# Patient Record
Sex: MALE | Race: WHITE | HISPANIC OR LATINO | URBAN - METROPOLITAN AREA
[De-identification: names, ages, dates, MRNs, and addresses within clinical notes are randomized per-mention and may not be internally consistent; named-entity substitution may affect disease eponyms.]

---

## 2023-01-01 ENCOUNTER — INPATIENT (INPATIENT)
Facility: HOSPITAL | Age: 68
LOS: 10 days | Discharge: HOME HEALTH | DRG: 693 | End: 2023-05-16
Attending: STUDENT IN AN ORGANIZED HEALTH CARE EDUCATION/TRAINING PROGRAM | Admitting: STUDENT IN AN ORGANIZED HEALTH CARE EDUCATION/TRAINING PROGRAM
Payer: MEDICARE

## 2023-01-01 ENCOUNTER — RESULT REVIEW (OUTPATIENT)
Age: 68
End: 2023-01-01

## 2023-01-01 ENCOUNTER — INPATIENT (INPATIENT)
Facility: HOSPITAL | Age: 68
LOS: 30 days | DRG: 853 | End: 2023-07-19
Attending: UROLOGY | Admitting: SURGERY
Payer: MEDICARE

## 2023-01-01 ENCOUNTER — TRANSCRIPTION ENCOUNTER (OUTPATIENT)
Age: 68
End: 2023-01-01

## 2023-01-01 ENCOUNTER — APPOINTMENT (OUTPATIENT)
Dept: UROLOGY | Facility: HOSPITAL | Age: 68
End: 2023-01-01

## 2023-01-01 ENCOUNTER — NON-APPOINTMENT (OUTPATIENT)
Age: 68
End: 2023-01-01

## 2023-01-01 ENCOUNTER — APPOINTMENT (OUTPATIENT)
Dept: UROLOGY | Facility: CLINIC | Age: 68
End: 2023-01-01
Payer: MEDICARE

## 2023-01-01 VITALS
DIASTOLIC BLOOD PRESSURE: 78 MMHG | SYSTOLIC BLOOD PRESSURE: 125 MMHG | HEART RATE: 110 BPM | RESPIRATION RATE: 32 BRPM | OXYGEN SATURATION: 99 % | TEMPERATURE: 98 F | HEIGHT: 70.98 IN

## 2023-01-01 VITALS
DIASTOLIC BLOOD PRESSURE: 89 MMHG | HEIGHT: 65 IN | OXYGEN SATURATION: 100 % | HEART RATE: 95 BPM | WEIGHT: 283.96 LBS | RESPIRATION RATE: 17 BRPM | TEMPERATURE: 98 F | BODY MASS INDEX: 26.66 KG/M2 | SYSTOLIC BLOOD PRESSURE: 193 MMHG | WEIGHT: 160 LBS

## 2023-01-01 VITALS
HEART RATE: 84 BPM | SYSTOLIC BLOOD PRESSURE: 120 MMHG | DIASTOLIC BLOOD PRESSURE: 81 MMHG | RESPIRATION RATE: 18 BRPM | TEMPERATURE: 100 F

## 2023-01-01 VITALS — OXYGEN SATURATION: 76 % | RESPIRATION RATE: 28 BRPM

## 2023-01-01 DIAGNOSIS — N13.30 UNSPECIFIED HYDRONEPHROSIS: ICD-10-CM

## 2023-01-01 DIAGNOSIS — N15.1 RENAL AND PERINEPHRIC ABSCESS: ICD-10-CM

## 2023-01-01 DIAGNOSIS — I87.1 COMPRESSION OF VEIN: ICD-10-CM

## 2023-01-01 DIAGNOSIS — Z51.5 ENCOUNTER FOR PALLIATIVE CARE: ICD-10-CM

## 2023-01-01 DIAGNOSIS — Z87.891 PERSONAL HISTORY OF NICOTINE DEPENDENCE: ICD-10-CM

## 2023-01-01 DIAGNOSIS — R93.49 ABNORMAL RADIOLOGIC FINDINGS ON DIAGNOSITIC IMAGING OF OTHER URINARY ORGANS: ICD-10-CM

## 2023-01-01 DIAGNOSIS — K29.00 ACUTE GASTRITIS WITHOUT BLEEDING: ICD-10-CM

## 2023-01-01 DIAGNOSIS — D50.9 IRON DEFICIENCY ANEMIA, UNSPECIFIED: ICD-10-CM

## 2023-01-01 DIAGNOSIS — R19.00 INTRA-ABDOMINAL AND PELVIC SWELLING, MASS AND LUMP, UNSPECIFIED SITE: ICD-10-CM

## 2023-01-01 DIAGNOSIS — N28.81 HYPERTROPHY OF KIDNEY: ICD-10-CM

## 2023-01-01 DIAGNOSIS — R73.03 PREDIABETES: ICD-10-CM

## 2023-01-01 DIAGNOSIS — D64.9 ANEMIA, UNSPECIFIED: ICD-10-CM

## 2023-01-01 DIAGNOSIS — N39.0 URINARY TRACT INFECTION, SITE NOT SPECIFIED: ICD-10-CM

## 2023-01-01 DIAGNOSIS — N28.1 CYST OF KIDNEY, ACQUIRED: ICD-10-CM

## 2023-01-01 DIAGNOSIS — E43 UNSPECIFIED SEVERE PROTEIN-CALORIE MALNUTRITION: ICD-10-CM

## 2023-01-01 DIAGNOSIS — R47.81 SLURRED SPEECH: ICD-10-CM

## 2023-01-01 DIAGNOSIS — J96.01 ACUTE RESPIRATORY FAILURE WITH HYPOXIA: ICD-10-CM

## 2023-01-01 DIAGNOSIS — A04.8 OTHER SPECIFIED BACTERIAL INTESTINAL INFECTIONS: ICD-10-CM

## 2023-01-01 DIAGNOSIS — Z78.9 OTHER SPECIFIED HEALTH STATUS: ICD-10-CM

## 2023-01-01 DIAGNOSIS — R53.1 WEAKNESS: ICD-10-CM

## 2023-01-01 DIAGNOSIS — I48.91 UNSPECIFIED ATRIAL FIBRILLATION: ICD-10-CM

## 2023-01-01 DIAGNOSIS — A41.9 SEPSIS, UNSPECIFIED ORGANISM: ICD-10-CM

## 2023-01-01 DIAGNOSIS — K44.9 DIAPHRAGMATIC HERNIA WITHOUT OBSTRUCTION OR GANGRENE: ICD-10-CM

## 2023-01-01 DIAGNOSIS — K31.9 DISEASE OF STOMACH AND DUODENUM, UNSPECIFIED: ICD-10-CM

## 2023-01-01 DIAGNOSIS — K76.0 FATTY (CHANGE OF) LIVER, NOT ELSEWHERE CLASSIFIED: ICD-10-CM

## 2023-01-01 LAB
-  AMPICILLIN: SIGNIFICANT CHANGE UP
-  DAPTOMYCIN: SIGNIFICANT CHANGE UP
-  LEVOFLOXACIN: SIGNIFICANT CHANGE UP
-  LINEZOLID: SIGNIFICANT CHANGE UP
-  MINOCYCLINE: SIGNIFICANT CHANGE UP
-  TETRACYCLINE: SIGNIFICANT CHANGE UP
-  TRIMETHOPRIM/SULFAMETHOXAZOLE: SIGNIFICANT CHANGE UP
-  TRIMETHOPRIM/SULFAMETHOXAZOLE: SIGNIFICANT CHANGE UP
-  VANCOMYCIN: SIGNIFICANT CHANGE UP
24R-OH-CALCIDIOL SERPL-MCNC: 26 NG/ML — LOW (ref 30–80)
A1C WITH ESTIMATED AVERAGE GLUCOSE RESULT: 5 % — SIGNIFICANT CHANGE UP (ref 4–5.6)
ACANTHOCYTES BLD QL SMEAR: SLIGHT — SIGNIFICANT CHANGE UP
ACANTHOCYTES BLD QL SMEAR: SLIGHT — SIGNIFICANT CHANGE UP
ALBUMIN SERPL ELPH-MCNC: 1.2 G/DL — LOW (ref 3.5–5.2)
ALBUMIN SERPL ELPH-MCNC: 1.3 G/DL — LOW (ref 3.5–5.2)
ALBUMIN SERPL ELPH-MCNC: 1.3 G/DL — LOW (ref 3.5–5.2)
ALBUMIN SERPL ELPH-MCNC: 1.6 G/DL — LOW (ref 3.5–5.2)
ALBUMIN SERPL ELPH-MCNC: 1.6 G/DL — LOW (ref 3.5–5.2)
ALBUMIN SERPL ELPH-MCNC: 1.8 G/DL — LOW (ref 3.5–5.2)
ALBUMIN SERPL ELPH-MCNC: 1.9 G/DL — LOW (ref 3.5–5.2)
ALBUMIN SERPL ELPH-MCNC: 2 G/DL — LOW (ref 3.5–5.2)
ALBUMIN SERPL ELPH-MCNC: 2.1 G/DL — LOW (ref 3.5–5.2)
ALBUMIN SERPL ELPH-MCNC: 2.2 G/DL — LOW (ref 3.5–5.2)
ALBUMIN SERPL ELPH-MCNC: 2.3 G/DL — LOW (ref 3.5–5.2)
ALBUMIN SERPL ELPH-MCNC: 2.3 G/DL — LOW (ref 3.5–5.2)
ALBUMIN SERPL ELPH-MCNC: 2.4 G/DL — LOW (ref 3.5–5.2)
ALBUMIN SERPL ELPH-MCNC: 2.4 G/DL — LOW (ref 3.5–5.2)
ALBUMIN SERPL ELPH-MCNC: 2.5 G/DL — LOW (ref 3.5–5.2)
ALBUMIN SERPL ELPH-MCNC: 2.5 G/DL — LOW (ref 3.5–5.2)
ALBUMIN SERPL ELPH-MCNC: 2.6 G/DL — LOW (ref 3.5–5.2)
ALBUMIN SERPL ELPH-MCNC: 2.8 G/DL — LOW (ref 3.5–5.2)
ALBUMIN SERPL ELPH-MCNC: 2.9 G/DL — LOW (ref 3.5–5.2)
ALBUMIN SERPL ELPH-MCNC: 3 G/DL — LOW (ref 3.5–5.2)
ALBUMIN SERPL ELPH-MCNC: 3 G/DL — LOW (ref 3.5–5.2)
ALBUMIN SERPL ELPH-MCNC: 3.1 G/DL — LOW (ref 3.5–5.2)
ALP SERPL-CCNC: 106 U/L — SIGNIFICANT CHANGE UP (ref 30–115)
ALP SERPL-CCNC: 113 U/L — SIGNIFICANT CHANGE UP (ref 30–115)
ALP SERPL-CCNC: 127 U/L — HIGH (ref 30–115)
ALP SERPL-CCNC: 179 U/L — HIGH (ref 30–115)
ALP SERPL-CCNC: 43 U/L — SIGNIFICANT CHANGE UP (ref 30–115)
ALP SERPL-CCNC: 46 U/L — SIGNIFICANT CHANGE UP (ref 30–115)
ALP SERPL-CCNC: 47 U/L — SIGNIFICANT CHANGE UP (ref 30–115)
ALP SERPL-CCNC: 48 U/L — SIGNIFICANT CHANGE UP (ref 30–115)
ALP SERPL-CCNC: 49 U/L — SIGNIFICANT CHANGE UP (ref 30–115)
ALP SERPL-CCNC: 50 U/L — SIGNIFICANT CHANGE UP (ref 30–115)
ALP SERPL-CCNC: 50 U/L — SIGNIFICANT CHANGE UP (ref 30–115)
ALP SERPL-CCNC: 51 U/L — SIGNIFICANT CHANGE UP (ref 30–115)
ALP SERPL-CCNC: 52 U/L — SIGNIFICANT CHANGE UP (ref 30–115)
ALP SERPL-CCNC: 54 U/L — SIGNIFICANT CHANGE UP (ref 30–115)
ALP SERPL-CCNC: 82 U/L — SIGNIFICANT CHANGE UP (ref 30–115)
ALP SERPL-CCNC: 83 U/L — SIGNIFICANT CHANGE UP (ref 30–115)
ALP SERPL-CCNC: 84 U/L — SIGNIFICANT CHANGE UP (ref 30–115)
ALP SERPL-CCNC: 86 U/L — SIGNIFICANT CHANGE UP (ref 30–115)
ALP SERPL-CCNC: 87 U/L — SIGNIFICANT CHANGE UP (ref 30–115)
ALP SERPL-CCNC: 95 U/L — SIGNIFICANT CHANGE UP (ref 30–115)
ALP SERPL-CCNC: 97 U/L — SIGNIFICANT CHANGE UP (ref 30–115)
ALP SERPL-CCNC: 98 U/L — SIGNIFICANT CHANGE UP (ref 30–115)
ALT FLD-CCNC: 1074 U/L — HIGH (ref 0–41)
ALT FLD-CCNC: 11 U/L — SIGNIFICANT CHANGE UP (ref 0–41)
ALT FLD-CCNC: 12 U/L — SIGNIFICANT CHANGE UP (ref 0–41)
ALT FLD-CCNC: 13 U/L — SIGNIFICANT CHANGE UP (ref 0–41)
ALT FLD-CCNC: 14 U/L — SIGNIFICANT CHANGE UP (ref 0–41)
ALT FLD-CCNC: 16 U/L — SIGNIFICANT CHANGE UP (ref 0–41)
ALT FLD-CCNC: 16 U/L — SIGNIFICANT CHANGE UP (ref 0–41)
ALT FLD-CCNC: 22 U/L — SIGNIFICANT CHANGE UP (ref 0–41)
ALT FLD-CCNC: 28 U/L — SIGNIFICANT CHANGE UP (ref 0–41)
ALT FLD-CCNC: 31 U/L — SIGNIFICANT CHANGE UP (ref 0–41)
ALT FLD-CCNC: 33 U/L — SIGNIFICANT CHANGE UP (ref 0–41)
ALT FLD-CCNC: 34 U/L — SIGNIFICANT CHANGE UP (ref 0–41)
ALT FLD-CCNC: 34 U/L — SIGNIFICANT CHANGE UP (ref 0–41)
ALT FLD-CCNC: 40 U/L — SIGNIFICANT CHANGE UP (ref 0–41)
ALT FLD-CCNC: 42 U/L — HIGH (ref 0–41)
ALT FLD-CCNC: 49 U/L — HIGH (ref 0–41)
ALT FLD-CCNC: 51 U/L — HIGH (ref 0–41)
ALT FLD-CCNC: 54 U/L — HIGH (ref 0–41)
ALT FLD-CCNC: 7 U/L — SIGNIFICANT CHANGE UP (ref 0–41)
ALT FLD-CCNC: 8 U/L — SIGNIFICANT CHANGE UP (ref 0–41)
ALT FLD-CCNC: 9 U/L — SIGNIFICANT CHANGE UP (ref 0–41)
AMMONIA BLD-MCNC: 25 UMOL/L — SIGNIFICANT CHANGE UP (ref 11–55)
AMMONIA BLD-MCNC: 26 UMOL/L — SIGNIFICANT CHANGE UP (ref 11–55)
AMMONIA BLD-MCNC: 33 UMOL/L — SIGNIFICANT CHANGE UP (ref 11–55)
AMYLASE FLD-CCNC: 4565 U/L — SIGNIFICANT CHANGE UP
ANION GAP SERPL CALC-SCNC: 10 MMOL/L — SIGNIFICANT CHANGE UP (ref 7–14)
ANION GAP SERPL CALC-SCNC: 11 MMOL/L — SIGNIFICANT CHANGE UP (ref 7–14)
ANION GAP SERPL CALC-SCNC: 12 MMOL/L — SIGNIFICANT CHANGE UP (ref 7–14)
ANION GAP SERPL CALC-SCNC: 13 MMOL/L — SIGNIFICANT CHANGE UP (ref 7–14)
ANION GAP SERPL CALC-SCNC: 14 MMOL/L — SIGNIFICANT CHANGE UP (ref 7–14)
ANION GAP SERPL CALC-SCNC: 15 MMOL/L — HIGH (ref 7–14)
ANION GAP SERPL CALC-SCNC: 15 MMOL/L — HIGH (ref 7–14)
ANION GAP SERPL CALC-SCNC: 16 MMOL/L — HIGH (ref 7–14)
ANION GAP SERPL CALC-SCNC: 17 MMOL/L — HIGH (ref 7–14)
ANION GAP SERPL CALC-SCNC: 18 MMOL/L — HIGH (ref 7–14)
ANION GAP SERPL CALC-SCNC: 19 MMOL/L — HIGH (ref 7–14)
ANION GAP SERPL CALC-SCNC: 20 MMOL/L — HIGH (ref 7–14)
ANION GAP SERPL CALC-SCNC: 20 MMOL/L — HIGH (ref 7–14)
ANION GAP SERPL CALC-SCNC: 21 MMOL/L — HIGH (ref 7–14)
ANION GAP SERPL CALC-SCNC: 22 MMOL/L — HIGH (ref 7–14)
ANION GAP SERPL CALC-SCNC: 8 MMOL/L — SIGNIFICANT CHANGE UP (ref 7–14)
ANION GAP SERPL CALC-SCNC: 9 MMOL/L — SIGNIFICANT CHANGE UP (ref 7–14)
ANISOCYTOSIS BLD QL: SIGNIFICANT CHANGE UP
ANISOCYTOSIS BLD QL: SIGNIFICANT CHANGE UP
ANISOCYTOSIS BLD QL: SLIGHT — SIGNIFICANT CHANGE UP
APPEARANCE UR: ABNORMAL
APPEARANCE UR: ABNORMAL
APPEARANCE UR: CLEAR — SIGNIFICANT CHANGE UP
APPEARANCE UR: CLEAR — SIGNIFICANT CHANGE UP
APTT BLD: 108.8 SEC — CRITICAL HIGH (ref 27–39.2)
APTT BLD: 26.7 SEC — LOW (ref 27–39.2)
APTT BLD: 28.8 SEC — SIGNIFICANT CHANGE UP (ref 27–39.2)
APTT BLD: 28.9 SEC — SIGNIFICANT CHANGE UP (ref 27–39.2)
APTT BLD: 29.2 SEC — SIGNIFICANT CHANGE UP (ref 27–39.2)
APTT BLD: 29.5 SEC — SIGNIFICANT CHANGE UP (ref 27–39.2)
APTT BLD: 29.6 SEC — SIGNIFICANT CHANGE UP (ref 27–39.2)
APTT BLD: 29.8 SEC — SIGNIFICANT CHANGE UP (ref 27–39.2)
APTT BLD: 30.5 SEC — SIGNIFICANT CHANGE UP (ref 27–39.2)
APTT BLD: 30.6 SEC — SIGNIFICANT CHANGE UP (ref 27–39.2)
APTT BLD: 31.1 SEC — SIGNIFICANT CHANGE UP (ref 27–39.2)
APTT BLD: 33.2 SEC — SIGNIFICANT CHANGE UP (ref 27–39.2)
APTT BLD: 34 SEC — SIGNIFICANT CHANGE UP (ref 27–39.2)
APTT BLD: 35.4 SEC — SIGNIFICANT CHANGE UP (ref 27–39.2)
APTT BLD: 35.6 SEC — SIGNIFICANT CHANGE UP (ref 27–39.2)
APTT BLD: 36.7 SEC — SIGNIFICANT CHANGE UP (ref 27–39.2)
APTT BLD: 37.3 SEC — SIGNIFICANT CHANGE UP (ref 27–39.2)
APTT BLD: 37.6 SEC — SIGNIFICANT CHANGE UP (ref 27–39.2)
APTT BLD: 43.1 SEC — HIGH (ref 27–39.2)
APTT BLD: 44.8 SEC — HIGH (ref 27–39.2)
APTT BLD: 45 SEC — HIGH (ref 27–39.2)
APTT BLD: 46.5 SEC — HIGH (ref 27–39.2)
APTT BLD: 49.1 SEC — HIGH (ref 27–39.2)
APTT BLD: 49.5 SEC — HIGH (ref 27–39.2)
APTT BLD: 56.3 SEC — HIGH (ref 27–39.2)
APTT BLD: 62 SEC — HIGH (ref 27–39.2)
APTT BLD: 65.1 SEC — HIGH (ref 27–39.2)
APTT BLD: 65.7 SEC — HIGH (ref 27–39.2)
APTT BLD: 66.9 SEC — HIGH (ref 27–39.2)
APTT BLD: 69.2 SEC — HIGH (ref 27–39.2)
APTT BLD: 69.9 SEC — HIGH (ref 27–39.2)
APTT BLD: 75.4 SEC — CRITICAL HIGH (ref 27–39.2)
APTT BLD: 81.4 SEC — CRITICAL HIGH (ref 27–39.2)
APTT BLD: 81.7 SEC — CRITICAL HIGH (ref 27–39.2)
APTT BLD: 93.6 SEC — CRITICAL HIGH (ref 27–39.2)
APTT BLD: 99.6 SEC — CRITICAL HIGH (ref 27–39.2)
AST SERPL-CCNC: 11 U/L — SIGNIFICANT CHANGE UP (ref 0–41)
AST SERPL-CCNC: 12 U/L — SIGNIFICANT CHANGE UP (ref 0–41)
AST SERPL-CCNC: 14 U/L — SIGNIFICANT CHANGE UP (ref 0–41)
AST SERPL-CCNC: 15 U/L — SIGNIFICANT CHANGE UP (ref 0–41)
AST SERPL-CCNC: 20 U/L — SIGNIFICANT CHANGE UP (ref 0–41)
AST SERPL-CCNC: 21 U/L — SIGNIFICANT CHANGE UP (ref 0–41)
AST SERPL-CCNC: 23 U/L — SIGNIFICANT CHANGE UP (ref 0–41)
AST SERPL-CCNC: 24 U/L — SIGNIFICANT CHANGE UP (ref 0–41)
AST SERPL-CCNC: 28 U/L — SIGNIFICANT CHANGE UP (ref 0–41)
AST SERPL-CCNC: 30 U/L — SIGNIFICANT CHANGE UP (ref 0–41)
AST SERPL-CCNC: 31 U/L — SIGNIFICANT CHANGE UP (ref 0–41)
AST SERPL-CCNC: 34 U/L — SIGNIFICANT CHANGE UP (ref 0–41)
AST SERPL-CCNC: 35 U/L — SIGNIFICANT CHANGE UP (ref 0–41)
AST SERPL-CCNC: 36 U/L — SIGNIFICANT CHANGE UP (ref 0–41)
AST SERPL-CCNC: 36 U/L — SIGNIFICANT CHANGE UP (ref 0–41)
AST SERPL-CCNC: 45 U/L — HIGH (ref 0–41)
AST SERPL-CCNC: 51 U/L — HIGH (ref 0–41)
AST SERPL-CCNC: 52 U/L — HIGH (ref 0–41)
AST SERPL-CCNC: 57 U/L — HIGH (ref 0–41)
AST SERPL-CCNC: >7000 U/L — HIGH (ref 0–41)
BACTERIA # UR AUTO: ABNORMAL
BACTERIA # UR AUTO: ABNORMAL
BACTERIA # UR AUTO: NEGATIVE — SIGNIFICANT CHANGE UP
BASE EXCESS BLDA CALC-SCNC: -0.8 MMOL/L — SIGNIFICANT CHANGE UP (ref -2–3)
BASE EXCESS BLDA CALC-SCNC: -1.4 MMOL/L — SIGNIFICANT CHANGE UP (ref -2–3)
BASE EXCESS BLDA CALC-SCNC: -11.7 MMOL/L — LOW (ref -2–3)
BASE EXCESS BLDA CALC-SCNC: -3.2 MMOL/L — LOW (ref -2–3)
BASE EXCESS BLDA CALC-SCNC: -4.9 MMOL/L — LOW (ref -2–3)
BASE EXCESS BLDA CALC-SCNC: -6 MMOL/L — LOW (ref -2–3)
BASE EXCESS BLDA CALC-SCNC: -9.6 MMOL/L — LOW (ref -2–3)
BASE EXCESS BLDA CALC-SCNC: -9.6 MMOL/L — LOW (ref -2–3)
BASE EXCESS BLDV CALC-SCNC: -7.1 MMOL/L — LOW (ref -2–3)
BASE EXCESS BLDV CALC-SCNC: -8.6 MMOL/L — LOW (ref -2–3)
BASE EXCESS BLDV CALC-SCNC: 1 MMOL/L — SIGNIFICANT CHANGE UP (ref -2–3)
BASOPHILS # BLD AUTO: 0 K/UL — SIGNIFICANT CHANGE UP (ref 0–0.2)
BASOPHILS # BLD AUTO: 0.01 K/UL — SIGNIFICANT CHANGE UP (ref 0–0.2)
BASOPHILS # BLD AUTO: 0.02 K/UL — SIGNIFICANT CHANGE UP (ref 0–0.2)
BASOPHILS # BLD AUTO: 0.03 K/UL — SIGNIFICANT CHANGE UP (ref 0–0.2)
BASOPHILS # BLD AUTO: 0.04 K/UL — SIGNIFICANT CHANGE UP (ref 0–0.2)
BASOPHILS # BLD AUTO: 0.04 K/UL — SIGNIFICANT CHANGE UP (ref 0–0.2)
BASOPHILS # BLD AUTO: 0.06 K/UL — SIGNIFICANT CHANGE UP (ref 0–0.2)
BASOPHILS # BLD AUTO: 0.07 K/UL — SIGNIFICANT CHANGE UP (ref 0–0.2)
BASOPHILS # BLD AUTO: 0.08 K/UL — SIGNIFICANT CHANGE UP (ref 0–0.2)
BASOPHILS # BLD AUTO: 0.09 K/UL — SIGNIFICANT CHANGE UP (ref 0–0.2)
BASOPHILS # BLD AUTO: 0.1 K/UL — SIGNIFICANT CHANGE UP (ref 0–0.2)
BASOPHILS # BLD AUTO: 0.11 K/UL — SIGNIFICANT CHANGE UP (ref 0–0.2)
BASOPHILS # BLD AUTO: 0.13 K/UL — SIGNIFICANT CHANGE UP (ref 0–0.2)
BASOPHILS # BLD AUTO: 0.14 K/UL — SIGNIFICANT CHANGE UP (ref 0–0.2)
BASOPHILS # BLD AUTO: 0.17 K/UL — SIGNIFICANT CHANGE UP (ref 0–0.2)
BASOPHILS # BLD AUTO: 0.23 K/UL — HIGH (ref 0–0.2)
BASOPHILS NFR BLD AUTO: 0 % — SIGNIFICANT CHANGE UP (ref 0–1)
BASOPHILS NFR BLD AUTO: 0.1 % — SIGNIFICANT CHANGE UP (ref 0–1)
BASOPHILS NFR BLD AUTO: 0.2 % — SIGNIFICANT CHANGE UP (ref 0–1)
BASOPHILS NFR BLD AUTO: 0.3 % — SIGNIFICANT CHANGE UP (ref 0–1)
BASOPHILS NFR BLD AUTO: 0.4 % — SIGNIFICANT CHANGE UP (ref 0–1)
BASOPHILS NFR BLD AUTO: 0.4 % — SIGNIFICANT CHANGE UP (ref 0–1)
BASOPHILS NFR BLD AUTO: 0.6 % — SIGNIFICANT CHANGE UP (ref 0–1)
BASOPHILS NFR BLD AUTO: 0.7 % — SIGNIFICANT CHANGE UP (ref 0–1)
BASOPHILS NFR BLD AUTO: 0.8 % — SIGNIFICANT CHANGE UP (ref 0–1)
BASOPHILS NFR BLD AUTO: 0.8 % — SIGNIFICANT CHANGE UP (ref 0–1)
BASOPHILS NFR BLD AUTO: 1.1 % — HIGH (ref 0–1)
BASOPHILS NFR BLD AUTO: 2 % — HIGH (ref 0–1)
BILIRUB DIRECT SERPL-MCNC: 0.2 MG/DL — SIGNIFICANT CHANGE UP (ref 0–0.3)
BILIRUB DIRECT SERPL-MCNC: 0.2 MG/DL — SIGNIFICANT CHANGE UP (ref 0–0.3)
BILIRUB DIRECT SERPL-MCNC: 0.3 MG/DL — SIGNIFICANT CHANGE UP (ref 0–0.3)
BILIRUB DIRECT SERPL-MCNC: 0.4 MG/DL — HIGH (ref 0–0.3)
BILIRUB DIRECT SERPL-MCNC: 0.4 MG/DL — HIGH (ref 0–0.3)
BILIRUB DIRECT SERPL-MCNC: 0.7 MG/DL — HIGH (ref 0–0.3)
BILIRUB DIRECT SERPL-MCNC: 0.7 MG/DL — HIGH (ref 0–0.3)
BILIRUB INDIRECT FLD-MCNC: 0.1 MG/DL — LOW (ref 0.2–1.2)
BILIRUB INDIRECT FLD-MCNC: 0.1 MG/DL — LOW (ref 0.2–1.2)
BILIRUB INDIRECT FLD-MCNC: 0.2 MG/DL — SIGNIFICANT CHANGE UP (ref 0.2–1.2)
BILIRUB INDIRECT FLD-MCNC: 0.2 MG/DL — SIGNIFICANT CHANGE UP (ref 0.2–1.2)
BILIRUB INDIRECT FLD-MCNC: 0.3 MG/DL — SIGNIFICANT CHANGE UP (ref 0.2–1.2)
BILIRUB INDIRECT FLD-MCNC: 0.3 MG/DL — SIGNIFICANT CHANGE UP (ref 0.2–1.2)
BILIRUB INDIRECT FLD-MCNC: 0.7 MG/DL — SIGNIFICANT CHANGE UP (ref 0.2–1.2)
BILIRUB SERPL-MCNC: 0.2 MG/DL — SIGNIFICANT CHANGE UP (ref 0.2–1.2)
BILIRUB SERPL-MCNC: 0.3 MG/DL — SIGNIFICANT CHANGE UP (ref 0.2–1.2)
BILIRUB SERPL-MCNC: 0.4 MG/DL — SIGNIFICANT CHANGE UP (ref 0.2–1.2)
BILIRUB SERPL-MCNC: 0.5 MG/DL — SIGNIFICANT CHANGE UP (ref 0.2–1.2)
BILIRUB SERPL-MCNC: 0.6 MG/DL — SIGNIFICANT CHANGE UP (ref 0.2–1.2)
BILIRUB SERPL-MCNC: 0.7 MG/DL — SIGNIFICANT CHANGE UP (ref 0.2–1.2)
BILIRUB SERPL-MCNC: 0.8 MG/DL — SIGNIFICANT CHANGE UP (ref 0.2–1.2)
BILIRUB SERPL-MCNC: 0.8 MG/DL — SIGNIFICANT CHANGE UP (ref 0.2–1.2)
BILIRUB SERPL-MCNC: 0.9 MG/DL — SIGNIFICANT CHANGE UP (ref 0.2–1.2)
BILIRUB SERPL-MCNC: 1.3 MG/DL — HIGH (ref 0.2–1.2)
BILIRUB SERPL-MCNC: 1.4 MG/DL — HIGH (ref 0.2–1.2)
BILIRUB SERPL-MCNC: 1.9 MG/DL — HIGH (ref 0.2–1.2)
BILIRUB UR-MCNC: ABNORMAL
BILIRUB UR-MCNC: NEGATIVE — SIGNIFICANT CHANGE UP
BLD GP AB SCN SERPL QL: SIGNIFICANT CHANGE UP
BUN SERPL-MCNC: 101 MG/DL — CRITICAL HIGH (ref 10–20)
BUN SERPL-MCNC: 105 MG/DL — CRITICAL HIGH (ref 10–20)
BUN SERPL-MCNC: 106 MG/DL — CRITICAL HIGH (ref 10–20)
BUN SERPL-MCNC: 107 MG/DL — CRITICAL HIGH (ref 10–20)
BUN SERPL-MCNC: 110 MG/DL — CRITICAL HIGH (ref 10–20)
BUN SERPL-MCNC: 111 MG/DL — CRITICAL HIGH (ref 10–20)
BUN SERPL-MCNC: 111 MG/DL — CRITICAL HIGH (ref 10–20)
BUN SERPL-MCNC: 112 MG/DL — CRITICAL HIGH (ref 10–20)
BUN SERPL-MCNC: 116 MG/DL — CRITICAL HIGH (ref 10–20)
BUN SERPL-MCNC: 120 MG/DL — CRITICAL HIGH (ref 10–20)
BUN SERPL-MCNC: 121 MG/DL — CRITICAL HIGH (ref 10–20)
BUN SERPL-MCNC: 121 MG/DL — CRITICAL HIGH (ref 10–20)
BUN SERPL-MCNC: 125 MG/DL — CRITICAL HIGH (ref 10–20)
BUN SERPL-MCNC: 13 MG/DL — SIGNIFICANT CHANGE UP (ref 10–20)
BUN SERPL-MCNC: 21 MG/DL — HIGH (ref 10–20)
BUN SERPL-MCNC: 22 MG/DL — HIGH (ref 10–20)
BUN SERPL-MCNC: 24 MG/DL — HIGH (ref 10–20)
BUN SERPL-MCNC: 25 MG/DL — HIGH (ref 10–20)
BUN SERPL-MCNC: 28 MG/DL — HIGH (ref 10–20)
BUN SERPL-MCNC: 29 MG/DL — HIGH (ref 10–20)
BUN SERPL-MCNC: 30 MG/DL — HIGH (ref 10–20)
BUN SERPL-MCNC: 31 MG/DL — HIGH (ref 10–20)
BUN SERPL-MCNC: 33 MG/DL — HIGH (ref 10–20)
BUN SERPL-MCNC: 33 MG/DL — HIGH (ref 10–20)
BUN SERPL-MCNC: 34 MG/DL — HIGH (ref 10–20)
BUN SERPL-MCNC: 35 MG/DL — HIGH (ref 10–20)
BUN SERPL-MCNC: 40 MG/DL — HIGH (ref 10–20)
BUN SERPL-MCNC: 41 MG/DL — HIGH (ref 10–20)
BUN SERPL-MCNC: 43 MG/DL — HIGH (ref 10–20)
BUN SERPL-MCNC: 43 MG/DL — HIGH (ref 10–20)
BUN SERPL-MCNC: 46 MG/DL — HIGH (ref 10–20)
BUN SERPL-MCNC: 51 MG/DL — HIGH (ref 10–20)
BUN SERPL-MCNC: 52 MG/DL — HIGH (ref 10–20)
BUN SERPL-MCNC: 57 MG/DL — HIGH (ref 10–20)
BUN SERPL-MCNC: 58 MG/DL — HIGH (ref 10–20)
BUN SERPL-MCNC: 59 MG/DL — HIGH (ref 10–20)
BUN SERPL-MCNC: 59 MG/DL — HIGH (ref 10–20)
BUN SERPL-MCNC: 60 MG/DL — HIGH (ref 10–20)
BUN SERPL-MCNC: 61 MG/DL — CRITICAL HIGH (ref 10–20)
BUN SERPL-MCNC: 67 MG/DL — CRITICAL HIGH (ref 10–20)
BUN SERPL-MCNC: 70 MG/DL — CRITICAL HIGH (ref 10–20)
BUN SERPL-MCNC: 71 MG/DL — CRITICAL HIGH (ref 10–20)
BUN SERPL-MCNC: 73 MG/DL — CRITICAL HIGH (ref 10–20)
BUN SERPL-MCNC: 73 MG/DL — CRITICAL HIGH (ref 10–20)
BUN SERPL-MCNC: 77 MG/DL — CRITICAL HIGH (ref 10–20)
BUN SERPL-MCNC: 78 MG/DL — CRITICAL HIGH (ref 10–20)
BUN SERPL-MCNC: 80 MG/DL — CRITICAL HIGH (ref 10–20)
BUN SERPL-MCNC: 83 MG/DL — CRITICAL HIGH (ref 10–20)
BUN SERPL-MCNC: 84 MG/DL — CRITICAL HIGH (ref 10–20)
BUN SERPL-MCNC: 84 MG/DL — CRITICAL HIGH (ref 10–20)
BUN SERPL-MCNC: 85 MG/DL — CRITICAL HIGH (ref 10–20)
BUN SERPL-MCNC: 86 MG/DL — CRITICAL HIGH (ref 10–20)
BUN SERPL-MCNC: 87 MG/DL — CRITICAL HIGH (ref 10–20)
BUN SERPL-MCNC: 88 MG/DL — CRITICAL HIGH (ref 10–20)
BUN SERPL-MCNC: 88 MG/DL — CRITICAL HIGH (ref 10–20)
BUN SERPL-MCNC: 90 MG/DL — CRITICAL HIGH (ref 10–20)
BUN SERPL-MCNC: 91 MG/DL — CRITICAL HIGH (ref 10–20)
BUN SERPL-MCNC: 92 MG/DL — CRITICAL HIGH (ref 10–20)
BUN SERPL-MCNC: 92 MG/DL — CRITICAL HIGH (ref 10–20)
BUN SERPL-MCNC: 93 MG/DL — CRITICAL HIGH (ref 10–20)
BUN SERPL-MCNC: 93 MG/DL — CRITICAL HIGH (ref 10–20)
BUN SERPL-MCNC: 95 MG/DL — CRITICAL HIGH (ref 10–20)
BUN SERPL-MCNC: 98 MG/DL — CRITICAL HIGH (ref 10–20)
BURR CELLS BLD QL SMEAR: PRESENT — SIGNIFICANT CHANGE UP
BURR CELLS BLD QL SMEAR: PRESENT — SIGNIFICANT CHANGE UP
CA-I BLD-SCNC: 4.7 MG/DL — SIGNIFICANT CHANGE UP (ref 4.5–5.6)
CA-I SERPL-SCNC: 1.03 MMOL/L — LOW (ref 1.15–1.33)
CA-I SERPL-SCNC: 1.2 MMOL/L — SIGNIFICANT CHANGE UP (ref 1.15–1.33)
CA-I SERPL-SCNC: 1.45 MMOL/L — HIGH (ref 1.15–1.33)
CALCIUM SERPL-MCNC: 10.2 MG/DL — SIGNIFICANT CHANGE UP (ref 8.4–10.4)
CALCIUM SERPL-MCNC: 5.3 MG/DL — CRITICAL LOW (ref 8.4–10.5)
CALCIUM SERPL-MCNC: 5.4 MG/DL — CRITICAL LOW (ref 8.4–10.5)
CALCIUM SERPL-MCNC: 5.9 MG/DL — CRITICAL LOW (ref 8.4–10.5)
CALCIUM SERPL-MCNC: 6.2 MG/DL — LOW (ref 8.4–10.5)
CALCIUM SERPL-MCNC: 6.7 MG/DL — LOW (ref 8.4–10.5)
CALCIUM SERPL-MCNC: 6.8 MG/DL — LOW (ref 8.4–10.4)
CALCIUM SERPL-MCNC: 6.9 MG/DL — LOW (ref 8.4–10.5)
CALCIUM SERPL-MCNC: 7 MG/DL — LOW (ref 8.4–10.4)
CALCIUM SERPL-MCNC: 7 MG/DL — LOW (ref 8.4–10.4)
CALCIUM SERPL-MCNC: 7 MG/DL — LOW (ref 8.4–10.5)
CALCIUM SERPL-MCNC: 7.1 MG/DL — LOW (ref 8.4–10.4)
CALCIUM SERPL-MCNC: 7.2 MG/DL — LOW (ref 8.4–10.5)
CALCIUM SERPL-MCNC: 7.3 MG/DL — LOW (ref 8.4–10.4)
CALCIUM SERPL-MCNC: 7.3 MG/DL — LOW (ref 8.4–10.5)
CALCIUM SERPL-MCNC: 7.4 MG/DL — LOW (ref 8.4–10.4)
CALCIUM SERPL-MCNC: 7.4 MG/DL — LOW (ref 8.4–10.5)
CALCIUM SERPL-MCNC: 7.5 MG/DL — LOW (ref 8.4–10.4)
CALCIUM SERPL-MCNC: 7.5 MG/DL — LOW (ref 8.4–10.5)
CALCIUM SERPL-MCNC: 7.6 MG/DL — LOW (ref 8.4–10.5)
CALCIUM SERPL-MCNC: 7.7 MG/DL — LOW (ref 8.4–10.4)
CALCIUM SERPL-MCNC: 7.7 MG/DL — LOW (ref 8.4–10.4)
CALCIUM SERPL-MCNC: 7.7 MG/DL — LOW (ref 8.4–10.5)
CALCIUM SERPL-MCNC: 7.8 MG/DL — LOW (ref 8.4–10.5)
CALCIUM SERPL-MCNC: 8 MG/DL — LOW (ref 8.4–10.4)
CALCIUM SERPL-MCNC: 8.1 MG/DL — LOW (ref 8.4–10.5)
CALCIUM SERPL-MCNC: 8.1 MG/DL — LOW (ref 8.4–10.5)
CALCIUM SERPL-MCNC: 8.2 MG/DL — LOW (ref 8.4–10.5)
CALCIUM SERPL-MCNC: 8.2 MG/DL — LOW (ref 8.4–10.5)
CALCIUM SERPL-MCNC: 8.3 MG/DL — LOW (ref 8.4–10.5)
CALCIUM SERPL-MCNC: 8.4 MG/DL — SIGNIFICANT CHANGE UP (ref 8.4–10.5)
CALCIUM SERPL-MCNC: 8.6 MG/DL — SIGNIFICANT CHANGE UP (ref 8.4–10.5)
CALCIUM SERPL-MCNC: 8.6 MG/DL — SIGNIFICANT CHANGE UP (ref 8.4–10.5)
CALCIUM SERPL-MCNC: 8.7 MG/DL — SIGNIFICANT CHANGE UP (ref 8.4–10.4)
CALCIUM SERPL-MCNC: 8.8 MG/DL — SIGNIFICANT CHANGE UP (ref 8.4–10.5)
CALCIUM SERPL-MCNC: 9 MG/DL — SIGNIFICANT CHANGE UP (ref 8.4–10.5)
CALCIUM SERPL-MCNC: 9.4 MG/DL — SIGNIFICANT CHANGE UP (ref 8.4–10.5)
CALCIUM SERPL-MCNC: 9.5 MG/DL — SIGNIFICANT CHANGE UP (ref 8.4–10.4)
CALCIUM SERPL-MCNC: 9.7 MG/DL — SIGNIFICANT CHANGE UP (ref 8.4–10.5)
CALCIUM SERPL-MCNC: 9.8 MG/DL — SIGNIFICANT CHANGE UP (ref 8.4–10.5)
CHLORIDE SERPL-SCNC: 100 MMOL/L — SIGNIFICANT CHANGE UP (ref 98–110)
CHLORIDE SERPL-SCNC: 101 MMOL/L — SIGNIFICANT CHANGE UP (ref 98–110)
CHLORIDE SERPL-SCNC: 102 MMOL/L — SIGNIFICANT CHANGE UP (ref 98–110)
CHLORIDE SERPL-SCNC: 103 MMOL/L — SIGNIFICANT CHANGE UP (ref 98–110)
CHLORIDE SERPL-SCNC: 104 MMOL/L — SIGNIFICANT CHANGE UP (ref 98–110)
CHLORIDE SERPL-SCNC: 105 MMOL/L — SIGNIFICANT CHANGE UP (ref 98–110)
CHLORIDE SERPL-SCNC: 106 MMOL/L — SIGNIFICANT CHANGE UP (ref 98–110)
CHLORIDE SERPL-SCNC: 106 MMOL/L — SIGNIFICANT CHANGE UP (ref 98–110)
CHLORIDE SERPL-SCNC: 107 MMOL/L — SIGNIFICANT CHANGE UP (ref 98–110)
CHLORIDE SERPL-SCNC: 108 MMOL/L — SIGNIFICANT CHANGE UP (ref 98–110)
CHLORIDE SERPL-SCNC: 86 MMOL/L — LOW (ref 98–110)
CHLORIDE SERPL-SCNC: 87 MMOL/L — LOW (ref 98–110)
CHLORIDE SERPL-SCNC: 89 MMOL/L — LOW (ref 98–110)
CHLORIDE SERPL-SCNC: 89 MMOL/L — LOW (ref 98–110)
CHLORIDE SERPL-SCNC: 90 MMOL/L — LOW (ref 98–110)
CHLORIDE SERPL-SCNC: 91 MMOL/L — LOW (ref 98–110)
CHLORIDE SERPL-SCNC: 92 MMOL/L — LOW (ref 98–110)
CHLORIDE SERPL-SCNC: 93 MMOL/L — LOW (ref 98–110)
CHLORIDE SERPL-SCNC: 94 MMOL/L — LOW (ref 98–110)
CHLORIDE SERPL-SCNC: 95 MMOL/L — LOW (ref 98–110)
CHLORIDE SERPL-SCNC: 96 MMOL/L — LOW (ref 98–110)
CHLORIDE SERPL-SCNC: 96 MMOL/L — LOW (ref 98–110)
CHLORIDE SERPL-SCNC: 97 MMOL/L — LOW (ref 98–110)
CHLORIDE SERPL-SCNC: 98 MMOL/L — SIGNIFICANT CHANGE UP (ref 98–110)
CHLORIDE SERPL-SCNC: 99 MMOL/L — SIGNIFICANT CHANGE UP (ref 98–110)
CHLORIDE SERPL-SCNC: 99 MMOL/L — SIGNIFICANT CHANGE UP (ref 98–110)
CHOLEST SERPL-MCNC: 58 MG/DL — SIGNIFICANT CHANGE UP
CK MB CFR SERPL CALC: 2.2 NG/ML — SIGNIFICANT CHANGE UP (ref 0.6–6.3)
CK SERPL-CCNC: 107 U/L — SIGNIFICANT CHANGE UP (ref 0–225)
CO2 SERPL-SCNC: 13 MMOL/L — LOW (ref 17–32)
CO2 SERPL-SCNC: 14 MMOL/L — LOW (ref 17–32)
CO2 SERPL-SCNC: 15 MMOL/L — LOW (ref 17–32)
CO2 SERPL-SCNC: 16 MMOL/L — LOW (ref 17–32)
CO2 SERPL-SCNC: 17 MMOL/L — SIGNIFICANT CHANGE UP (ref 17–32)
CO2 SERPL-SCNC: 18 MMOL/L — SIGNIFICANT CHANGE UP (ref 17–32)
CO2 SERPL-SCNC: 19 MMOL/L — SIGNIFICANT CHANGE UP (ref 17–32)
CO2 SERPL-SCNC: 20 MMOL/L — SIGNIFICANT CHANGE UP (ref 17–32)
CO2 SERPL-SCNC: 21 MMOL/L — SIGNIFICANT CHANGE UP (ref 17–32)
CO2 SERPL-SCNC: 22 MMOL/L — SIGNIFICANT CHANGE UP (ref 17–32)
CO2 SERPL-SCNC: 23 MMOL/L — SIGNIFICANT CHANGE UP (ref 17–32)
CO2 SERPL-SCNC: 24 MMOL/L — SIGNIFICANT CHANGE UP (ref 17–32)
CO2 SERPL-SCNC: 25 MMOL/L — SIGNIFICANT CHANGE UP (ref 17–32)
CO2 SERPL-SCNC: 26 MMOL/L — SIGNIFICANT CHANGE UP (ref 17–32)
COLOR SPEC: ABNORMAL
COLOR SPEC: ABNORMAL
COLOR SPEC: YELLOW — SIGNIFICANT CHANGE UP
COLOR SPEC: YELLOW — SIGNIFICANT CHANGE UP
COMMENT - URINE: SIGNIFICANT CHANGE UP
CORTICOSTEROID BINDING GLOBULIN RESULT: 1.1 MG/DL — LOW
CORTIS F/TOTAL MFR SERPL: 91 % — SIGNIFICANT CHANGE UP
CORTIS SERPL-MCNC: 84 UG/DL — HIGH
CORTISOL, FREE RESULT: 76 UG/DL — HIGH
CREAT ?TM UR-MCNC: 107 MG/DL — SIGNIFICANT CHANGE UP
CREAT ?TM UR-MCNC: 57 MG/DL — SIGNIFICANT CHANGE UP
CREAT ?TM UR-MCNC: 59 MG/DL — SIGNIFICANT CHANGE UP
CREAT ?TM UR-MCNC: 80 MG/DL — SIGNIFICANT CHANGE UP
CREAT FLD-MCNC: 3.99 MG/DL — SIGNIFICANT CHANGE UP
CREAT SERPL-MCNC: 0.6 MG/DL — LOW (ref 0.7–1.5)
CREAT SERPL-MCNC: 0.7 MG/DL — SIGNIFICANT CHANGE UP (ref 0.7–1.5)
CREAT SERPL-MCNC: 0.8 MG/DL — SIGNIFICANT CHANGE UP (ref 0.7–1.5)
CREAT SERPL-MCNC: 0.8 MG/DL — SIGNIFICANT CHANGE UP (ref 0.7–1.5)
CREAT SERPL-MCNC: 0.9 MG/DL — SIGNIFICANT CHANGE UP (ref 0.7–1.5)
CREAT SERPL-MCNC: 1 MG/DL — SIGNIFICANT CHANGE UP (ref 0.7–1.5)
CREAT SERPL-MCNC: 1.1 MG/DL — SIGNIFICANT CHANGE UP (ref 0.7–1.5)
CREAT SERPL-MCNC: 1.2 MG/DL — SIGNIFICANT CHANGE UP (ref 0.7–1.5)
CREAT SERPL-MCNC: 1.3 MG/DL — SIGNIFICANT CHANGE UP (ref 0.7–1.5)
CREAT SERPL-MCNC: 1.4 MG/DL — SIGNIFICANT CHANGE UP (ref 0.7–1.5)
CREAT SERPL-MCNC: 1.4 MG/DL — SIGNIFICANT CHANGE UP (ref 0.7–1.5)
CREAT SERPL-MCNC: 1.5 MG/DL — SIGNIFICANT CHANGE UP (ref 0.7–1.5)
CREAT SERPL-MCNC: 1.6 MG/DL — HIGH (ref 0.7–1.5)
CREAT SERPL-MCNC: 1.6 MG/DL — HIGH (ref 0.7–1.5)
CREAT SERPL-MCNC: 2.1 MG/DL — HIGH (ref 0.7–1.5)
CREAT SERPL-MCNC: 2.5 MG/DL — HIGH (ref 0.7–1.5)
CREAT SERPL-MCNC: 2.5 MG/DL — HIGH (ref 0.7–1.5)
CREAT SERPL-MCNC: 2.6 MG/DL — HIGH (ref 0.7–1.5)
CREAT SERPL-MCNC: 2.7 MG/DL — HIGH (ref 0.7–1.5)
CREAT SERPL-MCNC: 2.8 MG/DL — HIGH (ref 0.7–1.5)
CREAT SERPL-MCNC: 2.8 MG/DL — HIGH (ref 0.7–1.5)
CREAT SERPL-MCNC: 2.9 MG/DL — HIGH (ref 0.7–1.5)
CREAT SERPL-MCNC: 3 MG/DL — HIGH (ref 0.7–1.5)
CREAT SERPL-MCNC: 3.1 MG/DL — HIGH (ref 0.7–1.5)
CREAT SERPL-MCNC: 3.1 MG/DL — HIGH (ref 0.7–1.5)
CREAT SERPL-MCNC: 3.2 MG/DL — HIGH (ref 0.7–1.5)
CREAT SERPL-MCNC: 3.3 MG/DL — HIGH (ref 0.7–1.5)
CREAT SERPL-MCNC: 3.6 MG/DL — HIGH (ref 0.7–1.5)
CREAT SERPL-MCNC: 3.6 MG/DL — HIGH (ref 0.7–1.5)
CREAT SERPL-MCNC: 3.7 MG/DL — HIGH (ref 0.7–1.5)
CREAT SERPL-MCNC: 3.8 MG/DL — HIGH (ref 0.7–1.5)
CREAT SERPL-MCNC: 3.8 MG/DL — HIGH (ref 0.7–1.5)
CREAT SERPL-MCNC: 3.9 MG/DL — HIGH (ref 0.7–1.5)
CREAT SERPL-MCNC: 4 MG/DL — HIGH (ref 0.7–1.5)
CREAT SERPL-MCNC: 4.1 MG/DL — CRITICAL HIGH (ref 0.7–1.5)
CREAT SERPL-MCNC: 4.2 MG/DL — CRITICAL HIGH (ref 0.7–1.5)
CREAT SERPL-MCNC: <0.5 MG/DL — LOW (ref 0.7–1.5)
CRP SERPL-MCNC: 106.7 MG/L — HIGH
CRP SERPL-MCNC: 17.9 MG/L — HIGH
CRP SERPL-MCNC: 245.1 MG/L — HIGH
CRP SERPL-MCNC: 42.7 MG/L — HIGH
CULTURE RESULTS: NO GROWTH — SIGNIFICANT CHANGE UP
CULTURE RESULTS: NO GROWTH — SIGNIFICANT CHANGE UP
CULTURE RESULTS: SIGNIFICANT CHANGE UP
DIFF PNL FLD: ABNORMAL
DIFF PNL FLD: ABNORMAL
DIFF PNL FLD: NEGATIVE — SIGNIFICANT CHANGE UP
DIFF PNL FLD: NEGATIVE — SIGNIFICANT CHANGE UP
DIGOXIN SERPL-MCNC: 1 NG/ML — SIGNIFICANT CHANGE UP (ref 0.8–2)
DIGOXIN SERPL-MCNC: 1.1 NG/ML — SIGNIFICANT CHANGE UP (ref 0.8–2)
DIGOXIN SERPL-MCNC: 1.4 NG/ML — SIGNIFICANT CHANGE UP (ref 0.8–2)
DIGOXIN SERPL-MCNC: 1.7 NG/ML — SIGNIFICANT CHANGE UP (ref 0.8–2)
DIGOXIN SERPL-MCNC: 1.9 NG/ML — SIGNIFICANT CHANGE UP (ref 0.8–2)
DIGOXIN SERPL-MCNC: 2.7 NG/ML — CRITICAL HIGH (ref 0.8–2)
DIGOXIN SERPL-MCNC: 2.7 NG/ML — CRITICAL HIGH (ref 0.8–2)
DIGOXIN SERPL-MCNC: 2.8 NG/ML — CRITICAL HIGH (ref 0.8–2)
DIGOXIN SERPL-MCNC: 2.8 NG/ML — CRITICAL HIGH (ref 0.8–2)
DIGOXIN SERPL-MCNC: 2.9 NG/ML — CRITICAL HIGH (ref 0.8–2)
DIGOXIN SERPL-MCNC: 3.2 NG/ML — CRITICAL HIGH (ref 0.8–2)
DIGOXIN SERPL-MCNC: 3.2 NG/ML — CRITICAL HIGH (ref 0.8–2)
DIGOXIN SERPL-MCNC: <0.3 NG/ML — LOW (ref 0.8–2)
EGFR: 104 ML/MIN/1.73M2 — SIGNIFICANT CHANGE UP
EGFR: 117 ML/MIN/1.73M2 — SIGNIFICANT CHANGE UP
EGFR: 15 ML/MIN/1.73M2 — LOW
EGFR: 16 ML/MIN/1.73M2 — LOW
EGFR: 17 ML/MIN/1.73M2 — LOW
EGFR: 18 ML/MIN/1.73M2 — LOW
EGFR: 18 ML/MIN/1.73M2 — LOW
EGFR: 19 ML/MIN/1.73M2 — LOW
EGFR: 20 ML/MIN/1.73M2 — LOW
EGFR: 21 ML/MIN/1.73M2 — LOW
EGFR: 21 ML/MIN/1.73M2 — LOW
EGFR: 22 ML/MIN/1.73M2 — LOW
EGFR: 23 ML/MIN/1.73M2 — LOW
EGFR: 24 ML/MIN/1.73M2 — LOW
EGFR: 24 ML/MIN/1.73M2 — LOW
EGFR: 25 ML/MIN/1.73M2 — LOW
EGFR: 26 ML/MIN/1.73M2 — LOW
EGFR: 27 ML/MIN/1.73M2 — LOW
EGFR: 27 ML/MIN/1.73M2 — LOW
EGFR: 33 ML/MIN/1.73M2 — LOW
EGFR: 46 ML/MIN/1.73M2 — LOW
EGFR: 46 ML/MIN/1.73M2 — LOW
EGFR: 50 ML/MIN/1.73M2 — LOW
EGFR: 54 ML/MIN/1.73M2 — LOW
EGFR: 54 ML/MIN/1.73M2 — LOW
EGFR: 59 ML/MIN/1.73M2 — LOW
EGFR: 65 ML/MIN/1.73M2 — SIGNIFICANT CHANGE UP
EGFR: 72 ML/MIN/1.73M2 — SIGNIFICANT CHANGE UP
EGFR: 73 ML/MIN/1.73M2 — SIGNIFICANT CHANGE UP
EGFR: 73 ML/MIN/1.73M2 — SIGNIFICANT CHANGE UP
EGFR: 81 ML/MIN/1.73M2 — SIGNIFICANT CHANGE UP
EGFR: 92 ML/MIN/1.73M2 — SIGNIFICANT CHANGE UP
EGFR: 95 ML/MIN/1.73M2 — SIGNIFICANT CHANGE UP
EGFR: 95 ML/MIN/1.73M2 — SIGNIFICANT CHANGE UP
EGFR: 99 ML/MIN/1.73M2 — SIGNIFICANT CHANGE UP
EOSINOPHIL # BLD AUTO: 0 K/UL — SIGNIFICANT CHANGE UP (ref 0–0.7)
EOSINOPHIL # BLD AUTO: 0.01 K/UL — SIGNIFICANT CHANGE UP (ref 0–0.7)
EOSINOPHIL # BLD AUTO: 0.02 K/UL — SIGNIFICANT CHANGE UP (ref 0–0.7)
EOSINOPHIL # BLD AUTO: 0.03 K/UL — SIGNIFICANT CHANGE UP (ref 0–0.7)
EOSINOPHIL # BLD AUTO: 0.03 K/UL — SIGNIFICANT CHANGE UP (ref 0–0.7)
EOSINOPHIL # BLD AUTO: 0.04 K/UL — SIGNIFICANT CHANGE UP (ref 0–0.7)
EOSINOPHIL # BLD AUTO: 0.04 K/UL — SIGNIFICANT CHANGE UP (ref 0–0.7)
EOSINOPHIL # BLD AUTO: 0.06 K/UL — SIGNIFICANT CHANGE UP (ref 0–0.7)
EOSINOPHIL # BLD AUTO: 0.06 K/UL — SIGNIFICANT CHANGE UP (ref 0–0.7)
EOSINOPHIL # BLD AUTO: 0.09 K/UL — SIGNIFICANT CHANGE UP (ref 0–0.7)
EOSINOPHIL # BLD AUTO: 0.09 K/UL — SIGNIFICANT CHANGE UP (ref 0–0.7)
EOSINOPHIL # BLD AUTO: 0.12 K/UL — SIGNIFICANT CHANGE UP (ref 0–0.7)
EOSINOPHIL # BLD AUTO: 0.17 K/UL — SIGNIFICANT CHANGE UP (ref 0–0.7)
EOSINOPHIL # BLD AUTO: 0.18 K/UL — SIGNIFICANT CHANGE UP (ref 0–0.7)
EOSINOPHIL # BLD AUTO: 0.21 K/UL — SIGNIFICANT CHANGE UP (ref 0–0.7)
EOSINOPHIL # BLD AUTO: 0.22 K/UL — SIGNIFICANT CHANGE UP (ref 0–0.7)
EOSINOPHIL # BLD AUTO: 0.23 K/UL — SIGNIFICANT CHANGE UP (ref 0–0.7)
EOSINOPHIL # BLD AUTO: 0.26 K/UL — SIGNIFICANT CHANGE UP (ref 0–0.7)
EOSINOPHIL # BLD AUTO: 0.29 K/UL — SIGNIFICANT CHANGE UP (ref 0–0.7)
EOSINOPHIL # BLD AUTO: 0.33 K/UL — SIGNIFICANT CHANGE UP (ref 0–0.7)
EOSINOPHIL NFR BLD AUTO: 0 % — SIGNIFICANT CHANGE UP (ref 0–8)
EOSINOPHIL NFR BLD AUTO: 0.1 % — SIGNIFICANT CHANGE UP (ref 0–8)
EOSINOPHIL NFR BLD AUTO: 0.2 % — SIGNIFICANT CHANGE UP (ref 0–8)
EOSINOPHIL NFR BLD AUTO: 0.2 % — SIGNIFICANT CHANGE UP (ref 0–8)
EOSINOPHIL NFR BLD AUTO: 0.3 % — SIGNIFICANT CHANGE UP (ref 0–8)
EOSINOPHIL NFR BLD AUTO: 0.4 % — SIGNIFICANT CHANGE UP (ref 0–8)
EOSINOPHIL NFR BLD AUTO: 0.5 % — SIGNIFICANT CHANGE UP (ref 0–8)
EOSINOPHIL NFR BLD AUTO: 0.5 % — SIGNIFICANT CHANGE UP (ref 0–8)
EOSINOPHIL NFR BLD AUTO: 0.6 % — SIGNIFICANT CHANGE UP (ref 0–8)
EOSINOPHIL NFR BLD AUTO: 0.8 % — SIGNIFICANT CHANGE UP (ref 0–8)
EOSINOPHIL NFR BLD AUTO: 0.9 % — SIGNIFICANT CHANGE UP (ref 0–8)
EOSINOPHIL NFR BLD AUTO: 1.3 % — SIGNIFICANT CHANGE UP (ref 0–8)
EOSINOPHIL NFR BLD AUTO: 1.5 % — SIGNIFICANT CHANGE UP (ref 0–8)
EOSINOPHIL NFR BLD AUTO: 1.6 % — SIGNIFICANT CHANGE UP (ref 0–8)
EOSINOPHIL NFR BLD AUTO: 1.7 % — SIGNIFICANT CHANGE UP (ref 0–8)
EOSINOPHIL NFR BLD AUTO: 1.8 % — SIGNIFICANT CHANGE UP (ref 0–8)
EOSINOPHIL NFR BLD AUTO: 2.4 % — SIGNIFICANT CHANGE UP (ref 0–8)
EOSINOPHIL NFR BLD AUTO: 2.6 % — SIGNIFICANT CHANGE UP (ref 0–8)
EPI CELLS # UR: 13 /HPF — HIGH (ref 0–5)
EPI CELLS # UR: 17 /HPF — HIGH (ref 0–5)
EPI CELLS # UR: 3 /HPF — SIGNIFICANT CHANGE UP (ref 0–5)
ESTIMATED AVERAGE GLUCOSE: 97 MG/DL — SIGNIFICANT CHANGE UP (ref 68–114)
FERRITIN SERPL-MCNC: 526 NG/ML — HIGH (ref 30–400)
FIBRINOGEN PPP-MCNC: 353 MG/DL — SIGNIFICANT CHANGE UP (ref 204.4–570.6)
FOLATE SERPL-MCNC: 5.7 NG/ML — SIGNIFICANT CHANGE UP
FUNGITELL: 47 PG/ML — SIGNIFICANT CHANGE UP
FUNGITELL: >500 PG/ML
FUNGITELL: >500 PG/ML
GAMMA INTERFERON BACKGROUND BLD IA-ACNC: 0.01 IU/ML — SIGNIFICANT CHANGE UP
GAS PNL BLDA: SIGNIFICANT CHANGE UP
GAS PNL BLDV: 118 MMOL/L — CRITICAL LOW (ref 136–145)
GAS PNL BLDV: 120 MMOL/L — CRITICAL LOW (ref 136–145)
GAS PNL BLDV: 133 MMOL/L — LOW (ref 136–145)
GAS PNL BLDV: SIGNIFICANT CHANGE UP
GIANT PLATELETS BLD QL SMEAR: PRESENT — SIGNIFICANT CHANGE UP
GIANT PLATELETS BLD QL SMEAR: PRESENT — SIGNIFICANT CHANGE UP
GLUCOSE BLDC GLUCOMTR-MCNC: 100 MG/DL — HIGH (ref 70–99)
GLUCOSE BLDC GLUCOMTR-MCNC: 103 MG/DL — HIGH (ref 70–99)
GLUCOSE BLDC GLUCOMTR-MCNC: 104 MG/DL — HIGH (ref 70–99)
GLUCOSE BLDC GLUCOMTR-MCNC: 107 MG/DL — HIGH (ref 70–99)
GLUCOSE BLDC GLUCOMTR-MCNC: 107 MG/DL — HIGH (ref 70–99)
GLUCOSE BLDC GLUCOMTR-MCNC: 108 MG/DL — HIGH (ref 70–99)
GLUCOSE BLDC GLUCOMTR-MCNC: 110 MG/DL — HIGH (ref 70–99)
GLUCOSE BLDC GLUCOMTR-MCNC: 110 MG/DL — HIGH (ref 70–99)
GLUCOSE BLDC GLUCOMTR-MCNC: 111 MG/DL — HIGH (ref 70–99)
GLUCOSE BLDC GLUCOMTR-MCNC: 113 MG/DL — HIGH (ref 70–99)
GLUCOSE BLDC GLUCOMTR-MCNC: 115 MG/DL — HIGH (ref 70–99)
GLUCOSE BLDC GLUCOMTR-MCNC: 115 MG/DL — HIGH (ref 70–99)
GLUCOSE BLDC GLUCOMTR-MCNC: 117 MG/DL — HIGH (ref 70–99)
GLUCOSE BLDC GLUCOMTR-MCNC: 119 MG/DL — HIGH (ref 70–99)
GLUCOSE BLDC GLUCOMTR-MCNC: 120 MG/DL — HIGH (ref 70–99)
GLUCOSE BLDC GLUCOMTR-MCNC: 121 MG/DL — HIGH (ref 70–99)
GLUCOSE BLDC GLUCOMTR-MCNC: 122 MG/DL — HIGH (ref 70–99)
GLUCOSE BLDC GLUCOMTR-MCNC: 123 MG/DL — HIGH (ref 70–99)
GLUCOSE BLDC GLUCOMTR-MCNC: 123 MG/DL — HIGH (ref 70–99)
GLUCOSE BLDC GLUCOMTR-MCNC: 124 MG/DL — HIGH (ref 70–99)
GLUCOSE BLDC GLUCOMTR-MCNC: 126 MG/DL — HIGH (ref 70–99)
GLUCOSE BLDC GLUCOMTR-MCNC: 126 MG/DL — HIGH (ref 70–99)
GLUCOSE BLDC GLUCOMTR-MCNC: 127 MG/DL — HIGH (ref 70–99)
GLUCOSE BLDC GLUCOMTR-MCNC: 128 MG/DL — HIGH (ref 70–99)
GLUCOSE BLDC GLUCOMTR-MCNC: 130 MG/DL — HIGH (ref 70–99)
GLUCOSE BLDC GLUCOMTR-MCNC: 131 MG/DL — HIGH (ref 70–99)
GLUCOSE BLDC GLUCOMTR-MCNC: 133 MG/DL — HIGH (ref 70–99)
GLUCOSE BLDC GLUCOMTR-MCNC: 134 MG/DL — HIGH (ref 70–99)
GLUCOSE BLDC GLUCOMTR-MCNC: 136 MG/DL — HIGH (ref 70–99)
GLUCOSE BLDC GLUCOMTR-MCNC: 139 MG/DL — HIGH (ref 70–99)
GLUCOSE BLDC GLUCOMTR-MCNC: 141 MG/DL — HIGH (ref 70–99)
GLUCOSE BLDC GLUCOMTR-MCNC: 142 MG/DL — HIGH (ref 70–99)
GLUCOSE BLDC GLUCOMTR-MCNC: 145 MG/DL — HIGH (ref 70–99)
GLUCOSE BLDC GLUCOMTR-MCNC: 146 MG/DL — HIGH (ref 70–99)
GLUCOSE BLDC GLUCOMTR-MCNC: 146 MG/DL — HIGH (ref 70–99)
GLUCOSE BLDC GLUCOMTR-MCNC: 147 MG/DL — HIGH (ref 70–99)
GLUCOSE BLDC GLUCOMTR-MCNC: 147 MG/DL — HIGH (ref 70–99)
GLUCOSE BLDC GLUCOMTR-MCNC: 148 MG/DL — HIGH (ref 70–99)
GLUCOSE BLDC GLUCOMTR-MCNC: 149 MG/DL — HIGH (ref 70–99)
GLUCOSE BLDC GLUCOMTR-MCNC: 151 MG/DL — HIGH (ref 70–99)
GLUCOSE BLDC GLUCOMTR-MCNC: 151 MG/DL — HIGH (ref 70–99)
GLUCOSE BLDC GLUCOMTR-MCNC: 153 MG/DL — HIGH (ref 70–99)
GLUCOSE BLDC GLUCOMTR-MCNC: 154 MG/DL — HIGH (ref 70–99)
GLUCOSE BLDC GLUCOMTR-MCNC: 156 MG/DL — HIGH (ref 70–99)
GLUCOSE BLDC GLUCOMTR-MCNC: 158 MG/DL — HIGH (ref 70–99)
GLUCOSE BLDC GLUCOMTR-MCNC: 159 MG/DL — HIGH (ref 70–99)
GLUCOSE BLDC GLUCOMTR-MCNC: 161 MG/DL — HIGH (ref 70–99)
GLUCOSE BLDC GLUCOMTR-MCNC: 166 MG/DL — HIGH (ref 70–99)
GLUCOSE BLDC GLUCOMTR-MCNC: 168 MG/DL — HIGH (ref 70–99)
GLUCOSE BLDC GLUCOMTR-MCNC: 170 MG/DL — HIGH (ref 70–99)
GLUCOSE BLDC GLUCOMTR-MCNC: 170 MG/DL — HIGH (ref 70–99)
GLUCOSE BLDC GLUCOMTR-MCNC: 172 MG/DL — HIGH (ref 70–99)
GLUCOSE BLDC GLUCOMTR-MCNC: 178 MG/DL — HIGH (ref 70–99)
GLUCOSE BLDC GLUCOMTR-MCNC: 178 MG/DL — HIGH (ref 70–99)
GLUCOSE BLDC GLUCOMTR-MCNC: 179 MG/DL — HIGH (ref 70–99)
GLUCOSE BLDC GLUCOMTR-MCNC: 180 MG/DL — HIGH (ref 70–99)
GLUCOSE BLDC GLUCOMTR-MCNC: 191 MG/DL — HIGH (ref 70–99)
GLUCOSE BLDC GLUCOMTR-MCNC: 194 MG/DL — HIGH (ref 70–99)
GLUCOSE BLDC GLUCOMTR-MCNC: 194 MG/DL — HIGH (ref 70–99)
GLUCOSE BLDC GLUCOMTR-MCNC: 200 MG/DL — HIGH (ref 70–99)
GLUCOSE BLDC GLUCOMTR-MCNC: 201 MG/DL — HIGH (ref 70–99)
GLUCOSE BLDC GLUCOMTR-MCNC: 219 MG/DL — HIGH (ref 70–99)
GLUCOSE BLDC GLUCOMTR-MCNC: 22 MG/DL — CRITICAL LOW (ref 70–99)
GLUCOSE BLDC GLUCOMTR-MCNC: 220 MG/DL — HIGH (ref 70–99)
GLUCOSE BLDC GLUCOMTR-MCNC: 224 MG/DL — HIGH (ref 70–99)
GLUCOSE BLDC GLUCOMTR-MCNC: 259 MG/DL — HIGH (ref 70–99)
GLUCOSE BLDC GLUCOMTR-MCNC: 30 MG/DL — CRITICAL LOW (ref 70–99)
GLUCOSE BLDC GLUCOMTR-MCNC: 30 MG/DL — CRITICAL LOW (ref 70–99)
GLUCOSE BLDC GLUCOMTR-MCNC: 45 MG/DL — CRITICAL LOW (ref 70–99)
GLUCOSE BLDC GLUCOMTR-MCNC: 56 MG/DL — LOW (ref 70–99)
GLUCOSE BLDC GLUCOMTR-MCNC: 85 MG/DL — SIGNIFICANT CHANGE UP (ref 70–99)
GLUCOSE BLDC GLUCOMTR-MCNC: 90 MG/DL — SIGNIFICANT CHANGE UP (ref 70–99)
GLUCOSE BLDC GLUCOMTR-MCNC: 93 MG/DL — SIGNIFICANT CHANGE UP (ref 70–99)
GLUCOSE BLDC GLUCOMTR-MCNC: 94 MG/DL — SIGNIFICANT CHANGE UP (ref 70–99)
GLUCOSE BLDC GLUCOMTR-MCNC: 99 MG/DL — SIGNIFICANT CHANGE UP (ref 70–99)
GLUCOSE SERPL-MCNC: 100 MG/DL — HIGH (ref 70–99)
GLUCOSE SERPL-MCNC: 102 MG/DL — HIGH (ref 70–99)
GLUCOSE SERPL-MCNC: 102 MG/DL — HIGH (ref 70–99)
GLUCOSE SERPL-MCNC: 103 MG/DL — HIGH (ref 70–99)
GLUCOSE SERPL-MCNC: 104 MG/DL — HIGH (ref 70–99)
GLUCOSE SERPL-MCNC: 104 MG/DL — HIGH (ref 70–99)
GLUCOSE SERPL-MCNC: 105 MG/DL — HIGH (ref 70–99)
GLUCOSE SERPL-MCNC: 106 MG/DL — HIGH (ref 70–99)
GLUCOSE SERPL-MCNC: 107 MG/DL — HIGH (ref 70–99)
GLUCOSE SERPL-MCNC: 107 MG/DL — HIGH (ref 70–99)
GLUCOSE SERPL-MCNC: 110 MG/DL — HIGH (ref 70–99)
GLUCOSE SERPL-MCNC: 110 MG/DL — HIGH (ref 70–99)
GLUCOSE SERPL-MCNC: 111 MG/DL — HIGH (ref 70–99)
GLUCOSE SERPL-MCNC: 112 MG/DL — HIGH (ref 70–99)
GLUCOSE SERPL-MCNC: 114 MG/DL — HIGH (ref 70–99)
GLUCOSE SERPL-MCNC: 116 MG/DL — HIGH (ref 70–99)
GLUCOSE SERPL-MCNC: 116 MG/DL — HIGH (ref 70–99)
GLUCOSE SERPL-MCNC: 120 MG/DL — HIGH (ref 70–99)
GLUCOSE SERPL-MCNC: 124 MG/DL — HIGH (ref 70–99)
GLUCOSE SERPL-MCNC: 127 MG/DL — HIGH (ref 70–99)
GLUCOSE SERPL-MCNC: 127 MG/DL — HIGH (ref 70–99)
GLUCOSE SERPL-MCNC: 128 MG/DL — HIGH (ref 70–99)
GLUCOSE SERPL-MCNC: 129 MG/DL — HIGH (ref 70–99)
GLUCOSE SERPL-MCNC: 129 MG/DL — HIGH (ref 70–99)
GLUCOSE SERPL-MCNC: 131 MG/DL — HIGH (ref 70–99)
GLUCOSE SERPL-MCNC: 133 MG/DL — HIGH (ref 70–99)
GLUCOSE SERPL-MCNC: 135 MG/DL — HIGH (ref 70–99)
GLUCOSE SERPL-MCNC: 137 MG/DL — HIGH (ref 70–99)
GLUCOSE SERPL-MCNC: 142 MG/DL — HIGH (ref 70–99)
GLUCOSE SERPL-MCNC: 145 MG/DL — HIGH (ref 70–99)
GLUCOSE SERPL-MCNC: 149 MG/DL — HIGH (ref 70–99)
GLUCOSE SERPL-MCNC: 153 MG/DL — HIGH (ref 70–99)
GLUCOSE SERPL-MCNC: 154 MG/DL — HIGH (ref 70–99)
GLUCOSE SERPL-MCNC: 155 MG/DL — HIGH (ref 70–99)
GLUCOSE SERPL-MCNC: 160 MG/DL — HIGH (ref 70–99)
GLUCOSE SERPL-MCNC: 160 MG/DL — HIGH (ref 70–99)
GLUCOSE SERPL-MCNC: 161 MG/DL — HIGH (ref 70–99)
GLUCOSE SERPL-MCNC: 167 MG/DL — HIGH (ref 70–99)
GLUCOSE SERPL-MCNC: 168 MG/DL — HIGH (ref 70–99)
GLUCOSE SERPL-MCNC: 170 MG/DL — HIGH (ref 70–99)
GLUCOSE SERPL-MCNC: 171 MG/DL — HIGH (ref 70–99)
GLUCOSE SERPL-MCNC: 173 MG/DL — HIGH (ref 70–99)
GLUCOSE SERPL-MCNC: 174 MG/DL — HIGH (ref 70–99)
GLUCOSE SERPL-MCNC: 176 MG/DL — HIGH (ref 70–99)
GLUCOSE SERPL-MCNC: 177 MG/DL — HIGH (ref 70–99)
GLUCOSE SERPL-MCNC: 178 MG/DL — HIGH (ref 70–99)
GLUCOSE SERPL-MCNC: 183 MG/DL — HIGH (ref 70–99)
GLUCOSE SERPL-MCNC: 188 MG/DL — HIGH (ref 70–99)
GLUCOSE SERPL-MCNC: 191 MG/DL — HIGH (ref 70–99)
GLUCOSE SERPL-MCNC: 218 MG/DL — HIGH (ref 70–99)
GLUCOSE SERPL-MCNC: 68 MG/DL — LOW (ref 70–99)
GLUCOSE SERPL-MCNC: 68 MG/DL — LOW (ref 70–99)
GLUCOSE SERPL-MCNC: 69 MG/DL — LOW (ref 70–99)
GLUCOSE SERPL-MCNC: 73 MG/DL — SIGNIFICANT CHANGE UP (ref 70–99)
GLUCOSE SERPL-MCNC: 77 MG/DL — SIGNIFICANT CHANGE UP (ref 70–99)
GLUCOSE SERPL-MCNC: 80 MG/DL — SIGNIFICANT CHANGE UP (ref 70–99)
GLUCOSE SERPL-MCNC: 82 MG/DL — SIGNIFICANT CHANGE UP (ref 70–99)
GLUCOSE SERPL-MCNC: 83 MG/DL — SIGNIFICANT CHANGE UP (ref 70–99)
GLUCOSE SERPL-MCNC: 86 MG/DL — SIGNIFICANT CHANGE UP (ref 70–99)
GLUCOSE SERPL-MCNC: 86 MG/DL — SIGNIFICANT CHANGE UP (ref 70–99)
GLUCOSE SERPL-MCNC: 87 MG/DL — SIGNIFICANT CHANGE UP (ref 70–99)
GLUCOSE SERPL-MCNC: 91 MG/DL — SIGNIFICANT CHANGE UP (ref 70–99)
GLUCOSE SERPL-MCNC: 94 MG/DL — SIGNIFICANT CHANGE UP (ref 70–99)
GLUCOSE SERPL-MCNC: 94 MG/DL — SIGNIFICANT CHANGE UP (ref 70–99)
GLUCOSE SERPL-MCNC: 96 MG/DL — SIGNIFICANT CHANGE UP (ref 70–99)
GLUCOSE SERPL-MCNC: 97 MG/DL — SIGNIFICANT CHANGE UP (ref 70–99)
GLUCOSE SERPL-MCNC: 98 MG/DL — SIGNIFICANT CHANGE UP (ref 70–99)
GLUCOSE SERPL-MCNC: 99 MG/DL — SIGNIFICANT CHANGE UP (ref 70–99)
GLUCOSE UR QL: NEGATIVE — SIGNIFICANT CHANGE UP
GRAM STN FLD: SIGNIFICANT CHANGE UP
GRAN CASTS # UR COMP ASSIST: 2 /LPF — HIGH
HAPTOGLOB SERPL-MCNC: 165 MG/DL — SIGNIFICANT CHANGE UP (ref 34–200)
HCO3 BLDA-SCNC: 16 MMOL/L — LOW (ref 21–28)
HCO3 BLDA-SCNC: 17 MMOL/L — LOW (ref 21–28)
HCO3 BLDA-SCNC: 18 MMOL/L — LOW (ref 21–28)
HCO3 BLDA-SCNC: 18 MMOL/L — LOW (ref 21–28)
HCO3 BLDA-SCNC: 20 MMOL/L — LOW (ref 21–28)
HCO3 BLDA-SCNC: 20 MMOL/L — LOW (ref 21–28)
HCO3 BLDA-SCNC: 21 MMOL/L — SIGNIFICANT CHANGE UP (ref 21–28)
HCO3 BLDA-SCNC: 23 MMOL/L — SIGNIFICANT CHANGE UP (ref 21–28)
HCO3 BLDV-SCNC: 17 MMOL/L — LOW (ref 22–29)
HCO3 BLDV-SCNC: 19 MMOL/L — LOW (ref 22–29)
HCO3 BLDV-SCNC: 27 MMOL/L — SIGNIFICANT CHANGE UP (ref 22–29)
HCT VFR BLD CALC: 17.5 % — LOW (ref 42–52)
HCT VFR BLD CALC: 20.2 % — LOW (ref 42–52)
HCT VFR BLD CALC: 20.5 % — LOW (ref 42–52)
HCT VFR BLD CALC: 20.6 % — LOW (ref 42–52)
HCT VFR BLD CALC: 20.7 % — LOW (ref 42–52)
HCT VFR BLD CALC: 20.8 % — LOW (ref 42–52)
HCT VFR BLD CALC: 20.9 % — LOW (ref 42–52)
HCT VFR BLD CALC: 21.2 % — LOW (ref 42–52)
HCT VFR BLD CALC: 21.6 % — LOW (ref 42–52)
HCT VFR BLD CALC: 21.7 % — LOW (ref 42–52)
HCT VFR BLD CALC: 22.1 % — LOW (ref 42–52)
HCT VFR BLD CALC: 22.2 % — LOW (ref 42–52)
HCT VFR BLD CALC: 22.6 % — LOW (ref 42–52)
HCT VFR BLD CALC: 22.7 % — LOW (ref 42–52)
HCT VFR BLD CALC: 23.2 % — LOW (ref 42–52)
HCT VFR BLD CALC: 23.4 % — LOW (ref 42–52)
HCT VFR BLD CALC: 23.5 % — LOW (ref 42–52)
HCT VFR BLD CALC: 23.5 % — LOW (ref 42–52)
HCT VFR BLD CALC: 23.6 % — LOW (ref 42–52)
HCT VFR BLD CALC: 23.8 % — LOW (ref 42–52)
HCT VFR BLD CALC: 23.9 % — LOW (ref 42–52)
HCT VFR BLD CALC: 24 % — LOW (ref 42–52)
HCT VFR BLD CALC: 24 % — LOW (ref 42–52)
HCT VFR BLD CALC: 24.2 % — LOW (ref 42–52)
HCT VFR BLD CALC: 24.3 % — LOW (ref 42–52)
HCT VFR BLD CALC: 24.3 % — LOW (ref 42–52)
HCT VFR BLD CALC: 24.5 % — LOW (ref 42–52)
HCT VFR BLD CALC: 24.6 % — LOW (ref 42–52)
HCT VFR BLD CALC: 24.8 % — LOW (ref 42–52)
HCT VFR BLD CALC: 25 % — LOW (ref 42–52)
HCT VFR BLD CALC: 25 % — LOW (ref 42–52)
HCT VFR BLD CALC: 25.1 % — LOW (ref 42–52)
HCT VFR BLD CALC: 25.2 % — LOW (ref 42–52)
HCT VFR BLD CALC: 25.3 % — LOW (ref 42–52)
HCT VFR BLD CALC: 25.4 % — LOW (ref 42–52)
HCT VFR BLD CALC: 25.5 % — LOW (ref 42–52)
HCT VFR BLD CALC: 25.5 % — LOW (ref 42–52)
HCT VFR BLD CALC: 25.7 % — LOW (ref 42–52)
HCT VFR BLD CALC: 25.9 % — LOW (ref 42–52)
HCT VFR BLD CALC: 26.3 % — LOW (ref 42–52)
HCT VFR BLD CALC: 26.6 % — LOW (ref 42–52)
HCT VFR BLD CALC: 26.8 % — LOW (ref 42–52)
HCT VFR BLD CALC: 27.4 % — LOW (ref 42–52)
HCT VFR BLD CALC: 27.4 % — LOW (ref 42–52)
HCT VFR BLD CALC: 27.6 % — LOW (ref 42–52)
HCT VFR BLD CALC: 27.6 % — LOW (ref 42–52)
HCT VFR BLD CALC: 27.7 % — LOW (ref 42–52)
HCT VFR BLD CALC: 27.9 % — LOW (ref 42–52)
HCT VFR BLD CALC: 28 % — LOW (ref 42–52)
HCT VFR BLD CALC: 28.1 % — LOW (ref 42–52)
HCT VFR BLD CALC: 28.2 % — LOW (ref 42–52)
HCT VFR BLD CALC: 28.2 % — LOW (ref 42–52)
HCT VFR BLD CALC: 28.3 % — LOW (ref 42–52)
HCT VFR BLD CALC: 28.4 % — LOW (ref 42–52)
HCT VFR BLD CALC: 29.3 % — LOW (ref 42–52)
HCT VFR BLD CALC: 29.6 % — LOW (ref 42–52)
HCT VFR BLD CALC: 30.3 % — LOW (ref 42–52)
HCT VFR BLD CALC: 30.9 % — LOW (ref 42–52)
HCT VFR BLD CALC: 31 % — LOW (ref 42–52)
HCT VFR BLD CALC: 31.3 % — LOW (ref 42–52)
HCT VFR BLD CALC: 31.3 % — LOW (ref 42–52)
HCT VFR BLD CALC: 31.7 % — LOW (ref 42–52)
HCT VFR BLD CALC: 32 % — LOW (ref 42–52)
HCT VFR BLD CALC: 37.2 % — LOW (ref 42–52)
HCT VFR BLD CALC: 37.8 % — LOW (ref 42–52)
HCT VFR BLD CALC: 38.9 % — LOW (ref 42–52)
HCT VFR BLDA CALC: 23 % — LOW (ref 39–51)
HCT VFR BLDA CALC: 29 % — LOW (ref 39–51)
HCT VFR BLDA CALC: 32 % — LOW (ref 39–51)
HCV AB S/CO SERPL IA: 0.03 COI — SIGNIFICANT CHANGE UP
HCV AB SERPL-IMP: SIGNIFICANT CHANGE UP
HDLC SERPL-MCNC: 22 MG/DL — LOW
HEPARIN-PF4 AB RESULT: <0.6 U/ML — SIGNIFICANT CHANGE UP (ref 0–0.9)
HGB BLD CALC-MCNC: 10.8 G/DL — LOW (ref 12.6–17.4)
HGB BLD CALC-MCNC: 7.8 G/DL — LOW (ref 12.6–17.4)
HGB BLD CALC-MCNC: 9.8 G/DL — LOW (ref 12.6–17.4)
HGB BLD-MCNC: 10.2 G/DL — LOW (ref 14–18)
HGB BLD-MCNC: 10.6 G/DL — LOW (ref 14–18)
HGB BLD-MCNC: 10.6 G/DL — LOW (ref 14–18)
HGB BLD-MCNC: 10.8 G/DL — LOW (ref 14–18)
HGB BLD-MCNC: 12.3 G/DL — LOW (ref 14–18)
HGB BLD-MCNC: 12.4 G/DL — LOW (ref 14–18)
HGB BLD-MCNC: 12.8 G/DL — LOW (ref 14–18)
HGB BLD-MCNC: 5.3 G/DL — CRITICAL LOW (ref 14–18)
HGB BLD-MCNC: 6 G/DL — CRITICAL LOW (ref 14–18)
HGB BLD-MCNC: 6.5 G/DL — CRITICAL LOW (ref 14–18)
HGB BLD-MCNC: 6.5 G/DL — CRITICAL LOW (ref 14–18)
HGB BLD-MCNC: 6.6 G/DL — CRITICAL LOW (ref 14–18)
HGB BLD-MCNC: 6.6 G/DL — CRITICAL LOW (ref 14–18)
HGB BLD-MCNC: 6.7 G/DL — CRITICAL LOW (ref 14–18)
HGB BLD-MCNC: 7 G/DL — LOW (ref 14–18)
HGB BLD-MCNC: 7 G/DL — LOW (ref 14–18)
HGB BLD-MCNC: 7.1 G/DL — LOW (ref 14–18)
HGB BLD-MCNC: 7.1 G/DL — LOW (ref 14–18)
HGB BLD-MCNC: 7.2 G/DL — LOW (ref 14–18)
HGB BLD-MCNC: 7.3 G/DL — LOW (ref 14–18)
HGB BLD-MCNC: 7.5 G/DL — LOW (ref 14–18)
HGB BLD-MCNC: 7.6 G/DL — LOW (ref 14–18)
HGB BLD-MCNC: 7.7 G/DL — LOW (ref 14–18)
HGB BLD-MCNC: 7.8 G/DL — LOW (ref 14–18)
HGB BLD-MCNC: 7.9 G/DL — LOW (ref 14–18)
HGB BLD-MCNC: 8 G/DL — LOW (ref 14–18)
HGB BLD-MCNC: 8.1 G/DL — LOW (ref 14–18)
HGB BLD-MCNC: 8.1 G/DL — LOW (ref 14–18)
HGB BLD-MCNC: 8.2 G/DL — LOW (ref 14–18)
HGB BLD-MCNC: 8.2 G/DL — LOW (ref 14–18)
HGB BLD-MCNC: 8.3 G/DL — LOW (ref 14–18)
HGB BLD-MCNC: 8.4 G/DL — LOW (ref 14–18)
HGB BLD-MCNC: 8.5 G/DL — LOW (ref 14–18)
HGB BLD-MCNC: 8.5 G/DL — LOW (ref 14–18)
HGB BLD-MCNC: 8.6 G/DL — LOW (ref 14–18)
HGB BLD-MCNC: 8.7 G/DL — LOW (ref 14–18)
HGB BLD-MCNC: 8.9 G/DL — LOW (ref 14–18)
HGB BLD-MCNC: 8.9 G/DL — LOW (ref 14–18)
HGB BLD-MCNC: 9.1 G/DL — LOW (ref 14–18)
HGB BLD-MCNC: 9.3 G/DL — LOW (ref 14–18)
HGB BLD-MCNC: 9.4 G/DL — LOW (ref 14–18)
HGB BLD-MCNC: 9.5 G/DL — LOW (ref 14–18)
HGB BLD-MCNC: 9.5 G/DL — LOW (ref 14–18)
HGB BLD-MCNC: 9.7 G/DL — LOW (ref 14–18)
HOROWITZ INDEX BLDA+IHG-RTO: 30 — SIGNIFICANT CHANGE UP
HOROWITZ INDEX BLDA+IHG-RTO: 40 — SIGNIFICANT CHANGE UP
HOROWITZ INDEX BLDA+IHG-RTO: 60 — SIGNIFICANT CHANGE UP
HYALINE CASTS # UR AUTO: 114 /LPF — HIGH (ref 0–7)
HYALINE CASTS # UR AUTO: 2 /LPF — SIGNIFICANT CHANGE UP (ref 0–7)
HYALINE CASTS # UR AUTO: 28 /LPF — HIGH (ref 0–7)
HYPOCHROMIA BLD QL: SLIGHT — SIGNIFICANT CHANGE UP
IMM GRANULOCYTES NFR BLD AUTO: 0.2 % — SIGNIFICANT CHANGE UP (ref 0.1–0.3)
IMM GRANULOCYTES NFR BLD AUTO: 0.3 % — SIGNIFICANT CHANGE UP (ref 0.1–0.3)
IMM GRANULOCYTES NFR BLD AUTO: 0.3 % — SIGNIFICANT CHANGE UP (ref 0.1–0.3)
IMM GRANULOCYTES NFR BLD AUTO: 0.4 % — HIGH (ref 0.1–0.3)
IMM GRANULOCYTES NFR BLD AUTO: 0.5 % — HIGH (ref 0.1–0.3)
IMM GRANULOCYTES NFR BLD AUTO: 0.6 % — HIGH (ref 0.1–0.3)
IMM GRANULOCYTES NFR BLD AUTO: 0.7 % — HIGH (ref 0.1–0.3)
IMM GRANULOCYTES NFR BLD AUTO: 0.8 % — HIGH (ref 0.1–0.3)
IMM GRANULOCYTES NFR BLD AUTO: 0.8 % — HIGH (ref 0.1–0.3)
IMM GRANULOCYTES NFR BLD AUTO: 0.9 % — HIGH (ref 0.1–0.3)
IMM GRANULOCYTES NFR BLD AUTO: 1.4 % — HIGH (ref 0.1–0.3)
IMM GRANULOCYTES NFR BLD AUTO: 1.6 % — HIGH (ref 0.1–0.3)
IMM GRANULOCYTES NFR BLD AUTO: 1.6 % — HIGH (ref 0.1–0.3)
IMM GRANULOCYTES NFR BLD AUTO: 1.7 % — HIGH (ref 0.1–0.3)
IMM GRANULOCYTES NFR BLD AUTO: 1.7 % — HIGH (ref 0.1–0.3)
IMM GRANULOCYTES NFR BLD AUTO: 1.8 % — HIGH (ref 0.1–0.3)
IMM GRANULOCYTES NFR BLD AUTO: 1.9 % — HIGH (ref 0.1–0.3)
IMM GRANULOCYTES NFR BLD AUTO: 2.1 % — HIGH (ref 0.1–0.3)
IMM GRANULOCYTES NFR BLD AUTO: 3.2 % — HIGH (ref 0.1–0.3)
IMM GRANULOCYTES NFR BLD AUTO: 3.3 % — HIGH (ref 0.1–0.3)
IMM GRANULOCYTES NFR BLD AUTO: 3.6 % — HIGH (ref 0.1–0.3)
IMM GRANULOCYTES NFR BLD AUTO: 3.8 % — HIGH (ref 0.1–0.3)
IMM GRANULOCYTES NFR BLD AUTO: 3.9 % — HIGH (ref 0.1–0.3)
IMM GRANULOCYTES NFR BLD AUTO: 4 % — HIGH (ref 0.1–0.3)
IMM GRANULOCYTES NFR BLD AUTO: 4.4 % — HIGH (ref 0.1–0.3)
IMM GRANULOCYTES NFR BLD AUTO: 4.7 % — HIGH (ref 0.1–0.3)
INR BLD: 1.24 RATIO — SIGNIFICANT CHANGE UP (ref 0.65–1.3)
INR BLD: 1.3 RATIO — SIGNIFICANT CHANGE UP (ref 0.65–1.3)
INR BLD: 1.37 RATIO — HIGH (ref 0.65–1.3)
INR BLD: 1.47 RATIO — HIGH (ref 0.65–1.3)
INR BLD: 1.47 RATIO — HIGH (ref 0.65–1.3)
INR BLD: 1.49 RATIO — HIGH (ref 0.65–1.3)
INR BLD: 1.55 RATIO — HIGH (ref 0.65–1.3)
INR BLD: 1.59 RATIO — HIGH (ref 0.65–1.3)
INR BLD: 1.59 RATIO — HIGH (ref 0.65–1.3)
INR BLD: 1.61 RATIO — HIGH (ref 0.65–1.3)
INR BLD: 1.65 RATIO — HIGH (ref 0.65–1.3)
INR BLD: 1.71 RATIO — HIGH (ref 0.65–1.3)
INR BLD: 1.71 RATIO — HIGH (ref 0.65–1.3)
INR BLD: 1.78 RATIO — HIGH (ref 0.65–1.3)
INR BLD: 1.8 RATIO — HIGH (ref 0.65–1.3)
INR BLD: 1.88 RATIO — HIGH (ref 0.65–1.3)
INR BLD: 1.88 RATIO — HIGH (ref 0.65–1.3)
INR BLD: 2.2 RATIO — HIGH (ref 0.65–1.3)
INR BLD: 2.21 RATIO — HIGH (ref 0.65–1.3)
INR BLD: 3.07 RATIO — HIGH (ref 0.65–1.3)
INR BLD: 4.15 RATIO — HIGH (ref 0.65–1.3)
IRON SATN MFR SERPL: 12 UG/DL — LOW (ref 35–150)
IRON SATN MFR SERPL: 9 % — LOW (ref 15–50)
KETONES UR-MCNC: NEGATIVE — SIGNIFICANT CHANGE UP
LACTATE BLDV-MCNC: 1.1 MMOL/L — SIGNIFICANT CHANGE UP (ref 0.5–2)
LACTATE BLDV-MCNC: 3.6 MMOL/L — HIGH (ref 0.5–2)
LACTATE BLDV-MCNC: 6.2 MMOL/L — CRITICAL HIGH (ref 0.5–2)
LACTATE SERPL-SCNC: 1.8 MMOL/L — SIGNIFICANT CHANGE UP (ref 0.7–2)
LACTATE SERPL-SCNC: 5.3 MMOL/L — CRITICAL HIGH (ref 0.7–2)
LACTATE SERPL-SCNC: 6.4 MMOL/L — CRITICAL HIGH (ref 0.7–2)
LDH SERPL L TO P-CCNC: 267 U/L — HIGH (ref 50–242)
LDH SERPL L TO P-CCNC: 317 U/L — HIGH (ref 50–242)
LEUKOCYTE ESTERASE UR-ACNC: ABNORMAL
LEUKOCYTE ESTERASE UR-ACNC: ABNORMAL
LEUKOCYTE ESTERASE UR-ACNC: NEGATIVE — SIGNIFICANT CHANGE UP
LEUKOCYTE ESTERASE UR-ACNC: NEGATIVE — SIGNIFICANT CHANGE UP
LIDOCAIN IGE QN: 36 U/L — SIGNIFICANT CHANGE UP (ref 7–60)
LIPID PNL WITH DIRECT LDL SERPL: 18 MG/DL — SIGNIFICANT CHANGE UP
LYMPHOCYTES # BLD AUTO: 0 % — LOW (ref 20.5–51.1)
LYMPHOCYTES # BLD AUTO: 0 K/UL — LOW (ref 1.2–3.4)
LYMPHOCYTES # BLD AUTO: 0.15 K/UL — LOW (ref 1.2–3.4)
LYMPHOCYTES # BLD AUTO: 0.15 K/UL — LOW (ref 1.2–3.4)
LYMPHOCYTES # BLD AUTO: 0.19 K/UL — LOW (ref 1.2–3.4)
LYMPHOCYTES # BLD AUTO: 0.2 K/UL — LOW (ref 1.2–3.4)
LYMPHOCYTES # BLD AUTO: 0.26 K/UL — LOW (ref 1.2–3.4)
LYMPHOCYTES # BLD AUTO: 0.27 K/UL — LOW (ref 1.2–3.4)
LYMPHOCYTES # BLD AUTO: 0.28 K/UL — LOW (ref 1.2–3.4)
LYMPHOCYTES # BLD AUTO: 0.28 K/UL — LOW (ref 1.2–3.4)
LYMPHOCYTES # BLD AUTO: 0.29 K/UL — LOW (ref 1.2–3.4)
LYMPHOCYTES # BLD AUTO: 0.29 K/UL — LOW (ref 1.2–3.4)
LYMPHOCYTES # BLD AUTO: 0.31 K/UL — LOW (ref 1.2–3.4)
LYMPHOCYTES # BLD AUTO: 0.31 K/UL — LOW (ref 1.2–3.4)
LYMPHOCYTES # BLD AUTO: 0.32 K/UL — LOW (ref 1.2–3.4)
LYMPHOCYTES # BLD AUTO: 0.35 K/UL — LOW (ref 1.2–3.4)
LYMPHOCYTES # BLD AUTO: 0.36 K/UL — LOW (ref 1.2–3.4)
LYMPHOCYTES # BLD AUTO: 0.38 K/UL — LOW (ref 1.2–3.4)
LYMPHOCYTES # BLD AUTO: 0.42 K/UL — LOW (ref 1.2–3.4)
LYMPHOCYTES # BLD AUTO: 0.42 K/UL — LOW (ref 1.2–3.4)
LYMPHOCYTES # BLD AUTO: 0.44 K/UL — LOW (ref 1.2–3.4)
LYMPHOCYTES # BLD AUTO: 0.5 K/UL — LOW (ref 1.2–3.4)
LYMPHOCYTES # BLD AUTO: 0.51 K/UL — LOW (ref 1.2–3.4)
LYMPHOCYTES # BLD AUTO: 0.52 K/UL — LOW (ref 1.2–3.4)
LYMPHOCYTES # BLD AUTO: 0.53 K/UL — LOW (ref 1.2–3.4)
LYMPHOCYTES # BLD AUTO: 0.54 K/UL — LOW (ref 1.2–3.4)
LYMPHOCYTES # BLD AUTO: 0.6 % — LOW (ref 20.5–51.1)
LYMPHOCYTES # BLD AUTO: 0.73 K/UL — LOW (ref 1.2–3.4)
LYMPHOCYTES # BLD AUTO: 0.78 K/UL — LOW (ref 1.2–3.4)
LYMPHOCYTES # BLD AUTO: 0.78 K/UL — LOW (ref 1.2–3.4)
LYMPHOCYTES # BLD AUTO: 0.82 K/UL — LOW (ref 1.2–3.4)
LYMPHOCYTES # BLD AUTO: 0.9 % — LOW (ref 20.5–51.1)
LYMPHOCYTES # BLD AUTO: 0.9 K/UL — LOW (ref 1.2–3.4)
LYMPHOCYTES # BLD AUTO: 0.92 K/UL — LOW (ref 1.2–3.4)
LYMPHOCYTES # BLD AUTO: 0.94 K/UL — LOW (ref 1.2–3.4)
LYMPHOCYTES # BLD AUTO: 1.05 K/UL — LOW (ref 1.2–3.4)
LYMPHOCYTES # BLD AUTO: 1.1 % — LOW (ref 20.5–51.1)
LYMPHOCYTES # BLD AUTO: 1.13 K/UL — LOW (ref 1.2–3.4)
LYMPHOCYTES # BLD AUTO: 1.17 K/UL — LOW (ref 1.2–3.4)
LYMPHOCYTES # BLD AUTO: 1.2 % — LOW (ref 20.5–51.1)
LYMPHOCYTES # BLD AUTO: 1.22 K/UL — SIGNIFICANT CHANGE UP (ref 1.2–3.4)
LYMPHOCYTES # BLD AUTO: 1.29 K/UL — SIGNIFICANT CHANGE UP (ref 1.2–3.4)
LYMPHOCYTES # BLD AUTO: 1.3 % — LOW (ref 20.5–51.1)
LYMPHOCYTES # BLD AUTO: 1.31 K/UL — SIGNIFICANT CHANGE UP (ref 1.2–3.4)
LYMPHOCYTES # BLD AUTO: 1.33 K/UL — SIGNIFICANT CHANGE UP (ref 1.2–3.4)
LYMPHOCYTES # BLD AUTO: 1.39 K/UL — SIGNIFICANT CHANGE UP (ref 1.2–3.4)
LYMPHOCYTES # BLD AUTO: 1.4 % — LOW (ref 20.5–51.1)
LYMPHOCYTES # BLD AUTO: 1.42 K/UL — SIGNIFICANT CHANGE UP (ref 1.2–3.4)
LYMPHOCYTES # BLD AUTO: 1.43 K/UL — SIGNIFICANT CHANGE UP (ref 1.2–3.4)
LYMPHOCYTES # BLD AUTO: 1.5 % — LOW (ref 20.5–51.1)
LYMPHOCYTES # BLD AUTO: 1.55 K/UL — SIGNIFICANT CHANGE UP (ref 1.2–3.4)
LYMPHOCYTES # BLD AUTO: 1.59 K/UL — SIGNIFICANT CHANGE UP (ref 1.2–3.4)
LYMPHOCYTES # BLD AUTO: 1.59 K/UL — SIGNIFICANT CHANGE UP (ref 1.2–3.4)
LYMPHOCYTES # BLD AUTO: 1.6 % — LOW (ref 20.5–51.1)
LYMPHOCYTES # BLD AUTO: 1.6 % — LOW (ref 20.5–51.1)
LYMPHOCYTES # BLD AUTO: 1.62 K/UL — SIGNIFICANT CHANGE UP (ref 1.2–3.4)
LYMPHOCYTES # BLD AUTO: 1.64 K/UL — SIGNIFICANT CHANGE UP (ref 1.2–3.4)
LYMPHOCYTES # BLD AUTO: 1.65 K/UL — SIGNIFICANT CHANGE UP (ref 1.2–3.4)
LYMPHOCYTES # BLD AUTO: 1.7 % — LOW (ref 20.5–51.1)
LYMPHOCYTES # BLD AUTO: 1.95 K/UL — SIGNIFICANT CHANGE UP (ref 1.2–3.4)
LYMPHOCYTES # BLD AUTO: 10.6 % — LOW (ref 20.5–51.1)
LYMPHOCYTES # BLD AUTO: 11 % — LOW (ref 20.5–51.1)
LYMPHOCYTES # BLD AUTO: 11.5 % — LOW (ref 20.5–51.1)
LYMPHOCYTES # BLD AUTO: 12 % — LOW (ref 20.5–51.1)
LYMPHOCYTES # BLD AUTO: 12.4 % — LOW (ref 20.5–51.1)
LYMPHOCYTES # BLD AUTO: 13.1 % — LOW (ref 20.5–51.1)
LYMPHOCYTES # BLD AUTO: 13.3 % — LOW (ref 20.5–51.1)
LYMPHOCYTES # BLD AUTO: 2.1 % — LOW (ref 20.5–51.1)
LYMPHOCYTES # BLD AUTO: 2.3 % — LOW (ref 20.5–51.1)
LYMPHOCYTES # BLD AUTO: 2.3 K/UL — SIGNIFICANT CHANGE UP (ref 1.2–3.4)
LYMPHOCYTES # BLD AUTO: 2.4 % — LOW (ref 20.5–51.1)
LYMPHOCYTES # BLD AUTO: 2.6 % — LOW (ref 20.5–51.1)
LYMPHOCYTES # BLD AUTO: 2.7 % — LOW (ref 20.5–51.1)
LYMPHOCYTES # BLD AUTO: 2.8 % — LOW (ref 20.5–51.1)
LYMPHOCYTES # BLD AUTO: 3 % — LOW (ref 20.5–51.1)
LYMPHOCYTES # BLD AUTO: 3.2 % — LOW (ref 20.5–51.1)
LYMPHOCYTES # BLD AUTO: 3.3 % — LOW (ref 20.5–51.1)
LYMPHOCYTES # BLD AUTO: 3.4 % — LOW (ref 20.5–51.1)
LYMPHOCYTES # BLD AUTO: 3.5 % — LOW (ref 20.5–51.1)
LYMPHOCYTES # BLD AUTO: 3.9 % — LOW (ref 20.5–51.1)
LYMPHOCYTES # BLD AUTO: 4.8 % — LOW (ref 20.5–51.1)
LYMPHOCYTES # BLD AUTO: 4.9 % — LOW (ref 20.5–51.1)
LYMPHOCYTES # BLD AUTO: 4.9 % — LOW (ref 20.5–51.1)
LYMPHOCYTES # BLD AUTO: 5.2 % — LOW (ref 20.5–51.1)
LYMPHOCYTES # BLD AUTO: 6.3 % — LOW (ref 20.5–51.1)
LYMPHOCYTES # BLD AUTO: 6.4 % — LOW (ref 20.5–51.1)
LYMPHOCYTES # BLD AUTO: 8.1 % — LOW (ref 20.5–51.1)
LYMPHOCYTES # BLD AUTO: 8.3 % — LOW (ref 20.5–51.1)
LYMPHOCYTES # BLD AUTO: 9.8 % — LOW (ref 20.5–51.1)
M TB IFN-G BLD-IMP: ABNORMAL
M TB IFN-G CD4+ BCKGRND COR BLD-ACNC: 0 IU/ML — SIGNIFICANT CHANGE UP
M TB IFN-G CD4+CD8+ BCKGRND COR BLD-ACNC: 0 IU/ML — SIGNIFICANT CHANGE UP
MACROCYTES BLD QL: SIGNIFICANT CHANGE UP
MACROCYTES BLD QL: SLIGHT — SIGNIFICANT CHANGE UP
MAGNESIUM SERPL-MCNC: 1.5 MG/DL — LOW (ref 1.8–2.4)
MAGNESIUM SERPL-MCNC: 1.6 MG/DL — LOW (ref 1.8–2.4)
MAGNESIUM SERPL-MCNC: 1.8 MG/DL — SIGNIFICANT CHANGE UP (ref 1.8–2.4)
MAGNESIUM SERPL-MCNC: 1.8 MG/DL — SIGNIFICANT CHANGE UP (ref 1.8–2.4)
MAGNESIUM SERPL-MCNC: 1.9 MG/DL — SIGNIFICANT CHANGE UP (ref 1.8–2.4)
MAGNESIUM SERPL-MCNC: 2 MG/DL — SIGNIFICANT CHANGE UP (ref 1.8–2.4)
MAGNESIUM SERPL-MCNC: 2.1 MG/DL — SIGNIFICANT CHANGE UP (ref 1.8–2.4)
MAGNESIUM SERPL-MCNC: 2.2 MG/DL — SIGNIFICANT CHANGE UP (ref 1.8–2.4)
MAGNESIUM SERPL-MCNC: 2.3 MG/DL — SIGNIFICANT CHANGE UP (ref 1.8–2.4)
MAGNESIUM SERPL-MCNC: 2.4 MG/DL — SIGNIFICANT CHANGE UP (ref 1.8–2.4)
MANUAL DIF COMMENT BLD-IMP: SIGNIFICANT CHANGE UP
MANUAL SMEAR VERIFICATION: SIGNIFICANT CHANGE UP
MCHC RBC-ENTMCNC: 21.1 PG — LOW (ref 27–31)
MCHC RBC-ENTMCNC: 21.3 PG — LOW (ref 27–31)
MCHC RBC-ENTMCNC: 21.3 PG — LOW (ref 27–31)
MCHC RBC-ENTMCNC: 21.4 PG — LOW (ref 27–31)
MCHC RBC-ENTMCNC: 21.4 PG — LOW (ref 27–31)
MCHC RBC-ENTMCNC: 21.6 PG — LOW (ref 27–31)
MCHC RBC-ENTMCNC: 21.8 PG — LOW (ref 27–31)
MCHC RBC-ENTMCNC: 21.9 PG — LOW (ref 27–31)
MCHC RBC-ENTMCNC: 22 PG — LOW (ref 27–31)
MCHC RBC-ENTMCNC: 22.1 PG — LOW (ref 27–31)
MCHC RBC-ENTMCNC: 22.5 PG — LOW (ref 27–31)
MCHC RBC-ENTMCNC: 22.5 PG — LOW (ref 27–31)
MCHC RBC-ENTMCNC: 22.6 PG — LOW (ref 27–31)
MCHC RBC-ENTMCNC: 22.7 PG — LOW (ref 27–31)
MCHC RBC-ENTMCNC: 22.8 PG — LOW (ref 27–31)
MCHC RBC-ENTMCNC: 22.8 PG — LOW (ref 27–31)
MCHC RBC-ENTMCNC: 22.9 PG — LOW (ref 27–31)
MCHC RBC-ENTMCNC: 22.9 PG — LOW (ref 27–31)
MCHC RBC-ENTMCNC: 23 PG — LOW (ref 27–31)
MCHC RBC-ENTMCNC: 23.2 PG — LOW (ref 27–31)
MCHC RBC-ENTMCNC: 23.3 PG — LOW (ref 27–31)
MCHC RBC-ENTMCNC: 23.4 PG — LOW (ref 27–31)
MCHC RBC-ENTMCNC: 23.5 PG — LOW (ref 27–31)
MCHC RBC-ENTMCNC: 23.6 PG — LOW (ref 27–31)
MCHC RBC-ENTMCNC: 23.7 PG — LOW (ref 27–31)
MCHC RBC-ENTMCNC: 23.8 PG — LOW (ref 27–31)
MCHC RBC-ENTMCNC: 23.8 PG — LOW (ref 27–31)
MCHC RBC-ENTMCNC: 23.9 PG — LOW (ref 27–31)
MCHC RBC-ENTMCNC: 24 PG — LOW (ref 27–31)
MCHC RBC-ENTMCNC: 24.2 PG — LOW (ref 27–31)
MCHC RBC-ENTMCNC: 26.1 PG — LOW (ref 27–31)
MCHC RBC-ENTMCNC: 27.3 PG — SIGNIFICANT CHANGE UP (ref 27–31)
MCHC RBC-ENTMCNC: 27.3 PG — SIGNIFICANT CHANGE UP (ref 27–31)
MCHC RBC-ENTMCNC: 27.5 PG — SIGNIFICANT CHANGE UP (ref 27–31)
MCHC RBC-ENTMCNC: 27.6 PG — SIGNIFICANT CHANGE UP (ref 27–31)
MCHC RBC-ENTMCNC: 27.7 PG — SIGNIFICANT CHANGE UP (ref 27–31)
MCHC RBC-ENTMCNC: 27.8 PG — SIGNIFICANT CHANGE UP (ref 27–31)
MCHC RBC-ENTMCNC: 28 PG — SIGNIFICANT CHANGE UP (ref 27–31)
MCHC RBC-ENTMCNC: 28 PG — SIGNIFICANT CHANGE UP (ref 27–31)
MCHC RBC-ENTMCNC: 28.1 PG — SIGNIFICANT CHANGE UP (ref 27–31)
MCHC RBC-ENTMCNC: 28.4 PG — SIGNIFICANT CHANGE UP (ref 27–31)
MCHC RBC-ENTMCNC: 28.5 PG — SIGNIFICANT CHANGE UP (ref 27–31)
MCHC RBC-ENTMCNC: 28.6 PG — SIGNIFICANT CHANGE UP (ref 27–31)
MCHC RBC-ENTMCNC: 28.6 PG — SIGNIFICANT CHANGE UP (ref 27–31)
MCHC RBC-ENTMCNC: 28.7 PG — SIGNIFICANT CHANGE UP (ref 27–31)
MCHC RBC-ENTMCNC: 28.8 PG — SIGNIFICANT CHANGE UP (ref 27–31)
MCHC RBC-ENTMCNC: 28.9 PG — SIGNIFICANT CHANGE UP (ref 27–31)
MCHC RBC-ENTMCNC: 29 PG — SIGNIFICANT CHANGE UP (ref 27–31)
MCHC RBC-ENTMCNC: 29.2 G/DL — LOW (ref 32–37)
MCHC RBC-ENTMCNC: 29.2 PG — SIGNIFICANT CHANGE UP (ref 27–31)
MCHC RBC-ENTMCNC: 29.2 PG — SIGNIFICANT CHANGE UP (ref 27–31)
MCHC RBC-ENTMCNC: 29.4 G/DL — LOW (ref 32–37)
MCHC RBC-ENTMCNC: 29.4 PG — SIGNIFICANT CHANGE UP (ref 27–31)
MCHC RBC-ENTMCNC: 29.7 G/DL — LOW (ref 32–37)
MCHC RBC-ENTMCNC: 29.8 G/DL — LOW (ref 32–37)
MCHC RBC-ENTMCNC: 29.9 G/DL — LOW (ref 32–37)
MCHC RBC-ENTMCNC: 29.9 PG — SIGNIFICANT CHANGE UP (ref 27–31)
MCHC RBC-ENTMCNC: 29.9 PG — SIGNIFICANT CHANGE UP (ref 27–31)
MCHC RBC-ENTMCNC: 30 G/DL — LOW (ref 32–37)
MCHC RBC-ENTMCNC: 30.1 G/DL — LOW (ref 32–37)
MCHC RBC-ENTMCNC: 30.1 G/DL — LOW (ref 32–37)
MCHC RBC-ENTMCNC: 30.2 G/DL — LOW (ref 32–37)
MCHC RBC-ENTMCNC: 30.2 G/DL — LOW (ref 32–37)
MCHC RBC-ENTMCNC: 30.3 G/DL — LOW (ref 32–37)
MCHC RBC-ENTMCNC: 30.9 G/DL — LOW (ref 32–37)
MCHC RBC-ENTMCNC: 31 G/DL — LOW (ref 32–37)
MCHC RBC-ENTMCNC: 31.2 G/DL — LOW (ref 32–37)
MCHC RBC-ENTMCNC: 31.3 G/DL — LOW (ref 32–37)
MCHC RBC-ENTMCNC: 31.3 G/DL — LOW (ref 32–37)
MCHC RBC-ENTMCNC: 31.4 G/DL — LOW (ref 32–37)
MCHC RBC-ENTMCNC: 31.5 G/DL — LOW (ref 32–37)
MCHC RBC-ENTMCNC: 31.6 G/DL — LOW (ref 32–37)
MCHC RBC-ENTMCNC: 31.7 G/DL — LOW (ref 32–37)
MCHC RBC-ENTMCNC: 31.8 G/DL — LOW (ref 32–37)
MCHC RBC-ENTMCNC: 31.9 G/DL — LOW (ref 32–37)
MCHC RBC-ENTMCNC: 31.9 G/DL — LOW (ref 32–37)
MCHC RBC-ENTMCNC: 32 G/DL — SIGNIFICANT CHANGE UP (ref 32–37)
MCHC RBC-ENTMCNC: 32 G/DL — SIGNIFICANT CHANGE UP (ref 32–37)
MCHC RBC-ENTMCNC: 32.1 G/DL — SIGNIFICANT CHANGE UP (ref 32–37)
MCHC RBC-ENTMCNC: 32.1 G/DL — SIGNIFICANT CHANGE UP (ref 32–37)
MCHC RBC-ENTMCNC: 32.2 G/DL — SIGNIFICANT CHANGE UP (ref 32–37)
MCHC RBC-ENTMCNC: 32.3 G/DL — SIGNIFICANT CHANGE UP (ref 32–37)
MCHC RBC-ENTMCNC: 32.4 G/DL — SIGNIFICANT CHANGE UP (ref 32–37)
MCHC RBC-ENTMCNC: 32.5 G/DL — SIGNIFICANT CHANGE UP (ref 32–37)
MCHC RBC-ENTMCNC: 32.6 G/DL — SIGNIFICANT CHANGE UP (ref 32–37)
MCHC RBC-ENTMCNC: 32.7 G/DL — SIGNIFICANT CHANGE UP (ref 32–37)
MCHC RBC-ENTMCNC: 32.7 G/DL — SIGNIFICANT CHANGE UP (ref 32–37)
MCHC RBC-ENTMCNC: 32.8 G/DL — SIGNIFICANT CHANGE UP (ref 32–37)
MCHC RBC-ENTMCNC: 32.8 G/DL — SIGNIFICANT CHANGE UP (ref 32–37)
MCHC RBC-ENTMCNC: 32.9 G/DL — SIGNIFICANT CHANGE UP (ref 32–37)
MCHC RBC-ENTMCNC: 33 G/DL — SIGNIFICANT CHANGE UP (ref 32–37)
MCHC RBC-ENTMCNC: 33.1 G/DL — SIGNIFICANT CHANGE UP (ref 32–37)
MCHC RBC-ENTMCNC: 33.1 G/DL — SIGNIFICANT CHANGE UP (ref 32–37)
MCHC RBC-ENTMCNC: 33.2 G/DL — SIGNIFICANT CHANGE UP (ref 32–37)
MCHC RBC-ENTMCNC: 33.3 G/DL — SIGNIFICANT CHANGE UP (ref 32–37)
MCHC RBC-ENTMCNC: 33.5 G/DL — SIGNIFICANT CHANGE UP (ref 32–37)
MCHC RBC-ENTMCNC: 33.6 G/DL — SIGNIFICANT CHANGE UP (ref 32–37)
MCHC RBC-ENTMCNC: 33.8 G/DL — SIGNIFICANT CHANGE UP (ref 32–37)
MCHC RBC-ENTMCNC: 33.9 G/DL — SIGNIFICANT CHANGE UP (ref 32–37)
MCHC RBC-ENTMCNC: 34 G/DL — SIGNIFICANT CHANGE UP (ref 32–37)
MCHC RBC-ENTMCNC: 34.3 G/DL — SIGNIFICANT CHANGE UP (ref 32–37)
MCHC RBC-ENTMCNC: 34.3 G/DL — SIGNIFICANT CHANGE UP (ref 32–37)
MCHC RBC-ENTMCNC: 34.4 G/DL — SIGNIFICANT CHANGE UP (ref 32–37)
MCHC RBC-ENTMCNC: 34.8 G/DL — SIGNIFICANT CHANGE UP (ref 32–37)
MCV RBC AUTO: 100.5 FL — HIGH (ref 80–94)
MCV RBC AUTO: 69.5 FL — LOW (ref 80–94)
MCV RBC AUTO: 69.8 FL — LOW (ref 80–94)
MCV RBC AUTO: 70 FL — LOW (ref 80–94)
MCV RBC AUTO: 70.4 FL — LOW (ref 80–94)
MCV RBC AUTO: 70.4 FL — LOW (ref 80–94)
MCV RBC AUTO: 70.5 FL — LOW (ref 80–94)
MCV RBC AUTO: 70.6 FL — LOW (ref 80–94)
MCV RBC AUTO: 70.8 FL — LOW (ref 80–94)
MCV RBC AUTO: 71 FL — LOW (ref 80–94)
MCV RBC AUTO: 71.1 FL — LOW (ref 80–94)
MCV RBC AUTO: 71.2 FL — LOW (ref 80–94)
MCV RBC AUTO: 71.2 FL — LOW (ref 80–94)
MCV RBC AUTO: 71.3 FL — LOW (ref 80–94)
MCV RBC AUTO: 71.4 FL — LOW (ref 80–94)
MCV RBC AUTO: 71.5 FL — LOW (ref 80–94)
MCV RBC AUTO: 71.7 FL — LOW (ref 80–94)
MCV RBC AUTO: 71.7 FL — LOW (ref 80–94)
MCV RBC AUTO: 71.8 FL — LOW (ref 80–94)
MCV RBC AUTO: 71.9 FL — LOW (ref 80–94)
MCV RBC AUTO: 72 FL — LOW (ref 80–94)
MCV RBC AUTO: 72 FL — LOW (ref 80–94)
MCV RBC AUTO: 72.1 FL — LOW (ref 80–94)
MCV RBC AUTO: 72.2 FL — LOW (ref 80–94)
MCV RBC AUTO: 72.3 FL — LOW (ref 80–94)
MCV RBC AUTO: 72.3 FL — LOW (ref 80–94)
MCV RBC AUTO: 72.4 FL — LOW (ref 80–94)
MCV RBC AUTO: 72.8 FL — LOW (ref 80–94)
MCV RBC AUTO: 72.8 FL — LOW (ref 80–94)
MCV RBC AUTO: 72.9 FL — LOW (ref 80–94)
MCV RBC AUTO: 73.1 FL — LOW (ref 80–94)
MCV RBC AUTO: 73.2 FL — LOW (ref 80–94)
MCV RBC AUTO: 73.4 FL — LOW (ref 80–94)
MCV RBC AUTO: 73.5 FL — LOW (ref 80–94)
MCV RBC AUTO: 73.8 FL — LOW (ref 80–94)
MCV RBC AUTO: 74.1 FL — LOW (ref 80–94)
MCV RBC AUTO: 74.9 FL — LOW (ref 80–94)
MCV RBC AUTO: 77.1 FL — LOW (ref 80–94)
MCV RBC AUTO: 80.6 FL — SIGNIFICANT CHANGE UP (ref 80–94)
MCV RBC AUTO: 80.6 FL — SIGNIFICANT CHANGE UP (ref 80–94)
MCV RBC AUTO: 81.8 FL — SIGNIFICANT CHANGE UP (ref 80–94)
MCV RBC AUTO: 82.1 FL — SIGNIFICANT CHANGE UP (ref 80–94)
MCV RBC AUTO: 82.1 FL — SIGNIFICANT CHANGE UP (ref 80–94)
MCV RBC AUTO: 82.2 FL — SIGNIFICANT CHANGE UP (ref 80–94)
MCV RBC AUTO: 82.5 FL — SIGNIFICANT CHANGE UP (ref 80–94)
MCV RBC AUTO: 83.1 FL — SIGNIFICANT CHANGE UP (ref 80–94)
MCV RBC AUTO: 83.4 FL — SIGNIFICANT CHANGE UP (ref 80–94)
MCV RBC AUTO: 83.8 FL — SIGNIFICANT CHANGE UP (ref 80–94)
MCV RBC AUTO: 83.8 FL — SIGNIFICANT CHANGE UP (ref 80–94)
MCV RBC AUTO: 84.3 FL — SIGNIFICANT CHANGE UP (ref 80–94)
MCV RBC AUTO: 84.6 FL — SIGNIFICANT CHANGE UP (ref 80–94)
MCV RBC AUTO: 84.8 FL — SIGNIFICANT CHANGE UP (ref 80–94)
MCV RBC AUTO: 85.1 FL — SIGNIFICANT CHANGE UP (ref 80–94)
MCV RBC AUTO: 85.1 FL — SIGNIFICANT CHANGE UP (ref 80–94)
MCV RBC AUTO: 85.4 FL — SIGNIFICANT CHANGE UP (ref 80–94)
MCV RBC AUTO: 86 FL — SIGNIFICANT CHANGE UP (ref 80–94)
MCV RBC AUTO: 86.4 FL — SIGNIFICANT CHANGE UP (ref 80–94)
MCV RBC AUTO: 86.6 FL — SIGNIFICANT CHANGE UP (ref 80–94)
MCV RBC AUTO: 86.9 FL — SIGNIFICANT CHANGE UP (ref 80–94)
MCV RBC AUTO: 87.1 FL — SIGNIFICANT CHANGE UP (ref 80–94)
MCV RBC AUTO: 87.8 FL — SIGNIFICANT CHANGE UP (ref 80–94)
MCV RBC AUTO: 88.2 FL — SIGNIFICANT CHANGE UP (ref 80–94)
MCV RBC AUTO: 88.3 FL — SIGNIFICANT CHANGE UP (ref 80–94)
MCV RBC AUTO: 88.4 FL — SIGNIFICANT CHANGE UP (ref 80–94)
MCV RBC AUTO: 89.7 FL — SIGNIFICANT CHANGE UP (ref 80–94)
MCV RBC AUTO: 90 FL — SIGNIFICANT CHANGE UP (ref 80–94)
MCV RBC AUTO: 90.5 FL — SIGNIFICANT CHANGE UP (ref 80–94)
MCV RBC AUTO: 92.6 FL — SIGNIFICANT CHANGE UP (ref 80–94)
MCV RBC AUTO: 94.6 FL — HIGH (ref 80–94)
MCV RBC AUTO: 96 FL — HIGH (ref 80–94)
MCV RBC AUTO: 97.1 FL — HIGH (ref 80–94)
MCV RBC AUTO: 98.8 FL — HIGH (ref 80–94)
METAMYELOCYTES # FLD: 1.7 % — HIGH (ref 0–0)
METAMYELOCYTES # FLD: 1.7 % — HIGH (ref 0–0)
METAMYELOCYTES # FLD: 6.9 % — HIGH (ref 0–0)
METHOD TYPE: SIGNIFICANT CHANGE UP
MICROCYTES BLD QL: SIGNIFICANT CHANGE UP
MICROCYTES BLD QL: SLIGHT — SIGNIFICANT CHANGE UP
MICROCYTES BLD QL: SLIGHT — SIGNIFICANT CHANGE UP
MONOCYTES # BLD AUTO: 0.15 K/UL — SIGNIFICANT CHANGE UP (ref 0.1–0.6)
MONOCYTES # BLD AUTO: 0.21 K/UL — SIGNIFICANT CHANGE UP (ref 0.1–0.6)
MONOCYTES # BLD AUTO: 0.22 K/UL — SIGNIFICANT CHANGE UP (ref 0.1–0.6)
MONOCYTES # BLD AUTO: 0.22 K/UL — SIGNIFICANT CHANGE UP (ref 0.1–0.6)
MONOCYTES # BLD AUTO: 0.27 K/UL — SIGNIFICANT CHANGE UP (ref 0.1–0.6)
MONOCYTES # BLD AUTO: 0.28 K/UL — SIGNIFICANT CHANGE UP (ref 0.1–0.6)
MONOCYTES # BLD AUTO: 0.28 K/UL — SIGNIFICANT CHANGE UP (ref 0.1–0.6)
MONOCYTES # BLD AUTO: 0.31 K/UL — SIGNIFICANT CHANGE UP (ref 0.1–0.6)
MONOCYTES # BLD AUTO: 0.32 K/UL — SIGNIFICANT CHANGE UP (ref 0.1–0.6)
MONOCYTES # BLD AUTO: 0.32 K/UL — SIGNIFICANT CHANGE UP (ref 0.1–0.6)
MONOCYTES # BLD AUTO: 0.37 K/UL — SIGNIFICANT CHANGE UP (ref 0.1–0.6)
MONOCYTES # BLD AUTO: 0.46 K/UL — SIGNIFICANT CHANGE UP (ref 0.1–0.6)
MONOCYTES # BLD AUTO: 0.48 K/UL — SIGNIFICANT CHANGE UP (ref 0.1–0.6)
MONOCYTES # BLD AUTO: 0.49 K/UL — SIGNIFICANT CHANGE UP (ref 0.1–0.6)
MONOCYTES # BLD AUTO: 0.5 K/UL — SIGNIFICANT CHANGE UP (ref 0.1–0.6)
MONOCYTES # BLD AUTO: 0.55 K/UL — SIGNIFICANT CHANGE UP (ref 0.1–0.6)
MONOCYTES # BLD AUTO: 0.56 K/UL — SIGNIFICANT CHANGE UP (ref 0.1–0.6)
MONOCYTES # BLD AUTO: 0.57 K/UL — SIGNIFICANT CHANGE UP (ref 0.1–0.6)
MONOCYTES # BLD AUTO: 0.58 K/UL — SIGNIFICANT CHANGE UP (ref 0.1–0.6)
MONOCYTES # BLD AUTO: 0.6 K/UL — SIGNIFICANT CHANGE UP (ref 0.1–0.6)
MONOCYTES # BLD AUTO: 0.63 K/UL — HIGH (ref 0.1–0.6)
MONOCYTES # BLD AUTO: 0.68 K/UL — HIGH (ref 0.1–0.6)
MONOCYTES # BLD AUTO: 0.7 K/UL — HIGH (ref 0.1–0.6)
MONOCYTES # BLD AUTO: 0.73 K/UL — HIGH (ref 0.1–0.6)
MONOCYTES # BLD AUTO: 0.75 K/UL — HIGH (ref 0.1–0.6)
MONOCYTES # BLD AUTO: 0.77 K/UL — HIGH (ref 0.1–0.6)
MONOCYTES # BLD AUTO: 0.79 K/UL — HIGH (ref 0.1–0.6)
MONOCYTES # BLD AUTO: 0.81 K/UL — HIGH (ref 0.1–0.6)
MONOCYTES # BLD AUTO: 0.83 K/UL — HIGH (ref 0.1–0.6)
MONOCYTES # BLD AUTO: 0.85 K/UL — HIGH (ref 0.1–0.6)
MONOCYTES # BLD AUTO: 0.85 K/UL — HIGH (ref 0.1–0.6)
MONOCYTES # BLD AUTO: 0.87 K/UL — HIGH (ref 0.1–0.6)
MONOCYTES # BLD AUTO: 0.91 K/UL — HIGH (ref 0.1–0.6)
MONOCYTES # BLD AUTO: 0.92 K/UL — HIGH (ref 0.1–0.6)
MONOCYTES # BLD AUTO: 0.93 K/UL — HIGH (ref 0.1–0.6)
MONOCYTES # BLD AUTO: 0.94 K/UL — HIGH (ref 0.1–0.6)
MONOCYTES # BLD AUTO: 0.94 K/UL — HIGH (ref 0.1–0.6)
MONOCYTES # BLD AUTO: 0.95 K/UL — HIGH (ref 0.1–0.6)
MONOCYTES # BLD AUTO: 0.95 K/UL — HIGH (ref 0.1–0.6)
MONOCYTES # BLD AUTO: 0.98 K/UL — HIGH (ref 0.1–0.6)
MONOCYTES # BLD AUTO: 1.01 K/UL — HIGH (ref 0.1–0.6)
MONOCYTES # BLD AUTO: 1.02 K/UL — HIGH (ref 0.1–0.6)
MONOCYTES # BLD AUTO: 1.04 K/UL — HIGH (ref 0.1–0.6)
MONOCYTES # BLD AUTO: 1.05 K/UL — HIGH (ref 0.1–0.6)
MONOCYTES # BLD AUTO: 1.2 K/UL — HIGH (ref 0.1–0.6)
MONOCYTES # BLD AUTO: 1.24 K/UL — HIGH (ref 0.1–0.6)
MONOCYTES # BLD AUTO: 1.34 K/UL — HIGH (ref 0.1–0.6)
MONOCYTES # BLD AUTO: 1.68 K/UL — HIGH (ref 0.1–0.6)
MONOCYTES NFR BLD AUTO: 0.9 % — LOW (ref 1.7–9.3)
MONOCYTES NFR BLD AUTO: 1.4 % — LOW (ref 1.7–9.3)
MONOCYTES NFR BLD AUTO: 1.4 % — LOW (ref 1.7–9.3)
MONOCYTES NFR BLD AUTO: 1.5 % — LOW (ref 1.7–9.3)
MONOCYTES NFR BLD AUTO: 1.7 % — SIGNIFICANT CHANGE UP (ref 1.7–9.3)
MONOCYTES NFR BLD AUTO: 1.9 % — SIGNIFICANT CHANGE UP (ref 1.7–9.3)
MONOCYTES NFR BLD AUTO: 2.2 % — SIGNIFICANT CHANGE UP (ref 1.7–9.3)
MONOCYTES NFR BLD AUTO: 2.3 % — SIGNIFICANT CHANGE UP (ref 1.7–9.3)
MONOCYTES NFR BLD AUTO: 2.3 % — SIGNIFICANT CHANGE UP (ref 1.7–9.3)
MONOCYTES NFR BLD AUTO: 2.5 % — SIGNIFICANT CHANGE UP (ref 1.7–9.3)
MONOCYTES NFR BLD AUTO: 2.5 % — SIGNIFICANT CHANGE UP (ref 1.7–9.3)
MONOCYTES NFR BLD AUTO: 2.8 % — SIGNIFICANT CHANGE UP (ref 1.7–9.3)
MONOCYTES NFR BLD AUTO: 2.9 % — SIGNIFICANT CHANGE UP (ref 1.7–9.3)
MONOCYTES NFR BLD AUTO: 3 % — SIGNIFICANT CHANGE UP (ref 1.7–9.3)
MONOCYTES NFR BLD AUTO: 3.1 % — SIGNIFICANT CHANGE UP (ref 1.7–9.3)
MONOCYTES NFR BLD AUTO: 3.2 % — SIGNIFICANT CHANGE UP (ref 1.7–9.3)
MONOCYTES NFR BLD AUTO: 3.3 % — SIGNIFICANT CHANGE UP (ref 1.7–9.3)
MONOCYTES NFR BLD AUTO: 3.5 % — SIGNIFICANT CHANGE UP (ref 1.7–9.3)
MONOCYTES NFR BLD AUTO: 3.5 % — SIGNIFICANT CHANGE UP (ref 1.7–9.3)
MONOCYTES NFR BLD AUTO: 3.6 % — SIGNIFICANT CHANGE UP (ref 1.7–9.3)
MONOCYTES NFR BLD AUTO: 3.7 % — SIGNIFICANT CHANGE UP (ref 1.7–9.3)
MONOCYTES NFR BLD AUTO: 3.7 % — SIGNIFICANT CHANGE UP (ref 1.7–9.3)
MONOCYTES NFR BLD AUTO: 4.1 % — SIGNIFICANT CHANGE UP (ref 1.7–9.3)
MONOCYTES NFR BLD AUTO: 4.2 % — SIGNIFICANT CHANGE UP (ref 1.7–9.3)
MONOCYTES NFR BLD AUTO: 4.5 % — SIGNIFICANT CHANGE UP (ref 1.7–9.3)
MONOCYTES NFR BLD AUTO: 4.7 % — SIGNIFICANT CHANGE UP (ref 1.7–9.3)
MONOCYTES NFR BLD AUTO: 4.9 % — SIGNIFICANT CHANGE UP (ref 1.7–9.3)
MONOCYTES NFR BLD AUTO: 4.9 % — SIGNIFICANT CHANGE UP (ref 1.7–9.3)
MONOCYTES NFR BLD AUTO: 5 % — SIGNIFICANT CHANGE UP (ref 1.7–9.3)
MONOCYTES NFR BLD AUTO: 5.3 % — SIGNIFICANT CHANGE UP (ref 1.7–9.3)
MONOCYTES NFR BLD AUTO: 5.8 % — SIGNIFICANT CHANGE UP (ref 1.7–9.3)
MONOCYTES NFR BLD AUTO: 5.9 % — SIGNIFICANT CHANGE UP (ref 1.7–9.3)
MONOCYTES NFR BLD AUTO: 6 % — SIGNIFICANT CHANGE UP (ref 1.7–9.3)
MONOCYTES NFR BLD AUTO: 6 % — SIGNIFICANT CHANGE UP (ref 1.7–9.3)
MONOCYTES NFR BLD AUTO: 6.2 % — SIGNIFICANT CHANGE UP (ref 1.7–9.3)
MONOCYTES NFR BLD AUTO: 6.3 % — SIGNIFICANT CHANGE UP (ref 1.7–9.3)
MONOCYTES NFR BLD AUTO: 6.6 % — SIGNIFICANT CHANGE UP (ref 1.7–9.3)
MONOCYTES NFR BLD AUTO: 6.8 % — SIGNIFICANT CHANGE UP (ref 1.7–9.3)
MONOCYTES NFR BLD AUTO: 7.5 % — SIGNIFICANT CHANGE UP (ref 1.7–9.3)
MONOCYTES NFR BLD AUTO: 7.5 % — SIGNIFICANT CHANGE UP (ref 1.7–9.3)
MRSA PCR RESULT.: NEGATIVE — SIGNIFICANT CHANGE UP
NEUTROPHILS # BLD AUTO: 10.1 K/UL — HIGH (ref 1.4–6.5)
NEUTROPHILS # BLD AUTO: 10.83 K/UL — HIGH (ref 1.4–6.5)
NEUTROPHILS # BLD AUTO: 10.88 K/UL — HIGH (ref 1.4–6.5)
NEUTROPHILS # BLD AUTO: 10.93 K/UL — HIGH (ref 1.4–6.5)
NEUTROPHILS # BLD AUTO: 11.75 K/UL — HIGH (ref 1.4–6.5)
NEUTROPHILS # BLD AUTO: 11.9 K/UL — HIGH (ref 1.4–6.5)
NEUTROPHILS # BLD AUTO: 12.76 K/UL — HIGH (ref 1.4–6.5)
NEUTROPHILS # BLD AUTO: 13.32 K/UL — HIGH (ref 1.4–6.5)
NEUTROPHILS # BLD AUTO: 13.39 K/UL — HIGH (ref 1.4–6.5)
NEUTROPHILS # BLD AUTO: 14.24 K/UL — HIGH (ref 1.4–6.5)
NEUTROPHILS # BLD AUTO: 17.4 K/UL — HIGH (ref 1.4–6.5)
NEUTROPHILS # BLD AUTO: 18.32 K/UL — HIGH (ref 1.4–6.5)
NEUTROPHILS # BLD AUTO: 18.54 K/UL — HIGH (ref 1.4–6.5)
NEUTROPHILS # BLD AUTO: 19.1 K/UL — HIGH (ref 1.4–6.5)
NEUTROPHILS # BLD AUTO: 19.63 K/UL — HIGH (ref 1.4–6.5)
NEUTROPHILS # BLD AUTO: 23.84 K/UL — HIGH (ref 1.4–6.5)
NEUTROPHILS # BLD AUTO: 25.62 K/UL — HIGH (ref 1.4–6.5)
NEUTROPHILS # BLD AUTO: 29.95 K/UL — HIGH (ref 1.4–6.5)
NEUTROPHILS # BLD AUTO: 30.66 K/UL — HIGH (ref 1.4–6.5)
NEUTROPHILS # BLD AUTO: 31.38 K/UL — HIGH (ref 1.4–6.5)
NEUTROPHILS # BLD AUTO: 31.5 K/UL — HIGH (ref 1.4–6.5)
NEUTROPHILS # BLD AUTO: 31.94 K/UL — HIGH (ref 1.4–6.5)
NEUTROPHILS # BLD AUTO: 33.81 K/UL — HIGH (ref 1.4–6.5)
NEUTROPHILS # BLD AUTO: 34.92 K/UL — HIGH (ref 1.4–6.5)
NEUTROPHILS # BLD AUTO: 35.69 K/UL — HIGH (ref 1.4–6.5)
NEUTROPHILS # BLD AUTO: 35.76 K/UL — HIGH (ref 1.4–6.5)
NEUTROPHILS # BLD AUTO: 35.79 K/UL — HIGH (ref 1.4–6.5)
NEUTROPHILS # BLD AUTO: 36.35 K/UL — HIGH (ref 1.4–6.5)
NEUTROPHILS # BLD AUTO: 38.54 K/UL — HIGH (ref 1.4–6.5)
NEUTROPHILS # BLD AUTO: 39.12 K/UL — HIGH (ref 1.4–6.5)
NEUTROPHILS # BLD AUTO: 4.27 K/UL — SIGNIFICANT CHANGE UP (ref 1.4–6.5)
NEUTROPHILS # BLD AUTO: 41.56 K/UL — HIGH (ref 1.4–6.5)
NEUTROPHILS # BLD AUTO: 44.65 K/UL — HIGH (ref 1.4–6.5)
NEUTROPHILS # BLD AUTO: 44.66 K/UL — HIGH (ref 1.4–6.5)
NEUTROPHILS # BLD AUTO: 44.69 K/UL — HIGH (ref 1.4–6.5)
NEUTROPHILS # BLD AUTO: 5.37 K/UL — SIGNIFICANT CHANGE UP (ref 1.4–6.5)
NEUTROPHILS # BLD AUTO: 5.97 K/UL — SIGNIFICANT CHANGE UP (ref 1.4–6.5)
NEUTROPHILS # BLD AUTO: 53.04 K/UL — HIGH (ref 1.4–6.5)
NEUTROPHILS # BLD AUTO: 53.2 K/UL — HIGH (ref 1.4–6.5)
NEUTROPHILS # BLD AUTO: 53.76 K/UL — HIGH (ref 1.4–6.5)
NEUTROPHILS # BLD AUTO: 54.71 K/UL — HIGH (ref 1.4–6.5)
NEUTROPHILS # BLD AUTO: 6 K/UL — SIGNIFICANT CHANGE UP (ref 1.4–6.5)
NEUTROPHILS # BLD AUTO: 6.75 K/UL — HIGH (ref 1.4–6.5)
NEUTROPHILS # BLD AUTO: 7.01 K/UL — HIGH (ref 1.4–6.5)
NEUTROPHILS # BLD AUTO: 7.26 K/UL — HIGH (ref 1.4–6.5)
NEUTROPHILS # BLD AUTO: 7.68 K/UL — HIGH (ref 1.4–6.5)
NEUTROPHILS # BLD AUTO: 8.9 K/UL — HIGH (ref 1.4–6.5)
NEUTROPHILS # BLD AUTO: 9.09 K/UL — HIGH (ref 1.4–6.5)
NEUTROPHILS # BLD AUTO: 9.39 K/UL — HIGH (ref 1.4–6.5)
NEUTROPHILS # BLD AUTO: 9.52 K/UL — HIGH (ref 1.4–6.5)
NEUTROPHILS NFR BLD AUTO: 73.7 % — SIGNIFICANT CHANGE UP (ref 42.2–75.2)
NEUTROPHILS NFR BLD AUTO: 76.1 % — HIGH (ref 42.2–75.2)
NEUTROPHILS NFR BLD AUTO: 77.2 % — HIGH (ref 42.2–75.2)
NEUTROPHILS NFR BLD AUTO: 77.4 % — HIGH (ref 42.2–75.2)
NEUTROPHILS NFR BLD AUTO: 77.4 % — HIGH (ref 42.2–75.2)
NEUTROPHILS NFR BLD AUTO: 78.7 % — HIGH (ref 42.2–75.2)
NEUTROPHILS NFR BLD AUTO: 79 % — HIGH (ref 42.2–75.2)
NEUTROPHILS NFR BLD AUTO: 79.1 % — HIGH (ref 42.2–75.2)
NEUTROPHILS NFR BLD AUTO: 81 % — HIGH (ref 42.2–75.2)
NEUTROPHILS NFR BLD AUTO: 81 % — HIGH (ref 42.2–75.2)
NEUTROPHILS NFR BLD AUTO: 83 % — HIGH (ref 42.2–75.2)
NEUTROPHILS NFR BLD AUTO: 84.6 % — HIGH (ref 42.2–75.2)
NEUTROPHILS NFR BLD AUTO: 84.9 % — HIGH (ref 42.2–75.2)
NEUTROPHILS NFR BLD AUTO: 85.9 % — HIGH (ref 42.2–75.2)
NEUTROPHILS NFR BLD AUTO: 87.7 % — HIGH (ref 42.2–75.2)
NEUTROPHILS NFR BLD AUTO: 88.5 % — HIGH (ref 42.2–75.2)
NEUTROPHILS NFR BLD AUTO: 88.6 % — HIGH (ref 42.2–75.2)
NEUTROPHILS NFR BLD AUTO: 89.3 % — HIGH (ref 42.2–75.2)
NEUTROPHILS NFR BLD AUTO: 89.6 % — HIGH (ref 42.2–75.2)
NEUTROPHILS NFR BLD AUTO: 89.8 % — HIGH (ref 42.2–75.2)
NEUTROPHILS NFR BLD AUTO: 90.7 % — HIGH (ref 42.2–75.2)
NEUTROPHILS NFR BLD AUTO: 91 % — HIGH (ref 42.2–75.2)
NEUTROPHILS NFR BLD AUTO: 91.4 % — HIGH (ref 42.2–75.2)
NEUTROPHILS NFR BLD AUTO: 91.7 % — HIGH (ref 42.2–75.2)
NEUTROPHILS NFR BLD AUTO: 91.8 % — HIGH (ref 42.2–75.2)
NEUTROPHILS NFR BLD AUTO: 92 % — HIGH (ref 42.2–75.2)
NEUTROPHILS NFR BLD AUTO: 92.2 % — HIGH (ref 42.2–75.2)
NEUTROPHILS NFR BLD AUTO: 92.4 % — HIGH (ref 42.2–75.2)
NEUTROPHILS NFR BLD AUTO: 92.4 % — HIGH (ref 42.2–75.2)
NEUTROPHILS NFR BLD AUTO: 92.5 % — HIGH (ref 42.2–75.2)
NEUTROPHILS NFR BLD AUTO: 92.5 % — HIGH (ref 42.2–75.2)
NEUTROPHILS NFR BLD AUTO: 92.7 % — HIGH (ref 42.2–75.2)
NEUTROPHILS NFR BLD AUTO: 92.9 % — HIGH (ref 42.2–75.2)
NEUTROPHILS NFR BLD AUTO: 93 % — HIGH (ref 42.2–75.2)
NEUTROPHILS NFR BLD AUTO: 93.2 % — HIGH (ref 42.2–75.2)
NEUTROPHILS NFR BLD AUTO: 93.2 % — HIGH (ref 42.2–75.2)
NEUTROPHILS NFR BLD AUTO: 93.5 % — HIGH (ref 42.2–75.2)
NEUTROPHILS NFR BLD AUTO: 93.6 % — HIGH (ref 42.2–75.2)
NEUTROPHILS NFR BLD AUTO: 93.6 % — HIGH (ref 42.2–75.2)
NEUTROPHILS NFR BLD AUTO: 93.8 % — HIGH (ref 42.2–75.2)
NEUTROPHILS NFR BLD AUTO: 93.9 % — HIGH (ref 42.2–75.2)
NEUTROPHILS NFR BLD AUTO: 94.2 % — HIGH (ref 42.2–75.2)
NEUTROPHILS NFR BLD AUTO: 94.5 % — HIGH (ref 42.2–75.2)
NEUTROPHILS NFR BLD AUTO: 94.8 % — HIGH (ref 42.2–75.2)
NEUTROPHILS NFR BLD AUTO: 95.4 % — HIGH (ref 42.2–75.2)
NEUTROPHILS NFR BLD AUTO: 95.6 % — HIGH (ref 42.2–75.2)
NEUTROPHILS NFR BLD AUTO: 95.6 % — HIGH (ref 42.2–75.2)
NEUTROPHILS NFR BLD AUTO: 98.2 % — HIGH (ref 42.2–75.2)
NEUTS BAND # BLD: 1.7 % — SIGNIFICANT CHANGE UP (ref 0–6)
NEUTS BAND # BLD: 15.7 % — HIGH (ref 0–6)
NEUTS BAND # BLD: 7.8 % — HIGH (ref 0–6)
NIGHT BLUE STAIN TISS: SIGNIFICANT CHANGE UP
NITRITE UR-MCNC: NEGATIVE — SIGNIFICANT CHANGE UP
NON HDL CHOLESTEROL: 36 MG/DL — SIGNIFICANT CHANGE UP
NON-GYNECOLOGICAL CYTOLOGY STUDY: SIGNIFICANT CHANGE UP
NON-GYNECOLOGICAL CYTOLOGY STUDY: SIGNIFICANT CHANGE UP
NRBC # BLD: 0 /100 WBCS — SIGNIFICANT CHANGE UP (ref 0–0)
NRBC # BLD: 1 /100 WBCS — HIGH (ref 0–0)
NRBC # BLD: 1 /100 WBCS — HIGH (ref 0–0)
NRBC # BLD: 2 /100 WBCS — HIGH (ref 0–0)
NRBC # BLD: 2 /100 WBCS — HIGH (ref 0–0)
NRBC # BLD: 3 /100 WBCS — HIGH (ref 0–0)
NRBC # BLD: 4 /100 WBCS — HIGH (ref 0–0)
NRBC # BLD: 4 /100 — HIGH (ref 0–0)
NRBC # BLD: 5 /100 WBCS — HIGH (ref 0–0)
NRBC # BLD: SIGNIFICANT CHANGE UP /100 WBCS (ref 0–0)
NRBC # BLD: SIGNIFICANT CHANGE UP /100 WBCS (ref 0–0)
OB PNL STL: POSITIVE
ORGANISM # SPEC MICROSCOPIC CNT: SIGNIFICANT CHANGE UP
OSMOLALITY UR: 513 MOS/KG — SIGNIFICANT CHANGE UP (ref 50–1200)
OVALOCYTES BLD QL SMEAR: SIGNIFICANT CHANGE UP
OVALOCYTES BLD QL SMEAR: SLIGHT — SIGNIFICANT CHANGE UP
PCO2 BLDA: 27 MMHG — LOW (ref 35–48)
PCO2 BLDA: 28 MMHG — LOW (ref 35–48)
PCO2 BLDA: 28 MMHG — LOW (ref 35–48)
PCO2 BLDA: 33 MMHG — LOW (ref 35–48)
PCO2 BLDA: 36 MMHG — SIGNIFICANT CHANGE UP (ref 35–48)
PCO2 BLDA: 38 MMHG — SIGNIFICANT CHANGE UP (ref 35–48)
PCO2 BLDA: 42 MMHG — SIGNIFICANT CHANGE UP (ref 35–48)
PCO2 BLDA: 49 MMHG — HIGH (ref 35–48)
PCO2 BLDV: 37 MMHG — LOW (ref 42–55)
PCO2 BLDV: 42 MMHG — SIGNIFICANT CHANGE UP (ref 42–55)
PCO2 BLDV: 46 MMHG — SIGNIFICANT CHANGE UP (ref 42–55)
PF4 HEPARIN CMPLX AB SER-ACNC: NEGATIVE — SIGNIFICANT CHANGE UP
PH BLDA: 7.15 — CRITICAL LOW (ref 7.35–7.45)
PH BLDA: 7.23 — LOW (ref 7.35–7.45)
PH BLDA: 7.29 — LOW (ref 7.35–7.45)
PH BLDA: 7.32 — LOW (ref 7.35–7.45)
PH BLDA: 7.41 — SIGNIFICANT CHANGE UP (ref 7.35–7.45)
PH BLDA: 7.42 — SIGNIFICANT CHANGE UP (ref 7.35–7.45)
PH BLDA: 7.45 — SIGNIFICANT CHANGE UP (ref 7.35–7.45)
PH BLDA: 7.5 — HIGH (ref 7.35–7.45)
PH BLDV: 7.27 — LOW (ref 7.32–7.43)
PH BLDV: 7.28 — LOW (ref 7.32–7.43)
PH BLDV: 7.37 — SIGNIFICANT CHANGE UP (ref 7.32–7.43)
PH UR: 5 — SIGNIFICANT CHANGE UP (ref 5–8)
PH UR: 6 — SIGNIFICANT CHANGE UP (ref 5–8)
PH UR: 8 — SIGNIFICANT CHANGE UP (ref 5–8)
PHOSPHATE SERPL-MCNC: 1 MG/DL — LOW (ref 2.1–4.9)
PHOSPHATE SERPL-MCNC: 1 MG/DL — LOW (ref 2.1–4.9)
PHOSPHATE SERPL-MCNC: 1.7 MG/DL — LOW (ref 2.1–4.9)
PHOSPHATE SERPL-MCNC: 1.8 MG/DL — LOW (ref 2.1–4.9)
PHOSPHATE SERPL-MCNC: 2.1 MG/DL — SIGNIFICANT CHANGE UP (ref 2.1–4.9)
PHOSPHATE SERPL-MCNC: 2.2 MG/DL — SIGNIFICANT CHANGE UP (ref 2.1–4.9)
PHOSPHATE SERPL-MCNC: 2.4 MG/DL — SIGNIFICANT CHANGE UP (ref 2.1–4.9)
PHOSPHATE SERPL-MCNC: 2.4 MG/DL — SIGNIFICANT CHANGE UP (ref 2.1–4.9)
PHOSPHATE SERPL-MCNC: 2.5 MG/DL — SIGNIFICANT CHANGE UP (ref 2.1–4.9)
PHOSPHATE SERPL-MCNC: 2.5 MG/DL — SIGNIFICANT CHANGE UP (ref 2.1–4.9)
PHOSPHATE SERPL-MCNC: 2.8 MG/DL — SIGNIFICANT CHANGE UP (ref 2.1–4.9)
PHOSPHATE SERPL-MCNC: 2.9 MG/DL — SIGNIFICANT CHANGE UP (ref 2.1–4.9)
PHOSPHATE SERPL-MCNC: 2.9 MG/DL — SIGNIFICANT CHANGE UP (ref 2.1–4.9)
PHOSPHATE SERPL-MCNC: 3.5 MG/DL — SIGNIFICANT CHANGE UP (ref 2.1–4.9)
PHOSPHATE SERPL-MCNC: 3.7 MG/DL — SIGNIFICANT CHANGE UP (ref 2.1–4.9)
PHOSPHATE SERPL-MCNC: 3.9 MG/DL — SIGNIFICANT CHANGE UP (ref 2.1–4.9)
PHOSPHATE SERPL-MCNC: 4.5 MG/DL — SIGNIFICANT CHANGE UP (ref 2.1–4.9)
PHOSPHATE SERPL-MCNC: 4.9 MG/DL — SIGNIFICANT CHANGE UP (ref 2.1–4.9)
PHOSPHATE SERPL-MCNC: 4.9 MG/DL — SIGNIFICANT CHANGE UP (ref 2.1–4.9)
PHOSPHATE SERPL-MCNC: 5.4 MG/DL — HIGH (ref 2.1–4.9)
PHOSPHATE SERPL-MCNC: 5.6 MG/DL — HIGH (ref 2.1–4.9)
PHOSPHATE SERPL-MCNC: 5.7 MG/DL — HIGH (ref 2.1–4.9)
PHOSPHATE SERPL-MCNC: 5.7 MG/DL — HIGH (ref 2.1–4.9)
PHOSPHATE SERPL-MCNC: 5.8 MG/DL — HIGH (ref 2.1–4.9)
PHOSPHATE SERPL-MCNC: 5.9 MG/DL — HIGH (ref 2.1–4.9)
PHOSPHATE SERPL-MCNC: 5.9 MG/DL — HIGH (ref 2.1–4.9)
PHOSPHATE SERPL-MCNC: 6 MG/DL — HIGH (ref 2.1–4.9)
PHOSPHATE SERPL-MCNC: 6.2 MG/DL — HIGH (ref 2.1–4.9)
PHOSPHATE SERPL-MCNC: 6.3 MG/DL — HIGH (ref 2.1–4.9)
PHOSPHATE SERPL-MCNC: 6.4 MG/DL — HIGH (ref 2.1–4.9)
PHOSPHATE SERPL-MCNC: 7 MG/DL — HIGH (ref 2.1–4.9)
PHOSPHATE SERPL-MCNC: 7.3 MG/DL — HIGH (ref 2.1–4.9)
PHOSPHATE SERPL-MCNC: 7.5 MG/DL — HIGH (ref 2.1–4.9)
PHOSPHATE SERPL-MCNC: 7.6 MG/DL — HIGH (ref 2.1–4.9)
PHOSPHATE SERPL-MCNC: 7.6 MG/DL — HIGH (ref 2.1–4.9)
PHOSPHATE SERPL-MCNC: 7.7 MG/DL — HIGH (ref 2.1–4.9)
PHOSPHATE SERPL-MCNC: 7.8 MG/DL — HIGH (ref 2.1–4.9)
PHOSPHATE SERPL-MCNC: 8 MG/DL — HIGH (ref 2.1–4.9)
PHOSPHATE SERPL-MCNC: 8.1 MG/DL — HIGH (ref 2.1–4.9)
PHOSPHATE SERPL-MCNC: 8.4 MG/DL — HIGH (ref 2.1–4.9)
PHOSPHATE SERPL-MCNC: 8.4 MG/DL — HIGH (ref 2.1–4.9)
PHOSPHATE SERPL-MCNC: 8.5 MG/DL — HIGH (ref 2.1–4.9)
PHOSPHATE SERPL-MCNC: 8.5 MG/DL — HIGH (ref 2.1–4.9)
PHOSPHATE SERPL-MCNC: 9.2 MG/DL — HIGH (ref 2.1–4.9)
PLAT MORPH BLD: NORMAL — SIGNIFICANT CHANGE UP
PLATELET # BLD AUTO: 108 K/UL — LOW (ref 130–400)
PLATELET # BLD AUTO: 11 K/UL — CRITICAL LOW (ref 130–400)
PLATELET # BLD AUTO: 146 K/UL — SIGNIFICANT CHANGE UP (ref 130–400)
PLATELET # BLD AUTO: 154 K/UL — SIGNIFICANT CHANGE UP (ref 130–400)
PLATELET # BLD AUTO: 16 K/UL — CRITICAL LOW (ref 130–400)
PLATELET # BLD AUTO: 160 K/UL — SIGNIFICANT CHANGE UP (ref 130–400)
PLATELET # BLD AUTO: 175 K/UL — SIGNIFICANT CHANGE UP (ref 130–400)
PLATELET # BLD AUTO: 19 K/UL — CRITICAL LOW (ref 130–400)
PLATELET # BLD AUTO: 196 K/UL — SIGNIFICANT CHANGE UP (ref 130–400)
PLATELET # BLD AUTO: 197 K/UL — SIGNIFICANT CHANGE UP (ref 130–400)
PLATELET # BLD AUTO: 198 K/UL — SIGNIFICANT CHANGE UP (ref 130–400)
PLATELET # BLD AUTO: 20 K/UL — LOW (ref 130–400)
PLATELET # BLD AUTO: 201 K/UL — SIGNIFICANT CHANGE UP (ref 130–400)
PLATELET # BLD AUTO: 206 K/UL — SIGNIFICANT CHANGE UP (ref 130–400)
PLATELET # BLD AUTO: 21 K/UL — LOW (ref 130–400)
PLATELET # BLD AUTO: 217 K/UL — SIGNIFICANT CHANGE UP (ref 130–400)
PLATELET # BLD AUTO: 218 K/UL — SIGNIFICANT CHANGE UP (ref 130–400)
PLATELET # BLD AUTO: 218 K/UL — SIGNIFICANT CHANGE UP (ref 130–400)
PLATELET # BLD AUTO: 22 K/UL — LOW (ref 130–400)
PLATELET # BLD AUTO: 226 K/UL — SIGNIFICANT CHANGE UP (ref 130–400)
PLATELET # BLD AUTO: 227 K/UL — SIGNIFICANT CHANGE UP (ref 130–400)
PLATELET # BLD AUTO: 23 K/UL — LOW (ref 130–400)
PLATELET # BLD AUTO: 235 K/UL — SIGNIFICANT CHANGE UP (ref 130–400)
PLATELET # BLD AUTO: 236 K/UL — SIGNIFICANT CHANGE UP (ref 130–400)
PLATELET # BLD AUTO: 240 K/UL — SIGNIFICANT CHANGE UP (ref 130–400)
PLATELET # BLD AUTO: 242 K/UL — SIGNIFICANT CHANGE UP (ref 130–400)
PLATELET # BLD AUTO: 250 K/UL — SIGNIFICANT CHANGE UP (ref 130–400)
PLATELET # BLD AUTO: 252 K/UL — SIGNIFICANT CHANGE UP (ref 130–400)
PLATELET # BLD AUTO: 259 K/UL — SIGNIFICANT CHANGE UP (ref 130–400)
PLATELET # BLD AUTO: 259 K/UL — SIGNIFICANT CHANGE UP (ref 130–400)
PLATELET # BLD AUTO: 26 K/UL — LOW (ref 130–400)
PLATELET # BLD AUTO: 262 K/UL — SIGNIFICANT CHANGE UP (ref 130–400)
PLATELET # BLD AUTO: 267 K/UL — SIGNIFICANT CHANGE UP (ref 130–400)
PLATELET # BLD AUTO: 270 K/UL — SIGNIFICANT CHANGE UP (ref 130–400)
PLATELET # BLD AUTO: 284 K/UL — SIGNIFICANT CHANGE UP (ref 130–400)
PLATELET # BLD AUTO: 316 K/UL — SIGNIFICANT CHANGE UP (ref 130–400)
PLATELET # BLD AUTO: 32 K/UL — LOW (ref 130–400)
PLATELET # BLD AUTO: 324 K/UL — SIGNIFICANT CHANGE UP (ref 130–400)
PLATELET # BLD AUTO: 33 K/UL — LOW (ref 130–400)
PLATELET # BLD AUTO: 34 K/UL — LOW (ref 130–400)
PLATELET # BLD AUTO: 35 K/UL — SIGNIFICANT CHANGE UP (ref 130–400)
PLATELET # BLD AUTO: 351 K/UL — SIGNIFICANT CHANGE UP (ref 130–400)
PLATELET # BLD AUTO: 359 K/UL — SIGNIFICANT CHANGE UP (ref 130–400)
PLATELET # BLD AUTO: 36 K/UL — LOW (ref 130–400)
PLATELET # BLD AUTO: 365 K/UL — SIGNIFICANT CHANGE UP (ref 130–400)
PLATELET # BLD AUTO: 366 K/UL — SIGNIFICANT CHANGE UP (ref 130–400)
PLATELET # BLD AUTO: 385 K/UL — SIGNIFICANT CHANGE UP (ref 130–400)
PLATELET # BLD AUTO: 385 K/UL — SIGNIFICANT CHANGE UP (ref 130–400)
PLATELET # BLD AUTO: 39 K/UL — LOW (ref 130–400)
PLATELET # BLD AUTO: 39 K/UL — LOW (ref 130–400)
PLATELET # BLD AUTO: 393 K/UL — SIGNIFICANT CHANGE UP (ref 130–400)
PLATELET # BLD AUTO: 40 K/UL — LOW (ref 130–400)
PLATELET # BLD AUTO: 408 K/UL — HIGH (ref 130–400)
PLATELET # BLD AUTO: 408 K/UL — HIGH (ref 130–400)
PLATELET # BLD AUTO: 415 K/UL — HIGH (ref 130–400)
PLATELET # BLD AUTO: 425 K/UL — HIGH (ref 130–400)
PLATELET # BLD AUTO: 43 K/UL — LOW (ref 130–400)
PLATELET # BLD AUTO: 43 K/UL — LOW (ref 130–400)
PLATELET # BLD AUTO: 44 K/UL — LOW (ref 130–400)
PLATELET # BLD AUTO: 44 K/UL — SIGNIFICANT CHANGE UP (ref 130–400)
PLATELET # BLD AUTO: 442 K/UL — HIGH (ref 130–400)
PLATELET # BLD AUTO: 465 K/UL — HIGH (ref 130–400)
PLATELET # BLD AUTO: 47 K/UL — LOW (ref 130–400)
PLATELET # BLD AUTO: 505 K/UL — HIGH (ref 130–400)
PLATELET # BLD AUTO: 51 K/UL — LOW (ref 130–400)
PLATELET # BLD AUTO: 517 K/UL — HIGH (ref 130–400)
PLATELET # BLD AUTO: 52 K/UL — LOW (ref 130–400)
PLATELET # BLD AUTO: 63 K/UL — LOW (ref 130–400)
PLATELET # BLD AUTO: 65 K/UL — LOW (ref 130–400)
PLATELET # BLD AUTO: 685 K/UL — HIGH (ref 130–400)
PLATELET # BLD AUTO: 687 K/UL — HIGH (ref 130–400)
PMV BLD: 10 FL — SIGNIFICANT CHANGE UP (ref 7.4–10.4)
PMV BLD: 10.1 FL — SIGNIFICANT CHANGE UP (ref 7.4–10.4)
PMV BLD: 10.2 FL — SIGNIFICANT CHANGE UP (ref 7.4–10.4)
PMV BLD: 10.3 FL — SIGNIFICANT CHANGE UP (ref 7.4–10.4)
PMV BLD: 10.4 FL — SIGNIFICANT CHANGE UP (ref 7.4–10.4)
PMV BLD: 10.4 FL — SIGNIFICANT CHANGE UP (ref 7.4–10.4)
PMV BLD: 10.5 FL — HIGH (ref 7.4–10.4)
PMV BLD: 10.7 FL — HIGH (ref 7.4–10.4)
PMV BLD: 10.8 FL — HIGH (ref 7.4–10.4)
PMV BLD: 10.9 FL — HIGH (ref 7.4–10.4)
PMV BLD: 10.9 FL — HIGH (ref 7.4–10.4)
PMV BLD: 11.1 FL — HIGH (ref 7.4–10.4)
PMV BLD: 11.1 FL — HIGH (ref 7.4–10.4)
PMV BLD: 11.2 FL — HIGH (ref 7.4–10.4)
PMV BLD: 11.3 FL — HIGH (ref 7.4–10.4)
PMV BLD: 11.3 FL — HIGH (ref 7.4–10.4)
PMV BLD: 11.4 FL — HIGH (ref 7.4–10.4)
PMV BLD: 11.5 FL — HIGH (ref 7.4–10.4)
PMV BLD: 11.5 FL — HIGH (ref 7.4–10.4)
PMV BLD: 11.6 FL — HIGH (ref 7.4–10.4)
PMV BLD: 11.6 FL — HIGH (ref 7.4–10.4)
PMV BLD: 11.7 FL — HIGH (ref 7.4–10.4)
PMV BLD: 11.8 FL — HIGH (ref 7.4–10.4)
PMV BLD: 12 FL — HIGH (ref 7.4–10.4)
PMV BLD: 12.1 FL — HIGH (ref 7.4–10.4)
PMV BLD: 12.1 FL — HIGH (ref 7.4–10.4)
PMV BLD: 12.2 FL — HIGH (ref 7.4–10.4)
PMV BLD: 12.3 FL — HIGH (ref 7.4–10.4)
PMV BLD: 12.6 FL — HIGH (ref 7.4–10.4)
PMV BLD: 13.9 FL — HIGH (ref 7.4–10.4)
PMV BLD: 9.8 FL — SIGNIFICANT CHANGE UP (ref 7.4–10.4)
PMV BLD: 9.9 FL — SIGNIFICANT CHANGE UP (ref 7.4–10.4)
PMV BLD: SIGNIFICANT CHANGE UP (ref 7.4–10.4)
PO2 BLDA: 110 MMHG — HIGH (ref 83–108)
PO2 BLDA: 116 MMHG — HIGH (ref 83–108)
PO2 BLDA: 133 MMHG — HIGH (ref 83–108)
PO2 BLDA: 135 MMHG — HIGH (ref 83–108)
PO2 BLDA: 144 MMHG — HIGH (ref 83–108)
PO2 BLDA: 156 MMHG — HIGH (ref 83–108)
PO2 BLDA: 160 MMHG — HIGH (ref 83–108)
PO2 BLDA: 73 MMHG — LOW (ref 83–108)
PO2 BLDV: 22 MMHG — SIGNIFICANT CHANGE UP
PO2 BLDV: 30 MMHG — SIGNIFICANT CHANGE UP
PO2 BLDV: 58 MMHG — SIGNIFICANT CHANGE UP
POIKILOCYTOSIS BLD QL AUTO: SIGNIFICANT CHANGE UP
POLYCHROMASIA BLD QL SMEAR: SIGNIFICANT CHANGE UP
POLYCHROMASIA BLD QL SMEAR: SIGNIFICANT CHANGE UP
POLYCHROMASIA BLD QL SMEAR: SLIGHT — SIGNIFICANT CHANGE UP
POTASSIUM BLDV-SCNC: 4.3 MMOL/L — SIGNIFICANT CHANGE UP (ref 3.5–5.1)
POTASSIUM BLDV-SCNC: 5 MMOL/L — SIGNIFICANT CHANGE UP (ref 3.5–5.1)
POTASSIUM BLDV-SCNC: 5.4 MMOL/L — HIGH (ref 3.5–5.1)
POTASSIUM SERPL-MCNC: 3 MMOL/L — LOW (ref 3.5–5)
POTASSIUM SERPL-MCNC: 3.1 MMOL/L — LOW (ref 3.5–5)
POTASSIUM SERPL-MCNC: 3.4 MMOL/L — LOW (ref 3.5–5)
POTASSIUM SERPL-MCNC: 3.5 MMOL/L — SIGNIFICANT CHANGE UP (ref 3.5–5)
POTASSIUM SERPL-MCNC: 3.7 MMOL/L — SIGNIFICANT CHANGE UP (ref 3.5–5)
POTASSIUM SERPL-MCNC: 3.8 MMOL/L — SIGNIFICANT CHANGE UP (ref 3.5–5)
POTASSIUM SERPL-MCNC: 3.9 MMOL/L — SIGNIFICANT CHANGE UP (ref 3.5–5)
POTASSIUM SERPL-MCNC: 3.9 MMOL/L — SIGNIFICANT CHANGE UP (ref 3.5–5)
POTASSIUM SERPL-MCNC: 4 MMOL/L — SIGNIFICANT CHANGE UP (ref 3.5–5)
POTASSIUM SERPL-MCNC: 4.1 MMOL/L — SIGNIFICANT CHANGE UP (ref 3.5–5)
POTASSIUM SERPL-MCNC: 4.2 MMOL/L — SIGNIFICANT CHANGE UP (ref 3.5–5)
POTASSIUM SERPL-MCNC: 4.3 MMOL/L — SIGNIFICANT CHANGE UP (ref 3.5–5)
POTASSIUM SERPL-MCNC: 4.3 MMOL/L — SIGNIFICANT CHANGE UP (ref 3.5–5)
POTASSIUM SERPL-MCNC: 4.4 MMOL/L — SIGNIFICANT CHANGE UP (ref 3.5–5)
POTASSIUM SERPL-MCNC: 4.5 MMOL/L — SIGNIFICANT CHANGE UP (ref 3.5–5)
POTASSIUM SERPL-MCNC: 4.6 MMOL/L — SIGNIFICANT CHANGE UP (ref 3.5–5)
POTASSIUM SERPL-MCNC: 4.8 MMOL/L — SIGNIFICANT CHANGE UP (ref 3.5–5)
POTASSIUM SERPL-MCNC: 4.9 MMOL/L — SIGNIFICANT CHANGE UP (ref 3.5–5)
POTASSIUM SERPL-MCNC: 5 MMOL/L — SIGNIFICANT CHANGE UP (ref 3.5–5)
POTASSIUM SERPL-MCNC: 5.1 MMOL/L — HIGH (ref 3.5–5)
POTASSIUM SERPL-MCNC: 5.1 MMOL/L — HIGH (ref 3.5–5)
POTASSIUM SERPL-MCNC: 5.2 MMOL/L — HIGH (ref 3.5–5)
POTASSIUM SERPL-MCNC: 5.2 MMOL/L — HIGH (ref 3.5–5)
POTASSIUM SERPL-MCNC: 5.3 MMOL/L — HIGH (ref 3.5–5)
POTASSIUM SERPL-MCNC: 5.4 MMOL/L — HIGH (ref 3.5–5)
POTASSIUM SERPL-MCNC: 5.6 MMOL/L — HIGH (ref 3.5–5)
POTASSIUM SERPL-MCNC: 5.8 MMOL/L — HIGH (ref 3.5–5)
POTASSIUM SERPL-MCNC: 6.1 MMOL/L — CRITICAL HIGH (ref 3.5–5)
POTASSIUM SERPL-MCNC: 6.3 MMOL/L — CRITICAL HIGH (ref 3.5–5)
POTASSIUM SERPL-SCNC: 3 MMOL/L — LOW (ref 3.5–5)
POTASSIUM SERPL-SCNC: 3.1 MMOL/L — LOW (ref 3.5–5)
POTASSIUM SERPL-SCNC: 3.4 MMOL/L — LOW (ref 3.5–5)
POTASSIUM SERPL-SCNC: 3.5 MMOL/L — SIGNIFICANT CHANGE UP (ref 3.5–5)
POTASSIUM SERPL-SCNC: 3.7 MMOL/L — SIGNIFICANT CHANGE UP (ref 3.5–5)
POTASSIUM SERPL-SCNC: 3.8 MMOL/L — SIGNIFICANT CHANGE UP (ref 3.5–5)
POTASSIUM SERPL-SCNC: 3.9 MMOL/L — SIGNIFICANT CHANGE UP (ref 3.5–5)
POTASSIUM SERPL-SCNC: 3.9 MMOL/L — SIGNIFICANT CHANGE UP (ref 3.5–5)
POTASSIUM SERPL-SCNC: 4 MMOL/L — SIGNIFICANT CHANGE UP (ref 3.5–5)
POTASSIUM SERPL-SCNC: 4.1 MMOL/L — SIGNIFICANT CHANGE UP (ref 3.5–5)
POTASSIUM SERPL-SCNC: 4.2 MMOL/L — SIGNIFICANT CHANGE UP (ref 3.5–5)
POTASSIUM SERPL-SCNC: 4.3 MMOL/L — SIGNIFICANT CHANGE UP (ref 3.5–5)
POTASSIUM SERPL-SCNC: 4.3 MMOL/L — SIGNIFICANT CHANGE UP (ref 3.5–5)
POTASSIUM SERPL-SCNC: 4.4 MMOL/L — SIGNIFICANT CHANGE UP (ref 3.5–5)
POTASSIUM SERPL-SCNC: 4.5 MMOL/L — SIGNIFICANT CHANGE UP (ref 3.5–5)
POTASSIUM SERPL-SCNC: 4.6 MMOL/L — SIGNIFICANT CHANGE UP (ref 3.5–5)
POTASSIUM SERPL-SCNC: 4.8 MMOL/L — SIGNIFICANT CHANGE UP (ref 3.5–5)
POTASSIUM SERPL-SCNC: 4.9 MMOL/L — SIGNIFICANT CHANGE UP (ref 3.5–5)
POTASSIUM SERPL-SCNC: 5 MMOL/L — SIGNIFICANT CHANGE UP (ref 3.5–5)
POTASSIUM SERPL-SCNC: 5.1 MMOL/L — HIGH (ref 3.5–5)
POTASSIUM SERPL-SCNC: 5.1 MMOL/L — HIGH (ref 3.5–5)
POTASSIUM SERPL-SCNC: 5.2 MMOL/L — HIGH (ref 3.5–5)
POTASSIUM SERPL-SCNC: 5.2 MMOL/L — HIGH (ref 3.5–5)
POTASSIUM SERPL-SCNC: 5.3 MMOL/L — HIGH (ref 3.5–5)
POTASSIUM SERPL-SCNC: 5.4 MMOL/L — HIGH (ref 3.5–5)
POTASSIUM SERPL-SCNC: 5.6 MMOL/L — HIGH (ref 3.5–5)
POTASSIUM SERPL-SCNC: 5.8 MMOL/L — HIGH (ref 3.5–5)
POTASSIUM SERPL-SCNC: 6.1 MMOL/L — CRITICAL HIGH (ref 3.5–5)
POTASSIUM SERPL-SCNC: 6.3 MMOL/L — CRITICAL HIGH (ref 3.5–5)
POTASSIUM UR-SCNC: 38 MMOL/L — SIGNIFICANT CHANGE UP
PREALB SERPL-MCNC: 3 MG/DL — LOW (ref 20–40)
PREALB SERPL-MCNC: <3 MG/DL — LOW (ref 20–40)
PROCALCITONIN SERPL-MCNC: 0.97 NG/ML — HIGH (ref 0.02–0.1)
PROT ?TM UR-MCNC: 51 MG/DLG/24H — SIGNIFICANT CHANGE UP
PROT ?TM UR-MCNC: 51 MG/DLG/24H — SIGNIFICANT CHANGE UP
PROT SERPL-MCNC: 2.4 G/DL — LOW (ref 6–8)
PROT SERPL-MCNC: 2.7 G/DL — LOW (ref 6–8)
PROT SERPL-MCNC: 3.3 G/DL — LOW (ref 6–8)
PROT SERPL-MCNC: 3.3 G/DL — LOW (ref 6–8)
PROT SERPL-MCNC: 3.6 G/DL — LOW (ref 6–8)
PROT SERPL-MCNC: 3.8 G/DL — LOW (ref 6–8)
PROT SERPL-MCNC: 3.9 G/DL — LOW (ref 6–8)
PROT SERPL-MCNC: 4 G/DL — LOW (ref 6–8)
PROT SERPL-MCNC: 4.2 G/DL — LOW (ref 6–8)
PROT SERPL-MCNC: 4.2 G/DL — LOW (ref 6–8)
PROT SERPL-MCNC: 4.8 G/DL — LOW (ref 6–8)
PROT SERPL-MCNC: 4.9 G/DL — LOW (ref 6–8)
PROT SERPL-MCNC: 4.9 G/DL — LOW (ref 6–8)
PROT SERPL-MCNC: 5 G/DL — LOW (ref 6–8)
PROT SERPL-MCNC: 5.1 G/DL — LOW (ref 6–8)
PROT SERPL-MCNC: 5.2 G/DL — LOW (ref 6–8)
PROT SERPL-MCNC: 5.3 G/DL — LOW (ref 6–8)
PROT SERPL-MCNC: 5.4 G/DL — LOW (ref 6–8)
PROT SERPL-MCNC: 5.6 G/DL — LOW (ref 6–8)
PROT SERPL-MCNC: 5.7 G/DL — LOW (ref 6–8)
PROT SERPL-MCNC: 5.8 G/DL — LOW (ref 6–8)
PROT SERPL-MCNC: 6.3 G/DL — SIGNIFICANT CHANGE UP (ref 6–8)
PROT UR-MCNC: ABNORMAL
PROT UR-MCNC: SIGNIFICANT CHANGE UP
PROT/CREAT UR-RTO: 0.5 RATIO — HIGH (ref 0–0.2)
PROTHROM AB SERPL-ACNC: 14.2 SEC — HIGH (ref 9.95–12.87)
PROTHROM AB SERPL-ACNC: 14.9 SEC — HIGH (ref 9.95–12.87)
PROTHROM AB SERPL-ACNC: 15.8 SEC — HIGH (ref 9.95–12.87)
PROTHROM AB SERPL-ACNC: 16.9 SEC — HIGH (ref 9.95–12.87)
PROTHROM AB SERPL-ACNC: 17 SEC — HIGH (ref 9.95–12.87)
PROTHROM AB SERPL-ACNC: 17.2 SEC — HIGH (ref 9.95–12.87)
PROTHROM AB SERPL-ACNC: 17.9 SEC — HIGH (ref 9.95–12.87)
PROTHROM AB SERPL-ACNC: 18.4 SEC — HIGH (ref 9.95–12.87)
PROTHROM AB SERPL-ACNC: 18.4 SEC — HIGH (ref 9.95–12.87)
PROTHROM AB SERPL-ACNC: 18.6 SEC — HIGH (ref 9.95–12.87)
PROTHROM AB SERPL-ACNC: 19.1 SEC — HIGH (ref 9.95–12.87)
PROTHROM AB SERPL-ACNC: 19.8 SEC — HIGH (ref 9.95–12.87)
PROTHROM AB SERPL-ACNC: 19.8 SEC — HIGH (ref 9.95–12.87)
PROTHROM AB SERPL-ACNC: 20.7 SEC — HIGH (ref 9.95–12.87)
PROTHROM AB SERPL-ACNC: 20.9 SEC — HIGH (ref 9.95–12.87)
PROTHROM AB SERPL-ACNC: 21.8 SEC — HIGH (ref 9.95–12.87)
PROTHROM AB SERPL-ACNC: 21.9 SEC — HIGH (ref 9.95–12.87)
PROTHROM AB SERPL-ACNC: 25.7 SEC — HIGH (ref 9.95–12.87)
PROTHROM AB SERPL-ACNC: 25.8 SEC — HIGH (ref 9.95–12.87)
PROTHROM AB SERPL-ACNC: 36.2 SEC — HIGH (ref 9.95–12.87)
PROTHROM AB SERPL-ACNC: >40 SEC — HIGH (ref 9.95–12.87)
QUANT TB PLUS MITOGEN MINUS NIL: 0.03 IU/ML — SIGNIFICANT CHANGE UP
RBC # BLD: 1.85 M/UL — LOW (ref 4.7–6.1)
RBC # BLD: 2.01 M/UL — LOW (ref 4.7–6.1)
RBC # BLD: 2.26 M/UL — LOW (ref 4.7–6.1)
RBC # BLD: 2.31 M/UL — LOW (ref 4.7–6.1)
RBC # BLD: 2.36 M/UL — LOW (ref 4.7–6.1)
RBC # BLD: 2.37 M/UL — LOW (ref 4.7–6.1)
RBC # BLD: 2.38 M/UL — LOW (ref 4.7–6.1)
RBC # BLD: 2.43 M/UL — LOW (ref 4.7–6.1)
RBC # BLD: 2.5 M/UL — LOW (ref 4.7–6.1)
RBC # BLD: 2.52 M/UL — LOW (ref 4.7–6.1)
RBC # BLD: 2.53 M/UL — LOW (ref 4.7–6.1)
RBC # BLD: 2.54 M/UL — LOW (ref 4.7–6.1)
RBC # BLD: 2.63 M/UL — LOW (ref 4.7–6.1)
RBC # BLD: 2.71 M/UL — LOW (ref 4.7–6.1)
RBC # BLD: 2.72 M/UL — LOW (ref 4.7–6.1)
RBC # BLD: 2.76 M/UL — LOW (ref 4.7–6.1)
RBC # BLD: 2.8 M/UL — LOW (ref 4.7–6.1)
RBC # BLD: 2.81 M/UL — LOW (ref 4.7–6.1)
RBC # BLD: 2.82 M/UL — LOW (ref 4.7–6.1)
RBC # BLD: 2.83 M/UL — LOW (ref 4.7–6.1)
RBC # BLD: 2.85 M/UL — LOW (ref 4.7–6.1)
RBC # BLD: 2.86 M/UL — LOW (ref 4.7–6.1)
RBC # BLD: 2.91 M/UL — LOW (ref 4.7–6.1)
RBC # BLD: 2.91 M/UL — LOW (ref 4.7–6.1)
RBC # BLD: 2.95 M/UL — LOW (ref 4.7–6.1)
RBC # BLD: 2.96 M/UL — LOW (ref 4.7–6.1)
RBC # BLD: 3.02 M/UL — LOW (ref 4.7–6.1)
RBC # BLD: 3.03 M/UL — LOW (ref 4.7–6.1)
RBC # BLD: 3.03 M/UL — LOW (ref 4.7–6.1)
RBC # BLD: 3.04 M/UL — LOW (ref 4.7–6.1)
RBC # BLD: 3.1 M/UL — LOW (ref 4.7–6.1)
RBC # BLD: 3.11 M/UL — LOW (ref 4.7–6.1)
RBC # BLD: 3.16 M/UL — LOW (ref 4.7–6.1)
RBC # BLD: 3.18 M/UL — LOW (ref 4.7–6.1)
RBC # BLD: 3.2 M/UL — LOW (ref 4.7–6.1)
RBC # BLD: 3.21 M/UL — LOW (ref 4.7–6.1)
RBC # BLD: 3.24 M/UL — LOW (ref 4.7–6.1)
RBC # BLD: 3.29 M/UL — LOW (ref 4.7–6.1)
RBC # BLD: 3.38 M/UL — LOW (ref 4.7–6.1)
RBC # BLD: 3.39 M/UL — LOW (ref 4.7–6.1)
RBC # BLD: 3.4 M/UL — LOW (ref 4.7–6.1)
RBC # BLD: 3.43 M/UL — LOW (ref 4.7–6.1)
RBC # BLD: 3.44 M/UL — LOW (ref 4.7–6.1)
RBC # BLD: 3.45 M/UL — LOW (ref 4.7–6.1)
RBC # BLD: 3.49 M/UL — LOW (ref 4.7–6.1)
RBC # BLD: 3.5 M/UL — LOW (ref 4.7–6.1)
RBC # BLD: 3.5 M/UL — LOW (ref 4.7–6.1)
RBC # BLD: 3.51 M/UL — LOW (ref 4.7–6.1)
RBC # BLD: 3.54 M/UL — LOW (ref 4.7–6.1)
RBC # BLD: 3.55 M/UL — LOW (ref 4.7–6.1)
RBC # BLD: 3.62 M/UL — LOW (ref 4.7–6.1)
RBC # BLD: 3.63 M/UL — LOW (ref 4.7–6.1)
RBC # BLD: 3.65 M/UL — LOW (ref 4.7–6.1)
RBC # BLD: 3.67 M/UL — LOW (ref 4.7–6.1)
RBC # BLD: 3.7 M/UL — LOW (ref 4.7–6.1)
RBC # BLD: 3.7 M/UL — LOW (ref 4.7–6.1)
RBC # BLD: 3.75 M/UL — LOW (ref 4.7–6.1)
RBC # BLD: 3.81 M/UL — LOW (ref 4.7–6.1)
RBC # BLD: 3.85 M/UL — LOW (ref 4.7–6.1)
RBC # BLD: 3.89 M/UL — LOW (ref 4.7–6.1)
RBC # BLD: 3.89 M/UL — LOW (ref 4.7–6.1)
RBC # BLD: 3.9 M/UL — LOW (ref 4.7–6.1)
RBC # BLD: 3.92 M/UL — LOW (ref 4.7–6.1)
RBC # BLD: 3.92 M/UL — LOW (ref 4.7–6.1)
RBC # BLD: 3.94 M/UL — LOW (ref 4.7–6.1)
RBC # BLD: 3.95 M/UL — LOW (ref 4.7–6.1)
RBC # BLD: 3.97 M/UL — LOW (ref 4.7–6.1)
RBC # BLD: 3.99 M/UL — LOW (ref 4.7–6.1)
RBC # BLD: 4.06 M/UL — LOW (ref 4.7–6.1)
RBC # BLD: 4.1 M/UL — LOW (ref 4.7–6.1)
RBC # BLD: 4.24 M/UL — LOW (ref 4.7–6.1)
RBC # BLD: 4.39 M/UL — LOW (ref 4.7–6.1)
RBC # BLD: 4.46 M/UL — LOW (ref 4.7–6.1)
RBC # BLD: 4.53 M/UL — LOW (ref 4.7–6.1)
RBC # BLD: 4.55 M/UL — LOW (ref 4.7–6.1)
RBC # BLD: 4.64 M/UL — LOW (ref 4.7–6.1)
RBC # FLD: 14.5 % — SIGNIFICANT CHANGE UP (ref 11.5–14.5)
RBC # FLD: 17.2 % — HIGH (ref 11.5–14.5)
RBC # FLD: 17.4 % — HIGH (ref 11.5–14.5)
RBC # FLD: 17.5 % — HIGH (ref 11.5–14.5)
RBC # FLD: 18.5 % — HIGH (ref 11.5–14.5)
RBC # FLD: 18.6 % — HIGH (ref 11.5–14.5)
RBC # FLD: 18.6 % — HIGH (ref 11.5–14.5)
RBC # FLD: 18.8 % — HIGH (ref 11.5–14.5)
RBC # FLD: 18.9 % — HIGH (ref 11.5–14.5)
RBC # FLD: 19 % — HIGH (ref 11.5–14.5)
RBC # FLD: 19 % — HIGH (ref 11.5–14.5)
RBC # FLD: 19.1 % — HIGH (ref 11.5–14.5)
RBC # FLD: 19.2 % — HIGH (ref 11.5–14.5)
RBC # FLD: 19.4 % — HIGH (ref 11.5–14.5)
RBC # FLD: 19.5 % — HIGH (ref 11.5–14.5)
RBC # FLD: 19.7 % — HIGH (ref 11.5–14.5)
RBC # FLD: 19.8 % — HIGH (ref 11.5–14.5)
RBC # FLD: 19.9 % — HIGH (ref 11.5–14.5)
RBC # FLD: 20 % — HIGH (ref 11.5–14.5)
RBC # FLD: 20.1 % — HIGH (ref 11.5–14.5)
RBC # FLD: 20.2 % — HIGH (ref 11.5–14.5)
RBC # FLD: 20.3 % — HIGH (ref 11.5–14.5)
RBC # FLD: 20.4 % — HIGH (ref 11.5–14.5)
RBC # FLD: 20.4 % — HIGH (ref 11.5–14.5)
RBC # FLD: 20.5 % — HIGH (ref 11.5–14.5)
RBC # FLD: 20.5 % — HIGH (ref 11.5–14.5)
RBC # FLD: 20.6 % — HIGH (ref 11.5–14.5)
RBC # FLD: 20.6 % — HIGH (ref 11.5–14.5)
RBC # FLD: 20.9 % — HIGH (ref 11.5–14.5)
RBC # FLD: 21 % — HIGH (ref 11.5–14.5)
RBC # FLD: 21.1 % — HIGH (ref 11.5–14.5)
RBC # FLD: 21.2 % — HIGH (ref 11.5–14.5)
RBC # FLD: 21.3 % — HIGH (ref 11.5–14.5)
RBC # FLD: 21.4 % — HIGH (ref 11.5–14.5)
RBC # FLD: 21.5 % — HIGH (ref 11.5–14.5)
RBC # FLD: 21.6 % — HIGH (ref 11.5–14.5)
RBC # FLD: 21.8 % — HIGH (ref 11.5–14.5)
RBC # FLD: 21.8 % — HIGH (ref 11.5–14.5)
RBC # FLD: 21.9 % — HIGH (ref 11.5–14.5)
RBC # FLD: 22.2 % — HIGH (ref 11.5–14.5)
RBC BLD AUTO: ABNORMAL
RBC CASTS # UR COMP ASSIST: 1 /HPF — SIGNIFICANT CHANGE UP (ref 0–4)
RBC CASTS # UR COMP ASSIST: 319 /HPF — HIGH (ref 0–4)
RBC CASTS # UR COMP ASSIST: 8 /HPF — HIGH (ref 0–4)
RETICS #: 100 K/UL — SIGNIFICANT CHANGE UP (ref 25–125)
RETICS/RBC NFR: 3.3 % — HIGH (ref 0.5–1.5)
SAO2 % BLDA: 100 % — HIGH (ref 94–98)
SAO2 % BLDA: 97.1 % — SIGNIFICANT CHANGE UP (ref 94–98)
SAO2 % BLDA: 98.2 % — HIGH (ref 94–98)
SAO2 % BLDA: 98.6 % — HIGH (ref 94–98)
SAO2 % BLDA: 99 % — HIGH (ref 94–98)
SAO2 % BLDA: 99.3 % — HIGH (ref 94–98)
SAO2 % BLDA: 99.6 % — HIGH (ref 94–98)
SAO2 % BLDA: 99.9 % — HIGH (ref 94–98)
SAO2 % BLDV: 29.6 % — SIGNIFICANT CHANGE UP
SAO2 % BLDV: 44.1 % — SIGNIFICANT CHANGE UP
SAO2 % BLDV: 89.4 % — SIGNIFICANT CHANGE UP
SCHISTOCYTES BLD QL AUTO: SIGNIFICANT CHANGE UP
SODIUM SERPL-SCNC: 123 MMOL/L — LOW (ref 135–146)
SODIUM SERPL-SCNC: 123 MMOL/L — LOW (ref 135–146)
SODIUM SERPL-SCNC: 124 MMOL/L — LOW (ref 135–146)
SODIUM SERPL-SCNC: 125 MMOL/L — LOW (ref 135–146)
SODIUM SERPL-SCNC: 125 MMOL/L — LOW (ref 135–146)
SODIUM SERPL-SCNC: 128 MMOL/L — LOW (ref 135–146)
SODIUM SERPL-SCNC: 129 MMOL/L — LOW (ref 135–146)
SODIUM SERPL-SCNC: 130 MMOL/L — LOW (ref 135–146)
SODIUM SERPL-SCNC: 130 MMOL/L — LOW (ref 135–146)
SODIUM SERPL-SCNC: 131 MMOL/L — LOW (ref 135–146)
SODIUM SERPL-SCNC: 131 MMOL/L — LOW (ref 135–146)
SODIUM SERPL-SCNC: 132 MMOL/L — LOW (ref 135–146)
SODIUM SERPL-SCNC: 133 MMOL/L — LOW (ref 135–146)
SODIUM SERPL-SCNC: 134 MMOL/L — LOW (ref 135–146)
SODIUM SERPL-SCNC: 135 MMOL/L — SIGNIFICANT CHANGE UP (ref 135–146)
SODIUM SERPL-SCNC: 136 MMOL/L — SIGNIFICANT CHANGE UP (ref 135–146)
SODIUM SERPL-SCNC: 137 MMOL/L — SIGNIFICANT CHANGE UP (ref 135–146)
SODIUM SERPL-SCNC: 138 MMOL/L — SIGNIFICANT CHANGE UP (ref 135–146)
SODIUM SERPL-SCNC: 139 MMOL/L — SIGNIFICANT CHANGE UP (ref 135–146)
SODIUM SERPL-SCNC: 139 MMOL/L — SIGNIFICANT CHANGE UP (ref 135–146)
SODIUM SERPL-SCNC: 141 MMOL/L — SIGNIFICANT CHANGE UP (ref 135–146)
SODIUM SERPL-SCNC: 141 MMOL/L — SIGNIFICANT CHANGE UP (ref 135–146)
SODIUM SERPL-SCNC: 142 MMOL/L — SIGNIFICANT CHANGE UP (ref 135–146)
SODIUM UR-SCNC: 20 MMOL/L — SIGNIFICANT CHANGE UP
SODIUM UR-SCNC: 40 MMOL/L — SIGNIFICANT CHANGE UP
SODIUM UR-SCNC: <20 MMOL/L — SIGNIFICANT CHANGE UP
SODIUM UR-SCNC: <20 MMOL/L — SIGNIFICANT CHANGE UP
SP GR SPEC: 1.01 — SIGNIFICANT CHANGE UP (ref 1.01–1.03)
SP GR SPEC: 1.03 — SIGNIFICANT CHANGE UP (ref 1.01–1.03)
SP GR SPEC: 1.03 — SIGNIFICANT CHANGE UP (ref 1.01–1.03)
SP GR SPEC: 1.04 — HIGH (ref 1.01–1.03)
SPECIMEN SOURCE: SIGNIFICANT CHANGE UP
SURGICAL PATHOLOGY STUDY: SIGNIFICANT CHANGE UP
SURGICAL PATHOLOGY STUDY: SIGNIFICANT CHANGE UP
T3 SERPL-MCNC: 62 NG/DL — LOW (ref 80–200)
T4 AB SER-ACNC: 1.9 UG/DL — LOW (ref 4.6–12)
TIBC SERPL-MCNC: 128 UG/DL — LOW (ref 220–430)
TRANSFERRIN SERPL-MCNC: 101 MG/DL — LOW (ref 200–360)
TRIGL SERPL-MCNC: 88 MG/DL — SIGNIFICANT CHANGE UP
TROPONIN T SERPL-MCNC: 0.01 NG/ML — SIGNIFICANT CHANGE UP
TROPONIN T SERPL-MCNC: 0.02 NG/ML — HIGH
TROPONIN T SERPL-MCNC: 0.13 NG/ML — CRITICAL HIGH
TROPONIN T SERPL-MCNC: 0.14 NG/ML — CRITICAL HIGH
TROPONIN T SERPL-MCNC: 0.15 NG/ML — CRITICAL HIGH
TSH SERPL-MCNC: 0.3 UIU/ML — SIGNIFICANT CHANGE UP (ref 0.27–4.2)
TSH SERPL-MCNC: 0.35 UIU/ML — SIGNIFICANT CHANGE UP (ref 0.27–4.2)
TSH SERPL-MCNC: 0.45 UIU/ML — SIGNIFICANT CHANGE UP (ref 0.27–4.2)
TSH SERPL-MCNC: 3 UIU/ML — SIGNIFICANT CHANGE UP (ref 0.27–4.2)
UIBC SERPL-MCNC: 116 UG/DL — SIGNIFICANT CHANGE UP (ref 110–370)
UNFRACTIONATED HEPARIN INTERPRETATION: SIGNIFICANT CHANGE UP
UNFRACTIONATED HEPARIN RESULT: NEGATIVE — SIGNIFICANT CHANGE UP
UNFRACTIONATED HEPARIN-HIGH DOSE: 0 % — SIGNIFICANT CHANGE UP
UNFRACTIONATED HEPARIN-LOW DOSE: 0 % — SIGNIFICANT CHANGE UP
URATE CRY FLD QL MICRO: ABNORMAL
UROBILINOGEN FLD QL: SIGNIFICANT CHANGE UP
UUN UR-MCNC: 823 MG/DL — SIGNIFICANT CHANGE UP
VANCOMYCIN TROUGH SERPL-MCNC: 17.9 UG/ML — HIGH (ref 5–10)
VANCOMYCIN TROUGH SERPL-MCNC: 18.5 UG/ML — HIGH (ref 5–10)
VANCOMYCIN TROUGH SERPL-MCNC: 22.1 UG/ML — HIGH (ref 5–10)
VARIANT LYMPHS # BLD: 0.9 % — SIGNIFICANT CHANGE UP (ref 0–5)
VIT B12 SERPL-MCNC: 766 PG/ML — SIGNIFICANT CHANGE UP (ref 232–1245)
WBC # BLD: 10.29 K/UL — SIGNIFICANT CHANGE UP (ref 4.8–10.8)
WBC # BLD: 10.45 K/UL — SIGNIFICANT CHANGE UP (ref 4.8–10.8)
WBC # BLD: 11.69 K/UL — HIGH (ref 4.8–10.8)
WBC # BLD: 11.95 K/UL — HIGH (ref 4.8–10.8)
WBC # BLD: 12.17 K/UL — HIGH (ref 4.8–10.8)
WBC # BLD: 12.34 K/UL — HIGH (ref 4.8–10.8)
WBC # BLD: 12.39 K/UL — HIGH (ref 4.8–10.8)
WBC # BLD: 12.52 K/UL — HIGH (ref 4.8–10.8)
WBC # BLD: 12.78 K/UL — HIGH (ref 4.8–10.8)
WBC # BLD: 13 K/UL — HIGH (ref 4.8–10.8)
WBC # BLD: 13.74 K/UL — HIGH (ref 4.8–10.8)
WBC # BLD: 13.75 K/UL — HIGH (ref 4.8–10.8)
WBC # BLD: 13.78 K/UL — HIGH (ref 4.8–10.8)
WBC # BLD: 14.49 K/UL — HIGH (ref 4.8–10.8)
WBC # BLD: 14.68 K/UL — HIGH (ref 4.8–10.8)
WBC # BLD: 14.95 K/UL — HIGH (ref 4.8–10.8)
WBC # BLD: 15.08 K/UL — HIGH (ref 4.8–10.8)
WBC # BLD: 15.11 K/UL — HIGH (ref 4.8–10.8)
WBC # BLD: 15.48 K/UL — HIGH (ref 4.8–10.8)
WBC # BLD: 15.71 K/UL — HIGH (ref 4.8–10.8)
WBC # BLD: 15.95 K/UL — HIGH (ref 4.8–10.8)
WBC # BLD: 16.17 K/UL — HIGH (ref 4.8–10.8)
WBC # BLD: 16.66 K/UL — HIGH (ref 4.8–10.8)
WBC # BLD: 17.52 K/UL — HIGH (ref 4.8–10.8)
WBC # BLD: 18.12 K/UL — HIGH (ref 4.8–10.8)
WBC # BLD: 19.39 K/UL — HIGH (ref 4.8–10.8)
WBC # BLD: 19.65 K/UL — HIGH (ref 4.8–10.8)
WBC # BLD: 20.55 K/UL — HIGH (ref 4.8–10.8)
WBC # BLD: 20.95 K/UL — HIGH (ref 4.8–10.8)
WBC # BLD: 21.3 K/UL — HIGH (ref 4.8–10.8)
WBC # BLD: 24.98 K/UL — HIGH (ref 4.8–10.8)
WBC # BLD: 25.03 K/UL — HIGH (ref 4.8–10.8)
WBC # BLD: 26.86 K/UL — HIGH (ref 4.8–10.8)
WBC # BLD: 27.25 K/UL — HIGH (ref 4.8–10.8)
WBC # BLD: 28.59 K/UL — HIGH (ref 4.8–10.8)
WBC # BLD: 29.09 K/UL — HIGH (ref 4.8–10.8)
WBC # BLD: 30.08 K/UL — HIGH (ref 4.8–10.8)
WBC # BLD: 31.04 K/UL — HIGH (ref 4.8–10.8)
WBC # BLD: 32.66 K/UL — HIGH (ref 4.8–10.8)
WBC # BLD: 33.43 K/UL — HIGH (ref 4.8–10.8)
WBC # BLD: 33.55 K/UL — HIGH (ref 4.8–10.8)
WBC # BLD: 33.91 K/UL — HIGH (ref 4.8–10.8)
WBC # BLD: 33.99 K/UL — HIGH (ref 4.8–10.8)
WBC # BLD: 34.29 K/UL — HIGH (ref 4.8–10.8)
WBC # BLD: 34.51 K/UL — HIGH (ref 4.8–10.8)
WBC # BLD: 34.86 K/UL — HIGH (ref 4.8–10.8)
WBC # BLD: 35.37 K/UL — HIGH (ref 4.8–10.8)
WBC # BLD: 36.11 K/UL — HIGH (ref 4.8–10.8)
WBC # BLD: 37.91 K/UL — HIGH (ref 4.8–10.8)
WBC # BLD: 38.15 K/UL — HIGH (ref 4.8–10.8)
WBC # BLD: 38.86 K/UL — HIGH (ref 4.8–10.8)
WBC # BLD: 38.89 K/UL — HIGH (ref 4.8–10.8)
WBC # BLD: 4.87 K/UL — SIGNIFICANT CHANGE UP (ref 4.8–10.8)
WBC # BLD: 40.5 K/UL — CRITICAL HIGH (ref 4.8–10.8)
WBC # BLD: 41.06 K/UL — CRITICAL HIGH (ref 4.8–10.8)
WBC # BLD: 41.35 K/UL — CRITICAL HIGH (ref 4.8–10.8)
WBC # BLD: 41.78 K/UL — CRITICAL HIGH (ref 4.8–10.8)
WBC # BLD: 41.8 K/UL — CRITICAL HIGH (ref 4.8–10.8)
WBC # BLD: 42.21 K/UL — CRITICAL HIGH (ref 4.8–10.8)
WBC # BLD: 44.03 K/UL — CRITICAL HIGH (ref 4.8–10.8)
WBC # BLD: 44.28 K/UL — CRITICAL HIGH (ref 4.8–10.8)
WBC # BLD: 46.36 K/UL — CRITICAL HIGH (ref 4.8–10.8)
WBC # BLD: 47.76 K/UL — CRITICAL HIGH (ref 4.8–10.8)
WBC # BLD: 47.97 K/UL — CRITICAL HIGH (ref 4.8–10.8)
WBC # BLD: 48.22 K/UL — CRITICAL HIGH (ref 4.8–10.8)
WBC # BLD: 48.37 K/UL — CRITICAL HIGH (ref 4.8–10.8)
WBC # BLD: 5.92 K/UL — SIGNIFICANT CHANGE UP (ref 4.8–10.8)
WBC # BLD: 55.65 K/UL — CRITICAL HIGH (ref 4.8–10.8)
WBC # BLD: 56.96 K/UL — CRITICAL HIGH (ref 4.8–10.8)
WBC # BLD: 57.51 K/UL — CRITICAL HIGH (ref 4.8–10.8)
WBC # BLD: 58.76 K/UL — CRITICAL HIGH (ref 4.8–10.8)
WBC # BLD: 6.46 K/UL — SIGNIFICANT CHANGE UP (ref 4.8–10.8)
WBC # BLD: 6.64 K/UL — SIGNIFICANT CHANGE UP (ref 4.8–10.8)
WBC # BLD: 7.42 K/UL — SIGNIFICANT CHANGE UP (ref 4.8–10.8)
WBC # BLD: 7.47 K/UL — SIGNIFICANT CHANGE UP (ref 4.8–10.8)
WBC # BLD: 8.45 K/UL — SIGNIFICANT CHANGE UP (ref 4.8–10.8)
WBC # BLD: 8.68 K/UL — SIGNIFICANT CHANGE UP (ref 4.8–10.8)
WBC # BLD: 9.87 K/UL — SIGNIFICANT CHANGE UP (ref 4.8–10.8)
WBC # FLD AUTO: 10.29 K/UL — SIGNIFICANT CHANGE UP (ref 4.8–10.8)
WBC # FLD AUTO: 10.45 K/UL — SIGNIFICANT CHANGE UP (ref 4.8–10.8)
WBC # FLD AUTO: 11.69 K/UL — HIGH (ref 4.8–10.8)
WBC # FLD AUTO: 11.95 K/UL — HIGH (ref 4.8–10.8)
WBC # FLD AUTO: 12.17 K/UL — HIGH (ref 4.8–10.8)
WBC # FLD AUTO: 12.34 K/UL — HIGH (ref 4.8–10.8)
WBC # FLD AUTO: 12.39 K/UL — HIGH (ref 4.8–10.8)
WBC # FLD AUTO: 12.52 K/UL — HIGH (ref 4.8–10.8)
WBC # FLD AUTO: 12.78 K/UL — HIGH (ref 4.8–10.8)
WBC # FLD AUTO: 13 K/UL — HIGH (ref 4.8–10.8)
WBC # FLD AUTO: 13.74 K/UL — HIGH (ref 4.8–10.8)
WBC # FLD AUTO: 13.75 K/UL — HIGH (ref 4.8–10.8)
WBC # FLD AUTO: 13.78 K/UL — HIGH (ref 4.8–10.8)
WBC # FLD AUTO: 14.49 K/UL — HIGH (ref 4.8–10.8)
WBC # FLD AUTO: 14.68 K/UL — HIGH (ref 4.8–10.8)
WBC # FLD AUTO: 14.95 K/UL — HIGH (ref 4.8–10.8)
WBC # FLD AUTO: 15.08 K/UL — HIGH (ref 4.8–10.8)
WBC # FLD AUTO: 15.11 K/UL — HIGH (ref 4.8–10.8)
WBC # FLD AUTO: 15.48 K/UL — HIGH (ref 4.8–10.8)
WBC # FLD AUTO: 15.71 K/UL — HIGH (ref 4.8–10.8)
WBC # FLD AUTO: 15.95 K/UL — HIGH (ref 4.8–10.8)
WBC # FLD AUTO: 16.17 K/UL — HIGH (ref 4.8–10.8)
WBC # FLD AUTO: 16.66 K/UL — HIGH (ref 4.8–10.8)
WBC # FLD AUTO: 17.52 K/UL — HIGH (ref 4.8–10.8)
WBC # FLD AUTO: 18.12 K/UL — HIGH (ref 4.8–10.8)
WBC # FLD AUTO: 19.39 K/UL — HIGH (ref 4.8–10.8)
WBC # FLD AUTO: 19.65 K/UL — HIGH (ref 4.8–10.8)
WBC # FLD AUTO: 20.55 K/UL — HIGH (ref 4.8–10.8)
WBC # FLD AUTO: 20.95 K/UL — HIGH (ref 4.8–10.8)
WBC # FLD AUTO: 21.3 K/UL — HIGH (ref 4.8–10.8)
WBC # FLD AUTO: 24.98 K/UL — HIGH (ref 4.8–10.8)
WBC # FLD AUTO: 25.03 K/UL — HIGH (ref 4.8–10.8)
WBC # FLD AUTO: 26.86 K/UL — HIGH (ref 4.8–10.8)
WBC # FLD AUTO: 27.25 K/UL — HIGH (ref 4.8–10.8)
WBC # FLD AUTO: 28.59 K/UL — HIGH (ref 4.8–10.8)
WBC # FLD AUTO: 29.09 K/UL — HIGH (ref 4.8–10.8)
WBC # FLD AUTO: 30.08 K/UL — HIGH (ref 4.8–10.8)
WBC # FLD AUTO: 31.04 K/UL — HIGH (ref 4.8–10.8)
WBC # FLD AUTO: 32.66 K/UL — HIGH (ref 4.8–10.8)
WBC # FLD AUTO: 33.43 K/UL — HIGH (ref 4.8–10.8)
WBC # FLD AUTO: 33.55 K/UL — HIGH (ref 4.8–10.8)
WBC # FLD AUTO: 33.91 K/UL — HIGH (ref 4.8–10.8)
WBC # FLD AUTO: 33.99 K/UL — HIGH (ref 4.8–10.8)
WBC # FLD AUTO: 34.29 K/UL — HIGH (ref 4.8–10.8)
WBC # FLD AUTO: 34.51 K/UL — HIGH (ref 4.8–10.8)
WBC # FLD AUTO: 34.86 K/UL — HIGH (ref 4.8–10.8)
WBC # FLD AUTO: 35.37 K/UL — HIGH (ref 4.8–10.8)
WBC # FLD AUTO: 36.11 K/UL — HIGH (ref 4.8–10.8)
WBC # FLD AUTO: 37.91 K/UL — HIGH (ref 4.8–10.8)
WBC # FLD AUTO: 38.15 K/UL — HIGH (ref 4.8–10.8)
WBC # FLD AUTO: 38.86 K/UL — HIGH (ref 4.8–10.8)
WBC # FLD AUTO: 38.89 K/UL — HIGH (ref 4.8–10.8)
WBC # FLD AUTO: 4.87 K/UL — SIGNIFICANT CHANGE UP (ref 4.8–10.8)
WBC # FLD AUTO: 40.5 K/UL — CRITICAL HIGH (ref 4.8–10.8)
WBC # FLD AUTO: 41.06 K/UL — CRITICAL HIGH (ref 4.8–10.8)
WBC # FLD AUTO: 41.35 K/UL — CRITICAL HIGH (ref 4.8–10.8)
WBC # FLD AUTO: 41.78 K/UL — CRITICAL HIGH (ref 4.8–10.8)
WBC # FLD AUTO: 41.8 K/UL — CRITICAL HIGH (ref 4.8–10.8)
WBC # FLD AUTO: 42.21 K/UL — CRITICAL HIGH (ref 4.8–10.8)
WBC # FLD AUTO: 44.03 K/UL — CRITICAL HIGH (ref 4.8–10.8)
WBC # FLD AUTO: 44.28 K/UL — CRITICAL HIGH (ref 4.8–10.8)
WBC # FLD AUTO: 46.36 K/UL — CRITICAL HIGH (ref 4.8–10.8)
WBC # FLD AUTO: 47.76 K/UL — CRITICAL HIGH (ref 4.8–10.8)
WBC # FLD AUTO: 47.97 K/UL — CRITICAL HIGH (ref 4.8–10.8)
WBC # FLD AUTO: 48.22 K/UL — CRITICAL HIGH (ref 4.8–10.8)
WBC # FLD AUTO: 48.37 K/UL — CRITICAL HIGH (ref 4.8–10.8)
WBC # FLD AUTO: 5.92 K/UL — SIGNIFICANT CHANGE UP (ref 4.8–10.8)
WBC # FLD AUTO: 55.65 K/UL — CRITICAL HIGH (ref 4.8–10.8)
WBC # FLD AUTO: 56.96 K/UL — CRITICAL HIGH (ref 4.8–10.8)
WBC # FLD AUTO: 57.51 K/UL — CRITICAL HIGH (ref 4.8–10.8)
WBC # FLD AUTO: 58.76 K/UL — CRITICAL HIGH (ref 4.8–10.8)
WBC # FLD AUTO: 6.46 K/UL — SIGNIFICANT CHANGE UP (ref 4.8–10.8)
WBC # FLD AUTO: 6.64 K/UL — SIGNIFICANT CHANGE UP (ref 4.8–10.8)
WBC # FLD AUTO: 7.42 K/UL — SIGNIFICANT CHANGE UP (ref 4.8–10.8)
WBC # FLD AUTO: 7.47 K/UL — SIGNIFICANT CHANGE UP (ref 4.8–10.8)
WBC # FLD AUTO: 8.45 K/UL — SIGNIFICANT CHANGE UP (ref 4.8–10.8)
WBC # FLD AUTO: 8.68 K/UL — SIGNIFICANT CHANGE UP (ref 4.8–10.8)
WBC # FLD AUTO: 9.87 K/UL — SIGNIFICANT CHANGE UP (ref 4.8–10.8)
WBC UR QL: 10 /HPF — HIGH (ref 0–5)
WBC UR QL: >720 /HPF — HIGH (ref 0–5)
WBC UR QL: >720 /HPF — HIGH (ref 0–5)

## 2023-01-01 PROCEDURE — 99291 CRITICAL CARE FIRST HOUR: CPT | Mod: 25,24

## 2023-01-01 PROCEDURE — 99024 POSTOP FOLLOW-UP VISIT: CPT

## 2023-01-01 PROCEDURE — 49406 IMAGE CATH FLUID PERI/RETRO: CPT

## 2023-01-01 PROCEDURE — 94660 CPAP INITIATION&MGMT: CPT

## 2023-01-01 PROCEDURE — 71045 X-RAY EXAM CHEST 1 VIEW: CPT | Mod: 26

## 2023-01-01 PROCEDURE — 99222 1ST HOSP IP/OBS MODERATE 55: CPT

## 2023-01-01 PROCEDURE — 83615 LACTATE (LD) (LDH) ENZYME: CPT

## 2023-01-01 PROCEDURE — 87640 STAPH A DNA AMP PROBE: CPT

## 2023-01-01 PROCEDURE — 93930 UPPER EXTREMITY STUDY: CPT

## 2023-01-01 PROCEDURE — 99232 SBSQ HOSP IP/OBS MODERATE 35: CPT | Mod: 24

## 2023-01-01 PROCEDURE — 99291 CRITICAL CARE FIRST HOUR: CPT | Mod: 24,25

## 2023-01-01 PROCEDURE — 88305 TISSUE EXAM BY PATHOLOGIST: CPT | Mod: 26

## 2023-01-01 PROCEDURE — 99221 1ST HOSP IP/OBS SF/LOW 40: CPT

## 2023-01-01 PROCEDURE — 93930 UPPER EXTREMITY STUDY: CPT | Mod: 26

## 2023-01-01 PROCEDURE — 86923 COMPATIBILITY TEST ELECTRIC: CPT

## 2023-01-01 PROCEDURE — 88341 IMHCHEM/IMCYTCHM EA ADD ANTB: CPT

## 2023-01-01 PROCEDURE — 99232 SBSQ HOSP IP/OBS MODERATE 35: CPT | Mod: GC

## 2023-01-01 PROCEDURE — 70450 CT HEAD/BRAIN W/O DYE: CPT

## 2023-01-01 PROCEDURE — P9045: CPT

## 2023-01-01 PROCEDURE — 99232 SBSQ HOSP IP/OBS MODERATE 35: CPT

## 2023-01-01 PROCEDURE — C9113: CPT

## 2023-01-01 PROCEDURE — 99233 SBSQ HOSP IP/OBS HIGH 50: CPT

## 2023-01-01 PROCEDURE — 94002 VENT MGMT INPAT INIT DAY: CPT

## 2023-01-01 PROCEDURE — 74018 RADEX ABDOMEN 1 VIEW: CPT

## 2023-01-01 PROCEDURE — 72170 X-RAY EXAM OF PELVIS: CPT | Mod: 26

## 2023-01-01 PROCEDURE — 99291 CRITICAL CARE FIRST HOUR: CPT | Mod: 24

## 2023-01-01 PROCEDURE — 99291 CRITICAL CARE FIRST HOUR: CPT

## 2023-01-01 PROCEDURE — 80053 COMPREHEN METABOLIC PANEL: CPT

## 2023-01-01 PROCEDURE — 85730 THROMBOPLASTIN TIME PARTIAL: CPT

## 2023-01-01 PROCEDURE — 82962 GLUCOSE BLOOD TEST: CPT

## 2023-01-01 PROCEDURE — 31645 BRNCHSC W/THER ASPIR 1ST: CPT

## 2023-01-01 PROCEDURE — P9047: CPT

## 2023-01-01 PROCEDURE — 80048 BASIC METABOLIC PNL TOTAL CA: CPT

## 2023-01-01 PROCEDURE — 80076 HEPATIC FUNCTION PANEL: CPT

## 2023-01-01 PROCEDURE — 50432 PLMT NEPHROSTOMY CATHETER: CPT | Mod: LT

## 2023-01-01 PROCEDURE — 50435 EXCHANGE NEPHROSTOMY CATH: CPT | Mod: LT

## 2023-01-01 PROCEDURE — 99233 SBSQ HOSP IP/OBS HIGH 50: CPT | Mod: 24

## 2023-01-01 PROCEDURE — 80162 ASSAY OF DIGOXIN TOTAL: CPT

## 2023-01-01 PROCEDURE — 84134 ASSAY OF PREALBUMIN: CPT

## 2023-01-01 PROCEDURE — 86901 BLOOD TYPING SEROLOGIC RH(D): CPT

## 2023-01-01 PROCEDURE — 82565 ASSAY OF CREATININE: CPT

## 2023-01-01 PROCEDURE — 84145 PROCALCITONIN (PCT): CPT

## 2023-01-01 PROCEDURE — 88341 IMHCHEM/IMCYTCHM EA ADD ANTB: CPT | Mod: 26

## 2023-01-01 PROCEDURE — 99223 1ST HOSP IP/OBS HIGH 75: CPT

## 2023-01-01 PROCEDURE — 87077 CULTURE AEROBIC IDENTIFY: CPT

## 2023-01-01 PROCEDURE — 36415 COLL VENOUS BLD VENIPUNCTURE: CPT

## 2023-01-01 PROCEDURE — 93306 TTE W/DOPPLER COMPLETE: CPT | Mod: 26

## 2023-01-01 PROCEDURE — 71045 X-RAY EXAM CHEST 1 VIEW: CPT | Mod: 26,77

## 2023-01-01 PROCEDURE — 88112 CYTOPATH CELL ENHANCE TECH: CPT | Mod: 26

## 2023-01-01 PROCEDURE — 93010 ELECTROCARDIOGRAM REPORT: CPT

## 2023-01-01 PROCEDURE — 83735 ASSAY OF MAGNESIUM: CPT

## 2023-01-01 PROCEDURE — P9040: CPT

## 2023-01-01 PROCEDURE — 84540 ASSAY OF URINE/UREA-N: CPT

## 2023-01-01 PROCEDURE — 88312 SPECIAL STAINS GROUP 1: CPT

## 2023-01-01 PROCEDURE — 70551 MRI BRAIN STEM W/O DYE: CPT | Mod: 26

## 2023-01-01 PROCEDURE — 70450 CT HEAD/BRAIN W/O DYE: CPT | Mod: 26

## 2023-01-01 PROCEDURE — 99497 ADVNCD CARE PLAN 30 MIN: CPT

## 2023-01-01 PROCEDURE — 84132 ASSAY OF SERUM POTASSIUM: CPT

## 2023-01-01 PROCEDURE — 88304 TISSUE EXAM BY PATHOLOGIST: CPT | Mod: 26

## 2023-01-01 PROCEDURE — 99497 ADVNCD CARE PLAN 30 MIN: CPT | Mod: 25

## 2023-01-01 PROCEDURE — 86140 C-REACTIVE PROTEIN: CPT

## 2023-01-01 PROCEDURE — 93970 EXTREMITY STUDY: CPT | Mod: 26

## 2023-01-01 PROCEDURE — C1769: CPT

## 2023-01-01 PROCEDURE — 88342 IMHCHEM/IMCYTCHM 1ST ANTB: CPT

## 2023-01-01 PROCEDURE — 74176 CT ABD & PELVIS W/O CONTRAST: CPT | Mod: 26

## 2023-01-01 PROCEDURE — 99285 EMERGENCY DEPT VISIT HI MDM: CPT

## 2023-01-01 PROCEDURE — 84300 ASSAY OF URINE SODIUM: CPT

## 2023-01-01 PROCEDURE — 71045 X-RAY EXAM CHEST 1 VIEW: CPT

## 2023-01-01 PROCEDURE — 99214 OFFICE O/P EST MOD 30 MIN: CPT

## 2023-01-01 PROCEDURE — 83986 ASSAY PH BODY FLUID NOS: CPT

## 2023-01-01 PROCEDURE — 74177 CT ABD & PELVIS W/CONTRAST: CPT | Mod: 26,MA

## 2023-01-01 PROCEDURE — 76937 US GUIDE VASCULAR ACCESS: CPT | Mod: 26

## 2023-01-01 PROCEDURE — 84630 ASSAY OF ZINC: CPT

## 2023-01-01 PROCEDURE — 87102 FUNGUS ISOLATION CULTURE: CPT

## 2023-01-01 PROCEDURE — 82330 ASSAY OF CALCIUM: CPT

## 2023-01-01 PROCEDURE — 86850 RBC ANTIBODY SCREEN: CPT

## 2023-01-01 PROCEDURE — 85014 HEMATOCRIT: CPT

## 2023-01-01 PROCEDURE — 82803 BLOOD GASES ANY COMBINATION: CPT

## 2023-01-01 PROCEDURE — 88112 CYTOPATH CELL ENHANCE TECH: CPT

## 2023-01-01 PROCEDURE — 88307 TISSUE EXAM BY PATHOLOGIST: CPT | Mod: 26

## 2023-01-01 PROCEDURE — 74018 RADEX ABDOMEN 1 VIEW: CPT | Mod: 26

## 2023-01-01 PROCEDURE — 82607 VITAMIN B-12: CPT

## 2023-01-01 PROCEDURE — 50230 REMOVAL KIDNEY OPEN RADICAL: CPT | Mod: 62,22,LT

## 2023-01-01 PROCEDURE — 36556 INSERT NON-TUNNEL CV CATH: CPT

## 2023-01-01 PROCEDURE — 84436 ASSAY OF TOTAL THYROXINE: CPT

## 2023-01-01 PROCEDURE — 88342 IMHCHEM/IMCYTCHM 1ST ANTB: CPT | Mod: 26,59

## 2023-01-01 PROCEDURE — 99498 ADVNCD CARE PLAN ADDL 30 MIN: CPT

## 2023-01-01 PROCEDURE — 82746 ASSAY OF FOLIC ACID SERUM: CPT

## 2023-01-01 PROCEDURE — 87186 SC STD MICRODIL/AGAR DIL: CPT

## 2023-01-01 PROCEDURE — 84466 ASSAY OF TRANSFERRIN: CPT

## 2023-01-01 PROCEDURE — 87116 MYCOBACTERIA CULTURE: CPT

## 2023-01-01 PROCEDURE — 86803 HEPATITIS C AB TEST: CPT

## 2023-01-01 PROCEDURE — 74177 CT ABD & PELVIS W/CONTRAST: CPT | Mod: 26

## 2023-01-01 PROCEDURE — 85384 FIBRINOGEN ACTIVITY: CPT

## 2023-01-01 PROCEDURE — 84443 ASSAY THYROID STIM HORMONE: CPT

## 2023-01-01 PROCEDURE — 43239 EGD BIOPSY SINGLE/MULTIPLE: CPT

## 2023-01-01 PROCEDURE — 87040 BLOOD CULTURE FOR BACTERIA: CPT

## 2023-01-01 PROCEDURE — 85027 COMPLETE CBC AUTOMATED: CPT

## 2023-01-01 PROCEDURE — C1726: CPT

## 2023-01-01 PROCEDURE — 97162 PT EVAL MOD COMPLEX 30 MIN: CPT | Mod: GP

## 2023-01-01 PROCEDURE — P9017: CPT

## 2023-01-01 PROCEDURE — 82550 ASSAY OF CK (CPK): CPT

## 2023-01-01 PROCEDURE — 88304 TISSUE EXAM BY PATHOLOGIST: CPT

## 2023-01-01 PROCEDURE — P9037: CPT

## 2023-01-01 PROCEDURE — 82570 ASSAY OF URINE CREATININE: CPT

## 2023-01-01 PROCEDURE — 80202 ASSAY OF VANCOMYCIN: CPT

## 2023-01-01 PROCEDURE — 84484 ASSAY OF TROPONIN QUANT: CPT

## 2023-01-01 PROCEDURE — 94760 N-INVAS EAR/PLS OXIMETRY 1: CPT

## 2023-01-01 PROCEDURE — 99232 SBSQ HOSP IP/OBS MODERATE 35: CPT | Mod: 24,25

## 2023-01-01 PROCEDURE — 82533 TOTAL CORTISOL: CPT

## 2023-01-01 PROCEDURE — 50230 REMOVAL KIDNEY OPEN RADICAL: CPT | Mod: 58,62,LT

## 2023-01-01 PROCEDURE — 85610 PROTHROMBIN TIME: CPT

## 2023-01-01 PROCEDURE — 97110 THERAPEUTIC EXERCISES: CPT | Mod: GP

## 2023-01-01 PROCEDURE — 88305 TISSUE EXAM BY PATHOLOGIST: CPT

## 2023-01-01 PROCEDURE — 87205 SMEAR GRAM STAIN: CPT

## 2023-01-01 PROCEDURE — 99233 SBSQ HOSP IP/OBS HIGH 50: CPT | Mod: 25

## 2023-01-01 PROCEDURE — 88312 SPECIAL STAINS GROUP 1: CPT | Mod: 26

## 2023-01-01 PROCEDURE — P9059: CPT

## 2023-01-01 PROCEDURE — C1729: CPT

## 2023-01-01 PROCEDURE — 88300 SURGICAL PATH GROSS: CPT

## 2023-01-01 PROCEDURE — 97116 GAIT TRAINING THERAPY: CPT | Mod: GP

## 2023-01-01 PROCEDURE — 87449 NOS EACH ORGANISM AG IA: CPT

## 2023-01-01 PROCEDURE — 83010 ASSAY OF HAPTOGLOBIN QUANT: CPT

## 2023-01-01 PROCEDURE — 52356 CYSTO/URETERO W/LITHOTRIPSY: CPT | Mod: 52

## 2023-01-01 PROCEDURE — 84100 ASSAY OF PHOSPHORUS: CPT

## 2023-01-01 PROCEDURE — 93005 ELECTROCARDIOGRAM TRACING: CPT

## 2023-01-01 PROCEDURE — 88344 IMHCHEM/IMCYTCHM EA MLT ANTB: CPT | Mod: 26

## 2023-01-01 PROCEDURE — 87206 SMEAR FLUORESCENT/ACID STAI: CPT

## 2023-01-01 PROCEDURE — 93970 EXTREMITY STUDY: CPT

## 2023-01-01 PROCEDURE — P9100: CPT

## 2023-01-01 PROCEDURE — 71260 CT THORAX DX C+: CPT | Mod: 26,MA

## 2023-01-01 PROCEDURE — 84480 ASSAY TRIIODOTHYRONINE (T3): CPT

## 2023-01-01 PROCEDURE — 83935 ASSAY OF URINE OSMOLALITY: CPT

## 2023-01-01 PROCEDURE — 84133 ASSAY OF URINE POTASSIUM: CPT

## 2023-01-01 PROCEDURE — 85025 COMPLETE CBC W/AUTO DIFF WBC: CPT

## 2023-01-01 PROCEDURE — 87015 SPECIMEN INFECT AGNT CONCNTJ: CPT

## 2023-01-01 PROCEDURE — 86480 TB TEST CELL IMMUN MEASURE: CPT

## 2023-01-01 PROCEDURE — 99234 HOSP IP/OBS SM DT SF/LOW 45: CPT

## 2023-01-01 PROCEDURE — 82306 VITAMIN D 25 HYDROXY: CPT

## 2023-01-01 PROCEDURE — 36430 TRANSFUSION BLD/BLD COMPNT: CPT

## 2023-01-01 PROCEDURE — 87641 MR-STAPH DNA AMP PROBE: CPT

## 2023-01-01 PROCEDURE — 71045 X-RAY EXAM CHEST 1 VIEW: CPT | Mod: 26,76

## 2023-01-01 PROCEDURE — 87184 SC STD DISK METHOD PER PLATE: CPT

## 2023-01-01 PROCEDURE — 80061 LIPID PANEL: CPT

## 2023-01-01 PROCEDURE — 74176 CT ABD & PELVIS W/O CONTRAST: CPT

## 2023-01-01 PROCEDURE — 71250 CT THORAX DX C-: CPT | Mod: 26

## 2023-01-01 PROCEDURE — 82150 ASSAY OF AMYLASE: CPT

## 2023-01-01 PROCEDURE — 88300 SURGICAL PATH GROSS: CPT | Mod: 26,59

## 2023-01-01 PROCEDURE — 81001 URINALYSIS AUTO W/SCOPE: CPT

## 2023-01-01 PROCEDURE — 76937 US GUIDE VASCULAR ACCESS: CPT | Mod: 26,59

## 2023-01-01 PROCEDURE — 83550 IRON BINDING TEST: CPT

## 2023-01-01 PROCEDURE — 86022 PLATELET ANTIBODIES: CPT

## 2023-01-01 PROCEDURE — 86900 BLOOD TYPING SEROLOGIC ABO: CPT

## 2023-01-01 PROCEDURE — 85018 HEMOGLOBIN: CPT

## 2023-01-01 PROCEDURE — 71250 CT THORAX DX C-: CPT

## 2023-01-01 PROCEDURE — 93306 TTE W/DOPPLER COMPLETE: CPT

## 2023-01-01 PROCEDURE — 99152 MOD SED SAME PHYS/QHP 5/>YRS: CPT

## 2023-01-01 PROCEDURE — 99223 1ST HOSP IP/OBS HIGH 75: CPT | Mod: GC

## 2023-01-01 PROCEDURE — 49040 DRAIN OPEN ABDOM ABSCESS: CPT

## 2023-01-01 PROCEDURE — 36620 INSERTION CATHETER ARTERY: CPT

## 2023-01-01 PROCEDURE — 99448 NTRPROF PH1/NTRNET/EHR 21-30: CPT

## 2023-01-01 PROCEDURE — 87086 URINE CULTURE/COLONY COUNT: CPT

## 2023-01-01 PROCEDURE — 87075 CULTR BACTERIA EXCEPT BLOOD: CPT

## 2023-01-01 PROCEDURE — 70551 MRI BRAIN STEM W/O DYE: CPT

## 2023-01-01 PROCEDURE — 94003 VENT MGMT INPAT SUBQ DAY: CPT

## 2023-01-01 PROCEDURE — 99292 CRITICAL CARE ADDL 30 MIN: CPT

## 2023-01-01 PROCEDURE — 83605 ASSAY OF LACTIC ACID: CPT

## 2023-01-01 PROCEDURE — 74177 CT ABD & PELVIS W/CONTRAST: CPT

## 2023-01-01 PROCEDURE — C1887: CPT

## 2023-01-01 PROCEDURE — 83540 ASSAY OF IRON: CPT

## 2023-01-01 PROCEDURE — 85045 AUTOMATED RETICULOCYTE COUNT: CPT

## 2023-01-01 PROCEDURE — P9016: CPT

## 2023-01-01 PROCEDURE — 93010 ELECTROCARDIOGRAM REPORT: CPT | Mod: 77

## 2023-01-01 PROCEDURE — 84295 ASSAY OF SERUM SODIUM: CPT

## 2023-01-01 PROCEDURE — 82140 ASSAY OF AMMONIA: CPT

## 2023-01-01 PROCEDURE — 82270 OCCULT BLOOD FECES: CPT

## 2023-01-01 PROCEDURE — 88342 IMHCHEM/IMCYTCHM 1ST ANTB: CPT | Mod: 26

## 2023-01-01 PROCEDURE — 83036 HEMOGLOBIN GLYCOSYLATED A1C: CPT

## 2023-01-01 PROCEDURE — 50240 NEPHRECTOMY PARTIAL: CPT | Mod: LT,52

## 2023-01-01 PROCEDURE — 88307 TISSUE EXAM BY PATHOLOGIST: CPT

## 2023-01-01 PROCEDURE — 82553 CREATINE MB FRACTION: CPT

## 2023-01-01 PROCEDURE — 88344 IMHCHEM/IMCYTCHM EA MLT ANTB: CPT

## 2023-01-01 PROCEDURE — 50431 NJX PX NFROSGRM &/URTRGRM: CPT | Mod: LT

## 2023-01-01 PROCEDURE — 99231 SBSQ HOSP IP/OBS SF/LOW 25: CPT

## 2023-01-01 PROCEDURE — C1889: CPT

## 2023-01-01 PROCEDURE — 82728 ASSAY OF FERRITIN: CPT

## 2023-01-01 PROCEDURE — 87070 CULTURE OTHR SPECIMN AEROBIC: CPT

## 2023-01-01 PROCEDURE — 99233 SBSQ HOSP IP/OBS HIGH 50: CPT | Mod: 57

## 2023-01-01 PROCEDURE — 84156 ASSAY OF PROTEIN URINE: CPT

## 2023-01-01 RX ORDER — SODIUM BICARBONATE 1 MEQ/ML
0.15 SYRINGE (ML) INTRAVENOUS
Qty: 150 | Refills: 0 | Status: DISCONTINUED | OUTPATIENT
Start: 2023-01-01 | End: 2023-01-01

## 2023-01-01 RX ORDER — CALCIUM GLUCONATE 100 MG/ML
2 VIAL (ML) INTRAVENOUS ONCE
Refills: 0 | Status: COMPLETED | OUTPATIENT
Start: 2023-01-01 | End: 2023-01-01

## 2023-01-01 RX ORDER — FENTANYL CITRATE 50 UG/ML
0.5 INJECTION INTRAVENOUS
Qty: 2500 | Refills: 0 | Status: DISCONTINUED | OUTPATIENT
Start: 2023-01-01 | End: 2023-01-01

## 2023-01-01 RX ORDER — CEFTRIAXONE 500 MG/1
2000 INJECTION, POWDER, FOR SOLUTION INTRAMUSCULAR; INTRAVENOUS EVERY 24 HOURS
Refills: 0 | Status: DISCONTINUED | OUTPATIENT
Start: 2023-01-01 | End: 2023-01-01

## 2023-01-01 RX ORDER — BISMUTH SUBSALICYLATE 262 MG/1
262 TABLET, CHEWABLE ORAL 4 TIMES DAILY
Qty: 112 | Refills: 0 | Status: ACTIVE | COMMUNITY
Start: 2023-01-01 | End: 1900-01-01

## 2023-01-01 RX ORDER — SODIUM BICARBONATE 1 MEQ/ML
1300 SYRINGE (ML) INTRAVENOUS THREE TIMES A DAY
Refills: 0 | Status: DISCONTINUED | OUTPATIENT
Start: 2023-01-01 | End: 2023-01-01

## 2023-01-01 RX ORDER — MIDODRINE HYDROCHLORIDE 2.5 MG/1
5 TABLET ORAL EVERY 8 HOURS
Refills: 0 | Status: DISCONTINUED | OUTPATIENT
Start: 2023-01-01 | End: 2023-01-01

## 2023-01-01 RX ORDER — ACETAMINOPHEN 500 MG
650 TABLET ORAL EVERY 6 HOURS
Refills: 0 | Status: COMPLETED | OUTPATIENT
Start: 2023-01-01 | End: 2023-01-01

## 2023-01-01 RX ORDER — HEPARIN SODIUM 5000 [USP'U]/ML
6500 INJECTION INTRAVENOUS; SUBCUTANEOUS ONCE
Refills: 0 | Status: DISCONTINUED | OUTPATIENT
Start: 2023-01-01 | End: 2023-01-01

## 2023-01-01 RX ORDER — OCTREOTIDE ACETATE 200 UG/ML
50 INJECTION, SOLUTION INTRAVENOUS; SUBCUTANEOUS
Qty: 500 | Refills: 0 | Status: DISCONTINUED | OUTPATIENT
Start: 2023-01-01 | End: 2023-01-01

## 2023-01-01 RX ORDER — MEROPENEM 1 G/30ML
1000 INJECTION INTRAVENOUS EVERY 12 HOURS
Refills: 0 | Status: DISCONTINUED | OUTPATIENT
Start: 2023-01-01 | End: 2023-01-01

## 2023-01-01 RX ORDER — CASPOFUNGIN ACETATE 7 MG/ML
50 INJECTION, POWDER, LYOPHILIZED, FOR SOLUTION INTRAVENOUS EVERY 24 HOURS
Refills: 0 | Status: DISCONTINUED | OUTPATIENT
Start: 2023-01-01 | End: 2023-01-01

## 2023-01-01 RX ORDER — VASOPRESSIN 20 [USP'U]/ML
0.03 INJECTION INTRAVENOUS
Qty: 40 | Refills: 0 | Status: DISCONTINUED | OUTPATIENT
Start: 2023-01-01 | End: 2023-01-01

## 2023-01-01 RX ORDER — HEPARIN SODIUM 5000 [USP'U]/ML
5000 INJECTION INTRAVENOUS; SUBCUTANEOUS EVERY 8 HOURS
Refills: 0 | Status: DISCONTINUED | OUTPATIENT
Start: 2023-01-01 | End: 2023-01-01

## 2023-01-01 RX ORDER — ALTEPLASE 100 MG
2 KIT INTRAVENOUS ONCE
Refills: 0 | Status: COMPLETED | OUTPATIENT
Start: 2023-01-01 | End: 2023-01-01

## 2023-01-01 RX ORDER — I.V. FAT EMULSION 20 G/100ML
0.66 EMULSION INTRAVENOUS
Qty: 50.05 | Refills: 0 | Status: DISCONTINUED | OUTPATIENT
Start: 2023-01-01 | End: 2023-01-01

## 2023-01-01 RX ORDER — METRONIDAZOLE 500 MG
500 TABLET ORAL ONCE
Refills: 0 | Status: COMPLETED | OUTPATIENT
Start: 2023-01-01 | End: 2023-01-01

## 2023-01-01 RX ORDER — MINOCYCLINE HYDROCHLORIDE 45 MG/1
200 TABLET, EXTENDED RELEASE ORAL ONCE
Refills: 0 | Status: COMPLETED | OUTPATIENT
Start: 2023-01-01 | End: 2023-01-01

## 2023-01-01 RX ORDER — PHENYLEPHRINE HYDROCHLORIDE 10 MG/ML
0.1 INJECTION INTRAVENOUS
Qty: 160 | Refills: 0 | Status: DISCONTINUED | OUTPATIENT
Start: 2023-01-01 | End: 2023-01-01

## 2023-01-01 RX ORDER — DEXMEDETOMIDINE HYDROCHLORIDE IN 0.9% SODIUM CHLORIDE 4 UG/ML
0.1 INJECTION INTRAVENOUS
Qty: 400 | Refills: 0 | Status: DISCONTINUED | OUTPATIENT
Start: 2023-01-01 | End: 2023-01-01

## 2023-01-01 RX ORDER — VANCOMYCIN HCL 1 G
1000 VIAL (EA) INTRAVENOUS ONCE
Refills: 0 | Status: COMPLETED | OUTPATIENT
Start: 2023-01-01 | End: 2023-01-01

## 2023-01-01 RX ORDER — SODIUM CHLORIDE 9 MG/ML
1000 INJECTION INTRAMUSCULAR; INTRAVENOUS; SUBCUTANEOUS
Refills: 0 | Status: DISCONTINUED | OUTPATIENT
Start: 2023-01-01 | End: 2023-01-01

## 2023-01-01 RX ORDER — SODIUM BICARBONATE 1 MEQ/ML
50 SYRINGE (ML) INTRAVENOUS
Refills: 0 | Status: COMPLETED | OUTPATIENT
Start: 2023-01-01 | End: 2023-01-01

## 2023-01-01 RX ORDER — SODIUM ZIRCONIUM CYCLOSILICATE 10 G/10G
5 POWDER, FOR SUSPENSION ORAL EVERY 8 HOURS
Refills: 0 | Status: COMPLETED | OUTPATIENT
Start: 2023-01-01 | End: 2023-01-01

## 2023-01-01 RX ORDER — ALBUMIN HUMAN 25 %
250 VIAL (ML) INTRAVENOUS EVERY 6 HOURS
Refills: 0 | Status: COMPLETED | OUTPATIENT
Start: 2023-01-01 | End: 2023-01-01

## 2023-01-01 RX ORDER — ELECTROLYTE SOLUTION,INJ
1 VIAL (ML) INTRAVENOUS
Refills: 0 | Status: DISCONTINUED | OUTPATIENT
Start: 2023-01-01 | End: 2023-01-01

## 2023-01-01 RX ORDER — ALBUMIN HUMAN 25 %
100 VIAL (ML) INTRAVENOUS EVERY 6 HOURS
Refills: 0 | Status: COMPLETED | OUTPATIENT
Start: 2023-01-01 | End: 2023-01-01

## 2023-01-01 RX ORDER — COLLAGENASE CLOSTRIDIUM HIST. 250 UNIT/G
1 OINTMENT (GRAM) TOPICAL
Refills: 0 | Status: DISCONTINUED | OUTPATIENT
Start: 2023-01-01 | End: 2023-01-01

## 2023-01-01 RX ORDER — DIGOXIN 250 MCG
125 TABLET ORAL ONCE
Refills: 0 | Status: COMPLETED | OUTPATIENT
Start: 2023-01-01 | End: 2023-01-01

## 2023-01-01 RX ORDER — INSULIN HUMAN 100 [IU]/ML
10 INJECTION, SOLUTION SUBCUTANEOUS ONCE
Refills: 0 | Status: COMPLETED | OUTPATIENT
Start: 2023-01-01 | End: 2023-01-01

## 2023-01-01 RX ORDER — DIGOXIN 250 MCG
250 TABLET ORAL ONCE
Refills: 0 | Status: COMPLETED | OUTPATIENT
Start: 2023-01-01 | End: 2023-01-01

## 2023-01-01 RX ORDER — OLANZAPINE 15 MG/1
2.5 TABLET, FILM COATED ORAL AT BEDTIME
Refills: 0 | Status: DISCONTINUED | OUTPATIENT
Start: 2023-01-01 | End: 2023-01-01

## 2023-01-01 RX ORDER — METOPROLOL TARTRATE 50 MG
2.5 TABLET ORAL EVERY 6 HOURS
Refills: 0 | Status: DISCONTINUED | OUTPATIENT
Start: 2023-01-01 | End: 2023-01-01

## 2023-01-01 RX ORDER — METOPROLOL TARTRATE 50 MG
2.5 TABLET ORAL ONCE
Refills: 0 | Status: DISCONTINUED | OUTPATIENT
Start: 2023-01-01 | End: 2023-01-01

## 2023-01-01 RX ORDER — ALBUMIN HUMAN 25 %
250 VIAL (ML) INTRAVENOUS ONCE
Refills: 0 | Status: COMPLETED | OUTPATIENT
Start: 2023-01-01 | End: 2023-01-01

## 2023-01-01 RX ORDER — NOREPINEPHRINE BITARTRATE/D5W 8 MG/250ML
0.01 PLASTIC BAG, INJECTION (ML) INTRAVENOUS
Qty: 32 | Refills: 0 | Status: DISCONTINUED | OUTPATIENT
Start: 2023-01-01 | End: 2023-01-01

## 2023-01-01 RX ORDER — SODIUM CHLORIDE 9 MG/ML
1000 INJECTION INTRAMUSCULAR; INTRAVENOUS; SUBCUTANEOUS ONCE
Refills: 0 | Status: COMPLETED | OUTPATIENT
Start: 2023-01-01 | End: 2023-01-01

## 2023-01-01 RX ORDER — VANCOMYCIN HCL 1 G
1000 VIAL (EA) INTRAVENOUS ONCE
Refills: 0 | Status: DISCONTINUED | OUTPATIENT
Start: 2023-01-01 | End: 2023-01-01

## 2023-01-01 RX ORDER — TETRACYCLINE HYDROCHLORIDE 500 MG/1
500 CAPSULE ORAL
Qty: 56 | Refills: 0 | Status: ACTIVE | COMMUNITY
Start: 2023-01-01 | End: 1900-01-01

## 2023-01-01 RX ORDER — METOPROLOL TARTRATE 50 MG
2.5 TABLET ORAL ONCE
Refills: 0 | Status: COMPLETED | OUTPATIENT
Start: 2023-01-01 | End: 2023-01-01

## 2023-01-01 RX ORDER — PANTOPRAZOLE SODIUM 20 MG/1
40 TABLET, DELAYED RELEASE ORAL EVERY 12 HOURS
Refills: 0 | Status: DISCONTINUED | OUTPATIENT
Start: 2023-01-01 | End: 2023-01-01

## 2023-01-01 RX ORDER — SODIUM CHLORIDE 9 MG/ML
1000 INJECTION, SOLUTION INTRAVENOUS ONCE
Refills: 0 | Status: COMPLETED | OUTPATIENT
Start: 2023-01-01 | End: 2023-01-01

## 2023-01-01 RX ORDER — SODIUM BICARBONATE 1 MEQ/ML
50 SYRINGE (ML) INTRAVENOUS ONCE
Refills: 0 | Status: COMPLETED | OUTPATIENT
Start: 2023-01-01 | End: 2023-01-01

## 2023-01-01 RX ORDER — HYDROCORTISONE 20 MG
50 TABLET ORAL EVERY 12 HOURS
Refills: 0 | Status: DISCONTINUED | OUTPATIENT
Start: 2023-01-01 | End: 2023-01-01

## 2023-01-01 RX ORDER — OCTREOTIDE ACETATE 200 UG/ML
50 INJECTION, SOLUTION INTRAVENOUS; SUBCUTANEOUS EVERY 6 HOURS
Refills: 0 | Status: DISCONTINUED | OUTPATIENT
Start: 2023-01-01 | End: 2023-01-01

## 2023-01-01 RX ORDER — SODIUM CHLORIDE 9 MG/ML
1000 INJECTION, SOLUTION INTRAVENOUS
Refills: 0 | Status: DISCONTINUED | OUTPATIENT
Start: 2023-01-01 | End: 2023-01-01

## 2023-01-01 RX ORDER — CALCIUM GLUCONATE 100 MG/ML
1 VIAL (ML) INTRAVENOUS ONCE
Refills: 0 | Status: COMPLETED | OUTPATIENT
Start: 2023-01-01 | End: 2023-01-01

## 2023-01-01 RX ORDER — GABAPENTIN 400 MG/1
200 CAPSULE ORAL EVERY 8 HOURS
Refills: 0 | Status: DISCONTINUED | OUTPATIENT
Start: 2023-01-01 | End: 2023-01-01

## 2023-01-01 RX ORDER — IOHEXOL 300 MG/ML
30 INJECTION, SOLUTION INTRAVENOUS ONCE
Refills: 0 | Status: DISCONTINUED | OUTPATIENT
Start: 2023-01-01 | End: 2023-01-01

## 2023-01-01 RX ORDER — METOPROLOL TARTRATE 50 MG
2.5 TABLET ORAL EVERY 4 HOURS
Refills: 0 | Status: DISCONTINUED | OUTPATIENT
Start: 2023-01-01 | End: 2023-01-01

## 2023-01-01 RX ORDER — METOPROLOL TARTRATE 50 MG
5 TABLET ORAL EVERY 6 HOURS
Refills: 0 | Status: DISCONTINUED | OUTPATIENT
Start: 2023-01-01 | End: 2023-01-01

## 2023-01-01 RX ORDER — FENTANYL CITRATE 50 UG/ML
25 INJECTION INTRAVENOUS
Refills: 0 | Status: DISCONTINUED | OUTPATIENT
Start: 2023-01-01 | End: 2023-01-01

## 2023-01-01 RX ORDER — CHLORHEXIDINE GLUCONATE 213 G/1000ML
1 SOLUTION TOPICAL
Refills: 0 | Status: DISCONTINUED | OUTPATIENT
Start: 2023-01-01 | End: 2023-01-01

## 2023-01-01 RX ORDER — FERROUS SULFATE 325(65) MG
325 TABLET ORAL DAILY
Refills: 0 | Status: DISCONTINUED | OUTPATIENT
Start: 2023-01-01 | End: 2023-01-01

## 2023-01-01 RX ORDER — INSULIN HUMAN 100 [IU]/ML
INJECTION, SOLUTION SUBCUTANEOUS EVERY 6 HOURS
Refills: 0 | Status: DISCONTINUED | OUTPATIENT
Start: 2023-01-01 | End: 2023-01-01

## 2023-01-01 RX ORDER — DILTIAZEM HCL 120 MG
5 CAPSULE, EXT RELEASE 24 HR ORAL
Qty: 125 | Refills: 0 | Status: DISCONTINUED | OUTPATIENT
Start: 2023-01-01 | End: 2023-01-01

## 2023-01-01 RX ORDER — CEFTRIAXONE 500 MG/1
INJECTION, POWDER, FOR SOLUTION INTRAMUSCULAR; INTRAVENOUS
Refills: 0 | Status: DISCONTINUED | OUTPATIENT
Start: 2023-01-01 | End: 2023-01-01

## 2023-01-01 RX ORDER — FUROSEMIDE 40 MG
80 TABLET ORAL ONCE
Refills: 0 | Status: COMPLETED | OUTPATIENT
Start: 2023-01-01 | End: 2023-01-01

## 2023-01-01 RX ORDER — MAGNESIUM SULFATE 500 MG/ML
1 VIAL (ML) INJECTION ONCE
Refills: 0 | Status: COMPLETED | OUTPATIENT
Start: 2023-01-01 | End: 2023-01-01

## 2023-01-01 RX ORDER — MAGNESIUM SULFATE 500 MG/ML
2 VIAL (ML) INJECTION
Refills: 0 | Status: COMPLETED | OUTPATIENT
Start: 2023-01-01 | End: 2023-01-01

## 2023-01-01 RX ORDER — METOPROLOL TARTRATE 50 MG
5 TABLET ORAL ONCE
Refills: 0 | Status: COMPLETED | OUTPATIENT
Start: 2023-01-01 | End: 2023-01-01

## 2023-01-01 RX ORDER — PANTOPRAZOLE SODIUM 20 MG/1
40 TABLET, DELAYED RELEASE ORAL DAILY
Refills: 0 | Status: DISCONTINUED | OUTPATIENT
Start: 2023-01-01 | End: 2023-01-01

## 2023-01-01 RX ORDER — CEFEPIME 1 G/1
2000 INJECTION, POWDER, FOR SOLUTION INTRAMUSCULAR; INTRAVENOUS ONCE
Refills: 0 | Status: COMPLETED | OUTPATIENT
Start: 2023-01-01 | End: 2023-01-01

## 2023-01-01 RX ORDER — POTASSIUM CHLORIDE 20 MEQ
20 PACKET (EA) ORAL ONCE
Refills: 0 | Status: DISCONTINUED | OUTPATIENT
Start: 2023-01-01 | End: 2023-01-01

## 2023-01-01 RX ORDER — DAPTOMYCIN 500 MG/10ML
600 INJECTION, POWDER, LYOPHILIZED, FOR SOLUTION INTRAVENOUS
Refills: 0 | Status: DISCONTINUED | OUTPATIENT
Start: 2023-01-01 | End: 2023-01-01

## 2023-01-01 RX ORDER — METOPROLOL TARTRATE 50 MG
25 TABLET ORAL ONCE
Refills: 0 | Status: COMPLETED | OUTPATIENT
Start: 2023-01-01 | End: 2023-01-01

## 2023-01-01 RX ORDER — SODIUM CHLORIDE 9 MG/ML
500 INJECTION, SOLUTION INTRAVENOUS ONCE
Refills: 0 | Status: DISCONTINUED | OUTPATIENT
Start: 2023-01-01 | End: 2023-01-01

## 2023-01-01 RX ORDER — DEXMEDETOMIDINE HYDROCHLORIDE IN 0.9% SODIUM CHLORIDE 4 UG/ML
0.2 INJECTION INTRAVENOUS
Qty: 400 | Refills: 0 | Status: DISCONTINUED | OUTPATIENT
Start: 2023-01-01 | End: 2023-01-01

## 2023-01-01 RX ORDER — DEXAMETHASONE 0.5 MG/5ML
8 ELIXIR ORAL ONCE
Refills: 0 | Status: DISCONTINUED | OUTPATIENT
Start: 2023-01-01 | End: 2023-01-01

## 2023-01-01 RX ORDER — HYDROCORTISONE 20 MG
TABLET ORAL
Refills: 0 | Status: DISCONTINUED | OUTPATIENT
Start: 2023-01-01 | End: 2023-01-01

## 2023-01-01 RX ORDER — CHLORHEXIDINE GLUCONATE 213 G/1000ML
15 SOLUTION TOPICAL EVERY 12 HOURS
Refills: 0 | Status: DISCONTINUED | OUTPATIENT
Start: 2023-01-01 | End: 2023-01-01

## 2023-01-01 RX ORDER — ACETAMINOPHEN 500 MG
1000 TABLET ORAL ONCE
Refills: 0 | Status: COMPLETED | OUTPATIENT
Start: 2023-01-01 | End: 2023-01-01

## 2023-01-01 RX ORDER — HYDROCORTISONE 20 MG
25 TABLET ORAL DAILY
Refills: 0 | Status: DISCONTINUED | OUTPATIENT
Start: 2023-01-01 | End: 2023-01-01

## 2023-01-01 RX ORDER — CEFTRIAXONE 500 MG/1
1000 INJECTION, POWDER, FOR SOLUTION INTRAMUSCULAR; INTRAVENOUS EVERY 24 HOURS
Refills: 0 | Status: DISCONTINUED | OUTPATIENT
Start: 2023-01-01 | End: 2023-01-01

## 2023-01-01 RX ORDER — ALBUMIN HUMAN 25 %
100 VIAL (ML) INTRAVENOUS ONCE
Refills: 0 | Status: COMPLETED | OUTPATIENT
Start: 2023-01-01 | End: 2023-01-01

## 2023-01-01 RX ORDER — GABAPENTIN 400 MG/1
100 CAPSULE ORAL EVERY 8 HOURS
Refills: 0 | Status: DISCONTINUED | OUTPATIENT
Start: 2023-01-01 | End: 2023-01-01

## 2023-01-01 RX ORDER — DEXTROSE 50 % IN WATER 50 %
50 SYRINGE (ML) INTRAVENOUS
Refills: 0 | Status: DISCONTINUED | OUTPATIENT
Start: 2023-01-01 | End: 2023-01-01

## 2023-01-01 RX ORDER — DEXTROSE 50 % IN WATER 50 %
15 SYRINGE (ML) INTRAVENOUS ONCE
Refills: 0 | Status: DISCONTINUED | OUTPATIENT
Start: 2023-01-01 | End: 2023-01-01

## 2023-01-01 RX ORDER — SENNA PLUS 8.6 MG/1
2 TABLET ORAL AT BEDTIME
Refills: 0 | Status: DISCONTINUED | OUTPATIENT
Start: 2023-01-01 | End: 2023-01-01

## 2023-01-01 RX ORDER — HYDROCORTISONE 20 MG
50 TABLET ORAL EVERY 6 HOURS
Refills: 0 | Status: DISCONTINUED | OUTPATIENT
Start: 2023-01-01 | End: 2023-01-01

## 2023-01-01 RX ORDER — BUMETANIDE 0.25 MG/ML
2 INJECTION INTRAMUSCULAR; INTRAVENOUS ONCE
Refills: 0 | Status: COMPLETED | OUTPATIENT
Start: 2023-01-01 | End: 2023-01-01

## 2023-01-01 RX ORDER — MIDAZOLAM HYDROCHLORIDE 1 MG/ML
2 INJECTION, SOLUTION INTRAMUSCULAR; INTRAVENOUS ONCE
Refills: 0 | Status: DISCONTINUED | OUTPATIENT
Start: 2023-01-01 | End: 2023-01-01

## 2023-01-01 RX ORDER — DILTIAZEM HCL 120 MG
5 CAPSULE, EXT RELEASE 24 HR ORAL ONCE
Refills: 0 | Status: COMPLETED | OUTPATIENT
Start: 2023-01-01 | End: 2023-01-01

## 2023-01-01 RX ORDER — DEXTROSE 50 % IN WATER 50 %
50 SYRINGE (ML) INTRAVENOUS ONCE
Refills: 0 | Status: COMPLETED | OUTPATIENT
Start: 2023-01-01 | End: 2023-01-01

## 2023-01-01 RX ORDER — PANTOPRAZOLE 40 MG/1
40 TABLET, DELAYED RELEASE ORAL
Qty: 28 | Refills: 0 | Status: ACTIVE | COMMUNITY
Start: 2023-01-01 | End: 1900-01-01

## 2023-01-01 RX ORDER — PHENYLEPHRINE HYDROCHLORIDE 10 MG/ML
0.1 INJECTION INTRAVENOUS
Qty: 40 | Refills: 0 | Status: DISCONTINUED | OUTPATIENT
Start: 2023-01-01 | End: 2023-01-01

## 2023-01-01 RX ORDER — METOPROLOL TARTRATE 50 MG
25 TABLET ORAL EVERY 12 HOURS
Refills: 0 | Status: DISCONTINUED | OUTPATIENT
Start: 2023-01-01 | End: 2023-01-01

## 2023-01-01 RX ORDER — SODIUM BICARBONATE 1 MEQ/ML
0.1 SYRINGE (ML) INTRAVENOUS
Qty: 150 | Refills: 0 | Status: DISCONTINUED | OUTPATIENT
Start: 2023-01-01 | End: 2023-01-01

## 2023-01-01 RX ORDER — SODIUM CHLORIDE 9 MG/ML
2000 INJECTION, SOLUTION INTRAVENOUS ONCE
Refills: 0 | Status: COMPLETED | OUTPATIENT
Start: 2023-01-01 | End: 2023-01-01

## 2023-01-01 RX ORDER — HYDROMORPHONE HYDROCHLORIDE 2 MG/ML
1 INJECTION INTRAMUSCULAR; INTRAVENOUS; SUBCUTANEOUS EVERY 8 HOURS
Refills: 0 | Status: DISCONTINUED | OUTPATIENT
Start: 2023-01-01 | End: 2023-01-01

## 2023-01-01 RX ORDER — PIPERACILLIN AND TAZOBACTAM 4; .5 G/20ML; G/20ML
2.25 INJECTION, POWDER, LYOPHILIZED, FOR SOLUTION INTRAVENOUS EVERY 6 HOURS
Refills: 0 | Status: DISCONTINUED | OUTPATIENT
Start: 2023-01-01 | End: 2023-01-01

## 2023-01-01 RX ORDER — CASPOFUNGIN ACETATE 7 MG/ML
70 INJECTION, POWDER, LYOPHILIZED, FOR SOLUTION INTRAVENOUS ONCE
Refills: 0 | Status: COMPLETED | OUTPATIENT
Start: 2023-01-01 | End: 2023-01-01

## 2023-01-01 RX ORDER — HEPARIN SODIUM 5000 [USP'U]/ML
1600 INJECTION INTRAVENOUS; SUBCUTANEOUS
Qty: 25000 | Refills: 0 | Status: DISCONTINUED | OUTPATIENT
Start: 2023-01-01 | End: 2023-01-01

## 2023-01-01 RX ORDER — POTASSIUM PHOSPHATE, MONOBASIC POTASSIUM PHOSPHATE, DIBASIC 236; 224 MG/ML; MG/ML
30 INJECTION, SOLUTION INTRAVENOUS ONCE
Refills: 0 | Status: COMPLETED | OUTPATIENT
Start: 2023-01-01 | End: 2023-01-01

## 2023-01-01 RX ORDER — SODIUM CHLORIDE 9 MG/ML
1000 INJECTION, SOLUTION INTRAVENOUS
Refills: 0 | Status: COMPLETED | OUTPATIENT
Start: 2023-01-01 | End: 2023-01-01

## 2023-01-01 RX ORDER — DIGOXIN 250 MCG
250 TABLET ORAL EVERY 6 HOURS
Refills: 0 | Status: DISCONTINUED | OUTPATIENT
Start: 2023-01-01 | End: 2023-01-01

## 2023-01-01 RX ORDER — HYDRALAZINE HCL 50 MG
10 TABLET ORAL ONCE
Refills: 0 | Status: DISCONTINUED | OUTPATIENT
Start: 2023-01-01 | End: 2023-01-01

## 2023-01-01 RX ORDER — DAPTOMYCIN 500 MG/10ML
450 INJECTION, POWDER, LYOPHILIZED, FOR SOLUTION INTRAVENOUS
Refills: 0 | Status: COMPLETED | OUTPATIENT
Start: 2023-01-01 | End: 2023-01-01

## 2023-01-01 RX ORDER — AMIODARONE HYDROCHLORIDE 400 MG/1
150 TABLET ORAL ONCE
Refills: 0 | Status: COMPLETED | OUTPATIENT
Start: 2023-01-01 | End: 2023-01-01

## 2023-01-01 RX ORDER — POTASSIUM PHOSPHATE, MONOBASIC POTASSIUM PHOSPHATE, DIBASIC 236; 224 MG/ML; MG/ML
30 INJECTION, SOLUTION INTRAVENOUS EVERY 6 HOURS
Refills: 0 | Status: DISCONTINUED | OUTPATIENT
Start: 2023-01-01 | End: 2023-01-01

## 2023-01-01 RX ORDER — METOPROLOL TARTRATE 50 MG
50 TABLET ORAL EVERY 8 HOURS
Refills: 0 | Status: DISCONTINUED | OUTPATIENT
Start: 2023-01-01 | End: 2023-01-01

## 2023-01-01 RX ORDER — LABETALOL HCL 100 MG
100 TABLET ORAL ONCE
Refills: 0 | Status: COMPLETED | OUTPATIENT
Start: 2023-01-01 | End: 2023-01-01

## 2023-01-01 RX ORDER — VANCOMYCIN HCL 1 G
500 VIAL (EA) INTRAVENOUS ONCE
Refills: 0 | Status: COMPLETED | OUTPATIENT
Start: 2023-01-01 | End: 2023-01-01

## 2023-01-01 RX ORDER — DAPTOMYCIN 500 MG/10ML
460 INJECTION, POWDER, LYOPHILIZED, FOR SOLUTION INTRAVENOUS EVERY 24 HOURS
Refills: 0 | Status: DISCONTINUED | OUTPATIENT
Start: 2023-01-01 | End: 2023-01-01

## 2023-01-01 RX ORDER — METOPROLOL TARTRATE 50 MG
5 TABLET ORAL EVERY 4 HOURS
Refills: 0 | Status: DISCONTINUED | OUTPATIENT
Start: 2023-01-01 | End: 2023-01-01

## 2023-01-01 RX ORDER — HEPARIN SODIUM 5000 [USP'U]/ML
1400 INJECTION INTRAVENOUS; SUBCUTANEOUS
Qty: 25000 | Refills: 0 | Status: DISCONTINUED | OUTPATIENT
Start: 2023-01-01 | End: 2023-01-01

## 2023-01-01 RX ORDER — ACETAMINOPHEN 500 MG
650 TABLET ORAL EVERY 6 HOURS
Refills: 0 | Status: DISCONTINUED | OUTPATIENT
Start: 2023-01-01 | End: 2023-01-01

## 2023-01-01 RX ORDER — POTASSIUM CHLORIDE 20 MEQ
20 PACKET (EA) ORAL
Refills: 0 | Status: COMPLETED | OUTPATIENT
Start: 2023-01-01 | End: 2023-01-01

## 2023-01-01 RX ORDER — SODIUM BICARBONATE 1 MEQ/ML
50 SYRINGE (ML) INTRAVENOUS ONCE
Refills: 0 | Status: DISCONTINUED | OUTPATIENT
Start: 2023-01-01 | End: 2023-01-01

## 2023-01-01 RX ORDER — ALBUMIN HUMAN 25 %
50 VIAL (ML) INTRAVENOUS ONCE
Refills: 0 | Status: COMPLETED | OUTPATIENT
Start: 2023-01-01 | End: 2023-01-01

## 2023-01-01 RX ORDER — TRAMADOL HYDROCHLORIDE 50 MG/1
25 TABLET ORAL EVERY 8 HOURS
Refills: 0 | Status: DISCONTINUED | OUTPATIENT
Start: 2023-01-01 | End: 2023-01-01

## 2023-01-01 RX ORDER — SODIUM CHLORIDE 9 MG/ML
10 INJECTION INTRAMUSCULAR; INTRAVENOUS; SUBCUTANEOUS
Refills: 0 | Status: DISCONTINUED | OUTPATIENT
Start: 2023-01-01 | End: 2023-01-01

## 2023-01-01 RX ORDER — SODIUM CHLORIDE 9 MG/ML
250 INJECTION INTRAMUSCULAR; INTRAVENOUS; SUBCUTANEOUS ONCE
Refills: 0 | Status: COMPLETED | OUTPATIENT
Start: 2023-01-01 | End: 2023-01-01

## 2023-01-01 RX ORDER — ALBUMIN HUMAN 25 %
250 VIAL (ML) INTRAVENOUS EVERY 6 HOURS
Refills: 0 | Status: DISCONTINUED | OUTPATIENT
Start: 2023-01-01 | End: 2023-01-01

## 2023-01-01 RX ORDER — NOREPINEPHRINE BITARTRATE/D5W 8 MG/250ML
0.05 PLASTIC BAG, INJECTION (ML) INTRAVENOUS
Qty: 16 | Refills: 0 | Status: DISCONTINUED | OUTPATIENT
Start: 2023-01-01 | End: 2023-01-01

## 2023-01-01 RX ORDER — INSULIN LISPRO 100/ML
2 VIAL (ML) SUBCUTANEOUS ONCE
Refills: 0 | Status: COMPLETED | OUTPATIENT
Start: 2023-01-01 | End: 2023-01-01

## 2023-01-01 RX ORDER — NOREPINEPHRINE BITARTRATE/D5W 8 MG/250ML
0.05 PLASTIC BAG, INJECTION (ML) INTRAVENOUS
Qty: 8 | Refills: 0 | Status: DISCONTINUED | OUTPATIENT
Start: 2023-01-01 | End: 2023-01-01

## 2023-01-01 RX ORDER — POTASSIUM CHLORIDE 20 MEQ
20 PACKET (EA) ORAL
Refills: 0 | Status: DISCONTINUED | OUTPATIENT
Start: 2023-01-01 | End: 2023-01-01

## 2023-01-01 RX ORDER — SODIUM BICARBONATE 1 MEQ/ML
25 SYRINGE (ML) INTRAVENOUS ONCE
Refills: 0 | Status: COMPLETED | OUTPATIENT
Start: 2023-01-01 | End: 2023-01-01

## 2023-01-01 RX ORDER — DAPTOMYCIN 500 MG/10ML
460 INJECTION, POWDER, LYOPHILIZED, FOR SOLUTION INTRAVENOUS
Refills: 0 | Status: COMPLETED | OUTPATIENT
Start: 2023-01-01 | End: 2023-01-01

## 2023-01-01 RX ORDER — FUROSEMIDE 40 MG
40 TABLET ORAL ONCE
Refills: 0 | Status: DISCONTINUED | OUTPATIENT
Start: 2023-01-01 | End: 2023-01-01

## 2023-01-01 RX ORDER — MEROPENEM 1 G/30ML
500 INJECTION INTRAVENOUS EVERY 12 HOURS
Refills: 0 | Status: DISCONTINUED | OUTPATIENT
Start: 2023-01-01 | End: 2023-01-01

## 2023-01-01 RX ORDER — METRONIDAZOLE 500 MG
500 TABLET ORAL EVERY 8 HOURS
Refills: 0 | Status: DISCONTINUED | OUTPATIENT
Start: 2023-01-01 | End: 2023-01-01

## 2023-01-01 RX ORDER — BACITRACIN ZINC 500 UNIT/G
1 OINTMENT IN PACKET (EA) TOPICAL
Refills: 0 | Status: DISCONTINUED | OUTPATIENT
Start: 2023-01-01 | End: 2023-01-01

## 2023-01-01 RX ORDER — GLUCAGON INJECTION, SOLUTION 0.5 MG/.1ML
1 INJECTION, SOLUTION SUBCUTANEOUS ONCE
Refills: 0 | Status: DISCONTINUED | OUTPATIENT
Start: 2023-01-01 | End: 2023-01-01

## 2023-01-01 RX ORDER — SODIUM ZIRCONIUM CYCLOSILICATE 10 G/10G
5 POWDER, FOR SUSPENSION ORAL EVERY 8 HOURS
Refills: 0 | Status: DISCONTINUED | OUTPATIENT
Start: 2023-01-01 | End: 2023-01-01

## 2023-01-01 RX ORDER — LIDOCAINE 4 G/100G
1 CREAM TOPICAL ONCE
Refills: 0 | Status: COMPLETED | OUTPATIENT
Start: 2023-01-01 | End: 2023-01-01

## 2023-01-01 RX ORDER — I.V. FAT EMULSION 20 G/100ML
1.31 EMULSION INTRAVENOUS
Qty: 100.03 | Refills: 0 | Status: DISCONTINUED | OUTPATIENT
Start: 2023-01-01 | End: 2023-01-01

## 2023-01-01 RX ORDER — HEPARIN SODIUM 5000 [USP'U]/ML
INJECTION INTRAVENOUS; SUBCUTANEOUS
Qty: 25000 | Refills: 0 | Status: DISCONTINUED | OUTPATIENT
Start: 2023-01-01 | End: 2023-01-01

## 2023-01-01 RX ORDER — MINOCYCLINE HYDROCHLORIDE 45 MG/1
TABLET, EXTENDED RELEASE ORAL
Refills: 0 | Status: DISCONTINUED | OUTPATIENT
Start: 2023-01-01 | End: 2023-01-01

## 2023-01-01 RX ORDER — LANOLIN ALCOHOL/MO/W.PET/CERES
3 CREAM (GRAM) TOPICAL AT BEDTIME
Refills: 0 | Status: DISCONTINUED | OUTPATIENT
Start: 2023-01-01 | End: 2023-01-01

## 2023-01-01 RX ORDER — IPRATROPIUM/ALBUTEROL SULFATE 18-103MCG
3 AEROSOL WITH ADAPTER (GRAM) INHALATION EVERY 6 HOURS
Refills: 0 | Status: DISCONTINUED | OUTPATIENT
Start: 2023-01-01 | End: 2023-01-01

## 2023-01-01 RX ORDER — PIPERACILLIN AND TAZOBACTAM 4; .5 G/20ML; G/20ML
3.38 INJECTION, POWDER, LYOPHILIZED, FOR SOLUTION INTRAVENOUS EVERY 12 HOURS
Refills: 0 | Status: DISCONTINUED | OUTPATIENT
Start: 2023-01-01 | End: 2023-01-01

## 2023-01-01 RX ORDER — AMLODIPINE BESYLATE 2.5 MG/1
1 TABLET ORAL
Qty: 30 | Refills: 0
Start: 2023-01-01

## 2023-01-01 RX ORDER — DOBUTAMINE HCL 250MG/20ML
5 VIAL (ML) INTRAVENOUS
Qty: 500 | Refills: 0 | Status: DISCONTINUED | OUTPATIENT
Start: 2023-01-01 | End: 2023-01-01

## 2023-01-01 RX ORDER — MEROPENEM 1 G/30ML
1000 INJECTION INTRAVENOUS EVERY 12 HOURS
Refills: 0 | Status: COMPLETED | OUTPATIENT
Start: 2023-01-01 | End: 2023-01-01

## 2023-01-01 RX ORDER — POLYETHYLENE GLYCOL 3350 17 G/17G
17 POWDER, FOR SOLUTION ORAL
Refills: 0 | Status: DISCONTINUED | OUTPATIENT
Start: 2023-01-01 | End: 2023-01-01

## 2023-01-01 RX ORDER — CEFTRIAXONE 500 MG/1
2000 INJECTION, POWDER, FOR SOLUTION INTRAMUSCULAR; INTRAVENOUS ONCE
Refills: 0 | Status: COMPLETED | OUTPATIENT
Start: 2023-01-01 | End: 2023-01-01

## 2023-01-01 RX ORDER — ONDANSETRON 8 MG/1
4 TABLET, FILM COATED ORAL EVERY 8 HOURS
Refills: 0 | Status: DISCONTINUED | OUTPATIENT
Start: 2023-01-01 | End: 2023-01-01

## 2023-01-01 RX ORDER — DEXTROSE 50 % IN WATER 50 %
25 SYRINGE (ML) INTRAVENOUS ONCE
Refills: 0 | Status: DISCONTINUED | OUTPATIENT
Start: 2023-01-01 | End: 2023-01-01

## 2023-01-01 RX ORDER — SODIUM BICARBONATE 1 MEQ/ML
0.2 SYRINGE (ML) INTRAVENOUS
Qty: 150 | Refills: 0 | Status: DISCONTINUED | OUTPATIENT
Start: 2023-01-01 | End: 2023-01-01

## 2023-01-01 RX ORDER — CALCIUM ACETATE 667 MG
1334 TABLET ORAL EVERY 8 HOURS
Refills: 0 | Status: DISCONTINUED | OUTPATIENT
Start: 2023-01-01 | End: 2023-01-01

## 2023-01-01 RX ORDER — PANTOPRAZOLE SODIUM 20 MG/1
8 TABLET, DELAYED RELEASE ORAL
Qty: 80 | Refills: 0 | Status: DISCONTINUED | OUTPATIENT
Start: 2023-01-01 | End: 2023-01-01

## 2023-01-01 RX ORDER — FUROSEMIDE 40 MG
40 TABLET ORAL ONCE
Refills: 0 | Status: COMPLETED | OUTPATIENT
Start: 2023-01-01 | End: 2023-01-01

## 2023-01-01 RX ORDER — AMIODARONE HYDROCHLORIDE 400 MG/1
1 TABLET ORAL
Qty: 450 | Refills: 0 | Status: DISCONTINUED | OUTPATIENT
Start: 2023-01-01 | End: 2023-01-01

## 2023-01-01 RX ORDER — IRON SUCROSE 20 MG/ML
100 INJECTION, SOLUTION INTRAVENOUS EVERY 24 HOURS
Refills: 0 | Status: DISCONTINUED | OUTPATIENT
Start: 2023-01-01 | End: 2023-01-01

## 2023-01-01 RX ORDER — AMLODIPINE BESYLATE 2.5 MG/1
1 TABLET ORAL
Qty: 30 | Refills: 0
Start: 2023-01-01 | End: 2023-01-01

## 2023-01-01 RX ORDER — LIDOCAINE 4 G/100G
10 CREAM TOPICAL ONCE
Refills: 0 | Status: DISCONTINUED | OUTPATIENT
Start: 2023-01-01 | End: 2023-01-01

## 2023-01-01 RX ORDER — IRON SUCROSE 20 MG/ML
100 INJECTION, SOLUTION INTRAVENOUS EVERY 24 HOURS
Refills: 0 | Status: COMPLETED | OUTPATIENT
Start: 2023-01-01 | End: 2023-01-01

## 2023-01-01 RX ORDER — METRONIDAZOLE 250 MG/1
250 TABLET ORAL EVERY 6 HOURS
Qty: 56 | Refills: 0 | Status: ACTIVE | COMMUNITY
Start: 2023-01-01 | End: 1900-01-01

## 2023-01-01 RX ORDER — DILTIAZEM HCL 120 MG
30 CAPSULE, EXT RELEASE 24 HR ORAL EVERY 8 HOURS
Refills: 0 | Status: DISCONTINUED | OUTPATIENT
Start: 2023-01-01 | End: 2023-01-01

## 2023-01-01 RX ORDER — MIDODRINE HYDROCHLORIDE 2.5 MG/1
10 TABLET ORAL EVERY 8 HOURS
Refills: 0 | Status: DISCONTINUED | OUTPATIENT
Start: 2023-01-01 | End: 2023-01-01

## 2023-01-01 RX ORDER — PIPERACILLIN AND TAZOBACTAM 4; .5 G/20ML; G/20ML
3.38 INJECTION, POWDER, LYOPHILIZED, FOR SOLUTION INTRAVENOUS ONCE
Refills: 0 | Status: COMPLETED | OUTPATIENT
Start: 2023-01-01 | End: 2023-01-01

## 2023-01-01 RX ORDER — OCTREOTIDE ACETATE 200 UG/ML
25 INJECTION, SOLUTION INTRAVENOUS; SUBCUTANEOUS
Qty: 500 | Refills: 0 | Status: DISCONTINUED | OUTPATIENT
Start: 2023-01-01 | End: 2023-01-01

## 2023-01-01 RX ORDER — VANCOMYCIN HCL 1 G
1250 VIAL (EA) INTRAVENOUS EVERY 24 HOURS
Refills: 0 | Status: DISCONTINUED | OUTPATIENT
Start: 2023-01-01 | End: 2023-01-01

## 2023-01-01 RX ORDER — MIDAZOLAM HYDROCHLORIDE 1 MG/ML
1 INJECTION, SOLUTION INTRAMUSCULAR; INTRAVENOUS
Refills: 0 | Status: DISCONTINUED | OUTPATIENT
Start: 2023-01-01 | End: 2023-01-01

## 2023-01-01 RX ORDER — HYDROMORPHONE HYDROCHLORIDE 2 MG/ML
0.5 INJECTION INTRAMUSCULAR; INTRAVENOUS; SUBCUTANEOUS EVERY 4 HOURS
Refills: 0 | Status: DISCONTINUED | OUTPATIENT
Start: 2023-01-01 | End: 2023-01-01

## 2023-01-01 RX ORDER — SODIUM CHLORIDE 9 MG/ML
250 INJECTION INTRAMUSCULAR; INTRAVENOUS; SUBCUTANEOUS ONCE
Refills: 0 | Status: DISCONTINUED | OUTPATIENT
Start: 2023-01-01 | End: 2023-01-01

## 2023-01-01 RX ORDER — DIATRIZOATE MEGLUMINE 180 MG/ML
30 INJECTION, SOLUTION INTRAVESICAL ONCE
Refills: 0 | Status: COMPLETED | OUTPATIENT
Start: 2023-01-01 | End: 2023-01-01

## 2023-01-01 RX ORDER — PIPERACILLIN AND TAZOBACTAM 4; .5 G/20ML; G/20ML
3.38 INJECTION, POWDER, LYOPHILIZED, FOR SOLUTION INTRAVENOUS EVERY 8 HOURS
Refills: 0 | Status: DISCONTINUED | OUTPATIENT
Start: 2023-01-01 | End: 2023-01-01

## 2023-01-01 RX ORDER — POLYETHYLENE GLYCOL 3350 17 G/17G
17 POWDER, FOR SOLUTION ORAL AT BEDTIME
Refills: 0 | Status: DISCONTINUED | OUTPATIENT
Start: 2023-01-01 | End: 2023-01-01

## 2023-01-01 RX ORDER — DIGOXIN 250 MCG
250 TABLET ORAL ONCE
Refills: 0 | Status: DISCONTINUED | OUTPATIENT
Start: 2023-01-01 | End: 2023-01-01

## 2023-01-01 RX ORDER — HEPARIN SODIUM 5000 [USP'U]/ML
1200 INJECTION INTRAVENOUS; SUBCUTANEOUS
Qty: 25000 | Refills: 0 | Status: DISCONTINUED | OUTPATIENT
Start: 2023-01-01 | End: 2023-01-01

## 2023-01-01 RX ORDER — HYDROCORTISONE 20 MG
50 TABLET ORAL EVERY 8 HOURS
Refills: 0 | Status: DISCONTINUED | OUTPATIENT
Start: 2023-01-01 | End: 2023-01-01

## 2023-01-01 RX ORDER — ALBUMIN HUMAN 25 %
250 VIAL (ML) INTRAVENOUS
Refills: 0 | Status: DISCONTINUED | OUTPATIENT
Start: 2023-01-01 | End: 2023-01-01

## 2023-01-01 RX ORDER — ALBUMIN HUMAN 25 %
250 VIAL (ML) INTRAVENOUS
Refills: 0 | Status: COMPLETED | OUTPATIENT
Start: 2023-01-01 | End: 2023-01-01

## 2023-01-01 RX ORDER — POTASSIUM PHOSPHATE, MONOBASIC POTASSIUM PHOSPHATE, DIBASIC 236; 224 MG/ML; MG/ML
15 INJECTION, SOLUTION INTRAVENOUS ONCE
Refills: 0 | Status: COMPLETED | OUTPATIENT
Start: 2023-01-01 | End: 2023-01-01

## 2023-01-01 RX ORDER — HYDROCORTISONE 20 MG
25 TABLET ORAL EVERY 12 HOURS
Refills: 0 | Status: DISCONTINUED | OUTPATIENT
Start: 2023-01-01 | End: 2023-01-01

## 2023-01-01 RX ORDER — DIGOXIN 250 MCG
0.25 TABLET ORAL EVERY 6 HOURS
Refills: 0 | Status: DISCONTINUED | OUTPATIENT
Start: 2023-01-01 | End: 2023-01-01

## 2023-01-01 RX ORDER — SODIUM BICARBONATE 1 MEQ/ML
0.29 SYRINGE (ML) INTRAVENOUS
Qty: 150 | Refills: 0 | Status: DISCONTINUED | OUTPATIENT
Start: 2023-01-01 | End: 2023-01-01

## 2023-01-01 RX ORDER — MINOCYCLINE HYDROCHLORIDE 45 MG/1
200 TABLET, EXTENDED RELEASE ORAL EVERY 12 HOURS
Refills: 0 | Status: DISCONTINUED | OUTPATIENT
Start: 2023-01-01 | End: 2023-01-01

## 2023-01-01 RX ORDER — CALCIUM ACETATE 667 MG
667 TABLET ORAL
Refills: 0 | Status: DISCONTINUED | OUTPATIENT
Start: 2023-01-01 | End: 2023-01-01

## 2023-01-01 RX ORDER — MEROPENEM 1 G/30ML
1 INJECTION INTRAVENOUS EVERY 12 HOURS
Refills: 0 | Status: DISCONTINUED | OUTPATIENT
Start: 2023-01-01 | End: 2023-01-01

## 2023-01-01 RX ORDER — FENTANYL CITRATE 50 UG/ML
50 INJECTION INTRAVENOUS ONCE
Refills: 0 | Status: DISCONTINUED | OUTPATIENT
Start: 2023-01-01 | End: 2023-01-01

## 2023-01-01 RX ORDER — HEPARIN SODIUM 5000 [USP'U]/ML
1700 INJECTION INTRAVENOUS; SUBCUTANEOUS
Qty: 25000 | Refills: 0 | Status: DISCONTINUED | OUTPATIENT
Start: 2023-01-01 | End: 2023-01-01

## 2023-01-01 RX ORDER — DIGOXIN 250 MCG
250 TABLET ORAL EVERY 6 HOURS
Refills: 0 | Status: COMPLETED | OUTPATIENT
Start: 2023-01-01 | End: 2023-01-01

## 2023-01-01 RX ORDER — VANCOMYCIN HCL 1 G
1250 VIAL (EA) INTRAVENOUS EVERY 12 HOURS
Refills: 0 | Status: DISCONTINUED | OUTPATIENT
Start: 2023-01-01 | End: 2023-01-01

## 2023-01-01 RX ORDER — ALTEPLASE 100 MG
1 KIT INTRAVENOUS ONCE
Refills: 0 | Status: DISCONTINUED | OUTPATIENT
Start: 2023-01-01 | End: 2023-01-01

## 2023-01-01 RX ORDER — METRONIDAZOLE 500 MG
TABLET ORAL
Refills: 0 | Status: DISCONTINUED | OUTPATIENT
Start: 2023-01-01 | End: 2023-01-01

## 2023-01-01 RX ORDER — CASPOFUNGIN ACETATE 7 MG/ML
INJECTION, POWDER, LYOPHILIZED, FOR SOLUTION INTRAVENOUS
Refills: 0 | Status: DISCONTINUED | OUTPATIENT
Start: 2023-01-01 | End: 2023-01-01

## 2023-01-01 RX ORDER — DEXAMETHASONE 0.5 MG/5ML
8 ELIXIR ORAL ONCE
Refills: 0 | Status: COMPLETED | OUTPATIENT
Start: 2023-01-01 | End: 2023-01-01

## 2023-01-01 RX ORDER — VANCOMYCIN HCL 1 G
VIAL (EA) INTRAVENOUS
Refills: 0 | Status: DISCONTINUED | OUTPATIENT
Start: 2023-01-01 | End: 2023-01-01

## 2023-01-01 RX ORDER — ALTEPLASE 100 MG
2 KIT INTRAVENOUS ONCE
Refills: 0 | Status: DISCONTINUED | OUTPATIENT
Start: 2023-01-01 | End: 2023-01-01

## 2023-01-01 RX ORDER — CEFTRIAXONE 500 MG/1
1000 INJECTION, POWDER, FOR SOLUTION INTRAMUSCULAR; INTRAVENOUS ONCE
Refills: 0 | Status: COMPLETED | OUTPATIENT
Start: 2023-01-01 | End: 2023-01-01

## 2023-01-01 RX ORDER — PHYTONADIONE (VIT K1) 5 MG
10 TABLET ORAL ONCE
Refills: 0 | Status: COMPLETED | OUTPATIENT
Start: 2023-01-01 | End: 2023-01-01

## 2023-01-01 RX ORDER — PROPOFOL 10 MG/ML
25 INJECTION, EMULSION INTRAVENOUS
Qty: 1000 | Refills: 0 | Status: DISCONTINUED | OUTPATIENT
Start: 2023-01-01 | End: 2023-01-01

## 2023-01-01 RX ORDER — POTASSIUM CHLORIDE 20 MEQ
20 PACKET (EA) ORAL ONCE
Refills: 0 | Status: COMPLETED | OUTPATIENT
Start: 2023-01-01 | End: 2023-01-01

## 2023-01-01 RX ORDER — LABETALOL HCL 100 MG
100 TABLET ORAL
Refills: 0 | Status: DISCONTINUED | OUTPATIENT
Start: 2023-01-01 | End: 2023-01-01

## 2023-01-01 RX ORDER — METOPROLOL TARTRATE 50 MG
25 TABLET ORAL EVERY 8 HOURS
Refills: 0 | Status: DISCONTINUED | OUTPATIENT
Start: 2023-01-01 | End: 2023-01-01

## 2023-01-01 RX ORDER — OCTREOTIDE ACETATE 200 UG/ML
100 INJECTION, SOLUTION INTRAVENOUS; SUBCUTANEOUS EVERY 8 HOURS
Refills: 0 | Status: DISCONTINUED | OUTPATIENT
Start: 2023-01-01 | End: 2023-01-01

## 2023-01-01 RX ORDER — SODIUM CHLORIDE 9 MG/ML
2 INJECTION INTRAMUSCULAR; INTRAVENOUS; SUBCUTANEOUS EVERY 8 HOURS
Refills: 0 | Status: DISCONTINUED | OUTPATIENT
Start: 2023-01-01 | End: 2023-01-01

## 2023-01-01 RX ORDER — FENTANYL CITRATE 50 UG/ML
25 INJECTION INTRAVENOUS ONCE
Refills: 0 | Status: DISCONTINUED | OUTPATIENT
Start: 2023-01-01 | End: 2023-01-01

## 2023-01-01 RX ORDER — MIDAZOLAM HYDROCHLORIDE 1 MG/ML
1 INJECTION, SOLUTION INTRAMUSCULAR; INTRAVENOUS EVERY 4 HOURS
Refills: 0 | Status: DISCONTINUED | OUTPATIENT
Start: 2023-01-01 | End: 2023-01-01

## 2023-01-01 RX ORDER — ASPIRIN/CALCIUM CARB/MAGNESIUM 324 MG
81 TABLET ORAL DAILY
Refills: 0 | Status: DISCONTINUED | OUTPATIENT
Start: 2023-01-01 | End: 2023-01-01

## 2023-01-01 RX ORDER — DIGOXIN 250 MCG
0.25 TABLET ORAL ONCE
Refills: 0 | Status: DISCONTINUED | OUTPATIENT
Start: 2023-01-01 | End: 2023-01-01

## 2023-01-01 RX ORDER — PANTOPRAZOLE SODIUM 20 MG/1
40 TABLET, DELAYED RELEASE ORAL
Refills: 0 | Status: DISCONTINUED | OUTPATIENT
Start: 2023-01-01 | End: 2023-01-01

## 2023-01-01 RX ORDER — HYDROCORTISONE 20 MG
50 TABLET ORAL EVERY 6 HOURS
Refills: 0 | Status: COMPLETED | OUTPATIENT
Start: 2023-01-01 | End: 2023-01-01

## 2023-01-01 RX ORDER — DEXTROSE 50 % IN WATER 50 %
12.5 SYRINGE (ML) INTRAVENOUS ONCE
Refills: 0 | Status: DISCONTINUED | OUTPATIENT
Start: 2023-01-01 | End: 2023-01-01

## 2023-01-01 RX ADMIN — VASOPRESSIN 4.5 UNIT(S)/MIN: 20 INJECTION INTRAVENOUS at 19:59

## 2023-01-01 RX ADMIN — Medication 100 GRAM(S): at 07:51

## 2023-01-01 RX ADMIN — CHLORHEXIDINE GLUCONATE 1 APPLICATION(S): 213 SOLUTION TOPICAL at 05:18

## 2023-01-01 RX ADMIN — MEROPENEM 100 MILLIGRAM(S): 1 INJECTION INTRAVENOUS at 05:26

## 2023-01-01 RX ADMIN — GABAPENTIN 200 MILLIGRAM(S): 400 CAPSULE ORAL at 05:26

## 2023-01-01 RX ADMIN — Medication 2.5 MILLIGRAM(S): at 00:42

## 2023-01-01 RX ADMIN — Medication 50 MILLIGRAM(S): at 01:00

## 2023-01-01 RX ADMIN — MEROPENEM 100 MILLIGRAM(S): 1 INJECTION INTRAVENOUS at 17:29

## 2023-01-01 RX ADMIN — Medication 100 GRAM(S): at 07:45

## 2023-01-01 RX ADMIN — Medication 1 APPLICATION(S): at 17:11

## 2023-01-01 RX ADMIN — DEXMEDETOMIDINE HYDROCHLORIDE IN 0.9% SODIUM CHLORIDE 3.82 MICROGRAM(S)/KG/HR: 4 INJECTION INTRAVENOUS at 21:12

## 2023-01-01 RX ADMIN — HYDROMORPHONE HYDROCHLORIDE 0.5 MILLIGRAM(S): 2 INJECTION INTRAMUSCULAR; INTRAVENOUS; SUBCUTANEOUS at 15:32

## 2023-01-01 RX ADMIN — PANTOPRAZOLE SODIUM 40 MILLIGRAM(S): 20 TABLET, DELAYED RELEASE ORAL at 06:15

## 2023-01-01 RX ADMIN — Medication 650 MILLIGRAM(S): at 17:33

## 2023-01-01 RX ADMIN — Medication 125 MILLILITER(S): at 08:34

## 2023-01-01 RX ADMIN — HEPARIN SODIUM 5000 UNIT(S): 5000 INJECTION INTRAVENOUS; SUBCUTANEOUS at 05:23

## 2023-01-01 RX ADMIN — Medication 50 GRAM(S): at 23:44

## 2023-01-01 RX ADMIN — MIDODRINE HYDROCHLORIDE 5 MILLIGRAM(S): 2.5 TABLET ORAL at 03:50

## 2023-01-01 RX ADMIN — Medication 1 APPLICATION(S): at 17:34

## 2023-01-01 RX ADMIN — INSULIN HUMAN 10 UNIT(S): 100 INJECTION, SOLUTION SUBCUTANEOUS at 22:41

## 2023-01-01 RX ADMIN — HYDROMORPHONE HYDROCHLORIDE 0.5 MILLIGRAM(S): 2 INJECTION INTRAMUSCULAR; INTRAVENOUS; SUBCUTANEOUS at 09:45

## 2023-01-01 RX ADMIN — Medication 100 GRAM(S): at 21:10

## 2023-01-01 RX ADMIN — PANTOPRAZOLE SODIUM 40 MILLIGRAM(S): 20 TABLET, DELAYED RELEASE ORAL at 17:25

## 2023-01-01 RX ADMIN — PHENYLEPHRINE HYDROCHLORIDE 1.43 MICROGRAM(S)/KG/MIN: 10 INJECTION INTRAVENOUS at 18:07

## 2023-01-01 RX ADMIN — Medication 0.72 MICROGRAM(S)/KG/MIN: at 02:30

## 2023-01-01 RX ADMIN — Medication 5 MILLIGRAM(S): at 17:40

## 2023-01-01 RX ADMIN — Medication 1 APPLICATION(S): at 19:35

## 2023-01-01 RX ADMIN — PHENYLEPHRINE HYDROCHLORIDE 1.43 MICROGRAM(S)/KG/MIN: 10 INJECTION INTRAVENOUS at 22:37

## 2023-01-01 RX ADMIN — MEROPENEM 100 MILLIGRAM(S): 1 INJECTION INTRAVENOUS at 05:02

## 2023-01-01 RX ADMIN — Medication 1 APPLICATION(S): at 05:40

## 2023-01-01 RX ADMIN — Medication 50 MILLILITER(S): at 13:24

## 2023-01-01 RX ADMIN — IRON SUCROSE 210 MILLIGRAM(S): 20 INJECTION, SOLUTION INTRAVENOUS at 09:32

## 2023-01-01 RX ADMIN — CHLORHEXIDINE GLUCONATE 15 MILLILITER(S): 213 SOLUTION TOPICAL at 05:09

## 2023-01-01 RX ADMIN — MEROPENEM 100 MILLIGRAM(S): 1 INJECTION INTRAVENOUS at 07:36

## 2023-01-01 RX ADMIN — MEROPENEM 100 MILLIGRAM(S): 1 INJECTION INTRAVENOUS at 06:21

## 2023-01-01 RX ADMIN — OCTREOTIDE ACETATE 50 MICROGRAM(S): 200 INJECTION, SOLUTION INTRAVENOUS; SUBCUTANEOUS at 13:00

## 2023-01-01 RX ADMIN — Medication 1 EACH: at 21:35

## 2023-01-01 RX ADMIN — Medication 5 MILLIGRAM(S): at 05:25

## 2023-01-01 RX ADMIN — SODIUM ZIRCONIUM CYCLOSILICATE 5 GRAM(S): 10 POWDER, FOR SUSPENSION ORAL at 03:14

## 2023-01-01 RX ADMIN — MIDODRINE HYDROCHLORIDE 5 MILLIGRAM(S): 2.5 TABLET ORAL at 11:34

## 2023-01-01 RX ADMIN — Medication 325 MILLIGRAM(S): at 12:27

## 2023-01-01 RX ADMIN — Medication 1 APPLICATION(S): at 18:01

## 2023-01-01 RX ADMIN — SODIUM CHLORIDE 1000 MILLILITER(S): 9 INJECTION, SOLUTION INTRAVENOUS at 04:41

## 2023-01-01 RX ADMIN — CHLORHEXIDINE GLUCONATE 15 MILLILITER(S): 213 SOLUTION TOPICAL at 17:29

## 2023-01-01 RX ADMIN — POLYETHYLENE GLYCOL 3350 17 GRAM(S): 17 POWDER, FOR SOLUTION ORAL at 21:50

## 2023-01-01 RX ADMIN — GABAPENTIN 100 MILLIGRAM(S): 400 CAPSULE ORAL at 11:31

## 2023-01-01 RX ADMIN — VASOPRESSIN 4.5 UNIT(S)/MIN: 20 INJECTION INTRAVENOUS at 17:23

## 2023-01-01 RX ADMIN — HEPARIN SODIUM 1600 UNIT(S)/HR: 5000 INJECTION INTRAVENOUS; SUBCUTANEOUS at 12:20

## 2023-01-01 RX ADMIN — Medication 1 APPLICATION(S): at 05:03

## 2023-01-01 RX ADMIN — MIDODRINE HYDROCHLORIDE 5 MILLIGRAM(S): 2.5 TABLET ORAL at 18:10

## 2023-01-01 RX ADMIN — HEPARIN SODIUM 5000 UNIT(S): 5000 INJECTION INTRAVENOUS; SUBCUTANEOUS at 13:21

## 2023-01-01 RX ADMIN — Medication 100 MILLIGRAM(S): at 15:08

## 2023-01-01 RX ADMIN — Medication 100 MILLIGRAM(S): at 18:00

## 2023-01-01 RX ADMIN — Medication 1300 MILLIGRAM(S): at 07:49

## 2023-01-01 RX ADMIN — CASPOFUNGIN ACETATE 260 MILLIGRAM(S): 7 INJECTION, POWDER, LYOPHILIZED, FOR SOLUTION INTRAVENOUS at 02:01

## 2023-01-01 RX ADMIN — BUMETANIDE 2 MILLIGRAM(S): 0.25 INJECTION INTRAMUSCULAR; INTRAVENOUS at 13:24

## 2023-01-01 RX ADMIN — Medication 1334 MILLIGRAM(S): at 13:33

## 2023-01-01 RX ADMIN — SODIUM CHLORIDE 1000 MILLILITER(S): 9 INJECTION, SOLUTION INTRAVENOUS at 15:07

## 2023-01-01 RX ADMIN — Medication 40 MILLIGRAM(S): at 17:32

## 2023-01-01 RX ADMIN — HEPARIN SODIUM 1600 UNIT(S)/HR: 5000 INJECTION INTRAVENOUS; SUBCUTANEOUS at 04:14

## 2023-01-01 RX ADMIN — MIDODRINE HYDROCHLORIDE 10 MILLIGRAM(S): 2.5 TABLET ORAL at 21:50

## 2023-01-01 RX ADMIN — MEROPENEM 100 MILLIGRAM(S): 1 INJECTION INTRAVENOUS at 05:17

## 2023-01-01 RX ADMIN — Medication 5 MILLIGRAM(S): at 05:36

## 2023-01-01 RX ADMIN — CHLORHEXIDINE GLUCONATE 15 MILLILITER(S): 213 SOLUTION TOPICAL at 17:25

## 2023-01-01 RX ADMIN — Medication 1334 MILLIGRAM(S): at 21:13

## 2023-01-01 RX ADMIN — Medication 1300 MILLIGRAM(S): at 12:06

## 2023-01-01 RX ADMIN — Medication 5 MILLIGRAM(S): at 05:44

## 2023-01-01 RX ADMIN — SODIUM CHLORIDE 2 GRAM(S): 9 INJECTION INTRAMUSCULAR; INTRAVENOUS; SUBCUTANEOUS at 13:46

## 2023-01-01 RX ADMIN — Medication 50 MILLIGRAM(S): at 11:20

## 2023-01-01 RX ADMIN — HEPARIN SODIUM 5000 UNIT(S): 5000 INJECTION INTRAVENOUS; SUBCUTANEOUS at 21:29

## 2023-01-01 RX ADMIN — POLYETHYLENE GLYCOL 3350 17 GRAM(S): 17 POWDER, FOR SOLUTION ORAL at 06:53

## 2023-01-01 RX ADMIN — VASOPRESSIN 4.5 UNIT(S)/MIN: 20 INJECTION INTRAVENOUS at 16:20

## 2023-01-01 RX ADMIN — MIDAZOLAM HYDROCHLORIDE 2 MILLIGRAM(S): 1 INJECTION, SOLUTION INTRAMUSCULAR; INTRAVENOUS at 12:40

## 2023-01-01 RX ADMIN — Medication 100 MILLIGRAM(S): at 06:19

## 2023-01-01 RX ADMIN — Medication 1 EACH: at 21:19

## 2023-01-01 RX ADMIN — PANTOPRAZOLE SODIUM 40 MILLIGRAM(S): 20 TABLET, DELAYED RELEASE ORAL at 06:22

## 2023-01-01 RX ADMIN — PANTOPRAZOLE SODIUM 40 MILLIGRAM(S): 20 TABLET, DELAYED RELEASE ORAL at 17:29

## 2023-01-01 RX ADMIN — Medication 100 MILLIGRAM(S): at 21:45

## 2023-01-01 RX ADMIN — Medication 100 MILLIGRAM(S): at 17:02

## 2023-01-01 RX ADMIN — INSULIN HUMAN 10 UNIT(S): 100 INJECTION, SOLUTION SUBCUTANEOUS at 08:13

## 2023-01-01 RX ADMIN — GABAPENTIN 200 MILLIGRAM(S): 400 CAPSULE ORAL at 05:22

## 2023-01-01 RX ADMIN — OCTREOTIDE ACETATE 50 MICROGRAM(S): 200 INJECTION, SOLUTION INTRAVENOUS; SUBCUTANEOUS at 11:29

## 2023-01-01 RX ADMIN — Medication 650 MILLIGRAM(S): at 17:06

## 2023-01-01 RX ADMIN — CHLORHEXIDINE GLUCONATE 1 APPLICATION(S): 213 SOLUTION TOPICAL at 05:40

## 2023-01-01 RX ADMIN — Medication 250 MICROGRAM(S): at 01:48

## 2023-01-01 RX ADMIN — HEPARIN SODIUM 5000 UNIT(S): 5000 INJECTION INTRAVENOUS; SUBCUTANEOUS at 05:05

## 2023-01-01 RX ADMIN — Medication 325 MILLIGRAM(S): at 12:13

## 2023-01-01 RX ADMIN — CHLORHEXIDINE GLUCONATE 1 APPLICATION(S): 213 SOLUTION TOPICAL at 05:53

## 2023-01-01 RX ADMIN — HEPARIN SODIUM 5000 UNIT(S): 5000 INJECTION INTRAVENOUS; SUBCUTANEOUS at 13:34

## 2023-01-01 RX ADMIN — HEPARIN SODIUM 15 UNIT(S)/HR: 5000 INJECTION INTRAVENOUS; SUBCUTANEOUS at 21:22

## 2023-01-01 RX ADMIN — CHLORHEXIDINE GLUCONATE 15 MILLILITER(S): 213 SOLUTION TOPICAL at 17:12

## 2023-01-01 RX ADMIN — HEPARIN SODIUM 5000 UNIT(S): 5000 INJECTION INTRAVENOUS; SUBCUTANEOUS at 21:00

## 2023-01-01 RX ADMIN — Medication 5 MILLIGRAM(S): at 04:36

## 2023-01-01 RX ADMIN — CHLORHEXIDINE GLUCONATE 15 MILLILITER(S): 213 SOLUTION TOPICAL at 18:06

## 2023-01-01 RX ADMIN — SODIUM ZIRCONIUM CYCLOSILICATE 5 GRAM(S): 10 POWDER, FOR SUSPENSION ORAL at 21:11

## 2023-01-01 RX ADMIN — CASPOFUNGIN ACETATE 260 MILLIGRAM(S): 7 INJECTION, POWDER, LYOPHILIZED, FOR SOLUTION INTRAVENOUS at 18:41

## 2023-01-01 RX ADMIN — GABAPENTIN 200 MILLIGRAM(S): 400 CAPSULE ORAL at 21:26

## 2023-01-01 RX ADMIN — Medication 50 MILLIGRAM(S): at 04:23

## 2023-01-01 RX ADMIN — Medication 250 MICROGRAM(S): at 18:30

## 2023-01-01 RX ADMIN — PANTOPRAZOLE SODIUM 40 MILLIGRAM(S): 20 TABLET, DELAYED RELEASE ORAL at 18:20

## 2023-01-01 RX ADMIN — Medication 100 MILLIGRAM(S): at 17:14

## 2023-01-01 RX ADMIN — Medication 50 MILLIGRAM(S): at 05:36

## 2023-01-01 RX ADMIN — HEPARIN SODIUM 5000 UNIT(S): 5000 INJECTION INTRAVENOUS; SUBCUTANEOUS at 21:34

## 2023-01-01 RX ADMIN — POLYETHYLENE GLYCOL 3350 17 GRAM(S): 17 POWDER, FOR SOLUTION ORAL at 18:00

## 2023-01-01 RX ADMIN — PANTOPRAZOLE SODIUM 40 MILLIGRAM(S): 20 TABLET, DELAYED RELEASE ORAL at 17:06

## 2023-01-01 RX ADMIN — PANTOPRAZOLE SODIUM 40 MILLIGRAM(S): 20 TABLET, DELAYED RELEASE ORAL at 17:32

## 2023-01-01 RX ADMIN — MEROPENEM 100 MILLIGRAM(S): 1 INJECTION INTRAVENOUS at 19:00

## 2023-01-01 RX ADMIN — Medication 1334 MILLIGRAM(S): at 05:32

## 2023-01-01 RX ADMIN — Medication 100 MILLIGRAM(S): at 05:23

## 2023-01-01 RX ADMIN — Medication 125 MILLILITER(S): at 13:08

## 2023-01-01 RX ADMIN — DEXMEDETOMIDINE HYDROCHLORIDE IN 0.9% SODIUM CHLORIDE 3.82 MICROGRAM(S)/KG/HR: 4 INJECTION INTRAVENOUS at 22:59

## 2023-01-01 RX ADMIN — MEROPENEM 100 MILLIGRAM(S): 1 INJECTION INTRAVENOUS at 05:32

## 2023-01-01 RX ADMIN — MEROPENEM 100 MILLIGRAM(S): 1 INJECTION INTRAVENOUS at 17:02

## 2023-01-01 RX ADMIN — HEPARIN SODIUM 5000 UNIT(S): 5000 INJECTION INTRAVENOUS; SUBCUTANEOUS at 21:10

## 2023-01-01 RX ADMIN — Medication 1 APPLICATION(S): at 05:32

## 2023-01-01 RX ADMIN — DEXMEDETOMIDINE HYDROCHLORIDE IN 0.9% SODIUM CHLORIDE 3.82 MICROGRAM(S)/KG/HR: 4 INJECTION INTRAVENOUS at 21:22

## 2023-01-01 RX ADMIN — I.V. FAT EMULSION 31.3 GM/KG/DAY: 20 EMULSION INTRAVENOUS at 21:34

## 2023-01-01 RX ADMIN — Medication 1334 MILLIGRAM(S): at 05:10

## 2023-01-01 RX ADMIN — SODIUM CHLORIDE 1000 MILLILITER(S): 9 INJECTION, SOLUTION INTRAVENOUS at 18:55

## 2023-01-01 RX ADMIN — CHLORHEXIDINE GLUCONATE 15 MILLILITER(S): 213 SOLUTION TOPICAL at 05:44

## 2023-01-01 RX ADMIN — DEXMEDETOMIDINE HYDROCHLORIDE IN 0.9% SODIUM CHLORIDE 3.82 MICROGRAM(S)/KG/HR: 4 INJECTION INTRAVENOUS at 23:26

## 2023-01-01 RX ADMIN — Medication 667 MILLIGRAM(S): at 17:03

## 2023-01-01 RX ADMIN — Medication 325 MILLIGRAM(S): at 11:49

## 2023-01-01 RX ADMIN — I.V. FAT EMULSION 31.3 GM/KG/DAY: 20 EMULSION INTRAVENOUS at 21:10

## 2023-01-01 RX ADMIN — CHLORHEXIDINE GLUCONATE 15 MILLILITER(S): 213 SOLUTION TOPICAL at 05:20

## 2023-01-01 RX ADMIN — Medication 5 MILLIGRAM(S): at 09:35

## 2023-01-01 RX ADMIN — Medication 1 APPLICATION(S): at 06:05

## 2023-01-01 RX ADMIN — MEROPENEM 100 MILLIGRAM(S): 1 INJECTION INTRAVENOUS at 18:29

## 2023-01-01 RX ADMIN — Medication 200 GRAM(S): at 11:32

## 2023-01-01 RX ADMIN — MEROPENEM 100 MILLIGRAM(S): 1 INJECTION INTRAVENOUS at 05:19

## 2023-01-01 RX ADMIN — Medication 1300 MILLIGRAM(S): at 14:22

## 2023-01-01 RX ADMIN — Medication 1300 MILLIGRAM(S): at 17:03

## 2023-01-01 RX ADMIN — Medication 650 MILLIGRAM(S): at 11:48

## 2023-01-01 RX ADMIN — Medication 5 MILLIGRAM(S): at 12:57

## 2023-01-01 RX ADMIN — Medication 325 MILLIGRAM(S): at 11:39

## 2023-01-01 RX ADMIN — Medication 200 GRAM(S): at 22:21

## 2023-01-01 RX ADMIN — PIPERACILLIN AND TAZOBACTAM 200 GRAM(S): 4; .5 INJECTION, POWDER, LYOPHILIZED, FOR SOLUTION INTRAVENOUS at 12:57

## 2023-01-01 RX ADMIN — Medication 100 MILLIGRAM(S): at 05:05

## 2023-01-01 RX ADMIN — GABAPENTIN 200 MILLIGRAM(S): 400 CAPSULE ORAL at 13:22

## 2023-01-01 RX ADMIN — Medication 667 MILLIGRAM(S): at 11:57

## 2023-01-01 RX ADMIN — Medication 667 MILLIGRAM(S): at 09:32

## 2023-01-01 RX ADMIN — Medication 100 MILLIGRAM(S): at 21:01

## 2023-01-01 RX ADMIN — PANTOPRAZOLE SODIUM 10 MG/HR: 20 TABLET, DELAYED RELEASE ORAL at 22:33

## 2023-01-01 RX ADMIN — DIATRIZOATE MEGLUMINE 30 MILLILITER(S): 180 INJECTION, SOLUTION INTRAVESICAL at 12:50

## 2023-01-01 RX ADMIN — Medication 125 MILLILITER(S): at 23:37

## 2023-01-01 RX ADMIN — Medication 1300 MILLIGRAM(S): at 18:02

## 2023-01-01 RX ADMIN — Medication 667 MILLIGRAM(S): at 17:44

## 2023-01-01 RX ADMIN — CHLORHEXIDINE GLUCONATE 15 MILLILITER(S): 213 SOLUTION TOPICAL at 05:07

## 2023-01-01 RX ADMIN — Medication 1 APPLICATION(S): at 18:42

## 2023-01-01 RX ADMIN — FENTANYL CITRATE 50 MICROGRAM(S): 50 INJECTION INTRAVENOUS at 11:00

## 2023-01-01 RX ADMIN — DAPTOMYCIN 118.4 MILLIGRAM(S): 500 INJECTION, POWDER, LYOPHILIZED, FOR SOLUTION INTRAVENOUS at 23:33

## 2023-01-01 RX ADMIN — SODIUM CHLORIDE 50 MILLILITER(S): 9 INJECTION, SOLUTION INTRAVENOUS at 06:55

## 2023-01-01 RX ADMIN — SODIUM CHLORIDE 75 MILLILITER(S): 9 INJECTION, SOLUTION INTRAVENOUS at 18:22

## 2023-01-01 RX ADMIN — CHLORHEXIDINE GLUCONATE 15 MILLILITER(S): 213 SOLUTION TOPICAL at 05:25

## 2023-01-01 RX ADMIN — IRON SUCROSE 210 MILLIGRAM(S): 20 INJECTION, SOLUTION INTRAVENOUS at 09:24

## 2023-01-01 RX ADMIN — OCTREOTIDE ACETATE 50 MICROGRAM(S): 200 INJECTION, SOLUTION INTRAVENOUS; SUBCUTANEOUS at 05:43

## 2023-01-01 RX ADMIN — CASPOFUNGIN ACETATE 260 MILLIGRAM(S): 7 INJECTION, POWDER, LYOPHILIZED, FOR SOLUTION INTRAVENOUS at 19:42

## 2023-01-01 RX ADMIN — CEFTRIAXONE 100 MILLIGRAM(S): 500 INJECTION, POWDER, FOR SOLUTION INTRAMUSCULAR; INTRAVENOUS at 17:05

## 2023-01-01 RX ADMIN — DAPTOMYCIN 124 MILLIGRAM(S): 500 INJECTION, POWDER, LYOPHILIZED, FOR SOLUTION INTRAVENOUS at 18:09

## 2023-01-01 RX ADMIN — Medication 1 APPLICATION(S): at 05:19

## 2023-01-01 RX ADMIN — Medication 50 MILLIGRAM(S): at 14:21

## 2023-01-01 RX ADMIN — Medication 650 MILLIGRAM(S): at 11:20

## 2023-01-01 RX ADMIN — Medication 2.5 MILLIGRAM(S): at 16:33

## 2023-01-01 RX ADMIN — Medication 650 MILLIGRAM(S): at 06:47

## 2023-01-01 RX ADMIN — PANTOPRAZOLE SODIUM 40 MILLIGRAM(S): 20 TABLET, DELAYED RELEASE ORAL at 05:24

## 2023-01-01 RX ADMIN — Medication 1 APPLICATION(S): at 18:20

## 2023-01-01 RX ADMIN — MIDODRINE HYDROCHLORIDE 10 MILLIGRAM(S): 2.5 TABLET ORAL at 05:32

## 2023-01-01 RX ADMIN — Medication 1334 MILLIGRAM(S): at 05:21

## 2023-01-01 RX ADMIN — Medication 102 MILLIGRAM(S): at 11:15

## 2023-01-01 RX ADMIN — HEPARIN SODIUM 5000 UNIT(S): 5000 INJECTION INTRAVENOUS; SUBCUTANEOUS at 15:09

## 2023-01-01 RX ADMIN — MEROPENEM 100 MILLIGRAM(S): 1 INJECTION INTRAVENOUS at 05:09

## 2023-01-01 RX ADMIN — HEPARIN SODIUM 5000 UNIT(S): 5000 INJECTION INTRAVENOUS; SUBCUTANEOUS at 05:03

## 2023-01-01 RX ADMIN — Medication 1 APPLICATION(S): at 06:29

## 2023-01-01 RX ADMIN — Medication 100 MILLIGRAM(S): at 05:55

## 2023-01-01 RX ADMIN — Medication 125 MICROGRAM(S): at 15:48

## 2023-01-01 RX ADMIN — Medication 2.5 MILLIGRAM(S): at 23:41

## 2023-01-01 RX ADMIN — Medication 100 GRAM(S): at 23:18

## 2023-01-01 RX ADMIN — Medication 650 MILLIGRAM(S): at 19:02

## 2023-01-01 RX ADMIN — MEROPENEM 100 MILLIGRAM(S): 1 INJECTION INTRAVENOUS at 05:40

## 2023-01-01 RX ADMIN — Medication 50 MILLILITER(S): at 08:13

## 2023-01-01 RX ADMIN — Medication 5 MILLIGRAM(S): at 23:51

## 2023-01-01 RX ADMIN — Medication 325 MILLIGRAM(S): at 12:37

## 2023-01-01 RX ADMIN — CHLORHEXIDINE GLUCONATE 15 MILLILITER(S): 213 SOLUTION TOPICAL at 17:05

## 2023-01-01 RX ADMIN — DAPTOMYCIN 118.4 MILLIGRAM(S): 500 INJECTION, POWDER, LYOPHILIZED, FOR SOLUTION INTRAVENOUS at 00:39

## 2023-01-01 RX ADMIN — Medication 125 MILLILITER(S): at 18:38

## 2023-01-01 RX ADMIN — Medication 25 MILLIGRAM(S): at 13:47

## 2023-01-01 RX ADMIN — GABAPENTIN 200 MILLIGRAM(S): 400 CAPSULE ORAL at 22:18

## 2023-01-01 RX ADMIN — Medication 1 APPLICATION(S): at 17:12

## 2023-01-01 RX ADMIN — Medication 1334 MILLIGRAM(S): at 22:18

## 2023-01-01 RX ADMIN — Medication 1 APPLICATION(S): at 18:00

## 2023-01-01 RX ADMIN — Medication 30 MILLILITER(S): at 03:08

## 2023-01-01 RX ADMIN — GABAPENTIN 200 MILLIGRAM(S): 400 CAPSULE ORAL at 14:28

## 2023-01-01 RX ADMIN — CHLORHEXIDINE GLUCONATE 1 APPLICATION(S): 213 SOLUTION TOPICAL at 06:29

## 2023-01-01 RX ADMIN — Medication 1300 MILLIGRAM(S): at 04:40

## 2023-01-01 RX ADMIN — GABAPENTIN 200 MILLIGRAM(S): 400 CAPSULE ORAL at 05:07

## 2023-01-01 RX ADMIN — Medication 125 MICROGRAM(S): at 15:03

## 2023-01-01 RX ADMIN — CHLORHEXIDINE GLUCONATE 15 MILLILITER(S): 213 SOLUTION TOPICAL at 05:33

## 2023-01-01 RX ADMIN — PANTOPRAZOLE SODIUM 40 MILLIGRAM(S): 20 TABLET, DELAYED RELEASE ORAL at 17:12

## 2023-01-01 RX ADMIN — Medication 1334 MILLIGRAM(S): at 06:06

## 2023-01-01 RX ADMIN — OCTREOTIDE ACETATE 5 MICROGRAM(S)/HR: 200 INJECTION, SOLUTION INTRAVENOUS; SUBCUTANEOUS at 21:44

## 2023-01-01 RX ADMIN — Medication 400 MILLIGRAM(S): at 05:21

## 2023-01-01 RX ADMIN — Medication 100 MILLIEQUIVALENT(S): at 12:26

## 2023-01-01 RX ADMIN — HEPARIN SODIUM 5000 UNIT(S): 5000 INJECTION INTRAVENOUS; SUBCUTANEOUS at 21:24

## 2023-01-01 RX ADMIN — Medication 100 MILLIGRAM(S): at 05:45

## 2023-01-01 RX ADMIN — HEPARIN SODIUM 5000 UNIT(S): 5000 INJECTION INTRAVENOUS; SUBCUTANEOUS at 21:20

## 2023-01-01 RX ADMIN — Medication 100 MILLIGRAM(S): at 07:00

## 2023-01-01 RX ADMIN — PHENYLEPHRINE HYDROCHLORIDE 1.43 MICROGRAM(S)/KG/MIN: 10 INJECTION INTRAVENOUS at 08:37

## 2023-01-01 RX ADMIN — Medication 100 MILLIGRAM(S): at 14:29

## 2023-01-01 RX ADMIN — Medication 650 MILLIGRAM(S): at 06:17

## 2023-01-01 RX ADMIN — Medication 5 MILLIGRAM(S): at 05:26

## 2023-01-01 RX ADMIN — Medication 80 MILLIGRAM(S): at 18:29

## 2023-01-01 RX ADMIN — PANTOPRAZOLE SODIUM 40 MILLIGRAM(S): 20 TABLET, DELAYED RELEASE ORAL at 17:02

## 2023-01-01 RX ADMIN — HEPARIN SODIUM 12 UNIT(S)/HR: 5000 INJECTION INTRAVENOUS; SUBCUTANEOUS at 09:48

## 2023-01-01 RX ADMIN — Medication 5 MILLIGRAM(S): at 03:03

## 2023-01-01 RX ADMIN — FENTANYL CITRATE 25 MICROGRAM(S): 50 INJECTION INTRAVENOUS at 12:31

## 2023-01-01 RX ADMIN — MEROPENEM 100 MILLIGRAM(S): 1 INJECTION INTRAVENOUS at 17:06

## 2023-01-01 RX ADMIN — Medication 1334 MILLIGRAM(S): at 14:24

## 2023-01-01 RX ADMIN — PHENYLEPHRINE HYDROCHLORIDE 1.43 MICROGRAM(S)/KG/MIN: 10 INJECTION INTRAVENOUS at 21:27

## 2023-01-01 RX ADMIN — Medication 1 APPLICATION(S): at 05:47

## 2023-01-01 RX ADMIN — PANTOPRAZOLE SODIUM 40 MILLIGRAM(S): 20 TABLET, DELAYED RELEASE ORAL at 05:35

## 2023-01-01 RX ADMIN — HEPARIN SODIUM 5000 UNIT(S): 5000 INJECTION INTRAVENOUS; SUBCUTANEOUS at 21:18

## 2023-01-01 RX ADMIN — Medication 100 GRAM(S): at 05:36

## 2023-01-01 RX ADMIN — Medication 1300 MILLIGRAM(S): at 23:00

## 2023-01-01 RX ADMIN — Medication 1300 MILLIGRAM(S): at 11:31

## 2023-01-01 RX ADMIN — OCTREOTIDE ACETATE 100 MICROGRAM(S): 200 INJECTION, SOLUTION INTRAVENOUS; SUBCUTANEOUS at 00:08

## 2023-01-01 RX ADMIN — CHLORHEXIDINE GLUCONATE 15 MILLILITER(S): 213 SOLUTION TOPICAL at 07:47

## 2023-01-01 RX ADMIN — Medication 50 MILLIEQUIVALENT(S): at 21:44

## 2023-01-01 RX ADMIN — HEPARIN SODIUM 5000 UNIT(S): 5000 INJECTION INTRAVENOUS; SUBCUTANEOUS at 05:41

## 2023-01-01 RX ADMIN — Medication 1 APPLICATION(S): at 19:00

## 2023-01-01 RX ADMIN — PANTOPRAZOLE SODIUM 40 MILLIGRAM(S): 20 TABLET, DELAYED RELEASE ORAL at 05:45

## 2023-01-01 RX ADMIN — PHENYLEPHRINE HYDROCHLORIDE 1.43 MICROGRAM(S)/KG/MIN: 10 INJECTION INTRAVENOUS at 14:29

## 2023-01-01 RX ADMIN — Medication 50 MILLIGRAM(S): at 14:26

## 2023-01-01 RX ADMIN — HEPARIN SODIUM 5000 UNIT(S): 5000 INJECTION INTRAVENOUS; SUBCUTANEOUS at 05:10

## 2023-01-01 RX ADMIN — POLYETHYLENE GLYCOL 3350 17 GRAM(S): 17 POWDER, FOR SOLUTION ORAL at 06:13

## 2023-01-01 RX ADMIN — MEROPENEM 100 MILLIGRAM(S): 1 INJECTION INTRAVENOUS at 07:15

## 2023-01-01 RX ADMIN — MIDODRINE HYDROCHLORIDE 5 MILLIGRAM(S): 2.5 TABLET ORAL at 22:31

## 2023-01-01 RX ADMIN — Medication 50 MILLIGRAM(S): at 00:56

## 2023-01-01 RX ADMIN — Medication 1 APPLICATION(S): at 05:55

## 2023-01-01 RX ADMIN — Medication 50 MILLIGRAM(S): at 00:22

## 2023-01-01 RX ADMIN — CHLORHEXIDINE GLUCONATE 1 APPLICATION(S): 213 SOLUTION TOPICAL at 05:24

## 2023-01-01 RX ADMIN — DEXMEDETOMIDINE HYDROCHLORIDE IN 0.9% SODIUM CHLORIDE 3.83 MICROGRAM(S)/KG/HR: 4 INJECTION INTRAVENOUS at 09:47

## 2023-01-01 RX ADMIN — CEFTRIAXONE 100 MILLIGRAM(S): 500 INJECTION, POWDER, FOR SOLUTION INTRAMUSCULAR; INTRAVENOUS at 14:16

## 2023-01-01 RX ADMIN — Medication 1300 MILLIGRAM(S): at 12:11

## 2023-01-01 RX ADMIN — Medication 1 EACH: at 21:43

## 2023-01-01 RX ADMIN — HEPARIN SODIUM 5000 UNIT(S): 5000 INJECTION INTRAVENOUS; SUBCUTANEOUS at 22:34

## 2023-01-01 RX ADMIN — PHENYLEPHRINE HYDROCHLORIDE 1.43 MICROGRAM(S)/KG/MIN: 10 INJECTION INTRAVENOUS at 01:49

## 2023-01-01 RX ADMIN — GABAPENTIN 200 MILLIGRAM(S): 400 CAPSULE ORAL at 13:05

## 2023-01-01 RX ADMIN — CHLORHEXIDINE GLUCONATE 1 APPLICATION(S): 213 SOLUTION TOPICAL at 05:45

## 2023-01-01 RX ADMIN — MEROPENEM 100 MILLIGRAM(S): 1 INJECTION INTRAVENOUS at 06:17

## 2023-01-01 RX ADMIN — SODIUM CHLORIDE 75 MILLILITER(S): 9 INJECTION INTRAMUSCULAR; INTRAVENOUS; SUBCUTANEOUS at 10:17

## 2023-01-01 RX ADMIN — PANTOPRAZOLE SODIUM 40 MILLIGRAM(S): 20 TABLET, DELAYED RELEASE ORAL at 07:15

## 2023-01-01 RX ADMIN — SODIUM CHLORIDE 75 MILLILITER(S): 9 INJECTION INTRAMUSCULAR; INTRAVENOUS; SUBCUTANEOUS at 21:13

## 2023-01-01 RX ADMIN — Medication 1 APPLICATION(S): at 17:04

## 2023-01-01 RX ADMIN — Medication 1 APPLICATION(S): at 05:52

## 2023-01-01 RX ADMIN — HEPARIN SODIUM 5000 UNIT(S): 5000 INJECTION INTRAVENOUS; SUBCUTANEOUS at 14:03

## 2023-01-01 RX ADMIN — SENNA PLUS 2 TABLET(S): 8.6 TABLET ORAL at 22:34

## 2023-01-01 RX ADMIN — Medication 5 MILLIGRAM(S): at 22:20

## 2023-01-01 RX ADMIN — GABAPENTIN 200 MILLIGRAM(S): 400 CAPSULE ORAL at 06:20

## 2023-01-01 RX ADMIN — GABAPENTIN 200 MILLIGRAM(S): 400 CAPSULE ORAL at 21:13

## 2023-01-01 RX ADMIN — Medication 2.5 MILLIGRAM(S): at 12:35

## 2023-01-01 RX ADMIN — Medication 1 APPLICATION(S): at 17:24

## 2023-01-01 RX ADMIN — MIDODRINE HYDROCHLORIDE 5 MILLIGRAM(S): 2.5 TABLET ORAL at 11:00

## 2023-01-01 RX ADMIN — GABAPENTIN 200 MILLIGRAM(S): 400 CAPSULE ORAL at 22:43

## 2023-01-01 RX ADMIN — HEPARIN SODIUM 5000 UNIT(S): 5000 INJECTION INTRAVENOUS; SUBCUTANEOUS at 05:44

## 2023-01-01 RX ADMIN — PANTOPRAZOLE SODIUM 40 MILLIGRAM(S): 20 TABLET, DELAYED RELEASE ORAL at 17:46

## 2023-01-01 RX ADMIN — HEPARIN SODIUM 5000 UNIT(S): 5000 INJECTION INTRAVENOUS; SUBCUTANEOUS at 14:29

## 2023-01-01 RX ADMIN — Medication 325 MILLIGRAM(S): at 12:28

## 2023-01-01 RX ADMIN — Medication 100 MILLIGRAM(S): at 06:13

## 2023-01-01 RX ADMIN — Medication 1 APPLICATION(S): at 18:43

## 2023-01-01 RX ADMIN — Medication 2.5 MILLIGRAM(S): at 06:07

## 2023-01-01 RX ADMIN — CHLORHEXIDINE GLUCONATE 1 APPLICATION(S): 213 SOLUTION TOPICAL at 06:02

## 2023-01-01 RX ADMIN — Medication 50 MILLIGRAM(S): at 17:34

## 2023-01-01 RX ADMIN — SENNA PLUS 2 TABLET(S): 8.6 TABLET ORAL at 21:49

## 2023-01-01 RX ADMIN — HYDROMORPHONE HYDROCHLORIDE 0.5 MILLIGRAM(S): 2 INJECTION INTRAMUSCULAR; INTRAVENOUS; SUBCUTANEOUS at 09:28

## 2023-01-01 RX ADMIN — Medication 1300 MILLIGRAM(S): at 13:33

## 2023-01-01 RX ADMIN — Medication 100 MILLIGRAM(S): at 00:41

## 2023-01-01 RX ADMIN — Medication 1334 MILLIGRAM(S): at 06:19

## 2023-01-01 RX ADMIN — HEPARIN SODIUM 5000 UNIT(S): 5000 INJECTION INTRAVENOUS; SUBCUTANEOUS at 16:00

## 2023-01-01 RX ADMIN — INSULIN HUMAN 10 UNIT(S): 100 INJECTION, SOLUTION SUBCUTANEOUS at 17:35

## 2023-01-01 RX ADMIN — Medication 1 APPLICATION(S): at 05:56

## 2023-01-01 RX ADMIN — Medication 100 MILLIGRAM(S): at 13:55

## 2023-01-01 RX ADMIN — HEPARIN SODIUM 5000 UNIT(S): 5000 INJECTION INTRAVENOUS; SUBCUTANEOUS at 15:10

## 2023-01-01 RX ADMIN — CEFTRIAXONE 100 MILLIGRAM(S): 500 INJECTION, POWDER, FOR SOLUTION INTRAMUSCULAR; INTRAVENOUS at 15:00

## 2023-01-01 RX ADMIN — HEPARIN SODIUM 5000 UNIT(S): 5000 INJECTION INTRAVENOUS; SUBCUTANEOUS at 13:23

## 2023-01-01 RX ADMIN — Medication 650 MILLIGRAM(S): at 04:20

## 2023-01-01 RX ADMIN — Medication 650 MILLIGRAM(S): at 17:44

## 2023-01-01 RX ADMIN — SODIUM CHLORIDE 2 GRAM(S): 9 INJECTION INTRAMUSCULAR; INTRAVENOUS; SUBCUTANEOUS at 06:18

## 2023-01-01 RX ADMIN — MIDODRINE HYDROCHLORIDE 5 MILLIGRAM(S): 2.5 TABLET ORAL at 18:02

## 2023-01-01 RX ADMIN — OLANZAPINE 2.5 MILLIGRAM(S): 15 TABLET, FILM COATED ORAL at 21:40

## 2023-01-01 RX ADMIN — Medication 2.5 MILLIGRAM(S): at 18:20

## 2023-01-01 RX ADMIN — CHLORHEXIDINE GLUCONATE 1 APPLICATION(S): 213 SOLUTION TOPICAL at 06:00

## 2023-01-01 RX ADMIN — CHLORHEXIDINE GLUCONATE 1 APPLICATION(S): 213 SOLUTION TOPICAL at 05:27

## 2023-01-01 RX ADMIN — Medication 1300 MILLIGRAM(S): at 05:34

## 2023-01-01 RX ADMIN — CHLORHEXIDINE GLUCONATE 1 APPLICATION(S): 213 SOLUTION TOPICAL at 05:25

## 2023-01-01 RX ADMIN — Medication 1 APPLICATION(S): at 05:23

## 2023-01-01 RX ADMIN — Medication 200 GRAM(S): at 06:38

## 2023-01-01 RX ADMIN — CHLORHEXIDINE GLUCONATE 15 MILLILITER(S): 213 SOLUTION TOPICAL at 18:27

## 2023-01-01 RX ADMIN — DEXMEDETOMIDINE HYDROCHLORIDE IN 0.9% SODIUM CHLORIDE 3.83 MICROGRAM(S)/KG/HR: 4 INJECTION INTRAVENOUS at 21:21

## 2023-01-01 RX ADMIN — Medication 1300 MILLIGRAM(S): at 13:22

## 2023-01-01 RX ADMIN — CHLORHEXIDINE GLUCONATE 1 APPLICATION(S): 213 SOLUTION TOPICAL at 07:47

## 2023-01-01 RX ADMIN — HEPARIN SODIUM 5000 UNIT(S): 5000 INJECTION INTRAVENOUS; SUBCUTANEOUS at 14:44

## 2023-01-01 RX ADMIN — CHLORHEXIDINE GLUCONATE 15 MILLILITER(S): 213 SOLUTION TOPICAL at 17:35

## 2023-01-01 RX ADMIN — Medication 100 MILLIGRAM(S): at 17:28

## 2023-01-01 RX ADMIN — Medication 650 MILLIGRAM(S): at 05:37

## 2023-01-01 RX ADMIN — FENTANYL CITRATE 3.83 MICROGRAM(S)/KG/HR: 50 INJECTION INTRAVENOUS at 01:40

## 2023-01-01 RX ADMIN — HEPARIN SODIUM 5000 UNIT(S): 5000 INJECTION INTRAVENOUS; SUBCUTANEOUS at 22:18

## 2023-01-01 RX ADMIN — Medication 50 MILLILITER(S): at 01:41

## 2023-01-01 RX ADMIN — CHLORHEXIDINE GLUCONATE 1 APPLICATION(S): 213 SOLUTION TOPICAL at 06:30

## 2023-01-01 RX ADMIN — Medication 50 MILLIGRAM(S): at 17:16

## 2023-01-01 RX ADMIN — Medication 667 MILLIGRAM(S): at 17:07

## 2023-01-01 RX ADMIN — OCTREOTIDE ACETATE 50 MICROGRAM(S): 200 INJECTION, SOLUTION INTRAVENOUS; SUBCUTANEOUS at 17:12

## 2023-01-01 RX ADMIN — CHLORHEXIDINE GLUCONATE 15 MILLILITER(S): 213 SOLUTION TOPICAL at 17:42

## 2023-01-01 RX ADMIN — MIDODRINE HYDROCHLORIDE 10 MILLIGRAM(S): 2.5 TABLET ORAL at 13:05

## 2023-01-01 RX ADMIN — Medication 2.5 MILLIGRAM(S): at 23:11

## 2023-01-01 RX ADMIN — SODIUM CHLORIDE 1500 MILLILITER(S): 9 INJECTION INTRAMUSCULAR; INTRAVENOUS; SUBCUTANEOUS at 07:02

## 2023-01-01 RX ADMIN — Medication 125 MICROGRAM(S): at 09:38

## 2023-01-01 RX ADMIN — SODIUM CHLORIDE 75 MILLILITER(S): 9 INJECTION INTRAMUSCULAR; INTRAVENOUS; SUBCUTANEOUS at 23:01

## 2023-01-01 RX ADMIN — POLYETHYLENE GLYCOL 3350 17 GRAM(S): 17 POWDER, FOR SOLUTION ORAL at 21:13

## 2023-01-01 RX ADMIN — Medication 2.5 MILLIGRAM(S): at 13:32

## 2023-01-01 RX ADMIN — VASOPRESSIN 4.5 UNIT(S)/MIN: 20 INJECTION INTRAVENOUS at 21:10

## 2023-01-01 RX ADMIN — Medication 1300 MILLIGRAM(S): at 02:02

## 2023-01-01 RX ADMIN — ALTEPLASE 2 MILLIGRAM(S): KIT at 09:47

## 2023-01-01 RX ADMIN — INSULIN HUMAN 10 UNIT(S): 100 INJECTION, SOLUTION SUBCUTANEOUS at 03:00

## 2023-01-01 RX ADMIN — Medication 250 MICROGRAM(S): at 05:03

## 2023-01-01 RX ADMIN — CHLORHEXIDINE GLUCONATE 15 MILLILITER(S): 213 SOLUTION TOPICAL at 17:36

## 2023-01-01 RX ADMIN — HEPARIN SODIUM 5000 UNIT(S): 5000 INJECTION INTRAVENOUS; SUBCUTANEOUS at 05:20

## 2023-01-01 RX ADMIN — DEXMEDETOMIDINE HYDROCHLORIDE IN 0.9% SODIUM CHLORIDE 3.82 MICROGRAM(S)/KG/HR: 4 INJECTION INTRAVENOUS at 00:38

## 2023-01-01 RX ADMIN — SODIUM CHLORIDE 75 MILLILITER(S): 9 INJECTION, SOLUTION INTRAVENOUS at 20:49

## 2023-01-01 RX ADMIN — PANTOPRAZOLE SODIUM 40 MILLIGRAM(S): 20 TABLET, DELAYED RELEASE ORAL at 11:24

## 2023-01-01 RX ADMIN — Medication 125 MILLILITER(S): at 07:50

## 2023-01-01 RX ADMIN — Medication 1 EACH: at 21:10

## 2023-01-01 RX ADMIN — Medication 125 MICROGRAM(S): at 10:14

## 2023-01-01 RX ADMIN — Medication 1 EACH: at 20:59

## 2023-01-01 RX ADMIN — Medication 1300 MILLIGRAM(S): at 22:31

## 2023-01-01 RX ADMIN — OCTREOTIDE ACETATE 50 MICROGRAM(S): 200 INJECTION, SOLUTION INTRAVENOUS; SUBCUTANEOUS at 17:31

## 2023-01-01 RX ADMIN — CHLORHEXIDINE GLUCONATE 15 MILLILITER(S): 213 SOLUTION TOPICAL at 18:01

## 2023-01-01 RX ADMIN — Medication 1 APPLICATION(S): at 05:08

## 2023-01-01 RX ADMIN — Medication 50 MILLIGRAM(S): at 12:28

## 2023-01-01 RX ADMIN — CHLORHEXIDINE GLUCONATE 15 MILLILITER(S): 213 SOLUTION TOPICAL at 05:22

## 2023-01-01 RX ADMIN — Medication 5 MILLIGRAM(S): at 11:25

## 2023-01-01 RX ADMIN — Medication 5 MILLIGRAM(S): at 22:31

## 2023-01-01 RX ADMIN — SODIUM CHLORIDE 1500 MILLILITER(S): 9 INJECTION INTRAMUSCULAR; INTRAVENOUS; SUBCUTANEOUS at 09:47

## 2023-01-01 RX ADMIN — Medication 100 MILLIGRAM(S): at 05:50

## 2023-01-01 RX ADMIN — Medication 1 APPLICATION(S): at 05:33

## 2023-01-01 RX ADMIN — OCTREOTIDE ACETATE 100 MICROGRAM(S): 200 INJECTION, SOLUTION INTRAVENOUS; SUBCUTANEOUS at 21:44

## 2023-01-01 RX ADMIN — Medication 1300 MILLIGRAM(S): at 18:07

## 2023-01-01 RX ADMIN — CEFTRIAXONE 100 MILLIGRAM(S): 500 INJECTION, POWDER, FOR SOLUTION INTRAMUSCULAR; INTRAVENOUS at 21:01

## 2023-01-01 RX ADMIN — CEFEPIME 100 MILLIGRAM(S): 1 INJECTION, POWDER, FOR SOLUTION INTRAMUSCULAR; INTRAVENOUS at 15:07

## 2023-01-01 RX ADMIN — PANTOPRAZOLE SODIUM 40 MILLIGRAM(S): 20 TABLET, DELAYED RELEASE ORAL at 12:35

## 2023-01-01 RX ADMIN — SODIUM CHLORIDE 75 MILLILITER(S): 9 INJECTION INTRAMUSCULAR; INTRAVENOUS; SUBCUTANEOUS at 18:06

## 2023-01-01 RX ADMIN — CHLORHEXIDINE GLUCONATE 1 APPLICATION(S): 213 SOLUTION TOPICAL at 05:08

## 2023-01-01 RX ADMIN — Medication 650 MILLIGRAM(S): at 01:30

## 2023-01-01 RX ADMIN — Medication 5 MILLIGRAM(S): at 17:04

## 2023-01-01 RX ADMIN — Medication 1 APPLICATION(S): at 05:43

## 2023-01-01 RX ADMIN — Medication 100 MILLIGRAM(S): at 21:10

## 2023-01-01 RX ADMIN — SODIUM CHLORIDE 75 MILLILITER(S): 9 INJECTION INTRAMUSCULAR; INTRAVENOUS; SUBCUTANEOUS at 09:24

## 2023-01-01 RX ADMIN — Medication 50 MILLIEQUIVALENT(S): at 08:42

## 2023-01-01 RX ADMIN — Medication 5 MILLIGRAM(S): at 17:54

## 2023-01-01 RX ADMIN — INSULIN HUMAN 10 UNIT(S): 100 INJECTION, SOLUTION SUBCUTANEOUS at 22:25

## 2023-01-01 RX ADMIN — MEROPENEM 100 MILLIGRAM(S): 1 INJECTION INTRAVENOUS at 17:53

## 2023-01-01 RX ADMIN — Medication 2.5 MILLIGRAM(S): at 18:31

## 2023-01-01 RX ADMIN — Medication 80 MILLIGRAM(S): at 09:25

## 2023-01-01 RX ADMIN — HEPARIN SODIUM 5000 UNIT(S): 5000 INJECTION INTRAVENOUS; SUBCUTANEOUS at 11:49

## 2023-01-01 RX ADMIN — HEPARIN SODIUM 5000 UNIT(S): 5000 INJECTION INTRAVENOUS; SUBCUTANEOUS at 21:54

## 2023-01-01 RX ADMIN — Medication 2.5 MILLIGRAM(S): at 05:33

## 2023-01-01 RX ADMIN — CHLORHEXIDINE GLUCONATE 15 MILLILITER(S): 213 SOLUTION TOPICAL at 05:37

## 2023-01-01 RX ADMIN — Medication 5 MG/HR: at 16:36

## 2023-01-01 RX ADMIN — SODIUM CHLORIDE 2 GRAM(S): 9 INJECTION INTRAMUSCULAR; INTRAVENOUS; SUBCUTANEOUS at 22:32

## 2023-01-01 RX ADMIN — HYDROMORPHONE HYDROCHLORIDE 0.5 MILLIGRAM(S): 2 INJECTION INTRAMUSCULAR; INTRAVENOUS; SUBCUTANEOUS at 15:44

## 2023-01-01 RX ADMIN — OCTREOTIDE ACETATE 10 MICROGRAM(S)/HR: 200 INJECTION, SOLUTION INTRAVENOUS; SUBCUTANEOUS at 04:05

## 2023-01-01 RX ADMIN — HEPARIN SODIUM 5000 UNIT(S): 5000 INJECTION INTRAVENOUS; SUBCUTANEOUS at 13:32

## 2023-01-01 RX ADMIN — Medication 650 MILLIGRAM(S): at 17:31

## 2023-01-01 RX ADMIN — PHENYLEPHRINE HYDROCHLORIDE 1.43 MICROGRAM(S)/KG/MIN: 10 INJECTION INTRAVENOUS at 22:59

## 2023-01-01 RX ADMIN — Medication 2.5 MILLIGRAM(S): at 19:54

## 2023-01-01 RX ADMIN — Medication 100 MEQ/KG/HR: at 10:02

## 2023-01-01 RX ADMIN — Medication 100 MILLIGRAM(S): at 05:56

## 2023-01-01 RX ADMIN — HEPARIN SODIUM 1600 UNIT(S)/HR: 5000 INJECTION INTRAVENOUS; SUBCUTANEOUS at 11:50

## 2023-01-01 RX ADMIN — Medication 50 MILLIEQUIVALENT(S): at 07:51

## 2023-01-01 RX ADMIN — Medication 100 MILLIGRAM(S): at 21:39

## 2023-01-01 RX ADMIN — CHLORHEXIDINE GLUCONATE 15 MILLILITER(S): 213 SOLUTION TOPICAL at 17:04

## 2023-01-01 RX ADMIN — Medication 1334 MILLIGRAM(S): at 12:38

## 2023-01-01 RX ADMIN — PANTOPRAZOLE SODIUM 40 MILLIGRAM(S): 20 TABLET, DELAYED RELEASE ORAL at 17:50

## 2023-01-01 RX ADMIN — Medication 1300 MILLIGRAM(S): at 06:18

## 2023-01-01 RX ADMIN — Medication 1 APPLICATION(S): at 05:25

## 2023-01-01 RX ADMIN — POTASSIUM PHOSPHATE, MONOBASIC POTASSIUM PHOSPHATE, DIBASIC 83.33 MILLIMOLE(S): 236; 224 INJECTION, SOLUTION INTRAVENOUS at 08:03

## 2023-01-01 RX ADMIN — Medication 200 GRAM(S): at 10:03

## 2023-01-01 RX ADMIN — HEPARIN SODIUM 5000 UNIT(S): 5000 INJECTION INTRAVENOUS; SUBCUTANEOUS at 14:52

## 2023-01-01 RX ADMIN — PHENYLEPHRINE HYDROCHLORIDE 2.87 MICROGRAM(S)/KG/MIN: 10 INJECTION INTRAVENOUS at 00:25

## 2023-01-01 RX ADMIN — Medication 1300 MILLIGRAM(S): at 03:49

## 2023-01-01 RX ADMIN — Medication 100 GRAM(S): at 08:13

## 2023-01-01 RX ADMIN — Medication 650 MILLIGRAM(S): at 12:53

## 2023-01-01 RX ADMIN — VASOPRESSIN 4.5 UNIT(S)/MIN: 20 INJECTION INTRAVENOUS at 21:02

## 2023-01-01 RX ADMIN — Medication 50 MILLIEQUIVALENT(S): at 01:00

## 2023-01-01 RX ADMIN — DEXMEDETOMIDINE HYDROCHLORIDE IN 0.9% SODIUM CHLORIDE 3.82 MICROGRAM(S)/KG/HR: 4 INJECTION INTRAVENOUS at 06:56

## 2023-01-01 RX ADMIN — MINOCYCLINE HYDROCHLORIDE 100 MILLIGRAM(S): 45 TABLET, EXTENDED RELEASE ORAL at 09:24

## 2023-01-01 RX ADMIN — CHLORHEXIDINE GLUCONATE 1 APPLICATION(S): 213 SOLUTION TOPICAL at 05:33

## 2023-01-01 RX ADMIN — PANTOPRAZOLE SODIUM 40 MILLIGRAM(S): 20 TABLET, DELAYED RELEASE ORAL at 17:13

## 2023-01-01 RX ADMIN — CHLORHEXIDINE GLUCONATE 15 MILLILITER(S): 213 SOLUTION TOPICAL at 17:52

## 2023-01-01 RX ADMIN — Medication 50 MILLIGRAM(S): at 22:30

## 2023-01-01 RX ADMIN — MIDODRINE HYDROCHLORIDE 5 MILLIGRAM(S): 2.5 TABLET ORAL at 12:11

## 2023-01-01 RX ADMIN — Medication 25 GRAM(S): at 21:59

## 2023-01-01 RX ADMIN — CHLORHEXIDINE GLUCONATE 15 MILLILITER(S): 213 SOLUTION TOPICAL at 18:00

## 2023-01-01 RX ADMIN — Medication 2.5 MILLIGRAM(S): at 13:27

## 2023-01-01 RX ADMIN — PANTOPRAZOLE SODIUM 40 MILLIGRAM(S): 20 TABLET, DELAYED RELEASE ORAL at 18:06

## 2023-01-01 RX ADMIN — CHLORHEXIDINE GLUCONATE 15 MILLILITER(S): 213 SOLUTION TOPICAL at 05:34

## 2023-01-01 RX ADMIN — MEROPENEM 100 MILLIGRAM(S): 1 INJECTION INTRAVENOUS at 17:12

## 2023-01-01 RX ADMIN — MEROPENEM 100 MILLIGRAM(S): 1 INJECTION INTRAVENOUS at 05:58

## 2023-01-01 RX ADMIN — DEXMEDETOMIDINE HYDROCHLORIDE IN 0.9% SODIUM CHLORIDE 3.82 MICROGRAM(S)/KG/HR: 4 INJECTION INTRAVENOUS at 20:49

## 2023-01-01 RX ADMIN — SODIUM CHLORIDE 75 MILLILITER(S): 9 INJECTION, SOLUTION INTRAVENOUS at 16:10

## 2023-01-01 RX ADMIN — Medication 100 MILLIGRAM(S): at 06:08

## 2023-01-01 RX ADMIN — Medication 1300 MILLIGRAM(S): at 12:27

## 2023-01-01 RX ADMIN — OCTREOTIDE ACETATE 100 MICROGRAM(S): 200 INJECTION, SOLUTION INTRAVENOUS; SUBCUTANEOUS at 05:41

## 2023-01-01 RX ADMIN — Medication 2.5 MILLIGRAM(S): at 12:27

## 2023-01-01 RX ADMIN — Medication 25 MILLIGRAM(S): at 17:43

## 2023-01-01 RX ADMIN — INSULIN HUMAN 10 UNIT(S): 100 INJECTION, SOLUTION SUBCUTANEOUS at 08:14

## 2023-01-01 RX ADMIN — CHLORHEXIDINE GLUCONATE 15 MILLILITER(S): 213 SOLUTION TOPICAL at 05:08

## 2023-01-01 RX ADMIN — Medication 1334 MILLIGRAM(S): at 05:07

## 2023-01-01 RX ADMIN — DEXMEDETOMIDINE HYDROCHLORIDE IN 0.9% SODIUM CHLORIDE 3.82 MICROGRAM(S)/KG/HR: 4 INJECTION INTRAVENOUS at 21:27

## 2023-01-01 RX ADMIN — Medication 1300 MILLIGRAM(S): at 21:26

## 2023-01-01 RX ADMIN — HEPARIN SODIUM 5000 UNIT(S): 5000 INJECTION INTRAVENOUS; SUBCUTANEOUS at 13:11

## 2023-01-01 RX ADMIN — Medication 1334 MILLIGRAM(S): at 13:52

## 2023-01-01 RX ADMIN — PANTOPRAZOLE SODIUM 40 MILLIGRAM(S): 20 TABLET, DELAYED RELEASE ORAL at 19:58

## 2023-01-01 RX ADMIN — Medication 100 MEQ/KG/HR: at 13:10

## 2023-01-01 RX ADMIN — Medication 125 MILLILITER(S): at 11:16

## 2023-01-01 RX ADMIN — Medication 650 MILLIGRAM(S): at 06:18

## 2023-01-01 RX ADMIN — Medication 1 APPLICATION(S): at 05:24

## 2023-01-01 RX ADMIN — INSULIN HUMAN 10 UNIT(S): 100 INJECTION, SOLUTION SUBCUTANEOUS at 06:01

## 2023-01-01 RX ADMIN — PHENYLEPHRINE HYDROCHLORIDE 1.43 MICROGRAM(S)/KG/MIN: 10 INJECTION INTRAVENOUS at 06:24

## 2023-01-01 RX ADMIN — Medication 2.5 MILLIGRAM(S): at 11:21

## 2023-01-01 RX ADMIN — Medication 1300 MILLIGRAM(S): at 03:30

## 2023-01-01 RX ADMIN — HEPARIN SODIUM 5000 UNIT(S): 5000 INJECTION INTRAVENOUS; SUBCUTANEOUS at 05:02

## 2023-01-01 RX ADMIN — Medication 5 MILLIGRAM(S): at 18:20

## 2023-01-01 RX ADMIN — Medication 1300 MILLIGRAM(S): at 21:14

## 2023-01-01 RX ADMIN — MIDODRINE HYDROCHLORIDE 10 MILLIGRAM(S): 2.5 TABLET ORAL at 05:26

## 2023-01-01 RX ADMIN — Medication 2.5 MILLIGRAM(S): at 00:03

## 2023-01-01 RX ADMIN — Medication 1 APPLICATION(S): at 05:45

## 2023-01-01 RX ADMIN — Medication 50 MILLILITER(S): at 08:14

## 2023-01-01 RX ADMIN — POTASSIUM PHOSPHATE, MONOBASIC POTASSIUM PHOSPHATE, DIBASIC 63.75 MILLIMOLE(S): 236; 224 INJECTION, SOLUTION INTRAVENOUS at 08:36

## 2023-01-01 RX ADMIN — GABAPENTIN 200 MILLIGRAM(S): 400 CAPSULE ORAL at 14:25

## 2023-01-01 RX ADMIN — Medication 50 MILLILITER(S): at 05:03

## 2023-01-01 RX ADMIN — DEXMEDETOMIDINE HYDROCHLORIDE IN 0.9% SODIUM CHLORIDE 3.82 MICROGRAM(S)/KG/HR: 4 INJECTION INTRAVENOUS at 05:02

## 2023-01-01 RX ADMIN — Medication 50 MILLIEQUIVALENT(S): at 03:34

## 2023-01-01 RX ADMIN — Medication 1334 MILLIGRAM(S): at 21:18

## 2023-01-01 RX ADMIN — Medication 200 GRAM(S): at 05:33

## 2023-01-01 RX ADMIN — PANTOPRAZOLE SODIUM 40 MILLIGRAM(S): 20 TABLET, DELAYED RELEASE ORAL at 05:27

## 2023-01-01 RX ADMIN — PHENYLEPHRINE HYDROCHLORIDE 1.43 MICROGRAM(S)/KG/MIN: 10 INJECTION INTRAVENOUS at 22:19

## 2023-01-01 RX ADMIN — Medication 1 APPLICATION(S): at 19:57

## 2023-01-01 RX ADMIN — CHLORHEXIDINE GLUCONATE 15 MILLILITER(S): 213 SOLUTION TOPICAL at 17:31

## 2023-01-01 RX ADMIN — CHLORHEXIDINE GLUCONATE 15 MILLILITER(S): 213 SOLUTION TOPICAL at 17:43

## 2023-01-01 RX ADMIN — Medication 1 APPLICATION(S): at 17:35

## 2023-01-01 RX ADMIN — CHLORHEXIDINE GLUCONATE 15 MILLILITER(S): 213 SOLUTION TOPICAL at 07:20

## 2023-01-01 RX ADMIN — LIDOCAINE 1 APPLICATION(S): 4 CREAM TOPICAL at 12:10

## 2023-01-01 RX ADMIN — Medication 50 MILLIGRAM(S): at 23:18

## 2023-01-01 RX ADMIN — Medication 1300 MILLIGRAM(S): at 12:05

## 2023-01-01 RX ADMIN — Medication 325 MILLIGRAM(S): at 11:57

## 2023-01-01 RX ADMIN — Medication 2.5 MILLIGRAM(S): at 18:59

## 2023-01-01 RX ADMIN — SODIUM CHLORIDE 100 MILLILITER(S): 9 INJECTION INTRAMUSCULAR; INTRAVENOUS; SUBCUTANEOUS at 08:18

## 2023-01-01 RX ADMIN — CASPOFUNGIN ACETATE 260 MILLIGRAM(S): 7 INJECTION, POWDER, LYOPHILIZED, FOR SOLUTION INTRAVENOUS at 17:28

## 2023-01-01 RX ADMIN — Medication 50 MILLIGRAM(S): at 05:33

## 2023-01-01 RX ADMIN — SODIUM CHLORIDE 1000 MILLILITER(S): 9 INJECTION INTRAMUSCULAR; INTRAVENOUS; SUBCUTANEOUS at 02:00

## 2023-01-01 RX ADMIN — FENTANYL CITRATE 25 MICROGRAM(S): 50 INJECTION INTRAVENOUS at 02:00

## 2023-01-01 RX ADMIN — PANTOPRAZOLE SODIUM 40 MILLIGRAM(S): 20 TABLET, DELAYED RELEASE ORAL at 06:19

## 2023-01-01 RX ADMIN — Medication 125 MILLILITER(S): at 09:54

## 2023-01-01 RX ADMIN — VASOPRESSIN 4.5 UNIT(S)/MIN: 20 INJECTION INTRAVENOUS at 08:36

## 2023-01-01 RX ADMIN — DEXMEDETOMIDINE HYDROCHLORIDE IN 0.9% SODIUM CHLORIDE 3.82 MICROGRAM(S)/KG/HR: 4 INJECTION INTRAVENOUS at 13:54

## 2023-01-01 RX ADMIN — PANTOPRAZOLE SODIUM 40 MILLIGRAM(S): 20 TABLET, DELAYED RELEASE ORAL at 18:08

## 2023-01-01 RX ADMIN — Medication 1 APPLICATION(S): at 05:29

## 2023-01-01 RX ADMIN — SENNA PLUS 2 TABLET(S): 8.6 TABLET ORAL at 21:13

## 2023-01-01 RX ADMIN — MIDODRINE HYDROCHLORIDE 5 MILLIGRAM(S): 2.5 TABLET ORAL at 21:13

## 2023-01-01 RX ADMIN — CEFTRIAXONE 100 MILLIGRAM(S): 500 INJECTION, POWDER, FOR SOLUTION INTRAMUSCULAR; INTRAVENOUS at 17:28

## 2023-01-01 RX ADMIN — DEXMEDETOMIDINE HYDROCHLORIDE IN 0.9% SODIUM CHLORIDE 3.82 MICROGRAM(S)/KG/HR: 4 INJECTION INTRAVENOUS at 06:24

## 2023-01-01 RX ADMIN — OCTREOTIDE ACETATE 50 MICROGRAM(S): 200 INJECTION, SOLUTION INTRAVENOUS; SUBCUTANEOUS at 06:09

## 2023-01-01 RX ADMIN — CHLORHEXIDINE GLUCONATE 1 APPLICATION(S): 213 SOLUTION TOPICAL at 06:20

## 2023-01-01 RX ADMIN — Medication 75 MEQ/KG/HR: at 05:51

## 2023-01-01 RX ADMIN — DAPTOMYCIN 118 MILLIGRAM(S): 500 INJECTION, POWDER, LYOPHILIZED, FOR SOLUTION INTRAVENOUS at 06:00

## 2023-01-01 RX ADMIN — Medication 50 MILLIGRAM(S): at 06:19

## 2023-01-01 RX ADMIN — Medication 2.5 MILLIGRAM(S): at 11:37

## 2023-01-01 RX ADMIN — PANTOPRAZOLE SODIUM 40 MILLIGRAM(S): 20 TABLET, DELAYED RELEASE ORAL at 06:52

## 2023-01-01 RX ADMIN — Medication 650 MILLIGRAM(S): at 17:00

## 2023-01-01 RX ADMIN — PHENYLEPHRINE HYDROCHLORIDE 1.43 MICROGRAM(S)/KG/MIN: 10 INJECTION INTRAVENOUS at 10:17

## 2023-01-01 RX ADMIN — Medication 100 MILLIGRAM(S): at 06:52

## 2023-01-01 RX ADMIN — Medication 650 MILLIGRAM(S): at 02:00

## 2023-01-01 RX ADMIN — PANTOPRAZOLE SODIUM 40 MILLIGRAM(S): 20 TABLET, DELAYED RELEASE ORAL at 17:15

## 2023-01-01 RX ADMIN — SODIUM CHLORIDE 100 MILLILITER(S): 9 INJECTION, SOLUTION INTRAVENOUS at 14:36

## 2023-01-01 RX ADMIN — Medication 325 MILLIGRAM(S): at 11:43

## 2023-01-01 RX ADMIN — Medication 1 EACH: at 21:09

## 2023-01-01 RX ADMIN — DEXMEDETOMIDINE HYDROCHLORIDE IN 0.9% SODIUM CHLORIDE 3.82 MICROGRAM(S)/KG/HR: 4 INJECTION INTRAVENOUS at 18:08

## 2023-01-01 RX ADMIN — Medication 2.5 MILLIGRAM(S): at 23:03

## 2023-01-01 RX ADMIN — VASOPRESSIN 4.5 UNIT(S)/MIN: 20 INJECTION INTRAVENOUS at 22:18

## 2023-01-01 RX ADMIN — OCTREOTIDE ACETATE 50 MICROGRAM(S): 200 INJECTION, SOLUTION INTRAVENOUS; SUBCUTANEOUS at 05:06

## 2023-01-01 RX ADMIN — Medication 1334 MILLIGRAM(S): at 14:44

## 2023-01-01 RX ADMIN — HEPARIN SODIUM 5000 UNIT(S): 5000 INJECTION INTRAVENOUS; SUBCUTANEOUS at 22:59

## 2023-01-01 RX ADMIN — DEXMEDETOMIDINE HYDROCHLORIDE IN 0.9% SODIUM CHLORIDE 3.82 MICROGRAM(S)/KG/HR: 4 INJECTION INTRAVENOUS at 06:51

## 2023-01-01 RX ADMIN — CASPOFUNGIN ACETATE 260 MILLIGRAM(S): 7 INJECTION, POWDER, LYOPHILIZED, FOR SOLUTION INTRAVENOUS at 17:32

## 2023-01-01 RX ADMIN — CHLORHEXIDINE GLUCONATE 1 APPLICATION(S): 213 SOLUTION TOPICAL at 07:09

## 2023-01-01 RX ADMIN — GABAPENTIN 100 MILLIGRAM(S): 400 CAPSULE ORAL at 06:47

## 2023-01-01 RX ADMIN — Medication 5 MILLIGRAM(S): at 09:47

## 2023-01-01 RX ADMIN — Medication 50 MILLIGRAM(S): at 23:25

## 2023-01-01 RX ADMIN — Medication 100 MILLIGRAM(S): at 03:35

## 2023-01-01 RX ADMIN — Medication 325 MILLIGRAM(S): at 13:33

## 2023-01-01 RX ADMIN — CHLORHEXIDINE GLUCONATE 15 MILLILITER(S): 213 SOLUTION TOPICAL at 05:31

## 2023-01-01 RX ADMIN — HEPARIN SODIUM 5000 UNIT(S): 5000 INJECTION INTRAVENOUS; SUBCUTANEOUS at 14:55

## 2023-01-01 RX ADMIN — Medication 325 MILLIGRAM(S): at 11:55

## 2023-01-01 RX ADMIN — HEPARIN SODIUM 5000 UNIT(S): 5000 INJECTION INTRAVENOUS; SUBCUTANEOUS at 05:28

## 2023-01-01 RX ADMIN — Medication 1300 MILLIGRAM(S): at 05:27

## 2023-01-01 RX ADMIN — CEFTRIAXONE 100 MILLIGRAM(S): 500 INJECTION, POWDER, FOR SOLUTION INTRAMUSCULAR; INTRAVENOUS at 21:10

## 2023-01-01 RX ADMIN — Medication 125 MILLILITER(S): at 18:09

## 2023-01-01 RX ADMIN — Medication 5 MG/HR: at 09:31

## 2023-01-01 RX ADMIN — Medication 1300 MILLIGRAM(S): at 22:32

## 2023-01-01 RX ADMIN — Medication 7.17 MICROGRAM(S)/KG/MIN: at 20:49

## 2023-01-01 RX ADMIN — Medication 25 GRAM(S): at 23:14

## 2023-01-01 RX ADMIN — Medication 1300 MILLIGRAM(S): at 18:55

## 2023-01-01 RX ADMIN — MEROPENEM 100 MILLIGRAM(S): 1 INJECTION INTRAVENOUS at 17:34

## 2023-01-01 RX ADMIN — Medication 200 GRAM(S): at 17:01

## 2023-01-01 RX ADMIN — Medication 50 MILLIEQUIVALENT(S): at 00:14

## 2023-01-01 RX ADMIN — PANTOPRAZOLE SODIUM 40 MILLIGRAM(S): 20 TABLET, DELAYED RELEASE ORAL at 05:20

## 2023-01-01 RX ADMIN — Medication 5 MILLIGRAM(S): at 23:18

## 2023-01-01 RX ADMIN — Medication 650 MILLIGRAM(S): at 13:23

## 2023-01-01 RX ADMIN — Medication 1 APPLICATION(S): at 06:18

## 2023-01-01 RX ADMIN — PANTOPRAZOLE SODIUM 40 MILLIGRAM(S): 20 TABLET, DELAYED RELEASE ORAL at 12:57

## 2023-01-01 RX ADMIN — Medication 1334 MILLIGRAM(S): at 21:12

## 2023-01-01 RX ADMIN — Medication 50 MILLIGRAM(S): at 17:37

## 2023-01-01 RX ADMIN — Medication 100 MILLIGRAM(S): at 14:52

## 2023-01-01 RX ADMIN — DAPTOMYCIN 118.4 MILLIGRAM(S): 500 INJECTION, POWDER, LYOPHILIZED, FOR SOLUTION INTRAVENOUS at 18:04

## 2023-01-01 RX ADMIN — DEXMEDETOMIDINE HYDROCHLORIDE IN 0.9% SODIUM CHLORIDE 3.82 MICROGRAM(S)/KG/HR: 4 INJECTION INTRAVENOUS at 01:55

## 2023-01-01 RX ADMIN — Medication 2.5 MILLIGRAM(S): at 23:45

## 2023-01-01 RX ADMIN — Medication 100 MILLIGRAM(S): at 15:09

## 2023-01-01 RX ADMIN — DAPTOMYCIN 118.4 MILLIGRAM(S): 500 INJECTION, POWDER, LYOPHILIZED, FOR SOLUTION INTRAVENOUS at 23:32

## 2023-01-01 RX ADMIN — Medication 100 MILLIGRAM(S): at 22:39

## 2023-01-01 RX ADMIN — Medication 25 MILLIGRAM(S): at 06:03

## 2023-01-01 RX ADMIN — PANTOPRAZOLE SODIUM 40 MILLIGRAM(S): 20 TABLET, DELAYED RELEASE ORAL at 05:09

## 2023-01-01 RX ADMIN — Medication 100 MILLIEQUIVALENT(S): at 06:19

## 2023-01-01 RX ADMIN — CHLORHEXIDINE GLUCONATE 15 MILLILITER(S): 213 SOLUTION TOPICAL at 17:02

## 2023-01-01 RX ADMIN — ALTEPLASE 2 MILLIGRAM(S): KIT at 15:39

## 2023-01-01 RX ADMIN — PANTOPRAZOLE SODIUM 40 MILLIGRAM(S): 20 TABLET, DELAYED RELEASE ORAL at 06:46

## 2023-01-01 RX ADMIN — Medication 5 MILLIGRAM(S): at 13:05

## 2023-01-01 RX ADMIN — Medication 325 MILLIGRAM(S): at 22:37

## 2023-01-01 RX ADMIN — CHLORHEXIDINE GLUCONATE 15 MILLILITER(S): 213 SOLUTION TOPICAL at 18:32

## 2023-01-01 RX ADMIN — PANTOPRAZOLE SODIUM 40 MILLIGRAM(S): 20 TABLET, DELAYED RELEASE ORAL at 05:18

## 2023-01-01 RX ADMIN — GABAPENTIN 100 MILLIGRAM(S): 400 CAPSULE ORAL at 11:14

## 2023-01-01 RX ADMIN — HEPARIN SODIUM 5000 UNIT(S): 5000 INJECTION INTRAVENOUS; SUBCUTANEOUS at 05:35

## 2023-01-01 RX ADMIN — Medication 1 APPLICATION(S): at 06:59

## 2023-01-01 RX ADMIN — Medication 1300 MILLIGRAM(S): at 06:23

## 2023-01-01 RX ADMIN — Medication 650 MILLIGRAM(S): at 05:21

## 2023-01-01 RX ADMIN — HEPARIN SODIUM 5000 UNIT(S): 5000 INJECTION INTRAVENOUS; SUBCUTANEOUS at 13:55

## 2023-01-01 RX ADMIN — DEXMEDETOMIDINE HYDROCHLORIDE IN 0.9% SODIUM CHLORIDE 3.82 MICROGRAM(S)/KG/HR: 4 INJECTION INTRAVENOUS at 22:51

## 2023-01-01 RX ADMIN — Medication 250 MICROGRAM(S): at 23:09

## 2023-01-01 RX ADMIN — Medication 5 MILLIGRAM(S): at 23:00

## 2023-01-01 RX ADMIN — CASPOFUNGIN ACETATE 260 MILLIGRAM(S): 7 INJECTION, POWDER, LYOPHILIZED, FOR SOLUTION INTRAVENOUS at 18:56

## 2023-01-01 RX ADMIN — DAPTOMYCIN 118.4 MILLIGRAM(S): 500 INJECTION, POWDER, LYOPHILIZED, FOR SOLUTION INTRAVENOUS at 00:18

## 2023-01-01 RX ADMIN — HYDROMORPHONE HYDROCHLORIDE 0.5 MILLIGRAM(S): 2 INJECTION INTRAMUSCULAR; INTRAVENOUS; SUBCUTANEOUS at 22:10

## 2023-01-01 RX ADMIN — CASPOFUNGIN ACETATE 260 MILLIGRAM(S): 7 INJECTION, POWDER, LYOPHILIZED, FOR SOLUTION INTRAVENOUS at 17:35

## 2023-01-01 RX ADMIN — Medication 1300 MILLIGRAM(S): at 14:28

## 2023-01-01 RX ADMIN — Medication 1 APPLICATION(S): at 17:37

## 2023-01-01 RX ADMIN — CHLORHEXIDINE GLUCONATE 1 APPLICATION(S): 213 SOLUTION TOPICAL at 05:37

## 2023-01-01 RX ADMIN — HEPARIN SODIUM 1600 UNIT(S)/HR: 5000 INJECTION INTRAVENOUS; SUBCUTANEOUS at 13:50

## 2023-01-01 RX ADMIN — PANTOPRAZOLE SODIUM 40 MILLIGRAM(S): 20 TABLET, DELAYED RELEASE ORAL at 11:30

## 2023-01-01 RX ADMIN — Medication 50 MILLIGRAM(S): at 11:24

## 2023-01-01 RX ADMIN — Medication 650 MILLIGRAM(S): at 23:44

## 2023-01-01 RX ADMIN — HEPARIN SODIUM 5000 UNIT(S): 5000 INJECTION INTRAVENOUS; SUBCUTANEOUS at 05:45

## 2023-01-01 RX ADMIN — MEROPENEM 100 MILLIGRAM(S): 1 INJECTION INTRAVENOUS at 17:50

## 2023-01-01 RX ADMIN — SODIUM CHLORIDE 125 MILLILITER(S): 9 INJECTION INTRAMUSCULAR; INTRAVENOUS; SUBCUTANEOUS at 05:03

## 2023-01-01 RX ADMIN — Medication 1334 MILLIGRAM(S): at 21:26

## 2023-01-01 RX ADMIN — Medication 5 MILLIGRAM(S): at 15:50

## 2023-01-01 RX ADMIN — MEROPENEM 100 MILLIGRAM(S): 1 INJECTION INTRAVENOUS at 17:01

## 2023-01-01 RX ADMIN — Medication 166.67 MILLIGRAM(S): at 01:58

## 2023-01-01 RX ADMIN — HEPARIN SODIUM 5000 UNIT(S): 5000 INJECTION INTRAVENOUS; SUBCUTANEOUS at 13:28

## 2023-01-01 RX ADMIN — Medication 1 APPLICATION(S): at 17:18

## 2023-01-01 RX ADMIN — VASOPRESSIN 4.5 UNIT(S)/MIN: 20 INJECTION INTRAVENOUS at 05:09

## 2023-01-01 RX ADMIN — Medication 2.5 MILLIGRAM(S): at 05:03

## 2023-01-01 RX ADMIN — SODIUM ZIRCONIUM CYCLOSILICATE 5 GRAM(S): 10 POWDER, FOR SUSPENSION ORAL at 05:25

## 2023-01-01 RX ADMIN — CHLORHEXIDINE GLUCONATE 15 MILLILITER(S): 213 SOLUTION TOPICAL at 17:37

## 2023-01-01 RX ADMIN — Medication 50 MILLIGRAM(S): at 18:06

## 2023-01-01 RX ADMIN — Medication 50 MILLIEQUIVALENT(S): at 22:51

## 2023-01-01 RX ADMIN — Medication 125 MILLILITER(S): at 23:41

## 2023-01-01 RX ADMIN — PIPERACILLIN AND TAZOBACTAM 200 GRAM(S): 4; .5 INJECTION, POWDER, LYOPHILIZED, FOR SOLUTION INTRAVENOUS at 12:21

## 2023-01-01 RX ADMIN — Medication 100 MILLIGRAM(S): at 06:07

## 2023-01-01 RX ADMIN — CHLORHEXIDINE GLUCONATE 1 APPLICATION(S): 213 SOLUTION TOPICAL at 05:07

## 2023-01-01 RX ADMIN — MEROPENEM 100 MILLIGRAM(S): 1 INJECTION INTRAVENOUS at 05:22

## 2023-01-01 RX ADMIN — CHLORHEXIDINE GLUCONATE 15 MILLILITER(S): 213 SOLUTION TOPICAL at 05:24

## 2023-01-01 RX ADMIN — Medication 0.72 MICROGRAM(S)/KG/MIN: at 21:46

## 2023-01-01 RX ADMIN — Medication 100 MILLIGRAM(S): at 05:19

## 2023-01-01 RX ADMIN — PANTOPRAZOLE SODIUM 40 MILLIGRAM(S): 20 TABLET, DELAYED RELEASE ORAL at 05:32

## 2023-01-01 RX ADMIN — HEPARIN SODIUM 5000 UNIT(S): 5000 INJECTION INTRAVENOUS; SUBCUTANEOUS at 21:25

## 2023-01-01 RX ADMIN — PANTOPRAZOLE SODIUM 40 MILLIGRAM(S): 20 TABLET, DELAYED RELEASE ORAL at 17:30

## 2023-01-01 RX ADMIN — CASPOFUNGIN ACETATE 260 MILLIGRAM(S): 7 INJECTION, POWDER, LYOPHILIZED, FOR SOLUTION INTRAVENOUS at 03:34

## 2023-01-01 RX ADMIN — Medication 125 MILLILITER(S): at 05:57

## 2023-01-01 RX ADMIN — FENTANYL CITRATE 25 MICROGRAM(S): 50 INJECTION INTRAVENOUS at 12:25

## 2023-01-01 RX ADMIN — HEPARIN SODIUM 5000 UNIT(S): 5000 INJECTION INTRAVENOUS; SUBCUTANEOUS at 06:30

## 2023-01-01 RX ADMIN — Medication 1 APPLICATION(S): at 06:14

## 2023-01-01 RX ADMIN — Medication 50 MILLIEQUIVALENT(S): at 01:45

## 2023-01-01 RX ADMIN — HEPARIN SODIUM 5000 UNIT(S): 5000 INJECTION INTRAVENOUS; SUBCUTANEOUS at 21:02

## 2023-01-01 RX ADMIN — Medication 1334 MILLIGRAM(S): at 13:05

## 2023-01-01 RX ADMIN — Medication 1 APPLICATION(S): at 17:47

## 2023-01-01 RX ADMIN — Medication 2.5 MILLIGRAM(S): at 05:23

## 2023-01-01 RX ADMIN — Medication 1300 MILLIGRAM(S): at 14:24

## 2023-01-01 RX ADMIN — Medication 100 MILLIGRAM(S): at 14:04

## 2023-01-01 RX ADMIN — Medication 1 EACH: at 20:03

## 2023-01-01 RX ADMIN — CASPOFUNGIN ACETATE 260 MILLIGRAM(S): 7 INJECTION, POWDER, LYOPHILIZED, FOR SOLUTION INTRAVENOUS at 18:05

## 2023-01-01 RX ADMIN — DEXMEDETOMIDINE HYDROCHLORIDE IN 0.9% SODIUM CHLORIDE 3.82 MICROGRAM(S)/KG/HR: 4 INJECTION INTRAVENOUS at 10:18

## 2023-01-01 RX ADMIN — Medication 1 APPLICATION(S): at 05:42

## 2023-01-01 RX ADMIN — MEROPENEM 100 MILLIGRAM(S): 1 INJECTION INTRAVENOUS at 06:07

## 2023-01-01 RX ADMIN — OCTREOTIDE ACETATE 50 MICROGRAM(S): 200 INJECTION, SOLUTION INTRAVENOUS; SUBCUTANEOUS at 01:44

## 2023-01-01 RX ADMIN — Medication 650 MILLIGRAM(S): at 11:15

## 2023-01-01 RX ADMIN — CHLORHEXIDINE GLUCONATE 15 MILLILITER(S): 213 SOLUTION TOPICAL at 06:29

## 2023-01-01 RX ADMIN — Medication 1334 MILLIGRAM(S): at 05:22

## 2023-01-01 RX ADMIN — MIDODRINE HYDROCHLORIDE 5 MILLIGRAM(S): 2.5 TABLET ORAL at 17:03

## 2023-01-01 RX ADMIN — Medication 2.5 MILLIGRAM(S): at 13:21

## 2023-01-01 RX ADMIN — Medication 650 MILLIGRAM(S): at 05:26

## 2023-01-01 RX ADMIN — DEXMEDETOMIDINE HYDROCHLORIDE IN 0.9% SODIUM CHLORIDE 3.82 MICROGRAM(S)/KG/HR: 4 INJECTION INTRAVENOUS at 13:47

## 2023-01-01 RX ADMIN — MEROPENEM 100 MILLIGRAM(S): 1 INJECTION INTRAVENOUS at 17:16

## 2023-01-01 RX ADMIN — Medication 650 MILLIGRAM(S): at 17:35

## 2023-01-01 RX ADMIN — Medication 150 MEQ/KG/HR: at 11:17

## 2023-01-01 RX ADMIN — Medication 1300 MILLIGRAM(S): at 17:48

## 2023-01-01 RX ADMIN — Medication 1 APPLICATION(S): at 05:07

## 2023-01-01 RX ADMIN — Medication 1300 MILLIGRAM(S): at 06:08

## 2023-01-01 RX ADMIN — HEPARIN SODIUM 1600 UNIT(S)/HR: 5000 INJECTION INTRAVENOUS; SUBCUTANEOUS at 05:09

## 2023-01-01 RX ADMIN — CHLORHEXIDINE GLUCONATE 15 MILLILITER(S): 213 SOLUTION TOPICAL at 18:46

## 2023-01-01 RX ADMIN — CHLORHEXIDINE GLUCONATE 15 MILLILITER(S): 213 SOLUTION TOPICAL at 05:26

## 2023-01-01 RX ADMIN — Medication 1300 MILLIGRAM(S): at 11:55

## 2023-01-01 RX ADMIN — Medication 1 EACH: at 21:12

## 2023-01-01 RX ADMIN — HEPARIN SODIUM 5000 UNIT(S): 5000 INJECTION INTRAVENOUS; SUBCUTANEOUS at 22:56

## 2023-01-01 RX ADMIN — Medication 50 MILLIGRAM(S): at 23:10

## 2023-01-01 RX ADMIN — HEPARIN SODIUM 5000 UNIT(S): 5000 INJECTION INTRAVENOUS; SUBCUTANEOUS at 22:49

## 2023-01-01 RX ADMIN — Medication 1334 MILLIGRAM(S): at 05:25

## 2023-01-01 RX ADMIN — Medication 2.5 MILLIGRAM(S): at 17:50

## 2023-01-01 RX ADMIN — CHLORHEXIDINE GLUCONATE 15 MILLILITER(S): 213 SOLUTION TOPICAL at 05:11

## 2023-01-01 RX ADMIN — Medication 650 MILLIGRAM(S): at 06:07

## 2023-01-01 RX ADMIN — Medication 1 APPLICATION(S): at 17:05

## 2023-01-01 RX ADMIN — Medication 1 APPLICATION(S): at 05:12

## 2023-01-01 RX ADMIN — GABAPENTIN 200 MILLIGRAM(S): 400 CAPSULE ORAL at 21:08

## 2023-01-01 RX ADMIN — HEPARIN SODIUM 5000 UNIT(S): 5000 INJECTION INTRAVENOUS; SUBCUTANEOUS at 13:33

## 2023-01-01 RX ADMIN — Medication 50 MILLILITER(S): at 17:31

## 2023-01-01 RX ADMIN — FENTANYL CITRATE 50 MICROGRAM(S): 50 INJECTION INTRAVENOUS at 10:34

## 2023-01-01 RX ADMIN — Medication 1300 MILLIGRAM(S): at 18:20

## 2023-01-01 RX ADMIN — Medication 1334 MILLIGRAM(S): at 22:43

## 2023-01-01 RX ADMIN — Medication 80 MILLIGRAM(S): at 09:32

## 2023-01-01 RX ADMIN — GABAPENTIN 200 MILLIGRAM(S): 400 CAPSULE ORAL at 21:18

## 2023-01-01 RX ADMIN — Medication 1 EACH: at 21:17

## 2023-01-01 RX ADMIN — MEROPENEM 100 MILLIGRAM(S): 1 INJECTION INTRAVENOUS at 05:36

## 2023-01-01 RX ADMIN — GABAPENTIN 200 MILLIGRAM(S): 400 CAPSULE ORAL at 14:23

## 2023-01-01 RX ADMIN — Medication 1 APPLICATION(S): at 18:32

## 2023-01-01 RX ADMIN — Medication 50 MILLIGRAM(S): at 07:15

## 2023-01-01 RX ADMIN — I.V. FAT EMULSION 31.3 GM/KG/DAY: 20 EMULSION INTRAVENOUS at 20:17

## 2023-01-01 RX ADMIN — FENTANYL CITRATE 25 MICROGRAM(S): 50 INJECTION INTRAVENOUS at 22:00

## 2023-01-01 RX ADMIN — CEFTRIAXONE 100 MILLIGRAM(S): 500 INJECTION, POWDER, FOR SOLUTION INTRAMUSCULAR; INTRAVENOUS at 21:28

## 2023-01-01 RX ADMIN — Medication 2.5 MILLIGRAM(S): at 00:57

## 2023-01-01 RX ADMIN — Medication 650 MILLIGRAM(S): at 17:17

## 2023-01-01 RX ADMIN — Medication 325 MILLIGRAM(S): at 12:00

## 2023-01-01 RX ADMIN — GABAPENTIN 200 MILLIGRAM(S): 400 CAPSULE ORAL at 05:21

## 2023-01-01 RX ADMIN — HEPARIN SODIUM 5000 UNIT(S): 5000 INJECTION INTRAVENOUS; SUBCUTANEOUS at 05:07

## 2023-01-01 RX ADMIN — CHLORHEXIDINE GLUCONATE 15 MILLILITER(S): 213 SOLUTION TOPICAL at 06:06

## 2023-01-01 RX ADMIN — GABAPENTIN 200 MILLIGRAM(S): 400 CAPSULE ORAL at 14:44

## 2023-01-01 RX ADMIN — VASOPRESSIN 4.5 UNIT(S)/MIN: 20 INJECTION INTRAVENOUS at 22:53

## 2023-01-01 RX ADMIN — HEPARIN SODIUM 5000 UNIT(S): 5000 INJECTION INTRAVENOUS; SUBCUTANEOUS at 21:45

## 2023-01-01 RX ADMIN — Medication 0.72 MICROGRAM(S)/KG/MIN: at 05:58

## 2023-01-01 RX ADMIN — PIPERACILLIN AND TAZOBACTAM 200 GRAM(S): 4; .5 INJECTION, POWDER, LYOPHILIZED, FOR SOLUTION INTRAVENOUS at 01:51

## 2023-01-01 RX ADMIN — MEROPENEM 100 MILLIGRAM(S): 1 INJECTION INTRAVENOUS at 05:01

## 2023-01-01 RX ADMIN — Medication 50 MILLILITER(S): at 00:13

## 2023-01-01 RX ADMIN — SODIUM CHLORIDE 1000 MILLILITER(S): 9 INJECTION, SOLUTION INTRAVENOUS at 01:45

## 2023-01-01 RX ADMIN — MEROPENEM 100 MILLIGRAM(S): 1 INJECTION INTRAVENOUS at 06:19

## 2023-01-01 RX ADMIN — PIPERACILLIN AND TAZOBACTAM 200 GRAM(S): 4; .5 INJECTION, POWDER, LYOPHILIZED, FOR SOLUTION INTRAVENOUS at 05:18

## 2023-01-01 RX ADMIN — Medication 2.5 MILLIGRAM(S): at 23:23

## 2023-01-01 RX ADMIN — Medication 650 MILLIGRAM(S): at 03:50

## 2023-01-01 RX ADMIN — GABAPENTIN 100 MILLIGRAM(S): 400 CAPSULE ORAL at 17:03

## 2023-01-01 RX ADMIN — Medication 50 MILLILITER(S): at 22:42

## 2023-01-01 RX ADMIN — POLYETHYLENE GLYCOL 3350 17 GRAM(S): 17 POWDER, FOR SOLUTION ORAL at 05:46

## 2023-01-01 RX ADMIN — GABAPENTIN 200 MILLIGRAM(S): 400 CAPSULE ORAL at 13:32

## 2023-01-01 RX ADMIN — GABAPENTIN 200 MILLIGRAM(S): 400 CAPSULE ORAL at 13:34

## 2023-01-01 RX ADMIN — Medication 1 APPLICATION(S): at 17:06

## 2023-01-01 RX ADMIN — PHENYLEPHRINE HYDROCHLORIDE 1.43 MICROGRAM(S)/KG/MIN: 10 INJECTION INTRAVENOUS at 21:12

## 2023-01-01 RX ADMIN — Medication 1 APPLICATION(S): at 17:28

## 2023-01-01 RX ADMIN — CHLORHEXIDINE GLUCONATE 15 MILLILITER(S): 213 SOLUTION TOPICAL at 06:15

## 2023-01-01 RX ADMIN — OCTREOTIDE ACETATE 50 MICROGRAM(S): 200 INJECTION, SOLUTION INTRAVENOUS; SUBCUTANEOUS at 11:57

## 2023-01-01 RX ADMIN — VASOPRESSIN 4.5 UNIT(S)/MIN: 20 INJECTION INTRAVENOUS at 14:34

## 2023-01-01 RX ADMIN — Medication 25 MILLIGRAM(S): at 17:03

## 2023-01-01 RX ADMIN — Medication 650 MILLIGRAM(S): at 12:05

## 2023-01-01 RX ADMIN — Medication 1 APPLICATION(S): at 05:44

## 2023-01-01 RX ADMIN — OCTREOTIDE ACETATE 100 MICROGRAM(S): 200 INJECTION, SOLUTION INTRAVENOUS; SUBCUTANEOUS at 05:51

## 2023-01-01 RX ADMIN — Medication 85 MILLIMOLE(S): at 05:53

## 2023-01-01 RX ADMIN — Medication 1 APPLICATION(S): at 17:52

## 2023-01-01 RX ADMIN — AMIODARONE HYDROCHLORIDE 600 MILLIGRAM(S): 400 TABLET ORAL at 18:20

## 2023-01-01 RX ADMIN — CHLORHEXIDINE GLUCONATE 1 APPLICATION(S): 213 SOLUTION TOPICAL at 05:34

## 2023-01-01 RX ADMIN — GABAPENTIN 100 MILLIGRAM(S): 400 CAPSULE ORAL at 22:29

## 2023-01-01 RX ADMIN — VASOPRESSIN 4.5 UNIT(S)/MIN: 20 INJECTION INTRAVENOUS at 11:21

## 2023-01-01 RX ADMIN — Medication 75 MEQ/KG/HR: at 10:03

## 2023-01-01 RX ADMIN — POLYETHYLENE GLYCOL 3350 17 GRAM(S): 17 POWDER, FOR SOLUTION ORAL at 05:45

## 2023-01-01 RX ADMIN — Medication 2.5 MILLIGRAM(S): at 12:05

## 2023-01-01 RX ADMIN — SODIUM CHLORIDE 100 MILLILITER(S): 9 INJECTION INTRAMUSCULAR; INTRAVENOUS; SUBCUTANEOUS at 06:11

## 2023-01-01 RX ADMIN — Medication 1300 MILLIGRAM(S): at 22:18

## 2023-01-01 RX ADMIN — GABAPENTIN 200 MILLIGRAM(S): 400 CAPSULE ORAL at 13:33

## 2023-01-01 RX ADMIN — MEROPENEM 100 MILLIGRAM(S): 1 INJECTION INTRAVENOUS at 17:43

## 2023-01-01 RX ADMIN — Medication 75 MEQ/KG/HR: at 14:35

## 2023-01-01 RX ADMIN — ALTEPLASE 2 MILLIGRAM(S): KIT at 18:30

## 2023-01-01 RX ADMIN — HEPARIN SODIUM 5000 UNIT(S): 5000 INJECTION INTRAVENOUS; SUBCUTANEOUS at 14:21

## 2023-01-01 RX ADMIN — DEXMEDETOMIDINE HYDROCHLORIDE IN 0.9% SODIUM CHLORIDE 3.82 MICROGRAM(S)/KG/HR: 4 INJECTION INTRAVENOUS at 03:22

## 2023-01-01 RX ADMIN — Medication 1334 MILLIGRAM(S): at 22:31

## 2023-01-01 RX ADMIN — CHLORHEXIDINE GLUCONATE 1 APPLICATION(S): 213 SOLUTION TOPICAL at 05:39

## 2023-01-01 RX ADMIN — Medication 1300 MILLIGRAM(S): at 11:25

## 2023-01-01 RX ADMIN — Medication 200 GRAM(S): at 09:39

## 2023-01-01 RX ADMIN — BUMETANIDE 2 MILLIGRAM(S): 0.25 INJECTION INTRAMUSCULAR; INTRAVENOUS at 17:18

## 2023-01-01 RX ADMIN — OCTREOTIDE ACETATE 10 MICROGRAM(S)/HR: 200 INJECTION, SOLUTION INTRAVENOUS; SUBCUTANEOUS at 17:22

## 2023-01-01 RX ADMIN — Medication 650 MILLIGRAM(S): at 00:16

## 2023-01-01 RX ADMIN — Medication 1 APPLICATION(S): at 17:03

## 2023-01-01 RX ADMIN — Medication 1 APPLICATION(S): at 18:07

## 2023-01-01 RX ADMIN — CHLORHEXIDINE GLUCONATE 1 APPLICATION(S): 213 SOLUTION TOPICAL at 05:55

## 2023-01-01 RX ADMIN — Medication 50 MILLIEQUIVALENT(S): at 01:40

## 2023-01-01 RX ADMIN — Medication 1334 MILLIGRAM(S): at 05:26

## 2023-01-01 RX ADMIN — Medication 667 MILLIGRAM(S): at 11:43

## 2023-01-01 RX ADMIN — CHLORHEXIDINE GLUCONATE 15 MILLILITER(S): 213 SOLUTION TOPICAL at 05:38

## 2023-01-01 RX ADMIN — Medication 50 MILLIEQUIVALENT(S): at 00:21

## 2023-01-01 RX ADMIN — Medication 250 MILLIGRAM(S): at 15:07

## 2023-01-01 RX ADMIN — HEPARIN SODIUM 5000 UNIT(S): 5000 INJECTION INTRAVENOUS; SUBCUTANEOUS at 07:48

## 2023-01-01 RX ADMIN — MIDODRINE HYDROCHLORIDE 5 MILLIGRAM(S): 2.5 TABLET ORAL at 13:46

## 2023-01-01 RX ADMIN — Medication 1 APPLICATION(S): at 05:02

## 2023-01-01 RX ADMIN — Medication 1 APPLICATION(S): at 17:43

## 2023-01-01 RX ADMIN — Medication 2.5 MILLIGRAM(S): at 11:57

## 2023-01-01 RX ADMIN — SODIUM CHLORIDE 75 MILLILITER(S): 9 INJECTION, SOLUTION INTRAVENOUS at 12:10

## 2023-01-01 RX ADMIN — CHLORHEXIDINE GLUCONATE 15 MILLILITER(S): 213 SOLUTION TOPICAL at 05:35

## 2023-01-01 RX ADMIN — Medication 325 MILLIGRAM(S): at 12:04

## 2023-01-01 RX ADMIN — Medication 650 MILLIGRAM(S): at 00:37

## 2023-01-01 RX ADMIN — MEROPENEM 100 MILLIGRAM(S): 1 INJECTION INTRAVENOUS at 18:09

## 2023-01-01 RX ADMIN — SODIUM CHLORIDE 75 MILLILITER(S): 9 INJECTION INTRAMUSCULAR; INTRAVENOUS; SUBCUTANEOUS at 21:27

## 2023-01-01 RX ADMIN — Medication 1334 MILLIGRAM(S): at 23:01

## 2023-01-01 RX ADMIN — Medication 1334 MILLIGRAM(S): at 21:08

## 2023-01-01 RX ADMIN — Medication 1000 MILLIGRAM(S): at 10:55

## 2023-01-01 RX ADMIN — HEPARIN SODIUM 5000 UNIT(S): 5000 INJECTION INTRAVENOUS; SUBCUTANEOUS at 11:57

## 2023-01-01 RX ADMIN — DAPTOMYCIN 118 MILLIGRAM(S): 500 INJECTION, POWDER, LYOPHILIZED, FOR SOLUTION INTRAVENOUS at 01:44

## 2023-01-01 RX ADMIN — Medication 50 MILLILITER(S): at 17:35

## 2023-01-01 RX ADMIN — CHLORHEXIDINE GLUCONATE 15 MILLILITER(S): 213 SOLUTION TOPICAL at 05:02

## 2023-01-01 RX ADMIN — MEROPENEM 100 MILLIGRAM(S): 1 INJECTION INTRAVENOUS at 17:05

## 2023-01-01 RX ADMIN — Medication 650 MILLIGRAM(S): at 12:01

## 2023-01-01 RX ADMIN — Medication 0.72 MICROGRAM(S)/KG/MIN: at 11:57

## 2023-01-01 RX ADMIN — POLYETHYLENE GLYCOL 3350 17 GRAM(S): 17 POWDER, FOR SOLUTION ORAL at 17:31

## 2023-01-01 RX ADMIN — Medication 1 EACH: at 20:39

## 2023-01-01 RX ADMIN — HEPARIN SODIUM 5000 UNIT(S): 5000 INJECTION INTRAVENOUS; SUBCUTANEOUS at 14:25

## 2023-01-01 RX ADMIN — Medication 200 GRAM(S): at 22:32

## 2023-01-01 RX ADMIN — GABAPENTIN 200 MILLIGRAM(S): 400 CAPSULE ORAL at 07:47

## 2023-01-01 RX ADMIN — MEROPENEM 100 MILLIGRAM(S): 1 INJECTION INTRAVENOUS at 05:25

## 2023-01-01 RX ADMIN — PANTOPRAZOLE SODIUM 40 MILLIGRAM(S): 20 TABLET, DELAYED RELEASE ORAL at 05:19

## 2023-01-01 RX ADMIN — PANTOPRAZOLE SODIUM 40 MILLIGRAM(S): 20 TABLET, DELAYED RELEASE ORAL at 05:43

## 2023-01-01 RX ADMIN — Medication 50 MILLIGRAM(S): at 17:40

## 2023-01-01 RX ADMIN — HEPARIN SODIUM 5000 UNIT(S): 5000 INJECTION INTRAVENOUS; SUBCUTANEOUS at 22:58

## 2023-01-01 RX ADMIN — HEPARIN SODIUM 5000 UNIT(S): 5000 INJECTION INTRAVENOUS; SUBCUTANEOUS at 21:28

## 2023-01-01 RX ADMIN — ALTEPLASE 2 MILLIGRAM(S): KIT at 16:01

## 2023-01-01 RX ADMIN — PIPERACILLIN AND TAZOBACTAM 200 GRAM(S): 4; .5 INJECTION, POWDER, LYOPHILIZED, FOR SOLUTION INTRAVENOUS at 06:00

## 2023-01-01 RX ADMIN — Medication 50 MEQ/KG/HR: at 02:58

## 2023-01-01 RX ADMIN — CHLORHEXIDINE GLUCONATE 15 MILLILITER(S): 213 SOLUTION TOPICAL at 06:19

## 2023-01-01 RX ADMIN — Medication 1 APPLICATION(S): at 17:45

## 2023-01-01 RX ADMIN — SODIUM ZIRCONIUM CYCLOSILICATE 5 GRAM(S): 10 POWDER, FOR SUSPENSION ORAL at 13:05

## 2023-01-01 RX ADMIN — BUMETANIDE 2 MILLIGRAM(S): 0.25 INJECTION INTRAMUSCULAR; INTRAVENOUS at 09:44

## 2023-01-01 RX ADMIN — SODIUM CHLORIDE 100 MILLILITER(S): 9 INJECTION, SOLUTION INTRAVENOUS at 06:03

## 2023-01-01 RX ADMIN — CHLORHEXIDINE GLUCONATE 1 APPLICATION(S): 213 SOLUTION TOPICAL at 05:04

## 2023-01-01 RX ADMIN — Medication 125 MILLILITER(S): at 04:01

## 2023-01-01 RX ADMIN — VASOPRESSIN 4.5 UNIT(S)/MIN: 20 INJECTION INTRAVENOUS at 04:05

## 2023-01-01 RX ADMIN — PANTOPRAZOLE SODIUM 40 MILLIGRAM(S): 20 TABLET, DELAYED RELEASE ORAL at 17:43

## 2023-01-01 RX ADMIN — MIDODRINE HYDROCHLORIDE 5 MILLIGRAM(S): 2.5 TABLET ORAL at 06:18

## 2023-01-01 RX ADMIN — Medication 100 MILLIGRAM(S): at 21:29

## 2023-01-01 RX ADMIN — Medication 200 GRAM(S): at 08:59

## 2023-01-01 RX ADMIN — CHLORHEXIDINE GLUCONATE 15 MILLILITER(S): 213 SOLUTION TOPICAL at 05:41

## 2023-01-01 RX ADMIN — GABAPENTIN 200 MILLIGRAM(S): 400 CAPSULE ORAL at 22:34

## 2023-01-01 RX ADMIN — Medication 50 MILLIEQUIVALENT(S): at 11:14

## 2023-01-01 RX ADMIN — GABAPENTIN 100 MILLIGRAM(S): 400 CAPSULE ORAL at 03:50

## 2023-01-01 RX ADMIN — PANTOPRAZOLE SODIUM 40 MILLIGRAM(S): 20 TABLET, DELAYED RELEASE ORAL at 05:46

## 2023-01-01 RX ADMIN — Medication 50 MILLIGRAM(S): at 05:07

## 2023-01-01 RX ADMIN — Medication 650 MILLIGRAM(S): at 18:00

## 2023-01-01 RX ADMIN — Medication 650 MILLIGRAM(S): at 17:01

## 2023-01-01 RX ADMIN — PHENYLEPHRINE HYDROCHLORIDE 1.43 MICROGRAM(S)/KG/MIN: 10 INJECTION INTRAVENOUS at 18:04

## 2023-01-01 RX ADMIN — CHLORHEXIDINE GLUCONATE 15 MILLILITER(S): 213 SOLUTION TOPICAL at 05:53

## 2023-01-01 RX ADMIN — PANTOPRAZOLE SODIUM 40 MILLIGRAM(S): 20 TABLET, DELAYED RELEASE ORAL at 18:59

## 2023-01-01 RX ADMIN — CASPOFUNGIN ACETATE 260 MILLIGRAM(S): 7 INJECTION, POWDER, LYOPHILIZED, FOR SOLUTION INTRAVENOUS at 18:00

## 2023-01-01 RX ADMIN — Medication 50 MILLIGRAM(S): at 05:19

## 2023-01-01 RX ADMIN — Medication 325 MILLIGRAM(S): at 11:29

## 2023-01-01 RX ADMIN — PANTOPRAZOLE SODIUM 40 MILLIGRAM(S): 20 TABLET, DELAYED RELEASE ORAL at 05:03

## 2023-01-01 RX ADMIN — HEPARIN SODIUM 5000 UNIT(S): 5000 INJECTION INTRAVENOUS; SUBCUTANEOUS at 13:39

## 2023-01-01 RX ADMIN — DEXMEDETOMIDINE HYDROCHLORIDE IN 0.9% SODIUM CHLORIDE 3.82 MICROGRAM(S)/KG/HR: 4 INJECTION INTRAVENOUS at 12:28

## 2023-01-01 RX ADMIN — Medication 1300 MILLIGRAM(S): at 13:58

## 2023-01-01 RX ADMIN — OLANZAPINE 2.5 MILLIGRAM(S): 15 TABLET, FILM COATED ORAL at 21:18

## 2023-01-01 RX ADMIN — Medication 1 APPLICATION(S): at 17:29

## 2023-01-01 RX ADMIN — PIPERACILLIN AND TAZOBACTAM 200 GRAM(S): 4; .5 INJECTION, POWDER, LYOPHILIZED, FOR SOLUTION INTRAVENOUS at 17:44

## 2023-01-01 RX ADMIN — I.V. FAT EMULSION 31.3 GM/KG/DAY: 20 EMULSION INTRAVENOUS at 21:09

## 2023-01-01 RX ADMIN — PANTOPRAZOLE SODIUM 40 MILLIGRAM(S): 20 TABLET, DELAYED RELEASE ORAL at 05:50

## 2023-01-01 RX ADMIN — Medication 3.59 MICROGRAM(S)/KG/MIN: at 01:42

## 2023-01-01 RX ADMIN — PANTOPRAZOLE SODIUM 40 MILLIGRAM(S): 20 TABLET, DELAYED RELEASE ORAL at 06:13

## 2023-01-01 RX ADMIN — Medication 1 APPLICATION(S): at 06:17

## 2023-01-01 RX ADMIN — Medication 50 MILLIGRAM(S): at 11:59

## 2023-01-01 RX ADMIN — GABAPENTIN 100 MILLIGRAM(S): 400 CAPSULE ORAL at 18:02

## 2023-01-01 RX ADMIN — HEPARIN SODIUM 5000 UNIT(S): 5000 INJECTION INTRAVENOUS; SUBCUTANEOUS at 06:21

## 2023-01-01 RX ADMIN — Medication 50 MILLIGRAM(S): at 05:10

## 2023-01-01 RX ADMIN — HEPARIN SODIUM 5000 UNIT(S): 5000 INJECTION INTRAVENOUS; SUBCUTANEOUS at 05:32

## 2023-01-01 RX ADMIN — Medication 1 APPLICATION(S): at 05:06

## 2023-01-01 RX ADMIN — CHLORHEXIDINE GLUCONATE 15 MILLILITER(S): 213 SOLUTION TOPICAL at 17:53

## 2023-01-01 RX ADMIN — DIATRIZOATE MEGLUMINE 30 MILLILITER(S): 180 INJECTION, SOLUTION INTRAVESICAL at 16:14

## 2023-01-01 RX ADMIN — Medication 5 MILLIGRAM(S): at 03:08

## 2023-01-01 RX ADMIN — SODIUM CHLORIDE 2000 MILLILITER(S): 9 INJECTION, SOLUTION INTRAVENOUS at 00:21

## 2023-01-01 RX ADMIN — Medication 7.17 MICROGRAM(S)/KG/MIN: at 14:05

## 2023-01-01 RX ADMIN — PANTOPRAZOLE SODIUM 40 MILLIGRAM(S): 20 TABLET, DELAYED RELEASE ORAL at 06:30

## 2023-01-01 RX ADMIN — Medication 50 MILLIEQUIVALENT(S): at 02:30

## 2023-01-01 RX ADMIN — Medication 0.72 MICROGRAM(S)/KG/MIN: at 05:51

## 2023-01-01 RX ADMIN — DEXMEDETOMIDINE HYDROCHLORIDE IN 0.9% SODIUM CHLORIDE 3.82 MICROGRAM(S)/KG/HR: 4 INJECTION INTRAVENOUS at 13:33

## 2023-01-01 RX ADMIN — PANTOPRAZOLE SODIUM 40 MILLIGRAM(S): 20 TABLET, DELAYED RELEASE ORAL at 05:06

## 2023-01-01 RX ADMIN — OLANZAPINE 2.5 MILLIGRAM(S): 15 TABLET, FILM COATED ORAL at 21:29

## 2023-01-01 RX ADMIN — CASPOFUNGIN ACETATE 260 MILLIGRAM(S): 7 INJECTION, POWDER, LYOPHILIZED, FOR SOLUTION INTRAVENOUS at 02:10

## 2023-01-01 RX ADMIN — PANTOPRAZOLE SODIUM 40 MILLIGRAM(S): 20 TABLET, DELAYED RELEASE ORAL at 06:08

## 2023-01-01 RX ADMIN — PANTOPRAZOLE SODIUM 10 MG/HR: 20 TABLET, DELAYED RELEASE ORAL at 05:52

## 2023-01-01 RX ADMIN — Medication 1334 MILLIGRAM(S): at 14:27

## 2023-01-01 RX ADMIN — Medication 1 APPLICATION(S): at 17:51

## 2023-01-01 RX ADMIN — Medication 2.5 MILLIGRAM(S): at 23:28

## 2023-01-01 RX ADMIN — Medication 50 MILLILITER(S): at 03:26

## 2023-01-01 RX ADMIN — HEPARIN SODIUM 5000 UNIT(S): 5000 INJECTION INTRAVENOUS; SUBCUTANEOUS at 15:17

## 2023-01-01 RX ADMIN — Medication 500 MILLIGRAM(S): at 12:10

## 2023-01-01 RX ADMIN — Medication 1 EACH: at 20:01

## 2023-01-01 RX ADMIN — Medication 1 APPLICATION(S): at 07:15

## 2023-01-01 RX ADMIN — MEROPENEM 100 MILLIGRAM(S): 1 INJECTION INTRAVENOUS at 05:06

## 2023-01-01 RX ADMIN — GABAPENTIN 100 MILLIGRAM(S): 400 CAPSULE ORAL at 12:12

## 2023-01-01 RX ADMIN — HEPARIN SODIUM 5000 UNIT(S): 5000 INJECTION INTRAVENOUS; SUBCUTANEOUS at 13:17

## 2023-01-01 RX ADMIN — CHLORHEXIDINE GLUCONATE 15 MILLILITER(S): 213 SOLUTION TOPICAL at 17:58

## 2023-01-01 RX ADMIN — CASPOFUNGIN ACETATE 260 MILLIGRAM(S): 7 INJECTION, POWDER, LYOPHILIZED, FOR SOLUTION INTRAVENOUS at 17:43

## 2023-01-01 RX ADMIN — Medication 50 MILLIGRAM(S): at 23:07

## 2023-01-01 RX ADMIN — Medication 1334 MILLIGRAM(S): at 07:47

## 2023-01-01 RX ADMIN — HEPARIN SODIUM 5000 UNIT(S): 5000 INJECTION INTRAVENOUS; SUBCUTANEOUS at 13:03

## 2023-01-01 RX ADMIN — Medication 1 APPLICATION(S): at 10:00

## 2023-01-01 RX ADMIN — PHENYLEPHRINE HYDROCHLORIDE 1.43 MICROGRAM(S)/KG/MIN: 10 INJECTION INTRAVENOUS at 03:34

## 2023-01-01 RX ADMIN — GABAPENTIN 200 MILLIGRAM(S): 400 CAPSULE ORAL at 13:23

## 2023-01-01 RX ADMIN — Medication 100 MEQ/KG/HR: at 23:02

## 2023-01-01 RX ADMIN — CHLORHEXIDINE GLUCONATE 15 MILLILITER(S): 213 SOLUTION TOPICAL at 18:20

## 2023-01-01 RX ADMIN — GABAPENTIN 200 MILLIGRAM(S): 400 CAPSULE ORAL at 23:00

## 2023-01-01 RX ADMIN — HEPARIN SODIUM 5000 UNIT(S): 5000 INJECTION INTRAVENOUS; SUBCUTANEOUS at 21:26

## 2023-01-01 RX ADMIN — OCTREOTIDE ACETATE 100 MICROGRAM(S): 200 INJECTION, SOLUTION INTRAVENOUS; SUBCUTANEOUS at 21:15

## 2023-01-01 RX ADMIN — GABAPENTIN 100 MILLIGRAM(S): 400 CAPSULE ORAL at 03:28

## 2023-01-01 RX ADMIN — MEROPENEM 100 MILLIGRAM(S): 1 INJECTION INTRAVENOUS at 18:06

## 2023-01-01 RX ADMIN — VASOPRESSIN 4.5 UNIT(S)/MIN: 20 INJECTION INTRAVENOUS at 05:51

## 2023-01-01 RX ADMIN — Medication 200 GRAM(S): at 21:44

## 2023-01-01 RX ADMIN — CEFTRIAXONE 100 MILLIGRAM(S): 500 INJECTION, POWDER, FOR SOLUTION INTRAMUSCULAR; INTRAVENOUS at 21:45

## 2023-01-01 RX ADMIN — Medication 101.6 MILLIGRAM(S): at 01:44

## 2023-01-01 RX ADMIN — MIDAZOLAM HYDROCHLORIDE 2 MILLIGRAM(S): 1 INJECTION, SOLUTION INTRAMUSCULAR; INTRAVENOUS at 10:35

## 2023-01-01 RX ADMIN — Medication 50 MILLILITER(S): at 05:38

## 2023-01-01 RX ADMIN — SODIUM CHLORIDE 100 MILLILITER(S): 9 INJECTION, SOLUTION INTRAVENOUS at 21:21

## 2023-01-01 RX ADMIN — DEXMEDETOMIDINE HYDROCHLORIDE IN 0.9% SODIUM CHLORIDE 3.82 MICROGRAM(S)/KG/HR: 4 INJECTION INTRAVENOUS at 08:22

## 2023-01-01 RX ADMIN — HEPARIN SODIUM 5000 UNIT(S): 5000 INJECTION INTRAVENOUS; SUBCUTANEOUS at 22:43

## 2023-01-01 RX ADMIN — Medication 1 APPLICATION(S): at 18:34

## 2023-01-01 RX ADMIN — Medication 100 GRAM(S): at 21:12

## 2023-01-01 RX ADMIN — DEXMEDETOMIDINE HYDROCHLORIDE IN 0.9% SODIUM CHLORIDE 3.83 MICROGRAM(S)/KG/HR: 4 INJECTION INTRAVENOUS at 17:24

## 2023-01-01 RX ADMIN — INSULIN HUMAN 10 UNIT(S): 100 INJECTION, SOLUTION SUBCUTANEOUS at 06:24

## 2023-01-01 RX ADMIN — Medication 100 MILLIEQUIVALENT(S): at 11:25

## 2023-01-01 RX ADMIN — HEPARIN SODIUM 5000 UNIT(S): 5000 INJECTION INTRAVENOUS; SUBCUTANEOUS at 21:08

## 2023-01-01 RX ADMIN — POTASSIUM PHOSPHATE, MONOBASIC POTASSIUM PHOSPHATE, DIBASIC 83.33 MILLIMOLE(S): 236; 224 INJECTION, SOLUTION INTRAVENOUS at 09:05

## 2023-01-01 RX ADMIN — SODIUM CHLORIDE 1500 MILLILITER(S): 9 INJECTION INTRAMUSCULAR; INTRAVENOUS; SUBCUTANEOUS at 12:11

## 2023-01-01 RX ADMIN — Medication 50 MILLILITER(S): at 15:06

## 2023-01-01 RX ADMIN — Medication 166.67 MILLIGRAM(S): at 02:21

## 2023-01-01 RX ADMIN — IRON SUCROSE 210 MILLIGRAM(S): 20 INJECTION, SOLUTION INTRAVENOUS at 11:00

## 2023-01-01 RX ADMIN — Medication 667 MILLIGRAM(S): at 12:13

## 2023-01-01 RX ADMIN — Medication 650 MILLIGRAM(S): at 01:00

## 2023-01-01 RX ADMIN — PHENYLEPHRINE HYDROCHLORIDE 1.43 MICROGRAM(S)/KG/MIN: 10 INJECTION INTRAVENOUS at 19:59

## 2023-01-01 RX ADMIN — I.V. FAT EMULSION 20.9 GM/KG/DAY: 20 EMULSION INTRAVENOUS at 21:12

## 2023-01-01 RX ADMIN — MEROPENEM 100 MILLIGRAM(S): 1 INJECTION INTRAVENOUS at 05:11

## 2023-01-01 RX ADMIN — HEPARIN SODIUM 5000 UNIT(S): 5000 INJECTION INTRAVENOUS; SUBCUTANEOUS at 05:11

## 2023-01-01 RX ADMIN — MEROPENEM 100 MILLIGRAM(S): 1 INJECTION INTRAVENOUS at 17:25

## 2023-01-01 RX ADMIN — CHLORHEXIDINE GLUCONATE 15 MILLILITER(S): 213 SOLUTION TOPICAL at 17:14

## 2023-01-01 RX ADMIN — Medication 50 MILLIGRAM(S): at 05:25

## 2023-01-01 RX ADMIN — CHLORHEXIDINE GLUCONATE 1 APPLICATION(S): 213 SOLUTION TOPICAL at 05:41

## 2023-01-01 RX ADMIN — Medication 1 APPLICATION(S): at 06:09

## 2023-01-01 RX ADMIN — PANTOPRAZOLE SODIUM 40 MILLIGRAM(S): 20 TABLET, DELAYED RELEASE ORAL at 05:07

## 2023-01-01 RX ADMIN — Medication 2.5 MILLIGRAM(S): at 05:07

## 2023-01-01 RX ADMIN — Medication 75 MEQ/KG/HR: at 03:07

## 2023-01-01 RX ADMIN — Medication 650 MILLIGRAM(S): at 00:56

## 2023-01-01 RX ADMIN — CHLORHEXIDINE GLUCONATE 1 APPLICATION(S): 213 SOLUTION TOPICAL at 05:10

## 2023-01-01 RX ADMIN — Medication 400 MILLIGRAM(S): at 20:51

## 2023-01-01 RX ADMIN — PHENYLEPHRINE HYDROCHLORIDE 1.43 MICROGRAM(S)/KG/MIN: 10 INJECTION INTRAVENOUS at 09:18

## 2023-01-01 RX ADMIN — Medication 1000 MILLIGRAM(S): at 21:00

## 2023-01-01 RX ADMIN — Medication 1334 MILLIGRAM(S): at 05:02

## 2023-01-01 RX ADMIN — Medication 1300 MILLIGRAM(S): at 02:09

## 2023-01-01 RX ADMIN — PIPERACILLIN AND TAZOBACTAM 200 GRAM(S): 4; .5 INJECTION, POWDER, LYOPHILIZED, FOR SOLUTION INTRAVENOUS at 17:54

## 2023-01-01 RX ADMIN — PANTOPRAZOLE SODIUM 40 MILLIGRAM(S): 20 TABLET, DELAYED RELEASE ORAL at 12:13

## 2023-01-01 RX ADMIN — Medication 50 MILLIGRAM(S): at 18:22

## 2023-01-01 RX ADMIN — Medication 1 APPLICATION(S): at 05:14

## 2023-01-01 RX ADMIN — Medication 50 MILLIGRAM(S): at 18:08

## 2023-01-01 RX ADMIN — FENTANYL CITRATE 25 MICROGRAM(S): 50 INJECTION INTRAVENOUS at 03:52

## 2023-01-01 RX ADMIN — OCTREOTIDE ACETATE 50 MICROGRAM(S): 200 INJECTION, SOLUTION INTRAVENOUS; SUBCUTANEOUS at 00:45

## 2023-01-01 RX ADMIN — GABAPENTIN 200 MILLIGRAM(S): 400 CAPSULE ORAL at 12:38

## 2023-01-01 RX ADMIN — Medication 1334 MILLIGRAM(S): at 14:21

## 2023-01-01 RX ADMIN — Medication 50 MILLILITER(S): at 08:42

## 2023-01-01 RX ADMIN — Medication 200 GRAM(S): at 06:26

## 2023-01-01 RX ADMIN — Medication 650 MILLIGRAM(S): at 11:31

## 2023-01-01 RX ADMIN — Medication 400 MILLIGRAM(S): at 10:40

## 2023-01-01 RX ADMIN — Medication 25 MILLIGRAM(S): at 18:01

## 2023-01-01 RX ADMIN — Medication 1300 MILLIGRAM(S): at 05:10

## 2023-01-01 RX ADMIN — Medication 2.5 MILLIGRAM(S): at 05:27

## 2023-01-01 RX ADMIN — Medication 1300 MILLIGRAM(S): at 03:35

## 2023-01-01 RX ADMIN — CHLORHEXIDINE GLUCONATE 1 APPLICATION(S): 213 SOLUTION TOPICAL at 06:07

## 2023-01-01 RX ADMIN — MEROPENEM 100 MILLIGRAM(S): 1 INJECTION INTRAVENOUS at 17:47

## 2023-01-01 RX ADMIN — CHLORHEXIDINE GLUCONATE 1 APPLICATION(S): 213 SOLUTION TOPICAL at 06:19

## 2023-01-01 RX ADMIN — Medication 1 EACH: at 20:17

## 2023-01-01 RX ADMIN — SENNA PLUS 2 TABLET(S): 8.6 TABLET ORAL at 21:35

## 2023-01-01 RX ADMIN — MIDODRINE HYDROCHLORIDE 10 MILLIGRAM(S): 2.5 TABLET ORAL at 21:12

## 2023-01-01 RX ADMIN — FENTANYL CITRATE 25 MICROGRAM(S): 50 INJECTION INTRAVENOUS at 21:44

## 2023-01-01 RX ADMIN — HEPARIN SODIUM 5000 UNIT(S): 5000 INJECTION INTRAVENOUS; SUBCUTANEOUS at 04:55

## 2023-01-01 RX ADMIN — PANTOPRAZOLE SODIUM 40 MILLIGRAM(S): 20 TABLET, DELAYED RELEASE ORAL at 05:23

## 2023-01-01 RX ADMIN — SODIUM CHLORIDE 75 MILLILITER(S): 9 INJECTION INTRAMUSCULAR; INTRAVENOUS; SUBCUTANEOUS at 12:10

## 2023-01-01 RX ADMIN — PANTOPRAZOLE SODIUM 40 MILLIGRAM(S): 20 TABLET, DELAYED RELEASE ORAL at 17:58

## 2023-01-01 RX ADMIN — Medication 325 MILLIGRAM(S): at 11:59

## 2023-01-01 RX ADMIN — CHLORHEXIDINE GLUCONATE 1 APPLICATION(S): 213 SOLUTION TOPICAL at 05:26

## 2023-01-01 RX ADMIN — PANTOPRAZOLE SODIUM 40 MILLIGRAM(S): 20 TABLET, DELAYED RELEASE ORAL at 17:36

## 2023-01-01 RX ADMIN — PHENYLEPHRINE HYDROCHLORIDE 1.43 MICROGRAM(S)/KG/MIN: 10 INJECTION INTRAVENOUS at 21:21

## 2023-01-01 RX ADMIN — SODIUM CHLORIDE 1000 MILLILITER(S): 9 INJECTION, SOLUTION INTRAVENOUS at 01:38

## 2023-01-01 RX ADMIN — HEPARIN SODIUM 5000 UNIT(S): 5000 INJECTION INTRAVENOUS; SUBCUTANEOUS at 17:48

## 2023-01-01 RX ADMIN — DEXMEDETOMIDINE HYDROCHLORIDE IN 0.9% SODIUM CHLORIDE 3.82 MICROGRAM(S)/KG/HR: 4 INJECTION INTRAVENOUS at 22:15

## 2023-01-01 RX ADMIN — Medication 125 MILLILITER(S): at 23:18

## 2023-01-01 RX ADMIN — Medication 250 MICROGRAM(S): at 13:39

## 2023-01-01 RX ADMIN — HEPARIN SODIUM 5000 UNIT(S): 5000 INJECTION INTRAVENOUS; SUBCUTANEOUS at 21:40

## 2023-01-01 RX ADMIN — Medication 25 MILLIGRAM(S): at 05:33

## 2023-01-01 RX ADMIN — OLANZAPINE 2.5 MILLIGRAM(S): 15 TABLET, FILM COATED ORAL at 21:10

## 2023-01-01 RX ADMIN — Medication 325 MILLIGRAM(S): at 12:53

## 2023-01-01 RX ADMIN — Medication 1000 MILLIGRAM(S): at 05:36

## 2023-01-01 RX ADMIN — Medication 1300 MILLIGRAM(S): at 18:32

## 2023-01-01 RX ADMIN — PANTOPRAZOLE SODIUM 40 MILLIGRAM(S): 20 TABLET, DELAYED RELEASE ORAL at 06:07

## 2023-01-01 RX ADMIN — MEROPENEM 100 MILLIGRAM(S): 1 INJECTION INTRAVENOUS at 19:56

## 2023-01-01 RX ADMIN — Medication 1 APPLICATION(S): at 17:14

## 2023-01-01 RX ADMIN — Medication 50 MILLIGRAM(S): at 12:54

## 2023-01-01 RX ADMIN — Medication 50 MILLIGRAM(S): at 17:02

## 2023-01-01 RX ADMIN — GABAPENTIN 200 MILLIGRAM(S): 400 CAPSULE ORAL at 05:10

## 2023-01-01 RX ADMIN — Medication 50 MILLIGRAM(S): at 11:13

## 2023-01-01 RX ADMIN — Medication 325 MILLIGRAM(S): at 15:02

## 2023-01-01 RX ADMIN — PANTOPRAZOLE SODIUM 40 MILLIGRAM(S): 20 TABLET, DELAYED RELEASE ORAL at 07:19

## 2023-01-01 RX ADMIN — CEFTRIAXONE 100 MILLIGRAM(S): 500 INJECTION, POWDER, FOR SOLUTION INTRAMUSCULAR; INTRAVENOUS at 21:40

## 2023-01-01 RX ADMIN — Medication 3.59 MICROGRAM(S)/KG/MIN: at 21:21

## 2023-01-01 RX ADMIN — MEROPENEM 100 MILLIGRAM(S): 1 INJECTION INTRAVENOUS at 18:21

## 2023-01-01 RX ADMIN — Medication 100 MILLIGRAM(S): at 17:31

## 2023-01-01 RX ADMIN — GABAPENTIN 200 MILLIGRAM(S): 400 CAPSULE ORAL at 05:32

## 2023-01-01 RX ADMIN — MEROPENEM 100 MILLIGRAM(S): 1 INJECTION INTRAVENOUS at 17:14

## 2023-01-01 RX ADMIN — CASPOFUNGIN ACETATE 260 MILLIGRAM(S): 7 INJECTION, POWDER, LYOPHILIZED, FOR SOLUTION INTRAVENOUS at 17:31

## 2023-01-01 RX ADMIN — Medication 1334 MILLIGRAM(S): at 02:54

## 2023-01-01 RX ADMIN — Medication 325 MILLIGRAM(S): at 11:18

## 2023-01-01 RX ADMIN — Medication 25 MILLIEQUIVALENT(S): at 23:44

## 2023-01-01 RX ADMIN — Medication 1 APPLICATION(S): at 05:20

## 2023-01-01 RX ADMIN — Medication 25 MILLIGRAM(S): at 11:14

## 2023-01-01 RX ADMIN — OCTREOTIDE ACETATE 100 MICROGRAM(S): 200 INJECTION, SOLUTION INTRAVENOUS; SUBCUTANEOUS at 14:55

## 2023-01-01 RX ADMIN — FENTANYL CITRATE 25 MICROGRAM(S): 50 INJECTION INTRAVENOUS at 23:23

## 2023-01-01 RX ADMIN — PANTOPRAZOLE SODIUM 40 MILLIGRAM(S): 20 TABLET, DELAYED RELEASE ORAL at 05:55

## 2023-01-01 RX ADMIN — Medication 1 APPLICATION(S): at 05:35

## 2023-01-01 RX ADMIN — HEPARIN SODIUM 14 UNIT(S)/HR: 5000 INJECTION INTRAVENOUS; SUBCUTANEOUS at 16:04

## 2023-01-01 RX ADMIN — Medication 650 MILLIGRAM(S): at 11:43

## 2023-01-01 RX ADMIN — HEPARIN SODIUM 5000 UNIT(S): 5000 INJECTION INTRAVENOUS; SUBCUTANEOUS at 05:24

## 2023-01-01 RX ADMIN — CHLORHEXIDINE GLUCONATE 15 MILLILITER(S): 213 SOLUTION TOPICAL at 17:46

## 2023-01-01 RX ADMIN — MEROPENEM 100 MILLIGRAM(S): 1 INJECTION INTRAVENOUS at 05:24

## 2023-01-01 RX ADMIN — Medication 1300 MILLIGRAM(S): at 12:35

## 2023-01-01 RX ADMIN — MEROPENEM 100 MILLIGRAM(S): 1 INJECTION INTRAVENOUS at 05:28

## 2023-01-01 RX ADMIN — Medication 50 MILLILITER(S): at 22:25

## 2023-01-01 RX ADMIN — HEPARIN SODIUM 5000 UNIT(S): 5000 INJECTION INTRAVENOUS; SUBCUTANEOUS at 07:00

## 2023-01-01 RX ADMIN — HEPARIN SODIUM 5000 UNIT(S): 5000 INJECTION INTRAVENOUS; SUBCUTANEOUS at 11:30

## 2023-01-01 RX ADMIN — MIDAZOLAM HYDROCHLORIDE 1 MILLIGRAM(S): 1 INJECTION, SOLUTION INTRAMUSCULAR; INTRAVENOUS at 23:48

## 2023-01-01 RX ADMIN — MIDODRINE HYDROCHLORIDE 5 MILLIGRAM(S): 2.5 TABLET ORAL at 05:24

## 2023-01-01 RX ADMIN — VASOPRESSIN 4.5 UNIT(S)/MIN: 20 INJECTION INTRAVENOUS at 11:57

## 2023-01-01 RX ADMIN — CEFTRIAXONE 100 MILLIGRAM(S): 500 INJECTION, POWDER, FOR SOLUTION INTRAMUSCULAR; INTRAVENOUS at 18:00

## 2023-01-01 RX ADMIN — CASPOFUNGIN ACETATE 260 MILLIGRAM(S): 7 INJECTION, POWDER, LYOPHILIZED, FOR SOLUTION INTRAVENOUS at 17:24

## 2023-01-01 RX ADMIN — Medication 100 MILLIGRAM(S): at 05:42

## 2023-01-01 RX ADMIN — VASOPRESSIN 4.5 UNIT(S)/MIN: 20 INJECTION INTRAVENOUS at 06:50

## 2023-01-01 RX ADMIN — PIPERACILLIN AND TAZOBACTAM 200 GRAM(S): 4; .5 INJECTION, POWDER, LYOPHILIZED, FOR SOLUTION INTRAVENOUS at 23:20

## 2023-01-01 RX ADMIN — HEPARIN SODIUM 5000 UNIT(S): 5000 INJECTION INTRAVENOUS; SUBCUTANEOUS at 06:15

## 2023-01-01 RX ADMIN — CHLORHEXIDINE GLUCONATE 15 MILLILITER(S): 213 SOLUTION TOPICAL at 05:23

## 2023-01-01 RX ADMIN — OLANZAPINE 2.5 MILLIGRAM(S): 15 TABLET, FILM COATED ORAL at 21:02

## 2023-01-01 RX ADMIN — MEROPENEM 100 MILLIGRAM(S): 1 INJECTION INTRAVENOUS at 18:08

## 2023-01-01 RX ADMIN — MEROPENEM 100 MILLIGRAM(S): 1 INJECTION INTRAVENOUS at 18:01

## 2023-01-01 RX ADMIN — CHLORHEXIDINE GLUCONATE 1 APPLICATION(S): 213 SOLUTION TOPICAL at 07:20

## 2023-01-01 RX ADMIN — Medication 1 APPLICATION(S): at 06:07

## 2023-01-01 RX ADMIN — HEPARIN SODIUM 5000 UNIT(S): 5000 INJECTION INTRAVENOUS; SUBCUTANEOUS at 12:12

## 2023-01-01 RX ADMIN — GABAPENTIN 200 MILLIGRAM(S): 400 CAPSULE ORAL at 21:12

## 2023-01-01 RX ADMIN — PANTOPRAZOLE SODIUM 40 MILLIGRAM(S): 20 TABLET, DELAYED RELEASE ORAL at 17:37

## 2023-01-01 RX ADMIN — HEPARIN SODIUM 5000 UNIT(S): 5000 INJECTION INTRAVENOUS; SUBCUTANEOUS at 06:13

## 2023-01-01 RX ADMIN — PANTOPRAZOLE SODIUM 40 MILLIGRAM(S): 20 TABLET, DELAYED RELEASE ORAL at 18:43

## 2023-01-01 RX ADMIN — Medication 50 MILLIGRAM(S): at 09:53

## 2023-01-01 RX ADMIN — Medication 650 MILLIGRAM(S): at 11:45

## 2023-01-01 RX ADMIN — CHLORHEXIDINE GLUCONATE 15 MILLILITER(S): 213 SOLUTION TOPICAL at 18:59

## 2023-01-01 RX ADMIN — HEPARIN SODIUM 16 UNIT(S)/HR: 5000 INJECTION INTRAVENOUS; SUBCUTANEOUS at 05:38

## 2023-01-01 RX ADMIN — CEFTRIAXONE 100 MILLIGRAM(S): 500 INJECTION, POWDER, FOR SOLUTION INTRAMUSCULAR; INTRAVENOUS at 22:40

## 2023-01-01 RX ADMIN — GABAPENTIN 200 MILLIGRAM(S): 400 CAPSULE ORAL at 05:02

## 2023-01-01 RX ADMIN — OCTREOTIDE ACETATE 50 MICROGRAM(S): 200 INJECTION, SOLUTION INTRAVENOUS; SUBCUTANEOUS at 19:03

## 2023-01-01 RX ADMIN — Medication 2.5 MILLIGRAM(S): at 17:47

## 2023-01-01 RX ADMIN — HEPARIN SODIUM 5000 UNIT(S): 5000 INJECTION INTRAVENOUS; SUBCUTANEOUS at 21:14

## 2023-01-01 RX ADMIN — Medication 50 MILLILITER(S): at 06:25

## 2023-01-01 RX ADMIN — Medication 2 UNIT(S): at 07:35

## 2023-05-05 NOTE — ED ADULT TRIAGE NOTE - CHIEF COMPLAINT QUOTE
Pt came to ED for swelling and weeping b/l LE. states they have been that way for months. Pt fell in ED parking lot getting out of car. + head trauma. - LOC. Pt assisted to wheelchair by ED staff. denies anticoagulation.

## 2023-05-05 NOTE — ED PROVIDER NOTE - OBJECTIVE STATEMENT
Patient is a 67y male with pmhx of htn but has not seen a doctor in many years presenting to the ED for worsening lower ext swelling, abd distention, intermittent llq abd pain, and weakness for the last two days as well as dyspnea on ambulation. Pt does not have a nephrologist, cardiologist or pulmonologist that he follows up with. Otherwise denies any fever, chills, headache, changes in vision, cough, congestion, cp, palpitations, n/v/d, constipation, urinary complaints, lower extremity pain/swelling.

## 2023-05-05 NOTE — CONSULT NOTE ADULT - SUBJECTIVE AND OBJECTIVE BOX
UROLOGY CONSULT  Pt is a 66yo M who denies any PMHx (has not followed recently with medical doctor) presenting to the ED with b/l LE edema;  c/s'd for CT finding of severe L hydro. Pt seen at bedside and history obtained with help of pt's daughter. Reports pt has been having b/l LE edema for approx 1 year and more recently within the past 2-3 months has been weeping fluid from b/l LE which prompted ED visit.  In the ED, pt noted to have distended abdomen-- CTAP was performed which showed severe L hydronephrosis. Reports he urinates without difficulty, had one episode of mild hematuria a "few months ago," which has since resolved. Denies fevers/chills, dysuria, flank pain, current hematuria.      PAST MEDICAL & SURGICAL HISTORY:    MEDICATIONS  (STANDING):    MEDICATIONS  (PRN):    Allergies  No Known Allergies    SOCIAL HISTORY:   Former smoker ( approx 1-2 ppd for 20years; quit 30years ago)    FAMILY HISTORY:    REVIEW OF SYSTEMS   [x] A ten-point review of systems was otherwise negative except as noted.      Vital Signs Last 24 Hrs  T(C): 36.6 (05 May 2023 15:52), Max: 36.6 (05 May 2023 15:52)  T(F): 97.8 (05 May 2023 15:52), Max: 97.8 (05 May 2023 15:52)  HR: 95 (05 May 2023 15:52) (95 - 95)  BP: 193/89 (05 May 2023 15:52) (193/89 - 193/89)  RR: 17 (05 May 2023 15:52) (17 - 17)  SpO2: 100% (05 May 2023 15:52) (100% - 100%)    Parameters below as of 05 May 2023 15:52  Patient On (Oxygen Delivery Method): room air    PHYSICAL EXAM:  GEN: NAD  NEURO: Awake and alert   SKIN: Non diaphoretic  RESP: Non-labored breathing  ABDO: Distened abdomen; non-tender to palpation   BACK: No CVAT B/L   : Uncircumcised male  EXT: +edema to b/l LE     I&O's Summary    05 May 2023 07:01  -  05 May 2023 21:36  --------------------------------------------------------  IN: 0 mL / OUT: 400 mL / NET: -400 mL      LABS:                        9.4    15.11 )-----------( 505      ( 05 May 2023 17:45 )             31.0     05    137  |  104  |  22<H>  ----------------------------<  174<H>  4.5   |  23  |  1.0    Ca    10.2      05 May 2023 17:45  Mg     2.0         TPro  6.3  /  Alb  3.1<L>  /  TBili  0.4  /  DBili  x   /  AST  20  /  ALT  14  /  AlkPhos  54      PT/INR - ( 05 May 2023 18:55 )   PT: 14.20 sec;   INR: 1.24 ratio      PTT - ( 05 May 2023 18:55 )  PTT:30.6 sec    Urinalysis Basic - ( 05 May 2023 18:30 )  Color: Yellow / Appearance: Clear / S.010 / pH: x  Gluc: x / Ketone: Negative  / Bili: Negative / Urobili: <2 mg/dL   Blood: x / Protein: Trace / Nitrite: Negative   Leuk Esterase: Negative / RBC: x / WBC x   Sq Epi: x / Non Sq Epi: x / Bacteria: x    RADIOLOGY & ADDITIONAL STUDIES:  < from: CT Abdomen and Pelvis w/ IV Cont (23 @ 18:58) >  ACC: [id number] EXAM:  CT ABDOMEN AND PELVIS IC   ORDERED BY: CHRISTIAN BOOKER   PROCEDURE DATE:  2023      INTERPRETATION:  REASON FOR EXAM / CLINICAL STATEMENT:  Abdominal   distension. Lower extremity swelling. WBC 15.11    TECHNIQUE:  Contiguous axial CT images were obtained from the diaphragms   through the pubic symphysis with IV contrast. Reformatted images in the   coronal and sagittal planes were acquired.      COMPARISON CT: None    OTHER STUDIES USED FOR CORRELATION: None.      TUBES AND LINES: None.    LOWER CHEST: Cardiomegaly. Small left pleural effusion. No pericardial   effusion.    HEPATIC: The liver is normal in size with no evidence of solid mass or   bile duct dilatation. Hepatic steatosis is noted. The portal vein is   patent. The hepatic veins are opacified.    BILIARY: No calcified gallstones are noted.    SPLEEN: Unremarkable.    PANCREAS: The pancreas is partially displaced by the enlarged left kidney   but is otherwise normal in size and configuration. No evidence of mass or   pancreatitis.    ADRENAL GLANDS: Unremarkable.    KIDNEYS: There is massive Grade IV hydronephrosis and enlargement of the   left kidney, measuring 26 x 26 x 21 cm. There is minimal residual renal   parenchyma.    Right kidney displays normal enhancement with no evidence of   hydronephrosis, calcified stones, or solid mass. There is a right 8.8 cm   renal cyst.    ABDOMINOPELVIC NODES: (See below)    PELVIC ORGANS: Prostatic enlargement. No evidence of pelvic mass,   lymphadenopathy, or fluid collection.    BLADDER: Unremarkable.    PERITONEUM/MESENTERY/BOWEL: 5 cm inhomogeneously enhancing mass along the   lesser curvature of the stomach (2/16). This is associated with a   retroperitoneal 4.4 cm lymph node located ventral to the upper abdominal   aorta, just above the level of the celiac axis.    Moderate ascites is noted in the abdomen and pelvis.    No evidence of bowel obstruction. No pneumoperitoneum.  The appendix is   normal in appearance.    BONES/SOFTTISSUES: Degenerative changes of the spine are noted.    OTHER: Aortoiliac calcifications are noted with no evidence of abdominal   aortic aneurysm.    There is displacement of retroperitoneal structures to the right side by   the massively enlarged left kidney. This includes displacement and   compression of the inferior vena cava especially at the level of the   aortic bifurcation (54)      IMPRESSION:    1. There is massive Grade IV hydronephrosis and enlargement of the left   kidney, measuring 26 x 26 x 21 cm. There is minimal residual renal   parenchyma.    2. There is a 5 cm inhomogeneously enhancing mass along the lesser   curvature of the stomach (2/16). This is associated with a   retroperitoneal 4.4 cm lymph node located ventral to the upper abdominal   aorta, just above the level of the celiac axis ().    3. There is displacement of retroperitoneal structures to the right side   by the massively enlarged left kidney. This includes displacement and   compression of the inferior vena cava especially at the level of the   aortic bifurcation (54)    Findings were reviewed with Dr. Christian Booker 6:55 PM 2023, with   read back.    --- End of Report ---  JUANCHO DMOINGO MD; Attending Interventional Radiologist  This document has been electronically signed. May  5 2023  7:03PM    < end of copied text >

## 2023-05-05 NOTE — ED PROVIDER NOTE - PROGRESS NOTE DETAILS
pk: labs, CT ordered. will give lasix pk: labs and imaging reviewed, surgery and urology consults placed. pk: disc with urology and surgery, will admit to medicine for IR PCN placement, IR consult placed urine cytology ordered. pk: disc with urology and surgery, will admit to medicine for IR PCN placement, IR consult placed urine cytology ordered. admitting team made aware to contact surgery onc tomorrow. repeat /85

## 2023-05-05 NOTE — CONSULT NOTE ADULT - TIME BILLING
Patient seen and evaluated  CT scan reviewed  very large hydronephrotic LEFT kidney  no discernible parenchyma noted  the changes appear chronic but for whatever reason the patient is now symptomatic for the enlarging cyst    - IR consult for renal decompression (LEFT side)  - sent urine obtained for cytology  - leave nephrostomy tube in place  - patient will need interval left nephrectomy

## 2023-05-05 NOTE — ED ADULT NURSE NOTE - NSIMPLEMENTINTERV_GEN_ALL_ED
Implemented All Fall Risk Interventions:  [place] to call system. Call bell, personal items and telephone within reach. Instruct patient to call for assistance. Room bathroom lighting operational. Non-slip footwear when patient is off stretcher. Physically safe environment: no spills, clutter or unnecessary equipment. Stretcher in lowest position, wheels locked, appropriate side rails in place. Provide visual cue, wrist band, yellow gown, etc. Monitor gait and stability. Monitor for mental status changes and reorient to person, place, and time. Review medications for side effects contributing to fall risk. Reinforce activity limits and safety measures with patient and family.

## 2023-05-05 NOTE — CONSULT NOTE ADULT - ASSESSMENT
Pt is a 68yo M who denies any PMHx (has not followed recently with medical doctor) presenting to the ED with b/l LE edema;  c/s'd for CT finding of severe L hydro.   Cr 1.0  UA neg leuks, neg nit.    ·	Case and imaging reviewed with Dr. Turcios   ·	Recommend IR evaluation for L PCN to decompress L kidney   ·	Urine cytology   ·	s/w ED team

## 2023-05-05 NOTE — ED PROVIDER NOTE - CARE PLAN
1 Principal Discharge DX:	Hydronephrosis  Secondary Diagnosis:	Weakness  Secondary Diagnosis:	Mass of stomach

## 2023-05-05 NOTE — ED ADULT NURSE NOTE - OBJECTIVE STATEMENT
c/o swelling and weeping b/l LE x couple of months.  fell in ED parking lot, + head trauma, - LOC, - AC     for swelling and weeping b/l LE. states they have been that way for months. Pt fell in ED parking lot getting out of car. + head trauma. - LOC. Pt assisted to wheelchair by ED staff. denies anticoagulation.

## 2023-05-05 NOTE — ED PROVIDER NOTE - PHYSICAL EXAMINATION
CONSTITUTIONAL: well-appearing  SKIN: Warm dry, normal skin turgor  HEAD: NCAT  EYES: EOMI, PERRLA, no scleral icterus, conjunctiva pink  ENT: normal pharynx with no erythema or exudates  NECK: Supple; non tender. Full ROM.  CARD: RRR, no murmurs.  RESP: clear to ausculation b/l. No crackles or wheezing.  ABD: soft, enlarged abdomen without fluid wave non tender to palpation.   EXT: Full ROM, 4+ pitting edema with weeping.  NEURO: normal motor. normal sensory. CN II-XII intact.  PSYCH: Cooperative, appropriate.

## 2023-05-05 NOTE — ED ADULT NURSE NOTE - ISOLATION TYPE:
Assessment:     Well adolescent  1  Well adolescent visit     2  Screen for STD (sexually transmitted disease)  Chlamydia/GC amplified DNA by PCR    Chlamydia/GC amplified DNA by PCR   3  Auditory acuity evaluation     4  Examination of eyes and vision     5  Screening for depression     6  Body mass index, pediatric, 5th percentile to less than 85th percentile for age     9  Exercise counseling     8  Nutritional counseling     9  Acne, unspecified acne type       Continue follow up with Dermatologist   Continue daily wash twice daily, and use prescribed medications  Follow up for routine well visit  We will call [facility] for vaccine records to review  Plan:     1  Anticipatory guidance discussed  Specific topics reviewed: drugs, ETOH, and tobacco, importance of regular exercise, importance of varied diet and puberty  2  Development: appropriate for age    1  Immunizations today: full records not available on chart, mom will try to get from the school and us as well and we can follow up with mom if there seems to be a need    4  Follow-up visit in 1 year for next well child visit, or sooner as needed  Subjective:     Dave Wang is a 13 y o  female who is here for this well-child visit  Current Issues:  Here for a well visit today with mom  Mom has no major concerns  BMI 54%  Wears corrective lenses, glasses  PHQ-9 Screening is negative for depression  regular periods, no issues    No recent illness and no chronic conditions other than acne  She follows with [facility]  She was on Doxycycline and tretinoin wash  The following portions of the patient's history were reviewed and updated as appropriate: allergies, current medications, past medical history, past social history, past surgical history and problem list     Well Child Assessment:  History was provided by the mother  Radha Mom lives with her mother (two brothers)     Nutrition  Types of intake include vegetables, meats, fruits, fish, cereals and eggs (Rarely drinks milk  Drinks water and some juice throughout the day  Snacks/junk foods, once or twice daily)  Dental  The patient has a dental home  The patient brushes teeth regularly  The patient flosses regularly  Last dental exam was less than 6 months ago  Elimination  (No problems)   Behavioral  Disciplinary methods include taking away privileges  Sleep  Average sleep duration is 8 hours  The patient does not snore  There are no sleep problems  Safety  There is no smoking in the home  Home has working smoke alarms? yes  Home has working carbon monoxide alarms? yes  There is no gun in home  School  Current grade level is 9th  Current school district is [employer]  There are no signs of learning disabilities  Screening  There are no risk factors for hearing loss  There are no risk factors for vision problems  There are no risk factors related to alcohol  There are no risk factors related to drugs  There are no risk factors related to tobacco    Social  The caregiver enjoys the child  After school, the child is at home with a parent  Sibling interactions are good  The child spends 6 hours in front of a screen (tv or computer) per day  Objective:     Vitals:    08/27/20 1026   BP: (!) 98/56   Weight: 42 7 kg (94 lb 3 2 oz)   Height: 4' 9 52" (1 461 m)     Growth parameters are noted and are appropriate for age  Wt Readings from Last 1 Encounters:   08/27/20 42 7 kg (94 lb 3 2 oz) (14 %, Z= -1 10)*     * Growth percentiles are based on CDC (Girls, 2-20 Years) data  Ht Readings from Last 1 Encounters:   08/27/20 4' 9 52" (1 461 m) (<1 %, Z= -2 36)*     * Growth percentiles are based on CDC (Girls, 2-20 Years) data  Body mass index is 20 02 kg/m²      Vitals:    08/27/20 1026   BP: (!) 98/56   Weight: 42 7 kg (94 lb 3 2 oz)   Height: 4' 9 52" (1 461 m)        Hearing Screening    125Hz 250Hz 500Hz 1000Hz 2000Hz 3000Hz 4000Hz 6000Hz 8000Hz   Right ear:   20 20 20  20     Left ear:   20 20 20  20        Visual Acuity Screening    Right eye Left eye Both eyes   Without correction:      With correction: 20/20 20/16        Physical Exam  HENT:      Right Ear: Tympanic membrane and ear canal normal       Left Ear: Tympanic membrane and ear canal normal       Nose: Nose normal       Mouth/Throat:      Mouth: Mucous membranes are moist    Eyes:      General:         Right eye: No discharge  Extraocular Movements: Extraocular movements intact  Conjunctiva/sclera: Conjunctivae normal       Pupils: Pupils are equal, round, and reactive to light  Neck:      Musculoskeletal: Normal range of motion and neck supple  Cardiovascular:      Rate and Rhythm: Normal rate and regular rhythm  Heart sounds: Normal heart sounds  No murmur  Pulmonary:      Effort: Pulmonary effort is normal       Breath sounds: Normal breath sounds  Abdominal:      General: Bowel sounds are normal  There is no distension  Palpations: Abdomen is soft  Tenderness: There is no abdominal tenderness  Genitourinary:     General: Normal vulva  Musculoskeletal: Normal range of motion  Comments: No scoliosis noted   Skin:     Capillary Refill: Capillary refill takes less than 2 seconds  Findings: No rash  Neurological:      General: No focal deficit present  Mental Status: She is alert        Deep Tendon Reflexes: Reflexes normal    Psychiatric:         Mood and Affect: Mood normal  None

## 2023-05-06 NOTE — H&P ADULT - NSHPLABSRESULTS_GEN_ALL_CORE
( @ 17:45)                      9.4  15.11 )-----------( 505                 31.0    Neutrophils = 13.39 (88.5%)  Lymphocytes = 0.78 (5.2%)  Eosinophils = 0.03 (0.2%)  Basophils = 0.03 (0.2%)  Monocytes = 0.75 (5.0%)  Bands = --%        137  |  104  |  22<H>  ----------------------------<  174<H>  4.5   |  23  |  1.0    Ca    10.2      05 May 2023 17:45  Mg     2.0         TPro  6.3  /  Alb  3.1<L>  /  TBili  0.4  /  DBili  x   /  AST  20  /  ALT  14  /  AlkPhos  54      ( 05 May 2023 18:55 )   PT: 14.20 sec;   INR: 1.24 ratio;       PTT:30.6 sec  CARDIAC MARKERS ( 05 May 2023 17:45 )  Trop 0.01 ng/mL / CK x     / CKMB x           RVP:    Venous Blood Gas:   @ 17:59  7.37/46/22/27/29.6  VBG Lactate: 1.10      Urinalysis Basic - ( 05 May 2023 18:30 )    Color: Yellow / Appearance: Clear / S.010 / pH: x  Gluc: x / Ketone: Negative  / Bili: Negative / Urobili: <2 mg/dL   Blood: x / Protein: Trace / Nitrite: Negative   Leuk Esterase: Negative / RBC: x / WBC x   Sq Epi: x / Non Sq Epi: x / Bacteria: x

## 2023-05-06 NOTE — H&P ADULT - ATTENDING COMMENTS
Patient seen and examined. Case discussed with resident team    Case discussed with resident physician. Plan of care discussed with patient. Discussed with daughters.    # severe L sided hydronephrosis with displacement of viscera and IVC compression  - urolgoy evlauted; recommendation reviewed  - IR consult pending  - urine cytology to be collected; d/w nursing staff  monitor I/O  monitor renal function  monitor for s/o infection    # lesser curvature stomach mass with retroperitoneal lymph node enlargement  CT : 5 cm inhomogeneously enhancing mass along the   lesser curvature of the stomach (2/16). This is associated with a   retroperitoneal 4.4 cm lymph node located ventral to the upper abdominal   aorta, just above the level of the celiac axis.  - will need endoscopic evaluation as op  - GI consult for recommendation    # leucocytosis- monitor  no s/o infection    # anemia- microcytic  check iron studies  check b12, folate levels    # LE swelling- due to IVC compression    # HTN  # borderline DM- check a1c    Goals of care discussed

## 2023-05-06 NOTE — H&P ADULT - NSHPREVIEWOFSYSTEMS_GEN_ALL_CORE

## 2023-05-06 NOTE — H&P ADULT - CONVERSATION DETAILS
Goals of care discussed in detail with patient. After explanation of resuscitation measures; patient verbalized that he wish for no intubation and he also did not want cardiac resuscitation.  The conversation was witnessed again with nursing staff.    But when I discussed with daughters- Quiana and Steph- they were not comfortable with patient's decision. I explained patient's wish. But they wanted patient to reconsider his thought.     After further discussion with patient he wanted to think about again and is planning to discuss with daughters.    I explained that he will be full code until final decision is made.    Nursing staff updated.    Patient will be FULL CODE for now.  Needs to continue discussion on Monday- as per patient.

## 2023-05-06 NOTE — H&P ADULT - HISTORY OF PRESENT ILLNESS
67y male    pmhx of htn    presenting to the ED for worsening lower ext swelling, abd distention,    Patient complains of intermittent llq abd pain, and weakness for the last two days as well as dyspnea on ambulation.     LE edema has been going on for approx 1 year and more recently within the past 2-3 months has been weeping fluid from b/l LE .    Reports he urinates without difficulty, had one episode of mild hematuria a "few months ago," which has since resolved.   Denies fevers/chills, dysuria, flank pain, current hematuria.      Pt does not follow with any specialists and has not seen his primary in years.    In the ED :  BP: 193/89  HR : 95  RR : 17  T : 97  O2: 100% on RA     WBC: 15  HB: 9.4  PLT : 505  Cr: 1  UA : negative     CT abdomen/pelvis:   Massive Grade IV hydronephrosis and enlargement of the left kidney, measuring 26 x 26 x 21 cm.   There is minimal residual renal parenchyma.    5 cm inhomogeneously enhancing mass along the lesser curvature of the stomach (2/16).   retroperitoneal 4.4 cm lymph node located ventral to the upper abdominal aorta, just above the level of the celiac axis (2/21).    Displacement of retroperitoneal structures to the right side by the massively enlarged left kidney. Displacement and compression of the inferior vena cava especially at the level of the aortic bifurcation (2/54)    Seen by Urology ->  Recommend IR evaluation for L PCN to decompress L kidney + Urine cytology    67y male    PMH: of htn not on medication     presenting to the ED for worsening lower ext swelling, abd distention,    Patient complains of intermittent llq abd pain, and weakness for the last two days as well as dyspnea on ambulation.     LE edema has been going on for approx 1 year and more recently within the past 2-3 months has been weeping fluid from b/l LE .    Reports he urinates without difficulty, had one episode of mild hematuria a "few months ago," which has since resolved.   Denies fevers/chills, dysuria, flank pain, current hematuria.      Pt does not follow with any specialists and has not seen his primary in years.    In the ED :  BP: 193/89  HR : 95  RR : 17  T : 97  O2: 100% on RA     WBC: 15  HB: 9.4  PLT : 505  Cr: 1  UA : negative     CT abdomen/pelvis:   Massive Grade IV hydronephrosis and enlargement of the left kidney, measuring 26 x 26 x 21 cm.   There is minimal residual renal parenchyma.    5 cm inhomogeneously enhancing mass along the lesser curvature of the stomach (2/16).   retroperitoneal 4.4 cm lymph node located ventral to the upper abdominal aorta, just above the level of the celiac axis (2/21).    Displacement of retroperitoneal structures to the right side by the massively enlarged left kidney. Displacement and compression of the inferior vena cava especially at the level of the aortic bifurcation (2/54)    Seen by Urology ->  Recommend IR evaluation for L PCN to decompress L kidney + Urine cytology

## 2023-05-06 NOTE — H&P ADULT - NSHPPHYSICALEXAM_GEN_ALL_CORE
GENERAL: NAD, lying in bed comfortably  HEAD:  Atraumatic, Normocephalic  EYES: EOMI, PERRLA, conjunctiva and sclera clear  ENT: Moist mucous membranes  NECK: Supple, No JVD  CHEST/LUNG: Clear to auscultation bilaterally; No rales, rhonchi, wheezing, or rubs. Unlabored respirations  HEART: Regular rate and rhythm; No murmurs, rubs, or gallops  ABDOMEN: Bowel sounds present; Soft, Nontender, Nondistended. No hepatomegally  EXTREMITIES:  2+ Peripheral Pulses, brisk capillary refill. No clubbing, cyanosis, or edema  NERVOUS SYSTEM:  Alert & Oriented X3, speech clear. No deficits   MSK: FROM all 4 extremities, full and equal strength  SKIN: No rashes or lesions GENERAL: NAD, lying in bed comfortably  HEAD:  Atraumatic, Normocephalic  EYES: EOMI,  conjunctiva and sclera clear  NECK: Supple  CHEST/LUNG: Clear to auscultation bilaterally;   HEART: Regular rate and rhythm  ABDOMEN: + Distension , Nondistended.   EXTREMITIES:  3+ edema with weeping wounds   NERVOUS SYSTEM:  Alert & Oriented X3, speech clear.   MSK: FROM all 4 extremities, full and equal strength

## 2023-05-06 NOTE — H&P ADULT - ASSESSMENT
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   # lesser curvature stomach mass + 4cm LN just above celiac axis   - Urology onboard   - f/u IR evaluation for L PCN to decompress L kidney   - f/u Urine cytology  - WBC: 15  - HB: 9.4  - Cr: 1 unknown baseline   - UA : negative , 1x episode of hematuria months ago-resolved  - no flank pain       #HT  - in ED BP: 193/89        # DVT : Hepari sc   # Diet : DASH  # Activity : [employer] kimani   # Dispo : from home   # Code : full    67y male  pmhx of htn not on medications   Pesenting to the ED for worsening lower ext swelling, abd distention,    # L Hydronephrosis   # L enlarged kindey [id number] with displacment of retroperitoneal structures   # 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   # lesser curvature stomach mass + 4cm LN just above celiac axis   - Urology onboard   - f/u IR evaluation for L PCN to decompress L kidney   - f/u Urine cytology  - WBC: 15  - HB: 9.4  - Cr: 1 unknown baseline   - UA : negative , 1x episode of hematuria months ago-resolved  - no flank pain     # LEswelling/ wounds   - Wet dressing   - wound care eval in AM   - No ABx for now       #HT  - in ED BP: 193/89  - not on any home meds   - started labetalol 100mg BID       # DVT : Heparin sc  # Diet : DASH  # Activity : [employer] k[employer]   # Dispo : from home   # Code : full - he will discuss GOC with daughter in AM

## 2023-05-07 NOTE — PROGRESS NOTE ADULT - SUBJECTIVE AND OBJECTIVE BOX
HPI  Patient is a 67y old Male who presents with a chief complaint of Abd distension, LE edema (07 May 2023 15:05)    Currently admitted to medicine with the primary diagnosis of Hydronephrosis       Today is hospital day 2d.     INTERVAL HPI / OVERNIGHT EVENTS:  Patient was seen and examined at bedside  abdominal feel bloated  leg swelling slightly improving        PAST MEDICAL & SURGICAL HISTORY    ALLERGIES  No Known Allergies    MEDICATIONS  STANDING MEDICATIONS  cefTRIAXone   IVPB      heparin   Injectable 5000 Unit(s) SubCutaneous every 8 hours  labetalol 100 milliGRAM(s) Oral two times a day    PRN MEDICATIONS  acetaminophen     Tablet .. 650 milliGRAM(s) Oral every 6 hours PRN  aluminum hydroxide/magnesium hydroxide/simethicone Suspension 30 milliLiter(s) Oral every 4 hours PRN  melatonin 3 milliGRAM(s) Oral at bedtime PRN  ondansetron Injectable 4 milliGRAM(s) IV Push every 8 hours PRN    VITALS:  T(F): 97.6  HR: 75  BP: 153/76  RR: 18  SpO2: 98%    PHYSICAL EXAM  GEN: no distress, comfortable  PULM: BS heard b/l equal, No wheezing  CVS: S1S2 present, no rubs or gallops  ABD: Soft, distended, left sided abdominal mass palpable  EXT: 1+ lower extremity edema  NEURO: A&Ox3, moving all extremities    LABS                        8.4    15.08 )-----------( 425      ( 07 May 2023 09:19 )             28.3     05-    137  |  102  |  24<H>  ----------------------------<  153<H>  4.5   |  25  |  1.3    Ca    9.4      07 May 2023 09:19  Phos  3.7     05-  Mg     1.9     05-07    TPro  5.7<L>  /  Alb  2.9<L>  /  TBili  0.5  /  DBili  x   /  AST  20  /  ALT  16  /  AlkPhos  48  05-07    PT/INR - ( 05 May 2023 18:55 )   PT: 14.20 sec;   INR: 1.24 ratio         PTT - ( 05 May 2023 18:55 )  PTT:30.6 sec  Urinalysis Basic - ( 06 May 2023 17:04 )    Color: Yellow / Appearance: Clear / S.037 / pH: x  Gluc: x / Ketone: Negative  / Bili: Negative / Urobili: <2 mg/dL   Blood: x / Protein: 100 mg/dL / Nitrite: Negative   Leuk Esterase: Negative / RBC: 1 /HPF / WBC 10 /HPF   Sq Epi: x / Non Sq Epi: x / Bacteria: Negative            Culture - Urine (collected 05 May 2023 22:09)  Source: Clean Catch Clean Catch (Midstream)  Final Report (07 May 2023 08:05):    <10,000 CFU/mL Normal Urogenital Gabbie          RADIOLOGY

## 2023-05-07 NOTE — CONSULT NOTE ADULT - SUBJECTIVE AND OBJECTIVE BOX
Gastroenterology Consultation:    Patient is a 67y old  Male who presents with a chief complaint of Abd distension, LE edema (07 May 2023 13:51)      Admitted on: 05-05-23  HPI:  67y male no significant PMH presenting to the ED for worsening lower ext swelling for 4 months, abd pain for 3 months and abdominal distention for 2 months. Gi was consulted for gastric mass in CT Scan. As per patient he has mild abdominal pain 2-3/10 diffuse with bloating. has also weight loss of 50 lbs in last 6 months but he said he skipped meals to loose weight. Denies any other GI symptoms.     Prior EGD: never had   Prior Colonoscopy: never had       PAST MEDICAL & SURGICAL HISTORY:  as mentioned above     FAMILY HISTORY:  no family h/o GI malignancy     Social History:  Tobacco: N  Alcohol: occasional   Drugs: N    Home Medications:    MEDICATIONS  (STANDING):  heparin   Injectable 5000 Unit(s) SubCutaneous every 8 hours  labetalol 100 milliGRAM(s) Oral two times a day    MEDICATIONS  (PRN):  acetaminophen     Tablet .. 650 milliGRAM(s) Oral every 6 hours PRN Temp greater or equal to 38C (100.4F), Mild Pain (1 - 3)  aluminum hydroxide/magnesium hydroxide/simethicone Suspension 30 milliLiter(s) Oral every 4 hours PRN Dyspepsia  melatonin 3 milliGRAM(s) Oral at bedtime PRN Insomnia  ondansetron Injectable 4 milliGRAM(s) IV Push every 8 hours PRN Nausea and/or Vomiting      Allergies  No Known Allergies      Review of Systems:   Constitutional:  No Fever, No Chills  ENT/Mouth:  No Hearing Changes,  No Difficulty Swallowing  Eyes:  No Eye Pain, No Vision Changes  Cardiovascular:  No Chest Pain, No Palpitations  Respiratory:  No Cough, No Dyspnea  Gastrointestinal:  As described in HPI  Musculoskeletal:  No Joint Swelling, No Back Pain  Skin:  No Skin Lesions, No Jaundice  Neuro:  No Syncope, No Dizziness  Heme/Lymph:  No Bruising, No Bleeding.          Physical Examination:  T(C): 36.4 (05-07-23 @ 14:53), Max: 37.9 (05-06-23 @ 22:35)  HR: 75 (05-07-23 @ 14:53) (75 - 88)  BP: 153/76 (05-07-23 @ 14:53) (153/76 - 164/83)  RR: 18 (05-07-23 @ 14:53) (18 - 18)  SpO2: 98% (05-07-23 @ 14:53) (98% - 98%)      05-05-23 @ 07:01  -  05-06-23 @ 07:00  --------------------------------------------------------  IN: 0 mL / OUT: 400 mL / NET: -400 mL    05-06-23 @ 07:01  -  05-07-23 @ 07:00  --------------------------------------------------------  IN: 0 mL / OUT: 350 mL / NET: -350 mL        Constitutional: No acute distress.  Eyes:. Conjunctivae are clear, Sclera is non-icteric.  Ears Nose and Throat: The external ears are normal appearing,  Oral mucosa is pink and moist.  Respiratory:  No signs of respiratory distress. Lung sounds are clear bilaterally.  Cardiovascular:  S1 S2, Regular rate and rhythm.  GI: Abdomen is soft, symmetric, and non-tender without distention. There are no visible lesions or scars. Bowel sounds are present and normoactive in all four quadrants. No masses, hepatomegaly, or splenomegaly are noted.   Neuro: No Tremor, No involuntary movements  Skin: No rashes, No Jaundice.          Data:                        8.4    15.08 )-----------( 425      ( 07 May 2023 09:19 )             28.3     Hgb Trend:  8.4  05-07-23 @ 09:19  8.6  05-06-23 @ 08:48  9.4  05-05-23 @ 17:45        05-07    137  |  102  |  24<H>  ----------------------------<  153<H>  4.5   |  25  |  1.3    Ca    9.4      07 May 2023 09:19  Phos  3.7     05-07  Mg     1.9     05-07    TPro  5.7<L>  /  Alb  2.9<L>  /  TBili  0.5  /  DBili  x   /  AST  20  /  ALT  16  /  AlkPhos  48  05-07    Liver panel trend:  TBili 0.5   /   AST 20   /   ALT 16   /   AlkP 48   /   Tptn 5.7   /   Alb 2.9    /   DBili --      05-07  TBili 0.5   /   AST 14   /   ALT 9   /   AlkP 47   /   Tptn 5.6   /   Alb 3.0    /   DBili --      05-06  TBili 0.4   /   AST 20   /   ALT 14   /   AlkP 54   /   Tptn 6.3   /   Alb 3.1    /   DBili --      05-05      PT/INR - ( 05 May 2023 18:55 )   PT: 14.20 sec;   INR: 1.24 ratio         PTT - ( 05 May 2023 18:55 )  PTT:30.6 sec    Culture - Urine (collected 05 May 2023 22:09)  Source: Clean Catch Clean Catch (Midstream)  Final Report (07 May 2023 08:05):    <10,000 CFU/mL Normal Urogenital Gabbie          Radiology:    < from: CT Abdomen and Pelvis w/ IV Cont (05.05.23 @ 18:58) >    TUBES AND LINES: None.    LOWER CHEST: Cardiomegaly. Small left pleural effusion. No pericardial   effusion.    HEPATIC: The liver is normal in size with no evidence of solid mass or   bile duct dilatation. Hepatic steatosis is noted. The portal vein is   patent. The hepatic veins are opacified.    BILIARY: No calcified gallstones are noted.    SPLEEN: Unremarkable.    PANCREAS: The pancreas is partially displaced by the enlarged left kidney   but is otherwise normal in size and configuration. No evidence of mass or   pancreatitis.    ADRENAL GLANDS: Unremarkable.    KIDNEYS: There is massive Grade IV hydronephrosis and enlargement of the   left kidney, measuring 26 x 26 x 21 cm. There is minimal residual renal   parenchyma.    Right kidney displays normal enhancement with no evidence of   hydronephrosis, calcified stones, or solid mass. There is a right 8.8 cm   renal cyst.    ABDOMINOPELVIC NODES: (See below)    PELVIC ORGANS: Prostatic enlargement. No evidence of pelvic mass,   lymphadenopathy, or fluid collection.    BLADDER: Unremarkable.    PERITONEUM/MESENTERY/BOWEL: 5 cm inhomogeneously enhancing mass along the   lesser curvature of the stomach (2/16). This is associated with a   retroperitoneal 4.4 cm lymph node located ventral to the upper abdominal   aorta, just above the level of the celiac axis.    Moderate ascites is noted in the abdomen and pelvis.    No evidence of bowel obstruction. No pneumoperitoneum.  The appendix is   normal in appearance.    BONES/SOFTTISSUES: Degenerative changes of the spine are noted.    OTHER: Aortoiliac calcifications are noted with no evidence of abdominal   aortic aneurysm.    There is displacement of retroperitoneal structures to the right side by   the massively enlarged left kidney. This includes displacement and   compression of the inferior vena cava especially at the level of the   aortic bifurcation (2/54)      IMPRESSION:    1. There is massive Grade IV hydronephrosis and enlargement of the left   kidney, measuring 26 x 26 x 21 cm. There is minimal residual renal   parenchyma.    2. There is a 5 cm inhomogeneously enhancing mass along the lesser   curvature of the stomach (2/16). This is associated with a   retroperitoneal 4.4 cm lymph node located ventral to the upper abdominal   aorta, just above the level of the celiac axis (2/21).    3. There is displacement of retroperitoneal structures to the right side   by the massively enlarged left kidney. This includes displacement and   compression of the inferior vena cava especially at the level of the   aortic bifurcation (2/54)      Findings were reviewed with Dr. Christian Miller 6:55 PM 5/5/2023, with   read back.    < end of copied text >     Gastroenterology Consultation:    Patient is a 67y old  Male who presents with a chief complaint of Abd distension, LE edema (07 May 2023 13:51)      Admitted on: 05-05-23  HPI:  67y male no significant PMH presenting to the ED for worsening lower ext swelling for 4 months, abd pain for 3 months and abdominal distention for 2 months. Gi was consulted for gastric mass in CT Scan. As per patient he has mild abdominal pain 2-3/10 diffuse with bloating. has also weight loss of 50 lbs in last 6 months but he said he skipped meals to loose weight. Denies any other GI symptoms.  No melena, no hematemesis or hematochezia , denies vomiting, no constipation of diarrhea     Prior EGD: never had   Prior Colonoscopy: never had       PAST MEDICAL & SURGICAL HISTORY:  as mentioned above     FAMILY HISTORY:  no family h/o GI malignancy     Social History:  Tobacco: N  Alcohol: occasional   Drugs: N    Home Medications:    MEDICATIONS  (STANDING):  heparin   Injectable 5000 Unit(s) SubCutaneous every 8 hours  labetalol 100 milliGRAM(s) Oral two times a day    MEDICATIONS  (PRN):  acetaminophen     Tablet .. 650 milliGRAM(s) Oral every 6 hours PRN Temp greater or equal to 38C (100.4F), Mild Pain (1 - 3)  aluminum hydroxide/magnesium hydroxide/simethicone Suspension 30 milliLiter(s) Oral every 4 hours PRN Dyspepsia  melatonin 3 milliGRAM(s) Oral at bedtime PRN Insomnia  ondansetron Injectable 4 milliGRAM(s) IV Push every 8 hours PRN Nausea and/or Vomiting      Allergies  No Known Allergies      Review of Systems:   Constitutional:  No Fever, No Chills  ENT/Mouth:  No Hearing Changes,  No Difficulty Swallowing  Eyes:  No Eye Pain, No Vision Changes  Cardiovascular:  No Chest Pain, No Palpitations  Respiratory:  No Cough, No Dyspnea  Gastrointestinal:  As described in HPI  Musculoskeletal:  No Joint Swelling, No Back Pain  Skin:  No Skin Lesions, No Jaundice  Neuro:  No Syncope, No Dizziness  Heme/Lymph:  No Bruising, No Bleeding.          Physical Examination:  T(C): 36.4 (05-07-23 @ 14:53), Max: 37.9 (05-06-23 @ 22:35)  HR: 75 (05-07-23 @ 14:53) (75 - 88)  BP: 153/76 (05-07-23 @ 14:53) (153/76 - 164/83)  RR: 18 (05-07-23 @ 14:53) (18 - 18)  SpO2: 98% (05-07-23 @ 14:53) (98% - 98%)      05-05-23 @ 07:01  -  05-06-23 @ 07:00  --------------------------------------------------------  IN: 0 mL / OUT: 400 mL / NET: -400 mL    05-06-23 @ 07:01  -  05-07-23 @ 07:00  --------------------------------------------------------  IN: 0 mL / OUT: 350 mL / NET: -350 mL        Constitutional: No acute distress.  Eyes:. Conjunctivae are clear, Sclera is non-icteric.  Ears Nose and Throat: The external ears are normal appearing,  Oral mucosa is pink and moist.  Respiratory:  No signs of respiratory distress. Lung sounds are clear bilaterally.  Cardiovascular:  S1 S2, Regular rate and rhythm.  GI: Abdomen is soft, symmetric, and non-tender with distention. Bowel sounds are present and normoactive in all four quadrants.   Neuro: No Tremor, No involuntary movements  Skin: No rashes, No Jaundice.          Data:                        8.4    15.08 )-----------( 425      ( 07 May 2023 09:19 )             28.3     Hgb Trend:  8.4  05-07-23 @ 09:19  8.6  05-06-23 @ 08:48  9.4  05-05-23 @ 17:45        05-07    137  |  102  |  24<H>  ----------------------------<  153<H>  4.5   |  25  |  1.3    Ca    9.4      07 May 2023 09:19  Phos  3.7     05-07  Mg     1.9     05-07    TPro  5.7<L>  /  Alb  2.9<L>  /  TBili  0.5  /  DBili  x   /  AST  20  /  ALT  16  /  AlkPhos  48  05-07    Liver panel trend:  TBili 0.5   /   AST 20   /   ALT 16   /   AlkP 48   /   Tptn 5.7   /   Alb 2.9    /   DBili --      05-07  TBili 0.5   /   AST 14   /   ALT 9   /   AlkP 47   /   Tptn 5.6   /   Alb 3.0    /   DBili --      05-06  TBili 0.4   /   AST 20   /   ALT 14   /   AlkP 54   /   Tptn 6.3   /   Alb 3.1    /   DBili --      05-05      PT/INR - ( 05 May 2023 18:55 )   PT: 14.20 sec;   INR: 1.24 ratio         PTT - ( 05 May 2023 18:55 )  PTT:30.6 sec    Culture - Urine (collected 05 May 2023 22:09)  Source: Clean Catch Clean Catch (Midstream)  Final Report (07 May 2023 08:05):    <10,000 CFU/mL Normal Urogenital Gabbie          Radiology:    < from: CT Abdomen and Pelvis w/ IV Cont (05.05.23 @ 18:58) >    TUBES AND LINES: None.    LOWER CHEST: Cardiomegaly. Small left pleural effusion. No pericardial   effusion.    HEPATIC: The liver is normal in size with no evidence of solid mass or   bile duct dilatation. Hepatic steatosis is noted. The portal vein is   patent. The hepatic veins are opacified.    BILIARY: No calcified gallstones are noted.    SPLEEN: Unremarkable.    PANCREAS: The pancreas is partially displaced by the enlarged left kidney   but is otherwise normal in size and configuration. No evidence of mass or   pancreatitis.    ADRENAL GLANDS: Unremarkable.    KIDNEYS: There is massive Grade IV hydronephrosis and enlargement of the   left kidney, measuring 26 x 26 x 21 cm. There is minimal residual renal   parenchyma.    Right kidney displays normal enhancement with no evidence of   hydronephrosis, calcified stones, or solid mass. There is a right 8.8 cm   renal cyst.    ABDOMINOPELVIC NODES: (See below)    PELVIC ORGANS: Prostatic enlargement. No evidence of pelvic mass,   lymphadenopathy, or fluid collection.    BLADDER: Unremarkable.    PERITONEUM/MESENTERY/BOWEL: 5 cm inhomogeneously enhancing mass along the   lesser curvature of the stomach (2/16). This is associated with a   retroperitoneal 4.4 cm lymph node located ventral to the upper abdominal   aorta, just above the level of the celiac axis.    Moderate ascites is noted in the abdomen and pelvis.    No evidence of bowel obstruction. No pneumoperitoneum.  The appendix is   normal in appearance.    BONES/SOFTTISSUES: Degenerative changes of the spine are noted.    OTHER: Aortoiliac calcifications are noted with no evidence of abdominal   aortic aneurysm.    There is displacement of retroperitoneal structures to the right side by   the massively enlarged left kidney. This includes displacement and   compression of the inferior vena cava especially at the level of the   aortic bifurcation (2/54)      IMPRESSION:    1. There is massive Grade IV hydronephrosis and enlargement of the left   kidney, measuring 26 x 26 x 21 cm. There is minimal residual renal   parenchyma.    2. There is a 5 cm inhomogeneously enhancing mass along the lesser   curvature of the stomach (2/16). This is associated with a   retroperitoneal 4.4 cm lymph node located ventral to the upper abdominal   aorta, just above the level of the celiac axis (2/21).    3. There is displacement of retroperitoneal structures to the right side   by the massively enlarged left kidney. This includes displacement and   compression of the inferior vena cava especially at the level of the   aortic bifurcation (2/54)      Findings were reviewed with Dr. Christian Miller 6:55 PM 5/5/2023, with   read back.    < end of copied text >

## 2023-05-07 NOTE — CONSULT NOTE ADULT - SUBJECTIVE AND OBJECTIVE BOX
INTERVENTIONAL RADIOLOGY CONSULT:     Procedure Requested: Left PCN    HPI:  67y male  PMH: of htn not on medication   presenting to the ED for worsening lower ext swelling, abd distention,  Patient complains of intermittent llq abd pain, and weakness for the last two days as well as dyspnea on ambulation.   LE edema has been going on for approx 1 year and more recently within the past 2-3 months has been weeping fluid from b/l LE .    Reports he urinates without difficulty, had one episode of mild hematuria a "few months ago," which has since resolved.   Denies fevers/chills, dysuria, flank pain, current hematuria.    Pt does not follow with any specialists and has not seen his primary in years.    In the ED :  BP: 193/89  HR : 95  RR : 17  T : 97  O2: 100% on RA     WBC: 15  HB: 9.4  PLT : 505  Cr: 1  UA : negative     CT abdomen/pelvis:   Massive Grade IV hydronephrosis and enlargement of the left kidney, measuring 26 x 26 x 21 cm.   There is minimal residual renal parenchyma.    5 cm inhomogeneously enhancing mass along the lesser curvature of the stomach (2/16).   retroperitoneal 4.4 cm lymph node located ventral to the upper abdominal aorta, just above the level of the celiac axis (2/21).    Displacement of retroperitoneal structures to the right side by the massively enlarged left kidney. Displacement and compression of the inferior vena cava especially at the level of the aortic bifurcation (2/54)    Seen by Urology ->  Recommend IR evaluation for L PCN to decompress L kidney + Urine cytology    (06 May 2023 01:51)    PAST MEDICAL & SURGICAL HISTORY:    MEDICATIONS  (STANDING):  heparin   Injectable 5000 Unit(s) SubCutaneous every 8 hours  labetalol 100 milliGRAM(s) Oral two times a day    MEDICATIONS  (PRN):  acetaminophen     Tablet .. 650 milliGRAM(s) Oral every 6 hours PRN Temp greater or equal to 38C (100.4F), Mild Pain (1 - 3)  aluminum hydroxide/magnesium hydroxide/simethicone Suspension 30 milliLiter(s) Oral every 4 hours PRN Dyspepsia  melatonin 3 milliGRAM(s) Oral at bedtime PRN Insomnia  ondansetron Injectable 4 milliGRAM(s) IV Push every 8 hours PRN Nausea and/or Vomiting    Allergies    No Known Allergies    Intolerances      Physical Exam:   Vital Signs Last 24 Hrs  T(C): 37.1 (07 May 2023 04:23), Max: 37.9 (06 May 2023 22:35)  T(F): 98.8 (07 May 2023 04:23), Max: 100.2 (06 May 2023 22:35)  HR: 75 (07 May 2023 04:23) (75 - 88)  BP: 155/74 (07 May 2023 04:23) (155/74 - 164/83)  BP(mean): --  RR: 18 (07 May 2023 04:23) (18 - 18)  SpO2: --      Labs:                         8.4    15.08 )-----------( 425      ( 07 May 2023 09:19 )             28.3     05-07    137  |  102  |  24<H>  ----------------------------<  153<H>  4.5   |  25  |  1.3    Ca    9.4      07 May 2023 09:19  Phos  3.7     05-07  Mg     1.9     05-07    TPro  5.7<L>  /  Alb  2.9<L>  /  TBili  0.5  /  DBili  x   /  AST  20  /  ALT  16  /  AlkPhos  48  05-07    PT/INR - ( 05 May 2023 18:55 )   PT: 14.20 sec;   INR: 1.24 ratio         PTT - ( 05 May 2023 18:55 )  PTT:30.6 sec    Radiology & Additional Studies:   Radiology imaging reviewed.     ASSESSMENT/ PLAN:   66 yo M with apparent chronic obstruction of left kidney. CT A/P reveals severe hydronephrosis of the right kidney resulting on mass effect on adjacent structures. IR consulted for PCN for decompression. WBC 15, Cr 1.3. Afebrile. VSS.    Plan:  - Plan for possible left PCN tomorrow, 5/8  - NPO at midnight  - Hold anticoagulation/antiplatelet  - Current anticoagulation/antiplatelet: None    Risks, benefits, and alternatives to treatment discussed. All questions answered with understanding.    Thank you for the courtesy of this consult, please call [phone] Monday-Friday from 8am to 5pm. All other times please call [phone].

## 2023-05-07 NOTE — CONSULT NOTE ADULT - ASSESSMENT
67y male no significant PMH presenting to the ED for worsening lower ext swelling for 4 months, abd pain for 3 months and abdominal distention for 2 months. Gi was consulted for gastric mass in CT Scan. As per patient he has mild abdominal pain 2-3/10 diffuse with bloating. has also weight loss of 50 lbs in last 6 months but he said he skipped meals to loose weight. Denies any other GI symptoms.     #)gastric mass in CT scan r/o malignancy vs gastric ulcer vs polyp  #)Iron deficiency anemia   -hemodynamically stable   -admitted Hb of 9.4 No baseline   -iron studies consistent with iron deficiency   -Denies any evidence of active bleeding   -Ct abdomen showed  There is a 5 cm inhomogeneously enhancing mass along the lesser curvature of the stomach (2/16). This is associated with a retroperitoneal 4.4 cm lymph node located ventral to the upper abdominal aorta, just above the level of the celiac axis  -Never had EGD and colonoscopy   -not on any blood antiplatelet or Anti coagulation      Recs:   Trend CBC, Keep HB>8   active type and screen   2 18 gauge   will plan for EGD after IR intervention for hydronephrosis once medically stable   if EGD negative need colonoscopy as an OP for anemia     #)Left Hydronephrosis Left enlarged kindey [id number] with displacement of retroperitoneal structures   -Management as per urology  67y male no significant PMH presenting to the ED for worsening lower ext swelling for 4 months, abd pain for 3 months and abdominal distention for 2 months. Gi was consulted for gastric mass in CT Scan. As per patient he has mild abdominal pain 2-3/10 diffuse with bloating. has also weight loss of 50 lbs in last 6 months but he said he skipped meals to loose weight. Denies any other GI symptoms.     #)gastric mass in CT scan r/o malignancy vs gastric ulcer vs polyp  #)Iron deficiency anemia   -hemodynamically stable   -admitted Hb of 9.4 No baseline   -iron studies consistent with iron deficiency   -Denies any evidence of active bleeding   -Ct abdomen showed  There is a 5 cm inhomogeneously enhancing mass along the lesser curvature of the stomach (2/16). This is associated with a retroperitoneal 4.4 cm lymph node located ventral to the upper abdominal aorta, just above the level of the celiac axis  -Never had EGD and colonoscopy   -not on any blood antiplatelet or Anti coagulation      Recs:   Trend CBC, Keep HB>8   active type and screen   2 18 gauge   will plan for EGD after IR intervention for hydronephrosis once medically stable   will also need colonoscopy as an OP for anemia . [facility]-up with [facility] [phone]     #)Left Hydronephrosis Left enlarged kidney [id number] with displacement of retroperitoneal structures   -Management as per urology

## 2023-05-07 NOTE — PROGRESS NOTE ADULT - ASSESSMENT
# severe L sided hydronephrosis with displacement of viscera and IVC compression  - urolgoy evlauted; recommendation reviewed  - IR evaluated- plan for PCN tomorrow; NPO after midnight  - urine cytology  monitor I/O  monitor renal function  monitor for s/o infection  - patient with low grade temp- T max- 100.2 and leucocytosis; UA WBC- 10; start on ceftriaxone empirically;  monitor blood cultures  ur culture- nl nancy    # lesser curvature stomach mass with retroperitoneal lymph node enlargement  CT : 5 cm inhomogeneously enhancing mass along the   lesser curvature of the stomach (2/16). This is associated with a   retroperitoneal 4.4 cm lymph node located ventral to the upper abdominal   aorta, just above the level of the celiac axis.  - will need endoscopic evaluation as op  - GI consult for recommendation    # leucocytosis- monitor  no s/o infection    # anemia- microcytic  possibly blood loss from gastric mass  check iron studies  check b12, folate levels  monitor CBC  GI evaluated    # LE swelling- due to IVC compression    # HTN  # borderline DM- check a1c    Goals of care discussed . patient wants to be DNR and DNI. MOLST filled

## 2023-05-08 NOTE — PROGRESS NOTE ADULT - ASSESSMENT
67y male no significant PMH presenting to the ED for worsening lower ext swelling for 4 months, abd pain for 3 months and abdominal distention for 2 months. Gi was consulted for gastric mass in CT Scan. As per patient he has mild abdominal pain 2-3/10 diffuse with bloating. has also weight loss of 50 lbs in last 6 months but he said he skipped meals to loose weight. Denies any other GI symptoms.     #)gastric mass in CT scan r/o malignancy vs gastric ulcer vs polyp  #)Iron deficiency anemia   -hemodynamically stable   -admitted Hb of 9.4 No baseline   -iron studies consistent with iron deficiency   -Ct abdomen showed  There is a 5 cm inhomogeneously enhancing mass along the lesser curvature of the stomach (2/16). This is associated with a retroperitoneal 4.4 cm lymph node located ventral to the upper abdominal aorta, just above the level of the celiac axis  -Never had EGD and colonoscopy   -not on any blood antiplatelet or Anti coagulation    -Denies any nausea, vomiting   -Inc WBC count today     Recs:   Trend CBC, Keep HB>8   active type and screen   2 18 gauge   will plan for EGD after IR intervention for hydronephrosis once medically stable   will also need colonoscopy as an OP for anemia . [facility]-up with [facility] [phone]     #)Left Hydronephrosis Left enlarged kidney [id number] with displacement of retroperitoneal structures   -Management as per urology

## 2023-05-08 NOTE — PROGRESS NOTE ADULT - SUBJECTIVE AND OBJECTIVE BOX
CHRISTINE VILLAVICENCIO 67y Male  MRN#: [id number]     Hospital Day: 3d    CC:  Weakness, hydronephrosis, stomach mass    HOSPITAL COURSE:   67M. PMH: of htn not on medication   Presented 5/5 c/o worsening lower ext swelling, dyspnea w/ ambulation, abd distention w/ LLQ pain, and weakness x2d.  LE edema has been going on for approx 1 year and more recently within the past 2-3 months has been weeping fluid from b/l LE . Reports he urinates without difficulty, had one episode of mild hematuria a "few months ago," which has since resolved.   Pt does not follow with any specialists and has not seen his primary in years.    In the ED: BP: 193/89; HR : 95, RR : 17, T : 97, O2: 100% on RA     WBC: 15, HB: 9.4 (microcytic); PLT : 505, Cr: 1, UA : negative     CT abdomen/pelvis: Massive Grade IV hydronephrosis and enlargement of the left kidney, measuring 26 x 26 x 21 cm.; 5 cm inhomogeneously enhancing mass along the lesser curvature of the stomach (). retroperitoneal 4.4 cm lymph node located ventral to the upper abdominal aorta, just above the level of the celiac axis (). Displacement of retroperitoneal structures to the right side by the massively enlarged left kidney. Displacement and compression of the inferior vena cava especially at the level of the aortic bifurcation ()  Seen by Urology ->  IR evaluation for L PCN to decompress L kidney + Urine cytology - planned for PCN   GI Eval'd : will plan for EGD after IR intervention for hydronephrosis once medically stable    SUBJECTIVE     Overnight events  None    Subjective complaints                                               ----------------------------------------------------------  OBJECTIVE  PAST MEDICAL & SURGICAL HISTORY                                            -----------------------------------------------------------  ALLERGIES:  No Known Allergies                                            ------------------------------------------------------------    HOME MEDICATIONS  Home Medications:                           MEDICATIONS:  STANDING MEDICATIONS  cefTRIAXone   IVPB      cefTRIAXone   IVPB 1000 milliGRAM(s) IV Intermittent every 24 hours  labetalol 100 milliGRAM(s) Oral two times a day  pantoprazole    Tablet 40 milliGRAM(s) Oral before breakfast    PRN MEDICATIONS  acetaminophen     Tablet .. 650 milliGRAM(s) Oral every 6 hours PRN  aluminum hydroxide/magnesium hydroxide/simethicone Suspension 30 milliLiter(s) Oral every 4 hours PRN  melatonin 3 milliGRAM(s) Oral at bedtime PRN  ondansetron Injectable 4 milliGRAM(s) IV Push every 8 hours PRN                                            ------------------------------------------------------------  VITAL SIGNS: Last 24 Hours  T(C): 37.3 (08 May 2023 04:43), Max: 37.5 (07 May 2023 21:41)  T(F): 99.1 (08 May 2023 04:43), Max: 99.5 (07 May 2023 21:41)  HR: 80 (08 May 2023 04:43) (75 - 83)  BP: 171/85 (08 May 2023 04:43) (153/76 - 176/92)  BP(mean): --  RR: 18 (08 May 2023 04:43) (17 - 18)  SpO2: 98% (08 May 2023 03:01) (96% - 98%)                                             --------------------------------------------------------------  LABS:                        8.4    15.08 )-----------( 425      ( 07 May 2023 09:19 )             28.3     05-07    137  |  102  |  24<H>  ----------------------------<  153<H>  4.5   |  25  |  1.3    Ca    9.4      07 May 2023 09:19  Phos  3.7       Mg     1.9         TPro  5.7<L>  /  Alb  2.9<L>  /  TBili  0.5  /  DBili  x   /  AST  20  /  ALT  16  /  AlkPhos  48        Urinalysis Basic - ( 06 May 2023 17:04 )    Color: Yellow / Appearance: Clear / S.037 / pH: x  Gluc: x / Ketone: Negative  / Bili: Negative / Urobili: <2 mg/dL   Blood: x / Protein: 100 mg/dL / Nitrite: Negative   Leuk Esterase: Negative / RBC: 1 /HPF / WBC 10 /HPF   Sq Epi: x / Non Sq Epi: x / Bacteria: Negative              Culture - Urine (collected 05 May 2023 22:09)  Source: Clean Catch Clean Catch (Midstream)  Final Report (07 May 2023 08:05):    <10,000 CFU/mL Normal Urogenital Gabbie                                                    -------------------------------------------------------------  RADIOLOGY:                                            --------------------------------------------------------------    PHYSICAL EXAM:                                             --------------------------------------------------------------                 CHRISTINE VILLAVICENCIO 67y Male  MRN#: [id number]     Hospital Day: 3d    CC:  Weakness, hydronephrosis, stomach mass    HOSPITAL COURSE:   67M. PMH: of htn not on medication   Presented 5/5 c/o worsening lower ext swelling, dyspnea w/ ambulation, abd distention w/ LLQ pain, and weakness x2d.  LE edema has been going on for approx 1 year and more recently within the past 2-3 months has been weeping fluid from b/l LE . Reports he urinates without difficulty, had one episode of mild hematuria a "few months ago," which has since resolved.   Pt does not follow with any specialists and has not seen his primary in years.    In the ED: BP: 193/89; HR : 95, RR : 17, T : 97, O2: 100% on RA     WBC: 15, HB: 9.4 (microcytic); PLT : 505, Cr: 1, UA : negative     CT abdomen/pelvis: Massive Grade IV hydronephrosis and enlargement of the left kidney, measuring 26 x 26 x 21 cm.; 5 cm inhomogeneously enhancing mass along the lesser curvature of the stomach (). retroperitoneal 4.4 cm lymph node located ventral to the upper abdominal aorta, just above the level of the celiac axis (). Displacement of retroperitoneal structures to the right side by the massively enlarged left kidney. Displacement and compression of the inferior vena cava especially at the level of the aortic bifurcation ()  Seen by Urology ->  IR evaluation for L PCN to decompress L kidney + Urine cytology - planned for PCN   GI Eval'd : will plan for EGD after IR intervention for hydronephrosis once medically stable    SUBJECTIVE     Overnight events  None    Subjective complaints   Pt evaluated at bedside. Pleasant. No active complaints.                                            ----------------------------------------------------------  OBJECTIVE  PAST MEDICAL & SURGICAL HISTORY                                            -----------------------------------------------------------  ALLERGIES:  No Known Allergies                                            ------------------------------------------------------------    HOME MEDICATIONS  Home Medications:                           MEDICATIONS:  STANDING MEDICATIONS  cefTRIAXone   IVPB      cefTRIAXone   IVPB 1000 milliGRAM(s) IV Intermittent every 24 hours  labetalol 100 milliGRAM(s) Oral two times a day  pantoprazole    Tablet 40 milliGRAM(s) Oral before breakfast    PRN MEDICATIONS  acetaminophen     Tablet .. 650 milliGRAM(s) Oral every 6 hours PRN  aluminum hydroxide/magnesium hydroxide/simethicone Suspension 30 milliLiter(s) Oral every 4 hours PRN  melatonin 3 milliGRAM(s) Oral at bedtime PRN  ondansetron Injectable 4 milliGRAM(s) IV Push every 8 hours PRN                                            ------------------------------------------------------------  VITAL SIGNS: Last 24 Hours  T(C): 37.3 (08 May 2023 04:43), Max: 37.5 (07 May 2023 21:41)  T(F): 99.1 (08 May 2023 04:43), Max: 99.5 (07 May 2023 21:41)  HR: 80 (08 May 2023 04:43) (75 - 83)  BP: 171/85 (08 May 2023 04:43) (153/76 - 176/92)  BP(mean): --  RR: 18 (08 May 2023 04:43) (17 - 18)  SpO2: 98% (08 May 2023 03:01) (96% - 98%)                                             --------------------------------------------------------------  LABS:                        8.4    15.08 )-----------( 425      ( 07 May 2023 09:19 )             28.3     05-    137  |  102  |  24<H>  ----------------------------<  153<H>  4.5   |  25  |  1.3    Ca    9.4      07 May 2023 09:19  Phos  3.7     05-  Mg     1.9     -    TPro  5.7<L>  /  Alb  2.9<L>  /  TBili  0.5  /  DBili  x   /  AST  20  /  ALT  16  /  AlkPhos  48        Urinalysis Basic - ( 06 May 2023 17:04 )    Color: Yellow / Appearance: Clear / S.037 / pH: x  Gluc: x / Ketone: Negative  / Bili: Negative / Urobili: <2 mg/dL   Blood: x / Protein: 100 mg/dL / Nitrite: Negative   Leuk Esterase: Negative / RBC: 1 /HPF / WBC 10 /HPF   Sq Epi: x / Non Sq Epi: x / Bacteria: Negative              Culture - Urine (collected 05 May 2023 22:09)  Source: Clean Catch Clean Catch (Midstream)  Final Report (07 May 2023 08:05):    <10,000 CFU/mL Normal Urogenital Gabbie                                                    -------------------------------------------------------------  RADIOLOGY:                                            --------------------------------------------------------------    PHYSICAL EXAM:  GEN: NAD  NEURO: Alert & Orientedx3, no gross focal deficit  CARD: S1, S2 audible, no S3, regular rate and rhythm, no murmur  PULM: B/L breath sounds, no wheezing, crackles, or rales  ABD: Soft, Distended on L side, mild tenderness on VINNY&L quadrants w/o guarding   EXTR: No clubbing, cyanosis, 1+ LE edema. 2+ pulses b/l LE                                           --------------------------------------------------------------

## 2023-05-08 NOTE — GOALS OF CARE CONVERSATION - ADVANCED CARE PLANNING - CONVERSATION DETAILS
Spoke w pt re DNR DNI status at his request- states he wants to receive CPR if necessary, and that while he generally does not like the idea of intubation, concedes that if it is a necessary part of saving his life, would accept intubation as part of resuscitation and saving his life.     MEAGHAN voided and new MOLST signed - pt is FULL CODE.

## 2023-05-08 NOTE — PROGRESS NOTE ADULT - SUBJECTIVE AND OBJECTIVE BOX
INTERVENTIONAL RADIOLOGY BRIEF-OPERATIVE NOTE    Procedure: left nephrostomy    Pre-Op Diagnosis: left urinary obstruction    Post-Op Diagnosis: same as pre    Attending: Shama  Resident:     Anesthesia (type):  [ ] General Anesthesia  [ ] Deep Sedation - provided by anesthesiologist  [x ] Conscious Sedation -  Versed and Fentanyl   [ x] Local/Regional    Contrast: 0    Estimated Blood Loss: 0    Condition:   [ ] Critical  [ ] Serious  [ ] Fair   [ x] Good    Findings/Follow up Plan of Care: 8.5F left nephrostomy placed. red/brown fluid aspirated and sent for culture.    Specimens Removed: see above    Implants: see above    Complications: no acute    Disposition: back to room.       Please call Interventional Radiology [phone] with any questions, concerns, or issues.

## 2023-05-08 NOTE — PROGRESS NOTE ADULT - ASSESSMENT
67M. PMH: of htn not on medication   Presented 5/5 c/o worsening lower ext swelling, dyspnea w/ ambulation, abd distention w/ LLQ pain, and weakness x2d. Found to have severe L sided hydronephrosis with displacement of viscera and IVC compression and stomach mass    # severe L sided hydronephrosis with displacement of viscera and IVC compression  - urolgoy evlauted; recommendation reviewed  - IR evaluated- plan for PCN today; NPO   - urine cytology  monitor I/O  monitor renal function  monitor for s/o infection  - patient with low grade temp- T max- 100.2 and leucocytosis; UA WBC- 10; start on ceftriaxone empirically;  monitor blood cultures  ur culture- nl nancy    # lesser curvature stomach mass with retroperitoneal lymph node enlargement  CT : 5 cm inhomogeneously enhancing mass along the   lesser curvature of the stomach (2/16). This is associated with a   retroperitoneal 4.4 cm lymph node located ventral to the upper abdominal   aorta, just above the level of the celiac axis.  - will need endoscopic evaluation as op  - GI consult for recommendation: will plan for EGD after IR intervention for hydronephrosis once medically stable    # leucocytosis- monitor  no s/o infection    # anemia- microcytic  possibly blood loss from gastric mass  check iron studies  check b12, folate levels  monitor CBC  GI evaluated    # LE swelling- due to IVC compression    # HTN  # borderline DM- check a1c    #CODE STATUS: Full code (per documentation/MOLST 5/7)   67M. PMH: of htn not on medication   Presented 5/5 c/o worsening lower ext swelling, dyspnea w/ ambulation, abd distention w/ LLQ pain, and weakness x2d. Found to have severe L sided hydronephrosis with displacement of viscera and IVC compression and stomach mass    # severe L sided hydronephrosis with displacement of viscera and IVC compression  # leucocytosis- monitor  - 5/7: patient with low grade temp- T max- 100.2 and leucocytosis; UA WBC- 10; ur culture- nl nancy start on ceftriaxone   - urolgoy evlauted; recommendation reviewed  - IR evaluated- plan for PCN today; NPO   - F/u BCx (5/7)  - Rocephin (5/7-present) 9empirically;    # lesser curvature stomach mass with retroperitoneal lymph node enlargement  # LE swelling- due to IVC compression  CT : 5 cm inhomogeneously enhancing mass along the   lesser curvature of the stomach (2/16). This is associated with a   retroperitoneal 4.4 cm lymph node located ventral to the upper abdominal   aorta, just above the level of the celiac axis.  - will need endoscopic evaluation as op  - GI consult for recommendation: will plan for EGD after IR intervention for hydronephrosis once medically stable    # anemia- microcytic  possibly blood loss from gastric mass  check iron studies  - F/u b12, folate levels    #HTN  #Borderline DM- a1c (5/7) 5.0  #CODE STATUS: Full code (per documentation/MOLST 5/7)

## 2023-05-08 NOTE — PROGRESS NOTE ADULT - SUBJECTIVE AND OBJECTIVE BOX
Gastroenterology progress note:     Patient is a 67y old  Male who presents with a chief complaint of Abd distension, LE edema (08 May 2023 07:29)       Admitted on: 05-05-23    We are following the patient for gastric mass in CT scan      Interval History:  has mild abdominal discomfort   denies any nausea, vomiting        PAST MEDICAL & SURGICAL HISTORY:      MEDICATIONS  (STANDING):  cefTRIAXone   IVPB      cefTRIAXone   IVPB 1000 milliGRAM(s) IV Intermittent every 24 hours  labetalol 100 milliGRAM(s) Oral two times a day  pantoprazole    Tablet 40 milliGRAM(s) Oral before breakfast    MEDICATIONS  (PRN):  acetaminophen     Tablet .. 650 milliGRAM(s) Oral every 6 hours PRN Temp greater or equal to 38C (100.4F), Mild Pain (1 - 3)  aluminum hydroxide/magnesium hydroxide/simethicone Suspension 30 milliLiter(s) Oral every 4 hours PRN Dyspepsia  HYDROmorphone   Tablet 1 milliGRAM(s) Oral every 8 hours PRN Severe Pain (7 - 10)  melatonin 3 milliGRAM(s) Oral at bedtime PRN Insomnia  ondansetron Injectable 4 milliGRAM(s) IV Push every 8 hours PRN Nausea and/or Vomiting      Allergies  No Known Allergies      Review of Systems:   Cardiovascular:  No Chest Pain, No Palpitations  Respiratory:  No Cough, No Dyspnea  Gastrointestinal:  As described in HPI    Physical Examination:  T(C): 36.7 (05-08-23 @ 09:06), Max: 37.5 (05-07-23 @ 21:41)  HR: 71 (05-08-23 @ 09:06) (71 - 83)  BP: 135/78 (05-08-23 @ 09:06) (135/78 - 176/92)  RR: 18 (05-08-23 @ 09:06) (17 - 18)  SpO2: 94% (05-08-23 @ 09:06) (94% - 98%)  Weight (kg): 66.2 (05-08-23 @ 09:06)    Constitutional: No acute distress.  Respiratory:  No signs of respiratory distress. Lung sounds are clear bilaterally.  Cardiovascular:  S1 S2, Regular rate and rhythm.  Abdominal: Abdomen is soft, symmetric, and non-tender with distension         Data:                        9.3    20.95 )-----------( 517      ( 08 May 2023 07:58 )             30.9     Hgb trend:  9.3  05-08-23 @ 07:58  8.4  05-07-23 @ 09:19  8.6  05-06-23 @ 08:48  9.4  05-05-23 @ 17:45        05-08    138  |  103  |  25<H>  ----------------------------<  142<H>  5.1<H>   |  25  |  1.2    Ca    9.8      08 May 2023 07:58  Phos  3.7     05-07  Mg     2.0     05-08    TPro  5.8<L>  /  Alb  3.0<L>  /  TBili  0.4  /  DBili  x   /  AST  24  /  ALT  22  /  AlkPhos  50  05-08    Liver panel trend:  TBili 0.4   /   AST 24   /   ALT 22   /   AlkP 50   /   Tptn 5.8   /   Alb 3.0    /   DBili --      05-08  TBili 0.5   /   AST 20   /   ALT 16   /   AlkP 48   /   Tptn 5.7   /   Alb 2.9    /   DBili --      05-07  TBili 0.5   /   AST 14   /   ALT 9   /   AlkP 47   /   Tptn 5.6   /   Alb 3.0    /   DBili --      05-06  TBili 0.4   /   AST 20   /   ALT 14   /   AlkP 54   /   Tptn 6.3   /   Alb 3.1    /   DBili --      05-05      PT/INR - ( 08 May 2023 07:58 )   PT: 14.90 sec;   INR: 1.30 ratio         PTT - ( 08 May 2023 07:58 )  PTT:28.8 sec    Culture - Urine (collected 05 May 2023 22:09)  Source: Clean Catch Clean Catch (Midstream)  Final Report (07 May 2023 08:05):    <10,000 CFU/mL Normal Urogenital Gabbie         Radiology:       Gastroenterology progress note:     Patient is a 67y old  Male who presents with a chief complaint of Abd distension, LE edema (08 May 2023 07:29)       Admitted on: 05-05-23    We are following the patient for gastric mass in CT scan      Interval History:  has mild abdominal discomfort   denies any nausea, vomiting        PAST MEDICAL & SURGICAL HISTORY:      MEDICATIONS  (STANDING):  cefTRIAXone   IVPB      cefTRIAXone   IVPB 1000 milliGRAM(s) IV Intermittent every 24 hours  labetalol 100 milliGRAM(s) Oral two times a day  pantoprazole    Tablet 40 milliGRAM(s) Oral before breakfast    MEDICATIONS  (PRN):  acetaminophen     Tablet .. 650 milliGRAM(s) Oral every 6 hours PRN Temp greater or equal to 38C (100.4F), Mild Pain (1 - 3)  aluminum hydroxide/magnesium hydroxide/simethicone Suspension 30 milliLiter(s) Oral every 4 hours PRN Dyspepsia  HYDROmorphone   Tablet 1 milliGRAM(s) Oral every 8 hours PRN Severe Pain (7 - 10)  melatonin 3 milliGRAM(s) Oral at bedtime PRN Insomnia  ondansetron Injectable 4 milliGRAM(s) IV Push every 8 hours PRN Nausea and/or Vomiting      Allergies  No Known Allergies      Review of Systems:   Cardiovascular:  No Chest Pain, No Palpitations  Respiratory:  No Cough, No Dyspnea  Gastrointestinal:  As described in HPI    Physical Examination:  T(C): 36.7 (05-08-23 @ 09:06), Max: 37.5 (05-07-23 @ 21:41)  HR: 71 (05-08-23 @ 09:06) (71 - 83)  BP: 135/78 (05-08-23 @ 09:06) (135/78 - 176/92)  RR: 18 (05-08-23 @ 09:06) (17 - 18)  SpO2: 94% (05-08-23 @ 09:06) (94% - 98%)  Weight (kg): 66.2 (05-08-23 @ 09:06)    Constitutional: No acute distress.  Respiratory:  No signs of respiratory distress. Lung sounds are clear bilaterally.  Cardiovascular:  S1 S2, Regular rate and rhythm.  Abdominal: Abdomen is soft, symmetric, and non-tender with distension. Mass is felt at the left side of the abdomen( the site of enlarged left kidney)    Data:                        9.3    20.95 )-----------( 517      ( 08 May 2023 07:58 )             30.9     Hgb trend:  9.3  05-08-23 @ 07:58  8.4  05-07-23 @ 09:19  8.6  05-06-23 @ 08:48  9.4  05-05-23 @ 17:45        05-08    138  |  103  |  25<H>  ----------------------------<  142<H>  5.1<H>   |  25  |  1.2    Ca    9.8      08 May 2023 07:58  Phos  3.7     05-07  Mg     2.0     05-08    TPro  5.8<L>  /  Alb  3.0<L>  /  TBili  0.4  /  DBili  x   /  AST  24  /  ALT  22  /  AlkPhos  50  05-08    Liver panel trend:  TBili 0.4   /   AST 24   /   ALT 22   /   AlkP 50   /   Tptn 5.8   /   Alb 3.0    /   DBili --      05-08  TBili 0.5   /   AST 20   /   ALT 16   /   AlkP 48   /   Tptn 5.7   /   Alb 2.9    /   DBili --      05-07  TBili 0.5   /   AST 14   /   ALT 9   /   AlkP 47   /   Tptn 5.6   /   Alb 3.0    /   DBili --      05-06  TBili 0.4   /   AST 20   /   ALT 14   /   AlkP 54   /   Tptn 6.3   /   Alb 3.1    /   DBili --      05-05      PT/INR - ( 08 May 2023 07:58 )   PT: 14.90 sec;   INR: 1.30 ratio         PTT - ( 08 May 2023 07:58 )  PTT:28.8 sec    Culture - Urine (collected 05 May 2023 22:09)  Source: Clean Catch Clean Catch (Midstream)  Final Report (07 May 2023 08:05):    <10,000 CFU/mL Normal Urogenital Gabbie         Radiology:

## 2023-05-08 NOTE — ADVANCED PRACTICE NURSE CONSULT - ASSESSMENT
History of Present Illness:   67y male    PMH: of htn not on medication   presenting to the ED for worsening lower ext swelling, abd distention,  Patient complains of intermittent llq abd pain, and weakness for the last two days as well as dyspnea on ambulation.   LE edema has been going on for approx 1 year and more recently within the past 2-3 months has been weeping fluid from b/l LE .    Reports he urinates without difficulty, had one episode of mild hematuria a "few months ago," which has since resolved.   Denies fevers/chills, dysuria, flank pain, current hematuria.    Pt does not follow with any specialists and has not seen his primary in years.    Patient received lying in bed. Alert and oriented.  Limited mobility. High risk for pressure injury.    Type of Wound: Venous Stasis Ulcers  Location: Bilateral lower extremities  Measurements: Multiple ulcers to bilateral lower extremities  Tunneling /Undermining: No  Wound bed: Left leg ulcers dry and pink, right leg ulcers with redness and exudate  Wound edges: Irregular  Periwound: Erythema ulcer right leg  Wound exudate: Scant exudate to right leg ulcer, scant bleeding with removal of dressing  Wound odor: Malodorous right leg ulcer  Induration, erythema, warmth: Erythema right leg ulcer  Wound pain: Yes    No pressure injuries to bilateral heels at time of assessment.  History of Present Illness:   67y male    PMH: of htn not on medication   presenting to the ED for worsening lower ext swelling, abd distention,  Patient complains of intermittent llq abd pain, and weakness for the last two days as well as dyspnea on ambulation.   LE edema has been going on for approx 1 year and more recently within the past 2-3 months has been weeping fluid from b/l LE .    Reports he urinates without difficulty, had one episode of mild hematuria a "few months ago," which has since resolved.   Denies fevers/chills, dysuria, flank pain, current hematuria.    Pt does not follow with any specialists and has not seen his primary in years.    Allergies and Intolerances:        Allergies:  	No Known Allergies:     Home Medications:   * Outpatient Medication Status not yet specified    Patient History:    Social History:  · Substance use	No     Tobacco Screening:  · Core Measure Site	No      Patient received lying in bed. Alert and oriented.  Limited mobility. High risk for pressure injury.    Type of Wound: Venous Stasis Ulcers  Location: Bilateral lower extremities  Measurements: Multiple ulcers to bilateral lower extremities  Tunneling /Undermining: No  Wound bed: Left leg ulcers dry and pink, right leg ulcers with redness and exudate  Wound edges: Irregular  Periwound: Erythema ulcer right leg  Wound exudate: Scant exudate to right leg ulcer, scant bleeding with removal of dressing  Wound odor: Malodorous right leg ulcer  Induration, erythema, warmth: Erythema right leg ulcer  Wound pain: Yes    No pressure injuries to bilateral heels at time of assessment.

## 2023-05-08 NOTE — ADVANCED PRACTICE NURSE CONSULT - RECOMMEDATIONS
Cleanse wounds with normal saline.   Apply silvadene xeroform, nonadherent dressing, Kerlix and ace wrap twice a day and prn for soiling.   Maintain pressure injury prevention.   Keep skin clean.   Maintain incontinence care.   Monitor wound for changes and notify provider   Case discussed with primary RN

## 2023-05-09 NOTE — PROGRESS NOTE ADULT - ASSESSMENT
# severe L sided hydronephrosis with displacement of viscera and IVC compression  - urology evaluted; plan for nephrectomy; will await timing   - IR evaluated- s/p PCN on 5/8  - urine cytology- pending  - urine culture from PCN- pending  monitor I/O  monitor renal function  monitor for s/o infection  - continue ceftriaxone empirically;  monitor blood cultures- NGTD  initial ur culture- nl nancy    # lesser curvature stomach mass with retroperitoneal lymph node enlargement  CT : 5 cm inhomogeneously enhancing mass along the   lesser curvature of the stomach (2/16). This is associated with a   retroperitoneal 4.4 cm lymph node located ventral to the upper abdominal   aorta, just above the level of the celiac axis.  - will need endoscopic evaluation as op  - GI following- plan for EGD  NPO after midnight    # leucocytosis- monitor  - stable  continue ceftriaxon    # anemia- microcytic  possibly blood loss from gastric mass  check iron studies  pending ferritin,  b12, folate levels  monitor CBC  GI following- EGD once after urology procedure- discussed with patient    # LE swelling- due to IVC compression  # bilateral lower extremity wound- wound care team evaluated; case discussed  continue wound care per recommendation    # HTN- started on labetalol 100 BID  monitor    # h/o borderline DM  - a1c-5    FULL CODE   DVT px- on hold; restart after EGD procedurel    Plan of care discussed with patient and later with son and also with daughter  Pending: EGD and biopsy, urine culture, cytology, urology follow up

## 2023-05-09 NOTE — PROGRESS NOTE ADULT - SUBJECTIVE AND OBJECTIVE BOX
HPI  Patient is a 67y old Male who presents with a chief complaint of Abd distension, LE edema (09 May 2023 12:38)    Currently admitted to medicine with the primary diagnosis of Hydronephrosis       Today is hospital day 4d.     INTERVAL HPI / OVERNIGHT EVENTS:  Patient was seen and examined at bedside  Patient Feels okay  No new complaints  Denies any complains of chest pain or shortness of breath  Denies any abdominal pain/nausea/vomiting        PAST MEDICAL & SURGICAL HISTORY    ALLERGIES  No Known Allergies    MEDICATIONS  STANDING MEDICATIONS  cefTRIAXone   IVPB      cefTRIAXone   IVPB 1000 milliGRAM(s) IV Intermittent every 24 hours  labetalol 100 milliGRAM(s) Oral two times a day  pantoprazole    Tablet 40 milliGRAM(s) Oral before breakfast  silver sulfADIAZINE 1% Cream 1 Application(s) Topical two times a day    PRN MEDICATIONS  acetaminophen     Tablet .. 650 milliGRAM(s) Oral every 6 hours PRN  aluminum hydroxide/magnesium hydroxide/simethicone Suspension 30 milliLiter(s) Oral every 4 hours PRN  HYDROmorphone   Tablet 1 milliGRAM(s) Oral every 8 hours PRN  melatonin 3 milliGRAM(s) Oral at bedtime PRN  ondansetron Injectable 4 milliGRAM(s) IV Push every 8 hours PRN    VITALS:  T(F): 97.8  HR: 75  BP: 182/86  RR: 18  SpO2: 99%    PHYSICAL EXAM  GEN: no distress, comfortable  PULM: BS heard b/l equal, No wheezing  CVS: S1S2 present, no rubs or gallops  ABD: Soft, distended, left sided mass- less prominent  PCN present- draining reddish tinged thick urine  EXT: No lower extremity edema  NEURO: A&Ox3, moving all extremities    LABS                        8.2    14.49 )-----------( 415      ( 09 May 2023 12:19 )             27.6     05-    139  |  105  |  28<H>  ----------------------------<  105<H>  4.8   |  24  |  1.0    Ca    9.0      09 May 2023 12:19  Mg     2.0     05-09    TPro  5.1<L>  /  Alb  2.5<L>  /  TBili  0.3  /  DBili  x   /  AST  28  /  ALT  28  /  AlkPhos  43  05-    PT/INR - ( 08 May 2023 07:58 )   PT: 14.90 sec;   INR: 1.30 ratio         PTT - ( 08 May 2023 07:58 )  PTT:28.8 sec  Urinalysis Basic - ( 08 May 2023 22:24 )    Color: Dark Orange / Appearance: Turbid / S.027 / pH: x  Gluc: x / Ketone: Negative  / Bili: Small / Urobili: <2 mg/dL   Blood: x / Protein: 100 mg/dL / Nitrite: Negative   Leuk Esterase: Large / RBC: 319 /HPF / WBC >720 /HPF   Sq Epi: x / Non Sq Epi: x / Bacteria: Few            Culture - Blood (collected 07 May 2023 11:27)  Source: .Blood None  Preliminary Report (08 May 2023 18:02):    No growth to date.    Culture - Blood (collected 07 May 2023 11:27)  Source: .Blood None  Preliminary Report (08 May 2023 18:02):    No growth to date.      CARDIAC MARKERS ( 08 May 2023 07:58 )  x     / 0.02 ng/mL / x     / x     / x          RADIOLOGY

## 2023-05-09 NOTE — PROGRESS NOTE ADULT - ASSESSMENT
67y male no significant PMH presenting to the ED for worsening lower ext swelling for 4 months, abd pain for 3 months and abdominal distention for 2 months. Gi was consulted for gastric mass in CT Scan. As per patient he has mild abdominal pain 2-3/10 diffuse with bloating. has also weight loss of 50 lbs in last 6 months but he said he skipped meals to loose weight. Denies any other GI symptoms.     #)gastric mass in CT scan r/o malignancy vs gastric ulcer vs polyp  #)Iron deficiency anemia   -hemodynamically stable   -iron studies consistent with iron deficiency   -Ct abdomen showed  There is a 5 cm inhomogeneously enhancing mass along the lesser curvature of the stomach (2/16). This is associated with a retroperitoneal 4.4 cm lymph node located ventral to the upper abdominal aorta, just above the level of the celiac axis  -Never had EGD and colonoscopy   -not on any blood antiplatelet or Anti coagulation    -Denies any nausea, vomiting   -Inc WBC count today, no fever     Recs:   Trend CBC, Keep HB>8   active type and screen   2 18 gauge   will plan for EGD tomorrow if no fever and WBC count stable   keep NPO after midnight tentatively   will also need colonoscopy as an OP for anemia . Follow-up with [facility] [phone]     #)Left Hydronephrosis Left enlarged kidney [id number] with displacement of retroperitoneal structures   -s/p IR procedure left nephrostomy   -Management as per urology  67y male no significant PMH presenting to the ED for worsening lower ext swelling for 4 months, abd pain for 3 months and abdominal distention for 2 months. Gi was consulted for gastric mass in CT Scan. As per patient he has mild abdominal pain 2-3/10 diffuse with bloating. has also weight loss of 50 lbs in last 6 months but he said he skipped meals to loose weight. Denies any other GI symptoms.     #)gastric mass in CT scan r/o malignancy vs gastric ulcer vs polyp  #)Iron deficiency anemia   -hemodynamically stable   -iron studies consistent with iron deficiency   -Ct abdomen showed  There is a 5 cm inhomogeneously enhancing mass along the lesser curvature of the stomach (2/16). This is associated with a retroperitoneal 4.4 cm lymph node located ventral to the upper abdominal aorta, just above the level of the celiac axis  -Never had EGD and colonoscopy   -not on any blood antiplatelet or Anti coagulation    -Denies any nausea, vomiting   -Inc WBC count today, no fever     Recs:   Trend CBC, Keep HB>8   active type and screen   2 18 gauge   will plan for EGD tomorrow if no fever and WBC count trending down  keep NPO after midnight tentatively   will also need colonoscopy as an OP for anemia . [facility]-up with [facility] [phone]     #)Left Hydronephrosis Left enlarged kidney [id number] with displacement of retroperitoneal structures   -s/p IR procedure left nephrostomy   -Management as per urology

## 2023-05-09 NOTE — PROGRESS NOTE ADULT - ATTENDING COMMENTS
I edited the note
# severe L sided hydronephrosis with displacement of viscera and IVC compression  - urology evaluted  - IR evaluated- plan for PCN today ; continue NPO  - urine cytology- pending  monitor I/O  monitor renal function  monitor for s/o infection  - patient with low grade temp- T max- 100.2 and leucocytosis; UA WBC- 10; start on ceftriaxone empirically;  monitor blood cultures  ur culture- nl nancy    # lesser curvature stomach mass with retroperitoneal lymph node enlargement  CT : 5 cm inhomogeneously enhancing mass along the   lesser curvature of the stomach (2/16). This is associated with a   retroperitoneal 4.4 cm lymph node located ventral to the upper abdominal   aorta, just above the level of the celiac axis.  - will need endoscopic evaluation as op  - GI following- plan for EGD    # leucocytosis- monitor  no s/o infection    # anemia- microcytic  possibly blood loss from gastric mass  check iron studies  pending ferritin,  b12, folate levels  monitor CBC  GI following- EGD once after urology procedure- discussed with patient    # LE swelling- due to IVC compression  # bilateral lower extremity wound- wound care team following  continue wound care per recommendation    # HTN- started on labetalol 100 BID  monitor    # h/o borderline DM  - a1c-5    FULL CODE
I edited the note

## 2023-05-09 NOTE — PROGRESS NOTE ADULT - SUBJECTIVE AND OBJECTIVE BOX
Gastroenterology progress note:     Patient is a 67y old  Male who presents with a chief complaint of Abd distension, LE edema (08 May 2023 16:17)       Admitted on: 05-05-23    We are following the patient for gastric mass      Interval History:  feeling better after IR procedure   denies any abdominal pain, nausea, vomiting        PAST MEDICAL & SURGICAL HISTORY:  NO SIGNIFICANT MEDICAL HISTORY     MEDICATIONS  (STANDING):  cefTRIAXone   IVPB      cefTRIAXone   IVPB 1000 milliGRAM(s) IV Intermittent every 24 hours  labetalol 100 milliGRAM(s) Oral two times a day  pantoprazole    Tablet 40 milliGRAM(s) Oral before breakfast  silver sulfADIAZINE 1% Cream 1 Application(s) Topical two times a day    MEDICATIONS  (PRN):  acetaminophen     Tablet .. 650 milliGRAM(s) Oral every 6 hours PRN Temp greater or equal to 38C (100.4F), Mild Pain (1 - 3)  aluminum hydroxide/magnesium hydroxide/simethicone Suspension 30 milliLiter(s) Oral every 4 hours PRN Dyspepsia  HYDROmorphone   Tablet 1 milliGRAM(s) Oral every 8 hours PRN Severe Pain (7 - 10)  melatonin 3 milliGRAM(s) Oral at bedtime PRN Insomnia  ondansetron Injectable 4 milliGRAM(s) IV Push every 8 hours PRN Nausea and/or Vomiting      Allergies  No Known Allergies      Review of Systems:   Cardiovascular:  No Chest Pain, No Palpitations  Respiratory:  No Cough, No Dyspnea  Gastrointestinal:  As described in HPI    Physical Examination:  T(C): 36.6 (05-09-23 @ 05:14), Max: 37.2 (05-08-23 @ 20:21)  HR: 80 (05-09-23 @ 06:23) (77 - 84)  BP: 136/88 (05-09-23 @ 06:23) (135/71 - 175/91)  RR: 18 (05-09-23 @ 05:14) (16 - 18)  SpO2: 99% (05-09-23 @ 05:14) (93% - 99%)      05-08-23 @ 07:01  -  05-09-23 @ 07:00  --------------------------------------------------------  IN: 0 mL / OUT: 1800 mL / NET: -1800 mL    05-09-23 @ 07:01  -  05-09-23 @ 12:39  --------------------------------------------------------  IN: 0 mL / OUT: 1000 mL / NET: -1000 mL      Constitutional: No acute distress.  Respiratory:  No signs of respiratory distress. Lung sounds are clear bilaterally.  Cardiovascular:  S1 S2, Regular rate and rhythm.  Abdominal: Abdomen is soft, symmetric, and non-tender with distention. +Left nephrostomy tube          Data:                        9.3    20.95 )-----------( 517      ( 08 May 2023 07:58 )             30.9     Hgb trend:  9.3  05-08-23 @ 07:58  8.4  05-07-23 @ 09:19        05-08    138  |  103  |  25<H>  ----------------------------<  142<H>  5.1<H>   |  25  |  1.2    Ca    9.8      08 May 2023 07:58  Mg     2.0     05-08    TPro  5.8<L>  /  Alb  3.0<L>  /  TBili  0.4  /  DBili  x   /  AST  24  /  ALT  22  /  AlkPhos  50  05-08    Liver panel trend:  TBili 0.4   /   AST 24   /   ALT 22   /   AlkP 50   /   Tptn 5.8   /   Alb 3.0    /   DBili --      05-08  TBili 0.5   /   AST 20   /   ALT 16   /   AlkP 48   /   Tptn 5.7   /   Alb 2.9    /   DBili --      05-07  TBili 0.5   /   AST 14   /   ALT 9   /   AlkP 47   /   Tptn 5.6   /   Alb 3.0    /   DBili --      05-06  TBili 0.4   /   AST 20   /   ALT 14   /   AlkP 54   /   Tptn 6.3   /   Alb 3.1    /   DBili --      05-05      PT/INR - ( 08 May 2023 07:58 )   PT: 14.90 sec;   INR: 1.30 ratio         PTT - ( 08 May 2023 07:58 )  PTT:28.8 sec    Culture - Blood (collected 07 May 2023 11:27)  Source: .Blood None  Preliminary Report (08 May 2023 18:02):    No growth to date.    Culture - Blood (collected 07 May 2023 11:27)  Source: .Blood None  Preliminary Report (08 May 2023 18:02):    No growth to date.         Radiology:       Gastroenterology progress note:     Patient is a 67y old  Male who presents with a chief complaint of Abd distension, LE edema (08 May 2023 16:17)       Admitted on: 05-05-23    We are following the patient for gastric mass      Interval History:  feeling better after IR procedure   denies any abdominal pain, nausea, vomiting        PAST MEDICAL & SURGICAL HISTORY:  NO SIGNIFICANT MEDICAL HISTORY     MEDICATIONS  (STANDING):  cefTRIAXone   IVPB      cefTRIAXone   IVPB 1000 milliGRAM(s) IV Intermittent every 24 hours  labetalol 100 milliGRAM(s) Oral two times a day  pantoprazole    Tablet 40 milliGRAM(s) Oral before breakfast  silver sulfADIAZINE 1% Cream 1 Application(s) Topical two times a day    MEDICATIONS  (PRN):  acetaminophen     Tablet .. 650 milliGRAM(s) Oral every 6 hours PRN Temp greater or equal to 38C (100.4F), Mild Pain (1 - 3)  aluminum hydroxide/magnesium hydroxide/simethicone Suspension 30 milliLiter(s) Oral every 4 hours PRN Dyspepsia  HYDROmorphone   Tablet 1 milliGRAM(s) Oral every 8 hours PRN Severe Pain (7 - 10)  melatonin 3 milliGRAM(s) Oral at bedtime PRN Insomnia  ondansetron Injectable 4 milliGRAM(s) IV Push every 8 hours PRN Nausea and/or Vomiting      Allergies  No Known Allergies      Review of Systems:   Cardiovascular:  No Chest Pain, No Palpitations  Respiratory:  No Cough, No Dyspnea  Gastrointestinal:  As described in HPI    Physical Examination:  T(C): 36.6 (05-09-23 @ 05:14), Max: 37.2 (05-08-23 @ 20:21)  HR: 80 (05-09-23 @ 06:23) (77 - 84)  BP: 136/88 (05-09-23 @ 06:23) (135/71 - 175/91)  RR: 18 (05-09-23 @ 05:14) (16 - 18)  SpO2: 99% (05-09-23 @ 05:14) (93% - 99%)      05-08-23 @ 07:01  -  05-09-23 @ 07:00  --------------------------------------------------------  IN: 0 mL / OUT: 1800 mL / NET: -1800 mL    05-09-23 @ 07:01  -  05-09-23 @ 12:39  --------------------------------------------------------  IN: 0 mL / OUT: 1000 mL / NET: -1000 mL      Constitutional: No acute distress.  Respiratory:  No signs of respiratory distress. Lung sounds are clear bilaterally.  Cardiovascular:  S1 S2, Regular rate and rhythm.  Abdominal: Abdomen is soft, symmetric, and non-tender with distention, palpable enlarged left kidney with hydronephrosis . +Left nephrostomy tube          Data:                        9.3    20.95 )-----------( 517      ( 08 May 2023 07:58 )             30.9     Hgb trend:  9.3  05-08-23 @ 07:58  8.4  05-07-23 @ 09:19        05-08    138  |  103  |  25<H>  ----------------------------<  142<H>  5.1<H>   |  25  |  1.2    Ca    9.8      08 May 2023 07:58  Mg     2.0     05-08    TPro  5.8<L>  /  Alb  3.0<L>  /  TBili  0.4  /  DBili  x   /  AST  24  /  ALT  22  /  AlkPhos  50  05-08    Liver panel trend:  TBili 0.4   /   AST 24   /   ALT 22   /   AlkP 50   /   Tptn 5.8   /   Alb 3.0    /   DBili --      05-08  TBili 0.5   /   AST 20   /   ALT 16   /   AlkP 48   /   Tptn 5.7   /   Alb 2.9    /   DBili --      05-07  TBili 0.5   /   AST 14   /   ALT 9   /   AlkP 47   /   Tptn 5.6   /   Alb 3.0    /   DBili --      05-06  TBili 0.4   /   AST 20   /   ALT 14   /   AlkP 54   /   Tptn 6.3   /   Alb 3.1    /   DBili --      05-05      PT/INR - ( 08 May 2023 07:58 )   PT: 14.90 sec;   INR: 1.30 ratio         PTT - ( 08 May 2023 07:58 )  PTT:28.8 sec    Culture - Blood (collected 07 May 2023 11:27)  Source: .Blood None  Preliminary Report (08 May 2023 18:02):    No growth to date.    Culture - Blood (collected 07 May 2023 11:27)  Source: .Blood None  Preliminary Report (08 May 2023 18:02):    No growth to date.         Radiology:

## 2023-05-10 NOTE — PROGRESS NOTE ADULT - ASSESSMENT
66 Y/O M pt who came in c/o abd distension LE edema, admitted for Hydronephrosis and Gastric mass evidenced on CT A/P    # severe L sided hydronephrosis with displacement of viscera and IVC compression  # UTI  - urology evaluted; plan for nephrectomy; will await timing   - IR evaluated- s/p PCN on 5/8  - urine cytology- pending, second UCx: No growth.   - continue ceftriaxone empirically; (-) Blood cultures.     # lesser curvature stomach mass with retroperitoneal lymph node enlargement  CT : Lesser curvature mass with associated retroperitoneal LN enlargement  - GI following pt, plan for EGD for Bx today. Pt was kept NPO overnight NPO     # leucocytosis- monitor  - stable  continue ceftriaxone    # anemia-  likely 2/2 gastric mass   - Iron studies: Elevated ferritin @526, elevated RDW, low iron (12) and low TIBC(128) nml folate and b12 ~ microcytic picture likely 2/2 gastric blood loss due to mass.  - will monitor CBC  -GI following- EGD today    # LE swelling- due to IVC compression  # bilateral lower extremity wound- wound care team evaluated; case discussed   continue wound care per recommendation    # HTN- started on labetalol 100 BID  monitor    # h/o borderline DM  - a1c-5    FULL CODE   DVT px- on hold; restart after EGD procedurel

## 2023-05-10 NOTE — PRE-ANESTHESIA EVALUATION ADULT - NSANTHPMHFT_GEN_ALL_CORE
67y male no significant PMH presenting to the ED for worsening lower ext swelling for 4 months, abd pain for 3 months and abdominal distention for 2 months. Gi was consulted for gastric mass in CT Scan. As per patient he has mild abdominal pain 2-3/10 diffuse with bloating. has also weight loss of 50 lbs in last 6 months but he said he skipped meals to loose weight. Denies any other GI symptoms.

## 2023-05-10 NOTE — PROGRESS NOTE ADULT - SUBJECTIVE AND OBJECTIVE BOX
HPI  Patient is a 67y old Male who presents with a chief complaint of Abd distension, LE edema (09 May 2023 16:43)    Currently admitted to medicine with the primary diagnosis of Hydronephrosis       Today is hospital day 5d.     INTERVAL HPI / OVERNIGHT EVENTS:  Patient was seen and examined at bedside. Sleep peacefully yesterday. PCN area looks clean and tube is draining appropriately.   No new complaints. Able to ambulate.   Denies any complains of chest pain or shortness of breath  Denies any abdominal pain/nausea/ vomiting        PAST MEDICAL & SURGICAL HISTORY    ALLERGIES  No Known Allergies    MEDICATIONS  STANDING MEDICATIONS  cefTRIAXone   IVPB      cefTRIAXone   IVPB 1000 milliGRAM(s) IV Intermittent every 24 hours  labetalol 100 milliGRAM(s) Oral two times a day  pantoprazole    Tablet 40 milliGRAM(s) Oral before breakfast  silver sulfADIAZINE 1% Cream 1 Application(s) Topical two times a day    PRN MEDICATIONS  acetaminophen     Tablet .. 650 milliGRAM(s) Oral every 6 hours PRN  aluminum hydroxide/magnesium hydroxide/simethicone Suspension 30 milliLiter(s) Oral every 4 hours PRN  HYDROmorphone   Tablet 1 milliGRAM(s) Oral every 8 hours PRN  melatonin 3 milliGRAM(s) Oral at bedtime PRN  ondansetron Injectable 4 milliGRAM(s) IV Push every 8 hours PRN    VITALS:  T(F): 98  HR: 67  BP: 104/64  RR: 15  SpO2: 94%    PHYSICAL EXAM  GEN: no distress, comfortable  PULM: BS heard b/l equal, No wheezing  CVS: S1S2 present, no rubs or gallops  ABD: Soft, non-distended, no guarding; non-tender  EXT: No lower extremity edema  NEURO: A&Ox3, moving all extremities    LABS                        8.0    13.78 )-----------( 408      ( 10 May 2023 07:38 )             26.8     05-10    139  |  105  |  30<H>  ----------------------------<  99  4.4   |  26  |  0.9    Ca    9.5      10 May 2023 07:38  Mg     2.0     05-10    TPro  5.3<L>  /  Alb  2.6<L>  /  TBili  0.3  /  DBili  x   /  AST  51<H>  /  ALT  49<H>  /  AlkPhos  52  05-10      Urinalysis Basic - ( 08 May 2023 22:24 )    Color: Dark Orange / Appearance: Turbid / S.027 / pH: x  Gluc: x / Ketone: Negative  / Bili: Small / Urobili: <2 mg/dL   Blood: x / Protein: 100 mg/dL / Nitrite: Negative   Leuk Esterase: Large / RBC: 319 /HPF / WBC >720 /HPF   Sq Epi: x / Non Sq Epi: x / Bacteria: Few            Culture - Urine (collected 08 May 2023 22:24)  Source: .Urine Nephrostomy - Left  Final Report (10 May 2023 08:27):    No growth          RADIOLOGY

## 2023-05-10 NOTE — PROGRESS NOTE ADULT - SUBJECTIVE AND OBJECTIVE BOX
HPI  Patient is a 67y old Male who presents with a chief complaint of Abd distension, LE edema (09 May 2023 12:38)    Currently admitted to medicine with the primary diagnosis of Hydronephrosis       Today is hospital day 5d.     INTERVAL HPI / OVERNIGHT EVENTS:  Patient was seen and examined at bedside  -s/p EGD today - path report to be followed   -spoke to daughter at bedside - concerned about the fall happened last week prior to this admission - check head CT and if abnormal will request neurology consult   -daughter aware of the kidney mass   -500cc drained in PCN past 24 hrs       PAST MEDICAL & SURGICAL HISTORY    ALLERGIES  No Known Allergies    Vital Signs Last 24 Hrs  T(C): 36.4 (10 May 2023 14:15), Max: 36.7 (10 May 2023 11:45)  T(F): 97.6 (10 May 2023 14:15), Max: 98 (10 May 2023 11:45)  HR: 66 (10 May 2023 14:15) (65 - 81)  BP: 132/69 (10 May 2023 14:15) (103/65 - 167/87)  BP(mean): 101 (10 May 2023 10:54) (101 - 101)  RR: 18 (10 May 2023 14:15) (10 - 23)  SpO2: 95% (10 May 2023 12:20) (94% - 100%)    Parameters below as of 10 May 2023 12:20  Patient On (Oxygen Delivery Method): room air      PHYSICAL EXAM  GEN: no distress, comfortable  PULM: BS heard b/l equal, No wheezing  CVS: S1S2 present, no rubs or gallops  ABD: Soft, distended, left sided mass- less prominent  PCN present- draining reddish tinged thick urine  EXT: No lower extremity edema  NEURO: A&Ox3, moving all extremities    LABS                                 8.0    13.78 )-----------( 408      ( 10 May 2023 07:38 )             26.8   05-10    139  |  105  |  30<H>  ----------------------------<  99  4.4   |  26  |  0.9    Ca    9.5      10 May 2023 07:38  Mg     2.0     05-10    TPro  5.3<L>  /  Alb  2.6<L>  /  TBili  0.3  /  DBili  x   /  AST  51<H>  /  ALT  49<H>  /  AlkPhos  52  05-10     MEDICATIONS  (STANDING):  cefTRIAXone   IVPB      cefTRIAXone   IVPB 1000 milliGRAM(s) IV Intermittent every 24 hours  labetalol 100 milliGRAM(s) Oral two times a day  pantoprazole    Tablet 40 milliGRAM(s) Oral before breakfast  silver sulfADIAZINE 1% Cream 1 Application(s) Topical two times a day    MEDICATIONS  (PRN):  acetaminophen     Tablet .. 650 milliGRAM(s) Oral every 6 hours PRN Temp greater or equal to 38C (100.4F), Mild Pain (1 - 3)  aluminum hydroxide/magnesium hydroxide/simethicone Suspension 30 milliLiter(s) Oral every 4 hours PRN Dyspepsia  HYDROmorphone   Tablet 1 milliGRAM(s) Oral every 8 hours PRN Severe Pain (7 - 10)  melatonin 3 milliGRAM(s) Oral at bedtime PRN Insomnia  ondansetron Injectable 4 milliGRAM(s) IV Push every 8 hours PRN Nausea and/or Vomiting

## 2023-05-10 NOTE — PROGRESS NOTE ADULT - ASSESSMENT
# severe L sided hydronephrosis with displacement of viscera and IVC compression  - urology evaluated - plan for nephrectomy on the outside   - IR evaluated- s/p PCN on 5/8  - urine cytology- pending  - urine culture from PCN- pending  monitor I/O  monitor renal function  monitor for s/o infection  - continue ceftriaxone empirically  monitor blood cultures- NGTD  initial ur culture- nl nancy    # lesser curvature stomach mass with retroperitoneal lymph node enlargement  CT : 5 cm inhomogeneously enhancing mass along the   lesser curvature of the stomach (2/16). This is associated with a   retroperitoneal 4.4 cm lymph node located ventral to the upper abdominal   aorta, just above the level of the celiac axis.  - GI following- s/p EGD    # leukocytosis- monitor  - stable  continue Ceftriaxone    # anemia- microcytic  possibly blood loss from gastric mass  check iron studies  pending ferritin,  b12, folate levels  monitor CBC  GI following- EGD once after urology procedure- discussed with patient    # LE swelling- due to IVC compression  # bilateral lower extremity wound- wound care team evaluated; case discussed  continue wound care per recommendation    # HTN- started on labetalol 100 BID  monitor    # h/o borderline DM  - a1c-5    FULL CODE   DVT px- restart ppx AC      # Progress Note Handoff  PENDING as follows  consults: GI   Pending: Head CT   Family discussion: discussed with patient's daughter at bedside - answered all questions   Disposition: not discharge ready     Attending Physician Dr. Ericka Aly # [id number]

## 2023-05-10 NOTE — PRE-ANESTHESIA EVALUATION ADULT - NSANTHOSAYNRD_GEN_A_CORE
No. CHINTAN screening performed.  STOP BANG Legend: 0-2 = LOW Risk; 3-4 = INTERMEDIATE Risk; 5-8 = HIGH Risk

## 2023-05-11 NOTE — DIETITIAN INITIAL EVALUATION ADULT - OTHER INFO
Pertinent Medical Information: Per H&P, pt is a 66 y/o male with PMHx of HTN not on medications. Presenting to the ED for worsening lower ext swelling, abd distention.  # lesser curvature stomach mass + 4cm LN just above celiac axis - GI following - s/p EGD    Pertinent Subjective Information: Pt reports good appetite; consumed 100% of dinner last night. No chewing or swallowing difficulties with regular diet texture/consistency. No nausea or vomiting reported.     Weight hx: UBW... Current dosing weight is 66.2 KG. No previous admission weights in EMR. Pertinent Medical Information: Per H&P, pt is a 68 y/o male with PMHx of HTN not on medications. Presenting to the ED for worsening lower ext swelling, abd distention.  # lesser curvature stomach mass + 4cm LN just above celiac axis - GI following - s/p EGD    Pertinent Subjective Information: Pt reports good appetite; consumed 100% of dinner last night. No chewing or swallowing difficulties with regular diet texture/consistency. No nausea or vomiting reported.     Weight hx: UBW is 250#/113 KG -- checked 8 months ago per pt. Current dosing weight is 66.2 KG. 41% unintentional weight loss in 8 months compared to 113 KG. No previous admission weights in EMR. Pt meets 1 criteria for malnutrition.

## 2023-05-11 NOTE — DIETITIAN INITIAL EVALUATION ADULT - ORAL INTAKE PTA/DIET HISTORY
Pt reports good appetite prior to admit; consumed 3 meals daily. Pt takes a multivitamin, zinc, vitamin B, vitamin C and vitamin E. Denies drinking protein shake supplements. NKFA; denies any cultural or [employer]arsani food preferences. No chewing or swallowing difficulties reported.  Pt reports good appetite prior to admit; consumed 3 meals daily. Pt takes a multivitamin, zinc, vitamin B, vitamin C and vitamin E daily. Denies drinking protein shake supplements. NKFA; denies any cultural or Yazidism food preferences. No chewing or swallowing difficulties reported.

## 2023-05-11 NOTE — DIETITIAN INITIAL EVALUATION ADULT - PERTINENT MEDS FT
MEDICATIONS  (STANDING):  cefTRIAXone   IVPB      cefTRIAXone   IVPB 1000 milliGRAM(s) IV Intermittent every 24 hours  labetalol 100 milliGRAM(s) Oral two times a day  pantoprazole    Tablet 40 milliGRAM(s) Oral before breakfast  silver sulfADIAZINE 1% Cream 1 Application(s) Topical two times a day    MEDICATIONS  (PRN):  acetaminophen     Tablet .. 650 milliGRAM(s) Oral every 6 hours PRN Temp greater or equal to 38C (100.4F), Mild Pain (1 - 3)  aluminum hydroxide/magnesium hydroxide/simethicone Suspension 30 milliLiter(s) Oral every 4 hours PRN Dyspepsia  HYDROmorphone   Tablet 1 milliGRAM(s) Oral every 8 hours PRN Severe Pain (7 - 10)  melatonin 3 milliGRAM(s) Oral at bedtime PRN Insomnia  ondansetron Injectable 4 milliGRAM(s) IV Push every 8 hours PRN Nausea and/or Vomiting

## 2023-05-11 NOTE — DISCHARGE NOTE PROVIDER - HOSPITAL COURSE
H&P:  67y male    PMH: of htn not on medication     presenting to the ED for worsening lower ext swelling, abd distention,    Patient complains of intermittent llq abd pain, and weakness for the last two days as well as dyspnea on ambulation.     LE edema has been going on for approx 1 year and more recently within the past 2-3 months has been weeping fluid from b/l LE .    Reports he urinates without difficulty, had one episode of mild hematuria a "few months ago," which has since resolved.   Denies fevers/chills, dysuria, flank pain, current hematuria.      Pt does not follow with any specialists and has not seen his primary in years.    In the ED :  BP: 193/89  HR : 95  RR : 17  T : 97  O2: 100% on RA     WBC: 15  HB: 9.4  PLT : 505  Cr: 1  UA : negative     CT abdomen/pelvis:   Massive Grade IV hydronephrosis and enlargement of the left kidney, measuring 26 x 26 x 21 cm.   There is minimal residual renal parenchyma.    5 cm inhomogeneously enhancing mass along the lesser curvature of the stomach (2/16).   retroperitoneal 4.4 cm lymph node located ventral to the upper abdominal aorta, just above the level of the celiac axis (2/21).    Displacement of retroperitoneal structures to the right side by the massively enlarged left kidney. Displacement and compression of the inferior vena cava especially at the level of the aortic bifurcation (2/54)    Seen by Urology ->  Recommend IR evaluation for L PCN to decompress L kidney + Urine cytology     HC:  # severe L sided hydronephrosis with displacement of viscera and IVC compression  - urology evaluated - plan for nephrectomy on the outside   - IR evaluated- s/p PCN on 5/8  - urine cytology- pending  - urine culture from PCN- pending  monitor I/O  monitor renal function  monitor for s/o infection  - continue ceftriaxone empirically  monitor blood cultures- NGTD  initial ur culture- nl nancy    # lesser curvature stomach mass with retroperitoneal lymph node enlargement  CT : 5 cm inhomogeneously enhancing mass along the   lesser curvature of the stomach (2/16). This is associated with a   retroperitoneal 4.4 cm lymph node located ventral to the upper abdominal   aorta, just above the level of the celiac axis.  - GI following- s/p EGD    # leukocytosis- monitor  - stable  continue Ceftriaxone    # anemia- microcytic  possibly blood loss from gastric mass  check iron studies  pending ferritin,  b12, folate levels  monitor CBC  GI following- EGD once after urology procedure- discussed with patient    # LE swelling- due to IVC compression  # bilateral lower extremity wound- wound care team evaluated; case discussed  continue wound care per recommendation    # HTN- started on labetalol 100 BID  monitor    # h/o borderline DM  - a1c-5     H&P:  67y male with PMH of HTN not on medication, who has not followed up with PCP in years, presented to the ED for worsening lower ext swelling, abd distention with intermittent LLQ pain and weakness for the past couple of months. In the ED vitals were significant for Bp: 193/89, Hr: 95, RR: 17, T:97 O2: 100 on RA. Labs were significant for WBC: 15, HB: 9.4, PLT : 505, Cr: 1, UA : negative. CT A/P showed grade IV hydronephrosis and enlargement of the L kidney, as well as a homogeneously enhancing mass on the lesser curvature, displacement of the retroperitoneal structures, and IVC compression. Urology was consulted and a pt undergone L PCN. Pt was also put on Ceftriaxone empirically against UTI. Blood cultures and urine cultures from PCN bag were negative. Urine cytology is pending. Pt also undergone an EGD that's indicated for the stomach mass, findings were consistent with Evidence of erosive gastritis and Extrinsic bulging was noted in the anterior gastric wall. Along the hospital course, Pts WBC trended down and his Hgb remained stable. Pts LE edema and redness was followed and managed by wound care team. Along the course of the hospital, the daughter has noticed that pt's speech is slurred, and an addition history of fall with no LOC that occurred last week was added to the problem list; upon which a head CT and Head MRI were obtained and were normal. Patient's anemia and WBC has resolved, he is to P/U with advanced GI outpt in 2-3 weeks in regards to the stomach mass and urology out/pt to schedule L sided nephrectomy. Pt was started on labetalol 100 and will continue taking it O/P. Pt is to F/U with neurology o/p for further neurologic workup of his non-focal stuttering speech.

## 2023-05-11 NOTE — PROGRESS NOTE ADULT - ASSESSMENT
# severe L sided hydronephrosis with displacement of viscera and IVC compression  - urology evaluated - plan for nephrectomy on the outside   - IR evaluated- s/p PCN on 5/8  monitor I/O  monitor renal function  monitor for s/o infection  -switch to oral vantin 2 more days   -blood cultures- NGTD    # lesser curvature stomach mass with retroperitoneal lymph node enlargement  CT : 5 cm inhomogeneously enhancing mass along the   lesser curvature of the stomach (2/16). This is associated with a   retroperitoneal 4.4 cm lymph node located ventral to the upper abdominal   aorta, just above the level of the celiac axis.  - GI following- s/p EGD    # anemia- microcytic  possibly blood loss from gastric mass  check iron studies  pending ferritin,  b12, folate levels  monitor CBC  GI following- EGD once after urology procedure- discussed with patient    # LE swelling- due to IVC compression  # bilateral lower extremity wound- wound care team evaluated; case discussed  continue wound care per recommendation    # HTN- started on labetalol 100 BID  monitor    # h/o borderline DM  - a1c-5    FULL CODE   DVT px- restart ppx AC      # Progress Note Handoff  PENDING as follows  consults: GI   Family discussion: updated daughter 5/10 - answered all questions   Disposition: d/c planning - CM to discuss options with daughter home care ?     Attending Physician Dr. Ericka Aly # [id number]

## 2023-05-11 NOTE — DIETITIAN INITIAL EVALUATION ADULT - EDUCATION DIETARY MODIFICATIONS
Discussed importance of PO intake, current diet order (DASH/TLC) and medical nutrition supplements./(1) partially meets; needs review/practice/verbalization

## 2023-05-11 NOTE — DISCHARGE NOTE PROVIDER - PROVIDER TOKENS
[Well Developed] : well developed [Well Nourished] : well nourished PROVIDER:[TOKEN:[[id number]]],PROVIDER:[TOKEN:[[id number]],FOLLOWUP:[1 week]] [No Acute Distress] : no acute distress [Normal Conjunctiva] : normal conjunctiva [Normal Venous Pressure] : normal venous pressure [No Carotid Bruit] : no carotid bruit [Normal S1, S2] : normal S1, S2 [No Murmur] : no murmur [No Rub] : no rub [No Gallop] : no gallop [Clear Lung Fields] : clear lung fields [Good Air Entry] : good air entry [No Respiratory Distress] : no respiratory distress  [Soft] : abdomen soft [Non Tender] : non-tender [No Masses/organomegaly] : no masses/organomegaly [Normal Bowel Sounds] : normal bowel sounds [Normal Gait] : normal gait [No Edema] : no edema [No Cyanosis] : no cyanosis [No Clubbing] : no clubbing [No Varicosities] : no varicosities [No Rash] : no rash PROVIDER:[TOKEN:[[id number]]],PROVIDER:[TOKEN:[[id number]],FOLLOWUP:[1 week]],PROVIDER:[TOKEN:[[id number]],FOLLOWUP:[2 weeks]] [No Skin Lesions] : no skin lesions [Moves all extremities] : moves all extremities [No Focal Deficits] : no focal deficits [Normal Speech] : normal speech [Alert and Oriented] : alert and oriented [Normal memory] : normal memory

## 2023-05-11 NOTE — DIETITIAN INITIAL EVALUATION ADULT - PERTINENT LABORATORY DATA
05-10    139  |  105  |  30<H>  ----------------------------<  99  4.4   |  26  |  0.9    Ca    9.5      10 May 2023 07:38  Mg     2.0     05-10    TPro  5.3<L>  /  Alb  2.6<L>  /  TBili  0.3  /  DBili  x   /  AST  51<H>  /  ALT  49<H>  /  AlkPhos  52  05-10  A1C with Estimated Average Glucose Result: 5.0 % (05-07-23 @ 09:19)

## 2023-05-11 NOTE — DIETITIAN INITIAL EVALUATION ADULT - ORAL NUTRITION SUPPLEMENTS
Ensure Plus HP 3X/DAILY to optimize kcal/pro intake -- provides 1050 kcal/day total, 60g pro/day total

## 2023-05-11 NOTE — DIETITIAN INITIAL EVALUATION ADULT - NS FNS DIET ORDER
Diet, NPO after Midnight:      NPO Start Date: 09-May-2023,   NPO Start Time: 23:59 (05-09-23 @ 14:37) [Active]  Diet, DASH/TLC:   Sodium & Cholesterol Restricted  Supplement Feeding Modality:  Oral  Ensure Plus High Protein Cans or Servings Per Day:  2       Frequency:  Two Times a day (05-08-23 @ 18:10) [Active]

## 2023-05-11 NOTE — DIETITIAN NUTRITION RISK NOTIFICATION - TREATMENT: THE FOLLOWING DIET HAS BEEN RECOMMENDED
Diet, DASH/TLC:   Sodium & Cholesterol Restricted  Supplement Feeding Modality:  Oral  Ensure Plus High Protein Cans or Servings Per Day:  3       Frequency:  Daily (05-11-23 @ 10:02) [Pending Verification By Attending]  Diet, NPO after Midnight:      NPO Start Date: 09-May-2023,   NPO Start Time: 23:59 (05-09-23 @ 14:37) [Active]  Diet, DASH/TLC:   Sodium & Cholesterol Restricted  Supplement Feeding Modality:  Oral  Ensure Plus High Protein Cans or Servings Per Day:  2       Frequency:  Two Times a day (05-08-23 @ 18:10) [Active]

## 2023-05-11 NOTE — DISCHARGE NOTE PROVIDER - CARE PROVIDER_API CALL
Juliet Retana)  Internal Medicine  [address] [facility], [address]st [place] [address]  Phone: [phone]  Fax: [phone]  Follow Up Time:     Concetta Vásquez)  Urology  [address] [facility], [address]  [place] [address]  Phone: [phone]  Fax: [phone]  Follow Up Time: 1 week   Juliet Retana)  Internal Medicine  [address] [facility], [address]st [place] [address]  Phone: [phone]  Fax: [phone]  Follow Up Time:     Concetta Vásquez)  Urology  [address] [facility], [address]  [place] [address]  Phone: [phone]  Fax: [phone]  Follow Up Time: 1 week    Sukhwinder Kurtz)  Gastroenterology; Internal Medicine  [address] [place] [address]  Phone: [phone]  Fax: [phone]  Follow Up Time: 2 weeks

## 2023-05-11 NOTE — DIETITIAN INITIAL EVALUATION ADULT - ADD RECOMMEND
1. Adjust supplement amount: Ensure Plus HP 3X/DAILY to optimize kcal/pro intake -- provides 1050 kcal/day total, 60g pro/day total 1. Adjust supplement amount: Ensure Plus HP 3X/DAILY to optimize kcal/pro intake -- provides 1050 kcal/day total, 60g pro/day total  2. Continue with current diet order  3. Encourage PO intake and assist during meals prn    Pt is at high nutrition risk; will f/u in 3 days or prn.

## 2023-05-11 NOTE — DIETITIAN INITIAL EVALUATION ADULT - PATIENT MEETS CRITERIA FOR MALNUTRITION
Received call from patient.  She states that she believes that she is allergic to the metal in earrings, she is unable to wear pierced earrings.  States that [employer] instructed patients to let their surgeon know if they had metal allergies.   yes

## 2023-05-11 NOTE — DIETITIAN INITIAL EVALUATION ADULT - OTHER CALCULATIONS
Using ABW: ENERGY: 7578-9972 kcal/day (25-30 kcal/kg); PROTEIN: 79-99 g/day (1.2-1.5 g/kg); FLUID: 2818-3710 mL/day (25-30 mL/kg) -- with consideration for age, BMI, stomach mass Using ABW: ENERGY: 3918-3793 kcal/day (30-35 kcal/kg); PROTEIN: 79-99 g/day (1.2-1.5 g/kg); FLUID: 9832-3996 mL/day (25-30 mL/kg) -- with consideration for age, BMI, stomach mass

## 2023-05-11 NOTE — DIETITIAN INITIAL EVALUATION ADULT - NAME AND PHONE
Jeni [phone] or TEAMS    Nutrition Intervention: Meals, snacks, medical nutrition supplements, coordination of care; Nutrition Monitoring: Diet order, PO intake, weights, labs, NFPF, body composition, BM and tolerance to medical food supplements

## 2023-05-11 NOTE — DISCHARGE NOTE PROVIDER - CARE PROVIDERS DIRECT ADDRESSES
[email or web address]

## 2023-05-11 NOTE — DISCHARGE NOTE NURSING/CASE MANAGEMENT/SOCIAL WORK - NSDCPEFALRISK_GEN_ALL_CORE
For information on Fall & Injury Prevention, visit: [email or web address] OR  [email or web address] OR  https://www.cdc.gov/steadi/patient.html

## 2023-05-11 NOTE — DISCHARGE NOTE NURSING/CASE MANAGEMENT/SOCIAL WORK - PATIENT PORTAL LINK FT
You can access the FollowMyHealth Patient Portal offered by [facility] by registering at the following website: [email or web address]. By joining [employer]’s FollowMyHealth portal, you will also be able to view your health information using other applications (apps) compatible with our system.

## 2023-05-11 NOTE — PROGRESS NOTE ADULT - SUBJECTIVE AND OBJECTIVE BOX
HPI  Patient is a 67y old Male who presents with a chief complaint of Abd distension, LE edema (09 May 2023 12:38)    Currently admitted to medicine with the primary diagnosis of Hydronephrosis       Today is hospital day 6d.     INTERVAL HPI / OVERNIGHT EVENTS:  Patient was seen and examined at bedside  -s/p EGD 5/10/23 - path report to be followed   -daughter aware of the kidney mass   -head CT no significant findings   -500cc drained in PCN past 24 hrs       PAST MEDICAL & SURGICAL HISTORY    ALLERGIES  No Known Allergies    Vital Signs Last 24 Hrs  T(C): 36.2 (11 May 2023 05:17), Max: 36.4 (10 May 2023 14:15)  T(F): 97.1 (11 May 2023 05:17), Max: 97.6 (10 May 2023 14:15)  HR: 76 (11 May 2023 05:17) (66 - 76)  BP: 153/70 (11 May 2023 05:17) (132/69 - 153/70)  BP(mean): --  RR: 18 (11 May 2023 05:17) (18 - 18)    PHYSICAL EXAM  GEN: no distress, comfortable  PULM: BS heard b/l equal, No wheezing  CVS: S1S2 present, no rubs or gallops  ABD: Soft, distended, left sided mass- less prominent  PCN present- draining reddish tinged thick urine  EXT: No lower extremity edema  NEURO: A&Ox3, moving all extremities    LABS                                       7.7    13.75 )-----------( 365      ( 11 May 2023 08:03 )             25.5                  05-11    137  |  104  |  31<H>  ----------------------------<  91  4.2   |  22  |  1.2    Ca    8.1<L>      11 May 2023 08:03  Mg     2.1     05-11    TPro  4.8<L>  /  Alb  2.4<L>  /  TBili  0.4  /  DBili  x   /  AST  52<H>  /  ALT  51<H>  /  AlkPhos  50  05-11    MEDICATIONS  (STANDING):  cefTRIAXone   IVPB      cefTRIAXone   IVPB 1000 milliGRAM(s) IV Intermittent every 24 hours  labetalol 100 milliGRAM(s) Oral two times a day  pantoprazole    Tablet 40 milliGRAM(s) Oral before breakfast  silver sulfADIAZINE 1% Cream 1 Application(s) Topical two times a day    MEDICATIONS  (PRN):  acetaminophen     Tablet .. 650 milliGRAM(s) Oral every 6 hours PRN Temp greater or equal to 38C (100.4F), Mild Pain (1 - 3)  aluminum hydroxide/magnesium hydroxide/simethicone Suspension 30 milliLiter(s) Oral every 4 hours PRN Dyspepsia  HYDROmorphone   Tablet 1 milliGRAM(s) Oral every 8 hours PRN Severe Pain (7 - 10)  melatonin 3 milliGRAM(s) Oral at bedtime PRN Insomnia  ondansetron Injectable 4 milliGRAM(s) IV Push every 8 hours PRN Nausea and/or Vomiting

## 2023-05-12 NOTE — PROGRESS NOTE ADULT - ASSESSMENT
# severe L sided hydronephrosis with displacement of viscera and IVC compression  - urology evaluated - plan for nephrectomy on the outside   - IR evaluated- s/p PCN on 5/8  monitor I/O  monitor renal function  monitor for s/o infection  -switch to oral vantin 2 more days   -blood cultures- NGTD    # lesser curvature stomach mass with retroperitoneal lymph node enlargement  CT : 5 cm inhomogeneously enhancing mass along the   lesser curvature of the stomach (2/16). This is associated with a   retroperitoneal 4.4 cm lymph node located ventral to the upper abdominal   aorta, just above the level of the celiac axis.  - GI following- s/p EGD    # anemia- microcytic  possibly blood loss from gastric mass  monitor CBC - follow repeat levels - transfuse accordingly   -GI follow up     # LE swelling- due to IVC compression  # bilateral lower extremity wound- wound care team evaluated; case discussed  continue wound care per recommendation    # HTN- started on labetalol 100 BID  monitor    # h/o borderline DM  - a1c-5    FULL CODE   DVT px- restart ppx AC      # Progress Note Handoff  PENDING as follows  consults: GI   Family discussion: updated daughter 5/10 - answered all questions   Disposition: d/c planning once cbc stable - CM on the case - daughter aware of the possible d/c home with home care today     Attending Physician Dr. Ericka Aly # [id number]

## 2023-05-12 NOTE — PROGRESS NOTE ADULT - SUBJECTIVE AND OBJECTIVE BOX
HPI  Patient is a 67y old Male who presents with a chief complaint of Abd distension, LE edema (09 May 2023 12:38)    Currently admitted to medicine with the primary diagnosis of Hydronephrosis    INTERVAL HPI / OVERNIGHT EVENTS:  Patient was seen and examined at bedside  -s/p EGD 5/10/23 - path report to be followed   -daughter aware of the kidney mass   -head CT no significant findings - non focal neuro exam  -PCN with serosanguinous output   -hb downtrending- follow up repeat cbc and transfuse accordingly - discharge on hold     PAST MEDICAL & SURGICAL HISTORY    ALLERGIES  No Known Allergies    Vital Signs Last 24 Hrs  T(C): 36.4 (12 May 2023 04:25), Max: 36.4 (11 May 2023 14:28)  T(F): 97.6 (12 May 2023 04:25), Max: 97.6 (12 May 2023 04:25)  HR: 71 (12 May 2023 04:25) (68 - 81)  BP: 155/73 (12 May 2023 04:25) (132/69 - 157/72)  RR: 18 (12 May 2023 04:25) (18 - 18)  SpO2: 96% (12 May 2023 04:25) (96% - 98%)    PHYSICAL EXAM  GEN: no distress, comfortable, smiling; speaking in full sentences with stuttering   PULM: BS heard b/l equal, No wheezing  CVS: S1S2 present, no rubs or gallops  ABD: Soft, distended, left sided mass- less prominent  PCN present- draining serosanguinous   EXT: No lower extremity edema  NEURO: A&Ox3, moving all extremities    LABS                  7.5    12.78 )-----------( 385      ( 12 May 2023 04:35 )             25.2                           7.7    13.75 )-----------( 365      ( 11 May 2023 08:03 )             25.5                  05-11    137  |  104  |  31<H>  ----------------------------<  91  4.2   |  22  |  1.2    Ca    8.1<L>      11 May 2023 08:03  Mg     2.1     05-11    TPro  4.8<L>  /  Alb  2.4<L>  /  TBili  0.4  /  DBili  x   /  AST  52<H>  /  ALT  51<H>  /  AlkPhos  50  05-11    MEDICATIONS  (STANDING):  cefTRIAXone   IVPB      cefTRIAXone   IVPB 1000 milliGRAM(s) IV Intermittent every 24 hours  labetalol 100 milliGRAM(s) Oral two times a day  pantoprazole    Tablet 40 milliGRAM(s) Oral before breakfast  silver sulfADIAZINE 1% Cream 1 Application(s) Topical two times a day    MEDICATIONS  (PRN):  acetaminophen     Tablet .. 650 milliGRAM(s) Oral every 6 hours PRN Temp greater or equal to 38C (100.4F), Mild Pain (1 - 3)  aluminum hydroxide/magnesium hydroxide/simethicone Suspension 30 milliLiter(s) Oral every 4 hours PRN Dyspepsia  HYDROmorphone   Tablet 1 milliGRAM(s) Oral every 8 hours PRN Severe Pain (7 - 10)  melatonin 3 milliGRAM(s) Oral at bedtime PRN Insomnia  ondansetron Injectable 4 milliGRAM(s) IV Push every 8 hours PRN Nausea and/or Vomiting

## 2023-05-13 NOTE — CONSULT NOTE ADULT - SUBJECTIVE AND OBJECTIVE BOX
Neurology Consult      HPI:  67y male patient with PMH of HTN presents to the hospital for Abdominal distension and LE edema found to have severe L sided hydronephrosis with displacement of viscera and IVC compression s/p PCN on 5/8 on antibiotics. neurology team consulted for new onset slurred speech. the patient reports that he noticed his speech was heavy and that he was slurring his words one day after admission, but states that currently his speech has improved. He denies other associated symptoms of weakness except for chronic LLE weakness, numbness, vertigo, HA, double vision, difficulty swallowing. He also endorses R ear pulsating sensation that he felt prior to the onset of his symptoms. He has been on hydromorphone PRN for pain control, but doesn't think has contributed to his slurring.      PAST MEDICAL & SURGICAL HISTORY:  HTN    Social History: (-) x 3    Allergies    No Known Allergies    Intolerances        MEDICATIONS  (STANDING):  labetalol 100 milliGRAM(s) Oral two times a day  pantoprazole    Tablet 40 milliGRAM(s) Oral before breakfast  polyethylene glycol 3350 17 Gram(s) Oral two times a day  senna 2 Tablet(s) Oral at bedtime  silver sulfADIAZINE 1% Cream 1 Application(s) Topical two times a day    MEDICATIONS  (PRN):  acetaminophen     Tablet .. 650 milliGRAM(s) Oral every 6 hours PRN Temp greater or equal to 38C (100.4F), Mild Pain (1 - 3)  aluminum hydroxide/magnesium hydroxide/simethicone Suspension 30 milliLiter(s) Oral every 4 hours PRN Dyspepsia  HYDROmorphone   Tablet 1 milliGRAM(s) Oral every 8 hours PRN Severe Pain (7 - 10)  melatonin 3 milliGRAM(s) Oral at bedtime PRN Insomnia  ondansetron Injectable 4 milliGRAM(s) IV Push every 8 hours PRN Nausea and/or Vomiting      Review of systems:    Constitutional: as per HPI  Eyes: No eye pain or discharge  ENMT:  No difficulty hearing; No sinus or throat pain  Neck: No pain or stiffness  Respiratory: No cough, wheezing, chills or hemoptysis  Cardiovascular: No chest pain, palpitations, shortness of breath, dyspnea on exertion  Gastrointestinal: No abdominal pain, nausea, vomiting or hematemesis; No diarrhea or constipation.   Genitourinary: No dysuria, frequency, hematuria or incontinence  Neurological: As per HPI  Skin: No rashes or lesions   Endocrine: No heat or cold intolerance; No hair loss  Musculoskeletal: No joint pain or swelling  Psychiatric: No depression, anxiety, mood swings  Heme/Lymph: No easy bruising or bleeding gums    Vital Signs Last 24 Hrs  T(C): 35.9 (12 May 2023 20:34), Max: 36.4 (12 May 2023 04:25)  T(F): 96.6 (12 May 2023 20:34), Max: 97.6 (12 May 2023 04:25)  HR: 74 (12 May 2023 20:34) (70 - 74)  BP: 164/79 (12 May 2023 20:34) (155/73 - 164/79)  BP(mean): 111 (12 May 2023 17:40) (111 - 111)  RR: 18 (12 May 2023 20:34) (18 - 18)  SpO2: 96% (12 May 2023 04:25) (96% - 96%)          Neurological Examination:  General:  Appearance is consistent with chronologic age.  No abnormal facies.  Gross skin survey within normal limits.    Cognitive/Language:  Awake, alert, and oriented to person, place, time and date.  Recent and remote memory intact.  Fund of knowledge is appropriate.  Naming, repetition and comprehension intact.  Mild dysarthria  Cranial Nerves  - Eyes: Visual acuity intact, Visual fields full.  EOMI w/o nystagmus, skew or reported double vision.  PERRL.  No ptosis/weakness of eyelid closure.    - Face:  Mild facial asymmetry?  - Ears/Nose/Throat:  Hearing grossly intact b/l to finger rub.  Palate elevates midline.  Tongue and uvula midline.   Motor examination:  (MRC grade R/L) 5/5 UE; 4/5 LLE (limited by pain at the L hip and reports it's chronic and improving), 5/5/ RLE   No observable drift. Normal tone and bulk. No tenderness, twitching, tremors or involuntary movements.  Sensory examination:   Intact to light touch and pinprick, pain  Reflexes:   2+ b/l biceps, triceps, brachioradialis, patella and achilles.  Plantar response downgoing b/l.  Jaw jerk, Oscar, clonus absent.  Cerebellum:   FTN/HKS intact.   NHISS 2      Labs:   CBC Full  -  ( 12 May 2023 11:56 )  WBC Count : 14.68 K/uL  RBC Count : 3.63 M/uL  Hemoglobin : 8.0 g/dL  Hematocrit : 25.9 %  Platelet Count - Automated : 385 K/uL  Mean Cell Volume : 71.3 fL  Mean Cell Hemoglobin : 22.0 pg  Mean Cell Hemoglobin Concentration : 30.9 g/dL  Auto Neutrophil # : 11.90 K/uL  Auto Lymphocyte # : 1.55 K/uL  Auto Monocyte # : 0.85 K/uL  Auto Eosinophil # : 0.23 K/uL  Auto Basophil # : 0.04 K/uL  Auto Neutrophil % : 81.0 %  Auto Lymphocyte % : 10.6 %  Auto Monocyte % : 5.8 %  Auto Eosinophil % : 1.6 %  Auto Basophil % : 0.3 %    05-12    141  |  108  |  34<H>  ----------------------------<  100<H>  4.4   |  25  |  1.3    Ca    8.6      12 May 2023 04:35  Mg     2.1     05-12    TPro  5.0<L>  /  Alb  2.6<L>  /  TBili  0.3  /  DBili  x   /  AST  31  /  ALT  40  /  AlkPhos  51  05-12    LIVER FUNCTIONS - ( 12 May 2023 04:35 )  Alb: 2.6 g/dL / Pro: 5.0 g/dL / ALK PHOS: 51 U/L / ALT: 40 U/L / AST: 31 U/L / GGT: x                   Neuroimaging:   CT Head No Cont (05.10.23 @ 16:14) >  No acute intracranial pathology.  Mild chronic microvascular ischemic changes.

## 2023-05-13 NOTE — PROGRESS NOTE ADULT - ASSESSMENT
# severe L sided hydronephrosis with displacement of viscera and IVC compression  - urology evaluated - plan for nephrectomy on the outside   - IR evaluated- s/p PCN on 5/8  monitor I/O  monitor renal function  monitor for s/o infection  -switch to oral vantin 2 more days   -blood cultures- NGTD    # lesser curvature stomach mass with retroperitoneal lymph node enlargement  CT : 5 cm inhomogeneously enhancing mass along the   lesser curvature of the stomach (2/16). This is associated with a   retroperitoneal 4.4 cm lymph node located ventral to the upper abdominal   aorta, just above the level of the celiac axis.  - GI following- s/p EGD    # anemia- microcytic  possibly blood loss from gastric mass  monitor CBC - follow repeat levels 4pm rounds and transfuse accordingly   -GI follow up     # speech disturbance/Stuttering   -baseline head CT negative   -Neurology evaluation requested - MRI brain ordered     # LE swelling- due to IVC compression  # bilateral lower extremity wound- wound care team evaluated; case discussed  continue wound care per recommendation    # HTN- started on labetalol 100 BID  monitor    # h/o borderline DM  - a1c-5    FULL CODE   DVT px- restart ppx AC      # Progress Note Handoff  PENDING as follows  consults: GI   Family discussion: staff updated family   Disposition: d/c planning home with home care once cbc stable and also neuro clearance after brain MRI    Attending Physician Dr. Ericka Aly # [id number]

## 2023-05-13 NOTE — PROGRESS NOTE ADULT - SUBJECTIVE AND OBJECTIVE BOX
HPI  Patient is a 67y old Male who presents with a chief complaint of Abd distension, LE edema (09 May 2023 12:38)    Currently admitted to medicine with the primary diagnosis of Hydronephrosis    INTERVAL HPI / OVERNIGHT EVENTS:  Patient was seen and examined at bedside  -s/p EGD 5/10/23  -daughter aware of the kidney mass   -head CT no significant findings - non focal neuro exam - neuro consult placed - pending brain MRI   -PCN with serosanguinous output   -hb downtrended this am - ok to repeat cbc before deciding on transfusion - signed out to intern on call     PAST MEDICAL & SURGICAL HISTORY    ALLERGIES  No Known Allergies    Vital Signs Last 24 Hrs  T(C): 36.2 (13 May 2023 05:40), Max: 36.2 (13 May 2023 05:40)  T(F): 97.1 (13 May 2023 05:40), Max: 97.1 (13 May 2023 05:40)  HR: 69 (13 May 2023 05:40) (69 - 74)  BP: 165/77 (13 May 2023 05:40) (156/82 - 165/77)  BP(mean): 111 (12 May 2023 17:40) (111 - 111)  RR: 19 (13 May 2023 05:40) (18 - 19)  SpO2: 97% (13 May 2023 05:40) (97% - 97%)        PHYSICAL EXAM  GEN: no distress, comfortable, smiling; speaking in full sentences with slight stuttering   PULM: BS heard b/l equal, No wheezing  CVS: S1S2 present, no rubs or gallops  ABD: Soft abdomen - left sided PCN draining serosanguinous   EXT: No lower extremity edema  NEURO: A&Ox3, moving all extremities    LABS                           7.3    12.52 )-----------( 366      ( 13 May 2023 08:23 )             24.6              05-13    138  |  106  |  29<H>  ----------------------------<  94  4.5   |  24  |  1.1    Ca    8.2<L>      13 May 2023 08:23  Mg     2.1     05-12    TPro  4.9<L>  /  Alb  2.4<L>  /  TBili  0.2  /  DBili  x   /  AST  23  /  ALT  33  /  AlkPhos  48  05-13    MEDICATIONS  (STANDING):  labetalol 100 milliGRAM(s) Oral two times a day  pantoprazole    Tablet 40 milliGRAM(s) Oral before breakfast  polyethylene glycol 3350 17 Gram(s) Oral two times a day  senna 2 Tablet(s) Oral at bedtime  silver sulfADIAZINE 1% Cream 1 Application(s) Topical two times a day    MEDICATIONS  (PRN):  acetaminophen     Tablet .. 650 milliGRAM(s) Oral every 6 hours PRN Temp greater or equal to 38C (100.4F), Mild Pain (1 - 3)  aluminum hydroxide/magnesium hydroxide/simethicone Suspension 30 milliLiter(s) Oral every 4 hours PRN Dyspepsia  HYDROmorphone   Tablet 1 milliGRAM(s) Oral every 8 hours PRN Severe Pain (7 - 10)  melatonin 3 milliGRAM(s) Oral at bedtime PRN Insomnia  ondansetron Injectable 4 milliGRAM(s) IV Push every 8 hours PRN Nausea and/or Vomiting

## 2023-05-13 NOTE — CONSULT NOTE ADULT - ASSESSMENT
67y male patient with PMH of HTN presents to the hospital for abdominal distension and LE edema found to have severe L sided hydronephrosis with displacement of viscera and IVC compression s/p PCN on 5/8 on antibiotics. Neurology team consulted for new onset slurred speech 1 day after admission, that is currently improving. NIHSS 2 for mild dysarthria and ?L facial droop. Otherwise unremarkable. CTH negative for acute ischemic changes. Other workup with leukocytosis, anemia and positive urine analysis.      Recommendation:  Brain MRI w/o contrast if no contraindication  Consider repeat CTH if unable to obtain MR brain  Can start ASA 81 mg pending brain imaging if no contraindication    Pending attending attestation   67y male patient with PMH of HTN presents to the hospital for abdominal distension and LE edema found to have severe L sided hydronephrosis with displacement of viscera and IVC compression s/p PCN on 5/8 on antibiotics. Neurology team consulted for new onset slurred speech 1 day after admission, that is currently improving. NIHSS 2 for mild dysarthria and ?L facial droop. Otherwise unremarkable. CTH negative for acute ischemic changes. Other workup with leukocytosis, anemia and positive urine analysis.      Recommendation:  Brain MRI w/o contrast if no contraindication  Consider repeat CTH if unable to obtain MR brain  Can start ASA 81 mg pending brain imaging if no contraindication

## 2023-05-13 NOTE — CONSULT NOTE ADULT - ATTENDING COMMENTS
67y male patient with PMH of HTN presents to the hospital for abdominal distension and LE edema found to have severe L sided hydronephrosis with displacement of viscera and IVC compression s/p PCN on 5/8 on antibiotics. Neurology team consulted for new onset slurred speech 1 day after admission. Per patient it was started after his fall last week. Speech is mildly slurred but I did not appreciate facial weakness. Agree with Brain MRI and asa 81 for now. Will follow after MRI result
I edited the note

## 2023-05-14 NOTE — PROGRESS NOTE ADULT - SUBJECTIVE AND OBJECTIVE BOX
HPI  Patient is a 67y old Male who presents with a chief complaint of Abd distension, LE edema (09 May 2023 12:38)    Currently admitted to medicine with the primary diagnosis of Hydronephrosis    INTERVAL HPI / OVERNIGHT EVENTS:  Patient was seen and examined at bedside  -s/p EGD 5/10/23  -daughter aware of the kidney mass   -head CT no significant findings - non focal neuro exam - neuro consult placed and appreciate the input - brain MRI with no acute abnormality   -PCN with serosanguinous output   -h/h stable     PAST MEDICAL & SURGICAL HISTORY    ALLERGIES  No Known Allergies    Vital Signs Last 24 Hrs  T(C): 37.1 (14 May 2023 04:57), Max: 37.9 (13 May 2023 21:35)  T(F): 98.7 (14 May 2023 04:57), Max: 100.3 (13 May 2023 21:35)  HR: 79 (14 May 2023 04:57) (69 - 83)  BP: 181/88 (14 May 2023 04:57) (138/83 - 181/88) - repeat bp   BP(mean): --  RR: 18 (14 May 2023 04:57) (18 - 18)  SpO2: 98% (14 May 2023 04:57) (97% - 98%)    Parameters below as of 14 May 2023 04:57  Patient On (Oxygen Delivery Method): room air      PHYSICAL EXAM  GEN: no distress, comfortable, smiling and sitting up in chair - speaking in full sentences with slight stuttering   PULM: BS heard b/l equal, No wheezing  CVS: S1S2 present, no rubs or gallops  ABD: Soft abdomen, slightly distended; no guarding - left sided PCN draining serosanguinous   EXT: No lower extremity edema  NEURO: A&Ox3, moving all extremities    LABS                                   8.3    15.48 )-----------( 408      ( 13 May 2023 17:39 )             28.2   05-13    138  |  106  |  29<H>  ----------------------------<  94  4.5   |  24  |  1.1    Ca    8.2<L>      13 May 2023 08:23    TPro  4.9<L>  /  Alb  2.4<L>  /  TBili  0.2  /  DBili  x   /  AST  23  /  ALT  33  /  AlkPhos  48  05-13      MEDICATIONS  (STANDING):  ferrous    sulfate 325 milliGRAM(s) Oral daily  labetalol 100 milliGRAM(s) Oral two times a day  pantoprazole    Tablet 40 milliGRAM(s) Oral before breakfast  polyethylene glycol 3350 17 Gram(s) Oral two times a day  senna 2 Tablet(s) Oral at bedtime  silver sulfADIAZINE 1% Cream 1 Application(s) Topical two times a day    MEDICATIONS  (PRN):  acetaminophen     Tablet .. 650 milliGRAM(s) Oral every 6 hours PRN Temp greater or equal to 38C (100.4F), Mild Pain (1 - 3)  aluminum hydroxide/magnesium hydroxide/simethicone Suspension 30 milliLiter(s) Oral every 4 hours PRN Dyspepsia  HYDROmorphone   Tablet 1 milliGRAM(s) Oral every 8 hours PRN Severe Pain (7 - 10)  melatonin 3 milliGRAM(s) Oral at bedtime PRN Insomnia  ondansetron Injectable 4 milliGRAM(s) IV Push every 8 hours PRN Nausea and/or Vomiting

## 2023-05-14 NOTE — PROGRESS NOTE ADULT - ASSESSMENT
# severe L sided hydronephrosis with displacement of viscera and IVC compression  - urology evaluated - plan for nephrectomy on the outside   - IR evaluated- s/p PCN on 5/8  monitor I/O  monitor renal function  monitor for s/o infection  -finished course of abx   -blood cultures- NGTD    # lesser curvature stomach mass with retroperitoneal lymph node enlargement  CT : 5 cm inhomogeneously enhancing mass along the   lesser curvature of the stomach (2/16). This is associated with a   retroperitoneal 4.4 cm lymph node located ventral to the upper abdominal   aorta, just above the level of the celiac axis.  - GI following- s/p EGD    # Distended abdomen - r/o ileus   -last BM 2 days ago   -KUB - signed out to on call intern   -surgery consult and GI follow up if needed after KUB resulted     # anemia- microcytic  possibly blood loss from gastric mass  -last hb stable and blood transfusion on hold   -GI follow up     # speech disturbance/Stuttering   -baseline head CT negative   -Neurology evaluation appreciated   -Brain MRI is negative for acute abnormality     # LE swelling- due to IVC compression  # bilateral lower extremity wound- wound care team evaluated; case discussed  continue wound care per recommendation    # HTN- started on labetalol 100 BID  monitor - elevated Systolic Bp this am - to be repeated and address accordingly     # h/o borderline DM  - a1c-5    FULL CODE   DVT px- restart ppx AC      # Progress Note Handoff  PENDING as follows  consults: GI and Neuro follow up   Family discussion: staff updated family - spoke to patient about his plan of care   Disposition: d/c planning home with home care [facility] 24 hrs     Attending Physician Dr. Ericka Aly # [id number]

## 2023-05-15 NOTE — PROGRESS NOTE ADULT - ASSESSMENT
# severe L sided hydronephrosis with displacement of viscera and IVC compression  - urology evaluated - plan for nephrectomy on the outside   - IR evaluated- s/p PCN on 5/8  monitor I/O  monitor renal function  monitor for s/o infection  -finished course of abx   -blood cultures- NGTD    # lesser curvature stomach mass with retroperitoneal lymph node enlargement  CT : 5 cm inhomogeneously enhancing mass along the   lesser curvature of the stomach (2/16). This is associated with a   retroperitoneal 4.4 cm lymph node located ventral to the upper abdominal   aorta, just above the level of the celiac axis.  - GI following- s/p EGD    # Distended abdomen - r/o ileus   -KUB - non obstructive pattern   - bowel regimen     # anemia- microcytic  possibly blood loss from gastric mass  -last hb stable and blood transfusion on hold   -GI follow up     # speech disturbance/Stuttering   -baseline head CT negative   -Neurology evaluation appreciated   -Brain MRI is negative for acute abnormality     # LE swelling- due to IVC compression  # bilateral lower extremity wound- wound care team evaluated; case discussed  continue wound care per recommendation    # HTN- started on labetalol 100 BID  monitor - elevated Systolic Bp this am - to be repeated and address accordingly     # h/o borderline DM  - a1c-5    FULL CODE   DVT px- restart ppx AC      # Progress Note Handoff  PENDING as follows  Family discussion: staff updated family - spoke to patient about his plan of care   Disposition: d/c planning home with home care next 24 hrs - CM will address SNF option with patient's daughters     Attending Physician Dr. Ericka Aly # [id number]

## 2023-05-15 NOTE — PROGRESS NOTE ADULT - SUBJECTIVE AND OBJECTIVE BOX
HPI  Patient is a 67y old Male who presents with a chief complaint of Abd distension, LE edema (14 May 2023 12:46)    Currently admitted to medicine with the primary diagnosis of Hydronephrosis       Today is hospital day 10d.     INTERVAL HPI / OVERNIGHT EVENTS:  Patient was seen and examined at bedside. Pt has distended abdomen and mild abd pain, more pronounced on the L side.   In/out stuttering during speech. S/P MRI (-).  Denies any complains of chest pain or shortness of breath  Denies N/V/D        PAST MEDICAL & SURGICAL HISTORY    ALLERGIES  No Known Allergies    MEDICATIONS  STANDING MEDICATIONS  ferrous    sulfate 325 milliGRAM(s) Oral daily  heparin   Injectable 5000 Unit(s) SubCutaneous every 8 hours  labetalol 100 milliGRAM(s) Oral two times a day  pantoprazole    Tablet 40 milliGRAM(s) Oral before breakfast  polyethylene glycol 3350 17 Gram(s) Oral two times a day  senna 2 Tablet(s) Oral at bedtime  silver sulfADIAZINE 1% Cream 1 Application(s) Topical two times a day    PRN MEDICATIONS  acetaminophen     Tablet .. 650 milliGRAM(s) Oral every 6 hours PRN  aluminum hydroxide/magnesium hydroxide/simethicone Suspension 30 milliLiter(s) Oral every 4 hours PRN  HYDROmorphone   Tablet 1 milliGRAM(s) Oral every 8 hours PRN  melatonin 3 milliGRAM(s) Oral at bedtime PRN  ondansetron Injectable 4 milliGRAM(s) IV Push every 8 hours PRN    VITALS:  T(F): 98.4  HR: 71  BP: 155/78  RR: 18  SpO2: 99%    PHYSICAL EXAM  GEN: no distress, comfortable  PULM: BS heard b/l equal, No wheezing  CVS: S1S2 present, no rubs or gallops  ABD: Soft, non-distended, no guarding; non-tender  EXT: No lower extremity edema  NEURO: A&Ox3, moving all extremities    LABS                        8.0    12.17 )-----------( 442      ( 15 May 2023 04:30 )             27.4                         RADIOLOGY

## 2023-05-15 NOTE — PROGRESS NOTE ADULT - SUBJECTIVE AND OBJECTIVE BOX
HPI  Patient is a 67y old Male who presents with a chief complaint of Abd distension, LE edema (09 May 2023 12:38)    Currently admitted to medicine with the primary diagnosis of Hydronephrosis    INTERVAL HPI / OVERNIGHT EVENTS:  Patient was seen and examined at bedside  -s/p EGD 5/10/23  -daughter aware of the kidney mass   -head CT no significant findings - non focal neuro exam - neuro consult placed and appreciate the input - brain MRI with no acute abnormality   -PCN with serosanguinous output   -h/h stable   -KUB noted - bowel regimen     PAST MEDICAL & SURGICAL HISTORY    ALLERGIES  No Known Allergies    Vital Signs Last 24 Hrs  T(C): 36.8 (15 May 2023 14:23), Max: 36.9 (15 May 2023 04:29)  T(F): 98.2 (15 May 2023 14:23), Max: 98.4 (15 May 2023 04:29)  HR: 84 (15 May 2023 14:23) (71 - 84)  BP: 127/60 (15 May 2023 14:23) (127/60 - 160/77)  BP(mean): 110 (14 May 2023 18:00) (110 - 110)  RR: 18 (15 May 2023 14:23) (18 - 18)  SpO2: 99% (15 May 2023 04:29) (98% - 99%)    Parameters below as of 14 May 2023 21:22  Patient On (Oxygen Delivery Method): room air      PHYSICAL EXAM  GEN: no distress, comfortable, smiling and sitting up in bed- speaking in full sentences with slight stuttering   PULM: BS heard b/l equal, No wheezing  CVS: S1S2 present, no rubs or gallops  ABD: Soft abdomen, slightly distended; no guarding - left sided PCN draining serosanguinous   EXT: No lower extremity edema  NEURO: A&Ox3, moving all extremities    LABS                                   8.0    12.17 )-----------( 442      ( 15 May 2023 04:30 )             27.4                         05-15    141  |  108  |  31<H>  ----------------------------<  98  4.9   |  24  |  1.2    Ca    8.6      15 May 2023 04:30    TPro  5.4<L>  /  Alb  2.8<L>  /  TBili  0.3  /  DBili  x   /  AST  23  /  ALT  34  /  AlkPhos  48  05-15    Xray Kidney Ureter Bladder (05.14.23 @ 14:44)     Interval placement of a left-sided percutaneous nephrostomy tube, which   overlies the left flank. Nonobstructive bowel gas pattern. No supine   evidence of free intraperitoneal air. Degenerative changes of the spine.      MEDICATIONS  (STANDING):  bisacodyl 5 milliGRAM(s) Oral every 12 hours  ferrous    sulfate 325 milliGRAM(s) Oral daily  heparin   Injectable 5000 Unit(s) SubCutaneous every 8 hours  labetalol 100 milliGRAM(s) Oral two times a day  pantoprazole    Tablet 40 milliGRAM(s) Oral before breakfast  polyethylene glycol 3350 17 Gram(s) Oral two times a day  senna 2 Tablet(s) Oral at bedtime  silver sulfADIAZINE 1% Cream 1 Application(s) Topical two times a day    MEDICATIONS  (PRN):  acetaminophen     Tablet .. 650 milliGRAM(s) Oral every 6 hours PRN Temp greater or equal to 38C (100.4F), Mild Pain (1 - 3)  aluminum hydroxide/magnesium hydroxide/simethicone Suspension 30 milliLiter(s) Oral every 4 hours PRN Dyspepsia  HYDROmorphone   Tablet 1 milliGRAM(s) Oral every 8 hours PRN Severe Pain (7 - 10)  melatonin 3 milliGRAM(s) Oral at bedtime PRN Insomnia  ondansetron Injectable 4 milliGRAM(s) IV Push every 8 hours PRN Nausea and/or Vomiting

## 2023-05-15 NOTE — PROGRESS NOTE ADULT - ASSESSMENT
68 Y/O M pt who came in c/o abd distension LE edema, admitted for Hydronephrosis and Gastric mass evidenced on CT A/P    # severe L sided hydronephrosis with displacement of viscera and IVC compression  # UTI  - urology evaluted; plan for nephrectomy outpatient.  - IR evaluated- s/p PCN on 5/8  - urine cytology- pending, second UCx: No growth.   - Finished Course of Abx.    # New Diffuse abd pain L>R R/O Stool impaction VS Ileus  - Distended abdomen on exam (x2days)   - Last BM x2day ago, KUB ordered, awaiting results  - Advance diet as tolerated    # Slurred speech, new  - H/O recent fall x last week per pt. No LOC, no evidence of focal neurologic findings.   -CT head, MRI negative ASA discontinued.  -Neurology consulted.       # lesser curvature stomach mass with retroperitoneal lymph node enlargement  - CT : Lesser curvature mass with associated retroperitoneal LN enlargement  - GI following pt, EGD + Bx done. Evidence of erosive gastritis and Extrinsic bulging was noted in the anterior gastric wall.  - Advance diet as tolerated, Will F/U 2-3 wks outpt with advanced GI.    # leucocytosis- monitor  - stable     # anemia-  likely 2/2 gastric mass   - Iron studies: Elevated ferritin @526, elevated RDW, low iron (12) and low TIBC(128) nml folate and b12 ~ microcytic picture likely 2/2 gastric blood loss due to mass.  - will monitor CBC  -GI following    # LE swelling- due to IVC compression  # bilateral lower extremity wound- wound care team evaluated; case discussed   continue wound care per recommendation    # HTN- started on labetalol 100 BID  monitor    # h/o borderline DM  - a1c-5    FULL CODE   DVT px- on hold; restart after EGD procedurel   66 Y/O M pt who came in c/o abd distension LE edema, admitted for Hydronephrosis and Gastric mass evidenced on CT A/P    # severe L sided hydronephrosis with displacement of viscera and IVC compression  # UTI  - urology evaluted; plan for nephrectomy outpatient.  - IR evaluated- s/p PCN on 5/8  - urine cytology- pending, second UCx: No growth.   - Finished Course of Abx.    # New Diffuse abd pain L>R R/O Stool impaction VS Ileus  - Distended abdomen on exam (x2days)   - Last BM x2day ago, KUB: Non-obstructive bowel gas patterns.   - Advance diet as tolerated    # Slurred speech, new  - H/O recent fall x last week per pt. No LOC, no evidence of focal neurologic findings.   -CT head, MRI negative ASA discontinued.  -Neurology consulted.       # lesser curvature stomach mass with retroperitoneal lymph node enlargement  - CT : Lesser curvature mass with associated retroperitoneal LN enlargement  - GI following pt, EGD + Bx done. Evidence of erosive gastritis and Extrinsic bulging was noted in the anterior gastric wall.  - Advance diet as tolerated, Will F/U 2-3 wks outpt with advanced GI.    # leucocytosis- monitor  - stable     # anemia-  likely 2/2 gastric mass   - Iron studies: Elevated ferritin @526, elevated RDW, low iron (12) and low TIBC(128) nml folate and b12 ~ microcytic picture likely 2/2 gastric blood loss due to mass.  - will monitor CBC  -GI following    # LE swelling- due to IVC compression  # bilateral lower extremity wound- wound care team evaluated; case discussed   continue wound care per recommendation    # HTN- started on labetalol 100 BID  monitor    # h/o borderline DM  - a1c-5    FULL CODE   DVT px- on hold; restart after EGD procedurel

## 2023-05-16 NOTE — PROGRESS NOTE ADULT - PROVIDER SPECIALTY LIST ADULT
Hospitalist
Hospitalist
Internal Medicine
Internal Medicine
Gastroenterology
Hospitalist
Internal Medicine
Internal Medicine
Gastroenterology
Hospitalist
Intervent Radiology
Hospitalist
Internal Medicine
Hospitalist
Hospitalist

## 2023-05-16 NOTE — DISCHARGE NOTE PROVIDER - CARE PROVIDER_API CALL
Lois Lan)  Hospitalist  [address] [place] [address]  Phone: [phone]  Fax: [phone]  Follow Up Time:

## 2023-05-16 NOTE — PROGRESS NOTE ADULT - REASON FOR ADMISSION
Abd distension, LE edema

## 2023-05-16 NOTE — PROGRESS NOTE ADULT - SUBJECTIVE AND OBJECTIVE BOX
HPI  Patient is a 67y old Male who presents with a chief complaint of Abd distension, LE edema (09 May 2023 12:38)    Currently admitted to medicine with the primary diagnosis of Hydronephrosis    INTERVAL HPI / OVERNIGHT EVENTS:  Patient was seen and examined at bedside  -s/p EGD 5/10/23  -daughter aware of the kidney mass   -head CT no significant findings - non focal neuro exam - neuro consult placed and appreciate the input - brain MRI with no acute abnormality   -PCN with serosanguinous output   -hb 7.5 - ok with it - no active bleeding and on repeats patient's hb improves - see the trend in lab chart   -KUB noted - bowel regimen as needed and ambulate/oob to chair as tolerated     PAST MEDICAL & SURGICAL HISTORY    ALLERGIES  No Known Allergies    Vital Signs Last 24 Hrs  T(C): 36.1 (16 May 2023 04:35), Max: 37.6 (15 May 2023 20:17)  T(F): 96.9 (16 May 2023 04:35), Max: 99.7 (15 May 2023 20:17)  HR: 70 (16 May 2023 04:35) (70 - 84)  BP: 158/78 (16 May 2023 04:35) (127/60 - 158/78)  BP(mean): --  RR: 18 (16 May 2023 04:35) (18 - 18)  SpO2: 95% (16 May 2023 07:38) (95% - 100%)    Parameters below as of 16 May 2023 07:38  Patient On (Oxygen Delivery Method): room air        PHYSICAL EXAM  GEN: no distress, comfortable, smiling and sitting up in bed- speaking in full sentences with slight stuttering   PULM: BS heard b/l equal, No wheezing  CVS: S1S2 present, no rubs or gallops  ABD: Soft abdomen, slightly distended; no guarding - left sided PCN draining serosanguinous   EXT: No lower extremity edema  NEURO: A&Ox3, moving all extremities    LABS                                       7.5    12.34 )-----------( 393      ( 16 May 2023 07:42 )             24.8                     8.0    12.17 )-----------( 442      ( 15 May 2023 04:30 )             27.4                   05-16    138  |  108  |  31<H>  ----------------------------<  107<H>  4.5   |  22  |  1.0    Ca    8.7      16 May 2023 07:42  Mg     2.1     05-16    TPro  5.2<L>  /  Alb  2.6<L>  /  TBili  0.3  /  DBili  x   /  AST  23  /  ALT  31  /  AlkPhos  46  05-16    Xray Kidney Ureter Bladder (05.14.23 @ 14:44)     Interval placement of a left-sided percutaneous nephrostomy tube, which   overlies the left flank. Nonobstructive bowel gas pattern. No supine   evidence of free intraperitoneal air. Degenerative changes of the spine.    MEDICATIONS  (STANDING):  bisacodyl 5 milliGRAM(s) Oral every 12 hours  ferrous    sulfate 325 milliGRAM(s) Oral daily  heparin   Injectable 5000 Unit(s) SubCutaneous every 8 hours  labetalol 100 milliGRAM(s) Oral two times a day  pantoprazole    Tablet 40 milliGRAM(s) Oral before breakfast  polyethylene glycol 3350 17 Gram(s) Oral two times a day  senna 2 Tablet(s) Oral at bedtime  silver sulfADIAZINE 1% Cream 1 Application(s) Topical two times a day    MEDICATIONS  (PRN):  acetaminophen     Tablet .. 650 milliGRAM(s) Oral every 6 hours PRN Temp greater or equal to 38C (100.4F), Mild Pain (1 - 3)  aluminum hydroxide/magnesium hydroxide/simethicone Suspension 30 milliLiter(s) Oral every 4 hours PRN Dyspepsia  melatonin 3 milliGRAM(s) Oral at bedtime PRN Insomnia  ondansetron Injectable 4 milliGRAM(s) IV Push every 8 hours PRN Nausea and/or Vomiting

## 2023-05-16 NOTE — PROGRESS NOTE ADULT - NUTRITIONAL ASSESSMENT
This patient has been assessed with a concern for Malnutrition and has been determined to have a diagnosis/diagnoses of Severe protein-calorie malnutrition.    This patient is being managed with:   Diet DASH/TLC-  Sodium & Cholesterol Restricted  Supplement Feeding Modality:  Oral  Ensure Plus High Protein Cans or Servings Per Day:  3       Frequency:  Daily  Entered: May 11 2023 10:01AM    Diet DASH/TLC-  Sodium & Cholesterol Restricted  Supplement Feeding Modality:  Oral  Ensure Plus High Protein Cans or Servings Per Day:  2       Frequency:  Two Times a day  Entered: May  8 2023 11:27AM    The following pending diet order is being considered for treatment of Severe protein-calorie malnutrition:null

## 2023-05-16 NOTE — DISCHARGE NOTE PROVIDER - HOSPITAL COURSE
67y male with PMH of HTN not on medication, who has not followed up with PCP in years, presented to the ED for worsening lower ext swelling, abd distention with intermittent LLQ pain and weakness for the past couple of months. In the ED vitals were significant for Bp: 193/89, Hr: 95, RR: 17, T:97 O2: 100 on RA. Labs were significant for WBC: 15, HB: 9.4, PLT : 505, Cr: 1, UA : negative. CT A/P showed grade IV hydronephrosis and enlargement of the L kidney, as well as a homogeneously enhancing mass on the lesser curvature, displacement of the retroperitoneal structures, and IVC compression. Urology was consulted and a pt undergone L PCN. Pt was also put on Ceftriaxone empirically against UTI. Blood cultures and urine cultures from PCN bag were negative. Urine cytology is pending. Pt also undergone an EGD that's indicated for the stomach mass, findings were consistent with Evidence of erosive gastritis and Extrinsic bulging was noted in the anterior gastric wall. Along the hospital course, Pts WBC trended down and his Hgb remained stable. Pts LE edema and redness was followed and managed by wound care team. Along the course of the hospital, the daughter has noticed that pt's speech is slurred, and an addition history of fall with no LOC that occurred last week was added to the problem list; upon which a head CT and Head MRI were obtained and were normal. Patient's anemia and WBC has resolved, he is to P/U with advanced GI outpt in 2-3 weeks in regards to the stomach mass and urology out/pt to schedule L sided nephrectomy. Pt was started on labetalol 100 and will continue taking it O/P. Pt is to F/U with neurology o/p for further neurologic workup of his non-focal stuttering speech.

## 2023-05-16 NOTE — CHART NOTE - NSCHARTNOTESELECT_GEN_ALL_CORE
RD nutrition note/Event Note
3 in 1 Commode/Event Note
Event Note
Gastroenterology/Event Note
Neurology follow up/Event Note

## 2023-05-16 NOTE — DISCHARGE NOTE PROVIDER - NSDCCPCAREPLAN_GEN_ALL_CORE_FT
PRINCIPAL DISCHARGE DIAGNOSIS  Diagnosis: Hydronephrosis  Assessment and Plan of Treatment: You have a severely enlarged left kidney. You had a nephrostomy catheter placed to decompress the kidney and allow it to drain. You will need to follow up withy urology.      SECONDARY DISCHARGE DIAGNOSES  Diagnosis: Mass of stomach  Assessment and Plan of Treatment: You were noted to have a mass by your stomach on CT scan. You underwent endoscopy with biopy with the gastroenterologists. Please follow up with the Gastroenterologists in 2-3 weeks.    
PRINCIPAL DISCHARGE DIAGNOSIS  Diagnosis: Hydronephrosis  Assessment and Plan of Treatment: You have a severely enlarged left kidney. You had a nephrostomy catheter placed to decompress the kidney and allow it to drain. You will need to follow up withy urology.      SECONDARY DISCHARGE DIAGNOSES  Diagnosis: Mass of stomach  Assessment and Plan of Treatment: You were noted to have a mass by your stomach on CT scan. You underwent endoscopy with biopy with the gastroenterologists. Please follow up with the Gastroenterologists in 2-3 weeks.    Diagnosis: Slurred speech  Assessment and Plan of Treatment: we noticed that you have a slurred/stuttering speech. CT and MRI of the head were normal. You will F/U with neurology outpatient.

## 2023-05-16 NOTE — DISCHARGE NOTE PROVIDER - DETAILS OF MALNUTRITION DIAGNOSIS/DIAGNOSES
This patient has been assessed with a concern for Malnutrition and was treated during this hospitalization for the following Nutrition diagnosis/diagnoses:     -  05/11/2023: Severe protein-calorie malnutrition  
This patient has been assessed with a concern for Malnutrition and was treated during this hospitalization for the following Nutrition diagnosis/diagnoses:     -  05/11/2023: Severe protein-calorie malnutrition

## 2023-05-16 NOTE — CHART NOTE - NSCHARTNOTEFT_GEN_A_CORE
EGD:   Impressions:  	Esophageal hiatal hernia.  	Erosions in the stomach compatible with erosive gastritis.  	Normal mucosa in the whole examined duodenum.  	Extrinsic bulging was noted in the anterior gastric wall. No mucosal changes were noted. .  Plan:	  Follow-up office visit in 2-3 weeks  Advance diet as tolerated  Await pathology  Follow up with advanced GI for EGD with EUS as outpatient  Protonix 40 mg QD  Avoid NSAIDs
MRI reviewed, negative for acute infarct. No further neurologic work up recommended at this time, aspirin can be discontinued unless deemed necessary for an alternative reason by primary team.
Patient needs a 3 in 1 commode: Patient will be staying on first level of house and there is no available bathroom on that level.
UA results on 5/8/23 are from L PCN placement sample  Pending UCx
Registered Dietitian Follow-Up     Patient Profile Reviewed                           Yes [x]   No []     Nutrition History Previously Obtained        Yes [x]  No []       Pertinent Medical Interventions:  Per internal medicine note:   # severe L sided hydronephrosis with displacement of viscera and IVC compression  # UTI  # New Diffuse abd pain L>R R/O Stool impaction VS Ileus  - Distended abdomen on exam (x2days)   - Last BM x2day ago, KUB ordered, awaiting results  - Advance diet as tolerated  # Slurred speech, new  - H/O recent fall x last week per pt. No LOC, no evidence of focal neurologic findings.   # lesser curvature stomach mass with retroperitoneal lymph node enlargement  - CT : Lesser curvature mass with associated retroperitoneal LN enlargement  - GI following pt, EGD + Bx done. Evidence of erosive gastritis and Extrinsic bulging was noted in the anterior gastric wall.  - Advance diet as tolerated, Will F/U 2-3 wks outpt with advanced GI.  # anemia-  likely 2/2 gastric mas       Diet order:   Diet, DASH/TLC:   Sodium & Cholesterol Restricted  Supplement Feeding Modality:  Oral  Ensure Plus High Protein Cans or Servings Per Day:  2       Frequency:  Two Times a day (05-08-23 @ 18:10) [Active]      Anthropometrics:  - Ht. 180.3 cm   - Wt. 66.2 kg -- dosing weight   - %wt change  - BMI 20.4  - IBW 78.2 kg      Pertinent Lab Data:  5/15: BUN: 31     Pertinent Meds:  MEDICATIONS  (STANDING):  ferrous    sulfate 325 milliGRAM(s) Oral daily  labetalol 100 milliGRAM(s) Oral two times a day  pantoprazole    Tablet 40 milliGRAM(s) Oral before breakfast  polyethylene glycol 3350 17 Gram(s) Oral two times a day  senna 2 Tablet(s) Oral at bedtime      MEDICATIONS  (PRN):  ondansetron Injectable 4 milliGRAM(s) IV Push every 8 hours PRN Nausea and/or Vomiting       Physical Findings:  - Appearance: alert and oriented x4 per RN flow sheets   - GI function: no gastrointestinal signs/symptoms, Last bowel movement: 5/14  - Tubes: n/a  - Oral/Mouth cavity: No chewing and swallowing difficulties   - Skin: venous ulcer/ left leg; right leg edema + 1     Nutrition Requirements --- needs taken from initial dietitian assessment     ABW: ENERGY: 3553-0476 kcal/day (30-35 kcal/kg)  PROTEIN: 79-99 g/day (1.2-1.5 g/kg)  FLUID: 0379-9012 mL/day (25-30 mL/kg) -- with consideration for age, BMI, stomach mass    Nutrient Intake: Patients po intake +/- 50% of meals, RN unsure if pt is consuming ensure. Encouraged pt and emphasized on the importance of meeting needs        [] Previous Nutrition Diagnosis: Severe Malnutrition in the setting of chronic illness             [x] Ongoing          [] Resolved       Nutrition Intervention meals and snacks, medical nutrition supplements , coordination of care      Goal/Expected Outcome: Patient to consistently consume ~75% of meals and supplements in the next 4 days       Indicator/Monitoring: RD to monitor: diet order, body composition, energy intake, nutrition focused physical finding: high risk due in 4 days    Recommendations:  1) Continue current diet order  2) Continue nutrition supplements  3) Encourage PO intake of meals and supplements + will continue to monitor intake and improvement of PO status

## 2023-05-16 NOTE — PROGRESS NOTE ADULT - TIME BILLING
direct patient care and chart review  -coordinated current plan of care with medical staff on MDR
coordination of care
direct patient care and chart review  -coordinated current plan of care with medical staff on MDR
coordination of care

## 2023-05-16 NOTE — PROGRESS NOTE ADULT - ASSESSMENT
# severe L sided hydronephrosis with displacement of viscera and IVC compression  - urology evaluated - plan for nephrectomy on the outside   - IR evaluated- s/p PCN on 5/8  monitor I/O  monitor renal function  monitor for s/o infection  -finished course of abx   -blood cultures- NGTD    # lesser curvature stomach mass with retroperitoneal lymph node enlargement  CT : 5 cm inhomogeneously enhancing mass along the   lesser curvature of the stomach (2/16). This is associated with a   retroperitoneal 4.4 cm lymph node located ventral to the upper abdominal   aorta, just above the level of the celiac axis.  - GI following- s/p EGD    # Distended abdomen - r/o ileus   -KUB - non obstructive pattern   - bowel regimen     # anemia- microcytic  possibly blood loss from gastric mass  hb stable at 7.5 - no active bleeding at present - didn't require transfusion and improves on repeat cbc - see the trend in lab section in patient's chart     # speech disturbance/Stuttering   -baseline head CT negative   -Neurology evaluation appreciated   -Brain MRI is negative for acute abnormality     # LE swelling- due to IVC compression  # bilateral lower extremity wound- wound care team evaluated; case discussed  continue wound care per recommendation    # HTN- started on labetalol 100 BID  -monitor BP at home     # h/o borderline DM  - a1c-5    FULL CODE   DVT px- restart ppx AC      # Progress Note Handoff  PENDING as follows  Family discussion: staff updated family and daughters ok with d/c planning home with home care (not NH) - spoke to patient about his plan of care   Disposition: d/c planning home with home today     Attending Physician Dr. Ericka Aly # [id number]

## 2023-05-16 NOTE — DISCHARGE NOTE PROVIDER - NSDCMRMEDTOKEN_GEN_ALL_CORE_FT
Norvasc 2.5 mg oral tablet: 1 tab(s) orally once a day  
Norvasc 2.5 mg oral tablet: 1 tab(s) orally once a day

## 2023-05-19 PROBLEM — Z00.00 ENCOUNTER FOR PREVENTIVE HEALTH EXAMINATION: Status: ACTIVE | Noted: 2023-01-01

## 2023-05-23 PROBLEM — A04.8 H. PYLORI INFECTION: Status: ACTIVE | Noted: 2023-01-01

## 2023-06-07 PROBLEM — Z78.9 SOCIAL ALCOHOL USE: Status: ACTIVE | Noted: 2023-01-01

## 2023-06-07 PROBLEM — N28.1 COMPLEX RENAL CYST: Status: ACTIVE | Noted: 2023-01-01

## 2023-06-07 PROBLEM — Z87.891 FORMER SMOKER: Status: ACTIVE | Noted: 2023-01-01

## 2023-06-08 PROBLEM — N13.30 HYDRONEPHROSIS: Status: ACTIVE | Noted: 2023-01-01

## 2023-06-08 PROBLEM — R93.49 ABNORMAL FINDINGS ON DIAGNOSTIC IMAGING OF URINARY ORGANS: Status: ACTIVE | Noted: 2023-01-01

## 2023-06-08 NOTE — REVIEW OF SYSTEMS
[Feeling Poorly] : feeling poorly [Feeling Tired] : feeling tired [Constipation] : constipation [see HPI] : see HPI [Negative] : Respiratory

## 2023-06-08 NOTE — ASSESSMENT
[FreeTextEntry1] : 68 yo with large hemorrhagic cyst \par he does not have flank pain\par denies abdominal pain\par feels weak and deconditioned following hospitalization  but no flank pain \par \par - instructed to present ot ER if he experiences further flank pain or lethargy worsens\par - CBC amd CHem\par - repeat CT urogram\par - f/u with Dr. Rodarte for left nephrectomy\par \par discussed the potential for renal pelvic cancer as etiology of the dilatation\par all questions answered  [Hydronephrosis (591\N13.30)] : Salter-Watters type I

## 2023-06-08 NOTE — PHYSICAL EXAM
[FreeTextEntry1] : left PCN with old debris / blood product  [] : no respiratory distress once a week

## 2023-06-08 NOTE — LETTER BODY
[Dear  ___] : Dear  [unfilled], [Consult Letter:] : I had the pleasure of evaluating your patient, [unfilled]. [Please see my note below.] : Please see my note below. [Sincerely,] : Sincerely, [FreeTextEntry3] : Concetta Vásquez MD, FACS\par

## 2023-06-08 NOTE — HISTORY OF PRESENT ILLNESS
[FreeTextEntry1] : 66 yo gentleman initially seen in the hospital with a very large left renal cyst - no discernible parenchyma was noted prior to decompression\par the cyst was decompressed by IR and cytology reviewed - no cancer cells identified\par \par CT scan 5/2023\par IMPRESSION:\par \par 1. There is massive Grade IV hydronephrosis and enlargement of the left kidney, measuring 26 x 26 x 21 cm. There is minimal residual renal parenchyma.\par \par 2. There is a 5 cm inhomogeneously enhancing mass along the lesser curvature of the stomach (2/16). This is associated with a retroperitoneal 4.4 cm lymph node located ventral to the upper abdominal aorta, just above the level of the celiac axis (2/21).\par \par 3. There is displacement of retroperitoneal structures to the right side by the massively enlarged left kidney. This includes displacement and compression of the inferior vena cava especially at the level of the aortic bifurcation (2/54)\par \par repeat imaging post decompression not performed \par \par Cr 1.0 5/2023\par Hgb 7.5 \par \par

## 2023-06-18 NOTE — ED PROVIDER NOTE - ATTENDING CONTRIBUTION TO CARE
67-year-old male with history of hypertension, hydronephrosis with left nephrostomy placed in May 2023 presents to the emergency department after he has had no urine output in his left nephrostomy not having any urine output about 3 days.  Patient also states he has not urinated.    Const: in respiratory distress   Eyes: PERRL, no conjunctival injection  HENT:  Neck supple without meningismus   CV: RRR, Warm, well-perfused extremities  RESP: CTA B/L, no tachypnea   GI: soft, non-tender, + distended  MSK: No gross deformities appreciated  Skin: mottled   Neuro: Alert, CNs II-XII grossly intact. Sensation and motor function of extremities grossly intact.  Psych: Appropriate mood and affect.

## 2023-06-18 NOTE — ED ADULT TRIAGE NOTE - CHIEF COMPLAINT QUOTE
BIBA for difficulty breathing and substernal and abdominal retractions, pt. has a nephrostomy tub placed 1 month ago that hasn't been draining for over a week . abdomen distended RR 30s

## 2023-06-18 NOTE — H&P ADULT - ASSESSMENT
67M w/ PMH of HTN, grade IV hydronephrosis of L kidney s/p L PCN (placed 5/2023), stutter, hiatal hernia, iron deficiency anemia, H Pylori (untreated), and gastric mass (never biopsied) presents to the ED with at least 4 days of worsening weakness, abdominal distension, and no output from his PCN. Pt and daughters bedside are only able to give limited information, but for the past few days, he has noticed no output from his PCN as well as increasing abdominal distension, pain, and anorexia. He reports that he has not felt normal for the past 10 days. His PCN used to put out serosanguinous fluid, but the output changed to feculent/brown liquid over the past day. In the ED, he was noted to be in new onset afib w/ RVR to 150+ as well as experiencing some difficulty breathing. He was started on BiPAP. Stat labs were notable for WBC 47, Cr 2.9, and lactate 6. CT A/P w/ IV contrast showed free intraperitoneal fluid and air from an unknown source as well as fluid and air in the L kidney.     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 Currently on BiPAP saturating ok   - A Line placed. Frequent ABGs  - Mena placed for strict Is & Os  - repeat Labs     - Possible OR today, pending final discussion with Urology.     Above Plan was discussed in depth with Surgical Attending on call, Dr Gutiérrez 67M w/ PMH of HTN, grade IV hydronephrosis of L kidney s/p L PCN (placed 5/2023), stutter, hiatal hernia, iron deficiency anemia, H Pylori (untreated), and gastric mass (never biopsied) presents to the ED with at least 4 days of worsening weakness, abdominal distension, and no output from his PCN. Pt and daughters bedside are only able to give limited information, but for the past few days, he has noticed no output from his PCN as well as increasing abdominal distension, pain, and anorexia. He reports that he has not felt normal for the past 10 days. His PCN used to put out serosanguinous fluid, but the output changed to feculent/brown liquid over the past day. In the ED, he was noted to be in new onset afib w/ RVR to 150+ as well as experiencing some difficulty breathing. He was started on BiPAP. Stat labs were notable for WBC 47, Cr 2.9, and lactate 6. CT A/P w/ IV contrast showed free intraperitoneal fluid and air from an unknown source as well as fluid and air in the L kidney.     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 Currently on BiPAP saturating ok   - A Line placed. Frequent ABGs  - Mena placed for strict Is & Os  - repeat Labs     -Plan for IR drainage tonight. All teams in agreement     Above Plan was discussed in depth with Surgical Attending on call, Dr Gutiérrez

## 2023-06-18 NOTE — ED ADULT NURSE NOTE - OBJECTIVE STATEMENT
patient c/o shortness of breath and abdominal distention. states nephrostomy tube has not been draining any urine for about one week.

## 2023-06-18 NOTE — H&P ADULT - HISTORY OF PRESENT ILLNESS
67M w/ PMH of HTN, grade IV hydronephrosis of L kidney s/p L PCN (placed 5/2023), stutter, hiatal hernia, iron deficiency anemia, H Pylori (untreated), and gastric mass (never biopsied) presents to the ED with at least 4 days of worsening weakness, abdominal distension, and no output from his PCN. Pt and daughters bedside are only able to give limited information, but for the past few days, he has noticed no output from his PCN as well as increasing abdominal distension, pain, and anorexia. He reports that he has not felt normal for the past 10 days. His PCN used to put out serosanguinous fluid, but the output changed to feculent/brown liquid over the past day. In the ED, he was noted to be in new onset afib w/ RVR to 150+ as well as experiencing some difficulty breathing. He was started on BiPAP. Stat labs were notable for WBC 47, Cr 2.9, and lactate 6. CT A/P w/ IV contrast showed free intraperitoneal fluid and air from an unknown source as well as fluid and air in the L kidney.

## 2023-06-18 NOTE — H&P ADULT - NSHPLABSRESULTS_GEN_ALL_CORE
Labs:                10.2   47.97 )-----------( 687      ( 18 Jun 2023 14:18 )             31.7       Auto Neutrophil %: 77.4 % (06-18-23 @ 14:18)  Band Neutrophils %: 15.7 % (06-18-23 @ 14:18)    06-18    123<L>  |  86<L>  |  88<HH>  ----------------------------<  69<L>  5.3<H>   |  15<L>  |  2.9<H>    Calcium, Total Serum: 8.8 mg/dL (06-18-23 @ 14:18)    LFTs:             4.9  | 0.7  | 34       ------------------[106     ( 18 Jun 2023 14:18 )  2.3  | x    | 54          Lactate, Blood: 6.4 mmol/L (06-18-23 @ 14:18)  Blood Gas Venous - Lactate: 6.20 mmol/L (06-18-23 @ 14:05)    Coags:     19.80  ----< 1.71    ( 18 Jun 2023 14:18 )     31.1       RADIOLOGY:   < from: CT Abdomen and Pelvis w/ IV Cont (06.18.23 @ 15:15) >    IMPRESSION:    1 ) Moderate pneumoperitoneum with partial diffusion throughout moderate   volume ascites and with associated intermittent peritoneal enhancement   and nodularity. Perforation and peritonitis may be related to underlying   emphysematous pyelonephritis (see below). Less likely sources of   perforation include ureteral/bladder disruption, and bowel perforation   which cannot be entirely excluded on the provided images.    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 The compressed residual renal   parenchyma is inseparable from the adjacent fascial/fatty layers   anterosuperiorly and direct rupture into the peritoneum is a possibility   (2-355). The previously placed left-sided nephrostomy tube pigtail is   notedto be within the left renal collecting system.    3) New hepatic hypodensities measuring up to 2.6 cm, suspicious for   development of multiple focal abscesses.    4) Wedge-shaped region of hypodensity within the spleen may reflect   splenic infarct in the correct clinical setting. Developing   phlegmon/abscess cannot be excluded in the current clinical setting    5) Increased extensive retroperitoneal, portacaval and likely   gastrohepatic heterogeneously enhancing lymphadenopathy. Findings may be   reactive.    6) Mediastinal lymphadenopathy measuring up to 2.4 cm short axis.   Underlying etiology may be reactive, infectious/inflammatory, or   neoplastic. A short-term 3 month follow-up CT chest should be considered   for further evaluation.    7)Right middle and upper lobe solid pulmonary nodules which are not well   delineated due to respiratory motion but measure less than 6 mm.   attention on follow-up exam.    < end of copied text >

## 2023-06-18 NOTE — CONSULT NOTE ADULT - NS ATTEND AMEND GEN_ALL_CORE FT
pt seen at bedside  complex picture  seen by Dr Turcios last week for large left hydronehrosis s/p left pcn - discussed today for plan  now with obstructed left pcn likely due to debris for the last three days  discuss with Dr Sesay who will upsize catheter  also with colleciton in abdomen with airfluid level -- Dr Sesay to place drain here as well  plan for CT with PO and rectal contrast to rule out perf  WBC at 49 - will trend  SICU for monitoring - several discussions with Dr Gutiérrez - he says he is available if patient to go for exploration and washout  with renal failure -- ct at 2.9 - baseline of 1  IV abx per ID -- broad spectrum for now pt seen at bedside  complex picture  seen by Dr Turcios last week in office for large left hydronehrosis s/p left pcn - discussed today for plan  now with obstructed left pcn likely due to debris for the last three days  discuss with Dr Sesay who will upsize catheter  also with colleciton in abdomen with airfluid level -- Dr Sesay to place drain here as well  plan for CT with PO and rectal contrast to rule out bowel perf  WBC at 49 - will trend  SICU for monitoring - several discussions with Dr Gutiérrez - he says he is available if patient to go for exploration and washout  with renal failure -- ct at 2.9 - baseline of 1  IV abx per ID -- broad spectrum for now

## 2023-06-18 NOTE — CHART NOTE - NSCHARTNOTEFT_GEN_A_CORE
PACU ANESTHESIA ADMISSION NOTE      Procedure: Left nephrostomy tube exchange; right kidney abscess drainage      Post op diagnosis:      __x__  Intubated  TV:___525___       Rate: __14____      FiO2: ___100%___    ____  Patent Airway    ____  Full return of protective reflexes    ____  Full recovery from anesthesia / back to baseline     Vitals:   T:    98.2F       R:    14              BP:    96/67              Sat:    100               P: 141      Mental Status:  ____ Awake   _____ Alert   _____ Drowsy   __x___ Sedated    Nausea/Vomiting:  _x___ NO  ______Yes,   See Post - Op Orders          Pain Scale (0-10):  __0/10___    Treatment: ____ None    __x__ See Post - Op/PCA Orders    Post - Operative Fluids:   ____ Oral   _x___ See Post - Op Orders    Plan: Discharge:   ____Home       _____Floor     _x____Critical Care    _____  Other:_________________    Comments: Pt tolerated procedure well, no anesthesia related complications. Pt transported to SICU 3 via bed on monitors and ambu bag. Reports given to SICU RN/PA. Care of pt endorsed to SICU.

## 2023-06-18 NOTE — ED ADULT NURSE NOTE - HIV OFFER
"[facility] Hospital Medicine  History & Physical    Patient Name: Sheryl Martines  MRN: [id number]  Admission Date: 10/31/2018  Attending Physician: Kendrick Celis MD   Primary Care Provider: Primary Doctor [facility] Medicine Team: [facility] 2 Milton Cruz MD     Patient information was obtained from patient, past medical records and ER records.     Subjective:     Principal Problem:Acute metabolic encephalopathy    Chief Complaint:   Chief Complaint   Patient presents with    Altered Mental Status     Pt presents to ED via EMS from home. EMS reports family c/o AMS. Family states pt is "talking out of her head". Pt c/o RLE pain r/t broken tib/rib. EMS reports pt is aaox3.         HPI: 62 yo F with a PMH of asthma, HTN, HFpEF, CAD, DM2 on insulin c/p neuropathy, and history of CVA who presented to the ED after daughter called EMS due to altered mental status. The patient's daughter stated the patient began "not making sense" and "talking garbled" since the day prior to presentation. On my interview, patient's speech was still somewhat non-sensical, but was able to answer simple questions. Per patient she has had no appetite and no PO intake for 4-5 days. Of note patient was recently hospitalized following emergent appendectomy that was converted to open surgery and was complicated by surgical site infection. Surgical site grew MRSA, Entercoccus and E. coli. Treated with Vancomycin and Ertapenem. This was complicated by C diff infection, for which patient completed course of PO Vanc. Denies diarrhea, f/c/n/v at presentation. Also has close tib/fib fracture which occurred after recent discharge, currently in splint, being treated conservatively.    Past Medical History:   Diagnosis Date    Asthma     Closed compression fracture of fourth lumbar vertebra     COPD (chronic obstructive pulmonary disease)     Coronary artery disease     Diabetes mellitus     Glaucoma     High " cholesterol     Hypertension     Iritis     Pulmonary embolus     Stroke     rt sided weakness.       Past Surgical History:   Procedure Laterality Date    ABDOMINAL SURGERY      APPENDECTOMY N/A 8/26/2018    Procedure: APPENDECTOMY;  Surgeon: Bernadine Melendrez MD;  Location: [facility] OR;  Service: General;  Laterality: N/A;    APPENDECTOMY N/A 8/26/2018    Performed by Bernadine Melendrez MD at [facility] OR    APPENDECTOMY, LAPAROSCOPIC---CONVERTED TO OPEN APPENDECTOMY @0950 N/A 8/26/2018    Performed by Bernadine Melendrez MD at [facility] OR    BACK SURGERY      stimulator    CATARACT EXTRACTION      HYSTERECTOMY      LAPAROSCOPIC APPENDECTOMY N/A 8/26/2018    Procedure: APPENDECTOMY, LAPAROSCOPIC---CONVERTED TO OPEN APPENDECTOMY @0950;  Surgeon: Bernadine Melendrez MD;  Location: [facility] OR;  Service: General;  Laterality: N/A;       Review of patient's allergies indicates:   Allergen Reactions    Ace inhibitors Swelling    Hydralazine analogues     Tetracyclines Swelling    Travatan (with benzalkonium) [travoprost (benzalkonium)]        No current facility-administered medications on file prior to encounter.      Current Outpatient Medications on File Prior to Encounter   Medication Sig    aspirin (ECOTRIN) 81 MG EC tablet Take 1 tablet (81 mg total) by mouth once daily.    clopidogrel (PLAVIX) 75 mg tablet Take 75 mg by mouth once daily.    diltiaZEM (CARDIZEM CD) 240 MG 24 hr capsule Take 1 capsule (240 mg total) by mouth once daily.    DULoxetine (CYMBALTA) 60 MG capsule Take 60 mg by mouth once daily.    furosemide (LASIX) 40 MG tablet Take 40 mg by mouth once daily.     insulin aspart U-100 (NOVOLOG) 100 unit/mL InPn pen Inject 0-5 Units into the skin 4 (four) times daily before meals and nightly.    insulin aspart U-100 (NOVOLOG) 100 unit/mL InPn pen Inject 12 Units into the skin 3 (three) times daily.    insulin detemir U-100 (LEVEMIR FLEXTOUCH) 100 unit/mL (3 mL) SubQ InPn pen Inject 28  Units into the skin once daily.    losartan (COZAAR) 100 MG tablet Take 100 mg by mouth once daily.     pantoprazole (PROTONIX) 40 MG tablet Take 40 mg by mouth once daily.    predniSONE (DELTASONE) 10 MG tablet Take 10 mg by mouth once daily.     senna-docusate 8.6-50 mg (SENNA WITH DOCUSATE SODIUM) 8.6-50 mg per tablet Take 1 tablet by mouth 2 (two) times daily.    simethicone 80 mg Tab Take 160 mg by mouth every 6 (six) hours as needed for Flatulence.    simvastatin (ZOCOR) 40 MG tablet Take 0.5 tablets (20 mg total) by mouth every evening.    albuterol-ipratropium (DUO-NEB) 2.5 mg-0.5 mg/3 mL nebulizer solution Take 3 mLs by nebulization every 4 (four) hours. Rescue    Lactobacillus acidophilus 1 billion cell Cap Take 1 tablet by mouth 2 (two) times daily.    megestrol (MEGACE) 40 MG Tab Take 40 mg by mouth 2 (two) times daily.    ondansetron (ZOFRAN) 4 MG tablet Take 1 tablet (4 mg total) by mouth every 6 (six) hours as needed.    pyridoxine, vitamin B6, (VITAMIN B-6) 50 MG Tab Take 1 tablet (50 mg total) by mouth once daily.    [DISCONTINUED] acetaminophen (TYLENOL) 325 MG tablet Take 2 tablets (650 mg total) by mouth every 6 (six) hours as needed for Pain.    [DISCONTINUED] cloNIDine (CATAPRES) 0.3 MG tablet Take 1 tablet (0.3 mg total) by mouth 3 (three) times daily.    [DISCONTINUED] ferrous sulfate 325 (65 FE) MG EC tablet Take 1 tablet (325 mg total) by mouth once daily.    [DISCONTINUED] fluticasone-vilanterol (BREO) 100-25 mcg/dose diskus inhaler Inhale 1 puff into the lungs once daily. Controller    [DISCONTINUED] hydrALAZINE (APRESOLINE) 25 MG tablet Take 1 tablet (25 mg total) by mouth every 8 (eight) hours.     Family History     Problem Relation (Age of Onset)    Cancer Father    Diabetes Brother    Lupus Sister    Multiple sclerosis Mother        Tobacco Use    Smoking status: Never Smoker   Substance and Sexual Activity    Alcohol use: No     Frequency: Never    Drug use: No     Sexual activity: No     Partners: Male     Review of Systems   Constitutional: Positive for activity change, appetite change and fatigue.   Musculoskeletal:        Pain associated with R tib/fib     Objective:     Vital Signs (Most Recent):  Temp: 98.4 °F (36.9 °C) (11/01/18 0302)  Pulse: 104 (11/01/18 0302)  Resp: (!) 22 (11/01/18 0302)  BP: (!) 148/78 (11/01/18 0302)  SpO2: 97 % (11/01/18 0247) Vital Signs (24h Range):  Temp:  [98.4 °F (36.9 °C)-98.5 °F (36.9 °C)] 98.4 °F (36.9 °C)  Pulse:  [] 104  Resp:  [18-32] 22  SpO2:  [93 %-100 %] 97 %  BP: ()/(58-84) 148/78        There is no height or weight on file to calculate BMI.    Physical Exam   Constitutional: She appears well-developed and well-nourished. No distress.   HENT:   Head: Normocephalic and atraumatic.   Eyes: Conjunctivae are normal. No scleral icterus.   Neck: Normal range of motion.   Cardiovascular: Normal rate, regular rhythm, normal heart sounds and intact distal pulses.   Pulmonary/Chest: Effort normal. She has wheezes.   Abdominal: Soft. She exhibits no distension. There is no tenderness.   Musculoskeletal: She exhibits no edema.   R leg in brace   Neurological:   Somnolent, but arousable  Oriented to self only   Skin: Skin is warm and dry.   Nursing note and vitals reviewed.          Significant Labs: All pertinent labs within the past 24 hours have been reviewed.    Significant Imaging: I have reviewed all pertinent imaging results/findings within the past 24 hours.    Assessment/Plan:     * Acute metabolic encephalopathy    Initial concern for asthma/COPD exacerbation. Per patient's daughter, patient has a history of CO2 narcosis;however, VBG unremarkable. CXR without consolidation. No leukocytosis or lactic acidosis to raise suspicion of reoccurrence of intra abdominal infection. Glucose only mildly elevated. No uremia.     - Will continue treatment of asthma/COPD exacerbation given presence of wheezing on exam   -  Azithromycin and prednisone 40 for total of 5 day course  - Possible that this is a presentation of severe dehydration, tachycardia improved with fluids   - Ritchie placed in the ED, no UOP after 2 L IVF (though no NIKHIL on labs)   - Will infuse additional liter overnight  - Zyprexa ODT 10 BID PRN for agitation       Dehydration    - Per principle problem       Hypomagnesemia    Magnesium 1.0 in ED. s/p 2 g mag sulfate.    - Will give additional 2g mag sulfate IV q2h x 3 doses       Discharge planning issues    - During last admission, patient was significant placement issue   - Has [place] Medicaid which would only approve SNF in [place]; however, patient wished to be near daughter  - PT/OT eval and treat       History of CVA (cerebrovascular accident)    - Continue ASA, plavix and statin       Gastroesophageal reflux disease without esophagitis    - Pantoprazole qD       (HFpEF) heart failure with preserved ejection fraction    - Continue losartan  - Strict I&O and daily weights  - Cardiac diet       CAD (coronary artery disease)    - Continue ASA and statin       Insulin dependent diabetes mellitus    A1c 7.1 10/4/18. Per med list provided by patient, only taking detemir 20 u BID    - Will give detemir 20 u qHS with LDSSI and Accuchecks, titrate as appropriate  - Diabetic diet         VTE Risk Mitigation (From admission, onward)        Ordered     enoxaparin injection 40 mg  Daily      11/01/18 0333     IP VTE HIGH RISK PATIENT  Once      11/01/18 0333     Place sequential compression device  Until discontinued      11/01/18 0333     IP VTE HIGH RISK PATIENT  Once      11/01/18 0333             Milton Cruz MD  Department of Hospital Medicine   [facility]   Opt out

## 2023-06-18 NOTE — H&P ADULT - NSHPSOURCEINFORD_GEN_ALL_CORE
of  morbidity and mortality there is an increased risk of sudden infant death syndrome in the households where people smoke. I recommend that she stops smoking. 6. She is to continue the treatment with Lovenox. I recommend calcium supplementation (I told her to take three tablets of calcium carbonate (TUMS), because the association between long term use of heparin and osteopenia. 7. Fetal well-being was confirmed today. The amount of fluid around baby is normal.  The Biophysical profile score of 8/8 is reassuring, and the umbilical artery Doppler studies are normal.  8. She should monitor fetal well-being at home by counting movements after dinner. Her baby should  move 10 times in 2 hours; otherwise, she should call your office immediately. She is also to call, if she develops any headaches, blurred vision, abdominal pain, bleeding, or spotting, which are signs of preeclampsia. 9. She is to continue to follow with you in your office for ongoing obstetric care. 10. She should continue to be followed up in your office with nonstress test every 3 to 4 days. 11. If there is a need for further ultrasound evaluations in our office please do not hesitate to call our office and schedule an appointment. Thank you again, doctor, for allowing us to be of service to your patient. If I can be of further assistance, please do not hesitate to call. Sincerely,        Mallorie Muñoz M.D., FACOG    [place] OFFICE OUTPATIENT VISIT 85 MINUTES [40277]  US OB Follow Up Transabdominal Approach [MBJ546 Custom]  US Fetal Biophysical Profile WO Non Stress Testing [89707 Custom]    **This report has been created using voice recognition software.  It may contain minor errors     which are inherent in voice recognition technology** Child

## 2023-06-18 NOTE — CONSULT NOTE ADULT - SUBJECTIVE AND OBJECTIVE BOX
UROLOGY CONSULT NOTE:    Pt is a 66yo Male with pmh of hypertension, massive Grade IV hydronephrosis s/p left nephrostomy placed in May 2023  by [facility] presents to the ED with little to no urine output from LEFT nephrostomy from about a week. Urology consulted for left PCN not draining and r/o kidney rupture. Pt seen and examined at bedside with Dr. Bryan. Pt's daughter at bedside reports pt last seen Dr. Vásquez as outpatient beginning of this month and states he recommend and referred pt to Dr. Rodarte for plans for left nephrectomy. Patient's daughter reports his PCN used to put out serosanguinous fluid, however more recently changed to brown/purulent fluid. 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 Pt's daughter admits pt with abdominal distention/discomfort. Denies fever, N/V, flank pain, dysuria, hematuria, or trauma.       PAST MEDICAL & SURGICAL HISTORY:  Hypertension    Massive Grade IV hydronephrosis s/p left nephrostomy placed in May 2023  by [facility]    MEDICATIONS  (STANDING):  lactated ringers Bolus 1000 milliLiter(s) IV Bolus once  lactated ringers Bolus 1000 milliLiter(s) IV Bolus once  lactated ringers. 1000 milliLiter(s) (120 mL/Hr) IV Continuous <Continuous>        Allergies  No Known Allergies  Intolerances        SOCIAL HISTORY: No illicit drug use        REVIEW OF SYSTEMS  [x] Due to altered mental status, subjective information were not able to be obtained from patient. History was obtained, to the extent possible, from review of the chart and collateral sources of information.    Vital Signs Last 24 Hrs  T(C): 37.4 (18 Jun 2023 18:38), Max: 37.4 (18 Jun 2023 14:10)  T(F): 99.4 (18 Jun 2023 14:10), Max: 99.4 (18 Jun 2023 14:10)  HR: 86 (18 Jun 2023 18:38) (86 - 118)  BP: 160/101 (18 Jun 2023 18:38) (125/78 - 160/101)  RR: 32 (18 Jun 2023 18:38) (32 - 32)  SpO2: 98% (18 Jun 2023 18:38) (98% - 99%)    Parameters below as of 18 Jun 2023 18:38    O2 Flow (L/min): 15      PHYSICAL EXAM    GEN: NAD, awake    HEENT: NC/AT   SKIN: Good color, non diaphoretic.  RESP: + BiPAP satting 100%  ABDO: soft, mild tenderness x 4 quadrants, + abdominal distention, no suprapubic tenderness  BACK: No CVA tenderness B/L, + left PCN noted with brown/feculent drainage, attempted to irrigate left PCN, evacuated foul smelling thick brown/red pus. Cultures obtained at bedside   :  + Indwelling bobby in place   EXT: no LE edema b/l   NEURO: A&O x1        LABS:                        10.2   47.97 )-----------( 687      ( 18 Jun 2023 14:18 )             31.7     06-18    123<L>  |  86<L>  |  88<HH>  ----------------------------<  69<L>  5.3<H>   |  15<L>  |  2.9<H>    Ca    8.8      18 Jun 2023 14:18    TPro  4.9<L>  /  Alb  2.3<L>  /  TBili  0.7  /  DBili  x   /  AST  34  /  ALT  54<H>  /  AlkPhos  106  06-18    PT/INR - ( 18 Jun 2023 14:18 )   PT: 19.80 sec;   INR: 1.71 ratio         PTT - ( 18 Jun 2023 14:18 )  PTT:31.1 sec          RADIOLOGY & ADDITIONAL STUDIES:  < from: CT Abdomen and Pelvis w/ IV Cont (06.18.23 @ 15:15) >    ACC: [id number] EXAM:  CT CHEST IC   ORDERED BY: PAVEL FALCON     ACC: [id number] EXAM:  CT ABDOMEN AND PELVIS IC   ORDERED BY: PAVEL FALCON     PROCEDURE DATE:  06/18/2023          INTERPRETATION:  CLINICAL STATEMENT: Trauma, worsening abdominal pain.    TECHNIQUE: Contiguous axial CT images were obtained from the thoracic   inlet to the pubic symphysis after administration of 100 cc of Omnipaque   350 IV contrast.  Oral contrast was not administered.  Reformatted images   in the coronal and sagittal planes were acquired.    COMPARISON CT: CT abdomen and pelvis 5/5/2023. No CT chest is available   for comparison.    FINDINGS:    CHEST:    LUNGS/PLEURA/AIRWAYS: Respiratory motion limits evaluation of the subtle   parenchymal details. Central airways are patent. Expiratory phase   imaging. No focal consolidation, pleural effusion, or pneumothorax.   Bilateral small pleural effusions with adjacent compressive atelectatic   changes. Right middle and upper lobe solid pulmonary nodules which are  not well delineated due to respiratory motion but measure less than 6 mm.    MEDIASTINUM/THORACIC NODES: Mediastinal lymphadenopathy measuring up to   2.4 cm short axis at the pretracheal region (6-90). No measurable thyroid   nodule.    HEART/GREAT VESSELS: Left atrial enlargement. No pericardial effusion.   Coronary artery calcifications. Apparent ascending thoracic aortic   aneurysm measuring up to 4.6 cm in diameter on this   nondedicated/nondedicated study. Main pulmonary artery is of normal   caliber.      ABDOMEN/PELVIS:    HEPATOBILIARY: Multiple new indeterminate hepatic hypodensities measuring   up to 2.6 cm adjacent to the falciform ligament (6-223), suspicious for   new abscesses. Periportal edema. No definite acute gallbladder pathology.    SPLEEN: Late arterial phase appearance of the spleen. More focal   wedge-shaped region of hypodensity may reflect underlying splenic   infarct..    PANCREAS: Unremarkable.    ADRENAL GLANDS: Unremarkable.    KIDNEYS/RETROPERITONEUM: Again noted is grade 4 hydronephrosis and   enlargement of the left kidney with minimal residual renal parenchyma.   The residual renal parenchyma is inseparable from the adjacent Gerota's   faschia, compressed pararenal space and posterior aspect of the   peritoneal fascia and direct perforation into the peritoneum cannot be   excluded (6-354). Interval development of gas throughout the   hydronephrotic collecting system with likely extension into the residual   cortical tissues (6-316, 6-330, 6-334). Of note there are increased left   perirenal inflammatory changes without free air or fluid collection.   Unchanged large right renal cyst. Normal right renal parenchymal   enhancement.    ABDOMINOPELVIC NODES: Marked retroperitoneal lymphadenopathymeasuring up   to 4.4 x 5.0 cm. Increased portacaval lymphadenopathy measuring up to 3.1   x 4.9 cm. Previously noted lesser curvature mass measures 6.3 x 5.2 cm   (previously 5.2 x 3.5 cm by my measurement), and may represent   gastrohepatic lymphadenopathy.    PELVIC ORGANS: Enlarged hyperdense left ureter is inseparable from   adjacent peritoneal fluid collection at its proximal to midportion and   cannot be completely delineated. Decompressed bladder containing a Bobby   catheter.    PERITONEUM/MESENTERY/BOWEL: Moderate volume pneumoperitoneum,   predominantly layering anteriorly with numerous locules of air diffusing   throughout a moderate burden of abdominal ascites. There are intermittent   areas of peritoneal enhancement and nodularity measuring up to 2.2 cm   (6-532). The colon is of predominantly normal caliber and contains air   and stool (the wall of the transverse colon is intact, best seen on lung   window, key images). Small bowel is also of normal caliber and evidences   diffuse mild mucosal hyperenhancement, likely secondary to inflammatory   changes. The appendix is not well delineated. There is no focal walled   off collection within the peritoneal cavity. The enlarged left kidney has   mass effect on the bowel, left anterior pararenal space and peritoneal   space/contents. Previously placed left-sided nephrostomy tube pigtail is   noted to be within the left renal collecting system.    BONES/SOFT TISSUES: Degenerative osseous changes most prominent at the   visualized spine. Anasarca.    OTHER: Atherosclerotic aorta is of normal caliber, similar as compared   with prior exam.      IMPRESSION:    1 ) Moderate pneumoperitoneum with partial diffusion throughout moderate   volume ascites and with associated intermittent peritoneal enhancement   and nodularity. Perforation and peritonitis may be related to underlying   emphysematous pyelonephritis (see below). Less likely sources of   perforation include ureteral/bladder disruption, and bowel perforation   which cannot be entirely excluded on the provided images.    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 The compressed residual renal   parenchyma is inseparable from the adjacent fascial/fatty layers   anterosuperiorly and direct rupture into the peritoneum is a possibility   (6-355). The previously placed left-sided nephrostomy tube pigtail is   notedto be within the left renal collecting system.    3) New hepatic hypodensities measuring up to 2.6 cm, suspicious for   development of multiple focal abscesses.    4) Wedge-shaped region of hypodensity within the spleen may reflect   splenic infarct in the correct clinical setting. Developing   phlegmon/abscess cannot be excluded in the current clinical setting    5) Increased extensive retroperitoneal, portacaval and likely   gastrohepatic heterogeneously enhancing lymphadenopathy. Findings may be   reactive.    6) Mediastinal lymphadenopathy measuring up to 2.4 cm short axis.   Underlying etiology may be reactive, infectious/inflammatory, or   neoplastic. A short-term 3 month follow-up CT chest should be considered   for further evaluation.    7)Right middle and upper lobe solid pulmonary nodules which are not well   delineated due to respiratory motion but measure less than 6 mm.   attention on follow-up exam.        Dr. Flako Miller discussed preliminary findings of intraperitoneal free   air with MICKIE CHAUDHARI MD; Attending Emergency Medicine? in real-time   review with readback.    Dr. Flako Miller discussed preliminary findings with ADEOLA FAULKNER on   6/18/2023 3:43 PM with readback.    --- End of Report ---          FLAKO MILLER MD; Resident Radiologist  This document has been electronically signed.  RONNELL GAGE MD; Attending Radiologist  This document has been electronically signed. Jun 18 2023  5:11PM    < end of copied text >

## 2023-06-18 NOTE — PRE-ANESTHESIA EVALUATION ADULT - NSRADCARDRESULTSFT_GEN_ALL_CORE
EKG:    Ventricular Rate 172 BPM    Atrial Rate 144 BPM    QRS Duration 90 ms    Q-T Interval 292 ms    QTC Calculation(Bazett) 493 ms    R Axis 114 degrees    T Axis 93 degrees    Diagnosis Line Atrial fibrillation with rapid ventricular response  Left posterior fascicular block  Nonspecific T wave abnormality  Abnormal ECG    Confirmed by Deon Velasquez ([id number]) on 6/18/2023 3:38:26 PM      ACC: [id number] EXAM:  CT CHEST IC   ORDERED BY: PAVEL FALCON     ACC: [id number] EXAM:  CT ABDOMEN AND PELVIS IC   ORDERED BY: PAVEL FALCON     PROCEDURE DATE:  06/18/2023          INTERPRETATION:  CLINICAL STATEMENT: Trauma, worsening abdominal pain.    TECHNIQUE: Contiguous axial CT images were obtained from the thoracic   inlet to the pubic symphysis after administration of 100 cc of Omnipaque   350 IV contrast.  Oral contrast was not administered.  Reformatted images   in the coronal and sagittal planes were acquired.    COMPARISON CT: CT abdomen and pelvis 5/5/2023. No CT chest is available   for comparison.    FINDINGS:    CHEST:    LUNGS/PLEURA/AIRWAYS: Respiratory motion limits evaluation of the subtle   parenchymal details. Central airways are patent. Expiratory phase   imaging. No focal consolidation, pleural effusion, or pneumothorax.   Bilateral small pleural effusions with adjacent compressive atelectatic   changes. Right middle and upper lobe solid pulmonary nodules which are   not well delineated due to respiratory motion but measure less than 6 mm.    MEDIASTINUM/THORACIC NODES: Mediastinal lymphadenopathy measuring up to   2.4 cm short axis at the pretracheal region (6-90). No measurable thyroid   nodule.    HEART/GREAT VESSELS: Left atrial enlargement. No pericardial effusion.   Coronary artery calcifications. Apparent ascending thoracic aortic   aneurysm measuring up to 4.6 cm in diameter on this   nondedicated/nondedicated study. Main pulmonary artery is of normal   caliber.      ABDOMEN/PELVIS:    HEPATOBILIARY: Multiple new indeterminate hepatic hypodensities measuring   up to 2.6 cm adjacent to the falciform ligament (6-223), suspicious for   new abscesses. Periportal edema. No definite acute gallbladder pathology.    SPLEEN: Late arterial phase appearance of the spleen. More focal   wedge-shaped region of hypodensity may reflect underlying splenic   infarct..    PANCREAS: Unremarkable.    ADRENAL GLANDS: Unremarkable.    KIDNEYS/RETROPERITONEUM: Again noted is grade 4 hydronephrosis and   enlargement of the left kidney with minimal residual renal parenchyma.   The residual renal parenchyma is inseparable from the adjacent Gerota's   faschia, compressed pararenal space and posterior aspect of the   peritoneal fascia and direct perforation into the peritoneum cannot be   excluded (6-354). Interval development of gas throughout the   hydronephrotic collecting system with likely extension into the residual   cortical tissues (6-316, 6-330, 6-334). Of note there are increased left   perirenal inflammatory changes without free air or fluid collection.   Unchanged large right renal cyst. Normal right renal parenchymal   enhancement.    ABDOMINOPELVIC NODES: Marked retroperitoneal lymphadenopathy measuring up   to 4.4 x 5.0 cm. Increased portacaval lymphadenopathy measuring up to 3.1   x 4.9 cm. Previously noted lesser curvature mass measures 6.3 x 5.2 cm   (previously 5.2 x 3.5 cm by my measurement), and may represent   gastrohepatic lymphadenopathy.    PELVIC ORGANS: Enlarged hyperdense left ureter is inseparable from   adjacent peritoneal fluid collection at its proximal to midportion and   cannot be completely delineated. Decompressed bladder containing a Mena   catheter.    PERITONEUM/MESENTERY/BOWEL: Moderate volume pneumoperitoneum,   predominantly layering anteriorly with numerous locules of air diffusing   throughout a moderate burden of abdominal ascites. There are intermittent   areas of peritoneal enhancement and nodularity measuring up to 2.2 cm   (6-532). The colon is of predominantly normal caliber and contains air   and stool (the wall of the transverse colon is intact, best seen on lung   window, key images). Small bowel is also of normal caliber and evidences   diffuse mild mucosal hyperenhancement, likely secondary to inflammatory   changes. The appendix is not well delineated. There is no focal walled   off collection within the peritoneal cavity. The enlarged left kidney has   mass effect on the bowel, left anterior pararenal space and peritoneal   space/contents. Previously placed left-sided nephrostomy tube pigtail is   noted to be within the left renal collecting system.    BONES/SOFT TISSUES: Degenerative osseous changes most prominent at the   visualized spine. Anasarca.    OTHER: Atherosclerotic aorta is of normal caliber, similar as compared   with prior exam.      IMPRESSION:    1 ) Moderate pneumoperitoneum with partial diffusion throughout moderate   volume ascites and with associated intermittent peritoneal enhancement   and nodularity. Perforation and peritonitis may be related to underlying   emphysematous pyelonephritis (see below). Less likely sources of   perforation include ureteral/bladder disruption, and bowel perforation   which cannot be entirely excluded on the provided images.    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 The compressed residual renal   parenchyma is inseparable from the adjacent fascial/fatty layers   anterosuperiorly and direct rupture into the peritoneum is a possibility   (6-355). The previously placed left-sided nephrostomy tube pigtail is   noted to be within the left renal collecting system.    3) New hepatic hypodensities measuring up to 2.6 cm, suspicious for   development of multiple focal abscesses.    4) Wedge-shaped region of hypodensity within the spleen may reflect   splenic infarct in the correct clinical setting. Developing   phlegmon/abscess cannot be excluded in the current clinical setting    5) Increased extensive retroperitoneal, portacaval and likely   gastrohepatic heterogeneously enhancing lymphadenopathy. Findings may be   reactive.    6) Mediastinal lymphadenopathy measuring up to 2.4 cm short axis.   Underlying etiology may be reactive, infectious/inflammatory, or   neoplastic. A short-term 3 month follow-up CT chest should be considered   for further evaluation.    7)Right middle and upper lobe solid pulmonary nodules which are not well   delineated due to respiratory motion but measure less than 6 mm.   attention on follow-up exam.        Dr. Flako Miller discussed preliminary findings of intraperitoneal free   air with MICKIE CHAUDHARI MD; Attending Emergency Medicine? in real-time   review with readback.    Dr. Flako Miller discussed preliminary findings with ADEOLA FAULKNER on   6/18/2023 3:43 PM with readback.    --- End of Report ---          FLAKO MILLER MD; Resident Radiologist  This document has been electronically signed.  RONNELL GAGE MD; Attending Radiologist  This document has been electronically signed. Jun 18 2023  5:11PM

## 2023-06-18 NOTE — ED PROVIDER NOTE - OBJECTIVE STATEMENT
67-year-old male, past medical history of hypertension and hydronephrosis with left nephrostomy placed 5/2023, presents emergency department for his left nephrostomy tube not draining for 1 week.  Patient also states he has not urinated. 67-year-old male, past medical history of HTN, grade IV hydronephrosis of L kidney s/p L PCN (placed 5/2023), stutter, hiatal hernia, iron deficiency anemia, H Pylori (untreated), and gastric mass (never biopsied), presents emergency department for his left nephrostomy tube not draining for 1 week.  Patient also states he has not urinated in 3 days. Found to be hypoxic and SOB in triage and brought to the critical care area.

## 2023-06-18 NOTE — PROGRESS NOTE ADULT - SUBJECTIVE AND OBJECTIVE BOX
INTERVENTIONAL RADIOLOGY BRIEF-OPERATIVE NOTE    Procedure: left PCN exchange/upsize, RLQ drain    Pre-Op Diagnosis: sepsis    Post-Op Diagnosis: same as pre    Attending: Shama  Resident:     Anesthesia (type):  [x ] General Anesthesia  [ ] Deep Sedation - provided by anesthesiologist  [ ] Conscious Sedation -  Versed and Fentanyl   [ ] Local/Regional    Contrast: 0    Estimated Blood Loss: <1cc    Condition:   [ ] Critical  [ ] Serious  [ ] Fair   [ x] Good    Findings/Follow up Plan of Care: Left PCN upsized to 16F and 1200cc of very viscous tan colored fluid was aspirated. RLQ 16F drain was placed and 1200cc of tan colored fluid was aspirated (less viscous). A sample from each location was sent for culture.     Specimens Removed: see above    Implants: see above    Complications: no immediate    Disposition: back to ICU      Please call Interventional Radiology [phone] with any questions, concerns, or issues.

## 2023-06-18 NOTE — ED PROVIDER NOTE - CLINICAL SUMMARY MEDICAL DECISION MAKING FREE TEXT BOX
67-year-old male presenting to the emergency department after his nephrostomy tube stopped putting out urine.  On physical exam patient is in respiratory distress mottled skin diffuse abdominal distention and heart rate with A-fib with RVR with heart 172.  No history of A-fib.  Concern for sepsis as underlying cause of A-fib therefore we will hold off on any rate control at this point.  We will do labs start patient on BiPAP use CT abdomen pelvis.  Talk with family and patient is full code.

## 2023-06-18 NOTE — ED ADULT TRIAGE NOTE - GLASGOW COMA SCALE: BEST MOTOR RESPONSE, MLM
Returned call to patient to assist with colonoscopy instructions. Patient states he understands instructions.    (M6) obeys commands

## 2023-06-18 NOTE — CONSULT NOTE ADULT - ASSESSMENT
66yo Male with pmh of hypertension, massive Grade IV hydronephrosis s/p left nephrostomy placed in May 2023  by [facility] radiology presents to the ED with little to no urine output from LEFT nephrostomy from about a week-  consulted for left PCN not draining and r/o kidney rupture    CT A&P w/ IV contrast: Moderate pneumoperitoneum with partial diffusion throughout moderate volume ascites and with associated intermittent peritoneal enhancement and nodularity. Perforation and peritonitis may be related to underlying emphysematous pyelonephritis. Less likely sources of perforation include ureteral/bladder disruption, and bowel perforation which cannot be entirely excluded on the provided images. 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 The compressed residual renal parenchyma is inseparable from the adjacent fascial/fatty layers anterosuperiorly and direct rupture into the peritoneum is a possibility. The previously placed left-sided nephrostomy tube pigtail is noted to be within the left renal collecting system. New hepatic hypodensities measuring up to 2.6 cm, suspicious for development of multiple focal abscesses. Wedge-shaped region of hypodensity within the spleen may reflect splenic infarct in the correct clinical setting. Developing phlegmon/abscess cannot be excluded in the current clinical setting. Increased extensive retroperitoneal, portacaval and likely gastrohepatic heterogeneously enhancing lymphadenopathy. Findings may be reactive.        Plan:  ·	Case discussed and imaging reviewed amongst Dr. Bryan, Dr. Sesay (IR), and Dr. Gutiérrez (SICU/gensurg), plan as follows:  ·	admission to SICU for hemodynamic monitoring   ·	recommend IR for replacement and upsize of left nephrostomy tube and drainger of right gutter fluid collection, Dr. Sesay coming today for procedure   ·	Keep NPO   ·	After IR drainage, will reassess and obtain CT A&P w/ PO and rectal contrast to r/o bowel perforation   ·	f/u cultures obtained at bedside from left PCN aspiration   ·	IV abx as per ID recommendations  ·	IV fluids/hydrate if not c/i   ·	pain control as needed   ·	trend WBC, currently 47.97  ·	trend Cr, currently 2.9 (baseline 1.0)  ·	spoke with daughter, aware and agreeable to plan

## 2023-06-18 NOTE — CONSULT NOTE ADULT - SUBJECTIVE AND OBJECTIVE BOX
SICU Consultation Note  ======================================================================================================  CHRISTINE VILLAVICENCIO  MRN-[id number]    67M w/ PMH of HTN, grade IV hydronephrosis of L kidney s/p L PCN (placed 5/2023), stutter, hiatal hernia, iron deficiency anemia, H Pylori (untreated), and gastric mass (never biopsied) presents to the ED with at least 4 days of worsening weakness, abdominal distension, and no output from his PCN. Pt and daughters bedside are only able to give limited information, but for the past few days, he has noticed no output from his PCN as well as increasing abdominal distension, pain, and anorexia. He reports that he has not felt normal for the past 10 days. His PCN used to put out serosanguinous fluid, but the output changed to feculent/brown liquid over the past day. In the ED, he was noted to be in new onset afib w/ RVR to 150+ as well as experiencing some difficulty breathing. He was started on BiPAP. Stat labs were notable for WBC 47, Cr 2.9, and lactate 6. CT A/P w/ IV contrast showed free intraperitoneal fluid and air from an unknown source as well as fluid and air in the L kidney.     SICU was consulted for hemodynamic monitoring 2/2 intraabdominal sepsis.    PMH  HTN  grade IV hydronephrosis of L kidney s/p PCN  stutter  hiatal hernia  iron deficiency anemia  H pylori - untreated  gastric mass    Home Meds:  norvasc 2.5 QD    Allergies  No Known Allergies    Advanced Directives: Presumed Full Code    VITAL SIGNS, INS/OUTS (Last 24hours):    ICU Vital Signs Last 24 Hrs  T(C): 37.4 (18 Jun 2023 14:10), Max: 37.4 (18 Jun 2023 14:10)  T(F): 99.4 (18 Jun 2023 14:10), Max: 99.4 (18 Jun 2023 14:10)  HR: 86 (18 Jun 2023 14:19) (86 - 118)  BP: 160/101 (18 Jun 2023 14:10) (125/78 - 160/101)  RR: 32 (18 Jun 2023 13:50) (32 - 32)  SpO2: 98% (18 Jun 2023 14:19) (98% - 99%)    O2 Parameters below as of 18 Jun 2023 13:50  Patient On (Oxygen Delivery Method): mask, nonrebreather  O2 Flow (L/min): 15    Height (cm): 180.3 (06-18-23)    Physical Exam:   ---------------------------------------------------------------------------------------  GCS: 15, uncomfortable appearing  Exam: A&Ox3, no focal deficits    RESPIRATORY:  on bipap    CARDIOVASCULAR:  tachycardic to 150+, normotensive    GASTROINTESTINAL:  Abdomen soft, diffusely mildly tender, no rebound/guarding, distended    DERM:  pale skin and ecchymoses over abdomen and b/l proximal thighs    :   Exam: Mena catheter in place with no output.   L PCN in place to leg bag with brownish output    Tubes/Lines/Drains  ----------------------------------------------------------------------------------------------------------  [X] Peripheral IV  [X] Arterial Line		      Radial    Date Placed: 6/18  [X] Urinary Catheter Mena                                             Date Placed:   [X] NGT  [X] L PCN    LABS  --------------------------------------------------------------------------------------  Labs:                10.2   47.97 )-----------( 687      ( 18 Jun 2023 14:18 )             31.7       Auto Neutrophil %: 77.4 % (06-18-23 @ 14:18)  Band Neutrophils %: 15.7 % (06-18-23 @ 14:18)    06-18    123<L>  |  86<L>  |  88<HH>  ----------------------------<  69<L>  5.3<H>   |  15<L>  |  2.9<H>    Calcium, Total Serum: 8.8 mg/dL (06-18-23 @ 14:18)    LFTs:             4.9  | 0.7  | 34       ------------------[106     ( 18 Jun 2023 14:18 )  2.3  | x    | 54          Lactate, Blood: 6.4 mmol/L (06-18-23 @ 14:18)  Blood Gas Venous - Lactate: 6.20 mmol/L (06-18-23 @ 14:05)    Coags:     19.80  ----< 1.71    ( 18 Jun 2023 14:18 )     31.1       CT/XRAY/ECHO/TCD/EEG  ----------------------------------------------------------------------------------------------  pending read  --------------------------------------------------------------------------------------  Admit Diagnosis: Atrial fibrillation SICU Consultation Note  ======================================================================================================  CHRISTINE VILLAVICENCIO  MRN-[id number]    67M w/ PMH of HTN, grade IV hydronephrosis of L kidney s/p L PCN (placed 5/2023), stutter, hiatal hernia, iron deficiency anemia, H Pylori (untreated), and gastric mass (never biopsied) presents to the ED with at least 4 days of worsening weakness, abdominal distension, and no output from his PCN. Pt and daughters bedside are only able to give limited information, but for the past few days, he has noticed no output from his PCN as well as increasing abdominal distension, pain, and anorexia. He reports that he has not felt normal for the past 10 days. His PCN used to put out serosanguinous fluid, but the output changed to feculent/brown liquid over the past day. In the ED, he was noted to be in new onset afib w/ RVR to 150+ as well as experiencing some difficulty breathing. He was started on BiPAP. Stat labs were notable for WBC 47, Cr 2.9, and lactate 6. CT A/P w/ IV contrast showed free intraperitoneal fluid and air from an unknown source as well as fluid and air in the L kidney.     SICU was consulted for hemodynamic monitoring 2/2 intraabdominal sepsis.    PMH  HTN  grade IV hydronephrosis of L kidney s/p PCN  stutter  hiatal hernia  iron deficiency anemia  H pylori - untreated  gastric mass    Home Meds:  norvasc 2.5 QD    Allergies  No Known Allergies    Advanced Directives: Presumed Full Code    VITAL SIGNS, INS/OUTS (Last 24hours):    ICU Vital Signs Last 24 Hrs  T(C): 37.4 (18 Jun 2023 14:10), Max: 37.4 (18 Jun 2023 14:10)  T(F): 99.4 (18 Jun 2023 14:10), Max: 99.4 (18 Jun 2023 14:10)  HR: 86 (18 Jun 2023 14:19) (86 - 118)  BP: 160/101 (18 Jun 2023 14:10) (125/78 - 160/101)  RR: 32 (18 Jun 2023 13:50) (32 - 32)  SpO2: 98% (18 Jun 2023 14:19) (98% - 99%)    O2 Parameters below as of 18 Jun 2023 13:50  Patient On (Oxygen Delivery Method): mask, nonrebreather  O2 Flow (L/min): 15    Height (cm): 180.3 (06-18-23)    Physical Exam:   ---------------------------------------------------------------------------------------  GCS: 15, uncomfortable appearing  Exam: A&Ox3, no focal deficits    RESPIRATORY:  on bipap    CARDIOVASCULAR:  tachycardic to 150+, normotensive    GASTROINTESTINAL:  Abdomen soft, diffusely mildly tender, no rebound/guarding, distended    DERM:  pale skin and ecchymoses over abdomen and b/l proximal thighs    :   Exam: Mena catheter in place with no output.   L PCN in place to leg bag with brownish output    Tubes/Lines/Drains  ----------------------------------------------------------------------------------------------------------  [X] Peripheral IV  [X] Arterial Line		      Radial    Date Placed: 6/18  [X] Urinary Catheter Mena                                             Date Placed:   [X] NGT  [X] L PCN    LABS  --------------------------------------------------------------------------------------  Labs:                10.2   47.97 )-----------( 687      ( 18 Jun 2023 14:18 )             31.7       Auto Neutrophil %: 77.4 % (06-18-23 @ 14:18)  Band Neutrophils %: 15.7 % (06-18-23 @ 14:18)    06-18    123<L>  |  86<L>  |  88<HH>  ----------------------------<  69<L>  5.3<H>   |  15<L>  |  2.9<H>    Calcium, Total Serum: 8.8 mg/dL (06-18-23 @ 14:18)    LFTs:             4.9  | 0.7  | 34       ------------------[106     ( 18 Jun 2023 14:18 )  2.3  | x    | 54          Lactate, Blood: 6.4 mmol/L (06-18-23 @ 14:18)  Blood Gas Venous - Lactate: 6.20 mmol/L (06-18-23 @ 14:05)    Coags:     19.80  ----< 1.71    ( 18 Jun 2023 14:18 )     31.1     CT/XRAY/ECHO/TCD/EEG  ----------------------------------------------------------------------------------------------  < from: CT Abdomen and Pelvis w/ IV Cont (06.18.23 @ 15:15) >  FINDINGS:    CHEST:    LUNGS/PLEURA/AIRWAYS: Respiratory motion limits evaluation of the subtle   parenchymal details. Central airways are patent. Expiratory phase   imaging. No focal consolidation, pleural effusion, or pneumothorax.   Bilateral small pleural effusions with adjacent compressive atelectatic   changes. Right middle and upper lobe solid pulmonary nodules which are  not well delineated due to respiratory motion but measure less than 6 mm.    MEDIASTINUM/THORACIC NODES: Mediastinal lymphadenopathy measuring up to   2.4 cm short axis at the pretracheal region (6-90). No measurable thyroid   nodule.    HEART/GREAT VESSELS: Left atrial enlargement. No pericardial effusion.   Coronary artery calcifications. Apparent ascending thoracic aortic   aneurysm measuring up to 4.6 cm in diameter on this   nondedicated/nondedicated study. Main pulmonary artery is of normal   caliber.      ABDOMEN/PELVIS:    HEPATOBILIARY: Multiple new indeterminate hepatic hypodensities measuring   up to 2.6 cm adjacent to the falciform ligament (6-223), suspicious for   new abscesses. Periportal edema. No definite acute gallbladder pathology.    SPLEEN: Late arterial phase appearance of the spleen. More focal   wedge-shaped region of hypodensity may reflect underlying splenic   infarct.    PANCREAS: Unremarkable.    ADRENAL GLANDS: Unremarkable.    KIDNEYS/RETROPERITONEUM: Again noted is grade 4 hydronephrosis and   enlargement of the left kidney with minimal residual renal parenchyma.   The residual renal parenchyma is inseparable from the adjacent Gerota's   faschia, compressed pararenal space and posterior aspect of the   peritoneal fascia and direct perforation into the peritoneum cannot be   excluded (6-354). Interval development of gas throughout the   hydronephrotic collecting system with likely extension into the residual   cortical tissues (6-316, 6-330, 6-334). Of note there are increased left   perirenal inflammatory changes without free air or fluid collection.   Unchanged large right renal cyst. Normal right renal parenchymal   enhancement.    ABDOMINOPELVIC NODES: Marked retroperitoneal lymphadenopathy measuring up   to 4.4 x 5.0 cm. Increased portacaval lymphadenopathy measuring up to 3.1   x 4.9 cm. Previously noted lesser curvature mass measures 6.3 x 5.2 cm   (previously 5.2 x 3.5 cm by my measurement), and may represent   gastrohepatic lymphadenopathy.    PELVIC ORGANS: Enlarged hyperdense left ureter is inseparable from   adjacent peritoneal fluid collection at its proximal to midportion and   cannot be completely delineated. Decompressed bladder containing a Mena   catheter.    PERITONEUM/MESENTERY/BOWEL: Moderate volume pneumoperitoneum,   predominantly layering anteriorly with numerous locules of air diffusing   throughout a moderate burden of abdominal ascites. There are intermittent   areas of peritoneal enhancement and nodularity measuring up to 2.2 cm   (6-532). The colon is of predominantly normal caliber and contains air   and stool (the wall of the transverse colon is intact, best seen on lung   window, key images). Small bowel is also of normal caliber and evidences   diffuse mild mucosal hyperenhancement, likely secondary to inflammatory   changes. The appendix is not well delineated. There is no focal walled   off collection within the peritoneal cavity. The enlarged left kidney has   mass effect on the bowel, left anterior pararenal space and peritoneal   space/contents. Previously placed left-sided nephrostomy tube pigtail is   noted to be within the left renal collecting system.    BONES/SOFT TISSUES: Degenerative osseous changes most prominent at the   visualized spine. Anasarca.    OTHER: Atherosclerotic aorta is of normal caliber, similar as compared   with prior exam.      IMPRESSION:    1 ) Moderate pneumoperitoneum with partial diffusion throughout moderate   volume ascites and with associated intermittent peritoneal enhancement   and nodularity. Perforation and peritonitis may be related to underlying   emphysematous pyelonephritis (see below). Less likely sources of   perforation include ureteral/bladder disruption, and bowel perforation   which cannot be entirely excluded on the provided images.    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 The compressed residual renal   parenchyma is inseparable from the adjacent fascial/fatty layers   anterosuperiorly and direct rupture into the peritoneum is a possibility   (6-355). The previously placed left-sided nephrostomy tube pigtail is   notedto be within the left renal collecting system.    3) New hepatic hypodensities measuring up to 2.6 cm, suspicious for   development of multiple focal abscesses.    4) Wedge-shaped region of hypodensity within the spleen may reflect   splenic infarct in the correct clinical setting. Developing   phlegmon/abscess cannot be excluded in the current clinical setting    5) Increased extensive retroperitoneal, portacaval and likely   gastrohepatic heterogeneously enhancing lymphadenopathy. Findings may be   reactive.    6) Mediastinal lymphadenopathy measuring up to 2.4 cm short axis.   Underlying etiology may be reactive, infectious/inflammatory, or   neoplastic. A short-term 3 month follow-up CT chest should be considered   for further evaluation.    7)Right middle and upper lobe solid pulmonary nodules which are not well   delineated due to respiratory motion but measure less than 6 mm.   attention on follow-up exam.    < end of copied text >    --------------------------------------------------------------------------------------  Admit Diagnosis: Atrial fibrillation

## 2023-06-18 NOTE — ED PROVIDER NOTE - PHYSICAL EXAMINATION
GENERAL: ill appearing, in moderate respiratory distress   SKIN: warm, dry  HEAD: Normocephalic; atraumatic.  EYES: PERRLA, EOMI, no conjunctival erythema  ENT: No nasal discharge; airway clear.  NECK: Supple; non tender.  CARD: S1, S2 normal; no murmurs, gallops, or rubs. Tachycardic, irregularly irregular rhythm   RESP: LCTAB; No wheezes, rales, rhonchi, or stridor.  ABD: firm, nontender, and markedly distended, tympanic, purple discoloration over the abdomen   EXT: Normal ROM.  No LE TTP   LYMPH: No acute cervical adenopathy.  NEURO: Alert, oriented, grossly unremarkable  PSYCH: Cooperative, appropriate.

## 2023-06-18 NOTE — ED ADULT NURSE NOTE - NSFALLHARMRISKINTERV_ED_ALL_ED
Assistance OOB with selected safe patient handling equipment if applicable/Assistance with ambulation/Communicate risk of Fall with Harm to all staff, patient, and family/Monitor gait and stability/Provide visual cue: red socks, yellow wristband, yellow gown, etc/Reinforce activity limits and safety measures with patient and family/Bed in lowest position, wheels locked, appropriate side rails in place/Call bell, personal items and telephone in reach/Instruct patient to call for assistance before getting out of bed/chair/stretcher/Non-slip footwear applied when patient is off stretcher/[place] to call system/Physically safe environment - no spills, clutter or unnecessary equipment/Purposeful Proactive Rounding/Room/bathroom lighting operational, light cord in reach

## 2023-06-18 NOTE — CONSULT NOTE ADULT - ASSESSMENT
Assessment & Plan  67M w/ PMH of HTN, grade IV hydronephrosis of L kidney s/p L PCN (placed 5/2023), stutter, hiatal hernia, iron deficiency anemia, H Pylori (untreated), and gastric mass (never biopsied) presents to the ED with at least 4 days of worsening weakness, abdominal distension, and no output from his PCN.     NEURO:  PMH: stutter (negative ischemic workup last admission)  Pain: dilaudid PRN    RESP:  #respiratory distress  - currently on BiPAP, wean to NC as tolerated  - FU ABG  AM CXR, IS  Activity: ambulate with assistance      CARDS:   PMH: HTN  Home meds: amlodipine 2.5 QD -- holding  #new onset afib w/ RVR -- likely 2/2 sepsis  - EKG (6/18): afib w/ RVR to 172, L posterior fascicular block  - ECHO: none  - ?metoprolol    GI/NUTR:  Diet: NPO, NGT to LCWS  GI prophylaxis: PTX  Bowel regimen: none  Last BM: 6/18 in the ED    /RENAL:   #grade IV L hydronephrosis s/p L PCN -- now w/ ?feculent output  - urology c/s, flushing PCN  - urethral bobby placed in ED w/ no output    #intraperitoneal free air and fluid  - plan for stat repeat CT A/P with PO and rectal contrast to r/o bowel perforation    #ROJAS Cr 2.9 (baseline 1)  - Strict I/Os  - indwelling bobby (placed 6/18)  - BUN/Cr- 88/2.9  -->  - Electrolytes-Na 123 // K 5.3 // Mg -- //  Phos -- (06-18 @ 14:18)    HEME/ONC:   PMH: iron deficiency anemia  DVT prophylaxis: HSQ  H/H 10.2 (06-18 @ 14:18)    ID:  #sepsis  - s/p vancomycin, cefepime, and flagyl in the ED  - starting zosyn  - WBC 47  - afebrile here  - FU blood cultures    ENDO:  A1c 5.0  Glucose, Serum: 69 (06-18 @ 14:18)  FS Q6H while NPO    LINES/DRAINS: PIV, [place], Bobby, NGT    DISPO: SICU, d/w Dr. Gutiérrez Assessment & Plan  67M w/ PMH of HTN, grade IV hydronephrosis of L kidney s/p L PCN (placed 5/2023), stutter, hiatal hernia, iron deficiency anemia, H Pylori (untreated), and gastric mass (never biopsied) presents to the ED with at least 4 days of worsening weakness, abdominal distension, and no output from his PCN.     NEURO:  PMH: stutter (negative ischemic workup last admission)    RESP:  #respiratory distress  - currently on BiPAP, wean to NC as tolerated  - ABG - ( 18 Jun 2023 17:57 ) pH: 7.41  /  pCO2: 25    /  pO2: 184   / HCO3: 16    / Base Excess: -7.7  /  SaO2: 99.5    AM CXR, IS  Activity: ambulate with assistance      CARDS:  PMH: HTN  Home meds: amlodipine 2.5 QD -- holding  #new onset afib w/ RVR -- likely 2/2 sepsis  - EKG (6/18): afib w/ RVR to 172, L posterior fascicular block  - ECHO: none  - s/p metoprolol 2.5 and amio bolus/gtt  - now hypotensive on taj gtt    GI/NUTR:  Diet: NPO  GI prophylaxis: PTX  Bowel regimen: none  Last BM: 6/18 in the ED    /RENAL:   #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  - urology c/s, flushed PCN  - urethral bobby placed in ED w/ no output    #ROJAS Cr 2.9 (baseline 1)  - Strict I/Os  - indwelling bobby (placed 6/18)  - BUN/Cr- 88/2.9  -->  - Electrolytes-Na 123 // K 5.3 // Mg -- //  Phos -- (06-18 @ 14:18)    HEME/ONC:   PMH: iron deficiency anemia  DVT PPX: none currently, readdress after IR  H/H 10.2 (06-18 @ 14:18)    ID:  #sepsis  - s/p vancomycin, cefepime, and flagyl in the ED  - starting zosyn, vanc  - WBC 47  - FU blood cultures    ENDO:  A1c 5.0  Glucose, Serum: 69 (06-18 @ 14:18)  FS Q6H while NPO    LINES/DRAINS: MINDY, [place], Bobby    DISPO: SICU, d/w Dr. Gutiérrez

## 2023-06-18 NOTE — H&P ADULT - NSHPPHYSICALEXAM_GEN_ALL_CORE
GCS: 15, uncomfortable appearing  Exam: A&Ox3, no focal deficits    RESPIRATORY:  on bipap    CARDIOVASCULAR:  tachycardic to 150+, normotensive    GASTROINTESTINAL:  Abdomen soft, diffusely mildly tender, no rebound/guarding, distended    DERM:  pale skin and ecchymoses over abdomen and b/l proximal thighs    :   Exam: Mena catheter in place with no output.   L PCN in place to leg bag with dark purulent output

## 2023-06-18 NOTE — ED PROVIDER NOTE - PROGRESS NOTE DETAILS
patient has perforation. surgery aware Talked with surgery. Dr. Gutiérrez examined pt and is concerned for kidney rupture. Urology aware. Pt admitted to sicu for further evaluation and management.

## 2023-06-19 NOTE — PROGRESS NOTE ADULT - SUBJECTIVE AND OBJECTIVE BOX
CHRISTINE VILLAVICENCIO  MRN-[id number]    HPI:  67M w/ PMH of HTN, grade IV hydronephrosis of L kidney s/p L PCN (placed 5/2023), stutter, hiatal hernia, iron deficiency anemia, H Pylori (untreated), and gastric mass (never biopsied) presents to the ED with at least 4 days of worsening weakness, abdominal distension, and no output from his PCN. Pt and daughters bedside are only able to give limited information, but for the past few days, he has noticed no output from his PCN as well as increasing abdominal distension, pain, and anorexia. He reports that he has not felt normal for the past 10 days. His PCN used to put out serosanguinous fluid, but the output changed to feculent/brown liquid over the past day. In the ED, he was noted to be in new onset afib w/ RVR to 150+ as well as experiencing some difficulty breathing. He was started on BiPAP. Stat labs were notable for WBC 47, Cr 2.9, and lactate 6. CT A/P w/ IV contrast showed free intraperitoneal fluid and air from an unknown source as well as fluid and air in the L kidney.     SICU was consulted for hemodynamic monitoring 2/2 intraabdominal sepsis. Patient went with IR for Left PCN upsized to 16Fr and RLQ drain placement.   FIndings: Left PCN upsized to 16F and 1200cc of very viscous tan colored fluid was aspirated. RLQ 16F drain was placed and 1200cc of tan colored fluid was aspirated (less viscous). A sample from each location was sent for culture.     PMH:  HTN  grade IV hydronephrosis of L kidney s/p PCN  stutter  hiatal hernia  iron deficiency anemia  H pylori - untreated  gastric mass    Home Meds:  norvasc 2.5 QD    Allergies  No Known Allergies    Tubes/Lines/Drains  ----------------------------------------------------------------------------------------------------------  [X] Peripheral IV  [X] Arterial Line		      Radial    Date Placed: 6/18  [X] Urinary Catheter Mena        Date Placed: 6/18  [X] OGT    Date Placed: 6/18  [X] L PCN    Date Exchanged: 6/18    -------------------------------------------------------------------------------------- CHRISTINE VILLAVICENCIO  MRN-[id number]  1955  67M    HPI:  67M w/ PMH of HTN, grade IV hydronephrosis of L kidney s/p L PCN (placed 5/2023), stutter, hiatal hernia, iron deficiency anemia, H Pylori (untreated), and gastric mass (never biopsied) presents to the ED with at least 4 days of worsening weakness, abdominal distension, and no output from his PCN. Pt and daughters bedside are only able to give limited information, but for the past few days, he has noticed no output from his PCN as well as increasing abdominal distension, pain, and anorexia. He reports that he has not felt normal for the past 10 days. His PCN used to put out serosanguinous fluid, but the output changed to feculent/brown liquid over the past day. In the ED, he was noted to be in new onset afib w/ RVR to 150+ as well as experiencing some difficulty breathing. He was started on BiPAP. Stat labs were notable for WBC 47, Cr 2.9, and lactate 6. CT A/P w/ IV contrast showed free intraperitoneal fluid and air from an unknown source as well as fluid and air in the L kidney.     SICU was consulted for hemodynamic monitoring 2/2 intraabdominal sepsis. Patient went with IR for Left PCN upsized to 16Fr and RLQ drain placement.   FIndings: Left PCN upsized to 16F and 1200cc of very viscous tan colored fluid was aspirated. RLQ 16F drain was placed and 1200cc of tan colored fluid was aspirated (less viscous). A sample from each location was sent for culture.     PMH:  HTN  grade IV hydronephrosis of L kidney s/p PCN  stutter  hiatal hernia  iron deficiency anemia  H pylori - untreated  gastric mass    Home Meds:  norvasc 2.5 QD    Allergies  No Known Allergies    Tubes/Lines/Drains  ----------------------------------------------------------------------------------------------------------  [X] Peripheral IV  [X] Arterial Line		      Radial    Date Placed: 6/18  [X] Urinary Catheter Mena        Date Placed: 6/18  [X] OGT    Date Placed: 6/18  [X] L PCN    Date Exchanged: 6/18    -------------------------------------------------------------------------------------- CHRISTINE VILLAVICENCIO  MRN-[id number]  1955  67M    HPI:  67M w/ PMH of HTN, grade IV hydronephrosis of L kidney s/p L PCN (placed 5/2023), stutter, hiatal hernia, iron deficiency anemia, H Pylori (untreated), and gastric mass (never biopsied) presents to the ED with at least 4 days of worsening weakness, abdominal distension, and no output from his PCN. Pt and daughters bedside are only able to give limited information, but for the past few days, he has noticed no output from his PCN as well as increasing abdominal distension, pain, and anorexia. He reports that he has not felt normal for the past 10 days. His PCN used to put out serosanguinous fluid, but the output changed to feculent/brown liquid over the past day. In the ED, he was noted to be in new onset afib w/ RVR to 150+ as well as experiencing some difficulty breathing. He was started on BiPAP. Stat labs were notable for WBC 47, Cr 2.9, and lactate 6. CT A/P w/ IV contrast showed free intraperitoneal fluid and air from an unknown source as well as fluid and air in the L kidney.     SICU was consulted for hemodynamic monitoring 2/2 intraabdominal sepsis. Patient went with IR for Left PCN upsized to 16Fr and RLQ drain placement.   FIndings: Left PCN upsized to 16F and 1200cc of very viscous tan colored fluid was aspirated. RLQ 16F drain was placed and 1200cc of tan colored fluid was aspirated (less viscous). A sample from each location was sent for culture.     24 hour Events:   6/18  NIGHT  - f/u IR procedure - Left PCN upsized to 16F and 1200cc of very viscous tan colored fluid was aspirated. RLQ 16F drain was placed and 1200cc of tan colored fluid was aspirated (less viscous). A sample from each location was sent for culture.   - STAT labs / ABG   -Restarted SQH   - 2L bolus, 250cc 25% albumin   - Dig 250 x1   - Amio gtt stopped   - lac 4.3 -> 3.7 -> 4.5    - renally dosed zosyn   - WBC inc from 48 to 57  - troponin 0.14 > continue to trend   -ABG 7.14/49/260/17 -> dec FiO2 80 -> 60%, inc RR 16 to 20; 2amps bicarb then biocarb gtt    DAY  - received 2L bolus in ED, additional 1L in SICU  - afib RVR HR 170s => metop 2.5mg x1 IVP, amio bolus 150mg x1 => -130s  - SBPs 70-80s => levo, switch to phenylephrine  - FFP pending  - IR for drain xchange      PMH:  HTN  grade IV hydronephrosis of L kidney s/p PCN  stutter  hiatal hernia  iron deficiency anemia  H pylori - untreated  gastric mass    Home Meds:  norvasc 2.5 QD    Allergies  No Known Allergies    Tubes/Lines/Drains  ----------------------------------------------------------------------------------------------------------  [X] Peripheral IV  [X] Arterial Line		      Radial    Date Placed: 6/18  [X] Urinary Catheter Mena        Date Placed: 6/18  [X] OGT    Date Placed: 6/18  [X] L PCN    Date Exchanged: 6/18    -------------------------------------------------------------------------------------- CHRISTINE VILLAVICENCIO  MRN-[id number]  1955  67M    HPI:  67M w/ PMH of HTN, grade IV hydronephrosis of L kidney s/p L PCN (placed 5/2023), stutter, hiatal hernia, iron deficiency anemia, H Pylori (untreated), and gastric mass (never biopsied) presents to the ED with at least 4 days of worsening weakness, abdominal distension, and no output from his PCN. Pt and daughters bedside are only able to give limited information, but for the past few days, he has noticed no output from his PCN as well as increasing abdominal distension, pain, and anorexia. He reports that he has not felt normal for the past 10 days. His PCN used to put out serosanguinous fluid, but the output changed to feculent/brown liquid over the past day. In the ED, he was noted to be in new onset afib w/ RVR to 150+ as well as experiencing some difficulty breathing. He was started on BiPAP. Stat labs were notable for WBC 47, Cr 2.9, and lactate 6. CT A/P w/ IV contrast showed free intraperitoneal fluid and air from an unknown source as well as fluid and air in the L kidney.     SICU was consulted for hemodynamic monitoring 2/2 intraabdominal sepsis. Patient went with IR for Left PCN upsized to 16Fr and RLQ drain placement.   FIndings: Left PCN upsized to 16F and 1200cc of very viscous tan colored fluid was aspirated. RLQ 16F drain was placed and 1200cc of tan colored fluid was aspirated (less viscous). A sample from each location was sent for culture.     24 hour Events:   6/18  NIGHT  - f/u IR procedure - Left PCN upsized to 16F and 1200cc of very viscous tan colored fluid was aspirated. RLQ 16F drain was placed and 1200cc of tan colored fluid was aspirated (less viscous). A sample from each location was sent for culture.   - STAT labs / ABG   -Restarted SQH   - 2L bolus, 250cc 25% albumin   - Dig 250 x1   - Amio gtt stopped   - lac 4.3 -> 3.7 -> 4.5    - renally dosed zosyn   - WBC inc from 48 to 57  - troponin 0.14 > continue to trend   -ABG 7.14/49/260/17 -> dec FiO2 80 -> 60%, inc RR 16 to 20; 2amps bicarb then bicarb gtt    DAY  - received 2L bolus in ED, additional 1L in SICU  - afib RVR HR 170s => metop 2.5mg x1 IVP, amio bolus 150mg x1 => -130s  - SBPs 70-80s => levo, switch to phenylephrine  - FFP pending  - IR for drain xchange      PMH:  HTN  grade IV hydronephrosis of L kidney s/p PCN  stutter  hiatal hernia  iron deficiency anemia  H pylori - untreated  gastric mass    Home Meds:  norvasc 2.5 QD    Allergies  No Known Allergies    Tubes/Lines/Drains  ----------------------------------------------------------------------------------------------------------  [X] Peripheral IV  [X] Arterial Line		      Radial    Date Placed: 6/18  [X] Urinary Catheter Mena        Date Placed: 6/18  [X] OGT    Date Placed: 6/18  [X] L PCN    Date Exchanged: 6/18    -------------------------------------------------------------------------------------- CHRISTINE VILLAVICENCIO  MRN-[id number]  1955  67M    HPI:  67M w/ PMH of HTN, grade IV hydronephrosis of L kidney s/p L PCN (placed 5/2023), stutter, hiatal hernia, iron deficiency anemia, H Pylori (untreated), and gastric mass (never biopsied) presents to the ED with at least 4 days of worsening weakness, abdominal distension, and no output from his PCN. Pt and daughters bedside are only able to give limited information, but for the past few days, he has noticed no output from his PCN as well as increasing abdominal distension, pain, and anorexia. He reports that he has not felt normal for the past 10 days. His PCN used to put out serosanguinous fluid, but the output changed to feculent/brown liquid over the past day. In the ED, he was noted to be in new onset afib w/ RVR to 150+ as well as experiencing some difficulty breathing. He was started on BiPAP. Stat labs were notable for WBC 47, Cr 2.9, and lactate 6. CT A/P w/ IV contrast showed free intraperitoneal fluid and air from an unknown source as well as fluid and air in the L kidney.     SICU was consulted for hemodynamic monitoring 2/2 intraabdominal sepsis. Patient went with IR for Left PCN upsized to 16Fr and RLQ drain placement.   FIndings: Left PCN upsized to 16F and 1200cc of very viscous tan colored fluid was aspirated. RLQ 16F drain was placed and 1200cc of tan colored fluid was aspirated (less viscous). A sample from each location was sent for culture.     24 hour Events:   6/18  NIGHT  - f/u IR procedure - Left PCN upsized to 16F and 1200cc of very viscous tan colored fluid was aspirated. RLQ 16F drain was placed and 1200cc of tan colored fluid was aspirated (less viscous). A sample from each location was sent for culture.   - STAT labs / ABG   -Restarted SQH   - 2L bolus, 250cc 25% albumin   - Dig 250 x1   - Amio gtt stopped   - lac 4.3 -> 3.7 -> 4.5    - renally dosed zosyn   - WBC inc from 48 to 57  - troponin 0.14 > continue to trend   -ABG 7.14/49/260/17 -> dec FiO2 80 -> 60%, inc RR 16 to 20; 2amps bicarb then bicarb gtt    DAY  - received 2L bolus in ED, additional 1L in SICU  - afib RVR HR 170s => metop 2.5mg x1 IVP, amio bolus 150mg x1 => -130s  - SBPs 70-80s => levo, switch to phenylephrine  - FFP pending  - IR for drain xchange      PMH:  HTN  grade IV hydronephrosis of L kidney s/p PCN  stutter  hiatal hernia  iron deficiency anemia  H pylori - untreated  gastric mass    Home Meds:  norvasc 2.5 QD    Allergies  No Known Allergies    Tubes/Lines/Drains  ----------------------------------------------------------------------------------------------------------  [X] Peripheral IV  [X] Arterial Line		      Radial    Date Placed: 6/18  [X] Urinary Catheter Mena        Date Placed: 6/18  [X] OGT    Date Placed: 6/18  [X] L PCN    Date Exchanged: 6/18    --------------------------------------------------------------------------------------  Daily Height in cm: 180.3 (18 Jun 2023 21:27)    Daily   Diet, NPO:   Except Medications     Special Instructions for Nursing:  Except Medications (06-18-23 @ 18:51)    CURRENT MEDS:  Neurologic Medications  fentaNYL   Infusion. 0.5 MICROgram(s)/kG/Hr IV Continuous <Continuous>  midazolam Injectable 1 milliGRAM(s) IV Push every 3 hours PRN agitation/sedation    Respiratory Medications    Cardiovascular Medications  norepinephrine Infusion 0.05 MICROgram(s)/kG/Min IV Continuous <Continuous>    Gastrointestinal Medications  pantoprazole  Injectable 40 milliGRAM(s) IV Push daily  sodium bicarbonate  Infusion 0.098 mEq/kG/Hr IV Continuous <Continuous>  sodium chloride 0.9% Bolus 250 milliLiter(s) IV Bolus once  sodium chloride 0.9% lock flush 10 milliLiter(s) IV Push every 1 hour PRN Pre/post blood products, medications, blood draw, and to maintain line patency  sodium chloride 0.9%. 1000 milliLiter(s) IV Continuous <Continuous>    Genitourinary Medications    Hematologic/Oncologic Medications  heparin   Injectable 5000 Unit(s) SubCutaneous every 8 hours    Antimicrobial/Immunologic Medications  piperacillin/tazobactam IVPB.. 2.25 Gram(s) IV Intermittent every 6 hours  vancomycin  IVPB 1250 milliGRAM(s) IV Intermittent every 24 hours    Endocrine/Metabolic Medications    Topical/Other Medications  chlorhexidine 0.12% Liquid 15 milliLiter(s) Oral Mucosa every 12 hours  chlorhexidine 4% Liquid 1 Application(s) Topical <User Schedule>    ICU Vital Signs Last 24 Hrs  T(C): 37.7 (19 Jun 2023 05:00), Max: 37.7 (19 Jun 2023 05:00)  T(F): 99.8 (19 Jun 2023 05:00), Max: 99.8 (19 Jun 2023 05:00)  HR: 121 (19 Jun 2023 06:00) (86 - 169)  BP: 124/58 (19 Jun 2023 06:00) (89/62 - 160/101)  BP(mean): 84 (19 Jun 2023 06:00) (71 - 84)  ABP: 112/55 (19 Jun 2023 06:00) (80/49 - 115/65)  ABP(mean): 75 (19 Jun 2023 06:00) (60 - 100)  RR: 20 (19 Jun 2023 06:00) (17 - 36)  SpO2: 100% (19 Jun 2023 06:00) (70% - 100%)    O2 Parameters below as of 19 Jun 2023 06:00  Patient On (Oxygen Delivery Method): ventilator          Mode: AC/ CMV (Assist Control/ Continuous Mandatory Ventilation)  RR (machine): 20  TV (machine): 450  FiO2: 40  PEEP: 8  ITime: 1  MAP: 13  PIP: 25    ABG - ( 19 Jun 2023 04:14 )  pH, Arterial: 7.30  pH, Blood: x     /  pCO2: 37    /  pO2: 201   / HCO3: 18    / Base Excess: -7.6  /  SaO2: 99.2              I&O's Summary    18 Jun 2023 07:01  -  19 Jun 2023 07:00  --------------------------------------------------------  IN: 5541.1 mL / OUT: 845 mL / NET: 4696.1 mL      I&O's Detail    18 Jun 2023 07:01  -  19 Jun 2023 07:00  --------------------------------------------------------  IN:    Amiodarone: 33.3 mL    FentaNYL: 22.4 mL    IV PiggyBack: 250 mL    IV PiggyBack: 200 mL    IV PiggyBack: 50 mL    IV PiggyBack: 400 mL    Lactated Ringers Bolus: 3000 mL    Norepinephrine: 17.2 mL    Norepinephrine: 1.2 mL    Plasma: 267 mL    Sodium Bicarbonate: 300 mL    sodium chloride 0.9%: 1000 mL  Total IN: 5541.1 mL    OUT:    DOBUTamine: 0 mL    Gastric Aspirate - Discarded (mL): 400 mL    Indwelling Catheter - Urethral (mL): 20 mL    Phenylephrine: 0 mL    PRBCs (Packed Red Blood Cells): 0 mL    VAC (Vacuum Assisted Closure) System (mL): 385 mL    VAC (Vacuum Assisted Closure) System (mL): 40 mL  Total OUT: 845 mL    Total NET: 4696.1 mL          PHYSICAL EXAM:   Neuro: pupils 2 mm and reactive bilaterally, opens eyes spontaneously to voice, mildly squeezes right hand to command   GCS: 3 T (E3, V NT, M NT)    RESPIRATORY: Intubated 450/20/40/8. CTAB    CARDIOVASCULAR:  A fib    GASTROINTESTINAL:  Abdomen soft, nontender, distended. RLQ drain with serous output.     :   Exam: Mena catheter in place.   L PCN in place to leg bag with brownish output         CHRISTINE VILLAVICENCIO  MRN-[id number]  1955  67M    HPI:  67M w/ PMH of HTN, grade IV hydronephrosis of L kidney s/p L PCN (placed 5/2023), stutter, hiatal hernia, iron deficiency anemia, H Pylori (untreated), and gastric mass (never biopsied) presents to the ED with at least 4 days of worsening weakness, abdominal distension, and no output from his PCN. Pt and daughters bedside are only able to give limited information, but for the past few days, he has noticed no output from his PCN as well as increasing abdominal distension, pain, and anorexia. He reports that he has not felt normal for the past 10 days. His PCN used to put out serosanguinous fluid, but the output changed to feculent/brown liquid over the past day. In the ED, he was noted to be in new onset afib w/ RVR to 150+ as well as experiencing some difficulty breathing. He was started on BiPAP. Stat labs were notable for WBC 47, Cr 2.9, and lactate 6. CT A/P w/ IV contrast showed free intraperitoneal fluid and air from an unknown source as well as fluid and air in the L kidney.     SICU was consulted for hemodynamic monitoring 2/2 intraabdominal sepsis. Patient went with IR for Left PCN upsized to 16Fr and RLQ drain placement.   FIndings: Left PCN upsized to 16F and 1200cc of very viscous tan colored fluid was aspirated. RLQ 16F drain was placed and 1200cc of tan colored fluid was aspirated (less viscous). A sample from each location was sent for culture.     24 hour Events:   6/18  NIGHT  - f/u IR procedure - Left PCN upsized to 16F and 1200cc of very viscous tan colored fluid was aspirated. RLQ 16F drain was placed and 1200cc of tan colored fluid was aspirated (less viscous). A sample from each location was sent for culture.   - STAT labs / ABG   -Restarted SQH   - 2L bolus, 250cc 25% albumin   - Dig 250 x1   - Amio gtt stopped   - lac 4.3 -> 3.7 -> 4.5    - renally dosed zosyn   - WBC inc from 48 to 57  - troponin 0.14 > continue to trend   -ABG 7.14/49/260/17 -> dec FiO2 80 -> 60%, inc RR 16 to 20; 2amps bicarb then bicarb gtt    DAY  - received 2L bolus in ED, additional 1L in SICU  - afib RVR HR 170s => metop 2.5mg x1 IVP, amio bolus 150mg x1 => -130s  - SBPs 70-80s => levo, switch to phenylephrine  - FFP pending  - IR for drain xchange      PMH:  HTN  grade IV hydronephrosis of L kidney s/p PCN  stutter  hiatal hernia  iron deficiency anemia  H pylori - untreated  gastric mass    Home Meds:  norvasc 2.5 QD    Allergies  No Known Allergies    Tubes/Lines/Drains  ----------------------------------------------------------------------------------------------------------  [X] Peripheral IV  [X] Arterial Line		      Radial    Date Placed: 6/18  [X] Urinary Catheter Mena        Date Placed: 6/18  [X] OGT    Date Placed: 6/18  [X] L PCN    Date Exchanged: 6/18    --------------------------------------------------------------------------------------  Daily Height in cm: 180.3 (18 Jun 2023 21:27)    Daily   Diet, NPO:   Except Medications     Special Instructions for Nursing:  Except Medications (06-18-23 @ 18:51)    CURRENT MEDS:  Neurologic Medications  fentaNYL   Infusion. 0.5 MICROgram(s)/kG/Hr IV Continuous <Continuous>  midazolam Injectable 1 milliGRAM(s) IV Push every 3 hours PRN agitation/sedation    Respiratory Medications    Cardiovascular Medications  norepinephrine Infusion 0.05 MICROgram(s)/kG/Min IV Continuous <Continuous>    Gastrointestinal Medications  pantoprazole  Injectable 40 milliGRAM(s) IV Push daily  sodium bicarbonate  Infusion 0.098 mEq/kG/Hr IV Continuous <Continuous>  sodium chloride 0.9% Bolus 250 milliLiter(s) IV Bolus once  sodium chloride 0.9% lock flush 10 milliLiter(s) IV Push every 1 hour PRN Pre/post blood products, medications, blood draw, and to maintain line patency  sodium chloride 0.9%. 1000 milliLiter(s) IV Continuous <Continuous>    Genitourinary Medications    Hematologic/Oncologic Medications  heparin   Injectable 5000 Unit(s) SubCutaneous every 8 hours    Antimicrobial/Immunologic Medications  piperacillin/tazobactam IVPB.. 2.25 Gram(s) IV Intermittent every 6 hours  vancomycin  IVPB 1250 milliGRAM(s) IV Intermittent every 24 hours    Endocrine/Metabolic Medications    Topical/Other Medications  chlorhexidine 0.12% Liquid 15 milliLiter(s) Oral Mucosa every 12 hours  chlorhexidine 4% Liquid 1 Application(s) Topical <User Schedule>    ICU Vital Signs Last 24 Hrs  T(C): 37.7 (19 Jun 2023 05:00), Max: 37.7 (19 Jun 2023 05:00)  T(F): 99.8 (19 Jun 2023 05:00), Max: 99.8 (19 Jun 2023 05:00)  HR: 121 (19 Jun 2023 06:00) (86 - 169)  BP: 124/58 (19 Jun 2023 06:00) (89/62 - 160/101)  BP(mean): 84 (19 Jun 2023 06:00) (71 - 84)  ABP: 112/55 (19 Jun 2023 06:00) (80/49 - 115/65)  ABP(mean): 75 (19 Jun 2023 06:00) (60 - 100)  RR: 20 (19 Jun 2023 06:00) (17 - 36)  SpO2: 100% (19 Jun 2023 06:00) (70% - 100%)    O2 Parameters below as of 19 Jun 2023 06:00  Patient On (Oxygen Delivery Method): ventilator          Mode: AC/ CMV (Assist Control/ Continuous Mandatory Ventilation)  RR (machine): 20  TV (machine): 450  FiO2: 40  PEEP: 8  ITime: 1  MAP: 13  PIP: 25    ABG - ( 19 Jun 2023 04:14 )  pH, Arterial: 7.30  pH, Blood: x     /  pCO2: 37    /  pO2: 201   / HCO3: 18    / Base Excess: -7.6  /  SaO2: 99.2              I&O's Summary    18 Jun 2023 07:01  -  19 Jun 2023 07:00  --------------------------------------------------------  IN: 5541.1 mL / OUT: 845 mL / NET: 4696.1 mL      I&O's Detail    18 Jun 2023 07:01  -  19 Jun 2023 07:00  --------------------------------------------------------  IN:    Amiodarone: 33.3 mL    FentaNYL: 22.4 mL    IV PiggyBack: 250 mL    IV PiggyBack: 200 mL    IV PiggyBack: 50 mL    IV PiggyBack: 400 mL    Lactated Ringers Bolus: 3000 mL    Norepinephrine: 17.2 mL    Norepinephrine: 1.2 mL    Plasma: 267 mL    Sodium Bicarbonate: 300 mL    sodium chloride 0.9%: 1000 mL  Total IN: 5541.1 mL    OUT:    DOBUTamine: 0 mL    Gastric Aspirate - Discarded (mL): 400 mL    Indwelling Catheter - Urethral (mL): 20 mL    Phenylephrine: 0 mL    PRBCs (Packed Red Blood Cells): 0 mL    VAC (Vacuum Assisted Closure) System (mL): 385 mL    VAC (Vacuum Assisted Closure) System (mL): 40 mL  Total OUT: 845 mL    Total NET: 4696.1 mL          PHYSICAL EXAM:   Neuro: pupils 2 mm and reactive bilaterally, opens eyes spontaneously to voice, mildly squeezes right hand to command, moves extremities   GCS: 3 T (E3, V NT, M NT)    RESPIRATORY: Intubated 450/20/40/8. CTAB    CARDIOVASCULAR:  A fib    GASTROINTESTINAL:  Abdomen soft, nontender, distended. RLQ drain with serous output.     :   Exam: Mena catheter in place.   L PCN in place to leg bag with brownish output         CHRISTINE VILLAVICENCIO  [id number]  1955  67M    HPI:  67M w/ PMH of HTN, grade IV hydronephrosis of L kidney s/p L PCN (placed 5/2023), stutter, hiatal hernia, iron deficiency anemia, H Pylori (untreated), and gastric mass (never biopsied) presents to the ED with at least 4 days of worsening weakness, abdominal distension, and no output from his PCN. Pt and daughters bedside are only able to give limited information, but for the past few days, he has noticed no output from his PCN as well as increasing abdominal distension, pain, and anorexia. He reports that he has not felt normal for the past 10 days. His PCN used to put out serosanguinous fluid, but the output changed to feculent/brown liquid over the past day. In the ED, he was noted to be in new onset afib w/ RVR to 150+ as well as experiencing some difficulty breathing. He was started on BiPAP. Stat labs were notable for WBC 47, Cr 2.9, and lactate 6. CT A/P w/ IV contrast showed free intraperitoneal fluid and air from an unknown source as well as fluid and air in the L kidney.     SICU was consulted for hemodynamic monitoring 2/2 intraabdominal sepsis. Patient went with IR for Left PCN upsized to 16Fr and RLQ drain placement.   FIndings: Left PCN upsized to 16F and 1200cc of very viscous tan colored fluid was aspirated. RLQ 16F drain was placed and 1200cc of tan colored fluid was aspirated (less viscous). A sample from each location was sent for culture.     24 hour Events:   6/18  NIGHT  - f/u IR procedure - Left PCN upsized to 16F and 1200cc of very viscous tan colored fluid was aspirated. RLQ 16F drain was placed and 1200cc of tan colored fluid was aspirated (less viscous). A sample from each location was sent for culture.   - STAT labs / ABG   -Restarted SQH   - 2L bolus, 250cc 25% albumin   - Dig 250 x1   - Amio gtt stopped   - lac 4.3 -> 3.7 -> 4.5    - renally dosed zosyn   - WBC inc from 48 to 57  - troponin 0.14 > continue to trend   -ABG 7.14/49/260/17 -> dec FiO2 80 -> 60%, inc RR 16 to 20; 2amps bicarb then bicarb gtt    DAY  - received 2L bolus in ED, additional 1L in SICU  - afib RVR HR 170s => metop 2.5mg x1 IVP, amio bolus 150mg x1 => -130s  - SBPs 70-80s => levo, switch to phenylephrine  - FFP pending  - IR for drain xchange      PMH:  HTN  grade IV hydronephrosis of L kidney s/p PCN  stutter  hiatal hernia  iron deficiency anemia  H pylori - untreated  gastric mass    Home Meds:  norvasc 2.5 QD    Allergies  No Known Allergies    Tubes/Lines/Drains  ----------------------------------------------------------------------------------------------------------  [X] Peripheral IV  [X] Arterial Line		      Radial    Date Placed: 6/18  [X] Urinary Catheter Mena        Date Placed: 6/18  [X] OGT    Date Placed: 6/18  [X] L PCN    Date Exchanged: 6/18    --------------------------------------------------------------------------------------  Daily Height in cm: 180.3 (18 Jun 2023 21:27)    Daily   Diet, NPO:   Except Medications     Special Instructions for Nursing:  Except Medications (06-18-23 @ 18:51)    CURRENT MEDS:  Neurologic Medications  fentaNYL   Infusion. 0.5 MICROgram(s)/kG/Hr IV Continuous <Continuous>  midazolam Injectable 1 milliGRAM(s) IV Push every 3 hours PRN agitation/sedation    Respiratory Medications    Cardiovascular Medications  norepinephrine Infusion 0.05 MICROgram(s)/kG/Min IV Continuous <Continuous>    Gastrointestinal Medications  pantoprazole  Injectable 40 milliGRAM(s) IV Push daily  sodium bicarbonate  Infusion 0.098 mEq/kG/Hr IV Continuous <Continuous>  sodium chloride 0.9% Bolus 250 milliLiter(s) IV Bolus once  sodium chloride 0.9% lock flush 10 milliLiter(s) IV Push every 1 hour PRN Pre/post blood products, medications, blood draw, and to maintain line patency  sodium chloride 0.9%. 1000 milliLiter(s) IV Continuous <Continuous>    Genitourinary Medications    Hematologic/Oncologic Medications  heparin   Injectable 5000 Unit(s) SubCutaneous every 8 hours    Antimicrobial/Immunologic Medications  piperacillin/tazobactam IVPB.. 2.25 Gram(s) IV Intermittent every 6 hours  vancomycin  IVPB 1250 milliGRAM(s) IV Intermittent every 24 hours    Endocrine/Metabolic Medications    Topical/Other Medications  chlorhexidine 0.12% Liquid 15 milliLiter(s) Oral Mucosa every 12 hours  chlorhexidine 4% Liquid 1 Application(s) Topical <User Schedule>    ICU Vital Signs Last 24 Hrs  T(C): 37.7 (19 Jun 2023 05:00), Max: 37.7 (19 Jun 2023 05:00)  T(F): 99.8 (19 Jun 2023 05:00), Max: 99.8 (19 Jun 2023 05:00)  HR: 121 (19 Jun 2023 06:00) (86 - 169)  BP: 124/58 (19 Jun 2023 06:00) (89/62 - 160/101)  BP(mean): 84 (19 Jun 2023 06:00) (71 - 84)  ABP: 112/55 (19 Jun 2023 06:00) (80/49 - 115/65)  ABP(mean): 75 (19 Jun 2023 06:00) (60 - 100)  RR: 20 (19 Jun 2023 06:00) (17 - 36)  SpO2: 100% (19 Jun 2023 06:00) (70% - 100%)    O2 Parameters below as of 19 Jun 2023 06:00  Patient On (Oxygen Delivery Method): ventilator          Mode: AC/ CMV (Assist Control/ Continuous Mandatory Ventilation)  RR (machine): 20  TV (machine): 450  FiO2: 40  PEEP: 8  ITime: 1  MAP: 13  PIP: 25    ABG - ( 19 Jun 2023 04:14 )  pH, Arterial: 7.30  pH, Blood: x     /  pCO2: 37    /  pO2: 201   / HCO3: 18    / Base Excess: -7.6  /  SaO2: 99.2              I&O's Summary    18 Jun 2023 07:01  -  19 Jun 2023 07:00  --------------------------------------------------------  IN: 5541.1 mL / OUT: 845 mL / NET: 4696.1 mL      I&O's Detail    18 Jun 2023 07:01  -  19 Jun 2023 07:00  --------------------------------------------------------  IN:    Amiodarone: 33.3 mL    FentaNYL: 22.4 mL    IV PiggyBack: 250 mL    IV PiggyBack: 200 mL    IV PiggyBack: 50 mL    IV PiggyBack: 400 mL    Lactated Ringers Bolus: 3000 mL    Norepinephrine: 17.2 mL    Norepinephrine: 1.2 mL    Plasma: 267 mL    Sodium Bicarbonate: 300 mL    sodium chloride 0.9%: 1000 mL  Total IN: 5541.1 mL    OUT:    DOBUTamine: 0 mL    Gastric Aspirate - Discarded (mL): 400 mL    Indwelling Catheter - Urethral (mL): 20 mL    Phenylephrine: 0 mL    PRBCs (Packed Red Blood Cells): 0 mL    VAC (Vacuum Assisted Closure) System (mL): 385 mL    VAC (Vacuum Assisted Closure) System (mL): 40 mL  Total OUT: 845 mL    Total NET: 4696.1 mL          PHYSICAL EXAM:   Neuro: Pupils 2 mm and reactive bilaterally, opens eyes spontaneously to voice, mildly squeezes right hand to command, moves extremities   GCS: 9 T (E3, V NT, M 6)    RESPIRATORY: Intubated 450/20/40/8. CTAB    CARDIOVASCULAR:  A fib, tachycardic 130s   Palpable DP pulses b/l     GASTROINTESTINAL:  Abdomen soft, nontender, distended. RLQ drain with serous/straw colored output. Tympanitic to percussion.     :   Exam: Mena catheter in place with 10cc urine.   L PCN in place to leg bag with brown output

## 2023-06-19 NOTE — PROGRESS NOTE ADULT - SUBJECTIVE AND OBJECTIVE BOX
UROLOGY DAILY PROGRESS NOTE    Pt seen and examined    Pt is a 67y Male admitted with sepsis, intubated and sedated on pressors.  Pt with retroperitoneal abscess and intraabdominal abscess s/p IR placement  of drains.    MEDICATIONS  (STANDING):  albumin human  5% IVPB 250 milliLiter(s) IV Intermittent every 6 hours  chlorhexidine 0.12% Liquid 15 milliLiter(s) Oral Mucosa every 12 hours  chlorhexidine 2% Cloths 1 Application(s) Topical <User Schedule>  dexMEDEtomidine Infusion 0.2 MICROgram(s)/kG/Hr (3.83 mL/Hr) IV Continuous <Continuous>  heparin  Infusion 1200 Unit(s)/Hr (12 mL/Hr) IV Continuous <Continuous>  hydrocortisone sodium succinate Injectable 50 milliGRAM(s) IV Push every 6 hours  metoprolol tartrate Injectable 5 milliGRAM(s) IV Push every 6 hours  norepinephrine Infusion 0.05 MICROgram(s)/kG/Min (3.59 mL/Hr) IV Continuous <Continuous>  pantoprazole  Injectable 40 milliGRAM(s) IV Push daily  piperacillin/tazobactam IVPB.. 2.25 Gram(s) IV Intermittent every 6 hours  sodium chloride 0.45% 1000 milliLiter(s) (100 mL/Hr) IV Continuous <Continuous>  vancomycin  IVPB 1250 milliGRAM(s) IV Intermittent every 24 hours    MEDICATIONS  (PRN):  fentaNYL    Injectable 25 MICROGram(s) IV Push every 3 hours PRN Moderate Pain (4 - 6)  sodium chloride 0.9% lock flush 10 milliLiter(s) IV Push every 1 hour PRN Pre/post blood products, medications, blood draw, and to maintain line patency      REVIEW OF SYSTEMS     [x] Due to altered mental status/intubation, subjective information were not able to be obtained from patient. History was obtained, to the extent possible, from review of the chart and collateral sources of information.    Vital Signs Last 24 Hrs  T(C): 38 (2023 11:00), Max: 38 (2023 11:00)  T(F): 100.4 (2023 11:00), Max: 100.4 (2023 11:00)  HR: 112 (2023 12:30) (86 - 169)  BP: 116/70 (2023 12:30) (89/62 - 160/101)  BP(mean): 87 (2023 12:30) (71 - 91)  RR: 22 (2023 12:30) (8 - 36)  SpO2: 100% (2023 12:30) (70% - 100%)    Parameters below as of 2023 11:00  Patient On (Oxygen Delivery Method): ventilator    O2 Concentration (%): 40    PHYSICAL EXAM:    GEN: intubated and sedated  SKIN: Good color, non diaphoretic.  RESP:  pt vented Non-labored breathing. No use of accessory muscles.  CARDIO: +S1/S2  ABDO: Soft, distended, no palpable bladder, no suprapubic tenderness.  Left VENICE draining milky material 385 cc  right 40 cc  BACK: No CVAT B/L  : + Indwelling Mena in place, draining clear yellow urine. 10 cc/ hr  EXT: IVA pulido 4      I&O's Summary    2023 07:01  -  2023 07:00  --------------------------------------------------------  IN: 5541.1 mL / OUT: 845 mL / NET: 4696.1 mL    2023 07:01  -  2023 13:25  --------------------------------------------------------  IN: 1575.7 mL / OUT: 310 mL / NET: 1265.7 mL        LABS:                        7.2    31.04 )-----------( 359      ( 2023 11:20 )             22.2     06-19    124<L>  |  89<L>  |  86<HH>  ----------------------------<  128<H>  4.8   |  20  |  2.6<H>    Ca    7.3<L>      2023 09:00  Phos  5.7     06-19  Mg     2.0     06-19    TPro  4.2<L>  /  Alb  2.5<L>  /  TBili  0.9  /  DBili  0.7<H>  /  AST  57<H>  /  ALT  42<H>  /  AlkPhos  83      PT/INR - ( 2023 11:20 )   PT: 20.90 sec;   INR: 1.80 ratio         PTT - ( 2023 11:20 )  PTT:35.4 sec  Urinalysis Basic - ( 2023 02:50 )    Color: Lindsay / Appearance: Turbid / S.026 / pH: x  Gluc: x / Ketone: Negative  / Bili: Negative / Urobili: <2 mg/dL   Blood: x / Protein: 300 mg/dL / Nitrite: Negative   Leuk Esterase: Large / RBC: 8 /HPF / WBC >720 /HPF   Sq Epi: x / Non Sq Epi: x / Bacteria: Moderate

## 2023-06-19 NOTE — PROGRESS NOTE ADULT - ASSESSMENT
68 y/o m admitted with sepsis 2/2 abdominal abscess 2/2 to hydronephrosis    A) hydronephrosis  abdominal abscesses  leukocytosis  ROJAS  ATN  Hyponatremia  hypochloremia    P) Continue ICU management  wean off pressors as per protocol  monitor drain output  Continue IV abx   check cultures  continue Mena  awaiting CT scan abdomin/pelvis with IV and PO contrast  d/w attending

## 2023-06-19 NOTE — PROGRESS NOTE ADULT - ASSESSMENT
Assessment & Plan  67M w/ PMH of HTN, grade IV hydronephrosis of L kidney s/p L PCN (placed 5/2023), stutter, hiatal hernia, iron deficiency anemia, H Pylori (untreated), and gastric mass (never biopsied) presents to the ED with at least 4 days of worsening weakness, abdominal distension, and no output from his PCN. Admitted with emphysematous pyelonephritis    (6/18): s/p LT PCN upsizing to 16Fr and 16Fr RLQ drain placement with IR       NEURO:  #H/o stutter (negative ischemic workup last admission)  #Pain control     -Fentanyl gtt   #Sedation    -Holding for now per attending     RESP:  #respiratory distress   - Originally placed on BiPaP; returned from IR intubated with 7.5 ETT on 6/18    - Daily AM ABG   - Daily AM CXR   - Daily SAT/SBT   #Activity   -Bedrest while intubated and on pressors     CARDS:   #HTN    -Holding home amlodipine 2.5 qD while hypotensive   #Acute onset Afib w/ RVR with hypotension -- likely 2/2 sepsis    -(6/18) s/p lopressor pushes, 150cc amio bolus, amio drip d/c 6/18    -(6/19) s/p 250cc Dig x1     -Currently on Levo, wean as tolerated      -Continue IVF resuscitation     -Cards c/s: consider switching pressors to phenylephrine in setting of tachycardia; therapeutic AC once cleared, start cardizem/lopressor once off pressors; check TSH, TTE   Imaging    - EKG (6/18): afib w/ RVR to 172, L posterior fascicular block    - ECHO: none  Labs    -Post-op Troponin: 0.14 ->     GI/NUTR:  #Diet    -NPO,     -OGT to LCWS (55cm at lip)   #GI prophylaxis    -Protonix  Bowel regimen:     -none at this time     -Last BM: 6/18 in the ED    /RENAL:   #Grade IV L hydronephrosis s/p L PCN (5/2023) -- now w/ ?feculent output  #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    -(6/18) s/p LT PCN upsizing to 16Fr and 16Fr RLQ drain placement with IR      -Mena placed in ED 6/18     -Monitor LT PCN and RLQ drain - flush q8 hrs     -Urology recs - plan for stat repeat CT A/P with PO and rectal contrast to r/o bowel perforation --> once more stable  #ROJAS (baseline Cr 1)    -Cr 2.9 ->     -FeNa once more stable    #Lactic Acidosis    -Lactic acid 6.4 -> 4.3 -> 3.7 ->     Labs:          BUN/Cr- 88/2.9  -->,  90/2.8  -->          Electrolytes-Na 123 // K 5.3 // Mg 2.0 //  Phos 6.3 (06-18 @ 23:11)    HEME/ONC:   #H/o Fe Deficiency anemia  #DVT prophylaxis    -heparin   Injectable, SCDs    Labs: Hb/Hct:  10.2/31.7  -->,  8.9/28.1  -->                      Plts:  687  -->,  685  -->                 PTT/INR:  31.1/1.71  --->,  29.8/1.59  --->     T&S Expires: 6/21/23  Blood Consent- in chart     ID:  #Leukocytosis 2/2 sepsis     - s/p vancomycin, cefepime, and flagyl in the ED    - Continue with Zosyn and vanco     - Trend WBC   WBC- 47.97  --->>,  57.51  --->>  Temp trend- 24hrs T(F): 97 (06-18 @ 19:45), Max: 99.4 (06-18 @ 14:10)  Antibiotics    -piperacillin/tazobactam IVPB.. 2.25 every 6 hours    -vancomycin  IVPB 1250 every 24 hours  Cultures:     - Blood cultures (6/18): in lab     - PCN culture (6/18 from IR): in lab     - RLQ culture (6/18 from IR): in lab     ENDO:    -No active issues     -A1c (5/7/23): 5.0    -FS Q6H while NPO    MSK:  #Activity    -Bedrest while intubated and on pressors     -PT/rehab c/s once extubated     LINES/DRAINS: PIV, Joleen (6/18/23), Mena (6/18/23), OGT (6/18/23). TLC (6/18/23), Javier Drain (6/18/23), LT PCN exchanged (6/18/23)     ADVANCED DIRECTIVES:  Full Code    HCP/Emergency Contact- Tracey Adames: [phone]     INDICATION FOR SICU/SDU: Atrial fibrillation    DISPO: SICU Assessment & Plan  67M w/ PMH of HTN, grade IV hydronephrosis of L kidney s/p L PCN (placed 5/2023), stutter, hiatal hernia, iron deficiency anemia, H Pylori (untreated), and gastric mass (never biopsied) presents to the ED with at least 4 days of worsening weakness, abdominal distension, and no output from his PCN. Admitted with emphysematous pyelonephritis    (6/18): s/p LT PCN upsizing to 16Fr and 16Fr RLQ drain placement with IR       NEURO:  #H/o stutter (negative ischemic workup last admission)  #Pain control     -Fentanyl gtt   #Sedation    -Holding for now per attending     RESP:  #respiratory distress   - Originally placed on BiPaP; returned from IR intubated with 7.5 ETT on 6/18    - Vent: 450/20/60/8   - Daily AM ABG   - Daily AM CXR   - Daily SAT/SBT   #Activity   -Bedrest while intubated and on pressors     CARDS:   #HTN    -Holding home amlodipine 2.5 qD while hypotensive   #Acute onset Afib w/ RVR with hypotension -- likely 2/2 sepsis    -(6/18) s/p lopressor pushes, 150cc amio bolus, amio drip d/c 6/18    -(6/19) s/p 250cc Dig x1     -Currently on Levo, wean as tolerated      -Continue IVF resuscitation     -Cards c/s: consider switching pressors to phenylephrine in setting of tachycardia; therapeutic AC once cleared, start cardizem/lopressor once off pressors; check TSH, TTE   Imaging    - EKG (6/18): afib w/ RVR to 172, L posterior fascicular block    - EKG (6/19): Afib w/ RVR to 138     - ECHO: none  Labs    -Post-op Troponin: 0.14 ->     GI/NUTR:  #Diet    -NPO,     -OGT to LCWS (55cm at lip)   #GI prophylaxis    -Protonix  Bowel regimen:     -none at this time     -Last BM: 6/18 in the ED    /RENAL:   #Grade IV L hydronephrosis s/p L PCN (5/2023) -- now w/ ?feculent output  #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    -(6/18) s/p LT PCN upsizing to 16Fr and 16Fr RLQ drain placement with IR      -Mena placed in ED 6/18     -Monitor LT PCN and RLQ drain - flush q8 hrs     -Urology recs - plan for stat repeat CT A/P with PO and rectal contrast to r/o bowel perforation --> once more stable  #ROJAS (baseline Cr 1)    -Cr 2.9 -> 2.8 ->     -FeNa once more stable    #Metabolic Acidosis    -(6/19) ABG pH 7.14 -> 2amps bicarb, bicarb gtt @ 50cc   #Lactic Acidosis    -Lactic acid 6.4 -> 4.3 -> 3.7 -> 4.5    Labs:          BUN/Cr- 88/2.9  -->,  90/2.8  -->          Electrolytes-Na 123 // K 5.3 // Mg 2.0 //  Phos 6.3 (06-18 @ 23:11)    HEME/ONC:   #H/o Fe Deficiency anemia  #DVT prophylaxis    -heparin   Injectable, SCDs    Labs: Hb/Hct:  10.2/31.7  -->,  8.9/28.1  -->                      Plts:  687  -->,  685  -->                 PTT/INR:  31.1/1.71  --->,  29.8/1.59  --->     T&S Expires: 6/21/23  Blood Consent- in chart     ID:  #Leukocytosis 2/2 sepsis     - s/p vancomycin, cefepime, and flagyl in the ED    - Continue with Zosyn and vanco     - Trend WBC   WBC- 47.97  --->>,  57.51  --->>  Temp trend- 24hrs T(F): 97 (06-18 @ 19:45), Max: 99.4 (06-18 @ 14:10)  Antibiotics    -piperacillin/tazobactam IVPB.. 2.25 every 6 hours    -vancomycin  IVPB 1250 every 24 hours       []vanco through 6/21 AM   Cultures:     - Blood cultures (6/18): in lab     - PCN culture (6/18 from IR): in lab     - RLQ culture (6/18 from IR): in lab     ENDO:    -No active issues     -A1c (5/7/23): 5.0    -FS Q6H while NPO    MSK:  #Activity    -Bedrest while intubated and on pressors     -PT/rehab c/s once extubated     LINES/DRAINS: PIV, Joleen (6/18/23), Mena (6/18/23), OGT (6/18/23). TLC (6/18/23), Javier Drain (6/18/23), LT PCN exchanged (6/18/23)     ADVANCED DIRECTIVES:  Full Code    HCP/Emergency Contact- Tracey Adames: [phone]     INDICATION FOR SICU/SDU: Atrial fibrillation    DISPO: SICU Assessment & Plan  67M w/ PMH of HTN, grade IV hydronephrosis of L kidney s/p L PCN (placed 5/2023), stutter, hiatal hernia, iron deficiency anemia, H Pylori (untreated), and gastric mass (never biopsied) presents to the ED with at least 4 days of worsening weakness, abdominal distension, and no output from his PCN. Admitted with emphysematous pyelonephritis    (6/18): s/p LT PCN upsizing to 16Fr and 16Fr RLQ drain placement with IR       NEURO:  #H/o stutter (negative ischemic workup last admission)  #Pain control     -Fentanyl gtt   #Sedation    -Versed 1mg q3hr     RESP:  #respiratory distress   - Originally placed on BiPaP; returned from IR intubated with 7.5 ETT on 6/18    - Vent: 450/20/60/8   - Daily AM ABG   - Daily AM CXR   - Daily SAT/SBT   #Activity   -Bedrest while intubated and on pressors     CARDS:   #HTN    -Holding home amlodipine 2.5 qD while hypotensive   #Acute onset Afib w/ RVR with hypotension -- likely 2/2 sepsis    -(6/18) s/p lopressor pushes, 150cc amio bolus, amio drip d/c 6/18    -(6/19) s/p 250cc Dig x1     -Currently on Levo, wean as tolerated      -Continue IVF resuscitation     -Cards c/s: consider switching pressors to phenylephrine in setting of tachycardia; therapeutic AC once cleared, start cardizem/lopressor once off pressors; check TSH, TTE   Imaging    - EKG (6/18): afib w/ RVR to 172, L posterior fascicular block    - EKG (6/19): Afib w/ RVR to 138     - ECHO: none  Labs    -Post-op Troponin: 0.14 ->     GI/NUTR:  #Diet    -NPO,     -OGT to LCWS (55cm at lip)   #GI prophylaxis    -Protonix  Bowel regimen:     -none at this time     -Last BM: 6/18 in the ED    /RENAL:   #Grade IV L hydronephrosis s/p L PCN (5/2023) -- now w/ ?feculent output  #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    -(6/18) s/p LT PCN upsizing to 16Fr and 16Fr RLQ drain placement with IR      -Mena placed in ED 6/18     -Monitor LT PCN and RLQ drain - flush q8 hrs     -Urology recs - plan for stat repeat CT A/P with PO and rectal contrast to r/o bowel perforation --> once more stable  #ROJAS (baseline Cr 1)    -Cr 2.9 -> 2.8 ->     -FeNa once more stable    #Metabolic Acidosis    -(6/19) ABG pH 7.14 -> 2amps bicarb, bicarb gtt @ 50cc   #Lactic Acidosis    -Lactic acid 6.4 -> 4.3 -> 3.7 -> 4.5    Labs:          BUN/Cr- 88/2.9  -->,  90/2.8  -->          Electrolytes-Na 123 // K 5.3 // Mg 2.0 //  Phos 6.3 (06-18 @ 23:11)    HEME/ONC:   #H/o Fe Deficiency anemia  #DVT prophylaxis    -heparin   Injectable, SCDs    Labs: Hb/Hct:  10.2/31.7  -->,  8.9/28.1  -->                      Plts:  687  -->,  685  -->                 PTT/INR:  31.1/1.71  --->,  29.8/1.59  --->     T&S Expires: 6/21/23  Blood Consent- in chart     ID:  #Leukocytosis 2/2 sepsis     - s/p vancomycin, cefepime, and flagyl in the ED    - Continue with Zosyn and vanco     - Trend WBC   WBC- 47.97  --->>,  57.51  --->>  Temp trend- 24hrs T(F): 97 (06-18 @ 19:45), Max: 99.4 (06-18 @ 14:10)  Antibiotics    -piperacillin/tazobactam IVPB.. 2.25 every 6 hours    -vancomycin  IVPB 1250 every 24 hours       []vanco through 6/21 AM   Cultures:     - Blood cultures (6/18): in lab     - PCN culture (6/18 from IR): in lab     - RLQ culture (6/18 from IR): in lab     ENDO:    -No active issues     -A1c (5/7/23): 5.0    -FS Q6H while NPO    MSK:  #Activity    -Bedrest while intubated and on pressors     -PT/rehab c/s once extubated     LINES/DRAINS: PIV, [place] (6/18/23), Mena (6/18/23), OGT (6/18/23). TLC (6/18/23), Javier Drain (6/18/23), LT PCN exchanged (6/18/23)     ADVANCED DIRECTIVES:  Full Code    HCP/Emergency Contact- Tracey Adames: [phone]     INDICATION FOR SICU/SDU: Atrial fibrillation    DISPO: SICU Assessment & Plan  67M w/ PMH of HTN, grade IV hydronephrosis of L kidney s/p L PCN (placed 5/2023), stutter, hiatal hernia, iron deficiency anemia, H Pylori (untreated), and gastric mass (never biopsied) presents to the ED with at least 4 days of worsening weakness, abdominal distension, and no output from his PCN. Admitted with emphysematous pyelonephritis    (6/18): s/p LT PCN upsizing to 16Fr and 16Fr RLQ drain placement with IR       NEURO:  #H/o stutter (negative ischemic workup last admission)  #Pain control     -Fentanyl gtt   #Sedation    -Versed 1mg q3hr     RESP:  #respiratory distress   - Originally placed on BiPaP; returned from IR intubated with 7.5 ETT on 6/18    - Vent: 450/20/60/8   - Daily AM ABG   - Daily AM CXR   - Daily SAT/SBT   #Activity   -Bedrest while intubated and on pressors     CARDS:   #HTN    -Holding home amlodipine 2.5 qD while hypotensive   #Acute onset Afib w/ RVR with hypotension -- likely 2/2 sepsis    -(6/18) s/p lopressor pushes, 150cc amio bolus, amio drip d/c 6/18    -(6/19) s/p 250cc Dig x1     -Currently on Levo, wean as tolerated      -Continue IVF resuscitation     -Cards c/s: consider switching pressors to phenylephrine in setting of tachycardia; therapeutic AC once cleared, start cardizem/lopressor once off pressors; check TSH, TTE   Imaging    - EKG (6/18): afib w/ RVR to 172, L posterior fascicular block    - EKG (6/19): Afib w/ RVR to 138     - ECHO: none  Labs    -Post-op Troponin: 0.14 ->     GI/NUTR:  #Diet    -NPO,     -OGT to LCWS (55cm at lip)   #GI prophylaxis    -Protonix  Bowel regimen:     -none at this time     -Last BM: 6/18 in the ED    /RENAL:   #Grade IV L hydronephrosis s/p L PCN (5/2023) -- now w/ ?feculent output  #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    -(6/18) s/p LT PCN upsizing to 16Fr and 16Fr RLQ drain placement with IR      -Mena placed in ED 6/18     -Monitor LT PCN and RLQ drain - flush q8 hrs     -Urology recs - plan for stat repeat CT A/P with PO and rectal contrast to r/o bowel perforation --> once more stable  #ROJAS (baseline Cr 1)    -Cr 2.9 -> 2.8 ->     -FeNa once more stable    #Metabolic Acidosis    -(6/19) ABG pH 7.14 -> 2amps bicarb, bicarb gtt @ 50cc   #Lactic Acidosis    -Lactic acid 6.4 -> 4.3 -> 3.7 -> 4.5    Labs:          BUN/Cr- 88/2.9  -->,  90/2.8  -->          Electrolytes-Na 123 // K 5.3 // Mg 2.0 //  Phos 6.3 (06-18 @ 23:11)    HEME/ONC:   #H/o Fe Deficiency anemia  #Blood Product Repletions    -(6/18) s/p 1u FFP for elevated INR 1.71     -(6/19) s/p 1u PRBCs??   #DVT prophylaxis    -heparin   Injectable, SCDs    Labs: Hb/Hct:  10.2/31.7  -->,  8.9/28.1  -->                      Plts:  687  -->,  685  -->                 PTT/INR:  31.1/1.71  --->,  29.8/1.59  --->     T&S Expires: 6/21/23  Blood Consent- in chart     ID:  #Leukocytosis 2/2 sepsis     - s/p vancomycin, cefepime, and flagyl in the ED    - Continue with Zosyn and vanco     - Trend WBC   WBC- 47.97  --->>,  57.51  --->>  Temp trend- 24hrs T(F): 97 (06-18 @ 19:45), Max: 99.4 (06-18 @ 14:10)  Antibiotics    -piperacillin/tazobactam IVPB.. 2.25 every 6 hours    -vancomycin  IVPB 1250 every 24 hours       []vanco through 6/21 AM   Cultures:     - Blood cultures (6/18): in lab     - PCN culture (6/18 from IR): in lab     - RLQ culture (6/18 from IR): in lab     ENDO:    -No active issues     -A1c (5/7/23): 5.0    -FS Q6H while NPO    MSK:  #Activity    -Bedrest while intubated and on pressors     -PT/rehab c/s once extubated     LINES/DRAINS: PIV, Joleen (6/18/23), Mena (6/18/23), OGT (6/18/23). TLC (6/18/23), Javier Drain (6/18/23), LT PCN exchanged (6/18/23)     ADVANCED DIRECTIVES:  Full Code    HCP/Emergency Contact- Tracey Adames: [phone]     INDICATION FOR SICU/SDU: Atrial fibrillation    DISPO: SICU Assessment & Plan  67M w/ PMH of HTN, grade IV hydronephrosis of L kidney s/p L PCN (placed 5/2023), stutter, hiatal hernia, iron deficiency anemia, H Pylori (untreated), and gastric mass (never biopsied) presents to the ED with at least 4 days of worsening weakness, abdominal distension, and no output from his PCN. Admitted with emphysematous pyelonephritis    (6/18): s/p LT PCN upsizing to 16Fr and 16Fr RLQ drain placement with IR       NEURO:  #H/o stutter (negative ischemic workup last admission)  #Pain control     -Fentanyl gtt   #Sedation    -Versed 1mg q3hr     RESP:  #Respiratory distress   - Originally placed on BiPaP; returned from IR intubated with 7.5 ETT on 6/18    - Vent: 450/20/60/8   - Daily AM ABG   - Daily AM CXR   - Daily SAT/SBT   #Pulmonary nodules / Mediastinal lymphadenopathy    -CT Chest: Right middle and upper lobe solid pulmonary nodules which are not well delineated due to respiratory motion but measure less than 6 mm. Mediastinal lymphadenopathy measuring up to 2.4 cm short axis.     -Recommend outpatient follow up with pulmonology   #Activity   -Bedrest while intubated and on pressors     CARDS:   #HTN    -Holding home amlodipine 2.5 qD while hypotensive   #Acute onset Afib w/ RVR with hypotension -- likely 2/2 sepsis    -(6/18) s/p lopressor pushes, 150cc amio bolus, amio drip d/c 6/18    -(6/19) s/p 250cc Dig x1     -Currently on Levo, wean as tolerated      -Continue IVF resuscitation     -Cards c/s: consider switching pressors to phenylephrine in setting of tachycardia; therapeutic AC once cleared, start cardizem/lopressor once off pressors; check TSH, TTE   Imaging    - EKG (6/18): afib w/ RVR to 172, L posterior fascicular block    - EKG (6/19): Afib w/ RVR to 138     - ECHO: none  Labs    -Post-op Troponin: 0.14 ->     GI/NUTR:  #Gastric mass vs gastrohepatic lymphadenopathy    -CT A/P:Previously noted lesser curvature mass measures 6.3 x 5.2 cm   (previously 5.2 x 3.5 cm by my measurement), and may represent   gastrohepatic lymphadenopathy.  #Hepatic hypodensities, suspicious for development for multiple focal abscesses    -CT A/P: New hepatic hypodensities measuring up to 2.6 cm, suspicious for   development of multiple focal abscesses.  #Splenic Hypodensities, infarct vs phlegmon/abscess    -CT A/P:  Wedge-shaped region of hypodensity within the spleen may reflect   splenic infarct in the correct clinical setting. Developing   phlegmon/abscess cannot be excluded in the current clinical setting   #Diet    -NPO,     -OGT to LCWS (55cm at lip)   #GI prophylaxis    -Protonix  Bowel regimen:     -none at this time     -Last BM: 6/18 in the ED    /RENAL:   #Grade IV L hydronephrosis s/p L PCN (5/2023) -- now w/ ?feculent output  #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    -(6/18) s/p LT PCN upsizing to 16Fr and 16Fr RLQ drain placement with IR      -Mena placed in ED 6/18     -Monitor LT PCN and RLQ drain - flush q8 hrs     -Urology recs - plan for stat repeat CT A/P with PO and rectal contrast to r/o bowel perforation --> once more stable  #ROJAS (baseline Cr 1)    -Cr 2.9 -> 2.8 ->     -FeNa 1.1%, intrinsic  #Metabolic Acidosis    -(6/19) ABG pH 7.14 -> 2amps bicarb, bicarb gtt @ 50cc   #Lactic Acidosis    -Lactic acid 6.4 -> 4.3 -> 3.7 -> 4.5    Labs:          BUN/Cr- 88/2.9  -->,  90/2.8  -->          Electrolytes-Na 123 // K 5.3 // Mg 2.0 //  Phos 6.3 (06-18 @ 23:11)    HEME/ONC:   #H/o Fe Deficiency anemia  #Blood Product Repletions    -(6/18) s/p 1u FFP for elevated INR 1.71     -(6/19) s/p 1u PRBCs??   #DVT prophylaxis    -heparin   Injectable, SCDs    Labs: Hb/Hct:  10.2/31.7  -->,  8.9/28.1  -->                      Plts:  687  -->,  685  -->                 PTT/INR:  31.1/1.71  --->,  29.8/1.59  --->     T&S Expires: 6/21/23  Blood Consent- in chart     ID:  #Leukocytosis 2/2 sepsis     - s/p vancomycin, cefepime, and flagyl in the ED    - Continue with Zosyn and vanco     - Trend WBC   WBC- 47.97  --->>,  57.51  --->>  Temp trend- 24hrs T(F): 97 (06-18 @ 19:45), Max: 99.4 (06-18 @ 14:10)  Antibiotics (renally dosed)     -piperacillin/tazobactam IVPB.. 2.25 every 6 hours    -vancomycin  IVPB 1250 every 24 hours       []vanco through 6/21 AM   Cultures:     - Blood cultures (6/18): in lab     - PCN culture (6/18 from IR): in lab     - RLQ culture (6/18 from IR): in lab     - UA (6/19): in lab    ENDO:  #Glucose monitoring    -No active issues     -A1c (5/7/23): 5.0    -FS Q6H while NPO   #Steroid    -8mg dexamethasone given 6/18    -Random cortisol lvl: in lab     MSK:  #Activity    -Bedrest while intubated and on pressors     -PT/rehab c/s once extubated     LINES/DRAINS: PIV, [place] (6/18/23), Mena (6/18/23), OGT (6/18/23). TLC (6/18/23), Javier Drain (6/18/23), LT PCN exchanged (6/18/23)     ADVANCED DIRECTIVES:  Full Code    HCP/Emergency Contact- Tracey Adames: [phone]     INDICATION FOR SICU/SDU: Atrial fibrillation    DISPO: SICU Assessment & Plan  67M w/ PMH of HTN, grade IV hydronephrosis of L kidney s/p L PCN (placed 5/2023), stutter, hiatal hernia, iron deficiency anemia, H Pylori (untreated), and gastric mass (never biopsied) presents to the ED with at least 4 days of worsening weakness, abdominal distension, and no output from his PCN. Admitted with emphysematous pyelonephritis    (6/18): s/p LT PCN upsizing to 16Fr and 16Fr RLQ drain placement with IR       NEURO:  #H/o stutter (negative ischemic workup last admission)  #Pain control     -Fentanyl gtt   #Sedation    -Versed 1mg q3hr     RESP:  #Respiratory distress   - Originally placed on BiPaP; returned from IR intubated with 7.5 ETT on 6/18    - Vent: 450/20/60/8   - Daily AM ABG   - Daily AM CXR   - Daily SAT/SBT   #Pulmonary nodules / Mediastinal lymphadenopathy    -CT Chest: Right middle and upper lobe solid pulmonary nodules which are not well delineated due to respiratory motion but measure less than 6 mm. Mediastinal lymphadenopathy measuring up to 2.4 cm short axis.     -Recommend outpatient follow up with pulmonology   #Activity   -Bedrest while intubated and on pressors     CARDS:   #HTN    -Holding home amlodipine 2.5 qD while hypotensive   #Acute onset Afib w/ RVR with hypotension -- likely 2/2 sepsis    -(6/18) s/p lopressor pushes, 150cc amio bolus, amio drip d/c 6/18    -(6/19) s/p 250cc Dig x1     -Currently on Levo, wean as tolerated      -Continue IVF resuscitation     -Cards c/s: consider switching pressors to phenylephrine in setting of tachycardia; therapeutic AC once cleared, start cardizem/lopressor once off pressors; check TSH, TTE   Imaging    - EKG (6/18): afib w/ RVR to 172, L posterior fascicular block    - EKG (6/19): Afib w/ RVR to 138     - ECHO: none  Labs    -Post-op Troponin: 0.14 ->     GI/NUTR:  #Gastric mass vs gastrohepatic lymphadenopathy    -CT A/P:Previously noted lesser curvature mass measures 6.3 x 5.2 cm   (previously 5.2 x 3.5 cm by my measurement), and may represent   gastrohepatic lymphadenopathy.  #Hepatic hypodensities, suspicious for development for multiple focal abscesses    -CT A/P: New hepatic hypodensities measuring up to 2.6 cm, suspicious for   development of multiple focal abscesses.  #Splenic Hypodensities, infarct vs phlegmon/abscess    -CT A/P:  Wedge-shaped region of hypodensity within the spleen may reflect   splenic infarct in the correct clinical setting. Developing   phlegmon/abscess cannot be excluded in the current clinical setting   #Diet    -NPO,     -OGT to LCWS (55cm at lip)   #GI prophylaxis    -Protonix  Bowel regimen:     -none at this time     -Last BM: 6/18 in the ED    /RENAL:   #Grade IV L hydronephrosis s/p L PCN (5/2023) -- now w/ ?feculent output  #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    -(6/18) s/p LT PCN upsizing to 16Fr and 16Fr RLQ drain placement with IR      -Mena placed in ED 6/18     -Monitor LT PCN and RLQ drain - flush q8 hrs     -Urology recs - plan for stat repeat CT A/P with PO and rectal contrast to r/o bowel perforation --> once more stable  #ROJAS (baseline Cr 1)    -Cr 2.9 -> 2.8 ->     -FeNa 1.1%, intrinsic  #Metabolic Acidosis    -(6/19) ABG pH 7.14 -> 2amps bicarb, bicarb gtt @ 50cc   #Lactic Acidosis    -Lactic acid 6.4 -> 4.3 -> 3.7 -> 4.5    Labs:          BUN/Cr- 88/2.9  -->,  90/2.8  -->          Electrolytes-Na 123 // K 5.3 // Mg 2.0 //  Phos 6.3 (06-18 @ 23:11)    HEME/ONC:   #H/o Fe Deficiency anemia  #Blood Product Repletions    -(6/18) s/p 1u FFP for elevated INR 1.71     -(6/19) s/p 1u PRBCs??   #DVT prophylaxis    -heparin   Injectable, SCDs    Labs: Hb/Hct:  10.2/31.7  -->,  8.9/28.1  -->                      Plts:  687  -->,  685  -->                 PTT/INR:  31.1/1.71  --->,  29.8/1.59  --->     T&S Expires: 6/21/23  Blood Consent- in chart     ID:  #Leukocytosis 2/2 sepsis     - s/p vancomycin, cefepime, and flagyl in the ED    - Continue with Zosyn and vanco     - Trend WBC   WBC- 47.97  --->>,  57.51  --->>  Temp trend- 24hrs T(F): 97 (06-18 @ 19:45), Max: 99.4 (06-18 @ 14:10)  Antibiotics (renally dosed)     -piperacillin/tazobactam IVPB.. 2.25 every 6 hours    -vancomycin  IVPB 1250 every 24 hours       []vanco through 6/21 AM   Cultures:     - Blood cultures (6/18): in lab     - PCN culture (6/18 from IR): in lab     - RLQ culture (6/18 from IR): in lab     - UA (6/19): positive, f/u culture    ENDO:  #Glucose monitoring    -No active issues     -A1c (5/7/23): 5.0    -FS Q6H while NPO   #Adrenal Insufficieny?     -8mg dexamethasone given 6/18    -Random cortisol lvl: in lab     MSK:  #Activity    -Bedrest while intubated and on pressors     -PT/rehab c/s once extubated     LINES/DRAINS: PIV, [place] (6/18/23), Mena (6/18/23), OGT (6/18/23). TLC (6/18/23), Javier Drain (6/18/23), LT PCN exchanged (6/18/23)     ADVANCED DIRECTIVES:  Full Code    HCP/Emergency Contact- Tracey Adames: [phone]     INDICATION FOR SICU/SDU: Atrial fibrillation    DISPO: SICU Assessment & Plan  67M w/ PMH of HTN, grade IV hydronephrosis of L kidney s/p L PCN (placed 5/2023), stutter, hiatal hernia, iron deficiency anemia, H Pylori (untreated), and gastric mass (never biopsied) presents to the ED with at least 4 days of worsening weakness, abdominal distension, and no output from his PCN. Admitted with emphysematous pyelonephritis    (6/18): s/p LT PCN upsizing to 16Fr and 16Fr RLQ drain placement with IR       NEURO:  #H/o stutter (negative ischemic workup last admission)  #Pain control     -Fentanyl gtt   #Sedation    -Versed 1mg q3hr     RESP:  #Respiratory distress   - Originally placed on BiPaP; returned from IR intubated with 7.5 ETT on 6/18    - Vent: 450/20/60/8   - Daily AM ABG   - Daily AM CXR   - Daily SAT/SBT   #Pulmonary nodules / Mediastinal lymphadenopathy    -CT Chest: Right middle and upper lobe solid pulmonary nodules which are not well delineated due to respiratory motion but measure less than 6 mm. Mediastinal lymphadenopathy measuring up to 2.4 cm short axis.     -Recommend outpatient follow up with pulmonology   #Activity   -Bedrest while intubated and on pressors     CARDS:   #HTN    -Holding home amlodipine 2.5 qD while hypotensive   #Acute onset Afib w/ RVR with hypotension -- likely 2/2 sepsis    -(6/18) s/p lopressor pushes, 150cc amio bolus, amio drip d/c 6/18    -(6/19) s/p 250cc Dig x1     -Currently on Levo, wean as tolerated      -Continue IVF resuscitation     -Cards c/s: consider switching pressors to phenylephrine in setting of tachycardia; therapeutic AC once cleared, start cardizem/lopressor once off pressors; check TSH, TTE   Imaging    - EKG (6/18): afib w/ RVR to 172, L posterior fascicular block    - EKG (6/19): Afib w/ RVR to 138     - ECHO: none  Labs    -Post-op Troponin: 0.14 ->     GI/NUTR:  #Gastric mass vs gastrohepatic lymphadenopathy    -CT A/P:Previously noted lesser curvature mass measures 6.3 x 5.2 cm   (previously 5.2 x 3.5 cm by my measurement), and may represent   gastrohepatic lymphadenopathy.  #Hepatic hypodensities, suspicious for development for multiple focal abscesses    -CT A/P: New hepatic hypodensities measuring up to 2.6 cm, suspicious for   development of multiple focal abscesses.  #Splenic Hypodensities, infarct vs phlegmon/abscess    -CT A/P:  Wedge-shaped region of hypodensity within the spleen may reflect   splenic infarct in the correct clinical setting. Developing   phlegmon/abscess cannot be excluded in the current clinical setting   #Diet    -NPO,     -OGT to LCWS (55cm at lip)   #GI prophylaxis    -Protonix  Bowel regimen:     -none at this time     -Last BM: 6/18 in the ED    /RENAL:   #Grade IV L hydronephrosis s/p L PCN (5/2023) -- now w/ ?feculent output  #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    -(6/18) s/p LT PCN upsizing to 16Fr and 16Fr RLQ drain placement with IR      -Mena placed in ED 6/18     -Monitor LT PCN and RLQ drain - flush q8 hrs     -Urology recs - plan for stat repeat CT A/P with PO and rectal contrast to r/o bowel perforation --> once more stable  #ROJAS (baseline Cr 1.0)    -Cr 2.9 -> 2.8 ->     -FeNa 1.1%, intrinsic  #Metabolic Acidosis - improving     -(6/19) ABG pH 7.14 -> 2amps bicarb, bicarb gtt @ 50cc -> pH 7.30   #Lactic Acidosis    -Lactic acid 6.4 -> 4.3 -> 3.7 -> 4.5 -> 3.8     Labs:          BUN/Cr- 88/2.9  -->,  90/2.8  -->          Electrolytes-Na 123 // K 5.3 // Mg 2.0 //  Phos 6.3 (06-18 @ 23:11)    HEME/ONC:   #H/o Fe Deficiency anemia  #Blood Product Repletions    -(6/18) s/p 1u FFP for elevated INR 1.71     -(6/19) s/p 1u PRBCs??   #DVT prophylaxis    -heparin   Injectable, SCDs    Labs: Hb/Hct:  10.2/31.7  -->,  8.9/28.1  -->                      Plts:  687  -->,  685  -->                 PTT/INR:  31.1/1.71  --->,  29.8/1.59  --->     T&S Expires: 6/21/23  Blood Consent- in chart     ID:  #Leukocytosis 2/2 sepsis     - s/p vancomycin, cefepime, and flagyl in the ED    - Continue with Zosyn and vanco     - Trend WBC   WBC- 47.97  --->>,  57.51  --->>  Temp trend- 24hrs T(F): 97 (06-18 @ 19:45), Max: 99.4 (06-18 @ 14:10)  Antibiotics (renally dosed)     -piperacillin/tazobactam IVPB.. 2.25 every 6 hours    -vancomycin  IVPB 1250 every 24 hours       []vanco through 6/21 AM   Cultures:     - Blood cultures (6/18): in lab     - PCN culture (6/18 from IR): in lab     - RLQ culture (6/18 from IR): in lab     - UA (6/19): positive, f/u culture    ENDO:  #Glucose monitoring    -No active issues     -A1c (5/7/23): 5.0    -FS Q6H while NPO   #Adrenal Insufficieny?     -8mg dexamethasone given 6/18    -Random cortisol lvl: in lab     MSK:  #Activity    -Bedrest while intubated and on pressors     -PT/rehab c/s once extubated     LINES/DRAINS: PIV, [place] (6/18/23), Mena (6/18/23), OGT (6/18/23). TLC (6/18/23), Javier Drain (6/18/23), LT PCN exchanged (6/18/23)     ADVANCED DIRECTIVES:  Full Code    HCP/Emergency Contact- Tracey Adames: [phone]     INDICATION FOR SICU/SDU: Atrial fibrillation    DISPO: SICU Assessment & Plan  67M w/ PMH of HTN, grade IV hydronephrosis of L kidney s/p L PCN (placed 5/2023), stutter, hiatal hernia, iron deficiency anemia, H Pylori (untreated), and gastric mass (never biopsied) presents to the ED with at least 4 days of worsening weakness, abdominal distension, and no output from his PCN. Admitted with emphysematous pyelonephritis    (6/18): s/p LT PCN upsizing to 16Fr and 16Fr RLQ drain placement with IR       NEURO:  #H/o stutter (negative ischemic workup last admission)  #Pain control     -Fentanyl gtt   #Sedation    -Versed 1mg q3hr     RESP:  #Respiratory distress   - Originally placed on BiPaP; returned from IR intubated with 7.5 ETT on 6/18    - Vent: 450/20/60/8   - Daily AM ABG   - Daily AM CXR   - Daily SAT/SBT   #Pulmonary nodules / Mediastinal lymphadenopathy    -CT Chest: Right middle and upper lobe solid pulmonary nodules which are not well delineated due to respiratory motion but measure less than 6 mm. Mediastinal lymphadenopathy measuring up to 2.4 cm short axis.     -Recommend outpatient follow up with pulmonology   #Activity   -Bedrest while intubated and on pressors     CARDS:   #HTN    -Holding home amlodipine 2.5 qD while hypotensive   #Acute onset Afib w/ RVR with hypotension -- likely 2/2 sepsis    -(6/18) s/p lopressor pushes, 150cc amio bolus, amio drip d/c 6/18    -(6/19) s/p 250cc Dig x1     -Currently on Levo, wean as tolerated      -Continue IVF resuscitation     -Cards c/s: consider switching pressors to phenylephrine in setting of tachycardia; therapeutic AC once cleared, start cardizem/lopressor once off pressors; check TSH, TTE   Imaging    - EKG (6/18): afib w/ RVR to 172, L posterior fascicular block    - EKG (6/19): Afib w/ RVR to 138     - ECHO: none  Labs    -Post-op Troponin: 0.14 ->     GI/NUTR:  #Gastric mass vs gastrohepatic lymphadenopathy    -CT A/P:Previously noted lesser curvature mass measures 6.3 x 5.2 cm (previously 5.2 x 3.5 cm by my measurement), and may represent gastrohepatic lymphadenopathy.  #Hepatic hypodensities, suspicious for development for multiple focal abscesses    -CT A/P: New hepatic hypodensities measuring up to 2.6 cm, suspicious for development of multiple focal abscesses.  #Splenic Hypodensities, infarct vs phlegmon/abscess    -CT A/P:  Wedge-shaped region of hypodensity within the spleen may reflect splenic infarct in the correct clinical setting. Developing phlegmon/abscess cannot be excluded in the current clinical setting   #Diet    -NPO,     -OGT to LCWS (55cm at lip)   #GI prophylaxis    -Protonix  Bowel regimen:     -none at this time     -Last BM: 6/18 in the ED    /RENAL:   #Grade IV L hydronephrosis s/p L PCN (5/2023) -- now w/ ?feculent output  #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    -(6/18) s/p LT PCN upsizing to 16Fr and 16Fr RLQ drain placement with IR      -Mena placed in ED 6/18     -Monitor LT PCN and RLQ drain - flush q8 hrs     -Urology recs - plan for stat repeat CT A/P with PO and rectal contrast to r/o bowel perforation --> once more stable  #ROJAS (baseline Cr 1.0)    -Cr 2.9 -> 2.8 ->     -FeNa 1.1%, intrinsic  #Metabolic Acidosis - improving     -(6/19) ABG pH 7.14 -> 2amps bicarb, bicarb gtt @ 50cc -> pH 7.30   #Lactic Acidosis    -Lactic acid 6.4 -> 4.3 -> 3.7 -> 4.5 -> 3.8     Labs:          BUN/Cr- 88/2.9  -->,  90/2.8  -->          Electrolytes-Na 123 // K 5.3 // Mg 2.0 //  Phos 6.3 (06-18 @ 23:11)    HEME/ONC:   #H/o Fe Deficiency anemia  #Blood Product Repletions    -(6/18) s/p 1u FFP for elevated INR 1.71     -(6/19) s/p 1u PRBCs??   #DVT prophylaxis    -heparin   Injectable, SCDs    Labs: Hb/Hct:  10.2/31.7  -->,  8.9/28.1  -->                      Plts:  687  -->,  685  -->                 PTT/INR:  31.1/1.71  --->,  29.8/1.59  --->     T&S Expires: 6/21/23  Blood Consent- in chart     ID:  #Leukocytosis 2/2 sepsis     - s/p vancomycin, cefepime, and flagyl in the ED    - Continue with Zosyn and vanco     - Trend WBC   WBC- 47.97  --->>,  57.51  --->>  Temp trend- 24hrs T(F): 97 (06-18 @ 19:45), Max: 99.4 (06-18 @ 14:10)  Antibiotics (renally dosed)     -piperacillin/tazobactam IVPB.. 2.25 every 6 hours    -vancomycin  IVPB 1250 every 24 hours       []vanco through 6/21 AM   Cultures:     - Blood cultures (6/18): in lab     - PCN culture (6/18 from IR): in lab     - RLQ culture (6/18 from IR): in lab     - UA (6/19): positive, f/u culture    ENDO:  #Glucose monitoring    -No active issues     -A1c (5/7/23): 5.0    -FS Q6H while NPO   #Adrenal Insufficieny?     -8mg dexamethasone given 6/18    -Random cortisol lvl: in lab     MSK:  #Activity    -Bedrest while intubated and on pressors     -PT/rehab c/s once extubated     LINES/DRAINS: PIV, [place] (6/18/23), Mena (6/18/23), OGT (6/18/23). TLC (6/18/23), Javier Drain (6/18/23), LT PCN exchanged (6/18/23)     ADVANCED DIRECTIVES:  Full Code    HCP/Emergency Contact- Tracey Adames: [phone]     INDICATION FOR SICU/SDU: Atrial fibrillation    DISPO: SICU Assessment & Plan  67M w/ PMH of HTN, grade IV hydronephrosis of L kidney s/p L PCN (placed 5/2023), stutter, hiatal hernia, iron deficiency anemia, H Pylori (untreated), and gastric mass (never biopsied) presents to the ED with at least 4 days of worsening weakness, abdominal distension, and no output from his PCN. Admitted with emphysematous pyelonephritis    (6/18): s/p LT PCN upsizing to 16Fr and 16Fr RLQ drain placement with IR       NEURO:  #H/o stutter (negative ischemic workup last admission)  #Pain control     -Fentanyl gtt   #Sedation    -Versed 1mg q3hr     RESP:  #Respiratory distress   - Originally placed on BiPaP; returned from IR intubated with 7.5 ETT on 6/18    - Vent: 450/20/40/8   - Daily AM ABG   - Daily AM CXR   - Daily SAT/SBT   #Pulmonary nodules / Mediastinal lymphadenopathy    -CT Chest: Right middle and upper lobe solid pulmonary nodules which are not well delineated due to respiratory motion but measure less than 6 mm. Mediastinal lymphadenopathy measuring up to 2.4 cm short axis.     -Recommend outpatient follow up with pulmonology   #Activity   -Bedrest while intubated and on pressors     CARDS:   #HTN    -Holding home amlodipine 2.5 qD while hypotensive   #Acute onset Afib w/ RVR with hypotension -- likely 2/2 sepsis    -(6/18) s/p lopressor pushes, 150cc amio bolus, amio drip d/c 6/18    -(6/19) s/p 250cc Dig x1     -Currently on Levo, wean as tolerated      -Continue IVF resuscitation     -Cards c/s: consider switching pressors to phenylephrine in setting of tachycardia; therapeutic AC once cleared, start cardizem/lopressor once off pressors; check TSH, TTE   Imaging    - EKG (6/18): afib w/ RVR to 172, L posterior fascicular block    - EKG (6/19): Afib w/ RVR to 138     - ECHO: none  Labs    -Post-op Troponin: 0.14 ->     GI/NUTR:  #Gastric mass vs gastrohepatic lymphadenopathy    -CT A/P:Previously noted lesser curvature mass measures 6.3 x 5.2 cm (previously 5.2 x 3.5 cm by my measurement), and may represent gastrohepatic lymphadenopathy.  #Hepatic hypodensities, suspicious for development for multiple focal abscesses    -CT A/P: New hepatic hypodensities measuring up to 2.6 cm, suspicious for development of multiple focal abscesses.  #Splenic Hypodensities, infarct vs phlegmon/abscess    -CT A/P:  Wedge-shaped region of hypodensity within the spleen may reflect splenic infarct in the correct clinical setting. Developing phlegmon/abscess cannot be excluded in the current clinical setting   #Diet    -NPO,     -OGT to LCWS (55cm at lip)   #GI prophylaxis    -Protonix  Bowel regimen:     -none at this time     -Last BM: 6/18 in the ED    /RENAL:   #Grade IV L hydronephrosis s/p L PCN (5/2023) -- now w/ ?feculent output  #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    -(6/18) s/p LT PCN upsizing to 16Fr and 16Fr RLQ drain placement with IR      -Mena placed in ED 6/18     -Monitor LT PCN and RLQ drain - flush q8 hrs     -Urology recs - plan for stat repeat CT A/P with PO and rectal contrast to r/o bowel perforation --> once more stable  #ROJAS (baseline Cr 1.0)    -Cr 2.9 -> 2.8 ->     -FeNa 1.1%, intrinsic  #Metabolic Acidosis - improving     -(6/19) ABG pH 7.14 -> 2amps bicarb, bicarb gtt @ 50cc -> pH 7.30   #Lactic Acidosis    -Lactic acid 6.4 -> 4.3 -> 3.7 -> 4.5 -> 3.8     Labs:          BUN/Cr- 88/2.9  -->,  90/2.8  -->          Electrolytes-Na 123 // K 5.3 // Mg 2.0 //  Phos 6.3 (06-18 @ 23:11)    HEME/ONC:   #H/o Fe Deficiency anemia  #Blood Product Repletions    -(6/18) s/p 1u FFP for elevated INR 1.71     -(6/19) s/p 1u PRBCs??   #DVT prophylaxis    -heparin   Injectable, SCDs    Labs: Hb/Hct:  10.2/31.7  -->,  8.9/28.1  -->                      Plts:  687  -->,  685  -->                 PTT/INR:  31.1/1.71  --->,  29.8/1.59  --->     T&S Expires: 6/21/23  Blood Consent- in chart     ID:  #Leukocytosis 2/2 sepsis     - s/p vancomycin, cefepime, and flagyl in the ED    - Continue with Zosyn and vanco     - Trend WBC   WBC- 47.97  --->>,  57.51  --->>  Temp trend- 24hrs T(F): 97 (06-18 @ 19:45), Max: 99.4 (06-18 @ 14:10)  Antibiotics (renally dosed)     -piperacillin/tazobactam IVPB.. 2.25 every 6 hours    -vancomycin  IVPB 1250 every 24 hours       []vanco through 6/21 AM   Cultures:     - Blood cultures (6/18): in lab     - PCN culture (6/18 from IR): in lab     - RLQ culture (6/18 from IR): in lab     - UA (6/19): positive, f/u culture    ENDO:  #Glucose monitoring    -No active issues     -A1c (5/7/23): 5.0    -FS Q6H while NPO   #Adrenal Insufficieny?     -8mg dexamethasone given 6/18    -Random cortisol lvl: in lab     MSK:  #Activity    -Bedrest while intubated and on pressors     -PT/rehab c/s once extubated     LINES/DRAINS: PIV, [place] (6/18/23), Mena (6/18/23), OGT (6/18/23). TLC (6/18/23), Javier Drain (6/18/23), LT PCN exchanged (6/18/23)     ADVANCED DIRECTIVES:  Full Code    HCP/Emergency Contact- Tracey Adames: [phone]     INDICATION FOR SICU/SDU: Atrial fibrillation    DISPO: SICU Assessment & Plan  67M w/ PMH of HTN, grade IV hydronephrosis of L kidney s/p L PCN (placed 5/2023), stutter, hiatal hernia, iron deficiency anemia, H Pylori (untreated), and gastric mass (never biopsied) presents to the ED with at least 4 days of worsening weakness, abdominal distension, and no output from his PCN. Admitted with emphysematous pyelonephritis    (6/18): s/p LT PCN upsizing to 16Fr and 16Fr RLQ drain placement with IR       NEURO:  #H/o stutter (negative ischemic workup last admission)  #Pain control     -Fentanyl gtt   #Sedation    -Versed 1mg q3hr     RESP:  #Respiratory distress   - Originally placed on BiPaP; returned from IR intubated with 7.5 ETT on 6/18    - Vent: 450/20/40/8   - Daily AM ABG   - Daily AM CXR   - Daily SAT/SBT   #Pulmonary nodules / Mediastinal lymphadenopathy    -CT Chest: Right middle and upper lobe solid pulmonary nodules which are not well delineated due to respiratory motion but measure less than 6 mm. Mediastinal lymphadenopathy measuring up to 2.4 cm short axis.     -Recommend outpatient follow up with pulmonology   #Activity   -Bedrest while intubated and on pressors     CARDS:   #HTN    -Holding home amlodipine 2.5 qD while hypotensive   #Acute onset Afib w/ RVR with hypotension -- likely 2/2 sepsis    -(6/18) s/p lopressor pushes, 150cc amio bolus, amio drip d/c 6/18    -(6/19) s/p 250cc Dig x1     -Currently on Levo, wean as tolerated      -Continue IVF resuscitation     -Cards c/s: consider switching pressors to phenylephrine in setting of tachycardia; therapeutic AC once cleared, start cardizem/lopressor once off pressors; check TSH, TTE   Imaging    - EKG (6/18): afib w/ RVR to 172, L posterior fascicular block    - EKG (6/19): Afib w/ RVR to 138     - ECHO: none  Labs    -Post-op Troponin: 0.14 ->     GI/NUTR:  #Gastric mass vs gastrohepatic lymphadenopathy    -CT A/P:Previously noted lesser curvature mass measures 6.3 x 5.2 cm (previously 5.2 x 3.5 cm by my measurement), and may represent gastrohepatic lymphadenopathy.  #Hepatic hypodensities, suspicious for development for multiple focal abscesses    -CT A/P: New hepatic hypodensities measuring up to 2.6 cm, suspicious for development of multiple focal abscesses.  #Splenic Hypodensities, infarct vs phlegmon/abscess    -CT A/P:  Wedge-shaped region of hypodensity within the spleen may reflect splenic infarct in the correct clinical setting. Developing phlegmon/abscess cannot be excluded in the current clinical setting   #Diet    -NPO,     -OGT to LCWS (55cm at lip)   #GI prophylaxis    -Protonix  Bowel regimen:     -none at this time     -Last BM: 6/18 in the ED    /RENAL:   #Grade IV L hydronephrosis s/p L PCN (5/2023) -- now w/ ?feculent output  #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    -(6/18) s/p LT PCN upsizing to 16Fr and 16Fr RLQ drain placement with IR      -Mena placed in ED 6/18     -Monitor LT PCN and RLQ drain - flush q8 hrs     -Urology recs - plan for stat repeat CT A/P with PO and rectal contrast to r/o bowel perforation --> once more stable  #ROJAS (baseline Cr 1.0)    -Cr 2.9 -> 2.8 ->     -FeNa 1.1%, intrinsic  #Metabolic Acidosis - improving     -(6/19) ABG pH 7.14 -> 2amps bicarb, bicarb gtt @ 50cc -> pH 7.30   #Lactic Acidosis    -Lactic acid 6.4 -> 4.3 -> 3.7 -> 4.5 -> 3.8     Labs:          BUN/Cr- 88/2.9  -->,  90/2.8  -->          Electrolytes-Na 123 // K 5.3 // Mg 2.0 //  Phos 6.3 (06-18 @ 23:11)    HEME/ONC:   #H/o Fe Deficiency anemia  #Blood Product Repletions    -(6/18) s/p 1u FFP for elevated INR 1.71     -(6/19) s/p 1u PRBCs??   #Thrombocytosis likely 2/2 infection    -Plt 685 - continue to monitor    #DVT prophylaxis    -heparin   Injectable, SCDs    Labs: Hb/Hct:  10.2/31.7  -->,  8.9/28.1  -->                      Plts:  687  -->,  685  -->                 PTT/INR:  31.1/1.71  --->,  29.8/1.59  --->     T&S Expires: 6/21/23  Blood Consent- in chart     ID:  #Leukocytosis 2/2 sepsis     - s/p vancomycin, cefepime, and flagyl in the ED    - Continue with Zosyn and vanco     - Trend WBC   WBC- 47.97  --->>,  57.51  --->>  Temp trend- 24hrs T(F): 97 (06-18 @ 19:45), Max: 99.4 (06-18 @ 14:10)  Antibiotics (renally dosed)     -piperacillin/tazobactam IVPB.. 2.25 every 6 hours    -vancomycin  IVPB 1250 every 24 hours       []vanco through 6/21 AM   Cultures:     - Blood cultures (6/18): in lab     - PCN culture (6/18 from IR): in lab     - RLQ culture (6/18 from IR): in lab     - UA (6/19): positive, f/u culture    ENDO:  #Glucose monitoring    -No active issues     -A1c (5/7/23): 5.0    -FS Q6H while NPO   #Adrenal Insufficieny 2/2 to sepsis     -8mg dexamethasone given 6/18    -Random cortisol lvl: in lab     MSK:  #Activity    -Bedrest while intubated and on pressors     -PT/rehab c/s once extubated     LINES/DRAINS: PIV, [place] (6/18/23), Mena (6/18/23), OGT (6/18/23). TLC (6/18/23), Javier Drain (6/18/23), LT PCN exchanged (6/18/23)     ADVANCED DIRECTIVES:  Full Code    HCP/Emergency Contact- Tracey Adames: [phone]     INDICATION FOR SICU/SDU: Atrial fibrillation    DISPO: SICU Assessment & Plan  67M w/ PMH of HTN, grade IV hydronephrosis of L kidney s/p L PCN (placed 5/2023), stutter, hiatal hernia, iron deficiency anemia, H Pylori (untreated), and gastric mass (never biopsied) presents to the ED with at least 4 days of worsening weakness, abdominal distension, and no output from his PCN. Admitted with emphysematous pyelonephritis    (6/18): s/p LT PCN upsizing to 16Fr and 16Fr RLQ drain placement with IR       NEURO:  #H/o stutter (negative ischemic workup last admission)  #Pain control     -Fentanyl gtt   #Sedation    -Versed 1mg q3hr     RESP:  #Respiratory distress   - Originally placed on BiPaP; returned from IR intubated with 7.5 ETT on 6/18    - Vent: 450/20/40/8   - Daily AM ABG   - Daily AM CXR   - Daily SAT/SBT   #Pulmonary nodules / Mediastinal lymphadenopathy    -CT Chest: Right middle and upper lobe solid pulmonary nodules which are not well delineated due to respiratory motion but measure less than 6 mm. Mediastinal lymphadenopathy measuring up to 2.4 cm short axis.     -Recommend outpatient follow up with pulmonology   #Activity   -Bedrest while intubated and on pressors     CARDS:   #HTN    -Holding home amlodipine 2.5 qD while hypotensive   #Acute onset Afib w/ RVR with hypotension -- likely 2/2 sepsis    -(6/18) s/p lopressor pushes, 150cc amio bolus, amio drip d/c 6/18    -(6/19) s/p 250cc Dig x1     -Currently on Levo, wean as tolerated      -Continue IVF resuscitation     -Cards c/s: consider switching pressors to phenylephrine in setting of tachycardia; therapeutic AC once cleared, start cardizem/lopressor once off pressors; check TSH, TTE   Imaging    - EKG (6/18): afib w/ RVR to 172, L posterior fascicular block    - EKG (6/19): Afib w/ RVR to 138     - ECHO: pending  Labs    -Post-op Troponin: 0.14 -> 0.15 ->     GI/NUTR:  #Gastric mass vs gastrohepatic lymphadenopathy    -CT A/P:Previously noted lesser curvature mass measures 6.3 x 5.2 cm (previously 5.2 x 3.5 cm by my measurement), and may represent gastrohepatic lymphadenopathy.  #Hepatic hypodensities, suspicious for development for multiple focal abscesses    -CT A/P: New hepatic hypodensities measuring up to 2.6 cm, suspicious for development of multiple focal abscesses.  #Splenic Hypodensities, infarct vs phlegmon/abscess    -CT A/P:  Wedge-shaped region of hypodensity within the spleen may reflect splenic infarct in the correct clinical setting. Developing phlegmon/abscess cannot be excluded in the current clinical setting   #Diet    -NPO,     -OGT to LCWS (55cm at lip)   #GI prophylaxis    -Protonix  Bowel regimen:     -none at this time     -Last BM: 6/18 in the ED    /RENAL:   #Grade IV L hydronephrosis s/p L PCN (5/2023) -- now w/ ?feculent output  #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    -(6/18) s/p LT PCN upsizing to 16Fr and 16Fr RLQ drain placement with IR      -Mena placed in ED 6/18     -Monitor LT PCN and RLQ drain - flush q8 hrs     -Urology recs - plan for stat repeat CT A/P with PO and rectal contrast to r/o bowel perforation --> once more stable  #ROJAS (baseline Cr 1.0)    -Cr 2.9 -> 2.8 ->     -FeNa 1.1%, intrinsic  #Metabolic Acidosis - improving     -(6/19) ABG pH 7.14 -> 2amps bicarb, bicarb gtt @ 50cc -> pH 7.30   #Lactic Acidosis    -Lactic acid 6.4 -> 4.3 -> 3.7 -> 4.5 -> 3.8     Labs:          BUN/Cr- 88/2.9  -->,  90/2.8  -->          Electrolytes-Na 123 // K 5.3 // Mg 2.0 //  Phos 6.3 (06-18 @ 23:11)    HEME/ONC:   #H/o Fe Deficiency anemia  #Blood Product Repletions    -(6/18) s/p 1u FFP for elevated INR 1.71     -(6/19) s/p 1u PRBCs??   #Thrombocytosis likely 2/2 infection    -Plt 685 - continue to monitor    #DVT prophylaxis    -heparin   Injectable, SCDs    Labs: Hb/Hct:  10.2/31.7  -->,  8.9/28.1  -->                      Plts:  687  -->,  685  -->                 PTT/INR:  31.1/1.71  --->,  29.8/1.59  --->     T&S Expires: 6/21/23  Blood Consent- in chart     ID:  #Leukocytosis 2/2 sepsis     - s/p vancomycin, cefepime, and flagyl in the ED    - Continue with Zosyn and vanco     - Trend WBC   WBC- 47.97  --->>,  57.51  --->>  Temp trend- 24hrs T(F): 97 (06-18 @ 19:45), Max: 99.4 (06-18 @ 14:10)  Antibiotics (renally dosed)     -piperacillin/tazobactam IVPB.. 2.25 every 6 hours    -vancomycin  IVPB 1250 every 24 hours       []vanco through 6/21 AM   Cultures:     - Blood cultures (6/18): in lab     - PCN culture (6/18 from IR): in lab     - RLQ culture (6/18 from IR): in lab     - UA (6/19): positive, f/u culture    ENDO:  #Glucose monitoring    -No active issues     -A1c (5/7/23): 5.0    -FS Q6H while NPO   #Adrenal Insufficieny 2/2 to sepsis     -8mg dexamethasone given 6/18    -Random cortisol lvl: in lab     MSK:  #Activity    -Bedrest while intubated and on pressors     -PT/rehab c/s once extubated     LINES/DRAINS: PIV, Joleen (6/18/23), Mena (6/18/23), OGT (6/18/23). TLC (6/18/23), Javier Drain (6/18/23), LT PCN exchanged (6/18/23)     ADVANCED DIRECTIVES:  Full Code    HCP/Emergency Contact- Tracey Adames: [phone]     INDICATION FOR SICU/SDU: Atrial fibrillation    DISPO: SICU Assessment & Plan  67M w/ PMH of HTN, grade IV hydronephrosis of L kidney s/p L PCN (placed 5/2023), stutter, hiatal hernia, iron deficiency anemia, H Pylori (untreated), and gastric mass (never biopsied) presents to the ED with at least 4 days of worsening weakness, abdominal distension, and no output from his PCN. Admitted with emphysematous pyelonephritis    (6/18): s/p LT PCN upsizing to 16Fr and 16Fr RLQ drain placement with IR       NEURO:  #H/o stutter (negative ischemic workup last admission)  #Pain control     -Fentanyl gtt   #Sedation    -Versed 1mg q3hr     RESP:  #Respiratory distress   - Originally placed on BiPaP; returned from IR intubated with 7.5 ETT on 6/18    - Vent: 450/20/40/8   - Daily AM ABG   - Daily AM CXR   - Daily SAT/SBT   #Pulmonary nodules / Mediastinal lymphadenopathy    -CT Chest: Right middle and upper lobe solid pulmonary nodules which are not well delineated due to respiratory motion but measure less than 6 mm. Mediastinal lymphadenopathy measuring up to 2.4 cm short axis.     -Recommend outpatient follow up with pulmonology   #Activity   -Bedrest while intubated and on pressors     CARDS:   #HTN    -Holding home amlodipine 2.5 qD while hypotensive   #Acute onset Afib w/ RVR with hypotension -- likely 2/2 sepsis    -(6/18) s/p lopressor pushes, 150cc amio bolus, amio drip d/c 6/18    -(6/19) s/p 250cc Dig x1     -Currently on Levo, wean as tolerated      -Continue IVF resuscitation     -Cards c/s: consider switching pressors to phenylephrine in setting of tachycardia; therapeutic AC once cleared, start cardizem/lopressor once off pressors; check TSH, TTE   Imaging    - EKG (6/18): afib w/ RVR to 172, L posterior fascicular block    - EKG (6/19): Afib w/ RVR to 138     - ECHO: pending  Labs    -Post-op Troponin: 0.14 -> 0.15 ->     GI/NUTR:  #Gastric mass vs gastrohepatic lymphadenopathy    -CT A/P:Previously noted lesser curvature mass measures 6.3 x 5.2 cm (previously 5.2 x 3.5 cm by my measurement), and may represent gastrohepatic lymphadenopathy.  #Hepatic hypodensities, suspicious for development for multiple focal abscesses    -CT A/P: New hepatic hypodensities measuring up to 2.6 cm, suspicious for development of multiple focal abscesses.  #Splenic Hypodensities, infarct vs phlegmon/abscess    -CT A/P:  Wedge-shaped region of hypodensity within the spleen may reflect splenic infarct in the correct clinical setting. Developing phlegmon/abscess cannot be excluded in the current clinical setting   #Diet    -NPO,     -OGT to LCWS (55cm at lip)   #GI prophylaxis    -Protonix  Bowel regimen:     -none at this time     -Last BM: 6/18 in the ED    /RENAL:   #Grade IV L hydronephrosis s/p L PCN (5/2023) -- now w/ ?feculent output  #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    -(6/18) s/p LT PCN upsizing to 16Fr and 16Fr RLQ drain placement with IR      -Mena placed in ED 6/18     -Monitor LT PCN and RLQ drain - flush q8 hrs     -Urology recs - plan for stat repeat CT A/P with PO and rectal contrast to r/o bowel perforation --> once more stable  #ROJAS (baseline Cr 1.0)    -Cr 2.9 -> 2.8 ->     -FeNa 1.1%, intrinsic  #Metabolic Acidosis - improving     -(6/19) ABG pH 7.14 -> 2amps bicarb, bicarb gtt @ 50cc -> pH 7.30   #Lactic Acidosis    -Lactic acid 6.4 -> 4.3 -> 3.7 -> 4.5 -> 3.8     Labs:          BUN/Cr- 88/2.9  -->,  90/2.8  -->          Electrolytes-Na 123 // K 5.3 // Mg 2.0 //  Phos 6.3 (06-18 @ 23:11)    HEME/ONC:   #H/o Fe Deficiency anemia  #Blood Product Repletions    -(6/18) s/p 1u FFP for elevated INR 1.71     -(6/19) s/p 1u PRBCs??   #Thrombocytosis likely 2/2 infection    -Plt 685 - continue to monitor    #DVT prophylaxis    -heparin   Injectable, SCDs    Labs: Hb/Hct:  10.2/31.7  -->,  8.9/28.1  -->                      Plts:  687  -->,  685  -->                 PTT/INR:  31.1/1.71  --->,  29.8/1.59  --->     T&S Expires: 6/21/23  Blood Consent- in chart     ID:  #Leukocytosis 2/2 sepsis     - s/p vancomycin, cefepime, and flagyl in the ED    - Continue with Zosyn and vanco     - Trend WBC     - ID c/s   WBC- 47.97  --->>,  57.51  --->>  Temp trend- 24hrs T(F): 97 (06-18 @ 19:45), Max: 99.4 (06-18 @ 14:10)  Antibiotics (renally dosed)     -piperacillin/tazobactam IVPB.. 2.25 every 6 hours    -vancomycin  IVPB 1250 every 24 hours       []vanco through 6/21 AM   Cultures:     - Blood cultures (6/18): in lab     - PCN culture (6/18 from IR): in lab     - RLQ culture (6/18 from IR): in lab     - UA (6/19): positive, f/u culture    ENDO:  #Glucose monitoring    -No active issues     -A1c (5/7/23): 5.0    -FS Q6H while NPO   #Adrenal Insufficieny 2/2 to sepsis     -8mg dexamethasone given 6/18    -Random cortisol lvl: in lab     MSK:  #Activity    -Bedrest while intubated and on pressors     -PT/rehab c/s once extubated     LINES/DRAINS: PIV, Joleen (6/18/23), Mena (6/18/23), OGT (6/18/23). TLC (6/18/23), Jaiver Drain (6/18/23), LT PCN exchanged (6/18/23)     ADVANCED DIRECTIVES:  Full Code    HCP/Emergency Contact- Tracey Adames: [phone]     INDICATION FOR SICU/SDU: Atrial fibrillation    DISPO: SICU Assessment & Plan  67M w/ PMH of HTN, grade IV hydronephrosis of L kidney s/p L PCN (placed 5/2023), stutter, hiatal hernia, iron deficiency anemia, H Pylori (untreated), and gastric mass (never biopsied) presents to the ED with at least 4 days of worsening weakness, abdominal distension, and no output from his PCN. Admitted with emphysematous pyelonephritis    (6/18): s/p LT PCN upsizing to 16Fr and 16Fr RLQ drain placement with IR     NEURO:  #H/o stutter (negative ischemic workup last admission)  #Pain control     -Fentanyl gtt   #Sedation    -Versed 1mg q3hr     RESP:  #Respiratory distress   - Originally placed on BiPaP; returned from IR intubated with 7.5 ETT on 6/18    - Vent: 450/20/40/8   - Daily AM ABG   - Daily AM CXR   - Daily SAT/SBT   #Pulmonary nodules / Mediastinal lymphadenopathy    -CT Chest: Right middle and upper lobe solid pulmonary nodules which are not well delineated due to respiratory motion but measure less than 6 mm. Mediastinal lymphadenopathy measuring up to 2.4 cm short axis.     -Recommend outpatient follow up with pulmonology   #Activity   -Bedrest while intubated and on pressors     CARDS:   #HTN    -Holding home amlodipine 2.5 qD while hypotensive   #Acute onset Afib w/ RVR with hypotension -- likely 2/2 sepsis    -(6/18) s/p lopressor pushes, 150cc amio bolus, amio drip d/c 6/18    -(6/19) s/p 250cc Dig x1     -Currently on Levo, wean as tolerated      -Continue IVF resuscitation     -Cards c/s: consider switching pressors to phenylephrine in setting of tachycardia; therapeutic AC once cleared, start cardizem/lopressor once off pressors; check TSH, TTE   Imaging    - EKG (6/18): afib w/ RVR to 172, L posterior fascicular block    - EKG (6/19): Afib w/ RVR to 138     - ECHO: pending  Labs    -Post-op Troponin: 0.14 -> 0.15 ->     GI/NUTR:  #Gastric mass vs gastrohepatic lymphadenopathy    -CT A/P:Previously noted lesser curvature mass measures 6.3 x 5.2 cm (previously 5.2 x 3.5 cm by my measurement), and may represent gastrohepatic lymphadenopathy.  #Hepatic hypodensities, suspicious for development for multiple focal abscesses    -CT A/P: New hepatic hypodensities measuring up to 2.6 cm, suspicious for development of multiple focal abscesses.  #Splenic Hypodensities, infarct vs phlegmon/abscess    -CT A/P:  Wedge-shaped region of hypodensity within the spleen may reflect splenic infarct in the correct clinical setting. Developing phlegmon/abscess cannot be excluded in the current clinical setting   #Diet    -NPO,     -OGT to LCWS (55cm at lip)   #GI prophylaxis    -Protonix  Bowel regimen:     -none at this time     -Last BM: 6/18 in the ED    /RENAL:   #Grade IV L hydronephrosis s/p L PCN (5/2023) -- now w/ ?feculent output  #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    -(6/18) s/p LT PCN upsizing to 16Fr and 16Fr RLQ drain placement with IR      -Mena placed in ED 6/18     -Monitor LT PCN and RLQ drain - flush q8 hrs     -Urology recs - plan for stat repeat CT A/P with PO and rectal contrast to r/o bowel perforation --> once more stable  #ROJAS (baseline Cr 1.0)    -Cr 2.9 -> 2.8 ->     -FeNa 1.1%, intrinsic  #Metabolic Acidosis - improving     -(6/19) ABG pH 7.14 -> 2amps bicarb, bicarb gtt @ 50cc -> pH 7.30   #Lactic Acidosis    -Lactic acid 6.4 -> 4.3 -> 3.7 -> 4.5 -> 3.8     Labs:          BUN/Cr- 88/2.9  -->,  90/2.8  -->          Electrolytes-Na 123 // K 5.3 // Mg 2.0 //  Phos 6.3 (06-18 @ 23:11)    HEME/ONC:   #H/o Fe Deficiency anemia  #Blood Product Repletions    -(6/18) s/p 1u FFP for elevated INR 1.71   #Thrombocytosis likely 2/2 infection    -Plt 685 - continue to monitor    #DVT prophylaxis    -heparin   Injectable, SCDs    Labs: Hb/Hct:  10.2/31.7  -->,  8.9/28.1  -->                      Plts:  687  -->,  685  -->                 PTT/INR:  31.1/1.71  --->,  29.8/1.59  --->     T&S Expires: 6/21/23  Blood Consent- in chart     ID:  #Leukocytosis 2/2 sepsis     - s/p vancomycin, cefepime, and flagyl in the ED    - Continue with Zosyn and vanco     - Trend WBC     - ID c/s   WBC- 47.97  --->>,  57.51  --->>  Temp trend- 24hrs T(F): 97 (06-18 @ 19:45), Max: 99.4 (06-18 @ 14:10)  Antibiotics (renally dosed)     -piperacillin/tazobactam IVPB.. 2.25 every 6 hours    -vancomycin  IVPB 1250 every 24 hours       []vanco through 6/21 AM   Cultures:     - Blood cultures (6/18): in lab     - PCN culture (6/18 from IR): in lab     - RLQ culture (6/18 from IR): in lab     - UA (6/19): positive, f/u culture    ENDO:  #Glucose monitoring    -No active issues     -A1c (5/7/23): 5.0    -FS Q6H while NPO   #Adrenal Insufficieny 2/2 to sepsis     -8mg dexamethasone given 6/18    -Random cortisol lvl: in lab     MSK:  #Activity    -Bedrest while intubated and on pressors     -PT/rehab c/s once extubated     LINES/DRAINS: PIV, Joleen (6/18/23), Mena (6/18/23), OGT (6/18/23). TLC (6/18/23), Javier Drain (6/18/23), LT PCN exchanged (6/18/23)     ADVANCED DIRECTIVES:  Full Code    HCP/Emergency Contact- Tracey Adames: [phone]     INDICATION FOR SICU/SDU: Atrial fibrillation    DISPO: SICU   Assessment & Plan  67M w/ PMH of HTN, grade IV hydronephrosis of L kidney s/p L PCN (placed 5/2023), stutter, hiatal hernia, iron deficiency anemia, H Pylori (untreated), and gastric mass (never biopsied) presents to the ED with at least 4 days of worsening weakness, abdominal distension, and no output from his PCN. Admitted with emphysematous pyelonephritis    (6/18): s/p LT PCN upsizing to 16Fr and 16Fr RLQ drain placement with IR     NEURO:  #H/o stutter (negative ischemic workup last admission)  #Pain control     -Fentanyl gtt   #Sedation    -Versed 1mg q3hr     RESP:  #Respiratory distress   - Originally placed on BiPaP; returned from IR intubated with 7.5 ETT on 6/18    - Vent: 450/20/40/8   - Daily AM ABG   - Daily AM CXR   - Daily SAT/SBT   #Pulmonary nodules / Mediastinal lymphadenopathy    -CT Chest: Right middle and upper lobe solid pulmonary nodules which are not well delineated due to respiratory motion but measure less than 6 mm. Mediastinal lymphadenopathy measuring up to 2.4 cm short axis.     -Recommend outpatient follow up with pulmonology   #Activity   -Bedrest while intubated and on pressors     CARDS:   #HTN    -Holding home amlodipine 2.5 qD while hypotensive   #Acute onset Afib w/ RVR with hypotension -- likely 2/2 sepsis    -(6/18) s/p lopressor pushes, 150cc amio bolus, amio drip d/c 6/18    -(6/19) s/p 250cc Dig x1     -Currently on Levo, wean as tolerated      -Continue IVF resuscitation     -Cards c/s: consider switching pressors to phenylephrine in setting of tachycardia; therapeutic AC once cleared, start cardizem/lopressor once off pressors; check TSH, TTE   Imaging    - EKG (6/18): afib w/ RVR to 172, L posterior fascicular block    - EKG (6/19): Afib w/ RVR to 138     - ECHO: pending  Labs    -Post-op Troponin: 0.14 -> 0.15 ->     GI/NUTR:  #Gastric mass vs gastrohepatic lymphadenopathy    -CT A/P:Previously noted lesser curvature mass measures 6.3 x 5.2 cm (previously 5.2 x 3.5 cm by my measurement), and may represent gastrohepatic lymphadenopathy.  #Hepatic hypodensities, suspicious for development for multiple focal abscesses    -CT A/P: New hepatic hypodensities measuring up to 2.6 cm, suspicious for development of multiple focal abscesses.  #Splenic Hypodensities, infarct vs phlegmon/abscess    -CT A/P:  Wedge-shaped region of hypodensity within the spleen may reflect splenic infarct in the correct clinical setting. Developing phlegmon/abscess cannot be excluded in the current clinical setting   #Diet    -NPO,     -OGT to LCWS (55cm at lip)   #GI prophylaxis    -Protonix  Bowel regimen:     -none at this time     -Last BM: 6/18 in the ED    /RENAL:   #Grade IV L hydronephrosis s/p L PCN (5/2023) -- now w/ ?feculent output  #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    -(6/18) s/p LT PCN upsizing to 16Fr and 16Fr RLQ drain placement with IR      -Mena placed in ED 6/18     -Monitor LT PCN and RLQ drain - flush q8 hrs     -Urology recs - plan for stat repeat CT A/P with PO and rectal contrast to r/o bowel perforation --> once more stable  #ROJAS (baseline Cr 1.0)    -Cr 2.9 -> 2.8 ->     -FeNa 1.1%, intrinsic  #Metabolic Acidosis - improving     -(6/19) ABG pH 7.14 -> 2amps bicarb, bicarb gtt @ 50cc -> pH 7.30   #Lactic Acidosis    -Lactic acid 6.4 -> 4.3 -> 3.7 -> 4.5 -> 3.8     Labs:          BUN/Cr- 88/2.9  -->,  90/2.8  -->          Electrolytes-Na 123 // K 5.3 // Mg 2.0 //  Phos 6.3 (06-18 @ 23:11)    HEME/ONC:   #H/o Fe Deficiency anemia  #Blood Product Repletions    -(6/18) s/p 1u FFP for elevated INR 1.71   #Thrombocytosis likely 2/2 infection    -Plt 685 - continue to monitor    #DVT prophylaxis    -heparin   Injectable, SCDs    Labs: Hb/Hct:  10.2/31.7  -->,  8.9/28.1  -->                      Plts:  687  -->,  685  -->                 PTT/INR:  31.1/1.71  --->,  29.8/1.59  --->     T&S Expires: 6/21/23  Blood Consent- in chart     ID:  #Leukocytosis 2/2 sepsis     - s/p vancomycin, cefepime, and flagyl in the ED    - Continue with Zosyn and vanco     - Trend WBC     - ID c/s   WBC- 47.97  --->>,  57.51  --->>  Temp trend- 24hrs T(F): 97 (06-18 @ 19:45), Max: 99.4 (06-18 @ 14:10)  Antibiotics (renally dosed)     -piperacillin/tazobactam IVPB.. 2.25 every 6 hours    -vancomycin  IVPB 1250 every 24 hours       []vanco through 6/21 AM   Cultures:     - Blood cultures (6/18): in lab     - PCN culture (6/18 from IR): in lab     - RLQ culture (6/18 from IR): in lab     - UA (6/19): positive, f/u culture    ENDO:  #Glucose monitoring    -No active issues     -A1c (5/7/23): 5.0    -FS Q6H while NPO   #Adrenal Insufficieny 2/2 to sepsis     -8mg dexamethasone given 6/18    -Random cortisol lvl: in lab     MSK:  #Activity    -Bedrest while intubated and on pressors     -PT/rehab c/s once extubated     LINES/DRAINS: PIV, Joleen (6/18/23), Mena (6/18/23), OGT (6/18/23). TLC (6/18/23), Javier Drain (6/18/23), LT PCN exchanged (6/18/23)     ADVANCED DIRECTIVES:  Full Code    HCP/Emergency Contact- Tracey Adames: [phone]     INDICATION FOR SICU/SDU: Atrial fibrillation    DISPO: SICU   Assessment & Plan  67M w/ PMH of HTN, grade IV hydronephrosis of L kidney s/p L PCN (placed 5/2023), stutter, hiatal hernia, iron deficiency anemia, H Pylori (untreated), and gastric mass (never biopsied) presents to the ED with at least 4 days of worsening weakness, abdominal distension, and no output from his PCN. Admitted with emphysematous pyelonephritis    (6/18): s/p LT PCN upsizing to 16Fr and 16Fr RLQ drain placement with IR     NEURO:  #H/o stutter (negative ischemic workup last admission)  #Pain control     -Fentanyl 25q3 prn   #Sedation    -Precedex     RESP:  #Respiratory distress   - Originally placed on BiPaP; returned from IR intubated with 7.5 ETT on 6/18    - Vent: 450/20/40/6   - Daily AM ABG   - Daily AM CXR   - Daily SAT/SBT, PS 10/6 --> apneic   #Pulmonary nodules / Mediastinal lymphadenopathy    -CT Chest: Right middle and upper lobe solid pulmonary nodules which are not well delineated due to respiratory motion but measure less than 6 mm. Mediastinal lymphadenopathy measuring up to 2.4 cm short axis.     -Recommend outpatient follow up with pulmonology   #Activity   -Bedrest while intubated and on pressors     CARDS:   #Acute hypotension requiring pressors 2/2 septic shock     -Levophed     -NICOM 18%- Albumin 250cc q6x 4doses   #HTN    -Holding home amlodipine 2.5 qD while hypotensive   #Acute onset Afib w/ RVR with hypotension -- likely 2/2 sepsis    -IV Metoprolol 5mg q6 for rate control    -(6/18) s/p lopressor pushes, 150cc amio bolus, amio drip d/c 6/18    -(6/19) s/p 250cc Dig x1     -Currently on Levo, wean as tolerated      -Continue IVF resuscitation     -Cards c/s: consider switching pressors to phenylephrine in setting of tachycardia; therapeutic AC once cleared, start cardizem/lopressor once off pressors; check TSH, TTE  Imaging    - EKG (6/18): afib w/ RVR to 172, L posterior fascicular block    - EKG (6/19): Afib w/ RVR to 138     - ECHO: pending  Labs    -Post-op Troponin: 0.14 -> 0.15 ->     GI/NUTR:  #Gastric mass vs gastrohepatic lymphadenopathy    -CT A/P:Previously noted lesser curvature mass measures 6.3 x 5.2 cm (previously 5.2 x 3.5 cm by my measurement), and may represent gastrohepatic lymphadenopathy.  #Hepatic hypodensities, suspicious for development for multiple focal abscesses    -CT A/P: New hepatic hypodensities measuring up to 2.6 cm, suspicious for development of multiple focal abscesses.  #Splenic Hypodensities, infarct vs phlegmon/abscess    -CT A/P:  Wedge-shaped region of hypodensity within the spleen may reflect splenic infarct in the correct clinical setting. Developing phlegmon/abscess cannot be excluded in the current clinical setting   #Diet    -NPO    -OGT to LCWS (55cm at lip)   #GI prophylaxis    -Protonix  Bowel regimen:     -none at this time     -Last BM: 6/18 in the ED    /RENAL:   #Grade IV L hydronephrosis s/p L PCN (5/2023) -- now w/ ?feculent output  #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    -(6/18) s/p LT PCN upsizing to 16Fr and 16Fr RLQ drain placement with IR      -Mena placed in ED 6/18     -Monitor LT PCN and RLQ drain - flush q8 hrs     -Urology recs - CT A/P with PO and IV to r/o bowel perforation: pending   #ROJAS (baseline Cr 1.0)    -Cr 2.9 -> 2.8 -> 2.7    -FeNa 1.1%, intrinsic  #Metabolic Acidosis - improving     -(6/19) ABG pH 7.14 -> 2amps bicarb, bicarb gtt @ 50cc -> pH 7.30   #Lactic Acidosis    -Lactic acid 6.4 -> 4.3 -> 3.7 -> 4.5 -> 3.8     Labs:          BUN/Cr- 90/2.8  -->,  86/2.6  -->          Electrolytes-Na 124 // K 4.8 // Mg -- //  Phos -- (06-19 @ 09:00)    HEME/ONC:   #H/o Fe Deficiency anemia  #Blood Product Repletions    -(6/18) s/p 1u FFP for elevated INR 1.71   #Thrombocytosis likely 2/2 infection    -Plt 685 - continue to monitor    #DVT prophylaxis    -Hep gtt @1200u     Labs: Hb/Hct:  10.2/31.7  -->,  8.9/28.1  -->                      Plts:  687  -->,  685  -->                 PTT/INR:  31.1/1.71  --->,  29.8/1.59  --->     T&S Expires: 6/21/23  Blood Consent- in chart     ID:  #Leukocytosis 2/2 sepsis     - s/p vancomycin, cefepime, and flagyl in the ED    - Continue with Zosyn and vanco     - Trend WBC     - ID c/s   WBC- 47.97  --->>,  57.51  --->>  Temp trend- 24hrs T(F): 97 (06-18 @ 19:45), Max: 99.4 (06-18 @ 14:10)  Antibiotics (renally dosed)     -piperacillin/tazobactam IVPB.. 2.25 every 6 hours    -vancomycin  IVPB 1250 every 24 hours       []vanco through 6/21 AM   Cultures:     - Blood cultures (6/18): in lab     - PCN culture (6/18 from IR): in lab     - RLQ culture (6/18 from IR): in lab     - UA (6/19): positive, f/u culture    ENDO:  #Glucose monitoring    -No active issues     -A1c (5/7/23): 5.0    -FS Q6H while NPO   #Adrenal Insufficieny 2/2 to sepsis     -8mg dexamethasone given 6/18    -Solucortef 50q6 x3 days (end date 6/22)    -Random cortisol lvl: in lab     MSK:  #Activity    -Bedrest while intubated and on pressors     -PT/rehab c/s once extubated     LINES/DRAINS: PIV, Joleen (6/18/23), Mena (6/18/23), OGT (6/18/23). TLC (6/18/23), Javier Drain (6/18/23), LT PCN exchanged (6/18/23)     ADVANCED DIRECTIVES:  Full Code    HCP/Emergency Contact- Tracey Adames: [phone]     INDICATION FOR SICU/SDU: Atrial fibrillation    DISPO: SICU

## 2023-06-19 NOTE — CONSULT NOTE ADULT - SUBJECTIVE AND OBJECTIVE BOX
HPI:  67M w/ PMH of HTN, grade IV hydronephrosis of L kidney s/p L PCN (placed 2023), stutter, hiatal hernia, iron deficiency anemia, H Pylori (untreated), and gastric mass (never biopsied) presents to the ED with at least 4 days of worsening weakness, abdominal distension, and no output from his PCN. Pt and daughters bedside are only able to give limited information, but for the past few days, he has noticed no output from his PCN as well as increasing abdominal distension, pain, and anorexia. He reports that he has not felt normal for the past 10 days. His PCN used to put out serosanguinous fluid, but the output changed to feculent/brown liquid over the past day. In the ED, he was noted to be in new onset afib w/ RVR to 150+ as well as experiencing some difficulty breathing. He was started on BiPAP. Stat labs were notable for WBC 47, Cr 2.9, and lactate 6. CT A/P w/ IV contrast showed free intraperitoneal fluid and air from an unknown source as well as fluid and air in the L kidney.  (2023 18:34)      ---  cardio fellow additional notes:    Pt with above mentioned PMHx presented to the hospital for for weakness, abdominal pain. Found to have septic shock, MSOF, lactic acidosis from left hydronephrosis, pyelonephritis and possible rupture with pneumoperitoneum. Also noted to have AAA 4.6 cm, possible hepatic abscess and abdominal mass with lymphadenopathy. Pt was also in A Fib with RVR. Cardio consulted for A Fib with RVR.     PAST MEDICAL & SURGICAL HISTORY  HTN      FAMILY HISTORY:  FAMILY HISTORY:  unable to obtain    SOCIAL HISTORY:  Social History:  unable to obtain    ALLERGIES:  No Known Allergies      MEDICATIONS:  albumin human 25% IVPB 100 milliLiter(s) IV Intermittent every 6 hours  aMIOdarone Infusion 1 mG/Min (33.3 mL/Hr) IV Continuous <Continuous>  chlorhexidine 0.12% Liquid 15 milliLiter(s) Oral Mucosa every 12 hours  dexAMETHasone  Injectable 8 milliGRAM(s) IV Push once  DOBUTamine Infusion 5 MICROgram(s)/kG/Min (11.5 mL/Hr) IV Continuous <Continuous>  fentaNYL   Infusion. 0.5 MICROgram(s)/kG/Hr (3.83 mL/Hr) IV Continuous <Continuous>  lactated ringers Bolus 1000 milliLiter(s) IV Bolus once  lactated ringers Bolus 1000 milliLiter(s) IV Bolus every 2 hours  norepinephrine Infusion 0.05 MICROgram(s)/kG/Min (3.59 mL/Hr) IV Continuous <Continuous>  pantoprazole  Injectable 40 milliGRAM(s) IV Push daily  phenylephrine    Infusion 0.1 MICROgram(s)/kG/Min (2.87 mL/Hr) IV Continuous <Continuous>  piperacillin/tazobactam IVPB.. 2.25 Gram(s) IV Intermittent every 6 hours  sodium chloride 0.9%. 1000 milliLiter(s) (110 mL/Hr) IV Continuous <Continuous>  vancomycin  IVPB 1250 milliGRAM(s) IV Intermittent every 24 hours    PRN:  midazolam Injectable 1 milliGRAM(s) IV Push every 3 hours PRN      HOME MEDICATIONS:  Home Medications:      VITALS:   T(F): 97 ( @ 19:45), Max: 99.4 ( @ 14:10)  HR: 156 ( @ 22:45) (86 - 156)  BP: 97/62 ( @ 21:27) (89/62 - 160/101)  BP(mean): 75 ( @ 21:27) (71 - 75)  RR: 32 ( @ 21:27) (29 - 36)  SpO2: 100% ( @ 22:45) (98% - 100%)    I&O's Summary    2023 07:01  -  2023 00:03  --------------------------------------------------------  IN: 1017.2 mL / OUT: 0 mL / NET: 1017.2 mL        REVIEW OF SYSTEMS:  Unable to obtain    PHYSICAL EXAM:  General: Intubated, sedated  HEENT: ETT +  Cardio: Irregular, S1, S2   Pulm: B/L BS   Abdomen: Soft, non-tender, distended   Extremities: No edema b/l le  Neuro: sedated, not following commands    LABS:                        8.9    57.51 )-----------( 685      ( 2023 23:11 )             28.1         123<L>  |  86<L>  |  88<HH>  ----------------------------<  69<L>  5.3<H>   |  15<L>  |  2.9<H>    Ca    8.8      2023 14:18    TPro  4.9<L>  /  Alb  2.3<L>  /  TBili  0.7  /  DBili  x   /  AST  34  /  ALT  54<H>  /  AlkPhos  106      PT/INR - ( 2023 14:18 )   PT: 19.80 sec;   INR: 1.71 ratio         PTT - ( 2023 14:18 )  PTT:31.1 sec  Lactate, Blood: 6.4 mmol/L *HH* (23 @ 14:18)            RADIOLOGY:  -TTE: N/A  -CCTA: N/A  -STRESS TEST: N/A  -CATHETERIZATION: N/A  -OTHER:  EC Lead ECG:   Ventricular Rate 172 BPM    Atrial Rate 144 BPM    QRS Duration 90 ms    Q-T Interval 292 ms    QTC Calculation(Bazett) 493 ms    R Axis 114 degrees    T Axis 93 degrees    Diagnosis Line Atrial fibrillation with rapid ventricular response  Left posterior fascicular block  Nonspecific T wave abnormality  Abnormal ECG    Confirmed by Deon Velasquez ([id number]) on 2023 3:38:26 PM ( @ 13:59)      TELEMETRY EVENTS: A FIb with RVR HPI:  67M w/ PMH of HTN, grade IV hydronephrosis of L kidney s/p L PCN (placed 2023), stutter, hiatal hernia, iron deficiency anemia, H Pylori (untreated), and gastric mass (never biopsied) presents to the ED with at least 4 days of worsening weakness, abdominal distension, and no output from his PCN. Pt and daughters bedside are only able to give limited information, but for the past few days, he has noticed no output from his PCN as well as increasing abdominal distension, pain, and anorexia. He reports that he has not felt normal for the past 10 days. His PCN used to put out serosanguinous fluid, but the output changed to feculent/brown liquid over the past day. In the ED, he was noted to be in new onset afib w/ RVR to 150+ as well as experiencing some difficulty breathing. He was started on BiPAP. Stat labs were notable for WBC 47, Cr 2.9, and lactate 6. CT A/P w/ IV contrast showed free intraperitoneal fluid and air from an unknown source as well as fluid and air in the L kidney.  (2023 18:34)      ---  cardio fellow additional notes:    Pt with above mentioned PMHx presented to the hospital for weakness, abdominal pain. Found to have septic shock, MSOF, lactic acidosis from left hydronephrosis, pyelonephritis and possible rupture with pneumoperitoneum. Also noted to have AAA 4.6 cm, possible hepatic abscess and abdominal mass with lymphadenopathy. Pt was also in A Fib with RVR. Cardio consulted for A Fib with RVR.     PAST MEDICAL & SURGICAL HISTORY  HTN      FAMILY HISTORY:  FAMILY HISTORY:  unable to obtain    SOCIAL HISTORY:  Social History:  unable to obtain    ALLERGIES:  No Known Allergies      MEDICATIONS:  albumin human 25% IVPB 100 milliLiter(s) IV Intermittent every 6 hours  aMIOdarone Infusion 1 mG/Min (33.3 mL/Hr) IV Continuous <Continuous>  chlorhexidine 0.12% Liquid 15 milliLiter(s) Oral Mucosa every 12 hours  dexAMETHasone  Injectable 8 milliGRAM(s) IV Push once  DOBUTamine Infusion 5 MICROgram(s)/kG/Min (11.5 mL/Hr) IV Continuous <Continuous>  fentaNYL   Infusion. 0.5 MICROgram(s)/kG/Hr (3.83 mL/Hr) IV Continuous <Continuous>  lactated ringers Bolus 1000 milliLiter(s) IV Bolus once  lactated ringers Bolus 1000 milliLiter(s) IV Bolus every 2 hours  norepinephrine Infusion 0.05 MICROgram(s)/kG/Min (3.59 mL/Hr) IV Continuous <Continuous>  pantoprazole  Injectable 40 milliGRAM(s) IV Push daily  phenylephrine    Infusion 0.1 MICROgram(s)/kG/Min (2.87 mL/Hr) IV Continuous <Continuous>  piperacillin/tazobactam IVPB.. 2.25 Gram(s) IV Intermittent every 6 hours  sodium chloride 0.9%. 1000 milliLiter(s) (110 mL/Hr) IV Continuous <Continuous>  vancomycin  IVPB 1250 milliGRAM(s) IV Intermittent every 24 hours    PRN:  midazolam Injectable 1 milliGRAM(s) IV Push every 3 hours PRN      HOME MEDICATIONS:  Home Medications:      VITALS:   T(F): 97 ( @ 19:45), Max: 99.4 ( @ 14:10)  HR: 156 ( @ 22:45) (86 - 156)  BP: 97/62 ( @ 21:27) (89/62 - 160/101)  BP(mean): 75 ( @ 21:27) (71 - 75)  RR: 32 ( @ 21:27) (29 - 36)  SpO2: 100% ( @ 22:45) (98% - 100%)    I&O's Summary    2023 07:01  -  2023 00:03  --------------------------------------------------------  IN: 1017.2 mL / OUT: 0 mL / NET: 1017.2 mL        REVIEW OF SYSTEMS:  Unable to obtain    PHYSICAL EXAM:  General: Intubated, sedated  HEENT: ETT +  Cardio: Irregular, S1, S2   Pulm: B/L BS   Abdomen: Soft, non-tender, distended   Extremities: No edema b/l le  Neuro: sedated, not following commands    LABS:                        8.9    57.51 )-----------( 685      ( 2023 23:11 )             28.1         123<L>  |  86<L>  |  88<HH>  ----------------------------<  69<L>  5.3<H>   |  15<L>  |  2.9<H>    Ca    8.8      2023 14:18    TPro  4.9<L>  /  Alb  2.3<L>  /  TBili  0.7  /  DBili  x   /  AST  34  /  ALT  54<H>  /  AlkPhos  106      PT/INR - ( 2023 14:18 )   PT: 19.80 sec;   INR: 1.71 ratio         PTT - ( 2023 14:18 )  PTT:31.1 sec  Lactate, Blood: 6.4 mmol/L *HH* (23 @ 14:18)            RADIOLOGY:  -TTE: N/A  -CCTA: N/A  -STRESS TEST: N/A  -CATHETERIZATION: N/A  -OTHER:  EC Lead ECG:   Ventricular Rate 172 BPM    Atrial Rate 144 BPM    QRS Duration 90 ms    Q-T Interval 292 ms    QTC Calculation(Bazett) 493 ms    R Axis 114 degrees    T Axis 93 degrees    Diagnosis Line Atrial fibrillation with rapid ventricular response  Left posterior fascicular block  Nonspecific T wave abnormality  Abnormal ECG    Confirmed by Deon Velasquez ([id number]) on 2023 3:38:26 PM ( @ 13:59)      TELEMETRY EVENTS: A FIb with RVR

## 2023-06-19 NOTE — PROGRESS NOTE ADULT - SUBJECTIVE AND OBJECTIVE BOX
GENERAL SURGERY PROGRESS NOTE    Patient: CHRISTINE VILLAVICENCIO , 67y (51)Male   MRN: [id number]  Location: [address]  Visit: 23 Inpatient  Date: 23 @ 08:30      Procedure/Dx/Injuries: #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 s/p drain placement    Events of past 24 hours:     s/p drain by IR drain, tranferred to URO, rapid afib -> metoprolol, amiodarone, started on pressors, trending lactate, continue to bolus PRN, cards cx    PAST MEDICAL & SURGICAL HISTORY:      Vitals:   T(F): 99.8 (23 @ 05:00), Max: 99.8 (23 @ 05:00)  HR: 114 (23 @ 07:45)  BP: 124/58 (23 @ 06:00)  RR: 20 (23 @ 06:00)  SpO2: 100% (23 @ 07:45)  Mode: AC/ CMV (Assist Control/ Continuous Mandatory Ventilation), RR (machine): 20, TV (machine): 450, FiO2: 40, PEEP: 8, ITime: 1, MAP: 12, PIP: 21    Diet, NPO:   Except Medications     Special Instructions for Nursing:  Except Medications      Fluids: sodium chloride 0.9% Bolus:   250 milliLiter(s), IV Bolus, once, infuse over 10 Minute(s), Stop After 1 Doses  Special Instructions: Please RODERICK. Thank you  sodium chloride 0.9%.: Solution, 1000 milliLiter(s) infuse at 125 mL/Hr      I & O's:    23 @ 07:01  -  23 @ 07:00  --------------------------------------------------------  IN:    Amiodarone: 33.3 mL    FentaNYL: 22.4 mL    IV PiggyBack: 250 mL    IV PiggyBack: 200 mL    IV PiggyBack: 50 mL    IV PiggyBack: 400 mL    Lactated Ringers Bolus: 3000 mL    Norepinephrine: 17.2 mL    Norepinephrine: 1.2 mL    Plasma: 267 mL    Sodium Bicarbonate: 300 mL    sodium chloride 0.9%: 1000 mL  Total IN: 5541.1 mL    OUT:    DOBUTamine: 0 mL    Gastric Aspirate - Discarded (mL): 400 mL    Indwelling Catheter - Urethral (mL): 20 mL    Phenylephrine: 0 mL    PRBCs (Packed Red Blood Cells): 0 mL    VAC (Vacuum Assisted Closure) System (mL): 385 mL    VAC (Vacuum Assisted Closure) System (mL): 40 mL  Total OUT: 845 mL    Total NET: 4696.1 mL          PHYSICAL EXAM:  PHYSICAL EXAM:  GENERAL: vented w sedation  CHEST/LUNG: CTAB  HEART: Regular rate and rhythm.  ABDOMEN: Soft, non-distended, VENICE drain w +output  EXTREMITIES:  No clubbing, cyanosis, or edema  NEUROLOGY: A&O x 3, no focal deficits      MEDICATIONS  (STANDING):  chlorhexidine 0.12% Liquid 15 milliLiter(s) Oral Mucosa every 12 hours  chlorhexidine 4% Liquid 1 Application(s) Topical <User Schedule>  fentaNYL   Infusion. 0.5 MICROgram(s)/kG/Hr (3.83 mL/Hr) IV Continuous <Continuous>  heparin   Injectable 5000 Unit(s) SubCutaneous every 8 hours  norepinephrine Infusion 0.05 MICROgram(s)/kG/Min (3.59 mL/Hr) IV Continuous <Continuous>  pantoprazole  Injectable 40 milliGRAM(s) IV Push daily  piperacillin/tazobactam IVPB.. 2.25 Gram(s) IV Intermittent every 6 hours  sodium bicarbonate  Infusion 0.098 mEq/kG/Hr (50 mL/Hr) IV Continuous <Continuous>  sodium chloride 0.9% Bolus 250 milliLiter(s) IV Bolus once  sodium chloride 0.9%. 1000 milliLiter(s) (125 mL/Hr) IV Continuous <Continuous>  vancomycin  IVPB 1250 milliGRAM(s) IV Intermittent every 24 hours    MEDICATIONS  (PRN):  midazolam Injectable 1 milliGRAM(s) IV Push every 3 hours PRN agitation/sedation  sodium chloride 0.9% lock flush 10 milliLiter(s) IV Push every 1 hour PRN Pre/post blood products, medications, blood draw, and to maintain line patency      DVT PROPHYLAXIS: heparin   Injectable 5000 Unit(s) SubCutaneous every 8 hours    GI PROPHYLAXIS: pantoprazole  Injectable 40 milliGRAM(s) IV Push daily    ANTICOAGULATION:   ANTIBIOTICS:  piperacillin/tazobactam IVPB.. 2.25 Gram(s)  vancomycin  IVPB 1250 milliGRAM(s)            LAB/STUDIES:  Labs:  CAPILLARY BLOOD GLUCOSE      POCT Blood Glucose.: 126 mg/dL (2023 08:08)  POCT Blood Glucose.: 100 mg/dL (2023 00:24)                          8.9    57.51 )-----------( 685      ( 2023 23:11 )             28.1       Auto Neutrophil %: 93.6 % (23 @ 23:11)  Auto Immature Granulocyte %: 3.3 % (23 @ 23:11)  Auto Neutrophil %: 77.4 % (23 @ 14:18)  Band Neutrophils %: 15.7 % (23 @ 14:18)        123<L>  |  87<L>  |  90<HH>  ----------------------------<  73  5.3<H>   |  17  |  2.8<H>      Calcium, Total Serum: 8.2 mg/dL (23 @ 23:11)      LFTs:             4.9  | 0.7  | 34       ------------------[106     ( 2023 14:18 )  2.3  | x    | 54          Lipase:x      Amylase:x         Blood Gas Arterial, Lactate: 3.80 mmol/L (23 @ 04:14)  Blood Gas Venous - Lactate: 3.60 mmol/L (23 @ 02:18)  Blood Gas Arterial, Lactate: 4.50 mmol/L (23 @ 01:02)  Blood Gas Arterial, Lactate: 3.70 mmol/L (23 @ 23:07)  Blood Gas Arterial, Lactate: 4.30 mmol/L (23 @ 17:57)  Lactate, Blood: 6.4 mmol/L (23 @ 14:18)  Blood Gas Venous - Lactate: 6.20 mmol/L (23 @ 14:05)    ABG - ( 2023 04:14 )  pH: 7.30  /  pCO2: 37    /  pO2: 201   / HCO3: 18    / Base Excess: -7.6  /  SaO2: 99.2            ABG - ( 2023 01:02 )  pH: 7.14  /  pCO2: 49    /  pO2: 260   / HCO3: 17    / Base Excess: -11.7 /  SaO2: 100.0           ABG - ( 2023 23:07 )  pH: 7.21  /  pCO2: 43    /  pO2: 274   / HCO3: 17    / Base Excess: -10.1 /  SaO2: 100.0             Coags:     18.40  ----< 1.59    ( 2023 23:11 )     29.8        CARDIAC MARKERS ( 2023 04:24 )  x     / 0.15 ng/mL / x     / x     / x      CARDIAC MARKERS ( 2023 23:11 )  x     / 0.14 ng/mL / 107 U/L / x     / 2.2 ng/mL          Urinalysis Basic - ( 2023 02:50 )    Color: Lindsay / Appearance: Turbid / S.026 / pH: x  Gluc: x / Ketone: Negative  / Bili: Negative / Urobili: <2 mg/dL   Blood: x / Protein: 300 mg/dL / Nitrite: Negative   Leuk Esterase: Large / RBC: 8 /HPF / WBC >720 /HPF   Sq Epi: x / Non Sq Epi: x / Bacteria: Moderate        Culture - Abscess with Gram Stain (collected 2023 20:18)  Source: .Abscess left PCN aspirate  Gram Stain (2023 06:27):    No polymorphonuclear cells seen per low power field    Few Gram Variable Cocci seen per oil power field              IMAGING:      ACCESS/ DEVICES:  [ x] Peripheral IV  [x ] Central Venous Line	[ ] R	[ ] L	[ ] IJ	[ ] Fem	[ ] SC	Placed:   [ ] Arterial Line		[ ] R	[ ] L	[ ] Fem	[ ] Rad	[ ] Ax	Placed:   [ ] PICC:					[ ] Mediport  [x ] Urinary Catheter,  Date Placed:   [ ] Chest tube: [ ] Right, [ ] Left  [xx ] VENICE/Javier Drains

## 2023-06-19 NOTE — CONSULT NOTE ADULT - ASSESSMENT
Pt with above mentioned PMHx presented to the hospital for for weakness, abdominal pain. Found to have septic shock, MSOF, lactic acidosis from left emphysematous hydronephrosis, pyelonephritis and possible rupture with pneumoperitoneum. Also noted to have AAA 4.6 cm, possible hepatic abscesses, splenic infarct and abdominal mass with lymphadenopathy. Pt was also in A Fib with RVR. Cardio consulted for A Fib with RVR.     IMPRESSION  A Fib with RVR in the setting of septic shock on pressors  Emphysematous pyelonephritis, left hydro, rupture?  peritonitis, penumoperitoneum  splenic infarct?  hepatic abscesses?  lymphadenopathy  ROJAS  Lactic acidosis  Intubated on Vent  Ascending aortic aneurysm 4.6 cm    RECOMMENDATIONS  check TSH, TTE  consider switching pressors to phenylephrine in the setting of tachycardia  Therapeutic anticoagulation for A Fib when ok by surgery  can start cardizem/lopressor for rate control once off pressors and hemodynamically stable   Pt with above mentioned PMHx presented to the hospital for for weakness, abdominal pain. Found to have septic shock, MSOF, lactic acidosis from left emphysematous hydronephrosis, pyelonephritis and possible rupture with pneumoperitoneum. Also noted to have AAA 4.6 cm, possible hepatic abscesses, splenic infarct and abdominal mass with lymphadenopathy. Pt was also in A Fib with RVR. Cardio consulted for A Fib with RVR.     IMPRESSION  A Fib with RVR in the setting of septic shock on pressors  Elevated troponins likely type II  Emphysematous pyelonephritis, left hydro, rupture?  peritonitis, pneumoperitoneum  splenic infarct?  hepatic abscesses?  lymphadenopathy  ROJAS  Lactic acidosis  Intubated on Vent  Ascending aortic aneurysm 4.6 cm    RECOMMENDATIONS  check TSH, TTE  wean off pressors as tolerated  start aspirin when bleeding risk acceptable  start lipitor 40mg qhs, monitor LFTs  Therapeutic anticoagulation for A Fib, can start heparin drip when ok by surgery  can start lopressor for rate control once off pressors and hemodynamically stable  trend lactate, check VBG for central venous to assess cardiac output   Pt with above mentioned PMHx presented to the hospital for for weakness, abdominal pain. Found to have septic shock, MSOF, lactic acidosis from left emphysematous hydronephrosis, pyelonephritis and possible rupture with pneumoperitoneum. Also noted to have AAA 4.6 cm, possible hepatic abscesses, splenic infarct and abdominal mass with lymphadenopathy. Pt was also in A Fib with RVR. Cardio consulted for A Fib with RVR.     IMPRESSION  A Fib with RVR in the setting of septic shock on pressors  Elevated troponins likely type II  Emphysematous pyelonephritis, left hydro, rupture?  peritonitis, pneumoperitoneum  splenic infarct?  hepatic abscesses?  lymphadenopathy  ROJAS  Lactic acidosis  Intubated on Vent  Ascending aortic aneurysm 4.6 cm    RECOMMENDATIONS  check TSH, TTE  wean off pressors as tolerated, consider Josh for less beta adrenergic activity  start aspirin when bleeding risk acceptable  start lipitor 40mg qhs, monitor LFTs  Therapeutic anticoagulation for A Fib, can start heparin drip when ok by surgery  can start lopressor for rate control once off pressors and hemodynamically stable  trend lactate, check VBG for central venous to assess cardiac output   Pt with above mentioned PMHx presented to the hospital for for weakness, abdominal pain. Found to have septic shock, MSOF, lactic acidosis from left emphysematous hydronephrosis, pyelonephritis and possible rupture with pneumoperitoneum. Also noted to have AAA 4.6 cm, possible hepatic abscesses, splenic infarct and abdominal mass with lymphadenopathy. Pt was also in A Fib with RVR. Cardio consulted for A Fib with RVR.     IMPRESSION  A Fib with RVR in the setting of septic shock on pressors  Elevated troponin likely type II  Emphysematous pyelonephritis, left hydro, rupture?  peritonitis, pneumoperitoneum  splenic infarct?  hepatic abscesses?  lymphadenopathy  ROJAS  Lactic acidosis  Intubated on Vent  Ascending aortic aneurysm 4.6 cm    RECOMMENDATIONS  check TSH, TTE  wean off pressors as tolerated, consider Josh for less beta adrenergic activity  start aspirin when bleeding risk acceptable  Therapeutic anticoagulation for A Fib, can start heparin drip when ok by surgery  can start lopressor for rate control once off pressors and hemodynamically stable  trend lactate, check VBG from central venous to assess cardiac output

## 2023-06-19 NOTE — CHART NOTE - NSCHARTNOTEFT_GEN_A_CORE
Evaluated CT A/P which demonstrated slightly decreased size of left renal collection and significantly decreased size of anterior abdominal fluid collection. Examined patient at bedside and flushed left renal drain. Both drains are draining purulent fluid and appear in place. Updated patient's family, who was at bedside.

## 2023-06-19 NOTE — PROGRESS NOTE ADULT - NS ATTEND AMEND GEN_ALL_CORE FT
Pt seen and examined. Has drains bilaterally draining pus. Left drain plugged and recently irrigated by IR and functioning. Plan to provide supportive care. May need nephrectomy with Dr. Vásquez in the future.

## 2023-06-19 NOTE — CONSULT NOTE ADULT - ATTENDING COMMENTS
67M w/ PMH of HTN, grade IV hydronephrosis of L kidney s/p L PCN (placed 5/2023), stutter, hiatal hernia, iron deficiency anemia, H Pylori (untreated), and gastric mass (never biopsied) presents to the ED with at least 4 days of worsening weakness, abdominal distension, and no output from his PCN. Found to have septic shock, MSOF, lactic acidosis from left emphysematous hydronephrosis, pyelonephritis and possible rupture with pneumoperitoneum along with possible hepatic abscesses, splenic infarct and abdominal and mediastinal lymphadenopathies. he is s/p upsizing of left PCN tube by IR along with RLQ drain placed. Nephro eval for ROJAS and oliguria     Assessment and plan  ROJAS/ Hyponatremia/ severe left hydro s/p upsizing of left PCN catheter/ emphysematous pyelo with septic shock/ pneumoperitoneum/ ? kidney rupture/ ? hepatic abscesses/ splenic infarct  most likely ATN   on IVF switch to 1/2 NS with 75 meq bicarb at 100 cc per hour  strict I and O  Bumex 2 mg IV q12h tonight and follow BMP IP   dose meds to GFR < 30   on Zosyn and vanco / check vanco trough   follow cx   MV as per SICu team     will follow

## 2023-06-19 NOTE — PROGRESS NOTE ADULT - ASSESSMENT
67M w/ PMH of HTN, grade IV hydronephrosis of L kidney s/p L PCN (placed 5/2023), stutter, hiatal hernia, iron deficiency anemia, H Pylori (untreated), and gastric mass (never biopsied) presents to the ED with at least 4 days of worsening weakness, abdominal distension and no output from his PCN.     Plan:  - care as per SICU  - transferred to Urology  - Wean off pressors as tolerated  - Wean off vent as tolerated  - NPO, IVFs  - Broad spectrum antibiotics   - Monitor drain output  - Mena placed for strict Is & Os  - Cardiology: check TSH, TTE, wean off pressors as tolerated, consider Josh for less beta adrenergic activity, start aspirin when bleeding risk acceptable,  start lipitor 40mg qhs, monitor LFTs, Therapeutic anticoagulation for A Fib, can start heparin drip when ok by surgery, can start lopressor for rate control once off pressors and hemodynamically stable and trend lactate, check VBG for central venous to assess cardiac output    spectra [phone]

## 2023-06-19 NOTE — CONSULT NOTE ADULT - SUBJECTIVE AND OBJECTIVE BOX
NEPHROLOGY CONSULTATION NOTE    67M w/ PMH of HTN, grade IV hydronephrosis of L kidney s/p L PCN (placed 2023), stutter, hiatal hernia, iron deficiency anemia, H Pylori (untreated), and gastric mass (never biopsied) presents to the ED with at least 4 days of worsening weakness, abdominal distension, and no output from his PCN. Found to have septic shock, MSOF, lactic acidosis from left emphysematous hydronephrosis, pyelonephritis and possible rupture with pneumoperitoneum along with possible hepatic abscesses, splenic infarct and abdominal and mediastinal lymphadenopathies.   Patient seen at bedside, intubated and sedated  on levophed, BP maintained.       PAST MEDICAL & SURGICAL HISTORY:    Allergies:  No Known Allergies    Home Medications Reviewed  Hospital Medications:   MEDICATIONS  (STANDING):  albumin human  5% IVPB 250 milliLiter(s) IV Intermittent every 6 hours  chlorhexidine 0.12% Liquid 15 milliLiter(s) Oral Mucosa every 12 hours  chlorhexidine 2% Cloths 1 Application(s) Topical <User Schedule>  dexMEDEtomidine Infusion 0.2 MICROgram(s)/kG/Hr (3.83 mL/Hr) IV Continuous <Continuous>  diatrizoate meglumine/diatrizoate sodium. 30 milliLiter(s) Oral once  heparin  Infusion 1200 Unit(s)/Hr (12 mL/Hr) IV Continuous <Continuous>  hydrocortisone sodium succinate Injectable 50 milliGRAM(s) IV Push every 6 hours  metoprolol tartrate Injectable 5 milliGRAM(s) IV Push every 6 hours  norepinephrine Infusion 0.05 MICROgram(s)/kG/Min (3.59 mL/Hr) IV Continuous <Continuous>  pantoprazole  Injectable 40 milliGRAM(s) IV Push daily  piperacillin/tazobactam IVPB.. 2.25 Gram(s) IV Intermittent every 6 hours  sodium bicarbonate  Infusion 0.196 mEq/kG/Hr (100 mL/Hr) IV Continuous <Continuous>  vancomycin  IVPB 1250 milliGRAM(s) IV Intermittent every 24 hours    SOCIAL HISTORY:  Denies ETOH,Smoking,   FAMILY HISTORY:      REVIEW OF SYSTEMS:  CONSTITUTIONAL: patient intubated and sedated, unable to obtain comprehensive ROS.    VITALS:  Vital Signs Last 24 Hrs  T(C): 37.6 (2023 09:00), Max: 37.7 (2023 05:00)  T(F): 99.7 (2023 09:00), Max: 99.9 (2023 08:00)  HR: 126 (2023 09:15) (86 - 169)  BP: 127/67 (2023 09:00) (89/62 - 160/101)  BP(mean): 91 (2023 09:00) (71 - 91)  RR: 19 (2023 09:15) (8 - 36)  SpO2: 100% (2023 09:15) (70% - 100%)    Parameters below as of 2023 06:00  Patient On (Oxygen Delivery Method): ventilator         @ 07:01  -   @ 07:00  --------------------------------------------------------  IN: 5541.1 mL / OUT: 845 mL / NET: 4696.1 mL     @ 07:  -   @ 10:50  --------------------------------------------------------  IN: 825.2 mL / OUT: 95 mL / NET: 730.2 mL      Height (cm): 180.3 ( @ 21:27)  Weight (kg): 76.5 ( @ 21:27)  BMI (kg/m2): 23.5 ( @ 21:27)  BSA (m2): 1.96 ( @ 21:27)  PHYSICAL EXAM:  Constitutional: NAD  HEENT: anicteric sclera, oropharynx clear, MMM  Neck: No JVD  Respiratory: CTAB, no wheezes, rales or rhonchi  Cardiovascular: tachycardic  Gastrointestinal: BS+, RLQ drain in place  Extremities: b/l FERMIN 1+ pitting, mild  Neurological: sedated  : No CVA tenderness. folley in, left PCN  Skin: No rashes    LABS:  06-19    124<L>  |  89<L>  |  86<HH>  ----------------------------<  128<H>  4.8   |  20  |  2.6<H>    Ca    7.3<L>      2023 09:00  Phos  6.3       Mg     2.0         TPro  4.9<L>  /  Alb  2.3<L>  /  TBili  0.7  /  DBili      /  AST  34  /  ALT  54<H>  /  AlkPhos  106  -18    Creatinine Trend: 2.6 <--, 2.8 <--, 2.9 <--                        8.9    57.51 )-----------( 685      ( 2023 23:11 )             28.1     Urine Studies:  Urinalysis Basic - ( 2023 02:50 )    Color: Lindsay / Appearance: Turbid / S.026 / pH:   Gluc:  / Ketone: Negative  / Bili: Negative / Urobili: <2 mg/dL   Blood:  / Protein: 300 mg/dL / Nitrite: Negative   Leuk Esterase: Large / RBC: 8 /HPF / WBC >720 /HPF   Sq Epi:  / Non Sq Epi:  / Bacteria: Moderate      Sodium, Random Urine: 40.0 mmoL/L ( @ 02:50)  Creatinine, Random Urine: 80 mg/dL ( @ 02:50)                   NEPHROLOGY CONSULTATION NOTE    67M w/ PMH of HTN, grade IV hydronephrosis of L kidney s/p L PCN (placed 2023), stutter, hiatal hernia, iron deficiency anemia, H Pylori (untreated), and gastric mass (never biopsied) presents to the ED with at least 4 days of worsening weakness, abdominal distension, and no output from his PCN. Found to have septic shock, MSOF, lactic acidosis from left emphysematous hydronephrosis, pyelonephritis and possible rupture with pneumoperitoneum along with possible hepatic abscesses, splenic infarct and abdominal and mediastinal lymphadenopathies.   Patient seen at bedside, intubated and sedated  on levophed, BP maintained.       PAST MEDICAL & SURGICAL HISTORY:    Allergies:  No Known Allergies    Home Medications Reviewed  Hospital Medications:   MEDICATIONS  (STANDING):  albumin human  5% IVPB 250 milliLiter(s) IV Intermittent every 6 hours  dexMEDEtomidine Infusion 0.2 MICROgram(s)/kG/Hr (3.83 mL/Hr) IV Continuous <Continuous>  diatrizoate meglumine/diatrizoate sodium. 30 milliLiter(s) Oral once  heparin  Infusion 1200 Unit(s)/Hr (12 mL/Hr) IV Continuous <Continuous>  hydrocortisone sodium succinate Injectable 50 milliGRAM(s) IV Push every 6 hours  metoprolol tartrate Injectable 5 milliGRAM(s) IV Push every 6 hours  norepinephrine Infusion 0.05 MICROgram(s)/kG/Min (3.59 mL/Hr) IV Continuous <Continuous>  pantoprazole  Injectable 40 milliGRAM(s) IV Push daily  piperacillin/tazobactam IVPB.. 2.25 Gram(s) IV Intermittent every 6 hours  sodium bicarbonate  Infusion 0.196 mEq/kG/Hr (100 mL/Hr) IV Continuous <Continuous>  vancomycin  IVPB 1250 milliGRAM(s) IV Intermittent every 24 hours    SOCIAL HISTORY:  Denies ETOH,Smoking,   FAMILY HISTORY:      REVIEW OF SYSTEMS:  CONSTITUTIONAL: patient intubated and sedated, unable to obtain comprehensive ROS.    VITALS:  Vital Signs Last 24 Hrs  T(C): 37.6 (2023 09:00), Max: 37.7 (2023 05:00)  T(F): 99.7 (2023 09:00), Max: 99.9 (2023 08:00)  HR: 126 (2023 09:15) (86 - 169)  BP: 127/67 (2023 09:00) (89/62 - 160/101)  BP(mean): 91 (2023 09:00) (71 - 91)  RR: 19 (2023 09:15) (8 - 36)  SpO2: 100% (2023 09:15) (70% - 100%)    Parameters below as of 2023 06:00  Patient On (Oxygen Delivery Method): ventilator         @ 07:01  -   @ 07:00  --------------------------------------------------------  IN: 5541.1 mL / OUT: 845 mL / NET: 4696.1 mL     @ 07:01  -   @ 10:50  --------------------------------------------------------  IN: 825.2 mL / OUT: 95 mL / NET: 730.2 mL      Height (cm): 180.3 ( @ 21:27)  Weight (kg): 76.5 ( @ 21:27)  BMI (kg/m2): 23.5 ( @ 21:27)  BSA (m2): 1.96 ( @ 21:27)  PHYSICAL EXAM:  Constitutional: NAD  HEENT: anicteric sclera, oropharynx clear, MMM  Neck: No JVD  Respiratory: CTAB, no wheezes, rales or rhonchi  Cardiovascular: tachycardic  Gastrointestinal: BS+, RLQ drain in place  Extremities: b/l FERMIN 1+ pitting, mild  Neurological: sedated  : No CVA tenderness. folley in, left PCN  Skin: No rashes    LABS:      124<L>  |  89<L>  |  86<HH>  ----------------------------<  128<H>  4.8   |  20  |  2.6<H>    Ca    7.3<L>      2023 09:00  Phos  6.3       Mg     2.0         TPro  4.9<L>  /  Alb  2.3<L>  /  TBili  0.7  /  DBili      /  AST  34  /  ALT  54<H>  /  AlkPhos  106      Creatinine Trend: 2.6 <--, 2.8 <--, 2.9 <--                        8.9    57.51 )-----------( 685      ( 2023 23:11 )             28.1     Urine Studies:  Urinalysis Basic - ( 2023 02:50 )    Color: Lindsay / Appearance: Turbid / S.026 / pH:   Gluc:  / Ketone: Negative  / Bili: Negative / Urobili: <2 mg/dL   Blood:  / Protein: 300 mg/dL / Nitrite: Negative   Leuk Esterase: Large / RBC: 8 /HPF / WBC >720 /HPF   Sq Epi:  / Non Sq Epi:  / Bacteria: Moderate      Sodium, Random Urine: 40.0 mmoL/L ( @ 02:50)  Creatinine, Random Urine: 80 mg/dL ( @ 02:50)

## 2023-06-19 NOTE — CONSULT NOTE ADULT - ASSESSMENT
67M w/ PMH of HTN, grade IV hydronephrosis of L kidney s/p L PCN (placed 5/2023), stutter, hiatal hernia, iron deficiency anemia, H Pylori (untreated), and gastric mass (never biopsied) presents to the ED with at least 4 days of worsening weakness, abdominal distension, and no output from his PCN. Found to have septic shock, MSOF, lactic acidosis from left emphysematous hydronephrosis, pyelonephritis and possible rupture with pneumoperitoneum along with possible hepatic abscesses, splenic infarct and abdominal and mediastinal lymphadenopathies. he is s/p upsizing of left PCN tube by IR along with RLQ drain placed. Nephro eval for ROJAS and oliguria     Assessment and plan  ROJAS/ Hyponatremia/ severe left hydro s/p upsizing of left PCN catheter/ emphysematous pyelo with septic shock/ pneumoperitoneum/ ? kidney rupture/ ? hepatic abscesses/ splenic infarct  ROJAS likely ATN iso septic shock  oliguric  on levophed  s/p upsizing of left PCN tube by IR  balance grossly positive since admission, minimal UO  IVF bicarb infusion at 75 cc/hr   if no improvement in UO lasix 80 mg iv once  urine lytes noted, send spot urine for urine osm  plan for repeat CT a/p to r/o other possible causes of perforation and pneumoperitoneum   and IR following  abx  f/u cutlures   no acute indications for RRT as of now, may need it depending on UO and electrolytes  will follow  67M w/ PMH of HTN, grade IV hydronephrosis of L kidney s/p L PCN (placed 5/2023), stutter, hiatal hernia, iron deficiency anemia, H Pylori (untreated), and gastric mass (never biopsied) presents to the ED with at least 4 days of worsening weakness, abdominal distension, and no output from his PCN. Found to have septic shock, MSOF, lactic acidosis from left emphysematous hydronephrosis, pyelonephritis and possible rupture with pneumoperitoneum along with possible hepatic abscesses, splenic infarct and abdominal and mediastinal lymphadenopathies. he is s/p upsizing of left PCN tube by IR along with RLQ drain placed. Nephro eval for ROJAS and oliguria     Assessment and plan  ROJAS/ Hyponatremia/ severe left hydro s/p upsizing of left PCN catheter/ emphysematous pyelo with septic shock/ pneumoperitoneum/ ? kidney rupture/ ? hepatic abscesses/ splenic infarct  ROJAS likely ATN iso septic shock  oliguric  on levophed  s/p upsizing of left PCN tube by IR  balance grossly positive since admission, minimal UO  IVF bicarb infusion at 75 cc/hr   if no improvement in UO lasix 80 mg iv once  urine lytes noted, send spot urine for urine osm  plan for repeat CT a/p with IV contrast to r/o other possible causes of perforation and pneumoperitoneum  patient at high risk for MARGARITA and worsening kidney function with possibly needing RRT  if benefits outweighs risk proceed with CT    and IR following  abx  f/u cutlures   no acute indications for RRT as of now, may need it depending on UO and electrolytes  will follow

## 2023-06-20 NOTE — DIETITIAN INITIAL EVALUATION ADULT - PERTINENT MEDS FT
MEDICATIONS  (STANDING):  dexMEDEtomidine Infusion 0.2 MICROgram(s)/kG/Hr (3.83 mL/Hr) IV Continuous <Continuous>  hydrocortisone sodium succinate Injectable 50 milliGRAM(s) IV Push every 6 hours  iron sucrose IVPB 100 milliGRAM(s) IV Intermittent every 24 hours  metoprolol tartrate Injectable 5 milliGRAM(s) IV Push every 6 hours  midodrine 5 milliGRAM(s) Oral every 8 hours  norepinephrine Infusion 0.05 MICROgram(s)/kG/Min (3.59 mL/Hr) IV Continuous <Continuous>  pantoprazole  Injectable 40 milliGRAM(s) IV Push daily  piperacillin/tazobactam IVPB.. 2.25 Gram(s) IV Intermittent every 6 hours  sodium chloride 0.9%. 1000 milliLiter(s) (100 mL/Hr) IV Continuous <Continuous>  vancomycin  IVPB 1250 milliGRAM(s) IV Intermittent every 24 hours    MEDICATIONS  (PRN):  fentaNYL    Injectable 25 MICROGram(s) IV Push every 3 hours PRN Moderate Pain (4 - 6)  sodium chloride 0.9% lock flush 10 milliLiter(s) IV Push every 1 hour PRN Pre/post blood products, medications, blood draw, and to maintain line patency

## 2023-06-20 NOTE — PROGRESS NOTE ADULT - SUBJECTIVE AND OBJECTIVE BOX
CHRISTINE VILLAVICENCIO  MRN-[id number]  1955  67M    HPI:  67M w/ PMH of HTN, grade IV hydronephrosis of L kidney s/p L PCN (placed 5/2023), stutter, hiatal hernia, iron deficiency anemia, H Pylori (untreated), and gastric mass (never biopsied) presents to the ED with at least 4 days of worsening weakness, abdominal distension, and no output from his PCN. Pt and daughters bedside are only able to give limited information, but for the past few days, he has noticed no output from his PCN as well as increasing abdominal distension, pain, and anorexia. He reports that he has not felt normal for the past 10 days. His PCN used to put out serosanguinous fluid, but the output changed to feculent/brown liquid over the past day. In the ED, he was noted to be in new onset afib w/ RVR to 150+ as well as experiencing some difficulty breathing. He was started on BiPAP. Stat labs were notable for WBC 47, Cr 2.9, and lactate 6. CT A/P w/ IV contrast showed free intraperitoneal fluid and air from an unknown source as well as fluid and air in the L kidney.     SICU was consulted for hemodynamic monitoring 2/2 intraabdominal sepsis. Patient went with IR for Left PCN upsized to 16Fr and RLQ drain placement.   FIndings: Left PCN upsized to 16F and 1200cc of very viscous tan colored fluid was aspirated. RLQ 16F drain was placed and 1200cc of tan colored fluid was aspirated (less viscous). A sample from each location was sent for culture.     24 hour Events:   6/19  NIGHT  - PM rounds: Vent: 450/20/40/6; Precedex @ 0.5, Bicarb @ 100, Levo 0.07, hep gtt @ 12, RLQ drain with serous output. LT PCN with purulent output (flushed). HR high 90s    - L VENICE drain connected to LCWS @ 50, to be flushed with 10cc q6 (Provider to RN)  - 8pm: Temp 101, IV tylenol   - PTT 35.6 - Hep gtt inc to 15, PTT @ 3am   - Lac 2.2 -> 1.9  - 1g Ca  - d/c bicarb gtt,     DAY    - NICOM 18%-  - Albumin 250cc q6x 4doses    - ID c/s   - Nephrology c/s    - DC versed, begin precedex for sedation, d/c fent gtt and start PRN    - IV Lopressor 5mg q6 for rate control   - RLQ drain Cr 2.6   - Echo - EF 55-60%, mild AS, mild LAE, mild MR/TR, trivial pericardial effusion   - CT PO/IV abd/Pelvis    - Urology contacted --> Hep gtt ok for AC, PTT @1600   - Solucortef started    - decr Peep 6, PS 10/6, apneic    - d/c maintenance NS --> switched bicarb infusion to 1/2 NS w/ 75 mEq NaHCO3 at 100 cc/hr  - abscess cx stain --> few gm +cocci, numerous gm +cocci with numerous polymorphonuclear leukocytes  - hgb 8.9> 7.2>7.1, 1 prbc  -CT AP: Status post left nephrostomy tube placement. Left enlarged kidney with severe hydronephrosis/collection has decreased, now measuring 16 x 12 cm from 20 x 14 cm        REVIEW OF SYSTEMS:  [X] A ten-point review of systems was otherwise negative except as noted above.  [  ] Due to altered mental status/intubation, subjective information was not attained from the patient. History was obtained, to the extent possible, from review of the chart and collateral sources of information.  ----------------------------------------------------------------------------------------------------------------------

## 2023-06-20 NOTE — PROGRESS NOTE ADULT - NSPROGADDITIONALINFOA_GEN_ALL_CORE
Attending Attestation:   67yoM with sepsis secondary to renal abscess and ?perforated bowel. He is s/p placement of a 16F drain in the RLQ yealding 1200cc of purulent fluid and exchange/upsize of left nephrostomy tube to 16F yealding 1200cc of thick purulent fluid. Preliminary gram stain from each is not identical.     Patient seen and examined at bedside. There was minimal output from the left nephrostomy, the tube was flushed/aspirated several times, tubing was changed, an additional 240cc were removed on top of the 680 overnight, however no more was able to be removed. The catheter was left to suction and the nurse was left with instructions to flush every 8 hours.     - The left nephrostomy tube, despite its size, is not effectively draining the fluid and therefore not providing adequate source control. Recommend urology eval for nephrectomy.    - The RLQ drain is still draining however unlikely going to be able to adequately provide source control as the collection is not contained.   - d/w team

## 2023-06-20 NOTE — CONSULT NOTE ADULT - SUBJECTIVE AND OBJECTIVE BOX
CHRISTINE VILLAVICENCIO  67y, Male  Allergy: No Known Allergies      CHIEF COMPLAINT:   pneumoperitoneum, emphysematous pyelonephritis (2023 01:47)      LOS  2d    HPI  HPI:  67M w/ PMH of HTN, grade IV hydronephrosis of L kidney s/p L PCN (placed 2023), stutter, hiatal hernia, iron deficiency anemia, H Pylori (untreated), and gastric mass (never biopsied) presents to the ED with at least 4 days of worsening weakness, abdominal distension, and no output from his PCN. Pt and daughters bedside are only able to give limited information, but for the past few days, he has noticed no output from his PCN as well as increasing abdominal distension, pain, and anorexia. He reports that he has not felt normal for the past 10 days. His PCN used to put out serosanguinous fluid, but the output changed to feculent/brown liquid over the past day. In the ED, he was noted to be in new onset afib w/ RVR to 150+ as well as experiencing some difficulty breathing. He was started on BiPAP. Stat labs were notable for WBC 47, Cr 2.9, and lactate 6. CT A/P w/ IV contrast showed free intraperitoneal fluid and air from an unknown source as well as fluid and air in the L kidney.  (2023 18:34)      INFECTIOUS DISEASE HISTORY:  ID consulted for     Currently ordered for:  piperacillin/tazobactam IVPB.. 2.25 Gram(s) IV Intermittent every 6 hours  vancomycin  IVPB 1250 milliGRAM(s) IV Intermittent every 24 hours      PMH  PAST MEDICAL & SURGICAL HISTORY:      FAMILY HISTORY      SOCIAL HISTORY  Social History:        ROS  unable to obtain history secondary to patient's mental status and/or sedation     VITALS:  T(F): 99, Max: 101 (23 @ 20:00)  HR: 111  BP: 108/74  RR: 18Vital Signs Last 24 Hrs  T(C): 37.2 (2023 10:00), Max: 38.3 (2023 20:00)  T(F): 99 (2023 10:00), Max: 101 (2023 20:00)  HR: 111 (2023 11:00) (87 - 115)  BP: 108/74 (2023 20:30) (95/69 - 126/77)  BP(mean): 87 (2023 20:30) (77 - 96)  RR: 18 (2023 11:00) (14 - 24)  SpO2: 100% (2023 11:00) (99% - 100%)    Parameters below as of 2023 11:00  Patient On (Oxygen Delivery Method): ventilator    O2 Concentration (%): 30    PHYSICAL EXAM:  General: intubated  HEENT: NCAT  CV: RRR  Lungs: symmetric chest expansion, decreased BS at bases  Abd: Soft, bilateral VENICE, L drainage copious foul smelling purulence   Skin: no rash  Neuro: sedated  Lines: no phlebitis     TESTS & MEASUREMENTS:                        8.3    33.55 )-----------( 316      ( 2023 20:01 )             25.2     06-19    124<L>  |  89<L>  |  88<HH>  ----------------------------<  94  5.2<H>   |  18  |  2.9<H>    Ca    7.2<L>      2023 20:01  Phos  5.6     06-19  Mg     1.9         TPro  4.2<L>  /  Alb  2.5<L>  /  TBili  0.9  /  DBili  0.7<H>  /  AST  57<H>  /  ALT  42<H>  /  AlkPhos  83  -19      LIVER FUNCTIONS - ( 2023 10:30 )  Alb: 2.5 g/dL / Pro: 4.2 g/dL / ALK PHOS: 83 U/L / ALT: 42 U/L / AST: 57 U/L / GGT: x           Urinalysis Basic - ( 2023 02:50 )    Color: Lindsay / Appearance: Turbid / S.026 / pH: x  Gluc: x / Ketone: Negative  / Bili: Negative / Urobili: <2 mg/dL   Blood: x / Protein: 300 mg/dL / Nitrite: Negative   Leuk Esterase: Large / RBC: 8 /HPF / WBC >720 /HPF   Sq Epi: x / Non Sq Epi: x / Bacteria: Moderate        Culture - Urine (collected 23 @ 02:50)  Source: Clean Catch Clean Catch (Midstream)  Final Report (23 @ 10:18):    No growth    Culture - Abscess with Gram Stain (collected 23 @ 23:08)  Source: .Abscess right lower quadrant (intrabdominal)  Gram Stain (23 @ 11:56):    Numerous polymorphonuclear leukocytes per low power field    Numerous Gram positive cocci in pairs per oil power field  Preliminary Report (23 @ 09:10):    No growth    Culture - Abscess with Gram Stain (collected 23 @ 23:08)  Source: .Abscess left kidney  Gram Stain (23 @ 11:58):    Rare polymorphonuclear leukocytes per low power field    Few Gram Positive Cocci per oil power field  Preliminary Report (23 @ 09:03):    No growth    Culture - Abscess with Gram Stain (collected 23 @ 20:18)  Source: .Abscess left PCN aspirate  Gram Stain (23 @ 06:27):    No polymorphonuclear cells seen per low power field    Few Gram Variable Cocci seen per oil power field    Culture - Blood (collected 23 @ 14:18)  Source: .Blood Blood-Peripheral  Preliminary Report (23 @ 02:02):    No growth to date.    Culture - Blood (collected 23 @ 14:18)  Source: .Blood Blood-Peripheral  Preliminary Report (23 @ 02:02):    No growth to date.    Culture - Urine (collected 23 @ 22:24)  Source: .Urine Nephrostomy - Left  Final Report (05-10-23 @ 08:27):    No growth    Culture - Blood (collected 23 @ 11:27)  Source: .Blood None  Final Report (23 @ 18:00):    No Growth Final    Culture - Blood (collected 23 @ 11:27)  Source: .Blood None  Final Report (23 @ 18:00):    No Growth Final    Culture - Urine (collected 23 @ 22:09)  Source: Clean Catch Clean Catch (Midstream)  Final Report (23 @ 08:05):    <10,000 CFU/mL Normal Urogenital Gabbie        Blood Gas Venous - Lactate: 3.60 mmol/L (23 @ 02:18)  Lactate, Blood: 6.4 mmol/L (23 @ 14:18)  Blood Gas Venous - Lactate: 6.20 mmol/L (23 @ 14:05)      INFECTIOUS DISEASES TESTING      INFLAMMATORY MARKERS      RADIOLOGY & ADDITIONAL TESTS:  I have personally reviewed the last Chest xray  CXR  Xray Chest 1 View- PORTABLE-Urgent:   ACC: [id number] EXAM:  XR CHEST PORTABLE URGENT 1V   ORDERED BY: CALEB AMIN     PROCEDURE DATE:  2023          INTERPRETATION:  Clinical History/Reason for Exam:  post procedure    Technique:  Frontal view the chest .    Comparison: Chestx-ray 2023.    Findings:    Support devices:  Satisfactory position    Cardiac/mediastinum/hilum: Stable    Lung parenchyma/ Pleura: Bilateral basilar opacities. No pneumothorax      Skeleton/soft tissues: Stable degenerative changes      Impression:    Bilateral basilar opacities. No pneumothorax    --- End of Report ---            ASA BOWERS MD; Attending Radiologist  This document has been electronically signed. 2023 10:04AM (23 @ 23:49)      CT  CT Abdomen and Pelvis w/ Oral Cont and w/ IV Cont:   ACC: [id number] EXAM:  CT ABDOMEN AND PELVIS OC IC   ORDERED BY: BALJIT JESUS     PROCEDURE DATE:  2023          INTERPRETATION:  CLINICAL STATEMENT: Pneumoperitoneum and emphysematous   pyelonephritis status post drain placement.    TECHNIQUE: Contiguous axial CT images were obtained from the lower chest   to the pubic symphysis with intravenous contrast. Oral contrast was   administered. Reformatted images in the coronal and sagittal planes were   acquired.    COMPARISON: CT abdomen and pelvis 2023.    FINDINGS:    Limited evaluation from streak artifact due to contact with the gantry   and arm positioning.    LOWER CHEST: Bilateral moderate pleural effusions, right increased, with   adjacent subsegmental atelectasis.    HEPATOBILIARY: Unchanged.    SPLEEN: Unchanged.    PANCREAS: Unchanged.    ADRENAL GLANDS: Unchanged.    KIDNEYS: Status post left nephrostomy tube placement. Left enlarged   kidney with severe hydronephrosis/collection has decreased, now measuring   16 x 12 cm from 20 x 14 cm. Again seen gas throughout the left kidney,   likely postprocedural. Unchanged right large renal cyst. No   hydronephrosis.    ABDOMINOPELVIC NODES: Unchanged..    PELVIC ORGANS: Unchanged.    PERITONEUM/MESENTERY/BOWEL: Peritoneal catheter terminates in the right   lower quadrant. Pneumoperitoneum and free fluid is mildly decreased   unchanged since prior. Oral contrast is seen in the terminal ileum. No   contrast extravasation is visualized. Mild abdominopelvic ascites. No   bowel obstruction.    BONES/SOFT TISSUES: Anasarca. Degenerative osseous changes.    VASCULAR: Atherosclerotic calcifications.        IMPRESSION:    Since one day earlier,    No extraluminal contrast. Oral contrast was last seen in the terminal   ileum.    Status post left nephrostomy tube placement. Left enlarged kidney with   severe hydronephrosis/collection has decreased, now measuring 16 x 12 cm   from 20 x 14 cm    --- End of Report ---          NEVIN MONTES MD; Resident Radiologist  This document has been electronically signed.  SOFIA QUINN MD; Attending Radiologist  This document has been electronically signed. 2023  5:07PM (23 @ 15:58)      CARDIOLOGY TESTING  12 Lead ECG:   Ventricular Rate 123 BPM    Atrial Rate 150 BPM    QRS Duration 84 ms    Q-T Interval 368 ms    QTC Calculation(Bazett) 526 ms    R Axis 30 degrees    T Axis 54 degrees    Diagnosis Line Atrial fibrillation with rapid ventricular response  Low voltage QRS  Septal infarct , age undetermined  Abnormal ECG    Confirmed by Deon Velasquez ([id number]) on 2023 10:52:21 AM (23 @ 07:45)  12 Lead ECG:   Ventricular Rate 138 BPM    QRS Duration 108 ms    Q-T Interval 314 ms    QTC Calculation(Bazett) 475 ms    R Axis 39 degrees    T Axis 258 degrees    Diagnosis Line Atrial fibrillation with rapid ventricular response  Low voltage QRS  Nonspecific ST and T wave abnormality  Abnormal ECG    Confirmed by ROULA WISEMAN MD ([id number]) on 2023 7:36:42 AM (23 @ 01:59)      MEDICATIONS  chlorhexidine 0.12% Liquid 15 Oral Mucosa every 12 hours  chlorhexidine 2% Cloths 1 Topical <User Schedule>  dexMEDEtomidine Infusion 0.2 IV Continuous <Continuous>  heparin  Infusion 1200 IV Continuous <Continuous>  hydrocortisone sodium succinate Injectable 50 IV Push every 6 hours  iron sucrose IVPB 100 IV Intermittent every 24 hours  metoprolol tartrate Injectable 5 IV Push every 6 hours  midodrine 5 Oral every 8 hours  norepinephrine Infusion 0.05 IV Continuous <Continuous>  pantoprazole  Injectable 40 IV Push daily  piperacillin/tazobactam IVPB.. 2.25 IV Intermittent every 6 hours  sodium chloride 0.9%. 1000 IV Continuous <Continuous>  vancomycin  IVPB 1250 IV Intermittent every 24 hours        ANTIBIOTICS:  piperacillin/tazobactam IVPB.. 2.25 Gram(s) IV Intermittent every 6 hours  vancomycin  IVPB 1250 milliGRAM(s) IV Intermittent every 24 hours      ALLERGIES:  No Known Allergies

## 2023-06-20 NOTE — PROGRESS NOTE ADULT - SUBJECTIVE AND OBJECTIVE BOX
GENERAL SURGERY PROGRESS NOTE     SAUD CHRISTINE  [address] [place] [address] day :2d    POD:  Procedure: Insertion, arterial line, percutaneous    Central venous catheter placement with ultrasound guidance      Surgical Attending: Miguel Bryan  Overnight events:    T(F): 100 (23 @ 23:00), Max: 101 (23 @ 20:00)  HR: 106 (23 @ 23:30) (99 - 134)  BP: 108/74 (23 @ 20:30) (95/69 - 127/67)  ABP: 132/69 (23 @ 23:30) (88/54 - 135/69)  ABP(mean): 89 (23 @ 23:30) (66 - 100)  RR: 20 (23 @ 23:30) (8 - 24)  SpO2: 100% (23 @ 23:30) (100% - 100%)    IN'S / OUT's:    23 @ 07:01  -  23 @ 07:00  --------------------------------------------------------  IN:    Amiodarone: 33.3 mL    FentaNYL: 22.4 mL    IV PiggyBack: 250 mL    IV PiggyBack: 200 mL    IV PiggyBack: 50 mL    IV PiggyBack: 400 mL    Lactated Ringers Bolus: 3000 mL    Norepinephrine: 17.2 mL    Norepinephrine: 1.2 mL    Plasma: 267 mL    Sodium Bicarbonate: 300 mL    sodium chloride 0.9%: 1000 mL  Total IN: 5541.1 mL    OUT:    DOBUTamine: 0 mL    Gastric Aspirate - Discarded (mL): 400 mL    Indwelling Catheter - Urethral (mL): 20 mL    Phenylephrine: 0 mL    PRBCs (Packed Red Blood Cells): 0 mL    VAC (Vacuum Assisted Closure) System (mL): 385 mL    VAC (Vacuum Assisted Closure) System (mL): 40 mL  Total OUT: 845 mL    Total NET: 4696.1 mL      23 @ 07:01  -  23 @ 01:49  --------------------------------------------------------  IN:    Dexmedetomidine: 134.1 mL    FentaNYL: 7.2 mL    Heparin: 165 mL    IV PiggyBack: 250 mL    IV PiggyBack: 100 mL    IV PiggyBack: 200 mL    IV PiggyBack: 100 mL    Lactated Ringers: 100 mL    Miscellaneous Tube Feedin mL    Norepinephrine: 135.3 mL    PRBCs (Packed Red Blood Cells): 321 mL    Sodium Bicarbonate: 150 mL    Sodium Bicarbonate: 300 mL    sodium chloride 0.45% w/ Additives: 900 mL    sodium chloride 0.9%: 500 mL    Sodium Chloride 0.9% Bolus: 250 mL  Total IN: 3672.6 mL    OUT:    Gastric Aspirate - Discarded (mL): 250 mL    Indwelling Catheter - Urethral (mL): 175 mL    VAC (Vacuum Assisted Closure) System (mL): 80 mL    VAC (Vacuum Assisted Closure) System (mL): 420 mL  Total OUT: 925 mL    Total NET: 2747.6 mL          PHYSICAL EXAM:  GENERAL: NAD, well-appearing  CHEST/LUNG: Clear to auscultation bilaterally  HEART: Regular rate and rhythm  ABDOMEN: Soft, Nontender, Nondistended;   EXTREMITIES:  No clubbing, cyanosis, or edema      LABS  Labs:  CAPILLARY BLOOD GLUCOSE      POCT Blood Glucose.: 107 mg/dL (2023 23:39)  POCT Blood Glucose.: 156 mg/dL (2023 13:47)  POCT Blood Glucose.: 126 mg/dL (2023 08:08)                          8.3    33.55 )-----------( 316      ( 2023 20:01 )             25.2       Auto Neutrophil %: 93.9 % (23 @ 20:01)  Auto Immature Granulocyte %: 3.2 % (23 @ 20:01)  Auto Neutrophil %: 94.8 % (23 @ 11:20)  Band Neutrophils %: 1.7 % (23 @ 11:20)        124<L>  |  89<L>  |  88<HH>  ----------------------------<  94  5.2<H>   |  18  |  2.9<H>      Magnesium, Serum: 1.9 mg/dL (23 @ 20:01)      LFTs:             4.2  | 0.9  | 57       ------------------[83      ( 2023 10:30 )  2.5  | 0.7  | 42          Lipase:x      Amylase:x         Blood Gas Arterial, Lactate: 1.90 mmol/L (23 @ 21:45)  Blood Gas Arterial, Lactate: 2.20 mmol/L (23 @ 11:47)  Blood Gas Arterial, Lactate: 3.80 mmol/L (23 @ 04:14)  Blood Gas Venous - Lactate: 3.60 mmol/L (23 @ 02:18)  Blood Gas Arterial, Lactate: 4.50 mmol/L (23 @ 01:02)  Blood Gas Arterial, Lactate: 3.70 mmol/L (23 @ 23:07)  Blood Gas Arterial, Lactate: 4.30 mmol/L (23 @ 17:57)  Lactate, Blood: 6.4 mmol/L (23 @ 14:18)  Blood Gas Venous - Lactate: 6.20 mmol/L (23 @ 14:05)    ABG - ( 2023 21:45 )  pH: 7.38  /  pCO2: 33    /  pO2: 182   / HCO3: 20    / Base Excess: -5.0  /  SaO2: 100.0           ABG - ( 2023 11:47 )  pH: 7.34  /  pCO2: 38    /  pO2: 166   / HCO3: 20    / Base Excess: -4.8  /  SaO2: 99.4            ABG - ( 2023 04:14 )  pH: 7.30  /  pCO2: 37    /  pO2: 201   / HCO3: 18    / Base Excess: -7.6  /  SaO2: 99.2              Coags:     18.60  ----< 1.61    ( 2023 20:01 )     35.6        CARDIAC MARKERS ( 2023 11:20 )  x     / 0.13 ng/mL / x     / x     / x      CARDIAC MARKERS ( 2023 04:24 )  x     / 0.15 ng/mL / x     / x     / x      CARDIAC MARKERS ( 2023 23:11 )  x     / 0.14 ng/mL / 107 U/L / x     / 2.2 ng/mL          Urinalysis Basic - ( 2023 02:50 )    Color: Lindsay / Appearance: Turbid / S.026 / pH: x  Gluc: x / Ketone: Negative  / Bili: Negative / Urobili: <2 mg/dL   Blood: x / Protein: 300 mg/dL / Nitrite: Negative   Leuk Esterase: Large / RBC: 8 /HPF / WBC >720 /HPF   Sq Epi: x / Non Sq Epi: x / Bacteria: Moderate        Culture - Abscess with Gram Stain (collected 2023 23:08)  Source: .Abscess right lower quadrant (intrabdominal)  Gram Stain (2023 11:56):    Numerous polymorphonuclear leukocytes per low power field    Numerous Gram positive cocci in pairs per oil power field    Culture - Abscess with Gram Stain (collected 2023 23:08)  Source: .Abscess left kidney  Gram Stain (2023 11:58):    Rare polymorphonuclear leukocytes per low power field    Few Gram Positive Cocci per oil power field    Culture - Abscess with Gram Stain (collected 2023 20:18)  Source: .Abscess left PCN aspirate  Gram Stain (2023 06:27):    No polymorphonuclear cells seen per low power field    Few Gram Variable Cocci seen per oil power field          RADIOLOGY & ADDITIONAL TESTS:     GENERAL SURGERY PROGRESS NOTE     [facility] day :2d      Surgical Attending: Miguel Bryan  Overnight events: weaning vent settings, weaning pressors, repeat CT A?P    T(F): 100 (23 @ 23:00), Max: 101 (23 @ 20:00)  HR: 106 (23 @ 23:30) (99 - 134)  BP: 108/74 (23 @ 20:30) (95/69 - 127/67)  ABP: 132/69 (23 @ 23:30) (88/54 - 135/69)  ABP(mean): 89 (23 @ 23:30) (66 - 100)  RR: 20 (23 @ 23:30) (8 - 24)  SpO2: 100% (23 @ 23:30) (100% - 100%)    IN'S / OUT's:    23 @ 07:01  -  23 @ 07:00  --------------------------------------------------------  IN:    Amiodarone: 33.3 mL    FentaNYL: 22.4 mL    IV PiggyBack: 250 mL    IV PiggyBack: 200 mL    IV PiggyBack: 50 mL    IV PiggyBack: 400 mL    Lactated Ringers Bolus: 3000 mL    Norepinephrine: 17.2 mL    Norepinephrine: 1.2 mL    Plasma: 267 mL    Sodium Bicarbonate: 300 mL    sodium chloride 0.9%: 1000 mL  Total IN: 5541.1 mL    OUT:    DOBUTamine: 0 mL    Gastric Aspirate - Discarded (mL): 400 mL    Indwelling Catheter - Urethral (mL): 20 mL    Phenylephrine: 0 mL    PRBCs (Packed Red Blood Cells): 0 mL    VAC (Vacuum Assisted Closure) System (mL): 385 mL    VAC (Vacuum Assisted Closure) System (mL): 40 mL  Total OUT: 845 mL    Total NET: 4696.1 mL      23 @ 07:01  -  23 @ 01:49  --------------------------------------------------------  IN:    Dexmedetomidine: 134.1 mL    FentaNYL: 7.2 mL    Heparin: 165 mL    IV PiggyBack: 250 mL    IV PiggyBack: 100 mL    IV PiggyBack: 200 mL    IV PiggyBack: 100 mL    Lactated Ringers: 100 mL    Miscellaneous Tube Feedin mL    Norepinephrine: 135.3 mL    PRBCs (Packed Red Blood Cells): 321 mL    Sodium Bicarbonate: 150 mL    Sodium Bicarbonate: 300 mL    sodium chloride 0.45% w/ Additives: 900 mL    sodium chloride 0.9%: 500 mL    Sodium Chloride 0.9% Bolus: 250 mL  Total IN: 3672.6 mL    OUT:    Gastric Aspirate - Discarded (mL): 250 mL    Indwelling Catheter - Urethral (mL): 175 mL    VAC (Vacuum Assisted Closure) System (mL): 80 mL    VAC (Vacuum Assisted Closure) System (mL): 420 mL  Total OUT: 925 mL    Total NET: 2747.6 mL          PHYSICAL EXAM:  GENERAL: NAD, well-appearing  CHEST/LUNG: Clear to auscultation bilaterally, on vent  HEART: Regular rate and rhythm  ABDOMEN: Soft, Nondistended; b/l drains +output  EXTREMITIES:  No clubbing, cyanosis, or edema      LABS  Labs:  CAPILLARY BLOOD GLUCOSE      POCT Blood Glucose.: 107 mg/dL (2023 23:39)  POCT Blood Glucose.: 156 mg/dL (2023 13:47)  POCT Blood Glucose.: 126 mg/dL (2023 08:08)                          8.3    33.55 )-----------( 316      ( 2023 20:01 )             25.2       Auto Neutrophil %: 93.9 % (23 @ 20:01)  Auto Immature Granulocyte %: 3.2 % (23 @ 20:01)  Auto Neutrophil %: 94.8 % (23 @ 11:20)  Band Neutrophils %: 1.7 % (23 @ 11:20)        124<L>  |  89<L>  |  88<HH>  ----------------------------<  94  5.2<H>   |  18  |  2.9<H>      Magnesium, Serum: 1.9 mg/dL (23 @ 20:01)      LFTs:             4.2  | 0.9  | 57       ------------------[83      ( 2023 10:30 )  2.5  | 0.7  | 42          Lipase:x      Amylase:x         Blood Gas Arterial, Lactate: 1.90 mmol/L (23 @ 21:45)  Blood Gas Arterial, Lactate: 2.20 mmol/L (23 @ 11:47)  Blood Gas Arterial, Lactate: 3.80 mmol/L (23 @ 04:14)  Blood Gas Venous - Lactate: 3.60 mmol/L (23 @ 02:18)  Blood Gas Arterial, Lactate: 4.50 mmol/L (23 @ 01:02)  Blood Gas Arterial, Lactate: 3.70 mmol/L (23 @ 23:07)  Blood Gas Arterial, Lactate: 4.30 mmol/L (23 @ 17:57)  Lactate, Blood: 6.4 mmol/L (23 @ 14:18)  Blood Gas Venous - Lactate: 6.20 mmol/L (23 @ 14:05)    ABG - ( 2023 21:45 )  pH: 7.38  /  pCO2: 33    /  pO2: 182   / HCO3: 20    / Base Excess: -5.0  /  SaO2: 100.0           ABG - ( 2023 11:47 )  pH: 7.34  /  pCO2: 38    /  pO2: 166   / HCO3: 20    / Base Excess: -4.8  /  SaO2: 99.4            ABG - ( 2023 04:14 )  pH: 7.30  /  pCO2: 37    /  pO2: 201   / HCO3: 18    / Base Excess: -7.6  /  SaO2: 99.2              Coags:     18.60  ----< 1.61    ( 2023 20:01 )     35.6        CARDIAC MARKERS ( 2023 11:20 )  x     / 0.13 ng/mL / x     / x     / x      CARDIAC MARKERS ( 2023 04:24 )  x     / 0.15 ng/mL / x     / x     / x      CARDIAC MARKERS ( 2023 23:11 )  x     / 0.14 ng/mL / 107 U/L / x     / 2.2 ng/mL          Urinalysis Basic - ( 2023 02:50 )    Color: Lindsay / Appearance: Turbid / S.026 / pH: x  Gluc: x / Ketone: Negative  / Bili: Negative / Urobili: <2 mg/dL   Blood: x / Protein: 300 mg/dL / Nitrite: Negative   Leuk Esterase: Large / RBC: 8 /HPF / WBC >720 /HPF   Sq Epi: x / Non Sq Epi: x / Bacteria: Moderate        Culture - Abscess with Gram Stain (collected 2023 23:08)  Source: .Abscess right lower quadrant (intrabdominal)  Gram Stain (2023 11:56):    Numerous polymorphonuclear leukocytes per low power field    Numerous Gram positive cocci in pairs per oil power field    Culture - Abscess with Gram Stain (collected 2023 23:08)  Source: .Abscess left kidney  Gram Stain (2023 11:58):    Rare polymorphonuclear leukocytes per low power field    Few Gram Positive Cocci per oil power field    Culture - Abscess with Gram Stain (collected 2023 20:18)  Source: .Abscess left PCN aspirate  Gram Stain (2023 06:27):    No polymorphonuclear cells seen per low power field    Few Gram Variable Cocci seen per oil power field          RADIOLOGY & ADDITIONAL TESTS:    < from: CT Abdomen and Pelvis w/ Oral Cont and w/ IV Cont (23 @ 15:58) >    IMPRESSION:    Since one day earlier,    No extraluminal contrast. Oral contrast was last seen in the terminal   ileum.    Status post left nephrostomy tube placement. Left enlarged kidney with   severe hydronephrosis/collection has decreased, now measuring 16 x 12 cm   from 20 x 14 cm    --- End of Report ---      < end of copied text >

## 2023-06-20 NOTE — DIETITIAN INITIAL EVALUATION ADULT - OTHER INFO
pt is 67 year old male with hx of HTN, grade IV hydronephrosis of L kidney s/p L PCN (5/2023), hiatal hernia, iron def anemia, H. Pylori, gastric mass (never biopsied) presents with 4 days worsening weakness, abd distention and decrease output from PCN, bipap warranted in ED, pt admitted to SICU with intraabdominal sepsis and new onset Afib with RVR, respiratory distress, lactic acidosis, large pneumoperitoneum, ROJAS. s/p emergent IR guided drainage of RLQ and upsizing of L PCN. pt returned from IR intubated, NGT to LCS with 600 ml output on 6/19. Presently with EN order for nepro at 10 ml/hr awaiting feeding pump

## 2023-06-20 NOTE — PROGRESS NOTE ADULT - ASSESSMENT
A/P:  CHRISTINE VILLAVICENCIO is a 67yMale HD/POD    PLAN:   -SICU care  -Wean off pressors as tolerated  -Wean off vent as tolerated  -Monitor INS and OUTs  -Monitor drain outputs  -Urology primary team  -Npehor      #Antibiotics: piperacillin/tazobactam IVPB.. 2.25 Gram(s) IV Intermittent every 6 hours, 06-18-23 @ 21:03  vancomycin  IVPB 1250 milliGRAM(s) IV Intermittent every 24 hours, 06-18-23 @ 19:00   Day **  #DVT ppx: heparin  Infusion 1200 Unit(s)/Hr IV Continuous <Continuous>    #GI ppx: pantoprazole  Injectable 40 milliGRAM(s) IV Push daily    #Bowel regimen:   ***Senna/Mirilax/Mineral oil/Dulcolax     Disposition:  ***    Above plan discussed with Attending Surgeon  ***  , patient, patient family, and Primary team    Blue Spectra [id number]  TAP (Trauma, Acute care, Pediatrics) Spectra [id number]  Green Spectra [id number]  Vascular [id number]  PLAN:   ***     A/P:  67M w/ PMH of HTN, grade IV hydronephrosis of L kidney s/p L PCN (placed 5/2023), stutter, hiatal hernia, iron deficiency anemia, H Pylori (untreated), and gastric mass (never biopsied) presents to the ED with at least 4 days of worsening weakness, abdominal distension and no output from his PCN.     PLAN:   -SICU care  -Wean off pressors as tolerated  -Wean off vent as tolerated  -Monitor INS and OUTs  -Monitor drain outputs  -Urology primary team  -Nephro: on IVF switch to 1/2 NS with 75 meq bicarb at 100 cc per hour, strict I and O, Bumex 2 mg IV q12h tonight and follow BMP IP, dose meds to GFR < 30, on Zosyn and vanco / check vanco trough   -Cards: check TSH, TTE, wean off pressors as tolerated, consider Josh for less beta adrenergic activity, start aspirin when bleeding risk acceptable,  start lipitor 40mg qhs, monitor LFTs, Therapeutic anticoagulation for A Fib, can start heparin drip when ok by surgery, can start lopressor for rate control once off pressors and hemodynamically stable and trend lactate, check VBG for central venous to assess cardiac output  -No acute intervention by general surgery, will sign off  -Recall if further evaluation needed    #Antibiotics:   piperacillin/tazobactam IVPB.. 2.25 Gram(s) IV Intermittent every 6 hours,   vancomycin  IVPB 1250 milliGRAM(s) IV Intermittent every 24 hours    #DVT ppx: heparin  Infusion 1200 Unit(s)/Hr IV Continuous     #GI ppx: pantoprazole  Injectable 40 milliGRAM(s) IV Push daily    #Bowel regimen:  none    Disposition:  patient still acute    TAP (Trauma, Acute care, Pediatrics) Spectra [id number]

## 2023-06-20 NOTE — PROGRESS NOTE ADULT - ASSESSMENT
67M w/ PMH of HTN, grade IV hydronephrosis of L kidney s/p L PCN (placed 5/2023), stutter, hiatal hernia, iron deficiency anemia, H Pylori (untreated), and gastric mass (never biopsied) presents to the ED with at least 4 days of worsening weakness, abdominal distension, and no output from his PCN. Found to have septic shock, MSOF, lactic acidosis from left emphysematous hydronephrosis, pyelonephritis and possible rupture with pneumoperitoneum along with possible hepatic abscesses, splenic infarct and abdominal and mediastinal lymphadenopathies. he is s/p upsizing of left PCN tube by IR along with RLQ drain placed. Nephro eval for ROJAS and oliguria     Assessment and plan  ROJAS/ Hyponatremia/ severe left hydro s/p upsizing of left PCN catheter/ emphysematous pyelo with septic shock/ pneumoperitoneum/ ? kidney rupture/ ? hepatic abscesses/ splenic infarct  ROJAS likely ATN iso septic shock  Oliguria imrpoving   on levophed  s/p upsizing of left PCN tube by IR, still not drainign planed for possible Lt nephrectomy   balance grossly positive since admission, minimal UO  IVF bicarb infusion at 75 cc/hr   if no improvement in UO lasix 80 mg iv once  urine lytes noted, send spot urine for urine osm  plan for repeat CT a/p with IV contrast to r/o other possible causes of perforation and pneumoperitoneum  patient at high risk for MARGARITA and worsening kidney function with possibly needing RRT  if benefits outweighs risk proceed with CT    and IR following  no acute indications for RRT as of now,

## 2023-06-20 NOTE — PROGRESS NOTE ADULT - ASSESSMENT
67M w/ PMH of HTN, grade IV hydronephrosis of L kidney s/p L PCN (placed 5/2023), stutter, hiatal hernia, iron deficiency anemia, H Pylori (untreated), and gastric mass (never biopsied) presents to the ED with at least 4 days of worsening weakness, abdominal distension, and no output from his PCN. Admitted with emphysematous pyelonephritis    (6/18): s/p LT PCN upsizing to 16Fr and 16Fr RLQ drain placement with IR     NEURO:  #Pain control     -Fentanyl 25q3 prn   #Sedation    -Precedex RASS -1 to 0  #H/o stutter (negative ischemic workup last admission)    RESP:  #respiratory distress in the setting of free intraperitoneal air requirig mechanical ventilation    - intubated w/ 7.5 ETT on 6/18 lip line 25  RR (machine): 20, TV (machine): 450, FiO2: 40, PEEP: 6  06-19 @ 21:45--7.38 / 33 / 182 / 20 / 100.0  O2 100.0  Lac 1.90      #Pulmonary nodules / Mediastinal lymphadenopathy    -CT Chest: Right middle and upper lobe solid pulmonary nodules     -Recommend outpatient follow up with pulmonology   #Activity   -Bedrest while intubated and on pressors     CARDS:   #Acute hypotension requiring vasopressors 2/2 septic shock     -Levophed <0.1    -IVF LR at 100, previous on Sodium bicarb infusion 2/2 metabolic acidosis     intermittent NICOM- Albumin 250cc q6x 4doses on 6/19  #New onset Afib w/ RVR with hypotension -- likely 2/2 sepsis    -IV Metoprolol 5mg q6 for rate control    -(6/18) 150cc amio bolus, amio drip d/c 6/18    -(6/19) s/p 250cc Dig x1     -Cards c/s: consider switching pressors to phenylephrine in setting of tachycardia; therapeutic AC once cleared, start cardizem/lopressor once off pressors; check TSH, TTE  #HTN    -Holding home amlodipine 2.5 qD while hypotensive     Imaging    - EKG (6/19): Afib w/ RVR to 138     - ECHO: 6/19 EF 55-60%, mild AS, mild LAE, mild MR/TR, trivial pericardial effusion  #Acute tropanemia, plateaued trend PRN       GI/NUTR:  #Gastric mass vs gastrohepatic lymphadenopathy    -CT A/P:Previously noted lesser curvature mass measures 6.3 x 5.2 cm (previously 5.2 x 3.5 cm by my measurement), and may represent gastrohepatic lymphadenopathy.  #Hepatic hypodensities, suspicious for development for multiple focal abscesses    -CT A/P: New hepatic hypodensities measuring up to 2.6 cm, suspicious for development of multiple focal abscesses.  #Splenic Hypodensities, infarct vs phlegmon/abscess    -CT A/P:  Wedge-shaped region of hypodensity within the spleen may reflect splenic infarct in the correct clinical setting. Developing phlegmon/abscess cannot be excluded in the current clinical setting   #Diet    -NPO    -OGT to LCWS (55in at lip)   #GI prophylaxis    -Protonix  Bowel regimen:     -none at this time     -Last BM: 6/18 in the ED    /RENAL:   #Grade IV L hydronephrosis s/p L PCN (5/2023) -- now w/ ?feculent output  #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    -(6/18) s/p LT PCN upsizing to 16Fr and 16Fr RLQ drain placement with IR, flush q8 hr    -Urology recs - CT A/P with PO/IV to r/o bowel perforation: Status post left nephrostomy tube placement. Left enlarged kidney with severe hydronephrosis/collection has decreased, now measuring 16 x 12 cm from 20 x 14 cm  #ROJAS (baseline Cr 1.0)    -Cr 2.9 lateral    -(6/19) FeNa 1.1%, intrinsic  #Metabolic Acidosis, resolved  - Previously on sodium bicarb  #Lactic Acidosis - cleared    Labs:          BUN/Cr- 86/2.6  -->,  88/2.9  -->          Electrolytes-Na 124 // K 5.2 // Mg 1.9 //  Phos 5.6 (06-19 @ 20:01)    HEME/ONC:   #H/o Fe Deficiency anemia  #Acute anemia   - (6/19) 1u PRBC    -(6/18) s/p 1u FFP for elevated INR 1.71   #Thrombocytosis improving likely 2/2 infection    -Plt 685 to 315 - continue to monitor    #DVT prophylaxis, patient on full AC    -Hep gtt @1500u     Labs: Hb/Hct:  7.1/21.6  -->,  8.3/25.2  -->                      Plts:  351  -->,  316  -->                 PTT/INR:  35.4/1.80  --->,  35.6/1.61  --->     T&S Expires: 6/21  Blood Consent- in chart     ID:  #Leukocytosis 2/2 sepsis     - s/p vancomycin, cefepime, and flagyl in the ED    -Zosyn (6/18--    -Vanco (6/18--    - ID c/s   WBC- 31.04  --->>,  30.08  --->>,  33.55  --->>  Temp trend- 24hrs T(F): 100 (06-19 @ 23:00), Max: 101 (06-19 @ 20:00)  Current antibiotics-piperacillin/tazobactam IVPB.. 2.25 every 6 hours  vancomycin  IVPB 1250 every 24 hours  - Vanc trough 6/21 AM  Cultures:     - Blood cultures (6/18): in lab     - L PCN culture (6/18 from IR): few gram variable cocci     - RLQ culture (6/18 from IR): numerous gram +cocci, numerous polymorphonuclear leukocytes    - UA (6/19): positive, f/u culture received in lab    ENDO:  #Glucose monitoring    -No active issues     -A1c (5/7/23): 5.0    -FS Q6H while NPO   #Adrenal Insufficieny 2/2 to sepsis     -Solucortef 50q6 x3 days (end date 6/22)    -Random cortisol lvl: in lab     MSK:  #Activity    -Bedrest while intubated and on pressors     -PT/rehab c/s once extubated     LINES/DRAINS: PIV, L radial Joleen (6/18/23), Mena (6/18/23), OGT (6/18/23). L IJ TLC (6/18/23), Javier Drain (6/18/23), LT PCN exchanged (6/18/23)     ADVANCED DIRECTIVES:  Full Code    HCP/Emergency Contact- Tracey Adames: [phone]     INDICATION FOR SICU/SDU: New onset atrial fibrillation, intraperitoneal free requiring air mechanical ventilation     DISPO: SICU   67M w/ PMH of HTN, grade IV hydronephrosis of L kidney s/p L PCN (placed 5/2023), stutter, hiatal hernia, iron deficiency anemia, H Pylori (untreated), and gastric mass (never biopsied) presents to the ED with at least 4 days of worsening weakness, abdominal distension, and no output from his PCN. Admitted with emphysematous pyelonephritis    (6/18): s/p LT PCN upsizing to 16Fr and 16Fr RLQ drain placement with IR     NEURO:  #Pain control     -Fentanyl 25q3 PRN   #Sedation    -Precedex RASS -1 to 0  #H/o stutter (negative ischemic workup last admission)    RESP:  #respiratory distress in the setting of free intraperitoneal air requiring mechanical ventilation    - intubated w/ 7.5 ETT on 6/18 lip line 25  RR (machine): 20, TV (machine): 450, FiO2: 40, PEEP: 6  Transitioned to SIMV, tolerated CPAP this am    #Pulmonary nodules / Mediastinal lymphadenopathy    -CT Chest: Right middle and upper lobe solid pulmonary nodules     -Recommend outpatient follow up with pulmonology   #Activity   -Bedrest while intubated and on pressors     CARDS:   #Acute hypotension requiring vasopressors 2/2 septic shock     -Levophed <0.1, add midodrine to transition off    -IVF NS at 100, off bicarb additive      intermittent NICOM- Albumin 250cc q6x 4doses on 6/19  #New onset Afib w/ RVR with hypotension -- likely 2/2 sepsis    -IV Metoprolol 5mg q6 for rate control    -(6/18) 150cc amio bolus, amio drip d/c 6/18    -(6/19) s/p 250cc Dig x1     -Cards c/s: consider switching pressors to phenylephrine in setting of tachycardia; therapeutic AC once cleared, start cardizem/lopressor once off pressors; check TSH, TTE  #HTN    -Holding home amlodipine 2.5 qD while hypotensive     Imaging    - EKG (6/19): Afib w/ RVR to 138     - ECHO: 6/19 EF 55-60%, mild AS, mild LAE, mild MR/TR, trivial pericardial effusion  #Acute tropanemia, plateaued trend PRN       GI/NUTR:  #Gastric mass vs gastrohepatic lymphadenopathy    -CT A/P:Previously noted lesser curvature mass measures 6.3 x 5.2 cm (previously 5.2 x 3.5 cm by my measurement), and may represent gastrohepatic lymphadenopathy.  #Hepatic hypodensities, suspicious for development for multiple focal abscesses    -CT A/P: New hepatic hypodensities measuring up to 2.6 cm, suspicious for development of multiple focal abscesses.  #Splenic Hypodensities, infarct vs phlegmon/abscess    -CT A/P:  Wedge-shaped region of hypodensity within the spleen may reflect splenic infarct in the correct clinical setting. Developing phlegmon/abscess cannot be excluded in the current clinical setting   #Diet    -NPO, start trickle feeds   #GI prophylaxis    -Protonix  Bowel regimen:     -none at this time     -Last BM: 6/18 in the ED    /RENAL:   #Grade IV L hydronephrosis s/p L PCN (5/2023) -- now w/ ?feculent output  #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    -(6/18) s/p LT PCN upsizing to 16Fr and 16Fr RLQ drain placement with IR, flush q8hr    -Urology recs - CT A/P with PO/IV to r/o bowel perforation: Status post left nephrostomy tube placement. Left enlarged kidney with severe hydronephrosis/collection has decreased, now measuring 16 x 12 cm from 20 x 14 cm  #ROJAS (baseline Cr 1.0)    -Cr 2.9 lateral    -(6/19) FeNa 1.1%, intrinsic    -Nephrology consulted, rec'd diuresis challenge; holding today  #Metabolic Acidosis, resolved  - Previously on sodium bicarb  #Lactic Acidosis - cleared    Labs:          BUN/Cr- 86/2.6  -->,  88/2.9  -->          Electrolytes-Na 124 // K 5.2 // Mg 1.9 //  Phos 5.6 (06-19 @ 20:01)    HEME/ONC:   #H/o Fe Deficiency anemia  #Acute anemia   - (6/19) 1u PRBC    -(6/18) s/p 1u FFP for elevated INR 1.71   #Thrombocytosis improving likely 2/2 infection    -Plt 685 to 315 - continue to monitor    #DVT prophylaxis, patient on full AC    -Hep gtt @1600u     Labs: Hb/Hct:  7.1/21.6  -->,  8.3/25.2  -->                      Plts:  351  -->,  316  -->                 PTT/INR:  35.4/1.80  --->,  35.6/1.61  --->     T&S Expires: 6/21  Blood Consent- in chart     ID:  #Leukocytosis 2/2 sepsis     - s/p vancomycin, cefepime, and flagyl in the ED    -Zosyn (6/18--    -Vanco (6/18--    -Awaiting ID recs,   WBC- 31.04  --->>,  30.08  --->>,  33.55  --->>  Temp trend- 24hrs T(F): 100 (06-19 @ 23:00), Max: 101 (06-19 @ 20:00)  Current antibiotics-piperacillin/tazobactam IVPB.. 2.25 every 6 hours  vancomycin  IVPB 1250 every 24 hours  - Vanc trough 6/21 AM  Cultures:     - Blood cultures (6/18): no growth to date     - L PCN culture (6/18 from IR): few gram variable cocci     - RLQ culture (6/18 from IR): numerous gram +cocci, numerous polymorphonuclear leukocytes    - UA (6/19): positive, f/u culture received in lab    ENDO:  #Glucose monitoring    -No active issues     -A1c (5/7/23): 5.0    -FS Q6H while NPO   #Adrenal Insufficieny 2/2 to sepsis     -Solucortef 50q6 x3 days (end date 6/22)    -Random cortisol lvl: in lab     MSK:  #Activity    -Bedrest while intubated and on pressors     -PT/rehab c/s once extubated     LINES/DRAINS: PIV, L radial Neptune (6/18/23), Mena (6/18/23), OGT (6/18/23). L IJ TLC (6/18/23), Javier Drain (6/18/23), LT PCN exchanged (6/18/23)     ADVANCED DIRECTIVES:  Full Code    HCP/Emergency Contact- Tracey Adames: [phone]     INDICATION FOR SICU/SDU: New onset atrial fibrillation, intraperitoneal free requiring air mechanical ventilation     DISPO: SICU

## 2023-06-20 NOTE — PROGRESS NOTE ADULT - SUBJECTIVE AND OBJECTIVE BOX
Interventional Radiology Follow- Up Note      67y Male s/p intra-abdominal drain placement and exchange of left nephrostomy tube  on 6/18/2023  in Interventional Radiology with Dr. Sesay    O/N:   Patient seen and examined at bedside. Right intra-abdominal drain with mixed serous and purulent fluid. Left nephrostomy tube with minimal output and thick purulent foul smelling drainage, which appeared to be clogged in the drain. Drain was flushed and aspirated and VENICE tubing was exchanged, as well as tubing to the wall suction. 120 cc of thick purulent fluid aspirated. Despite intervention, minimal output following flushing and aspiration.    Vitals: T(F): 99 (06-20-23 @ 10:00), Max: 101 (06-19-23 @ 20:00)  HR: 112 (06-20-23 @ 13:00) (87 - 112)  BP: 108/74 (06-19-23 @ 20:30) (95/69 - 126/77)  RR: 16 (06-20-23 @ 13:00) (14 - 24)  SpO2: 99% (06-20-23 @ 13:00) (98% - 100%)    LABS:                        8.3    33.55 )-----------( 316      ( 19 Jun 2023 20:01 )             25.2     06-19    124<L>  |  89<L>  |  88<HH>  ----------------------------<  94  5.2<H>   |  18  |  2.9<H>    Ca    7.2<L>      19 Jun 2023 20:01  Phos  5.6     06-19  Mg     1.9     06-19    TPro  4.2<L>  /  Alb  2.5<L>  /  TBili  0.9  /  DBili  0.7<H>  /  AST  57<H>  /  ALT  42<H>  /  AlkPhos  83  06-19    PT/INR - ( 19 Jun 2023 20:01 )   PT: 18.60 sec;   INR: 1.61 ratio      PTT - ( 20 Jun 2023 02:55 )  PTT:46.5 sec    Impression: 67y Male admitted with afib and left renal abscess/infection. Patient with largest drain available to IR- 16 Fr. Patient examined at bedside and nephrostomy tube demonstrated minimal output. Flushed and aspirated the tube several times, exchanged the VENICE and suction tubing, all without much success. 120 cc of thick purulent fluid aspirated before drain clogged again.     Plan:  - Patient improving clinically; however, repeat CT demonstrated persistent large volume collection without complete source control  - Given consistency of infectious material within the left kidney, 16 Fr pigtail is proving to be ineffective and patient failing catheter directed intervention  - Consider left nephrectomy for definitive management  - Continue maximum medical management and drain care     Please call Interventional Radiology [phone] with any questions, concerns, or issues regarding above.

## 2023-06-20 NOTE — CONSULT NOTE ADULT - ASSESSMENT
ASSESSMENT  68yo Male with pmh of hypertension, massive Grade IV hydronephrosis s/p left nephrostomy placed in May 2023 by Intervention radiology presents to the ED with little to no urine output from LEFT nephrostomy from about a week. PCN  more recently drainage was changed to brown/purulent fluid. 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/ pneumoperitoneum with suspected liver abscesses & splenic infarct vs abscess    6/19 UCX NG; UA Blood: x / Protein: 300 mg/dL / Nitrite: Negative   Leuk Esterase: Large / RBC: 8 /HPF / WBC >720 /HPF   Sq Epi: x / Non Sq Epi: x / Bacteria: Moderate    6/18 L PCN   Few Gram Variable Cocci seen per oil power field    6/18 L PCN   Few Gram Positive Cocci per oil power field    6/18 L PCN   Numerous Gram positive cocci in pairs per oil power field    6/18 BCX NGTD     s/p 6/18  Left PCN upsized to 16F and 1200cc of very viscous tan colored fluid was aspirated. RLQ 16F drain was placed and 1200cc of tan colored fluid was aspirated (less viscous). A sample from each location was sent for culture.     Repeat CT 6/19 Since one day earlier,  No extraluminal contrast. Oral contrast was last seen in the terminal ileum.  Status post left nephrostomy tube placement. Left enlarged kidney with severe hydronephrosis/collection has decreased, now measuring 16 x 12 cm from 20 x 14 cm  < from: CT Abdomen and Pelvis w/ IV Cont (06.18.23 @ 15:15) >  1 ) Moderate pneumoperitoneum with partial diffusion throughout moderate   volume ascites and with associated intermittent peritoneal enhancement   and nodularity. Perforation and peritonitis may be related to underlying   emphysematous pyelonephritis (see below). Less likely sources of   perforation include ureteral/bladder disruption, and bowel perforation   which cannot be entirely excluded on the provided images.  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 The compressed residual renal   parenchyma is inseparable from the adjacent fascial/fatty layers   anterosuperiorly and direct rupture into the peritoneum is a possibility   (6-355). The previously placed left-sided nephrostomy tube pigtail is   notedto be within the left renal collecting system.  3) New hepatic hypodensities measuring up to 2.6 cm, suspicious for   development of multiple focal abscesses.  4) Wedge-shaped region of hypodensity within the spleen may reflect   splenic infarct in the correct clinical setting. Developing   phlegmon/abscess cannot be excluded in the current clinical setting  5) Increased extensive retroperitoneal, portacaval and likely   gastrohepatic heterogeneously enhancing lymphadenopathy. Findings may be   reactive.  6) Mediastinal lymphadenopathy measuring up to 2.4 cm short axis.   Underlying etiology may be reactive, infectious/inflammatory, or   neoplastic. A short-term 3 month follow-up CT chest should be considered   for further evaluation.  7)Right middle and upper lobe solid pulmonary nodules which are not well   delineated due to respiratory motion but measure less than 6 mm.   attention on follow-up exam.  #Lactic acidosis  #Abx allergy: No Known Allergies  #Prolonged QTC Calculation(Bazett) 526 ms  #Hyponatremia  #ROJAS  Creatinine, Serum: 2.9 (06-19-23 @ 20:01)    Weight (kg): 76.5 (06-18-23 @ 21:27)    RECOMMENDATIONS  - Continues to spike on vanc/zosyn   - Change zosyn to meropenem 1g q12h IV crcl 23   - Given ROJAS, D/C VANC--> Dapto 600mg q48h IV  - Check baseline CPK and monitor weekly   - f/u OR cultures, will narrow antibiotics when possible   - Poor prognosis , GOC     If any questions, please send a message or call on [employer] Teams  Please continue to update ID with any pertinent new laboratory or radiographic findings  #[id number]

## 2023-06-20 NOTE — PROGRESS NOTE ADULT - SUBJECTIVE AND OBJECTIVE BOX
UROLOGY DAILY PROGRESS NOTE    Pt is a 67y Male admitted with    MEDICATIONS  (STANDING):  chlorhexidine 0.12% Liquid 15 milliLiter(s) Oral Mucosa every 12 hours  chlorhexidine 2% Cloths 1 Application(s) Topical <User Schedule>  dexMEDEtomidine Infusion 0.2 MICROgram(s)/kG/Hr (3.83 mL/Hr) IV Continuous <Continuous>  heparin  Infusion 1200 Unit(s)/Hr (16 mL/Hr) IV Continuous <Continuous>  hydrocortisone sodium succinate Injectable 50 milliGRAM(s) IV Push every 6 hours  iron sucrose IVPB 100 milliGRAM(s) IV Intermittent every 24 hours  metoprolol tartrate Injectable 5 milliGRAM(s) IV Push every 6 hours  midodrine 5 milliGRAM(s) Oral every 8 hours  norepinephrine Infusion 0.05 MICROgram(s)/kG/Min (3.59 mL/Hr) IV Continuous <Continuous>  pantoprazole  Injectable 40 milliGRAM(s) IV Push daily  piperacillin/tazobactam IVPB.. 2.25 Gram(s) IV Intermittent every 6 hours  sodium chloride 0.9%. 1000 milliLiter(s) (100 mL/Hr) IV Continuous <Continuous>  vancomycin  IVPB 1250 milliGRAM(s) IV Intermittent every 24 hours    MEDICATIONS  (PRN):  fentaNYL    Injectable 25 MICROGram(s) IV Push every 3 hours PRN Moderate Pain (4 - 6)  sodium chloride 0.9% lock flush 10 milliLiter(s) IV Push every 1 hour PRN Pre/post blood products, medications, blood draw, and to maintain line patency      REVIEW OF SYSTEMS   [x] A ten-point review of systems was otherwise negative except as noted.  [ ] Due to altered mental status/intubation, subjective information were not able to be obtained from patient. History was obtained, to the extent possible, from review of the chart and collateral sources of information.  Vital Signs Last 24 Hrs  T(C): 37.2 (2023 10:00), Max: 38.3 (2023 20:00)  T(F): 99 (2023 10:00), Max: 101 (2023 20:00)  HR: 112 (2023 13:00) (87 - 112)  BP: 108/74 (2023 20:30) (95/69 - 126/77)  BP(mean): 87 (2023 20:30) (77 - 96)  RR: 16 (2023 13:00) (14 - 24)  SpO2: 99% (2023 13:00) (98% - 100%)    Parameters below as of 2023 13:00  Patient On (Oxygen Delivery Method): ventilator    O2 Concentration (%): 30    PHYSICAL EXAM:    GEN: NAD, well-developed, awake and alert.  SKIN: Good color, non diaphoretic.  HEENT: NC/AT.  RESP: No dyspnea, non-labored breathing. No use of accessory muscles.  CARDIO: +S1/S2  ABDO: Soft, NT/ND, no palpable bladder, no suprapubic tenderness/ + Suprapubic Tube draining clear yellow urine.  BACK: No CVAT B/L  : + Non circumcised male. B/L descended testicles x 2. No lesions or palpable masses noted B/L. No meatal discharge. + Indwelling bobby in place, draining clear yellow urine.       I&O's Summary    2023 07:01  -  2023 07:00  --------------------------------------------------------  IN: 5390 mL / OUT: 1315 mL / NET: 4075 mL    2023 07:01  -  2023 13:11  --------------------------------------------------------  IN: 807 mL / OUT: 400 mL / NET: 407 mL        LABS:                        8.3    33.55 )-----------( 316      ( 2023 20:01 )             25.2     06-19    124<L>  |  89<L>  |  88<HH>  ----------------------------<  94  5.2<H>   |  18  |  2.9<H>    Ca    7.2<L>      2023 20:01  Phos  5.6     -  Mg     1.9     -    TPro  4.2<L>  /  Alb  2.5<L>  /  TBili  0.9  /  DBili  0.7<H>  /  AST  57<H>  /  ALT  42<H>  /  AlkPhos  83  -    PT/INR - ( 2023 20:01 )   PT: 18.60 sec;   INR: 1.61 ratio         PTT - ( 2023 02:55 )  PTT:46.5 sec  Urinalysis Basic - ( 2023 02:50 )    Color: Lindsay / Appearance: Turbid / S.026 / pH: x  Gluc: x / Ketone: Negative  / Bili: Negative / Urobili: <2 mg/dL   Blood: x / Protein: 300 mg/dL / Nitrite: Negative   Leuk Esterase: Large / RBC: 8 /HPF / WBC >720 /HPF   Sq Epi: x / Non Sq Epi: x / Bacteria: Moderate            RADIOLOGY & ADDITIONAL STUDIES: UROLOGY DAILY PROGRESS NOTE    Pt is a 67y Male admitted with retroperitoneal abscess and intra-abdominal abscess s/p b/l drains placed by IR- seen and examined at bedside w/ SICU and IR team. Patient off pressors, remained sedated and intubated at this time. Patient left drain noted with malodorous, thick purulent drainage; 120cc aspirated by IR. Drain unable to maintain suction despite changed tubing and bulb.     MEDICATIONS  (STANDING):  chlorhexidine 0.12% Liquid 15 milliLiter(s) Oral Mucosa every 12 hours  chlorhexidine 2% Cloths 1 Application(s) Topical <User Schedule>  dexMEDEtomidine Infusion 0.2 MICROgram(s)/kG/Hr (3.83 mL/Hr) IV Continuous <Continuous>  heparin  Infusion 1200 Unit(s)/Hr (16 mL/Hr) IV Continuous <Continuous>  hydrocortisone sodium succinate Injectable 50 milliGRAM(s) IV Push every 6 hours  iron sucrose IVPB 100 milliGRAM(s) IV Intermittent every 24 hours  metoprolol tartrate Injectable 5 milliGRAM(s) IV Push every 6 hours  midodrine 5 milliGRAM(s) Oral every 8 hours  norepinephrine Infusion 0.05 MICROgram(s)/kG/Min (3.59 mL/Hr) IV Continuous <Continuous>  pantoprazole  Injectable 40 milliGRAM(s) IV Push daily  piperacillin/tazobactam IVPB.. 2.25 Gram(s) IV Intermittent every 6 hours  sodium chloride 0.9%. 1000 milliLiter(s) (100 mL/Hr) IV Continuous <Continuous>  vancomycin  IVPB 1250 milliGRAM(s) IV Intermittent every 24 hours    MEDICATIONS  (PRN):  fentaNYL    Injectable 25 MICROGram(s) IV Push every 3 hours PRN Moderate Pain (4 - 6)  sodium chloride 0.9% lock flush 10 milliLiter(s) IV Push every 1 hour PRN Pre/post blood products, medications, blood draw, and to maintain line patency      REVIEW OF SYSTEMS   [x] A ten-point review of systems was otherwise negative except as noted.  [ ] Due to altered mental status/intubation, subjective information were not able to be obtained from patient. History was obtained, to the extent possible, from review of the chart and collateral sources of information.  Vital Signs Last 24 Hrs  T(C): 37.2 (2023 10:00), Max: 38.3 (2023 20:00)  T(F): 99 (2023 10:00), Max: 101 (2023 20:00)  HR: 112 (2023 13:00) (87 - 112)  BP: 108/74 (2023 20:30) (95/69 - 126/77)  BP(mean): 87 (2023 20:30) (77 - 96)  RR: 16 (2023 13:00) (14 - 24)  SpO2: 99% (2023 13:00) (98% - 100%)    Parameters below as of 2023 13:00  Patient On (Oxygen Delivery Method): ventilator    O2 Concentration (%): 30    PHYSICAL EXAM:    GEN: intubated and sedated  SKIN: Good color, non diaphoretic.  RESP:  pt vented Non-labored breathing. No use of accessory muscles.  CARDIO: +S1/S2  ABDO: Soft, distended, no palpable bladder, no suprapubic tenderness.  Left VENICE draining thick purulent drainage   BACK: No CVAT B/L  : + Indwelling Mena in place, draining clear yellow urine.   EXT: IVA x 4      I&O's Summary    2023 07:01  -  2023 07:00  --------------------------------------------------------  IN: 5390 mL / OUT: 1315 mL / NET: 4075 mL    2023 07:01  -  2023 13:11  --------------------------------------------------------  IN: 807 mL / OUT: 400 mL / NET: 407 mL        LABS:                        8.3    33.55 )-----------( 316      ( 2023 20:01 )             25.2     06-19    124<L>  |  89<L>  |  88<HH>  ----------------------------<  94  5.2<H>   |  18  |  2.9<H>    Ca    7.2<L>      2023 20:01  Phos  5.6       Mg     1.9         TPro  4.2<L>  /  Alb  2.5<L>  /  TBili  0.9  /  DBili  0.7<H>  /  AST  57<H>  /  ALT  42<H>  /  AlkPhos  83      PT/INR - ( 2023 20:01 )   PT: 18.60 sec;   INR: 1.61 ratio         PTT - ( 2023 02:55 )  PTT:46.5 sec  Urinalysis Basic - ( 2023 02:50 )    Color: Lindsay / Appearance: Turbid / S.026 / pH: x  Gluc: x / Ketone: Negative  / Bili: Negative / Urobili: <2 mg/dL   Blood: x / Protein: 300 mg/dL / Nitrite: Negative   Leuk Esterase: Large / RBC: 8 /HPF / WBC >720 /HPF   Sq Epi: x / Non Sq Epi: x / Bacteria: Moderate      Culture - Abscess with Gram Stain (23 @ 23:08)    Gram Stain:   Numerous polymorphonuclear leukocytes per low power field  Numerous Gram positive cocci in pairs per oil power field   Specimen Source: .Abscess right lower quadrant (intrabdominal)   Culture Results:   No growth    Culture - Abscess with Gram Stain (23 @ 23:08)    Gram Stain:   Rare polymorphonuclear leukocytes per low power field  Few Gram Positive Cocci per oil power field   Specimen Source: .Abscess left kidney   Culture Results:   No growth          RADIOLOGY & ADDITIONAL STUDIES:  < from: CT Abdomen and Pelvis w/ Oral Cont and w/ IV Cont (23 @ 15:58) >  ACC: [id number] EXAM:  CT ABDOMEN AND PELVIS OC IC   ORDERED BY: BALJIT JESUS     PROCEDURE DATE:  2023          INTERPRETATION:  CLINICAL STATEMENT: Pneumoperitoneum and emphysematous   pyelonephritis status post drain placement.    TECHNIQUE: Contiguous axial CT images were obtained from the lower chest   to the pubic symphysis with intravenous contrast. Oral contrast was   administered. Reformatted images in the coronal and sagittal planes were   acquired.    COMPARISON: CT abdomen and pelvis 2023.    FINDINGS:    Limited evaluation from streak artifact due to contact with the gantry   and arm positioning.    LOWER CHEST: Bilateral moderate pleural effusions, right increased, with   adjacent subsegmental atelectasis.    HEPATOBILIARY: Unchanged.    SPLEEN: Unchanged.    PANCREAS: Unchanged.    ADRENAL GLANDS: Unchanged.    KIDNEYS: Status post left nephrostomy tube placement. Left enlarged   kidney with severe hydronephrosis/collection has decreased, now measuring   16 x 12 cm from 20 x 14 cm. Again seen gas throughout the left kidney,   likely postprocedural. Unchanged right large renal cyst. No   hydronephrosis.    ABDOMINOPELVIC NODES: Unchanged..    PELVIC ORGANS: Unchanged.    PERITONEUM/MESENTERY/BOWEL: Peritoneal catheter terminates in the right   lower quadrant. Pneumoperitoneum and free fluid is mildly decreased   unchanged since prior. Oral contrast is seen in the terminal ileum. No   contrast extravasation is visualized. Mild abdominopelvic ascites. No   bowel obstruction.    BONES/SOFT TISSUES: Anasarca. Degenerative osseous changes.    VASCULAR: Atherosclerotic calcifications.        IMPRESSION:    Since one day earlier,    No extraluminal contrast. Oral contrast was last seen in the terminal   ileum.    Status post left nephrostomy tube placement. Left enlarged kidney with   severe hydronephrosis/collection has decreased, now measuring 16 x 12 cm   from 20 x 14 cm    --- End of Report ---      NEVIN MONTES MD; Resident Radiologist  This document has been electronically signed.  SOFIA QUINN MD; Attending Radiologist  This document has been electronically signed. 2023  5:07PM    < end of copied text >

## 2023-06-20 NOTE — DIETITIAN INITIAL EVALUATION ADULT - PERTINENT LABORATORY DATA
06-19    124<L>  |  89<L>  |  88<HH>  ----------------------------<  94  5.2<H>   |  18  |  2.9<H>    Ca    7.2<L>      19 Jun 2023 20:01  Phos  5.6     06-19  Mg     1.9     06-19    TPro  4.2<L>  /  Alb  2.5<L>  /  TBili  0.9  /  DBili  0.7<H>  /  AST  57<H>  /  ALT  42<H>  /  AlkPhos  83  06-19  POCT Blood Glucose.: 120 mg/dL (06-20-23 @ 05:41)                          8.3    33.55 )-----------( 316      ( 19 Jun 2023 20:01 )             25.2     A1C with Estimated Average Glucose Result: 5.0 % (05-07-23 @ 09:19)

## 2023-06-20 NOTE — DIETITIAN INITIAL EVALUATION ADULT - ORAL INTAKE PTA/DIET HISTORY
unable to assess pt intubated to vent, no family at bedside    presently NPO intubated to vent, EN order in place awaiting feeding pump

## 2023-06-20 NOTE — PROGRESS NOTE ADULT - SUBJECTIVE AND OBJECTIVE BOX
Nephrology progress note    Patient was seen and examined, events over the last 24 h noted .  Cr stable    Allergies:  No Known Allergies    Hospital Medications:   MEDICATIONS  (STANDING):  chlorhexidine 0.12% Liquid 15 milliLiter(s) Oral Mucosa every 12 hours  chlorhexidine 2% Cloths 1 Application(s) Topical <User Schedule>  DAPTOmycin IVPB 600 milliGRAM(s) IV Intermittent every 48 hours  dexMEDEtomidine Infusion 0.2 MICROgram(s)/kG/Hr (3.83 mL/Hr) IV Continuous <Continuous>  heparin  Infusion 1700 Unit(s)/Hr (17 mL/Hr) IV Continuous <Continuous>  hydrocortisone sodium succinate Injectable 50 milliGRAM(s) IV Push every 6 hours  iron sucrose IVPB 100 milliGRAM(s) IV Intermittent every 24 hours  meropenem  IVPB 1000 milliGRAM(s) IV Intermittent every 12 hours  metoprolol tartrate Injectable 5 milliGRAM(s) IV Push every 6 hours  midodrine 5 milliGRAM(s) Oral every 8 hours  norepinephrine Infusion 0.05 MICROgram(s)/kG/Min (3.59 mL/Hr) IV Continuous <Continuous>  pantoprazole  Injectable 40 milliGRAM(s) IV Push daily  sodium chloride 0.9%. 1000 milliLiter(s) (100 mL/Hr) IV Continuous <Continuous>        VITALS:  T(F): 99 (23 @ 10:00), Max: 101 (23 @ 20:00)  HR: 106 (23 @ 16:00)  BP: 108/74 (23 @ 20:30)  RR: 18 (23 @ 16:00)  SpO2: 99% (23 @ 16:00)  Wt(kg): --     @ 07:01  -   @ 07:00  --------------------------------------------------------  IN: 5541.1 mL / OUT: 845 mL / NET: 4696.1 mL     @ 07:01  -   @ 07:00  --------------------------------------------------------  IN: 5390 mL / OUT: 1315 mL / NET: 4075 mL     @ 07:01  -   @ 16:52  --------------------------------------------------------  IN: 1289.6 mL / OUT: 455 mL / NET: 834.6 mL          PHYSICAL EXAM:  Constitutional: NAD  HEENT: anicteric sclera, oropharynx clear, MMM  Neck: No JVD  Respiratory: CTAB, no wheezes, rales or rhonchi  Cardiovascular: S1, S2, RRR  Gastrointestinal: BS+, soft, NT/ND  Extremities: No cyanosis or clubbing. No peripheral edema  :  No bobby.   Skin: No rashes    LABS:      124<L>  |  90<L>  |  92<HH>  ----------------------------<  103<H>  5.1<H>   |  20  |  2.9<H>    Ca    7.4<L>      2023 14:02  Phos  5.6       Mg     1.9         TPro  4.2<L>  /  Alb  2.3<L>  /  TBili  0.6  /  DBili      /  AST  36  /  ALT  34  /  AlkPhos  87                            8.3    33.55 )-----------( 316      ( 2023 20:01 )             25.2       Urine Studies:  Urinalysis Basic - ( 2023 02:50 )    Color: Lindsay / Appearance: Turbid / S.026 / pH:   Gluc:  / Ketone: Negative  / Bili: Negative / Urobili: <2 mg/dL   Blood:  / Protein: 300 mg/dL / Nitrite: Negative   Leuk Esterase: Large / RBC: 8 /HPF / WBC >720 /HPF   Sq Epi:  / Non Sq Epi:  / Bacteria: Moderate      Sodium, Random Urine: 40.0 mmoL/L ( @ 02:50)  Creatinine, Random Urine: 80 mg/dL ( @ 02:50)    RADIOLOGY & ADDITIONAL STUDIES:

## 2023-06-20 NOTE — DIETITIAN INITIAL EVALUATION ADULT - NS FNS DIET ORDER
Diet, NPO with Tube Feed:   Tube Feeding Modality: Nasogastric  Nepro with Carb Steady  Total Volume for 24 Hours (mL): 480  Continuous  Starting Tube Feed Rate {mL per Hour}: 10  Increase Tube Feed Rate by (mL): 10     Every 4 hours  Until Goal Tube Feed Rate (mL per Hour): 20  Tube Feed Duration (in Hours): 24  Tube Feed Start Time: 09:23 (06-20-23 @ 09:24)

## 2023-06-20 NOTE — PROGRESS NOTE ADULT - ASSESSMENT
Pt is a 67y Male admitted with retroperitoneal abscess and intra-abdominal abscess s/p b/l drains placed by IR- seen and examined at bedside w/ SICU and IR team.     Plan:   Case discussed w/ Dr. Vásquez, plan as follows:   - Continue ICU management  - monitor drain output - irrigate/aspirate per IR by RN   - continue bobby care   - Continue IV abx  -zosyn/vancomycin   - F/u ID recs for abx   - Abscess Cx - Left Kidney: Gram Positive Cocci per oil power field; Right lower quadrant: Numerous Gram positive cocci in pairs per oil power field  - Obtain medical/cardiac/pulm clearance for anesthesia   - Planned for possible left nephrectomy on Monday with Dr. Vásquez/Dr. Womack   - Spoke w/ SICU team/IR team

## 2023-06-20 NOTE — DIETITIAN INITIAL EVALUATION ADULT - ENTERAL
2/2 elevated phosph recommend to maintain on Nepro at 10 ml/hr increase as tolerated to goal rate of 50 ml/hr will provide pt with 2100 kcals, 95g protein, 1200 ml fluid meeting >80% of estimated nutrient needs

## 2023-06-20 NOTE — DIETITIAN INITIAL EVALUATION ADULT - ADD RECOMMEND
RD to monitor tolerance to EN, labs(renal fxn) meds, NFPF and f/u as needed within 3-5 days  high risk

## 2023-06-20 NOTE — DIETITIAN INITIAL EVALUATION ADULT - NSFNSGIIOFT_GEN_A_CORE
06-19-23 @ 07:01  -  06-20-23 @ 07:00  --------------------------------------------------------  OUT:    Gastric Aspirate - Discarded (mL): 250 mL  Total OUT: 250 mL    Total NET: -190 mL

## 2023-06-21 NOTE — PROGRESS NOTE ADULT - SUBJECTIVE AND OBJECTIVE BOX
CHRISTINE VILLAVICENCIO  MRN-[id number]  1955  67M    HPI:  67M w/ PMH of HTN, grade IV hydronephrosis of L kidney s/p L PCN (placed 5/2023), stutter, hiatal hernia, iron deficiency anemia, H Pylori (untreated), and gastric mass (never biopsied) presents to the ED with at least 4 days of worsening weakness, abdominal distension, and no output from his PCN. Pt and daughters bedside are only able to give limited information, but for the past few days, he has noticed no output from his PCN as well as increasing abdominal distension, pain, and anorexia. He reports that he has not felt normal for the past 10 days. His PCN used to put out serosanguinous fluid, but the output changed to feculent/brown liquid over the past day. In the ED, he was noted to be in new onset afib w/ RVR to 150+ as well as experiencing some difficulty breathing. He was started on BiPAP. Stat labs were notable for WBC 47, Cr 2.9, and lactate 6. CT A/P w/ IV contrast showed free intraperitoneal fluid and air from an unknown source as well as fluid and air in the L kidney.     SICU was consulted for hemodynamic monitoring 2/2 intraabdominal sepsis. Patient went with IR for Left PCN upsized to 16Fr and RLQ drain placement.   FIndings: Left PCN upsized to 16F and 1200cc of very viscous tan colored fluid was aspirated. RLQ 16F drain was placed and 1200cc of tan colored fluid was aspirated (less viscous). A sample from each location was sent for culture.     24 hour Events:   6/20  NIGHT  -PM rounds: Precedex @ 0.7; Heparin gtt @ 17; NS @ 100; Vent SIMV 450/10/30/6. TF via OGT @ 10cc/hr   -K 5.3 -> Calcium, insulin 10 + d50 // repeat K  -PTT @ 49.1 -> Hep inc 17 -> 18 // f/u AM PTT   -+ ISS    DAY  LR to NS mIVF given hyponatremia  Following commands off precedex, tolerating CPAP, transition to SIMV/PS  Heparin drip adjusted at 6a, increased to 1700u at 12p for aPTT 49.5  Holding diuresis  Start Nepro at trickle rate  Plan for L nephrectomy Monday  Midodrine 5mg q8hr, off levo  Abscess Cx Stenotrophomonas maltophilia, zosyn/vanc to meropenem/daptomycin      REVIEW OF SYSTEMS:  [X] A ten-point review of systems was otherwise negative except as noted above.  [  ] Due to altered mental status/intubation, subjective information was not attained from the patient. History was obtained, to the extent possible, from review of the chart and collateral sources of information.  -----------------------------------------------------------------------------------------------------------------------------------------------------------------------------------------------------------

## 2023-06-21 NOTE — PROGRESS NOTE ADULT - NS ATTEND AMEND GEN_ALL_CORE FT
seen at bedside  agree with above  extubated and no requiring pressor support  daughter at the bedside  patient and family aware of plan for nephrectomy next week - awaiting risk stratification by med, cardiology and pulm seen at bedside  agree with above  extubated and no requiring pressor support  daughter at the bedside  patient and family aware of plan for nephrectomy next week - awaiting risk stratification by med, cardiology and pulm  still with renal failure

## 2023-06-21 NOTE — PROGRESS NOTE ADULT - SUBJECTIVE AND OBJECTIVE BOX
UROLOGY DAILY PROGRESS NOTE    Pt is a 67y Male admitted with retroperitoneal & intraabdominal abscess s/p drain placement & L PCN upsizing with IR on 6/18. Patient seen and examined at bedside this morning, has since been extubated to face mask.     MEDICATIONS  (STANDING):  acetaminophen     Tablet .. 650 milliGRAM(s) Oral every 6 hours  bacitracin   Ointment 1 Application(s) Topical two times a day  chlorhexidine 2% Cloths 1 Application(s) Topical <User Schedule>  DAPTOmycin IVPB 600 milliGRAM(s) IV Intermittent every 48 hours  gabapentin 100 milliGRAM(s) Oral every 8 hours  heparin  Infusion 1700 Unit(s)/Hr (18 mL/Hr) IV Continuous <Continuous>  hydrocortisone sodium succinate Injectable 50 milliGRAM(s) IV Push every 8 hours  insulin regular  human corrective regimen sliding scale   SubCutaneous every 6 hours  iron sucrose IVPB 100 milliGRAM(s) IV Intermittent every 24 hours  levoFLOXacin IVPB 750 milliGRAM(s) IV Intermittent once  meropenem  IVPB 1000 milliGRAM(s) IV Intermittent every 12 hours  metoprolol tartrate 25 milliGRAM(s) Oral every 8 hours  midodrine 5 milliGRAM(s) Oral every 8 hours  sodium chloride 2 Gram(s) Oral every 8 hours  sodium chloride 0.9%. 1000 milliLiter(s) (75 mL/Hr) IV Continuous <Continuous>    MEDICATIONS  (PRN):  sodium chloride 0.9% lock flush 10 milliLiter(s) IV Push every 1 hour PRN Pre/post blood products, medications, blood draw, and to maintain line patency  traMADol 25 milliGRAM(s) Oral every 8 hours PRN Moderate Pain (4 - 6)    REVIEW OF SYSTEMS   [ ] Due to altered mental status/intubation, subjective information were not able to be obtained from patient. History was obtained, to the extent possible, from review of the chart and collateral sources of information.    Vital Signs Last 24 Hrs  T(C): 36.9 (21 Jun 2023 08:00), Max: 37 (20 Jun 2023 20:00)  T(F): 98.4 (21 Jun 2023 08:00), Max: 98.6 (20 Jun 2023 20:00)  HR: 121 (21 Jun 2023 09:30) (92 - 124)  BP: 119/68 (21 Jun 2023 08:00) (90/63 - 122/77)  BP(mean): 83 (21 Jun 2023 08:00) (73 - 92)  RR: 19 (21 Jun 2023 08:30) (13 - 22)  SpO2: 99% (21 Jun 2023 09:30) (97% - 100%)    Parameters below as of 21 Jun 2023 08:30  Patient On (Oxygen Delivery Method): BiPAP/CPAP    O2 Concentration (%): 30    PHYSICAL EXAM:  GEN: Ill appearing male in NAD, awake.  SKIN: Non diaphoretic.  RESP: No use of accessory muscles while on face mask.  CARDIO: +S1/S2  ABDO: Distended abdomen slightly firm, nontender to palpation. Right abdominal drain in place with seropurulent output.  : +++penoscrotal edema. Mena in place, no output in urometer.    I&O's Summary  20 Jun 2023 07:01  -  21 Jun 2023 07:00  --------------------------------------------------------  IN: 3323.8 mL / OUT: 1175 mL / NET: 2148.8 mL    LABS:             7.9    34.51 )-----------( 218      ( 20 Jun 2023 21:56 )             24.3     06-21  125<L>  |  90<L>  |  93<HH>  ----------------------------<  107<H>  4.9   |  17  |  3.1<H>    Ca    7.5<L>      21 Jun 2023 00:50  Phos  6.2     06-20  Mg     1.9     06-20    TPro  4.2<L>  /  Alb  2.3<L>  /  TBili  0.6  /  DBili  x   /  AST  36  /  ALT  34  /  AlkPhos  87  06-20    PT/INR - ( 19 Jun 2023 20:01 )   PT: 18.60 sec;   INR: 1.61 ratio    PTT - ( 21 Jun 2023 04:15 )  PTT:66.9 sec    Urinalysis Basic - ( 21 Jun 2023 00:50 )  Color: x / Appearance: x / SG: x / pH: x  Gluc: 107 mg/dL / Ketone: x  / Bili: x / Urobili: x   Blood: x / Protein: x / Nitrite: x   Leuk Esterase: x / RBC: x / WBC x   Sq Epi: x / Non Sq Epi: x / Bacteria: x    RADIOLOGY & ADDITIONAL STUDIES:  < from: Xray Chest 1 View- PORTABLE-Routine (Xray Chest 1 View- PORTABLE-Routine in AM.) (06.21.23 @ 07:09) >  FINDINGS:    Support devices: Endotracheal tube tip within the mid trachea   approximately 5 cm above the brigido. Left IJ central venous catheter tip   within the SVC. NG tube courses below the diaphragm. Numerous telemetry   leads and monitoring wires overlie the patient and may obscure underlying   anatomy.    Cardiac/mediastinum/hilum: Stable given differences in technique.    Lung parenchyma/Pleura: Right basilar opacity/effusion, diffuse vascular   congestion increased compared with prior exam.    Skeleton/soft tissues: Stable.    IMPRESSION:    New right basilar opacity/effusion and diffuse vascular congestion.   Findings compatible with developing fluid overload/CHF in the correct   clinical setting.  < end of copied text >

## 2023-06-21 NOTE — PROGRESS NOTE ADULT - SUBJECTIVE AND OBJECTIVE BOX
Nephrology Progress Note    CHRISTINE VILLAVICENCIO  [id number]  67y  Male    S:  Patient is seen and examined, events over the last 24h noted.    O:  Allergies:  No Known Allergies    Hospital Medications:   MEDICATIONS  (STANDING):  acetaminophen     Tablet .. 650 milliGRAM(s) Oral every 6 hours  bacitracin   Ointment 1 Application(s) Topical two times a day  chlorhexidine 2% Cloths 1 Application(s) Topical <User Schedule>  DAPTOmycin IVPB 600 milliGRAM(s) IV Intermittent every 48 hours  gabapentin 100 milliGRAM(s) Oral every 8 hours  heparin  Infusion. 1600 Unit(s)/Hr (16 mL/Hr) IV Continuous <Continuous>  hydrocortisone sodium succinate Injectable 50 milliGRAM(s) IV Push every 8 hours  insulin regular  human corrective regimen sliding scale   SubCutaneous every 6 hours  iron sucrose IVPB 100 milliGRAM(s) IV Intermittent every 24 hours  levoFLOXacin IVPB 750 milliGRAM(s) IV Intermittent every 48 hours  meropenem  IVPB 1000 milliGRAM(s) IV Intermittent every 12 hours  metoprolol tartrate 50 milliGRAM(s) Oral every 8 hours  midodrine 5 milliGRAM(s) Oral every 8 hours  sodium chloride 2 Gram(s) Oral every 8 hours  sodium chloride 0.9%. 1000 milliLiter(s) (75 mL/Hr) IV Continuous <Continuous>    MEDICATIONS  (PRN):  sodium chloride 0.9% lock flush 10 milliLiter(s) IV Push every 1 hour PRN Pre/post blood products, medications, blood draw, and to maintain line patency  traMADol 25 milliGRAM(s) Oral every 8 hours PRN Moderate Pain (4 - 6)    Home Medications:      VITALS:  T(F): 98.5 (06-21-23 @ 12:00), Max: 98.6 (06-20-23 @ 20:00)  HR: 127 (06-21-23 @ 12:00)  BP: 107/74 (06-21-23 @ 12:00)  RR: 20 (06-21-23 @ 12:00)  SpO2: 98% (06-21-23 @ 12:00)  Wt(kg): --  I&O's Detail    20 Jun 2023 07:01  -  21 Jun 2023 07:00  --------------------------------------------------------  IN:    Dexmedetomidine: 151.4 mL    Heparin: 112 mL    Heparin: 299 mL    IV PiggyBack: 100 mL    Nepro: 260 mL    Norepinephrine: 1.4 mL    sodium chloride 0.9%: 2400 mL  Total IN: 3323.8 mL    OUT:    Indwelling Catheter - Urethral (mL): 425 mL    VAC (Vacuum Assisted Closure) System (mL): 210 mL    VAC (Vacuum Assisted Closure) System (mL): 540 mL  Total OUT: 1175 mL    Total NET: 2148.8 mL      21 Jun 2023 07:01  -  21 Jun 2023 14:11  --------------------------------------------------------  IN:    Dexmedetomidine: 14 mL    Heparin: 90 mL    Heparin Infusion: 16 mL    IV PiggyBack: 200 mL    sodium chloride 0.9%: 300 mL    sodium chloride 0.9%: 200 mL  Total IN: 820 mL    OUT:    Indwelling Catheter - Urethral (mL): 145 mL  Total OUT: 145 mL    Total NET: 675 mL        I&O's Summary    20 Jun 2023 07:01  -  21 Jun 2023 07:00  --------------------------------------------------------  IN: 3323.8 mL / OUT: 1175 mL / NET: 2148.8 mL    21 Jun 2023 07:01  -  21 Jun 2023 14:11  --------------------------------------------------------  IN: 820 mL / OUT: 145 mL / NET: 675 mL          PHYSICAL EXAM:  Gen: ill appearing, in NAD   Resp: b/l breath sounds  Card: tachycardic   Abd: soft, distended  Extremities: +edema      LABS:  ABG - ( 21 Jun 2023 10:36 )  pH, Arterial: 7.38  pH, Blood: x     /  pCO2: 29    /  pO2: 149   / HCO3: 17    / Base Excess: -7.0  /  SaO2: 99.4      06-21    125<L>  |  90<L>  |  93<HH>  ----------------------------<  107<H>  4.9   |  17  |  3.1<H>    Ca    7.5<L>      21 Jun 2023 00:50  Phos  6.2     06-20  Mg     1.9     06-20    TPro  4.2<L>  /  Alb  2.3<L>  /  TBili  0.6  /  DBili      /  AST  36  /  ALT  34  /  AlkPhos  87  06-20    Phosphorus: 6.2 mg/dL (06-20-23 @ 21:56)  Phosphorus Level, Serum: 5.6 mg/dL (06-19-23 @ 20:01)    Vitamin D, 25-Hydroxy: 26 ng/mL (05-08-23 @ 07:58)                          7.9    34.51 )-----------( 218      ( 20 Jun 2023 21:56 )             24.3     Mean Cell Volume: 70.4 fL (06-20-23 @ 21:56)    Ferritin, Serum: 526 ng/mL (05-07-23 @ 09:19)  Iron Total, Serum: 12 ug/dL (05-07-23 @ 09:19)      Urine Studies:  Urinalysis Basic - ( 21 Jun 2023 00:50 )    Color:  / Appearance:  / SG:  / pH:   Gluc: 107 mg/dL / Ketone:   / Bili:  / Urobili:    Blood:  / Protein:  / Nitrite:    Leuk Esterase:  / RBC:  / WBC    Sq Epi:  / Non Sq Epi:  / Bacteria:         Urea Nitrogen,  Random Urine: 823 mg/dL (05-06-23 @ 17:04)    Culture Results:   No growth (06-19 @ 02:50)  Sodium, Random Urine: 40.0 mmoL/L (06-19 @ 02:50)    Creatinine trend:  Creatinine: 3.1 mg/dL (06-21-23 @ 00:50)  Creatinine: 3.2 mg/dL (06-20-23 @ 21:56)  Creatinine, Serum: 2.9 mg/dL (06-20-23 @ 14:02)  Creatinine, Serum: 2.9 mg/dL (06-19-23 @ 20:01)  Creatinine, Serum: 2.6 mg/dL (06-19-23 @ 09:00)  Creatinine, Serum: 2.7 mg/dL (06-19-23 @ 09:00)  Creatinine, Serum: 2.8 mg/dL (06-18-23 @ 23:11)  Creatinine, Serum: 2.9 mg/dL (06-18-23 @ 14:18)  Creatinine, Serum: 1.0 mg/dL (05-16-23 @ 07:42)    < from: CT Abdomen and Pelvis w/ Oral Cont and w/ IV Cont (06.19.23 @ 15:58) >    ACC: [id number] EXAM:  CT ABDOMEN AND PELVIS OC IC   ORDERED BY: BALJIT JESUS     PROCEDURE DATE:  06/19/2023          INTERPRETATION:  CLINICAL STATEMENT: Pneumoperitoneum and emphysematous   pyelonephritis status post drain placement.    TECHNIQUE: Contiguous axial CT images were obtained from the lower chest   to the pubic symphysis with intravenous contrast. Oral contrast was   administered. Reformatted images in the coronal and sagittal planes were   acquired.    COMPARISON: CT abdomen and pelvis 6/18/2023.    FINDINGS:    Limited evaluation from streak artifact due to contact with the gantry   and arm positioning.    LOWER CHEST: Bilateral moderate pleural effusions, right increased, with   adjacent subsegmental atelectasis.    HEPATOBILIARY: Unchanged.    SPLEEN: Unchanged.    PANCREAS: Unchanged.    ADRENAL GLANDS: Unchanged.    KIDNEYS: Status post left nephrostomy tube placement. Left enlarged   kidney with severe hydronephrosis/collection has decreased, now measuring   16 x 12 cm from 20 x 14 cm. Again seen gas throughout the left kidney,   likely postprocedural. Unchanged right large renal cyst. No   hydronephrosis.    ABDOMINOPELVIC NODES: Unchanged..    PELVIC ORGANS: Unchanged.    PERITONEUM/MESENTERY/BOWEL: Peritoneal catheter terminates in the right   lower quadrant. Pneumoperitoneum and free fluid is mildly decreased   unchanged since prior. Oral contrast is seen in the terminal ileum. No   contrast extravasation is visualized. Mild abdominopelvic ascites. No   bowel obstruction.    BONES/SOFT TISSUES: Anasarca. Degenerative osseous changes.    VASCULAR: Atherosclerotic calcifications.        IMPRESSION:    Since one day earlier,    No extraluminal contrast. Oral contrast was last seen in the terminal   ileum.    Status post left nephrostomy tube placement. Left enlarged kidney with   severe hydronephrosis/collection has decreased, now measuring 16 x 12 cm   from 20 x 14 cm    --- End of Report ---          NEVIN MONTES MD; Resident Radiologist  This document has been electronically signed.  SOFIA QUINN MD; Attending Radiologist  This document has been electronically signed. Jun 19 2023  5:07PM    < end of copied text >

## 2023-06-21 NOTE — PROGRESS NOTE ADULT - ASSESSMENT
ASSESSMENT  68yo Male with pmh of hypertension, massive Grade IV hydronephrosis s/p left nephrostomy placed in May 2023 by [facility] presents to the ED with little to no urine output from LEFT nephrostomy from about a week. PCN  more recently drainage was changed to brown/purulent fluid. 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 RLQ 16F drain was placed and 1200cc of tan colored fluid was aspirated (less viscous). A sample from each location was sent for culture.     Repeat CT 6/19 Since one day earlier,  No extraluminal contrast. Oral contrast was last seen in the terminal ileum.  Status post left nephrostomy tube placement. Left enlarged kidney with severe hydronephrosis/collection has decreased, now measuring 16 x 12 cm from 20 x 14 cm  < from: CT Abdomen and Pelvis w/ IV Cont (06.18.23 @ 15:15) >  1 ) Moderate pneumoperitoneum with partial diffusion throughout moderate   volume ascites and with associated intermittent peritoneal enhancement   and nodularity. Perforation and peritonitis may be related to underlying   emphysematous pyelonephritis (see below). Less likely sources of   perforation include ureteral/bladder disruption, and bowel perforation   which cannot be entirely excluded on the provided images.  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 The compressed residual renal   parenchyma is inseparable from the adjacent fascial/fatty layers   anterosuperiorly and direct rupture into the peritoneum is a possibility   (6355). The previously placed left-sided nephrostomy tube pigtail is   notedto be within the left renal collecting system.  3) New hepatic hypodensities measuring up to 2.6 cm, suspicious for   development of multiple focal abscesses.  4) Wedge-shaped region of hypodensity within the spleen may reflect   splenic infarct in the correct clinical setting. Developing   phlegmon/abscess cannot be excluded in the current clinical setting  5) Increased extensive retroperitoneal, portacaval and likely   gastrohepatic heterogeneously enhancing lymphadenopathy. Findings may be   reactive.  6) Mediastinal lymphadenopathy measuring up to 2.4 cm short axis.   Underlying etiology may be reactive, infectious/inflammatory, or   neoplastic. A short-term 3 month follow-up CT chest should be considered   for further evaluation.  7)Right middle and upper lobe solid pulmonary nodules which are not well   delineated due to respiratory motion but measure less than 6 mm.   attention on follow-up exam.  #Lactic acidosis  #Abx allergy: No Known Allergies  #Prolonged QTC Calculation(Bazett) 526 ms  #Hyponatremia  #ROJAS  Creatinine, Serum: 2.9 (06-19-23 @ 20:01)    Weight (kg): 76.5 (06-18-23 @ 21:27)    RECOMMENDATIONS  - f/u final cx  - For stenotrophomonas, add minocycline IV 200mg q12h (non-formulary)  - Continue meropenem 1g q12h IV crcl 23   - Dapto 600mg q48h IV  - Check baseline CPK and monitor closely-- Creatine Kinase, Serum: 107 U/L (06.18.23 @ 23:11)  - Poor prognosis , GOC     If any questions, please send a message or call on [employer] Teams  Please continue to update ID with any pertinent new laboratory or radiographic findings  #[id number]   ASSESSMENT  66yo Male with pmh of hypertension, massive Grade IV hydronephrosis s/p left nephrostomy placed in May 2023 by [facility] presents to the ED with little to no urine output from LEFT nephrostomy from about a week. PCN  more recently drainage was changed to brown/purulent fluid. 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 RLQ 16F drain was placed and 1200cc of tan colored fluid was aspirated (less viscous). A sample from each location was sent for culture.     Repeat CT 6/19 Since one day earlier,  No extraluminal contrast. Oral contrast was last seen in the terminal ileum.  Status post left nephrostomy tube placement. Left enlarged kidney with severe hydronephrosis/collection has decreased, now measuring 16 x 12 cm from 20 x 14 cm  < from: CT Abdomen and Pelvis w/ IV Cont (06.18.23 @ 15:15) >  1 ) Moderate pneumoperitoneum with partial diffusion throughout moderate   volume ascites and with associated intermittent peritoneal enhancement   and nodularity. Perforation and peritonitis may be related to underlying   emphysematous pyelonephritis (see below). Less likely sources of   perforation include ureteral/bladder disruption, and bowel perforation   which cannot be entirely excluded on the provided images.  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 The compressed residual renal   parenchyma is inseparable from the adjacent fascial/fatty layers   anterosuperiorly and direct rupture into the peritoneum is a possibility   (6355). The previously placed left-sided nephrostomy tube pigtail is   notedto be within the left renal collecting system.  3) New hepatic hypodensities measuring up to 2.6 cm, suspicious for   development of multiple focal abscesses.  4) Wedge-shaped region of hypodensity within the spleen may reflect   splenic infarct in the correct clinical setting. Developing   phlegmon/abscess cannot be excluded in the current clinical setting  5) Increased extensive retroperitoneal, portacaval and likely   gastrohepatic heterogeneously enhancing lymphadenopathy. Findings may be   reactive.  6) Mediastinal lymphadenopathy measuring up to 2.4 cm short axis.   Underlying etiology may be reactive, infectious/inflammatory, or   neoplastic. A short-term 3 month follow-up CT chest should be considered   for further evaluation.  7)Right middle and upper lobe solid pulmonary nodules which are not well   delineated due to respiratory motion but measure less than 6 mm.   attention on follow-up exam.  #Lactic acidosis  #Abx allergy: No Known Allergies  #Prolonged QTC Calculation(Bazett) 526 ms  #Hyponatremia  #ROJAS  Creatinine, Serum: 2.9 mg/dL (06-20-23 @ 14:02)crcl 26  Ht 180  Weight (kg): 76.5 (06-18-23 @ 21:27)    RECOMMENDATIONS  - f/u final cx  - For stenotrophomonas, add levaquin IV 750mg stat then q48h IV   - Continue meropenem 1g q12h IV & Dapto 600mg q48h IV until cultures are finalized   - Check baseline CPK and monitor closely-- Creatine Kinase, Serum: 107 U/L (06.18.23 @ 23:11)  - Poor prognosis , GOC     If any questions, please send a message or call on [employer] Teams  Please continue to update ID with any pertinent new laboratory or radiographic findings  #[id number]

## 2023-06-21 NOTE — PROGRESS NOTE ADULT - ASSESSMENT
Assessment/Plan   67M w/ PMH of HTN, grade IV hydronephrosis of L kidney s/p L PCN (placed 5/2023), stutter, hiatal hernia, iron deficiency anemia, H Pylori (untreated), and gastric mass (never biopsied) presents to the ED with at least 4 days of worsening weakness, abdominal distension, and no output from his PCN. Admitted with emphysematous pyelonephritis    (6/18): s/p LT PCN upsizing to 16Fr and 16Fr RLQ drain placement with IR     NEURO:  #Pain control     -Fentanyl 25q3 prn   #Sedation    -Precedex RASS -1 to 0; following commands off precedex  #H/o stutter (negative ischemic workup last admission)    RESP:  #respiratory distress in the setting of free intraperitoneal air requirig mechanical ventilation    - intubated w/ 7.5 ETT on 6/18 lip line 25    -Patient tolerating SIMV   RR (machine): 20, TV (machine): 450, FiO2: 30, PEEP: 6    #Pulmonary nodules / Mediastinal lymphadenopathy    -CT Chest: Right middle and upper lobe solid pulmonary nodules     -Recommend outpatient follow up with pulmonology   #Activity   -Bedrest     CARDS:   #Acute hypotension requiring vasopressors 2/2 septic shock     - weaned levophed off 6/20, started midodrine 5mg q8    -IVF NS at 100, previous on Sodium bicarb infusion 2/2 metabolic acidosis (d/c 6/20)    intermittent NICOM- Albumin 250cc q6x 4doses on 6/19  #New onset Afib w/ RVR with hypotension -- likely 2/2 sepsis    -IV Metoprolol 5mg q6 for rate control    -(6/18) 150cc amio bolus, amio drip d/c 6/18    -(6/19) s/p 250cc Dig x1     -Cards c/s: consider switching pressors to phenylephrine in setting of tachycardia; therapeutic AC once cleared, start cardizem/lopressor once off pressors; check TSH, TTE  #HTN    -Holding home amlodipine 2.5 qD while hypotensive     Imaging    - EKG (6/19): Afib w/ RVR to 138     - ECHO: 6/19 EF 55-60%, mild AS, mild LAE, mild MR/TR, trivial pericardial effusion  #Acute tropanemia, plateaued trend PRN       GI/NUTR:  #Gastric mass vs gastrohepatic lymphadenopathy    -CT A/P:Previously noted lesser curvature mass measures 6.3 x 5.2 cm (previously 5.2 x 3.5 cm by my measurement), and may represent gastrohepatic lymphadenopathy.  #Hepatic hypodensities, suspicious for development for multiple focal abscesses    -CT A/P: New hepatic hypodensities measuring up to 2.6 cm, suspicious for development of multiple focal abscesses.  #Splenic Hypodensities, infarct vs phlegmon/abscess    -CT A/P:  Wedge-shaped region of hypodensity within the spleen may reflect splenic infarct in the correct clinical setting. Developing phlegmon/abscess cannot be excluded in the current clinical setting   #Diet    -trickle feeds started Suplena @20cc     -OGT to LCWS (55in at lip)   #GI prophylaxis    -Protonix  Bowel regimen:     -none at this time     -Last BM: 6/18 in the ED    /RENAL:   #Grade IV L hydronephrosis s/p L PCN (5/2023) -- now w/ ?feculent output  #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    -(6/18) s/p LT PCN upsizing to 16Fr and 16Fr RLQ drain placement with IR, flush q8 hr    -Urology recs - CT A/P with PO/IV to r/o bowel perforation: Status post left nephrostomy tube placement. Left enlarged kidney with severe hydronephrosis/collection has decreased, now measuring 16 x 12 cm from 20 x 14 cm    -Plan for L nephrectomy for 6/26  #ROJAS (baseline Cr 1.0)    -Cr 2.9 to 3.2    -(6/19) FeNa 1.1%, intrinsicc  #Hyperkalemia    -K 5.3     -Calcium, insulin 10u with D50     -F/u K 2 hours post administration  #Metabolic Acidosis, resolved  - Previously on sodium bicarb  #Lactic Acidosis - cleared       Labs:          BUN/Cr- 92/2.9  -->,  92/3.2  -->          Electrolytes-Na 124 // K 5.3 // Mg 1.9 //  Phos 6.2 (06-20 @ 21:56)      HEME/ONC:   #H/o Fe Deficiency anemia  #Acute anemia   - (6/19) 1u PRBC    -(6/18) s/p 1u FFP for elevated INR 1.71   #Thrombocytosis improving likely 2/2 infection    -Plt 685 to 315 - continue to monitor    #DVT prophylaxis, patient on full AC    -Hep gtt @1800u; f/u AM PTT        Labs: Hb/Hct:  8.3/25.2  -->,  7.9/24.3  -->                      Plts:  316  -->,  218  -->                 PTT/INR:  49.5/--  --->,  49.1/--  --->     T&S Expires: 6/21  Blood Consent- in chart     ID:  #Leukocytosis 2/2 sepsis     - s/p vancomycin, cefepime, and flagyl in the ED    - Meropenem 1g q12h    - Daptomycin 600 q48h    - ID c/s --> switch vanc and zosyn to meropenem and daptomycin, monitor creatine kinase   WBC- 31.04  --->>,  30.08  --->>,  33.55  --->>  afebrile   Cultures:     - Blood cultures (6/18): NGTD    - L PCN culture (6/18 from IR): Stenotrophomonas maltophilia    - RLQ culture (6/18 from IR): numerous gram +cocci, numerous polymorphonuclear leukocytes    - UA (6/19): positive, f/u culture received in lab    ENDO:  #Glucose monitoring    -ISS     -A1c (5/7/23): 5.0    -FS Q6H while on trickle feeds   #Adrenal Insufficieny 2/2 to sepsis     -Solucortef 50q6 x3 days (end date 6/22)    -Random cortisol lvl: in lab     MSK:  #Activity    -Bedrest     -PT/rehab c/s once extubated     LINES/DRAINS: PIV, L radial [place] (6/18/23), Mena (6/18/23), OGT (6/18/23). L IJ TLC (6/18/23), Javier Drain (6/18/23), LT PCN exchanged (6/18/23)     ADVANCED DIRECTIVES:  Full Code    HCP/Emergency Contact- Tracey Adames: [phone]     INDICATION FOR SICU/SDU: New onset atrial fibrillation, intraperitoneal free requiring air mechanical ventilation     DISPO: SICU   Assessment/Plan   67M w/ PMH of HTN, grade IV hydronephrosis of L kidney s/p L PCN (placed 5/2023), stutter, hiatal hernia, iron deficiency anemia, H Pylori (untreated), and gastric mass (never biopsied) presents to the ED with at least 4 days of worsening weakness, abdominal distension, and no output from his PCN. Admitted with emphysematous pyelonephritis    (6/18): s/p LT PCN upsizing to 16Fr and 16Fr RLQ drain placement with IR     NEURO:  #Pain control    - APAP and tramadol PRN  #H/o stutter (negative ischemic workup last admission)      RESP:  #acute respiratory failure    - Returned intubated post drain upsizing by IR 6/18    - Tolerated SAT/SBT, extubated to face mask    - Maintain O2 sat > 92%  #Pulmonary nodules / Mediastinal lymphadenopathy    -CT Chest: Right middle and upper lobe solid pulmonary nodules     -Recommend outpatient follow up with pulmonology   #Activity   -Bedrest     CARDS:   #Septic shock    - On midodrine 5mg q8hr, off levophed 6/20    intermittent NICOM- Albumin 250cc q6x 4doses on 6/19  #New onset Afib w/ RVR with hypotension -- likely 2/2 sepsis    -IV metoprolol 5mg q6hr transitioned to PO 25mg q8hr PO    -(6/18) 150cc amio bolus, amio drip d/c 6/18    -(6/19) s/p 250cc Dig x1   #HTN    -Holding home amlodipine 2.5 qD while hypotensive   Imaging    - EKG (6/19): Afib w/ RVR to 138     - ECHO: 6/19 EF 55-60%, mild AS, mild LAE, mild MR/TR, trivial pericardial effusion    GI/NUTR:  #Gastric mass vs gastrohepatic lymphadenopathy    -CT A/P:Previously noted lesser curvature mass measures 6.3 x 5.2 cm (previously 5.2 x 3.5 cm by my measurement), and may represent gastrohepatic lymphadenopathy.  #Hepatic hypodensities, suspicious for development for multiple focal abscesses    -CT A/P: New hepatic hypodensities measuring up to 2.6 cm, suspicious for development of multiple focal abscesses.  #Splenic Hypodensities, infarct vs phlegmon/abscess    -CT A/P:  Wedge-shaped region of hypodensity within the spleen may reflect splenic infarct in the correct clinical setting. Developing phlegmon/abscess cannot be excluded in the current clinical setting   #Diet    -NPO except for meds, ADAT    -SLP if bedside swallow fails    -Last BM: 6/18 in the ED    /RENAL:   #Grade IV L hydronephrosis s/p L PCN (5/2023) -- now w/ ?feculent output  #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    -(6/18) s/p LT PCN upsizing to 16Fr and 16Fr RLQ drain placement with IR, flush q8 hr    -Urology recs - CT A/P with PO/IV to r/o bowel perforation: Status post left nephrostomy tube placement. Left enlarged kidney with severe hydronephrosis/collection has decreased, now measuring 16 x 12 cm from 20 x 14 cm    -Plan for L nephrectomy for 6/26  #ROJAS (baseline Cr 1.0)    -Cr 2.9 to 3.2 to 3.1, UOP remains minimal    -(6/19) FeNa 1.1%, intrinsic    -Lasix 80mg x1 today    -Hyperkalemic overnight with resolution after insulin/dextrose administration    -Nephrology following, may require RRT        Labs:          BUN/Cr- 92/2.9  -->,  92/3.2  -->          Electrolytes-Na 124 // K 5.3 // Mg 1.9 //  Phos 6.2 (06-20 @ 21:56)      HEME/ONC:   #H/o Fe Deficiency anemia    - Iron supplementation  #DVT prophylaxis, patient on full AC    -Hep gtt @1800u; f/u AM PTT        Labs: Hb/Hct:  8.3/25.2  -->,  7.9/24.3  -->                      Plts:  316  -->,  218  -->                 PTT/INR:  49.5/--  --->,  49.1/--  --->     T&S Expires: 6/21  Blood Consent- in chart     ID:  #Leukocytosis 2/2 sepsis     - Blood cultures remain negative, abcess cultures with Stenotrophomonas maltophilia    - s/p vancomycin, cefepime, and flagyl in the ED    - Meropenem 1g q12h    - Daptomycin 600 q48h    - ID rec'd minocycline for Stenotrophomonal coverage, f/u given updated sensitivities to levaquin     - Afebrile     - UA (6/19): positive, f/u culture received in lab    ENDO:  #Glucose monitoring    -ISS     -A1c (5/7/23): 5.0    -FS Q6H while on trickle feeds   #Adrenal Insufficieny 2/2 to sepsis     -Solucortef to 50mg q8hr    -Random cortisol lvl: in lab     MSK:  #Activity    -Advance as tolerated    -PT/rehab     LINES/DRAINS: PIV, L radial [place] (6/18/23), Mena (6/18/23), L IJ TLC (6/18/23), Javier Drain (6/18/23), LT PCN (6/18/23)     ADVANCED DIRECTIVES:  Full Code    HCP/Emergency Contact- Tracey Adames: [phone]     INDICATION FOR SICU/SDU: New onset atrial fibrillation, intraperitoneal free requiring air mechanical ventilation     DISPO: SICU

## 2023-06-21 NOTE — PROGRESS NOTE ADULT - SUBJECTIVE AND OBJECTIVE BOX
CHRISTINE VILLAVICENCIO  67y, Male  Allergy: No Known Allergies      LOS  3d    CHIEF COMPLAINT: pneumoperitoneum, emphysematous pyelonephritis (21 Jun 2023 01:33)      INTERVAL EVENTS/HPI  - Afebrile   - T(F): , Max: 99.5 (06-20-23 @ 07:00)  - Tolerating medication  - WBC Count: 34.51 (06-20-23 @ 21:56)  WBC Count: 33.55 (06-19-23 @ 20:01)     - Creatinine: 3.1 (06-21-23 @ 00:50)  Creatinine: 3.2 (06-20-23 @ 21:56)       ROS  unable to obtain history secondary to patient's mental status and/or sedation *    VITALS:  T(F): 97.7, Max: 99.5 (06-20-23 @ 07:00)  HR: 119  BP: 90/63  RR: 20Vital Signs Last 24 Hrs  T(C): 36.5 (21 Jun 2023 00:00), Max: 37.5 (20 Jun 2023 07:00)  T(F): 97.7 (21 Jun 2023 00:00), Max: 99.5 (20 Jun 2023 07:00)  HR: 119 (21 Jun 2023 03:00) (89 - 119)  BP: 90/63 (21 Jun 2023 00:00) (90/63 - 90/63)  BP(mean): 73 (21 Jun 2023 00:00) (73 - 73)  RR: 20 (21 Jun 2023 03:00) (13 - 22)  SpO2: 100% (21 Jun 2023 04:00) (98% - 100%)    Parameters below as of 21 Jun 2023 00:00  Patient On (Oxygen Delivery Method): ventilator    O2 Concentration (%): 30    PHYSICAL EXAM:  ***    FH: Non-contributory  Social Hx: Non-contributory    TESTS & MEASUREMENTS:                        7.9    34.51 )-----------( 218      ( 20 Jun 2023 21:56 )             24.3     06-21    125<L>  |  90<L>  |  93<HH>  ----------------------------<  107<H>  4.9   |  17  |  3.1<H>    Ca    7.5<L>      21 Jun 2023 00:50  Phos  6.2     06-20  Mg     1.9     06-20    TPro  4.2<L>  /  Alb  2.3<L>  /  TBili  0.6  /  DBili  x   /  AST  36  /  ALT  34  /  AlkPhos  87  06-20      LIVER FUNCTIONS - ( 20 Jun 2023 14:02 )  Alb: 2.3 g/dL / Pro: 4.2 g/dL / ALK PHOS: 87 U/L / ALT: 34 U/L / AST: 36 U/L / GGT: x           Urinalysis Basic - ( 21 Jun 2023 00:50 )    Color: x / Appearance: x / SG: x / pH: x  Gluc: 107 mg/dL / Ketone: x  / Bili: x / Urobili: x   Blood: x / Protein: x / Nitrite: x   Leuk Esterase: x / RBC: x / WBC x   Sq Epi: x / Non Sq Epi: x / Bacteria: x        Culture - Urine (collected 06-19-23 @ 02:50)  Source: Clean Catch Clean Catch (Midstream)  Final Report (06-20-23 @ 10:18):    No growth    Culture - Abscess with Gram Stain (collected 06-18-23 @ 23:08)  Source: .Abscess right lower quadrant (intrabdominal)  Gram Stain (06-19-23 @ 11:56):    Numerous polymorphonuclear leukocytes per low power field    Numerous Gram positive cocci in pairs per oil power field  Preliminary Report (06-20-23 @ 09:10):    No growth    Culture - Abscess with Gram Stain (collected 06-18-23 @ 23:08)  Source: .Abscess left kidney  Gram Stain (06-19-23 @ 11:58):    Rare polymorphonuclear leukocytes per low power field    Few Gram Positive Cocci per oil power field  Preliminary Report (06-20-23 @ 09:03):    No growth    Culture - Abscess with Gram Stain (collected 06-18-23 @ 20:18)  Source: .Abscess left PCN aspirate  Gram Stain (06-19-23 @ 06:27):    No polymorphonuclear cells seen per low power field    Few Gram Variable Cocci seen per oil power field  Preliminary Report (06-20-23 @ 11:47):    Few Stenotrophomonas maltophilia    Culture - Blood (collected 06-18-23 @ 14:18)  Source: .Blood Blood-Peripheral  Preliminary Report (06-20-23 @ 02:02):    No growth to date.    Culture - Blood (collected 06-18-23 @ 14:18)  Source: .Blood Blood-Peripheral  Preliminary Report (06-20-23 @ 02:02):    No growth to date.        Blood Gas Venous - Lactate: 3.60 mmol/L (06-19-23 @ 02:18)  Lactate, Blood: 6.4 mmol/L (06-18-23 @ 14:18)  Blood Gas Venous - Lactate: 6.20 mmol/L (06-18-23 @ 14:05)      INFECTIOUS DISEASES TESTING  strept    INFLAMMATORY MARKERS      RADIOLOGY & ADDITIONAL TESTS:  I have personally reviewed the last available Chest xray  CXR  Xray Chest 1 View- PORTABLE-Urgent:   ACC: [id number] EXAM:  XR CHEST PORTABLE URGENT 1V   ORDERED BY: CALEB AMIN     PROCEDURE DATE:  06/18/2023          INTERPRETATION:  Clinical History/Reason for Exam:  post procedure    Technique:  Frontal view the chest .    Comparison: Chestx-ray 6/18/2023.    Findings:    Support devices:  Satisfactory position    Cardiac/mediastinum/hilum: Stable    Lung parenchyma/ Pleura: Bilateral basilar opacities. No pneumothorax      Skeleton/soft tissues: Stable degenerative changes      Impression:    Bilateral basilar opacities. No pneumothorax    --- End of Report ---            ASA BOWERS MD; Attending Radiologist  This document has been electronically signed. Jun 19 2023 10:04AM (06-18-23 @ 23:49)      CT  CT Abdomen and Pelvis w/ Oral Cont and w/ IV Cont:   ACC: [id number] EXAM:  CT ABDOMEN AND PELVIS OC IC   ORDERED BY: BALJIT JESUS     PROCEDURE DATE:  06/19/2023          INTERPRETATION:  CLINICAL STATEMENT: Pneumoperitoneum and emphysematous   pyelonephritis status post drain placement.    TECHNIQUE: Contiguous axial CT images were obtained from the lower chest   to the pubic symphysis with intravenous contrast. Oral contrast was   administered. Reformatted images in the coronal and sagittal planes were   acquired.    COMPARISON: CT abdomen and pelvis 6/18/2023.    FINDINGS:    Limited evaluation from streak artifact due to contact with the gantry   and arm positioning.    LOWER CHEST: Bilateral moderate pleural effusions, right increased, with   adjacent subsegmental atelectasis.    HEPATOBILIARY: Unchanged.    SPLEEN: Unchanged.    PANCREAS: Unchanged.    ADRENAL GLANDS: Unchanged.    KIDNEYS: Status post left nephrostomy tube placement. Left enlarged   kidney with severe hydronephrosis/collection has decreased, now measuring   16 x 12 cm from 20 x 14 cm. Again seen gas throughout the left kidney,   likely postprocedural. Unchanged right large renal cyst. No   hydronephrosis.    ABDOMINOPELVIC NODES: Unchanged..    PELVIC ORGANS: Unchanged.    PERITONEUM/MESENTERY/BOWEL: Peritoneal catheter terminates in the right   lower quadrant. Pneumoperitoneum and free fluid is mildly decreased   unchanged since prior. Oral contrast is seen in the terminal ileum. No   contrast extravasation is visualized. Mild abdominopelvic ascites. No   bowel obstruction.    BONES/SOFT TISSUES: Anasarca. Degenerative osseous changes.    VASCULAR: Atherosclerotic calcifications.        IMPRESSION:    Since one day earlier,    No extraluminal contrast. Oral contrast was last seen in the terminal   ileum.    Status post left nephrostomy tube placement. Left enlarged kidney with   severe hydronephrosis/collection has decreased, now measuring 16 x 12 cm   from 20 x 14 cm    --- End of Report ---          NEVIN MONTES MD; Resident Radiologist  This document has been electronically signed.  SOFIA QUINN MD; Attending Radiologist  This document has been electronically signed. Jun 19 2023  5:07PM (06-19-23 @ 15:58)      CARDIOLOGY TESTING  12 Lead ECG:   Ventricular Rate 123 BPM    Atrial Rate 150 BPM    QRS Duration 84 ms    Q-T Interval 368 ms    QTC Calculation(Bazett) 526 ms    R Axis 30 degrees    T Axis 54 degrees    Diagnosis Line Atrial fibrillation with rapid ventricular response  Low voltage QRS  Septal infarct , age undetermined  Abnormal ECG    Confirmed by Deon Velasquez ([id number]) on 6/20/2023 10:52:21 AM (06-19-23 @ 07:45)  12 Lead ECG:   Ventricular Rate 138 BPM    QRS Duration 108 ms    Q-T Interval 314 ms    QTC Calculation(Bazett) 475 ms    R Axis 39 degrees    T Axis 258 degrees    Diagnosis Line Atrial fibrillation with rapid ventricular response  Low voltage QRS  Nonspecific ST and T wave abnormality  Abnormal ECG    Confirmed by ROULA WISEMAN MD ([id number]) on 6/20/2023 7:36:42 AM (06-19-23 @ 01:59)      MEDICATIONS  bacitracin   Ointment 1 Topical two times a day  chlorhexidine 0.12% Liquid 15 Oral Mucosa every 12 hours  chlorhexidine 2% Cloths 1 Topical <User Schedule>  DAPTOmycin IVPB 600 IV Intermittent every 48 hours  dexMEDEtomidine Infusion 0.2 IV Continuous <Continuous>  dextrose 5%. 1000 IV Continuous <Continuous>  dextrose 5%. 1000 IV Continuous <Continuous>  dextrose 50% Injectable 12.5 IV Push once  dextrose 50% Injectable 25 IV Push once  dextrose 50% Injectable 25 IV Push once  glucagon  Injectable 1 IntraMuscular once  heparin  Infusion 1700 IV Continuous <Continuous>  hydrocortisone sodium succinate Injectable 50 IV Push every 6 hours  insulin regular  human corrective regimen sliding scale  SubCutaneous every 6 hours  iron sucrose IVPB 100 IV Intermittent every 24 hours  meropenem  IVPB 1000 IV Intermittent every 12 hours  metoprolol tartrate Injectable 5 IV Push every 6 hours  midodrine 5 Oral every 8 hours  pantoprazole  Injectable 40 IV Push daily  sodium chloride 0.9%. 1000 IV Continuous <Continuous>      WEIGHT  Weight (kg): 76.5 (06-18-23 @ 21:27)  Creatinine: 3.1 mg/dL (06-21-23 @ 00:50)  Creatinine: 3.2 mg/dL (06-20-23 @ 21:56)  Creatinine, Serum: 2.9 mg/dL (06-20-23 @ 14:02)      ANTIBIOTICS:  DAPTOmycin IVPB 600 milliGRAM(s) IV Intermittent every 48 hours  meropenem  IVPB 1000 milliGRAM(s) IV Intermittent every 12 hours      All available historical records have been reviewed       CHRISTINE VILLAVICENCIO  67y, Male  Allergy: No Known Allergies      LOS  3d    CHIEF COMPLAINT: pneumoperitoneum, emphysematous pyelonephritis (21 Jun 2023 01:33)      INTERVAL EVENTS/HPI  - Afebrile   - T(F): , Max: 99.5 (06-20-23 @ 07:00)  - Tolerating medication  - WBC Count: 34.51 (06-20-23 @ 21:56)  WBC Count: 33.55 (06-19-23 @ 20:01)     - Creatinine: 3.1 (06-21-23 @ 00:50)  Creatinine: 3.2 (06-20-23 @ 21:56)       ROS  unable to obtain history secondary to patient's mental status and/or sedation *    VITALS:  T(F): 97.7, Max: 99.5 (06-20-23 @ 07:00)  HR: 119  BP: 90/63  RR: 20Vital Signs Last 24 Hrs  T(C): 36.5 (21 Jun 2023 00:00), Max: 37.5 (20 Jun 2023 07:00)  T(F): 97.7 (21 Jun 2023 00:00), Max: 99.5 (20 Jun 2023 07:00)  HR: 119 (21 Jun 2023 03:00) (89 - 119)  BP: 90/63 (21 Jun 2023 00:00) (90/63 - 90/63)  BP(mean): 73 (21 Jun 2023 00:00) (73 - 73)  RR: 20 (21 Jun 2023 03:00) (13 - 22)  SpO2: 100% (21 Jun 2023 04:00) (98% - 100%)    Parameters below as of 21 Jun 2023 00:00  Patient On (Oxygen Delivery Method): ventilator    O2 Concentration (%): 30    PHYSICAL EXAM:  Gen: chronically ill appearing, lethargic, extubated   HEENT: Normocephalic, atraumatic  Neck: supple, no lymphadenopathy  CV: Regular rate & regular rhythm  Lungs: decreased BS at bases, no fremitus  Abdomen: Soft, BS present VENICE L purulence, R VENICE  Ext: Warm, well perfused  Neuro: non focal  Skin: no rash, no erythema  Lines: no phlebitis     FH: Non-contributory  Social Hx: Non-contributory    TESTS & MEASUREMENTS:                        7.9    34.51 )-----------( 218      ( 20 Jun 2023 21:56 )             24.3     06-21    125<L>  |  90<L>  |  93<HH>  ----------------------------<  107<H>  4.9   |  17  |  3.1<H>    Ca    7.5<L>      21 Jun 2023 00:50  Phos  6.2     06-20  Mg     1.9     06-20    TPro  4.2<L>  /  Alb  2.3<L>  /  TBili  0.6  /  DBili  x   /  AST  36  /  ALT  34  /  AlkPhos  87  06-20      LIVER FUNCTIONS - ( 20 Jun 2023 14:02 )  Alb: 2.3 g/dL / Pro: 4.2 g/dL / ALK PHOS: 87 U/L / ALT: 34 U/L / AST: 36 U/L / GGT: x           Urinalysis Basic - ( 21 Jun 2023 00:50 )    Color: x / Appearance: x / SG: x / pH: x  Gluc: 107 mg/dL / Ketone: x  / Bili: x / Urobili: x   Blood: x / Protein: x / Nitrite: x   Leuk Esterase: x / RBC: x / WBC x   Sq Epi: x / Non Sq Epi: x / Bacteria: x        Culture - Urine (collected 06-19-23 @ 02:50)  Source: Clean Catch Clean Catch (Midstream)  Final Report (06-20-23 @ 10:18):    No growth    Culture - Abscess with Gram Stain (collected 06-18-23 @ 23:08)  Source: .Abscess right lower quadrant (intrabdominal)  Gram Stain (06-19-23 @ 11:56):    Numerous polymorphonuclear leukocytes per low power field    Numerous Gram positive cocci in pairs per oil power field  Preliminary Report (06-20-23 @ 09:10):    No growth    Culture - Abscess with Gram Stain (collected 06-18-23 @ 23:08)  Source: .Abscess left kidney  Gram Stain (06-19-23 @ 11:58):    Rare polymorphonuclear leukocytes per low power field    Few Gram Positive Cocci per oil power field  Preliminary Report (06-20-23 @ 09:03):    No growth    Culture - Abscess with Gram Stain (collected 06-18-23 @ 20:18)  Source: .Abscess left PCN aspirate  Gram Stain (06-19-23 @ 06:27):    No polymorphonuclear cells seen per low power field    Few Gram Variable Cocci seen per oil power field  Preliminary Report (06-20-23 @ 11:47):    Few Stenotrophomonas maltophilia    Culture - Blood (collected 06-18-23 @ 14:18)  Source: .Blood Blood-Peripheral  Preliminary Report (06-20-23 @ 02:02):    No growth to date.    Culture - Blood (collected 06-18-23 @ 14:18)  Source: .Blood Blood-Peripheral  Preliminary Report (06-20-23 @ 02:02):    No growth to date.        Blood Gas Venous - Lactate: 3.60 mmol/L (06-19-23 @ 02:18)  Lactate, Blood: 6.4 mmol/L (06-18-23 @ 14:18)  Blood Gas Venous - Lactate: 6.20 mmol/L (06-18-23 @ 14:05)      INFECTIOUS DISEASES TESTING  strept    INFLAMMATORY MARKERS      RADIOLOGY & ADDITIONAL TESTS:  I have personally reviewed the last available Chest xray  CXR  Xray Chest 1 View- PORTABLE-Urgent:   ACC: [id number] EXAM:  XR CHEST PORTABLE URGENT 1V   ORDERED BY: CALEB AMIN     PROCEDURE DATE:  06/18/2023          INTERPRETATION:  Clinical History/Reason for Exam:  post procedure    Technique:  Frontal view the chest .    Comparison: Chestx-ray 6/18/2023.    Findings:    Support devices:  Satisfactory position    Cardiac/mediastinum/hilum: Stable    Lung parenchyma/ Pleura: Bilateral basilar opacities. No pneumothorax      Skeleton/soft tissues: Stable degenerative changes      Impression:    Bilateral basilar opacities. No pneumothorax    --- End of Report ---            ASA BOWERS MD; Attending Radiologist  This document has been electronically signed. Jun 19 2023 10:04AM (06-18-23 @ 23:49)      CT  CT Abdomen and Pelvis w/ Oral Cont and w/ IV Cont:   ACC: [id number] EXAM:  CT ABDOMEN AND PELVIS OC IC   ORDERED BY: BALJIT JESUS     PROCEDURE DATE:  06/19/2023          INTERPRETATION:  CLINICAL STATEMENT: Pneumoperitoneum and emphysematous   pyelonephritis status post drain placement.    TECHNIQUE: Contiguous axial CT images were obtained from the lower chest   to the pubic symphysis with intravenous contrast. Oral contrast was   administered. Reformatted images in the coronal and sagittal planes were   acquired.    COMPARISON: CT abdomen and pelvis 6/18/2023.    FINDINGS:    Limited evaluation from streak artifact due to contact with the gantry   and arm positioning.    LOWER CHEST: Bilateral moderate pleural effusions, right increased, with   adjacent subsegmental atelectasis.    HEPATOBILIARY: Unchanged.    SPLEEN: Unchanged.    PANCREAS: Unchanged.    ADRENAL GLANDS: Unchanged.    KIDNEYS: Status post left nephrostomy tube placement. Left enlarged   kidney with severe hydronephrosis/collection has decreased, now measuring   16 x 12 cm from 20 x 14 cm. Again seen gas throughout the left kidney,   likely postprocedural. Unchanged right large renal cyst. No   hydronephrosis.    ABDOMINOPELVIC NODES: Unchanged..    PELVIC ORGANS: Unchanged.    PERITONEUM/MESENTERY/BOWEL: Peritoneal catheter terminates in the right   lower quadrant. Pneumoperitoneum and free fluid is mildly decreased   unchanged since prior. Oral contrast is seen in the terminal ileum. No   contrast extravasation is visualized. Mild abdominopelvic ascites. No   bowel obstruction.    BONES/SOFT TISSUES: Anasarca. Degenerative osseous changes.    VASCULAR: Atherosclerotic calcifications.        IMPRESSION:    Since one day earlier,    No extraluminal contrast. Oral contrast was last seen in the terminal   ileum.    Status post left nephrostomy tube placement. Left enlarged kidney with   severe hydronephrosis/collection has decreased, now measuring 16 x 12 cm   from 20 x 14 cm    --- End of Report ---          NEVIN MONTES MD; Resident Radiologist  This document has been electronically signed.  SOFIA QUINN MD; Attending Radiologist  This document has been electronically signed. Jun 19 2023  5:07PM (06-19-23 @ 15:58)      CARDIOLOGY TESTING  12 Lead ECG:   Ventricular Rate 123 BPM    Atrial Rate 150 BPM    QRS Duration 84 ms    Q-T Interval 368 ms    QTC Calculation(Bazett) 526 ms    R Axis 30 degrees    T Axis 54 degrees    Diagnosis Line Atrial fibrillation with rapid ventricular response  Low voltage QRS  Septal infarct , age undetermined  Abnormal ECG    Confirmed by Deon Velasquez ([id number]) on 6/20/2023 10:52:21 AM (06-19-23 @ 07:45)  12 Lead ECG:   Ventricular Rate 138 BPM    QRS Duration 108 ms    Q-T Interval 314 ms    QTC Calculation(Bazett) 475 ms    R Axis 39 degrees    T Axis 258 degrees    Diagnosis Line Atrial fibrillation with rapid ventricular response  Low voltage QRS  Nonspecific ST and T wave abnormality  Abnormal ECG    Confirmed by ROULA WISEMAN MD ([id number]) on 6/20/2023 7:36:42 AM (06-19-23 @ 01:59)      MEDICATIONS  bacitracin   Ointment 1 Topical two times a day  chlorhexidine 0.12% Liquid 15 Oral Mucosa every 12 hours  chlorhexidine 2% Cloths 1 Topical <User Schedule>  DAPTOmycin IVPB 600 IV Intermittent every 48 hours  dexMEDEtomidine Infusion 0.2 IV Continuous <Continuous>  dextrose 5%. 1000 IV Continuous <Continuous>  dextrose 5%. 1000 IV Continuous <Continuous>  dextrose 50% Injectable 12.5 IV Push once  dextrose 50% Injectable 25 IV Push once  dextrose 50% Injectable 25 IV Push once  glucagon  Injectable 1 IntraMuscular once  heparin  Infusion 1700 IV Continuous <Continuous>  hydrocortisone sodium succinate Injectable 50 IV Push every 6 hours  insulin regular  human corrective regimen sliding scale  SubCutaneous every 6 hours  iron sucrose IVPB 100 IV Intermittent every 24 hours  meropenem  IVPB 1000 IV Intermittent every 12 hours  metoprolol tartrate Injectable 5 IV Push every 6 hours  midodrine 5 Oral every 8 hours  pantoprazole  Injectable 40 IV Push daily  sodium chloride 0.9%. 1000 IV Continuous <Continuous>      WEIGHT  Weight (kg): 76.5 (06-18-23 @ 21:27)  Creatinine: 3.1 mg/dL (06-21-23 @ 00:50)  Creatinine: 3.2 mg/dL (06-20-23 @ 21:56)  Creatinine, Serum: 2.9 mg/dL (06-20-23 @ 14:02)      ANTIBIOTICS:  DAPTOmycin IVPB 600 milliGRAM(s) IV Intermittent every 48 hours  meropenem  IVPB 1000 milliGRAM(s) IV Intermittent every 12 hours      All available historical records have been reviewed

## 2023-06-21 NOTE — PROGRESS NOTE ADULT - ASSESSMENT
Pt is a 67y Male admitted with retroperitoneal & intraabdominal abscess s/p drain placement & L PCN upsizing with IR on 6/18.     PLAN:  -C/w SICU management  -F/u cultures -- prelim G (+) cocci; L PCN aspirate few stenotrophomonas maltophilia  -C/w IV abx per ID -- on Minocycline, Meropenem, and Daptomycin  -Medical, cardiac, and pulm risk stratification for left nephrectomy  -Planning for left nephrectomy next week w/ Dr. Vásquez & Dr. Womack pending patient stabilization & clearances  -Discussed with Dr. Vásquez

## 2023-06-22 NOTE — PRE-ANESTHESIA EVALUATION ADULT - NSANTHPMHFT_GEN_ALL_CORE
HTN Hiatal Hernia   New Onset A.Fib Controlled with Cardizem  Hx of Gastric MAss  Hydronephrosis
Presented in septic shock - on BiPAP, on levo, on amio  PMHx: New Onset with A Fib with RVR, HTN, TAA, BRADEN, gastric mass c/b untreated H. pylori, ?RCC, ROJAS vs ROJAS on CKI, PNA dx on 5/21/23  PSHx: H/o hydronephrosis s/p L percutaneous nephrostomy tube
htn, sepsis, hydronephrosis, new onset atrial fibrillation,

## 2023-06-22 NOTE — BRIEF OPERATIVE NOTE - NSICDXBRIEFPROCEDURE_GEN_ALL_CORE_FT
PROCEDURES:  Nephrostolithotomy, percutaneous, with lithotripsy, large stone 22-Jun-2023 23:27:09 left large thick abscess materia dimension of aspirate over 4 x 4 x 4 cm Bernie Perdomo  Change external ureteral stent 22-Jun-2023 23:28:03 left Bernie Perdomo  Nephrostogram 22-Jun-2023 23:28:31 left Bernie Perdomo   PROCEDURES:  Change external ureteral stent 22-Jun-2023 23:28:03 left Bernie Perdomo  Partial nephrectomy 28-Jun-2023 18:47:58  Bernie Perdomo  Nephrostogram via existing catheter 28-Jun-2023 18:48:33  Bernie Perdomo

## 2023-06-22 NOTE — PROGRESS NOTE ADULT - ASSESSMENT
67M w/ PMH of HTN, grade IV hydronephrosis of L kidney s/p L PCN (placed 2023), stutter, hiatal hernia, iron deficiency anemia, H Pylori (untreated), and gastric mass (never biopsied) presents to the ED with at least 4 days of worsening weakness, abdominal distension, and no output from his PCN. Admitted with emphysematous pyelonephritis    (): s/p LT PCN upsizing to 16Fr and 16Fr RLQ drain placement with IR     NEURO:  #Pain control    - APAP and tramadol PRN  #H/o stutter (negative ischemic workup last admission)      RESP:  #acute respiratory failure    - Returned intubated post drain upsizing by IR     - Extubated - now on O2 via NC 2 lpm, saturating 98%    - Maintain O2 sat > 92%     -encourage is  #Pulmonary nodules / Mediastinal lymphadenopathy    -CT Chest: Right middle and upper lobe solid pulmonary nodules     -Recommend outpatient follow up with pulmonology   #Activity   -Bedrest     CARDS:   #Septic shock    - On midodrine 5mg q8hr, off levophed    #New onset Afib w/ RVR with hypotension -- likely 2/2 sepsis    -IV metoprolol 7.5 mg IVP q6h- HR remains in 130s and refractory to Cardizem( single 5mg IVP w/ no change in HR)    -() 150cc amio bolus, amio drip d/c     -() s/p 250cc Dig x1   #HTN    -Holding home amlodipine 2.5 qD while hypotensive      Currently normotensive   Imaging    - EKG (): Afib w/ RVR to 138     - ECHO:  EF 55-60%, mild AS, mild LAE, mild MR/TR, trivial pericardial effusion    GI/NUTR:  #Gastric mass vs gastrohepatic lymphadenopathy    -CT A/P:Previously noted lesser curvature mass measures 6.3 x 5.2 cm (previously 5.2 x 3.5 cm by my measurement), and may represent gastrohepatic lymphadenopathy.  #Hepatic hypodensities, suspicious for development for multiple focal abscesses    -CT A/P: New hepatic hypodensities measuring up to 2.6 cm, suspicious for development of multiple focal abscesses.  #Splenic Hypodensities, infarct vs phlegmon/abscess    -CT A/P:  Wedge-shaped region of hypodensity within the spleen may reflect splenic infarct in the correct clinical setting. Developing phlegmon/abscess cannot be excluded in the current clinical setting   #Diet    -Clear liquid diet- consider starting nutritional supplements    -Passed bedside swallow evaluation    -Last BM:  in the ED    /RENAL:   #Grade IV L hydronephrosis s/p L PCN (2023) -- presented to ED with thick dark foul smelling output  #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    -() s/p LT PCN upsizing to 16Fr and 16Fr RLQ drain placement with IR, flush q8 hr    - CT A/P with PO/IV to r/o bowel perforation: Status post left nephrostomy tube placement. Left enlarged kidney with severe hydronephrosis/collection has decreased, now measuring 16 x 12 cm from 20 x 14 cm    -Plan for L nephrectomy for   #ROJAS (baseline Cr 1.0)    -Cr 2.9 to 3.2 to 3.1      S/ P lasix challenge 80mg    -() FeNa 1.1%, intrinsic    -Lasix 80mg x 1 on  uo improved transiently    -Nephrology following, may require RRT        Labs:          BUN/Cr- 92/2.9  -->,  92/3.2  -->101/3.1          Electrolytes: Sodium/Potassium/Magnesium/Phosphate  - @ 21:28  Sodium  124  Potassium 4.5  Magnesium 2.1  Phosphorus  6.0    @ 17:08  Sodium  125  Potassium 4.8  Magnesium --   Phosphorus  --        HEME/ONC:   #H/o Fe Deficiency anemia    - Iron supplementation  #DVT prophylaxis, patient on full AC    -Hep gtt @1600 units/ he PTT @  was 69.9, follow 0430 PTT        Labs: Hb/Hct:  8.3/25.2  -->,  7.9/24.3  -->   9.1/ 27.9              Plts:  316  -->,  218  -->   227              PTT: 66.9-> 108 (decrease heparin gtt to 1600) -> 69.9-->  T&S  --> new T&S to be drawn this morning  Blood Consent- in chart     ID:  #Leukocytosis 2/2 sepsis --> currently 48.37 (uptrending)    - Blood cultures remain negative, abscess cultures with Stenotrophomonas maltophilia    - s/p vancomycin, cefepime, and flagyl in the ED    - Meropenem 1g q12h--> changed to Levofloxacin on  based on sensitivities    - Daptomycin 600 q48h    -ID following    - Afebrile     - UA (): positive, Urine culture  no growth (final)    ENDO:  #Glucose monitoring    -ISS     -A1c (23): 5.0    -FS Q6H while on trickle feeds     #Adrenal Insufficieny 2/2 to sepsis     -Solucortef to 50mg q8hr- taper in progress, plan to decrease to q12h today    -Random cortisol level -sent, results pending    MSK:  #Activity    -Advance as tolerated    -PT/rehab     LINES/DRAINS: PIV, L radial [place]ole[place] (23), Mena (23), Javier Drain (23), LT PCN (23). Left basilic midline    Left IJ TLC removed on     ADVANCED DIRECTIVES:  Full Code    HCP/Emergency Contact- Tracey Adames: [phone]     INDICATION FOR SICU/SDU: New onset atrial fibrillation, intraperitoneal free requiring air mechanical ventilation     DISPO: SICU-> patient has ongoing sign and symptoms of sepsis--elevated WBC, tachycardia end organ dysfunction   67M w/ PMH of HTN, grade IV hydronephrosis of L kidney s/p L PCN (placed 5/2023), stutter, hiatal hernia, iron deficiency anemia, H Pylori (untreated), and gastric mass (never biopsied) presents to the ED with at least 4 days of worsening weakness, abdominal distension, and no output from his PCN. Admitted with emphysematous pyelonephritis    (6/18): s/p LT PCN upsizing to 16Fr and 16Fr RLQ drain placement with IR     NEURO:  #Pain control    - APAP and tramadol PRN  #H/o stutter (negative ischemic workup last admission)    RESP:  #acute respiratory failure    - Returned intubated post drain upsizing by IR 6/18    - Extubated 6/21- now on O2 via NC 2 lpm, saturating 98%    - Maintain O2 sat > 92%    - Encourage IS  #Pulmonary nodules / Mediastinal lymphadenopathy    - CT Chest: Right middle and upper lobe solid pulmonary nodules     - Recommend outpatient follow up with pulmonology   #Activity   - OOBTC as tolerated    CARDS:   #Septic shock    - On midodrine 5mg q8hr, off levophed 6/20   #New onset Afib w/ RVR with hypotension -- likely 2/2 sepsis    - HR in the 140s overnight despite metoprolol pushes and subsequent cardizem 5mg IVP x1, trial of cardizem drip today    - Cardiology following, monitor on drip but rec'd holding off on amiodarone as management   #HTN    -Holding home amlodipine 2.5 qD while hypotensive      Currently normotensive   Imaging    - EKG (6/19): Afib w/ RVR to 138     - ECHO: 6/19 EF 55-60%, mild AS, mild LAE, mild MR/TR, trivial pericardial effusion    GI/NUTR:  #Gastric mass vs gastrohepatic lymphadenopathy    -CT A/P:Previously noted lesser curvature mass measures 6.3 x 5.2 cm (previously 5.2 x 3.5 cm by my measurement), and may represent gastrohepatic lymphadenopathy.  #Hepatic hypodensities, suspicious for development for multiple focal abscesses    -CT A/P: New hepatic hypodensities measuring up to 2.6 cm, suspicious for development of multiple focal abscesses.  #Splenic Hypodensities, infarct vs phlegmon/abscess    -CT A/P:  Wedge-shaped region of hypodensity within the spleen may reflect splenic infarct in the correct clinical setting. Developing phlegmon/abscess cannot be excluded in the current clinical setting     -Abd progressively more distended but patient denies pain. (+) flatus  #Diet    -FLD with nepro supplementation    -Last BM: 6/18 in the ED    /RENAL:   #Grade IV L hydronephrosis s/p L PCN (5/2023) -- presented to ED with thick dark foul smelling output  #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    -(6/18) s/p LT PCN upsizing to 16Fr and 16Fr RLQ drain placement with IR, flush q8 hr    - CT A/P with PO/IV to r/o bowel perforation: Status post left nephrostomy tube placement. Left enlarged kidney with severe hydronephrosis/collection has decreased, now measuring 16 x 12 cm from 20 x 14 cm    -Plan for L nephrectomy for 6/26, NPO after midnight for drainage with urology  #ROJAS (baseline Cr 1.0)    -Cr 3.1>3.3      Dosed with lasix 80mg x2 yesterday with second dose given in the evening with Albumin 25%, 50cc      Now oliguric with UOP ~30-50cc hourly, plan to redose lasix today    -(6/19) FeNa 1.1%, intrinsic    -Lasix 80mg x 1 on 6/21  improved transiently    -Nephrology following    -On bicarb tabs, phoslo       HEME/ONC:   #H/o Fe Deficiency anemia    - Iron supplementation  #DVT prophylaxis, patient on full AC    -Hep gtt @1600 units/ he PTT @ 2000 was 69.9, follow 0430 PTT  f/u T&S  Blood Consent- in chart     ID:  #Leukocytosis 2/2 sepsis --> currently 48.37 (uptrending)    - Blood and urine cultures negative, abscess cultures with Stenotrophomonas maltophilia    - s/p vancomycin, cefepime, and flagyl in the ED    - Continuing meropenem and levaquin    - ID following    - Afebrile     ENDO:  #Glucose monitoring    -ISS     -A1c (5/7/23): 5.0    -FS Q6H while on trickle feeds     #Adrenal Insufficieny 2/2 to sepsis     -Solucortef to 50mg q12hr today, transition to daily dosing tomorrow    -Random cortisol level -sent, results pending    MSK:  #Activity    -Advance as tolerated    -PT/rehab     LINES/DRAINS: PIV, L radial [place] (6/18/23), Mena (6/18/23), Javier Drain (6/18/23), LT PCN (6/18/23). Left basilic midline  6/21    ADVANCED DIRECTIVES:  Full Code    HCP/Emergency Contact- Tracey Adames: [phone]     INDICATION FOR SICU/SDU: New onset atrial fibrillation    DISPO: SICU-> worsening leukocytosis, Afib with RVR, end organ dysfunction

## 2023-06-22 NOTE — BRIEF OPERATIVE NOTE - OPERATION/FINDINGS
thick gelatinous materia that was difficult to suction out even with the shock pulse. I would alternate 2 prong to remove and break up / shock pulse to suck out. after an hour where I had no clear planes I electe dto stop. irrigation through the 26 bobby took out a lot additional materail  in ashlyn catch cup 4 x 4 x 4 cm lump of materail trapped thick gelatinous material that was difficult to suction out even with the shock pulse. I would alternate 2 prong to remove and break up / shock pulse to suck out. after an hour where I had no clear planes I elected to stop. irrigation through the 26 bobby took out a lot additional material  in ashlyn catch cup 4 x 4 x 4 cm lump of material trapped thick gelatinous material that was difficult to suction out even with the shock pulse. I would alternate 2 prong to remove and break up / shock pulse to suck out. after an hour where I had no clear planes I elected to stop. irrigation through the 26 bobby took out a lot additional material  in ashlyn catch cup 4 x 4 x 4 cm lump of material trapped    please note the  procedure codes were fine tuned to what had happened after discussion with pathology and the coders

## 2023-06-22 NOTE — PROGRESS NOTE ADULT - ASSESSMENT
Pt is a 67y Male admitted with retroperitoneal & intraabdominal abscess s/p drain placement & L PCN upsizing with IR on 6/18.    PLAN:  -C/w SICU management  -IR recalled today for possible additional left drain to help decompress kidney in preparation for nephrectomy next week w/ Dr. Vásquez & Dr. Womack  -C/w abx per ID -- switched to Zosyn & Levaquin  -Cx growing stenotrophomonas maltophilia & peptoniphilus species  -Medical, cardiac, and pulm risk stratification for left nephrectomy  -D/w Dr. Vásquez Pt is a 67y Male admitted with retroperitoneal & intraabdominal abscess s/p drain placement & L PCN upsizing with IR on 6/18.    PLAN:  -C/w SICU management  -IR recalled today for possible additional left drain to help decompress kidney in preparation for nephrectomy next week w/ Dr. Vásquez & Dr. Womack  -C/w abx per ID -- switched to Zosyn & Levaquin  -Cx growing stenotrophomonas maltophilia & peptoniphilus species  -Medical, cardiac, and pulm risk stratification for left nephrectomy  -D/w Dr. Vásquez    ADDENDUM @ 1230:  -Patient added on for percutaneous drainage of left renal abscess, possible open with Dr. Perdomo or Dr. Vásquez today  -NPO Pt is a 67y Male admitted with retroperitoneal & intraabdominal abscess s/p drain placement & L PCN upsizing with IR on 6/18.    PLAN:  -C/w SICU management  -IR recalled today for possible additional left drain to help decompress kidney in preparation for nephrectomy next week w/ Dr. Vásquez & Dr. Womack  -C/w abx per ID -- switched to Zosyn & Levaquin  -Cx growing stenotrophomonas maltophilia & peptoniphilus species  -Medical, cardiac, and pulm risk stratification for left nephrectomy  -D/w Dr. Vásquez    ADDENDUM @ 9360:  -Patient added on for percutaneous drainage of left renal abscess, possible open with Dr. Perdomo or Dr. Vásquez today  -NPO  -Hold hep gtt for now  -Dr. Perdomo had at length discussion w/ pt's daughter Rupali (HCP) regarding risks vs benefits of above procedure -- consent obtained & placed in chart Pt is a 67y Male admitted with retroperitoneal & intraabdominal abscess s/p drain placement & L PCN upsizing with IR on 6/18.    PLAN:  -C/w SICU management  -IR recalled today for possible additional left drain to help decompress kidney in preparation for nephrectomy next week w/ Dr. Cruz & Dr. Womack  -C/w abx per ID -- switched to Zosyn & Levaquin  -Cx growing stenotrophomonas maltophilia & peptoniphilus species  -Medical, cardiac, and pulm risk stratification for left nephrectomy  -D/w Dr. Cruz    ADDENDUM @ 7400:  -Patient added on for percutaneous drainage of left renal abscess, possible open with Dr. Perdomo or Dr. Cruz today  -NPO  -Hold hep gtt for now  -Dr. Perdomo had at length discussion w/ pt's daughter Rupali (HCP) regarding risks vs benefits of above procedure -- consent obtained & placed in chart    wbc up to 41.8  case reviewed with IR attending and dr cruz, they have no larger drain and unless we can get juan pablo pus drained he will need an open drainage  family aware i cannot do a wash out but if we can drain the pus it might enable waiting fr the system to settle own and then if when he goes for open surgery it would be a nephrectomy    they are aware it is not a guarantee he might still arielle to o open tomorrw and it is not risk free

## 2023-06-22 NOTE — PROGRESS NOTE ADULT - ASSESSMENT
67M w/ PMH of HTN, grade IV hydronephrosis of L kidney s/p L PCN (placed 5/2023), stutter, hiatal hernia, iron deficiency anemia, H Pylori (untreated), and gastric mass (never biopsied) presents to the ED with at least 4 days of worsening weakness, abdominal distension, and no output from his PCN. Found to have septic shock, MSOF, lactic acidosis from left emphysematous hydronephrosis, pyelonephritis and possible rupture with pneumoperitoneum along with possible hepatic abscesses, splenic infarct and abdominal and mediastinal lymphadenopathies. he is s/p upsizing of left PCN tube by IR along with RLQ drain placed. Nephro eval for ROJAS and oliguria     Assessment and plan  ROJAS/ Hyponatremia/ severe left hydro s/p upsizing of left PCN catheter/ emphysematous pyelo with septic shock/ pneumoperitoneum/ ? kidney rupture/ ? hepatic abscesses/ splenic infarct  ROJAS likely ATN iso septic shock  off levophed / on midodrine  tachycardic, needs rate control in A fib   oliguria improved with iv lasix today can repeat today   hyponatremia stable  cont lasix 80mg iv q12h  strict i/o  phos level noted;  started phoslo 1 tab TID qac if/when tolerating po intake   s/p upsizing of left PCN tube by IR, still not draining planned for possible Lt nephrectomy    and IR following  cont abx per ID recs  no acute indication for RRT as of now  d/w SICU team

## 2023-06-22 NOTE — PROGRESS NOTE ADULT - SUBJECTIVE AND OBJECTIVE BOX
Nephrology progress note    THIS IS AN INCOMPLETE NOTE . FULL NOTE TO FOLLOW SHORTLY    Patient is seen and examined, events over the last 24 h noted .    Allergies:  No Known Allergies    Hospital Medications:   MEDICATIONS  (STANDING):  acetaminophen     Tablet .. 650 milliGRAM(s) Oral every 6 hours  bacitracin   Ointment 1 Application(s) Topical two times a day  calcium acetate 667 milliGRAM(s) Oral three times a day with meals  chlorhexidine 2% Cloths 1 Application(s) Topical <User Schedule>  ferrous    sulfate 325 milliGRAM(s) Oral daily  gabapentin 100 milliGRAM(s) Oral every 8 hours  heparin  Infusion. 1600 Unit(s)/Hr (16 mL/Hr) IV Continuous <Continuous>  hydrocortisone sodium succinate Injectable 50 milliGRAM(s) IV Push every 8 hours  insulin regular  human corrective regimen sliding scale   SubCutaneous every 6 hours  iron sucrose IVPB 100 milliGRAM(s) IV Intermittent every 24 hours  levoFLOXacin IVPB 750 milliGRAM(s) IV Intermittent every 48 hours  metoprolol tartrate Injectable 5 milliGRAM(s) IV Push every 6 hours  midodrine 5 milliGRAM(s) Oral every 8 hours  piperacillin/tazobactam IVPB.. 3.375 Gram(s) IV Intermittent every 8 hours  sodium bicarbonate 1300 milliGRAM(s) Oral three times a day  sodium chloride 2 Gram(s) Oral every 8 hours        VITALS:  T(F): 96.7 (06-22-23 @ 08:00), Max: 98.2 (06-22-23 @ 04:00)  HR: 142 (06-22-23 @ 08:00)  BP: 116/83 (06-22-23 @ 08:00)  RR: 23 (06-22-23 @ 08:00)  SpO2: 97% (06-22-23 @ 08:00)  Wt(kg): --    06-20 @ 07:01  -  06-21 @ 07:00  --------------------------------------------------------  IN: 3323.8 mL / OUT: 1175 mL / NET: 2148.8 mL    06-21 @ 07:01  -  06-22 @ 07:00  --------------------------------------------------------  IN: 1538 mL / OUT: 1445 mL / NET: 93 mL    06-22 @ 07:01  -  06-22 @ 08:55  --------------------------------------------------------  IN: 16 mL / OUT: 0 mL / NET: 16 mL          PHYSICAL EXAM:  Constitutional: NAD  HEENT: anicteric sclera, oropharynx clear, MMM  Neck: No JVD  Respiratory: CTAB, no wheezes, rales or rhonchi  Cardiovascular: S1, S2, RRR  Gastrointestinal: BS+, soft, NT/ND  Extremities: No cyanosis or clubbing. No peripheral edema  :  No bobby.   Skin: No rashes    LABS:  06-21    124<L>  |  90<L>  |  101<HH>  ----------------------------<  120<H>  4.5   |  17  |  3.3<H>    Ca    7.5<L>      21 Jun 2023 21:28  Phos  6.0     06-21  Mg     2.1     06-21    TPro  4.2<L>  /  Alb  2.3<L>  /  TBili  0.6  /  DBili      /  AST  36  /  ALT  34  /  AlkPhos  87  06-20                          9.1    48.37 )-----------( 227      ( 21 Jun 2023 21:28 )             27.9       Urine Studies:  Urinalysis Basic - ( 21 Jun 2023 21:28 )    Color:  / Appearance:  / SG:  / pH:   Gluc: 120 mg/dL / Ketone:   / Bili:  / Urobili:    Blood:  / Protein:  / Nitrite:    Leuk Esterase:  / RBC:  / WBC    Sq Epi:  / Non Sq Epi:  / Bacteria:       Sodium, Random Urine: 40.0 mmoL/L (06-19 @ 02:50)  Creatinine, Random Urine: 80 mg/dL (06-19 @ 02:50)      Iron 12, TIBC 128, %sat 9      [05-07-23 @ 09:19]  Ferritin 526      [05-07-23 @ 09:19]  Vitamin D (25OH) 26      [05-08-23 @ 07:58]  TSH 0.45      [06-19-23 @ 14:14]          RADIOLOGY & ADDITIONAL STUDIES:   Nephrology progress note    Patient is seen and examined, events over the last 24 h noted .  Lying in bed comfortable     Allergies:  No Known Allergies    Hospital Medications:   MEDICATIONS  (STANDING):    acetaminophen     Tablet .. 650 milliGRAM(s) Oral every 6 hours  bacitracin   Ointment 1 Application(s) Topical two times a day  calcium acetate 667 milliGRAM(s) Oral three times a day with meals  ferrous    sulfate 325 milliGRAM(s) Oral daily  gabapentin 100 milliGRAM(s) Oral every 8 hours  heparin  Infusion. 1600 Unit(s)/Hr (16 mL/Hr) IV Continuous <Continuous>  hydrocortisone sodium succinate Injectable 50 milliGRAM(s) IV Push every 8 hours  insulin regular  human corrective regimen sliding scale   SubCutaneous every 6 hours  iron sucrose IVPB 100 milliGRAM(s) IV Intermittent every 24 hours  levoFLOXacin IVPB 750 milliGRAM(s) IV Intermittent every 48 hours  metoprolol tartrate Injectable 5 milliGRAM(s) IV Push every 6 hours  midodrine 5 milliGRAM(s) Oral every 8 hours  piperacillin/tazobactam IVPB.. 3.375 Gram(s) IV Intermittent every 8 hours  sodium bicarbonate 1300 milliGRAM(s) Oral three times a day  sodium chloride 2 Gram(s) Oral every 8 hours        VITALS:  T(F): 96.7 (06-22-23 @ 08:00), Max: 98.2 (06-22-23 @ 04:00)  HR: 142 (06-22-23 @ 08:00)  BP: 116/83 (06-22-23 @ 08:00)  RR: 23 (06-22-23 @ 08:00)  SpO2: 97% (06-22-23 @ 08:00)      06-20 @ 07:01  -  06-21 @ 07:00  --------------------------------------------------------  IN: 3323.8 mL / OUT: 1175 mL / NET: 2148.8 mL    06-21 @ 07:01  -  06-22 @ 07:00  --------------------------------------------------------  IN: 1538 mL / OUT: 1445 mL / NET: 93 mL    06-22 @ 07:01  -  06-22 @ 08:55  --------------------------------------------------------  IN: 16 mL / OUT: 0 mL / NET: 16 mL          PHYSICAL EXAM:  Constitutional: NAD  Respiratory: CTAB,   Cardiovascular: S1, S2, RRR  Gastrointestinal: BS+, soft, NT/ND  Extremities: No cyanosis or clubbing. No peripheral edema  :  No bobby.   Skin: No rashes    LABS:      06-21    124<L>  |  90<L>  |  101<HH>  ----------------------------<  120<H>  4.5   |  17  |  3.3<H>    Ca    7.5<L>      21 Jun 2023 21:28  Phos  6.0     06-21  Mg     2.1     06-21    TPro  4.2<L>  /  Alb  2.3<L>  /  TBili  0.6  /  DBili      /  AST  36  /  ALT  34  /  AlkPhos  87  06-20                          9.1    48.37 )-----------( 227      ( 21 Jun 2023 21:28 )             27.9       Urine Studies:  Urinalysis Basic - ( 21 Jun 2023 21:28 )    Color:  / Appearance:  / SG:  / pH:   Gluc: 120 mg/dL / Ketone:   / Bili:  / Urobili:    Blood:  / Protein:  / Nitrite:    Leuk Esterase:  / RBC:  / WBC    Sq Epi:  / Non Sq Epi:  / Bacteria:       Sodium, Random Urine: 40.0 mmoL/L (06-19 @ 02:50)  Creatinine, Random Urine: 80 mg/dL (06-19 @ 02:50)      Iron 12, TIBC 128, %sat 9      [05-07-23 @ 09:19]  Ferritin 526      [05-07-23 @ 09:19]  Vitamin D (25OH) 26      [05-08-23 @ 07:58]  TSH 0.45      [06-19-23 @ 14:14]          RADIOLOGY & ADDITIONAL STUDIES:

## 2023-06-22 NOTE — PROGRESS NOTE ADULT - SUBJECTIVE AND OBJECTIVE BOX
UROLOGY DAILY PROGRESS NOTE    Pt is a 67y Male admitted with retroperitoneal & intraabdominal abscess s/p drain placement & L PCN upsizing with IR on 6/18. Patient seen and examined at bedside earlier this AM - denies pain but fatigued & unable to obtain other ROS. Started on cardizem gtt for AFib.    MEDICATIONS  (STANDING):  acetaminophen     Tablet .. 650 milliGRAM(s) Oral every 6 hours  bacitracin   Ointment 1 Application(s) Topical two times a day  calcium acetate 667 milliGRAM(s) Oral three times a day with meals  chlorhexidine 2% Cloths 1 Application(s) Topical <User Schedule>  diltiazem Infusion 5 mG/Hr (5 mL/Hr) IV Continuous <Continuous>  ferrous    sulfate 325 milliGRAM(s) Oral daily  gabapentin 100 milliGRAM(s) Oral every 8 hours  heparin  Infusion. 1600 Unit(s)/Hr (16 mL/Hr) IV Continuous <Continuous>  hydrocortisone sodium succinate Injectable 50 milliGRAM(s) IV Push every 12 hours  levoFLOXacin IVPB 750 milliGRAM(s) IV Intermittent every 48 hours  meropenem  IVPB 500 milliGRAM(s) IV Intermittent every 12 hours  midodrine 5 milliGRAM(s) Oral every 8 hours  sodium bicarbonate 1300 milliGRAM(s) Oral three times a day    MEDICATIONS  (PRN):  sodium chloride 0.9% lock flush 10 milliLiter(s) IV Push every 1 hour PRN Pre/post blood products, medications, blood draw, and to maintain line patency  traMADol 25 milliGRAM(s) Oral every 8 hours PRN Moderate Pain (4 - 6)    REVIEW OF SYSTEMS   [ ] Due to altered mental status/intubation, subjective information were not able to be obtained from patient. History was obtained, to the extent possible, from review of the chart and collateral sources of information.    Vital Signs Last 24 Hrs  T(C): 35.9 (22 Jun 2023 12:00), Max: 36.8 (22 Jun 2023 04:00)  T(F): 96.7 (22 Jun 2023 12:00), Max: 98.2 (22 Jun 2023 04:00)  HR: 120 (22 Jun 2023 12:00) (120 - 145)  BP: 94/72 (22 Jun 2023 12:00) (94/72 - 143/69)  BP(mean): 80 (22 Jun 2023 12:00) (80 - 100)  RR: 18 (22 Jun 2023 12:00) (17 - 23)  SpO2: 96% (22 Jun 2023 12:00) (95% - 99%)    Parameters below as of 22 Jun 2023 12:00  Patient On (Oxygen Delivery Method): nasal cannula  O2 Flow (L/min): 1    PHYSICAL EXAM:  GEN: Ill appearing male in no acute distress, awake but fatigued.  SKIN: Non diaphoretic.  RESP: No use of accessory muscles while on nasal cannula.  ABDO: Distended abdomen slightly firm, nontender to palpation. Right abdominal drain in place with seropurulent output. Left PCN drain w/ malodorous pink/purulent material.  : +++penoscrotal edema. Mena in place draining clear yellow urine.  EXT: B/L upper and lower extremity edema.    I&O's Summary  21 Jun 2023 07:01  -  22 Jun 2023 07:00  --------------------------------------------------------  IN: 1538 mL / OUT: 1445 mL / NET: 93 mL    22 Jun 2023 07:01  -  22 Jun 2023 12:51  --------------------------------------------------------  IN: 665 mL / OUT: 205 mL / NET: 460 mL    LABS:             9.1    41.80 )-----------( 235      ( 22 Jun 2023 11:50 )             28.0     06-21  124<L>  |  90<L>  |  101<HH>  ----------------------------<  120<H>  4.5   |  17  |  3.3<H>    Ca    7.5<L>      21 Jun 2023 21:28  Phos  6.0     06-21  Mg     2.1     06-21    TPro  4.2<L>  /  Alb  2.3<L>  /  TBili  0.6  /  DBili  x   /  AST  36  /  ALT  34  /  AlkPhos  87  06-20    PTT - ( 22 Jun 2023 11:50 )  PTT:93.6 sec    Urinalysis Basic - ( 21 Jun 2023 21:28 )  Color: x / Appearance: x / SG: x / pH: x  Gluc: 120 mg/dL / Ketone: x  / Bili: x / Urobili: x   Blood: x / Protein: x / Nitrite: x   Leuk Esterase: x / RBC: x / WBC x   Sq Epi: x / Non Sq Epi: x / Bacteria: x    RADIOLOGY & ADDITIONAL STUDIES:  < from: Xray Kidney Ureter Bladder (06.22.23 @ 11:23) >  FINDINGS/IMPRESSION:    Nonobstructive bowel gas pattern. Pneumoperitoneum is difficult to   evaluate on this supine view Partially visualized drainage catheter   terminating in the right lower quadrant. Partially visualized left   nephrostomy tube.    Degenerative visualized osseous structures.  < end of copied text >

## 2023-06-22 NOTE — PROGRESS NOTE ADULT - SUBJECTIVE AND OBJECTIVE BOX
CHRISTINE KHALIL  MRN-[id number]  1955  67M    HPI:  67M w/ PMH of HTN, grade IV hydronephrosis of L kidney s/p L PCN (placed 5/2023), stutter, hiatal hernia, iron deficiency anemia, H Pylori (untreated), and gastric mass (never biopsied) presents to the ED with at least 4 days of worsening weakness, abdominal distension, and no output from his PCN. Pt and daughters bedside are only able to give limited information, but for the past few days, he has noticed no output from his PCN as well as increasing abdominal distension, pain, and anorexia. He reports that he has not felt normal for the past 10 days. His PCN used to put out serosanguinous fluid, but the output changed to feculent/brown liquid over the past day. In the ED, he was noted to be in new onset afib w/ RVR to 150+ as well as experiencing some difficulty breathing. He was started on BiPAP. Stat labs were notable for WBC 47, Cr 2.9, and lactate 6. CT A/P w/ IV contrast showed free intraperitoneal fluid and air from an unknown source as well as fluid and air in the L kidney.     SICU was consulted for hemodynamic monitoring 2/2 intraabdominal sepsis. Patient went with IR for Left PCN upsized to 16Fr and RLQ drain placement.   FIndings: Left PCN upsized to 16F and 1200cc of very viscous tan colored fluid was aspirated. RLQ 16F drain was placed and 1200cc of tan colored fluid was aspirated (less viscous). A sample from each location was sent for culture.     24 hour Events:   6/21  NIGHT   - PM rounds: Abd soft, distended, NT. LT PCN draining purulent fluid (flushing & aspirating easier); RLQ drain with serous fluid. 2L NC  -Reports passing flatus   -Cardizem 5mg-no improvement in HR which remains in the 130's    Day  Extubated to face mask, now on NC  Minocycline switched to levaquin per ID given susceptibilities for S. maltophilia  Lasix 80mg IVP x1 with minimal response  Decr IVF to 75cc  NaCl 2g q8  Changed metop PO 25mg q8, HR uptrending to 130-140, lopressor 5mgx1 given  Tylenol/gabapentin/tramadol started   Hep gtt now @ 1600 for .8  DCed TLC, placed L basilic midline  25% albumin 50mL/Lasix 80mg redosed, DCed maintenance fluids  CLD   Bicarb tabs                  REVIEW OF SYSTEMS:  [X] A ten-point review of systems was otherwise negative except as noted above.  [  ] Due to altered mental status/intubation, subjective information was not attained from the patient. History was obtained, to the extent possible, from review of the chart and collateral sources of information.  ----------------------------------------------------------------------------------------------------------------------  67M w/ PMH of HTN, grade IV hydronephrosis of L kidney s/p L PCN (placed 5/2023), stutter, hiatal hernia, iron deficiency anemia, H Pylori (untreated), and gastric mass (never biopsied) presents to the ED with at least 4 days of worsening weakness, abdominal distension, and no output from his PCN. Admitted with emphysematous pyelonephritis    (6/18): s/p LT PCN upsizing to 16Fr and 16Fr RLQ drain placement with IR     NEURO:  #Pain control    - APAP and tramadol PRN  #H/o stutter (negative ischemic workup last admission)      RESP:  #acute respiratory failure    - Returned intubated post drain upsizing by IR 6/18    - Tolerated SAT/SBT, extubated to face mask    - Maintain O2 sat > 92%  #Pulmonary nodules / Mediastinal lymphadenopathy    -CT Chest: Right middle and upper lobe solid pulmonary nodules     -Recommend outpatient follow up with pulmonology   #Activity   -Bedrest     CARDS:   #Septic shock    - On midodrine 5mg q8hr, off levophed 6/20    intermittent NICOM- Albumin 250cc q6x 4doses on 6/19  #New onset Afib w/ RVR with hypotension -- likely 2/2 sepsis    -IV metoprolol 5mg q6hr transitioned to PO 25mg q8hr PO    -(6/18) 150cc amio bolus, amio drip d/c 6/18    -(6/19) s/p 250cc Dig x1   #HTN    -Holding home amlodipine 2.5 qD while hypotensive   Imaging    - EKG (6/19): Afib w/ RVR to 138     - ECHO: 6/19 EF 55-60%, mild AS, mild LAE, mild MR/TR, trivial pericardial effusion    GI/NUTR:  #Gastric mass vs gastrohepatic lymphadenopathy    -CT A/P:Previously noted lesser curvature mass measures 6.3 x 5.2 cm (previously 5.2 x 3.5 cm by my measurement), and may represent gastrohepatic lymphadenopathy.  #Hepatic hypodensities, suspicious for development for multiple focal abscesses    -CT A/P: New hepatic hypodensities measuring up to 2.6 cm, suspicious for development of multiple focal abscesses.  #Splenic Hypodensities, infarct vs phlegmon/abscess    -CT A/P:  Wedge-shaped region of hypodensity within the spleen may reflect splenic infarct in the correct clinical setting. Developing phlegmon/abscess cannot be excluded in the current clinical setting   #Diet    -NPO except for meds, ADAT    -SLP if bedside swallow fails    -Last BM: 6/18 in the ED    /RENAL:   #Grade IV L hydronephrosis s/p L PCN (5/2023) -- now w/ ?feculent output  #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    -(6/18) s/p LT PCN upsizing to 16Fr and 16Fr RLQ drain placement with IR, flush q8 hr    -Urology recs - CT A/P with PO/IV to r/o bowel perforation: Status post left nephrostomy tube placement. Left enlarged kidney with severe hydronephrosis/collection has decreased, now measuring 16 x 12 cm from 20 x 14 cm    -Plan for L nephrectomy for 6/26  #ROJAS (baseline Cr 1.0)    -Cr 2.9 to 3.2 to 3.1, UOP remains minimal    -(6/19) FeNa 1.1%, intrinsic    -Lasix 80mg x1 today    -Hyperkalemic overnight with resolution after insulin/dextrose administration    -Nephrology following, may require RRT        Labs:          BUN/Cr- 92/2.9  -->,  92/3.2  -->          Electrolytes-Na 124 // K 5.3 // Mg 1.9 //  Phos 6.2 (06-20 @ 21:56)      HEME/ONC:   #H/o Fe Deficiency anemia    - Iron supplementation  #DVT prophylaxis, patient on full AC    -Hep gtt @1800u; f/u AM PTT        Labs: Hb/Hct:  8.3/25.2  -->,  7.9/24.3  -->                      Plts:  316  -->,  218  -->                 PTT/INR:  49.5/--  --->,  49.1/--  --->     T&S Expires: 6/21  Blood Consent- in chart     ID:  #Leukocytosis 2/2 sepsis     - Blood cultures remain negative, abcess cultures with Stenotrophomonas maltophilia    - s/p vancomycin, cefepime, and flagyl in the ED    - Meropenem 1g q12h    - Daptomycin 600 q48h    - ID rec'd minocycline for Stenotrophomonal coverage, f/u given updated sensitivities to levaquin     - Afebrile     - UA (6/19): positive, f/u culture received in lab    ENDO:  #Glucose monitoring    -ISS     -A1c (5/7/23): 5.0    -FS Q6H while on trickle feeds   #Adrenal Insufficieny 2/2 to sepsis     -Solucortef to 50mg q8hr    -Random cortisol lvl: in lab     MSK:  #Activity    -Advance as tolerated    -PT/rehab     LINES/DRAINS: PIV, L radial Joleen (6/18/23), Mena (6/18/23), L IJ TLC (6/18/23), Javier Drain (6/18/23), LT PCN (6/18/23)     ADVANCED DIRECTIVES:  Full Code    HCP/Emergency Contact- Tracey Adames: [phone]     INDICATION FOR SICU/SDU: New onset atrial fibrillation, intraperitoneal free requiring air mechanical ventilation     DISPO: SICU   CHRISTINE KHALIL  MRN-[id number]  1955  67M    HPI:  67M w/ PMH of HTN, grade IV hydronephrosis of L kidney s/p L PCN (placed 5/2023), stutter, hiatal hernia, iron deficiency anemia, H Pylori (untreated), and gastric mass (never biopsied) presents to the ED with at least 4 days of worsening weakness, abdominal distension, and no output from his PCN. Pt and daughters bedside are only able to give limited information, but for the past few days, he has noticed no output from his PCN as well as increasing abdominal distension, pain, and anorexia. He reports that he has not felt normal for the past 10 days. His PCN used to put out serosanguinous fluid, but the output changed to feculent/brown liquid over the past day. In the ED, he was noted to be in new onset afib w/ RVR to 150+ as well as experiencing some difficulty breathing. He was started on BiPAP. Stat labs were notable for WBC 47, Cr 2.9, and lactate 6. CT A/P w/ IV contrast showed free intraperitoneal fluid and air from an unknown source as well as fluid and air in the L kidney.     SICU was consulted for hemodynamic monitoring 2/2 intraabdominal sepsis. Patient went with IR for Left PCN upsized to 16Fr and RLQ drain placement.   FIndings: Left PCN upsized to 16F and 1200cc of very viscous tan colored fluid was aspirated. RLQ 16F drain was placed and 1200cc of tan colored fluid was aspirated (less viscous). A sample from each location was sent for culture.     24 hour Events:   6/21  NIGHT   - PM rounds: Abd soft, distended, NT. LT PCN draining purulent fluid (flushing & aspirating easier); RLQ drain with serous fluid. 2L NC  -Reports passing flatus   -Cardizem 5mg-no improvement in HR which remains in the 130's    Day  Extubated to face mask, now on NC  Minocycline switched to Levaquin per ID given susceptibilities for S. maltophilia  Lasix 80mg IVP x1 with minimal response  Decreased  IVF to 75cc--> IVL  NaCl 2g q8  Changed metop PO 25mg q8, HR uptrending to 130-140, lopressor 5mgx1 given  Tylenol/gabapentin/tramadol started   Hep gtt now @ 1600 for .8  DCed TLC, placed L basilic midline  25% albumin 50mL/Lasix 80mg redosed, DCed maintenance fluids  CLD   Bicarb tabs      REVIEW OF SYSTEMS:  [X] A ten-point review of systems was otherwise negative except as noted above.  [  ] Due to altered mental status/intubation, subjective information was not attained from the patient. History was obtained, to the extent possible, from review of the chart and collateral sources of information.  ----------------------------------------------------------------------------------------------------------------------

## 2023-06-22 NOTE — PROGRESS NOTE ADULT - SUBJECTIVE AND OBJECTIVE BOX
Outpt cardiologist:    Reason for Consult:     HISTORY OF PRESENT ILLNESS:  67M w/ PMH of HTN, grade IV hydronephrosis of L kidney s/p L PCN (placed 2023), stutter, hiatal hernia, iron deficiency anemia, H Pylori (untreated), and gastric mass (never biopsied) presents to the ED with at least 4 days of worsening weakness, abdominal distension, and no output from his PCN. Pt and daughters bedside are only able to give limited information, but for the past few days, he has noticed no output from his PCN as well as increasing abdominal distension, pain, and anorexia. He reports that he has not felt normal for the past 10 days. His PCN used to put out serosanguinous fluid, but the output changed to feculent/brown liquid over the past day. In the ED, he was noted to be in new onset afib w/ RVR to 150+ as well as experiencing some difficulty breathing. He was started on BiPAP. Stat labs were notable for WBC 47, Cr 2.9, and lactate 6. CT A/P w/ IV contrast showed free intraperitoneal fluid and air from an unknown source as well as fluid and air in the L kidney.  (2023 18:34)      Patient is now s/p left PCN upsize   Remains septic     Was extubated and went back into a fib with RVR  Concern for ileus so not getting anything PO just metoprolol IV     PAST MEDICAL & SURGICAL HISTORY      FAMILY HISTORY:  FAMILY HISTORY:      SOCIAL HISTORY:  Social History:      ALLERGIES:  No Known Allergies      MEDICATIONS:  acetaminophen     Tablet .. 650 milliGRAM(s) Oral every 6 hours  bacitracin   Ointment 1 Application(s) Topical two times a day  calcium acetate 667 milliGRAM(s) Oral three times a day with meals  chlorhexidine 2% Cloths 1 Application(s) Topical <User Schedule>  diltiazem Infusion 5 mG/Hr (5 mL/Hr) IV Continuous <Continuous>  ferrous    sulfate 325 milliGRAM(s) Oral daily  gabapentin 100 milliGRAM(s) Oral every 8 hours  heparin  Infusion. 1600 Unit(s)/Hr (16 mL/Hr) IV Continuous <Continuous>  hydrocortisone sodium succinate Injectable 50 milliGRAM(s) IV Push every 12 hours  insulin regular  human corrective regimen sliding scale   SubCutaneous every 6 hours  levoFLOXacin IVPB 750 milliGRAM(s) IV Intermittent every 48 hours  meropenem  IVPB 500 milliGRAM(s) IV Intermittent every 12 hours  midodrine 5 milliGRAM(s) Oral every 8 hours  sodium bicarbonate 1300 milliGRAM(s) Oral three times a day    PRN:  sodium chloride 0.9% lock flush 10 milliLiter(s) IV Push every 1 hour PRN  traMADol 25 milliGRAM(s) Oral every 8 hours PRN      HOME MEDICATIONS:  Home Medications:      VITALS:   T(F): 96.7 ( @ 08:00), Max: 101 ( @ 20:00)  HR: 142 ( @ 08:00) (87 - 145)  BP: 116/83 ( @ 08:00) (90/63 - 143/69)  BP(mean): 97 ( @ 08:00) (73 - 100)  RR: 23 ( @ 08:00) (8 - 24)  SpO2: 97% ( @ 08:00) (96% - 100%)    I&O's Summary    2023 07: 07:00  --------------------------------------------------------  IN: 1538 mL / OUT: 1445 mL / NET: 93 mL    2023 07:01  -  2023 10:31  --------------------------------------------------------  IN: 32 mL / OUT: 75 mL / NET: -43 mL        REVIEW OF SYSTEMS:  CONSTITUTIONAL: No weakness, fevers or chills  HEENT: No visual changes, neck/ear pain  RESPIRATORY: No cough, sob  CARDIOVASCULAR: See HPI  GASTROINTESTINAL: No abdominal pain. No nausea, vomiting, diarrhea   GENITOURINARY: No dysuria, frequency or hematuria  NEUROLOGICAL: No new focal deficits  SKIN: No new rashes    PHYSICAL EXAM:  General: Not in distress.  Non-toxic appearing.   HEENT: EOMI  Cardio: regular, S1, S2, no murmur  Pulm: B/L BS.  No wheezing / crackles / rales  Abdomen: Soft, non-tender, non-distended. Normoactive bowel sounds  Extremities: No edema b/l le  Neuro: A&O x3. No focal deficits    LABS:                        9.1    48.37 )-----------( 227      ( 2023 21:28 )             27.9     06-    124<L>  |  90<L>  |  101<HH>  ----------------------------<  120<H>  4.5   |  17  |  3.3<H>    Ca    7.5<L>      2023 21:28  Phos  6.0       Mg     2.1         TPro  4.2<L>  /  Alb  2.3<L>  /  TBili  0.6  /  DBili  x   /  AST  36  /  ALT  34  /  AlkPhos  87  -    PTT - ( 2023 04:35 )  PTT:65.1 sec          Troponin trend:    IMAGING:  -TTE:  < from: TTE Echo Complete w/o Contrast w/ Doppler (23 @ 09:37) >  Summary:   1. Technically difficult study.   2. Left ventricular ejection fraction, by visual estimation, is 55 to   60%.   3. Normal global left ventricular systolic function.   4. The left ventricular diastolic function could not be assessed in this   study.   5. Mildly enlarged left atrium.   6. Peak transaortic gradient equals 30.7 mmHg, mean transaortic gradient   equals 15.0 mmHg, the calculated aortic valve area equals 1.77 cm² by the   continuity equation consistent with mild aortic stenosis.   7. Mild mitral regurgitation.   8. Mild tricuspid regurgitation.   9. Trivial pericardial effusion.    EC Lead ECG:   Ventricular Rate 109 BPM    QRS Duration 106 ms    Q-T Interval 332 ms    QTC Calculation(Bazett) 447 ms    R Axis 123 degrees    T Axis 38 degrees    Diagnosis Line Atrial fibrillation with rapid ventricular response  Low voltage QRS  Left posterior fascicular block  Abnormal ECG    Confirmed by ROULA WISEMAN MD ([id number]) on 2023 6:55:47 AM ( @ 03:16)      TELEMETRY EVENTS:

## 2023-06-22 NOTE — BRIEF OPERATIVE NOTE - NSEVIDNCEORABSCESS_GEN_ALL_CORE
other Skin Substitute Text: The defect edges were debeveled with a #15 scalpel blade.  Given the location of the defect, shape of the defect and the proximity to free margins a skin substitute graft was deemed most appropriate.  The graft material was trimmed to fit the size of the defect. The graft was then placed in the primary defect and oriented appropriately.

## 2023-06-22 NOTE — PROGRESS NOTE ADULT - ASSESSMENT
IMPRESSION  A Fib with RVR in the setting of septic shock on pressors  Elevated troponin likely type II  Emphysematous pyelonephritis, left hydro, rupture?  peritonitis, pneumoperitoneum  splenic infarct?  hepatic abscesses?  lymphadenopathy  ROJAS  Lactic acidosis  Intubated on Vent  Ascending aortic aneurysm 4.6 cm    METS < 4  RCRI -> 10% risk of perioperative MACE     No ACS   No evidence of malignant arrhythmia   Mild CHF    RECOMMENDATIONS  - Consider cardizem IV push (0.25 mg / kg) and continue cardizem gtt - HR in the 130s is expected in severe sepsis   - Pain control   - Consider a dose of bumex 2 mg IV   - If patient planned for procedure he is at moderate to high risk for perioperative MACE    This a preliminary plan. Will need to discuss with attending.   Signature: Roverto MIRANDA, 1st year Cardiology Fellow   IMPRESSION  A Fib with RVR in the setting of septic shock on pressors  Elevated troponin likely type II  Emphysematous pyelonephritis, left hydro, rupture?  peritonitis, pneumoperitoneum  splenic infarct?  hepatic abscesses?  lymphadenopathy  ROJAS  Lactic acidosis  Intubated on Vent  Ascending aortic aneurysm 4.6 cm    METS < 4  RCRI -> 10% risk of perioperative MACE     No ACS   No evidence of malignant arrhythmia   Mild CHF    RECOMMENDATIONS  - Consider cardizem IV push (0.25 mg / kg) and continue cardizem gtt - HR in the 120s is expected in severe sepsis   - Pain control   - Consider a dose of bumex 2 mg IV   - If patient planned for procedure he is at high risk for perioperative MACE. High risk procedure.   - Continue AC    Discussed with attending.   Signature: Roverto MIRANDA, 1st year Cardiology Fellow

## 2023-06-22 NOTE — PROGRESS NOTE ADULT - SUBJECTIVE AND OBJECTIVE BOX
CHRISTINE VILLAVICENCIO  67y, Male  Allergy: No Known Allergies      LOS  4d    CHIEF COMPLAINT: pneumoperitoneum, emphysematous pyelonephritis (22 Jun 2023 00:14)      INTERVAL EVENTS/HPI  - No acute events overnight  - T(F): , Max: 98.4 (06-21-23 @ 08:00)  - Tolerating medication  - WBC Count: 48.37 (06-21-23 @ 21:28) uptrending   WBC Count: 34.51 (06-20-23 @ 21:56)     - Creatinine: 3.3 (06-21-23 @ 21:28)  Creatinine: 3.1 (06-21-23 @ 17:08)       ROS  ***    VITALS:  T(F): 98.2, Max: 98.4 (06-21-23 @ 08:00)  HR: 136  BP: 121/76  RR: 18Vital Signs Last 24 Hrs  T(C): 36.8 (22 Jun 2023 04:00), Max: 36.9 (21 Jun 2023 08:00)  T(F): 98.2 (22 Jun 2023 04:00), Max: 98.4 (21 Jun 2023 08:00)  HR: 136 (22 Jun 2023 06:00) (118 - 145)  BP: 121/76 (22 Jun 2023 06:00) (100/71 - 143/69)  BP(mean): 90 (22 Jun 2023 06:00) (81 - 100)  RR: 18 (22 Jun 2023 06:00) (16 - 22)  SpO2: 99% (22 Jun 2023 06:00) (96% - 99%)    Parameters below as of 22 Jun 2023 06:00  Patient On (Oxygen Delivery Method): nasal cannula  O2 Flow (L/min): 2      PHYSICAL EXAM:  ***    FH: Non-contributory  Social Hx: Non-contributory    TESTS & MEASUREMENTS:                        9.1    48.37 )-----------( 227      ( 21 Jun 2023 21:28 )             27.9     06-21    124<L>  |  90<L>  |  101<HH>  ----------------------------<  120<H>  4.5   |  17  |  3.3<H>    Ca    7.5<L>      21 Jun 2023 21:28  Phos  6.0     06-21  Mg     2.1     06-21    TPro  4.2<L>  /  Alb  2.3<L>  /  TBili  0.6  /  DBili  x   /  AST  36  /  ALT  34  /  AlkPhos  87  06-20      LIVER FUNCTIONS - ( 20 Jun 2023 14:02 )  Alb: 2.3 g/dL / Pro: 4.2 g/dL / ALK PHOS: 87 U/L / ALT: 34 U/L / AST: 36 U/L / GGT: x           Urinalysis Basic - ( 21 Jun 2023 21:28 )    Color: x / Appearance: x / SG: x / pH: x  Gluc: 120 mg/dL / Ketone: x  / Bili: x / Urobili: x   Blood: x / Protein: x / Nitrite: x   Leuk Esterase: x / RBC: x / WBC x   Sq Epi: x / Non Sq Epi: x / Bacteria: x        Culture - Urine (collected 06-19-23 @ 02:50)  Source: Clean Catch Clean Catch (Midstream)  Final Report (06-20-23 @ 10:18):    No growth    Culture - Abscess with Gram Stain (collected 06-18-23 @ 23:08)  Source: .Abscess right lower quadrant (intrabdominal)  Gram Stain (06-19-23 @ 11:56):    Numerous polymorphonuclear leukocytes per low power field    Numerous Gram positive cocci in pairs per oil power field  Final Report (06-21-23 @ 14:13):    Numerous Anaerobic Gram Positive Cocci Most closely resembling    Peptoniphilus species "Susceptibilities not performed"    Culture - Abscess with Gram Stain (collected 06-18-23 @ 23:08)  Source: .Abscess left kidney  Gram Stain (06-19-23 @ 11:58):    Rare polymorphonuclear leukocytes per low power field    Few Gram Positive Cocci per oil power field  Preliminary Report (06-21-23 @ 14:15):    Growth in fluid media only Anaerobic Gram Positive Cocci Most closely    resembling Peptoniphilus species "Susceptibilities not performed"    Rare Stenotrophomonas maltophilia    Culture - Abscess with Gram Stain (collected 06-18-23 @ 20:18)  Source: .Abscess left PCN aspirate  Gram Stain (06-19-23 @ 06:27):    No polymorphonuclear cells seen per low power field    Few Gram Variable Cocci seen per oil power field  Preliminary Report (06-20-23 @ 11:47):    Few Stenotrophomonas maltophilia  Organism: Stenotrophomonas maltophilia (06-21-23 @ 09:06)  Organism: Stenotrophomonas maltophilia (06-21-23 @ 09:06)      Method Type: EDMUND      -  Levofloxacin: S 2      -  Trimethoprim/Sulfamethoxazole: S <=0.5/9.5    Culture - Blood (collected 06-18-23 @ 14:18)  Source: .Blood Blood-Peripheral  Preliminary Report (06-20-23 @ 02:02):    No growth to date.    Culture - Blood (collected 06-18-23 @ 14:18)  Source: .Blood Blood-Peripheral  Preliminary Report (06-20-23 @ 02:02):    No growth to date.        Blood Gas Venous - Lactate: 3.60 mmol/L (06-19-23 @ 02:18)  Lactate, Blood: 6.4 mmol/L (06-18-23 @ 14:18)  Blood Gas Venous - Lactate: 6.20 mmol/L (06-18-23 @ 14:05)      INFECTIOUS DISEASES TESTING  strept    INFLAMMATORY MARKERS      RADIOLOGY & ADDITIONAL TESTS:  I have personally reviewed the last available Chest xray  CXR      CT  CT Abdomen and Pelvis w/ Oral Cont and w/ IV Cont:   ACC: [id number] EXAM:  CT ABDOMEN AND PELVIS OC IC   ORDERED BY: BALJIT JESUS     PROCEDURE DATE:  06/19/2023          INTERPRETATION:  CLINICAL STATEMENT: Pneumoperitoneum and emphysematous   pyelonephritis status post drain placement.    TECHNIQUE: Contiguous axial CT images were obtained from the lower chest   to the pubic symphysis with intravenous contrast. Oral contrast was   administered. Reformatted images in the coronal and sagittal planes were   acquired.    COMPARISON: CT abdomen and pelvis 6/18/2023.    FINDINGS:    Limited evaluation from streak artifact due to contact with the gantry   and arm positioning.    LOWER CHEST: Bilateral moderate pleural effusions, right increased, with   adjacent subsegmental atelectasis.    HEPATOBILIARY: Unchanged.    SPLEEN: Unchanged.    PANCREAS: Unchanged.    ADRENAL GLANDS: Unchanged.    KIDNEYS: Status post left nephrostomy tube placement. Left enlarged   kidney with severe hydronephrosis/collection has decreased, now measuring   16 x 12 cm from 20 x 14 cm. Again seen gas throughout the left kidney,   likely postprocedural. Unchanged right large renal cyst. No   hydronephrosis.    ABDOMINOPELVIC NODES: Unchanged..    PELVIC ORGANS: Unchanged.    PERITONEUM/MESENTERY/BOWEL: Peritoneal catheter terminates in the right   lower quadrant. Pneumoperitoneum and free fluid is mildly decreased   unchanged since prior. Oral contrast is seen in the terminal ileum. No   contrast extravasation is visualized. Mild abdominopelvic ascites. No   bowel obstruction.    BONES/SOFT TISSUES: Anasarca. Degenerative osseous changes.    VASCULAR: Atherosclerotic calcifications.        IMPRESSION:    Since one day earlier,    No extraluminal contrast. Oral contrast was last seen in the terminal   ileum.    Status post left nephrostomy tube placement. Left enlarged kidney with   severe hydronephrosis/collection has decreased, now measuring 16 x 12 cm   from 20 x 14 cm    --- End of Report ---          NEVIN MONTES MD; Resident Radiologist  This document has been electronically signed.  SOFIA QUINN MD; Attending Radiologist  This document has been electronically signed. Jun 19 2023  5:07PM (06-19-23 @ 15:58)      CARDIOLOGY TESTING  12 Lead ECG:   Ventricular Rate 109 BPM    QRS Duration 106 ms    Q-T Interval 332 ms    QTC Calculation(Bazett) 447 ms    R Axis 123 degrees    T Axis 38 degrees    Diagnosis Line Atrial fibrillation with rapid ventricular response  Low voltage QRS  Left posterior fascicular block  Abnormal ECG    Confirmed by ROULA WISEMAN MD ([id number]) on 6/21/2023 6:55:47 AM (06-21-23 @ 03:16)  12 Lead ECG:   Ventricular Rate 123 BPM    Atrial Rate 150 BPM    QRS Duration 84 ms    Q-T Interval 368 ms    QTC Calculation(Bazett) 526 ms    R Axis 30 degrees    T Axis 54 degrees    Diagnosis Line Atrial fibrillation with rapid ventricular response  Low voltage QRS  Septal infarct , age undetermined  Abnormal ECG    Confirmed by Deon Velasquez ([id number]) on 6/20/2023 10:52:21 AM (06-19-23 @ 07:45)      MEDICATIONS  acetaminophen     Tablet .. 650 Oral every 6 hours  bacitracin   Ointment 1 Topical two times a day  calcium acetate 667 Oral three times a day with meals  chlorhexidine 2% Cloths 1 Topical <User Schedule>  DAPTOmycin IVPB 600 IV Intermittent every 48 hours  ferrous    sulfate 325 Oral daily  gabapentin 100 Oral every 8 hours  heparin  Infusion. 1600 IV Continuous <Continuous>  hydrocortisone sodium succinate Injectable 50 IV Push every 8 hours  insulin regular  human corrective regimen sliding scale  SubCutaneous every 6 hours  iron sucrose IVPB 100 IV Intermittent every 24 hours  levoFLOXacin IVPB 750 IV Intermittent every 48 hours  meropenem  IVPB 1000 IV Intermittent every 12 hours  metoprolol tartrate Injectable 5 IV Push every 6 hours  midodrine 5 Oral every 8 hours  sodium bicarbonate 1300 Oral three times a day  sodium chloride 2 Oral every 8 hours      WEIGHT  Weight (kg): 76.5 (06-18-23 @ 21:27)  Creatinine: 3.3 mg/dL (06-21-23 @ 21:28)  Creatinine: 3.1 mg/dL (06-21-23 @ 17:08)      ANTIBIOTICS:  DAPTOmycin IVPB 600 milliGRAM(s) IV Intermittent every 48 hours  levoFLOXacin IVPB 750 milliGRAM(s) IV Intermittent every 48 hours  meropenem  IVPB 1000 milliGRAM(s) IV Intermittent every 12 hours      All available historical records have been reviewed       CHRISTINE VILLAVICENCIO  67y, Male  Allergy: No Known Allergies      LOS  4d    CHIEF COMPLAINT: pneumoperitoneum, emphysematous pyelonephritis (22 Jun 2023 00:14)      INTERVAL EVENTS/HPI  - No acute events overnight  - T(F): , Max: 98.4 (06-21-23 @ 08:00)  - Tolerating medication  - WBC Count: 48.37 (06-21-23 @ 21:28) uptrending   WBC Count: 34.51 (06-20-23 @ 21:56)     - Creatinine: 3.3 (06-21-23 @ 21:28)  Creatinine: 3.1 (06-21-23 @ 17:08)       ROS  General: Denies rigors, nightsweats  HEENT: Denies headache, rhinorrhea, sore throat, eye pain  CV: Denies CP, palpitations  PULM: Denies wheezing, hemoptysis  GI: Denies hematemesis, hematochezia, melena  : Denies discharge, hematuria  MSK: Denies arthralgias, myalgias  SKIN: Denies rash, lesions  NEURO: Denies paresthesias, weakness  PSYCH: Denies depression, anxiety     VITALS:  T(F): 98.2, Max: 98.4 (06-21-23 @ 08:00)  HR: 136  BP: 121/76  RR: 18Vital Signs Last 24 Hrs  T(C): 36.8 (22 Jun 2023 04:00), Max: 36.9 (21 Jun 2023 08:00)  T(F): 98.2 (22 Jun 2023 04:00), Max: 98.4 (21 Jun 2023 08:00)  HR: 136 (22 Jun 2023 06:00) (118 - 145)  BP: 121/76 (22 Jun 2023 06:00) (100/71 - 143/69)  BP(mean): 90 (22 Jun 2023 06:00) (81 - 100)  RR: 18 (22 Jun 2023 06:00) (16 - 22)  SpO2: 99% (22 Jun 2023 06:00) (96% - 99%)    Parameters below as of 22 Jun 2023 06:00  Patient On (Oxygen Delivery Method): nasal cannula  O2 Flow (L/min): 2      PHYSICAL EXAM:  Gen: NAD, resting in bed  HEENT: Normocephalic, atraumatic  Neck: supple, no lymphadenopathy  CV: Regular rate & regular rhythm  Lungs: decreased BS at bases, no fremitus  Abdomen: Soft, BS present L VENICE decreased output, dressings  Ext: Warm, well perfused  Neuro: non focal, awake  Skin: no rash, no erythema  Lines: no phlebitis     FH: Non-contributory  Social Hx: Non-contributory    TESTS & MEASUREMENTS:                        9.1    48.37 )-----------( 227      ( 21 Jun 2023 21:28 )             27.9     06-21    124<L>  |  90<L>  |  101<HH>  ----------------------------<  120<H>  4.5   |  17  |  3.3<H>    Ca    7.5<L>      21 Jun 2023 21:28  Phos  6.0     06-21  Mg     2.1     06-21    TPro  4.2<L>  /  Alb  2.3<L>  /  TBili  0.6  /  DBili  x   /  AST  36  /  ALT  34  /  AlkPhos  87  06-20      LIVER FUNCTIONS - ( 20 Jun 2023 14:02 )  Alb: 2.3 g/dL / Pro: 4.2 g/dL / ALK PHOS: 87 U/L / ALT: 34 U/L / AST: 36 U/L / GGT: x           Urinalysis Basic - ( 21 Jun 2023 21:28 )    Color: x / Appearance: x / SG: x / pH: x  Gluc: 120 mg/dL / Ketone: x  / Bili: x / Urobili: x   Blood: x / Protein: x / Nitrite: x   Leuk Esterase: x / RBC: x / WBC x   Sq Epi: x / Non Sq Epi: x / Bacteria: x        Culture - Urine (collected 06-19-23 @ 02:50)  Source: Clean Catch Clean Catch (Midstream)  Final Report (06-20-23 @ 10:18):    No growth    Culture - Abscess with Gram Stain (collected 06-18-23 @ 23:08)  Source: .Abscess right lower quadrant (intrabdominal)  Gram Stain (06-19-23 @ 11:56):    Numerous polymorphonuclear leukocytes per low power field    Numerous Gram positive cocci in pairs per oil power field  Final Report (06-21-23 @ 14:13):    Numerous Anaerobic Gram Positive Cocci Most closely resembling    Peptoniphilus species "Susceptibilities not performed"    Culture - Abscess with Gram Stain (collected 06-18-23 @ 23:08)  Source: .Abscess left kidney  Gram Stain (06-19-23 @ 11:58):    Rare polymorphonuclear leukocytes per low power field    Few Gram Positive Cocci per oil power field  Preliminary Report (06-21-23 @ 14:15):    Growth in fluid media only Anaerobic Gram Positive Cocci Most closely    resembling Peptoniphilus species "Susceptibilities not performed"    Rare Stenotrophomonas maltophilia    Culture - Abscess with Gram Stain (collected 06-18-23 @ 20:18)  Source: .Abscess left PCN aspirate  Gram Stain (06-19-23 @ 06:27):    No polymorphonuclear cells seen per low power field    Few Gram Variable Cocci seen per oil power field  Preliminary Report (06-20-23 @ 11:47):    Few Stenotrophomonas maltophilia  Organism: Stenotrophomonas maltophilia (06-21-23 @ 09:06)  Organism: Stenotrophomonas maltophilia (06-21-23 @ 09:06)      Method Type: EDMUND      -  Levofloxacin: S 2      -  Trimethoprim/Sulfamethoxazole: S <=0.5/9.5    Culture - Blood (collected 06-18-23 @ 14:18)  Source: .Blood Blood-Peripheral  Preliminary Report (06-20-23 @ 02:02):    No growth to date.    Culture - Blood (collected 06-18-23 @ 14:18)  Source: .Blood Blood-Peripheral  Preliminary Report (06-20-23 @ 02:02):    No growth to date.        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    TECHNIQUE: Contiguous axial CT images were obtained from the lower chest   to the pubic symphysis with intravenous contrast. Oral contrast was   administered. Reformatted images in the coronal and sagittal planes were   acquired.    COMPARISON: CT abdomen and pelvis 6/18/2023.    FINDINGS:    Limited evaluation from streak artifact due to contact with the gantry   and arm positioning.    LOWER CHEST: Bilateral moderate pleural effusions, right increased, with   adjacent subsegmental atelectasis.    HEPATOBILIARY: Unchanged.    SPLEEN: Unchanged.    PANCREAS: Unchanged.    ADRENAL GLANDS: Unchanged.    KIDNEYS: Status post left nephrostomy tube placement. Left enlarged   kidney with severe hydronephrosis/collection has decreased, now measuring   16 x 12 cm from 20 x 14 cm. Again seen gas throughout the left kidney,   likely postprocedural. Unchanged right large renal cyst. No   hydronephrosis.    ABDOMINOPELVIC NODES: Unchanged..    PELVIC ORGANS: Unchanged.    PERITONEUM/MESENTERY/BOWEL: Peritoneal catheter terminates in the right   lower quadrant. Pneumoperitoneum and free fluid is mildly decreased   unchanged since prior. Oral contrast is seen in the terminal ileum. No   contrast extravasation is visualized. Mild abdominopelvic ascites. No   bowel obstruction.    BONES/SOFT TISSUES: Anasarca. Degenerative osseous changes.    VASCULAR: Atherosclerotic calcifications.        IMPRESSION:    Since one day earlier,    No extraluminal contrast. Oral contrast was last seen in the terminal   ileum.    Status post left nephrostomy tube placement. Left enlarged kidney with   severe hydronephrosis/collection has decreased, now measuring 16 x 12 cm   from 20 x 14 cm    --- End of Report ---          NEVIN MONTES MD; Resident Radiologist  This document has been electronically signed.  SOFIA QUINN MD; Attending Radiologist  This document has been electronically signed. Jun 19 2023  5:07PM (06-19-23 @ 15:58)      CARDIOLOGY TESTING  12 Lead ECG:   Ventricular Rate 109 BPM    QRS Duration 106 ms    Q-T Interval 332 ms    QTC Calculation(Bazett) 447 ms    R Axis 123 degrees    T Axis 38 degrees    Diagnosis Line Atrial fibrillation with rapid ventricular response  Low voltage QRS  Left posterior fascicular block  Abnormal ECG    Confirmed by ROULA WISEMAN MD ([id number]) on 6/21/2023 6:55:47 AM (06-21-23 @ 03:16)  12 Lead ECG:   Ventricular Rate 123 BPM    Atrial Rate 150 BPM    QRS Duration 84 ms    Q-T Interval 368 ms    QTC Calculation(Bazett) 526 ms    R Axis 30 degrees    T Axis 54 degrees    Diagnosis Line Atrial fibrillation with rapid ventricular response  Low voltage QRS  Septal infarct , age undetermined  Abnormal ECG    Confirmed by Deon Velasquez ([id number]) on 6/20/2023 10:52:21 AM (06-19-23 @ 07:45)      MEDICATIONS  acetaminophen     Tablet .. 650 Oral every 6 hours  bacitracin   Ointment 1 Topical two times a day  calcium acetate 667 Oral three times a day with meals  chlorhexidine 2% Cloths 1 Topical <User Schedule>  DAPTOmycin IVPB 600 IV Intermittent every 48 hours  ferrous    sulfate 325 Oral daily  gabapentin 100 Oral every 8 hours  heparin  Infusion. 1600 IV Continuous <Continuous>  hydrocortisone sodium succinate Injectable 50 IV Push every 8 hours  insulin regular  human corrective regimen sliding scale  SubCutaneous every 6 hours  iron sucrose IVPB 100 IV Intermittent every 24 hours  levoFLOXacin IVPB 750 IV Intermittent every 48 hours  meropenem  IVPB 1000 IV Intermittent every 12 hours  metoprolol tartrate Injectable 5 IV Push every 6 hours  midodrine 5 Oral every 8 hours  sodium bicarbonate 1300 Oral three times a day  sodium chloride 2 Oral every 8 hours      WEIGHT  Weight (kg): 76.5 (06-18-23 @ 21:27)  Creatinine: 3.3 mg/dL (06-21-23 @ 21:28)  Creatinine: 3.1 mg/dL (06-21-23 @ 17:08)      ANTIBIOTICS:  DAPTOmycin IVPB 600 milliGRAM(s) IV Intermittent every 48 hours  levoFLOXacin IVPB 750 milliGRAM(s) IV Intermittent every 48 hours  meropenem  IVPB 1000 milliGRAM(s) IV Intermittent every 12 hours      All available historical records have been reviewed

## 2023-06-22 NOTE — PROGRESS NOTE ADULT - ASSESSMENT
ASSESSMENT  66yo Male with pmh of hypertension, massive Grade IV hydronephrosis s/p left nephrostomy placed in May 2023 by [facility] presents to the ED with little to no urine output from LEFT nephrostomy from about a week. PCN  more recently drainage was changed to brown/purulent fluid. 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/ pneumoperitoneum with suspected liver abscesses & splenic infarct vs abscess    6/19 UCX NG; UA Blood: x / Protein: 300 mg/dL / Nitrite: Negative   Leuk Esterase: Large / RBC: 8 /HPF / WBC >720 /HPF   Sq Epi: x / Non Sq Epi: x / Bacteria: Moderate    6/18 L PCN     Few Stenotrophomonas maltophilia Levofloxacin: S 2    6/18 L PCN     Numerous Anaerobic Gram Positive Cocci Most closely resembling  Peptoniphilus species "Susceptibilities not performed"    6/18 L PCN   Numerous Gram positive cocci in pairs per oil power field    6/18 BCX NGTD     s/p 6/18  Left PCN upsized to 16F and 1200cc of very viscous tan colored fluid was aspirated. RLQ 16F drain was placed and 1200cc of tan colored fluid was aspirated (less viscous). A sample from each location was sent for culture.     Repeat CT 6/19 Since one day earlier,  No extraluminal contrast. Oral contrast was last seen in the terminal ileum.  Status post left nephrostomy tube placement. Left enlarged kidney with severe hydronephrosis/collection has decreased, now measuring 16 x 12 cm from 20 x 14 cm  < from: CT Abdomen and Pelvis w/ IV Cont (06.18.23 @ 15:15) >  1 ) Moderate pneumoperitoneum with partial diffusion throughout moderate   volume ascites and with associated intermittent peritoneal enhancement   and nodularity. Perforation and peritonitis may be related to underlying   emphysematous pyelonephritis (see below). Less likely sources of   perforation include ureteral/bladder disruption, and bowel perforation   which cannot be entirely excluded on the provided images.  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 The compressed residual renal   parenchyma is inseparable from the adjacent fascial/fatty layers   anterosuperiorly and direct rupture into the peritoneum is a possibility   (0-988). The previously placed left-sided nephrostomy tube pigtail is   notedto be within the left renal collecting system.  3) New hepatic hypodensities measuring up to 2.6 cm, suspicious for   development of multiple focal abscesses.  4) Wedge-shaped region of hypodensity within the spleen may reflect   splenic infarct in the correct clinical setting. Developing   phlegmon/abscess cannot be excluded in the current clinical setting  5) Increased extensive retroperitoneal, portacaval and likely   gastrohepatic heterogeneously enhancing lymphadenopathy. Findings may be   reactive.  6) Mediastinal lymphadenopathy measuring up to 2.4 cm short axis.   Underlying etiology may be reactive, infectious/inflammatory, or   neoplastic. A short-term 3 month follow-up CT chest should be considered   for further evaluation.  7)Right middle and upper lobe solid pulmonary nodules which are not well   delineated due to respiratory motion but measure less than 6 mm.   attention on follow-up exam.  #Lactic acidosis  #Abx allergy: No Known Allergies  #Prolonged QTC Calculation(Bazett) 526 ms  #Hyponatremia  #AKICreatinine: 3.3 mg/dL (06.21.23 @ 21:28)  )crcl 26  Ht 180  Weight (kg): 76.5 (06-18-23 @ 21:27)    RECOMMENDATIONS  - D/C Dapto- no MRSA or VRE in cultures   - D/C meropenem, no ESBL in cultures--> Zosyn 3.375 q8h IV (if worsening Cr, CrCl < 20 decrease to q12h IV dosing)  - Trend WBC   - For stenotrophomonas, Continue levaquin IV 750mg q48h IV       If any questions, please send a message or call on [employer] Teams  Please continue to update ID with any pertinent new laboratory or radiographic findings  #[id number]   ASSESSMENT  66yo Male with pmh of hypertension, massive Grade IV hydronephrosis s/p left nephrostomy placed in May 2023 by [facility] presents to the ED with little to no urine output from LEFT nephrostomy from about a week. PCN  more recently drainage was changed to brown/purulent fluid. 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/ pneumoperitoneum with suspected liver abscesses & splenic infarct vs abscess    6/19 UCX NG; UA Blood: x / Protein: 300 mg/dL / Nitrite: Negative   Leuk Esterase: Large / RBC: 8 /HPF / WBC >720 /HPF   Sq Epi: x / Non Sq Epi: x / Bacteria: Moderate    6/18 L PCN     Few Stenotrophomonas maltophilia Levofloxacin: S 2    6/18 L PCN     Numerous Anaerobic Gram Positive Cocci Most closely resembling  Peptoniphilus species "Susceptibilities not performed"    6/18 L PCN   Numerous Gram positive cocci in pairs per oil power field    6/18 BCX NGTD     s/p 6/18  Left PCN upsized to 16F and 1200cc of very viscous tan colored fluid was aspirated. RLQ 16F drain was placed and 1200cc of tan colored fluid was aspirated (less viscous). A sample from each location was sent for culture.     Repeat CT 6/19 Since one day earlier,  No extraluminal contrast. Oral contrast was last seen in the terminal ileum.  Status post left nephrostomy tube placement. Left enlarged kidney with severe hydronephrosis/collection has decreased, now measuring 16 x 12 cm from 20 x 14 cm  < from: CT Abdomen and Pelvis w/ IV Cont (06.18.23 @ 15:15) >  1 ) Moderate pneumoperitoneum with partial diffusion throughout moderate   volume ascites and with associated intermittent peritoneal enhancement   and nodularity. Perforation and peritonitis may be related to underlying   emphysematous pyelonephritis (see below). Less likely sources of   perforation include ureteral/bladder disruption, and bowel perforation   which cannot be entirely excluded on the provided images.  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 The compressed residual renal   parenchyma is inseparable from the adjacent fascial/fatty layers   anterosuperiorly and direct rupture into the peritoneum is a possibility   (3-448). The previously placed left-sided nephrostomy tube pigtail is   notedto be within the left renal collecting system.  3) New hepatic hypodensities measuring up to 2.6 cm, suspicious for   development of multiple focal abscesses.  4) Wedge-shaped region of hypodensity within the spleen may reflect   splenic infarct in the correct clinical setting. Developing   phlegmon/abscess cannot be excluded in the current clinical setting  5) Increased extensive retroperitoneal, portacaval and likely   gastrohepatic heterogeneously enhancing lymphadenopathy. Findings may be   reactive.  6) Mediastinal lymphadenopathy measuring up to 2.4 cm short axis.   Underlying etiology may be reactive, infectious/inflammatory, or   neoplastic. A short-term 3 month follow-up CT chest should be considered   for further evaluation.  7)Right middle and upper lobe solid pulmonary nodules which are not well   delineated due to respiratory motion but measure less than 6 mm.   attention on follow-up exam.  #Lactic acidosis  #Abx allergy: No Known Allergies  #Prolonged QTC Calculation(Bazett) 526 ms  #Hyponatremia  #AKICreatinine: 3.3 mg/dL (06.21.23 @ 21:28)  )crcl 26  Ht 180  Weight (kg): 76.5 (06-18-23 @ 21:27)    RECOMMENDATIONS  - D/C Dapto- no MRSA or VRE in cultures   - OK to continue Ar 1000 milliGRAM(s) IV Intermittent every 12 hours as rise in WBC< suspect source control issue, pending OR   - Continue levaquin IV 750mg q48h IV   - f/u final cultures  - Trend WBC     If any questions, please send a message or call on [employer] Teams  Please continue to update ID with any pertinent new laboratory or radiographic findings  #[id number]

## 2023-06-22 NOTE — PROGRESS NOTE ADULT - NS ATTEND AMEND GEN_ALL_CORE FT
I personally saw and examined the patient, reviewed the chart and available data. I discussed the situation with the patient and Dr Yoel rodriguez from  and Dr cruz as well as  team. I also reviewed and/or amended the note as necessary.

## 2023-06-23 PROBLEM — N15.1 RENAL ABSCESS, LEFT: Status: ACTIVE | Noted: 2023-01-01

## 2023-06-23 NOTE — ANESTHESIA FOLLOW-UP NOTE - NSRECOMMENDFT_GEN_ALL_CORE
Patient remains orally intubated and sedated on the levophed and precedex drip.  Care as per the SICU team.

## 2023-06-23 NOTE — PROGRESS NOTE ADULT - ASSESSMENT
Pt is a 67y M POD#0 s/p percutaneous drainage of L renal abscess.      ·	26Fr catheter to L flank (10cc inflated in balloon)--draining blood-tinged in tubing ([place]ian Punch colored).   ·	16Fr urethral, indwelling bobby catheter in place--draining yellow urine   ·	Plan for repeat CTAP tomorrow   ·	f/u intra-op cultures  ·	Cont IV abx-- f/u ID recs   ·	d/w SICU team and Dr. Perdomo

## 2023-06-23 NOTE — PROGRESS NOTE ADULT - SUBJECTIVE AND OBJECTIVE BOX
UROLOGY DAILY PROGRESS NOTE    67y Male admitted with retroperitoneal & intraabdominal abscess s/p drain placement & L PCN upsizing with IR on 6/18; s/p percutaneous drainage of LEFT renal abcess, POD#1. Patient remains intubated/sedated. Patient remains on pressors and started of cardizem drip yesterday. LEFT 26fr flank drain with blood-tinged drainage. 16 fr bobby in place draining yellow urine. RLQ VENICE drain serous drainage.       MEDICATIONS  (STANDING):  acetaminophen     Tablet .. 650 milliGRAM(s) Oral every 6 hours  bacitracin   Ointment 1 Application(s) Topical two times a day  calcium acetate 667 milliGRAM(s) Oral three times a day with meals  calcium gluconate IVPB 2 Gram(s) IV Intermittent once  chlorhexidine 0.12% Liquid 15 milliLiter(s) Oral Mucosa every 12 hours  chlorhexidine 2% Cloths 1 Application(s) Topical <User Schedule>  dexMEDEtomidine Infusion 0.2 MICROgram(s)/kG/Hr (3.82 mL/Hr) IV Continuous <Continuous>  diltiazem Infusion 5 mG/Hr (5 mL/Hr) IV Continuous <Continuous>  ferrous    sulfate 325 milliGRAM(s) Oral daily  gabapentin 100 milliGRAM(s) Oral every 8 hours  heparin   Injectable 5000 Unit(s) SubCutaneous every 8 hours  hydrocortisone sodium succinate Injectable 25 milliGRAM(s) IV Push every 12 hours  lactated ringers Bolus 500 milliLiter(s) IV Bolus once  levoFLOXacin IVPB 750 milliGRAM(s) IV Intermittent every 48 hours  meropenem  IVPB 500 milliGRAM(s) IV Intermittent every 12 hours  metoprolol tartrate Injectable 2.5 milliGRAM(s) IV Push every 6 hours  midodrine 5 milliGRAM(s) Oral every 8 hours  norepinephrine Infusion 0.05 MICROgram(s)/kG/Min (7.17 mL/Hr) IV Continuous <Continuous>  pantoprazole  Injectable 40 milliGRAM(s) IV Push daily  sodium bicarbonate 1300 milliGRAM(s) Oral three times a day    MEDICATIONS  (PRN):  fentaNYL    Injectable 25 MICROGram(s) IV Push every 3 hours PRN Moderate Pain (4 - 6)  midazolam Injectable 1 milliGRAM(s) IV Push every 4 hours PRN Agitation  sodium chloride 0.9% lock flush 10 milliLiter(s) IV Push every 1 hour PRN Pre/post blood products, medications, blood draw, and to maintain line patency  traMADol 25 milliGRAM(s) Oral every 8 hours PRN Moderate Pain (4 - 6)      REVIEW OF SYSTEMS   [ ] Due to altered mental status/intubation, subjective information were not able to be obtained from patient. History was obtained, to the extent possible, from review of the chart and collateral sources of information.    Vital Signs Last 24 Hrs  T(C): 36.4 (23 Jun 2023 08:00), Max: 36.7 (22 Jun 2023 16:03)  T(F): 97.5 (23 Jun 2023 08:00), Max: 98 (22 Jun 2023 16:03)  HR: 97 (23 Jun 2023 09:45) (60 - 140)  BP: 100/71 (23 Jun 2023 09:00) (83/53 - 126/66)  BP(mean): 82 (23 Jun 2023 09:00) (63 - 95)  RR: 18 (23 Jun 2023 09:00) (17 - 23)  SpO2: 100% (23 Jun 2023 09:45) (94% - 100%)    Parameters below as of 23 Jun 2023 09:00  Patient On (Oxygen Delivery Method): ventilator    O2 Concentration (%): 50    PHYSICAL EXAM:    GEN: NAD, well-developed, awake and alert.  SKIN: Good color, non diaphoretic.  HEENT: NC/AT.  RESP: No dyspnea, non-labored breathing. No use of accessory muscles.  CARDIO: +S1/S2  ABDO: Soft, NT/ND, no palpable bladder, no suprapubic tenderness  BACK: No CVAT B/L, +LEFT flank drain 26fr (10cc inflated in balloon)--draining blood-tinged in tubing ([place]ian Punch colored).  RLQ VENICE drain in place with serous drainage   : 16 fr Indwelling bobby in place, draining clear yellow urine.       I&O's Summary    22 Jun 2023 07:01  -  23 Jun 2023 07:00  --------------------------------------------------------  IN: 4109.2 mL / OUT: 1075 mL / NET: 3034.2 mL    23 Jun 2023 07:01  -  23 Jun 2023 09:57  --------------------------------------------------------  IN: 31.1 mL / OUT: 90 mL / NET: -58.9 mL        LABS:                        8.6    44.03 )-----------( 242      ( 23 Jun 2023 05:15 )             26.3     06-23    133<L>  |  107  |  80<HH>  ----------------------------<  80  3.5   |  13<L>  |  2.5<H>    Ca    5.3<LL>      23 Jun 2023 05:00  Phos  5.9     06-22  Mg     2.0     06-22      PT/INR - ( 23 Jun 2023 05:00 )   PT: 19.80 sec;   INR: 1.71 ratio         PTT - ( 23 Jun 2023 05:00 )  PTT:36.7 sec  Urinalysis Basic - ( 23 Jun 2023 05:00 )    Color: x / Appearance: x / SG: x / pH: x  Gluc: 80 mg/dL / Ketone: x  / Bili: x / Urobili: x   Blood: x / Protein: x / Nitrite: x   Leuk Esterase: x / RBC: x / WBC x   Sq Epi: x / Non Sq Epi: x / Bacteria: x    Culture - Urine (06.19.23 @ 02:50)    Specimen Source: Clean Catch Clean Catch (Midstream)   Culture Results:   No growth      Culture - Abscess with Gram Stain (06.18.23 @ 23:08)    Gram Stain:   Numerous polymorphonuclear leukocytes per low power field  Numerous Gram positive cocci in pairs per oil power field   Specimen Source: .Abscess right lower quadrant (intrabdominal)   Culture Results:   Numerous Anaerobic Gram Positive Cocci Most closely resembling  Peptoniphilus species "Susceptibilities not performed"    Culture - Abscess with Gram Stain (06.18.23 @ 23:08)    -  Levofloxacin: S 1   -  Trimethoprim/Sulfamethoxazole: S <=0.5/9.5   -  Minocycline: S   Gram Stain:   Rare polymorphonuclear leukocytes per low power field  Few Gram Positive Cocci per oil power field   Specimen Source: .Abscess left kidney   Culture Results:   Growth in fluid media only Anaerobic Gram Positive Cocci Most closely  resembling Peptoniphilus species "Susceptibilities not performed"  Rare Stenotrophomonas maltophilia   Organism Identification: Stenotrophomonas maltophilia  Stenotrophomonas maltophilia   Organism: Stenotrophomonas maltophilia   Organism: Stenotrophomonas maltophilia   Method Type: KB   Method Type: EDMUND                RADIOLOGY & ADDITIONAL STUDIES:

## 2023-06-23 NOTE — PROGRESS NOTE ADULT - SUBJECTIVE AND OBJECTIVE BOX
CHRISTINE VILLAVICENCIO  67y, Male  Allergy: No Known Allergies      LOS  5d    CHIEF COMPLAINT: pneumoperitoneum, emphysematous pyelonephritis (23 Jun 2023 02:04)      INTERVAL EVENTS/HPI  - T(F): , Max: 98 (06-22-23 @ 16:03), s/p OR   - WBC Count: 44.03 (06-23-23 @ 05:15)  WBC Count: 36.11 (06-23-23 @ 02:56)     - Creatinine: 2.5 (06-23-23 @ 05:00)  Creatinine: 3.6 (06-22-23 @ 20:07)     -   -   -     ROS  cannot obtain secondary to patient's sedation and/or mental status    VITALS:  T(F): 97.1, Max: 98 (06-22-23 @ 16:03)  HR: 79  BP: 121/73  RR: 17Vital Signs Last 24 Hrs  T(C): 36.2 (23 Jun 2023 04:00), Max: 36.7 (22 Jun 2023 16:03)  T(F): 97.1 (23 Jun 2023 04:00), Max: 98 (22 Jun 2023 16:03)  HR: 79 (23 Jun 2023 06:00) (60 - 142)  BP: 121/73 (23 Jun 2023 06:00) (83/53 - 126/66)  BP(mean): 88 (23 Jun 2023 06:00) (63 - 97)  RR: 17 (22 Jun 2023 21:00) (17 - 23)  SpO2: 100% (23 Jun 2023 06:00) (94% - 100%)    Parameters below as of 22 Jun 2023 21:00  Patient On (Oxygen Delivery Method): room air        PHYSICAL EXAM:  ***     FH: Non-contributory  Social Hx: Non-contributory    TESTS & MEASUREMENTS:                        8.6    44.03 )-----------( 242      ( 23 Jun 2023 05:15 )             26.3     06-23    133<L>  |  107  |  80<HH>  ----------------------------<  80  3.5   |  13<L>  |  2.5<H>    Ca    5.3<LL>      23 Jun 2023 05:00  Phos  5.9     06-22  Mg     2.0     06-22          Urinalysis Basic - ( 23 Jun 2023 05:00 )    Color: x / Appearance: x / SG: x / pH: x  Gluc: 80 mg/dL / Ketone: x  / Bili: x / Urobili: x   Blood: x / Protein: x / Nitrite: x   Leuk Esterase: x / RBC: x / WBC x   Sq Epi: x / Non Sq Epi: x / Bacteria: x        Culture - Urine (collected 06-19-23 @ 02:50)  Source: Clean Catch Clean Catch (Midstream)  Final Report (06-20-23 @ 10:18):    No growth    Culture - Abscess with Gram Stain (collected 06-18-23 @ 23:08)  Source: .Abscess right lower quadrant (intrabdominal)  Gram Stain (06-19-23 @ 11:56):    Numerous polymorphonuclear leukocytes per low power field    Numerous Gram positive cocci in pairs per oil power field  Final Report (06-21-23 @ 14:13):    Numerous Anaerobic Gram Positive Cocci Most closely resembling    Peptoniphilus species "Susceptibilities not performed"    Culture - Abscess with Gram Stain (collected 06-18-23 @ 23:08)  Source: .Abscess left kidney  Gram Stain (06-19-23 @ 11:58):    Rare polymorphonuclear leukocytes per low power field    Few Gram Positive Cocci per oil power field  Final Report (06-22-23 @ 10:13):    Growth in fluid media only Anaerobic Gram Positive Cocci Most closely    resembling Peptoniphilus species "Susceptibilities not performed"    Rare Stenotrophomonas maltophilia  Organism: Stenotrophomonas maltophilia  Stenotrophomonas maltophilia (06-22-23 @ 10:13)  Organism: Stenotrophomonas maltophilia (06-22-23 @ 10:13)      Method Type: EDMUND      -  Levofloxacin: S 1      -  Trimethoprim/Sulfamethoxazole: S <=0.5/9.5  Organism: Stenotrophomonas maltophilia (06-22-23 @ 10:13)      Method Type: KB      -  Minocycline: S    Culture - Abscess with Gram Stain (collected 06-18-23 @ 20:18)  Source: .Abscess left PCN aspirate  Gram Stain (06-19-23 @ 06:27):    No polymorphonuclear cells seen per low power field    Few Gram Variable Cocci seen per oil power field  Preliminary Report (06-20-23 @ 11:47):    Few Stenotrophomonas maltophilia  Organism: Stenotrophomonas maltophilia (06-21-23 @ 09:06)  Organism: Stenotrophomonas maltophilia (06-21-23 @ 09:06)      Method Type: EDMUND      -  Levofloxacin: S 2      -  Trimethoprim/Sulfamethoxazole: S <=0.5/9.5    Culture - Blood (collected 06-18-23 @ 14:18)  Source: .Blood Blood-Peripheral  Preliminary Report (06-20-23 @ 02:02):    No growth to date.    Culture - Blood (collected 06-18-23 @ 14:18)  Source: .Blood Blood-Peripheral  Preliminary Report (06-20-23 @ 02:02):    No growth to date.        Blood Gas Venous - Lactate: 3.60 mmol/L (06-19-23 @ 02:18)  Lactate, Blood: 6.4 mmol/L (06-18-23 @ 14:18)  Blood Gas Venous - Lactate: 6.20 mmol/L (06-18-23 @ 14:05)      INFECTIOUS DISEASES TESTING      INFLAMMATORY MARKERS      RADIOLOGY & ADDITIONAL TESTS:  I have personally reviewed the last available Chest xray  CXR      CT      CARDIOLOGY TESTING  12 Lead ECG:   Ventricular Rate 114 BPM    Atrial Rate 107 BPM    QRS Duration 106 ms    Q-T Interval 286 ms    QTC Calculation(Bazett) 394 ms    R Axis 11 degrees    T Axis 138 degrees    Diagnosis Line Atrial fibrillation with rapid ventricular response  Low voltage QRS  Nonspecific T wave abnormality  Abnormal ECG    Confirmed by ROULA WISEMAN MD ([id number]) on 6/23/2023 6:42:27 AM (06-23-23 @ 01:54)  12 Lead ECG:   Ventricular Rate 109 BPM    QRS Duration 106 ms    Q-T Interval 332 ms    QTC Calculation(Bazett) 447 ms    R Axis 123 degrees    T Axis 38 degrees    Diagnosis Line Atrial fibrillation with rapid ventricular response  Low voltage QRS  Left posterior fascicular block  Abnormal ECG    Confirmed by ROULA WISEMAN MD ([id number]) on 6/21/2023 6:55:47 AM (06-21-23 @ 03:16)      MEDICATIONS  acetaminophen     Tablet .. 650  bacitracin   Ointment 1  calcium acetate 667  chlorhexidine 0.12% Liquid 15  chlorhexidine 2% Cloths 1  dexMEDEtomidine Infusion 0.2  diltiazem Infusion 5  ferrous    sulfate 325  gabapentin 100  hydrocortisone sodium succinate Injectable 50  lactated ringers Bolus 500  levoFLOXacin IVPB 750  meropenem  IVPB 500  midodrine 5  norepinephrine Infusion 0.05  pantoprazole  Injectable 40  sodium bicarbonate 1300      WEIGHT  Weight (kg): 76.476 (06-22-23 @ 21:00)  Creatinine: 2.5 mg/dL (06-23-23 @ 05:00)  Creatinine: 3.6 mg/dL (06-22-23 @ 20:07)      ANTIBIOTICS:  levoFLOXacin IVPB 750 milliGRAM(s) IV Intermittent every 48 hours  meropenem  IVPB 500 milliGRAM(s) IV Intermittent every 12 hours      All available historical records have been reviewed   CHRISTINE VILLAVICENCIO  67y, Male  Allergy: No Known Allergies      LOS  5d    CHIEF COMPLAINT: pneumoperitoneum, emphysematous pyelonephritis (23 Jun 2023 02:04)      INTERVAL EVENTS/HPI  - T(F): , Max: 98 (06-22-23 @ 16:03), s/p OR , extubated  - WBC Count: 44.03 (06-23-23 @ 05:15)  WBC Count: 36.11 (06-23-23 @ 02:56)     - Creatinine: 2.5 (06-23-23 @ 05:00)  Creatinine: 3.6 (06-22-23 @ 20:07)     -   -   -     ROS  cannot obtain secondary to patient's sedation and/or mental status    VITALS:  T(F): 97.1, Max: 98 (06-22-23 @ 16:03)  HR: 79  BP: 121/73  RR: 17Vital Signs Last 24 Hrs  T(C): 36.2 (23 Jun 2023 04:00), Max: 36.7 (22 Jun 2023 16:03)  T(F): 97.1 (23 Jun 2023 04:00), Max: 98 (22 Jun 2023 16:03)  HR: 79 (23 Jun 2023 06:00) (60 - 142)  BP: 121/73 (23 Jun 2023 06:00) (83/53 - 126/66)  BP(mean): 88 (23 Jun 2023 06:00) (63 - 97)  RR: 17 (22 Jun 2023 21:00) (17 - 23)  SpO2: 100% (23 Jun 2023 06:00) (94% - 100%)    Parameters below as of 22 Jun 2023 21:00  Patient On (Oxygen Delivery Method): room air        PHYSICAL EXAM:  Gen: chronically ill appearing face mask  HEENT: Normocephalic, atraumatic  Neck: supple, no lymphadenopathy  CV: Regular rate & regular rhythm  Lungs: decreased BS at bases, no fremitus  Abdomen: Soft, BS present bilateral drains, L serosanguinous fluid   Ext: Warm, well perfused  Neuro: non focal, awake, sluggish  Skin: no rash, no erythema  Lines: no phlebitis     FH: Non-contributory  Social Hx: Non-contributory    TESTS & MEASUREMENTS:                        8.6    44.03 )-----------( 242      ( 23 Jun 2023 05:15 )             26.3     06-23    133<L>  |  107  |  80<HH>  ----------------------------<  80  3.5   |  13<L>  |  2.5<H>    Ca    5.3<LL>      23 Jun 2023 05:00  Phos  5.9     06-22  Mg     2.0     06-22          Urinalysis Basic - ( 23 Jun 2023 05:00 )    Color: x / Appearance: x / SG: x / pH: x  Gluc: 80 mg/dL / Ketone: x  / Bili: x / Urobili: x   Blood: x / Protein: x / Nitrite: x   Leuk Esterase: x / RBC: x / WBC x   Sq Epi: x / Non Sq Epi: x / Bacteria: x        Culture - Urine (collected 06-19-23 @ 02:50)  Source: Clean Catch Clean Catch (Midstream)  Final Report (06-20-23 @ 10:18):    No growth    Culture - Abscess with Gram Stain (collected 06-18-23 @ 23:08)  Source: .Abscess right lower quadrant (intrabdominal)  Gram Stain (06-19-23 @ 11:56):    Numerous polymorphonuclear leukocytes per low power field    Numerous Gram positive cocci in pairs per oil power field  Final Report (06-21-23 @ 14:13):    Numerous Anaerobic Gram Positive Cocci Most closely resembling    Peptoniphilus species "Susceptibilities not performed"    Culture - Abscess with Gram Stain (collected 06-18-23 @ 23:08)  Source: .Abscess left kidney  Gram Stain (06-19-23 @ 11:58):    Rare polymorphonuclear leukocytes per low power field    Few Gram Positive Cocci per oil power field  Final Report (06-22-23 @ 10:13):    Growth in fluid media only Anaerobic Gram Positive Cocci Most closely    resembling Peptoniphilus species "Susceptibilities not performed"    Rare Stenotrophomonas maltophilia  Organism: Stenotrophomonas maltophilia  Stenotrophomonas maltophilia (06-22-23 @ 10:13)  Organism: Stenotrophomonas maltophilia (06-22-23 @ 10:13)      Method Type: EDMUND      -  Levofloxacin: S 1      -  Trimethoprim/Sulfamethoxazole: S <=0.5/9.5  Organism: Stenotrophomonas maltophilia (06-22-23 @ 10:13)      Method Type: KB      -  Minocycline: S    Culture - Abscess with Gram Stain (collected 06-18-23 @ 20:18)  Source: .Abscess left PCN aspirate  Gram Stain (06-19-23 @ 06:27):    No polymorphonuclear cells seen per low power field    Few Gram Variable Cocci seen per oil power field  Preliminary Report (06-20-23 @ 11:47):    Few Stenotrophomonas maltophilia  Organism: Stenotrophomonas maltophilia (06-21-23 @ 09:06)  Organism: Stenotrophomonas maltophilia (06-21-23 @ 09:06)      Method Type: EDMUND      -  Levofloxacin: S 2      -  Trimethoprim/Sulfamethoxazole: S <=0.5/9.5    Culture - Blood (collected 06-18-23 @ 14:18)  Source: .Blood Blood-Peripheral  Preliminary Report (06-20-23 @ 02:02):    No growth to date.    Culture - Blood (collected 06-18-23 @ 14:18)  Source: .Blood Blood-Peripheral  Preliminary Report (06-20-23 @ 02:02):    No growth to date.        Blood Gas Venous - Lactate: 3.60 mmol/L (06-19-23 @ 02:18)  Lactate, Blood: 6.4 mmol/L (06-18-23 @ 14:18)  Blood Gas Venous - Lactate: 6.20 mmol/L (06-18-23 @ 14:05)      INFECTIOUS DISEASES TESTING      INFLAMMATORY MARKERS      RADIOLOGY & ADDITIONAL TESTS:  I have personally reviewed the last available Chest xray  CXR      CT      CARDIOLOGY TESTING  12 Lead ECG:   Ventricular Rate 114 BPM    Atrial Rate 107 BPM    QRS Duration 106 ms    Q-T Interval 286 ms    QTC Calculation(Bazett) 394 ms    R Axis 11 degrees    T Axis 138 degrees    Diagnosis Line Atrial fibrillation with rapid ventricular response  Low voltage QRS  Nonspecific T wave abnormality  Abnormal ECG    Confirmed by ROULA WISEMAN MD ([id number]) on 6/23/2023 6:42:27 AM (06-23-23 @ 01:54)  12 Lead ECG:   Ventricular Rate 109 BPM    QRS Duration 106 ms    Q-T Interval 332 ms    QTC Calculation(Bazett) 447 ms    R Axis 123 degrees    T Axis 38 degrees    Diagnosis Line Atrial fibrillation with rapid ventricular response  Low voltage QRS  Left posterior fascicular block  Abnormal ECG    Confirmed by ROULA WISEMAN MD ([id number]) on 6/21/2023 6:55:47 AM (06-21-23 @ 03:16)      MEDICATIONS  acetaminophen     Tablet .. 650  bacitracin   Ointment 1  calcium acetate 667  chlorhexidine 0.12% Liquid 15  chlorhexidine 2% Cloths 1  dexMEDEtomidine Infusion 0.2  diltiazem Infusion 5  ferrous    sulfate 325  gabapentin 100  hydrocortisone sodium succinate Injectable 50  lactated ringers Bolus 500  levoFLOXacin IVPB 750  meropenem  IVPB 500  midodrine 5  norepinephrine Infusion 0.05  pantoprazole  Injectable 40  sodium bicarbonate 1300      WEIGHT  Weight (kg): 76.476 (06-22-23 @ 21:00)  Creatinine: 2.5 mg/dL (06-23-23 @ 05:00)  Creatinine: 3.6 mg/dL (06-22-23 @ 20:07)      ANTIBIOTICS:  levoFLOXacin IVPB 750 milliGRAM(s) IV Intermittent every 48 hours  meropenem  IVPB 500 milliGRAM(s) IV Intermittent every 12 hours      All available historical records have been reviewed

## 2023-06-23 NOTE — PATIENT PROFILE ADULT - FALL HARM RISK - UNIVERSAL INTERVENTIONS
Bed in lowest position, wheels locked, appropriate side rails in place/Call bell, personal items and telephone in reach/Instruct patient to call for assistance before getting out of bed or chair/Non-slip footwear when patient is out of bed/[place] to call system/Physically safe environment - no spills, clutter or unnecessary equipment/Purposeful Proactive Rounding/Room/bathroom lighting operational, light cord in reach

## 2023-06-23 NOTE — PROGRESS NOTE ADULT - SUBJECTIVE AND OBJECTIVE BOX
Nephrology progress note    Patient was seen and examined, events over the last 24 h noted .  cr improving  Allergies:  No Known Allergies    Hospital Medications:   MEDICATIONS  (STANDING):  acetaminophen     Tablet .. 650 milliGRAM(s) Oral every 6 hours  bacitracin   Ointment 1 Application(s) Topical two times a day  calcium acetate 667 milliGRAM(s) Oral three times a day with meals  chlorhexidine 2% Cloths 1 Application(s) Topical <User Schedule>  ferrous    sulfate 325 milliGRAM(s) Oral daily  gabapentin 100 milliGRAM(s) Oral every 8 hours  heparin   Injectable 5000 Unit(s) SubCutaneous every 8 hours  hydrocortisone sodium succinate Injectable 25 milliGRAM(s) IV Push every 12 hours  lactated ringers Bolus 500 milliLiter(s) IV Bolus once  levoFLOXacin IVPB 750 milliGRAM(s) IV Intermittent every 48 hours  meropenem  IVPB 500 milliGRAM(s) IV Intermittent every 12 hours  metoprolol tartrate Injectable 5 milliGRAM(s) IV Push every 6 hours  midodrine 5 milliGRAM(s) Oral every 8 hours  pantoprazole  Injectable 40 milliGRAM(s) IV Push daily  sodium bicarbonate 1300 milliGRAM(s) Oral three times a day        VITALS:  T(F): 97.1 (06-23-23 @ 12:00), Max: 98 (06-22-23 @ 16:03)  HR: 113 (06-23-23 @ 14:00)  BP: 105/60 (06-23-23 @ 14:00)  RR: 18 (06-23-23 @ 14:00)  SpO2: 95% (06-23-23 @ 14:00)  Wt(kg): --    06-21 @ 07:01  -  06-22 @ 07:00  --------------------------------------------------------  IN: 1538 mL / OUT: 1445 mL / NET: 93 mL    06-22 @ 07:01  -  06-23 @ 07:00  --------------------------------------------------------  IN: 4109.2 mL / OUT: 1075 mL / NET: 3034.2 mL    06-23 @ 07:01  -  06-23 @ 15:41  --------------------------------------------------------  IN: 52.2 mL / OUT: 290 mL / NET: -237.8 mL      Height (cm): 180 (06-22 @ 21:00)  Weight (kg): 76.476 (06-22 @ 21:00)  BMI (kg/m2): 23.6 (06-22 @ 21:00)  BSA (m2): 1.96 (06-22 @ 21:00)    PHYSICAL EXAM:  Constitutional: NAD  HEENT: anicteric sclera, oropharynx clear, MMM  Neck: No JVD  Respiratory: CTAB, no wheezes, rales or rhonchi  Cardiovascular: S1, S2, RRR  Gastrointestinal: BS+, soft, NT/ND  Extremities: No cyanosis or clubbing. No peripheral edema  :  No bobby.   Skin: No rashes    LABS:  06-23    133<L>  |  107  |  80<HH>  ----------------------------<  80  3.5   |  13<L>  |  2.5<H>    Ca    5.3<LL>      23 Jun 2023 05:00  Phos  5.9     06-22  Mg     2.0     06-22                            8.9    37.91 )-----------( 198      ( 23 Jun 2023 14:50 )             28.2       Urine Studies:  Urinalysis Basic - ( 23 Jun 2023 05:00 )    Color:  / Appearance:  / SG:  / pH:   Gluc: 80 mg/dL / Ketone:   / Bili:  / Urobili:    Blood:  / Protein:  / Nitrite:    Leuk Esterase:  / RBC:  / WBC    Sq Epi:  / Non Sq Epi:  / Bacteria:       Sodium, Random Urine: 40.0 mmoL/L (06-19 @ 02:50)  Creatinine, Random Urine: 80 mg/dL (06-19 @ 02:50)    RADIOLOGY & ADDITIONAL STUDIES:

## 2023-06-23 NOTE — CHART NOTE - NSCHARTNOTEFT_GEN_A_CORE
PACU ANESTHESIA ADMISSION NOTE      Procedure: Insertion, arterial line, percutaneous    Central venous catheter placement with ultrasound guidance    Midline catheter insertion    Nephrostolithotomy, percutaneous, with lithotripsy, large stone  left large thick abscess materia dimension of aspirate over 4 x 4 x 4 cm    Change external ureteral stent  left    Nephrostogram  left      Post op diagnosis:  Renal abscess, left    Elevated white blood cell count    Other specified sepsis    Acute respiratory failure with hypoxia        _x__  Intubated  TV: 450______       Rate: __14____      FiO2: __100____         Vitals:            T:   36.8             BP :     108/75           R:    14          Sat:  100%             P: 125      Mental Status:  ___ Awake   _____ Alert   _____ Drowsy   ____x_ Sedated    Nausea/Vomiting:  _x___  NO       ______Yes,   See Post - Op Orders         Pain Scale (0-10):  __0___    Treatment: ___ None    __x__ See Post - Op/PCA Orders    Post - Operative Fluids:   ___ Oral   __x__ See Post - Op Orders    Plan: Discharge:   ___Home       _____Floor     __x___Critical Care    SICU____  Other:_________________    Comments:  Patient remains in critical condition intubated. Intra-op - on Cardizem drip, still rapid A-Fib.  Transported back to SICU intubated, with cardiac monitors , O2 via Ambu-bag to TT.  Report given to SICU team

## 2023-06-23 NOTE — CHART NOTE - NSCHARTNOTEFT_GEN_A_CORE
Post Operative Note  Patient: CHRISTINE VILLAVICENCIO 67y (1955) Male   MRN: [id number]  Location: [address]  Visit: 06-18-23 Inpatient  Date: 06-23-23 @ 00:54    OR #1: Upsizing of PCN    Patient returned to SICU, remained intubated on vent settings VC//16/100/5. VS , /68, O2 100%. Patient remained on cardizem gtt throughout case with 2 pushes of esmolol 10mg in OR. L flank catheter upsized to 26Fr catheter with blood-tinged output. RLQ VENICE drain with 75cc serous output. Abdomen soft, nondistended, and nontender. Patient placed on Precedex. Intra-op cultures pending.    Objective:  Vitals: T(F): 98 (06-22-23 @ 21:00), Max: 98.2 (06-22-23 @ 04:00)  HR: 134 (06-23-23 @ 00:30)  BP: 113/80 (06-23-23 @ 00:30) (93/68 - 143/69)  RR: 17 (06-22-23 @ 21:00)  SpO2: 100% (06-23-23 @ 00:30)  Vent Settings: Mode: AC/ CMV (Assist Control/ Continuous Mandatory Ventilation), RR (machine): 16, TV (machine): 450, FiO2: 100, PEEP: 5, MAP: 9, PIP: 22    In:   06-21-23 @ 07:01  -  06-22-23 @ 07:00  --------------------------------------------------------  IN: 1538 mL    06-22-23 @ 07:01  -  06-23-23 @ 00:54  --------------------------------------------------------  IN: 818 mL    Out:   06-21-23 @ 07:01 - 06-22-23 @ 07:00  --------------------------------------------------------  OUT: 1445 mL    06-22-23 @ 07:01  -  06-23-23 @ 00:54  --------------------------------------------------------  OUT: 725 mL    EBL:     Voided Urine:   06-21-23 @ 07:01  -  06-22-23 @ 07:00  --------------------------------------------------------  OUT: 1445 mL    06-22-23 @ 07:01  -  06-23-23 @ 00:54  --------------------------------------------------------  OUT: 725 mL      Physical Examination:  General Appearance: NAD  HEENT: EOMI, sclera non-icteric.  Heart: RRR  Lungs: CTABL  Abdomen:  Soft, non tender, nondistended. L flank catheter in place with clean dressing and bloody output. RLQ drain with serous output.   MSK/Extremities: Warm & well-perfused. Peripheral pulses intact.  Skin: Warm, dry. No jaundice.   Incisions/Wounds: Dressings in place, clean, dry and intact, no signs of infection/active bleeding/drainage      Medications: [Standing]  acetaminophen     Tablet .. 650 milliGRAM(s) Oral every 6 hours  bacitracin   Ointment 1 Application(s) Topical two times a day  calcium acetate 667 milliGRAM(s) Oral three times a day with meals  chlorhexidine 0.12% Liquid 15 milliLiter(s) Oral Mucosa every 12 hours  chlorhexidine 2% Cloths 1 Application(s) Topical <User Schedule>  dexMEDEtomidine Infusion 0.2 MICROgram(s)/kG/Hr IV Continuous <Continuous>  diltiazem Infusion 5 mG/Hr IV Continuous <Continuous>  ferrous    sulfate 325 milliGRAM(s) Oral daily  gabapentin 100 milliGRAM(s) Oral every 8 hours  hydrocortisone sodium succinate Injectable 50 milliGRAM(s) IV Push every 12 hours  levoFLOXacin IVPB 750 milliGRAM(s) IV Intermittent every 48 hours  meropenem  IVPB 500 milliGRAM(s) IV Intermittent every 12 hours  midazolam Injectable 1 milliGRAM(s) IV Push every 4 hours PRN  midodrine 5 milliGRAM(s) Oral every 8 hours  sodium bicarbonate 1300 milliGRAM(s) Oral three times a day  sodium chloride 0.9% lock flush 10 milliLiter(s) IV Push every 1 hour PRN  traMADol 25 milliGRAM(s) Oral every 8 hours PRN    Medications: [PRN]  acetaminophen     Tablet .. 650 milliGRAM(s) Oral every 6 hours  bacitracin   Ointment 1 Application(s) Topical two times a day  calcium acetate 667 milliGRAM(s) Oral three times a day with meals  chlorhexidine 0.12% Liquid 15 milliLiter(s) Oral Mucosa every 12 hours  chlorhexidine 2% Cloths 1 Application(s) Topical <User Schedule>  dexMEDEtomidine Infusion 0.2 MICROgram(s)/kG/Hr IV Continuous <Continuous>  diltiazem Infusion 5 mG/Hr IV Continuous <Continuous>  ferrous    sulfate 325 milliGRAM(s) Oral daily  gabapentin 100 milliGRAM(s) Oral every 8 hours  hydrocortisone sodium succinate Injectable 50 milliGRAM(s) IV Push every 12 hours  levoFLOXacin IVPB 750 milliGRAM(s) IV Intermittent every 48 hours  meropenem  IVPB 500 milliGRAM(s) IV Intermittent every 12 hours  midazolam Injectable 1 milliGRAM(s) IV Push every 4 hours PRN  midodrine 5 milliGRAM(s) Oral every 8 hours  sodium bicarbonate 1300 milliGRAM(s) Oral three times a day  sodium chloride 0.9% lock flush 10 milliLiter(s) IV Push every 1 hour PRN  traMADol 25 milliGRAM(s) Oral every 8 hours PRN    Labs:                        9.5    47.76 )-----------( 236      ( 23 Jun 2023 00:30 )             29.6     06-22    128<L>  |  92<L>  |  105<HH>  ----------------------------<  104<H>  4.6   |  17  |  3.6<H>    Ca    7.4<L>      22 Jun 2023 20:07  Phos  5.9     06-22  Mg     2.0     06-22      PT/INR - ( 23 Jun 2023 00:30 )   PT: 15.80 sec;   INR: 1.37 ratio    PTT - ( 23 Jun 2023 00:30 )  PTT:29.6 sec    Imaging:  Post-op CXR: ETT above brigido    Assessment & Plan:  - Plan for repeat CTAP tomorrow  - f/u intra-op cultures  - continue IV abx course, f/u ID recs  - continue cardizem gtt  - continue precedex  - Vent VC/AC: 450/16/100/5  - Continue Pain Medications   - Monitor urine output  - GI Prophylaxis  - Monitor wound and dressing for changes, redress as needed.      Date/Time: 06-23-23 @ 00:54

## 2023-06-23 NOTE — PROGRESS NOTE ADULT - ASSESSMENT
ASSESSMENT  66yo Male with pmh of hypertension, massive Grade IV hydronephrosis s/p left nephrostomy placed in May 2023 by [facility] presents to the ED with little to no urine output from LEFT nephrostomy from about a week. PCN  more recently drainage was changed to brown/purulent fluid. 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 "thick gelatinous material that was difficult to suction out even with the shock pulse. I would alternate 2 prong to remove and break up / shock pulse to suck out. after an hour where I had no clear planes I elected to stop. irrigation through the 26 bobby took out a lot additional material  in ashlyn catch cup 4 x 4 x 4 cm lump of material trapped"    6/19 UCX NG; UA Blood: x / Protein: 300 mg/dL / Nitrite: Negative   Leuk Esterase: Large / RBC: 8 /HPF / WBC >720 /HPF   Sq Epi: x / Non Sq Epi: x / Bacteria: Moderate    6/18 L PCN     Few Stenotrophomonas maltophilia Levofloxacin: S 2    6/18 L PCN     Numerous Anaerobic Gram Positive Cocci Most closely resembling  Peptoniphilus species "Susceptibilities not performed"    6/18 L PCN   Numerous Gram positive cocci in pairs per oil power field    6/18 BCX NGTD     s/p 6/18  Left PCN upsized to 16F and 1200cc of very viscous tan colored fluid was aspirated. RLQ 16F drain was placed and 1200cc of tan colored fluid was aspirated (less viscous). A sample from each location was sent for culture.     Repeat CT 6/19 Since one day earlier,  No extraluminal contrast. Oral contrast was last seen in the terminal ileum.  Status post left nephrostomy tube placement. Left enlarged kidney with severe hydronephrosis/collection has decreased, now measuring 16 x 12 cm from 20 x 14 cm  < from: CT Abdomen and Pelvis w/ IV Cont (06.18.23 @ 15:15) >  1 ) Moderate pneumoperitoneum with partial diffusion throughout moderate   volume ascites and with associated intermittent peritoneal enhancement   and nodularity. Perforation and peritonitis may be related to underlying   emphysematous pyelonephritis (see below). Less likely sources of   perforation include ureteral/bladder disruption, and bowel perforation   which cannot be entirely excluded on the provided images.  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 The compressed residual renal   parenchyma is inseparable from the adjacent fascial/fatty layers   anterosuperiorly and direct rupture into the peritoneum is a possibility   (6-355). The previously placed left-sided nephrostomy tube pigtail is   notedto be within the left renal collecting system.  3) New hepatic hypodensities measuring up to 2.6 cm, suspicious for   development of multiple focal abscesses.  4) Wedge-shaped region of hypodensity within the spleen may reflect   splenic infarct in the correct clinical setting. Developing   phlegmon/abscess cannot be excluded in the current clinical setting  5) Increased extensive retroperitoneal, portacaval and likely   gastrohepatic heterogeneously enhancing lymphadenopathy. Findings may be   reactive.  6) Mediastinal lymphadenopathy measuring up to 2.4 cm short axis.   Underlying etiology may be reactive, infectious/inflammatory, or   neoplastic. A short-term 3 month follow-up CT chest should be considered   for further evaluation.  7)Right middle and upper lobe solid pulmonary nodules which are not well   delineated due to respiratory motion but measure less than 6 mm.   attention on follow-up exam.  #Lactic acidosis  #Abx allergy: No Known Allergies  #Prolonged QTC Calculation(Bazett) 526 ms  #Hyponatremia  #AKICreatinine: 3.3 mg/dL (06.21.23 @ 21:28)  )crcl 26  Ht 180  Weight (kg): 76.5 (06-18-23 @ 21:27)    RECOMMENDATIONS  - Ar 1000 milliGRAM(s) IV Intermittent every 12 hours   - Continue levaquin IV 750mg q48h IV   - f/u final cultures  - Trend WBC     If any questions, please send a message or call on [employer]  Please continue to update ID with any pertinent new laboratory or radiographic findings  #[id number]   ASSESSMENT  66yo Male with pmh of hypertension, massive Grade IV hydronephrosis s/p left nephrostomy placed in May 2023 by [facility] presents to the ED with little to no urine output from LEFT nephrostomy from about a week. PCN  more recently drainage was changed to brown/purulent fluid. 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 "thick gelatinous material that was difficult to suction out even with the shock pulse. I would alternate 2 prong to remove and break up / shock pulse to suck out. after an hour where I had no clear planes I elected to stop. irrigation through the 26 bobby took out a lot additional material  in ashlyn catch cup 4 x 4 x 4 cm lump of material trapped"    6/19 UCX NG; UA Blood: x / Protein: 300 mg/dL / Nitrite: Negative   Leuk Esterase: Large / RBC: 8 /HPF / WBC >720 /HPF   Sq Epi: x / Non Sq Epi: x / Bacteria: Moderate    6/18 L PCN     Few Stenotrophomonas maltophilia Levofloxacin: S 2    6/18 L PCN     Numerous Anaerobic Gram Positive Cocci Most closely resembling  Peptoniphilus species "Susceptibilities not performed"    6/18 L PCN   Numerous Gram positive cocci in pairs per oil power field    6/18 BCX NGTD     s/p 6/18  Left PCN upsized to 16F and 1200cc of very viscous tan colored fluid was aspirated. RLQ 16F drain was placed and 1200cc of tan colored fluid was aspirated (less viscous). A sample from each location was sent for culture.     Repeat CT 6/19 Since one day earlier,  No extraluminal contrast. Oral contrast was last seen in the terminal ileum.  Status post left nephrostomy tube placement. Left enlarged kidney with severe hydronephrosis/collection has decreased, now measuring 16 x 12 cm from 20 x 14 cm  < from: CT Abdomen and Pelvis w/ IV Cont (06.18.23 @ 15:15) >  1 ) Moderate pneumoperitoneum with partial diffusion throughout moderate   volume ascites and with associated intermittent peritoneal enhancement   and nodularity. Perforation and peritonitis may be related to underlying   emphysematous pyelonephritis (see below). Less likely sources of   perforation include ureteral/bladder disruption, and bowel perforation   which cannot be entirely excluded on the provided images.  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 The compressed residual renal   parenchyma is inseparable from the adjacent fascial/fatty layers   anterosuperiorly and direct rupture into the peritoneum is a possibility   (6-355). The previously placed left-sided nephrostomy tube pigtail is   notedto be within the left renal collecting system.  3) New hepatic hypodensities measuring up to 2.6 cm, suspicious for   development of multiple focal abscesses.  4) Wedge-shaped region of hypodensity within the spleen may reflect   splenic infarct in the correct clinical setting. Developing   phlegmon/abscess cannot be excluded in the current clinical setting  5) Increased extensive retroperitoneal, portacaval and likely   gastrohepatic heterogeneously enhancing lymphadenopathy. Findings may be   reactive.  6) Mediastinal lymphadenopathy measuring up to 2.4 cm short axis.   Underlying etiology may be reactive, infectious/inflammatory, or   neoplastic. A short-term 3 month follow-up CT chest should be considered   for further evaluation.  7)Right middle and upper lobe solid pulmonary nodules which are not well   delineated due to respiratory motion but measure less than 6 mm.   attention on follow-up exam.  #Lactic acidosis  #Abx allergy: No Known Allergies  #Prolonged QTC Calculation(Bazett) 526 ms  #Hyponatremia  #AKICreatinine: 3.3 mg/dL (06.21.23 @ 21:28)  )crcl 26  Ht 180  Weight (kg): 76.5 (06-18-23 @ 21:27)    RECOMMENDATIONS  - Ar 1000 milliGRAM(s) IV Intermittent every 12 hours  for now, can likely narrow soon if repeat OR cultures without new organisms  - Continue levaquin IV 750mg q48h IV   - f/u final cultures  - Trend WBC     If any questions, please send a message or call on [employer]  Please continue to update ID with any pertinent new laboratory or radiographic findings  #[id number]

## 2023-06-23 NOTE — CHART NOTE - NSCHARTNOTEFT_GEN_A_CORE
Registered Dietitian Follow-Up     Patient Profile Reviewed                           Yes [x]   No []     Nutrition History Previously Obtained        Yes []  No [x]       Pertinent Subjective Information: Limited to chart review at this time as pt intubated and unable to provide hx. Chart reviewed- pt last seen by our service on 5/11/23 and reported good appetite but had weight loss from #/113 KG (Sept 2022) -> 66.2 KG (5/11/23).      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  -(6/23) s/p LT PCN upsizing to 26Fr catheter, RLQ 16 Fr drain remains  -(6/18) s/p LT PCN upsizing to 16Fr and 16Fr RLQ drain placement with IR, flush q8 hr  -Plan for L nephrectomy for 6/26   Also notably has #Gastric mass vs gastrohepatic lymphadenopathy, #Hepatic hypodensities, suspicious for development for multiple focal abscesses and #Splenic Hypodensities, infarct vs phlegmon/abscess.    Diet order: Diet, NPO:   Except Medications (06-23-23 @ 02:36) [Active]    Anthropometrics:  Height (cm): 180 (06-22-23 @ 21:00)  Weight (kg): 76.476 (06-22-23 @ 21:00)  BMI (kg/m2): 23.6 (06-22-23 @ 21:00)  IBW:   UBW: 113 KG (Sept 2022) -> 66.2 KG (5/11/23)    Daily   % Weight Change    MEDICATIONS  (STANDING):  acetaminophen     Tablet .. 650 milliGRAM(s) Oral every 6 hours  bacitracin   Ointment 1 Application(s) Topical two times a day  calcium acetate 667 milliGRAM(s) Oral three times a day with meals  chlorhexidine 0.12% Liquid 15 milliLiter(s) Oral Mucosa every 12 hours  chlorhexidine 2% Cloths 1 Application(s) Topical <User Schedule>  dexMEDEtomidine Infusion 0.2 MICROgram(s)/kG/Hr (3.82 mL/Hr) IV Continuous <Continuous>  diltiazem Infusion 5 mG/Hr (5 mL/Hr) IV Continuous <Continuous>  ferrous    sulfate 325 milliGRAM(s) Oral daily  gabapentin 100 milliGRAM(s) Oral every 8 hours  hydrocortisone sodium succinate Injectable 25 milliGRAM(s) IV Push every 12 hours  lactated ringers Bolus 500 milliLiter(s) IV Bolus once  levoFLOXacin IVPB 750 milliGRAM(s) IV Intermittent every 48 hours  meropenem  IVPB 500 milliGRAM(s) IV Intermittent every 12 hours  midodrine 5 milliGRAM(s) Oral every 8 hours  norepinephrine Infusion 0.05 MICROgram(s)/kG/Min (7.17 mL/Hr) IV Continuous <Continuous>  pantoprazole  Injectable 40 milliGRAM(s) IV Push daily  sodium bicarbonate 1300 milliGRAM(s) Oral three times a day    MEDICATIONS  (PRN):  midazolam Injectable 1 milliGRAM(s) IV Push every 4 hours PRN Agitation  sodium chloride 0.9% lock flush 10 milliLiter(s) IV Push every 1 hour PRN Pre/post blood products, medications, blood draw, and to maintain line patency  traMADol 25 milliGRAM(s) Oral every 8 hours PRN Moderate Pain (4 - 6)    Pertinent Labs: 06-23 @ 05:00: Na 133<L>, BUN 80<HH>, Cr 2.5<H>, BG 80, K+ 3.5, Phos --, Mg --, Alk Phos --, ALT/SGPT --, AST/SGOT --, HbA1c --  06-22 @ 20:07: Na 128<L>, <HH>, Cr 3.6<H>, <H>, K+ 4.6, Phos 5.9<H>, Mg 2.0, Alk Phos --, ALT/SGPT --, AST/SGOT --, HbA1c --    Finger Sticks:  POCT Blood Glucose.: 130 mg/dL (06-22 @ 11:25)    Physical Findings:  - Appearance:  - GI function:  - Tubes:  - Oral/Mouth cavity:  - Skin:  - Edema:     Nutrition Requirements:  Weight Used:     Estimated Energy Needs    Continue []  Adjust []  Energy Recommendations:   kcal/day -     Estimated Protein Needs    Continue []  Adjust []  Protein Recommendations:   gm/day -      Estimated Fluid Needs        Continue []  Adjust []  Fluid Recommendations:   mL/day -      Nutrient Intake:    [x] Previous Nutrition Diagnosis: Malnutrition            [x] Ongoing          [] Resolved     Nutrition Education:      Goal/Expected Outcome:      Indicator/Monitoring: Monitor diet order, kcal intake, body composition, NFPE, labs  (kim BG, renal indices)    Recommendation: Registered Dietitian Follow-Up     Patient Profile Reviewed                           Yes [x]   No []     Nutrition History Previously Obtained        Yes []  No [x]       Pertinent Subjective Information: Limited to chart review at this time as pt intubated and unable to provide hx. Chart reviewed- pt last seen by our service on 5/11/23 and reported good appetite but had weight loss from #/113 KG (Sept 2022) -> 66.2 KG (5/11/23).      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  -(6/23) s/p LT PCN upsizing to 26Fr catheter, RLQ 16 Fr drain remains  -(6/18) s/p LT PCN upsizing to 16Fr and 16Fr RLQ drain placement with IR, flush q8 hr  -Plan for L nephrectomy for 6/26   Also notably has #Gastric mass vs gastrohepatic lymphadenopathy, #Hepatic hypodensities, suspicious for development for multiple focal abscesses and #Splenic Hypodensities, infarct vs phlegmon/abscess.  Extubated at noon today.     Diet order: Diet, NPO:   Except Medications (06-23-23 @ 02:36) [Active]    Anthropometrics:  Height (cm): 180 (06-22-23 @ 21:00)  Weight (kg): 76.476 (06-22-23 @ 21:00)  BMI (kg/m2): 23.6 (06-22-23 @ 21:00)  IBW: 78.2 kg  UBW: 113 KG (Sept 2022) -> 66.2 KG (5/11/23)    Weight Change: no new weight to assess.     MEDICATIONS  (STANDING):  acetaminophen     Tablet .. 650 milliGRAM(s) Oral every 6 hours  bacitracin   Ointment 1 Application(s) Topical two times a day  calcium acetate 667 milliGRAM(s) Oral three times a day with meals  chlorhexidine 0.12% Liquid 15 milliLiter(s) Oral Mucosa every 12 hours  chlorhexidine 2% Cloths 1 Application(s) Topical <User Schedule>  dexMEDEtomidine Infusion 0.2 MICROgram(s)/kG/Hr (3.82 mL/Hr) IV Continuous <Continuous>  diltiazem Infusion 5 mG/Hr (5 mL/Hr) IV Continuous <Continuous>  ferrous    sulfate 325 milliGRAM(s) Oral daily  gabapentin 100 milliGRAM(s) Oral every 8 hours  hydrocortisone sodium succinate Injectable 25 milliGRAM(s) IV Push every 12 hours  lactated ringers Bolus 500 milliLiter(s) IV Bolus once  levoFLOXacin IVPB 750 milliGRAM(s) IV Intermittent every 48 hours  meropenem  IVPB 500 milliGRAM(s) IV Intermittent every 12 hours  midodrine 5 milliGRAM(s) Oral every 8 hours  norepinephrine Infusion 0.05 MICROgram(s)/kG/Min (7.17 mL/Hr) IV Continuous <Continuous>  pantoprazole  Injectable 40 milliGRAM(s) IV Push daily  sodium bicarbonate 1300 milliGRAM(s) Oral three times a day    MEDICATIONS  (PRN):  midazolam Injectable 1 milliGRAM(s) IV Push every 4 hours PRN Agitation  sodium chloride 0.9% lock flush 10 milliLiter(s) IV Push every 1 hour PRN Pre/post blood products, medications, blood draw, and to maintain line patency  traMADol 25 milliGRAM(s) Oral every 8 hours PRN Moderate Pain (4 - 6)    Pertinent Labs:                         8.6    44.03 )-----------( 242      ( 23 Jun 2023 05:15 )             26.3     06-23 @ 05:00: Na 133<L>, BUN 80<HH>, Cr 2.5<H>, BG 80, K+ 3.5, Phos --, Mg --, Alk Phos --, ALT/SGPT --, AST/SGOT --, HbA1c --  06-22 @ 20:07: Na 128<L>, <HH>, Cr 3.6<H>, <H>, K+ 4.6, Phos 5.9<H>, Mg 2.0, Alk Phos --, ALT/SGPT --, AST/SGOT --, HbA1c --    Finger Sticks:  POCT Blood Glucose.: 130 mg/dL (06-22 @ 11:25)    I&O's Detail  22 Jun 2023 07:01  -  23 Jun 2023 07:00  --------------------------------------------------------  IN:    Dexmedetomidine: 90.4 mL    Diltiazem: 105 mL    Heparin Infusion: 128 mL    IV PiggyBack: 150 mL    IV PiggyBack: 500 mL    IV PiggyBack: 100 mL    Lactated Ringers Bolus: 2500 mL    Norepinephrine: 75.8 mL    Oral Fluid: 460 mL  Total IN: 4109.2 mL    OUT:    Drain (mL): 200 mL    Indwelling Catheter - Urethral (mL): 605 mL    VAC (Vacuum Assisted Closure) System (mL): 120 mL    VAC (Vacuum Assisted Closure) System (mL): 150 mL  Total OUT: 1075 mL    Total NET: 3034.2 mL    Physical Findings:  - Appearance: sedated  - GI function: abdomen distended  - Tubes: no feeding tube  - Oral/Mouth cavity: NPO, for SLP if bedside swallow fails  - Skin: stage 2 pressure injury to sacrum   - Edema: 2+ generalized; scrotum edema     Nutrition Requirements:  Weight Used: dosing weight 76.5 kg     Estimated Energy Needs    Continue []  Adjust [x]  Energy Recommendations: 2367-0588 kcal/day - MSJ 1550*SF 1.2-1.5g/kg - PCM    Estimated Protein Needs    Continue []  Adjust []  Protein Recommendations:   gm/day -      Estimated Fluid Needs        Continue []  Adjust []  Fluid Recommendations:   mL/day -      Nutrient Intake:    [x] Previous Nutrition Diagnosis: Malnutrition            [x] Ongoing          [] Resolved     Nutrition Education:      Goal/Expected Outcome:      Indicator/Monitoring: Monitor diet order, kcal intake, body composition, NFPE, labs  (lytes, BG, renal indices)    Recommendation: Registered Dietitian Follow-Up     Patient Profile Reviewed                           Yes [x]   No []     Nutrition History Previously Obtained        Yes []  No [x]       Pertinent Subjective Information: Limited to chart review at this time as pt intubated and unable to provide hx. Chart reviewed- pt last seen by our service on 5/11/23 and reported good appetite but had weight loss from #/113 KG (Sept 2022) -> 66.2 KG (5/11/23).      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  -(6/23) s/p LT PCN upsizing to 26Fr catheter, RLQ 16 Fr drain remains  -(6/18) s/p LT PCN upsizing to 16Fr and 16Fr RLQ drain placement with IR, flush q8 hr  -Plan for L nephrectomy for 6/26   Also notably has #Gastric mass vs gastrohepatic lymphadenopathy, #Hepatic hypodensities, suspicious for development for multiple focal abscesses and #Splenic Hypodensities, infarct vs phlegmon/abscess.  Extubated at noon today.     Diet order: Diet, NPO:   Except Medications (06-23-23 @ 02:36) [Active]    Anthropometrics:  Height (cm): 180 (06-22-23 @ 21:00)  Weight (kg): 76.476 (06-22-23 @ 21:00)  BMI (kg/m2): 23.6 (06-22-23 @ 21:00)  IBW: 78.2 kg  UBW: 113 KG (Sept 2022) -> 66.2 KG (5/11/23)    Weight Change: no new weight to assess.     MEDICATIONS  (STANDING):  acetaminophen     Tablet .. 650 milliGRAM(s) Oral every 6 hours  bacitracin   Ointment 1 Application(s) Topical two times a day  calcium acetate 667 milliGRAM(s) Oral three times a day with meals  chlorhexidine 0.12% Liquid 15 milliLiter(s) Oral Mucosa every 12 hours  chlorhexidine 2% Cloths 1 Application(s) Topical <User Schedule>  dexMEDEtomidine Infusion 0.2 MICROgram(s)/kG/Hr (3.82 mL/Hr) IV Continuous <Continuous>  diltiazem Infusion 5 mG/Hr (5 mL/Hr) IV Continuous <Continuous>  ferrous    sulfate 325 milliGRAM(s) Oral daily  gabapentin 100 milliGRAM(s) Oral every 8 hours  hydrocortisone sodium succinate Injectable 25 milliGRAM(s) IV Push every 12 hours  lactated ringers Bolus 500 milliLiter(s) IV Bolus once  levoFLOXacin IVPB 750 milliGRAM(s) IV Intermittent every 48 hours  meropenem  IVPB 500 milliGRAM(s) IV Intermittent every 12 hours  midodrine 5 milliGRAM(s) Oral every 8 hours  norepinephrine Infusion 0.05 MICROgram(s)/kG/Min (7.17 mL/Hr) IV Continuous <Continuous>  pantoprazole  Injectable 40 milliGRAM(s) IV Push daily  sodium bicarbonate 1300 milliGRAM(s) Oral three times a day    MEDICATIONS  (PRN):  midazolam Injectable 1 milliGRAM(s) IV Push every 4 hours PRN Agitation  sodium chloride 0.9% lock flush 10 milliLiter(s) IV Push every 1 hour PRN Pre/post blood products, medications, blood draw, and to maintain line patency  traMADol 25 milliGRAM(s) Oral every 8 hours PRN Moderate Pain (4 - 6)    Pertinent Labs:                         8.6    44.03 )-----------( 242      ( 23 Jun 2023 05:15 )             26.3     06-23 @ 05:00: Na 133<L>, BUN 80<HH>, Cr 2.5<H>, BG 80, K+ 3.5, Phos --, Mg --, Alk Phos --, ALT/SGPT --, AST/SGOT --, HbA1c --  06-22 @ 20:07: Na 128<L>, <HH>, Cr 3.6<H>, <H>, K+ 4.6, Phos 5.9<H>, Mg 2.0, Alk Phos --, ALT/SGPT --, AST/SGOT --, HbA1c --    Finger Sticks:  POCT Blood Glucose.: 130 mg/dL (06-22 @ 11:25)    I&O's Detail  22 Jun 2023 07:01  -  23 Jun 2023 07:00  --------------------------------------------------------  IN:    Dexmedetomidine: 90.4 mL    Diltiazem: 105 mL    Heparin Infusion: 128 mL    IV PiggyBack: 150 mL    IV PiggyBack: 500 mL    IV PiggyBack: 100 mL    Lactated Ringers Bolus: 2500 mL    Norepinephrine: 75.8 mL    Oral Fluid: 460 mL  Total IN: 4109.2 mL    OUT:    Drain (mL): 200 mL    Indwelling Catheter - Urethral (mL): 605 mL    VAC (Vacuum Assisted Closure) System (mL): 120 mL    VAC (Vacuum Assisted Closure) System (mL): 150 mL  Total OUT: 1075 mL    Total NET: 3034.2 mL    Physical Findings:  - Appearance: sedated  - GI function: abdomen distended  - Tubes: no feeding tube  - Oral/Mouth cavity: NPO, for SLP if bedside swallow fails  - Skin: stage 2 pressure injury to sacrum   - Edema: 2+ generalized; scrotum edema     Nutrition Requirements:  Weight Used: dosing weight 76.5 kg     Estimated Energy Needs    Continue []  Adjust [x]  Energy Recommendations: 5571-6812 kcal/day - MSJ 1550*SF 1.2-1.5g/kg - PCM    Estimated Protein Needs    Continue []  Adjust [x]  Protein Recommendations:  gm/day - 1.2-1.5g/kg - PCM     Estimated Fluid Needs        Continue [x]  Adjust []  Fluid Recommendations: 6945-8810 mL/day - 25-30 ml/kg     Nutrient Intake: Pt was kept NPO/on trickle feeds in the past 5 days since admission     [x] Previous Nutrition Diagnosis: Inadequate Protein Energy Intake             [x] Ongoing          [] Resolved     Pt now meets critieria for malnutrition in the context of acute illness as evidenced by <50% estimated needs met for 5 days and moderate (2+) edema masking weight loss.     Nutrition Education: N/A     Goal/Expected Outcome: to establish a route of nutrition and pt will meet >50% estimated needs in 3 days.     Indicator/Monitoring: Monitor diet order, kcal intake, body composition, NFPE, labs  (lytes, BG, renal indices)    Recommendation:    Patient assessed at high nutrition risk.  RD spectra [id number], also available on Teams. Registered Dietitian Follow-Up     Patient Profile Reviewed                           Yes [x]   No []     Nutrition History Previously Obtained        Yes []  No [x]       Pertinent Subjective Information: Limited to chart review at this time as pt intubated when seen and unable to provide hx. Chart reviewed- pt last seen by our service on 5/11/23 and reported good appetite but had weight loss from #/113 KG (Sept 2022) -> 66.2 KG (5/11/23).      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  -(6/23) s/p LT PCN upsizing to 26Fr catheter, RLQ 16 Fr drain remains  -(6/18) s/p LT PCN upsizing to 16Fr and 16Fr RLQ drain placement with IR, flush q8 hr  -Plan for L nephrectomy for 6/26   Also notably has #Gastric mass vs gastrohepatic lymphadenopathy, #Hepatic hypodensities, suspicious for development for multiple focal abscesses and #Splenic Hypodensities, infarct vs phlegmon/abscess.  Extubated at noon today.     Diet order: Diet, NPO:   Except Medications (06-23-23 @ 02:36) [Active]    Anthropometrics:  Height (cm): 180 (06-22-23 @ 21:00)  Weight (kg): 76.476 (06-22-23 @ 21:00)  BMI (kg/m2): 23.6 (06-22-23 @ 21:00)  IBW: 78.2 kg  UBW: 113 KG (Sept 2022) -> 66.2 KG (5/11/23)    Weight Change: no new weight to assess.     MEDICATIONS  (STANDING):  acetaminophen     Tablet .. 650 milliGRAM(s) Oral every 6 hours  bacitracin   Ointment 1 Application(s) Topical two times a day  calcium acetate 667 milliGRAM(s) Oral three times a day with meals  chlorhexidine 0.12% Liquid 15 milliLiter(s) Oral Mucosa every 12 hours  chlorhexidine 2% Cloths 1 Application(s) Topical <User Schedule>  dexMEDEtomidine Infusion 0.2 MICROgram(s)/kG/Hr (3.82 mL/Hr) IV Continuous <Continuous>  diltiazem Infusion 5 mG/Hr (5 mL/Hr) IV Continuous <Continuous>  ferrous    sulfate 325 milliGRAM(s) Oral daily  gabapentin 100 milliGRAM(s) Oral every 8 hours  hydrocortisone sodium succinate Injectable 25 milliGRAM(s) IV Push every 12 hours  lactated ringers Bolus 500 milliLiter(s) IV Bolus once  levoFLOXacin IVPB 750 milliGRAM(s) IV Intermittent every 48 hours  meropenem  IVPB 500 milliGRAM(s) IV Intermittent every 12 hours  midodrine 5 milliGRAM(s) Oral every 8 hours  norepinephrine Infusion 0.05 MICROgram(s)/kG/Min (7.17 mL/Hr) IV Continuous <Continuous>  pantoprazole  Injectable 40 milliGRAM(s) IV Push daily  sodium bicarbonate 1300 milliGRAM(s) Oral three times a day    MEDICATIONS  (PRN):  midazolam Injectable 1 milliGRAM(s) IV Push every 4 hours PRN Agitation  sodium chloride 0.9% lock flush 10 milliLiter(s) IV Push every 1 hour PRN Pre/post blood products, medications, blood draw, and to maintain line patency  traMADol 25 milliGRAM(s) Oral every 8 hours PRN Moderate Pain (4 - 6)    Pertinent Labs:                         8.6    44.03 )-----------( 242      ( 23 Jun 2023 05:15 )             26.3     06-23 @ 05:00: Na 133<L>, BUN 80<HH>, Cr 2.5<H>, BG 80, K+ 3.5, Phos --, Mg --, Alk Phos --, ALT/SGPT --, AST/SGOT --, HbA1c --  06-22 @ 20:07: Na 128<L>, <HH>, Cr 3.6<H>, <H>, K+ 4.6, Phos 5.9<H>, Mg 2.0, Alk Phos --, ALT/SGPT --, AST/SGOT --, HbA1c --    Finger Sticks:  POCT Blood Glucose.: 130 mg/dL (06-22 @ 11:25)    I&O's Detail  22 Jun 2023 07:01  -  23 Jun 2023 07:00  --------------------------------------------------------  IN:    Dexmedetomidine: 90.4 mL    Diltiazem: 105 mL    Heparin Infusion: 128 mL    IV PiggyBack: 150 mL    IV PiggyBack: 500 mL    IV PiggyBack: 100 mL    Lactated Ringers Bolus: 2500 mL    Norepinephrine: 75.8 mL    Oral Fluid: 460 mL  Total IN: 4109.2 mL    OUT:    Drain (mL): 200 mL    Indwelling Catheter - Urethral (mL): 605 mL    VAC (Vacuum Assisted Closure) System (mL): 120 mL    VAC (Vacuum Assisted Closure) System (mL): 150 mL  Total OUT: 1075 mL    Total NET: 3034.2 mL    Physical Findings:  - Appearance: sedated  - GI function: abdomen distended  - Tubes: no feeding tube  - Oral/Mouth cavity: NPO, for SLP if bedside swallow fails  - Skin: stage 2 pressure injury to sacrum   - Edema: 2+ generalized; scrotum edema     Nutrition Requirements:  Weight Used: dosing weight 76.5 kg     Estimated Energy Needs    Continue []  Adjust [x]  Energy Recommendations: 7469-8108 kcal/day - MSJ 1550*SF 1.2-1.5g/kg - PCM    Estimated Protein Needs    Continue []  Adjust [x]  Protein Recommendations:  gm/day - 1.2-1.5g/kg - PCM     Estimated Fluid Needs        Continue [x]  Adjust []  Fluid Recommendations: 8275-4179 mL/day - 25-30 ml/kg     Nutrient Intake: Pt was kept NPO/on trickle feeds in the past 5 days since admission     [x] Previous Nutrition Diagnosis: Inadequate Protein Energy Intake             [x] Ongoing          [] Resolved     Pt now meets criteria for severe malnutrition in the context of acute illness as evidenced by <50% estimated needs met for 5 days and moderate (2+) edema masking weight loss.     Nutrition Education: N/A     Goal/Expected Outcome: to establish a route of nutrition and pt will meet >50% estimated needs in 3 days.     Indicator/Monitoring: Monitor diet order, kcal intake, body composition, NFPE, labs  (lytes, BG, renal indices)    Recommendation:  Regular diet + consistency per SLP if warranted   - if pt with complaint of decreased PO tolerance and/or early satiety (associated with abdominal distension), then consider low fat, low fiber modifications  - noted pt with decreased renal function, but lytes are low normal warranting repletion and question if pt will be able to have good PO intake (thus intake of electrolytes) in setting of medical condition   - if tray intake <50%, add Nepro shake (420kcal, 19g protein each) 1can 2-3x/day    If pt for TF, Nepro formula, bolus/gravity feeds, 480ml@8AM, 480ml@12noon, 240ml@4PM, 480ml@8PM. Free water flushes 50ml before and after each bolus. -- 2160kcal, 96g protein, 1226+400ml free water.     Patient assessed at high nutrition risk.  RD spectra [id number], also available on Teams. Registered Dietitian Follow-Up     Patient Profile Reviewed                           Yes [x]   No []     Nutrition History Previously Obtained        Yes []  No [x]       Pertinent Subjective Information: Limited to chart review at this time as pt intubated when seen and unable to provide hx. Chart reviewed- pt last seen by our service on 5/11/23 and reported good appetite but had weight loss from #/113 KG (Sept 2022) -> 66.2 KG (5/11/23).      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  -(6/23) s/p LT PCN upsizing to 26Fr catheter, RLQ 16 Fr drain remains  -(6/18) s/p LT PCN upsizing to 16Fr and 16Fr RLQ drain placement with IR, flush q8 hr  -Plan for L nephrectomy for 6/26   Also notably has #Gastric mass vs gastrohepatic lymphadenopathy, #Hepatic hypodensities, suspicious for development for multiple focal abscesses and #Splenic Hypodensities, infarct vs phlegmon/abscess.  Extubated at noon today.     Diet order: Diet, NPO:   Except Medications (06-23-23 @ 02:36) [Active]    Anthropometrics:  Height (cm): 180 (06-22-23 @ 21:00)  Weight (kg): 76.476 (06-22-23 @ 21:00)  BMI (kg/m2): 23.6 (06-22-23 @ 21:00)  IBW: 78.2 kg  UBW: 113 KG (Sept 2022) -> 66.2 KG (5/11/23)    Weight Change: no new weight to assess.     MEDICATIONS  (STANDING):  acetaminophen     Tablet .. 650 milliGRAM(s) Oral every 6 hours  bacitracin   Ointment 1 Application(s) Topical two times a day  calcium acetate 667 milliGRAM(s) Oral three times a day with meals  chlorhexidine 0.12% Liquid 15 milliLiter(s) Oral Mucosa every 12 hours  chlorhexidine 2% Cloths 1 Application(s) Topical <User Schedule>  dexMEDEtomidine Infusion 0.2 MICROgram(s)/kG/Hr (3.82 mL/Hr) IV Continuous <Continuous>  diltiazem Infusion 5 mG/Hr (5 mL/Hr) IV Continuous <Continuous>  ferrous    sulfate 325 milliGRAM(s) Oral daily  gabapentin 100 milliGRAM(s) Oral every 8 hours  hydrocortisone sodium succinate Injectable 25 milliGRAM(s) IV Push every 12 hours  lactated ringers Bolus 500 milliLiter(s) IV Bolus once  levoFLOXacin IVPB 750 milliGRAM(s) IV Intermittent every 48 hours  meropenem  IVPB 500 milliGRAM(s) IV Intermittent every 12 hours  midodrine 5 milliGRAM(s) Oral every 8 hours  norepinephrine Infusion 0.05 MICROgram(s)/kG/Min (7.17 mL/Hr) IV Continuous <Continuous>  pantoprazole  Injectable 40 milliGRAM(s) IV Push daily  sodium bicarbonate 1300 milliGRAM(s) Oral three times a day    MEDICATIONS  (PRN):  midazolam Injectable 1 milliGRAM(s) IV Push every 4 hours PRN Agitation  sodium chloride 0.9% lock flush 10 milliLiter(s) IV Push every 1 hour PRN Pre/post blood products, medications, blood draw, and to maintain line patency  traMADol 25 milliGRAM(s) Oral every 8 hours PRN Moderate Pain (4 - 6)    Pertinent Labs:                         8.6    44.03 )-----------( 242      ( 23 Jun 2023 05:15 )             26.3     06-23 @ 05:00: Na 133<L>, BUN 80<HH>, Cr 2.5<H>, BG 80, K+ 3.5, Phos --, Mg --, Alk Phos --, ALT/SGPT --, AST/SGOT --, HbA1c --  06-22 @ 20:07: Na 128<L>, <HH>, Cr 3.6<H>, <H>, K+ 4.6, Phos 5.9<H>, Mg 2.0, Alk Phos --, ALT/SGPT --, AST/SGOT --, HbA1c --    Finger Sticks:  POCT Blood Glucose.: 130 mg/dL (06-22 @ 11:25)    I&O's Detail  22 Jun 2023 07:01  -  23 Jun 2023 07:00  --------------------------------------------------------  IN:    Dexmedetomidine: 90.4 mL    Diltiazem: 105 mL    Heparin Infusion: 128 mL    IV PiggyBack: 150 mL    IV PiggyBack: 500 mL    IV PiggyBack: 100 mL    Lactated Ringers Bolus: 2500 mL    Norepinephrine: 75.8 mL    Oral Fluid: 460 mL  Total IN: 4109.2 mL    OUT:    Drain (mL): 200 mL    Indwelling Catheter - Urethral (mL): 605 mL    VAC (Vacuum Assisted Closure) System (mL): 120 mL    VAC (Vacuum Assisted Closure) System (mL): 150 mL  Total OUT: 1075 mL    Total NET: 3034.2 mL    Physical Findings:  - Appearance: sedated  - GI function: abdomen distended  - Tubes: no feeding tube  - Oral/Mouth cavity: NPO, for SLP if bedside swallow fails  - Skin: stage 2 pressure injury to sacrum   - Edema: 2+ generalized; scrotum edema     Nutrition Requirements:  Weight Used: dosing weight 76.5 kg     Estimated Energy Needs    Continue []  Adjust [x]  Energy Recommendations: 2433-1772 kcal/day - MSJ 1550*SF 1.2-1.5g/kg - PCM    Estimated Protein Needs    Continue []  Adjust [x]  Protein Recommendations:  gm/day - 1.2-1.5g/kg - PCM     Estimated Fluid Needs        Continue [x]  Adjust []  Fluid Recommendations: 9287-7232 mL/day - 25-30 ml/kg     Nutrient Intake: Pt was kept NPO/on trickle feeds in the past 5 days since admission     [x] Previous Nutrition Diagnosis: Inadequate Protein Energy Intake             [x] Ongoing          [] Resolved     Pt now meets criteria for severe malnutrition in the context of acute illness as evidenced by <50% estimated needs met for 5 days and moderate (2+) edema masking weight loss.     Nutrition Education: N/A     Goal/Expected Outcome: to establish a route of nutrition and pt will meet >50% estimated needs in 3 days.     Indicator/Monitoring: Monitor diet order, kcal intake, body composition, NFPE, labs  (lytes, BG, renal indices)    Recommendation:  Regular diet + consistency per SLP if warranted   - if pt with complaint of decreased PO tolerance and/or early satiety (associated with abdominal distension), then consider low fat, low fiber modifications  - would defer renal restriction at this time despite decreased renal function given lytes are low normal warranting repletion, also question if pt will be able to have good PO intake (thus intake of electrolytes) in setting of medical condition   - if tray intake <50%, add Nepro shake (420kcal, 19g protein each) 1can 2-3x/day    If pt for TF, Nepro formula, bolus/gravity feeds, 480ml@8AM, 480ml@12noon, 240ml@4PM, 480ml@8PM. Free water flushes 50ml before and after each bolus. -- 2160kcal, 96g protein, 1226+400ml free water.     Patient assessed at high nutrition risk.  RD spectra [phone], also available on Teams.

## 2023-06-23 NOTE — PROGRESS NOTE ADULT - NS ATTEND AMEND GEN_ALL_CORE FT
pt seen and examined clinically improving today off pressors, extubated. serosang /mild purulence from nephrostomy. we gently irrigated the nephrostomy today to ensure patency given the reported thickness of the fluid drained out ( per Dr Lomeli's report) and it is flushing well.  will cont to monitor.  cont abx fu cultures  cont icu care pt seen and examined clinically improving today off pressors, extubated. serosang /mild purulence from nephrostomy. ct ap images visualized show improved appearance of kidney though still w severe hydro. we gently irrigated the nephrostomy today to ensure patency given the reported thickness of the fluid drained out ( discuss w Dr Lomeli ) and it is flushing well.  will cont to monitor.  cont abx fu cultures  cont icu care

## 2023-06-23 NOTE — PROGRESS NOTE ADULT - ASSESSMENT
67M w/ PMH of HTN, grade IV hydronephrosis of L kidney s/p L PCN (placed 5/2023), stutter, hiatal hernia, iron deficiency anemia, H Pylori (untreated), and gastric mass (never biopsied) presents to the ED with at least 4 days of worsening weakness, abdominal distension, and no output from his PCN  . Found to have septic shock, MSOF, lactic acidosis from left emphysematous hydronephrosis, pyelonephritis and possible rupture with pneumoperitoneum along with possible hepatic abscesses, splenic infarct and abdominal and mediastinal lymphadenopathies. he is s/p upsizing of left PCN tube by IR along with RLQ drain placed.     Assessment and plan  ROJAS/ Hyponatremia/ severe left hydro s/p upsizing of left PCN catheter/ emphysematous pyelo with septic shock/ pneumoperitoneum/ ? kidney rupture/ ? hepatic abscesses/ splenic infarct  ROJAS likely ATN iso septic shock  off levophed / on midodrine, cr improving  tachycardic, needs rate control in A fib   oliguria improved with iv lasix   hyponatremia stable  cont lasix 80mg iv q12h  strict i/o  phos level noted;  started phoslo 1 tab TID qac if/when tolerating po intake   s/p upsizing of left PCN tube by IR, still not draining planned for possible Lt nephrectomy    and IR following  cont abx per ID recs  no indication for RRT as of now

## 2023-06-23 NOTE — PROGRESS NOTE ADULT - SUBJECTIVE AND OBJECTIVE BOX
UROLOGY POST-OP CHECK:    Pt is a 67y M POD#0 s/p percutaneous drainage of L renal abscess.  Pt remains intubated post-operatively. 26Fr catheter to L flank. 16Fr urethral bobby catheter.     MEDICATIONS  (STANDING):  acetaminophen     Tablet .. 650 milliGRAM(s) Oral every 6 hours  bacitracin   Ointment 1 Application(s) Topical two times a day  calcium acetate 667 milliGRAM(s) Oral three times a day with meals  chlorhexidine 2% Cloths 1 Application(s) Topical <User Schedule>  dexMEDEtomidine Infusion 0.2 MICROgram(s)/kG/Hr (3.82 mL/Hr) IV Continuous <Continuous>  diltiazem Infusion 5 mG/Hr (5 mL/Hr) IV Continuous <Continuous>  ferrous    sulfate 325 milliGRAM(s) Oral daily  gabapentin 100 milliGRAM(s) Oral every 8 hours  hydrocortisone sodium succinate Injectable 50 milliGRAM(s) IV Push every 12 hours  levoFLOXacin IVPB 750 milliGRAM(s) IV Intermittent every 48 hours  meropenem  IVPB 500 milliGRAM(s) IV Intermittent every 12 hours  midodrine 5 milliGRAM(s) Oral every 8 hours  sodium bicarbonate 1300 milliGRAM(s) Oral three times a day    MEDICATIONS  (PRN):  sodium chloride 0.9% lock flush 10 milliLiter(s) IV Push every 1 hour PRN Pre/post blood products, medications, blood draw, and to maintain line patency  traMADol 25 milliGRAM(s) Oral every 8 hours PRN Moderate Pain (4 - 6)      REVIEW OF SYSTEMS   [X] Due to altered mental status/intubation, subjective information were not able to be obtained from patient. History was obtained, to the extent possible, from review of the chart and collateral sources of information.    Vital Signs Last 24 Hrs  T(C): 36.7 (22 Jun 2023 21:00), Max: 36.8 (22 Jun 2023 04:00)  T(F): 98 (22 Jun 2023 21:00), Max: 98.2 (22 Jun 2023 04:00)  HR: 110 (22 Jun 2023 21:00) (98 - 142)  BP: 126/66 (22 Jun 2023 21:00) (94/72 - 143/69)  BP(mean): 92 (22 Jun 2023 20:00) (77 - 97)  RR: 17 (22 Jun 2023 21:00) (17 - 23)  SpO2: 94% (22 Jun 2023 21:00) (94% - 99%)      PHYSICAL EXAM:  GEN: Pt intubated/sedated   RESP: Pt intubated   BACK: +26Fr catheter in place to L flank, blood tinged drainage in tubing    : +16Fr bobby in place draining yellow urine    I&O's Summary    21 Jun 2023 07:01  -  22 Jun 2023 07:00  --------------------------------------------------------  IN: 1538 mL / OUT: 1445 mL / NET: 93 mL    22 Jun 2023 07:01  -  23 Jun 2023 00:09  --------------------------------------------------------  IN: 818 mL / OUT: 725 mL / NET: 93 mL

## 2023-06-23 NOTE — PROGRESS NOTE ADULT - ASSESSMENT
Assessment & Plan:  67M w/ PMH of HTN, grade IV hydronephrosis of L kidney s/p L PCN (placed 5/2023), stutter, hiatal hernia, iron deficiency anemia, H Pylori (untreated), and gastric mass (never biopsied) presents to the ED with at least 4 days of worsening weakness, abdominal distension, and no output from his PCN. Admitted with emphysematous pyelonephritis     (6/18): s/p LT PCN upsizing to 16Fr and 16Fr RLQ drain placement with IR     NEURO:  #Pain control    - APAP and tramadol PRN  #Sedation    -Precedex  #Agitation    -Versed prn  #H/o stutter (negative ischemic workup last admission)    RESP:  #acute respiratory failure    - Returned intubated s/p L flank catheter upsizing by urology (6/23)         -Vent VC//16/80/5    - Returned intubated post drain upsizing by IR (6/18)         - Extubated (6/21)    - Maintain O2 sat > 92%  #Pulmonary nodules / Mediastinal lymphadenopathy    - CT Chest: Right middle and upper lobe solid pulmonary nodules     - Recommend outpatient follow up with pulmonology   #Activity   - increased as tolerated    CARDS:   #Septic shock    - restarted Levo overnight    - On midodrine 5mg q8hr  #New onset Afib w/ RVR with hypotension -- likely 2/2 sepsis    - on cardizem gtt    - Cardiology following, monitor on drip but rec'd holding off on amiodarone as management   #HTN    -Holding home amlodipine 2.5 qD while hypotensive      Currently normotensive   Imaging    - EKG (6/19): Afib w/ RVR to 138     - ECHO: (6/19) EF 55-60%, mild AS, mild LAE, mild MR/TR, trivial pericardial effusion    GI/NUTR:  #Gastric mass vs gastrohepatic lymphadenopathy    -CT A/P: Previously noted lesser curvature mass measures 6.3 x 5.2 cm (previously 5.2 x 3.5 cm by my measurement), and may represent gastrohepatic lymphadenopathy.  #Hepatic hypodensities, suspicious for development for multiple focal abscesses    -CT A/P: New hepatic hypodensities measuring up to 2.6 cm, suspicious for development of multiple focal abscesses.  #Splenic Hypodensities, infarct vs phlegmon/abscess    -CT A/P:  Wedge-shaped region of hypodensity within the spleen may reflect splenic infarct in the correct clinical setting. Developing phlegmon/abscess cannot be excluded in the current clinical setting     -Abd progressively more distended but patient denies pain. (+) flatus  #Diet    -NPO x/rx    -OGT in place, LL @ 55    -Last BM: 6/18 in the ED    /RENAL:   #Grade IV L hydronephrosis s/p L PCN (5/2023) -- presented to ED with thick dark foul smelling output  #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    -(6/23) s/p LT PCN upsizing to 26Fr catheter, RLQ 16 Fr drain remains    -(6/18) s/p LT PCN upsizing to 16Fr and 16Fr RLQ drain placement with IR, flush q8 hr    - CT A/P with PO/IV to r/o bowel perforation: Status post left nephrostomy tube placement. Left enlarged kidney with severe hydronephrosis/collection has decreased, now measuring 16 x 12 cm from 20 x 14 cm    -Plan for L nephrectomy for 6/26   #ROJAS (baseline Cr 1.0)    -Cr 3.1>3.3      Oliguric with UOP ~30-50cc hourly, received Lasix 80 x1 and Bumex 2mg x1 with minimal improvement    -(6/19) FeNa 1.1%, intrinsic    -Nephrology following    -On bicarb tabs, phoslo     HEME/ONC:   #H/o Fe Deficiency anemia    - Iron supplementation  #DVT prophylaxis, patient on full AC    -Hep gtt held pre-op  T&S Expires 6/24  Blood Consent- in chart     ID:  #Leukocytosis 2/2 sepsis --> WBC uptrending    - Blood and urine cultures negative, abscess cultures with Stenotrophomonas maltophilia    - s/p vancomycin, cefepime, and flagyl in the ED    - Continuing meropenem and levaquin    - ID following    - Afebrile     ENDO:  #Glucose monitoring    -A1c (5/7/23): 5.0   #Adrenal Insufficieny 2/2 to sepsis     -Solucortef to 50mg q12hr, transition to daily dosing 6/23    -Random cortisol level -sent, results pending    MSK:  #Activity    -Advance as tolerated    -PT/rehab     LINES/DRAINS: PIV, L radial [place] (6/18/23), Mena (6/18/23), Javier Drain (6/18/23), LT PCN (6/18/23). Left basilic midline  6/21    ADVANCED DIRECTIVES:  Full Code    HCP/Emergency Contact- Tracey Adames: [phone]     INDICATION FOR SICU: New onset atrial fibrillation    DISPO: SICU-> worsening leukocytosis, Afib with RVR, end organ dysfunction Assessment & Plan:  67M w/ PMH of HTN, grade IV hydronephrosis of L kidney s/p L PCN (placed 5/2023), stutter, hiatal hernia, iron deficiency anemia, H Pylori (untreated), and gastric mass (never biopsied) presents to the ED with at least 4 days of worsening weakness, abdominal distension, and no output from his PCN. Admitted with emphysematous pyelonephritis     (6/18): s/p LT PCN upsizing to 16Fr and 16Fr RLQ drain placement with IR     NEURO:  #Pain control    - APAP and tramadol PRN  #H/o stutter (negative ischemic workup last admission)    RESP:  #acute respiratory failure    - Returned intubated s/p L flank catheter upsizing by urology (6/23)         -Vent VC//16/80/5         - Extubated (6/23)    - Returned intubated post drain upsizing by IR (6/18)         - Extubated (6/21)    - Maintain O2 sat > 92%  #Pulmonary nodules / Mediastinal lymphadenopathy    - CT Chest: Right middle and upper lobe solid pulmonary nodules     - Recommend outpatient follow up with pulmonology   #Activity   - increased as tolerated    CARDS:   #Septic shock    - restarted Levo overnight, titrate as needed    - On midodrine 5mg q8hr  #New onset Afib w/ RVR with hypotension -- likely 2/2 sepsis    - IV lopressor 2.5mg BID    - on/off cardizem gtt    - Cardiology following, monitor on drip but rec'd holding off on amiodarone as management   #HTN    -Holding home amlodipine 2.5 qD while hypotensive      Currently normotensive   Imaging    - EKG (6/19): Afib w/ RVR to 138     - ECHO: (6/19) EF 55-60%, mild AS, mild LAE, mild MR/TR, trivial pericardial effusion    GI/NUTR:  #Gastric mass vs gastrohepatic lymphadenopathy    -CT A/P: Previously noted lesser curvature mass measures 6.3 x 5.2 cm (previously 5.2 x 3.5 cm by my measurement), and may represent gastrohepatic lymphadenopathy.  #Hepatic hypodensities, suspicious for development for multiple focal abscesses    -CT A/P: New hepatic hypodensities measuring up to 2.6 cm, suspicious for development of multiple focal abscesses.  #Splenic Hypodensities, infarct vs phlegmon/abscess    -CT A/P:  Wedge-shaped region of hypodensity within the spleen may reflect splenic infarct in the correct clinical setting. Developing phlegmon/abscess cannot be excluded in the current clinical setting     -Abd progressively more distended but patient denies pain. (+) flatus   - Pending new CT AP no contrast per urology  #Diet    -Pending speech and swallow, will start diet if pass    -Last BM: 6/18 in the ED    /RENAL:   #Grade IV L hydronephrosis s/p L PCN (5/2023) -- presented to ED with thick dark foul smelling output  #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    -(6/23) s/p LT PCN upsizing to 26Fr catheter, RLQ 16 Fr drain remains    -(6/18) s/p LT PCN upsizing to 16Fr and 16Fr RLQ drain placement with IR, flush q8 hr    - CT A/P with PO/IV to r/o bowel perforation: Status post left nephrostomy tube placement. Left enlarged kidney with severe hydronephrosis/collection has decreased, now measuring 16 x 12 cm from 20 x 14 cm    -Plan for L nephrectomy for 6/26   #ROJAS (baseline Cr 1.0)    -Cr 3.1>3.3      Oliguric with UOP ~30-50cc hourly, received Lasix 80 x1 and Bumex 2mg x1 with minimal improvement    -(6/19) FeNa 1.1%, intrinsic    -Nephrology following    -On bicarb tabs, phoslo     HEME/ONC:   #H/o Fe Deficiency anemia    - Iron supplementation  #DVT prophylaxis, patient on full AC    - Resume Hep gtt if CBC stable    - SQH while off hep gtt  T&S Expires 6/24  Blood Consent- in chart     ID:  #Leukocytosis 2/2 sepsis --> WBC uptrending    - Blood and urine cultures negative, abscess cultures with Stenotrophomonas maltophilia    - s/p vancomycin, cefepime, and flagyl in the ED    - Continuing meropenem and levaquin per ID    - ID following    - Afebrile     ENDO:  #Glucose monitoring    -A1c (5/7/23): 5.0   #Adrenal Insufficieny 2/2 to sepsis     -Solucortef to 25mg q12hr, transition to daily dosing 6/24    -Random cortisol level -sent, results pending    MSK:  #Activity    -Advance as tolerated    -PT/rehab     LINES/DRAINS: PIV, L radial [facility] (6/18/23), Mena (6/18/23), Javier Drain (6/18/23), LT PCN (6/18/23). Left basilic midline  6/21    ADVANCED DIRECTIVES:  Full Code    HCP/Emergency Contact- Tracey Adames: [phone]     INDICATION FOR SICU: New onset atrial fibrillation    DISPO: SICU-> worsening leukocytosis, Afib with RVR, end organ dysfunction

## 2023-06-23 NOTE — PROCEDURE NOTE - PROCEDURE DATE TIME, MLM
Marianne Prasad is a 35 y.o. male presents today for his complete physical exam.            Pt is in Room # 4        Learning Assessment (baseline): Completed  Depression Screening: Completed    1. Have you been to the ER, urgent care clinic since your last visit? Hospitalized since your last visit? No    2. Have you seen or consulted any other health care providers outside of the [address] [place] [address] since your last visit? Include any pap smears or colon screening.  No 23-Jun-2023 07:10

## 2023-06-23 NOTE — PROGRESS NOTE ADULT - ASSESSMENT
67y Male admitted with retroperitoneal & intraabdominal abscess s/p drain placement & L PCN upsizing with IR on 6/18; s/p percutaneous drainage of LEFT renal abcess, POD#1. Patient remains intubated/sedated. Patient remains on pressors and started of cardizem drip yesterday.     Plan:   - F/u CT A/P today for further evaluation   - LEFT 26fr flank drain with blood-tinged drainage. 16 fr bobby in place draining yellow urine. RLQ VENICE drain serous drainage.   - f/u Intra-op Cx  -  67y Male admitted with retroperitoneal & intraabdominal abscess s/p drain placement & L PCN upsizing with IR on 6/18; s/p percutaneous drainage of LEFT renal abcess, POD#1. Patient remains intubated/sedated. Patient remains on pressors and started of cardizem drip yesterday.     Plan:   - F/u CT A/P today for further evaluation   - LEFT 26fr flank drain with blood-tinged drainage. 16 fr bobby in place draining yellow urine. RLQ VENICE drain serous drainage.   - f/u Intra-op Cx  - Cardiology risk stratification:  67y Male admitted with retroperitoneal & intraabdominal abscess s/p drain placement & L PCN upsizing with IR on 6/18; s/p percutaneous drainage of LEFT renal abcess, POD#1. Patient remains intubated/sedated. Patient remains on pressors and started of cardizem drip yesterday.     Plan:   - F/u CT A/P today for further evaluation   - LEFT 26fr flank drain with blood-tinged drainage. 16 fr bobby in place draining yellow urine. RLQ VENICE drain serous drainage.   - f/u Intra-op Cx  - Cardiology risk stratification appreciated : high risk for MACE. proceed as planned. iv Cardizem for rate control, allow permissive tachycardia - 100s. may interrupt ac as needed   - Continue management per SICU team   - Will d/w attending

## 2023-06-23 NOTE — PROGRESS NOTE ADULT - SUBJECTIVE AND OBJECTIVE BOX
CHRISTINE KHLAIL  MRN-[id number]  1955  67M    HPI:  67M w/ PMH of HTN, grade IV hydronephrosis of L kidney s/p L PCN (placed 5/2023), stutter, hiatal hernia, iron deficiency anemia, H Pylori (untreated), and gastric mass (never biopsied) presents to the ED with at least 4 days of worsening weakness, abdominal distension, and no output from his PCN. Pt and daughters bedside are only able to give limited information, but for the past few days, he has noticed no output from his PCN as well as increasing abdominal distension, pain, and anorexia. He reports that he has not felt normal for the past 10 days. His PCN used to put out serosanguinous fluid, but the output changed to feculent/brown liquid over the past day. In the ED, he was noted to be in new onset afib w/ RVR to 150+ as well as experiencing some difficulty breathing. He was started on BiPAP. Stat labs were notable for WBC 47, Cr 2.9, and lactate 6. CT A/P w/ IV contrast showed free intraperitoneal fluid and air from an unknown source as well as fluid and air in the L kidney.     SICU was consulted for hemodynamic monitoring 2/2 intraabdominal sepsis. Patient went with IR for Left PCN upsized to 16Fr and RLQ drain placement.   FIndings: Left PCN upsized to 16F and 1200cc of very viscous tan colored fluid was aspirated. RLQ 16F drain was placed and 1200cc of tan colored fluid was aspirated (less viscous). A sample from each location was sent for culture.     24 hour Events:   6/22  NIGHT  -Post-op: returned intubated ETT 7.5 LL @23, continued cardizem gtt in OR, esmolol 10 x2 pushes  -Plan for CTAP tomorrow  -f/u intraop cultures    DAY  , continuing chest PT  L PCN minimal passive drainage, requires manual aspiration for more output  q12 flushing of drains with 10cc   AF with RVR despite metoprolol and cardizem, cardizem gtt started with improvement  Cardiology recs: continue cardizem gtt, moderate to high risk for OR   Oliguric, Cr 3.1>3.3; 80mg lasix with minimal response, trial of 2mg bumex not attempted due to mild hypotension  (+) flatus, distension progressing but KUB WNL  Continuing meropenem/levaquin  Solucortef tapered q12hr  Add on for percutaneous drainage of left renal abscess, possible open : NPO, heparin gtt held    REVIEW OF SYSTEMS:  [X] A ten-point review of systems was otherwise negative except as noted above.  [  ] Due to altered mental status/intubation, subjective information was not attained from the patient. History was obtained, to the extent possible, from review of the chart and collateral sources of information.  ----------------------------------------------------------------------------------------------------------------------  Assessment & Plan:  67M w/ PMH of HTN, grade IV hydronephrosis of L kidney s/p L PCN (placed 5/2023), stutter, hiatal hernia, iron deficiency anemia, H Pylori (untreated), and gastric mass (never biopsied) presents to the ED with at least 4 days of worsening weakness, abdominal distension, and no output from his PCN. Admitted with emphysematous pyelonephritis    (6/18): s/p LT PCN upsizing to 16Fr and 16Fr RLQ drain placement with IR     NEURO:  #Pain control    - APAP and tramadol PRN  #H/o stutter (negative ischemic workup last admission)    RESP:  #acute respiratory failure    - Returned intubated post drain upsizing by IR 6/18    - Extubated 6/21- now on O2 via NC 2 lpm, saturating 98%    - Maintain O2 sat > 92%    - Encourage IS  #Pulmonary nodules / Mediastinal lymphadenopathy    - CT Chest: Right middle and upper lobe solid pulmonary nodules     - Recommend outpatient follow up with pulmonology   #Activity   - OOBTC as tolerated    CARDS:   #Septic shock    - On midodrine 5mg q8hr, off levophed 6/20   #New onset Afib w/ RVR with hypotension -- likely 2/2 sepsis    - HR in the 140s overnight despite metoprolol pushes and subsequent cardizem 5mg IVP x1, trial of cardizem drip today    - Cardiology following, monitor on drip but rec'd holding off on amiodarone as management   #HTN    -Holding home amlodipine 2.5 qD while hypotensive      Currently normotensive   Imaging    - EKG (6/19): Afib w/ RVR to 138     - ECHO: 6/19 EF 55-60%, mild AS, mild LAE, mild MR/TR, trivial pericardial effusion    GI/NUTR:  #Gastric mass vs gastrohepatic lymphadenopathy    -CT A/P:Previously noted lesser curvature mass measures 6.3 x 5.2 cm (previously 5.2 x 3.5 cm by my measurement), and may represent gastrohepatic lymphadenopathy.  #Hepatic hypodensities, suspicious for development for multiple focal abscesses    -CT A/P: New hepatic hypodensities measuring up to 2.6 cm, suspicious for development of multiple focal abscesses.  #Splenic Hypodensities, infarct vs phlegmon/abscess    -CT A/P:  Wedge-shaped region of hypodensity within the spleen may reflect splenic infarct in the correct clinical setting. Developing phlegmon/abscess cannot be excluded in the current clinical setting     -Abd progressively more distended but patient denies pain. (+) flatus  #Diet    -FLD with nepro supplementation    -Last BM: 6/18 in the ED    /RENAL:   #Grade IV L hydronephrosis s/p L PCN (5/2023) -- presented to ED with thick dark foul smelling output  #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    -(6/18) s/p LT PCN upsizing to 16Fr and 16Fr RLQ drain placement with IR, flush q8 hr    - CT A/P with PO/IV to r/o bowel perforation: Status post left nephrostomy tube placement. Left enlarged kidney with severe hydronephrosis/collection has decreased, now measuring 16 x 12 cm from 20 x 14 cm    -Plan for L nephrectomy for 6/26, NPO after midnight for drainage with urology  #ROJAS (baseline Cr 1.0)    -Cr 3.1>3.3      Dosed with lasix 80mg x2 yesterday with second dose given in the evening with Albumin 25%, 50cc      Now oliguric with UOP ~30-50cc hourly, plan to redose lasix today    -(6/19) FeNa 1.1%, intrinsic    -Lasix 80mg x 1 on 6/21  improved transiently    -Nephrology following    -On bicarb tabs, phoslo       HEME/ONC:   #H/o Fe Deficiency anemia    - Iron supplementation  #DVT prophylaxis, patient on full AC    -Hep gtt @1600 units/ he PTT @ 2000 was 69.9, follow 0430 PTT  f/u T&S  Blood Consent- in chart     ID:  #Leukocytosis 2/2 sepsis --> currently 48.37 (uptrending)    - Blood and urine cultures negative, abscess cultures with Stenotrophomonas maltophilia    - s/p vancomycin, cefepime, and flagyl in the ED    - Continuing meropenem and levaquin    - ID following    - Afebrile     ENDO:  #Glucose monitoring    -ISS     -A1c (5/7/23): 5.0    -FS Q6H while on trickle feeds     #Adrenal Insufficieny 2/2 to sepsis     -Solucortef to 50mg q12hr today, transition to daily dosing tomorrow    -Random cortisol level -sent, results pending    MSK:  #Activity    -Advance as tolerated    -PT/rehab     LINES/DRAINS: PIV, L radial [place] (6/18/23), Mena (6/18/23), Javier Drain (6/18/23), LT PCN (6/18/23). Left basilic midline  6/21    ADVANCED DIRECTIVES:  Full Code    HCP/Emergency Contact- Tracey Adames: [phone]     INDICATION FOR SICU/SDU: New onset atrial fibrillation    DISPO: SICU-> worsening leukocytosis, Afib with RVR, end organ dysfunction CHRISTINE KHALIL  MRN-[id number]  1955  67M    HPI:  67M w/ PMH of HTN, grade IV hydronephrosis of L kidney s/p L PCN (placed 5/2023), stutter, hiatal hernia, iron deficiency anemia, H Pylori (untreated), and gastric mass (never biopsied) presents to the ED with at least 4 days of worsening weakness, abdominal distension, and no output from his PCN. Pt and daughters bedside are only able to give limited information, but for the past few days, he has noticed no output from his PCN as well as increasing abdominal distension, pain, and anorexia. He reports that he has not felt normal for the past 10 days. His PCN used to put out serosanguinous fluid, but the output changed to feculent/brown liquid over the past day. In the ED, he was noted to be in new onset afib w/ RVR to 150+ as well as experiencing some difficulty breathing. He was started on BiPAP. Stat labs were notable for WBC 47, Cr 2.9, and lactate 6. CT A/P w/ IV contrast showed free intraperitoneal fluid and air from an unknown source as well as fluid and air in the L kidney.     SICU was consulted for hemodynamic monitoring 2/2 intraabdominal sepsis. Patient went with IR for Left PCN upsized to 16Fr and RLQ drain placement.   FIndings: Left PCN upsized to 16F and 1200cc of very viscous tan colored fluid was aspirated. RLQ 16F drain was placed and 1200cc of tan colored fluid was aspirated (less viscous). A sample from each location was sent for culture.     24 hour Events:   6/22  NIGHT  -Post-op: returned intubated ETT 7.5 LL @23, continued cardizem gtt in OR, esmolol 10 x2 pushes  -Plan for CTAP tomorrow  -f/u intraop cultures    DAY  , continuing chest PT  L PCN minimal passive drainage, requires manual aspiration for more output  q12 flushing of drains with 10cc   AF with RVR despite metoprolol and cardizem, cardizem gtt started with improvement  Cardiology recs: continue cardizem gtt, moderate to high risk for OR   Oliguric, Cr 3.1>3.3; 80mg lasix with minimal response, trial of 2mg bumex not attempted due to mild hypotension  (+) flatus, distension progressing but KUB WNL  Continuing meropenem/levaquin  Solucortef tapered q12hr  Add on for percutaneous drainage of left renal abscess, possible open : NPO, heparin gtt held    REVIEW OF SYSTEMS:  [X] A ten-point review of systems was otherwise negative except as noted above.  [  ] Due to altered mental status/intubation, subjective information was not attained from the patient. History was obtained, to the extent possible, from review of the chart and collateral sources of information.  ----------------------------------------------------------------------------------------------------------------------  Assessment & Plan:  67M w/ PMH of HTN, grade IV hydronephrosis of L kidney s/p L PCN (placed 5/2023), stutter, hiatal hernia, iron deficiency anemia, H Pylori (untreated), and gastric mass (never biopsied) presents to the ED with at least 4 days of worsening weakness, abdominal distension, and no output from his PCN. Admitted with emphysematous pyelonephritis     (6/18): s/p LT PCN upsizing to 16Fr and 16Fr RLQ drain placement with IR     NEURO:  #Pain control    - APAP and tramadol PRN  #H/o stutter (negative ischemic workup last admission)    RESP:  #acute respiratory failure    - Returned intubated post drain upsizing by IR 6/18    - Extubated 6/21- now on O2 via NC 2 lpm, saturating 98%    - Maintain O2 sat > 92%    - Encourage IS  #Pulmonary nodules / Mediastinal lymphadenopathy    - CT Chest: Right middle and upper lobe solid pulmonary nodules     - Recommend outpatient follow up with pulmonology   #Activity   - OOBTC as tolerated    CARDS:   #Septic shock    - On midodrine 5mg q8hr, off levophed 6/20   #New onset Afib w/ RVR with hypotension -- likely 2/2 sepsis    - HR in the 140s overnight despite metoprolol pushes and subsequent cardizem 5mg IVP x1, trial of cardizem drip today    - Cardiology following, monitor on drip but rec'd holding off on amiodarone as management   #HTN    -Holding home amlodipine 2.5 qD while hypotensive      Currently normotensive   Imaging    - EKG (6/19): Afib w/ RVR to 138     - ECHO: 6/19 EF 55-60%, mild AS, mild LAE, mild MR/TR, trivial pericardial effusion    GI/NUTR:  #Gastric mass vs gastrohepatic lymphadenopathy    -CT A/P:Previously noted lesser curvature mass measures 6.3 x 5.2 cm (previously 5.2 x 3.5 cm by my measurement), and may represent gastrohepatic lymphadenopathy.  #Hepatic hypodensities, suspicious for development for multiple focal abscesses    -CT A/P: New hepatic hypodensities measuring up to 2.6 cm, suspicious for development of multiple focal abscesses.  #Splenic Hypodensities, infarct vs phlegmon/abscess    -CT A/P:  Wedge-shaped region of hypodensity within the spleen may reflect splenic infarct in the correct clinical setting. Developing phlegmon/abscess cannot be excluded in the current clinical setting     -Abd progressively more distended but patient denies pain. (+) flatus  #Diet    -FLD with nepro supplementation    -Last BM: 6/18 in the ED    /RENAL:   #Grade IV L hydronephrosis s/p L PCN (5/2023) -- presented to ED with thick dark foul smelling output  #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    -(6/18) s/p LT PCN upsizing to 16Fr and 16Fr RLQ drain placement with IR, flush q8 hr    - CT A/P with PO/IV to r/o bowel perforation: Status post left nephrostomy tube placement. Left enlarged kidney with severe hydronephrosis/collection has decreased, now measuring 16 x 12 cm from 20 x 14 cm    -Plan for L nephrectomy for 6/26, NPO after midnight for drainage with urology  #ROJAS (baseline Cr 1.0)    -Cr 3.1>3.3      Dosed with lasix 80mg x2 yesterday with second dose given in the evening with Albumin 25%, 50cc      Now oliguric with UOP ~30-50cc hourly, plan to redose lasix today    -(6/19) FeNa 1.1%, intrinsic    -Lasix 80mg x 1 on 6/21  improved transiently    -Nephrology following    -On bicarb tabs, phoslo       HEME/ONC:   #H/o Fe Deficiency anemia    - Iron supplementation  #DVT prophylaxis, patient on full AC    -Hep gtt @1600 units/ he PTT @ 2000 was 69.9, follow 0430 PTT  f/u T&S  Blood Consent- in chart     ID:  #Leukocytosis 2/2 sepsis --> currently 48.37 (uptrending)    - Blood and urine cultures negative, abscess cultures with Stenotrophomonas maltophilia    - s/p vancomycin, cefepime, and flagyl in the ED    - Continuing meropenem and levaquin    - ID following    - Afebrile     ENDO:  #Glucose monitoring    -ISS     -A1c (5/7/23): 5.0    -FS Q6H while on trickle feeds     #Adrenal Insufficieny 2/2 to sepsis     -Solucortef to 50mg q12hr today, transition to daily dosing tomorrow    -Random cortisol level -sent, results pending    MSK:  #Activity    -Advance as tolerated    -PT/rehab     LINES/DRAINS: PIV, L radial [place] (6/18/23), Mena (6/18/23), Javier Drain (6/18/23), LT PCN (6/18/23). Left basilic midline  6/21    ADVANCED DIRECTIVES:  Full Code    HCP/Emergency Contact- Tracey Adames: [phone]     INDICATION FOR SICU/SDU: New onset atrial fibrillation    DISPO: SICU-> worsening leukocytosis, Afib with RVR, end organ dysfunction CHRISTINE KHALIL  MRN-[id number]  1955  67M    HPI:  67M w/ PMH of HTN, grade IV hydronephrosis of L kidney s/p L PCN (placed 5/2023), stutter, hiatal hernia, iron deficiency anemia, H Pylori (untreated), and gastric mass (never biopsied) presents to the ED with at least 4 days of worsening weakness, abdominal distension, and no output from his PCN. Pt and daughters bedside are only able to give limited information, but for the past few days, he has noticed no output from his PCN as well as increasing abdominal distension, pain, and anorexia. He reports that he has not felt normal for the past 10 days. His PCN used to put out serosanguinous fluid, but the output changed to feculent/brown liquid over the past day. In the ED, he was noted to be in new onset afib w/ RVR to 150+ as well as experiencing some difficulty breathing. He was started on BiPAP. Stat labs were notable for WBC 47, Cr 2.9, and lactate 6. CT A/P w/ IV contrast showed free intraperitoneal fluid and air from an unknown source as well as fluid and air in the L kidney.     SICU was consulted for hemodynamic monitoring 2/2 intraabdominal sepsis. Patient went with IR for Left PCN upsized to 16Fr and RLQ drain placement.   FIndings: Left PCN upsized to 16F and 1200cc of very viscous tan colored fluid was aspirated. RLQ 16F drain was placed and 1200cc of tan colored fluid was aspirated (less viscous). A sample from each location was sent for culture.     24 hour Events:   6/22  NIGHT  -Post-op: returned intubated ETT 7.5 LL @23, continued cardizem gtt in OR, esmolol 10 x2 pushes  -Plan for CTAP tomorrow  -f/u intraop cultures    DAY  , continuing chest PT  L PCN minimal passive drainage, requires manual aspiration for more output  q12 flushing of drains with 10cc   AF with RVR despite metoprolol and cardizem, cardizem gtt started with improvement  Cardiology recs: continue cardizem gtt, moderate to high risk for OR   Oliguric, Cr 3.1>3.3; 80mg lasix with minimal response, trial of 2mg bumex not attempted due to mild hypotension  (+) flatus, distension progressing but KUB WNL  Continuing meropenem/levaquin  Solucortef tapered q12hr  Add on for percutaneous drainage of left renal abscess, possible open : NPO, heparin gtt held    REVIEW OF SYSTEMS:  [X] A ten-point review of systems was otherwise negative except as noted above.  [  ] Due to altered mental status/intubation, subjective information was not attained from the patient. History was obtained, to the extent possible, from review of the chart and collateral sources of information.  ----------------------------------------------------------------------------------------------------------------------  Assessment & Plan:  67M w/ PMH of HTN, grade IV hydronephrosis of L kidney s/p L PCN (placed 5/2023), stutter, hiatal hernia, iron deficiency anemia, H Pylori (untreated), and gastric mass (never biopsied) presents to the ED with at least 4 days of worsening weakness, abdominal distension, and no output from his PCN. Admitted with emphysematous pyelonephritis     (6/18): s/p LT PCN upsizing to 16Fr and 16Fr RLQ drain placement with IR     NEURO:  #Pain control    - APAP and tramadol PRN  #Sedation    -Precedex  #Agitation    -Versed prn  #H/o stutter (negative ischemic workup last admission)    RESP:  #acute respiratory failure    - Returned intubated s/p L flank catheter upsizing by urology (6/23)         -Vent VC//16/80/5    - Returned intubated post drain upsizing by IR (6/18)         - Extubated (6/21)    - Maintain O2 sat > 92%  #Pulmonary nodules / Mediastinal lymphadenopathy    - CT Chest: Right middle and upper lobe solid pulmonary nodules     - Recommend outpatient follow up with pulmonology   #Activity   - increased as tolerated    CARDS:   #Septic shock    - On midodrine 5mg q8hr, off levophed 6/20   #New onset Afib w/ RVR with hypotension -- likely 2/2 sepsis    - on cardizem gtt    - Cardiology following, monitor on drip but rec'd holding off on amiodarone as management   #HTN    -Holding home amlodipine 2.5 qD while hypotensive      Currently normotensive   Imaging    - EKG (6/19): Afib w/ RVR to 138     - ECHO: (6/19) EF 55-60%, mild AS, mild LAE, mild MR/TR, trivial pericardial effusion    GI/NUTR:  #Gastric mass vs gastrohepatic lymphadenopathy    -CT A/P: Previously noted lesser curvature mass measures 6.3 x 5.2 cm (previously 5.2 x 3.5 cm by my measurement), and may represent gastrohepatic lymphadenopathy.  #Hepatic hypodensities, suspicious for development for multiple focal abscesses    -CT A/P: New hepatic hypodensities measuring up to 2.6 cm, suspicious for development of multiple focal abscesses.  #Splenic Hypodensities, infarct vs phlegmon/abscess    -CT A/P:  Wedge-shaped region of hypodensity within the spleen may reflect splenic infarct in the correct clinical setting. Developing phlegmon/abscess cannot be excluded in the current clinical setting     -Abd progressively more distended but patient denies pain. (+) flatus  #Diet    -NPO x/rx    -OGT in place    -Last BM: 6/18 in the ED    /RENAL:   #Grade IV L hydronephrosis s/p L PCN (5/2023) -- presented to ED with thick dark foul smelling output  #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    -(6/23) s/p LT PCN upsizing to 26Fr catheter, RLQ 16 Fr drain remains    -(6/18) s/p LT PCN upsizing to 16Fr and 16Fr RLQ drain placement with IR, flush q8 hr    - CT A/P with PO/IV to r/o bowel perforation: Status post left nephrostomy tube placement. Left enlarged kidney with severe hydronephrosis/collection has decreased, now measuring 16 x 12 cm from 20 x 14 cm    -Plan for L nephrectomy for 6/26   #ROJAS (baseline Cr 1.0)    -Cr 3.1>3.3      Oliguric with UOP ~30-50cc hourly, received Lasix 80 x1 and Bumex 2mg x1 with minimal improvement    -(6/19) FeNa 1.1%, intrinsic    -Nephrology following    -On bicarb tabs, phoslo     HEME/ONC:   #H/o Fe Deficiency anemia    - Iron supplementation  #DVT prophylaxis, patient on full AC    -Hep gtt held pre-op  T&S Expires 6/24  Blood Consent- in chart     ID:  #Leukocytosis 2/2 sepsis --> WBC uptrending    - Blood and urine cultures negative, abscess cultures with Stenotrophomonas maltophilia    - s/p vancomycin, cefepime, and flagyl in the ED    - Continuing meropenem and levaquin    - ID following    - Afebrile     ENDO:  #Glucose monitoring    -A1c (5/7/23): 5.0   #Adrenal Insufficieny 2/2 to sepsis     -Solucortef to 50mg q12hr, transition to daily dosing 6/23    -Random cortisol level -sent, results pending    MSK:  #Activity    -Advance as tolerated    -PT/rehab     LINES/DRAINS: PIV, L radial [place] (6/18/23), Mena (6/18/23), Javier Drain (6/18/23), LT PCN (6/18/23). Left basilic midline  6/21    ADVANCED DIRECTIVES:  Full Code    HCP/Emergency Contact- Tracey Adames: [phone]     INDICATION FOR SICU: New onset atrial fibrillation    DISPO: SICU-> worsening leukocytosis, Afib with RVR, end organ dysfunction CHRISTINE KHALIL  MRN-[id number]  1955  67M    HPI:  67M w/ PMH of HTN, grade IV hydronephrosis of L kidney s/p L PCN (placed 5/2023), stutter, hiatal hernia, iron deficiency anemia, H Pylori (untreated), and gastric mass (never biopsied) presents to the ED with at least 4 days of worsening weakness, abdominal distension, and no output from his PCN. Pt and daughters bedside are only able to give limited information, but for the past few days, he has noticed no output from his PCN as well as increasing abdominal distension, pain, and anorexia. He reports that he has not felt normal for the past 10 days. His PCN used to put out serosanguinous fluid, but the output changed to feculent/brown liquid over the past day. In the ED, he was noted to be in new onset afib w/ RVR to 150+ as well as experiencing some difficulty breathing. He was started on BiPAP. Stat labs were notable for WBC 47, Cr 2.9, and lactate 6. CT A/P w/ IV contrast showed free intraperitoneal fluid and air from an unknown source as well as fluid and air in the L kidney.     SICU was consulted for hemodynamic monitoring 2/2 intraabdominal sepsis. Patient went with IR for Left PCN upsized to 16Fr and RLQ drain placement.   FIndings: Left PCN upsized to 16F and 1200cc of very viscous tan colored fluid was aspirated. RLQ 16F drain was placed and 1200cc of tan colored fluid was aspirated (less viscous). A sample from each location was sent for culture.     24 hour Events:   6/22  NIGHT  -Post-op: returned intubated ETT 7.5 LL @23, continued cardizem gtt in OR, esmolol 10 x2 pushes  -Plan for CTAP tomorrow  -f/u intraop cultures  -OGT placed, LL @ 50  - x1, 2L x1  -Albumun 5% 250 x2  -BP 80s/40s, started Levo    DAY  , continuing chest PT  L PCN minimal passive drainage, requires manual aspiration for more output  q12 flushing of drains with 10cc   AF with RVR despite metoprolol and cardizem, cardizem gtt started with improvement  Cardiology recs: continue cardizem gtt, moderate to high risk for OR   Oliguric, Cr 3.1>3.3; 80mg lasix with minimal response, trial of 2mg bumex not attempted due to mild hypotension  (+) flatus, distension progressing but KUB WNL  Continuing meropenem/levaquin  Solucortef tapered q12hr  Add on for percutaneous drainage of left renal abscess, possible open : NPO, heparin gtt held    REVIEW OF SYSTEMS:  [X] A ten-point review of systems was otherwise negative except as noted above.  [  ] Due to altered mental status/intubation, subjective information was not attained from the patient. History was obtained, to the extent possible, from review of the chart and collateral sources of information.  ----------------------------------------------------------------------------------------------------------------------   CHRISTINE KHALIL  MRN-[id number]  1955  67M    HPI:  67M w/ PMH of HTN, grade IV hydronephrosis of L kidney s/p L PCN (placed 5/2023), stutter, hiatal hernia, iron deficiency anemia, H Pylori (untreated), and gastric mass (never biopsied) presents to the ED with at least 4 days of worsening weakness, abdominal distension, and no output from his PCN. Pt and daughters bedside are only able to give limited information, but for the past few days, he has noticed no output from his PCN as well as increasing abdominal distension, pain, and anorexia. He reports that he has not felt normal for the past 10 days. His PCN used to put out serosanguinous fluid, but the output changed to feculent/brown liquid over the past day. In the ED, he was noted to be in new onset afib w/ RVR to 150+ as well as experiencing some difficulty breathing. He was started on BiPAP. Stat labs were notable for WBC 47, Cr 2.9, and lactate 6. CT A/P w/ IV contrast showed free intraperitoneal fluid and air from an unknown source as well as fluid and air in the L kidney.     SICU was consulted for hemodynamic monitoring 2/2 intraabdominal sepsis. Patient went with IR for Left PCN upsized to 16Fr and RLQ drain placement.   FIndings: Left PCN upsized to 16F and 1200cc of very viscous tan colored fluid was aspirated. RLQ 16F drain was placed and 1200cc of tan colored fluid was aspirated (less viscous). A sample from each location was sent for culture.     24 hour Events:   6/22  NIGHT  -Post-op: returned intubated ETT 7.5 LL @23, continued cardizem gtt in OR, esmolol 10 x2 pushes  -Plan for CTAP tomorrow  -f/u intraop cultures  -OGT placed, LL @ 50  - x1, 2L x1  -Albumun 5% 250 x2  -BP 80s/40s, started Levo  -off cardizem since 3am  -Right radial A-line placed    DAY  , continuing chest PT  L PCN minimal passive drainage, requires manual aspiration for more output  q12 flushing of drains with 10cc   AF with RVR despite metoprolol and cardizem, cardizem gtt started with improvement  Cardiology recs: continue cardizem gtt, moderate to high risk for OR   Oliguric, Cr 3.1>3.3; 80mg lasix with minimal response, trial of 2mg bumex not attempted due to mild hypotension  (+) flatus, distension progressing but KUB WNL  Continuing meropenem/levaquin  Solucortef tapered q12hr  Add on for percutaneous drainage of left renal abscess, possible open : NPO, heparin gtt held    REVIEW OF SYSTEMS:  [X] A ten-point review of systems was otherwise negative except as noted above.  [  ] Due to altered mental status/intubation, subjective information was not attained from the patient. History was obtained, to the extent possible, from review of the chart and collateral sources of information.  ----------------------------------------------------------------------------------------------------------------------   CHRISTINE KHALIL  [id number]  1955  67M    HPI:  67M w/ PMH of HTN, grade IV hydronephrosis of L kidney s/p L PCN (placed 5/2023), stutter, hiatal hernia, iron deficiency anemia, H Pylori (untreated), and gastric mass (never biopsied) presents to the ED with at least 4 days of worsening weakness, abdominal distension, and no output from his PCN. Pt and daughters bedside are only able to give limited information, but for the past few days, he has noticed no output from his PCN as well as increasing abdominal distension, pain, and anorexia. He reports that he has not felt normal for the past 10 days. His PCN used to put out serosanguinous fluid, but the output changed to feculent/brown liquid over the past day. In the ED, he was noted to be in new onset afib w/ RVR to 150+ as well as experiencing some difficulty breathing. He was started on BiPAP. Stat labs were notable for WBC 47, Cr 2.9, and lactate 6. CT A/P w/ IV contrast showed free intraperitoneal fluid and air from an unknown source as well as fluid and air in the L kidney.     SICU was consulted for hemodynamic monitoring 2/2 intraabdominal sepsis. Patient went with IR for Left PCN upsized to 16Fr and RLQ drain placement.   FIndings: Left PCN upsized to 16F and 1200cc of very viscous tan colored fluid was aspirated. RLQ 16F drain was placed and 1200cc of tan colored fluid was aspirated (less viscous). A sample from each location was sent for culture.     24 hour Events:   6/22  NIGHT  -Post-op: returned intubated ETT 7.5 LL @23, continued cardizem gtt in OR, esmolol 10 x2 pushes  -Plan for CTAP tomorrow  -f/u intraop cultures  -OGT placed, LL @ 50  - x1, 2L x1  -Albumun 5% 250 x2  -BP 80s/40s, started Levo  -off cardizem since 3am  -Right radial A-line placed    DAY  , continuing chest PT  L PCN minimal passive drainage, requires manual aspiration for more output  q12 flushing of drains with 10cc   AF with RVR despite metoprolol and cardizem, cardizem gtt started with improvement  Cardiology recs: continue cardizem gtt, moderate to high risk for OR   Oliguric, Cr 3.1>3.3; 80mg lasix with minimal response, trial of 2mg bumex not attempted due to mild hypotension  (+) flatus, distension progressing but KUB WNL  Continuing meropenem/levaquin  Solucortef tapered q12hr  Add on for percutaneous drainage of left renal abscess, possible open : NPO, heparin gtt held    REVIEW OF SYSTEMS:  [X] A ten-point review of systems was otherwise negative except as noted above.  [  ] Due to altered mental status/intubation, subjective information was not attained from the patient. History was obtained, to the extent possible, from review of the chart and collateral sources of information.  ----------------------------------------------------------------------------------------------------------------------    Daily Height in cm: 180 (22 Jun 2023 21:00)    Daily   Diet, NPO with Tube Feed:   Tube Feeding Modality: Nasogastric  Nepro with Carb Steady  Total Volume for 24 Hours (mL): 480  Continuous  Starting Tube Feed Rate mL per Hour: 10  Increase Tube Feed Rate by (mL): 10     Every 4 hours  Until Goal Tube Feed Rate (mL per Hour): 20  Tube Feed Duration (in Hours): 24  Tube Feed Start Time: 09:23 (06-23-23 @ 09:38)    CURRENT MEDS:  Neurologic Medications  acetaminophen     Tablet .. 650 milliGRAM(s) Oral every 6 hours  gabapentin 100 milliGRAM(s) Oral every 8 hours  traMADol 25 milliGRAM(s) Oral every 8 hours PRN Moderate Pain (4 - 6)    Respiratory Medications    Cardiovascular Medications  diltiazem Infusion 5 mG/Hr IV Continuous <Continuous>  metoprolol tartrate Injectable 2.5 milliGRAM(s) IV Push every 6 hours  midodrine 5 milliGRAM(s) Oral every 8 hours  norepinephrine Infusion 0.05 MICROgram(s)/kG/Min IV Continuous <Continuous>    Gastrointestinal Medications  calcium acetate 667 milliGRAM(s) Oral three times a day with meals  ferrous    sulfate 325 milliGRAM(s) Oral daily  lactated ringers Bolus 500 milliLiter(s) IV Bolus once  pantoprazole  Injectable 40 milliGRAM(s) IV Push daily  sodium bicarbonate 1300 milliGRAM(s) Oral three times a day  sodium chloride 0.9% lock flush 10 milliLiter(s) IV Push every 1 hour PRN Pre/post blood products, medications, blood draw, and to maintain line patency    Genitourinary Medications    Hematologic/Oncologic Medications  heparin   Injectable 5000 Unit(s) SubCutaneous every 8 hours    Antimicrobial/Immunologic Medications  levoFLOXacin IVPB 750 milliGRAM(s) IV Intermittent every 48 hours  meropenem  IVPB 500 milliGRAM(s) IV Intermittent every 12 hours    Endocrine/Metabolic Medications  hydrocortisone sodium succinate Injectable 25 milliGRAM(s) IV Push every 12 hours    Topical/Other Medications  bacitracin   Ointment 1 Application(s) Topical two times a day  chlorhexidine 2% Cloths 1 Application(s) Topical <User Schedule>    ICU Vital Signs Last 24 Hrs  T(C): 36.2 (23 Jun 2023 12:00), Max: 36.7 (22 Jun 2023 16:03)  T(F): 97.1 (23 Jun 2023 12:00), Max: 98 (22 Jun 2023 16:03)  HR: 114 (23 Jun 2023 13:00) (60 - 137)  BP: 107/66 (23 Jun 2023 13:00) (83/53 - 126/66)  BP(mean): 82 (23 Jun 2023 13:00) (63 - 95)  ABP: 115/56 (23 Jun 2023 13:00) (43/41 - 124/61)  ABP(mean): 74 (23 Jun 2023 13:00) (41 - 85)  RR: 18 (23 Jun 2023 13:00) (17 - 23)  SpO2: 100% (23 Jun 2023 13:00) (94% - 100%)    O2 Parameters below as of 23 Jun 2023 13:00  Patient On (Oxygen Delivery Method): mask, simple face  O2 Flow (L/min): 15  O2 Concentration (%): 40      Mode: AC/ CMV (Assist Control/ Continuous Mandatory Ventilation)  RR (machine): 16  TV (machine): 450  FiO2: 40  PEEP: 5  MAP: 9  PIP: 21    ABG - ( 23 Jun 2023 13:20 )  pH, Arterial: 7.35  pH, Blood: x     /  pCO2: 32    /  pO2: 173   / HCO3: 18    / Base Excess: -7.1  /  SaO2: 100.0             I&O's Summary    22 Jun 2023 07:01  -  23 Jun 2023 07:00  --------------------------------------------------------  IN: 4109.2 mL / OUT: 1075 mL / NET: 3034.2 mL    23 Jun 2023 07:01  -  23 Jun 2023 14:09  --------------------------------------------------------  IN: 52.2 mL / OUT: 230 mL / NET: -177.8 mL      I&O's Detail    22 Jun 2023 07:01  -  23 Jun 2023 07:00  --------------------------------------------------------  IN:    Dexmedetomidine: 90.4 mL    Diltiazem: 105 mL    Heparin Infusion: 128 mL    IV PiggyBack: 150 mL    IV PiggyBack: 500 mL    IV PiggyBack: 100 mL    Lactated Ringers Bolus: 2500 mL    Norepinephrine: 75.8 mL    Oral Fluid: 460 mL  Total IN: 4109.2 mL    OUT:    Drain (mL): 200 mL    Indwelling Catheter - Urethral (mL): 605 mL    VAC (Vacuum Assisted Closure) System (mL): 120 mL    VAC (Vacuum Assisted Closure) System (mL): 150 mL  Total OUT: 1075 mL    Total NET: 3034.2 mL      23 Jun 2023 07:01  -  23 Jun 2023 14:09  --------------------------------------------------------  IN:    Dexmedetomidine: 30.8 mL    Norepinephrine: 21.4 mL  Total IN: 52.2 mL    OUT:    Indwelling Catheter - Urethral (mL): 200 mL    VAC (Vacuum Assisted Closure) System (mL): 30 mL  Total OUT: 230 mL    Total NET: -177.8 mL      PHYSICAL EXAM:   Sedated, on precedex (RASS -2)  follows commands, ENRIQUE  no acute distress, comfortable  equal chest rise b/l, ETT in place  abdomen soft, moderately distended, nontender to palpation   - RLQ drain with serous drainage   - L nephrostomy drain with serosanguinous drainage with some old blood clots  extremities soft, mildly edematous  urinary cath in place

## 2023-06-24 NOTE — PROCEDURE NOTE - NSINFORMCONSENT_GEN_A_CORE
obtained from daughter, in chart/Benefits, risks, and possible complications of procedure explained to patient/caregiver who verbalized understanding and gave written consent.

## 2023-06-24 NOTE — PROCEDURE NOTE - NSBRONCHPROCDETAILS_GEN_A_CORE_FT
Patient's clinical status and necessity for bronchoscopy explained in detail to serene Adames; consent obtained and placed in chart. A time-out was performed confirming patient, site, procedure, and allergies. 2mg versed and 50mcg fentanyl given. Bronchoscope lubricated with viscous lidocaine and inserted through endotracheal tube. Copious white, thick liquid noted in left mainstem bronchus; irrigated and suctioned. Irrigation and suctioning of left lower and left upper lobe performed with similar findings. Sample obtained and specimen sent for gram stain and culture. Right bronchus evaluated with scant fluid noted; irrigated and suctioned. Procedure performed by Dr. Orozco with assistance from PA and respiratory therapy. Patient tolerated procedure well and will remain in SICU. Post-procedure CXR ordered.  Patient's clinical status and necessity for bronchoscopy explained in detail to daughter Tracey Adames; consent obtained and placed in chart. A time-out was performed confirming patient, site, procedure, and allergies. 2mg versed and 50mcg fentanyl given. After administration of versed and fentanyl, patient became hypotensive with SBP 70s; levophed gtt started. Bronchoscope lubricated with viscous lidocaine and inserted through endotracheal tube. Copious white, thick liquid noted in left mainstem bronchus; irrigated and suctioned. Irrigation and suctioning of left lower and left upper lobe performed with similar findings. Sample obtained and specimen sent for gram stain and culture. Right bronchus evaluated with scant fluid noted; irrigated and suctioned. Procedure performed by Dr. Orozco with assistance from PA and respiratory therapy. Patient tolerated procedure well and will remain in SICU. Post-procedure CXR ordered.  Patient's clinical status and necessity for bronchoscopy explained in detail to daughter Tracey Adames; consent obtained and placed in chart. A time-out was performed confirming patient, site, procedure, and allergies. 2mg versed and 50mcg fentanyl given. After administration of versed and fentanyl, patient became hypotensive with SBP 70s; levophed gtt started. Flexible Bronchoscope lubricated with viscous lidocaine and inserted through endotracheal tube. Copious white, thick liquid noted in left mainstem bronchus; irrigated and suctioned. Irrigation and suctioning of left lower and left upper lobe performed with similar findings. Sample obtained and specimen sent for gram stain and culture. Right bronchus evaluated with scant fluid noted; irrigated and suctioned. Procedure performed by Dr. Orozco with assistance from PA and respiratory therapy. Patient tolerated procedure well and will remain in SICU. Post-procedure CXR ordered.

## 2023-06-24 NOTE — PROGRESS NOTE ADULT - ASSESSMENT
67y Male admitted with retroperitoneal & intraabdominal abscess s/p drain placement & L PCN upsizing with IR on 6/18; s/p percutaneous drainage of LEFT renal abcess, POD#2.  Overnight, patient requiring increasing oxygen. CXR found with white out of left lung. ICU team attempted trial of BIPAP and Chest PT, however patient continued desatting. Patient was intubated at 6:50am and planned for bronchoscopy for further evaluation.     Plan:   - Patient now intubated, planned for bronchoscopy   - LEFT 26fr flank drain with blood-tinged drainage. 16 fr bobby in place draining yellow urine. RLQ VENICE drain serous drainage.   - f/u Intra-op Cx  - Continue IV abx per ID   - Continue Trend Cr   - F/u Nephrology c/s   - Analgesics for pain control prn   - Continue I&Os   - Continue management per SICU   - Will d/w attending  p67y Male admitted with retroperitoneal & intraabdominal abscess s/p drain placement & L PCN upsizing with IR on 6/18; s/p percutaneous drainage of LEFT renal abcess, POD#2.  Overnight, patient requiring increasing oxygen. CXR found with white out of left lung. ICU team attempted trial of BIPAP and Chest PT, however patient continued desatting. Patient was intubated at 6:50am and planned for bronchoscopy for further evaluation.     Plan:   - Patient now intubated, planned for bronchoscopy   - LEFT 26fr flank drain with blood-tinged drainage. 16 fr bobby in place draining yellow urine. RLQ VENICE drain serous drainage.   - f/u Intra-op Cx  - Continue IV abx per ID   - Continue Trend Cr   - F/u Nephrology c/s   - Analgesics for pain control prn   - Continue I&Os   - Continue management per SICU   - Will d/w attending     ADDENDUM:   Patient seen and examined at bedside w/ Dr. Rodarte. Patient's left flank drain repositioned and irrigated with 400cc of SW, aspirated thick, malodorous bloody purulent drainage until clearing with assistance of RN. Patient tolerated procedure well. Will continue to monitor output   - Consider adding antifungal   - Will reassess tomorrow  - spoke with SICU team

## 2023-06-24 NOTE — PROGRESS NOTE ADULT - NS ATTEND AMEND GEN_ALL_CORE FT
pt seen and examinned 6/24/23. CT ap images compared from post recent drainage to initial presentation last month , hydro has decreased from 26 cm to 13 cm.  there was purulence noted on CT and today we irrigated the nephrostomy and manipulated the tube (as it looked to be up again the mucosa), and 650 cc of purulent, thick fluid was evacuated (with gentle flushing).   aside from the mucous plug event, pt was also clinically improving from sepsis standpoint now off pressors.  will cont large bore nephrostomy drainage w periodic flushing as tube is draining well. Can consider another large bore nephrostomy drainage site (ie lower pole) with IR if future repeat image shows persistent significant debris.   cont abx. fu urine studies

## 2023-06-24 NOTE — PROGRESS NOTE ADULT - SUBJECTIVE AND OBJECTIVE BOX
seen and examined  24 h events noted   no distress   lying comfortable         PAST HISTORY  --------------------------------------------------------------------------------  No significant changes to PMH, PSH, FHx, SHx, unless otherwise noted    ALLERGIES & MEDICATIONS  --------------------------------------------------------------------------------  Allergies    No Known Allergies    Intolerances      Standing Inpatient Medications  albuterol/ipratropium for Nebulization 3 milliLiter(s) Nebulizer every 6 hours  bacitracin   Ointment 1 Application(s) Topical two times a day  calcium acetate 667 milliGRAM(s) Oral three times a day with meals  chlorhexidine 0.12% Liquid 15 milliLiter(s) Oral Mucosa every 12 hours  chlorhexidine 2% Cloths 1 Application(s) Topical <User Schedule>  dexMEDEtomidine Infusion 0.2 MICROgram(s)/kG/Hr IV Continuous <Continuous>  diltiazem Infusion 5 mG/Hr IV Continuous <Continuous>  ferrous    sulfate 325 milliGRAM(s) Oral daily  gabapentin 100 milliGRAM(s) Oral every 8 hours  heparin  Infusion. 1600 Unit(s)/Hr IV Continuous <Continuous>  hydrocortisone sodium succinate Injectable 25 milliGRAM(s) IV Push every 12 hours  levoFLOXacin IVPB 750 milliGRAM(s) IV Intermittent every 48 hours  meropenem  IVPB 500 milliGRAM(s) IV Intermittent every 12 hours  midodrine 5 milliGRAM(s) Oral every 8 hours  pantoprazole  Injectable 40 milliGRAM(s) IV Push daily  sodium bicarbonate 1300 milliGRAM(s) Oral three times a day  sodium chloride 0.9%. 1000 milliLiter(s) IV Continuous <Continuous>    PRN Inpatient Medications  fentaNYL    Injectable 25 MICROGram(s) IV Push every 3 hours PRN  sodium chloride 0.9% lock flush 10 milliLiter(s) IV Push every 1 hour PRN  traMADol 25 milliGRAM(s) Oral every 8 hours PRN            VITALS/PHYSICAL EXAM  --------------------------------------------------------------------------------  T(C): 36.1 (06-24-23 @ 03:00), Max: 36.4 (06-23-23 @ 08:00)  HR: 102 (06-24-23 @ 06:55) (89 - 129)  BP: 110/64 (06-24-23 @ 06:00) (95/63 - 123/74)  RR: 19 (06-23-23 @ 19:00) (18 - 20)  SpO2: 100% (06-24-23 @ 06:55) (91% - 100%)  Wt(kg): --  Height (cm): 180 (06-22-23 @ 21:00)  Weight (kg): 76.476 (06-22-23 @ 21:00)  BMI (kg/m2): 23.6 (06-22-23 @ 21:00)  BSA (m2): 1.96 (06-22-23 @ 21:00)      06-23-23 @ 07:01  -  06-24-23 @ 07:00  --------------------------------------------------------  IN: 1225.7 mL / OUT: 1335 mL / NET: -109.3 mL      Physical Exam:  	Gen: BIPAP  	Pulm:decrease BS B/L  	CV:  S1S2; no rub  	Abd: +distended, PCN   	LE:  edema  	    LABS/STUDIES  --------------------------------------------------------------------------------              7.9    38.89 >-----------<  197      [06-23-23 @ 20:45]              24.8     132  |  104  |  91  ----------------------------<  82      [06-24-23 @ 04:46]  3.8   |  15  |  2.8        Ca     5.9     [06-24-23 @ 04:46]      Mg     1.9     [06-24-23 @ 04:46]      Phos  5.7     [06-24-23 @ 04:46]      PT/INR: PT 19.80, INR 1.71       [06-23-23 @ 05:00]  PTT: 56.3       [06-24-23 @ 04:46]      Creatinine Trend:  SCr 2.8 [06-24 @ 04:46]  SCr 3.0 [06-23 @ 20:45]  SCr 2.5 [06-23 @ 05:00]  SCr 3.6 [06-22 @ 20:07]  SCr 3.3 [06-21 @ 21:28]    Urinalysis - [06-24-23 @ 04:46]      Color  / Appearance  / SG  / pH       Gluc 82 / Ketone   / Bili  / Urobili        Blood  / Protein  / Leuk Est  / Nitrite       RBC  / WBC  / Hyaline  / Gran  / Sq Epi  / Non Sq Epi  / Bacteria     Urine Creatinine 80      [06-19-23 @ 02:50]  Urine Sodium 40.0      [06-19-23 @ 02:50]    Iron 12, TIBC 128, %sat 9      [05-07-23 @ 09:19]  Ferritin 526      [05-07-23 @ 09:19]  Vitamin D (25OH) 26      [05-08-23 @ 07:58]  TSH 0.45      [06-19-23 @ 14:14]

## 2023-06-24 NOTE — PROGRESS NOTE ADULT - ASSESSMENT
67M w/ PMH of HTN, grade IV hydronephrosis of L kidney s/p L PCN (placed 5/2023), stutter, hiatal hernia, iron deficiency anemia, H Pylori (untreated), and gastric mass (never biopsied) presents to the ED with at least 4 days of worsening weakness, abdominal distension, and no output from his PCN  . Found to have septic shock, MSOF, lactic acidosis from left emphysematous hydronephrosis, pyelonephritis and possible rupture with pneumoperitoneum along with possible hepatic abscesses, splenic infarct and abdominal and mediastinal lymphadenopathies. he is s/p upsizing of left PCN tube by IR along with RLQ drain placed.   Assessment and plan  ROJAS/ Hyponatremia/ severe left hydro s/p upsizing of left PCN catheter/ emphysematous pyelo with septic shock/ pneumoperitoneum/ ? kidney rupture/ ? hepatic abscesses/ splenic infarct  ROJAS likely ATN iso septic shock  cr improving   non oliguric   continue sodium bicarbonate po   BP noted / increase midodrine to 10 q 8   ph noted / on  binders    s/p upsizing of left PCN tube by IR, still not draining planned forLt nephrectomy    and IR following  no indication for RRT as of now

## 2023-06-24 NOTE — CHART NOTE - NSCHARTNOTEFT_GEN_A_CORE
Notified by nurse that patient had increasing oxygen requirements throughout the night. ABG performed, 7.34/33/133/18. CXR showing white out of left lung. Trial of bipap and chest PT was attempted, however repeat ABG showing 7.32/35/74/18 and saturations were decreasing. Patient also complaining of increasing difficulty breathing. ICU attending informed of results and decision was made to intubate patient and plan for bronchoscopy during the day. Patient's daughter, Tracey, was notified and agreed with the plan for intubation. Patient intubated at 6:50 AM. Will follow up post intubation ABG and CXR. Notified by nurse that patient had increasing oxygen requirements throughout the night. ABG performed, 7.34/33/133/18. CXR showing white out of left lung. Case was discussed with ICU attending and trial of bipap and chest PT was attempted. However repeat ABG showed 7.32/35/74/18 and saturations were decreasing. Patient also complaining of increased difficulty breathing. ICU attending informed of new results and decision was made to intubate patient with plan for bronchoscopy during the day. Patient's daughter, Tracey, was notified and agreed with the plan for intubation. Patient intubated at 6:50 AM. Will follow up post intubation ABG and CXR.

## 2023-06-24 NOTE — PROGRESS NOTE ADULT - SUBJECTIVE AND OBJECTIVE BOX
CHRISTINE VILLAVICENCIO  67y, Male  Allergy: No Known Allergies      LOS  6d    CHIEF COMPLAINT: pneumoperitoneum, emphysematous pyelonephritis (24 Jun 2023 07:31)      INTERVAL EVENTS/HPI  - T(F): , Max: 97.1 (06-23-23 @ 12:00), intubated, CXR L white out   - WBC Count: 38.89 (06-23-23 @ 20:45)  WBC Count: 37.91 (06-23-23 @ 14:50)     - Creatinine: 2.8 (06-24-23 @ 04:46)  Creatinine: 3.0 (06-23-23 @ 20:45)     -   -   -     ROS  cannot obtain secondary to patient's sedation and/or mental status    VITALS:  T(F): 96.6, Max: 97.1 (06-23-23 @ 12:00)  HR: 88  BP: 105/63  RR: 19Vital Signs Last 24 Hrs  T(C): 35.9 (24 Jun 2023 07:54), Max: 36.2 (23 Jun 2023 12:00)  T(F): 96.6 (24 Jun 2023 07:54), Max: 97.1 (23 Jun 2023 12:00)  HR: 88 (24 Jun 2023 10:00) (88 - 129)  BP: 105/63 (24 Jun 2023 10:00) (95/63 - 123/74)  BP(mean): 77 (24 Jun 2023 10:00) (74 - 93)  RR: 19 (23 Jun 2023 19:00) (18 - 20)  SpO2: 97% (24 Jun 2023 10:00) (91% - 100%)    Parameters below as of 24 Jun 2023 10:00  Patient On (Oxygen Delivery Method): ventilator    O2 Concentration (%): 100    PHYSICAL EXAM:  Gen: Intubated  CV: RRR  Lungs: Decreased BS at bases  Abd: Soft bL VENICE  Neuro: Sedated  Lines clean, no phlebitis    FH: Non-contributory  Social Hx: Non-contributory    TESTS & MEASUREMENTS:                        7.9    38.89 )-----------( 197      ( 23 Jun 2023 20:45 )             24.8     06-24    132<L>  |  104  |  91<HH>  ----------------------------<  82  3.8   |  15<L>  |  2.8<H>    Ca    5.9<LL>      24 Jun 2023 04:46  Phos  5.7     06-24  Mg     1.9     06-24          Urinalysis Basic - ( 24 Jun 2023 04:46 )    Color: x / Appearance: x / SG: x / pH: x  Gluc: 82 mg/dL / Ketone: x  / Bili: x / Urobili: x   Blood: x / Protein: x / Nitrite: x   Leuk Esterase: x / RBC: x / WBC x   Sq Epi: x / Non Sq Epi: x / Bacteria: x        Culture - Urine (collected 06-19-23 @ 02:50)  Source: Clean Catch Clean Catch (Midstream)  Final Report (06-20-23 @ 10:18):    No growth    Culture - Abscess with Gram Stain (collected 06-18-23 @ 23:08)  Source: .Abscess right lower quadrant (intrabdominal)  Gram Stain (06-19-23 @ 11:56):    Numerous polymorphonuclear leukocytes per low power field    Numerous Gram positive cocci in pairs per oil power field  Final Report (06-21-23 @ 14:13):    Numerous Anaerobic Gram Positive Cocci Most closely resembling    Peptoniphilus species "Susceptibilities not performed"    Culture - Abscess with Gram Stain (collected 06-18-23 @ 23:08)  Source: .Abscess left kidney  Gram Stain (06-19-23 @ 11:58):    Rare polymorphonuclear leukocytes per low power field    Few Gram Positive Cocci per oil power field  Final Report (06-22-23 @ 10:13):    Growth in fluid media only Anaerobic Gram Positive Cocci Most closely    resembling Peptoniphilus species "Susceptibilities not performed"    Rare Stenotrophomonas maltophilia  Organism: Stenotrophomonas maltophilia  Stenotrophomonas maltophilia (06-22-23 @ 10:13)  Organism: Stenotrophomonas maltophilia (06-22-23 @ 10:13)      -  Levofloxacin: S 1      Method Type: EDMUND      -  Trimethoprim/Sulfamethoxazole: S <=0.5/9.5  Organism: Stenotrophomonas maltophilia (06-22-23 @ 10:13)      -  Minocycline: S      Method Type: KB    Culture - Abscess with Gram Stain (collected 06-18-23 @ 20:18)  Source: .Abscess left PCN aspirate  Gram Stain (06-19-23 @ 06:27):    No polymorphonuclear cells seen per low power field    Few Gram Variable Cocci seen per oil power field  Final Report (06-24-23 @ 09:11):    Few Stenotrophomonas maltophilia  Organism: Stenotrophomonas maltophilia (06-24-23 @ 09:11)  Organism: Stenotrophomonas maltophilia (06-24-23 @ 09:11)      -  Levofloxacin: S 2      Method Type: EDMUND      -  Trimethoprim/Sulfamethoxazole: S <=0.5/9.5    Culture - Blood (collected 06-18-23 @ 14:18)  Source: .Blood Blood-Peripheral  Final Report (06-24-23 @ 02:00):    No Growth Final    Culture - Blood (collected 06-18-23 @ 14:18)  Source: .Blood Blood-Peripheral  Final Report (06-24-23 @ 02:00):    No Growth Final            INFECTIOUS DISEASES TESTING      INFLAMMATORY MARKERS      RADIOLOGY & ADDITIONAL TESTS:  I have personally reviewed the last available Chest xray  CXR  Xray Chest 1 View- PORTABLE-Urgent:   ACC: [id number] EXAM:  XR CHEST PORTABLE URGENT 1V   ORDERED BY: ARY COSTA     PROCEDURE DATE:  06/24/2023          INTERPRETATION:  Clinical History / Reason for exam: Intubated patient    Comparison : Chest radiograph June 24, 2023 time 6:47 AM.    Technique/Positioning: Frontal.    Findings:    Support devices: Endotracheal tube at the suprasternal notch. NG tube   coiled in the proximal stomach with the tip at the EG junction..    Cardiac/mediastinum/hilum: Obscuration of the left cardiacborder and   mediastinum..    Lung parenchyma/Pleura: Left hemithorax complete opacification. Right   basilar opacity/pleural effusion.    Skeleton/soft tissues: Stable.    Impression:    Left hemithorax complete opacification, unchanged. Right basilar   opacity/pleural effusion, decreased.        --- End of Report ---            JUAN A WISEMAN MD; Attending Radiologist  This document has been electronically signed. Jun 24 2023  8:26AM (06-24-23 @ 08:35)      CT  CT Abdomen and Pelvis No Cont:   ACC: [id number] EXAM:  CT ABDOMEN AND PELVIS   ORDERED BY: JULIANNE HERNANDEZ     PROCEDURE DATE:  06/23/2023          INTERPRETATION:  CLINICAL STATEMENT: Left kidney abscess status post   drainage.    TECHNIQUE: Contiguous axial CT images were obtained from the lower chest   to the pubic symphysis without intravenous contrast. Oral contrast was   not administered. Reformatted images in the coronal and sagittal planes   were acquired.    COMPARISON: CT abdomen and pelvis 6/19/2023.    FINDINGS:    Limited evaluation of solid organs and vascular structures secondary to   lack of intravenous contrast and streak artifact due to contact with CT   gantry.    LOWER CHEST: Moderate right and large left pleural effusions, increased   since prior, with adjacent subsegmental atelectasis. 3 mm right middle   lobe pulmonary nodule, unchanged (4/9). Cardiomegaly.    HEPATOBILIARY: Difficult to evaluate due to significant artifact.   Suggestion of vicarious excretion of contrast within the gallbladder    SPLEEN: Unchanged.    PANCREAS: Very difficult to visualize.    ADRENAL GLANDS: Very difficult to visualize.    KIDNEYS: Left enlarged kidney with severe hydronephrosis/collection has   decreased status post drainage, now measuring 13.7 x 9.5 cm from 18.2x   12.4 cm. Unchanged right kidney.    ABDOMINOPELVIC NODES: Unchanged..    PELVIC ORGANS: Unchanged.    PERITONEUM/MESENTERY/BOWEL: Peritoneal catheter terminates in the right   lower quadrant. Pneumoperitoneum is mildly decreased since prior.   Residual oral contrast is visualized throughout the abdomen, possibly due   to slow transit. Suggestion of mild increase in ascites.    BONES/SOFT TISSUES: Anasarca. Degenerative osseous changes.    VASCULAR: Unchanged.        IMPRESSION:    Overall limited examination.    Moderate right and large left pleural effusions, increased since prior,   with adjacent subsegmental atelectasis.    Status post drainage, left enlarged kidney with severe   hydronephrosis/collection has decreased now measuring 13.7 x 9.5 cm. The   retroperitoneum has markedly decreased since prior.    Diffuse anasarca. Ascites which appear to have increased since the prior   exam.    --- End of Report ---          NEVIN MONTES MD; Resident Radiologist  This document has been electronically signed.  CHARLES SINGH MD; Attending Radiologist  This document has been electronically signed. Jun 23 2023  2:56PM (06-23-23 @ 14:21)      CARDIOLOGY TESTING  12 Lead ECG:   Ventricular Rate 114 BPM    Atrial Rate 107 BPM    QRS Duration 106 ms    Q-T Interval 286 ms    QTC Calculation(Bazett) 394 ms    R Axis 11 degrees    T Axis 138 degrees    Diagnosis Line Atrial fibrillation with rapid ventricular response  Low voltage QRS  Nonspecific T wave abnormality  Abnormal ECG    Confirmed by ROULA WISEMAN MD ([id number]) on 6/23/2023 6:42:27 AM (06-23-23 @ 01:54)  12 Lead ECG:   Ventricular Rate 109 BPM    QRS Duration 106 ms    Q-T Interval 332 ms    QTC Calculation(Bazett) 447 ms    R Axis 123 degrees    T Axis 38 degrees    Diagnosis Line Atrial fibrillation with rapid ventricular response  Low voltage QRS  Left posterior fascicular block  Abnormal ECG    Confirmed by ROULA WISEMAN MD ([id number]) on 6/21/2023 6:55:47 AM (06-21-23 @ 03:16)      MEDICATIONS  albuterol/ipratropium for Nebulization 3  bacitracin   Ointment 1  calcium acetate 667  chlorhexidine 0.12% Liquid 15  chlorhexidine 2% Cloths 1  dexMEDEtomidine Infusion 0.2  diltiazem Infusion 5  fentaNYL    Injectable 50  ferrous    sulfate 325  gabapentin 100  heparin  Infusion. 1600  hydrocortisone sodium succinate Injectable 25  levoFLOXacin IVPB 750  lidocaine 2% Gel 1  meropenem  IVPB 500  midodrine 5  pantoprazole  Injectable 40  sodium bicarbonate 1300  sodium chloride 0.45% 1000  sodium chloride 0.9% Bolus 250  sodium chloride 0.9%. 1000  vancomycin  IVPB   vancomycin  IVPB 500      WEIGHT  Weight (kg): 76.476 (06-22-23 @ 21:00)  Creatinine: 2.8 mg/dL (06-24-23 @ 04:46)  Creatinine: 3.0 mg/dL (06-23-23 @ 20:45)      ANTIBIOTICS:  levoFLOXacin IVPB 750 milliGRAM(s) IV Intermittent every 48 hours  meropenem  IVPB 500 milliGRAM(s) IV Intermittent every 12 hours  vancomycin  IVPB 500 milliGRAM(s) IV Intermittent once  vancomycin  IVPB          All available historical records have been reviewed

## 2023-06-24 NOTE — PROGRESS NOTE ADULT - ASSESSMENT
ASSESSMENT  68yo Male with pmh of hypertension, massive Grade IV hydronephrosis s/p left nephrostomy placed in May 2023 by [facility] presents to the ED with little to no urine output from LEFT nephrostomy from about a week. PCN  more recently drainage was changed to brown/purulent fluid. 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      IMPRESSION  #Reintubated, L lung white out, rule out HAP  #Septic shock on admission due to Emphysematous pyelonephritis with suspected perforation/ pneumoperitoneum with suspected liver abscesses & splenic infarct vs abscess  Nephrostolithotomy, percutaneous, with lithotripsy, large stone 22-Jun-2023 23:27:09 left large thick abscess materia dimension of aspirate over 4 x 4 x 4 cm Bernie Perdomo  Change external ureteral stent 22-Jun-2023 23:28:03 left Bernie Perdomo  Nephrostogram 22-Jun-2023 23:28:31 left Bernie Perdomo. "thick gelatinous material that was difficult to suction out even with the shock pulse. I would alternate 2 prong to remove and break up / shock pulse to suck out. after an hour where I had no clear planes I elected to stop. irrigation through the 26 bobby took out a lot additional material  in ashlyn catch cup 4 x 4 x 4 cm lump of material trapped"    6/19 UCX NG; UA Blood: x / Protein: 300 mg/dL / Nitrite: Negative   Leuk Esterase: Large / RBC: 8 /HPF / WBC >720 /HPF   Sq Epi: x / Non Sq Epi: x / Bacteria: Moderate    6/18 L PCN     Few Stenotrophomonas maltophilia Levofloxacin: S 2    6/18 L PCN     Numerous Anaerobic Gram Positive Cocci Most closely resembling  Peptoniphilus species "Susceptibilities not performed"    6/18 L PCN   Numerous Gram positive cocci in pairs per oil power field    6/18 BCX NGTD     s/p 6/18  Left PCN upsized to 16F and 1200cc of very viscous tan colored fluid was aspirated. RLQ 16F drain was placed and 1200cc of tan colored fluid was aspirated (less viscous). A sample from each location was sent for culture.     Repeat CT 6/19 Since one day earlier,  No extraluminal contrast. Oral contrast was last seen in the terminal ileum.  Status post left nephrostomy tube placement. Left enlarged kidney with severe hydronephrosis/collection has decreased, now measuring 16 x 12 cm from 20 x 14 cm  < from: CT Abdomen and Pelvis w/ IV Cont (06.18.23 @ 15:15) >  1 ) Moderate pneumoperitoneum with partial diffusion throughout moderate   volume ascites and with associated intermittent peritoneal enhancement   and nodularity. Perforation and peritonitis may be related to underlying   emphysematous pyelonephritis (see below). Less likely sources of   perforation include ureteral/bladder disruption, and bowel perforation   which cannot be entirely excluded on the provided images.  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 The compressed residual renal   parenchyma is inseparable from the adjacent fascial/fatty layers   anterosuperiorly and direct rupture into the peritoneum is a possibility   (6-355). The previously placed left-sided nephrostomy tube pigtail is   notedto be within the left renal collecting system.  3) New hepatic hypodensities measuring up to 2.6 cm, suspicious for   development of multiple focal abscesses.  4) Wedge-shaped region of hypodensity within the spleen may reflect   splenic infarct in the correct clinical setting. Developing   phlegmon/abscess cannot be excluded in the current clinical setting  5) Increased extensive retroperitoneal, portacaval and likely   gastrohepatic heterogeneously enhancing lymphadenopathy. Findings may be   reactive.  6) Mediastinal lymphadenopathy measuring up to 2.4 cm short axis.   Underlying etiology may be reactive, infectious/inflammatory, or   neoplastic. A short-term 3 month follow-up CT chest should be considered   for further evaluation.  7)Right middle and upper lobe solid pulmonary nodules which are not well   delineated due to respiratory motion but measure less than 6 mm.   attention on follow-up exam.  #Lactic acidosis  #Abx allergy: No Known Allergies  #Prolonged QTC Calculation(Bazett) 526 ms  #Hyponatremia  #AKICreatinine: 3.3 mg/dL (06.21.23 @ 21:28)  )crcl 26  Ht 180  Weight (kg): 76.5 (06-18-23 @ 21:27)    RECOMMENDATIONS  - Send DTA   - Send MRSA nares  - Vanc 1g x1 then check AM level 6/25, ROJAS  - Ar 1000 milliGRAM(s) IV Intermittent every 12 hours   - Continue levaquin IV 750mg q48h IV   - Trend WBC     If any questions, please send a message or call on [employer]  Please continue to update ID with any pertinent new laboratory or radiographic findings  #[id number]

## 2023-06-24 NOTE — PROCEDURE NOTE - NSBRONCHHISTORY_GEN_A_CORE_FT
67y Male admitted with retroperitoneal & intraabdominal abscess s/p drain placement & L PCN upsizing with IR on 6/18; s/p percutaneous drainage of LEFT renal abcess, POD#2. Overnight, patient requiring increasing oxygen. CXR found with white out of left lung. ICU team attempted trial of BIPAP and Chest PT, however patient continued desatting. Patient was intubated at 6:50am and planned for bronchoscopy for further evaluation. 67y Male admitted with retroperitoneal & intraabdominal abscess s/p drain placement & L PCN upsizing with IR on 6/18; s/p percutaneous drainage of LEFT renal abcess, POD#2.   Overnight, patient requiring increasing oxygen. CXR found with whit complete opacification of left lung. ICU team attempted trial of BIPAP and Chest PT, however patient continued desatting. Patient was intubated at 6:50am and planned for bronchoscopy for further evaluation.  Postintubation CXR shows persistent left lung opacification due to mucus plug.

## 2023-06-24 NOTE — PROGRESS NOTE ADULT - SUBJECTIVE AND OBJECTIVE BOX
UROLOGY DAILY PROGRESS NOTE    67y Male admitted with retroperitoneal & intraabdominal abscess s/p drain placement & L PCN upsizing with IR on 6/18; s/p percutaneous drainage of LEFT renal abcess, POD#2. Patient was extubated and off pressor yesterday, Left percutaneous drain with bloody drainage, 100c. Overnight, patient requiring increasing oxygen. CXR found with white out of left lung. ICU team attempted trial of BIPAP and Chest PT, however patient continued desatting. Patient was intubated at 6:50am and planned for bronchoscopy for further evaluation.     MEDICATIONS  (STANDING):  albuterol/ipratropium for Nebulization 3 milliLiter(s) Nebulizer every 6 hours  bacitracin   Ointment 1 Application(s) Topical two times a day  calcium acetate 667 milliGRAM(s) Oral three times a day with meals  chlorhexidine 0.12% Liquid 15 milliLiter(s) Oral Mucosa every 12 hours  chlorhexidine 2% Cloths 1 Application(s) Topical <User Schedule>  dexMEDEtomidine Infusion 0.2 MICROgram(s)/kG/Hr (3.82 mL/Hr) IV Continuous <Continuous>  diltiazem Infusion 5 mG/Hr (5 mL/Hr) IV Continuous <Continuous>  fentaNYL    Injectable 50 MICROGram(s) IV Push once  ferrous    sulfate 325 milliGRAM(s) Oral daily  gabapentin 100 milliGRAM(s) Oral every 8 hours  heparin  Infusion. 1600 Unit(s)/Hr (16 mL/Hr) IV Continuous <Continuous>  hydrocortisone sodium succinate Injectable 25 milliGRAM(s) IV Push every 12 hours  levoFLOXacin IVPB 750 milliGRAM(s) IV Intermittent every 48 hours  lidocaine 2% Gel 1 Application(s) Topical once  meropenem  IVPB 500 milliGRAM(s) IV Intermittent every 12 hours  midodrine 5 milliGRAM(s) Oral every 8 hours  pantoprazole  Injectable 40 milliGRAM(s) IV Push daily  sodium bicarbonate 1300 milliGRAM(s) Oral three times a day  sodium chloride 0.45% 1000 milliLiter(s) (75 mL/Hr) IV Continuous <Continuous>  sodium chloride 0.9%. 1000 milliLiter(s) (75 mL/Hr) IV Continuous <Continuous>  vancomycin  IVPB      vancomycin  IVPB 500 milliGRAM(s) IV Intermittent once    MEDICATIONS  (PRN):  fentaNYL    Injectable 25 MICROGram(s) IV Push every 3 hours PRN Moderate Pain (4 - 6)  lidocaine 2% Viscous 10 milliLiter(s) Oral once PRN FOR BRONCHOSCOPY  lidocaine 2% Viscous 10 milliLiter(s) Oral once PRN FOR BRONCHOSCOPY  midazolam Injectable 2 milliGRAM(s) IV Push once PRN FOR BRONCHOSCOPY  sodium chloride 0.9% lock flush 10 milliLiter(s) IV Push every 1 hour PRN Pre/post blood products, medications, blood draw, and to maintain line patency  traMADol 25 milliGRAM(s) Oral every 8 hours PRN Moderate Pain (4 - 6)      REVIEW OF SYSTEMS   [ ] Due to altered mental status/intubation, subjective information were not able to be obtained from patient. History was obtained, to the extent possible, from review of the chart and collateral sources of information.    Vital Signs Last 24 Hrs  T(C): 35.9 (24 Jun 2023 07:54), Max: 36.2 (23 Jun 2023 12:00)  T(F): 96.6 (24 Jun 2023 07:54), Max: 97.1 (23 Jun 2023 12:00)  HR: 88 (24 Jun 2023 10:00) (88 - 129)  BP: 105/63 (24 Jun 2023 10:00) (95/63 - 123/74)  BP(mean): 77 (24 Jun 2023 10:00) (74 - 93)  RR: 19 (23 Jun 2023 19:00) (18 - 20)  SpO2: 97% (24 Jun 2023 10:00) (91% - 100%)    Parameters below as of 24 Jun 2023 10:00  Patient On (Oxygen Delivery Method): ventilator    O2 Concentration (%): 100    PHYSICAL EXAM:    GEN: Intubated/sedated with ETT in place   SKIN: Good color, non diaphoretic.  Anasarca     HEENT: NC/AT.  RESP: No dyspnea, non-labored breathing. No use of accessory muscles.  CARDIO: +S1/S2  ABDO: Soft, obese,   BACK:  +LEFT flank drain 26fr (10cc inflated in balloon)--draining blood-tinged in tubing ([place] Punch colored).  RLQ VENICE drain in place with serous drainage   : 16 fr Indwelling bobby in place, draining clear yellow urine.     I&O's Summary    23 Jun 2023 07:01  -  24 Jun 2023 07:00  --------------------------------------------------------  IN: 1241.7 mL / OUT: 1380 mL / NET: -138.3 mL    24 Jun 2023 07:01  -  24 Jun 2023 10:27  --------------------------------------------------------  IN: 379.5 mL / OUT: 220 mL / NET: 159.5 mL        LABS:                        7.9    38.89 )-----------( 197      ( 23 Jun 2023 20:45 )             24.8     06-24    132<L>  |  104  |  91<HH>  ----------------------------<  82  3.8   |  15<L>  |  2.8<H>    Ca    5.9<LL>      24 Jun 2023 04:46  Phos  5.7     06-24  Mg     1.9     06-24      PT/INR - ( 23 Jun 2023 05:00 )   PT: 19.80 sec;   INR: 1.71 ratio         PTT - ( 24 Jun 2023 04:46 )  PTT:56.3 sec  Urinalysis Basic - ( 24 Jun 2023 04:46 )    Color: x / Appearance: x / SG: x / pH: x  Gluc: 82 mg/dL / Ketone: x  / Bili: x / Urobili: x   Blood: x / Protein: x / Nitrite: x   Leuk Esterase: x / RBC: x / WBC x   Sq Epi: x / Non Sq Epi: x / Bacteria: x            RADIOLOGY & ADDITIONAL STUDIES:

## 2023-06-24 NOTE — PROGRESS NOTE ADULT - ASSESSMENT
Assessment & Plan:  67M w/ PMH of HTN, grade IV hydronephrosis of L kidney s/p L PCN (placed 5/2023), stutter, hiatal hernia, iron deficiency anemia, H Pylori (untreated), and gastric mass (never biopsied) presents to the ED with at least 4 days of worsening weakness, abdominal distension, and no output from his PCN. Admitted with emphysematous pyelonephritis     (6/18): s/p LT PCN upsizing to 16Fr and 16Fr RLQ drain placement with IR   (6/23): s/p shock lithotripsy of L kidney abscess cavity with drainage of thick contents, upsize of drain to 26Fr bobby catheter    NEURO:  #Pain control    - APAP and tramadol PRN  #H/o stutter (negative ischemic workup last admission)    RESP:  #acute respiratory failure; now with left lung mucous plugging    - Placed on bipap 6/24- Settings 12/6, 40%    - Returned intubated s/p L flank catheter upsizing by urology (6/23) > Extubated (6/23)    - Returned intubated post drain upsizing by IR (6/18) > Extubated (6/21)    - Maintain O2 sat > 92%  #Pulmonary nodules / Mediastinal lymphadenopathy    - CT Chest: Right middle and upper lobe solid pulmonary nodules     - Recommend outpatient follow up with pulmonology   #Activity   - increased as tolerated    CARDS:   #Septic shock    - Levophed off 6/23 at 12pm    - On midodrine 5mg q8hr  #New onset Afib w/ RVR with hypotension -- likely 2/2 sepsis    - holding IV lopressor 2.5mg BID    - on cardizem gtt    - Cardiology following, monitor on drip but rec'd holding off on amiodarone as management   #HTN    -Holding home amlodipine 2.5 qD while hypotensive      Currently normotensive   Imaging    - EKG (6/19): Afib w/ RVR to 138     - ECHO: (6/19) EF 55-60%, mild AS, mild LAE, mild MR/TR, trivial pericardial effusion    GI/NUTR:  #Gastric mass vs gastrohepatic lymphadenopathy    -CT A/P: Previously noted lesser curvature mass measures 6.3 x 5.2 cm (previously 5.2 x 3.5 cm by my measurement), and may represent gastrohepatic lymphadenopathy.  #Hepatic hypodensities, suspicious for development for multiple focal abscesses    -CT A/P: New hepatic hypodensities measuring up to 2.6 cm, suspicious for development of multiple focal abscesses.  #Splenic Hypodensities, infarct vs phlegmon/abscess    -CT A/P:  Wedge-shaped region of hypodensity within the spleen may reflect splenic infarct in the correct clinical setting. Developing phlegmon/abscess cannot be excluded in the current clinical setting     -Abd progressively more distended but patient denies pain. (+) flatus   - CTAP 6/23: IMPRESSION: Status post drainage, left enlarged kidney with severe   hydronephrosis/collection has decreased now measuring 13.7 x 9.5 cm. The   retroperitoneum has markedly decreased since prior. Diffuse anasarca. Ascites which appear to have increased since the prior exam.    #Diet    - SLP evaluation pending     -Last BM: 6/18 in the ED    /RENAL:   #Grade IV L hydronephrosis s/p L PCN (5/2023) -- presented to ED with thick dark foul smelling output  #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    -(6/23) s/p LT PCN upsizing to 26Fr catheter, RLQ 16 Fr drain remains    -(6/18) s/p LT PCN upsizing to 16Fr and 16Fr RLQ drain placement with IR, flush q8 hr    - CT A/P with PO/IV to r/o bowel perforation: Status post left nephrostomy tube placement. Left enlarged kidney with severe hydronephrosis/collection has decreased, now measuring 16 x 12 cm from 20 x 14 cm    -Plan for L nephrectomy for 6/26   #ROJAS (baseline Cr 1.0)    -Cr 3.1>3.3      Oliguric with UOP ~30-50cc hourly, received Lasix 80 x1 and Bumex 2mg x1 with minimal improvement    Labs:          BUN/Cr- 80/2.5  -->,  93/3.0  -->          Electrolytes-Na 132 // K 4.0 // Mg 1.6 //  Phos 5.9 (06-23 @ 20:45)    -(6/19) FeNa 1.1%, intrinsic    -Nephrology following    -On bicarb tabs, phoslo     HEME/ONC:   #H/o Fe Deficiency anemia    - Iron supplementation  #DVT prophylaxis, patient on full AC    - Heparin gtt @ 14u/hr    Labs: Hb/Hct:  8.9/28.2  -->,  7.9/24.8  -->                      Plts:  198  -->,  197  -->                 PTT/INR:  36.7/1.71  --->,  81.4/--  --->     T&S Expires 6/24  Blood Consent- in chart     ID:  #Leukocytosis 2/2 sepsis --> WBC uptrending    - Blood and urine cultures negative, abscess cultures with Stenotrophomonas maltophilia    - s/p vancomycin, cefepime, and flagyl in the ED    - Continuing meropenem q12 and levaquin q48 hr per ID    - ID following    - Afebrile   WBC- 44.03  --->>,  37.91  --->>,  38.89  --->>  Temp trend- 24hrs T(F): 96.4 (06-24 @ 00:00), Max: 97.5 (06-23 @ 08:00)    ENDO:  #Glucose monitoring    -A1c (5/7/23): 5.0   #Adrenal Insufficiency 2/2 to sepsis     -Solucortef to 25mg q12hr, transition to daily dosing 6/24    -Random cortisol level -sent, results pending    MSK:  #Activity    -Advance as tolerated    -PT/rehab     LINES/DRAINS: PIV, L radial Joleen (6/18/23), Bobby (6/18/23), Javier Drain (6/18/23), LT PCN (6/18/23). Left basilic midline  6/21    ADVANCED DIRECTIVES:  Full Code    HCP/Emergency Contact- Tracey Adames: [phone]     INDICATION FOR SICU: septic shock    DISPO: SICU   Assessment & Plan:  67M w/ PMH of HTN, grade IV hydronephrosis of L kidney s/p L PCN (placed 5/2023), stutter, hiatal hernia, iron deficiency anemia, H Pylori (untreated), and gastric mass (never biopsied) presents to the ED with at least 4 days of worsening weakness, abdominal distension, and no output from his PCN. Admitted with emphysematous pyelonephritis     (6/18): s/p LT PCN upsizing to 16Fr and 16Fr RLQ drain placement with IR   (6/23): s/p shock lithotripsy of L kidney abscess cavity with drainage of thick contents, upsize of drain to 26Fr bobby catheter    NEURO:  #Pain control    - APAP and tramadol PRN  -fent 25 q 3 PRN while intubated  #Sedation  -precedex gtt  #H/o stutter (negative ischemic workup last admission)    RESP:  #acute respiratory failure; now with left lung mucous plugging    - reintubated 6/24 AM due to increasing O2 requirements and mucus plugging; LL @ 23, 450/16/100/8   -s/p bronchoscopy 6/24, mucus plug in L mainstem suctioned    - Returned intubated s/p L flank catheter upsizing by urology (6/23) > Extubated (6/23)    - Returned intubated post drain upsizing by IR (6/18) > Extubated (6/21)    - Maintain O2 sat > 92%  #Pulmonary nodules / Mediastinal lymphadenopathy    - CT Chest: Right middle and upper lobe solid pulmonary nodules     - Recommend outpatient follow up with pulmonology   #Activity   - increased as tolerated    CARDS:   #Septic shock    - Levophed off 6/23 at 12pm; restarted 6/24 due to hypotension during bronchoscopy    - On midodrine 5mg q8hr  #New onset Afib w/ RVR with hypotension -- likely 2/2 sepsis    - IV lopressor 5 q 6     - off cardizem gtt    - Cardiology following, monitor on drip but rec'd holding off on amiodarone as management   #HTN    -Holding home amlodipine 2.5 qD while hypotensive      Currently normotensive   Imaging    - EKG (6/19): Afib w/ RVR to 138     - ECHO: (6/19) EF 55-60%, mild AS, mild LAE, mild MR/TR, trivial pericardial effusion    GI/NUTR:  #Gastric mass vs gastrohepatic lymphadenopathy    -CT A/P: Previously noted lesser curvature mass measures 6.3 x 5.2 cm (previously 5.2 x 3.5 cm by my measurement), and may represent gastrohepatic lymphadenopathy.  #Hepatic hypodensities, suspicious for development for multiple focal abscesses    -CT A/P: New hepatic hypodensities measuring up to 2.6 cm, suspicious for development of multiple focal abscesses.  #Splenic Hypodensities, infarct vs phlegmon/abscess    -CT A/P:  Wedge-shaped region of hypodensity within the spleen may reflect splenic infarct in the correct clinical setting. Developing phlegmon/abscess cannot be excluded in the current clinical setting     -Abd progressively more distended but patient denies pain. (+) flatus   - CTAP 6/23: IMPRESSION: Status post drainage, left enlarged kidney with severe   hydronephrosis/collection has decreased now measuring 13.7 x 9.5 cm. The   retroperitoneum has markedly decreased since prior. Diffuse anasarca. Ascites which appear to have increased since the prior exam.    #Diet    - SLP evaluation pending     -Last BM: 6/18 in the ED    /RENAL:   #Grade IV L hydronephrosis s/p L PCN (5/2023) -- presented to ED with thick dark foul smelling output  #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    -(6/23) s/p LT PCN upsizing to 26Fr catheter, RLQ 16 Fr drain remains    -(6/18) s/p LT PCN upsizing to 16Fr and 16Fr RLQ drain placement with IR, flush q8 hr    - CT A/P with PO/IV to r/o bowel perforation: Status post left nephrostomy tube placement. Left enlarged kidney with severe hydronephrosis/collection has decreased, now measuring 16 x 12 cm from 20 x 14 cm    -Plan for L nephrectomy for 6/26   #ROJAS (baseline Cr 1.0)    -Cr 2.5>3.0>2.8    Labs:          BUN/Cr- 93/3.0  -->,  91/2.8  -->          Electrolytes-Na 132 // K 3.8 // Mg 1.9 //  Phos 5.7 (06-24 @ 04:46)    -(6/19) FeNa 1.1%, intrinsic    -Nephrology following    HEME/ONC:   #H/o Fe Deficiency anemia    - Iron supplementation  #DVT prophylaxis  -prev on hep drip (16U), held; consider restarting if stable CBC   -hepsubq    Labs:          BUN/Cr- 93/3.0  -->,  91/2.8  -->          Electrolytes-Na 132 // K 3.8 // Mg 1.9 //  Phos 5.7 (06-24 @ 04:46)  #hypomagnesemia- repleted    T&S Expires 6/24; new T&S ordered  Blood Consent- in chart     ID:  #Leukocytosis 2/2 sepsis --> WBC uptrending    - Blood and urine cultures negative, abscess cultures with Stenotrophomonas maltophilia    - s/p vancomycin, cefepime, and flagyl in the ED    - Continuing meropenem q12 and levaquin q48 hr per ID; vancomycin 500qd added    - ID following    - Afebrile   WBC- 44.03  --->>,  37.91  --->>,  38.89  --->>  Temp trend- 24hrs T(F): 96.6 (06-24 @ 07:54), Max: 96.9 (06-24 @ 03:00)  Antibiotics-levoFLOXacin IVPB 750 every 48 hours  meropenem  IVPB 500 every 12 hours  vancomycin  IVPB        ENDO:  #Glucose monitoring    -A1c (5/7/23): 5.0   #Adrenal Insufficiency 2/2 to sepsis     -Solucortef to 25mg q12hr, transition to daily dosing 6/24    -Random cortisol level -sent, results pending    MSK:  #Activity    -Advance as tolerated    -PT/rehab     LINES/DRAINS: PIV, L radial [place] (6/18/23), Bobby (6/18/23), Javier Drain (6/18/23), LT PCN (6/18/23). Left basilic midline  6/21    ADVANCED DIRECTIVES:  Full Code    HCP/Emergency Contact- Tracey Adames: [phone]     INDICATION FOR SICU: New onset atrial fibrillation    DISPO: SICU   Assessment & Plan:  67M w/ PMH of HTN, grade IV hydronephrosis of L kidney s/p L PCN (placed 5/2023), stutter, hiatal hernia, iron deficiency anemia, H Pylori (untreated), and gastric mass (never biopsied) presents to the ED with at least 4 days of worsening weakness, abdominal distension, and no output from his PCN. Admitted with emphysematous pyelonephritis     (6/18): s/p LT PCN upsizing to 16Fr and 16Fr RLQ drain placement with IR   (6/23): s/p shock lithotripsy of L kidney abscess cavity with drainage of thick contents, upsize of drain to 26Fr bobby catheter    NEURO:  #Pain control    - APAP and tramadol PRN  -fent 25 q 3 PRN while intubated  #Sedation  -precedex gtt  #H/o stutter (negative ischemic workup last admission)    RESP:  #acute respiratory failure; now with left lung mucous plugging    - reintubated 6/24 AM due to increasing O2 requirements and mucus plugging; LL @ 23, 450/16/100/8   -s/p bronchoscopy 6/24, mucus plug in L mainstem suctioned    - Returned intubated s/p L flank catheter upsizing by urology (6/23) > Extubated (6/23)    - Returned intubated post drain upsizing by IR (6/18) > Extubated (6/21)    - Maintain O2 sat > 92%  #Pulmonary nodules / Mediastinal lymphadenopathy    - CT Chest: Right middle and upper lobe solid pulmonary nodules     - Recommend outpatient follow up with pulmonology   #Activity   - increased as tolerated    CARDS:   #Septic shock    - Levophed off 6/23 at 12pm; restarted 6/24 due to hypotension during bronchoscopy + 250 cc NS given     - On midodrine 5mg q8hr  #New onset Afib w/ RVR with hypotension -- likely 2/2 sepsis    - IV lopressor 5 q 6     - off cardizem gtt    - Cardiology following, monitor on drip but rec'd holding off on amiodarone as management   #HTN    -Holding home amlodipine 2.5 qD while hypotensive      Currently normotensive   Imaging    - EKG (6/19): Afib w/ RVR to 138     - ECHO: (6/19) EF 55-60%, mild AS, mild LAE, mild MR/TR, trivial pericardial effusion    GI/NUTR:  #Gastric mass vs gastrohepatic lymphadenopathy    -CT A/P: Previously noted lesser curvature mass measures 6.3 x 5.2 cm (previously 5.2 x 3.5 cm by my measurement), and may represent gastrohepatic lymphadenopathy.  #Hepatic hypodensities, suspicious for development for multiple focal abscesses    -CT A/P: New hepatic hypodensities measuring up to 2.6 cm, suspicious for development of multiple focal abscesses.  #Splenic Hypodensities, infarct vs phlegmon/abscess    -CT A/P:  Wedge-shaped region of hypodensity within the spleen may reflect splenic infarct in the correct clinical setting. Developing phlegmon/abscess cannot be excluded in the current clinical setting     -Abd progressively more distended but patient denies pain. (+) flatus   - CTAP 6/23: IMPRESSION: Status post drainage, left enlarged kidney with severe   hydronephrosis/collection has decreased now measuring 13.7 x 9.5 cm. The   retroperitoneum has markedly decreased since prior. Diffuse anasarca. Ascites which appear to have increased since the prior exam.    #Diet    - SLP evaluation pending     -Last BM: 6/18 in the ED    /RENAL:   #Grade IV L hydronephrosis s/p L PCN (5/2023) -- presented to ED with thick dark foul smelling output  #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    -(6/23) s/p LT PCN upsizing to 26Fr catheter, RLQ 16 Fr drain remains    -(6/18) s/p LT PCN upsizing to 16Fr and 16Fr RLQ drain placement with IR, flush q8 hr    - CT A/P with PO/IV to r/o bowel perforation: Status post left nephrostomy tube placement. Left enlarged kidney with severe hydronephrosis/collection has decreased, now measuring 16 x 12 cm from 20 x 14 cm    -Plan for L nephrectomy for 6/26   #ROJAS (baseline Cr 1.0)    -Cr 2.5>3.0>2.8  #metabolic acidosis  -1/2 NS + 75 mEq sodium bicarb @ 75/hr    Labs:          BUN/Cr- 93/3.0  -->,  91/2.8  -->          Electrolytes-Na 132 // K 3.8 // Mg 1.9 //  Phos 5.7 (06-24 @ 04:46)    -(6/19) FeNa 1.1%, intrinsic    -Nephrology following    HEME/ONC:   #H/o Fe Deficiency anemia    - Iron supplementation  #DVT prophylaxis  -prev on hep drip (16U), held; consider restarting if stable CBC   -hepsubq    Labs:          BUN/Cr- 93/3.0  -->,  91/2.8  -->          Electrolytes-Na 132 // K 3.8 // Mg 1.9 //  Phos 5.7 (06-24 @ 04:46)  #hypomagnesemia- repleted    T&S Expires 6/24; new T&S ordered  Blood Consent- in chart     ID:  #Leukocytosis 2/2 sepsis --> WBC uptrending    - Blood and urine cultures negative, abscess cultures with Stenotrophomonas maltophilia    - s/p vancomycin, cefepime, and flagyl in the ED    - Continuing meropenem q12 and levaquin q48 hr per ID; vancomycin 500qd added (vanc trough 6/26 11am)    - ID following    - Afebrile   WBC- 44.03  --->>,  37.91  --->>,  38.89  --->>  Temp trend- 24hrs T(F): 96.6 (06-24 @ 07:54), Max: 96.9 (06-24 @ 03:00)  Antibiotics-levoFLOXacin IVPB 750 every 48 hours  meropenem  IVPB 500 every 12 hours  vancomycin  IVPB        ENDO:  #Glucose monitoring    -A1c (5/7/23): 5.0   #Adrenal Insufficiency 2/2 to sepsis     -Solucortef to 25mg q12hr, transition to daily dosing 6/24    -Random cortisol level -sent, results pending    MSK:  #Activity    -Advance as tolerated    -PT/rehab     LINES/DRAINS: PIV, L radial [place] (6/18/23), Bobby (6/18/23), Javier Drain (6/18/23), LT PCN (6/18/23). Left basilic midline  6/21    ADVANCED DIRECTIVES:  Full Code    HCP/Emergency Contact- Tracey Adames: [phone]     INDICATION FOR SICU: New onset atrial fibrillation    DISPO: SICU

## 2023-06-24 NOTE — PROGRESS NOTE ADULT - SUBJECTIVE AND OBJECTIVE BOX
CHRISTINE VILLAVICENCIO  [id number]  67y Male    Indication for ICU admission:    Admit Date:23  ICU Date: 23  OR Date:    No Known Allergies    PAST MEDICAL & SURGICAL HISTORY:      24HRS EVENT:    PM  -ordered steroid taper   -desaturating while eating > made NPO, SLP eval in AM. Added NS @ 50 while NPO  -PTT @ 10pm & 4:30   -AM labs   -AG 16  -2gm Mg x 2  -PTT 80> no changes   - Increasing oxygen requirements > AB.34/33/133/18  - CXR showing white out left lung > patient placed on bipap and chest pt        DAY   - f/u restart heparin drip vs DVT ppx;  - Per urology, restart hep gtt after CTAP and if H&H stable   - f/u restart TF --> trickle feeds restarted with nepro, follow up further nutrition recs  -dc OGT, if passes bedside swallow: regular diet, if tray intake <50%, add Nepro shake (420kcal, 19g protein each) 1can 2-3x/day   - metop 2.5 q 6 for AFib rate control   - 2 g Ca x 1    - started HSQ   - solucortef weaned to 25q12    - CPAP ab.35/31/173/17. RSBI 30, NIF -24, + cuff leak    - Extubated to face mask; post extubation ABG 7.35/32/173/18   - passed bedside swallow --> started renal restriction diet    - off levo since noon   - tachy to 114-120--> 2.5 metop given, remains tachy, additional 2.5 given--> remains 115-120; incr metop to 5 q 6   - CTAP with decreased size of L kidney abscess cavity, pending read--> left enlarged kidney with severe hydronephrosis/collection has decreased now measuring 13.7 x 9.5 cm    -hgb stable, hep drip started @ 1400     - cardizem gtt to be restarted     -duonebs ATC ordered       DVT PTX: heparin  Infusion 1400 Unit(s)/Hr IV Continuous <Continuous>      GI PTX:pantoprazole  Injectable 40 milliGRAM(s) IV Push daily      ***Tubes/Lines/Drains  ***  [x]Peripheral IV  Central Venous Line     	Date   [x]Arterial Line		                Date   [] PICC:         	[] Midline		[] Mediport             [x]Urinary Catheter		Indication: Strict I&O    Date Placed:       REVIEW OF SYSTEMS  [x ] A ten-point review of systems was otherwise negative except as noted.  [ ] Due to altered mental status/intubation, subjective information were not able to be obtained from the patient. History was obtained, to the extent possible, from review of the chart and collateral sources of information.  ---------------------------------------------------------------------------------------------------------------------- CHRISTINE VILLAVICENCIO  [id number]  67y Male    Indication for ICU admission:    Admit Date:23  ICU Date: 23  OR Date:    No Known Allergies    PAST MEDICAL & SURGICAL HISTORY:      24HRS EVENT:    PM  -ordered steroid taper   -desaturating while eating > made NPO, SLP eval in AM. Added NS @ 50 while NPO  -PTT @ 10pm & 4:30   -AM labs   -AG 16  -2gm Mg x 2  -PTT 80> no changes   - Increasing oxygen requirements > AB.34/33/133/18  - CXR showing white out left lung > patient placed on bipap and chest pt        DAY   - f/u restart heparin drip vs DVT ppx;  - Per urology, restart hep gtt after CTAP and if H&H stable   - f/u restart TF --> trickle feeds restarted with nepro, follow up further nutrition recs  -dc OGT, if passes bedside swallow: regular diet, if tray intake <50%, add Nepro shake (420kcal, 19g protein each) 1can 2-3x/day   - metop 2.5 q 6 for AFib rate control   - 2 g Ca x 1    - started HSQ   - solucortef weaned to 25q12    - CPAP ab.35/31/173/17. RSBI 30, NIF -24, + cuff leak    - Extubated to face mask; post extubation ABG 7.35/32/173/18   - passed bedside swallow --> started renal restriction diet    - off levo since noon   - tachy to 114-120--> 2.5 metop given, remains tachy, additional 2.5 given--> remains 115-120; incr metop to 5 q 6   - CTAP with decreased size of L kidney abscess cavity, pending read--> left enlarged kidney with severe hydronephrosis/collection has decreased now measuring 13.7 x 9.5 cm    -hgb stable, hep drip started @ 1400     - cardizem gtt to be restarted     -duonebs ATC ordered       DVT PTX: heparin  Infusion 1400 Unit(s)/Hr IV Continuous <Continuous>      GI PTX:pantoprazole  Injectable 40 milliGRAM(s) IV Push daily      ***Tubes/Lines/Drains  ***  [x]Peripheral IV  Central Venous Line     	Date   [x]Arterial Line		                Date   [] PICC:         	[] Midline		[] Mediport             [x]Urinary Catheter		Indication: Strict I&O    Date Placed:       REVIEW OF SYSTEMS  [x ] A ten-point review of systems was otherwise negative except as noted.  [ ] Due to altered mental status/intubation, subjective information were not able to be obtained from the patient. History was obtained, to the extent possible, from review of the chart and collateral sources of information.  ----------------------------------------------------------------------------------------------------------------------  Daily     Daily     Diet, NPO:   Except Medications (23 @ 21:06)      CURRENT MEDS:  Neurologic Medications  dexMEDEtomidine Infusion 0.2 MICROgram(s)/kG/Hr IV Continuous <Continuous>  fentaNYL    Injectable 25 MICROGram(s) IV Push every 3 hours PRN Moderate Pain (4 - 6)  gabapentin 100 milliGRAM(s) Oral every 8 hours  traMADol 25 milliGRAM(s) Oral every 8 hours PRN Moderate Pain (4 - 6)    Respiratory Medications  albuterol/ipratropium for Nebulization 3 milliLiter(s) Nebulizer every 6 hours    Cardiovascular Medications  diltiazem Infusion 5 mG/Hr IV Continuous <Continuous>  midodrine 5 milliGRAM(s) Oral every 8 hours  norepinephrine Infusion 0.05 MICROgram(s)/kG/Min IV Continuous <Continuous>    Gastrointestinal Medications  calcium acetate 667 milliGRAM(s) Oral three times a day with meals  ferrous    sulfate 325 milliGRAM(s) Oral daily  pantoprazole  Injectable 40 milliGRAM(s) IV Push daily  sodium bicarbonate 1300 milliGRAM(s) Oral three times a day  sodium chloride 0.45% 1000 milliLiter(s) IV Continuous <Continuous>  sodium chloride 0.9% lock flush 10 milliLiter(s) IV Push every 1 hour PRN Pre/post blood products, medications, blood draw, and to maintain line patency  sodium chloride 0.9%. 1000 milliLiter(s) IV Continuous <Continuous>    Genitourinary Medications    Hematologic/Oncologic Medications  heparin   Injectable 5000 Unit(s) SubCutaneous every 8 hours    Antimicrobial/Immunologic Medications  levoFLOXacin IVPB 750 milliGRAM(s) IV Intermittent every 48 hours  meropenem  IVPB 500 milliGRAM(s) IV Intermittent every 12 hours  vancomycin  IVPB        Endocrine/Metabolic Medications  hydrocortisone sodium succinate Injectable 25 milliGRAM(s) IV Push every 12 hours    Topical/Other Medications  bacitracin   Ointment 1 Application(s) Topical two times a day  chlorhexidine 0.12% Liquid 15 milliLiter(s) Oral Mucosa every 12 hours  chlorhexidine 2% Cloths 1 Application(s) Topical <User Schedule>  lidocaine 2% Viscous 10 milliLiter(s) Oral once PRN FOR BRONCHOSCOPY  lidocaine 2% Viscous 10 milliLiter(s) Oral once PRN FOR BRONCHOSCOPY      ICU Vital Signs Last 24 Hrs  T(C): 35.9 (2023 07:54), Max: 36.1 (2023 03:00)  T(F): 96.6 (2023 07:54), Max: 96.9 (2023 03:00)  HR: 81 (2023 11:00) (81 - 129)  BP: 88/61 (2023 11:00) (88/61 - 123/74)  BP(mean): 68 (2023 11:00) (68 - 93)  ABP: 93/54 (2023 11:00) (93/54 - 127/58)  ABP(mean): 67 (2023 11:00) (65 - 79)  RR: 19 (2023 19:00) (18 - 20)  SpO2: 100% (2023 11:00) (91% - 100%)    O2 Parameters below as of 2023 12:00  Patient On (Oxygen Delivery Method): ventilator    O2 Concentration (%): 100          Mode: AC/ CMV (Assist Control/ Continuous Mandatory Ventilation)  RR (machine): 16  TV (machine): 450  FiO2: 100  PEEP: 8  ITime: 1  MAP: 17  PIP: 31    ABG - ( 2023 07:40 )  pH, Arterial: 7.25  pH, Blood: x     /  pCO2: 41    /  pO2: 107   / HCO3: 18    / Base Excess: -8.6  /  SaO2: 99.8                I&O's Summary    2023 07:  -  2023 07:00  --------------------------------------------------------  IN: 1241.7 mL / OUT: 1380 mL / NET: -138.3 mL    2023 07:  -  2023 13:07  --------------------------------------------------------  IN: 476.1 mL / OUT: 275 mL / NET: 201.1 mL      I&O's Detail    2023 07:  -  2023 07:00  --------------------------------------------------------  IN:    Dexmedetomidine: 30.8 mL    Diltiazem: 77.5 mL    Heparin: 196 mL    Heparin Infusion: 16 mL    IV PiggyBack: 150 mL    Norepinephrine: 21.4 mL    sodium chloride 0.9%: 750 mL  Total IN: 1241.7 mL    OUT:    Drain (mL): 100 mL    Indwelling Catheter - Urethral (mL): 710 mL    VAC (Vacuum Assisted Closure) System (mL): 70 mL    VAC (Vacuum Assisted Closure) System (mL): 500 mL  Total OUT: 1380 mL    Total NET: -138.3 mL      2023 07:  -  2023 13:07  --------------------------------------------------------  IN:    Dexmedetomidine: 72.6 mL    Diltiazem: 12.5 mL    Heparin Infusion: 16 mL    sodium chloride 0.9%: 375 mL  Total IN: 476.1 mL    OUT:    Indwelling Catheter - Urethral (mL): 195 mL    VAC (Vacuum Assisted Closure) System (mL): 80 mL  Total OUT: 275 mL    Total NET: 201.1 mL          PHYSICAL EXAM:    General/Neuro: Intubated, sedated on precedex. RASS -2. Follows commands, ENRIQUE.    Lungs: Clear to auscultation, Normal expansion/effort. ETT in place @ 23 cm @ lip line.    Cardiovascular : S1, S2.  Regular rate and rhythm.  Peripheral edema     GI: Abdomen soft, moderately distended, nontender. RLQ drain with serous drainage.  L nephrostomy drain with serosanguinous drainage noted.    Extremities: Extremities warm, pink, well-perfused. 2+ edema noted in lower extremities.     Derm: Good skin turgor, no skin breakdown.      :  Mena catheter in place.    CXR:     LABS:  CAPILLARY BLOOD GLUCOSE                              7.9    38.89 )-----------( 197      ( 2023 20:45 )             24.8       06-24    132<L>  |  104  |  91<HH>  ----------------------------<  82  3.8   |  15<L>  |  2.8<H>    Ca    5.9<LL>      2023 04:46  Phos  5.7     06-24  Mg     1.9     06-24        PT/INR - ( 2023 05:00 )   PT: 19.80 sec;   INR: 1.71 ratio         PTT - ( 2023 04:46 )  PTT:56.3 sec      Urinalysis Basic - ( 2023 04:46 )    Color: x / Appearance: x / SG: x / pH: x  Gluc: 82 mg/dL / Ketone: x  / Bili: x / Urobili: x   Blood: x / Protein: x / Nitrite: x   Leuk Esterase: x / RBC: x / WBC x   Sq Epi: x / Non Sq Epi: x / Bacteria: x        Culture - Surgical Swab (collected 2023 22:11)  Source: .Surgical Swab None  Preliminary Report (2023 11:08):    No growth

## 2023-06-25 NOTE — PROGRESS NOTE ADULT - ASSESSMENT
67y Male admitted with retroperitoneal & intraabdominal abscess s/p drain placement & L PCN upsizing with IR on 6/18; s/p percutaneous drainage of LEFT renal abcess, POD#2. Seen and examined at bedside. Patient remains intubated/sedated and restarted on pressors (Levophed 0.07) s/p bronchoscopy found with thick white mucous plug in left mainstem bronchus.     Plan:   - LEFT 26fr flank drain with blood-tinged drainage. 16 fr bobby in place draining yellow urine. RLQ VENICE drain serous drainage.   - plan for repeat irrigation at bedside today   - f/u Intra-op Cx  - Continue IV abx per ID - on Ar/ Levaquin, discussed obtaining repeat cultures during irrigation  - Continue Trend Cr   - F/u Nephrology c/s   - Analgesics for pain control prn   - Continue I&Os   - Continue management per SICU  - Will d/w attending

## 2023-06-25 NOTE — PROGRESS NOTE ADULT - ASSESSMENT
67M w/ PMH of HTN, grade IV hydronephrosis of L kidney s/p L PCN (placed 5/2023), stutter, hiatal hernia, iron deficiency anemia, H Pylori (untreated), and gastric mass (never biopsied) presents to the ED with at least 4 days of worsening weakness, abdominal distension, and no output from his PCN  . Found to have septic shock, MSOF, lactic acidosis from left emphysematous hydronephrosis, pyelonephritis and possible rupture with pneumoperitoneum along with possible hepatic abscesses, splenic infarct and abdominal and mediastinal lymphadenopathies. he is s/p upsizing of left PCN tube by IR along with RLQ drain placed.       Assessment and plan  ROJAS/ Hyponatremia/ severe left hydro s/p upsizing of left PCN catheter/ emphysematous pyelo with septic shock/ pneumoperitoneum/ ? kidney rupture/ ? hepatic abscesses/ splenic infarct  ROJAS likely ATN iso septic shock  cr improved from peak now stable  non oliguric   continue sodium bicarbonate po   BP noted / increase midodrine to 10 q 8   ph noted / on  binders     and IR following  no indication for RRT as of now

## 2023-06-25 NOTE — PROGRESS NOTE ADULT - SUBJECTIVE AND OBJECTIVE BOX
CHRISTINE VILLAVICENCIO  67y, Male  Allergy: No Known Allergies      LOS  7d    CHIEF COMPLAINT: pneumoperitoneum, emphysematous pyelonephritis (25 Jun 2023 01:56)      INTERVAL EVENTS/HPI  - s/p irrigation yesterday with , continued purulence. s/p bronch , on pressors  - T(F): , Max: 98.3 (06-25-23 @ 07:38)  - WBC Count: 40.50 (06-24-23 @ 20:56)  WBC Count: 38.15 (06-24-23 @ 17:38)    - Creatinine: 3.8 (06-25-23 @ 04:10)  Creatinine: 3.8 (06-24-23 @ 20:56)     -   -   -     ROS  cannot obtain secondary to patient's sedation and/or mental status    VITALS:  T(F): 98.3, Max: 98.3 (06-25-23 @ 07:38)  HR: 110  BP: 111/66  RR: 21Vital Signs Last 24 Hrs  T(C): 36.8 (25 Jun 2023 07:38), Max: 36.8 (25 Jun 2023 07:38)  T(F): 98.3 (25 Jun 2023 07:38), Max: 98.3 (25 Jun 2023 07:38)  HR: 110 (25 Jun 2023 08:00) (75 - 132)  BP: 111/66 (25 Jun 2023 08:00) (80/54 - 153/81)  BP(mean): 83 (25 Jun 2023 08:00) (59 - 104)  RR: 21 (25 Jun 2023 08:00) (13 - 24)  SpO2: 98% (25 Jun 2023 08:00) (97% - 100%)    Parameters below as of 25 Jun 2023 08:00  Patient On (Oxygen Delivery Method): ventilator        PHYSICAL EXAM:  ***     FH: Non-contributory  Social Hx: Non-contributory    TESTS & MEASUREMENTS:                        8.5    40.50 )-----------( 240      ( 24 Jun 2023 20:56 )             26.6     06-25    128<L>  |  95<L>  |  106<HH>  ----------------------------<  104<H>  5.6<H>   |  19  |  3.8<H>    Ca    8.0<L>      25 Jun 2023 04:10  Phos  8.1     06-24  Mg     2.3     06-24          Urinalysis Basic - ( 25 Jun 2023 04:10 )    Color: x / Appearance: x / SG: x / pH: x  Gluc: 104 mg/dL / Ketone: x  / Bili: x / Urobili: x   Blood: x / Protein: x / Nitrite: x   Leuk Esterase: x / RBC: x / WBC x   Sq Epi: x / Non Sq Epi: x / Bacteria: x        Culture - Bronchial (collected 06-24-23 @ 11:00)  Source: .Bronchial None  Gram Stain (06-24-23 @ 23:21):    Numerous polymorphonuclear leukocytes per low power field    No squamous epithelial cells per low power field    Rare Yeast like cells per oil power field    Culture - Surgical Swab (collected 06-22-23 @ 22:11)  Source: .Surgical Swab None  Preliminary Report (06-24-23 @ 11:08):    No growth    Culture - Fungal, Other (collected 06-22-23 @ 22:11)  Source: .Other None  Preliminary Report (06-24-23 @ 15:03):    Culture is being performed. Fungal cultures are held for 4 weeks.    Culture - Urine (collected 06-19-23 @ 02:50)  Source: Clean Catch Clean Catch (Midstream)  Final Report (06-20-23 @ 10:18):    No growth    Culture - Abscess with Gram Stain (collected 06-18-23 @ 23:08)  Source: .Abscess right lower quadrant (intrabdominal)  Gram Stain (06-19-23 @ 11:56):    Numerous polymorphonuclear leukocytes per low power field    Numerous Gram positive cocci in pairs per oil power field  Final Report (06-21-23 @ 14:13):    Numerous Anaerobic Gram Positive Cocci Most closely resembling    Peptoniphilus species "Susceptibilities not performed"    Culture - Abscess with Gram Stain (collected 06-18-23 @ 23:08)  Source: .Abscess left kidney  Gram Stain (06-19-23 @ 11:58):    Rare polymorphonuclear leukocytes per low power field    Few Gram Positive Cocci per oil power field  Final Report (06-22-23 @ 10:13):    Growth in fluid media only Anaerobic Gram Positive Cocci Most closely    resembling Peptoniphilus species "Susceptibilities not performed"    Rare Stenotrophomonas maltophilia  Organism: Stenotrophomonas maltophilia  Stenotrophomonas maltophilia (06-22-23 @ 10:13)  Organism: Stenotrophomonas maltophilia (06-22-23 @ 10:13)      -  Levofloxacin: S 1      Method Type: EDMUND      -  Trimethoprim/Sulfamethoxazole: S <=0.5/9.5  Organism: Stenotrophomonas maltophilia (06-22-23 @ 10:13)      -  Minocycline: S      Method Type: KB    Culture - Abscess with Gram Stain (collected 06-18-23 @ 20:18)  Source: .Abscess left PCN aspirate  Gram Stain (06-19-23 @ 06:27):    No polymorphonuclear cells seen per low power field    Few Gram Variable Cocci seen per oil power field  Final Report (06-24-23 @ 09:11):    Few Stenotrophomonas maltophilia  Organism: Stenotrophomonas maltophilia (06-24-23 @ 09:11)  Organism: Stenotrophomonas maltophilia (06-24-23 @ 09:11)      -  Levofloxacin: S 2      Method Type: EDMUND      -  Trimethoprim/Sulfamethoxazole: S <=0.5/9.5    Culture - Blood (collected 06-18-23 @ 14:18)  Source: .Blood Blood-Peripheral  Final Report (06-24-23 @ 02:00):    No Growth Final    Culture - Blood (collected 06-18-23 @ 14:18)  Source: .Blood Blood-Peripheral  Final Report (06-24-23 @ 02:00):    No Growth Final            INFECTIOUS DISEASES TESTING  MRSA PCR Result.: Negative (06-24-23 @ 17:38)      INFLAMMATORY MARKERS      RADIOLOGY & ADDITIONAL TESTS:  I have personally reviewed the last available Chest xray  CXR  Xray Chest 1 View- PORTABLE-Urgent:   ACC: [id number] EXAM:  XR CHEST PORTABLE URGENT 1V   ORDERED BY: NORMA PATTERSON     PROCEDURE DATE:  06/24/2023          INTERPRETATION:  Clinical History / Reason for exam: Post bronchoscopy    Comparison : Chest radiograph June 24, 2023      Technique/Positioning: Frontal.    Findings:    Support devices: Stable positioning.    Cardiac/mediastinum/hilum: Unremarkable.    Lung parenchyma/Pleura: Left basilar opacity/pleural effusion.. Right   basilar focal atelectasis    Skeleton/soft tissues: Stable.    Impression:    Left basilar opacity/pleural effusion changed from a previous left   hemithorax complete opacification. Right basilar focal atelectasis.        --- End of Report ---            JUAN A WISEMAN MD; Attending Radiologist  This document has been electronically signed. Jun 24 2023 11:55AM (06-24-23 @ 11:39)      CT  CT Abdomen and Pelvis No Cont:   ACC: [id number] EXAM:  CT ABDOMEN AND PELVIS   ORDERED BY: JULIANNE HERNANDEZ     PROCEDURE DATE:  06/23/2023          INTERPRETATION:  CLINICAL STATEMENT: Left kidney abscess status post   drainage.    TECHNIQUE: Contiguous axial CT images were obtained from the lower chest   to the pubic symphysis without intravenous contrast. Oral contrast was   not administered. Reformatted images in the coronal and sagittal planes   were acquired.    COMPARISON: CT abdomen and pelvis 6/19/2023.    FINDINGS:    Limited evaluation of solid organs and vascular structures secondary to   lack of intravenous contrast and streak artifact due to contact with CT   gantry.    LOWER CHEST: Moderate right and large left pleural effusions, increased   since prior, with adjacent subsegmental atelectasis. 3 mm right middle   lobe pulmonary nodule, unchanged (4/9). Cardiomegaly.    HEPATOBILIARY: Difficult to evaluate due to significant artifact.   Suggestion of vicarious excretion of contrast within the gallbladder    SPLEEN: Unchanged.    PANCREAS: Very difficult to visualize.    ADRENAL GLANDS: Very difficult to visualize.    KIDNEYS: Left enlarged kidney with severe hydronephrosis/collection has   decreased status post drainage, now measuring 13.7 x 9.5 cm from 18.2x   12.4 cm. Unchanged right kidney.    ABDOMINOPELVIC NODES: Unchanged..    PELVIC ORGANS: Unchanged.    PERITONEUM/MESENTERY/BOWEL: Peritoneal catheter terminates in the right   lower quadrant. Pneumoperitoneum is mildly decreased since prior.   Residual oral contrast is visualized throughout the abdomen, possibly due   to slow transit. Suggestion of mild increase in ascites.    BONES/SOFT TISSUES: Anasarca. Degenerative osseous changes.    VASCULAR: Unchanged.        IMPRESSION:    Overall limited examination.    Moderate right and large left pleural effusions, increased since prior,   with adjacent subsegmental atelectasis.    Status post drainage, left enlarged kidney with severe   hydronephrosis/collection has decreased now measuring 13.7 x 9.5 cm. The   retroperitoneum has markedly decreased since prior.    Diffuse anasarca. Ascites which appear to have increased since the prior   exam.    --- End of Report ---          NEVIN MONTES MD; Resident Radiologist  This document has been electronically signed.  CHARLES SINGH MD; Attending Radiologist  This document has been electronically signed. Jun 23 2023  2:56PM (06-23-23 @ 14:21)      CARDIOLOGY TESTING  12 Lead ECG:   Ventricular Rate 108 BPM    QRS Duration 88 ms    Q-T Interval 292 ms    QTC Calculation(Bazett) 391 ms    R Axis 45 degrees    T Axis 68 degrees    Diagnosis Line Atrial fibrillation with rapid ventricular response  Low voltage QRS  Nonspecific STand T wave abnormality  Abnormal ECG    Confirmed by Chilo Martinez ([id number]) on 6/25/2023 8:17:20 AM (06-25-23 @ 06:19)  12 Lead ECG:   Ventricular Rate 114 BPM    Atrial Rate 107 BPM    QRS Duration 106 ms    Q-T Interval 286 ms    QTC Calculation(Bazett) 394 ms    R Axis 11 degrees    T Axis 138 degrees    Diagnosis Line Atrial fibrillation with rapid ventricular response  Low voltage QRS  Nonspecific T wave abnormality  Abnormal ECG    Confirmed by ROULA WISEMAN MD ([id number]) on 6/23/2023 6:42:27 AM (06-23-23 @ 01:54)      MEDICATIONS  albuterol/ipratropium for Nebulization 3  bacitracin   Ointment 1  calcium acetate 1334  chlorhexidine 2% Cloths 1  dexMEDEtomidine Infusion 0.2  ferrous    sulfate 325  gabapentin 100  heparin  Infusion. 1600  hydrocortisone sodium succinate Injectable   levoFLOXacin IVPB 750  meropenem  IVPB 1000  metoprolol tartrate Injectable 5  midodrine 10  norepinephrine Infusion 0.05  pantoprazole  Injectable 40  polyethylene glycol 3350 17  senna 2  sodium bicarbonate 1300  sodium chloride 0.45% 1000  sodium zirconium cyclosilicate 5      WEIGHT  Weight (kg): 76.476 (06-22-23 @ 21:00)  Creatinine: 3.8 mg/dL (06-25-23 @ 04:10)  Creatinine: 3.8 mg/dL (06-24-23 @ 20:56)      ANTIBIOTICS:  levoFLOXacin IVPB 750 milliGRAM(s) IV Intermittent every 48 hours  meropenem  IVPB 1000 milliGRAM(s) IV Intermittent every 12 hours      All available historical records have been reviewed   CHRISTINE VILLAVICENCIO  67y, Male  Allergy: No Known Allergies      LOS  7d    CHIEF COMPLAINT: pneumoperitoneum, emphysematous pyelonephritis (25 Jun 2023 01:56)      INTERVAL EVENTS/HPI  - s/p irrigation yesterday with , continued purulence. s/p bronch , on pressors  - T(F): , Max: 98.3 (06-25-23 @ 07:38)  - WBC Count: 40.50 (06-24-23 @ 20:56)  WBC Count: 38.15 (06-24-23 @ 17:38)    - Creatinine: 3.8 (06-25-23 @ 04:10)  Creatinine: 3.8 (06-24-23 @ 20:56)     -   -   -     ROS  cannot obtain secondary to patient's sedation and/or mental status    VITALS:  T(F): 98.3, Max: 98.3 (06-25-23 @ 07:38)  HR: 110  BP: 111/66  RR: 21Vital Signs Last 24 Hrs  T(C): 36.8 (25 Jun 2023 07:38), Max: 36.8 (25 Jun 2023 07:38)  T(F): 98.3 (25 Jun 2023 07:38), Max: 98.3 (25 Jun 2023 07:38)  HR: 110 (25 Jun 2023 08:00) (75 - 132)  BP: 111/66 (25 Jun 2023 08:00) (80/54 - 153/81)  BP(mean): 83 (25 Jun 2023 08:00) (59 - 104)  RR: 21 (25 Jun 2023 08:00) (13 - 24)  SpO2: 98% (25 Jun 2023 08:00) (97% - 100%)    Parameters below as of 25 Jun 2023 08:00  Patient On (Oxygen Delivery Method): ventilator        PHYSICAL EXAM:  General: intubated  HEENT: NCAT  CV: RRR  Lungs: symmetric chest expansion, decreased BS at bases  Abd: Soft bilateral drains  Skin: no rash  Neuro: sedated but opens eyes  Lines: no phlebitis     FH: Non-contributory  Social Hx: Non-contributory    TESTS & MEASUREMENTS:                        8.5    40.50 )-----------( 240      ( 24 Jun 2023 20:56 )             26.6     06-25    128<L>  |  95<L>  |  106<HH>  ----------------------------<  104<H>  5.6<H>   |  19  |  3.8<H>    Ca    8.0<L>      25 Jun 2023 04:10  Phos  8.1     06-24  Mg     2.3     06-24          Urinalysis Basic - ( 25 Jun 2023 04:10 )    Color: x / Appearance: x / SG: x / pH: x  Gluc: 104 mg/dL / Ketone: x  / Bili: x / Urobili: x   Blood: x / Protein: x / Nitrite: x   Leuk Esterase: x / RBC: x / WBC x   Sq Epi: x / Non Sq Epi: x / Bacteria: x        Culture - Bronchial (collected 06-24-23 @ 11:00)  Source: .Bronchial None  Gram Stain (06-24-23 @ 23:21):    Numerous polymorphonuclear leukocytes per low power field    No squamous epithelial cells per low power field    Rare Yeast like cells per oil power field    Culture - Surgical Swab (collected 06-22-23 @ 22:11)  Source: .Surgical Swab None  Preliminary Report (06-24-23 @ 11:08):    No growth    Culture - Fungal, Other (collected 06-22-23 @ 22:11)  Source: .Other None  Preliminary Report (06-24-23 @ 15:03):    Culture is being performed. Fungal cultures are held for 4 weeks.    Culture - Urine (collected 06-19-23 @ 02:50)  Source: Clean Catch Clean Catch (Midstream)  Final Report (06-20-23 @ 10:18):    No growth    Culture - Abscess with Gram Stain (collected 06-18-23 @ 23:08)  Source: .Abscess right lower quadrant (intrabdominal)  Gram Stain (06-19-23 @ 11:56):    Numerous polymorphonuclear leukocytes per low power field    Numerous Gram positive cocci in pairs per oil power field  Final Report (06-21-23 @ 14:13):    Numerous Anaerobic Gram Positive Cocci Most closely resembling    Peptoniphilus species "Susceptibilities not performed"    Culture - Abscess with Gram Stain (collected 06-18-23 @ 23:08)  Source: .Abscess left kidney  Gram Stain (06-19-23 @ 11:58):    Rare polymorphonuclear leukocytes per low power field    Few Gram Positive Cocci per oil power field  Final Report (06-22-23 @ 10:13):    Growth in fluid media only Anaerobic Gram Positive Cocci Most closely    resembling Peptoniphilus species "Susceptibilities not performed"    Rare Stenotrophomonas maltophilia  Organism: Stenotrophomonas maltophilia  Stenotrophomonas maltophilia (06-22-23 @ 10:13)  Organism: Stenotrophomonas maltophilia (06-22-23 @ 10:13)      -  Levofloxacin: S 1      Method Type: EDMUND      -  Trimethoprim/Sulfamethoxazole: S <=0.5/9.5  Organism: Stenotrophomonas maltophilia (06-22-23 @ 10:13)      -  Minocycline: S      Method Type: KB    Culture - Abscess with Gram Stain (collected 06-18-23 @ 20:18)  Source: .Abscess left PCN aspirate  Gram Stain (06-19-23 @ 06:27):    No polymorphonuclear cells seen per low power field    Few Gram Variable Cocci seen per oil power field  Final Report (06-24-23 @ 09:11):    Few Stenotrophomonas maltophilia  Organism: Stenotrophomonas maltophilia (06-24-23 @ 09:11)  Organism: Stenotrophomonas maltophilia (06-24-23 @ 09:11)      -  Levofloxacin: S 2      Method Type: EDMUND      -  Trimethoprim/Sulfamethoxazole: S <=0.5/9.5    Culture - Blood (collected 06-18-23 @ 14:18)  Source: .Blood Blood-Peripheral  Final Report (06-24-23 @ 02:00):    No Growth Final    Culture - Blood (collected 06-18-23 @ 14:18)  Source: .Blood Blood-Peripheral  Final Report (06-24-23 @ 02:00):    No Growth Final            INFECTIOUS DISEASES TESTING  MRSA PCR Result.: Negative (06-24-23 @ 17:38)      INFLAMMATORY MARKERS      RADIOLOGY & ADDITIONAL TESTS:  I have personally reviewed the last available Chest xray  CXR  Xray Chest 1 View- PORTABLE-Urgent:   ACC: [id number] EXAM:  XR CHEST PORTABLE URGENT 1V   ORDERED BY: NORMA PATTERSON     PROCEDURE DATE:  06/24/2023          INTERPRETATION:  Clinical History / Reason for exam: Post bronchoscopy    Comparison : Chest radiograph June 24, 2023      Technique/Positioning: Frontal.    Findings:    Support devices: Stable positioning.    Cardiac/mediastinum/hilum: Unremarkable.    Lung parenchyma/Pleura: Left basilar opacity/pleural effusion.. Right   basilar focal atelectasis    Skeleton/soft tissues: Stable.    Impression:    Left basilar opacity/pleural effusion changed from a previous left   hemithorax complete opacification. Right basilar focal atelectasis.        --- End of Report ---            JUAN A WISEMAN MD; Attending Radiologist  This document has been electronically signed. Jun 24 2023 11:55AM (06-24-23 @ 11:39)      CT  CT Abdomen and Pelvis No Cont:   ACC: [id number] EXAM:  CT ABDOMEN AND PELVIS   ORDERED BY: JULIANNE HERNANDEZ     PROCEDURE DATE:  06/23/2023          INTERPRETATION:  CLINICAL STATEMENT: Left kidney abscess status post   drainage.    TECHNIQUE: Contiguous axial CT images were obtained from the lower chest   to the pubic symphysis without intravenous contrast. Oral contrast was   not administered. Reformatted images in the coronal and sagittal planes   were acquired.    COMPARISON: CT abdomen and pelvis 6/19/2023.    FINDINGS:    Limited evaluation of solid organs and vascular structures secondary to   lack of intravenous contrast and streak artifact due to contact with CT   gantry.    LOWER CHEST: Moderate right and large left pleural effusions, increased   since prior, with adjacent subsegmental atelectasis. 3 mm right middle   lobe pulmonary nodule, unchanged (4/9). Cardiomegaly.    HEPATOBILIARY: Difficult to evaluate due to significant artifact.   Suggestion of vicarious excretion of contrast within the gallbladder    SPLEEN: Unchanged.    PANCREAS: Very difficult to visualize.    ADRENAL GLANDS: Very difficult to visualize.    KIDNEYS: Left enlarged kidney with severe hydronephrosis/collection has   decreased status post drainage, now measuring 13.7 x 9.5 cm from 18.2x   12.4 cm. Unchanged right kidney.    ABDOMINOPELVIC NODES: Unchanged..    PELVIC ORGANS: Unchanged.    PERITONEUM/MESENTERY/BOWEL: Peritoneal catheter terminates in the right   lower quadrant. Pneumoperitoneum is mildly decreased since prior.   Residual oral contrast is visualized throughout the abdomen, possibly due   to slow transit. Suggestion of mild increase in ascites.    BONES/SOFT TISSUES: Anasarca. Degenerative osseous changes.    VASCULAR: Unchanged.        IMPRESSION:    Overall limited examination.    Moderate right and large left pleural effusions, increased since prior,   with adjacent subsegmental atelectasis.    Status post drainage, left enlarged kidney with severe   hydronephrosis/collection has decreased now measuring 13.7 x 9.5 cm. The   retroperitoneum has markedly decreased since prior.    Diffuse anasarca. Ascites which appear to have increased since the prior   exam.    --- End of Report ---          NEVIN MONTES MD; Resident Radiologist  This document has been electronically signed.  CHARLES SINGH MD; Attending Radiologist  This document has been electronically signed. Jun 23 2023  2:56PM (06-23-23 @ 14:21)      CARDIOLOGY TESTING  12 Lead ECG:   Ventricular Rate 108 BPM    QRS Duration 88 ms    Q-T Interval 292 ms    QTC Calculation(Bazett) 391 ms    R Axis 45 degrees    T Axis 68 degrees    Diagnosis Line Atrial fibrillation with rapid ventricular response  Low voltage QRS  Nonspecific STand T wave abnormality  Abnormal ECG    Confirmed by Chilo Martinez ([id number]) on 6/25/2023 8:17:20 AM (06-25-23 @ 06:19)  12 Lead ECG:   Ventricular Rate 114 BPM    Atrial Rate 107 BPM    QRS Duration 106 ms    Q-T Interval 286 ms    QTC Calculation(Bazett) 394 ms    R Axis 11 degrees    T Axis 138 degrees    Diagnosis Line Atrial fibrillation with rapid ventricular response  Low voltage QRS  Nonspecific T wave abnormality  Abnormal ECG    Confirmed by ROULA WISEMAN MD ([id number]) on 6/23/2023 6:42:27 AM (06-23-23 @ 01:54)      MEDICATIONS  albuterol/ipratropium for Nebulization 3  bacitracin   Ointment 1  calcium acetate 1334  chlorhexidine 2% Cloths 1  dexMEDEtomidine Infusion 0.2  ferrous    sulfate 325  gabapentin 100  heparin  Infusion. 1600  hydrocortisone sodium succinate Injectable   levoFLOXacin IVPB 750  meropenem  IVPB 1000  metoprolol tartrate Injectable 5  midodrine 10  norepinephrine Infusion 0.05  pantoprazole  Injectable 40  polyethylene glycol 3350 17  senna 2  sodium bicarbonate 1300  sodium chloride 0.45% 1000  sodium zirconium cyclosilicate 5      WEIGHT  Weight (kg): 76.476 (06-22-23 @ 21:00)  Creatinine: 3.8 mg/dL (06-25-23 @ 04:10)  Creatinine: 3.8 mg/dL (06-24-23 @ 20:56)      ANTIBIOTICS:  levoFLOXacin IVPB 750 milliGRAM(s) IV Intermittent every 48 hours  meropenem  IVPB 1000 milliGRAM(s) IV Intermittent every 12 hours      All available historical records have been reviewed

## 2023-06-25 NOTE — PROGRESS NOTE ADULT - SUBJECTIVE AND OBJECTIVE BOX
Nephrology progress note    Patient was seen and examined, events over the last 24 h noted .  cr now stable    Allergies:  No Known Allergies    Hospital Medications:   MEDICATIONS  (STANDING):  albuterol/ipratropium for Nebulization 3 milliLiter(s) Nebulizer every 6 hours  bacitracin   Ointment 1 Application(s) Topical two times a day  calcium acetate 1334 milliGRAM(s) Oral every 8 hours  chlorhexidine 2% Cloths 1 Application(s) Topical <User Schedule>  dexMEDEtomidine Infusion 0.2 MICROgram(s)/kG/Hr (3.82 mL/Hr) IV Continuous <Continuous>  ferrous    sulfate 325 milliGRAM(s) Oral daily  gabapentin 200 milliGRAM(s) Oral every 8 hours  heparin  Infusion. 1600 Unit(s)/Hr (16 mL/Hr) IV Continuous <Continuous>  hydrocortisone sodium succinate Injectable   IV Push   hydrocortisone sodium succinate Injectable 25 milliGRAM(s) IV Push every 12 hours  levoFLOXacin IVPB 500 milliGRAM(s) IV Intermittent every 48 hours  meropenem  IVPB 1000 milliGRAM(s) IV Intermittent every 12 hours  metoprolol tartrate Injectable 5 milliGRAM(s) IV Push every 4 hours  midodrine 10 milliGRAM(s) Oral every 8 hours  norepinephrine Infusion 0.05 MICROgram(s)/kG/Min (7.17 mL/Hr) IV Continuous <Continuous>  pantoprazole  Injectable 40 milliGRAM(s) IV Push daily  polyethylene glycol 3350 17 Gram(s) Oral at bedtime  senna 2 Tablet(s) Oral at bedtime  sodium bicarbonate 1300 milliGRAM(s) Oral three times a day  sodium chloride 0.45% 1000 milliLiter(s) (75 mL/Hr) IV Continuous <Continuous>  sodium zirconium cyclosilicate 5 Gram(s) Oral every 8 hours        VITALS:  T(F): 98.3 (06-25-23 @ 07:38), Max: 98.3 (06-25-23 @ 07:38)  HR: 116 (06-25-23 @ 11:00)  BP: 98/65 (06-25-23 @ 10:00)  RR: 13 (06-25-23 @ 11:00)  SpO2: 99% (06-25-23 @ 11:00)  Wt(kg): --    06-23 @ 07:01  -  06-24 @ 07:00  --------------------------------------------------------  IN: 1241.7 mL / OUT: 1780 mL / NET: -538.3 mL    06-24 @ 07:01  - 06-25 @ 07:00  --------------------------------------------------------  IN: 3291 mL / OUT: 1305 mL / NET: 1986 mL    06-25 @ 07:01 - 06-25 @ 11:38  --------------------------------------------------------  IN: 457.8 mL / OUT: 60 mL / NET: 397.8 mL          PHYSICAL EXAM:  	Gen: BIPAP  	Pulm:decrease BS B/L  	CV:  S1S2; no rub  	Abd: +distended, PCN   	LE:  edema  LABS:  06-25    128<L>  |  95<L>  |  106<HH>  ----------------------------<  104<H>  5.6<H>   |  19  |  3.8<H>    Ca    8.0<L>      25 Jun 2023 04:10  Phos  8.1     06-24  Mg     2.3     06-24                            8.5    40.50 )-----------( 240      ( 24 Jun 2023 20:56 )             26.6       Urine Studies:  Urinalysis Basic - ( 25 Jun 2023 04:10 )    Color:  / Appearance:  / SG:  / pH:   Gluc: 104 mg/dL / Ketone:   / Bili:  / Urobili:    Blood:  / Protein:  / Nitrite:    Leuk Esterase:  / RBC:  / WBC    Sq Epi:  / Non Sq Epi:  / Bacteria:       Sodium, Random Urine: 40.0 mmoL/L (06-19 @ 02:50)  Creatinine, Random Urine: 80 mg/dL (06-19 @ 02:50)    RADIOLOGY & ADDITIONAL STUDIES:

## 2023-06-25 NOTE — PROGRESS NOTE ADULT - SUBJECTIVE AND OBJECTIVE BOX
CHRISTINE VILLAVICENCIO  [id number]  67y Male  Indication for ICU admission: Septic Shock     Admit Date:06-18-23  ICU Date: 6/18/23  OR Date:    No Known Allergies    PAST MEDICAL & SURGICAL HISTORY:      24HRS EVENT:    DVT PTX: heparin  Infusion 1400 Unit(s)/Hr IV Continuous <Continuous>      GI PTX:pantoprazole  Injectable 40 milliGRAM(s) IV Push daily      ***Tubes/Lines/Drains  ***  [x]Peripheral IV  Central Venous Line     	Date   [x]Arterial Line		                Date   [] PICC:         	[x] Midline		[] Mediport             [x]Urinary Catheter		Indication: Strict I&O    Date Placed:       REVIEW OF SYSTEMS  [x ] A ten-point review of systems was otherwise negative except as noted.  [ ] Due to altered mental status/intubation, subjective information were not able to be obtained from the patient. History was obtained, to the extent possible, from review of the chart and collateral sources of information.    24 Hour Events:  6/24  NIGHT  -levo 0.07   -restarted heparin gtt 1600  -incr midodrine 10mg q8  -AM vanc trough   -Cr 2.8>3.8, K 5.3, started lokelma 5mg q8, incr phoslo 1334 q8  -BAL- numerous PMNs, rare yeast like cells   -UOP 20cc/hr  -increased lopressor 5 q4     DAY  -aggressive chest PT  -advanced ETT 2cm--> ETT @ 23   -OGT pulled back 5cm, TF @ 20?  -abg post intubation: 7.25/41/107/18; post bronch ABG 7.26/39/161/18  -IV 5mg metoprolol q6  -half NS with 75 bicarb   -added vanc 500 q24 (renal dose confirmed by pharmacy)--> ID rec vanc 1g x 1 then check AM dose  -hypotensive after fent and versed given for bronch; levo started   -hold heparin drip, f/u 16:00 CBC --> hgb 8.6  -Bronch: 250cc NS due to hypotension + levo started during induction of fent and versed,opious white, thick liquid noted in left mainstem bronchus; irrigated and suctioned, sputum cx sent, f/u cxr--> cleared   -ID recs: MRSA nares, BAL, jan 1g q 12, c/w levo, vanc 1g x 1 then check AM dose  Per urology no plan for OR as patient currently in critical condition  -started trickle feeds; nutrition recommending bolus feeds  -urology suctioned and irrigated approx 650 cc purulent fluid from L nephrostomy     ----------------------------------------------------------------------------------------------------------------------     CHRISTINE VILLAVICENCIO  [id number]  67y Male    Indication for ICU admission:    Admit Date:23  ICU Date: 23  OR Date:    No Known Allergies    PAST MEDICAL & SURGICAL HISTORY:      24HRS EVENT:    DVT PTX: heparin  Infusion 1400 Unit(s)/Hr IV Continuous <Continuous>      GI PTX:pantoprazole  Injectable 40 milliGRAM(s) IV Push daily      ***Tubes/Lines/Drains  ***  [x]Peripheral IV  Central Venous Line     	Date   [x]Arterial Line		                Date   [] PICC:         	[x] Midline		[] Mediport             [x]Urinary Catheter		Indication: Strict I&O    Date Placed:       REVIEW OF SYSTEMS  [x ] A ten-point review of systems was otherwise negative except as noted.  [ ] Due to altered mental status/intubation, subjective information were not able to be obtained from the patient. History was obtained, to the extent possible, from review of the chart and collateral sources of information.    24 Hour Events:    NIGHT  -levo 0.07   -restarted heparin gtt 1600  -incr midodrine 10mg q8  -AM vanc trough: 22.1   -Cr 2.8>3.8, K 5.3, started lokelma 5mg q8, incr phoslo 1334 q8  -BAL- numerous PMNs, rare yeast like cells   -UOP 20cc/hr  -increased lopressor 5 q4   -AB.20/49/139/19 Lac 1.40 -> inc RR to 20 from 16   -PTT: 65.7 --> f/u PTT 12   -K 5.6 (after 2 doses lokelma) --> treated, F/U stat EKG     DAY  -aggressive chest PT  -advanced ETT 2cm--> ETT @ 23   -OGT pulled back 5cm, TF @ 20?  -abg post intubation: 7.25/41/107/18; post bronch ABG 7.26/39/161/18  -IV 5mg metoprolol q6  -half NS with 75 bicarb   -added vanc 500 q24 (renal dose confirmed by pharmacy)--> ID rec vanc 1g x 1 then check AM dose  -hypotensive after fent and versed given for bronch; levo started   -hold heparin drip, f/u 16:00 CBC --> hgb 8.6  -Bronch: 250cc NS due to hypotension + levo started during induction of fent and versed,opious white, thick liquid noted in left mainstem bronchus; irrigated and suctioned, sputum cx sent, f/u cxr--> cleared   -ID recs: MRSA nares, BAL, jan 1g q 12, c/w levo, vanc 1g x 1 then check AM dose  Per urology no plan for OR as patient currently in critical condition  -started trickle feeds; nutrition recommending bolus feeds  -urology suctioned and irrigated approx 650 cc purulent fluid from L nephrostomy     ----------------------------------------------------------------------------------------------------------------------     CHRISTINE VILLAVICENCIO  [id number]  67y Male    Indication for ICU admission:    Admit Date:23  ICU Date: 23  OR Date:    No Known Allergies    PAST MEDICAL & SURGICAL HISTORY:      24HRS EVENT:    DVT PTX: heparin  Infusion 1400 Unit(s)/Hr IV Continuous <Continuous>      GI PTX:pantoprazole  Injectable 40 milliGRAM(s) IV Push daily      ***Tubes/Lines/Drains  ***  [x]Peripheral IV  Central Venous Line     	Date   [x]Arterial Line		                Date   [] PICC:         	[x] Midline		[] Mediport             [x]Urinary Catheter		Indication: Strict I&O    Date Placed:       REVIEW OF SYSTEMS  [x ] A ten-point review of systems was otherwise negative except as noted.  [ ] Due to altered mental status/intubation, subjective information were not able to be obtained from the patient. History was obtained, to the extent possible, from review of the chart and collateral sources of information.    24 Hour Events:    NIGHT  -levo 0.07   -restarted heparin gtt 1600  -incr midodrine 10mg q8  -AM vanc trough: 22.1   -Cr 2.8>3.8, K 5.3, started lokelma 5mg q8, incr phoslo 1334 q8  -BAL- numerous PMNs, rare yeast like cells   -UOP 20cc/hr  -increased lopressor 5 q4   -AB.20/49/139/19 Lac 1.40 -> inc RR to 20 from 16   -PTT: 65.7 --> f/u PTT 12   -K 5.6 (after 2 doses lokelma) --> treated, F/U stat EKG --> no acute changes    DAY  -aggressive chest PT  -advanced ETT 2cm--> ETT @ 23   -OGT pulled back 5cm, TF @ 20?  -abg post intubation: 7.25/41/107/18; post bronch ABG 7.26/39/161/18  -IV 5mg metoprolol q6  -half NS with 75 bicarb   -added vanc 500 q24 (renal dose confirmed by pharmacy)--> ID rec vanc 1g x 1 then check AM dose  -hypotensive after fent and versed given for bronch; levo started   -hold heparin drip, f/u 16:00 CBC --> hgb 8.6  -Bronch: 250cc NS due to hypotension + levo started during induction of fent and versed,opious white, thick liquid noted in left mainstem bronchus; irrigated and suctioned, sputum cx sent, f/u cxr--> cleared   -ID recs: MRSA nares, BAL, jan 1g q 12, c/w levo, vanc 1g x 1 then check AM dose  Per urology no plan for OR as patient currently in critical condition  -started trickle feeds; nutrition recommending bolus feeds  -urology suctioned and irrigated approx 650 cc purulent fluid from L nephrostomy     ----------------------------------------------------------------------------------------------------------------------  Daily     Daily     Diet, NPO with Tube Feed:   Tube Feeding Modality: Nasogastric  Nepro with Carb Steady  Total Volume for 24 Hours (mL): 480  Continuous  Starting Tube Feed Rate mL per Hour: 20  Increase Tube Feed Rate by (mL): 20     Every 4 hours  Until Goal Tube Feed Rate (mL per Hour): 20  Tube Feed Duration (in Hours): 24  Tube Feed Start Time: 09:23 (23 @ 15:35)      CURRENT MEDS:  Neurologic Medications  dexMEDEtomidine Infusion 0.2 MICROgram(s)/kG/Hr IV Continuous <Continuous>  fentaNYL    Injectable 25 MICROGram(s) IV Push every 3 hours PRN Moderate Pain (4 - 6)  gabapentin 100 milliGRAM(s) Oral every 8 hours  traMADol 25 milliGRAM(s) Oral every 8 hours PRN Moderate Pain (4 - 6)    Respiratory Medications  albuterol/ipratropium for Nebulization 3 milliLiter(s) Nebulizer every 6 hours    Cardiovascular Medications  metoprolol tartrate Injectable 5 milliGRAM(s) IV Push every 4 hours  midodrine 10 milliGRAM(s) Oral every 8 hours  norepinephrine Infusion 0.05 MICROgram(s)/kG/Min IV Continuous <Continuous>    Gastrointestinal Medications  calcium acetate 1334 milliGRAM(s) Oral every 8 hours  ferrous    sulfate 325 milliGRAM(s) Oral daily  pantoprazole  Injectable 40 milliGRAM(s) IV Push daily  polyethylene glycol 3350 17 Gram(s) Oral at bedtime  senna 2 Tablet(s) Oral at bedtime  sodium bicarbonate 1300 milliGRAM(s) Oral three times a day  sodium chloride 0.45% 1000 milliLiter(s) IV Continuous <Continuous>  sodium chloride 0.9% lock flush 10 milliLiter(s) IV Push every 1 hour PRN Pre/post blood products, medications, blood draw, and to maintain line patency    Genitourinary Medications    Hematologic/Oncologic Medications  heparin  Infusion. 1600 Unit(s)/Hr IV Continuous <Continuous>    Antimicrobial/Immunologic Medications  levoFLOXacin IVPB 750 milliGRAM(s) IV Intermittent every 48 hours  meropenem  IVPB 1000 milliGRAM(s) IV Intermittent every 12 hours    Endocrine/Metabolic Medications  hydrocortisone sodium succinate Injectable 25 milliGRAM(s) IV Push every 12 hours    Topical/Other Medications  bacitracin   Ointment 1 Application(s) Topical two times a day  chlorhexidine 2% Cloths 1 Application(s) Topical <User Schedule>  sodium zirconium cyclosilicate 5 Gram(s) Oral every 8 hours      ICU Vital Signs Last 24 Hrs  T(C): 36.8 (2023 07:38), Max: 36.8 (2023 07:38)  T(F): 98.3 (2023 07:38), Max: 98.3 (2023 07:38)  HR: 116 (2023 07:00) (75 - 132)  BP: 105/68 (2023 07:00) (80/54 - 153/81)  BP(mean): 80 (2023 07:00) (59 - 104)  ABP: 118/76 (2023 07:00) (38/102 - 136/75)  ABP(mean): 89 (2023 07:00) (61 - 100)  RR: 13 (2023 07:00) (13 - 24)  SpO2: 100% (2023 07:00) (96% - 100%)    O2 Parameters below as of 2023 00:00  Patient On (Oxygen Delivery Method): ventilator  O2 Flow (L/min): 50            Mode: AC/ CMV (Assist Control/ Continuous Mandatory Ventilation)  RR (machine): 20  TV (machine): 450  FiO2: 50  PEEP: 8  ITime: 1  MAP: 13  PIP: 20    ABG - ( 2023 06:18 )  pH, Arterial: 7.25  pH, Blood: x     /  pCO2: 42    /  pO2: 132   / HCO3: 18    / Base Excess: -8.3  /  SaO2: 99.7                I&O's Summary    2023 07:  -  2023 07:00  --------------------------------------------------------  IN: 3291 mL / OUT: 1305 mL / NET: 1986 mL      I&O's Detail    2023 07:01  -  2023 07:00  --------------------------------------------------------  IN:    Dexmedetomidine: 340 mL    Diltiazem: 15 mL    Heparin Infusion: 16 mL    Heparin Infusion: 154 mL    IV PiggyBack: 200 mL    IV PiggyBack: 200 mL    Nepro: 280 mL    Norepinephrine: 136 mL    sodium chloride 0.45% w/ Additives: 1425 mL    sodium chloride 0.9%: 525 mL  Total IN: 3291 mL    OUT:    Drain (mL): 575 mL    Indwelling Catheter - Urethral (mL): 540 mL    VAC (Vacuum Assisted Closure) System (mL): 190 mL  Total OUT: 1305 mL    Total NET: 1986 mL          PHYSICAL EXAM:    General/Neuro: Sedated on precedex @ 1. RASS -2     Lungs: Vented on /20/50/8. Clear to auscultation, Normal expansion/effort.     Cardiovascular : S1, S2.  Regular rate and rhythm.     GI: Abdomen soft, Non-tender, mildly distended. R VENICE with serous OP. Lt PCN with serosang output.     Extremities: Extremities warm, pink, well-perfused. 2+ peripheral edema noted.    Derm: Good skin turgor, no skin breakdown.      :  Mena catheter in place. Scrotal edema noted.       LABS:  CAPILLARY BLOOD GLUCOSE      POCT Blood Glucose.: 154 mg/dL (2023 06:19)                          8.5    40.50 )-----------( 240      ( 2023 20:56 )             26.6       06-25    128<L>  |  95<L>  |  106<HH>  ----------------------------<  104<H>  5.6<H>   |  19  |  3.8<H>    Ca    8.0<L>      2023 04:10  Phos  8.1     06-24  Mg     2.3     06-24        PTT - ( 2023 04:10 )  PTT:65.7 sec      Urinalysis Basic - ( 2023 04:10 )    Color: x / Appearance: x / SG: x / pH: x  Gluc: 104 mg/dL / Ketone: x  / Bili: x / Urobili: x   Blood: x / Protein: x / Nitrite: x   Leuk Esterase: x / RBC: x / WBC x   Sq Epi: x / Non Sq Epi: x / Bacteria: x        Culture - Bronchial (collected 2023 11:00)  Source: .Bronchial None  Gram Stain (2023 23:21):    Numerous polymorphonuclear leukocytes per low power field    No squamous epithelial cells per low power field    Rare Yeast like cells per oil power field    Culture - Fungal, Other (collected 2023 22:11)  Source: .Other None  Preliminary Report (2023 15:03):    Culture is being performed. Fungal cultures are held for 4 weeks.    Culture - Surgical Swab (collected 2023 22:11)  Source: .Surgical Swab None  Preliminary Report (2023 11:08):    No growth

## 2023-06-25 NOTE — PROGRESS NOTE ADULT - NS ATTEND OPT1A GEN_ALL_CORE
History/Exam/Medical decision making
History/Medical decision making
History/Exam/Medical decision making
History/Exam/Medical decision making

## 2023-06-25 NOTE — PROGRESS NOTE ADULT - ASSESSMENT
ASSESSMENT  68yo Male with pmh of hypertension, massive Grade IV hydronephrosis s/p left nephrostomy placed in May 2023 by [facility]ology presents to the ED with little to no urine output from LEFT nephrostomy from about a week. PCN  more recently drainage was changed to brown/purulent fluid. 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 "thick gelatinous material that was difficult to suction out even with the shock pulse. I would alternate 2 prong to remove and break up / shock pulse to suck out. after an hour where I had no clear planes I elected to stop. irrigation through the 26 bobby took out a lot additional material  in ashlyn catch cup 4 x 4 x 4 cm lump of material trapped"    6/19 UCX NG; UA Blood: x / Protein: 300 mg/dL / Nitrite: Negative   Leuk Esterase: Large / RBC: 8 /HPF / WBC >720 /HPF   Sq Epi: x / Non Sq Epi: x / Bacteria: Moderate    6/18 L PCN     Few Stenotrophomonas maltophilia Levofloxacin: S 2    6/18 L PCN     Numerous Anaerobic Gram Positive Cocci Most closely resembling  Peptoniphilus species "Susceptibilities not performed"    6/18 L PCN   Numerous Gram positive cocci in pairs per oil power field    6/18 BCX NGTD     s/p 6/18  Left PCN upsized to 16F and 1200cc of very viscous tan colored fluid was aspirated. RLQ 16F drain was placed and 1200cc of tan colored fluid was aspirated (less viscous). A sample from each location was sent for culture.     Repeat CT 6/19 Since one day earlier,  No extraluminal contrast. Oral contrast was last seen in the terminal ileum.  Status post left nephrostomy tube placement. Left enlarged kidney with severe hydronephrosis/collection has decreased, now measuring 16 x 12 cm from 20 x 14 cm  < from: CT Abdomen and Pelvis w/ IV Cont (06.18.23 @ 15:15) >  1 ) Moderate pneumoperitoneum with partial diffusion throughout moderate   volume ascites and with associated intermittent peritoneal enhancement   and nodularity. Perforation and peritonitis may be related to underlying   emphysematous pyelonephritis (see below). Less likely sources of   perforation include ureteral/bladder disruption, and bowel perforation   which cannot be entirely excluded on the provided images.  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 The compressed residual renal   parenchyma is inseparable from the adjacent fascial/fatty layers   anterosuperiorly and direct rupture into the peritoneum is a possibility   (6-355). The previously placed left-sided nephrostomy tube pigtail is   notedto be within the left renal collecting system.  3) New hepatic hypodensities measuring up to 2.6 cm, suspicious for   development of multiple focal abscesses.  4) Wedge-shaped region of hypodensity within the spleen may reflect   splenic infarct in the correct clinical setting. Developing   phlegmon/abscess cannot be excluded in the current clinical setting  5) Increased extensive retroperitoneal, portacaval and likely   gastrohepatic heterogeneously enhancing lymphadenopathy. Findings may be   reactive.  6) Mediastinal lymphadenopathy measuring up to 2.4 cm short axis.   Underlying etiology may be reactive, infectious/inflammatory, or   neoplastic. A short-term 3 month follow-up CT chest should be considered   for further evaluation.  7)Right middle and upper lobe solid pulmonary nodules which are not well   delineated due to respiratory motion but measure less than 6 mm.   attention on follow-up exam.  #Lactic acidosis  #Abx allergy: No Known Allergies  #Prolonged QTC Calculation(Bazett) 526 ms  #Hyponatremia  #ROJAS Creatinine: 3.8 mg/dL (06.25.23 @ 04:10 crcl 19   Ht 180  Weight (kg): 76.5 (06-18-23 @ 21:27)    RECOMMENDATIONS  - crcl 19-->   - Vanc level > 15, check AM level 6/26   - Ar 1000 milliGRAM(s) IV Intermittent every 12 hours (can cont samedosing unless crcl is < 10)  - DECREASE to levaquin  mg every 48 hours  - Trend WBC , suspect source control issue, need further OR   - Please repeat urine / kidney cultures today during irrigation, discussed with Urology  - Urology asking about antifungal coverage. No yeast in any UCX/OR cx, doubt cause of decompensation and continued purulence. Appears to be more of a source control issue. Will f/u cultures sent today. Yeast in bronch cx is insignificant- it is a colonizer.   - Can send fungitell  - Only if worsening hemodynamic compromise, ok for caspo 70mg x1 then 50mg daily IV     If any questions, please send a message or call on [facility] Teams  Please continue to update ID with any pertinent new laboratory or radiographic findings  #[id number]   s/p Abdominoplasty, Arm and Butt lift 12/7  - surgical wounds w/ JESSICA drain and dressing in place   - Augmentin on board, analgesics prn   - mgmt as per Surgery

## 2023-06-25 NOTE — PROGRESS NOTE ADULT - SUBJECTIVE AND OBJECTIVE BOX
UROLOGY DAILY PROGRESS NOTE    67y Male admitted with retroperitoneal & intraabdominal abscess s/p drain placement & L PCN upsizing with IR on 6/18; s/p percutaneous drainage of LEFT renal abcess, POD#2. Seen and examined at bedside. Patient remains intubated/sedated and restarted on pressors (Levophed 0.07) s/p bronchoscopy found with thick white mucous plug in left mainstem bronchus. Left flank drain with bloody drainage.     MEDICATIONS  (STANDING):  albuterol/ipratropium for Nebulization 3 milliLiter(s) Nebulizer every 6 hours  bacitracin   Ointment 1 Application(s) Topical two times a day  calcium acetate 1334 milliGRAM(s) Oral every 8 hours  chlorhexidine 2% Cloths 1 Application(s) Topical <User Schedule>  dexMEDEtomidine Infusion 0.2 MICROgram(s)/kG/Hr (3.82 mL/Hr) IV Continuous <Continuous>  ferrous    sulfate 325 milliGRAM(s) Oral daily  gabapentin 100 milliGRAM(s) Oral every 8 hours  heparin  Infusion. 1600 Unit(s)/Hr (16 mL/Hr) IV Continuous <Continuous>  hydrocortisone sodium succinate Injectable   IV Push   levoFLOXacin IVPB 750 milliGRAM(s) IV Intermittent every 48 hours  meropenem  IVPB 1000 milliGRAM(s) IV Intermittent every 12 hours  metoprolol tartrate Injectable 5 milliGRAM(s) IV Push every 4 hours  midodrine 10 milliGRAM(s) Oral every 8 hours  norepinephrine Infusion 0.05 MICROgram(s)/kG/Min (7.17 mL/Hr) IV Continuous <Continuous>  pantoprazole  Injectable 40 milliGRAM(s) IV Push daily  polyethylene glycol 3350 17 Gram(s) Oral at bedtime  senna 2 Tablet(s) Oral at bedtime  sodium bicarbonate 1300 milliGRAM(s) Oral three times a day  sodium zirconium cyclosilicate 5 Gram(s) Oral every 8 hours    MEDICATIONS  (PRN):  fentaNYL    Injectable 25 MICROGram(s) IV Push every 3 hours PRN Moderate Pain (4 - 6)  sodium chloride 0.9% lock flush 10 milliLiter(s) IV Push every 1 hour PRN Pre/post blood products, medications, blood draw, and to maintain line patency  traMADol 25 milliGRAM(s) Oral every 8 hours PRN Moderate Pain (4 - 6)      REVIEW OF SYSTEMS   [ ] Due to altered mental status/intubation, subjective information were not able to be obtained from patient. History was obtained, to the extent possible, from review of the chart and collateral sources of information.     Vital Signs Last 24 Hrs  T(C): 36.8 (25 Jun 2023 07:38), Max: 36.8 (25 Jun 2023 07:38)  T(F): 98.3 (25 Jun 2023 07:38), Max: 98.3 (25 Jun 2023 07:38)  HR: 109 (25 Jun 2023 09:00) (75 - 132)  BP: 111/66 (25 Jun 2023 08:00) (80/54 - 153/81)  BP(mean): 83 (25 Jun 2023 08:00) (59 - 104)  RR: 13 (25 Jun 2023 09:00) (13 - 24)  SpO2: 98% (25 Jun 2023 09:00) (97% - 100%)    Parameters below as of 25 Jun 2023 08:00  Patient On (Oxygen Delivery Method): ventilator        PHYSICAL EXAM:    GEN: Intubated/sedated with ETT in place   SKIN: Good color, non diaphoretic.  Anasarca     HEENT: NC/AT.  RESP: No dyspnea, non-labored breathing. No use of accessory muscles.  CARDIO: +S1/S2  ABDO: Soft, obese,   BACK:  +LEFT flank drain 26fr (10cc inflated in balloon)--draining blood-tinged in tubing.  RLQ VENICE drain in place with serous drainage   : 16 fr Indwelling bobby in place, draining clear yellow urine.       I&O's Summary    24 Jun 2023 07:01  -  25 Jun 2023 07:00  --------------------------------------------------------  IN: 3291 mL / OUT: 1305 mL / NET: 1986 mL    25 Jun 2023 07:01  -  25 Jun 2023 09:18  --------------------------------------------------------  IN: 181.4 mL / OUT: 35 mL / NET: 146.4 mL        LABS:                        8.5    40.50 )-----------( 240      ( 24 Jun 2023 20:56 )             26.6     06-25    128<L>  |  95<L>  |  106<HH>  ----------------------------<  104<H>  5.6<H>   |  19  |  3.8<H>    Ca    8.0<L>      25 Jun 2023 04:10  Phos  8.1     06-24  Mg     2.3     06-24      PTT - ( 25 Jun 2023 04:10 )  PTT:65.7 sec  Urinalysis Basic - ( 25 Jun 2023 04:10 )    Color: x / Appearance: x / SG: x / pH: x  Gluc: 104 mg/dL / Ketone: x  / Bili: x / Urobili: x   Blood: x / Protein: x / Nitrite: x   Leuk Esterase: x / RBC: x / WBC x   Sq Epi: x / Non Sq Epi: x / Bacteria: x    Culture - Fungal, Other (06.22.23 @ 22:11)    Specimen Source: .Other None   Culture Results:   Culture is being performed. Fungal cultures are held for 4 weeks.    Culture - Surgical Swab (06.22.23 @ 22:11)    Specimen Source: .Surgical Swab None   Culture Results:   No growth              RADIOLOGY & ADDITIONAL STUDIES:

## 2023-06-25 NOTE — CHART NOTE - NSCHARTNOTEFT_GEN_A_CORE
Palliative Care chart note    Palliative care consult received electronically. Chart reviewed.  Will plan to see patient for full consult on Monday.    Please call [phone] PRN for any questions, needs, or concerns prior to that time.

## 2023-06-25 NOTE — PROGRESS NOTE ADULT - ASSESSMENT
67M w/ PMH of HTN, grade IV hydronephrosis of L kidney s/p L PCN (placed 5/2023), stutter, hiatal hernia, iron deficiency anemia, H Pylori (untreated), and gastric mass (never biopsied) presents to the ED with at least 4 days of worsening weakness, abdominal distension, and no output from his PCN. Admitted with emphysematous pyelonephritis     (6/18): s/p LT PCN upsizing to 16Fr and 16Fr RLQ drain placement with IR   (6/23): s/p shock lithotripsy of L kidney abscess cavity with drainage of thick contents, upsize of drain to 26Fr bobby catheter    NEURO:  #Pain control    -APAP PRN    -Tramadol PRN    -Gabapentin PRN    -fent 25 q 3 PRN while intubated  #Sedation    -precedex gtt  #H/o stutter (negative ischemic workup last admission)    RESP:  #acute respiratory failure; now with left lung mucous plugging    - reintubated 6/24 AM due to increasing O2 requirements and mucus plugging; LL @ 23, 450/16/50/8     -(06/24): s/p bronchoscopy 6/24, mucus plug in L mainstem suctioned        -250cc NS due to hypotension + levo started during induction of fent and                           versed,opious white, thick liquid noted in left mainstem bronchus; irrigated and            suctioned, sputum cx sent, f/u cxr--> cleared         -BAL: numerous PMNs, rare yeast like cells     -aggressive chest PT    - Returned intubated s/p L flank catheter upsizing by urology (6/23) > Extubated (6/23)    - Returned intubated post drain upsizing by IR (6/18) > Extubated (6/21)    - Maintain O2 sat > 92%  #Pulmonary nodules / Mediastinal lymphadenopathy    - CT Chest: Right middle and upper lobe solid pulmonary nodules     - Recommend outpatient follow up with pulmonology   #Activity   - increased as tolerated    CARDS:   #Septic shock    - Levophed off 6/23 at 12pm; restarted 6/24 due to hypotension during bronchoscopy + 250 cc NS given      (06/25 PM): levophed 0.07    - increased midodrine 10mg q8hr  #New onset Afib w/ RVR with hypotension -- likely 2/2 sepsis    - IV lopressor 5 q 4     - off cardizem gtt    - Cardiology following    - restarted heparin gtt 6/24 PM  #HTN    -Holding home amlodipine 2.5 qD while hypotensive   Imaging    - EKG (6/19): Afib w/ RVR to 138     - ECHO: (6/19) EF 55-60%, mild AS, mild LAE, mild MR/TR, trivial pericardial effusion    GI/NUTR:  #Gastric mass vs gastrohepatic lymphadenopathy    -CT A/P: Previously noted lesser curvature mass measures 6.3 x 5.2 cm (previously 5.2 x 3.5 cm by my measurement), and may represent gastrohepatic lymphadenopathy.  #Hepatic hypodensities, suspicious for development for multiple focal abscesses    -CT A/P: New hepatic hypodensities measuring up to 2.6 cm, suspicious for development of multiple focal abscesses.  #Splenic Hypodensities, infarct vs phlegmon/abscess    -CT A/P:  Wedge-shaped region of hypodensity within the spleen may reflect splenic infarct in the correct clinical setting. Developing phlegmon/abscess cannot be excluded in the current clinical setting     -Abd progressively more distended but patient denies pain. (+) flatus   - CTAP 6/23: IMPRESSION: Status post drainage, left enlarged kidney with severe   hydronephrosis/collection has decreased now measuring 13.7 x 9.5 cm. The   retroperitoneum has markedly decreased since prior. Diffuse anasarca. Ascites which appear to have increased since the prior exam.    #Diet    NPO w/ trickle feeds @ 20/hr  -Nutrition recommends Nepro formula, bolus/gravity feeds, 480ml@8AM, 480ml@12noon, 240ml@4PM, 480ml@8PM. Free water flushes 50ml before and after each bolus. -- 2160kcal, 96g protein, 1226+400ml free water    -Last BM: 6/24    /RENAL:   #Grade IV L hydronephrosis s/p L PCN (5/2023) -- presented to ED with thick dark foul smelling output  #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    -(6/24): per urology no plan for OR as patient in critical condition    -(6/24): urology suctioned and irrigated approx 650 cc purulent fluid from L nephrostomy     -(6/23) s/p LT PCN upsizing to 26Fr catheter, RLQ 16 Fr drain remains    -(6/18) s/p LT PCN upsizing to 16Fr and 16Fr RLQ drain placement with IR, flush q8 hr    - CT A/P with PO/IV to r/o bowel perforation: Status post left nephrostomy tube placement. Left enlarged kidney with severe hydronephrosis/collection has decreased, now measuring 16 x 12 cm from 20 x 14 cm  #ROJAS (baseline Cr 1.0)    -Cr 2.5>3.0>2.8>3.8    -(6/19) FeNa 1.1%, intrinsic    -Nephrology following    Labs:          BUN/Cr- 93/3.0  -->,  91/2.8  --> 107/3.8          Electrolytes-Na 129 // K 5.3 // Mg 2.3 //  Phos 8.1 (06-24 @ 04:46)  #metabolic acidosis  -1/2 NS + 75 mEq sodium bicarb @ 75/hr  #Hyperkalemia     -Started Lokelma 5q8 for hyperkalemia  #Hyperphosphatemia    -Increased Phoslo 1334mg q8    HEME/ONC:   #H/o Fe Deficiency anemia    - Iron supplementation  #DVT prophylaxis    -Heparin gtt restarted 6/24 PM    Labs: Hb/Hct:  7.9/24.8  -->,  8.6/26.8  --> 8.5/26.6                     Plts:  197  -->,  201  -->  240              PTT/INR:  29.5 --> F/U 4AM (restarted heparin gtt)    T&S Expires 6/24; new T&S ordered  Blood Consent- in chart     ID:  #Leukocytosis 2/2 sepsis --> WBC uptrending     - Blood and urine cultures negative, abscess cultures with Stenotrophomonas maltophilia    - s/p vancomycin, cefepime, and flagyl in the ED    - Continuing meropenem 1GRAM q12 and levaquin q48 hr per ID; vancomycin 1g x 1 and follow-up AM dose    - ID following: MRSA nares, vanc 1g x 1 then follow up AM dose,     - Afebrile   WBC- 44.03  --->>,  37.91  --->>,  38.89  --->> 40.50  Temp trend- 24hrs T(F): 96.6 (06-24 @ 07:54), Max: 96.9 (06-24 @ 03:00)  Antibiotics-levoFLOXacin IVPB 750 every 48 hours  meropenem  IVPB 500 every 12 hours  vancomycin  IVPB        ENDO:  #Glucose monitoring    -A1c (5/7/23): 5.0   #Adrenal Insufficiency 2/2 to sepsis     -Solucortef to 25mg q12hr, transitioned to daily dosing 6/25    -Random cortisol level -sent, results pending    MSK:  #Activity    -Advance as tolerated    -PT/rehab     LINES/DRAINS: PIV, L radial [place] (6/18/23), Bobby (6/18/23), Javier Drain (6/18/23), LT PCN (6/18/23). Left basilic midline  6/21    ADVANCED DIRECTIVES:  Full Code    HCP/Emergency Contact- Tracey Adames: [phone]     INDICATION FOR SICU: Septic Shock    DISPO: SICU Assessment & Plan:  67M w/ PMH of HTN, grade IV hydronephrosis of L kidney s/p L PCN (placed 5/2023), stutter, hiatal hernia, iron deficiency anemia, H Pylori (untreated), and gastric mass (never biopsied) presents to the ED with at least 4 days of worsening weakness, abdominal distension, and no output from his PCN. Admitted with emphysematous pyelonephritis     (6/18): s/p LT PCN upsizing to 16Fr and 16Fr RLQ drain placement with IR   (6/23): s/p shock lithotripsy of L kidney abscess cavity with drainage of thick contents, upsize of drain to 26Fr bobby catheter    NEURO:  #Pain control    -APAP PRN    -Tramadol PRN    -Gabapentin PRN    -fent 25 q 3 PRN while intubated  #Sedation    -precedex gtt  #H/o stutter (negative ischemic workup last admission)    RESP:  #acute respiratory failure; now with left lung mucous plugging    - reintubated 6/24 AM due to increasing O2 requirements and mucus plugging; LL @ 23, 450/20/50/8     -(06/24): s/p bronchoscopy 6/24, mucus plug in L mainstem suctioned        -250cc NS due to hypotension + levo started during induction of fent and                           versed,opious white, thick liquid noted in left mainstem bronchus; irrigated and            suctioned, sputum cx sent, f/u cxr--> cleared         -BAL: numerous PMNs, rare yeast like cells     -aggressive chest PT    - Returned intubated s/p L flank catheter upsizing by urology (6/23) > Extubated (6/23)    - Returned intubated post drain upsizing by IR (6/18) > Extubated (6/21)    - Maintain O2 sat > 92%  #Pulmonary nodules / Mediastinal lymphadenopathy    - CT Chest: Right middle and upper lobe solid pulmonary nodules     - Recommend outpatient follow up with pulmonology   #Activity   - increased as tolerated    CARDS:   #Septic shock    - Levophed off 6/23 at 12pm; restarted 6/24 due to hypotension during bronchoscopy + 250 cc NS given      (06/25 PM): levophed 0.07    - increased midodrine 10mg q8hr  #New onset Afib w/ RVR with hypotension -- likely 2/2 sepsis    - IV lopressor 5 q 4     - off cardizem gtt    - Cardiology following    - restarted heparin gtt 6/24 PM  #HTN    -Holding home amlodipine 2.5 qD while hypotensive   Imaging    - EKG (6/19): Afib w/ RVR to 138     - ECHO: (6/19) EF 55-60%, mild AS, mild LAE, mild MR/TR, trivial pericardial effusion    GI/NUTR:  #Gastric mass vs gastrohepatic lymphadenopathy    -CT A/P: Previously noted lesser curvature mass measures 6.3 x 5.2 cm (previously 5.2 x 3.5 cm by my measurement), and may represent gastrohepatic lymphadenopathy.  #Hepatic hypodensities, suspicious for development for multiple focal abscesses    -CT A/P: New hepatic hypodensities measuring up to 2.6 cm, suspicious for development of multiple focal abscesses.  #Splenic Hypodensities, infarct vs phlegmon/abscess    -CT A/P:  Wedge-shaped region of hypodensity within the spleen may reflect splenic infarct in the correct clinical setting. Developing phlegmon/abscess cannot be excluded in the current clinical setting     -Abd progressively more distended but patient denies pain. (+) flatus   - CTAP 6/23: IMPRESSION: Status post drainage, left enlarged kidney with severe   hydronephrosis/collection has decreased now measuring 13.7 x 9.5 cm. The   retroperitoneum has markedly decreased since prior. Diffuse anasarca. Ascites which appear to have increased since the prior exam.    #Diet    NPO w/ trickle feeds @ 20/hr  -Nutrition recommends Nepro formula, bolus/gravity feeds, 480ml@8AM, 480ml@12noon, 240ml@4PM, 480ml@8PM. Free water flushes 50ml before and after each bolus. -- 2160kcal, 96g protein, 1226+400ml free water    -Last BM: 6/24    /RENAL:   #Grade IV L hydronephrosis s/p L PCN (5/2023) -- presented to ED with thick dark foul smelling output  #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    -(6/24): per urology no plan for OR as patient in critical condition    -(6/24): urology suctioned and irrigated approx 650 cc purulent fluid from L nephrostomy     -(6/23) s/p LT PCN upsizing to 26Fr catheter, RLQ 16 Fr drain remains    -(6/18) s/p LT PCN upsizing to 16Fr and 16Fr RLQ drain placement with IR, flush q8 hr    - CT A/P with PO/IV to r/o bowel perforation: Status post left nephrostomy tube placement. Left enlarged kidney with severe hydronephrosis/collection has decreased, now measuring 16 x 12 cm from 20 x 14 cm  #ROJAS (baseline Cr 1.0)    -Cr 2.5>3.0>2.8>3.8 >3.8    -(6/19) FeNa 1.1%, intrinsic    -Nephrology following    Labs:          BUN/Cr- 93/3.0  -->,  91/2.8  --> 107/3.8 --> 106/3.8          Electrolytes-Na 128 // K 5.6 // Mg 2.3 //  Phos 8.1 (06-24 @ 04:46)  #metabolic acidosis  -1/2 NS + 75 mEq sodium bicarb @ 75/hr  #Hyperkalemia     -Started Lokelma 5q8 for hyperkalemia     -Increased to 5.6 --> treated w/ 1g Cagluc,10U reg insulin, 50%dextrose  #Hyperphosphatemia    -Increased Phoslo 1334mg q8    HEME/ONC:   #H/o Fe Deficiency anemia    - Iron supplementation  #DVT prophylaxis    -Heparin gtt restarted 6/24 PM    Labs: Hb/Hct:  7.9/24.8  -->,  8.6/26.8  --> 8.5/26.6                     Plts:  197  -->,  201  -->  240              PTT/INR:  29.5 --> 65.7 --> F/U 12 PM     T&S Expires 6/24; new T&S ordered  Blood Consent- in chart     ID:  #Leukocytosis 2/2 sepsis --> WBC uptrending     - Blood and urine cultures negative, abscess cultures with Stenotrophomonas maltophilia    - s/p vancomycin, cefepime, and flagyl in the ED    - Continuing meropenem 1GRAM q12 and levaquin q48 hr per ID; vancomycin 1g x 1 and follow-up AM dose    - Vanc trough: 22.1     - ID following: MRSA nares, vanc 1g x 1 then follow up AM dose,     - Afebrile   WBC- 44.03  --->>,  37.91  --->>,  38.89  --->> 40.50  Temp trend- 24hrs T(F): 96.6 (06-24 @ 07:54), Max: 96.9 (06-24 @ 03:00)  Antibiotics-levoFLOXacin IVPB 750 every 48 hours  meropenem  IVPB 500 every 12 hours  vancomycin  IVPB      ENDO:  #Glucose monitoring    -A1c (5/7/23): 5.0   #Adrenal Insufficiency 2/2 to sepsis     -Solucortef to 25mg q12hr    -Random cortisol level -sent, results pending    MSK:  #Activity    -Advance as tolerated    -PT/rehab     LINES/DRAINS: PIV, L radial [place] (6/18/23), Bobby (6/18/23), Javier Drain (6/18/23), LT PCN (6/18/23). Left basilic midline  6/21    ADVANCED DIRECTIVES:  Full Code    HCP/Emergency Contact- Tracey Adames: [phone]     INDICATION FOR SICU: New onset atrial fibrillation    DISPO: SICU Assessment & Plan:  67M w/ PMH of HTN, grade IV hydronephrosis of L kidney s/p L PCN (placed 5/2023), stutter, hiatal hernia, iron deficiency anemia, H Pylori (untreated), and gastric mass (never biopsied) presents to the ED with at least 4 days of worsening weakness, abdominal distension, and no output from his PCN. Admitted with emphysematous pyelonephritis     (6/18): s/p LT PCN upsizing to 16Fr and 16Fr RLQ drain placement with IR   (6/23): s/p shock lithotripsy of L kidney abscess cavity with drainage of thick contents, upsize of drain to 26Fr bobby catheter    NEURO:  #Pain control    -APAP PRN    -Tramadol PRN    -Gabapentin PRN    -fent 25 q 3 PRN while intubated  #Sedation    -precedex gtt  #H/o stutter (negative ischemic workup last admission)    RESP:  #acute respiratory failure; now with left lung mucous plugging    - reintubated 6/24 AM due to increasing O2 requirements and mucus plugging; LL @ 23, 450/20/50/8     -(06/24): s/p bronchoscopy 6/24, mucus plug in L mainstem suctioned       -copious white, thick liquid noted in left mainstem bronchus; irrigated and            suctioned       -BAL: numerous PMNs, rare yeast like cells     -aggressive chest PT    - Returned intubated s/p L flank catheter upsizing by urology (6/23) > Extubated (6/23)    - Returned intubated post drain upsizing by IR (6/18) > Extubated (6/21)    - Maintain O2 sat > 92%  #Pulmonary nodules / Mediastinal lymphadenopathy    - CT Chest: Right middle and upper lobe solid pulmonary nodules     - Recommend outpatient follow up with pulmonology   #Activity   - increased as tolerated    CARDS:   #Septic shock    - Levophed off 6/23 at 12pm; restarted 6/24 due to hypotension during bronchoscopy + 250 cc NS given      (06/25 PM): levophed 0.07    - increased midodrine 10mg q8hr  #New onset Afib w/ RVR with hypotension -- likely 2/2 sepsis    - IV lopressor 5 q 4     - off cardizem gtt    - Cardiology following    - restarted heparin gtt 6/24 PM  #HTN    -Holding home amlodipine 2.5 qD while hypotensive   Imaging    - EKG (6/19): Afib w/ RVR to 138     - ECHO: (6/19) EF 55-60%, mild AS, mild LAE, mild MR/TR, trivial pericardial effusion    GI/NUTR:  #Gastric mass vs gastrohepatic lymphadenopathy    -CT A/P: Previously noted lesser curvature mass measures 6.3 x 5.2 cm (previously 5.2 x 3.5 cm by my measurement), and may represent gastrohepatic lymphadenopathy.  #Hepatic hypodensities, suspicious for development for multiple focal abscesses    -CT A/P: New hepatic hypodensities measuring up to 2.6 cm, suspicious for development of multiple focal abscesses.  #Splenic Hypodensities, infarct vs phlegmon/abscess    -CT A/P:  Wedge-shaped region of hypodensity within the spleen may reflect splenic infarct in the correct clinical setting. Developing phlegmon/abscess cannot be excluded in the current clinical setting     -Abd progressively more distended but patient denies pain. (+) flatus   - CTAP 6/23: IMPRESSION: Status post drainage, left enlarged kidney with severe   hydronephrosis/collection has decreased now measuring 13.7 x 9.5 cm. The   retroperitoneum has markedly decreased since prior. Diffuse anasarca. Ascites which appear to have increased since the prior exam.    #Diet    NPO w/ trickle feeds @ 20/hr  -Nutrition recommends Nepro formula, bolus/gravity feeds, 480ml@8AM, 480ml@12noon, 240ml@4PM, 480ml@8PM. Free water flushes 50ml before and after each bolus. -- 2160kcal, 96g protein, 1226+400ml free water    -Last BM: 6/24    /RENAL:   #Grade IV L hydronephrosis s/p L PCN (5/2023) -- presented to ED with thick dark foul smelling output  #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    -(6/24): per urology no plan for OR as patient in critical condition    -(6/24): urology suctioned and irrigated approx 650 cc purulent fluid from L nephrostomy     -(6/23) s/p LT PCN upsizing to 26Fr catheter, RLQ 16 Fr drain remains    -(6/18) s/p LT PCN upsizing to 16Fr and 16Fr RLQ drain placement with IR, flush q8 hr    - CT A/P with PO/IV to r/o bowel perforation: Status post left nephrostomy tube placement. Left enlarged kidney with severe hydronephrosis/collection has decreased, now measuring 16 x 12 cm from 20 x 14 cm  #ROJAS (baseline Cr 1.0)    -Cr 2.5>3.0>2.8>3.8 >3.8    -(6/19) FeNa 1.1%, intrinsic    -Nephrology following    Labs:          BUN/Cr- 107/3.8  -->,  106/3.8  -->          Electrolytes-Na 128 // K 5.6 // Mg -- //  Phos -- (06-25 @ 04:10)  #hyperkalemia  -Lokelma 5 q 8   -K 5.6 treated, no acute EKG changes  #metabolic acidosis  -1/2 NS + 75 mEq sodium bicarb @ 75/hr  #Hyperkalemia     -Started Lokelma 5q8 for hyperkalemia     -Increased to 5.6 --> treated w/ 1g Cagluc,10U reg insulin, 50%dextrose  #Hyperphosphatemia    -Increased Phoslo 1334mg q8    HEME/ONC:   #H/o Fe Deficiency anemia    - Iron supplementation  #DVT prophylaxis    -Heparin gtt restarted 6/24 PM    Labs: Hb/Hct:  7.9/24.8  -->,  8.6/26.8  --> 8.5/26.6                     Plts:  197  -->,  201  -->  240              PTT/INR:  29.5 --> 65.7 --> F/U 12 PM     T&S Expires 6/24; new T&S ordered  Blood Consent- in chart     ID:  #Leukocytosis 2/2 sepsis --> WBC uptrending     - Blood and urine cultures negative, abscess cultures with Stenotrophomonas maltophilia    - s/p vancomycin, cefepime, and flagyl in the ED    - Continuing meropenem 1GRAM q12 and levaquin q48 hr per ID; vancomycin 1g x 1 and follow-up AM dose    - Vanc trough: 22.1     - ID following: MRSA nares, vanc 1g x 1 then follow up AM dose,     - Afebrile   WBC- 38.89  --->>,  38.15  --->>,  40.50  --->>  Temp trend- 24hrs T(F): 98.3 (06-25 @ 07:38), Max: 98.3 (06-25 @ 07:38)  Antibiotics-levoFLOXacin IVPB 750 every 48 hours  meropenem  IVPB 1000 every 12 hours    Antibiotics-levoFLOXacin IVPB 750 every 48 hours  meropenem  IVPB 500 every 12 hours  vancomycin  IVPB      ENDO:  #Glucose monitoring    -A1c (5/7/23): 5.0   #Adrenal Insufficiency 2/2 to sepsis     -Solucortef to 25mg q12hr    -Random cortisol level -sent, results pending    MSK:  #Activity    -Advance as tolerated    -PT/rehab     LINES/DRAINS: PIV, L radial [place] (6/18/23), Bobby (6/18/23), Javier Drain (6/18/23), LT PCN (6/18/23). Left basilic midline  6/21    ADVANCED DIRECTIVES:  Full Code    HCP/Emergency Contact- Tracey Adames: [phone]     INDICATION FOR SICU: New onset atrial fibrillation    DISPO: SICU

## 2023-06-25 NOTE — PROGRESS NOTE ADULT - NS ATTEND AMEND GEN_ALL_CORE FT
pt seen and examined 6/25/23, pts pressor requirements improving today however wbc still elevated. we drained more purulent fluid from nephrostomy today w manual irrigation. recommend repeat CT to evaluate kidney.  if persistent significant fluid can consider an additional percutaneous access site via IR versus OR.    we sent fluid for cultures, cytology and fungal culture

## 2023-06-26 NOTE — CONSULT NOTE ADULT - SUBJECTIVE AND OBJECTIVE BOX
Gastroenterology Consultation:    Patient is a 67y old  Male who presents with emphysematous pyelonephritis (26 Jun 2023 11:48)    Admitted on: 06-18-23      HPI:  67M w/ PMH of HTN, grade IV hydronephrosis of L kidney s/p L PCN (placed 5/2023), stutter, hiatal hernia, iron deficiency anemia, H Pylori (untreated), and gastric mass (never biopsied) presents to the ED with at least 4 days of worsening weakness, abdominal distension, and no output from his PCN. Pt and daughters bedside are only able to give limited information, but for the past few days, he has noticed no output from his PCN as well as increasing abdominal distension, pain, and anorexia. He reports that he has not felt normal for the past 10 days. His PCN used to put out serosanguinous fluid, but the output changed to feculent/brown liquid over the past day. In the ED, he was noted to be in new onset Afib CT A/P w/ IV contrast showed free intraperitoneal fluid and air from an unknown source as well as fluid and air in the L kidney.  Admitted to SICU for sepsis and septic shock. Team reported bloody bowel movement since yesterday. Patient had multiple bowel movements with blood streaks. GI consulted for hematochezia       Prior EGD/ Colonoscopy:  < from: EGD (05.10.23 @ 09:30) >  Impressions:    Esophageal hiatal hernia.    Erosions in the stomach compatible with erosivegastritis.    Normal mucosa in the whole examined duodenum.    Extrinsic bulging was noted in the anterior gastric wall. No mucosal changes  were noted. No intraluminal gastric mass noted.    < end of copied text >      PAST MEDICAL & SURGICAL HISTORY:        FAMILY HISTORY:  No FH of gi cancers     Social History:  Tobacco: no reported hx   Alcohol: no reported hx   Drugs: no reported hx     Home Medications:        MEDICATIONS  (STANDING):  albuterol/ipratropium for Nebulization 3 milliLiter(s) Nebulizer every 6 hours  bacitracin   Ointment 1 Application(s) Topical two times a day  calcium acetate 1334 milliGRAM(s) Oral every 8 hours  caspofungin IVPB      chlorhexidine 0.12% Liquid 15 milliLiter(s) Oral Mucosa every 12 hours  chlorhexidine 2% Cloths 1 Application(s) Topical <User Schedule>  chlorhexidine 4% Liquid 1 Application(s) Topical <User Schedule>  dexMEDEtomidine Infusion 0.2 MICROgram(s)/kG/Hr (3.82 mL/Hr) IV Continuous <Continuous>  digoxin  Injectable 250 MICROGram(s) IV Push every 6 hours  ferrous    sulfate 325 milliGRAM(s) Oral daily  gabapentin 200 milliGRAM(s) Oral every 8 hours  heparin   Injectable 5000 Unit(s) SubCutaneous every 8 hours  levoFLOXacin IVPB 500 milliGRAM(s) IV Intermittent every 48 hours  meropenem  IVPB 1000 milliGRAM(s) IV Intermittent every 12 hours  metoprolol tartrate Injectable 2.5 milliGRAM(s) IV Push every 6 hours  pantoprazole  Injectable 40 milliGRAM(s) IV Push daily  phenylephrine    Infusion 0.1 MICROgram(s)/kG/Min (1.43 mL/Hr) IV Continuous <Continuous>  sodium bicarbonate 1300 milliGRAM(s) Oral three times a day  sodium bicarbonate  Infusion 0.196 mEq/kG/Hr (100 mL/Hr) IV Continuous <Continuous>    MEDICATIONS  (PRN):  fentaNYL    Injectable 25 MICROGram(s) IV Push every 3 hours PRN Moderate Pain (4 - 6)  sodium chloride 0.9% lock flush 10 milliLiter(s) IV Push every 1 hour PRN Pre/post blood products, medications, blood draw, and to maintain line patency  sodium chloride 0.9% lock flush 10 milliLiter(s) IV Push every 1 hour PRN Pre/post blood products, medications, blood draw, and to maintain line patency  traMADol 25 milliGRAM(s) Oral every 8 hours PRN Moderate Pain (4 - 6)      Allergies  No Known Allergies      Review of Systems:   unable to obtain       Physical Examination:  T(C): 37.4 (06-26-23 @ 16:00), Max: 37.7 (06-26-23 @ 08:00)  HR: 122 (06-26-23 @ 16:00) (120 - 140)  BP: 149/80 (06-26-23 @ 11:15) (86/57 - 149/80)  RR: 11 (06-26-23 @ 16:00) (11 - 27)  SpO2: 100% (06-26-23 @ 16:00) (95% - 100%)      06-24-23 @ 07:01  -  06-25-23 @ 07:00  --------------------------------------------------------  IN: 3291 mL / OUT: 1305 mL / NET: 1986 mL    06-25-23 @ 07:01  -  06-26-23 @ 07:00  --------------------------------------------------------  IN: 3337.7 mL / OUT: 665 mL / NET: 2672.7 mL    06-26-23 @ 07:01  -  06-26-23 @ 16:28  --------------------------------------------------------  IN: 1193 mL / OUT: 500 mL / NET: 693 mL          GENERAL: intubated   HEAD:  Atraumatic, Normocephalic  EYES: conjunctiva and sclera clear  NECK: Supple, no JVD or thyromegaly  CHEST/LUNG: Clear to auscultation bilaterally  HEART: Regular rate and rhythm; normal S1, S2, No murmurs.  ABDOMEN: Soft, nontender, nondistended brown stool with blood streaks   NEUROLOGY: sedated   SKIN: Intact, no jaundice        Data:                        7.8    38.86 )-----------( 250      ( 26 Jun 2023 11:27 )             23.8     Hgb Trend:  7.8  06-26-23 @ 11:27  7.5  06-26-23 @ 08:21  7.3  06-26-23 @ 06:36  8.6  06-25-23 @ 21:13  8.5  06-24-23 @ 20:56  8.6  06-24-23 @ 17:38  7.9  06-23-23 @ 20:45        06-26    129<L>  |  94<L>  |  116<HH>  ----------------------------<  133<H>  5.0   |  18  |  4.2<HH>    Ca    7.5<L>      26 Jun 2023 05:30  Phos  8.4     06-25  Mg     2.3     06-25      Liver panel trend:  TBili 0.6   /   AST 36   /   ALT 34   /   AlkP 87   /   Tptn 4.2   /   Alb 2.3    /   DBili --      06-20  TBili 0.9   /   AST 57   /   ALT 42   /   AlkP 83   /   Tptn 4.2   /   Alb 2.5    /   DBili 0.7      06-19  TBili 0.7   /   AST 34   /   ALT 54   /   AlkP 106   /   Tptn 4.9   /   Alb 2.3    /   DBili --      06-18      PT/INR - ( 25 Jun 2023 18:02 )   PT: 17.00 sec;   INR: 1.47 ratio         PTT - ( 26 Jun 2023 11:27 )  PTT:33.2 sec    Culture - Fungal, Bronchial (collected 24 Jun 2023 11:00)  Source: .Bronchial None  Preliminary Report (26 Jun 2023 11:00):    Moderate Yeast    Culture - Acid Fast - Bronchial w/Smear (collected 24 Jun 2023 11:00)  Source: .Bronchial None    Culture - Bronchial (collected 24 Jun 2023 11:00)  Source: .Bronchial None  Gram Stain (24 Jun 2023 23:21):    Numerous polymorphonuclear leukocytes per low power field    No squamous epithelial cells per low power field    Rare Yeast like cells per oil power field  Preliminary Report (25 Jun 2023 14:36):    Normal Respiratory Gabbie present          Radiology:      < from: CT Abdomen and Pelvis No Cont (06.23.23 @ 14:21) >    ACC: [id number] EXAM:  CT ABDOMEN AND PELVIS   ORDERED BY: JULIANNE ESPOSITO DATE:  06/23/2023          INTERPRETATION:  CLINICAL STATEMENT: Left kidney abscess status post   drainage.    TECHNIQUE: Contiguous axial CT images were obtained from the lower chest   to the pubic symphysis without intravenous contrast. Oral contrast was   not administered. Reformatted images in the coronal and sagittal planes   were acquired.    COMPARISON: CT abdomen and pelvis 6/19/2023.    FINDINGS:    Limited evaluation of solid organs and vascular structures secondary to   lack of intravenous contrast and streak artifact due to contact with CT   gantry.    LOWER CHEST: Moderate right and large left pleural effusions, increased   since prior, with adjacent subsegmental atelectasis. 3 mm right middle   lobe pulmonary nodule, unchanged (4/9). Cardiomegaly.    HEPATOBILIARY: Difficult to evaluate due to significant artifact.   Suggestion of vicarious excretion of contrast within the gallbladder    SPLEEN: Unchanged.    PANCREAS: Very difficult to visualize.    ADRENAL GLANDS: Very difficult to visualize.    KIDNEYS: Left enlarged kidney with severe hydronephrosis/collection has   decreased status post drainage, now measuring 13.7 x 9.5 cm from 18.2x   12.4 cm. Unchanged right kidney.    ABDOMINOPELVIC NODES: Unchanged..    PELVIC ORGANS: Unchanged.    PERITONEUM/MESENTERY/BOWEL: Peritoneal catheter terminates in the right   lower quadrant. Pneumoperitoneum is mildly decreased since prior.   Residual oral contrast is visualized throughout the abdomen, possibly due   to slow transit. Suggestion of mild increase in ascites.    BONES/SOFT TISSUES: Anasarca. Degenerative osseous changes.    VASCULAR: Unchanged.        IMPRESSION:    Overall limited examination.    Moderate right and large left pleural effusions, increased since prior,   with adjacent subsegmental atelectasis.    Status post drainage, left enlarged kidney with severe   hydronephrosis/collection has decreased now measuring 13.7 x 9.5 cm. The   retroperitoneum has markedly decreased since prior.    Diffuse anasarca. Ascites which appear to have increased since the prior   exam.    < end of copied text >

## 2023-06-26 NOTE — CONSULT NOTE ADULT - PROBLEM SELECTOR RECOMMENDATION 2
Patient on mechanical ventilation  -sedation per primary team  -patient may need trach/PEG - family open to trach placement  -full code

## 2023-06-26 NOTE — CONSULT NOTE ADULT - PROBLEM SELECTOR RECOMMENDATION 9
With septic shock on pressors. In setting of kidney abscess and emphysematous pyelo  -continue levaquin, meropenem, caspofungin  -pressors per primary team  -continue bicarb infusion  -f/u cultures  -f/u ID  -f/u urology

## 2023-06-26 NOTE — INITIAL ORGAN DONATION REFERRAL - ORGAN DONOR #
Denies known Latex allergy or symptoms of Latex sensitivity.  Medications verified with patient.  See med tab.  Tobacco history verified.      Health Maintenance Due   Topic Date Due   • Shingles Vaccine (1 of 2) Never done   • COVID-19 Vaccine (3 - Booster for Pfizer series) 03/13/2022   • DTaP/Tdap/Td Vaccine (2 - Td or Tdap) 05/08/2022   • Depression Screening  06/08/2022       Patient is due for topics listed above, he wishes to proceed with Depression Screening , but is not proceeding with Immunization(s) COVID-19, Dtap/Tdap/Td and Shingles at this time. Will discuss shingles with MD     [id number]

## 2023-06-26 NOTE — CONSULT NOTE ADULT - NS ATTEND AMEND GEN_ALL_CORE FT
Patient with rectal bleeding Risk outweighs benefit for any endoscopy at this stage.Can be done as outpatient. Once patients condition stabilizes.

## 2023-06-26 NOTE — CONSULT NOTE ADULT - PROBLEM SELECTOR RECOMMENDATION 4
-full code  -ongoing medical management  -[facility]

## 2023-06-26 NOTE — PROGRESS NOTE ADULT - SUBJECTIVE AND OBJECTIVE BOX
Nephrology progress note    Patient was seen and examined, events over the last 24 h noted .    Allergies:  No Known Allergies    Hospital Medications:   MEDICATIONS  (STANDING):  albuterol/ipratropium for Nebulization 3 milliLiter(s) Nebulizer every 6 hours  bacitracin   Ointment 1 Application(s) Topical two times a day  calcium acetate 1334 milliGRAM(s) Oral every 8 hours  caspofungin IVPB      chlorhexidine 0.12% Liquid 15 milliLiter(s) Oral Mucosa every 12 hours  chlorhexidine 2% Cloths 1 Application(s) Topical <User Schedule>  chlorhexidine 4% Liquid 1 Application(s) Topical <User Schedule>  dexMEDEtomidine Infusion 0.2 MICROgram(s)/kG/Hr (3.82 mL/Hr) IV Continuous <Continuous>  digoxin  Injectable 250 MICROGram(s) IV Push every 6 hours  ferrous    sulfate 325 milliGRAM(s) Oral daily  gabapentin 200 milliGRAM(s) Oral every 8 hours  heparin   Injectable 5000 Unit(s) SubCutaneous every 8 hours  levoFLOXacin IVPB 500 milliGRAM(s) IV Intermittent every 48 hours  meropenem  IVPB 1000 milliGRAM(s) IV Intermittent every 12 hours  metoprolol tartrate Injectable 2.5 milliGRAM(s) IV Push every 6 hours  pantoprazole  Injectable 40 milliGRAM(s) IV Push daily  phenylephrine    Infusion 0.1 MICROgram(s)/kG/Min (1.43 mL/Hr) IV Continuous <Continuous>  sodium bicarbonate 1300 milliGRAM(s) Oral three times a day  sodium bicarbonate  Infusion 0.196 mEq/kG/Hr (100 mL/Hr) IV Continuous <Continuous>        VITALS:  T(F): 99.4 (06-26-23 @ 16:00), Max: 99.9 (06-26-23 @ 08:00)  HR: 124 (06-26-23 @ 16:15)  BP: 149/80 (06-26-23 @ 11:15)  RR: 22 (06-26-23 @ 16:15)  SpO2: 100% (06-26-23 @ 16:15)  Wt(kg): --    06-24 @ 07:01  -  06-25 @ 07:00  --------------------------------------------------------  IN: 3291 mL / OUT: 1305 mL / NET: 1986 mL    06-25 @ 07:01 - 06-26 @ 07:00  --------------------------------------------------------  IN: 3337.7 mL / OUT: 665 mL / NET: 2672.7 mL    06-26 @ 07:01 - 06-26 @ 16:49  --------------------------------------------------------  IN: 1193 mL / OUT: 500 mL / NET: 693 mL          PHYSICAL EXAM:  	Gen: vent  	Pulm:decrease BS B/L  	CV:  S1S2; no rub  	Abd: +distended, PCN   	LE:  edema  LABS:  06-26    129<L>  |  94<L>  |  116<HH>  ----------------------------<  133<H>  5.0   |  18  |  4.2<HH>    Ca    7.5<L>      26 Jun 2023 05:30  Phos  8.4     06-25  Mg     2.3     06-25                            7.8    38.86 )-----------( 250      ( 26 Jun 2023 11:27 )             23.8       Urine Studies:  Urinalysis Basic - ( 26 Jun 2023 05:30 )    Color:  / Appearance:  / SG:  / pH:   Gluc: 133 mg/dL / Ketone:   / Bili:  / Urobili:    Blood:  / Protein:  / Nitrite:    Leuk Esterase:  / RBC:  / WBC    Sq Epi:  / Non Sq Epi:  / Bacteria:         RADIOLOGY & ADDITIONAL STUDIES:

## 2023-06-26 NOTE — PROGRESS NOTE ADULT - ASSESSMENT
67M w/ PMH of HTN, grade IV hydronephrosis of L kidney s/p L PCN (placed 5/2023), stutter, hiatal hernia, iron deficiency anemia, H Pylori (untreated), and gastric mass (never biopsied) presents to the ED with at least 4 days of worsening weakness, abdominal distension, and no output from his PCN  . Found to have septic shock, MSOF, lactic acidosis from left emphysematous hydronephrosis, pyelonephritis and possible rupture with pneumoperitoneum along with possible hepatic abscesses, splenic infarct and abdominal and mediastinal lymphadenopathies. he is s/p upsizing of left PCN tube by IR along with RLQ drain placed.       Assessment and plan  ORJAS/ Hyponatremia/ severe left hydro s/p upsizing of left PCN catheter/ emphysematous pyelo with septic shock/  ROJAS likely ATN iso septic shock  cr improved from peak now stable not imporvign furether now developing oliguria  continue sodium bicarbonate po   BP noted / increase midodrine to 10 q 8   ph noted / on  binders     and IR following  no indication for RRT as of now, will cont to reeval daily

## 2023-06-26 NOTE — PROGRESS NOTE ADULT - SUBJECTIVE AND OBJECTIVE BOX
UROLOGY: Pt is a 66y/o M a/w retroperitoneal and intraabdominal abscess s/p IR placement of drain and LEFT PCN 6/18, s/p percutaneous drainage of LEFT renal abscess POD # 4. Pt seen and examined at bedside. Overnight events noted -- episode of BRBPR, rectal tube now in place draining blood tinged, switched to Neosynephrine, now off pressors, but lethargic and less responsive than yesterday.    REVIEW OF SYSTEMS   [ x ] Due to altered mental status/intubation, subjective information were not able to be obtained from patient. History was obtained, to the extent possible, from review of the chart and collateral sources of information.    Vital Signs Last 24 Hrs  T(C): 37.7 (26 Jun 2023 08:00), Max: 37.7 (26 Jun 2023 08:00)  T(F): 99.9 (26 Jun 2023 08:00), Max: 99.9 (26 Jun 2023 08:00)  HR: 134 (26 Jun 2023 11:45) (121 - 140)  BP: 149/80 (26 Jun 2023 11:15) (86/57 - 149/80)  BP(mean): 103 (26 Jun 2023 11:15) (66 - 103)  RR: 22 (26 Jun 2023 11:45) (17 - 27)  SpO2: 99% (26 Jun 2023 11:45) (95% - 100%)    PHYSICAL EXAM:  GEN: NAD, intubated, off sedation, but lethargic  SKIN: Good color, non diaphoretic.  RESP: mechanically ventilated  CARDIO: +S1/S2  ABDO: Soft, slightly distended  : +generalized penoscrotal edema, 16Fr temperature probe bobby catheter in place draining clear yellow urine, RLQ VENICE with clear serous fluid, LEFT flank 26Fr drain with blood tinged output.      LABS:             7.8    38.86 )-----------( 250      ( 26 Jun 2023 11:27 )             23.8     129<L>  |  94<L>  |  116<HH>  ----------------------------<  133<H>  5.0   |  18  |  4.2<HH>    Ca    7.5<L>      26 Jun 2023 05:30  Phos  8.4     06-25  Mg     2.3     06-25  PT/INR - ( 25 Jun 2023 18:02 )   PT: 17.00 sec;   INR: 1.47 ratio    PTT - ( 26 Jun 2023 00:15 )  PTT:62.0 sec    Urinalysis Basic - ( 26 Jun 2023 05:30 )  Color: x / Appearance: x / SG: x / pH: x  Gluc: 133 mg/dL / Ketone: x  / Bili: x / Urobili: x   Blood: x / Protein: x / Nitrite: x   Leuk Esterase: x / RBC: x / WBC x   Sq Epi: x / Non Sq Epi: x / Bacteria: x

## 2023-06-26 NOTE — CONSULT NOTE ADULT - PROBLEM SELECTOR RECOMMENDATION 3
with ROJAS  -continue bicarb ggt as above  -recommendations per renal   -no need for RRT currently  -f/u renal

## 2023-06-26 NOTE — CONSULT NOTE ADULT - ASSESSMENT
67M w/ PMH of HTN, grade IV hydronephrosis of L kidney s/p L PCN (placed 5/2023), stutter, hiatal hernia, iron deficiency anemia, H Pylori (untreated), presents to the ED with at least 4 days of worsening weakness, abdominal distension, and no output from his PCN.  Admitted to SICU for sepsis and septic shock secondary to  emphysematous pyelonephritis. Team reported bloody bowel movement since yesterday. Patient had multiple bowel movements with blood streaks. GI consulted for hematochezia.     # Hematochezia DDx (Hemorrhoidal, ischemic colitis, Diverticulosis, vascular lesions, CRC or others).   Sepsis, on pressors for septic shock. On AC for Afib.     PLAN:   Risk outweighs the benefit for colonoscopy. Will continue with the conservative measures and supportive care for now. Plan fo Colonoscopy as outpatient after resolution of acute issues. Miralax TID. Avoid Opioids, Avoid constipation, Correct electrolytes. NPO for now. In case of large volume hematochezia with change in hemodynamics please obtain STAT CTA. Monitor CBC.     # Stomach mass in previous CT S/P EGD with no intraluminal mass:   # H-pylori gastritis   PLAN   Plan for EGD with EUS + FNA as outpatient   H-pylori treatment after resolution of acute issues

## 2023-06-26 NOTE — PROGRESS NOTE ADULT - ASSESSMENT
Assessment & Plan:  67M w/ PMH of HTN, grade IV hydronephrosis of L kidney s/p L PCN (placed 5/2023), stutter, hiatal hernia, iron deficiency anemia, H Pylori (untreated), and gastric mass (never biopsied) presents to the ED with at least 4 days of worsening weakness, abdominal distension, and no output from his PCN. Admitted with emphysematous pyelonephritis     (6/18): s/p LT PCN upsizing to 16Fr and 16Fr RLQ drain placement with IR   (6/23): s/p shock lithotripsy of L kidney abscess cavity with drainage of thick contents, upsize of drain to 26Fr bobby catheter    NEURO:  #Pain control    -APAP PRN    -Tramadol PRN    -Gabapentin 200 q 8     -fent 25 q 3 PRN while intubated  #Sedation    -precedex gtt  #H/o stutter (negative ischemic workup last admission)    RESP:  #acute respiratory failure with left lung mucous plugging (intubated 6/24)    - LL @ 23, 7.5Fr    - 6/24 s/p bronchoscopy secondary to L maintstem mucus plug    - Intermittent PS tolerating 2hrs on 6/25    - Minimal vent settings    #Pulmonary nodules / Mediastinal lymphadenopathy    - CT Chest: Right middle and upper lobe solid pulmonary nodules, f/u outpatient pulm   #Activity   - increased as tolerated    CARDS:   #Septic shock    - intermittent levophed > switched to taj    - midodrine 10mg q8hr  #New onset Afib w/ RVR with hypotension -- likely 2/2 sepsis    - Metoprolol 2.5 q6 IV    - Cardiology following    - Hep gtt  #HTN    -Amlodipine 2.5mg HOLDING   Imaging    - EKG (6/19): Afib w/ RVR to 138     - ECHO: (6/19) EF 55-60%, mild AS, mild LAE, mild MR/TR, trivial pericardial effusion    GI/NUTR:  #Diet    NPO except meds  #Ileus  - Holding tube feeds   - NGT to suction   #bowel movement    - last BM: 6/24  #GI PPX  -IV Protonix 40 daily  #Gastric mass vs gastrohepatic lymphadenopathy    -CT A/P: lesser curvature mass , Hepatic hypodensities  #Splenic Hypodensities, infarct vs phlegmon/abscess    -CT A/P:  Wedge-shaped region of hypodensity within the spleen    /RENAL:   #Grade IV L hydronephrosis s/p L PCN (5/2023) -- presented to ED with thick dark foul smelling output  #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  -intermittently suction/irrigation per urology; f/u cx and cytology from 6/25     -(6/23) s/p LT PCN upsizing to 26Fr catheter, RLQ 16 Fr drain remains    -(6/18) s/p LT PCN upsizing to 16Fr and 16Fr RLQ drain placement with IR, flush q8 hr  #volume overload    -Bumex 2mg 6/25- minimal response   #ROJAS (baseline Cr 1.0)    - Cr 3.8  #Worsening metabolic acidosis  -Nephrology c/s - no indication for RRT as of 6/25 > follow up 6/26  - bicarb gtt 3 amps at 100cc/hr   - 2 amps given additionally overnight   #Hyperkalemia     -Lokelma 5q8   #Hyperphosphatemia    -Phoslo 1334mg q8  #metabolic acidosis  -nephrology following, no indication for RRT    Labs:          BUN/Cr- 112/3.9  -->,  120/3.9  -->          Electrolytes-Na 128 // K 4.9 // Mg -- //  Phos -- (06-26 @ 00:15)    HEME/ONC:   #new onset Afib    - Heparin infusion @ 1400    Labs: Hb/Hct:  8.5/26.6  -->,  8.6/27.7  -->                      Plts:  240  -->,  324  -->                 PTT/INR:  99.6/1.47  --->,  62.0/--  --->     #H/o Fe Deficiency anemia    - Iron supplementation  #DVT prophylaxis  -SCDs    - on full AC secondary to Afib  T&S Expires 6/27  Blood Consent- in chart     ID:  #Leukocytosis 2/2 sepsis   WBC- 38.15  --->>,  40.50  --->>,  46.36  --->>  Temp trend- 24hrs T(F): 99.5 (06-26 @ 00:00), Max: 99.5 (06-26 @ 00:00)  #antibiotics    -Levaquin 500mg q48 (6/21-    - jan 1g q12 (6/20-    -Vanco intermittent dosing based on trough f/u 6/26 AM  - consider adding antifungal per urology   #Cultures  L PCN culture 6/25: f/u     Funtitell 6/26: received f/u     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    BAL 6/24: negative FINAL    UCx 6/19: negative FINAL      Left PCN aspiration 6/18: stenotrophomonas maltophilia    ENDO:  #Glucose monitoring    -A1c (5/7/23): 5.0   #Adrenal Insufficiency 2/2 to sepsis     -completed solucortef taper     -Random cortisol level -sent, results pending    MSK:  #Activity    -Advance as tolerated    -PT/rehab once extubated    LINES/DRAINS: PIV,   Bobby (6/18)  RLQ 16Fr Javier Drain (6/18),   LT PCN 26Fr (6/18)   Left basilic midline  6/21  Right Radial Joleen 6/22  L radial Joleen (6/18-6/22)      ADVANCED DIRECTIVES:  Full Code  HCP/Emergency Contact- Tracey Adames: [phone]   Palliative consult placed    INDICATION FOR SICU: New onset atrial fibrillation w/ mechanival ventilation    DISPO: SICU     Assessment & Plan:  67M w/ PMH of HTN, grade IV hydronephrosis of L kidney s/p L PCN (placed 5/2023), stutter, hiatal hernia, iron deficiency anemia, H Pylori (untreated), and gastric mass (never biopsied) presents to the ED with at least 4 days of worsening weakness, abdominal distension, and no output from his PCN. Admitted with emphysematous pyelonephritis     (6/18): s/p LT PCN upsizing to 16Fr and 16Fr RLQ drain placement with IR   (6/22): s/p shock lithotripsy of L kidney abscess cavity with drainage of thick contents, upsize of drain to 26Fr bobby catheter    NEURO:  #Pain control    -APAP PRN    -Tramadol PRN    -Gabapentin 200 q 8     -fent 25 q 3 PRN while intubated  #Sedation    -precedex gtt  #H/o stutter (negative ischemic workup last admission)    RESP:  #acute respiratory failure with left lung mucous plugging (intubated 6/24)    - LL @ 23, 7.5Fr    - 6/24 s/p bronchoscopy secondary to L maintstem mucus plug    - Intermittent PS tolerating 2hrs on 6/25    - Minimal vent settings    #Pulmonary nodules / Mediastinal lymphadenopathy    - CT Chest: Right middle and upper lobe solid pulmonary nodules, f/u outpatient pulm   #Activity   - increased as tolerated    CARDS:   #Septic shock    - on phenylephrine   #New onset Afib w/ RVR with hypotension -- likely 2/2 sepsis    - Metoprolol 2.5 q6 IV    - Cardiology following    - Hep gtt  #HTN    -Amlodipine 2.5mg HOLDING   Imaging    - EKG (6/19): Afib w/ RVR to 138     - ECHO: (6/19) EF 55-60%, mild AS, mild LAE, mild MR/TR, trivial pericardial effusion    GI/NUTR:  #Diet    NPO except meds  #Ileus  - Holding tube feeds & bowel regimen  - NGT to suction   #bowel movements    - 2 bloody BM's noted overnight 6/25; GI consult placed   -dignishield in place   -FOBT sent   #GI PPX  -IV Protonix 40 daily  #Gastric mass vs gastrohepatic lymphadenopathy    -CT A/P: lesser curvature mass , Hepatic hypodensities  #Splenic Hypodensities, infarct vs phlegmon/abscess    -CT A/P:  Wedge-shaped region of hypodensity within the spleen    /RENAL:   #Grade IV L hydronephrosis s/p L PCN (5/2023) -- presented to ED with thick dark foul smelling output  #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  -intermittent suction/irrigation per urology; f/u cx and cytology from 6/25     -(6/22) s/p LT PCN upsizing to 26Fr catheter, RLQ 16 Fr drain remains    -(6/18) s/p LT PCN upsizing to 16Fr and 16Fr RLQ drain placement with IR, flush q8 hr  #volume overload    -Bumex 2mg 6/25- minimal response   -repeat CT A&P recommended by urology team   #ROJAS (baseline Cr 1.0)    - Cr 3.8--> 4.2   #Worsening metabolic acidosis  -Nephrology c/s - no indication for RRT as of 6/25 > follow up 6/26  - bicarb gtt 150 mEq at 100cc/hr   - 2 amps given 6/25 night   -5% 250 cc albumin given 6/25 night for elevated lactate (2.5-->3.4)  #Hyperkalemia     -Lokelma 5q8   #Hyperphosphatemia    -Phoslo 1334mg q8  #metabolic acidosis  -nephrology following, no indication for RRT     Labs:          BUN/Cr- 120/3.9  -->,  116/4.2  -->          Electrolytes-Na 129 // K 5.0 // Mg -- //  Phos -- (06-26 @ 05:30)    HEME/ONC:   #new onset Afib    - holding heparin infusion in setting of hematochezia     Labs: Hb/Hct:  8.5/26.6  -->,  8.6/27.7  -->                      Plts:  240  -->,  324  -->                 PTT/INR:  99.6/1.47  --->,  62.0/--  --->     #H/o Fe Deficiency anemia    - Iron supplementation  #DVT prophylaxis  -SCDs    - on full AC secondary to Afib  T&S Expires 6/27  Blood Consent- in chart     ID:  #Leukocytosis 2/2 sepsis   WBC- 38.15  --->>,  40.50  --->>,  46.36  --->>  Temp trend- 24hrs T(F): 99.5 (06-26 @ 00:00), Max: 99.5 (06-26 @ 00:00)  #antibiotics    -Levaquin 500mg q48 (6/21-    - jan 1g q12 (6/20-  -caspo 50mg q 24 (6/26-    -Vanco intermittent dosing based on trough; last trough 18  #Cultures  L PCN culture 6/25: f/u     Funtitell 6/26: received f/u     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    BAL 6/24: negative FINAL    UCx 6/19: negative FINAL      Left PCN aspiration 6/18: stenotrophomonas maltophilia    ENDO:  #Glucose monitoring    -A1c (5/7/23): 5.0   #Adrenal Insufficiency 2/2 to sepsis     -completed solucortef taper     -Random cortisol level -sent, results pending    MSK:  #Activity    -Advance as tolerated    -PT/rehab once extubated    LINES/DRAINS:   PIV  Bobby (6/18)  RLQ 16Fr Javier Drain (6/18),   LT PCN 26Fr (6/18)   Left basilic midline  6/21  Right Radial Joleen 6/22  L radial Joleen (6/18-6/22)  L IJ TLC (6/26)  ETT  OGT    ADVANCED DIRECTIVES:  Full Code  HCP/Emergency Contact- Tracey Adames: [phone]   Palliative consult placed, f/u GOC discussion    INDICATION FOR SICU: New onset atrial fibrillation w/ mechanival ventilation    DISPO: SICU

## 2023-06-26 NOTE — CHART NOTE - NSCHARTNOTEFT_GEN_A_CORE
Goals of care discussion held with daughterTracey, and son-in-law Jose, with JENNIFER Garcia and Dr. Mccollum from palliative team present. Discussed septic shock with multisystem organ failure, with increasing vasopressor requirements and acute respiratory failure requiring mechanical ventilation.  The following updates were given to the family:    -Daily sedation and vent liberation trials   -Patient with increasing vasopressor requirements  -Requiring metoprolol and digoxin for Afib  -Nephrology team daily evaluation for possible dialysis; not candidate at this time  -Daily irrigation and suction from nephrostomy by urology   -Patient on multiple antibiotics for coverage     All questions and concerns addressed and answered. Patient to remain full-code. Family aware patient will likely require tracheostomy and PEG tube placement if unable to liberate off of vent.

## 2023-06-26 NOTE — CHART NOTE - NSCHARTNOTEFT_GEN_A_CORE
Patient condition worsening. wbc elevated to 46, requiring increase in pressor support. Acidotic and lactate elevated. Discussed with Dr. Rodarte and plan from urology is as follows.  - Cont SICU management  - Add antifungal  - CT A/P  - Will cont to monitor

## 2023-06-26 NOTE — CONSULT NOTE ADULT - CONVERSATION DETAILS
Spoke with patient's family outside patient's room. Palliative care introduced. Diya provide a medical update and all questions were answered.  They discussed that if the patient's infection can be controlled, the next step would likely be trach/PEG if the patient/family wishes for continued aggressive .  We discussed trach/PEG at length, discussed possibility of long term care in a facility if PEG/trach is placed, and discussed code status.    Family wishes for ongoing aggressive medical management and full code. They are interested in trach/PEG.  All questions answered.

## 2023-06-26 NOTE — ADVANCED PRACTICE NURSE CONSULT - RECOMMEDATIONS
Cleanse wound to sacrococcygeal region with soap and water.   Pat dry apply triad, gauze and Allevyn twice a day and prn for soiling.     Cleanse wound to right lower leg with soap and water  Pat dry, apply Xeroform, nonadherent dressing, wrap with Kerlix twice a day, prn for soiling  Recommend follow up with vascular    Cleanse skin tear to right forearm with soap and water  Pat dry, apply Xeroform nonadherent dressing, wrap with Kerlix, twice a day, prn for soiling     Maintain pressure injury prevention.   Keep skin clean.   Maintain incontinence care.   Monitor wound for changes and notify provider   Case discussed with primary RN

## 2023-06-26 NOTE — PROGRESS NOTE ADULT - SUBJECTIVE AND OBJECTIVE BOX
CHRISTINE VILLAVICENCIO  [id number]  67y Male    Indication for ICU admission: mechanical ventilatior secondary to urosepsis     Admit Date:23  ICU Date: 23  OR Date: ,       24HRS EVENT:    NIGHT  -f/u PTT 12- 66 (held for >1 hour)  -f/u AM vanc trough  -f/u fungitell, drain Cx  -call lab Cx/cytology   -WBC 46.6 (40), Cr 3.9>3.9  -AB.20/46/127/19 Lac 2.4 (from 1.4)  -episode hypotension (SBPs 70s) -> increased levo .20, 2amps bicarb, 250 5% albumin, restarted bicarb gtt 3amp @100  -2.5 lopressor IVPx2 for HR 140s -> 120s   -Switched levo to taj   -cards fellow recs: if >150HR can do one time dose digoxin 0.25, can switch levo to taj, can do bicarb pushes ontop of gtt, amio last line as hasn't been anticoag for 2 weeks   -plan for repeat CT A/P tomorrow   - Urology- recommending antifungals   -KUB urgent: ileus -> NPO except meds, NGT suction    - TLC to be placed for increasing pressor requirements    DAY  -f/u with nephro regarding dialysis, f/u with next vanc dose and trough  -ID recs: decrease levaquin 500 q 48, repeat urine cultures during irrigation, fungitell  -TF advancing to goal  -IVL when at goal   -noon PTT 81  -SBT for 2 hours before back on vent d/t tachycardia  -tachy to 150s, fent push given, metoprolol dose given earlier  -palliative consult   -bumex 2mg x1 for diuresis f/u BMP & OP  -increased hany to 200 q8  -no indication for RRT for now per nephro  -OR cx with finegolda magna, f/u susceptibilities   -BAL with normal respiratory nancy   -HR 140s despite metoprolol and sedation --> IV 2.5 lopressor x1, standing PO metoprolol 25 BID started  -urology suctioned and irrigated 220cc serosanginous fluid from L nephrostomy, sent for culture and cytology   -ptt 99.6--> hep drip to 1400        REVIEW OF SYSTEMS  [x ] A ten-point review of systems was otherwise negative except as noted.  [ ] Due to altered mental status/intubation, subjective information were not able to be obtained from the patient. History was obtained, to the extent possible, from review of the chart and collateral sources of information.    ----------------------------------------------------------------------------------------------------------------------  ----------------------------------------------------------------------------------------------------------------------  ----------------------------------------------------------------------------------------------------------------------  ---------------------------------------------------------------------------------------------------------------------- CHRISTINE VILLAVICENCIO  [id number]  67y Male    Indication for ICU admission: mechanical ventilatior secondary to urosepsis     Admit Date:23  ICU Date: 23  OR Date: ,       24HRS EVENT:    NIGHT  -f/u PTT 12- 66 (held for >1 hour)  -f/u AM vanc trough  -f/u fungitell, drain Cx  -call lab Cx/cytology   -WBC 46.6 (40), Cr 3.9>3.9  -AB.20/46/127/19 Lac 2.4 (from 1.4)  -episode hypotension (SBPs 70s) -> increased levo .20, 2amps bicarb, 250 5% albumin, restarted bicarb gtt 3amp @100  -2.5 lopressor IVPx2 for HR 140s -> 120s   -Switched levo to taj   -cards fellow recs: if >150HR can do one time dose digoxin 0.25, can switch levo to taj, can do bicarb pushes ontop of gtt, amio last line as hasn't been anticoag for 2 weeks   -plan for repeat CT A/P tomorrow   - Urology- recommending antifungals   -KUB urgent: ileus -> NPO except meds, NGT suction    - TLC to be placed for increasing pressor requirements    DAY  -f/u with nephro regarding dialysis, f/u with next vanc dose and trough  -ID recs: decrease levaquin 500 q 48, repeat urine cultures during irrigation, fungitell  -TF advancing to goal  -IVL when at goal   -noon PTT 81  -SBT for 2 hours before back on vent d/t tachycardia  -tachy to 150s, fent push given, metoprolol dose given earlier  -palliative consult   -bumex 2mg x1 for diuresis f/u BMP & OP  -increased hany to 200 q8  -no indication for RRT for now per nephro  -OR cx with finegolda magna, f/u susceptibilities   -BAL with normal respiratory nancy   -HR 140s despite metoprolol and sedation --> IV 2.5 lopressor x1, standing PO metoprolol 25 BID started  -urology suctioned and irrigated 220cc serosanginous fluid from L nephrostomy, sent for culture and cytology   -ptt 99.6--> hep drip to 1400        REVIEW OF SYSTEMS  [x ] A ten-point review of systems was otherwise negative except as noted.  [ ] Due to altered mental status/intubation, subjective information were not able to be obtained from the patient. History was obtained, to the extent possible, from review of the chart and collateral sources of information.    ----------------------------------------------------------------------------------------------------------------------  ----------------------------------------------------------------------------------------------------------------------  Daily     Daily     Diet, NPO:   Except Medications (23 @ 00:16)      CURRENT MEDS:  Neurologic Medications  dexMEDEtomidine Infusion 0.2 MICROgram(s)/kG/Hr IV Continuous <Continuous>  fentaNYL    Injectable 25 MICROGram(s) IV Push every 3 hours PRN Moderate Pain (4 - 6)  gabapentin 200 milliGRAM(s) Oral every 8 hours  traMADol 25 milliGRAM(s) Oral every 8 hours PRN Moderate Pain (4 - 6)    Respiratory Medications  albuterol/ipratropium for Nebulization 3 milliLiter(s) Nebulizer every 6 hours    Cardiovascular Medications  metoprolol tartrate Injectable 2.5 milliGRAM(s) IV Push every 6 hours  phenylephrine    Infusion 0.1 MICROgram(s)/kG/Min IV Continuous <Continuous>    Gastrointestinal Medications  calcium acetate 1334 milliGRAM(s) Oral every 8 hours  ferrous    sulfate 325 milliGRAM(s) Oral daily  pantoprazole  Injectable 40 milliGRAM(s) IV Push daily  sodium bicarbonate 1300 milliGRAM(s) Oral three times a day  sodium bicarbonate  Infusion 0.196 mEq/kG/Hr IV Continuous <Continuous>  sodium chloride 0.9% lock flush 10 milliLiter(s) IV Push every 1 hour PRN Pre/post blood products, medications, blood draw, and to maintain line patency  sodium chloride 0.9% lock flush 10 milliLiter(s) IV Push every 1 hour PRN Pre/post blood products, medications, blood draw, and to maintain line patency    Genitourinary Medications    Hematologic/Oncologic Medications    Antimicrobial/Immunologic Medications  caspofungin IVPB      levoFLOXacin IVPB 500 milliGRAM(s) IV Intermittent every 48 hours  meropenem  IVPB 1000 milliGRAM(s) IV Intermittent every 12 hours    Endocrine/Metabolic Medications    Topical/Other Medications  bacitracin   Ointment 1 Application(s) Topical two times a day  chlorhexidine 0.12% Liquid 15 milliLiter(s) Oral Mucosa every 12 hours  chlorhexidine 2% Cloths 1 Application(s) Topical <User Schedule>  chlorhexidine 4% Liquid 1 Application(s) Topical <User Schedule>      ICU Vital Signs Last 24 Hrs  T(C): 37.5 (2023 00:00), Max: 37.5 (2023 00:00)  T(F): 99.5 (2023 00:00), Max: 99.5 (2023 00:00)  HR: 126 (2023 07:00) (108 - 140)  BP: 89/58 (2023 07:00) (86/57 - 127/66)  BP(mean): 66 (2023 07:00) (66 - 89)  ABP: 102/57 (2023 07:00) (83/53 - 122/67)  ABP(mean): 70 (2023 07:00) (61 - 90)  RR: 22 (2023 07:00) (13 - 26)  SpO2: 100% (2023 07:00) (95% - 100%)    O2 Parameters below as of 2023 00:00  Patient On (Oxygen Delivery Method): ventilator              Mode: AC/ CMV (Assist Control/ Continuous Mandatory Ventilation)  RR (machine): 22  TV (machine): 450  FiO2: 40  PEEP: 8  ITime: 1  MAP: 23  PIP: 34    ABG - ( 2023 03:56 )  pH, Arterial: 7.36  pH, Blood: x     /  pCO2: 32    /  pO2: 189   / HCO3: 18    / Base Excess: -6.6  /  SaO2: 100.0               I&O's Summary    2023 07:  -  2023 07:00  --------------------------------------------------------  IN: 3337.7 mL / OUT: 535 mL / NET: 2802.7 mL      I&O's Detail    2023 07:01  -  2023 07:00  --------------------------------------------------------  IN:    Dexmedetomidine: 300.4 mL    Heparin: 84 mL    Heparin Infusion: 176 mL    IV PiggyBack: 100 mL    Nepro: 760 mL    Norepinephrine: 121.3 mL    Phenylephrine: 27 mL    Phenylephrine: 144 mL    Sodium Bicarbonate: 800 mL    sodium chloride 0.45% w/ Additives: 75 mL    sodium chloride 0.45% w/ Additives: 750 mL  Total IN: 3337.7 mL    OUT:    Drain (mL): 240 mL    Indwelling Catheter - Urethral (mL): 245 mL    VAC (Vacuum Assisted Closure) System (mL): 50 mL  Total OUT: 535 mL    Total NET: 2802.7 mL          PHYSICAL EXAM:    General/Neuro: Sedated on precedex. RASS = -2. Following commands off sedation.     Lungs: Intubated on /22/40/8. Clear to auscultation, Normal expansion/effort.     Cardiovascular : S1, S2.  Afib.     GI: Abdomen soft, Non-tender, distended. OGT in place. Dignishield in place.     Extremities: Extremities warm, pink, well-perfused. 2+ peripheral edema.    Derm: Good skin turgor, no skin breakdown.      :    Mena catheter in place Scrotal edema.      CXR:     LABS:  CAPILLARY BLOOD GLUCOSE      POCT Blood Glucose.: 224 mg/dL (2023 23:11)  POCT Blood Glucose.: 220 mg/dL (2023 22:24)                          7.3    32.66 )-----------( 252      ( 2023 06:36 )             23.4       06-26    129<L>  |  94<L>  |  116<HH>  ----------------------------<  133<H>  5.0   |  18  |  4.2<HH>    Ca    7.5<L>      2023 05:30  Phos  8.4     06-25  Mg     2.3     06-25        PT/INR - ( 2023 18:02 )   PT: 17.00 sec;   INR: 1.47 ratio         PTT - ( 2023 00:15 )  PTT:62.0 sec      Urinalysis Basic - ( 2023 05:30 )    Color: x / Appearance: x / SG: x / pH: x  Gluc: 133 mg/dL / Ketone: x  / Bili: x / Urobili: x   Blood: x / Protein: x / Nitrite: x   Leuk Esterase: x / RBC: x / WBC x   Sq Epi: x / Non Sq Epi: x / Bacteria: x        Culture - Acid Fast - Bronchial w/Smear (collected 2023 11:00)  Source: .Bronchial None    Culture - Bronchial (collected 2023 11:00)  Source: .Bronchial None  Gram Stain (2023 23:21):    Numerous polymorphonuclear leukocytes per low power field    No squamous epithelial cells per low power field    Rare Yeast like cells per oil power field  Preliminary Report (2023 14:36):    Normal Respiratory Nancy present        ----------------------------------------------------------------------------------------------------------------------  ----------------------------------------------------------------------------------------------------------------------

## 2023-06-26 NOTE — CONSULT NOTE ADULT - ASSESSMENT
67 year old man with history of hydronephrosis s/p PCN placement, HTN, anemia, H pylori presents with abdominal pain and no output from PCN. HOspital course complicated by septic shock in the setting of emphysematous pyelonephritis, hematochezia, respiratory failure requiring mechanical ventilation. Palliative care consulted for GOC.     Education about palliative care provided to patient/family.  See Recs below.    Please call [phone] with questions or concerns 24/7.   We will continue to follow.

## 2023-06-26 NOTE — CONSULT NOTE ADULT - SUBJECTIVE AND OBJECTIVE BOX
CC: weakness and abdominal distention    HPI:  67M w/ PMH of HTN, grade IV hydronephrosis of L kidney s/p L PCN (placed 5/2023), stutter, hiatal hernia, iron deficiency anemia, H Pylori (untreated), and gastric mass (never biopsied) presents to the ED with at least 4 days of worsening weakness, abdominal distension, and no output from his PCN. Pt and daughters bedside are only able to give limited information, but for the past few days, he has noticed no output from his PCN as well as increasing abdominal distension, pain, and anorexia. He reports that he has not felt normal for the past 10 days. His PCN used to put out serosanguinous fluid, but the output changed to feculent/brown liquid over the past day. In the ED, he was noted to be in new onset afib w/ RVR to 150+ as well as experiencing some difficulty breathing. He was started on BiPAP. Stat labs were notable for WBC 47, Cr 2.9, and lactate 6. CT A/P w/ IV contrast showed free intraperitoneal fluid and air from an unknown source as well as fluid and air in the L kidney.  (18 Jun 2023 18:34)    PERTINENT PM/SXH:   HTN  hydronephrosis  H pylori      FAMILY HISTORY:  Unable to determine as patient sedated and intubated    ITEMS NOT CHECKED ARE NOT PRESENT    SOCIAL HISTORY:   Significant other/partner[ ]  Children[ ]  Buddhist/Spirituality:  Substance hx:  [ ]   Tobacco hx:  [ ]   Alcohol hx: [ ]   Living Situation: [x ]Home  [ ]Long term care  [ ]Rehab [ ]Other  Home Services: [ ] HHA [ ] Visting RN [ ] Hospice  Occupation:  Home Opioid hx:  [ ] Y [ ] N [x ] I-Stop Reference No:  Reference #: [id number] - no meds    ADVANCE DIRECTIVES:    [x ] Full Code [ ] DNR  MOLST  [ ]  Living Will  [ ]   DECISION MAKER(s):  [x ] Health Care Proxy(s)  [ ] Surrogate(s)  [ ] Guardian           Name(s): Phone Number(s):  Tracey    FAN (I)ADL(s) (prior to admission):  [place]: [ ]Total  [ ] Moderate [ ]Dependent  Palliative Performance Status Version 2:         %    http://npcrc.org/files/news/palliative_performance_scale_ppsv2.pdf    Allergies    No Known Allergies    Intolerances    MEDICATIONS  (STANDING):  albuterol/ipratropium for Nebulization 3 milliLiter(s) Nebulizer every 6 hours  bacitracin   Ointment 1 Application(s) Topical two times a day  calcium acetate 1334 milliGRAM(s) Oral every 8 hours  caspofungin IVPB      chlorhexidine 0.12% Liquid 15 milliLiter(s) Oral Mucosa every 12 hours  chlorhexidine 2% Cloths 1 Application(s) Topical <User Schedule>  chlorhexidine 4% Liquid 1 Application(s) Topical <User Schedule>  dexMEDEtomidine Infusion 0.2 MICROgram(s)/kG/Hr (3.82 mL/Hr) IV Continuous <Continuous>  digoxin  Injectable 250 MICROGram(s) IV Push every 6 hours  ferrous    sulfate 325 milliGRAM(s) Oral daily  gabapentin 200 milliGRAM(s) Oral every 8 hours  heparin   Injectable 5000 Unit(s) SubCutaneous every 8 hours  levoFLOXacin IVPB 500 milliGRAM(s) IV Intermittent every 48 hours  meropenem  IVPB 1000 milliGRAM(s) IV Intermittent every 12 hours  metoprolol tartrate Injectable 2.5 milliGRAM(s) IV Push every 6 hours  pantoprazole  Injectable 40 milliGRAM(s) IV Push daily  phenylephrine    Infusion 0.1 MICROgram(s)/kG/Min (1.43 mL/Hr) IV Continuous <Continuous>  sodium bicarbonate 1300 milliGRAM(s) Oral three times a day  sodium bicarbonate  Infusion 0.196 mEq/kG/Hr (100 mL/Hr) IV Continuous <Continuous>    MEDICATIONS  (PRN):  fentaNYL    Injectable 25 MICROGram(s) IV Push every 3 hours PRN Moderate Pain (4 - 6)  sodium chloride 0.9% lock flush 10 milliLiter(s) IV Push every 1 hour PRN Pre/post blood products, medications, blood draw, and to maintain line patency  sodium chloride 0.9% lock flush 10 milliLiter(s) IV Push every 1 hour PRN Pre/post blood products, medications, blood draw, and to maintain line patency  traMADol 25 milliGRAM(s) Oral every 8 hours PRN Moderate Pain (4 - 6)    PRESENT SYMPTOMS: [x]Unable to obtain due to poor mentation   Source if other than patient:  [ ]Family   [ ]Team     Pain: [ ]yes [ ]no  QOL impact -   Location -                    Aggravating factors -  Quality -  Radiation -  Timing-  Severity (0-10 scale):  Minimal acceptable level (0-10 scale):     CPOT:  0  https://www.[facility].org/getattachment/[email or web address]/Critical-Care-Pain-Observation-Tool-(CPOT)    PAIN AD Score:   http://geriatrictoolkit.[facility]/cog/painad.pdf (press ctrl +  left click to view)    Dyspnea:                           [ ]None[ ]Mild [ ]Moderate [ ]Severe     Respiratory Distress Observation Scale (RDOS): 0  A score of 0 to 2 signifies little or no respiratory distress, 3 signifies mild distress, scores 4 to 6 indicate moderate distress, and scores greater than 7 signify severe distress  https://www.[facility].ca/sites/default/files/PDFS/437508-zpdjhezifwk-jlefwosu-[email or web address]-[email or web address].pdf    Anxiety:                             [ ]None[ ]Mild [ ]Moderate [ ]Severe   Fatigue:                             [ ]None[ ]Mild [ ]Moderate [ ]Severe   Nausea:                             [ ]None[ ]Mild [ ]Moderate [ ]Severe   Loss of appetite:              [ ]None[ ]Mild [ ]Moderate [ ]Severe   Constipation:                    [ ]None[ ]Mild [ ]Moderate [ ]Severe    Other Symptoms:  [ ]All other review of systems negative     Palliative Performance Status Version 2:        10 %    http://[facility].org/files/news/palliative_performance_scale_ppsv2.pdf    PHYSICAL EXAM:  Vital Signs Last 24 Hrs  T(C): 37.4 (26 Jun 2023 16:00), Max: 37.7 (26 Jun 2023 08:00)  T(F): 99.4 (26 Jun 2023 16:00), Max: 99.9 (26 Jun 2023 08:00)  HR: 126 (26 Jun 2023 18:00) (117 - 140)  BP: 149/80 (26 Jun 2023 11:15) (86/57 - 149/80)  BP(mean): 103 (26 Jun 2023 11:15) (66 - 103)  RR: 22 (26 Jun 2023 18:00) (2 - 27)  SpO2: 100% (26 Jun 2023 18:00) (95% - 100%)    Parameters below as of 26 Jun 2023 18:00  Patient On (Oxygen Delivery Method): ventilator    O2 Concentration (%): 40 I&O's Summary    25 Jun 2023 07:01  -  26 Jun 2023 07:00  --------------------------------------------------------  IN: 3337.7 mL / OUT: 665 mL / NET: 2672.7 mL    26 Jun 2023 07:01  -  26 Jun 2023 18:33  --------------------------------------------------------  IN: 1432.2 mL / OUT: 575 mL / NET: 857.2 mL    GENERAL:  [ ] No acute distress [ ]Lethargic  [ ]Unarousable  [ ]Verbal  [ ]Non-Verbal [ ]Cachexia    BEHAVIORAL/PSYCH:  [ ]Alert and Oriented x  [ ] Anxiety [ ] Delirium [ ] Agitation [ ] Calm   EYES: [ ] No scleral icterus [ ] Scleral icterus [ ] Closed  ENMT:  [ ]Dry mouth  [ ]No external oral lesions [ ] No external ear or nose lesions  CARDIOVASCULAR:  [ ]Regular [ ]Irregular [ ]Tachy [ ]Not Tachy  [ ]Ben [ ] Edema [ ] No edema  PULMONARY:  [ ]Tachypnea  [ ]Audible excessive secretions [ ] No labored breathing [ ] labored breathing  GASTROINTESTINAL: [ ]Soft  [ ]Distended  [ ]Not distended [ ]Non tender [ ]Tender  MUSCULOSKELETAL: [ ]No clubbing [ ] clubbing  [ ] No cyanosis [ ] cyanosis  NEUROLOGIC: [ ]No focal deficits  [ ]Follows commands  [ ]Does not follow commands  [ ]Cognitive impairment  [ ]Dysphagia  [ ]Dysarthria  [ ]Paresis   SKIN: [ ] Jaundiced [ ] Non-jaundiced [ ]Rash [ ]No Rash [ ] Warm [ ] Dry  MISC/LINES: [ ] ET tube [ ] Trach [ ]NGT/OGT [ ]PEG [ ]Mena    CRITICAL CARE:  [x ] Shock Present  [ ]Septic [ ]Cardiogenic [ ]Neurologic [ ]Hypovolemic  [ ]  Vasopressors [ ]  Inotropes   [x ]Respiratory failure present [ x]Mechanical ventilation [ ]Non-invasive ventilatory support [ ]High flow  [ ]Acute  [ ]Chronic [ ]Hypoxic  [ ]Hypercarbic [ ]Other  [ ]Other organ failure     LABS: reviewed by me                        7.8    38.86 )-----------( 250      ( 26 Jun 2023 11:27 )             23.8   06-26    129<L>  |  94<L>  |  116<HH>  ----------------------------<  133<H>  5.0   |  18  |  4.2<HH>    Ca    7.5<L>      26 Jun 2023 05:30  Phos  8.4     06-25  Mg     2.3     06-25    PT/INR - ( 25 Jun 2023 18:02 )   PT: 17.00 sec;   INR: 1.47 ratio         PTT - ( 26 Jun 2023 11:27 )  PTT:33.2 sec    Urinalysis Basic - ( 26 Jun 2023 05:30 )    Color: x / Appearance: x / SG: x / pH: x  Gluc: 133 mg/dL / Ketone: x  / Bili: x / Urobili: x   Blood: x / Protein: x / Nitrite: x   Leuk Esterase: x / RBC: x / WBC x   Sq Epi: x / Non Sq Epi: x / Bacteria: x      RADIOLOGY & ADDITIONAL STUDIES: reviewed by me  < from: Xray Chest 1 View- PORTABLE-Urgent (Xray Chest 1 View- PORTABLE-Urgent .) (06.26.23 @ 03:09) >  Impression:    Bilateral opacifications. Support devices as described    Follow-up as needed.    < end of copied text >      EKG: reviewed by me  < from: 12 Lead ECG (06.25.23 @ 06:19) >    Ventricular Rate 108 BPM    QRS Duration 88 ms    Q-T Interval 292 ms    QTC Calculation(Bazett) 391 ms    R Axis 45 degrees    T Axis 68 degrees    Diagnosis Line Atrial fibrillation with rapid ventricular response  Low voltage QRS  Nonspecific STand T wave abnormality  Abnormal ECG    < end of copied text >      PROTEIN CALORIE MALNUTRITION PRESENT: [ ]mild [ ]moderate [ ]severe [ ]underweight [ ]morbid obesity  https://www.andeal.org/vault/8062/web/files/ONC/Table_Clinical%20Characteristics%20to%20Document%20Malnutrition-White%20JV%20et%20al%272944.pdf    Height (cm): 180 (06-22-23 @ 21:00), 180.3 (05-11-23 @ 08:58)  Weight (kg): 76.476 (06-22-23 @ 21:00), 66.2 (05-10-23 @ 10:54)  BMI (kg/m2): 23.6 (06-22-23 @ 21:00), 20.4 (05-11-23 @ 08:58)    [ ]PPSV2 < or = to 30% [ ]significant weight loss  [ ]poor nutritional intake  [ ]anasarca      [ ]Artificial Nutrition      REFERRALS:   [ ]Chaplaincy  [ ]Hospice  [ ]Child Life  [ x]Social Work  [ ]Case management [ ]Holistic Therapy     Patient discussed with primary medical team MD  Palliative care education provided to patient and/or family  Patient and/or family assessed for spiritual and social needs    Goals of Care Document:    CC: weakness and abdominal distention    HPI:  67M w/ PMH of HTN, grade IV hydronephrosis of L kidney s/p L PCN (placed 5/2023), stutter, hiatal hernia, iron deficiency anemia, H Pylori (untreated), and gastric mass (never biopsied) presents to the ED with at least 4 days of worsening weakness, abdominal distension, and no output from his PCN. Pt and daughters bedside are only able to give limited information, but for the past few days, he has noticed no output from his PCN as well as increasing abdominal distension, pain, and anorexia. He reports that he has not felt normal for the past 10 days. His PCN used to put out serosanguinous fluid, but the output changed to feculent/brown liquid over the past day. In the ED, he was noted to be in new onset afib w/ RVR to 150+ as well as experiencing some difficulty breathing. He was started on BiPAP. Stat labs were notable for WBC 47, Cr 2.9, and lactate 6. CT A/P w/ IV contrast showed free intraperitoneal fluid and air from an unknown source as well as fluid and air in the L kidney.  (18 Jun 2023 18:34)    PERTINENT PM/SXH:   HTN  hydronephrosis  H pylori      FAMILY HISTORY:  Unable to determine as patient sedated and intubated    ITEMS NOT CHECKED ARE NOT PRESENT    SOCIAL HISTORY:   Significant other/partner[ ]  Children[ ]  Catholic/Spirituality:  Substance hx:  [ ]   Tobacco hx:  [ ]   Alcohol hx: [ ]   Living Situation: [x ]Home  [ ]Long term care  [ ]Rehab [ ]Other  Home Services: [ ] HHA [ ] Visting RN [ ] Hospice  Occupation:  Home Opioid hx:  [ ] Y [ ] N [x ] I-Stop Reference No:  Reference #: [id number] - no meds    ADVANCE DIRECTIVES:    [x ] Full Code [ ] DNR  MOLST  [ ]  Living Will  [ ]   DECISION MAKER(s):  [x ] Health Care Proxy(s)  [ ] Surrogate(s)  [ ] Guardian           Name(s): Phone Number(s):  Tracey    FAN (I)ADL(s) (prior to admission):  [place]: [ ]Total  [ ] Moderate [ ]Dependent  Palliative Performance Status Version 2:         %    http://npcrc.org/files/news/palliative_performance_scale_ppsv2.pdf    Allergies    No Known Allergies    Intolerances    MEDICATIONS  (STANDING):  albuterol/ipratropium for Nebulization 3 milliLiter(s) Nebulizer every 6 hours  bacitracin   Ointment 1 Application(s) Topical two times a day  calcium acetate 1334 milliGRAM(s) Oral every 8 hours  caspofungin IVPB      chlorhexidine 0.12% Liquid 15 milliLiter(s) Oral Mucosa every 12 hours  chlorhexidine 2% Cloths 1 Application(s) Topical <User Schedule>  chlorhexidine 4% Liquid 1 Application(s) Topical <User Schedule>  dexMEDEtomidine Infusion 0.2 MICROgram(s)/kG/Hr (3.82 mL/Hr) IV Continuous <Continuous>  digoxin  Injectable 250 MICROGram(s) IV Push every 6 hours  ferrous    sulfate 325 milliGRAM(s) Oral daily  gabapentin 200 milliGRAM(s) Oral every 8 hours  heparin   Injectable 5000 Unit(s) SubCutaneous every 8 hours  levoFLOXacin IVPB 500 milliGRAM(s) IV Intermittent every 48 hours  meropenem  IVPB 1000 milliGRAM(s) IV Intermittent every 12 hours  metoprolol tartrate Injectable 2.5 milliGRAM(s) IV Push every 6 hours  pantoprazole  Injectable 40 milliGRAM(s) IV Push daily  phenylephrine    Infusion 0.1 MICROgram(s)/kG/Min (1.43 mL/Hr) IV Continuous <Continuous>  sodium bicarbonate 1300 milliGRAM(s) Oral three times a day  sodium bicarbonate  Infusion 0.196 mEq/kG/Hr (100 mL/Hr) IV Continuous <Continuous>    MEDICATIONS  (PRN):  fentaNYL    Injectable 25 MICROGram(s) IV Push every 3 hours PRN Moderate Pain (4 - 6)  sodium chloride 0.9% lock flush 10 milliLiter(s) IV Push every 1 hour PRN Pre/post blood products, medications, blood draw, and to maintain line patency  sodium chloride 0.9% lock flush 10 milliLiter(s) IV Push every 1 hour PRN Pre/post blood products, medications, blood draw, and to maintain line patency  traMADol 25 milliGRAM(s) Oral every 8 hours PRN Moderate Pain (4 - 6)    PRESENT SYMPTOMS: [x]Unable to obtain due to poor mentation   Source if other than patient:  [ ]Family   [ ]Team     Pain: [ ]yes [ ]no  QOL impact -   Location -                    Aggravating factors -  Quality -  Radiation -  Timing-  Severity (0-10 scale):  Minimal acceptable level (0-10 scale):     CPOT:  0  https://www.[facility].org/getattachment/[email or web address]/Critical-Care-Pain-Observation-Tool-(CPOT)    PAIN AD Score:   http://geriatrictoolkit.[facility]/cog/painad.pdf (press ctrl +  left click to view)    Dyspnea:                           [ ]None[ ]Mild [ ]Moderate [ ]Severe     Respiratory Distress Observation Scale (RDOS): 0  A score of 0 to 2 signifies little or no respiratory distress, 3 signifies mild distress, scores 4 to 6 indicate moderate distress, and scores greater than 7 signify severe distress  https://www.[facility].ca/sites/default/files/PDFS/623470-vcewajzwmzm-bnpghvub-ltsnmbmogbm-tyhri.pdf    Anxiety:                             [ ]None[ ]Mild [ ]Moderate [ ]Severe   Fatigue:                             [ ]None[ ]Mild [ ]Moderate [ ]Severe   Nausea:                             [ ]None[ ]Mild [ ]Moderate [ ]Severe   Loss of appetite:              [ ]None[ ]Mild [ ]Moderate [ ]Severe   Constipation:                    [ ]None[ ]Mild [ ]Moderate [ ]Severe    Other Symptoms:  [ ]All other review of systems negative     Palliative Performance Status Version 2:        10 %    http://[facility].org/files/news/palliative_performance_scale_ppsv2.pdf    PHYSICAL EXAM:  Vital Signs Last 24 Hrs  T(C): 37.4 (26 Jun 2023 16:00), Max: 37.7 (26 Jun 2023 08:00)  T(F): 99.4 (26 Jun 2023 16:00), Max: 99.9 (26 Jun 2023 08:00)  HR: 126 (26 Jun 2023 18:00) (117 - 140)  BP: 149/80 (26 Jun 2023 11:15) (86/57 - 149/80)  BP(mean): 103 (26 Jun 2023 11:15) (66 - 103)  RR: 22 (26 Jun 2023 18:00) (2 - 27)  SpO2: 100% (26 Jun 2023 18:00) (95% - 100%)    Parameters below as of 26 Jun 2023 18:00  Patient On (Oxygen Delivery Method): ventilator    O2 Concentration (%): 40 I&O's Summary    25 Jun 2023 07:01  -  26 Jun 2023 07:00  --------------------------------------------------------  IN: 3337.7 mL / OUT: 665 mL / NET: 2672.7 mL    26 Jun 2023 07:01  -  26 Jun 2023 18:33  --------------------------------------------------------  IN: 1432.2 mL / OUT: 575 mL / NET: 857.2 mL    GENERAL:  [ ] No acute distress [ ]Lethargic  [ x]Unarousable  [ ]Verbal  [ ]Non-Verbal [ ]Cachexia    BEHAVIORAL/PSYCH:  [ ]Alert and Oriented x  [ ] Anxiety [ ] Delirium [ ] Agitation [ x] Calm   EYES: [ ] No scleral icterus [ ] Scleral icterus [x ] Closed  ENMT:  [ ]Dry mouth  [x ]No external oral lesions [ ] No external ear or nose lesions  CARDIOVASCULAR:  [ ]Regular [ ]Irregular [ ]Tachy [x ]Not Tachy  [ ]Ben [ ] Edema [ ] No edema  PULMONARY:  [ ]Tachypnea  [ ]Audible excessive secretions [x ] No labored breathing [ ] labored breathing  GASTROINTESTINAL: [ ]Soft  [ ]Distended  x[ ]Not distended [ ]Non tender [ ]Tender  MUSCULOSKELETAL: [ ]No clubbing [ ] clubbing  [x ] No cyanosis [ ] cyanosis  NEUROLOGIC: [ ]No focal deficits  [ ]Follows commands  [x ]Does not follow commands  [ ]Cognitive impairment  [ ]Dysphagia  [ ]Dysarthria  [ ]Paresis   SKIN: [ ] Jaundiced [x ] Non-jaundiced [ ]Rash [ ]No Rash [ ] Warm [ ] Dry  MISC/LINES: [x ] ET tube [ ] Trach [ ]NGT/OGT [ ]PEG [ ]FoleyGENERAL:  [ ] No acute distress [ ]Lethargic  [ ]Unarousable  [ ]Verbal  [ ]Non-Verbal [ ]Cachexia    BEHAVIORAL/PSYCH:  [ ]Alert and Oriented x  [ ] Anxiety [ ] Delirium [ ] Agitation [ ] Calm       CRITICAL CARE:  [x ] Shock Present  [ ]Septic [ ]Cardiogenic [ ]Neurologic [ ]Hypovolemic  [ ]  Vasopressors [ ]  Inotropes   [x ]Respiratory failure present [ x]Mechanical ventilation [ ]Non-invasive ventilatory support [ ]High flow  [ ]Acute  [ ]Chronic [ ]Hypoxic  [ ]Hypercarbic [ ]Other  [ ]Other organ failure     LABS: reviewed by me                        7.8    38.86 )-----------( 250      ( 26 Jun 2023 11:27 )             23.8   06-26    129<L>  |  94<L>  |  116<HH>  ----------------------------<  133<H>  5.0   |  18  |  4.2<HH>    Ca    7.5<L>      26 Jun 2023 05:30  Phos  8.4     06-25  Mg     2.3     06-25    PT/INR - ( 25 Jun 2023 18:02 )   PT: 17.00 sec;   INR: 1.47 ratio         PTT - ( 26 Jun 2023 11:27 )  PTT:33.2 sec    Urinalysis Basic - ( 26 Jun 2023 05:30 )    Color: x / Appearance: x / SG: x / pH: x  Gluc: 133 mg/dL / Ketone: x  / Bili: x / Urobili: x   Blood: x / Protein: x / Nitrite: x   Leuk Esterase: x / RBC: x / WBC x   Sq Epi: x / Non Sq Epi: x / Bacteria: x      RADIOLOGY & ADDITIONAL STUDIES: reviewed by me  < from: Xray Chest 1 View- PORTABLE-Urgent (Xray Chest 1 View- PORTABLE-Urgent .) (06.26.23 @ 03:09) >  Impression:    Bilateral opacifications. Support devices as described    Follow-up as needed.    < end of copied text >      EKG: reviewed by me  < from: 12 Lead ECG (06.25.23 @ 06:19) >    Ventricular Rate 108 BPM    QRS Duration 88 ms    Q-T Interval 292 ms    QTC Calculation(Bazett) 391 ms    R Axis 45 degrees    T Axis 68 degrees    Diagnosis Line Atrial fibrillation with rapid ventricular response  Low voltage QRS  Nonspecific STand T wave abnormality  Abnormal ECG    < end of copied text >      PROTEIN CALORIE MALNUTRITION PRESENT: [ ]mild [ ]moderate [ ]severe [ ]underweight [ ]morbid obesity  https://www.andeal.org/vault/2440/web/files/ONC/Table_Clinical%20Characteristics%20to%20Document%20Malnutrition-White%20JV%20et%20al%202012.pdf    Height (cm): 180 (06-22-23 @ 21:00), 180.3 (05-11-23 @ 08:58)  Weight (kg): 76.476 (06-22-23 @ 21:00), 66.2 (05-10-23 @ 10:54)  BMI (kg/m2): 23.6 (06-22-23 @ 21:00), 20.4 (05-11-23 @ 08:58)    [ ]PPSV2 < or = to 30% [ ]significant weight loss  [ ]poor nutritional intake  [ ]anasarca      [ ]Artificial Nutrition      REFERRALS:   [ ]Chaplaincy  [ ]Hospice  [ ]Child Life  [ x]Social Work  [ ]Case management [ ]Holistic Therapy     Patient discussed with primary medical team MD  Palliative care education provided to patient and/or family  Patient and/or family assessed for spiritual and social needs    Goals of Care Document:

## 2023-06-26 NOTE — ADVANCED PRACTICE NURSE CONSULT - ASSESSMENT
History of Present Illness:   67M w/ PMH of HTN, grade IV hydronephrosis of L kidney s/p L PCN (placed 5/2023), stutter, hiatal hernia, iron deficiency anemia, H Pylori (untreated), and gastric mass (never biopsied) presents to the ED with at least 4 days of worsening weakness, abdominal distension, and no output from his PCN. Pt and daughters bedside are only able to give limited information, but for the past few days, he has noticed no output from his PCN as well as increasing abdominal distension, pain, and anorexia. He reports that he has not felt normal for the past 10 days. His PCN used to put out serosanguinous fluid, but the output changed to feculent/brown liquid over the past day. In the ED, he was noted to be in new onset afib w/ RVR to 150+ as well as experiencing some difficulty breathing. He was started on BiPAP. Stat labs were notable for WBC 47, Cr 2.9, and lactate 6. CT A/P w/ IV contrast showed free intraperitoneal fluid and air from an unknown source as well as fluid and air in the L kidney.     Allergies and Intolerances:        Allergies:  	No Known Allergies:     Home Medications:   * Patient Currently Takes Medications as of 18-Jun-2023 17:16 documented in Structured Notes  · 	Norvasc 2.5 mg oral tablet: Last Dose Taken:  , 1 tab(s) orally once a day    Patient received lying in bed. Intubated and sedated. Limited mobility. Incontinent of stool, bobby in place. High risk for pressure injury.    Wound #1    Type of Wound: Deep Tissue Injury with progression  Location: Sacrococcygeal region  Measurements: ~9ucp9ay  Tunneling/ Undermining: No  Wound bed: Dark purple with areas of partial thickness skin loss  Wound edges: Intact  Periwound: Intact  Wound exudate: None  Wound odor: No  Induration, erythema, warmth: No  Wound pain: No    Wound #2    Type of Wound: Venous Ulcer  Location: Right lower leg  Measurements: ~4bze5kt  Tunneling/ Undermining: No  Wound bed: Pink/yellow  Wound edges: Intact  Periwound: Intact  Wound exudate: None  Wound odor: No  Induration, erythema, warmth: No  Wound pain: No    Wound #3  Skin tear noted to right forearm. ~0.5cmx0.5cm. No exudate, no odor.

## 2023-06-26 NOTE — PROCEDURE NOTE - NSINFORMCONSENT_GEN_A_CORE
Benefits, risks, and possible complications of procedure explained to patient/caregiver who verbalized understanding and gave written consent. Obtained from Babita castherita/Benefits, risks, and possible complications of procedure explained to patient/caregiver who verbalized understanding and gave verbal consent.

## 2023-06-26 NOTE — PROGRESS NOTE ADULT - ASSESSMENT
66y/o M a/w retroperitoneal and intraabdominal abscess s/p IR placement of drain and LEFT PCN 6/18, s/p percutaneous drainage of LEFT renal abscess POD # 4.     - Case discussed with Dr. Rodarte, Dr. Perdomo, and Dr. Vásquez, plan as per attending   - Suspicion of Genito-urinary TB; discussed with Pathology this morning regarding staining pathology slides for TB  - Continue IV Abx and Antifungals as per ID, consider anti-tuberculous drugs until pathology stains are back for TB  - F/u GI c/s regarding lower GI bleeding   - F/u Palliative consult regarding goals of care   - Continue care as per SICU .

## 2023-06-26 NOTE — CHART NOTE - NSCHARTNOTEFT_GEN_A_CORE
PALLIATIVE MEDICINE INTERDISCIPLINARY TEAM NOTE    Provider:                                             Met with: [ x  ] Patient  [   ] Family  [   ] Other:    Primary Language: [ x  ] English [   ] Other*:                      *Interpretation provided by:    SUPPORT DIAGNOSES            (Check all that apply)    [   ] EOL issues  [ x  ] Advanced Illness  [   ] Cultural / spiritual concerns  [   ] Pain / suffering  [   ] Dementia / AMS  [   ] Other:  [   ] AD issues  [   ] Grief / loss / sadness  [   ] Discharge issues  [  x ] Distress / coping    PSYCHOSOCIAL ASSESSMENT OF PATIENT         (Check all that apply)    [ x  ] Initial Assessment            [   ] Reassessment          [   ] Not Applicable this visit    Pain/suffering acuity:  [ x  ] None to mild (0-3)           [   ] Moderate (4-6)        [   ] High (7-10)    Mental Status:  [   ] Alert/oriented (x3)          [   ] Confused/Altered(x2/x1)         [  x ] Non-resp: Intubated     Functional status:  [   ] Independent w ADLs      [   ] Needs Assistance             [  x ] Bedbound/Full Care    Coping:  [   ] Coping well                     [   ] Coping w/difficulty            [   ] Poor coping  [ x ] unable to assess     Support system:  [   ] Strong                              [ x  ] Adequate                        [   ] Inadequate      Past history and medications for:     [ ] Anxiety       [ ] Depression    [ ] Sleep disorders       SERVICE PROVIDED  [   ]Discharge support / facilitation  [   ]AD / goals of care counseling                                  [   ]EOL / death / bereavement counseling  [   ]Counseling / support                                                [   ] Family meeting  [   ]Prayer / sacrament / ritual                                      [   ] Referral   [ x  ]Other                                                                       NOTE and Plan of Care (PoC):    patient is a 68 y/o M with pmhx of HTN, grade IV hydronephrosis of L kidney, stutter, hiatal hernia, iron deficiency anemia, H pylori, and gastric mass, presenting with c/o worsening weakness, abdominal distension, and no output from his PCN. visited patient earlier today. patient presently intubated. no family at bedside at time of visit. will f/u [phone]

## 2023-06-27 NOTE — PROGRESS NOTE ADULT - ASSESSMENT
ASSESSMENT  66yo Male with pmh of hypertension, massive Grade IV hydronephrosis s/p left nephrostomy placed in May 2023 by [facility] presents to the ED with little to no urine output from LEFT nephrostomy from about a week. PCN  more recently drainage was changed to brown/purulent fluid. 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      IMPRESSION  #Reintubated, L lung white out, rule out HAP  #Septic shock on admission due to Emphysematous pyelonephritis with suspected perforation/ pneumoperitoneum with suspected liver abscesses & splenic infarct vs abscess    6/24 bronch CX NG,   Moderate Yeast- likely colonizer    6/22 UCX  Few Finegoldia magna "Susceptibilities not performed"  Nephrostolithotomy, percutaneous, with lithotripsy, large stone 22-Jun-2023 23:27:09 left large thick abscess materia dimension of aspirate over 4 x 4 x 4 cm Bernie Perdomo  Change external ureteral stent 22-Jun-2023 23:28:03 left Bernie Perdomo  Nephrostogram 22-Jun-2023 23:28:31 left Bernie Perdomo. "thick gelatinous material that was difficult to suction out even with the shock pulse. I would alternate 2 prong to remove and break up / shock pulse to suck out. after an hour where I had no clear planes I elected to stop. irrigation through the 26 bobby took out a lot additional material  in ashlyn catch cup 4 x 4 x 4 cm lump of material trapped"    6/19 UCX NG; UA Blood: x / Protein: 300 mg/dL / Nitrite: Negative   Leuk Esterase: Large / RBC: 8 /HPF / WBC >720 /HPF   Sq Epi: x / Non Sq Epi: x / Bacteria: Moderate    6/18 L PCN     Few Stenotrophomonas maltophilia Levofloxacin: S 2    6/18 L PCN     Numerous Anaerobic Gram Positive Cocci Most closely resembling  Peptoniphilus species "Susceptibilities not performed"    6/18 L PCN   Numerous Gram positive cocci in pairs per oil power field    6/18 BCX NGTD     s/p 6/18  Left PCN upsized to 16F and 1200cc of very viscous tan colored fluid was aspirated. RLQ 16F drain was placed and 1200cc of tan colored fluid was aspirated (less viscous). A sample from each location was sent for culture.     Repeat CT 6/19 Since one day earlier,  No extraluminal contrast. Oral contrast was last seen in the terminal ileum.  Status post left nephrostomy tube placement. Left enlarged kidney with severe hydronephrosis/collection has decreased, now measuring 16 x 12 cm from 20 x 14 cm  Surgical Pathology Report:   Left renal abscess, possible parenchyma  Left renal abscess, possible parenchyma, percutaneous drainage:  -  Fragments of extensively necrotic tissue, cannot be fullycharacterized  Comment: The microscopic appearance of the necrotic tissue is concerning for a neoplasm with coagulative necrosis. Definitive diagnosis cannot be  made, due to lack of viable material.  Additional sections will be submitted.  Immunohistochemical studies willbe performed, in an attempt to characterise the necrotic tissue.  Based on the H&E appearance, an infectious process such as tuberculosis  appears less likely, but special stains for microorganisms (acid-fastbacilli and fungal) are in progress and will be reported separately.   (06.22.23 @ 22:12)  < from: CT Abdomen and Pelvis w/ IV Cont (06.18.23 @ 15:15) >  1 ) Moderate pneumoperitoneum with partial diffusion throughout moderate   volume ascites and with associated intermittent peritoneal enhancement   and nodularity. Perforation and peritonitis may be related to underlying   emphysematous pyelonephritis (see below). Less likely sources of   perforation include ureteral/bladder disruption, and bowel perforation   which cannot be entirely excluded on the provided images.  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 The compressed residual renal   parenchyma is inseparable from the adjacent fascial/fatty layers   anterosuperiorly and direct rupture into the peritoneum is a possibility   (6-355). The previously placed left-sided nephrostomy tube pigtail is   notedto be within the left renal collecting system.  3) New hepatic hypodensities measuring up to 2.6 cm, suspicious for   development of multiple focal abscesses.  4) Wedge-shaped region of hypodensity within the spleen may reflect   splenic infarct in the correct clinical setting. Developing   phlegmon/abscess cannot be excluded in the current clinical setting  5) Increased extensive retroperitoneal, portacaval and likely   gastrohepatic heterogeneously enhancing lymphadenopathy. Findings may be   reactive.  6) Mediastinal lymphadenopathy measuring up to 2.4 cm short axis.   Underlying etiology may be reactive, infectious/inflammatory, or   neoplastic. A short-term 3 month follow-up CT chest should be considered   for further evaluation.  7)Right middle and upper lobe solid pulmonary nodules which are not well   delineated due to respiratory motion but measure less than 6 mm.   attention on follow-up exam.  #Lactic acidosis  #Abx allergy: No Known Allergies  #Prolonged QTC Calculation(Bazett) 526 ms  #Hyponatremia  #AKICreatinine: 3.3 mg/dL (06.21.23 @ 21:28)  )crcl 26  Ht 180  Weight (kg): 76.5 (06-18-23 @ 21:27)    RECOMMENDATIONS  - Pathology  concerning for a neoplasm with coagulative necrosis  - Needs OR source control for cure  - No further Vanc recommended   - DECREASE to Ar 500 milliGRAM(s) IV Intermittent every 12 hours   - Continue levaquin IV 500mg q48h IV   - Team started caspo 50mg q24h IV (no yeast in UCX, yeast in bronch is a likely colonizer)  - Send fungitell - D/C caspo if unremarkable   - Overall low suspicion for  TB as path concerning for malignancy and he is growing bacterial organisms that can be associated with pyelonephritis , worth ruling out. Urine AFB negative, check quantiferon gold. Would not recommend empiric TB treatment at this time as toxicities outweigh benefit  - Trend WBC     If any questions, please send a message or call on [employer] Teams  Please continue to update ID with any pertinent new laboratory or radiographic findings  #[id number]

## 2023-06-27 NOTE — PROGRESS NOTE ADULT - ASSESSMENT
67M w/ PMH of HTN, grade IV hydronephrosis of L kidney s/p L PCN (placed 5/2023), stutter, hiatal hernia, iron deficiency anemia, H Pylori (untreated), presents to the ED with at least 4 days of worsening weakness, abdominal distension, and no output from his PCN.  Admitted to SICU for sepsis and septic shock secondary to  emphysematous pyelonephritis. Team reported bloody bowel movement since yesterday. Patient had multiple bowel movements with blood streaks. GI consulted for hematochezia.     # Hematochezia DDx (Hemorrhoidal, ischemic colitis, Diverticulosis, vascular lesions, CRC or others).   Sepsis, on pressors for septic shock. On AC for Afib. Brown stool today     PLAN:   Risk outweighs the benefit for colonoscopy. Will continue with the conservative measures and supportive care for now. Plan fo Colonoscopy as outpatient after resolution of acute issues. Miralax TID. Avoid Opioids, Avoid constipation, Correct electrolytes. NPO for now. In case of large volume hematochezia with change in hemodynamics please obtain STAT CTA. Monitor CBC. Call as needed     # Stomach mass in previous CT S/P EGD with no intraluminal mass:   # H-pylori gastritis   PLAN   Plan for EGD with EUS + FNA as outpatient   H-pylori treatment after resolution of acute issues

## 2023-06-27 NOTE — PROGRESS NOTE ADULT - SUBJECTIVE AND OBJECTIVE BOX
CHRISTINE VILLAVICENCIO  [id number]  67y Male    Indication for ICU admission: mechanical ventilatior secondary to urosepsis     Admit Date:06-18-23  ICU Date: 6/18/23  OR Date: 6/18, 6/22      24HRS EVENT:  6/26  NIGHT  - Digoxin level ordered for the morning  - WBC increased to 41K  - H&H remains stable  - no electroly supplementation needed.  - increased protonix frequency to q12 given h/o untreated H. Pylori and positive fecal occult blood test      DAY  - Starting digoxin 0.25mg q6 x4 doses for Afib rate control  - Hgb 8.6-->7.5, repeat CBC @ 11am--->7.8; hepsubq started   - SBT started, tachycardic to 140s-150, placed back on vent  - palliative c/s- pt to remain full code, continue current treatment  - FOBT positive         REVIEW OF SYSTEMS  [x ] A ten-point review of systems was otherwise negative except as noted.  [ ] Due to altered mental status/intubation, subjective information were not able to be obtained from the patient. History was obtained, to the extent possible, from review of the chart and collateral sources of information.

## 2023-06-27 NOTE — PHARMACOTHERAPY INTERVENTION NOTE - COMMENTS
Patient ordered both fentanyl 25 mcg q3h prn and tramadol 25 mg q8h prn for the same pain scale of 4-6. Contacted provider to ask for modification of one of these orders to avoid duplicate prn indication.
Patient ordered both fentanyl 25 mcg and tramadol 25 mg prn for the same pain scale of 4-6. Message sent to provider to modify one of these orders to avoid duplicate indication. Tramadol order dcd.

## 2023-06-27 NOTE — PROGRESS NOTE ADULT - SUBJECTIVE AND OBJECTIVE BOX
Gastroenterology progress note:     Patient is a 67y old  Male who presents with a chief complaint of pneumoperitoneum, emphysematous pyelonephritis (27 Jun 2023 14:24)       Admitted on: 06-18-23    We are following the patient for hematochezia   Brown stool in the bag in am. no overnight events     PAST MEDICAL & SURGICAL HISTORY:      MEDICATIONS  (STANDING):  albuterol/ipratropium for Nebulization 3 milliLiter(s) Nebulizer every 6 hours  bacitracin   Ointment 1 Application(s) Topical two times a day  calcium acetate 1334 milliGRAM(s) Oral every 8 hours  caspofungin IVPB      caspofungin IVPB 50 milliGRAM(s) IV Intermittent every 24 hours  chlorhexidine 0.12% Liquid 15 milliLiter(s) Oral Mucosa every 12 hours  chlorhexidine 2% Cloths 1 Application(s) Topical <User Schedule>  dexMEDEtomidine Infusion 0.2 MICROgram(s)/kG/Hr (3.82 mL/Hr) IV Continuous <Continuous>  ferrous    sulfate 325 milliGRAM(s) Oral daily  gabapentin 200 milliGRAM(s) Oral every 8 hours  heparin   Injectable 5000 Unit(s) SubCutaneous every 8 hours  levoFLOXacin IVPB 500 milliGRAM(s) IV Intermittent every 48 hours  meropenem  IVPB 500 milliGRAM(s) IV Intermittent every 12 hours  metoprolol tartrate Injectable 2.5 milliGRAM(s) IV Push every 6 hours  pantoprazole  Injectable 40 milliGRAM(s) IV Push every 12 hours  phenylephrine    Infusion 0.1 MICROgram(s)/kG/Min (1.43 mL/Hr) IV Continuous <Continuous>  sodium bicarbonate 1300 milliGRAM(s) Oral three times a day  sodium chloride 0.9%. 1000 milliLiter(s) (75 mL/Hr) IV Continuous <Continuous>    MEDICATIONS  (PRN):  fentaNYL    Injectable 25 MICROGram(s) IV Push every 3 hours PRN Moderate Pain (4 - 6)  sodium chloride 0.9% lock flush 10 milliLiter(s) IV Push every 1 hour PRN Pre/post blood products, medications, blood draw, and to maintain line patency  sodium chloride 0.9% lock flush 10 milliLiter(s) IV Push every 1 hour PRN Pre/post blood products, medications, blood draw, and to maintain line patency      Allergies  No Known Allergies      Review of Systems:   unable to obtain     Physical Examination:  T(C): 37.7 (06-27-23 @ 12:00), Max: 37.9 (06-27-23 @ 07:28)  HR: 96 (06-27-23 @ 13:57) (90 - 127)  BP: 99/67 (06-27-23 @ 12:00) (96/42 - 168/60)  RR: 25 (06-27-23 @ 12:15) (2 - 25)  SpO2: 100% (06-27-23 @ 13:57) (98% - 100%)      06-26-23 @ 07:01  -  06-27-23 @ 07:00  --------------------------------------------------------  IN: 3117.4 mL / OUT: 1790 mL / NET: 1327.4 mL    06-27-23 @ 07:01  -  06-27-23 @ 15:03  --------------------------------------------------------  IN: 681.5 mL / OUT: 445 mL / NET: 236.5 mL        GENERAL: intubated   HEAD:  Atraumatic, Normocephalic  EYES: conjunctiva and sclera clear  NECK: Supple, no JVD or thyromegaly  CHEST/LUNG: Clear to auscultation bilaterally; No wheeze, rhonchi, or rales  HEART: Regular rate and rhythm; normal S1, S2, No murmurs.  ABDOMEN: Soft, nontender, nondistended  brown stool   NEUROLOGY: sedated   SKIN: Intact, no jaundice     Data:                        8.1    55.65 )-----------( 267      ( 27 Jun 2023 13:37 )             24.2     Hgb trend:  8.1  06-27-23 @ 13:37  7.5  06-26-23 @ 19:53  7.8  06-26-23 @ 11:27  7.5  06-26-23 @ 08:21  7.3  06-26-23 @ 06:36  8.6  06-25-23 @ 21:13  8.5  06-24-23 @ 20:56  8.6  06-24-23 @ 17:38        06-27    131<L>  |  92<L>  |  120<HH>  ----------------------------<  87  4.2   |  20  |  4.1<HH>    Ca    7.2<L>      27 Jun 2023 04:25  Phos  7.7     06-26  Mg     2.3     06-26    TPro  3.9<L>  /  Alb  2.1<L>  /  TBili  0.3  /  DBili  0.2  /  AST  21  /  ALT  12  /  AlkPhos  98  06-26    Liver panel trend:  TBili 0.3   /   AST 21   /   ALT 12   /   AlkP 98   /   Tptn 3.9   /   Alb 2.1    /   DBili 0.2      06-26  TBili 0.6   /   AST 36   /   ALT 34   /   AlkP 87   /   Tptn 4.2   /   Alb 2.3    /   DBili --      06-20  TBili 0.9   /   AST 57   /   ALT 42   /   AlkP 83   /   Tptn 4.2   /   Alb 2.5    /   DBili 0.7      06-19  TBili 0.7   /   AST 34   /   ALT 54   /   AlkP 106   /   Tptn 4.9   /   Alb 2.3    /   DBili --      06-18      PT/INR - ( 25 Jun 2023 18:02 )   PT: 17.00 sec;   INR: 1.47 ratio         PTT - ( 26 Jun 2023 11:27 )  PTT:33.2 sec       Radiology:  CT Abdomen and Pelvis No Cont:   ACC: [id number] EXAM:  CT ABDOMEN AND PELVIS   ORDERED BY: JULIANNE ESPOSITO DATE:  06/27/2023          INTERPRETATION:  CLINICAL STATEMENT: Follow-up renal abscess    TECHNIQUE: Contiguous axial CT images were obtained from the lower chest   to the pubic symphysis .  Oral contrast was given.  Reformatted images in   the coronal and sagittal planes were acquired.    COMPARISON CT: CT abdomen pelvis from June 23, 2023.    OTHER STUDIES USED FOR CORRELATION: None.      FINDINGS:    Limited evaluation of solid organs and vascular structures secondary to   lack of intravenous contrast and streak artifact due to contact with CT   gantry    LOWER CHEST: Bilateral pleural effusions and adjacent compressive   atelectasis, again noted. Small pericardial effusion.    HEPATOBILIARY: Difficult to evaluate due to significant artifact. Again   demonstrated are poorly evaluated hepatic hypodensities adjacent to the   falciform ligament could reflect abscesses or liver masses. Evaluation   also limited by lack of intravenous contrast. Increased density within   the gallbladder again suggest vicarious excretion..    SPLEEN: Difficult to evaluate.    PANCREAS: Not well-visualized.    ADRENAL GLANDS: Not well-visualized.    KIDNEYS: Large right renal cyst again demonstrated. Suggestion of   contrast within the right kidney which could reflect renal failure    A normal left kidney is not identified. Percutaneous catheter seen within   the left kidney. Ill-defined abscess in the region of the left kidney may   have decreased in size from the prior examination but difficult to   exactly measure..    ABDOMINOPELVIC NODES: Difficult to visualize.    PELVIC ORGANS: Mena catheter within a poorly distended urinary bladder.   Prostate gland unchanged.    PERITONEUM/MESENTERY/BOWEL: Peritoneal catheter terminates within the   right lower quadrant. There is no bowel obstruction. Again demonstrated   is pneumoperitoneum scattered throughout the peritoneum anteriorly not   significantly changed from prior examination. Diffuse ascites again seen   throughout the abdomen pelvis, overall unchanged.    BONES/SOFT TISSUES: Diffuse anasarca. Degenerative changes again noted..    OTHER: Extensive vascular calcifications are again noted. Nasogastric   tube is seen extending to the stomach.      IMPRESSION:    Study is overall very limited due to lack of intravenous contrast, beam   Willingham artifact.    Persistent bilateral pleural effusions with adjacent consolidations   likely reflecting compressive atelectasis.    Left kidney poorly defined with a heterogeneous collection with air and   fluid which has likely mildly diminished in size compared to the prior   examination but exact comparison limited.    Diffuse ascites again noted. Diffuse anasarca again noted. Persistent   pneumoperitoneum.    Poorly defined lesions within the liver adjacent to the falciform   ligament which may again reflect abscesses or masses, difficult to   evaluate due to the limitations described above.    --- Endof Report ---            CHARLES SINGH MD; Attending Radiologist  This document has been electronically signed. Jun 27 2023  1:22PM (06-27-23 @ 13:09)

## 2023-06-27 NOTE — PROGRESS NOTE ADULT - ASSESSMENT
66y/o M a/w retroperitoneal and intraabdominal abscess s/p IR placement of drain and LEFT PCN 6/18, s/p percutaneous drainage of LEFT renal abscess POD # 5.     - F/u Pathology, concern for  TB  - Continue IV Abx and Antifungals as per ID, consider anti-tuberculous drugs until pathology stains are back for TB  - F/u Palliative consult regarding goals of care   - Continue care as per SICU   66y/o M a/w retroperitoneal and intraabdominal abscess s/p IR placement of drain and LEFT PCN 6/18, s/p percutaneous drainage of LEFT renal abscess POD # 5.     - F/u Pathology, concern for  TB  - Continue IV Abx and Antifungals as per ID, consider anti-tuberculous drugs until pathology stains are back for TB  - F/u Palliative consult regarding goals of care   - Continue care as per SICU    issues discussed with Pathology-not definitive but available tissue c/w necrotic renal cell  issues discussed with ID- Meropenem covers the anaerobic gram positive, agrees given cat scan showing significant decrease in cavity size to wait another ? 48 hours and if needed go for surgery relaizing risks of the procedure  issues discussed with dr cruz and above reviewed he agrees  issues discussed with the family who are aware decision re surgery will depend on course including the dx of PROBABLE malignancy, possibility of a para nepolastic rxn, risks fo removal of the kidney for source control  this is different from post op care for the "abscess" drainage done last Thursday

## 2023-06-27 NOTE — PROGRESS NOTE ADULT - SUBJECTIVE AND OBJECTIVE BOX
Nephrology progress note    Patient was seen and examined, events over the last 24 h noted .    Allergies:  No Known Allergies    Hospital Medications:   MEDICATIONS  (STANDING):  albuterol/ipratropium for Nebulization 3 milliLiter(s) Nebulizer every 6 hours  bacitracin   Ointment 1 Application(s) Topical two times a day  calcium acetate 1334 milliGRAM(s) Oral every 8 hours  caspofungin IVPB      caspofungin IVPB 50 milliGRAM(s) IV Intermittent every 24 hours  chlorhexidine 0.12% Liquid 15 milliLiter(s) Oral Mucosa every 12 hours  chlorhexidine 2% Cloths 1 Application(s) Topical <User Schedule>  dexMEDEtomidine Infusion 0.2 MICROgram(s)/kG/Hr (3.82 mL/Hr) IV Continuous <Continuous>  ferrous    sulfate 325 milliGRAM(s) Oral daily  gabapentin 200 milliGRAM(s) Oral every 8 hours  heparin   Injectable 5000 Unit(s) SubCutaneous every 8 hours  levoFLOXacin IVPB 500 milliGRAM(s) IV Intermittent every 48 hours  meropenem  IVPB 500 milliGRAM(s) IV Intermittent every 12 hours  metoprolol tartrate Injectable 2.5 milliGRAM(s) IV Push every 6 hours  pantoprazole  Injectable 40 milliGRAM(s) IV Push every 12 hours  phenylephrine    Infusion 0.1 MICROgram(s)/kG/Min (1.43 mL/Hr) IV Continuous <Continuous>  sodium bicarbonate 1300 milliGRAM(s) Oral three times a day  sodium chloride 0.9%. 1000 milliLiter(s) (75 mL/Hr) IV Continuous <Continuous>        VITALS:  T(F): 99.9 (06-27-23 @ 12:00), Max: 100.2 (06-27-23 @ 07:28)  HR: 96 (06-27-23 @ 13:57)  BP: 99/67 (06-27-23 @ 12:00)  RR: 25 (06-27-23 @ 12:15)  SpO2: 100% (06-27-23 @ 13:57)  Wt(kg): --    06-25 @ 07:01  -  06-26 @ 07:00  --------------------------------------------------------  IN: 3337.7 mL / OUT: 665 mL / NET: 2672.7 mL    06-26 @ 07:01 - 06-27 @ 07:00  --------------------------------------------------------  IN: 3117.4 mL / OUT: 1790 mL / NET: 1327.4 mL    06-27 @ 07:01 - 06-27 @ 14:24  --------------------------------------------------------  IN: 681.5 mL / OUT: 445 mL / NET: 236.5 mL          PHYSICAL EXAM:  	Gen: vent  	Pulm:decrease BS B/L  	CV:  S1S2; no rub  	Abd: +distended, PCN   	LE:  edema    LABS:  06-27    131<L>  |  92<L>  |  120<HH>  ----------------------------<  87  4.2   |  20  |  4.1<HH>    Ca    7.2<L>      27 Jun 2023 04:25  Phos  7.7     06-26  Mg     2.3     06-26    TPro  3.9<L>  /  Alb  2.1<L>  /  TBili  0.3  /  DBili  0.2  /  AST  21  /  ALT  12  /  AlkPhos  98  06-26                          8.1    55.65 )-----------( 267      ( 27 Jun 2023 13:37 )             24.2       Urine Studies:  Urinalysis Basic - ( 27 Jun 2023 04:25 )    Color:  / Appearance:  / SG:  / pH:   Gluc: 87 mg/dL / Ketone:   / Bili:  / Urobili:    Blood:  / Protein:  / Nitrite:    Leuk Esterase:  / RBC:  / WBC    Sq Epi:  / Non Sq Epi:  / Bacteria:         RADIOLOGY & ADDITIONAL STUDIES:

## 2023-06-27 NOTE — PROGRESS NOTE ADULT - SUBJECTIVE AND OBJECTIVE BOX
CHRISTINE VILLAVICENCIO  67y, Male  Allergy: No Known Allergies      LOS  9d    CHIEF COMPLAINT: pneumoperitoneum, emphysematous pyelonephritis (27 Jun 2023 11:26)      INTERVAL EVENTS/HPI  - T(F): , Max: 100.2 (06-27-23 @ 07:28), team added caspo   - WBC Count: 41.06 (06-26-23 @ 19:53)  WBC Count: 38.86 (06-26-23 @ 11:27)     - Creatinine: 4.1 (06-27-23 @ 04:25)  Creatinine: 4.0 (06-26-23 @ 19:53)     -   -   -     ROS  cannot obtain secondary to patient's sedation and/or mental status    VITALS:  T(F): 99.8, Max: 100.2 (06-27-23 @ 07:28)  HR: 96  BP: 105/65  RR: 24Vital Signs Last 24 Hrs  T(C): 37.7 (27 Jun 2023 08:00), Max: 37.9 (27 Jun 2023 07:28)  T(F): 99.8 (27 Jun 2023 08:00), Max: 100.2 (27 Jun 2023 07:28)  HR: 96 (27 Jun 2023 11:45) (90 - 137)  BP: 105/65 (27 Jun 2023 11:45) (96/42 - 168/60)  BP(mean): 51 (27 Jun 2023 07:00) (51 - 119)  RR: 24 (27 Jun 2023 11:45) (2 - 25)  SpO2: 100% (27 Jun 2023 11:45) (98% - 100%)    Parameters below as of 27 Jun 2023 11:45  Patient On (Oxygen Delivery Method): ventilator        PHYSICAL EXAM:  Gen: Intubated  CV: RRR  Lungs: Decreased BS at bases  Abd: Soft bilateral drains  Neuro: Sedated  Lines clean, no phlebitis    FH: Non-contributory  Social Hx: Non-contributory    TESTS & MEASUREMENTS:                        7.5    41.06 )-----------( 206      ( 26 Jun 2023 19:53 )             22.7     06-27    131<L>  |  92<L>  |  120<HH>  ----------------------------<  87  4.2   |  20  |  4.1<HH>    Ca    7.2<L>      27 Jun 2023 04:25  Phos  7.7     06-26  Mg     2.3     06-26    TPro  3.9<L>  /  Alb  2.1<L>  /  TBili  0.3  /  DBili  0.2  /  AST  21  /  ALT  12  /  AlkPhos  98  06-26      LIVER FUNCTIONS - ( 26 Jun 2023 19:53 )  Alb: 2.1 g/dL / Pro: 3.9 g/dL / ALK PHOS: 98 U/L / ALT: 12 U/L / AST: 21 U/L / GGT: x           Urinalysis Basic - ( 27 Jun 2023 04:25 )    Color: x / Appearance: x / SG: x / pH: x  Gluc: 87 mg/dL / Ketone: x  / Bili: x / Urobili: x   Blood: x / Protein: x / Nitrite: x   Leuk Esterase: x / RBC: x / WBC x   Sq Epi: x / Non Sq Epi: x / Bacteria: x        Culture - Fungal, Bronchial (collected 06-24-23 @ 11:00)  Source: .Bronchial None  Preliminary Report (06-26-23 @ 11:00):    Moderate Yeast    Culture - Acid Fast - Bronchial w/Smear (collected 06-24-23 @ 11:00)  Source: .Bronchial None    Culture - Bronchial (collected 06-24-23 @ 11:00)  Source: .Bronchial None  Gram Stain (06-24-23 @ 23:21):    Numerous polymorphonuclear leukocytes per low power field    No squamous epithelial cells per low power field    Rare Yeast like cells per oil power field  Final Report (06-26-23 @ 17:56):    Normal Respiratory Gabbie present    Culture - Fungal, Other (collected 06-22-23 @ 22:11)  Source: .Other None  Preliminary Report (06-26-23 @ 10:57):    Testing in progress    Culture - Surgical Swab (collected 06-22-23 @ 22:11)  Source: .Surgical Swab None  Final Report (06-25-23 @ 14:14):    Few Finegoldia magna "Susceptibilities not performed"    Culture - Urine (collected 06-19-23 @ 02:50)  Source: Clean Catch Clean Catch (Midstream)  Final Report (06-20-23 @ 10:18):    No growth    Culture - Abscess with Gram Stain (collected 06-18-23 @ 23:08)  Source: .Abscess right lower quadrant (intrabdominal)  Gram Stain (06-19-23 @ 11:56):    Numerous polymorphonuclear leukocytes per low power field    Numerous Gram positive cocci in pairs per oil power field  Final Report (06-21-23 @ 14:13):    Numerous Anaerobic Gram Positive Cocci Most closely resembling    Peptoniphilus species "Susceptibilities not performed"    Culture - Abscess with Gram Stain (collected 06-18-23 @ 23:08)  Source: .Abscess left kidney  Gram Stain (06-19-23 @ 11:58):    Rare polymorphonuclear leukocytes per low power field    Few Gram Positive Cocci per oil power field  Final Report (06-22-23 @ 10:13):    Growth in fluid media only Anaerobic Gram Positive Cocci Most closely    resembling Peptoniphilus species "Susceptibilities not performed"    Rare Stenotrophomonas maltophilia  Organism: Stenotrophomonas maltophilia  Stenotrophomonas maltophilia (06-22-23 @ 10:13)  Organism: Stenotrophomonas maltophilia (06-22-23 @ 10:13)      Method Type: EDMUND      -  Levofloxacin: S 1      -  Trimethoprim/Sulfamethoxazole: S <=0.5/9.5  Organism: Stenotrophomonas maltophilia (06-22-23 @ 10:13)      Method Type: KB      -  Minocycline: S    Culture - Abscess with Gram Stain (collected 06-18-23 @ 20:18)  Source: .Abscess left PCN aspirate  Gram Stain (06-19-23 @ 06:27):    No polymorphonuclear cells seen per low power field    Few Gram Variable Cocci seen per oil power field  Final Report (06-24-23 @ 09:11):    Few Stenotrophomonas maltophilia  Organism: Stenotrophomonas maltophilia (06-24-23 @ 09:11)  Organism: Stenotrophomonas maltophilia (06-24-23 @ 09:11)      Method Type: EDMUND      -  Levofloxacin: S 2      -  Trimethoprim/Sulfamethoxazole: S <=0.5/9.5    Culture - Blood (collected 06-18-23 @ 14:18)  Source: .Blood Blood-Peripheral  Final Report (06-24-23 @ 02:00):    No Growth Final    Culture - Blood (collected 06-18-23 @ 14:18)  Source: .Blood Blood-Peripheral  Final Report (06-24-23 @ 02:00):    No Growth Final            INFECTIOUS DISEASES TESTING  MRSA PCR Result.: Negative (06-24-23 @ 17:38)      INFLAMMATORY MARKERS      RADIOLOGY & ADDITIONAL TESTS:  I have personally reviewed the last available Chest xray  CXR  Xray Chest 1 View- PORTABLE-Urgent:   ACC: [id number] EXAM:  XR CHEST PORTABLE URGENT 1V   ORDERED BY: ARY COSTA     PROCEDURE DATE:  06/26/2023          INTERPRETATION:  Clinical History / Reason for exam: Catheter placement.    Comparison : Chest radiograph prior day.    Technique/Positioning: Frontal portable, low lung volumes.    Findings:    Support devices: Endotracheal tube above the brigido. Enteric catheter   extends below the hemidiaphragm. Telemetry leads are seen. Left neck   central venous catheter.    Cardiac/mediastinum/hilum: Unremarkable.    Lung parenchyma/Pleura: Bilateral opacifications    Skeleton/soft tissues: Unremarkable.    Impression:    Bilateral opacifications. Support devices as described    Follow-up as needed.    --- End of Report ---            LEÓN IRWIN MD; Attending Interventional Radiologist  This document has been electronically signed. Jun 26 2023  7:59AM (06-26-23 @ 03:09)      CT      CARDIOLOGY TESTING  12 Lead ECG:   Ventricular Rate 108 BPM    QRS Duration 88 ms    Q-T Interval 292 ms    QTC Calculation(Bazett) 391 ms    R Axis 45 degrees    T Axis 68 degrees    Diagnosis Line Atrial fibrillation with rapid ventricular response  Low voltage QRS  Nonspecific STand T wave abnormality  Abnormal ECG    Confirmed by Chilo Martinez ([id number]) on 6/25/2023 8:17:20 AM (06-25-23 @ 06:19)  12 Lead ECG:   Ventricular Rate 114 BPM    Atrial Rate 107 BPM    QRS Duration 106 ms    Q-T Interval 286 ms    QTC Calculation(Bazett) 394 ms    R Axis 11 degrees    T Axis 138 degrees    Diagnosis Line Atrial fibrillation with rapid ventricular response  Low voltage QRS  Nonspecific T wave abnormality  Abnormal ECG    Confirmed by ROULA WISEMAN MD ([id number]) on 6/23/2023 6:42:27 AM (06-23-23 @ 01:54)      MEDICATIONS  albuterol/ipratropium for Nebulization 3  bacitracin   Ointment 1  calcium acetate 1334  caspofungin IVPB 50  caspofungin IVPB   chlorhexidine 0.12% Liquid 15  chlorhexidine 2% Cloths 1  dexMEDEtomidine Infusion 0.2  ferrous    sulfate 325  gabapentin 200  heparin   Injectable 5000  levoFLOXacin IVPB 500  meropenem  IVPB 1000  metoprolol tartrate Injectable 2.5  pantoprazole  Injectable 40  phenylephrine    Infusion 0.1  sodium bicarbonate 1300  sodium chloride 0.9%. 1000      WEIGHT  Weight (kg): 76.476 (06-22-23 @ 21:00)  Creatinine: 4.1 mg/dL (06-27-23 @ 04:25)  Creatinine: 4.0 mg/dL (06-26-23 @ 19:53)      ANTIBIOTICS:  caspofungin IVPB      caspofungin IVPB 50 milliGRAM(s) IV Intermittent every 24 hours  levoFLOXacin IVPB 500 milliGRAM(s) IV Intermittent every 48 hours  meropenem  IVPB 1000 milliGRAM(s) IV Intermittent every 12 hours      All available historical records have been reviewed

## 2023-06-27 NOTE — PROGRESS NOTE ADULT - NS ATTEND AMEND GEN_ALL_CORE FT
I personally saw and examined the patient, reviewed the chart and available data. I discussed the situation with the patient and family, Pathology, ID, Dr Vásquez. I also reviewed and/or amended the note as necessary.

## 2023-06-27 NOTE — CHART NOTE - NSCHARTNOTEFT_GEN_A_CORE
called and spoke with patient's dtr. Reviewed role of palliative SW. She received medical update today. She is hopeful that patient will improve. support rendered. [id number]

## 2023-06-27 NOTE — PROGRESS NOTE ADULT - ASSESSMENT
- discussed the patients clinical status with the patients daughter, son in law and family friend on the telephone  discussed the discussion regarding the potential need for an emergent nephrectomy  discussed the risks of an emergent nephrectomy and clearly outlined the indications  will continue to monitor in the ICU and stabilize his parameters  he has recently undergone percutaneous aspiration by Dr. Hu  spoke to ICU Attenfing Dr. Fontaine as well    - will continue to montior  - discussed emergent nephrectomy if no clinical improvement  - discussed the elevated risk associated with emergent nephrectomy  all questions answered

## 2023-06-27 NOTE — PROGRESS NOTE ADULT - ASSESSMENT
67M w/ PMH of HTN, grade IV hydronephrosis of L kidney s/p L PCN (placed 5/2023), stutter, hiatal hernia, iron deficiency anemia, H Pylori (untreated), and gastric mass (never biopsied) presents to the ED with at least 4 days of worsening weakness, abdominal distension, and no output from his PCN  . Found to have septic shock, MSOF, lactic acidosis from left emphysematous hydronephrosis, pyelonephritis and possible rupture with pneumoperitoneum along with possible hepatic abscesses, splenic infarct and abdominal and mediastinal lymphadenopathies. he is s/p upsizing of left PCN tube by IR along with RLQ drain placed.       Assessment and plan  ROJAS/ Hyponatremia/ severe left hydro s/p upsizing of left PCN catheter/ emphysematous pyelo with septic shock/  ROJAS likely ATN iso septic shock  cr improved from peak now stable though not imporvign further  non-oliguric  continue sodium bicarbonate po   BP noted / increase midodrine to 10 q 8   ph noted / on  binders     and IR following  no indication for RRT as of now, will cont to reeval daily

## 2023-06-27 NOTE — PROGRESS NOTE ADULT - ASSESSMENT
Assessment & Plan:  67M w/ PMH of HTN, grade IV hydronephrosis of L kidney s/p L PCN (placed 5/2023), stutter, hiatal hernia, iron deficiency anemia, H Pylori (untreated), and gastric mass (never biopsied) presents to the ED with at least 4 days of worsening weakness, abdominal distension, and no output from his PCN. Admitted with emphysematous pyelonephritis     (6/18): s/p LT PCN upsizing to 16Fr and 16Fr RLQ drain placement with IR   (6/22): s/p shock lithotripsy of L kidney abscess cavity with drainage of thick contents, upsize of drain to 26Fr bobby catheter    NEURO:  #Pain control    -APAP PRN    -Tramadol PRN    -Gabapentin 200 q 8     -fent 25 q 3 PRN while intubated  #Sedation    -precedex gtt  #H/o stutter (negative ischemic workup last admission)    RESP:  #acute respiratory failure with left lung mucous plugging (intubated 6/24)    - LL @ 23, 7.5Fr    - 6/24 s/p bronchoscopy secondary to left maintstem mucus plug    - Vent settings: 450/22/40/8    #Pulmonary nodules / Mediastinal lymphadenopathy    - CT Chest: Right middle and upper lobe solid pulmonary nodules, f/u outpatient pulm   #Activity   - increased as tolerated    CARDS:   #Septic shock    - Phenylephrine infusion  #New onset Afib w/ RVR with hypotension -- likely 2/2 sepsis    - Metoprolol 2.5 q6 IV  -Digoxin 250mcg q6 load (x4 doses)    - Cardiology following    - AM digoxin level:     #HTN    -Amlodipine 2.5mg HOLDING   Imaging    - EKG (6/25): Afib w/ RVR    - ECHO: (6/19) EF 55-60%, mild AS, mild LAE, mild MR/TR, trivial pericardial effusion    GI/NUTR:  #Diet    NPO except meds  #Ileus  - Holding tube feeds & bowel regimen  - NGT to suction   #bowel movements    - 2 bloody BM's noted overnight 6/25  - GI consult 6/26- Plan for EGD with EUS + FNA as outpatient, H-pylori treatment after resolution of acute issues   -dignishield in place   -FOBT positive    #GI PPX  -IV Protonix 40 q12 hours  #Gastric mass vs gastrohepatic lymphadenopathy    -CT A/P: lesser curvature mass , Hepatic hypodensities  #Splenic Hypodensities, infarct vs phlegmon/abscess    -CT A/P:  Wedge-shaped region of hypodensity within the spleen    /RENAL:   #Grade IV L hydronephrosis s/p L PCN (5/2023) -- presented to ED with thick dark foul smelling output  #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  -intermittent suction/irrigation per urology; f/u cx and cytology from 6/25     -(6/23) s/p LT PCN upsizing to 26Fr catheter, RLQ 16 Fr drain remains, s/p nephrostolithotomy w/ lithotripsy    -(6/18) s/p LT PCN upsizing to 16Fr and 16Fr RLQ drain placement with IR, flush q8 hr  #volume overload  #ROJAS (baseline Cr 1.0)    - Cr 3.8--> 4.2 --> 4.0  #metabolic acidosis  -Nephrology c/s - no indication for RRT as of 6/26  - bicarb gtt 150 mEq at 100cc/hr   #Hyperkalemia     -Lokelma 5q8 HOLDING in setting of bloody BMs  #Hyperphosphatemia    -Phoslo 1334mg q8    Labs:          BUN/Cr- 116/4.2  -->,  120/4.0  -->          Electrolytes-Na 130 // K 4.3 // Mg 2.3 //  Phos 7.7 (06-26 @ 19:53)    HEME/ONC:   #new onset Afib    - holding heparin infusion in setting of hematochezia     Labs: Hb/Hct:  7.8/23.8  -->,  7.5/22.7  -->                      Plts:  250  -->,  206  -->                 PTT/INR:  33.2/--  --->      #H/o Fe Deficiency anemia    - Iron supplementation  #DVT prophylaxis  -SCDs, heparin subQ   T&S Expires 6/29  Blood Consent- in chart     ID:  #Leukocytosis 2/2 sepsis   WBC- 34.86  --->>,  38.86  --->>,  41.06  --->>  Temp trend- 24hrs T(F): 99.4 (06-26 @ 16:00), Max: 99.9 (06-26 @ 08:00)  #antibiotics    -Levaquin 500mg q48 (6/21-    -Meropenem 1g q12 (6/20-    -Caspofungin 50mg q 24 (6/26-    -Vanco intermittent dosing based on trough; last trough 18 --> 6/27 AM vancomycin trough______  #Cultures  L PCN culture 6/25: f/u     Funtitell 6/26: received f/u     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    BAL 6/24: moderate yeast     UCx 6/19: negative FINAL      Left PCN aspiration 6/18: stenotrophomonas maltophilia    ENDO:  #Glucose monitoring    -A1c (5/7/23): 5.0    -POCT q6 hours while NPO   #Adrenal Insufficiency 2/2 to sepsis     -completed solucortef taper     -Random cortisol level -sent, results pending    MSK:  #wound care  Sacrococcygeal DTI: wound care following- cleanse with soap and water, apply triad, gauze, and allevyn BID and PM for soiling   Venous ulcer right lower leg, R forearm skin tear: Pat dry, apply Xeroform, nonadherent dressing, wrap with Kerlix twice a day, prn for soiling  #Activity    -Advance as tolerated    -PT/rehab once extubated    LINES/DRAINS:   PIV  Bobby (6/18)  RLQ 16Fr Javier Drain (6/18),   LT PCN 26Fr (6/18)   Left basilic midline  6/21  Right Radial [place] 6/22  L radial [place] (6/18-6/22)  L IJ TLC (6/26)  ETT  OGT    ADVANCED DIRECTIVES:  Full Code  HCP/Emergency Contact- Tracey Adames: [phone]   Palliative following. Last GOC discussion 6/26 day.    INDICATION FOR SICU: New onset atrial fibrillation w/ mechanical ventilation    DISPO: SICU Assessment & Plan:  67M w/ PMH of HTN, grade IV hydronephrosis of L kidney s/p L PCN (placed 2023), stutter, hiatal hernia, iron deficiency anemia, H Pylori (untreated), and gastric mass (never biopsied) presents to the ED with at least 4 days of worsening weakness, abdominal distension, and no output from his PCN. Admitted with emphysematous pyelonephritis     (): s/p LT PCN upsizing to 16Fr and 16Fr RLQ drain placement with IR   (): s/p shock lithotripsy of L kidney abscess cavity with drainage of thick contents, upsize of drain to 26Fr bobby catheter    NEURO:  #Pain control    -APAP PRN    -Tramadol PRN    -Gabapentin 200 q 8     -fent 25 q 3 PRN while intubated  #Sedation    -precedex gtt  #H/o stutter (negative ischemic workup last admission)    RESP:  #acute respiratory failure with left lung mucous plugging (intubated )    - LL @ 23, 7.5Fr    -  s/p bronchoscopy secondary to left maintstem mucus plug    - Vent settings: 450/22/40/8    - AM AB.47/30/131/22   Lac 4.2    #Pulmonary nodules / Mediastinal lymphadenopathy    - CT Chest: Right middle and upper lobe solid pulmonary nodules, f/u outpatient pulm   #Activity   - increased as tolerated    CARDS:   #Septic shock    - Phenylephrine infusion  #New onset Afib w/ RVR with hypotension -- likely 2/2 sepsis    - Metoprolol 2.5 q6 IV  -Digoxin 250mcg q6 load (x4 doses)    - Cardiology following    - AM digoxin level:     #HTN    -Amlodipine 2.5mg HOLDING   Imaging    - EKG (): Afib w/ RVR    - ECHO: () EF 55-60%, mild AS, mild LAE, mild MR/TR, trivial pericardial effusion    GI/NUTR:  #Diet    NPO except meds  #Ileus  - Holding tube feeds & bowel regimen  - NGT to suction   #bowel movements    - 2 bloody BM's noted overnight   - GI consult - Plan for EGD with EUS + FNA as outpatient, H-pylori treatment after resolution of acute issues   -dignishield in place   -FOBT positive    #GI PPX  -IV Protonix 40 q12 hours  #Gastric mass vs gastrohepatic lymphadenopathy    -CT A/P: lesser curvature mass , Hepatic hypodensities  #Splenic Hypodensities, infarct vs phlegmon/abscess    -CT A/P:  Wedge-shaped region of hypodensity within the spleen    /RENAL:   #Grade IV L hydronephrosis s/p L PCN (2023) -- presented to ED with thick dark foul smelling output  #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  -intermittent suction/irrigation per urology; f/u cx and cytology from      -() s/p LT PCN upsizing to 26Fr catheter, RLQ 16 Fr drain remains, s/p nephrostolithotomy w/ lithotripsy    -() s/p LT PCN upsizing to 16Fr and 16Fr RLQ drain placement with IR, flush q8 hr  #volume overload  #ROJAS (baseline Cr 1.0)    - Cr 3.8--> 4.2 --> 4.0  #metabolic acidosis  -Nephrology c/s - no indication for RRT as of   - PO sodium bicarb 1300mg q8  - d/c sodium bicarb infusion given alkylosis on AM ABG  #Hyperkalemia     -Lokelma 5q8 HOLDING in setting of bloody BMs  #Hyperphosphatemia    -Phoslo 1334mg q8    Labs:          BUN/Cr- 116/4.2  -->,  120/4.0  -->          Electrolytes-Na 130 // K 4.3 // Mg 2.3 //  Phos 7.7 ( @ 19:53)  - IV maintenance fluids: NS @ 100 cc/hr    HEME/ONC:   #new onset Afib    - holding heparin infusion in setting of hematochezia     Labs: Hb/Hct:  7.8/23.8  -->,  7.5/22.7  -->                      Plts:  250  -->,  206  -->                 PTT/INR:  33.2/--  --->      #H/o Fe Deficiency anemia    - Iron supplementation  #DVT prophylaxis  -SCDs, heparin subQ   T&S Expires   Blood Consent- in chart     ID:  #Leukocytosis 2/2 sepsis   WBC- 34.86  --->>,  38.86  --->>,  41.06  --->>  Temp trend- 24hrs T(F): 99.4 ( @ 16:00), Max: 99.9 ( @ 08:00)  #antibiotics    -Levaquin 500mg q48 (-    -Meropenem 1g q12 (-    -Caspofungin 50mg q 24 (-    -Vanco intermittent dosing based on trough; last trough 18 -->  AM vancomycin trough______  #Cultures  L PCN culture : f/u     Funtitell : received f/u     OR Cx : Finegoldia magna    BAL : moderate yeast     UCx : negative FINAL      Left PCN aspiration : stenotrophomonas maltophilia    ENDO:  #Glucose monitoring    -A1c (23): 5.0    -POCT q6 hours while NPO   #Adrenal Insufficiency 2/2 to sepsis     -completed solucortef taper     -Random cortisol level -sent, results pending    MSK:  #wound care  Sacrococcygeal DTI: wound care following- cleanse with soap and water, apply triad, gauze, and allevyn BID and PM for soiling   Venous ulcer right lower leg, R forearm skin tear: Pat dry, apply Xeroform, nonadherent dressing, wrap with Kerlix twice a day, prn for soiling  #Activity    -Advance as tolerated    -PT/rehab once extubated    LINES/DRAINS:   PIV  Bobby ()  RLQ 16Fr Javier Drain (),   LT PCN 26Fr ()   Left basilic midline    Right Radial [place]   L radial [place] (-)  L IJ TLC ()  ETT  OGT    ADVANCED DIRECTIVES:  Full Code  HCP/Emergency Contact- Tracey Adames: [phone]   Palliative following. Last GOC discussion  day.    INDICATION FOR SICU: New onset atrial fibrillation w/ mechanical ventilation    DISPO: SICU Assessment & Plan:  67M w/ PMH of HTN, grade IV hydronephrosis of L kidney s/p L PCN (placed 2023), stutter, hiatal hernia, iron deficiency anemia, H Pylori (untreated), and gastric mass (never biopsied) presents to the ED with at least 4 days of worsening weakness, abdominal distension, and no output from his PCN. Admitted with emphysematous pyelonephritis     (): s/p LT PCN upsizing to 16Fr and 16Fr RLQ drain placement with IR   (): s/p shock lithotripsy of L kidney abscess cavity with drainage of thick contents, upsize of drain to 26Fr bobby catheter    NEURO:  #Pain control    -APAP PRN    -Tramadol PRN    -Gabapentin 200 q 8     -fent 25 q 3 PRN while intubated  #Sedation    -precedex gtt  #H/o stutter (negative ischemic workup last admission)    RESP:  #acute respiratory failure with left lung mucous plugging (intubated )    - LL @ 23, 7.5Fr    -  s/p bronchoscopy secondary to left maintstem mucus plug    - Vent settings: 450/22/40/8    - AM AB.47/30/131/22   Lac 4.2    #Pulmonary nodules / Mediastinal lymphadenopathy    - CT Chest: Right middle and upper lobe solid pulmonary nodules, f/u outpatient pulm   #Activity   - increased as tolerated    CARDS:   #Septic shock    - Phenylephrine infusion  #New onset Afib w/ RVR with hypotension -- likely 2/2 sepsis    - Metoprolol 2.5 q6 IV  -Digoxin 250mcg q6 load (x4 doses)    - Cardiology following    - AM digoxin level: 3.2, recheck tomorrow  #HTN    -Amlodipine 2.5mg HOLDING   Imaging    - EKG (): Afib w/ RVR    - ECHO: () EF 55-60%, mild AS, mild LAE, mild MR/TR, trivial pericardial effusion    GI/NUTR:  #Diet    NPO except meds  #Ileus  - Holding tube feeds & bowel regimen  - NGT to suction   #bowel movements    - 2 bloody BM's noted overnight   - GI consult - Plan for EGD with EUS + FNA as outpatient, H-pylori treatment after resolution of acute issues   -dignishield in place   -FOBT positive    #GI PPX  -IV Protonix 40 q12 hours  #Gastric mass vs gastrohepatic lymphadenopathy    -CT A/P: lesser curvature mass , Hepatic hypodensities  #Splenic Hypodensities, infarct vs phlegmon/abscess    -CT A/P:  Wedge-shaped region of hypodensity within the spleen    /RENAL:   #Grade IV L hydronephrosis s/p L PCN (2023) -- presented to ED with thick dark foul smelling output  #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  -intermittent suction/irrigation per urology; f/u cx and cytology from      -() s/p LT PCN upsizing to 26Fr catheter, RLQ 16 Fr drain remains, s/p nephrostolithotomy w/ lithotripsy    -() s/p LT PCN upsizing to 16Fr and 16Fr RLQ drain placement with IR, flush q8 hr    -Plan to f/u CT AP non-con  #volume overload  #ROJAS (baseline Cr 1.0)    - Cr 3.8--> 4.2 --> 4.0  #metabolic acidosis  -Nephrology c/s - no indication for RRT as of   - PO sodium bicarb 1300mg q8  - d/c sodium bicarb infusion given alkalosis on AM ABG  #Hyperkalemia     -Lokelma 5q8 HOLDING in setting of bloody BMs  #Hyperphosphatemia    -Phoslo 1334mg q8    Labs:          BUN/Cr- 116/4.2  -->,  120/4.0  -->          Electrolytes-Na 130 // K 4.3 // Mg 2.3 //  Phos 7.7 ( @ 19:53)  - IV maintenance fluids: NS @ 100 cc/hr    HEME/ONC:   #new onset Afib    - holding heparin infusion in setting of hematochezia     Labs: Hb/Hct:  7.8/23.8  -->,  7.5/22.7  -->                      Plts:  250  -->,  206  -->                 PTT/INR:  33.2/--  --->      #H/o Fe Deficiency anemia    - Iron supplementation  #DVT prophylaxis  -SCDs, heparin subQ   T&S Expires   Blood Consent- in chart     ID:  #Leukocytosis 2/2 sepsis   WBC- 34.86  --->>,  38.86  --->>,  41.06  --->>  Temp trend- 24hrs T(F): 99.4 ( @ 16:00), Max: 99.9 ( @ 08:00)  #antibiotics    -Levaquin 500mg q48 (-    -Meropenem 1g q12 (-    -Caspofungin 50mg q 24 (-    -Vanco intermittent dosing based on trough; last trough 18 -->  AM vancomycin trough______  #Cultures  L PCN culture : f/u     Funtitell : received f/u     OR Cx : Finegoldia magna    BAL : moderate yeast     UCx : negative FINAL      Left PCN aspiration : Stenotrophomonas maltophilia    ENDO:  #Glucose monitoring    -A1c (23): 5.0    -POCT q6 hours while NPO    MSK:  #wound care  Sacrococcygeal DTI: wound care following- cleanse with soap and water, apply triad, gauze, and allevyn BID and PM for soiling   Venous ulcer right lower leg, R forearm skin tear: Pat dry, apply Xeroform, nonadherent dressing, wrap with Kerlix twice a day, prn for soiling  #Activity    -Advance as tolerated    -PT/rehab once extubated    LINES/DRAINS:   PIV  Bobby ()  RLQ 16Fr Javier Drain (),   LT PCN 26Fr ()   Left basilic midline    Right Radial [place]   L IJ TLC ()  ETT  OGT    ADVANCED DIRECTIVES:  Full Code  HCP/Emergency Contact- Tracey Adames: [phone]   Palliative following. Last GOC discussion  day.    INDICATION FOR SICU: New onset atrial fibrillation w/ mechanical ventilation    DISPO: SICU

## 2023-06-28 NOTE — PROGRESS NOTE ADULT - TREATMENT GUIDELINE COMMENT
-Full code  -Family wants surgical intervention as appropriate  -Urology follow up with family re: surgery

## 2023-06-28 NOTE — PROGRESS NOTE ADULT - SUBJECTIVE AND OBJECTIVE BOX
Nephrology progress note    Patient was seen and examined, events over the last 24 h noted .    Allergies:  No Known Allergies    Hospital Medications:   MEDICATIONS  (STANDING):  albuterol/ipratropium for Nebulization 3 milliLiter(s) Nebulizer every 6 hours  bacitracin   Ointment 1 Application(s) Topical two times a day  calcium acetate 1334 milliGRAM(s) Oral every 8 hours  chlorhexidine 0.12% Liquid 15 milliLiter(s) Oral Mucosa every 12 hours  chlorhexidine 2% Cloths 1 Application(s) Topical <User Schedule>  dexMEDEtomidine Infusion 0.2 MICROgram(s)/kG/Hr (3.82 mL/Hr) IV Continuous <Continuous>  ferrous    sulfate 325 milliGRAM(s) Oral daily  gabapentin 200 milliGRAM(s) Oral every 8 hours  heparin   Injectable 5000 Unit(s) SubCutaneous every 8 hours  levoFLOXacin IVPB 500 milliGRAM(s) IV Intermittent every 48 hours  meropenem  IVPB 500 milliGRAM(s) IV Intermittent every 12 hours  metoprolol tartrate Injectable 2.5 milliGRAM(s) IV Push every 6 hours  pantoprazole  Injectable 40 milliGRAM(s) IV Push every 12 hours  phenylephrine    Infusion 0.1 MICROgram(s)/kG/Min (1.43 mL/Hr) IV Continuous <Continuous>  sodium bicarbonate 1300 milliGRAM(s) Oral three times a day  sodium chloride 0.9%. 1000 milliLiter(s) (75 mL/Hr) IV Continuous <Continuous>        VITALS:  T(F): 99.6 (06-28-23 @ 12:00), Max: 99.7 (06-27-23 @ 16:00)  HR: 95 (06-28-23 @ 15:00)  BP: 115/74 (06-28-23 @ 10:00)  RR: 22 (06-28-23 @ 15:00)  SpO2: 100% (06-28-23 @ 15:00)  Wt(kg): --    06-26 @ 07:01  -  06-27 @ 07:00  --------------------------------------------------------  IN: 3117.4 mL / OUT: 1790 mL / NET: 1327.4 mL    06-27 @ 07:01 - 06-28 @ 07:00  --------------------------------------------------------  IN: 3085.7 mL / OUT: 1395 mL / NET: 1690.7 mL    06-28 @ 07:01 - 06-28 @ 15:31  --------------------------------------------------------  IN: 941.3 mL / OUT: 370 mL / NET: 571.3 mL          PHYSICAL EXAM:  	Gen: vent  	Pulm:decrease BS B/L  	CV:  S1S2; no rub  	Abd: +distended, PCN   	LE:  edema    LABS:  06-27    133<L>  |  95<L>  |  120<HH>  ----------------------------<  77  4.3   |  21  |  4.0<H>    Ca    7.3<L>      27 Jun 2023 19:57  Phos  7.3     06-27  Mg     2.1     06-27    TPro  3.9<L>  /  Alb  2.1<L>  /  TBili  0.3  /  DBili  0.2  /  AST  21  /  ALT  12  /  AlkPhos  98  06-26                          8.4    58.76 )-----------( 242      ( 28 Jun 2023 05:15 )             25.0       Urine Studies:  Urinalysis Basic - ( 27 Jun 2023 19:57 )    Color:  / Appearance:  / SG:  / pH:   Gluc: 77 mg/dL / Ketone:   / Bili:  / Urobili:    Blood:  / Protein:  / Nitrite:    Leuk Esterase:  / RBC:  / WBC    Sq Epi:  / Non Sq Epi:  / Bacteria:         RADIOLOGY & ADDITIONAL STUDIES:

## 2023-06-28 NOTE — PROGRESS NOTE ADULT - SUBJECTIVE AND OBJECTIVE BOX
HPI:  68y/o M a/w retroperitoneal and intraabdominal abscess s/p IR placement of drain and LEFT PCN 6/18, s/p percutaneous drainage of LEFT renal abscess POD # 6. Flank drain irrigated, continues to drain serosang fluid with gelatinous drainage, ? further necrotic material vs abscess.    MEDICATIONS  (STANDING):  albuterol/ipratropium for Nebulization 3 milliLiter(s) Nebulizer every 6 hours  bacitracin   Ointment 1 Application(s) Topical two times a day  calcium acetate 1334 milliGRAM(s) Oral every 8 hours  caspofungin IVPB      caspofungin IVPB 50 milliGRAM(s) IV Intermittent every 24 hours  chlorhexidine 0.12% Liquid 15 milliLiter(s) Oral Mucosa every 12 hours  chlorhexidine 2% Cloths 1 Application(s) Topical <User Schedule>  dexMEDEtomidine Infusion 0.2 MICROgram(s)/kG/Hr (3.82 mL/Hr) IV Continuous <Continuous>  ferrous    sulfate 325 milliGRAM(s) Oral daily  gabapentin 200 milliGRAM(s) Oral every 8 hours  heparin   Injectable 5000 Unit(s) SubCutaneous every 8 hours  levoFLOXacin IVPB 500 milliGRAM(s) IV Intermittent every 48 hours  meropenem  IVPB 500 milliGRAM(s) IV Intermittent every 12 hours  metoprolol tartrate Injectable 2.5 milliGRAM(s) IV Push every 6 hours  pantoprazole  Injectable 40 milliGRAM(s) IV Push every 12 hours  phenylephrine    Infusion 0.1 MICROgram(s)/kG/Min (1.43 mL/Hr) IV Continuous <Continuous>  sodium bicarbonate 1300 milliGRAM(s) Oral three times a day  sodium chloride 0.9%. 1000 milliLiter(s) (75 mL/Hr) IV Continuous <Continuous>    MEDICATIONS  (PRN):  fentaNYL    Injectable 25 MICROGram(s) IV Push every 3 hours PRN Moderate Pain (4 - 6)  sodium chloride 0.9% lock flush 10 milliLiter(s) IV Push every 1 hour PRN Pre/post blood products, medications, blood draw, and to maintain line patency  sodium chloride 0.9% lock flush 10 milliLiter(s) IV Push every 1 hour PRN Pre/post blood products, medications, blood draw, and to maintain line patency    Allergies  No Known Allergies      REVIEW OF SYSTEMS   [x] Due to altered mental status/intubation, subjective information were not able to be obtained from patient. History was obtained, to the extent possible, from review of the chart and collateral sources of information.    Vital Signs Last 24 Hrs  T(C): 37.6 (28 Jun 2023 08:00), Max: 37.7 (27 Jun 2023 12:00)  T(F): 99.7 (28 Jun 2023 08:00), Max: 99.9 (27 Jun 2023 12:00)  HR: 97 (28 Jun 2023 09:30) (91 - 109)  BP: 107/70 (28 Jun 2023 09:00) (95/66 - 149/84)  BP(mean): 83 (28 Jun 2023 09:00) (68 - 102)  RR: 22 (28 Jun 2023 09:30) (14 - 25)  SpO2: 100% (28 Jun 2023 09:30) (97% - 100%)    Parameters below as of 28 Jun 2023 08:00  Patient On (Oxygen Delivery Method): ventilator    O2 Concentration (%): 40    PHYSICAL EXAM:    PHYSICAL EXAM:  GEN: NAD, intubated  SKIN: Good color, non diaphoretic.  RESP: mechanically ventilated  CARDIO: +S1/S2  ABDO: Soft, ND  : +generalized penoscrotal edema, 16Fr temperature probe bobby catheter in place draining clear yellow urine, RLQ VENICE with clear serous fluid, LEFT flank 26Fr drain with serous drainage with gelatinous debris.     I&O's Summary    27 Jun 2023 07:01  -  28 Jun 2023 07:00  --------------------------------------------------------  IN: 3085.7 mL / OUT: 1395 mL / NET: 1690.7 mL    28 Jun 2023 07:01  -  28 Jun 2023 09:43  --------------------------------------------------------  IN: 189.7 mL / OUT: 80 mL / NET: 109.7 mL    LABS:                        8.4    58.76 )-----------( 242      ( 28 Jun 2023 05:15 )             25.0     06-27    133<L>  |  95<L>  |  120<HH>  ----------------------------<  77  4.3   |  21  |  4.0<H>    Ca    7.3<L>      27 Jun 2023 19:57  Phos  7.3     06-27  Mg     2.1     06-27    TPro  3.9<L>  /  Alb  2.1<L>  /  TBili  0.3  /  DBili  0.2  /  AST  21  /  ALT  12  /  AlkPhos  98  06-26    PT/INR - ( 27 Jun 2023 19:57 )   PT: 18.40 sec;   INR: 1.59 ratio         PTT - ( 27 Jun 2023 19:57 )  PTT:29.2 sec  Urinalysis Basic - ( 27 Jun 2023 19:57 )    Color: x / Appearance: x / SG: x / pH: x  Gluc: 77 mg/dL / Ketone: x  / Bili: x / Urobili: x   Blood: x / Protein: x / Nitrite: x   Leuk Esterase: x / RBC: x / WBC x   Sq Epi: x / Non Sq Epi: x / Bacteria: x

## 2023-06-28 NOTE — PROGRESS NOTE ADULT - SUBJECTIVE AND OBJECTIVE BOX
CHRISTINE VILLAVICENCIO  [id number]  67y Male    Indication for ICU admission: mechanical ventilatior secondary to urosepsis     Admit Date:06-18-23  ICU Date: 6/18/23  OR Date: 6/18, 6/22      24HRS EVENT:  6/27  NIGHT  -PM rounds: HR 98, /58 (70), O2 100%, RLQ drain serous, nephrostomy drain bloody output, Josh @ 0.9, precedex @ 0.3  -Pre-op labs sent, EKG NSR. Patient has an active T&S    DAY  - WBC 55K, Hgb 8.1  - CT AP Noncontrast: Persistent bilateral pleural effusions with adjacent consolidations likely reflecting compressive atelectasis. Left kidney poorly defined with a heterogeneous collection with air and fluid which has likely mildly diminished in size compared to the prior examination but exact comparison limited. Diffuse ascites again noted. Diffuse anasarca again noted. Persistent pneumoperitoneum. Poorly defined lesions within the liver adjacent to the falciform ligament which may again reflect abscesses or masses, difficult to  evaluate due to the limitations described above.  - mIVF to 75cc/hr  - No need for vanc dosing or trough and dec meropenem to 500mg q12hr per ID (Elizabeth)          REVIEW OF SYSTEMS  [x ] A ten-point review of systems was otherwise negative except as noted.  [ ] Due to altered mental status/intubation, subjective information were not able to be obtained from the patient. History was obtained, to the extent possible, from review of the chart and collateral sources of information.    ----------------------------------------------------------------------------------------------------------------------  ----------------------------------------------------------------------------------------------------------------------      ADVANCED DIRECTIVES:  Full Code  HCP/Emergency Contact- Tracey Villavicencio: [phone]   Palliative following. Last [facility] discussion 6/26 day.    INDICATION FOR SICU: New onset atrial fibrillation w/ mechanical ventilation    DISPO: SICU CHRISTINE VILLAVICENCIO  [id number]  67y Male    Indication for ICU admission: mechanical ventilatior secondary to urosepsis     Admit Date:06-18-23  ICU Date: 6/18/23  OR Date: 6/18, 6/22      24HRS EVENT:  6/27  NIGHT  -PM rounds: HR 98, /58 (70), O2 100%, RLQ drain serous, nephrostomy drain bloody output, Josh @ 0.9, precedex @ 0.3  -Pre-op labs sent, EKG NSR. Patient has an active T&S  -Ionized calcium 0.99-> 2 gms calcium gluconate given  -Hgb on ABG 6.9- repeat cbc pending    DAY  - WBC 55K, Hgb 8.1  - CT AP Noncontrast: Persistent bilateral pleural effusions with adjacent consolidations likely reflecting compressive atelectasis. Left kidney poorly defined with a heterogeneous collection with air and fluid which has likely mildly diminished in size compared to the prior examination but exact comparison limited. Diffuse ascites again noted. Diffuse anasarca again noted. Persistent pneumoperitoneum. Poorly defined lesions within the liver adjacent to the falciform ligament which may again reflect abscesses or masses, difficult to  evaluate due to the limitations described above.  - mIVF to 75cc/hr  - No need for vanc dosing or trough and dec meropenem to 500mg q12hr per ID (Elizabeth)          REVIEW OF SYSTEMS  [x ] A ten-point review of systems was otherwise negative except as noted.  [ ] Due to altered mental status/intubation, subjective information were not able to be obtained from the patient. History was obtained, to the extent possible, from review of the chart and collateral sources of information.    ----------------------------------------------------------------------------------------------------------------------  ----------------------------------------------------------------------------------------------------------------------      ADVANCED DIRECTIVES:  Full Code  HCP/Emergency Contact- Tracey Villavicencio: [phone]   Palliative following. Last [facility] discussion 6/26 day.    INDICATION FOR SICU: New onset atrial fibrillation w/ mechanical ventilation    DISPO: SICU CHRISTINE VILLAVICENCIO  [id number]  67y Male    Indication for ICU admission: mechanical ventilatior secondary to urosepsis     Admit Date:06-18-23  ICU Date: 6/18/23  OR Date: 6/18, 6/22      24HRS EVENT:  6/27  NIGHT  -PM rounds: HR 98, /58 (70), O2 100%, RLQ drain serous, nephrostomy drain bloody output, Josh @ 0.9, precedex @ 0.3  -Pre-op labs sent, EKG NSR. Patient has an active T&S  -Ionized calcium 0.99-> 2 gms calcium gluconate given  -Hgb on ABG 6.9- repeat cbc pending    DAY  - WBC 55K, Hgb 8.1  - CT AP Noncontrast: Persistent bilateral pleural effusions with adjacent consolidations likely reflecting compressive atelectasis. Left kidney poorly defined with a heterogeneous collection with air and fluid which has likely mildly diminished in size compared to the prior examination but exact comparison limited. Diffuse ascites again noted. Diffuse anasarca again noted. Persistent pneumoperitoneum. Poorly defined lesions within the liver adjacent to the falciform ligament which may again reflect abscesses or masses, difficult to  evaluate due to the limitations described above.  - Decreased IVF to 75cc/hr  - No need for vanc dosing or trough and dec meropenem to 500mg q12hr per ID (Elizabeth)          REVIEW OF SYSTEMS  [x ] A ten-point review of systems was otherwise negative except as noted.  [ ] Due to altered mental status/intubation, subjective information were not able to be obtained from the patient. History was obtained, to the extent possible, from review of the chart and collateral sources of information.    ----------------------------------------------------------------------------------------------------------------------  ----------------------------------------------------------------------------------------------------------------------      ADVANCED DIRECTIVES:  Full Code  HCP/Emergency Contact- Tracey [place]: [phone]   Palliative following. Last [facility] discussion 6/26 day.    INDICATION FOR SICU: New onset atrial fibrillation w/ mechanical ventilation    DISPO: SICU CHRISTINE VILLAVICENCIO  [id number]  67y Male    Indication for ICU admission: mechanical ventilatior secondary to urosepsis     Admit Date:06-18-23  ICU Date: 6/18/23  OR Date: 6/18, 6/22      24HRS EVENT:  6/27  NIGHT  -PM rounds: HR 98, /58 (70), O2 100%, RLQ drain serous, nephrostomy drain bloody output, Josh @ 0.9, precedex @ 0.3  -Pre-op labs sent, EKG NSR. Patient has an active T&S  -Ionized calcium 0.99-> 2 gms calcium gluconate given  -Hgb on ABG 6.9- repeat cbc pending    DAY  - WBC 55K, Hgb 8.1  - CT AP Noncontrast: Persistent bilateral pleural effusions with adjacent consolidations likely reflecting compressive atelectasis. Left kidney poorly defined with a heterogeneous collection with air and fluid which has likely mildly diminished in size compared to the prior examination but exact comparison limited. Diffuse ascites again noted. Diffuse anasarca again noted. Persistent pneumoperitoneum. Poorly defined lesions within the liver adjacent to the falciform ligament which may again reflect abscesses or masses, difficult to  evaluate due to the limitations described above.  - Decreased IVF to 75cc/hr  - No need for vanc dosing or trough and dec meropenem to 500mg q12hr per ID (Elizabeth)          REVIEW OF SYSTEMS  [x ] A ten-point review of systems was otherwise negative except as noted.  [ ] Due to altered mental status/intubation, subjective information were not able to be obtained from the patient. History was obtained, to the extent possible, from review of the chart and collateral sources of information.    ----------------------------------------------------------------------------------------------------------------------  ----------------------------------------------------------------------------------------------------------------------    Daily     Daily     Diet, NPO:   Except Medications (06-26-23 @ 00:16)      CURRENT MEDS:  Neurologic Medications  dexMEDEtomidine Infusion 0.2 MICROgram(s)/kG/Hr IV Continuous <Continuous>  fentaNYL    Injectable 25 MICROGram(s) IV Push every 3 hours PRN Moderate Pain (4 - 6)  gabapentin 200 milliGRAM(s) Oral every 8 hours    Respiratory Medications  albuterol/ipratropium for Nebulization 3 milliLiter(s) Nebulizer every 6 hours    Cardiovascular Medications  metoprolol tartrate Injectable 2.5 milliGRAM(s) IV Push every 6 hours  phenylephrine    Infusion 0.1 MICROgram(s)/kG/Min IV Continuous <Continuous>    Gastrointestinal Medications  calcium acetate 1334 milliGRAM(s) Oral every 8 hours  ferrous    sulfate 325 milliGRAM(s) Oral daily  pantoprazole  Injectable 40 milliGRAM(s) IV Push every 12 hours  sodium bicarbonate 1300 milliGRAM(s) Oral three times a day  sodium chloride 0.9% lock flush 10 milliLiter(s) IV Push every 1 hour PRN Pre/post blood products, medications, blood draw, and to maintain line patency  sodium chloride 0.9% lock flush 10 milliLiter(s) IV Push every 1 hour PRN Pre/post blood products, medications, blood draw, and to maintain line patency  sodium chloride 0.9%. 1000 milliLiter(s) IV Continuous <Continuous>    Genitourinary Medications    Hematologic/Oncologic Medications  heparin   Injectable 5000 Unit(s) SubCutaneous every 8 hours    Antimicrobial/Immunologic Medications  caspofungin IVPB      caspofungin IVPB 50 milliGRAM(s) IV Intermittent every 24 hours  levoFLOXacin IVPB 500 milliGRAM(s) IV Intermittent every 48 hours  meropenem  IVPB 500 milliGRAM(s) IV Intermittent every 12 hours    Endocrine/Metabolic Medications    Topical/Other Medications  bacitracin   Ointment 1 Application(s) Topical two times a day  chlorhexidine 0.12% Liquid 15 milliLiter(s) Oral Mucosa every 12 hours  chlorhexidine 2% Cloths 1 Application(s) Topical <User Schedule>      ICU Vital Signs Last 24 Hrs  T(C): 37.3 (27 Jun 2023 20:00), Max: 37.7 (27 Jun 2023 12:00)  T(F): 99.1 (27 Jun 2023 20:00), Max: 99.9 (27 Jun 2023 12:00)  HR: 98 (28 Jun 2023 07:00) (90 - 109)  BP: 96/69 (28 Jun 2023 07:00) (95/66 - 149/84)  BP(mean): 79 (28 Jun 2023 07:00) (68 - 102)  ABP: 108/57 (28 Jun 2023 07:00) (101/51 - 144/66)  ABP(mean): 70 (28 Jun 2023 07:00) (62 - 88)  RR: 22 (28 Jun 2023 07:00) (14 - 25)  SpO2: 100% (28 Jun 2023 07:00) (97% - 100%)    O2 Parameters below as of 28 Jun 2023 07:00  Patient On (Oxygen Delivery Method): ventilator    O2 Concentration (%): 40        Adult Advanced Hemodynamics Last 24 Hrs  CVP(mm Hg): --  CVP(cm H2O): --  CO: --  CI: --  PA: --  PA(mean): --  PCWP: --  SVR: --  SVRI: --  PVR: --  PVRI: --    Mode: AC/ CMV (Assist Control/ Continuous Mandatory Ventilation)  RR (machine): 22  TV (machine): 450  FiO2: 40  PEEP: 8  ITime: 1  MAP: 13  PIP: 22    ABG - ( 28 Jun 2023 04:30 )  pH, Arterial: 7.52  pH, Blood: x     /  pCO2: 27    /  pO2: 132   / HCO3: 22    / Base Excess: -0.7  /  SaO2: 99.5                I&O's Summary    27 Jun 2023 07:01  -  28 Jun 2023 07:00  --------------------------------------------------------  IN: 3085.7 mL / OUT: 1395 mL / NET: 1690.7 mL      I&O's Detail    27 Jun 2023 07:01  -  28 Jun 2023 07:00  --------------------------------------------------------  IN:    Dexmedetomidine: 143.7 mL    Enteral Tube Flush: 110 mL    IV PiggyBack: 500 mL    IV PiggyBack: 150 mL    Other (mL): 150 mL    Phenylephrine: 282 mL    sodium chloride 0.9%: 1750 mL  Total IN: 3085.7 mL    OUT:    Drain (mL): 240 mL    Gastric Aspirate - Discarded (mL): 50 mL    Indwelling Catheter - Urethral (mL): 515 mL    Oral Fluid: 0 mL    Other (mL): 150 mL    Rectal Tube (mL): 400 mL    VAC (Vacuum Assisted Closure) System (mL): 40 mL  Total OUT: 1395 mL    Total NET: 1690.7 mL          PHYSICAL EXAM:    General/Neuro  RASS:  -1           GCS:  10T   = E 3  / V 1T  / M 6     Deficits:    on minimal sedation, opens eyes to voice, follows commands, motor sensory function intact in upper and lower bilateral extremities, moving all bilateral extremities    Lungs:    clear to auscultation bilaterally, lung sounds present bilaterally, Normal expansion/effort.     Cardiovascular : S1, S2.  Peripheral edema in bilateral upper and lower extremities.  Cardiac Rhythm: A-fib    GI: Abdomen soft, Non-tender, Non-distended.    VENICE drain in place with serosanguinous fluid, nephrostomy tube in place with serosanguinous fluid.    Extremities: Bilateral upper extremities and right lower extremity warm and dry. Left lower extremity cool and dry. Pulses: bilateral DP and PT pulses present on doppler.    Derm: Good skin turgor.    :       Mena catheter in place.      CXR:     LABS:  CAPILLARY BLOOD GLUCOSE      POCT Blood Glucose.: 104 mg/dL (28 Jun 2023 06:52)  POCT Blood Glucose.: 110 mg/dL (27 Jun 2023 22:41)  POCT Blood Glucose.: 113 mg/dL (27 Jun 2023 22:39)  POCT Blood Glucose.: 99 mg/dL (27 Jun 2023 18:05)                          8.4    58.76 )-----------( 242      ( 28 Jun 2023 05:15 )             25.0       06-27    133<L>  |  95<L>  |  120<HH>  ----------------------------<  77  4.3   |  21  |  4.0<H>    Ca    7.3<L>      27 Jun 2023 19:57  Phos  7.3     06-27  Mg     2.1     06-27    TPro  3.9<L>  /  Alb  2.1<L>  /  TBili  0.3  /  DBili  0.2  /  AST  21  /  ALT  12  /  AlkPhos  98  06-26      PT/INR - ( 27 Jun 2023 19:57 )   PT: 18.40 sec;   INR: 1.59 ratio         PTT - ( 27 Jun 2023 19:57 )  PTT:29.2 sec      Urinalysis Basic - ( 27 Jun 2023 19:57 )    Color: x / Appearance: x / SG: x / pH: x  Gluc: 77 mg/dL / Ketone: x  / Bili: x / Urobili: x   Blood: x / Protein: x / Nitrite: x   Leuk Esterase: x / RBC: x / WBC x   Sq Epi: x / Non Sq Epi: x / Bacteria: x

## 2023-06-28 NOTE — PROGRESS NOTE ADULT - SUBJECTIVE AND OBJECTIVE BOX
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)      INTERVAL EVENTS/HPI  - T(F): , Max: 100.2 (06-27-23 @ 07:28) intubated on mechanical ventilation on phenyl  - WBC Count: 58.76 (06-28-23 @ 05:15) uptrending   WBC Count: 56.96 (06-27-23 @ 19:57)  - Creatinine: 4.0 (06-27-23 @ 19:57)  Creatinine: 4.1 (06-27-23 @ 04:25)     -   -   -     ROS  cannot obtain secondary to patient's sedation and/or mental status    VITALS:  T(F): 99.1, Max: 100.2 (06-27-23 @ 07:28)  HR: 105  BP: 107/77  RR: 22Vital Signs Last 24 Hrs  T(C): 37.3 (27 Jun 2023 20:00), Max: 37.9 (27 Jun 2023 07:28)  T(F): 99.1 (27 Jun 2023 20:00), Max: 100.2 (27 Jun 2023 07:28)  HR: 105 (28 Jun 2023 06:00) (90 - 109)  BP: 107/77 (28 Jun 2023 06:00) (95/66 - 149/84)  BP(mean): 87 (28 Jun 2023 06:00) (68 - 102)  RR: 22 (28 Jun 2023 06:00) (14 - 25)  SpO2: 100% (28 Jun 2023 06:00) (97% - 100%)    Parameters below as of 28 Jun 2023 06:00  Patient On (Oxygen Delivery Method): ventilator    O2 Concentration (%): 40    PHYSICAL EXAM:  ***     FH: Non-contributory  Social Hx: Non-contributory    TESTS & MEASUREMENTS:                        8.4    58.76 )-----------( 242      ( 28 Jun 2023 05:15 )             25.0     06-27    133<L>  |  95<L>  |  120<HH>  ----------------------------<  77  4.3   |  21  |  4.0<H>    Ca    7.3<L>      27 Jun 2023 19:57  Phos  7.3     06-27  Mg     2.1     06-27    TPro  3.9<L>  /  Alb  2.1<L>  /  TBili  0.3  /  DBili  0.2  /  AST  21  /  ALT  12  /  AlkPhos  98  06-26      LIVER FUNCTIONS - ( 26 Jun 2023 19:53 )  Alb: 2.1 g/dL / Pro: 3.9 g/dL / ALK PHOS: 98 U/L / ALT: 12 U/L / AST: 21 U/L / GGT: x           Urinalysis Basic - ( 27 Jun 2023 19:57 )    Color: x / Appearance: x / SG: x / pH: x  Gluc: 77 mg/dL / Ketone: x  / Bili: x / Urobili: x   Blood: x / Protein: x / Nitrite: x   Leuk Esterase: x / RBC: x / WBC x   Sq Epi: x / Non Sq Epi: x / Bacteria: x        Culture - Fungal, Bronchial (collected 06-24-23 @ 11:00)  Source: .Bronchial None  Preliminary Report (06-26-23 @ 11:00):    Moderate Yeast    Culture - Acid Fast - Bronchial w/Smear (collected 06-24-23 @ 11:00)  Source: .Bronchial None    Culture - Bronchial (collected 06-24-23 @ 11:00)  Source: .Bronchial None  Gram Stain (06-24-23 @ 23:21):    Numerous polymorphonuclear leukocytes per low power field    No squamous epithelial cells per low power field    Rare Yeast like cells per oil power field  Final Report (06-26-23 @ 17:56):    Normal Respiratory Gabbie present    Culture - Fungal, Other (collected 06-22-23 @ 22:11)  Source: .Other None  Preliminary Report (06-26-23 @ 10:57):    Testing in progress    Culture - Surgical Swab (collected 06-22-23 @ 22:11)  Source: .Surgical Swab None  Final Report (06-25-23 @ 14:14):    Few Finegoldia magna "Susceptibilities not performed"    Culture - Urine (collected 06-19-23 @ 02:50)  Source: Clean Catch Clean Catch (Midstream)  Final Report (06-20-23 @ 10:18):    No growth    Culture - Abscess with Gram Stain (collected 06-18-23 @ 23:08)  Source: .Abscess right lower quadrant (intrabdominal)  Gram Stain (06-19-23 @ 11:56):    Numerous polymorphonuclear leukocytes per low power field    Numerous Gram positive cocci in pairs per oil power field  Final Report (06-21-23 @ 14:13):    Numerous Anaerobic Gram Positive Cocci Most closely resembling    Peptoniphilus species "Susceptibilities not performed"    Culture - Abscess with Gram Stain (collected 06-18-23 @ 23:08)  Source: .Abscess left kidney  Gram Stain (06-19-23 @ 11:58):    Rare polymorphonuclear leukocytes per low power field    Few Gram Positive Cocci per oil power field  Final Report (06-22-23 @ 10:13):    Growth in fluid media only Anaerobic Gram Positive Cocci Most closely    resembling Peptoniphilus species "Susceptibilities not performed"    Rare Stenotrophomonas maltophilia  Organism: Stenotrophomonas maltophilia  Stenotrophomonas maltophilia (06-22-23 @ 10:13)  Organism: Stenotrophomonas maltophilia (06-22-23 @ 10:13)      Method Type: EDMUND      -  Levofloxacin: S 1      -  Trimethoprim/Sulfamethoxazole: S <=0.5/9.5  Organism: Stenotrophomonas maltophilia (06-22-23 @ 10:13)      Method Type: KB      -  Minocycline: S    Culture - Abscess with Gram Stain (collected 06-18-23 @ 20:18)  Source: .Abscess left PCN aspirate  Gram Stain (06-19-23 @ 06:27):    No polymorphonuclear cells seen per low power field    Few Gram Variable Cocci seen per oil power field  Final Report (06-24-23 @ 09:11):    Few Stenotrophomonas maltophilia  Organism: Stenotrophomonas maltophilia (06-24-23 @ 09:11)  Organism: Stenotrophomonas maltophilia (06-24-23 @ 09:11)      Method Type: EDMUND      -  Levofloxacin: S 2      -  Trimethoprim/Sulfamethoxazole: S <=0.5/9.5    Culture - Blood (collected 06-18-23 @ 14:18)  Source: .Blood Blood-Peripheral  Final Report (06-24-23 @ 02:00):    No Growth Final    Culture - Blood (collected 06-18-23 @ 14:18)  Source: .Blood Blood-Peripheral  Final Report (06-24-23 @ 02:00):    No Growth Final        Lactate, Blood: 1.8 mmol/L (06-27-23 @ 19:57)      INFECTIOUS DISEASES TESTING  MRSA PCR Result.: Negative (06-24-23 @ 17:38)      INFLAMMATORY MARKERS      RADIOLOGY & ADDITIONAL TESTS:  I have personally reviewed the last available Chest xray  CXR  Xray Chest 1 View- PORTABLE-Urgent:   ACC: [id number] EXAM:  XR CHEST PORTABLE URGENT 1V   ORDERED BY: ARY COSTA     PROCEDURE DATE:  06/26/2023          INTERPRETATION:  Clinical History / Reason for exam: Catheter placement.    Comparison : Chest radiograph prior day.    Technique/Positioning: Frontal portable, low lung volumes.    Findings:    Support devices: Endotracheal tube above the brigido. Enteric catheter   extends below the hemidiaphragm. Telemetry leads are seen. Left neck   central venous catheter.    Cardiac/mediastinum/hilum: Unremarkable.    Lung parenchyma/Pleura: Bilateral opacifications    Skeleton/soft tissues: Unremarkable.    Impression:    Bilateral opacifications. Support devices as described    Follow-up as needed.    --- End of Report ---            LEÓN IRWIN MD; Attending Interventional Radiologist  This document has been electronically signed. Jun 26 2023  7:59AM (06-26-23 @ 03:09)      CT  CT Abdomen and Pelvis No Cont:   ACC: [id number] EXAM:  CT ABDOMEN AND PELVIS   ORDERED BY: JULIANNE HERNANDEZ     PROCEDURE DATE:  06/27/2023          INTERPRETATION:  CLINICAL STATEMENT: Follow-up renal abscess    TECHNIQUE: Contiguous axial CT images were obtained from the lower chest   to the pubic symphysis .  Oral contrast was given.  Reformatted images in   the coronal and sagittal planes were acquired.    COMPARISON CT: CT abdomen pelvis from June 23, 2023.    OTHER STUDIES USED FOR CORRELATION: None.      FINDINGS:    Limited evaluation of solid organs and vascular structures secondary to   lack of intravenous contrast and streak artifact due to contact with CT   gantry    LOWER CHEST: Bilateral pleural effusions and adjacent compressive   atelectasis, again noted. Small pericardial effusion.    HEPATOBILIARY: Difficult to evaluate due to significant artifact. Again   demonstrated are poorly evaluated hepatic hypodensities adjacent to the   falciform ligament could reflect abscesses or liver masses. Evaluation   also limited by lack of intravenous contrast. Increased density within   the gallbladder again suggest vicarious excretion..    SPLEEN: Difficult to evaluate.    PANCREAS: Not well-visualized.    ADRENAL GLANDS: Not well-visualized.    KIDNEYS: Large right renal cyst again demonstrated. Suggestion of   contrast within the right kidney which could reflect renal failure    A normal left kidney is not identified. Percutaneous catheter seen within   the left kidney. Ill-defined abscess in the region of the left kidney may   have decreased in size from the prior examination but difficult to   exactly measure..    ABDOMINOPELVIC NODES: Difficult to visualize.    PELVIC ORGANS: Mena catheter within a poorly distended urinary bladder.   Prostate gland unchanged.    PERITONEUM/MESENTERY/BOWEL: Peritoneal catheter terminates within the   right lower quadrant. There is no bowel obstruction. Again demonstrated   is pneumoperitoneum scattered throughout the peritoneum anteriorly not   significantly changed from prior examination. Diffuse ascites again seen   throughout the abdomen pelvis, overall unchanged.    BONES/SOFT TISSUES: Diffuse anasarca. Degenerative changes again noted..    OTHER: Extensive vascular calcifications are again noted. Nasogastric   tube is seen extending to the stomach.      IMPRESSION:    Study is overall very limited due to lack of intravenous contrast, beam   Willingham artifact.    Persistent bilateral pleural effusions with adjacent consolidations   likely reflecting compressive atelectasis.    Left kidney poorly defined with a heterogeneous collection with air and   fluid which has likely mildly diminished in size compared to the prior   examination but exact comparison limited.    Diffuse ascites again noted. Diffuse anasarca again noted. Persistent   pneumoperitoneum.    Poorly defined lesions within the liver adjacent to the falciform   ligament which may again reflect abscesses or masses, difficult to   evaluate due to the limitations described above.    --- Endof Report ---            CHARLES SINGH MD; Attending Radiologist  This document has been electronically signed. Jun 27 2023  1:22PM (06-27-23 @ 13:09)      CARDIOLOGY TESTING  12 Lead ECG:   Ventricular Rate 96 BPM    Atrial Rate 97 BPM    QRS Duration 78 ms    Q-T Interval 290 ms    QTC Calculation(Bazett) 366 ms    R Axis 28 degrees    T Axis 227 degrees    Diagnosis Line Atrial fibrillation  Low voltage QRS  Septal infarct , age undetermined  Abnormal ECG    Confirmed by ROULA WISEMAN MD ([id number]) on 6/28/2023 6:46:27 AM (06-27-23 @ 22:17)  12 Lead ECG:   Ventricular Rate 98 BPM    QRS Duration 90 ms    Q-T Interval 292 ms    QTC Calculation(Bazett) 372 ms    R Axis 28 degrees    T Axis 228 degrees    Diagnosis Line Atrial fibrillation  Low voltage QRS  Nonspecific ST and T wave abnormality  Abnormal ECG    Confirmed by ROULA WISEMAN MD ([id number]) on 6/28/2023 6:51:59 AM (06-27-23 @ 18:22)      MEDICATIONS  albuterol/ipratropium for Nebulization 3  bacitracin   Ointment 1  calcium acetate 1334  caspofungin IVPB 50  caspofungin IVPB   chlorhexidine 0.12% Liquid 15  chlorhexidine 2% Cloths 1  dexMEDEtomidine Infusion 0.2  ferrous    sulfate 325  gabapentin 200  heparin   Injectable 5000  levoFLOXacin IVPB 500  meropenem  IVPB 500  metoprolol tartrate Injectable 2.5  pantoprazole  Injectable 40  phenylephrine    Infusion 0.1  sodium bicarbonate 1300  sodium chloride 0.9%. 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)      INTERVAL EVENTS/HPI  - T(F): , Max: 100.2 (06-27-23 @ 07:28) intubated on mechanical ventilation on phenyl  - WBC Count: 58.76 (06-28-23 @ 05:15) uptrending   WBC Count: 56.96 (06-27-23 @ 19:57)  - Creatinine: 4.0 (06-27-23 @ 19:57)  Creatinine: 4.1 (06-27-23 @ 04:25)     -   -   -     ROS  cannot obtain secondary to patient's sedation and/or mental status    VITALS:  T(F): 99.1, Max: 100.2 (06-27-23 @ 07:28)  HR: 105  BP: 107/77  RR: 22Vital Signs Last 24 Hrs  T(C): 37.3 (27 Jun 2023 20:00), Max: 37.9 (27 Jun 2023 07:28)  T(F): 99.1 (27 Jun 2023 20:00), Max: 100.2 (27 Jun 2023 07:28)  HR: 105 (28 Jun 2023 06:00) (90 - 109)  BP: 107/77 (28 Jun 2023 06:00) (95/66 - 149/84)  BP(mean): 87 (28 Jun 2023 06:00) (68 - 102)  RR: 22 (28 Jun 2023 06:00) (14 - 25)  SpO2: 100% (28 Jun 2023 06:00) (97% - 100%)    Parameters below as of 28 Jun 2023 06:00  Patient On (Oxygen Delivery Method): ventilator    O2 Concentration (%): 40    PHYSICAL EXAM:  General: intubated  HEENT: NCAT  CV: RRR  Lungs: symmetric chest expansion, decreased BS at bases  Abd: Soft bilateral drains serosanguinous fluid   Skin: no rash  Neuro: sedated  Lines: no phlebitis     FH: Non-contributory  Social Hx: Non-contributory    TESTS & MEASUREMENTS:                        8.4    58.76 )-----------( 242      ( 28 Jun 2023 05:15 )             25.0     06-27    133<L>  |  95<L>  |  120<HH>  ----------------------------<  77  4.3   |  21  |  4.0<H>    Ca    7.3<L>      27 Jun 2023 19:57  Phos  7.3     06-27  Mg     2.1     06-27    TPro  3.9<L>  /  Alb  2.1<L>  /  TBili  0.3  /  DBili  0.2  /  AST  21  /  ALT  12  /  AlkPhos  98  06-26      LIVER FUNCTIONS - ( 26 Jun 2023 19:53 )  Alb: 2.1 g/dL / Pro: 3.9 g/dL / ALK PHOS: 98 U/L / ALT: 12 U/L / AST: 21 U/L / GGT: x           Urinalysis Basic - ( 27 Jun 2023 19:57 )    Color: x / Appearance: x / SG: x / pH: x  Gluc: 77 mg/dL / Ketone: x  / Bili: x / Urobili: x   Blood: x / Protein: x / Nitrite: x   Leuk Esterase: x / RBC: x / WBC x   Sq Epi: x / Non Sq Epi: x / Bacteria: x        Culture - Fungal, Bronchial (collected 06-24-23 @ 11:00)  Source: .Bronchial None  Preliminary Report (06-26-23 @ 11:00):    Moderate Yeast    Culture - Acid Fast - Bronchial w/Smear (collected 06-24-23 @ 11:00)  Source: .Bronchial None    Culture - Bronchial (collected 06-24-23 @ 11:00)  Source: .Bronchial None  Gram Stain (06-24-23 @ 23:21):    Numerous polymorphonuclear leukocytes per low power field    No squamous epithelial cells per low power field    Rare Yeast like cells per oil power field  Final Report (06-26-23 @ 17:56):    Normal Respiratory Gabbie present    Culture - Fungal, Other (collected 06-22-23 @ 22:11)  Source: .Other None  Preliminary Report (06-26-23 @ 10:57):    Testing in progress    Culture - Surgical Swab (collected 06-22-23 @ 22:11)  Source: .Surgical Swab None  Final Report (06-25-23 @ 14:14):    Few Finegoldia magna "Susceptibilities not performed"    Culture - Urine (collected 06-19-23 @ 02:50)  Source: Clean Catch Clean Catch (Midstream)  Final Report (06-20-23 @ 10:18):    No growth    Culture - Abscess with Gram Stain (collected 06-18-23 @ 23:08)  Source: .Abscess right lower quadrant (intrabdominal)  Gram Stain (06-19-23 @ 11:56):    Numerous polymorphonuclear leukocytes per low power field    Numerous Gram positive cocci in pairs per oil power field  Final Report (06-21-23 @ 14:13):    Numerous Anaerobic Gram Positive Cocci Most closely resembling    Peptoniphilus species "Susceptibilities not performed"    Culture - Abscess with Gram Stain (collected 06-18-23 @ 23:08)  Source: .Abscess left kidney  Gram Stain (06-19-23 @ 11:58):    Rare polymorphonuclear leukocytes per low power field    Few Gram Positive Cocci per oil power field  Final Report (06-22-23 @ 10:13):    Growth in fluid media only Anaerobic Gram Positive Cocci Most closely    resembling Peptoniphilus species "Susceptibilities not performed"    Rare Stenotrophomonas maltophilia  Organism: Stenotrophomonas maltophilia  Stenotrophomonas maltophilia (06-22-23 @ 10:13)  Organism: Stenotrophomonas maltophilia (06-22-23 @ 10:13)      Method Type: EDMUND      -  Levofloxacin: S 1      -  Trimethoprim/Sulfamethoxazole: S <=0.5/9.5  Organism: Stenotrophomonas maltophilia (06-22-23 @ 10:13)      Method Type: KB      -  Minocycline: S    Culture - Abscess with Gram Stain (collected 06-18-23 @ 20:18)  Source: .Abscess left PCN aspirate  Gram Stain (06-19-23 @ 06:27):    No polymorphonuclear cells seen per low power field    Few Gram Variable Cocci seen per oil power field  Final Report (06-24-23 @ 09:11):    Few Stenotrophomonas maltophilia  Organism: Stenotrophomonas maltophilia (06-24-23 @ 09:11)  Organism: Stenotrophomonas maltophilia (06-24-23 @ 09:11)      Method Type: EDMUND      -  Levofloxacin: S 2      -  Trimethoprim/Sulfamethoxazole: S <=0.5/9.5    Culture - Blood (collected 06-18-23 @ 14:18)  Source: .Blood Blood-Peripheral  Final Report (06-24-23 @ 02:00):    No Growth Final    Culture - Blood (collected 06-18-23 @ 14:18)  Source: .Blood Blood-Peripheral  Final Report (06-24-23 @ 02:00):    No Growth Final        Lactate, Blood: 1.8 mmol/L (06-27-23 @ 19:57)      INFECTIOUS DISEASES TESTING  MRSA PCR Result.: Negative (06-24-23 @ 17:38)      INFLAMMATORY MARKERS      RADIOLOGY & ADDITIONAL TESTS:  I have personally reviewed the last available Chest xray  CXR  Xray Chest 1 View- PORTABLE-Urgent:   ACC: [id number] EXAM:  XR CHEST PORTABLE URGENT 1V   ORDERED BY: ARY COSTA     PROCEDURE DATE:  06/26/2023          INTERPRETATION:  Clinical History / Reason for exam: Catheter placement.    Comparison : Chest radiograph prior day.    Technique/Positioning: Frontal portable, low lung volumes.    Findings:    Support devices: Endotracheal tube above the brigido. Enteric catheter   extends below the hemidiaphragm. Telemetry leads are seen. Left neck   central venous catheter.    Cardiac/mediastinum/hilum: Unremarkable.    Lung parenchyma/Pleura: Bilateral opacifications    Skeleton/soft tissues: Unremarkable.    Impression:    Bilateral opacifications. Support devices as described    Follow-up as needed.    --- End of Report ---            LEÓN IRWIN MD; Attending Interventional Radiologist  This document has been electronically signed. Jun 26 2023  7:59AM (06-26-23 @ 03:09)      CT  CT Abdomen and Pelvis No Cont:   ACC: [id number] EXAM:  CT ABDOMEN AND PELVIS   ORDERED BY: JULIANNE HERNANDEZ     PROCEDURE DATE:  06/27/2023          INTERPRETATION:  CLINICAL STATEMENT: Follow-up renal abscess    TECHNIQUE: Contiguous axial CT images were obtained from the lower chest   to the pubic symphysis .  Oral contrast was given.  Reformatted images in   the coronal and sagittal planes were acquired.    COMPARISON CT: CT abdomen pelvis from June 23, 2023.    OTHER STUDIES USED FOR CORRELATION: None.      FINDINGS:    Limited evaluation of solid organs and vascular structures secondary to   lack of intravenous contrast and streak artifact due to contact with CT   gantry    LOWER CHEST: Bilateral pleural effusions and adjacent compressive   atelectasis, again noted. Small pericardial effusion.    HEPATOBILIARY: Difficult to evaluate due to significant artifact. Again   demonstrated are poorly evaluated hepatic hypodensities adjacent to the   falciform ligament could reflect abscesses or liver masses. Evaluation   also limited by lack of intravenous contrast. Increased density within   the gallbladder again suggest vicarious excretion..    SPLEEN: Difficult to evaluate.    PANCREAS: Not well-visualized.    ADRENAL GLANDS: Not well-visualized.    KIDNEYS: Large right renal cyst again demonstrated. Suggestion of   contrast within the right kidney which could reflect renal failure    A normal left kidney is not identified. Percutaneous catheter seen within   the left kidney. Ill-defined abscess in the region of the left kidney may   have decreased in size from the prior examination but difficult to   exactly measure..    ABDOMINOPELVIC NODES: Difficult to visualize.    PELVIC ORGANS: Mena catheter within a poorly distended urinary bladder.   Prostate gland unchanged.    PERITONEUM/MESENTERY/BOWEL: Peritoneal catheter terminates within the   right lower quadrant. There is no bowel obstruction. Again demonstrated   is pneumoperitoneum scattered throughout the peritoneum anteriorly not   significantly changed from prior examination. Diffuse ascites again seen   throughout the abdomen pelvis, overall unchanged.    BONES/SOFT TISSUES: Diffuse anasarca. Degenerative changes again noted..    OTHER: Extensive vascular calcifications are again noted. Nasogastric   tube is seen extending to the stomach.      IMPRESSION:    Study is overall very limited due to lack of intravenous contrast, beam   Willingham artifact.    Persistent bilateral pleural effusions with adjacent consolidations   likely reflecting compressive atelectasis.    Left kidney poorly defined with a heterogeneous collection with air and   fluid which has likely mildly diminished in size compared to the prior   examination but exact comparison limited.    Diffuse ascites again noted. Diffuse anasarca again noted. Persistent   pneumoperitoneum.    Poorly defined lesions within the liver adjacent to the falciform   ligament which may again reflect abscesses or masses, difficult to   evaluate due to the limitations described above.    --- Endof Report ---            CHARLES SINGH MD; Attending Radiologist  This document has been electronically signed. Jun 27 2023  1:22PM (06-27-23 @ 13:09)      CARDIOLOGY TESTING  12 Lead ECG:   Ventricular Rate 96 BPM    Atrial Rate 97 BPM    QRS Duration 78 ms    Q-T Interval 290 ms    QTC Calculation(Bazett) 366 ms    R Axis 28 degrees    T Axis 227 degrees    Diagnosis Line Atrial fibrillation  Low voltage QRS  Septal infarct , age undetermined  Abnormal ECG    Confirmed by ROULA WISEMAN MD ([id number]) on 6/28/2023 6:46:27 AM (06-27-23 @ 22:17)  12 Lead ECG:   Ventricular Rate 98 BPM    QRS Duration 90 ms    Q-T Interval 292 ms    QTC Calculation(Bazett) 372 ms    R Axis 28 degrees    T Axis 228 degrees    Diagnosis Line Atrial fibrillation  Low voltage QRS  Nonspecific ST and T wave abnormality  Abnormal ECG    Confirmed by ROULA WISEMAN MD ([id number]) on 6/28/2023 6:51:59 AM (06-27-23 @ 18:22)      MEDICATIONS  albuterol/ipratropium for Nebulization 3  bacitracin   Ointment 1  calcium acetate 1334  caspofungin IVPB 50  caspofungin IVPB   chlorhexidine 0.12% Liquid 15  chlorhexidine 2% Cloths 1  dexMEDEtomidine Infusion 0.2  ferrous    sulfate 325  gabapentin 200  heparin   Injectable 5000  levoFLOXacin IVPB 500  meropenem  IVPB 500  metoprolol tartrate Injectable 2.5  pantoprazole  Injectable 40  phenylephrine    Infusion 0.1  sodium bicarbonate 1300  sodium chloride 0.9%. 1000      WEIGHT  Weight (kg): 76.476 (06-22-23 @ 21:00)  Creatinine: 4.0 mg/dL (06-27-23 @ 19:57)      ANTIBIOTICS:  caspofungin IVPB      caspofungin IVPB 50 milliGRAM(s) IV Intermittent every 24 hours  levoFLOXacin IVPB 500 milliGRAM(s) IV Intermittent every 48 hours  meropenem  IVPB 500 milliGRAM(s) IV Intermittent every 12 hours      All available historical records have been reviewed

## 2023-06-28 NOTE — PROGRESS NOTE ADULT - ASSESSMENT
Assessment & Plan:  67M w/ PMH of HTN, grade IV hydronephrosis of L kidney s/p L PCN (placed 5/2023), stutter, hiatal hernia, iron deficiency anemia, H Pylori (untreated), and gastric mass (never biopsied) presents to the ED with at least 4 days of worsening weakness, abdominal distension, and no output from his PCN. Admitted with emphysematous pyelonephritis     (6/18): s/p LT PCN upsizing to 16Fr and 16Fr RLQ drain placement with IR   (6/22): s/p shock lithotripsy of L kidney abscess cavity with drainage of thick contents, upsize of drain to 26Fr bobby catheter    NEURO:  #Pain control    -APAP PRN    -Tramadol PRN    -Gabapentin 200 q 8     -fent 25 q 3 PRN while intubated  #Sedation    -precedex gtt @ 3m0.3mcg/kg/hr  #H/o stutter (negative ischemic workup last admission)    RESP:  #acute respiratory failure with left lung mucous plugging (intubated 6/24)    - LL @ 23, 7.5Fr    - 6/24 s/p bronchoscopy secondary to left maintstem mucus plug    - Vent settings: 450/22/40/8    - ABG:    #Pulmonary nodules / Mediastinal lymphadenopathy    - CT Chest: Right middle and upper lobe solid pulmonary nodules, f/u outpatient pulm   #Activity   - increased as tolerated    CARDS:   #Septic shock    - Phenylephrine infusion- currently at 0.9 mcg/kg/min  #New onset Afib w/ RVR with hypotension -- likely 2/2 sepsis    - Metoprolol 2.5 q6 IV  -Digoxin 250mcg q6 load (x4 doses)- follow level in am     - rate better controlled    - Cardiology following     #H/O HTN    -Amlodipine 2.5mg HOLDING   Imaging    - EKG (6/2y): Afib w/     - ECHO: (6/19) EF 55-60%, mild AS, mild LAE, mild MR/TR, trivial pericardial effusion    GI/NUTR:  #Diet    NPO except meds  #Ileus  - Holding tube feeds & bowel regimen  - NGT to suction   #bowel movements    - 2 bloody BM's noted overnight 6/25  - GI consult 6/26- Plan for EGD with EUS + FNA as outpatient, H-pylori treatment after resolution of acute issues   -dignishield in place   -FOBT positive    #GI PPX  -IV Protonix 40 q12 hours  #Gastric mass vs gastrohepatic lymphadenopathy    -CT A/P: lesser curvature mass , Hepatic hypodensities  #Splenic Hypodensities, infarct vs phlegmon/abscess    -CT A/P:  Wedge-shaped region of hypodensity within the spleen    /RENAL:   #Grade IV L hydronephrosis s/p L PCN (5/2023) -- presented to ED with thick dark foul smelling output  #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  -intermittent suction/irrigation per urology; f/u cx and cytology from 6/25     -(6/23) s/p LT PCN upsizing to 26Fr catheter, RLQ 16 Fr drain remains, s/p nephrostolithotomy w/ lithotripsy    -(6/18) s/p LT PCN upsizing to 16Fr and 16Fr RLQ drain placement with IR, flush q8 hr    #Possible EX-lab, left nephrectomy today or tomorrow    High suspicion of renal malignancy given recent pathology  #volume overload  #ROJAS (baseline Cr 1.0)    - Cr 3.8--> 4.2 --> 4.0  #metabolic acidosis  -Nephrology c/s - no indication for RRT as of 6/26  - PO sodium bicarb 1300mg q8  - s/p 24hr of sodium bicarbonate infusion 6/26  #Hyperkalemia     -now resolved  #Hyperphosphatemia    -Phoslo 1334mg q8    Labs:          BUN/Cr- 116/4.2  -->,  120/4.0  -->          Electrolytes-  - IV maintenance fluids: NS @ 75 cc/hr    HEME/ONC:   #new onset Afib    - holding heparin infusion in setting of hematochezia     Labs: Hb/Hct:  7.8/23.8  -->,  7.5/22.7  -->  8.0/24.3                    Plts:  250  -->,  206  -->   262              PTT/INR:  33.2/--  --->      #H/o Fe Deficiency anemia    - Iron supplementation  #DVT prophylaxis  -SCDs, heparin subQ   T&S Expires 6/29  Blood Consent- in chart     ID:  #Leukocytosis 2/2 sepsis   WBC- 34.86  --->>,  38.86  --->>,  41.06  --->>56.9-->  Temp trend- 24hrs T(  #antibiotics    -Levaquin 500mg q48 (6/21-    -Meropenem 1g q12 (6/20-    -Caspofungin 50mg q 24 (6/26-    -AS per ID: no need for Vancomycin or Daptomycin in the absence of MRSA  #Cultures  L PCN cyto-pathology  6/25: f/u     Funtitell 6/26: received f/u     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    BAL 6/24: moderate yeast     UCx 6/19: negative FINAL      Left PCN aspiration 6/18: stenotrophomonas maltophilia  Needs source control    ENDO:  #Glucose monitoring    -A1c (5/7/23): 5.0    -POCT q6 hours while NPO   #Adrenal Insufficiency 2/2 to sepsis     -completed solucortef taper     -Cortisol level - binding globulin 1.1 (low), serum cortiso, 84, free cortisol 76, % free         cortisol 91    MSK:  #wound care  Sacrococcygeal DTI: wound care following- cleanse with soap and water, apply triad, gauze, and allevyn BID and PRN for soiling   Venous ulcer right lower leg, R forearm skin tear: Pat dry, apply Xeroform, nonadherent dressing, wrap with Kerlix twice a day, prn for soiling  #Activity    -Advance as tolerated    -PT/rehab once extubated  Globally deconditioned    LINES/DRAINS:   PIV  Bobby (6/18)  RLQ 16Fr Javier Drain (6/18),   LT PCN 26Fr (6/18)   Left basilic midline  6/21  Right Radial [place] 6/22  L radial [place] (6/18-6/22)  L IJ TLC (6/26)  ETT  OGT   Assessment & Plan:  67M w/ PMH of HTN, grade IV hydronephrosis of L kidney s/p L PCN (placed 5/2023), stutter, hiatal hernia, iron deficiency anemia, H Pylori (untreated), and gastric mass (never biopsied) presents to the ED with at least 4 days of worsening weakness, abdominal distension, and no output from his PCN. Admitted with emphysematous pyelonephritis     (6/18): s/p LT PCN upsizing to 16Fr and 16Fr RLQ drain placement with IR   (6/22): s/p shock lithotripsy of L kidney abscess cavity with drainage of thick contents, upsize of drain to 26Fr bobby catheter    NEURO:  #Pain control    -APAP PRN    -Tramadol PRN    -Gabapentin 200 q 8     -fent 25 q 3 PRN while intubated  #Sedation    -precedex gtt @ 0.3mcg/kg/hr  #H/o stutter (negative ischemic workup last admission)    RESP:  #acute respiratory failure with left lung mucous plugging (intubated 6/24)    - LL @ 23, 7.5Fr    - 6/24 s/p bronchoscopy secondary to left mainstem mucus plug    - Vent settings: 450/22/40/8    - ABG:ABG - ( 28 Jun 2023 04:30 )  pH, Arterial: 7.52  pH, Blood: x     /  pCO2: 27    /  pO2: 132   / HCO3: 22    / Base Excess: -0.7  /  SaO2: 99.5      #Pulmonary nodules / Mediastinal lymphadenopathy    - CT Chest: Right middle and upper lobe solid pulmonary nodules, f/u outpatient pulm   #Activity   - increased as tolerated    CARDS:   #Septic shock    - Phenylephrine infusion- currently at 0.9 mcg/kg/min  #New onset Afib w/ RVR with hypotension -- likely 2/2 sepsis    - Metoprolol 2.5 q6 IV  -Digoxin 250mcg q6 load (x4 doses)- Digoxin level 6/27 am 3.2- continue to hold    - rate better controlled    - Cardiology following     #H/O HTN    -Amlodipine 2.5mg HOLDING   Imaging    - EKG (6/2y): Afib w/     - ECHO: (6/19) EF 55-60%, mild AS, mild LAE, mild MR/TR, trivial pericardial effusion    GI/NUTR:  #Diet    NPO except meds  #Ileus  - Holding tube feeds & bowel regimen  - NGT to suction   #bowel movements    - 2 bloody BM's noted overnight 6/25  - GI consult 6/26- Plan for EGD with EUS + FNA as outpatient, H-pylori treatment after resolution of acute issues   -dignishield in place   -FOBT positive    #GI PPX  -IV Protonix 40 q12 hours  #Gastric mass vs gastrohepatic lymphadenopathy    -CT A/P: lesser curvature mass , Hepatic hypodensities  #Splenic Hypodensities, infarct vs phlegmon/abscess    -CT A/P:  Wedge-shaped region of hypodensity within the spleen    /RENAL:   #Grade IV L hydronephrosis s/p L PCN (5/2023) -- presented to ED with thick dark foul smelling output  #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  -intermittent suction/irrigation per urology; f/u cx and cytology from 6/25     -(6/23) s/p LT PCN upsizing to 26Fr catheter, RLQ 16 Fr drain remains, s/p nephrostolithotomy w/ lithotripsy    -(6/18) s/p LT PCN upsizing to 16Fr and 16Fr RLQ drain placement with IR, flush q8 hr    #Possible EX-lab, left nephrectomy today or tomorrow    High suspicion of renal malignancy given recent pathology  #volume overload  #ROJAS (baseline Cr 1.0)    - Cr 3.8--> 4.2 --> 4.0  #metabolic acidosis  -Nephrology c/s - no indication for RRT as of 6/26  - PO sodium bicarb 1300mg q8  - s/p 24hr of sodium bicarbonate infusion 6/26  #Hyperkalemia     -now resolved  #Hyperphosphatemia    -Phoslo 1334mg q8    Labs:          BUN/Cr- 116/4.2  -->,  120/4.0  -->          Electrolytes-Sodium/Potassium/Magnesium/Phosphate  06-27 @ 19:57  Sodium  133  Potassium 4.3  Magnesium 2.1  Phosphorus  7.3   - IV maintenance fluids: NS @ 75 cc/hr  Hypocalcemia w/ ionized calcium 0.99- supplemented with 2 gms calcium gluconate    HEME/ONC:   #new onset Afib    - holding heparin infusion in setting of hematochezia     Labs: Hb/Hct:  7.8/23.8  -->,  7.5/22.7  -->  8.0/24.3                    Plts:  250  -->,  206  -->   262              PTT/INR:  33.2/--  --->      #H/o Fe Deficiency anemia    - Iron supplementation  #DVT prophylaxis  -SCDs, heparin subQ   T&S Expires 6/29  Blood Consent- in chart     ID:  #Leukocytosis 2/2 sepsis   WBC- 34.86  --->>,  38.86  --->>,  41.06  --->>56.9-->  Temp trend- 24hrs T(  #antibiotics    -Levaquin 500mg q48 (6/21-    -Meropenem 1g q12 (6/20-    -Caspofungin 50mg q 24 (6/26-    -AS per ID: no need for Vancomycin or Daptomycin in the absence of MRSA  #Cultures  L PCN cyto-pathology  6/25: f/u     Funtitell 6/26: received f/u     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    BAL 6/24: moderate yeast     UCx 6/19: negative FINAL      Left PCN aspiration 6/18: Stenotrophomonas maltophilia  Needs source control    ENDO:  #Glucose monitoring    -A1c (5/7/23): 5.0    -POCT q6 hours while NPO   #Adrenal Insufficiency 2/2 to sepsis     -completed solucortef taper     -Cortisol level - binding globulin 1.1 (low), serum cortiso, 84, free cortisol 76, % free         cortisol 91    MSK:  #wound care  Sacrococcygeal DTI: wound care following- cleanse with soap and water, apply triad, gauze, and allevyn BID and PRN for soiling   Venous ulcer right lower leg, R forearm skin tear: Pat dry, apply Xeroform, nonadherent dressing, wrap with Kerlix twice a day, prn for soiling  #Activity    -Advance as tolerated    -PT/rehab once extubated  Globally deconditioned    LINES/DRAINS:   PIV  Bobby (6/18)  RLQ 16Fr Javier Drain (6/18),   LT PCN 26Fr (6/18)   Left basilic midline  6/21  Right Radial [place] 6/22  L radial [place] (6/18-6/22)  L IJ TLC (6/26)  ETT  OGT

## 2023-06-28 NOTE — PROGRESS NOTE ADULT - ASSESSMENT
67 year old man with history of hydronephrosis s/p PCN placement, HTN, anemia, H pylori presents with abdominal pain and no output from PCN. HOspital course complicated by septic shock in the setting of emphysematous pyelonephritis, hematochezia, respiratory failure requiring mechanical ventilation. Palliative care consulted for [facility].     Spoke with primary surgical ICU team. They noted the patient was stable enough for surgery and that the patient's daughter did not wish to pursue surgery at that time.  Spoke with son at bedside and he went to speak with the patient's daughter/HCP, Tracey. Received a call from Tracey later in the day. She noted that she does want surgery, but only suggested that it be delayed until the patient is more stable. She noted that if the surgical team feels more urgent surgical intervention is required/appropriate, she will be in agreement with surgery. Alerted the primary team SICU team of this discussion, and they noted they would reach out to Urology.  All questions answered.     Education about palliative care provided to patient/family.  See Recs below.    Please call [phone] with questions or concerns 24/7.   We will continue to follow.

## 2023-06-28 NOTE — PROGRESS NOTE ADULT - SUBJECTIVE AND OBJECTIVE BOX
HPI:  67M w/ PMH of HTN, grade IV hydronephrosis of L kidney s/p L PCN (placed 5/2023), stutter, hiatal hernia, iron deficiency anemia, H Pylori (untreated), and gastric mass (never biopsied) presents to the ED with at least 4 days of worsening weakness, abdominal distension, and no output from his PCN. Pt and daughters bedside are only able to give limited information, but for the past few days, he has noticed no output from his PCN as well as increasing abdominal distension, pain, and anorexia. He reports that he has not felt normal for the past 10 days. His PCN used to put out serosanguinous fluid, but the output changed to feculent/brown liquid over the past day. In the ED, he was noted to be in new onset afib w/ RVR to 150+ as well as experiencing some difficulty breathing. He was started on BiPAP. Stat labs were notable for WBC 47, Cr 2.9, and lactate 6. CT A/P w/ IV contrast showed free intraperitoneal fluid and air from an unknown source as well as fluid and air in the L kidney.  (18 Jun 2023 18:34)    Interval history  -Patient seen at bedside with son  -He remains intubated and sedated and unable to participate in exam    ADVANCE DIRECTIVES:    [x ] Full Code [ ] DNR  MOLST  [ ]  Living Will  [ ]   DECISION MAKER(s):  [x ] Health Care Proxy(s)  [ ] Surrogate(s)  [ ] Guardian           Name(s): Phone Number(s):  Tracey    BASELINE (I)ADL(s) (prior to admission):  Pillow: [ ]Total  [ ] Moderate [ ]Dependent  Palliative Performance Status Version 2:         %    http://npcrc.org/files/news/palliative_performance_scale_[email or web address].pdf    Allergies    No Known Allergies    Intolerances    MEDICATIONS  (STANDING):  albuterol/ipratropium for Nebulization 3 milliLiter(s) Nebulizer every 6 hours  bacitracin   Ointment 1 Application(s) Topical two times a day  calcium acetate 1334 milliGRAM(s) Oral every 8 hours  chlorhexidine 0.12% Liquid 15 milliLiter(s) Oral Mucosa every 12 hours  chlorhexidine 2% Cloths 1 Application(s) Topical <User Schedule>  dexMEDEtomidine Infusion 0.2 MICROgram(s)/kG/Hr (3.82 mL/Hr) IV Continuous <Continuous>  ferrous    sulfate 325 milliGRAM(s) Oral daily  gabapentin 200 milliGRAM(s) Oral every 8 hours  heparin   Injectable 5000 Unit(s) SubCutaneous every 8 hours  levoFLOXacin IVPB 500 milliGRAM(s) IV Intermittent every 48 hours  meropenem  IVPB 500 milliGRAM(s) IV Intermittent every 12 hours  metoprolol tartrate Injectable 2.5 milliGRAM(s) IV Push every 6 hours  pantoprazole  Injectable 40 milliGRAM(s) IV Push every 12 hours  phenylephrine    Infusion 0.1 MICROgram(s)/kG/Min (1.43 mL/Hr) IV Continuous <Continuous>  sodium bicarbonate 1300 milliGRAM(s) Oral three times a day  sodium chloride 0.9%. 1000 milliLiter(s) (75 mL/Hr) IV Continuous <Continuous>    MEDICATIONS  (PRN):  fentaNYL    Injectable 25 MICROGram(s) IV Push every 3 hours PRN Moderate Pain (4 - 6)  sodium chloride 0.9% lock flush 10 milliLiter(s) IV Push every 1 hour PRN Pre/post blood products, medications, blood draw, and to maintain line patency  sodium chloride 0.9% lock flush 10 milliLiter(s) IV Push every 1 hour PRN Pre/post blood products, medications, blood draw, and to maintain line patency      PRESENT SYMPTOMS: [x]Unable to obtain due to poor mentation   Source if other than patient:  [ ]Family   [ ]Team     Pain: [ ]yes [ ]no  QOL impact -   Location -                    Aggravating factors -  Quality -  Radiation -  Timing-  Severity (0-10 scale):  Minimal acceptable level (0-10 scale):     CPOT:  0  https://www.sccm.org/getattachment/wtx39c63-3v6a-0j1z-5e3n-2524b8015t4l/Critical-Care-Pain-Observation-Tool-(CPOT)    PAIN AD Score:   [email or web address].[email or web address]/painad.pdf (press ctrl +  left click to view)    Dyspnea:                           [ ]None[ ]Mild [ ]Moderate [ ]Severe     Respiratory Distress Observation Scale (RDOS): 0  A score of 0 to 2 signifies little or no respiratory distress, 3 signifies mild distress, scores 4 to 6 indicate moderate distress, and scores greater than 7 signify severe distress  [email or web address]/sites/default/files/[email or web address]-qofuonkuguc-puedrrtd-ewrnzyuxqaa-fsblk.pdf    Anxiety:                             [ ]None[ ]Mild [ ]Moderate [ ]Severe   Fatigue:                             [ ]None[ ]Mild [ ]Moderate [ ]Severe   Nausea:                             [ ]None[ ]Mild [ ]Moderate [ ]Severe   Loss of appetite:              [ ]None[ ]Mild [ ]Moderate [ ]Severe   Constipation:                    [ ]None[ ]Mild [ ]Moderate [ ]Severe    Other Symptoms:  [ ]All other review of systems negative     Palliative Performance Status Version 2:        10 %    http://[email or web address]/files/news/palliative_performance_scale_[email or web address].pdf    PHYSICAL EXAM:  Vital Signs Last 24 Hrs  T(C): 37.1 (28 Jun 2023 16:02), Max: 37.6 (28 Jun 2023 08:00)  T(F): 98.7 (28 Jun 2023 16:02), Max: 99.7 (28 Jun 2023 08:00)  HR: 85 (28 Jun 2023 16:02) (85 - 106)  BP: 116/66 (28 Jun 2023 16:02) (95/66 - 149/84)  BP(mean): 89 (28 Jun 2023 10:00) (68 - 102)  RR: 22 (28 Jun 2023 16:02) (14 - 24)  SpO2: 100% (28 Jun 2023 16:02) (99% - 100%)    Parameters below as of 28 Jun 2023 08:00  Patient On (Oxygen Delivery Method): ventilator    O2 Concentration (%): 40    GENERAL:  [ ] No acute distress [ ]Lethargic  [ x]Unarousable  [ ]Verbal  [ ]Non-Verbal [ ]Cachexia    BEHAVIORAL/PSYCH:  [ ]Alert and Oriented x  [ ] Anxiety [ ] Delirium [ ] Agitation [ x] Calm   EYES: [ ] No scleral icterus [ ] Scleral icterus [x ] Closed  ENMT:  [ ]Dry mouth  [x ]No external oral lesions [ ] No external ear or nose lesions  CARDIOVASCULAR:  [ ]Regular [ ]Irregular [ ]Tachy [x ]Not Tachy  [ ]Ben [ ] Edema [ ] No edema  PULMONARY:  [ ]Tachypnea  [ ]Audible excessive secretions [x ] No labored breathing [ ] labored breathing  GASTROINTESTINAL: [ ]Soft  [ ]Distended  x[ ]Not distended [ ]Non tender [ ]Tender  MUSCULOSKELETAL: [ ]No clubbing [ ] clubbing  [x ] No cyanosis [ ] cyanosis  NEUROLOGIC: [ ]No focal deficits  [ ]Follows commands  [x ]Does not follow commands  [ ]Cognitive impairment  [ ]Dysphagia  [ ]Dysarthria  [ ]Paresis   SKIN: [ ] Jaundiced [x ] Non-jaundiced [ ]Rash [ ]No Rash [ ] Warm [ ] Dry  MISC/LINES: [x ] ET tube [ ] Trach [ ]NGT/OGT [ ]PEG [ ]Mena    CRITICAL CARE:  [x ] Shock Present  [ ]Septic [ ]Cardiogenic [ ]Neurologic [ ]Hypovolemic  [ ]  Vasopressors [ ]  Inotropes   [x ]Respiratory failure present [ x]Mechanical ventilation [ ]Non-invasive ventilatory support [ ]High flow  [ ]Acute  [ ]Chronic [ ]Hypoxic  [ ]Hypercarbic [ ]Other  [ ]Other organ failure     LABS: reviewed by me                                   8.4    58.76 )-----------( 242      ( 28 Jun 2023 05:15 )             25.0       06-27    133<L>  |  95<L>  |  120<HH>  ----------------------------<  77  4.3   |  21  |  4.0<H>    Ca    7.3<L>      27 Jun 2023 19:57  Phos  7.3     06-27  Mg     2.1     06-27    TPro  3.9<L>  /  Alb  2.1<L>  /  TBili  0.3  /  DBili  0.2  /  AST  21  /  ALT  12  /  AlkPhos  98  06-26          ABG - ( 28 Jun 2023 04:30 )  pH, Arterial: 7.52  pH, Blood: x     /  pCO2: 27    /  pO2: 132   / HCO3: 22    / Base Excess: -0.7  /  SaO2: 99.5                Urinalysis Basic - ( 27 Jun 2023 19:57 )    Color: x / Appearance: x / SG: x / pH: x  Gluc: 77 mg/dL / Ketone: x  / Bili: x / Urobili: x   Blood: x / Protein: x / Nitrite: x   Leuk Esterase: x / RBC: x / WBC x   Sq Epi: x / Non Sq Epi: x / Bacteria: x        PT/INR - ( 27 Jun 2023 19:57 )   PT: 18.40 sec;   INR: 1.59 ratio         PTT - ( 27 Jun 2023 19:57 )  PTT:29.2 sec          CAPILLARY BLOOD GLUCOSE      POCT Blood Glucose.: 104 mg/dL (28 Jun 2023 06:52)                RADIOLOGY & ADDITIONAL STUDIES: reviewed by me    < from: Xray Chest 1 View- PORTABLE-Routine (06.28.23 @ 05:40) >  FINDINGS/  IMPRESSION:    NG tube terminating in the left upper quadrant. Unchanged left IJ venous   catheter.    Bibasal opacities without significant change accounting for differences   in positioning. No pneumothorax.    Stable cardiomediastinal silhouette.    Unchanged osseous structures.    < end of copied text >      EKG: reviewed by me  < from: 12 Lead ECG (06.27.23 @ 22:17) >  Ventricular Rate 96 BPM    Atrial Rate 97 BPM    QRS Duration 78 ms    Q-T Interval 290 ms    QTC Calculation(Bazett) 366 ms    R Axis 28 degrees    T Axis 227 degrees    Diagnosis Line Atrial fibrillation  Low voltage QRS  Septal infarct , age undetermined  Abnormal ECG    < end of copied text >      PROTEIN CALORIE MALNUTRITION PRESENT: [ ]mild [ ]moderate [ ]severe [ ]underweight [ ]morbid obesity  https://www.andeal.org/vault/2440/web/files/ONC/Table_Clinical%20Characteristics%20to%20Document%20Malnutrition-White%20JV%20et%20al%414640.pdf    Height (cm): 180 (06-22-23 @ 21:00), 180.3 (05-11-23 @ 08:58)  Weight (kg): 76.476 (06-22-23 @ 21:00), 66.2 (05-10-23 @ 10:54)  BMI (kg/m2): 23.6 (06-22-23 @ 21:00), 20.4 (05-11-23 @ 08:58)    [ ]PPSV2 < or = to 30% [ ]significant weight loss  [ ]poor nutritional intake  [ ]anasarca      [ ]Artificial Nutrition      REFERRALS:   [ ]Chaplaincy  [ ]Hospice  [ ]Child Life  [ x]Social Work  [ ]Case management [ ]Holistic Therapy     Patient discussed with primary medical team MD  Palliative care education provided to patient and/or family  Patient and/or family assessed for spiritual and social needs    Goals of Care Document:

## 2023-06-28 NOTE — PROGRESS NOTE ADULT - ASSESSMENT
67M w/ PMH of HTN, grade IV hydronephrosis of L kidney s/p L PCN (placed 5/2023), stutter, hiatal hernia, iron deficiency anemia, H Pylori (untreated), and gastric mass (never biopsied) presents to the ED with at least 4 days of worsening weakness, abdominal distension, and no output from his PCN  . Found to have septic shock, MSOF, lactic acidosis from left emphysematous hydronephrosis, pyelonephritis and possible rupture with pneumoperitoneum along with possible hepatic abscesses, splenic infarct and abdominal and mediastinal lymphadenopathies. he is s/p upsizing of left PCN tube by IR along with RLQ drain placed.       Assessment and plan  ROJAS/ Hyponatremia/ severe left hydro s/p upsizing of left PCN catheter/ emphysematous pyelo with septic shock/  ROJAS likely ATN iso septic shock  cr improved from peak now stable though not imporving further  non-oliguric  continue sodium bicarbonate po   BP noted / increase midodrine to 10 q 8   ph noted / on  binders     and IR following  no indication for RRT as of now, will cont to reeval daily  Planned for possible Nephrectoym for source control    Discussed w/ family bedside

## 2023-06-28 NOTE — CHART NOTE - NSCHARTNOTEFT_GEN_A_CORE
Registered Dietitian Follow-Up     Patient Profile Reviewed                           Yes [x]   No []     Nutrition History Previously Obtained        Yes []  No [x]       Pertinent Subjective Information: Limited to chart review at this time as pt intubated and unable to provide hx.     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 Pt intubated 6/24. Previously with ileus and 2 bloody BM's noted overnight 6/25. GI consult 6/26- Plan for EGD with EUS + FNA as outpatient, H-pylori treatment after resolution of acute issues.      Diet order: Diet, NPO with Tube Feed:   Tube Feeding Modality: Nasogastric  Nepro with Carb Steady  Total Volume for 24 Hours (mL): 480  Continuous  Starting Tube Feed Rate {mL per Hour}: 20  Increase Tube Feed Rate by (mL): 20     Every 4 hours  Until Goal Tube Feed Rate (mL per Hour): 20  Tube Feed Duration (in Hours): 24  Tube Feed Start Time: 09:23 (06-28-23 @ 10:01) [Active]    Anthropometrics:  Height (cm): 180 (06-22-23 @ 21:00)  Weight (kg): 76.476 (06-22-23 @ 21:00)  BMI (kg/m2): 23.6 (06-22-23 @ 21:00)  IBW: 78.2 kg  UBW: 113 KG (Sept 2022) -> 66.2 KG (5/11/23)    Weight Change: no new weight to assess.     MEDICATIONS  (STANDING):  albuterol/ipratropium for Nebulization 3 milliLiter(s) Nebulizer every 6 hours  bacitracin   Ointment 1 Application(s) Topical two times a day  calcium acetate 1334 milliGRAM(s) Oral every 8 hours  caspofungin IVPB      caspofungin IVPB 50 milliGRAM(s) IV Intermittent every 24 hours  chlorhexidine 0.12% Liquid 15 milliLiter(s) Oral Mucosa every 12 hours  chlorhexidine 2% Cloths 1 Application(s) Topical <User Schedule>  dexMEDEtomidine Infusion 0.2 MICROgram(s)/kG/Hr (3.82 mL/Hr) IV Continuous <Continuous>  ferrous    sulfate 325 milliGRAM(s) Oral daily  gabapentin 200 milliGRAM(s) Oral every 8 hours  heparin   Injectable 5000 Unit(s) SubCutaneous every 8 hours  levoFLOXacin IVPB 500 milliGRAM(s) IV Intermittent every 48 hours  meropenem  IVPB 500 milliGRAM(s) IV Intermittent every 12 hours  metoprolol tartrate Injectable 2.5 milliGRAM(s) IV Push every 6 hours  pantoprazole  Injectable 40 milliGRAM(s) IV Push every 12 hours  phenylephrine    Infusion 0.1 MICROgram(s)/kG/Min (1.43 mL/Hr) IV Continuous <Continuous>  sodium bicarbonate 1300 milliGRAM(s) Oral three times a day  sodium chloride 0.9%. 1000 milliLiter(s) (75 mL/Hr) IV Continuous <Continuous>    MEDICATIONS  (PRN):  fentaNYL    Injectable 25 MICROGram(s) IV Push every 3 hours PRN Moderate Pain (4 - 6)  sodium chloride 0.9% lock flush 10 milliLiter(s) IV Push every 1 hour PRN Pre/post blood products, medications, blood draw, and to maintain line patency  sodium chloride 0.9% lock flush 10 milliLiter(s) IV Push every 1 hour PRN Pre/post blood products, medications, blood draw, and to maintain line patency    Pertinent Labs:              8.4    58.76 )-----------( 242      ( 28 Jun 2023 05:15 )             25.0   06-27 @ 19:57: Na 133<L>, <HH>, Cr 4.0<H>, BG 77, K+ 4.3, Phos 7.3<H>, Mg 2.1, Alk Phos --, ALT/SGPT --, AST/SGOT --, HbA1c --    Finger Sticks:  POCT Blood Glucose.: 104 mg/dL (06-28 @ 06:52)  POCT Blood Glucose.: 110 mg/dL (06-27 @ 22:41)  POCT Blood Glucose.: 113 mg/dL (06-27 @ 22:39)  POCT Blood Glucose.: 99 mg/dL (06-27 @ 18:05)    Physical Findings:  - Appearance: sedated  - GI function: abdomen rounded; distended  - Tubes: OGT  - Oral/Mouth cavity: NPO  - Skin: WOCN 6/26:  Deep Tissue Injury to Sacrococcygeal region; Venous Ulcer to Right lower leg; Skin tear to right forearm  - Edema: 3+ generalized; 4+scrotum edema     Nutrition Requirements:  Weight Used: dosing weight 76.5 kg     Estimated Energy Needs    Continue []  Adjust [x]  Energy Recommendations: 1891 kcal/day - PSE Tm 37.9 Ve 9.2    Estimated Protein Needs    Continue []  Adjust [x]  Protein Recommendations:  gm/day - 1.2-1.5g/kg - PCM     Estimated Fluid Needs        Continue [x]  Adjust []  Fluid Recommendations: 6966-5000 mL/day - 25-30 ml/kg    Nutrient Intake: current TF provides 864kcal, 38g protein, 350ml free water    [x] Previous Nutrition Diagnosis: Inadequate Protein Energy Intake, Severe malnutrition            [x] Ongoing          [] Resolved     Nutrition Education: N/A     Goal/Expected Outcome: Pt to meet >90% & <105% estimated needs via EN in 3-5 days. RD to follow as per high risk protocol.     Indicator/Monitoring: Monitor diet order, kcal intake, body composition, NFPE, labs  (lytes, BG, renal indices)    Recommendation:  Nepro formula, advance to goal 40ml/hr as tolerated. Add No Carb Prosource (1pkg = 15gms Protein) 1pkt once a day. -- will provide 1788kcal, 91g protein, 701ml free water,   -- if tolerated well, further advance to 45ml/hr to meet estimated protein needs  -- Consider Banatrol 2-3x/day for loose/watery bowel movements    Patient assessed at high nutrition risk.  RD spectra [id number], also available on Teams. Registered Dietitian Follow-Up     Patient Profile Reviewed                           Yes [x]   No []     Nutrition History Previously Obtained        Yes []  No [x]       Pertinent Subjective Information: Limited to chart review at this time as pt intubated and unable to provide hx.     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 Pt intubated 6/24. Previously with ileus and 2 bloody BM's noted overnight 6/25. GI consult 6/26- Plan for EGD with EUS + FNA as outpatient, H-pylori treatment after resolution of acute issues.      Diet order: Diet, NPO with Tube Feed:   Tube Feeding Modality: Nasogastric  Nepro with Carb Steady  Total Volume for 24 Hours (mL): 480  Continuous  Starting Tube Feed Rate {mL per Hour}: 20  Increase Tube Feed Rate by (mL): 20     Every 4 hours  Until Goal Tube Feed Rate (mL per Hour): 20  Tube Feed Duration (in Hours): 24  Tube Feed Start Time: 09:23 (06-28-23 @ 10:01) [Active]    Anthropometrics:  Height (cm): 180 (06-22-23 @ 21:00)  Weight (kg): 76.476 (06-22-23 @ 21:00)  BMI (kg/m2): 23.6 (06-22-23 @ 21:00)  IBW: 78.2 kg  UBW: 113 KG (Sept 2022) -> 66.2 KG (5/11/23)    Weight Change: no new weight to assess.     MEDICATIONS  (STANDING):  albuterol/ipratropium for Nebulization 3 milliLiter(s) Nebulizer every 6 hours  bacitracin   Ointment 1 Application(s) Topical two times a day  calcium acetate 1334 milliGRAM(s) Oral every 8 hours  caspofungin IVPB      caspofungin IVPB 50 milliGRAM(s) IV Intermittent every 24 hours  chlorhexidine 0.12% Liquid 15 milliLiter(s) Oral Mucosa every 12 hours  chlorhexidine 2% Cloths 1 Application(s) Topical <User Schedule>  dexMEDEtomidine Infusion 0.2 MICROgram(s)/kG/Hr (3.82 mL/Hr) IV Continuous <Continuous>  ferrous    sulfate 325 milliGRAM(s) Oral daily  gabapentin 200 milliGRAM(s) Oral every 8 hours  heparin   Injectable 5000 Unit(s) SubCutaneous every 8 hours  levoFLOXacin IVPB 500 milliGRAM(s) IV Intermittent every 48 hours  meropenem  IVPB 500 milliGRAM(s) IV Intermittent every 12 hours  metoprolol tartrate Injectable 2.5 milliGRAM(s) IV Push every 6 hours  pantoprazole  Injectable 40 milliGRAM(s) IV Push every 12 hours  phenylephrine    Infusion 0.1 MICROgram(s)/kG/Min (1.43 mL/Hr) IV Continuous <Continuous>  sodium bicarbonate 1300 milliGRAM(s) Oral three times a day  sodium chloride 0.9%. 1000 milliLiter(s) (75 mL/Hr) IV Continuous <Continuous>    MEDICATIONS  (PRN):  fentaNYL    Injectable 25 MICROGram(s) IV Push every 3 hours PRN Moderate Pain (4 - 6)  sodium chloride 0.9% lock flush 10 milliLiter(s) IV Push every 1 hour PRN Pre/post blood products, medications, blood draw, and to maintain line patency  sodium chloride 0.9% lock flush 10 milliLiter(s) IV Push every 1 hour PRN Pre/post blood products, medications, blood draw, and to maintain line patency    Pertinent Labs:              8.4    58.76 )-----------( 242      ( 28 Jun 2023 05:15 )             25.0   06-27 @ 19:57: Na 133<L>, <HH>, Cr 4.0<H>, BG 77, K+ 4.3, Phos 7.3<H>, Mg 2.1, Alk Phos --, ALT/SGPT --, AST/SGOT --, HbA1c --    Finger Sticks:  POCT Blood Glucose.: 104 mg/dL (06-28 @ 06:52)  POCT Blood Glucose.: 110 mg/dL (06-27 @ 22:41)  POCT Blood Glucose.: 113 mg/dL (06-27 @ 22:39)  POCT Blood Glucose.: 99 mg/dL (06-27 @ 18:05)    Physical Findings:  - Appearance: sedated  - GI function: abdomen rounded; distended  - Tubes: OGT  - Oral/Mouth cavity: NPO  - Skin: WOCN 6/26:  Deep Tissue Injury to Sacrococcygeal region; Venous Ulcer to Right lower leg; Skin tear to right forearm  - Edema: 3+ generalized; 4+scrotum edema     Nutrition Requirements:  Weight Used: dosing weight 76.5 kg     Estimated Energy Needs    Continue []  Adjust [x]  Energy Recommendations: 1891 kcal/day - PSE Tm 37.9 Ve 9.2    Estimated Protein Needs    Continue []  Adjust [x]  Protein Recommendations:  gm/day - 1.2-1.5g/kg - PCM     Estimated Fluid Needs        Continue [x]  Adjust []  Fluid Recommendations: 0753-4594 mL/day - 25-30 ml/kg    Nutrient Intake: current TF provides 864kcal, 38g protein, 350ml free water    [x] Previous Nutrition Diagnosis: Inadequate Protein Energy Intake, Severe malnutrition            [x] Ongoing          [] Resolved     Nutrition Education: N/A     Goal/Expected Outcome: Pt to meet >90% & <105% estimated needs via EN in 3-5 days. RD to follow as per high risk protocol.     Indicator/Monitoring: Monitor diet order, kcal intake, body composition, NFPE, labs  (lytes, BG, renal indices)    Recommendation:  Nepro formula, advance to goal 40ml/hr as tolerated. Add No Carb Prosource (1pkg = 15gms Protein) 1pkt once a day. -- will provide 1788kcal, 91g protein, 701ml free water,   -- if tolerated well, further advance to 45ml/hr to meet estimated protein needs  -- Consider Banatrol 2-3x/day for loose/watery bowel movements  If medically feasible, consider supplemental parenteral nutrition while continuing TF to help meet needs, given pt has mostly been on trickle feeds through admission with multiple GI issues.    Patient assessed at high nutrition risk.  RD spectra [phone], also available on Teams.

## 2023-06-28 NOTE — PROGRESS NOTE ADULT - ASSESSMENT
ASSESSMENT  68yo Male with pmh of hypertension, massive Grade IV hydronephrosis s/p left nephrostomy placed in May 2023 by [facility] presents to the ED with little to no urine output from LEFT nephrostomy from about a week. PCN  more recently drainage was changed to brown/purulent fluid. 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      IMPRESSION  #Reintubated, L lung white out, rule out HAP  #Septic shock on admission due to Emphysematous pyelonephritis with suspected perforation/ pneumoperitoneum with suspected liver abscesses & splenic infarct vs abscess    6/24 bronch CX NG,   Moderate Yeast- likely colonizer    6/22 UCX  Few Finegoldia magna "Susceptibilities not performed"  Nephrostolithotomy, percutaneous, with lithotripsy, large stone 22-Jun-2023 23:27:09 left large thick abscess materia dimension of aspirate over 4 x 4 x 4 cm Bernie Perdomo  Change external ureteral stent 22-Jun-2023 23:28:03 left Bernie Perdomo  Nephrostogram 22-Jun-2023 23:28:31 left Bernie Perdomo. "thick gelatinous material that was difficult to suction out even with the shock pulse. I would alternate 2 prong to remove and break up / shock pulse to suck out. after an hour where I had no clear planes I elected to stop. irrigation through the 26 bobby took out a lot additional material  in ashlyn catch cup 4 x 4 x 4 cm lump of material trapped"    6/19 UCX NG; UA Blood: x / Protein: 300 mg/dL / Nitrite: Negative   Leuk Esterase: Large / RBC: 8 /HPF / WBC >720 /HPF   Sq Epi: x / Non Sq Epi: x / Bacteria: Moderate    6/18 L PCN     Few Stenotrophomonas maltophilia Levofloxacin: S 2    6/18 L PCN     Numerous Anaerobic Gram Positive Cocci Most closely resembling  Peptoniphilus species "Susceptibilities not performed"    6/18 L PCN   Numerous Gram positive cocci in pairs per oil power field    6/18 BCX NGTD     s/p 6/18  Left PCN upsized to 16F and 1200cc of very viscous tan colored fluid was aspirated. RLQ 16F drain was placed and 1200cc of tan colored fluid was aspirated (less viscous). A sample from each location was sent for culture.     Repeat CT 6/19 Since one day earlier,  No extraluminal contrast. Oral contrast was last seen in the terminal ileum.  Status post left nephrostomy tube placement. Left enlarged kidney with severe hydronephrosis/collection has decreased, now measuring 16 x 12 cm from 20 x 14 cm  Surgical Pathology Report:   Left renal abscess, possible parenchyma  Left renal abscess, possible parenchyma, percutaneous drainage:  -  Fragments of extensively necrotic tissue, cannot be fullycharacterized  Comment: The microscopic appearance of the necrotic tissue is concerning for a neoplasm with coagulative necrosis. Definitive diagnosis cannot be  made, due to lack of viable material.  Additional sections will be submitted.  Immunohistochemical studies willbe performed, in an attempt to characterise the necrotic tissue.  Based on the H&E appearance, an infectious process such as tuberculosis  appears less likely, but special stains for microorganisms (acid-fastbacilli and fungal) are in progress and will be reported separately.   (06.22.23 @ 22:12)  6/27 CT Study is overall very limited due to lack of intravenous contrast, beam   Willingham artifact.  Persistent bilateral pleural effusions with adjacent consolidations   likely reflecting compressive atelectasis.  Left kidney poorly defined with a heterogeneous collection with air and   fluid which has likely mildly diminished in size compared to the prior   examination but exact comparison limited.  Diffuse ascites again noted. Diffuse anasarca again noted. Persistent   pneumoperitoneum.  Poorly defined lesions within the liver adjacent to the falciform   ligament which may again reflect abscesses or masses, difficult to   evaluate due to the limitations described above.  < from: CT Abdomen and Pelvis w/ IV Cont (06.18.23 @ 15:15) >  1 ) Moderate pneumoperitoneum with partial diffusion throughout moderate   volume ascites and with associated intermittent peritoneal enhancement   and nodularity. Perforation and peritonitis may be related to underlying   emphysematous pyelonephritis (see below). Less likely sources of   perforation include ureteral/bladder disruption, and bowel perforation   which cannot be entirely excluded on the provided images.  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 The compressed residual renal   parenchyma is inseparable from the adjacent fascial/fatty layers   anterosuperiorly and direct rupture into the peritoneum is a possibility   (6355). The previously placed left-sided nephrostomy tube pigtail is   notedto be within the left renal collecting system.  3) New hepatic hypodensities measuring up to 2.6 cm, suspicious for   development of multiple focal abscesses.  4) Wedge-shaped region of hypodensity within the spleen may reflect   splenic infarct in the correct clinical setting. Developing   phlegmon/abscess cannot be excluded in the current clinical setting  5) Increased extensive retroperitoneal, portacaval and likely   gastrohepatic heterogeneously enhancing lymphadenopathy. Findings may be   reactive.  6) Mediastinal lymphadenopathy measuring up to 2.4 cm short axis.   Underlying etiology may be reactive, infectious/inflammatory, or   neoplastic. A short-term 3 month follow-up CT chest should be considered   for further evaluation.  7)Right middle and upper lobe solid pulmonary nodules which are not well   delineated due to respiratory motion but measure less than 6 mm.   attention on follow-up exam.  #Lactic acidosis  #Abx allergy: No Known Allergies  #Prolonged QTC Calculation(Bazett) 526 ms  #Hyponatremia  #AKICreatinine: 3.3 mg/dL (06.21.23 @ 21:28)  )crcl 26  Ht 180  Weight (kg): 76.5 (06-18-23 @ 21:27)    RECOMMENDATIONS  - Pathology  concerning for a neoplasm with coagulative necrosis, f/u final results  - WBC may represent leukemoid reaction as afebrile and on appropriate antibiotics targeting the culture data   - Ar 500 milliGRAM(s) IV Intermittent every 12 hours   - Continue levaquin IV 500mg q48h IV   - Team started caspo 50mg q24h IV (no yeast in UCX, yeast in bronch is a likely colonizer)  - Send fungitell - D/C caspo if unremarkable   - Overall low suspicion for  TB as path concerning for malignancy and he is growing bacterial organisms that can be associated with pyelonephritis , worth ruling out. Urine AFB negative, check quantiferon gold. Would not recommend empiric TB treatment at this time as toxicities outweigh benefit  - Trend WBC   - Urology following, discussed case with Dr. Perdomo, possible OR later this week if not improving, high risk.    If any questions, please send a message or call on [email or web address] Teams  Please continue to update ID with any pertinent new laboratory or radiographic findings  #[id number]

## 2023-06-28 NOTE — PROGRESS NOTE ADULT - ASSESSMENT
66y/o M a/w retroperitoneal and intraabdominal abscess s/p IR placement of drain and LEFT PCN 6/18, s/p percutaneous drainage of LEFT renal abscess POD # 6.     - F/u Pathology, concern for  TB  - Continue IV Abx and Antifungals as per ID, consider anti-tuberculous drugs until pathology stains are back for TB  - Dr. Vásquez scheduled patient for nephrectomy today but after speaking at length with family regarding risks they decided to forego the procedure at this time until patient is more stable.    - Dr. Perdomo discussed with the family who are aware decision re surgery will depend on course including the dx of PROBABLE malignancy, possibility of a para nepolastic rxn, risks fo removal of the kidney for source control  - Continue care as per SICU  - Will discuss with attending

## 2023-06-29 NOTE — PROGRESS NOTE ADULT - ASSESSMENT
ASSESSMENT  66yo Male with pmh of hypertension, massive Grade IV hydronephrosis s/p left nephrostomy placed in May 2023 by [facility] presents to the ED with little to no urine output from LEFT nephrostomy from about a week. PCN  more recently drainage was changed to brown/purulent fluid. 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      IMPRESSION  #Reintubated, L lung white out, ruled out HAP    6/24 bronch CX NG,   Moderate Yeast- likely colonizer  #Septic shock on admission due to Emphysematous pyelonephritis with suspected perforation/ pneumoperitoneum with suspected liver abscesses & splenic infarct vs abscess    6/22 UCX  Few Finegoldia magna "Susceptibilities not performed"  Nephrostolithotomy, percutaneous, with lithotripsy, large stone 22-Jun-2023 23:27:09 left large thick abscess materia dimension of aspirate over 4 x 4 x 4 cm Bernie Perdomo  Change external ureteral stent 22-Jun-2023 23:28:03 left Bernie Perdomo  Nephrostogram 22-Jun-2023 23:28:31 left Bernie Perdomo. "thick gelatinous material that was difficult to suction out even with the shock pulse. I would alternate 2 prong to remove and break up / shock pulse to suck out. after an hour where I had no clear planes I elected to stop. irrigation through the 26 bobby took out a lot additional material  in ashlyn catch cup 4 x 4 x 4 cm lump of material trapped"    6/19 UCX NG; UA Blood: x / Protein: 300 mg/dL / Nitrite: Negative   Leuk Esterase: Large / RBC: 8 /HPF / WBC >720 /HPF   Sq Epi: x / Non Sq Epi: x / Bacteria: Moderate    6/18 L PCN     Few Stenotrophomonas maltophilia Levofloxacin: S 2    6/18 L PCN     Numerous Anaerobic Gram Positive Cocci Most closely resembling  Peptoniphilus species "Susceptibilities not performed"    6/18 L PCN   Numerous Gram positive cocci in pairs per oil power field    6/18 BCX NGTD     s/p 6/18  Left PCN upsized to 16F and 1200cc of very viscous tan colored fluid was aspirated. RLQ 16F drain was placed and 1200cc of tan colored fluid was aspirated (less viscous). A sample from each location was sent for culture.     Repeat CT 6/19 Since one day earlier,  No extraluminal contrast. Oral contrast was last seen in the terminal ileum.  Status post left nephrostomy tube placement. Left enlarged kidney with severe hydronephrosis/collection has decreased, now measuring 16 x 12 cm from 20 x 14 cm  Surgical Pathology Report:   Left renal abscess, possible parenchyma  Left renal abscess, possible parenchyma, percutaneous drainage:  -  Fragments of extensively necrotic tissue, cannot be fullycharacterized  Comment: The microscopic appearance of the necrotic tissue is concerning for a neoplasm with coagulative necrosis. Definitive diagnosis cannot be  made, due to lack of viable material.  Additional sections will be submitted.  Immunohistochemical studies willbe performed, in an attempt to characterise the necrotic tissue.  Based on the H&E appearance, an infectious process such as tuberculosis  appears less likely, but special stains for microorganisms (acid-fastbacilli and fungal) are in progress and will be reported separately.   (06.22.23 @ 22:12)  6/27 CT Study is overall very limited due to lack of intravenous contrast, beam   Willingham artifact.  Persistent bilateral pleural effusions with adjacent consolidations   likely reflecting compressive atelectasis.  Left kidney poorly defined with a heterogeneous collection with air and   fluid which has likely mildly diminished in size compared to the prior   examination but exact comparison limited.  Diffuse ascites again noted. Diffuse anasarca again noted. Persistent   pneumoperitoneum.  Poorly defined lesions within the liver adjacent to the falciform   ligament which may again reflect abscesses or masses, difficult to   evaluate due to the limitations described above.  < from: CT Abdomen and Pelvis w/ IV Cont (06.18.23 @ 15:15) >  1 ) Moderate pneumoperitoneum with partial diffusion throughout moderate   volume ascites and with associated intermittent peritoneal enhancement   and nodularity. Perforation and peritonitis may be related to underlying   emphysematous pyelonephritis (see below). Less likely sources of   perforation include ureteral/bladder disruption, and bowel perforation   which cannot be entirely excluded on the provided images.  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 The compressed residual renal   parenchyma is inseparable from the adjacent fascial/fatty layers   anterosuperiorly and direct rupture into the peritoneum is a possibility   (6355). The previously placed left-sided nephrostomy tube pigtail is   notedto be within the left renal collecting system.  3) New hepatic hypodensities measuring up to 2.6 cm, suspicious for   development of multiple focal abscesses.  4) Wedge-shaped region of hypodensity within the spleen may reflect   splenic infarct in the correct clinical setting. Developing   phlegmon/abscess cannot be excluded in the current clinical setting  5) Increased extensive retroperitoneal, portacaval and likely   gastrohepatic heterogeneously enhancing lymphadenopathy. Findings may be   reactive.  6) Mediastinal lymphadenopathy measuring up to 2.4 cm short axis.   Underlying etiology may be reactive, infectious/inflammatory, or   neoplastic. A short-term 3 month follow-up CT chest should be considered   for further evaluation.  7)Right middle and upper lobe solid pulmonary nodules which are not well   delineated due to respiratory motion but measure less than 6 mm.   attention on follow-up exam.  #Lactic acidosis  #Abx allergy: No Known Allergies  #Prolonged QTC Calculation(Bazett) 526 ms  #Hyponatremia  #AKICreatinine: 3.3 mg/dL (06.21.23 @ 21:28)  )crcl 26  Ht 180  Weight (kg): 76.5 (06-18-23 @ 21:27)    RECOMMENDATIONS  - Pathology  concerning for a neoplasm with coagulative necrosis, f/u final results  - WBC may represent leukemoid reaction as afebrile and on appropriate antibiotics targeting the culture data   - Ar 500 milliGRAM(s) IV Intermittent every 12 hours   - Continue levaquin IV 500mg q48h IV   - Overall low suspicion for  TB as path concerning for malignancy and he is growing bacterial organisms that can be associated with pyelonephritis , worth ruling out. Urine AFB NEGATIVEative, check quantiferon gold. Would not recommend empiric TB treatment at this time as toxicities outweigh benefit  - Trend WBC   - Urology following, discussed case with Dr. Perdomo, possible OR later this week if not improving, high risk.    If any questions, please send a message or call on [facility] [employer]  Please continue to update ID with any pertinent new laboratory or radiographic findings  #[id number]

## 2023-06-29 NOTE — CONSULT NOTE ADULT - SUBJECTIVE AND OBJECTIVE BOX
GENERAL SURGERY CONSULT NOTE    Patient: CHRISTINE VILLAVICENCIO , 67y ([birth year])Male   MRN: [id number]  Location: [address]  Visit: 06-18-23 Inpatient  Date: 06-29-23 @ 09:48    HPI:  67M w/ PMH of HTN, grade IV hydronephrosis of L kidney s/p L PCN (placed 5/2023), stutter, hiatal hernia, iron deficiency anemia, H Pylori (untreated), and gastric mass (never biopsied) presents to the ED with at least 4 days of worsening weakness, abdominal distension, and no output from his PCN. Pt and daughters bedside are only able to give limited information, but for the past few days, he has noticed no output from his PCN as well as increasing abdominal distension, pain, and anorexia. He reports that he has not felt normal for the past 10 days. His PCN used to put out serosanguinous fluid, but the output changed to feculent/brown liquid over the past day. In the ED, he was noted to be in new onset afib w/ RVR to 150+ as well as experiencing some difficulty breathing. He was started on BiPAP. Stat labs were notable for WBC 47, Cr 2.9, and lactate 6. CT A/P w/ IV contrast showed free intraperitoneal fluid and air from an unknown source as well as fluid and air in the L kidney.  (18 Jun 2023 18:34)        VITALS:  T(F): 98.5 (06-29-23 @ 08:00), Max: 99.6 (06-28-23 @ 12:00)  HR: 100 (06-29-23 @ 09:00) (84 - 102)  BP: 91/62 (06-29-23 @ 09:00) (80/54 - 116/66)  RR: 18 (06-29-23 @ 09:00) (1 - 23)  SpO2: 100% (06-29-23 @ 09:00) (99% - 100%)    PHYSICAL EXAM:  General: NAD, AAOx3, calm and cooperative  HEENT: NCAT, AGUSTÍN, EOMI, Trachea ML, Neck supple  Cardiac: RRR S1, S2, no Murmurs, rubs or gallops  Respiratory: CTAB, normal respiratory effort, breath sounds equal BL, no wheeze, rhonchi or crackles  Abdomen: Soft, non-distended, non-tender, no rebound, no guarding. +BS.  Rectal: Good tone, +stool, no blood, no darnell-anal masses/lesions, no fistulas, fissures, hemorrhoids  Musculoskeletal: Strength 5/5 BL UE/LE, ROM intact, compartments soft  Neuro: Sensation grossly intact and equal throughout, no focal deficits  Vascular: Pulses 2+ throughout, extremities well perfused  Skin: Warm/dry, normal color, no jaundice  Incision/wound: healing well, dressings in place, clean, dry and intact    MEDICATIONS  (STANDING):  albuterol/ipratropium for Nebulization 3 milliLiter(s) Nebulizer every 6 hours  bacitracin   Ointment 1 Application(s) Topical two times a day  calcium acetate 1334 milliGRAM(s) Oral every 8 hours  chlorhexidine 0.12% Liquid 15 milliLiter(s) Oral Mucosa every 12 hours  chlorhexidine 2% Cloths 1 Application(s) Topical <User Schedule>  dexMEDEtomidine Infusion 0.2 MICROgram(s)/kG/Hr (3.82 mL/Hr) IV Continuous <Continuous>  ferrous    sulfate 325 milliGRAM(s) Oral daily  gabapentin 200 milliGRAM(s) Oral every 8 hours  heparin   Injectable 5000 Unit(s) SubCutaneous every 8 hours  levoFLOXacin IVPB 500 milliGRAM(s) IV Intermittent every 48 hours  meropenem  IVPB 500 milliGRAM(s) IV Intermittent every 12 hours  metoprolol tartrate Injectable 2.5 milliGRAM(s) IV Push every 6 hours  pantoprazole  Injectable 40 milliGRAM(s) IV Push every 12 hours  phenylephrine    Infusion 0.1 MICROgram(s)/kG/Min (1.43 mL/Hr) IV Continuous <Continuous>  sodium bicarbonate 1300 milliGRAM(s) Oral three times a day  sodium chloride 0.9%. 1000 milliLiter(s) (75 mL/Hr) IV Continuous <Continuous>    MEDICATIONS  (PRN):  fentaNYL    Injectable 25 MICROGram(s) IV Push every 3 hours PRN Moderate Pain (4 - 6)  sodium chloride 0.9% lock flush 10 milliLiter(s) IV Push every 1 hour PRN Pre/post blood products, medications, blood draw, and to maintain line patency  sodium chloride 0.9% lock flush 10 milliLiter(s) IV Push every 1 hour PRN Pre/post blood products, medications, blood draw, and to maintain line patency      LAB/STUDIES:                        8.3    44.28 )-----------( 226      ( 28 Jun 2023 20:51 )             24.8     06-28    131<L>  |  96<L>  |  121<HH>  ----------------------------<  102<H>  4.4   |  19  |  3.9<H>    Ca    7.1<L>      28 Jun 2023 20:51  Phos  7.6     06-28  Mg     2.1     06-27    TPro  3.8<L>  /  Alb  1.8<L>  /  TBili  0.5  /  DBili  0.3  /  AST  20  /  ALT  9   /  AlkPhos  113  06-28    PT/INR - ( 27 Jun 2023 19:57 )   PT: 18.40 sec;   INR: 1.59 ratio         PTT - ( 27 Jun 2023 19:57 )  PTT:29.2 sec  LIVER FUNCTIONS - ( 28 Jun 2023 20:51 )  Alb: 1.8 g/dL / Pro: 3.8 g/dL / ALK PHOS: 113 U/L / ALT: 9 U/L / AST: 20 U/L / GGT: x           Urinalysis Basic - ( 28 Jun 2023 20:51 )    Color: x / Appearance: x / SG: x / pH: x  Gluc: 102 mg/dL / Ketone: x  / Bili: x / Urobili: x   Blood: x / Protein: x / Nitrite: x   Leuk Esterase: x / RBC: x / WBC x   Sq Epi: x / Non Sq Epi: x / Bacteria: x              ABG - ( 29 Jun 2023 04:19 )  pH, Arterial: 7.45  pH, Blood: x     /  pCO2: 28    /  pO2: 160   / HCO3: 20    / Base Excess: -3.2  /  SaO2: 98.6                IMAGING:      ACCESS DEVICES:  [ ] Peripheral IV  [ ] Central Venous Line	[ ] R	[ ] L	[ ] IJ	[ ] Fem	[ ] SC	Placed:   [ ] Arterial Line		[ ] R	[ ] L	[ ] Fem	[ ] Rad	[ ] Ax	Placed:   [ ] PICC:					[ ] Mediport  [ ] Urinary Catheter, Date Placed:     ASSESSMENT:  67yM w/ PMHx of  ***  who presented with ***. Physical exam findings, imaging, and labs as documented above.     PLAN:  -  -  -    Lines/Tubes: PIV, Midline, Central Line, A-Line, Chest tubes, Javier/VENICE drains, Mena Catheter.    Above plan discussed with Attending Surgeon  ***  , patient, patient family, and Primary team  06-29-23 @ 09:48   GENERAL SURGERY CONSULT NOTE    Patient: CHRISTINE VILLAVICENCIO , 67y ([birth year])Male   MRN: [id number]  Location: [address]  Visit: 06-18-23 Inpatient  Date: 06-29-23 @ 09:48    HPI:  67M w/ PMH of HTN, grade IV hydronephrosis of L kidney s/p L PCN (placed 5/2023), stutter, hiatal hernia, iron deficiency anemia, H Pylori (untreated), and gastric mass (never biopsied) presents to the ED with at least 4 days of worsening weakness, abdominal distension, and no output from his PCN. Pt and daughters bedside are only able to give limited information, but for the past few days, he has noticed no output from his PCN as well as increasing abdominal distension, pain, and anorexia. He reports that he has not felt normal for the past 10 days. His PCN used to put out serosanguinous fluid, but the output changed to feculent/brown liquid over the past day. In the ED, he was noted to be in new onset afib w/ RVR to 150+ as well as experiencing some difficulty breathing. He was started on BiPAP. Stat labs were notable for WBC 47, Cr 2.9, and lactate 6. CT A/P w/ IV contrast showed free intraperitoneal fluid and air from an unknown source as well as fluid and air in the L kidney.  (18 Jun 2023 18:34).  Patient is now IR placement of drain and LEFT PCN 6/18 upsizing to 16Fr and 16Fr RLQ drain placement with IR and s/p shock lithotripsy of left kidney abscess cavity with drainage of thick contents, upsize of drain too 26 Fr Bobby catheter. Patient now in critical condition in the SICU for hemodynamic monitoring for intrabdominal sepsis 2/2 pneumoperitoneum and emphysematous pyelonephritis.     Surgical Oncology consulted for plan for emergent nephrectomy tomorrow 06/30/23 with Urology team. Patient is in critical condition, on Josh 0.8, Dex 0.5, TF at 20, vent settings 450/18/40/8.       VITALS:  T(F): 98.5 (06-29-23 @ 08:00), Max: 99.6 (06-28-23 @ 12:00)  HR: 100 (06-29-23 @ 09:00) (84 - 102)  BP: 91/62 (06-29-23 @ 09:00) (80/54 - 116/66)  RR: 18 (06-29-23 @ 09:00) (1 - 23)  SpO2: 100% (06-29-23 @ 09:00) (99% - 100%)      PHYSICAL EXAM:  GEN: NAD, intubated, sedated  SKIN: Good color, non diaphoretic.  RESP: mechanically ventilated  CARDIO: +S1/S2  ABDO: Soft, ND  : bobby catheter in place draining clear yellow urine, RLQ VENICE with clear serous fluid in place, LEFT flank 26Fr drain in place      MEDICATIONS  (STANDING):  albuterol/ipratropium for Nebulization 3 milliLiter(s) Nebulizer every 6 hours  bacitracin   Ointment 1 Application(s) Topical two times a day  calcium acetate 1334 milliGRAM(s) Oral every 8 hours  chlorhexidine 0.12% Liquid 15 milliLiter(s) Oral Mucosa every 12 hours  chlorhexidine 2% Cloths 1 Application(s) Topical <User Schedule>  dexMEDEtomidine Infusion 0.2 MICROgram(s)/kG/Hr (3.82 mL/Hr) IV Continuous <Continuous>  ferrous    sulfate 325 milliGRAM(s) Oral daily  gabapentin 200 milliGRAM(s) Oral every 8 hours  heparin   Injectable 5000 Unit(s) SubCutaneous every 8 hours  levoFLOXacin IVPB 500 milliGRAM(s) IV Intermittent every 48 hours  meropenem  IVPB 500 milliGRAM(s) IV Intermittent every 12 hours  metoprolol tartrate Injectable 2.5 milliGRAM(s) IV Push every 6 hours  pantoprazole  Injectable 40 milliGRAM(s) IV Push every 12 hours  phenylephrine    Infusion 0.1 MICROgram(s)/kG/Min (1.43 mL/Hr) IV Continuous <Continuous>  sodium bicarbonate 1300 milliGRAM(s) Oral three times a day  sodium chloride 0.9%. 1000 milliLiter(s) (75 mL/Hr) IV Continuous <Continuous>    MEDICATIONS  (PRN):  fentaNYL    Injectable 25 MICROGram(s) IV Push every 3 hours PRN Moderate Pain (4 - 6)  sodium chloride 0.9% lock flush 10 milliLiter(s) IV Push every 1 hour PRN Pre/post blood products, medications, blood draw, and to maintain line patency  sodium chloride 0.9% lock flush 10 milliLiter(s) IV Push every 1 hour PRN Pre/post blood products, medications, blood draw, and to maintain line patency      LAB/STUDIES:                        8.3    44.28 )-----------( 226      ( 28 Jun 2023 20:51 )             24.8     06-28    131<L>  |  96<L>  |  121<HH>  ----------------------------<  102<H>  4.4   |  19  |  3.9<H>    Ca    7.1<L>      28 Jun 2023 20:51  Phos  7.6     06-28  Mg     2.1     06-27    TPro  3.8<L>  /  Alb  1.8<L>  /  TBili  0.5  /  DBili  0.3  /  AST  20  /  ALT  9   /  AlkPhos  113  06-28    PT/INR - ( 27 Jun 2023 19:57 )   PT: 18.40 sec;   INR: 1.59 ratio         PTT - ( 27 Jun 2023 19:57 )  PTT:29.2 sec  LIVER FUNCTIONS - ( 28 Jun 2023 20:51 )  Alb: 1.8 g/dL / Pro: 3.8 g/dL / ALK PHOS: 113 U/L / ALT: 9 U/L / AST: 20 U/L / GGT: x           Urinalysis Basic - ( 28 Jun 2023 20:51 )    Color: x / Appearance: x / SG: x / pH: x  Gluc: 102 mg/dL / Ketone: x  / Bili: x / Urobili: x   Blood: x / Protein: x / Nitrite: x   Leuk Esterase: x / RBC: x / WBC x   Sq Epi: x / Non Sq Epi: x / Bacteria: x              ABG - ( 29 Jun 2023 04:19 )  pH, Arterial: 7.45  pH, Blood: x     /  pCO2: 28    /  pO2: 160   / HCO3: 20    / Base Excess: -3.2  /  SaO2: 98.6                IMAGING:      < from: CT Abdomen and Pelvis No Cont (06.27.23 @ 13:09) >  Study is overall very limited due to lack of intravenous contrast, beam   Willingham artifact.    Persistent bilateral pleural effusions with adjacent consolidations   likely reflecting compressive atelectasis.    Left kidney poorly defined with a heterogeneous collection with air and   fluid which has likely mildly diminished in size compared to the prior   examination but exact comparison limited.    Diffuse ascites again noted. Diffuse anasarca again noted. Persistent   pneumoperitoneum.    Poorly defined lesions within the liver adjacent to the falciform   ligament which may again reflect abscesses or masses, difficult to   evaluate due to the limitations described above.    < end of copied text >

## 2023-06-29 NOTE — PROGRESS NOTE ADULT - SUBJECTIVE AND OBJECTIVE BOX
Nephrology progress note    Patient is seen and examined, events over the last 24 h noted .  On IVF 75  Allergies:  No Known Allergies    Hospital Medications:   MEDICATIONS  (STANDING):  bacitracin   Ointment 1 Application(s) Topical two times a day  calcium acetate 1334 milliGRAM(s) Oral every 8 hours  chlorhexidine 0.12% Liquid 15 milliLiter(s) Oral Mucosa every 12 hours  chlorhexidine 2% Cloths 1 Application(s) Topical <User Schedule>  dexMEDEtomidine Infusion 0.2 MICROgram(s)/kG/Hr (3.82 mL/Hr) IV Continuous <Continuous>  ferrous    sulfate 325 milliGRAM(s) Oral daily  gabapentin 200 milliGRAM(s) Oral every 8 hours  heparin   Injectable 5000 Unit(s) SubCutaneous every 8 hours  levoFLOXacin IVPB 500 milliGRAM(s) IV Intermittent every 48 hours  meropenem  IVPB 500 milliGRAM(s) IV Intermittent every 12 hours  metoprolol tartrate Injectable 2.5 milliGRAM(s) IV Push every 6 hours  pantoprazole  Injectable 40 milliGRAM(s) IV Push every 12 hours  phenylephrine    Infusion 0.1 MICROgram(s)/kG/Min (1.43 mL/Hr) IV Continuous <Continuous>  sodium bicarbonate 1300 milliGRAM(s) Oral three times a day  sodium chloride 0.9%. 1000 milliLiter(s) (75 mL/Hr) IV Continuous <Continuous>        VITALS:  T(F): 97.6 (06-29-23 @ 12:15), Max: 98.7 (06-29-23 @ 00:00)  HR: 85 (06-29-23 @ 15:05)  BP: 122/81 (06-29-23 @ 15:00)  RR: 14 (06-29-23 @ 15:00)  SpO2: 100% (06-29-23 @ 15:05)  Wt(kg): --    06-27 @ 07:01  -  06-28 @ 07:00  --------------------------------------------------------  IN: 3085.7 mL / OUT: 1395 mL / NET: 1690.7 mL    06-28 @ 07:01  -  06-29 @ 07:00  --------------------------------------------------------  IN: 3108.1 mL / OUT: 1215 mL / NET: 1893.1 mL    06-29 @ 07:01  -  06-29 @ 17:39  --------------------------------------------------------  IN: 1029.1 mL / OUT: 277 mL / NET: 752.1 mL          PHYSICAL EXAM:  Constitutional: NAD  HEENT: anicteric sclera  Neck: No JVD  Respiratory: CTA  Cardiovascular: S1, S2, RRR  Gastrointestinal: BS+, soft, NT/ND  Extremities: No peripheral edema  Neurological: calm  : has bobby.   Skin: No rashes  Vascular Access:    LABS:  06-28    131<L>  |  96<L>  |  121<HH>  ----------------------------<  102<H>  4.4   |  19  |  3.9<H>    Ca    7.1<L>      28 Jun 2023 20:51  Phos  7.6     06-28  Mg     2.1     06-27    TPro  3.8<L>  /  Alb  1.8<L>  /  TBili  0.5  /  DBili  0.3  /  AST  20  /  ALT  9   /  AlkPhos  113  06-28                          8.3    48.22 )-----------( 284      ( 29 Jun 2023 14:26 )             25.4       Urine Studies:  Urinalysis Basic - ( 28 Jun 2023 20:51 )    Color:  / Appearance:  / SG:  / pH:   Gluc: 102 mg/dL / Ketone:   / Bili:  / Urobili:    Blood:  / Protein:  / Nitrite:    Leuk Esterase:  / RBC:  / WBC    Sq Epi:  / Non Sq Epi:  / Bacteria:       Creatinine, Random Urine: 59 mg/dL (06-29 @ 16:48)  Sodium, Random Urine: <20.0 mmoL/L (06-29 @ 16:48)    RADIOLOGY & ADDITIONAL STUDIES:  < from: Xray Chest 1 View- PORTABLE-Routine (Xray Chest 1 View- PORTABLE-Routine in AM.) (06.29.23 @ 06:28) >    Increasing bilateral opacities/effusions. No pneumothorax.  < from: CT Abdomen and Pelvis No Cont (06.27.23 @ 13:09) >  KIDNEYS: Large right renal cyst again demonstrated. Suggestion of   contrast within the right kidney which could reflect renal failure    < end of copied text >    Support devices, in satisfactory position.    < end of copied text >

## 2023-06-29 NOTE — PROGRESS NOTE ADULT - ASSESSMENT
67 year old man with history of hydronephrosis s/p PCN placement, HTN, anemia, H pylori presents with abdominal pain and no output from PCN. HOspital course complicated by septic shock in the setting of emphysematous pyelonephritis, hematochezia, respiratory failure requiring mechanical ventilation. Palliative care consulted for GOC.     Spoke with primary surgical ICU team, pt planned for LEFT radical nephrectomy and abdominal washout tomorrow.     Education about palliative care provided to patient/family.  See Recs below.    Please call [phone] with questions or concerns 24/7.   We will continue to follow.

## 2023-06-29 NOTE — CONSULT NOTE ADULT - ATTENDING COMMENTS
patient has severe sepsis with possible perforated renal parenchyma with possible infected urine/necrotic renal tumor tissue in peritoneal cavity, patient under aggressive sepsis management by SICU to optimize him for surgery tomorrow , discussed with urology team and patient the gravity of his condition     will assisst dr Vásquez in left nephrectomy which require mobilization of splenic flexure/spleen/pancreas and possible lysis of adhesion patient has severe sepsis with possible necrotic renal parenchyma with possible infected urine/necrotic renal tumor tissue spillage in peritoneal cavity, patient under aggressive sepsis management by SICU to optimize him for surgery tomorrow , discussed with urology team and patient the gravity of his condition     will assisst dr Vásquez in left nephrectomy which require mobilization of splenic flexure/spleen/pancreas and possible lysis of adhesion

## 2023-06-29 NOTE — PROGRESS NOTE ADULT - SUBJECTIVE AND OBJECTIVE BOX
CHRISTINE VILLAVICENCIO  67y, Male  Allergy: No Known Allergies      LOS  11d    CHIEF COMPLAINT: pneumoperitoneum, emphysematous pyelonephritis (29 Jun 2023 00:42)      INTERVAL EVENTS/HPI  - T(F): , Max: 99.6 (06-28-23 @ 12:00), intubated on mechanical ventilation , on pressors  - WBC Count: 44.28 (06-28-23 @ 20:51) downtrending   WBC Count: 58.76 (06-28-23 @ 05:15)     - Creatinine: 3.9 (06-28-23 @ 20:51)  Creatinine: 4.0 (06-27-23 @ 19:57)     -   -   -     ROS  cannot obtain secondary to patient's sedation and/or mental status    VITALS:  T(F): 98.5, Max: 99.6 (06-28-23 @ 12:00)  HR: 100  BP: 91/62  RR: 18Vital Signs Last 24 Hrs  T(C): 36.9 (29 Jun 2023 08:00), Max: 37.6 (28 Jun 2023 12:00)  T(F): 98.5 (29 Jun 2023 08:00), Max: 99.6 (28 Jun 2023 12:00)  HR: 100 (29 Jun 2023 09:00) (84 - 102)  BP: 91/62 (29 Jun 2023 09:00) (80/54 - 116/66)  BP(mean): 71 (29 Jun 2023 09:00) (59 - 89)  RR: 18 (29 Jun 2023 09:00) (1 - 23)  SpO2: 100% (29 Jun 2023 09:00) (99% - 100%)    Parameters below as of 29 Jun 2023 09:15  Patient On (Oxygen Delivery Method): ventilator    O2 Concentration (%): 40    PHYSICAL EXAM:  Gen: Intubated  CV: RRR  Lungs: Decreased BS at bases  Abd: Soft bilateral drains serosanguinous fluid   Neuro: Sedated  Lines clean, no phlebitis    FH: Non-contributory  Social Hx: Non-contributory    TESTS & MEASUREMENTS:                        8.3    44.28 )-----------( 226      ( 28 Jun 2023 20:51 )             24.8     06-28    131<L>  |  96<L>  |  121<HH>  ----------------------------<  102<H>  4.4   |  19  |  3.9<H>    Ca    7.1<L>      28 Jun 2023 20:51  Phos  7.6     06-28  Mg     2.1     06-27    TPro  3.8<L>  /  Alb  1.8<L>  /  TBili  0.5  /  DBili  0.3  /  AST  20  /  ALT  9   /  AlkPhos  113  06-28      LIVER FUNCTIONS - ( 28 Jun 2023 20:51 )  Alb: 1.8 g/dL / Pro: 3.8 g/dL / ALK PHOS: 113 U/L / ALT: 9 U/L / AST: 20 U/L / GGT: x           Urinalysis Basic - ( 28 Jun 2023 20:51 )    Color: x / Appearance: x / SG: x / pH: x  Gluc: 102 mg/dL / Ketone: x  / Bili: x / Urobili: x   Blood: x / Protein: x / Nitrite: x   Leuk Esterase: x / RBC: x / WBC x   Sq Epi: x / Non Sq Epi: x / Bacteria: x        Culture - Fungal, Bronchial (collected 06-24-23 @ 11:00)  Source: .Bronchial None  Preliminary Report (06-26-23 @ 11:00):    Moderate Yeast    Culture - Bronchial (collected 06-24-23 @ 11:00)  Source: .Bronchial None  Gram Stain (06-24-23 @ 23:21):    Numerous polymorphonuclear leukocytes per low power field    No squamous epithelial cells per low power field    Rare Yeast like cells per oil power field  Final Report (06-26-23 @ 17:56):    Normal Respiratory Gabbie present    Culture - Acid Fast - Bronchial w/Smear (collected 06-24-23 @ 11:00)  Source: .Bronchial None  Preliminary Report (06-28-23 @ 15:08):    Culture is being performed.    Culture - Surgical Swab (collected 06-22-23 @ 22:11)  Source: .Surgical Swab None  Final Report (06-25-23 @ 14:14):    Few Finegoldia magna "Susceptibilities not performed"    Culture - Fungal, Other (collected 06-22-23 @ 22:11)  Source: .Other None  Preliminary Report (06-26-23 @ 10:57):    Testing in progress    Culture - Urine (collected 06-19-23 @ 02:50)  Source: Clean Catch Clean Catch (Midstream)  Final Report (06-20-23 @ 10:18):    No growth    Culture - Abscess with Gram Stain (collected 06-18-23 @ 23:08)  Source: .Abscess right lower quadrant (intrabdominal)  Gram Stain (06-19-23 @ 11:56):    Numerous polymorphonuclear leukocytes per low power field    Numerous Gram positive cocci in pairs per oil power field  Final Report (06-21-23 @ 14:13):    Numerous Anaerobic Gram Positive Cocci Most closely resembling    Peptoniphilus species "Susceptibilities not performed"    Culture - Abscess with Gram Stain (collected 06-18-23 @ 23:08)  Source: .Abscess left kidney  Gram Stain (06-19-23 @ 11:58):    Rare polymorphonuclear leukocytes per low power field    Few Gram Positive Cocci per oil power field  Final Report (06-22-23 @ 10:13):    Growth in fluid media only Anaerobic Gram Positive Cocci Most closely    resembling Peptoniphilus species "Susceptibilities not performed"    Rare Stenotrophomonas maltophilia  Organism: Stenotrophomonas maltophilia  Stenotrophomonas maltophilia (06-22-23 @ 10:13)  Organism: Stenotrophomonas maltophilia (06-22-23 @ 10:13)      -  Levofloxacin: S 1      Method Type: EDMUND      -  Trimethoprim/Sulfamethoxazole: S <=0.5/9.5  Organism: Stenotrophomonas maltophilia (06-22-23 @ 10:13)      -  Minocycline: S      Method Type: KB    Culture - Abscess with Gram Stain (collected 06-18-23 @ 20:18)  Source: .Abscess left PCN aspirate  Gram Stain (06-19-23 @ 06:27):    No polymorphonuclear cells seen per low power field    Few Gram Variable Cocci seen per oil power field  Final Report (06-24-23 @ 09:11):    Few Stenotrophomonas maltophilia  Organism: Stenotrophomonas maltophilia (06-24-23 @ 09:11)  Organism: Stenotrophomonas maltophilia (06-24-23 @ 09:11)      -  Levofloxacin: S 2      Method Type: EDMUND      -  Trimethoprim/Sulfamethoxazole: S <=0.5/9.5    Culture - Blood (collected 06-18-23 @ 14:18)  Source: .Blood Blood-Peripheral  Final Report (06-24-23 @ 02:00):    No Growth Final    Culture - Blood (collected 06-18-23 @ 14:18)  Source: .Blood Blood-Peripheral  Final Report (06-24-23 @ 02:00):    No Growth Final        Lactate, Blood: 1.8 mmol/L (06-27-23 @ 19:57)      INFECTIOUS DISEASES TESTING  Fungitell: 47 (06-25-23 @ 15:30)  MRSA PCR Result.: Negative (06-24-23 @ 17:38)      INFLAMMATORY MARKERS      RADIOLOGY & ADDITIONAL TESTS:  I have personally reviewed the last available Chest xray  CXR      CT  CT Abdomen and Pelvis No Cont:   ACC: [id number] EXAM:  CT ABDOMEN AND PELVIS   ORDERED BY: JULIANNE HERNANDEZ     PROCEDURE DATE:  06/27/2023          INTERPRETATION:  CLINICAL STATEMENT: Follow-up renal abscess    TECHNIQUE: Contiguous axial CT images were obtained from the lower chest   to the pubic symphysis .  Oral contrast was given.  Reformatted images in   the coronal and sagittal planes were acquired.    COMPARISON CT: CT abdomen pelvis from June 23, 2023.    OTHER STUDIES USED FOR CORRELATION: None.      FINDINGS:    Limited evaluation of solid organs and vascular structures secondary to   lack of intravenous contrast and streak artifact due to contact with CT   gantry    LOWER CHEST: Bilateral pleural effusions and adjacent compressive   atelectasis, again noted. Small pericardial effusion.    HEPATOBILIARY: Difficult to evaluate due to significant artifact. Again   demonstrated are poorly evaluated hepatic hypodensities adjacent to the   falciform ligament could reflect abscesses or liver masses. Evaluation   also limited by lack of intravenous contrast. Increased density within   the gallbladder again suggest vicarious excretion..    SPLEEN: Difficult to evaluate.    PANCREAS: Not well-visualized.    ADRENAL GLANDS: Not well-visualized.    KIDNEYS: Large right renal cyst again demonstrated. Suggestion of   contrast within the right kidney which could reflect renal failure    A normal left kidney is not identified. Percutaneous catheter seen within   the left kidney. Ill-defined abscess in the region of the left kidney may   have decreased in size from the prior examination but difficult to   exactly measure..    ABDOMINOPELVIC NODES: Difficult to visualize.    PELVIC ORGANS: Mena catheter within a poorly distended urinary bladder.   Prostate gland unchanged.    PERITONEUM/MESENTERY/BOWEL: Peritoneal catheter terminates within the   right lower quadrant. There is no bowel obstruction. Again demonstrated   is pneumoperitoneum scattered throughout the peritoneum anteriorly not   significantly changed from prior examination. Diffuse ascites again seen   throughout the abdomen pelvis, overall unchanged.    BONES/SOFT TISSUES: Diffuse anasarca. Degenerative changes again noted..    OTHER: Extensive vascular calcifications are again noted. Nasogastric   tube is seen extending to the stomach.      IMPRESSION:    Study is overall very limited due to lack of intravenous contrast, beam   Willingham artifact.    Persistent bilateral pleural effusions with adjacent consolidations   likely reflecting compressive atelectasis.    Left kidney poorly defined with a heterogeneous collection with air and   fluid which has likely mildly diminished in size compared to the prior   examination but exact comparison limited.    Diffuse ascites again noted. Diffuse anasarca again noted. Persistent   pneumoperitoneum.    Poorly defined lesions within the liver adjacent to the falciform   ligament which may again reflect abscesses or masses, difficult to   evaluate due to the limitations described above.    --- Endof Report ---            CHARLES SINGH MD; Attending Radiologist  This document has been electronically signed. Jun 27 2023  1:22PM (06-27-23 @ 13:09)      CARDIOLOGY TESTING  12 Lead ECG:   Ventricular Rate 96 BPM    Atrial Rate 97 BPM    QRS Duration 78 ms    Q-T Interval 290 ms    QTC Calculation(Bazett) 366 ms    R Axis 28 degrees    T Axis 227 degrees    Diagnosis Line Atrial fibrillation  Low voltage QRS  Septal infarct , age undetermined  Abnormal ECG    Confirmed by ROULA WISEMAN MD ([id number]) on 6/28/2023 6:46:27 AM (06-27-23 @ 22:17)  12 Lead ECG:   Ventricular Rate 98 BPM    QRS Duration 90 ms    Q-T Interval 292 ms    QTC Calculation(Bazett) 372 ms    R Axis 28 degrees    T Axis 228 degrees    Diagnosis Line Atrial fibrillation  Low voltage QRS  Nonspecific ST and T wave abnormality  Abnormal ECG    Confirmed by ROULA WISEMAN MD ([id number]) on 6/28/2023 6:51:59 AM (06-27-23 @ 18:22)      MEDICATIONS  albuterol/ipratropium for Nebulization 3  bacitracin   Ointment 1  calcium acetate 1334  chlorhexidine 0.12% Liquid 15  chlorhexidine 2% Cloths 1  dexMEDEtomidine Infusion 0.2  ferrous    sulfate 325  gabapentin 200  heparin   Injectable 5000  levoFLOXacin IVPB 500  meropenem  IVPB 500  metoprolol tartrate Injectable 2.5  pantoprazole  Injectable 40  phenylephrine    Infusion 0.1  sodium bicarbonate 1300  sodium chloride 0.9%. 1000      WEIGHT  Weight (kg): 76.476 (06-22-23 @ 21:00)  Creatinine: 3.9 mg/dL (06-28-23 @ 20:51)      ANTIBIOTICS:  levoFLOXacin IVPB 500 milliGRAM(s) IV Intermittent every 48 hours  meropenem  IVPB 500 milliGRAM(s) IV Intermittent every 12 hours      All available historical records have been reviewed

## 2023-06-29 NOTE — PROGRESS NOTE ADULT - ASSESSMENT
Assessment & Plan:  67M w/ PMH of HTN, grade IV hydronephrosis of L kidney s/p L PCN (placed 5/2023), stutter, hiatal hernia, iron deficiency anemia, H Pylori (untreated), and gastric mass (never biopsied) presents to the ED with at least 4 days of worsening weakness, abdominal distension, and no output from his PCN. Admitted with emphysematous pyelonephritis     (6/18): s/p LT PCN upsizing to 16Fr and 16Fr RLQ drain placement with IR   (6/22): s/p shock lithotripsy of L kidney abscess cavity with drainage of thick contents, upsize of drain to 26Fr bobby catheter    NEURO:  #Pain control    -APAP PRN    -Gabapentin 200 q 8     -fent 25 q 3 PRN while intubated  #Sedation    -precedex gtt @ 1    RESP:  #acute respiratory failure with left lung mucous plugging (intubated 6/24)    - LL @ 23, 7.5Fr    - 6/24 s/p bronchoscopy secondary to left maintstem mucus plug    - Vent settings: 450/18/40/8    - ABG (pm 6/28) 7.47 28 137 20 98.8    #Pulmonary nodules / Mediastinal lymphadenopathy    - CT Chest: Right middle and upper lobe solid pulmonary nodules, f/u outpatient pulm   #Activity   - increased as tolerated    CARDS:   #Septic shock    - Phenylephrine infusion- currently at 0.4 mcg/kg/min  #New onset Afib w/ RVR with hypotension -- likely 2/2 sepsis    - Metoprolol 2.5 q6 IV  -Digoxin 250mcg q6 load (x4 doses)- follow level in am     - rate better controlled    - Cardiology following     #H/O HTN    -Amlodipine 2.5mg HOLDING   Imaging    - EKG (6/2y): Afib w/     - ECHO: (6/19) EF 55-60%, mild AS, mild LAE, mild MR/TR, trivial pericardial effusion    GI/NUTR:  #Diet    NPO except meds; Trickle tube feeds (Nepro @ 20cc/hr)  #bowel movements    - 2 bloody BM's noted overnight 6/25  - GI consult 6/26- Plan for EGD with EUS + FNA as outpatient, H-pylori treatment after resolution of acute issues   -dignishield in place   -FOBT positive    #GI PPX  -IV Protonix 40 q12 hours  #Gastric mass vs gastrohepatic lymphadenopathy    -CT A/P: lesser curvature mass , Hepatic hypodensities  #Splenic Hypodensities, infarct vs phlegmon/abscess    -CT A/P:  Wedge-shaped region of hypodensity within the spleen    /RENAL:   #Grade IV L hydronephrosis s/p L PCN (5/2023) -- presented to ED with thick dark foul smelling output  #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  -intermittent suction/irrigation per urology; f/u cx and cytology from 6/25     -(6/23) s/p LT PCN upsizing to 26Fr catheter, RLQ 16 Fr drain remains, s/p nephrostolithotomy w/ lithotripsy    -(6/18) s/p LT PCN upsizing to 16Fr and 16Fr RLQ drain placement with IR, flush q8 hr    #Return to OR on Friday 6/30    High suspicion of renal malignancy given recent pathology  #volume overload  #ROJAS (baseline Cr 1.0)    - Cr 3.8--> 4.2 --> 4.0-> 3.9  #metabolic acidosis  -Nephrology c/s - no indication for RRT as of 6/27  - PO sodium bicarb 1300mg q8  - s/p 24hr of sodium bicarbonate infusion 6/26  #Hyperkalemia     -now resolved  #Hyperphosphatemia    -Phoslo 1334mg q8    Labs:          BUN/Cr- 116/4.2  -->,  120/4.0  -->121/3.9          Electrolytes-Sodium/Potassium/Magnesium/Phosphate  06-28 @ 20:51  Sodium  131  Potassium 4.4  Magnesium --   Phosphorus  7.6    - IV maintenance fluids: NS @ 75 cc/hr    HEME/ONC:   #new onset Afib    - holding heparin infusion in setting of hematochezia     Labs: Hb/Hct:  7.8/23.8  -->,  7.5/22.7  -->  8.0/24.3  -> 8.3/24.8                  Plts:  250  -->,  206  -->   262-> 226              PTT/INR:  33.2/--  --->    #H/o Fe Deficiency anemia    - Iron supplementation  #DVT prophylaxis  -SCDs, heparin subQ   T&S Expires 6/29  Blood Consent- in chart     ID:  #Leukocytosis 2/2 sepsis   WBC- 34.86  --->>,  38.86  --->>,  41.06  --->>56.9-->44.28  Temp trend- 24hrs T  #antibiotics    -Levaquin 500mg q48 (6/21-    -Meropenem 1g q12 (6/20-    -As per ID: no need for Vancomycin or Daptomycin in the absence of MRSA    -discontinued caspofungin (as per ID, since fungitell is unremarkable)  #Cultures  L PCN cyto-pathology  6/25: results suggestive of a necrotic neoplasm    Funtitell 6/26: 47    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    BAL 6/24: moderate yeast     UCx 6/19: negative FINAL      Left PCN aspiration 6/18: stenotrophomonas maltophilia  Needs source control    ENDO:  #Glucose monitoring    -A1c (5/7/23): 5.0    -POCT q6 hours while NPO   #Adrenal Insufficiency 2/2 to sepsis     -completed solucortef taper     -Cortisol level - binding globulin 1.1 (low), serum cortisol, 84, free cortisol 76, % free         cortisol 91    MSK:  #wound care  Sacrococcygeal DTI: wound care following- cleanse with soap and water, apply triad, gauze, and allevyn BID and PRN for soiling   Venous ulcer right lower leg, R forearm skin tear: Pat dry, apply Xeroform, nonadherent dressing, wrap with Kerlix twice a day, prn for soiling  #Activity    -Advance as tolerated    -PT/rehab once extubated  Globally deconditioned    LINES/DRAINS:   PIV  Bobby (6/18)  RLQ 16Fr Javier Drain (6/18),   LT PCN 26Fr (6/18)   Left basilic midline  6/21  Right Radial Joleen 6/22  L radial Milan (6/18-6/22)  L IJ TLC (6/26)  ETT  OGT    ADVANCED DIRECTIVES:  Full Code  HCP/Emergency Contact- Tracey Adames: [phone]   Palliative following. Last GOC discussion 6/26 day.    INDICATION FOR SICU: New onset atrial fibrillation w/ mechanical ventilation    DISPO: SICU

## 2023-06-29 NOTE — PROGRESS NOTE ADULT - ASSESSMENT
67 year old M a/w retroperitoneal and intraabdominal abscess s/p IR placement of drain and LEFT PCN 6/18, s/p percutaneous drainage of LEFT renal abscess POD #7. Left 26fr flank catheter in place draining serosang fluid with ?necrotic material vs abscess, fluid thinner and clearer than prior.    Plan:  - F/u Pathology, concern for  TB  - Continue IV Abx and Antifungals as per ID, consider anti-tuberculous drugs until pathology stains are back for TB  - Dr. Vásquez scheduled patient for nephrectomy today but after speaking at length with family regarding risks they decided to forego the procedure at this time until patient is more stable.    - Further previous discussions with family and  attendings were had and family made aware that decision for surgery will depend on course including the dx of PROBABLE malignancy, possibility of a para nepolastic rxn, risks fo removal of the kidney for source control  - Continue care as per SICU  - Will discuss with attending   67 year old M a/w retroperitoneal and intraabdominal abscess s/p IR placement of drain and LEFT PCN 6/18, s/p percutaneous drainage of LEFT renal abscess POD #7. Left 26fr flank catheter in place draining serosang fluid with ?necrotic material vs abscess, fluid thinner and clearer than prior.    Plan:  - F/u Pathology, concern for  TB  - Continue IV Abx and Antifungals as per ID, consider anti-TB drugs until pathology stains are back for TB  - Per discussion with Dr Vásquez, pt planned for LEFT radical nephrectomy tomorrow  - Continue care as per SICU  - Will follow 67 year old M a/w retroperitoneal and intraabdominal abscess s/p IR placement of drain and LEFT PCN 6/18, s/p percutaneous drainage of LEFT renal abscess POD #7. Left 26fr flank catheter in place draining serosang fluid with ?necrotic material vs abscess, fluid thinner and clearer than prior.    Plan:  - F/u Pathology, concern for  TB  - Continue IV Abx and Antifungals as per ID, consider anti-TB drugs until pathology stains are back for TB  - Per discussion with Dr Vásquez, pt planned for LEFT radical nephrectomy and abdominal washout  tomorrow  - Continue care as per SICU  - Will follow

## 2023-06-29 NOTE — PROGRESS NOTE ADULT - SUBJECTIVE AND OBJECTIVE BOX
CHRISTINE VILLAVICENCIO  [id number]  67y Male    Indication for ICU admission: mechanical ventilatior secondary to urosepsis     Admit Date:06-18-23  ICU Date: 6/18/23  OR Date: 6/18, 6/22      24HRS EVENT:          REVIEW OF SYSTEMS  [ ] A ten-point review of systems was otherwise negative except as noted.  [ x] Due to altered mental status/intubation, subjective information were not able to be obtained from the patient. History was obtained, to the extent possible, from review of the chart and collateral sources of information.    ----------------------------------------------------------------------------------------------------------------------  ----------------------------------------------------------------------------------------------------------------------     CHRISTINE VILLAVICENCIO  [id number]  67y Male    Indication for ICU admission: mechanical ventilatior secondary to urosepsis     Admit Date:06-18-23  ICU Date: 6/18/23  OR Date: 6/18, 6/22      24HRS EVENT:  6/28  NIGHT  -precedex @1, Josh @ 0.4, metating, follows commands    AM  -dec RR -- 450/18/40/8   -restart TF   -surgical swab Cx: Finegoldia magna (gram+ aneaobe, class of clostridium)   -RTOR for nephrectomy friday, patient's daughter (healthcare proxy) now agrees to procedure  -Daughter consenting for nephrectomy  -Claudine 45, DC lyla            REVIEW OF SYSTEMS  [ ] A ten-point review of systems was otherwise negative except as noted.  [ x] Due to altered mental status/intubation, subjective information were not able to be obtained from the patient. History was obtained, to the extent possible, from review of the chart and collateral sources of information.    ----------------------------------------------------------------------------------------------------------------------  ----------------------------------------------------------------------------------------------------------------------

## 2023-06-29 NOTE — PROGRESS NOTE ADULT - SUBJECTIVE AND OBJECTIVE BOX
HPI:  67M w/ PMH of HTN, grade IV hydronephrosis of L kidney s/p L PCN (placed 5/2023), stutter, hiatal hernia, iron deficiency anemia, H Pylori (untreated), and gastric mass (never biopsied) presents to the ED with at least 4 days of worsening weakness, abdominal distension, and no output from his PCN. Pt and daughters bedside are only able to give limited information, but for the past few days, he has noticed no output from his PCN as well as increasing abdominal distension, pain, and anorexia. He reports that he has not felt normal for the past 10 days. His PCN used to put out serosanguinous fluid, but the output changed to feculent/brown liquid over the past day. In the ED, he was noted to be in new onset afib w/ RVR to 150+ as well as experiencing some difficulty breathing. He was started on BiPAP. Stat labs were notable for WBC 47, Cr 2.9, and lactate 6. CT A/P w/ IV contrast showed free intraperitoneal fluid and air from an unknown source as well as fluid and air in the L kidney.  (18 Jun 2023 18:34)    Interval history  -Patient seen at bedside with son  -He remains intubated and sedated and unable to participate in exam    ADVANCE DIRECTIVES:    [x ] Full Code [ ] DNR  MOLST  [ ]  Living Will  [ ]   DECISION MAKER(s):  [x ] Health Care Proxy(s)  [ ] Surrogate(s)  [ ] Guardian           Name(s): Phone Number(s):  Tracey    BASELINE (I)ADL(s) (prior to admission):  [place]: [ ]Total  [ ] Moderate [ ]Dependent  Palliative Performance Status Version 2:         %    http://npcrc.org/files/news/palliative_performance_scale_ppsv2.pdf    Allergies    No Known Allergies    Intolerances    MEDICATIONS  (STANDING):  albuterol/ipratropium for Nebulization 3 milliLiter(s) Nebulizer every 6 hours  bacitracin   Ointment 1 Application(s) Topical two times a day  calcium acetate 1334 milliGRAM(s) Oral every 8 hours  chlorhexidine 0.12% Liquid 15 milliLiter(s) Oral Mucosa every 12 hours  chlorhexidine 2% Cloths 1 Application(s) Topical <User Schedule>  dexMEDEtomidine Infusion 0.2 MICROgram(s)/kG/Hr (3.82 mL/Hr) IV Continuous <Continuous>  ferrous    sulfate 325 milliGRAM(s) Oral daily  gabapentin 200 milliGRAM(s) Oral every 8 hours  heparin   Injectable 5000 Unit(s) SubCutaneous every 8 hours  levoFLOXacin IVPB 500 milliGRAM(s) IV Intermittent every 48 hours  meropenem  IVPB 500 milliGRAM(s) IV Intermittent every 12 hours  metoprolol tartrate Injectable 2.5 milliGRAM(s) IV Push every 6 hours  pantoprazole  Injectable 40 milliGRAM(s) IV Push every 12 hours  phenylephrine    Infusion 0.1 MICROgram(s)/kG/Min (1.43 mL/Hr) IV Continuous <Continuous>  sodium bicarbonate 1300 milliGRAM(s) Oral three times a day  sodium chloride 0.9%. 1000 milliLiter(s) (75 mL/Hr) IV Continuous <Continuous>    MEDICATIONS  (PRN):  fentaNYL    Injectable 25 MICROGram(s) IV Push every 3 hours PRN Moderate Pain (4 - 6)  sodium chloride 0.9% lock flush 10 milliLiter(s) IV Push every 1 hour PRN Pre/post blood products, medications, blood draw, and to maintain line patency  sodium chloride 0.9% lock flush 10 milliLiter(s) IV Push every 1 hour PRN Pre/post blood products, medications, blood draw, and to maintain line patency        PRESENT SYMPTOMS: [x]Unable to obtain due to poor mentation   Source if other than patient:  [ ]Family   [ ]Team     Pain: [ ]yes [ ]no  QOL impact -   Location -                    Aggravating factors -  Quality -  Radiation -  Timing-  Severity (0-10 scale):  Minimal acceptable level (0-10 scale):     CPOT:  0  https://www.sccm.org/getattachment/mqr18g57-9a5r-5u4u-3f7m-9127x6587b1w/Critical-Care-Pain-Observation-Tool-(CPOT)    PAIN AD Score:   [email or web address].[email or web address]/painad.pdf (press ctrl +  left click to view)    Dyspnea:                           [ ]None[ ]Mild [ ]Moderate [ ]Severe     Respiratory Distress Observation Scale (RDOS): 0  A score of 0 to 2 signifies little or no respiratory distress, 3 signifies mild distress, scores 4 to 6 indicate moderate distress, and scores greater than 7 signify severe distress  [email or web address]/[email or web address]/[email or web address]-abnolchlbjp-sotaevmv-utqqdkyrnsp-vqqnn.pdf    Anxiety:                             [ ]None[ ]Mild [ ]Moderate [ ]Severe   Fatigue:                             [ ]None[ ]Mild [ ]Moderate [ ]Severe   Nausea:                             [ ]None[ ]Mild [ ]Moderate [ ]Severe   Loss of appetite:              [ ]None[ ]Mild [ ]Moderate [ ]Severe   Constipation:                    [ ]None[ ]Mild [ ]Moderate [ ]Severe    Other Symptoms:  [ ]All other review of systems negative     Palliative Performance Status Version 2:        10 %    http://[email or web address].org/files/news/palliative_performance_scale_[email or web address].pdf    PHYSICAL EXAM:  ICU Vital Signs Last 24 Hrs  T(C): 36.9 (29 Jun 2023 08:00), Max: 37.4 (28 Jun 2023 16:00)  T(F): 98.5 (29 Jun 2023 08:00), Max: 99.3 (28 Jun 2023 16:00)  HR: 89 (29 Jun 2023 13:00) (84 - 102)  BP: 80/54 (29 Jun 2023 08:00) (80/54 - 116/66)  BP(mean): 59 (29 Jun 2023 08:00) (59 - 59)  ABP: 114/60 (29 Jun 2023 13:00) (75/42 - 129/69)  ABP(mean): 73 (29 Jun 2023 13:00) (52 - 110)  RR: 16 (29 Jun 2023 13:00) (1 - 23)  SpO2: 100% (29 Jun 2023 13:00) (99% - 100%)    O2 Parameters below as of 29 Jun 2023 13:00  Patient On (Oxygen Delivery Method): ventilator    O2 Concentration (%): 40        GENERAL:  [ ] No acute distress [ ]Lethargic  [ x]Unarousable  [ ]Verbal  [ ]Non-Verbal [ ]Cachexia    BEHAVIORAL/PSYCH:  [ ]Alert and Oriented x  [ ] Anxiety [ ] Delirium [ ] Agitation [ x] Calm   EYES: [ ] No scleral icterus [ ] Scleral icterus [x ] Closed  ENMT:  [ ]Dry mouth  [x ]No external oral lesions [ ] No external ear or nose lesions  CARDIOVASCULAR:  [ ]Regular [ ]Irregular [ ]Tachy [x ]Not Tachy  [ ]Ben [ ] Edema [ ] No edema  PULMONARY:  [ ]Tachypnea  [ ]Audible excessive secretions [x ] No labored breathing [ ] labored breathing  GASTROINTESTINAL: [ ]Soft  [ ]Distended  [x ]Not distended [ ]Non tender [ ]Tender  MUSCULOSKELETAL: [ ]No clubbing [ ] clubbing  [x ] No cyanosis [ ] cyanosis  NEUROLOGIC: [ ]No focal deficits  [ ]Follows commands  [x ]Does not follow commands  [ ]Cognitive impairment  [ ]Dysphagia  [ ]Dysarthria  [ ]Paresis   SKIN: [ ] Jaundiced [x ] Non-jaundiced [ ]Rash [ ]No Rash [ ] Warm [ ] Dry  MISC/LINES: [x ] ET tube [ ] Trach [ ]NGT/OGT [ ]PEG [ ]Mena    CRITICAL CARE:  [x ] Shock Present  [ ]Septic [ ]Cardiogenic [ ]Neurologic [ ]Hypovolemic  [ ]  Vasopressors [ ]  Inotropes   [x ]Respiratory failure present [ x]Mechanical ventilation [ ]Non-invasive ventilatory support [ ]High flow  [ ]Acute  [ ]Chronic [ ]Hypoxic  [ ]Hypercarbic [ ]Other  [ ]Other organ failure     LABS: reviewed by me                                   8.3    44.28 )-----------( 226      ( 28 Jun 2023 20:51 )             24.8   06-28    131<L>  |  96<L>  |  121<HH>  ----------------------------<  102<H>  4.4   |  19  |  3.9<H>    Ca    7.1<L>      28 Jun 2023 20:51  Phos  7.6     06-28  Mg     2.1     06-27    TPro  3.8<L>  /  Alb  1.8<L>  /  TBili  0.5  /  DBili  0.3  /  AST  20  /  ALT  9   /  AlkPhos  113  06-28                  RADIOLOGY & ADDITIONAL STUDIES: reviewed by me    < from: Xray Chest 1 View- PORTABLE-Routine (06.28.23 @ 05:40) >  FINDINGS/  IMPRESSION:    NG tube terminating in the left upper quadrant. Unchanged left IJ venous   catheter.    Bibasal opacities without significant change accounting for differences   in positioning. No pneumothorax.    Stable cardiomediastinal silhouette.    Unchanged osseous structures.    < end of copied text >      EKG: reviewed by me  < from: 12 Lead ECG (06.27.23 @ 22:17) >  Ventricular Rate 96 BPM    Atrial Rate 97 BPM    QRS Duration 78 ms    Q-T Interval 290 ms    QTC Calculation(Bazett) 366 ms    R Axis 28 degrees    T Axis 227 degrees    Diagnosis Line Atrial fibrillation  Low voltage QRS  Septal infarct , age undetermined  Abnormal ECG    < end of copied text >      PROTEIN CALORIE MALNUTRITION PRESENT: [ ]mild [ ]moderate [ ]severe [ ]underweight [ ]morbid obesity  https://www.andeal.org/vault/2440/web/files/ONC/Table_Clinical%20Characteristics%20to%20Document%20Malnutrition-White%20JV%20et%20al%190866.pdf    Height (cm): 180 (06-22-23 @ 21:00), 180.3 (05-11-23 @ 08:58)  Weight (kg): 76.476 (06-22-23 @ 21:00), 66.2 (05-10-23 @ 10:54)  BMI (kg/m2): 23.6 (06-22-23 @ 21:00), 20.4 (05-11-23 @ 08:58)    [ ]PPSV2 < or = to 30% [ ]significant weight loss  [ ]poor nutritional intake  [ ]anasarca      [ ]Artificial Nutrition      REFERRALS:   [ ]Chaplaincy  [ ]Hospice  [ ]Child Life  [ x]Social Work  [ ]Case management [ ]Holistic Therapy     Patient discussed with primary medical team MD  Palliative care education provided to patient and/or family  Patient and/or family assessed for spiritual and social needs    Goals of Care Document:

## 2023-06-29 NOTE — PROGRESS NOTE ADULT - SUBJECTIVE AND OBJECTIVE BOX
Urology Progress Note:   67 year old M a/w retroperitoneal and intraabdominal abscess s/p IR placement of drain and LEFT PCN 6/18, s/p percutaneous drainage of LEFT renal abscess POD #7. Left 26fr flank catheter in place draining serosang fluid with ?necrotic material vs abscess, fluid thinner and clearer than prior.      [ x ]  Due to altered mental status/intubation, subjective information was not able to be obtained from the patient. History was obtained to the extent possible from review of the chart and collateral sources of information.     MEDICATIONS  (STANDING):  albuterol/ipratropium for Nebulization 3 milliLiter(s) Nebulizer every 6 hours  bacitracin   Ointment 1 Application(s) Topical two times a day  calcium acetate 1334 milliGRAM(s) Oral every 8 hours  chlorhexidine 0.12% Liquid 15 milliLiter(s) Oral Mucosa every 12 hours  chlorhexidine 2% Cloths 1 Application(s) Topical <User Schedule>  dexMEDEtomidine Infusion 0.2 MICROgram(s)/kG/Hr (3.82 mL/Hr) IV Continuous <Continuous>  ferrous    sulfate 325 milliGRAM(s) Oral daily  gabapentin 200 milliGRAM(s) Oral every 8 hours  heparin   Injectable 5000 Unit(s) SubCutaneous every 8 hours  levoFLOXacin IVPB 500 milliGRAM(s) IV Intermittent every 48 hours  meropenem  IVPB 500 milliGRAM(s) IV Intermittent every 12 hours  metoprolol tartrate Injectable 2.5 milliGRAM(s) IV Push every 6 hours  pantoprazole  Injectable 40 milliGRAM(s) IV Push every 12 hours  phenylephrine    Infusion 0.1 MICROgram(s)/kG/Min (1.43 mL/Hr) IV Continuous <Continuous>  sodium bicarbonate 1300 milliGRAM(s) Oral three times a day  sodium chloride 0.9%. 1000 milliLiter(s) (75 mL/Hr) IV Continuous <Continuous>    MEDICATIONS  (PRN):  fentaNYL    Injectable 25 MICROGram(s) IV Push every 3 hours PRN Moderate Pain (4 - 6)  sodium chloride 0.9% lock flush 10 milliLiter(s) IV Push every 1 hour PRN Pre/post blood products, medications, blood draw, and to maintain line patency  sodium chloride 0.9% lock flush 10 milliLiter(s) IV Push every 1 hour PRN Pre/post blood products, medications, blood draw, and to maintain line patency      Allergies: No Known Allergies    SOCIAL HISTORY: No illicit drug use    Vital Signs Last 24 Hrs  T(C): 36.9 (29 Jun 2023 08:00), Max: 37.6 (28 Jun 2023 12:00)  T(F): 98.5 (29 Jun 2023 08:00), Max: 99.6 (28 Jun 2023 12:00)  HR: 95 (29 Jun 2023 10:30) (84 - 102)  BP: 99/72 (29 Jun 2023 10:00) (80/54 - 116/66)  BP(mean): 82 (29 Jun 2023 10:00) (59 - 82)  RR: 14 (29 Jun 2023 10:30) (1 - 23)  SpO2: 100% (29 Jun 2023 10:30) (99% - 100%)    Parameters below as of 29 Jun 2023 11:00  Patient On (Oxygen Delivery Method): ventilator  O2 Concentration (%): 40    PHYSICAL EXAM:  General: Intubated  Resp: No accessory respiratory muscle use, +intubated  Back: +left 26fr catheter in place draining serosang fluid with ?necrotic material vs abscess, fluid thinner and clearer than prior  Abd: Soft, NT/ND. No suprapubic ttp, +right pigtail drain in place with scant drainage of clear serous fluid  : +penoscrotal edema, +16fr temperature catheter in place draining clear yellow urine  Ext: No edema or cyanosis, ENRIQUE x 4  Neuro: Awake and alert  Skin: Good color, non diaphoretic    I&O's Summary  28 Jun 2023 07:01  -  29 Jun 2023 07:00  --------------------------------------------------------  IN: 3108.1 mL / OUT: 1215 mL / NET: 1893.1 mL    29 Jun 2023 07:01  -  29 Jun 2023 10:58  --------------------------------------------------------  IN: 346.7 mL / OUT: 112 mL / NET: 234.7 mL    LABS:                      8.3    44.28 )-----------( 226      ( 28 Jun 2023 20:51 )             24.8     06-28    131<L>  |  96<L>  |  121<HH>  ----------------------------<  102<H>  4.4   |  19  |  3.9<H>    Ca    7.1<L>      28 Jun 2023 20:51  Phos  7.6     06-28  Mg     2.1     06-27    TPro  3.8<L>  /  Alb  1.8<L>  /  TBili  0.5  /  DBili  0.3  /  AST  20  /  ALT  9   /  AlkPhos  113  06-28    PT/INR - ( 27 Jun 2023 19:57 )   PT: 18.40 sec;   INR: 1.59 ratio         PTT - ( 27 Jun 2023 19:57 )  PTT:29.2 sec  Urinalysis Basic - ( 28 Jun 2023 20:51 )    Color: x / Appearance: x / SG: x / pH: x  Gluc: 102 mg/dL / Ketone: x  / Bili: x / Urobili: x   Blood: x / Protein: x / Nitrite: x   Leuk Esterase: x / RBC: x / WBC x   Sq Epi: x / Non Sq Epi: x / Bacteria: x

## 2023-06-29 NOTE — CONSULT NOTE ADULT - ASSESSMENT
ASSESSMENT: 67M w/ PMH of HTN, grade IV hydronephrosis of L kidney s/p L PCN (placed 5/2023), stutter, hiatal hernia, iron deficiency anemia, H Pylori (untreated), and gastric mass (never biopsied) presents to the ED with at least 4 days of worsening weakness, abdominal distension, and no output from his PCN. Patient is now IR placement of drain and LEFT PCN 6/18 upsizing to 16Fr and 16Fr RLQ drain placement with IR and s/p shock lithotripsy of left kidney abscess cavity with drainage of thick contents, upsize of drain too 26 Fr Mena catheter. Patient now in critical condition in the SICU for hemodynamic monitoring for intrabdominal sepsis 2/2 pneumoperitoneum and emphysematous pyelonephritis.     Surgical Oncology consulted for plan for emergent nephrectomy tomorrow 06/30/23 with Urology team.     PLAN:  - Dr. Womack is available for assistance for emergent nephrectomy tomorrow 06/30/23 with Dr. Vásquez  - Blue Team Surgery to follow [phone]    Above plan discussed with Attending Surgeon Dr. Womack and Primary team  06-29-23 @ 09:48

## 2023-06-29 NOTE — PROGRESS NOTE ADULT - ASSESSMENT
67M w/ PMH of HTN, grade IV hydronephrosis of L kidney s/p L PCN (placed 5/2023), stutter, hiatal hernia, iron deficiency anemia, H Pylori (untreated), and gastric mass (never biopsied) presents to the ED with at least 4 days of worsening weakness, abdominal distension, and no output from his PCN  . Found to have septic shock, MSOF, lactic acidosis from left emphysematous hydronephrosis, pyelonephritis and possible rupture with pneumoperitoneum along with possible hepatic abscesses, splenic infarct and abdominal and mediastinal lymphadenopathies. he is s/p upsizing of left PCN tube by IR along with RLQ drain placed.     ROJAS/ Hyponatremia/ severe left hydro s/p upsizing of left PCN catheter/ emphysematous pyelo with septic shock/  ROJAS likely ATN iso septic shock  cr improved from peak now stable though not improving further  non-oliguric  Pt planned for LEFT radical nephrectomy and abdominal washout tomorrow.   continue sodium bicarbonate po   BP noted / increase midodrine to 10 q 8   ph noted / on  binders    no indication for RRT as of now, will cont to reeval daily  strict Is and OS

## 2023-06-30 NOTE — PRE-ANESTHESIA EVALUATION ADULT - NSANTHADDINFOFT_GEN_ALL_CORE
GA planned; Risks discussed including dental injury and more serious complications including cardiac and pulmonary complications and stroke.  Patient expresses understanding with regard to risks of anesthesia and wishes to proceed.
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

## 2023-06-30 NOTE — PROGRESS NOTE ADULT - ASSESSMENT
67 year old man with history of hydronephrosis s/p PCN placement, HTN, anemia, H pylori presents with abdominal pain and no output from PCN. HOspital course complicated by septic shock in the setting of emphysematous pyelonephritis, hematochezia, respiratory failure requiring mechanical ventilation. Palliative care consulted for GOC.     Spoke with primary surgical ICU team, pt planned for LEFT radical nephrectomy and abdominal washout later today.    Education about palliative care provided to patient/family.  See Recs below.    Please call [phone] with questions or concerns 24/7.   We will continue to follow.

## 2023-06-30 NOTE — PRE-ANESTHESIA EVALUATION ADULT - NSANTHPROCED_GEN_ALL_CORE
Arterial Catheter/Central Venous Catheter
Arterial Catheter
Arterial Catheter/Central Venous Catheter

## 2023-06-30 NOTE — BRIEF OPERATIVE NOTE - NSICDXBRIEFPREOP_GEN_ALL_CORE_FT
PRE-OP DIAGNOSIS:  Renal cancer, left 30-Jun-2023 21:36:17  Aure Prabhakar  
PRE-OP DIAGNOSIS:  Renal abscess, left 22-Jun-2023 23:30:57  Bernie Perdomo  Elevated white blood cell count 22-Jun-2023 23:31:10  Bernie Perdomo  Other specified sepsis 22-Jun-2023 23:32:05  Bernie Perdomo  Acute respiratory failure with hypoxia 22-Jun-2023 23:32:30  Bernie Perdomo

## 2023-06-30 NOTE — BRIEF OPERATIVE NOTE - COMMENTS
Dict # [id number]
most of the material removed was necrotic tissue the identification of which awaits pathology

## 2023-06-30 NOTE — PROCEDURE NOTE - NSINFORMCONSENT_GEN_A_CORE
Consent obtained from patient's daughter (healthcare proxy) (consent in chart)/Benefits, risks, and possible complications of procedure explained to patient/caregiver who verbalized understanding and gave verbal consent.

## 2023-06-30 NOTE — PRE-ANESTHESIA EVALUATION ADULT - NSATTENDATTESTRD_GEN_ALL_CORE
The patient has been re-examined and I agree with the above assessment or I updated with my findings.
Relaxed

## 2023-06-30 NOTE — PRE-ANESTHESIA EVALUATION ADULT - MALLAMPATI CLASS
Class III - visualization of the soft palate and the base of the uvula
Class II - visualization of the soft palate, fauces, and uvula
Class III - visualization of the soft palate and the base of the uvula
unable to evaluate

## 2023-06-30 NOTE — PROGRESS NOTE ADULT - ASSESSMENT
Pt is a 67y Male admitted with retroperitoneal and intraabdominal abscess s/p IR placement of drain and LEFT PCN 6/18, s/p percutaneous drainage of LEFT renal abscess POD #8 with left 26fr flank catheter in place, Left Renal Cancer s/p Left radical nephrectomy, Ex-Lap, central venous catheter placement w/ US guidance, EBL 1000L, POD#0.    Plan:   - Continue management per SICU   - Continue IV abx per ID - on Ar/Levo   - F/u AM labs - WBC 28.59   - Trend Cr, Cr 3.6  - F/u Intra-op Cx   - Strict I&Os,  monitor catheter/drain outputs (3)  - DVT/GI ppx   - D/w SICU, Gen Sx, attending

## 2023-06-30 NOTE — CHART NOTE - NSCHARTNOTEFT_GEN_A_CORE
PACU ANESTHESIA ADMISSION NOTE      Procedure: Insertion, arterial line, percutaneous    Central venous catheter placement with ultrasound guidance    Midline catheter insertion    Nephrostolithotomy, percutaneous, with lithotripsy, large stone  left large thick abscess materia dimension of aspirate over 4 x 4 x 4 cm    Change external ureteral stent  left    Nephrostogram  left    US guided vascular access    Insertion of arterial line with imaging guidance  right radial    Bronchoscopy, flexible, adult    Partial nephrectomy    Nephrostogram via existing catheter    Exploratory laparotomy    Left nephrectomy      Post op diagnosis:  Renal abscess, left    Elevated white blood cell count    Other specified sepsis    Acute respiratory failure with hypoxia    Renal cancer, left         Intubated and sedated    Vitals:   T: 36          R: 20                  BP:   107/56               Sat: 98                  P: 140      Mental Status:  Sedated    Nausea/Vomiting:  _x___ NO  ______Yes,   See Post - Op Orders          Pain Scale (0-10):  _____    Treatment: __x__ None    ____ See Post - Op/PCA Orders    Post - Operative Fluids:   ____ Oral   ___x_ See Post - Op Orders    Plan: Discharge:   ____Home       _____Floor     ___x__Critical Care    _____  Other:_________________    Comments:  Patient transported to SICU ventilated through Ambu bag on 100% O2, on all monitors. Signed out to the bedside RN and PA.

## 2023-06-30 NOTE — PROGRESS NOTE ADULT - SUBJECTIVE AND OBJECTIVE BOX
CHRISTINE VILLAVICENCIO  67y, Male  Allergy: No Known Allergies      LOS  12d    CHIEF COMPLAINT: pneumoperitoneum, emphysematous pyelonephritis (30 Jun 2023 05:37)      INTERVAL EVENTS/HPI  - T(F): , Max: 99.9 (06-29-23 @ 20:00)  - WBC Count: 33.43 (06-30-23 @ 10:01) downtrending   WBC Count: 42.21 (06-29-23 @ 20:15)     - Creatinine: 4.0 (06-29-23 @ 20:15)  Creatinine: 3.9 (06-28-23 @ 20:51)     -   -   -     ROS  cannot obtain secondary to patient's sedation and/or mental status    VITALS:  T(F): 99.1, Max: 99.9 (06-29-23 @ 20:00)  HR: 81  BP: 119/85  RR: 3Vital Signs Last 24 Hrs  T(C): 37.3 (30 Jun 2023 04:00), Max: 37.7 (29 Jun 2023 20:00)  T(F): 99.1 (30 Jun 2023 04:00), Max: 99.9 (29 Jun 2023 20:00)  HR: 81 (30 Jun 2023 11:00) (75 - 93)  BP: 119/85 (30 Jun 2023 11:00) (95/70 - 145/78)  BP(mean): 99 (30 Jun 2023 11:00) (79 - 107)  RR: 3 (30 Jun 2023 11:00) (3 - 22)  SpO2: 100% (30 Jun 2023 11:00) (96% - 100%)    Parameters below as of 30 Jun 2023 11:00  Patient On (Oxygen Delivery Method): ventilator    O2 Concentration (%): 40    PHYSICAL EXAM:  Gen: Intubated  CV: RRR  Lungs: Decreased BS at bases  Abd: Soft bilateral drains  Ext anasarca  Neuro: Sedated  Lines clean, no phlebitis    FH: Non-contributory  Social Hx: Non-contributory    TESTS & MEASUREMENTS:                        10.6   33.43 )-----------( 196      ( 30 Jun 2023 10:01 )             31.3     06-29    134<L>  |  97<L>  |  125<HH>  ----------------------------<  111<H>  4.5   |  19  |  4.0<H>    Ca    7.0<L>      29 Jun 2023 20:15  Phos  7.5     06-29  Mg     2.0     06-29    TPro  3.8<L>  /  Alb  1.8<L>  /  TBili  0.5  /  DBili  0.3  /  AST  20  /  ALT  9   /  AlkPhos  113  06-28      LIVER FUNCTIONS - ( 28 Jun 2023 20:51 )  Alb: 1.8 g/dL / Pro: 3.8 g/dL / ALK PHOS: 113 U/L / ALT: 9 U/L / AST: 20 U/L / GGT: x           Urinalysis Basic - ( 29 Jun 2023 20:15 )    Color: x / Appearance: x / SG: x / pH: x  Gluc: 111 mg/dL / Ketone: x  / Bili: x / Urobili: x   Blood: x / Protein: x / Nitrite: x   Leuk Esterase: x / RBC: x / WBC x   Sq Epi: x / Non Sq Epi: x / Bacteria: x        Culture - Fungal, Bronchial (collected 06-24-23 @ 11:00)  Source: .Bronchial None  Preliminary Report (06-26-23 @ 11:00):    Moderate Yeast    Culture - Acid Fast - Bronchial w/Smear (collected 06-24-23 @ 11:00)  Source: .Bronchial None  Preliminary Report (06-28-23 @ 15:08):    Culture is being performed.    Culture - Bronchial (collected 06-24-23 @ 11:00)  Source: .Bronchial None  Gram Stain (06-24-23 @ 23:21):    Numerous polymorphonuclear leukocytes per low power field    No squamous epithelial cells per low power field    Rare Yeast like cells per oil power field  Final Report (06-26-23 @ 17:56):    Normal Respiratory Gabbie present    Culture - Fungal, Other (collected 06-22-23 @ 22:11)  Source: .Other None  Preliminary Report (06-26-23 @ 10:57):    Testing in progress    Culture - Surgical Swab (collected 06-22-23 @ 22:11)  Source: .Surgical Swab None  Final Report (06-25-23 @ 14:14):    Few Finegoldia magna "Susceptibilities not performed"    Culture - Urine (collected 06-19-23 @ 02:50)  Source: Clean Catch Clean Catch (Midstream)  Final Report (06-20-23 @ 10:18):    No growth    Culture - Abscess with Gram Stain (collected 06-18-23 @ 23:08)  Source: .Abscess right lower quadrant (intrabdominal)  Gram Stain (06-19-23 @ 11:56):    Numerous polymorphonuclear leukocytes per low power field    Numerous Gram positive cocci in pairs per oil power field  Final Report (06-21-23 @ 14:13):    Numerous Anaerobic Gram Positive Cocci Most closely resembling    Peptoniphilus species "Susceptibilities not performed"    Culture - Abscess with Gram Stain (collected 06-18-23 @ 23:08)  Source: .Abscess left kidney  Gram Stain (06-19-23 @ 11:58):    Rare polymorphonuclear leukocytes per low power field    Few Gram Positive Cocci per oil power field  Final Report (06-22-23 @ 10:13):    Growth in fluid media only Anaerobic Gram Positive Cocci Most closely    resembling Peptoniphilus species "Susceptibilities not performed"    Rare Stenotrophomonas maltophilia  Organism: Stenotrophomonas maltophilia  Stenotrophomonas maltophilia (06-22-23 @ 10:13)  Organism: Stenotrophomonas maltophilia (06-22-23 @ 10:13)      Method Type: EDMUND      -  Levofloxacin: S 1      -  Trimethoprim/Sulfamethoxazole: S <=0.5/9.5  Organism: Stenotrophomonas maltophilia (06-22-23 @ 10:13)      Method Type: KB      -  Minocycline: S    Culture - Abscess with Gram Stain (collected 06-18-23 @ 20:18)  Source: .Abscess left PCN aspirate  Gram Stain (06-19-23 @ 06:27):    No polymorphonuclear cells seen per low power field    Few Gram Variable Cocci seen per oil power field  Final Report (06-24-23 @ 09:11):    Few Stenotrophomonas maltophilia  Organism: Stenotrophomonas maltophilia (06-24-23 @ 09:11)  Organism: Stenotrophomonas maltophilia (06-24-23 @ 09:11)      Method Type: EDMUND      -  Levofloxacin: S 2      -  Trimethoprim/Sulfamethoxazole: S <=0.5/9.5    Culture - Blood (collected 06-18-23 @ 14:18)  Source: .Blood Blood-Peripheral  Final Report (06-24-23 @ 02:00):    No Growth Final    Culture - Blood (collected 06-18-23 @ 14:18)  Source: .Blood Blood-Peripheral  Final Report (06-24-23 @ 02:00):    No Growth Final        Lactate, Blood: 1.8 mmol/L (06-27-23 @ 19:57)      INFECTIOUS DISEASES TESTING  Fungitell: 47 (06-25-23 @ 15:30)  MRSA PCR Result.: Negative (06-24-23 @ 17:38)      INFLAMMATORY MARKERS      RADIOLOGY & ADDITIONAL TESTS:  I have personally reviewed the last available Chest xray  CXR      CT  CT Abdomen and Pelvis No Cont:   ACC: [id number] EXAM:  CT ABDOMEN AND PELVIS   ORDERED BY: JULIANNE HERNANDEZ     PROCEDURE DATE:  06/27/2023          INTERPRETATION:  CLINICAL STATEMENT: Follow-up renal abscess    TECHNIQUE: Contiguous axial CT images were obtained from the lower chest   to the pubic symphysis .  Oral contrast was given.  Reformatted images in   the coronal and sagittal planes were acquired.    COMPARISON CT: CT abdomen pelvis from June 23, 2023.    OTHER STUDIES USED FOR CORRELATION: None.      FINDINGS:    Limited evaluation of solid organs and vascular structures secondary to   lack of intravenous contrast and streak artifact due to contact with CT   gantry    LOWER CHEST: Bilateral pleural effusions and adjacent compressive   atelectasis, again noted. Small pericardial effusion.    HEPATOBILIARY: Difficult to evaluate due to significant artifact. Again   demonstrated are poorly evaluated hepatic hypodensities adjacent to the   falciform ligament could reflect abscesses or liver masses. Evaluation   also limited by lack of intravenous contrast. Increased density within   the gallbladder again suggest vicarious excretion..    SPLEEN: Difficult to evaluate.    PANCREAS: Not well-visualized.    ADRENAL GLANDS: Not well-visualized.    KIDNEYS: Large right renal cyst again demonstrated. Suggestion of   contrast within the right kidney which could reflect renal failure    A normal left kidney is not identified. Percutaneous catheter seen within   the left kidney. Ill-defined abscess in the region of the left kidney may   have decreased in size from the prior examination but difficult to   exactly measure..    ABDOMINOPELVIC NODES: Difficult to visualize.    PELVIC ORGANS: Mena catheter within a poorly distended urinary bladder.   Prostate gland unchanged.    PERITONEUM/MESENTERY/BOWEL: Peritoneal catheter terminates within the   right lower quadrant. There is no bowel obstruction. Again demonstrated   is pneumoperitoneum scattered throughout the peritoneum anteriorly not   significantly changed from prior examination. Diffuse ascites again seen   throughout the abdomen pelvis, overall unchanged.    BONES/SOFT TISSUES: Diffuse anasarca. Degenerative changes again noted..    OTHER: Extensive vascular calcifications are again noted. Nasogastric   tube is seen extending to the stomach.      IMPRESSION:    Study is overall very limited due to lack of intravenous contrast, beam   Willingham artifact.    Persistent bilateral pleural effusions with adjacent consolidations   likely reflecting compressive atelectasis.    Left kidney poorly defined with a heterogeneous collection with air and   fluid which has likely mildly diminished in size compared to the prior   examination but exact comparison limited.    Diffuse ascites again noted. Diffuse anasarca again noted. Persistent   pneumoperitoneum.    Poorly defined lesions within the liver adjacent to the falciform   ligament which may again reflect abscesses or masses, difficult to   evaluate due to the limitations described above.    --- Endof Report ---            CHARLES SINGH MD; Attending Radiologist  This document has been electronically signed. Jun 27 2023  1:22PM (06-27-23 @ 13:09)      CARDIOLOGY TESTING  12 Lead ECG:   Ventricular Rate 96 BPM    Atrial Rate 97 BPM    QRS Duration 78 ms    Q-T Interval 290 ms    QTC Calculation(Bazett) 366 ms    R Axis 28 degrees    T Axis 227 degrees    Diagnosis Line Atrial fibrillation  Low voltage QRS  Septal infarct , age undetermined  Abnormal ECG    Confirmed by ROULA WISEMAN MD ([id number]) on 6/28/2023 6:46:27 AM (06-27-23 @ 22:17)  12 Lead ECG:   Ventricular Rate 98 BPM    QRS Duration 90 ms    Q-T Interval 292 ms    QTC Calculation(Bazett) 372 ms    R Axis 28 degrees    T Axis 228 degrees    Diagnosis Line Atrial fibrillation  Low voltage QRS  Nonspecific ST and T wave abnormality  Abnormal ECG    Confirmed by ROULA WISEMAN MD ([id number]) on 6/28/2023 6:51:59 AM (06-27-23 @ 18:22)      MEDICATIONS  acetaminophen     Tablet .. 650  bacitracin   Ointment 1  calcium acetate 1334  chlorhexidine 0.12% Liquid 15  chlorhexidine 2% Cloths 1  dexMEDEtomidine Infusion 0.2  ferrous    sulfate 325  gabapentin 200  heparin   Injectable 5000  levoFLOXacin IVPB 500  meropenem  IVPB 500  metoprolol tartrate Injectable 2.5  pantoprazole  Injectable 40  phenylephrine    Infusion 0.1  sodium bicarbonate 1300  sodium chloride 0.9%. 1000      WEIGHT  Weight (kg): 76.476 (06-22-23 @ 21:00)  Creatinine: 4.0 mg/dL (06-29-23 @ 20:15)      ANTIBIOTICS:  levoFLOXacin IVPB 500 milliGRAM(s) IV Intermittent every 48 hours  meropenem  IVPB 500 milliGRAM(s) IV Intermittent every 12 hours      All available historical records have been reviewed

## 2023-06-30 NOTE — PROGRESS NOTE ADULT - SUBJECTIVE AND OBJECTIVE BOX
UROLOGY DAILY PROGRESS NOTE    Pt is a 67y Male admitted with retroperitoneal and intraabdominal abscess s/p IR placement of drain and LEFT PCN 6/18, s/p percutaneous drainage of LEFT renal abscess POD #8 with left 26fr flank catheter in place, Left Renal Cancer s/p Left radical nephrectomy, Ex-Lap, central venous catheter placement w/ US guidance, EBL 1000L, POD#0. Patient seen and examined at bedside w/ Dr. Vásquez and SICU. Patient had intra-op vascular consult for intra-op bleeding. Patient remains intubated/sedated with ETT in place on pressors.     MEDICATIONS  (STANDING):  acetaminophen     Tablet .. 650 milliGRAM(s) Oral every 6 hours  bacitracin   Ointment 1 Application(s) Topical two times a day  calcium acetate 1334 milliGRAM(s) Oral every 8 hours  chlorhexidine 0.12% Liquid 15 milliLiter(s) Oral Mucosa every 12 hours  chlorhexidine 2% Cloths 1 Application(s) Topical <User Schedule>  dexMEDEtomidine Infusion 0.2 MICROgram(s)/kG/Hr (3.82 mL/Hr) IV Continuous <Continuous>  ferrous    sulfate 325 milliGRAM(s) Oral daily  gabapentin 200 milliGRAM(s) Oral every 8 hours  heparin   Injectable 5000 Unit(s) SubCutaneous every 8 hours  levoFLOXacin IVPB 500 milliGRAM(s) IV Intermittent every 48 hours  meropenem  IVPB 500 milliGRAM(s) IV Intermittent every 12 hours  metoprolol tartrate Injectable 2.5 milliGRAM(s) IV Push every 6 hours  pantoprazole  Injectable 40 milliGRAM(s) IV Push every 12 hours  phenylephrine    Infusion 0.1 MICROgram(s)/kG/Min (1.43 mL/Hr) IV Continuous <Continuous>  sodium bicarbonate 1300 milliGRAM(s) Oral three times a day  sodium chloride 0.9%. 1000 milliLiter(s) (75 mL/Hr) IV Continuous <Continuous>    MEDICATIONS  (PRN):  fentaNYL    Injectable 25 MICROGram(s) IV Push every 3 hours PRN Moderate Pain (4 - 6)  sodium chloride 0.9% lock flush 10 milliLiter(s) IV Push every 1 hour PRN Pre/post blood products, medications, blood draw, and to maintain line patency  sodium chloride 0.9% lock flush 10 milliLiter(s) IV Push every 1 hour PRN Pre/post blood products, medications, blood draw, and to maintain line patency      REVIEW OF SYSTEMS   [ ] Due to altered mental status/intubation, subjective information were not able to be obtained from patient. History was obtained, to the extent possible, from review of the chart and collateral sources of information.    Vital Signs Last 24 Hrs  T(C): 37 (30 Jun 2023 16:00), Max: 37.5 (30 Jun 2023 00:00)  T(F): 98.6 (30 Jun 2023 16:00), Max: 99.5 (30 Jun 2023 00:00)  HR: 82 (30 Jun 2023 16:00) (75 - 86)  BP: 111/77 (30 Jun 2023 15:33) (95/70 - 145/78)  BP(mean): 90 (30 Jun 2023 15:33) (79 - 107)  RR: 19 (30 Jun 2023 16:00) (3 - 20)  SpO2: 100% (30 Jun 2023 16:00) (96% - 100%)    Parameters below as of 30 Jun 2023 16:00  Patient On (Oxygen Delivery Method): ventilator    O2 Concentration (%): 40    PHYSICAL EXAM:    GEN: Intubated/sedated w/ ETT on pressors  SKIN: Good color, non diaphoretic.  HEENT: NC/AT.  RESP: No dyspnea, non-labored breathing. No use of accessory muscles.  CARDIO: +S1/S2  ABDO: Soft, improved edema,  no palpable bladder, +#2 Pelvic eleazar drain with serosanguinous fluid, #1 Lerma pouch eleazar drain with serosanguinous fluid   BACK: No CVAT B/L, +#3 LEFT kidney eleazar drain with serosanguinous drainage, 25cc   : + Penoscrotal edema  + Indwelling bobby in place, draining clear yellow urine      I&O's Summary    29 Jun 2023 07:01  -  30 Jun 2023 07:00  --------------------------------------------------------  IN: 3433.6 mL / OUT: 611 mL / NET: 2822.6 mL    30 Jun 2023 07:01 - 30 Jun 2023 22:05  --------------------------------------------------------  IN: 998 mL / OUT: 245 mL / NET: 753 mL        LABS:                        12.4   28.59 )-----------( 146      ( 30 Jun 2023 20:29 )             37.8     06-30    133<L>  |  101  |  111<HH>  ----------------------------<  161<H>  5.0   |  14<L>  |  3.6<H>    Ca    6.9<L>      30 Jun 2023 20:29  Phos  8.5     06-30  Mg     1.9     06-30    TPro  3.3<L>  /  Alb  1.6<L>  /  TBili  0.8  /  DBili  x   /  AST  36  /  ALT  9   /  AlkPhos  82  06-30    PT/INR - ( 30 Jun 2023 20:10 )   PT: 17.90 sec;   INR: 1.55 ratio         PTT - ( 30 Jun 2023 20:10 )  PTT:26.7 sec  Urinalysis Basic - ( 30 Jun 2023 20:29 )    Color: x / Appearance: x / SG: x / pH: x  Gluc: 161 mg/dL / Ketone: x  / Bili: x / Urobili: x   Blood: x / Protein: x / Nitrite: x   Leuk Esterase: x / RBC: x / WBC x   Sq Epi: x / Non Sq Epi: x / Bacteria: x    Culture - Fungal, Bronchial (06.24.23 @ 11:00)    Specimen Source: .Bronchial None   Culture Results:   Moderate Yeast    Culture - Acid Fast - Bronchial w/Smear (06.24.23 @ 11:00)    Specimen Source: .Bronchial None   Acid Fast Bacilli Smear:   No acid-fast bacilli seen by fluorochrome stain   Culture Results:   Culture is being performed.    Culture - Surgical Swab (06.22.23 @ 22:11)    Specimen Source: .Surgical Swab None   Culture Results:   Few Finegoldia magna "Susceptibilities not performed"      Culture - Fungal, Other (06.22.23 @ 22:11)    Specimen Source: .Other None   Culture Results:   Testing in progress    Culture - Abscess with Gram Stain (06.18.23 @ 23:08)    Gram Stain:   Numerous polymorphonuclear leukocytes per low power field  Numerous Gram positive cocci in pairs per oil power field   Specimen Source: .Abscess right lower quadrant (intrabdominal)   Culture Results:   Numerous Anaerobic Gram Positive Cocci Most closely resembling  Peptoniphilus species "Susceptibilities not performed"      RADIOLOGY & ADDITIONAL STUDIES:

## 2023-06-30 NOTE — PROGRESS NOTE ADULT - SUBJECTIVE AND OBJECTIVE BOX
HPI:  67M w/ PMH of HTN, grade IV hydronephrosis of L kidney s/p L PCN (placed 5/2023), stutter, hiatal hernia, iron deficiency anemia, H Pylori (untreated), and gastric mass (never biopsied) presents to the ED with at least 4 days of worsening weakness, abdominal distension, and no output from his PCN. Pt and daughters bedside are only able to give limited information, but for the past few days, he has noticed no output from his PCN as well as increasing abdominal distension, pain, and anorexia. He reports that he has not felt normal for the past 10 days. His PCN used to put out serosanguinous fluid, but the output changed to feculent/brown liquid over the past day. In the ED, he was noted to be in new onset afib w/ RVR to 150+ as well as experiencing some difficulty breathing. He was started on BiPAP. Stat labs were notable for WBC 47, Cr 2.9, and lactate 6. CT A/P w/ IV contrast showed free intraperitoneal fluid and air from an unknown source as well as fluid and air in the L kidney.  (18 Jun 2023 18:34)    Interval history  -Patient seen at bedside with son  -He remains intubated and sedated and unable to participate in exam  -planned for nephrectomy later today    ADVANCE DIRECTIVES:    [x ] Full Code [ ] DNR  MOLST  [ ]  Living Will  [ ]   DECISION MAKER(s):  [x ] Health Care Proxy(s)  [ ] Surrogate(s)  [ ] Guardian           Name(s): Phone Number(s):  Tracey    BASELINE (I)ADL(s) (prior to admission):  [place]: [ ]Total  [ ] Moderate [ ]Dependent  Palliative Performance Status Version 2:         %    http://npcrc.org/files/news/palliative_performance_scale_[email or web address].pdf    Allergies    No Known Allergies    Intolerances    MEDICATIONS  (STANDING):  acetaminophen     Tablet .. 650 milliGRAM(s) Oral every 6 hours  bacitracin   Ointment 1 Application(s) Topical two times a day  calcium acetate 1334 milliGRAM(s) Oral every 8 hours  chlorhexidine 0.12% Liquid 15 milliLiter(s) Oral Mucosa every 12 hours  chlorhexidine 2% Cloths 1 Application(s) Topical <User Schedule>  dexMEDEtomidine Infusion 0.2 MICROgram(s)/kG/Hr (3.82 mL/Hr) IV Continuous <Continuous>  ferrous    sulfate 325 milliGRAM(s) Oral daily  gabapentin 200 milliGRAM(s) Oral every 8 hours  heparin   Injectable 5000 Unit(s) SubCutaneous every 8 hours  levoFLOXacin IVPB 500 milliGRAM(s) IV Intermittent every 48 hours  meropenem  IVPB 500 milliGRAM(s) IV Intermittent every 12 hours  metoprolol tartrate Injectable 2.5 milliGRAM(s) IV Push every 6 hours  pantoprazole  Injectable 40 milliGRAM(s) IV Push every 12 hours  phenylephrine    Infusion 0.1 MICROgram(s)/kG/Min (1.43 mL/Hr) IV Continuous <Continuous>  sodium bicarbonate 1300 milliGRAM(s) Oral three times a day  sodium chloride 0.9%. 1000 milliLiter(s) (75 mL/Hr) IV Continuous <Continuous>    MEDICATIONS  (PRN):  fentaNYL    Injectable 25 MICROGram(s) IV Push every 3 hours PRN Moderate Pain (4 - 6)  sodium chloride 0.9% lock flush 10 milliLiter(s) IV Push every 1 hour PRN Pre/post blood products, medications, blood draw, and to maintain line patency  sodium chloride 0.9% lock flush 10 milliLiter(s) IV Push every 1 hour PRN Pre/post blood products, medications, blood draw, and to maintain line patency          PRESENT SYMPTOMS: [x]Unable to obtain due to poor mentation   Source if other than patient:  [ ]Family   [ ]Team     Pain: [ ]yes [ ]no  QOL impact -   Location -                    Aggravating factors -  Quality -  Radiation -  Timing-  Severity (0-10 scale):  Minimal acceptable level (0-10 scale):     CPOT:  0  https://www.sccm.org/getattachment/[email or web address]-[email or web address]/Critical-Care-Pain-Observation-Tool-(CPOT)    PAIN AD Score:   [email or web address] (press ctrl +  left click to view)    Dyspnea:                           [ ]None[ ]Mild [ ]Moderate [ ]Severe     Respiratory Distress Observation Scale (RDOS): 0  A score of 0 to 2 signifies little or no respiratory distress, 3 signifies mild distress, scores 4 to 6 indicate moderate distress, and scores greater than 7 signify severe distress  [email or web address]/[email or web address]-heglfkqnmrb-ifchysta-[email or web address]-xcmtp.[email or web address]    Anxiety:                             [ ]None[ ]Mild [ ]Moderate [ ]Severe   Fatigue:                             [ ]None[ ]Mild [ ]Moderate [ ]Severe   Nausea:                             [ ]None[ ]Mild [ ]Moderate [ ]Severe   Loss of appetite:              [ ]None[ ]Mild [ ]Moderate [ ]Severe   Constipation:                    [ ]None[ ]Mild [ ]Moderate [ ]Severe    Other Symptoms:  [ ]All other review of systems negative     Palliative Performance Status Version 2:        10 %    [email or web address]_performance_scale_[email or web address].pdf    PHYSICAL EXAM:    ICU Vital Signs Last 24 Hrs  T(C): 37.3 (30 Jun 2023 04:00), Max: 37.7 (29 Jun 2023 20:00)  T(F): 99.1 (30 Jun 2023 04:00), Max: 99.9 (29 Jun 2023 20:00)  HR: 81 (30 Jun 2023 11:00) (75 - 91)  BP: 119/85 (30 Jun 2023 11:00) (95/70 - 145/78)  BP(mean): 99 (30 Jun 2023 11:00) (79 - 107)  ABP: 65/65 (30 Jun 2023 09:00) (40/33 - 121/62)  ABP(mean): 65 (30 Jun 2023 09:00) (37 - 101)  RR: 3 (30 Jun 2023 11:00) (3 - 22)  SpO2: 100% (30 Jun 2023 11:00) (96% - 100%)    O2 Parameters below as of 30 Jun 2023 11:00  Patient On (Oxygen Delivery Method): ventilator    O2 Concentration (%): 40          GENERAL:  [ ] No acute distress [ ]Lethargic  [ x]Unarousable  [ ]Verbal  [ ]Non-Verbal [ ]Cachexia    BEHAVIORAL/PSYCH:  [ ]Alert and Oriented x  [ ] Anxiety [ ] Delirium [ ] Agitation [ x] Calm   EYES: [ ] No scleral icterus [ ] Scleral icterus [x ] Closed  ENMT:  [ ]Dry mouth  [x ]No external oral lesions [ ] No external ear or nose lesions  CARDIOVASCULAR:  [ ]Regular [ ]Irregular [ ]Tachy [x ]Not Tachy  [ ]Ben [ ] Edema [ ] No edema  PULMONARY:  [ ]Tachypnea  [ ]Audible excessive secretions [x ] No labored breathing [ ] labored breathing  GASTROINTESTINAL: [ ]Soft  [ ]Distended  [x ]Not distended [ ]Non tender [ ]Tender  MUSCULOSKELETAL: [ ]No clubbing [ ] clubbing  [x ] No cyanosis [ ] cyanosis  NEUROLOGIC: [ ]No focal deficits  [ ]Follows commands  [x ]Does not follow commands  [ ]Cognitive impairment  [ ]Dysphagia  [ ]Dysarthria  [ ]Paresis   SKIN: [ ] Jaundiced [x ] Non-jaundiced [ ]Rash [ ]No Rash [ ] Warm [ ] Dry  MISC/LINES: [x ] ET tube [ ] Trach [ ]NGT/OGT [ ]PEG [ ]Mena    CRITICAL CARE:  [x ] Shock Present  [ ]Septic [ ]Cardiogenic [ ]Neurologic [ ]Hypovolemic  [ ]  Vasopressors [ ]  Inotropes   [x ]Respiratory failure present [ x]Mechanical ventilation [ ]Non-invasive ventilatory support [ ]High flow  [ ]Acute  [ ]Chronic [ ]Hypoxic  [ ]Hypercarbic [ ]Other  [ ]Other organ failure     LABS: reviewed by me                                   10.6   33.43 )-----------( 196      ( 30 Jun 2023 10:01 )             31.3   06-29    134<L>  |  97<L>  |  125<HH>  ----------------------------<  111<H>  4.5   |  19  |  4.0<H>    Ca    7.0<L>      29 Jun 2023 20:15  Phos  7.5     06-29  Mg     2.0     06-29    TPro  3.8<L>  /  Alb  1.8<L>  /  TBili  0.5  /  DBili  0.3  /  AST  20  /  ALT  9   /  AlkPhos  113  06-28                    RADIOLOGY & ADDITIONAL STUDIES: reviewed by me    < from: Xray Chest 1 View- PORTABLE-Routine (06.28.23 @ 05:40) >  FINDINGS/  IMPRESSION:    NG tube terminating in the left upper quadrant. Unchanged left IJ venous   catheter.    Bibasal opacities without significant change accounting for differences   in positioning. No pneumothorax.    Stable cardiomediastinal silhouette.    Unchanged osseous structures.    < end of copied text >      EKG: reviewed by me  < from: 12 Lead ECG (06.27.23 @ 22:17) >  Ventricular Rate 96 BPM    Atrial Rate 97 BPM    QRS Duration 78 ms    Q-T Interval 290 ms    QTC Calculation(Bazett) 366 ms    R Axis 28 degrees    T Axis 227 degrees    Diagnosis Line Atrial fibrillation  Low voltage QRS  Septal infarct , age undetermined  Abnormal ECG    < end of copied text >      PROTEIN CALORIE MALNUTRITION PRESENT: [ ]mild [ ]moderate [ ]severe [ ]underweight [ ]morbid obesity  https://www.andeal.org/vault/2440/web/files/ONC/Table_Clinical%20Characteristics%20to%20Document%20Malnutrition-White%20JV%20et%20al%395831.pdf    Height (cm): 180 (06-22-23 @ 21:00), 180.3 (05-11-23 @ 08:58)  Weight (kg): 76.476 (06-22-23 @ 21:00), 66.2 (05-10-23 @ 10:54)  BMI (kg/m2): 23.6 (06-22-23 @ 21:00), 20.4 (05-11-23 @ 08:58)    [ ]PPSV2 < or = to 30% [ ]significant weight loss  [ ]poor nutritional intake  [ ]anasarca      [ ]Artificial Nutrition      REFERRALS:   [ ]Chaplaincy  [ ]Hospice  [ ]Child Life  [ x]Social Work  [ ]Case management [ ]Holistic Therapy     Patient discussed with primary medical team MD  Palliative care education provided to patient and/or family  Patient and/or family assessed for spiritual and social needs    Goals of Care Document:

## 2023-06-30 NOTE — PROGRESS NOTE ADULT - SUBJECTIVE AND OBJECTIVE BOX
Nephrology progress note    Patient is seen and examined, events over the last 24 h noted .  NPO for sx today  On Josh, Precedex, NS 75   Allergies:  No Known Allergies    Hospital Medications:   MEDICATIONS  (STANDING):  acetaminophen     Tablet .. 650 milliGRAM(s) Oral every 6 hours  bacitracin   Ointment 1 Application(s) Topical two times a day  calcium acetate 1334 milliGRAM(s) Oral every 8 hours  chlorhexidine 0.12% Liquid 15 milliLiter(s) Oral Mucosa every 12 hours  chlorhexidine 2% Cloths 1 Application(s) Topical <User Schedule>  dexMEDEtomidine Infusion 0.2 MICROgram(s)/kG/Hr (3.82 mL/Hr) IV Continuous <Continuous>  ferrous    sulfate 325 milliGRAM(s) Oral daily  gabapentin 200 milliGRAM(s) Oral every 8 hours  heparin   Injectable 5000 Unit(s) SubCutaneous every 8 hours  levoFLOXacin IVPB 500 milliGRAM(s) IV Intermittent every 48 hours  meropenem  IVPB 500 milliGRAM(s) IV Intermittent every 12 hours  metoprolol tartrate Injectable 2.5 milliGRAM(s) IV Push every 6 hours  pantoprazole  Injectable 40 milliGRAM(s) IV Push every 12 hours  phenylephrine    Infusion 0.1 MICROgram(s)/kG/Min (1.43 mL/Hr) IV Continuous <Continuous>  sodium bicarbonate 1300 milliGRAM(s) Oral three times a day  sodium chloride 0.9%. 1000 milliLiter(s) (75 mL/Hr) IV Continuous <Continuous>        VITALS:  T(F): 99.1 (06-30-23 @ 04:00), Max: 99.9 (06-29-23 @ 20:00)  HR: 79 (06-30-23 @ 15:33)  BP: 111/77 (06-30-23 @ 15:33)  RR: 18 (06-30-23 @ 15:33)  SpO2: 99% (06-30-23 @ 15:33)  Wt(kg): --    06-28 @ 07:01  -  06-29 @ 07:00  --------------------------------------------------------  IN: 3108.1 mL / OUT: 1215 mL / NET: 1893.1 mL    06-29 @ 07:01  -  06-30 @ 07:00  --------------------------------------------------------  IN: 3433.6 mL / OUT: 611 mL / NET: 2822.6 mL    06-30 @ 07:01  -  06-30 @ 15:46  --------------------------------------------------------  IN: 399.2 mL / OUT: 167 mL / NET: 232.2 mL      Height (cm): 180 (06-30 @ 15:33)  Weight (kg): 76.5 (06-30 @ 15:33)  BMI (kg/m2): 23.6 (06-30 @ 15:33)  BSA (m2): 1.96 (06-30 @ 15:33)    PHYSICAL EXAM:  Constitutional: NAD  Respiratory: CTA  Cardiovascular: S1, S2, RRR  Gastrointestinal: BS+, soft, NT/ND  Extremities: + peripheral edema  Neurological: sedated  : N+ bobby.   Vascular Access:    LABS:  06-29    134<L>  |  97<L>  |  125<HH>  ----------------------------<  111<H>  4.5   |  19  |  4.0<H>    Ca    7.0<L>      29 Jun 2023 20:15  Phos  7.5     06-29  Mg     2.0     06-29    TPro  3.8<L>  /  Alb  1.8<L>  /  TBili  0.5  /  DBili  0.3  /  AST  20  /  ALT  9   /  AlkPhos  113  06-28                          10.6   33.43 )-----------( 196      ( 30 Jun 2023 10:01 )             31.3       Urine Studies:  Urinalysis Basic - ( 29 Jun 2023 20:15 )    Color:  / Appearance:  / SG:  / pH:   Gluc: 111 mg/dL / Ketone:   / Bili:  / Urobili:    Blood:  / Protein:  / Nitrite:    Leuk Esterase:  / RBC:  / WBC    Sq Epi:  / Non Sq Epi:  / Bacteria:       Sodium, Random Urine: <20.0 mmoL/L (06-29 @ 20:15)  Creatinine, Random Urine: 57 mg/dL (06-29 @ 20:15)  Creatinine, Random Urine: 59 mg/dL (06-29 @ 16:48)  Sodium, Random Urine: <20.0 mmoL/L (06-29 @ 16:48)    RADIOLOGY & ADDITIONAL STUDIES:

## 2023-06-30 NOTE — PROGRESS NOTE ADULT - ASSESSMENT
Assessment & Plan:  67M w/ PMH of HTN, grade IV hydronephrosis of L kidney s/p L PCN (placed 5/2023), stutter, hiatal hernia, iron deficiency anemia, H Pylori (untreated), and gastric mass (never biopsied) presents to the ED with at least 4 days of worsening weakness, abdominal distension, and no output from his PCN. Admitted with emphysematous pyelonephritis     (6/18): s/p LT PCN upsizing to 16Fr and 16Fr RLQ drain placement with IR   (6/22): s/p shock lithotripsy of L kidney abscess cavity with drainage of thick contents, upsize of drain to 26Fr bobby catheter    NEURO:  #Pain control    -APAP PRN    -Gabapentin 200 q 8     -fent 25 q 3 PRN while intubated  #Sedation    -precedex gtt @     RESP:  #acute respiratory failure with left lung mucous plugging (intubated 6/24)    - LL @ 23, 7.5Fr    - 6/24 s/p bronchoscopy secondary to left maintstem mucus plug    - Vent settings: RR (machine): 18, TV (machine): 400, FiO2: 40, PEEP: 8    #Pulmonary nodules / Mediastinal lymphadenopathy    - CT Chest: Right middle and upper lobe solid pulmonary nodules, f/u outpatient pulm   #Activity   - increased as tolerated    CARDS:   #Septic shock    - Phenylephrine infusion- currently at 0.4 mcg/kg/min  #New onset Afib w/ RVR with hypotension -- likely 2/2 sepsis    - Metoprolol 2.5 q6 IV  -Digoxin 250mcg q6 load (x4 doses)- follow level in am     - Digoxin level 2.8    - rate better controlled    - Cardiology following     #H/O HTN    -Amlodipine 2.5mg HOLDING   Imaging    - EKG (6/2y): Afib w/     - ECHO: (6/19) EF 55-60%, mild AS, mild LAE, mild MR/TR, trivial pericardial effusion    GI/NUTR:  #Diet    NPO except meds; Trickle tube feeds (Nepro @ 20cc/hr) held in anticipation for OR   #bowel movements    - 2 bloody BM's noted overnight 6/25  - GI consult 6/26- Plan for EGD with EUS + FNA as outpatient, H-pylori treatment after resolution of acute issues   -dignishield in place   -FOBT positive    #GI PPX  -IV Protonix 40 q12 hours  #Gastric mass vs gastrohepatic lymphadenopathy    -CT A/P: lesser curvature mass , Hepatic hypodensities  #Splenic Hypodensities, infarct vs phlegmon/abscess    -CT A/P:  Wedge-shaped region of hypodensity within the spleen    /RENAL:   #Grade IV L hydronephrosis s/p L PCN (5/2023) -- presented to ED with thick dark foul smelling output  #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  -intermittent suction/irrigation per urology; f/u cx and cytology from 6/25     -(6/23) s/p LT PCN upsizing to 26Fr catheter, RLQ 16 Fr drain remains, s/p nephrostolithotomy w/ lithotripsy    -(6/18) s/p LT PCN upsizing to 16Fr and 16Fr RLQ drain placement with IR, flush q8 hr    #Return to OR on Friday 6/30    High suspicion of renal malignancy given recent pathology  #volume overload  #ROJAS (baseline Cr 1.0)    - Cr 3.8--> 4.2 --> 4.0-> 3.9-->4.0  #metabolic acidosis  -Nephrology c/s - no indication for RRT as of 6/27  - PO sodium bicarb 1300mg q8  - s/p 24hr of sodium bicarbonate infusion 6/26  #Hyperkalemia     -now resolved  #Hyperphosphatemia    -Phoslo 1334mg q8  Labs:          BUN/Cr- 121/3.9  -->,  125/4.0  -->          Electrolytes-Na 134 // K 4.5 // Mg 2.0 //  Phos 7.5 (06-29 @ 20:15)  - IV maintenance fluids: NS @ 75 cc/hr    HEME/ONC:   #new onset Afib    - holding heparin infusion in setting of hematochezia     Labs: DVT propylaxis-heparin   Injectable  Hb/Hct:  8.3/24.8  -->,  8.3/25.4  -->,  8.2/25.3  -->  Plts:  226  -->,  284  -->,  259  -->    PTT/INR: 28.9/1.49 (06-29)  T&S:ABO RH Interpretation:  (06-29)    #H/o Fe Deficiency anemia    - Iron supplementation  #DVT prophylaxis  -SCDs, heparin subQ   T&S Expires 7/2  Blood Consent- in chart     ID:  #Leukocytosis 2/2 sepsis   WBC- WBC- 44.28  --->>,  48.22  --->>,  42.21  --->>  Temp trend- 24hrs T(F): 99.1 (06-30 @ 04:00), Max: 99.9 (06-29 @ 20:00)  #antibiotics    -Levaquin 500mg q48 (6/21-    -Meropenem 1g q12 (6/20-    -As per ID: no need for Vancomycin or Daptomycin in the absence of MRSA    -discontinued caspofungin (as per ID, since fungitell is unremarkable)  #Cultures  L PCN cyto-pathology  6/25: results suggestive of a necrotic neoplasm    Funtitell 6/26: 47    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    BAL 6/24: moderate yeast     UCx 6/19: negative FINAL      Left PCN aspiration 6/18: stenotrophomonas maltophilia  Needs source control    ENDO:  #Glucose monitoring    -A1c (5/7/23): 5.0    -POCT q6 hours while NPO   #Adrenal Insufficiency 2/2 to sepsis     -completed solucortef taper     -Cortisol level - binding globulin 1.1 (low), serum cortisol, 84, free cortisol 76, % free cortisol 91    MSK:  #wound care  Sacrococcygeal DTI: wound care following- cleanse with soap and water, apply triad, gauze, and allevyn BID and PRN for soiling   Venous ulcer right lower leg, R forearm skin tear: Pat dry, apply Xeroform, nonadherent dressing, wrap with Kerlix twice a day, prn for soiling  #Activity    -Advance as tolerated    -PT/rehab once extubated  Globally deconditioned    LINES/DRAINS:   PIV  Bobby (6/18)  RLQ 16Fr Javier Drain (6/18),   LT PCN 26Fr (6/18)   Left basilic midline  6/21  Right Radial [place] 6/22  L radial [place] (6/18-6/22)  L IJ TLC (6/26)  ETT  OGT    ADVANCED DIRECTIVES:  Full Code  HCP/Emergency Contact- Tracey Adames: [phone]   Palliative following. Last GOC discussion 6/26 day.    INDICATION FOR SICU: New onset atrial fibrillation w/ mechanical ventilation    DISPO:     SICU Assessment & Plan:  67M w/ PMH of HTN, grade IV hydronephrosis of L kidney s/p L PCN (placed 5/2023), stutter, hiatal hernia, iron deficiency anemia, H Pylori (untreated), and gastric mass (never biopsied) presents to the ED with at least 4 days of worsening weakness, abdominal distension, and no output from his PCN. Admitted with emphysematous pyelonephritis     (6/18): s/p LT PCN upsizing to 16Fr and 16Fr RLQ drain placement with IR   (6/22): s/p shock lithotripsy of L kidney abscess cavity with drainage of thick contents, upsize of drain to 26Fr bobby catheter    NEURO:  #Pain control    -APAP PRN    -Gabapentin 200 q 8     -fent 25 q 3 PRN while intubated  #Sedation    -precedex gtt @     RESP:  #acute respiratory failure with left lung mucous plugging (intubated 6/24)    - LL @ 23, 7.5Fr    - 6/24 s/p bronchoscopy secondary to left maintstem mucus plug    - Vent settings: RR (machine): 18, TV (machine): 400, FiO2: 40, PEEP: 8    #Pulmonary nodules / Mediastinal lymphadenopathy    - CT Chest: Right middle and upper lobe solid pulmonary nodules, f/u outpatient pulm   #Activity   - increased as tolerated    CARDS:   #Septic shock    - Phenylephrine infusion- currently at 0.4 mcg/kg/min  #New onset Afib w/ RVR with hypotension -- likely 2/2 sepsis    - Metoprolol 2.5 q6 IV  -Digoxin 250mcg q6 load (x4 doses)    - Digoxin level 2.8    - rate better controlled    - Cardiology following     #H/O HTN    -Amlodipine 2.5mg HOLDING   Imaging    - ECHO: (6/19) EF 55-60%, mild AS, mild LAE, mild MR/TR, trivial pericardial effusion    GI/NUTR:  #Diet    NPO except meds; Trickle tube feeds (Nepro @ 20cc/hr) held in anticipation for OR   #bowel movements    - 2 bloody BM's noted overnight 6/25  - GI consult 6/26- Plan for EGD with EUS + FNA as outpatient, H-pylori treatment after resolution of acute issues   -dignishield in place   -FOBT positive    #GI PPX  -IV Protonix 40 q12 hours  #Gastric mass vs gastrohepatic lymphadenopathy    -CT A/P: lesser curvature mass , Hepatic hypodensities  #Splenic Hypodensities, infarct vs phlegmon/abscess    -CT A/P:  Wedge-shaped region of hypodensity within the spleen    /RENAL:   #Grade IV L hydronephrosis s/p L PCN (5/2023) -- presented to ED with thick dark foul smelling output  #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  -intermittent suction/irrigation per urology; f/u cx and cytology from 6/25     -(6/23) s/p LT PCN upsizing to 26Fr catheter, RLQ 16 Fr drain remains, s/p nephrostolithotomy w/ lithotripsy    -(6/18) s/p LT PCN upsizing to 16Fr and 16Fr RLQ drain placement with IR, flush q8 hr    #Return to OR on Friday 6/30    High suspicion of renal malignancy given recent pathology  #volume overload  #ROJAS (baseline Cr 1.0)    - Cr 3.8--> 4.2 --> 4.0-> 3.9-->4.0  #metabolic acidosis  -Nephrology c/s - no indication for RRT as of 6/27  - PO sodium bicarb 1300mg q8  - s/p 24hr of sodium bicarbonate infusion 6/26  #Hyperkalemia     -now resolved  #Hyperphosphatemia    -Phoslo 1334mg q8  Labs:          BUN/Cr- 121/3.9  -->,  125/4.0  -->          Electrolytes-Na 134 // K 4.5 // Mg 2.0 //  Phos 7.5 (06-29 @ 20:15)  - IV maintenance fluids: NS @ 75 cc/hr    HEME/ONC:   #new onset Afib    - holding heparin infusion in setting of hematochezia     Labs: DVT propylaxis-heparin   Injectable  Hb/Hct:  8.3/24.8  -->,  8.3/25.4  -->,  8.2/25.3  -->  Plts:  226  -->,  284  -->,  259  -->    PTT/INR: 28.9/1.49 (06-29)  T&S:ABO RH Interpretation:  (06-29)    #H/o Fe Deficiency anemia    - Iron supplementation  #DVT prophylaxis  -SCDs, heparin subQ   T&S Expires 7/2  Blood Consent- in chart     ID:  #Leukocytosis 2/2 sepsis   WBC- WBC- 44.28  --->>,  48.22  --->>,  42.21  --->>  Temp trend- 24hrs T(F): 99.1 (06-30 @ 04:00), Max: 99.9 (06-29 @ 20:00)  #antibiotics    -Levaquin 500mg q48 (6/21-    -Meropenem 500q12    -As per ID: no need for Vancomycin or Daptomycin in the absence of MRSA    -discontinued caspofungin (as per ID, since fungitell is unremarkable)  #Cultures  L PCN cyto-pathology  6/25: results suggestive of a necrotic neoplasm    Funtitell 6/26: 47    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    BAL 6/24: moderate yeast     UCx 6/19: negative FINAL      Left PCN aspiration 6/18: stenotrophomonas maltophilia  Needs source control    ENDO:  #Glucose monitoring    -A1c (5/7/23): 5.0    -POCT q6 hours while NPO   #Adrenal Insufficiency 2/2 to sepsis     -completed solucortef taper     -Cortisol level - binding globulin 1.1 (low), serum cortisol, 84, free cortisol 76, % free cortisol 91    MSK:  #wound care  Sacrococcygeal DTI: wound care following- cleanse with soap and water, apply triad, gauze, and allevyn BID and PRN for soiling   Venous ulcer right lower leg, R forearm skin tear: Pat dry, apply Xeroform, nonadherent dressing, wrap with Kerlix twice a day, prn for soiling  #Activity    -Advance as tolerated    -PT/rehab once extubated  Globally deconditioned    LINES/DRAINS:   PIV  Bobby (6/18)  RLQ 16Fr Javier Drain (6/18),   LT PCN 26Fr (6/18)   Left basilic midline  6/21  Right Radial [place] 6/22  L radial [place] (6/18-6/22)  L IJ TLC (6/26)  ETT  OGT    ADVANCED DIRECTIVES:  Full Code  HCP/Emergency Contact- Tracey Adames: [phone]   Palliative following. Last GOC discussion 6/26 day.    INDICATION FOR SICU: New onset atrial fibrillation w/ mechanical ventilation    DISPO:     SICU Assessment & Plan:  67M w/ PMH of HTN, grade IV hydronephrosis of L kidney s/p L PCN (placed 5/2023), stutter, hiatal hernia, iron deficiency anemia, H Pylori (untreated), and gastric mass (never biopsied) presents to the ED with at least 4 days of worsening weakness, abdominal distension, and no output from his PCN. Admitted with emphysematous pyelonephritis     (6/18): s/p LT PCN upsizing to 16Fr and 16Fr RLQ drain placement with IR   (6/22): s/p shock lithotripsy of L kidney abscess cavity with drainage of thick contents, upsize of drain to 26Fr bobby catheter    NEURO:  #Pain control    -APAP PRN    -Gabapentin 200 q 8     -fent 25 q 3 PRN while intubated  #Sedation    -precedex gtt     RESP:  #acute respiratory failure with left lung mucous plugging (intubated 6/24)    - LL @ 23, 7.5Fr    - 6/24 s/p bronchoscopy secondary to left maintstem mucus plug    - Vent settings: RR (machine): 18, TV (machine): 400, FiO2: 40, PEEP: 8    #Pulmonary nodules / Mediastinal lymphadenopathy    - CT Chest: Right middle and upper lobe solid pulmonary nodules, f/u outpatient pulm   #Activity   - increased as tolerated    CARDS:   #Septic shock    - Phenylephrine infusion- currently at 0.4 mcg/kg/min  #New onset Afib w/ RVR with hypotension -- likely 2/2 sepsis    - Metoprolol 2.5 q6 IV  -Digoxin 250mcg q6 load (x4 doses)    - Digoxin level 2.8    - rate better controlled    - Cardiology following     #H/O HTN    -Amlodipine 2.5mg HOLDING   Imaging    - ECHO: (6/19) EF 55-60%, mild AS, mild LAE, mild MR/TR, trivial pericardial effusion    GI/NUTR:  #Diet    NPO except meds; Trickle tube feeds (Nepro @ 20cc/hr) held in anticipation for OR   #bowel movements    - 2 bloody BM's noted overnight 6/25  - GI consult 6/26- Plan for EGD with EUS + FNA as outpatient, H-pylori treatment after resolution of acute issues   -dignishield in place   -FOBT positive    #GI PPX  -IV Protonix 40 q12 hours  #Gastric mass vs gastrohepatic lymphadenopathy    -CT A/P: lesser curvature mass , Hepatic hypodensities  #Splenic Hypodensities, infarct vs phlegmon/abscess    -CT A/P:  Wedge-shaped region of hypodensity within the spleen    /RENAL:   #Grade IV L hydronephrosis s/p L PCN (5/2023) -- presented to ED with thick dark foul smelling output  #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  -intermittent suction/irrigation per urology; f/u cx and cytology from 6/25     -(6/23) s/p LT PCN upsizing to 26Fr catheter, RLQ 16 Fr drain remains, s/p nephrostolithotomy w/ lithotripsy    -(6/18) s/p LT PCN upsizing to 16Fr and 16Fr RLQ drain placement with IR, flush q8 hr    #Return to OR on Friday 6/30    High suspicion of renal malignancy given recent pathology  #volume overload  #ROJAS (baseline Cr 1.0)    - Cr 3.8--> 4.2 --> 4.0-> 3.9-->4.0  #metabolic acidosis  -Nephrology c/s - no indication for RRT as of 6/27  - PO sodium bicarb 1300mg q8  - s/p 24hr of sodium bicarbonate infusion 6/26  #Hyperkalemia     -now resolved  #Hyperphosphatemia    -Phoslo 1334mg q8  Labs:          BUN/Cr- 121/3.9  -->,  125/4.0  -->          Electrolytes-Na 134 // K 4.5 // Mg 2.0 //  Phos 7.5 (06-29 @ 20:15)  - IV maintenance fluids: NS @ 75 cc/hr    HEME/ONC:   #new onset Afib    - holding heparin infusion in setting of hematochezia     Labs: DVT propylaxis-heparin   Injectable  Hb/Hct:  8.3/24.8  -->,  8.3/25.4  -->,  8.2/25.3  -->  Plts:  226  -->,  284  -->,  259  -->    PTT/INR: 28.9/1.49 (06-29)  T&S:ABO RH Interpretation:  (06-29)    #H/o Fe Deficiency anemia    - Iron supplementation  #DVT prophylaxis  -SCDs, heparin subQ   T&S Expires 7/2  Blood Consent- in chart     ID:  #Leukocytosis 2/2 sepsis   WBC- WBC- 44.28  --->>,  48.22  --->>,  42.21  --->>  Temp trend- 24hrs T(F): 99.1 (06-30 @ 04:00), Max: 99.9 (06-29 @ 20:00)  #antibiotics    -Levaquin 500mg q48 (6/21-    -Meropenem 500q12    -As per ID: no need for Vancomycin or Daptomycin in the absence of MRSA    -discontinued caspofungin (as per ID, since fungitell is unremarkable)  #Cultures  L PCN cyto-pathology  6/25: results suggestive of a necrotic neoplasm    Funtitell 6/26: 47    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    BAL 6/24: moderate yeast     UCx 6/19: negative FINAL      Left PCN aspiration 6/18: stenotrophomonas maltophilia  Needs source control    ENDO:  #Glucose monitoring    -A1c (5/7/23): 5.0    -POCT q6 hours while NPO   #Adrenal Insufficiency 2/2 to sepsis     -completed solucortef taper     -Cortisol level - binding globulin 1.1 (low), serum cortisol, 84, free cortisol 76, % free cortisol 91    MSK:  #wound care  Sacrococcygeal DTI: wound care following- cleanse with soap and water, apply triad, gauze, and allevyn BID and PRN for soiling   Venous ulcer right lower leg, R forearm skin tear: Pat dry, apply Xeroform, nonadherent dressing, wrap with Kerlix twice a day, prn for soiling  #Activity    -Advance as tolerated    -PT/rehab once extubated  Globally deconditioned    LINES/DRAINS:   PIV  Bobby (6/18)  RLQ 16Fr Javier Drain (6/18),   LT PCN 26Fr (6/18)   Left basilic midline  6/21  Right Radial [place] 6/22  L radial [place] (6/18-6/22)  L IJ TLC (6/26)  ETT  OGT    ADVANCED DIRECTIVES:  Full Code  HCP/Emergency Contact- Tracey Adames: [phone]   Palliative following. Last GOC discussion 6/26 day.    INDICATION FOR SICU: New onset atrial fibrillation w/ mechanical ventilation    DISPO:     SICU

## 2023-06-30 NOTE — PROGRESS NOTE ADULT - SUBJECTIVE AND OBJECTIVE BOX
UROLOGY DAILY PROGRESS NOTE    Pt is a 67y Male admitted with retroperitoneal and intraabdominal abscess s/p IR placement of drain and LEFT PCN 6/18, s/p percutaneous drainage of LEFT renal abscess POD #8 with left 26fr flank catheter in place draining foul-smelling serosangunious fluid with ?necrotic material. Patient seen and examined at bedside earlier this morning. Overnight, pt received 2uPRBCs > Hgb 10.6 this AM. Remains intubated on phenylephrine.    MEDICATIONS  (STANDING):  acetaminophen     Tablet .. 650 milliGRAM(s) Oral every 6 hours  bacitracin   Ointment 1 Application(s) Topical two times a day  calcium acetate 1334 milliGRAM(s) Oral every 8 hours  chlorhexidine 0.12% Liquid 15 milliLiter(s) Oral Mucosa every 12 hours  chlorhexidine 2% Cloths 1 Application(s) Topical <User Schedule>  dexMEDEtomidine Infusion 0.2 MICROgram(s)/kG/Hr (3.82 mL/Hr) IV Continuous <Continuous>  ferrous    sulfate 325 milliGRAM(s) Oral daily  gabapentin 200 milliGRAM(s) Oral every 8 hours  heparin   Injectable 5000 Unit(s) SubCutaneous every 8 hours  levoFLOXacin IVPB 500 milliGRAM(s) IV Intermittent every 48 hours  meropenem  IVPB 500 milliGRAM(s) IV Intermittent every 12 hours  metoprolol tartrate Injectable 2.5 milliGRAM(s) IV Push every 6 hours  pantoprazole  Injectable 40 milliGRAM(s) IV Push every 12 hours  phenylephrine    Infusion 0.1 MICROgram(s)/kG/Min (1.43 mL/Hr) IV Continuous <Continuous>  sodium bicarbonate 1300 milliGRAM(s) Oral three times a day  sodium chloride 0.9%. 1000 milliLiter(s) (75 mL/Hr) IV Continuous <Continuous>    MEDICATIONS  (PRN):  fentaNYL    Injectable 25 MICROGram(s) IV Push every 3 hours PRN Moderate Pain (4 - 6)  sodium chloride 0.9% lock flush 10 milliLiter(s) IV Push every 1 hour PRN Pre/post blood products, medications, blood draw, and to maintain line patency  sodium chloride 0.9% lock flush 10 milliLiter(s) IV Push every 1 hour PRN Pre/post blood products, medications, blood draw, and to maintain line patency    REVIEW OF SYSTEMS   [ ] Due to altered mental status/intubation, subjective information were not able to be obtained from patient. History was obtained, to the extent possible, from review of the chart and collateral sources of information.    Vital Signs Last 24 Hrs  T(C): 37.3 (30 Jun 2023 04:00), Max: 37.7 (29 Jun 2023 20:00)  T(F): 99.1 (30 Jun 2023 04:00), Max: 99.9 (29 Jun 2023 20:00)  HR: 81 (30 Jun 2023 11:00) (75 - 91)  BP: 119/85 (30 Jun 2023 11:00) (95/70 - 145/78)  BP(mean): 99 (30 Jun 2023 11:00) (79 - 107)  RR: 3 (30 Jun 2023 11:00) (3 - 22)  SpO2: 100% (30 Jun 2023 11:00) (96% - 100%)    Parameters below as of 30 Jun 2023 11:00  Patient On (Oxygen Delivery Method): ventilator    O2 Concentration (%): 40    PHYSICAL EXAM:  GEN: Ill appearing male intubated, sedated.  SKIN: Non diaphoretic.  RESP: Vented.  CARDIO: +S1/S2  ABDO: Firm distended abdomen, right drain in place with scant amt of serous fluid.   BACK: L 26Fr catheter in place draining serosanguinous fluid mixed with thick ?necrotic material. Catheter aspirated at bedside, left draining well.  : +++penoscrotal edema. 16Fr bobby in place draining scant amt of clear yellow urine.  EXT: ++b/l upper and lower extremity edema.     I&O's Summary  29 Jun 2023 07:01  -  30 Jun 2023 07:00  --------------------------------------------------------  IN: 3433.6 mL / OUT: 611 mL / NET: 2822.6 mL    30 Jun 2023 07:01  -  30 Jun 2023 12:36  --------------------------------------------------------  IN: 399.2 mL / OUT: 167 mL / NET: 232.2 mL    LABS:             10.6   33.43 )-----------( 196      ( 30 Jun 2023 10:01 )             31.3     06-29  134<L>  |  97<L>  |  125<HH>  ----------------------------<  111<H>  4.5   |  19  |  4.0<H>    Ca    7.0<L>      29 Jun 2023 20:15  Phos  7.5     06-29  Mg     2.0     06-29    TPro  3.8<L>  /  Alb  1.8<L>  /  TBili  0.5  /  DBili  0.3  /  AST  20  /  ALT  9   /  AlkPhos  113  06-28    PT/INR - ( 29 Jun 2023 20:15 )   PT: 17.20 sec;   INR: 1.49 ratio    PTT - ( 29 Jun 2023 20:15 )  PTT:28.9 sec    Urinalysis Basic - ( 29 Jun 2023 20:15 )  Color: x / Appearance: x / SG: x / pH: x  Gluc: 111 mg/dL / Ketone: x  / Bili: x / Urobili: x   Blood: x / Protein: x / Nitrite: x   Leuk Esterase: x / RBC: x / WBC x   Sq Epi: x / Non Sq Epi: x / Bacteria: x    RADIOLOGY & ADDITIONAL STUDIES:  < from: Xray Chest 1 View- PORTABLE-Routine (Xray Chest 1 View- PORTABLE-Routine in AM.) (06.30.23 @ 07:30) >  Impression:    Bilateral opacifications and effusions. Support devices as described.    Follow-up as needed.  < end of copied text >

## 2023-06-30 NOTE — PROGRESS NOTE ADULT - ASSESSMENT
A/P 67M w/ PMH of HTN, grade IV hydronephrosis of L kidney s/p L PCN (placed 5/2023), stutter, hiatal hernia, iron deficiency anemia, H Pylori (untreated), and gastric mass (never biopsied) presents to the ED with at least 4 days of worsening weakness, abdominal distension, and no output from his PCN. Patient is now IR placement of drain and LEFT PCN 6/18 upsizing to 16Fr and 16Fr RLQ drain placement with IR and s/p shock lithotripsy of left kidney abscess cavity with drainage of thick contents, upsize of drain too 26 Fr Mena catheter. Patient now in critical condition in the SICU for hemodynamic monitoring for intrabdominal sepsis 2/2 pneumoperitoneum and emphysematous pyelonephritis.     Surgical Oncology consulted for plan for emergent nephrectomy tomorrow 06/30/23 with Urology team.     continue management per   OR today for emergent nephrectomy  surgery will follow

## 2023-06-30 NOTE — PRE-ANESTHESIA EVALUATION ADULT - NSANTHPEFT_GEN_ALL_CORE
Alert.  Cachectic.  IRR.  Tachy.  b/l coarse BSs. increased WOB. +accessory mm of resp  Abd firm, distended, appropriate ttp. +tympany. +rebound/-guarding. no fluid wave.
sedated, opens eyes spontaneously, responds with head nods, follows commmands  lungs clear  abdomen soft, tender left side  edematous extremities

## 2023-06-30 NOTE — PRE-ANESTHESIA EVALUATION ADULT - NSPROPOSEDPROCEDFT_GEN_ALL_CORE
exchange/upsize of L percutaneous nephrostomy tube
Percutaneous Drainage of Left Renal Abcess
Ex lap, left nephrectomy

## 2023-06-30 NOTE — CONSULT NOTE ADULT - SUBJECTIVE AND OBJECTIVE BOX
Vascular surgery was called in for intra-operative consult.  The left kidney and pelvic mass was already removed.  There was some oozing from RP raw surface, but no arterial or venous bleeding.  The left renal vein stump staple line was evaluated and it was hemostatic.  There is a necrotic lymph node behind the staple line, but is not violating the stability of staple line.  No arterial bleed is found  There is moderate amount of fibrin between bowel loops and in RP and pelvis. Surgical oncology and Urology team will continue with hemostasis.

## 2023-06-30 NOTE — PROGRESS NOTE ADULT - ASSESSMENT
67M w/ PMH of HTN, grade IV hydronephrosis of L kidney s/p L PCN (placed 5/2023), stutter, hiatal hernia, iron deficiency anemia, H Pylori (untreated), and gastric mass (never biopsied) presents to the ED with at least 4 days of worsening weakness, abdominal distension, and no output from his PCN  . Found to have septic shock, MSOF, lactic acidosis from left emphysematous hydronephrosis, pyelonephritis and possible rupture with pneumoperitoneum along with possible hepatic abscesses, splenic infarct and abdominal and mediastinal lymphadenopathies. he is s/p upsizing of left PCN tube by IR along with RLQ drain placed.     ROJAS/ Hyponatremia/ severe left hydro s/p upsizing of left PCN catheter/ emphysematous pyelo with septic shock/  ROJAS likely ATN iso septic shock  cr improved from peak now stable though not improving further  non-oliguric  Pt planned for LEFT radical nephrectomy and abdominal washout today   continue sodium bicarbonate po   BP noted / increase midodrine to 10 q 8   ph noted / on  binders    no indication for RRT as of now, will cont to follow, may need it soon  strict Is and OS

## 2023-06-30 NOTE — BRIEF OPERATIVE NOTE - NSICDXBRIEFPROCEDURE_GEN_ALL_CORE_FT
PROCEDURES:  Central venous catheter placement with ultrasound guidance 18-Jun-2023 23:28:12  Aure Prabhakar  Exploratory laparotomy 30-Jun-2023 21:35:05  Aure Prabhakar  Left nephrectomy 30-Jun-2023 21:35:11  Aure Prabhakar

## 2023-06-30 NOTE — BRIEF OPERATIVE NOTE - NSICDXBRIEFPOSTOP_GEN_ALL_CORE_FT
POST-OP DIAGNOSIS:  Renal cancer, left 30-Jun-2023 21:36:45  Aure Prabhakar  
POST-OP DIAGNOSIS:  Renal abscess, left 22-Jun-2023 23:29:32  Bernie Perdomo  Elevated white blood cell count 22-Jun-2023 23:29:42  Bernie Perdomo  Other specified sepsis 22-Jun-2023 23:29:55  Bernie Perdomo  Acute respiratory failure with hypoxia 22-Jun-2023 23:30:03  Bernie Perdomo

## 2023-06-30 NOTE — PROGRESS NOTE ADULT - ASSESSMENT
ASSESSMENT  68yo Male with pmh of hypertension, massive Grade IV hydronephrosis s/p left nephrostomy placed in May 2023 by [facility] presents to the ED with little to no urine output from LEFT nephrostomy from about a week. PCN  more recently drainage was changed to brown/purulent fluid. 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      IMPRESSION  #Reintubated, L lung white out, ruled out HAP    6/24 bronch CX NG,   Moderate Yeast- likely colonizer  #Septic shock on admission due to Emphysematous pyelonephritis with suspected perforation/ pneumoperitoneum with suspected liver abscesses & splenic infarct vs abscess    6/22 UCX  Few Finegoldia magna "Susceptibilities not performed"  Nephrostolithotomy, percutaneous, with lithotripsy, large stone 22-Jun-2023 23:27:09 left large thick abscess materia dimension of aspirate over 4 x 4 x 4 cm Bernie Perdomo  Change external ureteral stent 22-Jun-2023 23:28:03 left Bernie Perdomo  Nephrostogram 22-Jun-2023 23:28:31 left Bernie Perdomo. "thick gelatinous material that was difficult to suction out even with the shock pulse. I would alternate 2 prong to remove and break up / shock pulse to suck out. after an hour where I had no clear planes I elected to stop. irrigation through the 26 bobby took out a lot additional material  in ashlyn catch cup 4 x 4 x 4 cm lump of material trapped"    6/19 UCX NG; UA Blood: x / Protein: 300 mg/dL / Nitrite: Negative   Leuk Esterase: Large / RBC: 8 /HPF / WBC >720 /HPF   Sq Epi: x / Non Sq Epi: x / Bacteria: Moderate    6/18 L PCN     Few Stenotrophomonas maltophilia Levofloxacin: S 2    6/18 L PCN     Numerous Anaerobic Gram Positive Cocci Most closely resembling  Peptoniphilus species "Susceptibilities not performed"    6/18 L PCN   Numerous Gram positive cocci in pairs per oil power field    6/18 BCX NGTD     s/p 6/18  Left PCN upsized to 16F and 1200cc of very viscous tan colored fluid was aspirated. RLQ 16F drain was placed and 1200cc of tan colored fluid was aspirated (less viscous). A sample from each location was sent for culture.     Repeat CT 6/19 Since one day earlier,  No extraluminal contrast. Oral contrast was last seen in the terminal ileum.  Status post left nephrostomy tube placement. Left enlarged kidney with severe hydronephrosis/collection has decreased, now measuring 16 x 12 cm from 20 x 14 cm  Surgical Pathology Report:   Left renal abscess, possible parenchyma  Left renal abscess, possible parenchyma, percutaneous drainage:  -  Fragments of extensively necrotic tissue, cannot be fullycharacterized  Comment: The microscopic appearance of the necrotic tissue is concerning for a neoplasm with coagulative necrosis. Definitive diagnosis cannot be  made, due to lack of viable material.  Additional sections will be submitted.  Immunohistochemical studies willbe performed, in an attempt to characterise the necrotic tissue.  Based on the H&E appearance, an infectious process such as tuberculosis  appears less likely, but special stains for microorganisms (acid-fastbacilli and fungal) are in progress and will be reported separately.   (06.22.23 @ 22:12)  6/27 CT Study is overall very limited due to lack of intravenous contrast, beam   Willingham artifact.  Persistent bilateral pleural effusions with adjacent consolidations   likely reflecting compressive atelectasis.  Left kidney poorly defined with a heterogeneous collection with air and   fluid which has likely mildly diminished in size compared to the prior   examination but exact comparison limited.  Diffuse ascites again noted. Diffuse anasarca again noted. Persistent   pneumoperitoneum.  Poorly defined lesions within the liver adjacent to the falciform   ligament which may again reflect abscesses or masses, difficult to   evaluate due to the limitations described above.  < from: CT Abdomen and Pelvis w/ IV Cont (06.18.23 @ 15:15) >  1 ) Moderate pneumoperitoneum with partial diffusion throughout moderate   volume ascites and with associated intermittent peritoneal enhancement   and nodularity. Perforation and peritonitis may be related to underlying   emphysematous pyelonephritis (see below). Less likely sources of   perforation include ureteral/bladder disruption, and bowel perforation   which cannot be entirely excluded on the provided images.  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 The compressed residual renal   parenchyma is inseparable from the adjacent fascial/fatty layers   anterosuperiorly and direct rupture into the peritoneum is a possibility   (6355). The previously placed left-sided nephrostomy tube pigtail is   notedto be within the left renal collecting system.  3) New hepatic hypodensities measuring up to 2.6 cm, suspicious for   development of multiple focal abscesses.  4) Wedge-shaped region of hypodensity within the spleen may reflect   splenic infarct in the correct clinical setting. Developing   phlegmon/abscess cannot be excluded in the current clinical setting  5) Increased extensive retroperitoneal, portacaval and likely   gastrohepatic heterogeneously enhancing lymphadenopathy. Findings may be   reactive.  6) Mediastinal lymphadenopathy measuring up to 2.4 cm short axis.   Underlying etiology may be reactive, infectious/inflammatory, or   neoplastic. A short-term 3 month follow-up CT chest should be considered   for further evaluation.  7)Right middle and upper lobe solid pulmonary nodules which are not well   delineated due to respiratory motion but measure less than 6 mm.   attention on follow-up exam.  #Lactic acidosis  #Abx allergy: No Known Allergies  #Prolonged QTC Calculation(Bazett) 526 ms  #Hyponatremia  #AKICreatinine: 3.3 mg/dL (06.21.23 @ 21:28)  )crcl 26  Ht 180  Weight (kg): 76.5 (06-18-23 @ 21:27)    RECOMMENDATIONS  - Pathology  concerning for a neoplasm with coagulative necrosis, both 6/2023 & 5/2023  - WBC may represent leukemoid reaction as afebrile and on appropriate antibiotics targeting the culture data - downtrending   - Ar 500 milliGRAM(s) IV Intermittent every 12 hours   - Continue levaquin IV 500mg q48h IV   - Overall low suspicion for  TB as path concerning for malignancy and he is growing bacterial organisms that can be associated with pyelonephritis , worth ruling out. Urine AFB NEGATIVEative, check quantiferon gold. Would not recommend empiric TB treatment at this time as toxicities outweigh benefit   - Trend WBC   - OR today  - Urology following    If any questions, please send a message or call on [employer] Teams  Please continue to update ID with any pertinent new laboratory or radiographic findings  #[id number]

## 2023-06-30 NOTE — CHART NOTE - NSCHARTNOTEFT_GEN_A_CORE
Post Operative Note  Patient: CHRISTINE VILLAVICENCIO 67y (1955) Male   MRN: [id number]  Location: [address]  Visit: 06-18-23 Inpatient  Date: 06-30-23 @ 22:41      Subjective:   Nausea: yes no, Vomiting: yes no, Ambulating: yes no, Flatus: yes no  Pain Assessment: Patient is complaining of *** pain that is appropriate for post-operative course.     Objective:  Vitals: T(F): 95.9 (06-30-23 @ 22:00), Max: 99.5 (06-30-23 @ 00:00)  HR: 141 (06-30-23 @ 22:15)  BP: 56/30 (06-30-23 @ 22:00) (56/30 - 145/78)  RR: 18 (06-30-23 @ 22:15)  SpO2: 98% (06-30-23 @ 22:15)  Vent Settings: Mode: AC/ CMV (Assist Control/ Continuous Mandatory Ventilation), RR (machine): 18, TV (machine): 450, FiO2: 40, PEEP: 8, MAP: 12, PIP: 28    In:   06-29-23 @ 07:01  -  06-30-23 @ 07:00  --------------------------------------------------------  IN: 3433.6 mL    06-30-23 @ 07:01  -  06-30-23 @ 22:41  --------------------------------------------------------  IN: 998 mL      IV Fluids: calcium acetate 1334 milliGRAM(s) Oral every 8 hours  ferrous    sulfate 325 milliGRAM(s) Oral daily  sodium bicarbonate 1300 milliGRAM(s) Oral three times a day  sodium chloride 0.9%. 1000 milliLiter(s) (75 mL/Hr) IV Continuous <Continuous>      Out:   06-29-23 @ 07:01  -  06-30-23 @ 07:00  --------------------------------------------------------  OUT: 611 mL    06-30-23 @ 07:01  -  06-30-23 @ 22:41  --------------------------------------------------------  OUT: 245 mL      EBL:   06-29-23 @ 07:01  -  06-30-23 @ 07:00  --------------------------------------------------------  OUT: 0 mL      Voided Urine:   06-29-23 @ 07:01  -  06-30-23 @ 07:00  --------------------------------------------------------  OUT: 611 mL    06-30-23 @ 07:01  -  06-30-23 @ 22:41  --------------------------------------------------------  OUT: 245 mL      Mena Catheter: yes no   Drains:   VENICE:   06-29-23 @ 07:01  -  06-30-23 @ 07:00  --------------------------------------------------------  OUT: 202 mL    06-30-23 @ 07:01  -  06-30-23 @ 22:41  --------------------------------------------------------  OUT: 89 mL     ,   Chest Tube:      NG Tube:       Physical Examination:  General Appearance Intubated and sedated on precedex. PEERLA. +cough  HEENT: EOMI, sclera non-icteric.  Heart: RRR  Lungs: CTABL  Abdomen:  Soft, *** tender, appropriate for post-op course, nondistended. No rigidity, guarding, or rebound tenderness.   MSK/Extremities: Warm & well-perfused. Peripheral pulses intact.  Skin: Warm, dry. No jaundice.   Incisions/Wounds: Dressings in place, clean, dry and intact, no signs of infection/active bleeding/drainage    Medications: [Standing]  acetaminophen     Tablet .. 650 milliGRAM(s) Oral every 6 hours  bacitracin   Ointment 1 Application(s) Topical two times a day  calcium acetate 1334 milliGRAM(s) Oral every 8 hours  chlorhexidine 0.12% Liquid 15 milliLiter(s) Oral Mucosa every 12 hours  chlorhexidine 2% Cloths 1 Application(s) Topical <User Schedule>  dexMEDEtomidine Infusion 0.2 MICROgram(s)/kG/Hr IV Continuous <Continuous>  fentaNYL    Injectable 25 MICROGram(s) IV Push every 3 hours PRN  ferrous    sulfate 325 milliGRAM(s) Oral daily  gabapentin 200 milliGRAM(s) Oral every 8 hours  heparin   Injectable 5000 Unit(s) SubCutaneous every 8 hours  levoFLOXacin IVPB 500 milliGRAM(s) IV Intermittent every 48 hours  meropenem  IVPB 500 milliGRAM(s) IV Intermittent every 12 hours  metoprolol tartrate Injectable 2.5 milliGRAM(s) IV Push every 6 hours  pantoprazole  Injectable 40 milliGRAM(s) IV Push every 12 hours  phenylephrine    Infusion 0.1 MICROgram(s)/kG/Min IV Continuous <Continuous>  sodium bicarbonate 1300 milliGRAM(s) Oral three times a day  sodium chloride 0.9% lock flush 10 milliLiter(s) IV Push every 1 hour PRN  sodium chloride 0.9% lock flush 10 milliLiter(s) IV Push every 1 hour PRN  sodium chloride 0.9%. 1000 milliLiter(s) IV Continuous <Continuous>    Medications: [PRN]  acetaminophen     Tablet .. 650 milliGRAM(s) Oral every 6 hours  bacitracin   Ointment 1 Application(s) Topical two times a day  calcium acetate 1334 milliGRAM(s) Oral every 8 hours  chlorhexidine 0.12% Liquid 15 milliLiter(s) Oral Mucosa every 12 hours  chlorhexidine 2% Cloths 1 Application(s) Topical <User Schedule>  dexMEDEtomidine Infusion 0.2 MICROgram(s)/kG/Hr IV Continuous <Continuous>  fentaNYL    Injectable 25 MICROGram(s) IV Push every 3 hours PRN  ferrous    sulfate 325 milliGRAM(s) Oral daily  gabapentin 200 milliGRAM(s) Oral every 8 hours  heparin   Injectable 5000 Unit(s) SubCutaneous every 8 hours  levoFLOXacin IVPB 500 milliGRAM(s) IV Intermittent every 48 hours  meropenem  IVPB 500 milliGRAM(s) IV Intermittent every 12 hours  metoprolol tartrate Injectable 2.5 milliGRAM(s) IV Push every 6 hours  pantoprazole  Injectable 40 milliGRAM(s) IV Push every 12 hours  phenylephrine    Infusion 0.1 MICROgram(s)/kG/Min IV Continuous <Continuous>  sodium bicarbonate 1300 milliGRAM(s) Oral three times a day  sodium chloride 0.9% lock flush 10 milliLiter(s) IV Push every 1 hour PRN  sodium chloride 0.9% lock flush 10 milliLiter(s) IV Push every 1 hour PRN  sodium chloride 0.9%. 1000 milliLiter(s) IV Continuous <Continuous>    Labs:                        12.8   33.91 )-----------( 242      ( 30 Jun 2023 22:09 )             38.9     06-30    133<L>  |  101  |  111<HH>  ----------------------------<  161<H>  5.0   |  14<L>  |  3.6<H>    Ca    6.9<L>      30 Jun 2023 20:29  Phos  8.5     06-30  Mg     1.9     06-30    TPro  3.3<L>  /  Alb  1.6<L>  /  TBili  0.8  /  DBili  x   /  AST  36  /  ALT  9   /  AlkPhos  82  06-30    PT/INR - ( 30 Jun 2023 20:10 )   PT: 17.90 sec;   INR: 1.55 ratio         PTT - ( 30 Jun 2023 20:10 )  PTT:26.7 sec        Imaging:  No post-op imaging studies    Assessment:  67yMale patient S/P CHRISTINE VILLAVICENCIO    Plan:  ***  - Monitor vitals  - Monitor post-op labs and replete as necessary  - Monitor for bowel function  - Continue Pain Medications if necessary  - Continue Antibiotics if necessary  - Encourage ambulation as tolerated  - Monitor urine output and trial of void once Mena removed  - DVT and GI Prophylaxis  - Monitor wound and dressing for changes, redress as needed.      Date/Time: 06-30-23 @ 22:41 Post Operative Note  Patient: CHRISTINE VILLAVICENCIO 67y (1955) Male   MRN: [id number]  Location: [place] [address] A  Visit: 06-18-23 Inpatient  Date: 06-30-23 @ 22:41      Pt is a 67y Male admitted with retroperitoneal and intraabdominal abscess s/p IR placement of drain and LEFT PCN 6/18, s/p percutaneous drainage of LEFT renal abscess POD #8 with left 26fr flank catheter in place, now with Left Renal Cancer s/p ex-lap and left radical nephrectomy. Per anesthesia, patient remained on same vent settings (450/18/40/8), but was noted to be acidotic with pH 7.21 and bicarb of 18; 1 amp sodium bicarb given. Patient also given 4U PRBC, 2U FFP, and 1 U platelets intraoperatively. Other products included 1mg dilaudid, 120 mcg fentanyl, and 1g Ca intraoperatively. Per surgery team, purulent fluid noted around liver and irrigated. Necrotic bulky tissue noted to be adherent to renal vein; hemostasis achieved via staples and vascular called intraoperatively. Per vascular  left renal vein stump staple line was evaluated and it was hemostatic. L PCN and previous R VENICE drain right; three new VENICE drains placed: #1 in Lerma's pouch, #2 in the pelvis, and #3 in the renal bed.    Patient evaluated by SICU team postoperatively. Intubated and sedated on precedex, vent settings 450/18/100/8. Abdomen soft and mildly distended. 3 VENICE drains noted (2 right-sided VENICE drains draining Lerma's pouch and     Nausea: yes no, Vomiting: yes no, Ambulating: yes no, Flatus: yes no  Pain Assessment: Patient is complaining of *** pain that is appropriate for post-operative course.     Operative Findings: Exploratory laparotomy for possible left renal cancer complicated by significant hydronephrosis and pyelonephritis. Left nephrectomy performed, notable for necrotic bulky tissue adherent to the renal vein, staple line in tact and hemostasis achieved. Copious irrigation of retroperitoneal renal bed and intraperitoneal fibrinous exudate and purulent fluid. Three drains placed, #1 in Lerma's pouch, #2 in the pelvis, and #3 in the renal bed.      Objective:  Vitals: T(F): 95.9 (06-30-23 @ 22:00), Max: 99.5 (06-30-23 @ 00:00)  HR: 141 (06-30-23 @ 22:15)  BP: 56/30 (06-30-23 @ 22:00) (56/30 - 145/78)  RR: 18 (06-30-23 @ 22:15)  SpO2: 98% (06-30-23 @ 22:15)  Vent Settings: Mode: AC/ CMV (Assist Control/ Continuous Mandatory Ventilation), RR (machine): 18, TV (machine): 450, FiO2: 40, PEEP: 8, MAP: 12, PIP: 28    In:   06-29-23 @ 07:01  -  06-30-23 @ 07:00  --------------------------------------------------------  IN: 3433.6 mL    06-30-23 @ 07:01  -  06-30-23 @ 22:41  --------------------------------------------------------  IN: 998 mL      IV Fluids: calcium acetate 1334 milliGRAM(s) Oral every 8 hours  ferrous    sulfate 325 milliGRAM(s) Oral daily  sodium bicarbonate 1300 milliGRAM(s) Oral three times a day  sodium chloride 0.9%. 1000 milliLiter(s) (75 mL/Hr) IV Continuous <Continuous>      Out:   06-29-23 @ 07:01  -  06-30-23 @ 07:00  --------------------------------------------------------  OUT: 611 mL    06-30-23 @ 07:01  -  06-30-23 @ 22:41  --------------------------------------------------------  OUT: 245 mL      EBL:   06-29-23 @ 07:01  -  06-30-23 @ 07:00  --------------------------------------------------------  OUT: 0 mL      Voided Urine:   06-29-23 @ 07:01  -  06-30-23 @ 07:00  --------------------------------------------------------  OUT: 611 mL    06-30-23 @ 07:01  -  06-30-23 @ 22:41  --------------------------------------------------------  OUT: 245 mL      Mena Catheter: yes no   Drains:   VENICE:   06-29-23 @ 07:01  -  06-30-23 @ 07:00  --------------------------------------------------------  OUT: 202 mL    06-30-23 @ 07:01  -  06-30-23 @ 22:41  --------------------------------------------------------  OUT: 89 mL     ,   Chest Tube:      NG Tube:       Physical Examination:  General Appearance Intubated and sedated on precedex. PEERLA. +cough  HEENT: EOMI, sclera non-icteric.  Heart: RRR  Lungs: CTABL  Abdomen:  Soft, *** tender, appropriate for post-op course, nondistended. No rigidity, guarding, or rebound tenderness.   MSK/Extremities: Warm & well-perfused. Peripheral pulses intact.  Skin: Warm, dry. No jaundice.   Incisions/Wounds: Dressings in place, clean, dry and intact, no signs of infection/active bleeding/drainage    Medications: [Standing]  acetaminophen     Tablet .. 650 milliGRAM(s) Oral every 6 hours  bacitracin   Ointment 1 Application(s) Topical two times a day  calcium acetate 1334 milliGRAM(s) Oral every 8 hours  chlorhexidine 0.12% Liquid 15 milliLiter(s) Oral Mucosa every 12 hours  chlorhexidine 2% Cloths 1 Application(s) Topical <User Schedule>  dexMEDEtomidine Infusion 0.2 MICROgram(s)/kG/Hr IV Continuous <Continuous>  fentaNYL    Injectable 25 MICROGram(s) IV Push every 3 hours PRN  ferrous    sulfate 325 milliGRAM(s) Oral daily  gabapentin 200 milliGRAM(s) Oral every 8 hours  heparin   Injectable 5000 Unit(s) SubCutaneous every 8 hours  levoFLOXacin IVPB 500 milliGRAM(s) IV Intermittent every 48 hours  meropenem  IVPB 500 milliGRAM(s) IV Intermittent every 12 hours  metoprolol tartrate Injectable 2.5 milliGRAM(s) IV Push every 6 hours  pantoprazole  Injectable 40 milliGRAM(s) IV Push every 12 hours  phenylephrine    Infusion 0.1 MICROgram(s)/kG/Min IV Continuous <Continuous>  sodium bicarbonate 1300 milliGRAM(s) Oral three times a day  sodium chloride 0.9% lock flush 10 milliLiter(s) IV Push every 1 hour PRN  sodium chloride 0.9% lock flush 10 milliLiter(s) IV Push every 1 hour PRN  sodium chloride 0.9%. 1000 milliLiter(s) IV Continuous <Continuous>    Medications: [PRN]  acetaminophen     Tablet .. 650 milliGRAM(s) Oral every 6 hours  bacitracin   Ointment 1 Application(s) Topical two times a day  calcium acetate 1334 milliGRAM(s) Oral every 8 hours  chlorhexidine 0.12% Liquid 15 milliLiter(s) Oral Mucosa every 12 hours  chlorhexidine 2% Cloths 1 Application(s) Topical <User Schedule>  dexMEDEtomidine Infusion 0.2 MICROgram(s)/kG/Hr IV Continuous <Continuous>  fentaNYL    Injectable 25 MICROGram(s) IV Push every 3 hours PRN  ferrous    sulfate 325 milliGRAM(s) Oral daily  gabapentin 200 milliGRAM(s) Oral every 8 hours  heparin   Injectable 5000 Unit(s) SubCutaneous every 8 hours  levoFLOXacin IVPB 500 milliGRAM(s) IV Intermittent every 48 hours  meropenem  IVPB 500 milliGRAM(s) IV Intermittent every 12 hours  metoprolol tartrate Injectable 2.5 milliGRAM(s) IV Push every 6 hours  pantoprazole  Injectable 40 milliGRAM(s) IV Push every 12 hours  phenylephrine    Infusion 0.1 MICROgram(s)/kG/Min IV Continuous <Continuous>  sodium bicarbonate 1300 milliGRAM(s) Oral three times a day  sodium chloride 0.9% lock flush 10 milliLiter(s) IV Push every 1 hour PRN  sodium chloride 0.9% lock flush 10 milliLiter(s) IV Push every 1 hour PRN  sodium chloride 0.9%. 1000 milliLiter(s) IV Continuous <Continuous>    Labs:                        12.8   33.91 )-----------( 242      ( 30 Jun 2023 22:09 )             38.9     06-30    133<L>  |  101  |  111<HH>  ----------------------------<  161<H>  5.0   |  14<L>  |  3.6<H>    Ca    6.9<L>      30 Jun 2023 20:29  Phos  8.5     06-30  Mg     1.9     06-30    TPro  3.3<L>  /  Alb  1.6<L>  /  TBili  0.8  /  DBili  x   /  AST  36  /  ALT  9   /  AlkPhos  82  06-30    PT/INR - ( 30 Jun 2023 20:10 )   PT: 17.90 sec;   INR: 1.55 ratio         PTT - ( 30 Jun 2023 20:10 )  PTT:26.7 sec        Imaging:  No post-op imaging studies    Assessment:  67yMale patient S/P CHRISTINE VILLAVICENCIO    Plan:  ***  - Monitor vitals  - Monitor post-op labs and replete as necessary  - Monitor for bowel function  - Continue Pain Medications if necessary  - Continue Antibiotics if necessary  - Encourage ambulation as tolerated  - Monitor urine output and trial of void once Mena removed  - DVT and GI Prophylaxis  - Monitor wound and dressing for changes, redress as needed.      Date/Time: 06-30-23 @ 22:41 Post Operative Note  Patient: CHRISTINE VILLAVICENCIO 67y (1955) Male   MRN: [id number]  Location: [place] [address] [address]  Visit: 06-18-23 Inpatient  Date: 06-30-23 @ 22:41      Pt is a 67y Male admitted with retroperitoneal and intraabdominal abscess s/p IR placement of drain and LEFT PCN 6/18, s/p percutaneous drainage of LEFT renal abscess POD #8 with left 26fr flank catheter in place, now with Left Renal Cancer s/p ex-lap and left radical nephrectomy. Per anesthesia, patient remained on same vent settings (450/18/40/8), but was noted to be acidotic with pH 7.21 and bicarb of 18; 1 amp sodium bicarb given. Patient also given 4U PRBC, 2U FFP, and 1 U platelets intraoperatively. Other products included 1mg dilaudid, 120 mcg fentanyl, and 1g Ca intraoperatively. Per surgery team, purulent fluid noted around liver and irrigated. Necrotic bulky tissue noted to be adherent to renal vein; hemostasis achieved via staples and vascular called intraoperatively. Per vascular, left renal vein stump staple line was evaluated and it was hemostatic. L PCN and previous R VENICE drain removed; three new VENICE drains placed: #1 in Lerma's pouch, #2 in the pelvis, and #3 in the renal bed.    Patient evaluated by SICU team postoperatively. Intubated and sedated on precedex, vent settings 450/18/100/8. Abdomen soft and mildly distended. 3 VENICE drains noted (2 right-sided VENICE drains draining Lerma's pouch, L VENICE draining renal bed), sanginous OP. SBP 96 with MAP in 80s at time of examination. On taj @3, levo @0.08. Postoperative labs ordered.      Operative Findings: Exploratory laparotomy for possible left renal cancer complicated by significant hydronephrosis and pyelonephritis. Left nephrectomy performed, notable for necrotic bulky tissue adherent to the renal vein, staple line in tact and hemostasis achieved. Copious irrigation of retroperitoneal renal bed and intraperitoneal fibrinous exudate and purulent fluid. Three drains placed, #1 in Lerma's pouch, #2 in the pelvis, and #3 in the renal bed.    OR STATS:  OR TIme: 4.5 hours  IVF: 2.5L   Blood products: 4U PRBC, 2U FFP, 1U platelets  UOP: 400      Objective:  Vitals: T(F): 95.9 (06-30-23 @ 22:00), Max: 99.5 (06-30-23 @ 00:00)  HR: 141 (06-30-23 @ 22:15)  BP: 56/30 (06-30-23 @ 22:00) (56/30 - 145/78)  RR: 18 (06-30-23 @ 22:15)  SpO2: 98% (06-30-23 @ 22:15)  Vent Settings: Mode: AC/ CMV (Assist Control/ Continuous Mandatory Ventilation), RR (machine): 18, TV (machine): 450, FiO2: 40, PEEP: 8, MAP: 12, PIP: 28    In:   06-29-23 @ 07:01  -  06-30-23 @ 07:00  --------------------------------------------------------  IN: 3433.6 mL    06-30-23 @ 07:01  -  06-30-23 @ 22:41  --------------------------------------------------------  IN: 998 mL      IV Fluids: calcium acetate 1334 milliGRAM(s) Oral every 8 hours  ferrous    sulfate 325 milliGRAM(s) Oral daily  sodium bicarbonate 1300 milliGRAM(s) Oral three times a day  sodium chloride 0.9%. 1000 milliLiter(s) (75 mL/Hr) IV Continuous <Continuous>      Out:   06-29-23 @ 07:01  -  06-30-23 @ 07:00  --------------------------------------------------------  OUT: 611 mL    06-30-23 @ 07:01  -  06-30-23 @ 22:41  --------------------------------------------------------  OUT: 245 mL      EBL:   06-29-23 @ 07:01  -  06-30-23 @ 07:00  --------------------------------------------------------  OUT: 0 mL      Voided Urine:   06-29-23 @ 07:01  -  06-30-23 @ 07:00  --------------------------------------------------------  OUT: 611 mL    06-30-23 @ 07:01  -  06-30-23 @ 22:41  --------------------------------------------------------  OUT: 245 mL      Bobby Catheter: yes no   Drains:   VENICE:   06-29-23 @ 07:01  -  06-30-23 @ 07:00  --------------------------------------------------------  OUT: 202 mL    06-30-23 @ 07:01  -  06-30-23 @ 22:41  --------------------------------------------------------  OUT: 89 mL    Physical Examination:  General Appearance Intubated and sedated on precedex. IAN. +cough  HEENT: PEERLA, sclera non-icteric.  Heart: In afib, HR 130s. Irregularly irregular rhythm.  Lungs: Mild rhonchi noted. ETT in place @ 23cm @ lip line.  Abdomen:  Soft, mildly distended. No rigidity or guarding. 3 VENICE drains noted (2 right-sided VENICE drains draining Lerma's pouch, L VENICE draining renal bed), sanginous OP. NGT in place.  MSK/Extremities: 2+ cap refill b/l. Peripheral edema and ecchymoses of upper and lower extremities noted.   : Scrotal edema noted, bobby catheter in place.     Medications: [Standing]  acetaminophen     Tablet .. 650 milliGRAM(s) Oral every 6 hours  bacitracin   Ointment 1 Application(s) Topical two times a day  calcium acetate 1334 milliGRAM(s) Oral every 8 hours  chlorhexidine 0.12% Liquid 15 milliLiter(s) Oral Mucosa every 12 hours  chlorhexidine 2% Cloths 1 Application(s) Topical <User Schedule>  dexMEDEtomidine Infusion 0.2 MICROgram(s)/kG/Hr IV Continuous <Continuous>  fentaNYL    Injectable 25 MICROGram(s) IV Push every 3 hours PRN  ferrous    sulfate 325 milliGRAM(s) Oral daily  gabapentin 200 milliGRAM(s) Oral every 8 hours  heparin   Injectable 5000 Unit(s) SubCutaneous every 8 hours  levoFLOXacin IVPB 500 milliGRAM(s) IV Intermittent every 48 hours  meropenem  IVPB 500 milliGRAM(s) IV Intermittent every 12 hours  metoprolol tartrate Injectable 2.5 milliGRAM(s) IV Push every 6 hours  pantoprazole  Injectable 40 milliGRAM(s) IV Push every 12 hours  phenylephrine    Infusion 0.1 MICROgram(s)/kG/Min IV Continuous <Continuous>  sodium bicarbonate 1300 milliGRAM(s) Oral three times a day  sodium chloride 0.9% lock flush 10 milliLiter(s) IV Push every 1 hour PRN  sodium chloride 0.9% lock flush 10 milliLiter(s) IV Push every 1 hour PRN  sodium chloride 0.9%. 1000 milliLiter(s) IV Continuous <Continuous>  Labs:                        12.8   33.91 )-----------( 242      ( 30 Jun 2023 22:09 )             38.9     06-30    133<L>  |  101  |  111<HH>  ----------------------------<  161<H>  5.0   |  14<L>  |  3.6<H>    Ca    6.9<L>      30 Jun 2023 20:29  Phos  8.5     06-30  Mg     1.9     06-30    TPro  3.3<L>  /  Alb  1.6<L>  /  TBili  0.8  /  DBili  x   /  AST  36  /  ALT  9   /  AlkPhos  82  06-30    PT/INR - ( 30 Jun 2023 20:10 )   PT: 17.90 sec;   INR: 1.55 ratio         PTT - ( 30 Jun 2023 20:10 )  PTT:26.7 sec    Imaging:  Post op CXR ordered    Plan:  -Appropriate sedation and pain control, daily SAT/SBT  -Post op ABG 7.25/35/128/15--> 1 amp sodium bicarb given; f/u AM ABG  -F/u post op CXR  -On taj and vaso; titrate to MAP > 85  -Follow-up post op CBC & BMP; replete as necessary  - Monitor for bowel function  - Continue Antibiotics  - Monitor urine output  -F/u nephrology re: need for RRT  -Monitor VENICE drain output  - DVT and GI Prophylaxis      Date/Time: 06-30-23 @ 22:41

## 2023-06-30 NOTE — BRIEF OPERATIVE NOTE - OPERATION/FINDINGS
Exploratory laparotomy for possible left renal cancer complicated by significant hydronephrosis and pyelonephritis. Left nephrectomy performed, notable for necrotic bulky tissue adherent to the renal vein, staple line in tact and hemostasis achieved. Copious irrigation of retroperitoneal renal bed and intraperitoneal fibrinous exudate and purulent fluid. Three drains placed, #1 in Lerma's pouch, #2 in the pelvis, and #3 in the renal bed.

## 2023-06-30 NOTE — PROGRESS NOTE ADULT - SUBJECTIVE AND OBJECTIVE BOX
CHRISTINE VILLAVICENCIO  [id number]  67y Male    Indication for ICU admission: mechanical ventilatior secondary to urosepsis     Admit Date:06-18-23  ICU Date: 6/18/23  OR Date: 6/18, 6/22  **Planned for 6/30**      24HRS EVENT:  6/29  Night  - 2 units PRBC  - TF stopped mn  - dig level 2.8  - FeNa 1.0%    Day  -NPO after midnight   -precedex off since 8AM   -keep TF @ 20   -f/u dig level in am  -f/u urine lytes, UOP downtrending -> FENa post-osbtructive  - 2u prbc ordered, 4 U on hold, 2 plasma on hold     REVIEW OF SYSTEMS  [ ] A ten-point review of systems was otherwise negative except as noted.  [ x] Due to altered mental status/intubation, subjective information were not able to be obtained from the patient. History was obtained, to the extent possible, from review of the chart and collateral sources of information.    ----------------------------------------------------------------------------------------------------------------------  ----------------------------------------------------------------------------------------------------------------------     CHRISTINE VILLAVICENCIO  [id number]  67y Male    Indication for ICU admission: mechanical ventilatior secondary to urosepsis     Admit Date:06-18-23  ICU Date: 6/18/23  OR Date: 6/18, 6/22  **Planned for 6/30**      24HRS EVENT:  6/29  Night  - 2 units PRBC  - TF stopped mn  - dig level 2.8  - FeNa 1.0%    Day  -NPO after midnight   -precedex off since 8AM   -keep TF @ 20   -f/u dig level in am  -f/u urine lytes, UOP downtrending -> FENa post-osbtructive  - 2u prbc ordered, 4 U on hold, 2 plasma on hold     REVIEW OF SYSTEMS  [ ] A ten-point review of systems was otherwise negative except as noted.  [ x] Due to altered mental status/intubation, subjective information were not able to be obtained from the patient. History was obtained, to the extent possible, from review of the chart and collateral sources of information.    ----------------------------------------------------------------------------------------------------------------------  ----------------------------------------------------------------------------------------------------------------------  Daily     Daily   Diet, NPO after Midnight:      NPO Start Date: 29-Jun-2023,   NPO Start Time: 23:59 (06-29-23 @ 08:47)  Diet, NPO with Tube Feed:   Tube Feeding Modality: Nasogastric  Nepro with Carb Steady  Total Volume for 24 Hours (mL): 480  Continuous  Starting Tube Feed Rate mL per Hour: 20  Increase Tube Feed Rate by (mL): 20     Every 4 hours  Until Goal Tube Feed Rate (mL per Hour): 20  Tube Feed Duration (in Hours): 24  Tube Feed Start Time: 09:23 (06-28-23 @ 10:01)    CURRENT MEDS:  Neurologic Medications  dexMEDEtomidine Infusion 0.2 MICROgram(s)/kG/Hr IV Continuous <Continuous>  fentaNYL    Injectable 25 MICROGram(s) IV Push every 3 hours PRN Moderate Pain (4 - 6)  gabapentin 200 milliGRAM(s) Oral every 8 hours    Respiratory Medications    Cardiovascular Medications  metoprolol tartrate Injectable 2.5 milliGRAM(s) IV Push every 6 hours  phenylephrine    Infusion 0.1 MICROgram(s)/kG/Min IV Continuous <Continuous>    Gastrointestinal Medications  calcium acetate 1334 milliGRAM(s) Oral every 8 hours  ferrous    sulfate 325 milliGRAM(s) Oral daily  pantoprazole  Injectable 40 milliGRAM(s) IV Push every 12 hours  sodium bicarbonate 1300 milliGRAM(s) Oral three times a day  sodium chloride 0.9% lock flush 10 milliLiter(s) IV Push every 1 hour PRN Pre/post blood products, medications, blood draw, and to maintain line patency  sodium chloride 0.9% lock flush 10 milliLiter(s) IV Push every 1 hour PRN Pre/post blood products, medications, blood draw, and to maintain line patency  sodium chloride 0.9%. 1000 milliLiter(s) IV Continuous <Continuous>    Genitourinary Medications    Hematologic/Oncologic Medications  heparin   Injectable 5000 Unit(s) SubCutaneous every 8 hours    Antimicrobial/Immunologic Medications  levoFLOXacin IVPB 500 milliGRAM(s) IV Intermittent every 48 hours  meropenem  IVPB 500 milliGRAM(s) IV Intermittent every 12 hours    Endocrine/Metabolic Medications    Topical/Other Medications  bacitracin   Ointment 1 Application(s) Topical two times a day  chlorhexidine 0.12% Liquid 15 milliLiter(s) Oral Mucosa every 12 hours  chlorhexidine 2% Cloths 1 Application(s) Topical <User Schedule>    ICU Vital Signs Last 24 Hrs  T(C): 37.3 (30 Jun 2023 04:00), Max: 37.7 (29 Jun 2023 20:00)  T(F): 99.1 (30 Jun 2023 04:00), Max: 99.9 (29 Jun 2023 20:00)  HR: 81 (30 Jun 2023 07:00) (77 - 102)  BP: 95/70 (30 Jun 2023 07:00) (80/54 - 145/78)  BP(mean): 79 (30 Jun 2023 07:00) (59 - 107)  ABP: 89/77 (30 Jun 2023 07:00) (40/33 - 125/66)  ABP(mean): 81 (30 Jun 2023 07:00) (37 - 110)  RR: 13 (30 Jun 2023 07:00) (3 - 22)  SpO2: 100% (30 Jun 2023 07:00) (99% - 100%)    O2 Parameters below as of 30 Jun 2023 07:00  Patient On (Oxygen Delivery Method): ventilator    O2 Concentration (%): 40      Mode: AC/ CMV (Assist Control/ Continuous Mandatory Ventilation)  RR (machine): 18  TV (machine): 400  FiO2: 40  PEEP: 8  ITime: 1  MAP: 12  PIP: 25    ABG - ( 30 Jun 2023 06:37 )  pH, Arterial: 7.42  pH, Blood: x     /  pCO2: 28    /  pO2: 133   / HCO3: 18    / Base Excess: -4.9  /  SaO2: 99.0              I&O's Summary    29 Jun 2023 07:01  -  30 Jun 2023 07:00  --------------------------------------------------------  IN: 3433.6 mL / OUT: 611 mL / NET: 2822.6 mL      I&O's Detail    29 Jun 2023 07:01  -  30 Jun 2023 07:00  --------------------------------------------------------  IN:    Dexmedetomidine: 429.8 mL    Enteral Tube Flush: 280 mL    IV PiggyBack: 100 mL    IV PiggyBack: 50 mL    Nepro with Carb Steady: 320 mL    Phenylephrine: 203.8 mL    PRBCs (Packed Red Blood Cells): 250 mL    sodium chloride 0.9%: 1800 mL  Total IN: 3433.6 mL    OUT:    Drain (mL): 190 mL    Indwelling Catheter - Urethral (mL): 259 mL    Rectal Tube (mL): 150 mL    VAC (Vacuum Assisted Closure) System (mL): 12 mL  Total OUT: 611 mL    Total NET: 2822.6 mL              bacitracin   Ointment 1 Application(s) Topical two times a day  calcium acetate 1334 milliGRAM(s) Oral every 8 hours  chlorhexidine 0.12% Liquid 15 milliLiter(s) Oral Mucosa every 12 hours  chlorhexidine 2% Cloths 1 Application(s) Topical <User Schedule>  dexMEDEtomidine Infusion 0.2 MICROgram(s)/kG/Hr IV Continuous <Continuous>  fentaNYL    Injectable 25 MICROGram(s) IV Push every 3 hours PRN  ferrous    sulfate 325 milliGRAM(s) Oral daily  gabapentin 200 milliGRAM(s) Oral every 8 hours  heparin   Injectable 5000 Unit(s) SubCutaneous every 8 hours  levoFLOXacin IVPB 500 milliGRAM(s) IV Intermittent every 48 hours  meropenem  IVPB 500 milliGRAM(s) IV Intermittent every 12 hours  metoprolol tartrate Injectable 2.5 milliGRAM(s) IV Push every 6 hours  pantoprazole  Injectable 40 milliGRAM(s) IV Push every 12 hours  phenylephrine    Infusion 0.1 MICROgram(s)/kG/Min IV Continuous <Continuous>  sodium bicarbonate 1300 milliGRAM(s) Oral three times a day  sodium chloride 0.9% lock flush 10 milliLiter(s) IV Push every 1 hour PRN  sodium chloride 0.9% lock flush 10 milliLiter(s) IV Push every 1 hour PRN  sodium chloride 0.9%. 1000 milliLiter(s) IV Continuous <Continuous>                         CHRISITNE VILLAVICENCIO  [id number]  67y Male    Indication for ICU admission: mechanical ventilatior secondary to urosepsis     Admit Date:06-18-23  ICU Date: 6/18/23  OR Date: 6/18, 6/22  **Planned for 6/30**      24HRS EVENT:  6/29  Night  - 2 units PRBC  - TF stopped mn  - dig level 2.8  - FeNa 1.0%    Day  -NPO after midnight   -precedex off since 8AM   -keep TF @ 20   -f/u dig level in am  -f/u urine lytes, UOP downtrending -> FENa post-osbtructive  - 2u prbc ordered, 4 U on hold, 2 plasma on hold     REVIEW OF SYSTEMS  [ ] A ten-point review of systems was otherwise negative except as noted.  [ x] Due to altered mental status/intubation, subjective information were not able to be obtained from the patient. History was obtained, to the extent possible, from review of the chart and collateral sources of information.    ----------------------------------------------------------------------------------------------------------------------  ----------------------------------------------------------------------------------------------------------------------  Daily     Daily   Diet, NPO after Midnight:      NPO Start Date: 29-Jun-2023,   NPO Start Time: 23:59 (06-29-23 @ 08:47)  Diet, NPO with Tube Feed:   Tube Feeding Modality: Nasogastric  Nepro with Carb Steady  Total Volume for 24 Hours (mL): 480  Continuous  Starting Tube Feed Rate mL per Hour: 20  Increase Tube Feed Rate by (mL): 20     Every 4 hours  Until Goal Tube Feed Rate (mL per Hour): 20  Tube Feed Duration (in Hours): 24  Tube Feed Start Time: 09:23 (06-28-23 @ 10:01)    CURRENT MEDS:  Neurologic Medications  dexMEDEtomidine Infusion 0.2 MICROgram(s)/kG/Hr IV Continuous <Continuous>  fentaNYL    Injectable 25 MICROGram(s) IV Push every 3 hours PRN Moderate Pain (4 - 6)  gabapentin 200 milliGRAM(s) Oral every 8 hours    Respiratory Medications    Cardiovascular Medications  metoprolol tartrate Injectable 2.5 milliGRAM(s) IV Push every 6 hours  phenylephrine    Infusion 0.1 MICROgram(s)/kG/Min IV Continuous <Continuous>    Gastrointestinal Medications  calcium acetate 1334 milliGRAM(s) Oral every 8 hours  ferrous    sulfate 325 milliGRAM(s) Oral daily  pantoprazole  Injectable 40 milliGRAM(s) IV Push every 12 hours  sodium bicarbonate 1300 milliGRAM(s) Oral three times a day  sodium chloride 0.9% lock flush 10 milliLiter(s) IV Push every 1 hour PRN Pre/post blood products, medications, blood draw, and to maintain line patency  sodium chloride 0.9% lock flush 10 milliLiter(s) IV Push every 1 hour PRN Pre/post blood products, medications, blood draw, and to maintain line patency  sodium chloride 0.9%. 1000 milliLiter(s) IV Continuous <Continuous>    Genitourinary Medications    Hematologic/Oncologic Medications  heparin   Injectable 5000 Unit(s) SubCutaneous every 8 hours    Antimicrobial/Immunologic Medications  levoFLOXacin IVPB 500 milliGRAM(s) IV Intermittent every 48 hours  meropenem  IVPB 500 milliGRAM(s) IV Intermittent every 12 hours    Endocrine/Metabolic Medications    Topical/Other Medications  bacitracin   Ointment 1 Application(s) Topical two times a day  chlorhexidine 0.12% Liquid 15 milliLiter(s) Oral Mucosa every 12 hours  chlorhexidine 2% Cloths 1 Application(s) Topical <User Schedule>    ICU Vital Signs Last 24 Hrs  T(C): 37.3 (30 Jun 2023 04:00), Max: 37.7 (29 Jun 2023 20:00)  T(F): 99.1 (30 Jun 2023 04:00), Max: 99.9 (29 Jun 2023 20:00)  HR: 81 (30 Jun 2023 07:00) (77 - 102)  BP: 95/70 (30 Jun 2023 07:00) (80/54 - 145/78)  BP(mean): 79 (30 Jun 2023 07:00) (59 - 107)  ABP: 89/77 (30 Jun 2023 07:00) (40/33 - 125/66)  ABP(mean): 81 (30 Jun 2023 07:00) (37 - 110)  RR: 13 (30 Jun 2023 07:00) (3 - 22)  SpO2: 100% (30 Jun 2023 07:00) (99% - 100%)    O2 Parameters below as of 30 Jun 2023 07:00  Patient On (Oxygen Delivery Method): ventilator    O2 Concentration (%): 40      Mode: AC/ CMV (Assist Control/ Continuous Mandatory Ventilation)  RR (machine): 18  TV (machine): 400  FiO2: 40  PEEP: 8  ITime: 1  MAP: 12  PIP: 25    ABG - ( 30 Jun 2023 06:37 )  pH, Arterial: 7.42  pH, Blood: x     /  pCO2: 28    /  pO2: 133   / HCO3: 18    / Base Excess: -4.9  /  SaO2: 99.0              I&O's Summary    29 Jun 2023 07:01  -  30 Jun 2023 07:00  --------------------------------------------------------  IN: 3433.6 mL / OUT: 611 mL / NET: 2822.6 mL      I&O's Detail    29 Jun 2023 07:01  -  30 Jun 2023 07:00  --------------------------------------------------------  IN:    Dexmedetomidine: 429.8 mL    Enteral Tube Flush: 280 mL    IV PiggyBack: 100 mL    IV PiggyBack: 50 mL    Nepro with Carb Steady: 320 mL    Phenylephrine: 203.8 mL    PRBCs (Packed Red Blood Cells): 250 mL    sodium chloride 0.9%: 1800 mL  Total IN: 3433.6 mL    OUT:    Drain (mL): 190 mL    Indwelling Catheter - Urethral (mL): 259 mL    Rectal Tube (mL): 150 mL    VAC (Vacuum Assisted Closure) System (mL): 12 mL  Total OUT: 611 mL    Total NET: 2822.6 mL              bacitracin   Ointment 1 Application(s) Topical two times a day  calcium acetate 1334 milliGRAM(s) Oral every 8 hours  chlorhexidine 0.12% Liquid 15 milliLiter(s) Oral Mucosa every 12 hours  chlorhexidine 2% Cloths 1 Application(s) Topical <User Schedule>  dexMEDEtomidine Infusion 0.2 MICROgram(s)/kG/Hr IV Continuous <Continuous>  fentaNYL    Injectable 25 MICROGram(s) IV Push every 3 hours PRN  ferrous    sulfate 325 milliGRAM(s) Oral daily  gabapentin 200 milliGRAM(s) Oral every 8 hours  heparin   Injectable 5000 Unit(s) SubCutaneous every 8 hours  levoFLOXacin IVPB 500 milliGRAM(s) IV Intermittent every 48 hours  meropenem  IVPB 500 milliGRAM(s) IV Intermittent every 12 hours  metoprolol tartrate Injectable 2.5 milliGRAM(s) IV Push every 6 hours  pantoprazole  Injectable 40 milliGRAM(s) IV Push every 12 hours  phenylephrine    Infusion 0.1 MICROgram(s)/kG/Min IV Continuous <Continuous>  sodium bicarbonate 1300 milliGRAM(s) Oral three times a day  sodium chloride 0.9% lock flush 10 milliLiter(s) IV Push every 1 hour PRN  sodium chloride 0.9% lock flush 10 milliLiter(s) IV Push every 1 hour PRN  sodium chloride 0.9%. 1000 milliLiter(s) IV Continuous <Continuous>        PHYSICAL EXAM:    General/Neuro  RASS -1 GCS:  10T   = E 3  / V 1T  / M 6     Deficits:  On minimal sedation, opens eyes to voice, follows commands,  moving all extremities    Lungs: Clear to auscultation bilaterally, lung sounds present bilaterally, Normal expansion/effort.     Cardiovascular: S1, S2.      GI: Abdomen soft, Non-tender, Non-distended. +BS    VENICE drain in place with serous fluid, nephrostomy tube in place with serosanguinous fluid    Extremities: Bilateral upper extremities and lower extremities with 3+ peripheral edema.     Derm: Good skin turgor.    : Mena catheter in place.

## 2023-06-30 NOTE — PROGRESS NOTE ADULT - SUBJECTIVE AND OBJECTIVE BOX
Procedure/ diagnosis: pneumoperitoneum, emphysematous pyelonephritis    PAST MEDICAL & SURGICAL HISTORY:      24H EVENTS    Vital Signs Last 24 Hrs  T(C): 37.3 (2023 04:00), Max: 37.7 (2023 20:00)  T(F): 99.1 (2023 04:00), Max: 99.9 (2023 20:00)  HR: 81 (2023 11:00) (75 - 91)  BP: 119/85 (2023 11:00) (95/70 - 145/78)  BP(mean): 99 (2023 11:00) (79 - 107)  RR: 3 (:00) (3 - 22)  SpO2: 100% (:00) (96% - 100%)    Parameters below as of 2023 11:00  Patient On (Oxygen Delivery Method): ventilator    O2 Concentration (%): 40    Mode: AC/ CMV (Assist Control/ Continuous Mandatory Ventilation)  RR (machine): 18  TV (machine): 450  FiO2: 40  PEEP: 8  ITime: 1  MAP: 12  PIP: 27      I&O's Detail    2023 07:01  -  2023 07:00  --------------------------------------------------------  IN:    Dexmedetomidine: 429.8 mL    Enteral Tube Flush: 280 mL    IV PiggyBack: 100 mL    IV PiggyBack: 50 mL    Nepro with Carb Steady: 320 mL    Phenylephrine: 203.8 mL    PRBCs (Packed Red Blood Cells): 250 mL    sodium chloride 0.9%: 1800 mL  Total IN: 3433.6 mL    OUT:    Drain (mL): 190 mL    Indwelling Catheter - Urethral (mL): 259 mL    Rectal Tube (mL): 150 mL    VAC (Vacuum Assisted Closure) System (mL): 12 mL  Total OUT: 611 mL    Total NET: 2822.6 mL      2023 07:01  -  2023 14:10  --------------------------------------------------------  IN:    Dexmedetomidine: 76.4 mL    Phenylephrine: 22.8 mL    sodium chloride 0.9%: 300 mL  Total IN: 399.2 mL    OUT:    Drain (mL): 54 mL    Indwelling Catheter - Urethral (mL): 113 mL  Total OUT: 167 mL    Total NET: 232.2 mL          Urinalysis Basic - ( 2023 20:15 )    Color: x / Appearance: x / SG: x / pH: x  Gluc: 111 mg/dL / Ketone: x  / Bili: x / Urobili: x   Blood: x / Protein: x / Nitrite: x   Leuk Esterase: x / RBC: x / WBC x   Sq Epi: x / Non Sq Epi: x / Bacteria: x                   10.6   33.43 )-----------( 196      (  @ 10:01 )             31.3                8.2    42.21 )-----------( 259      (  @ 20:15 )             25.3                8.3    48.22 )-----------( 284      (  @ 14:26 )             25.4                8.3    44.28 )-----------( 226      (  @ 20:51 )             24.8                    134   |  97    |  125                Ca: 7.0    BMP:   ----------------------------< 111    M.0   (23 @ 20:15)             4.5    |  19    | 4.0                Ph: 7.5      LFT:     TPro: 3.8 / Alb: 1.8 / TBili: 0.5 / DBili: 0.3 / AST: 20 / ALT: 9 / AlkPhos: 113   (23 @ 20:51)          PT/INR - ( 2023 20:15 )   PT: 17.20 sec;   INR: 1.49 ratio         PTT - ( 2023 20:15 )  PTT:28.9 sec          Urinalysis Basic - ( 2023 20:15 )    Color: x / Appearance: x / SG: x / pH: x  Gluc: 111 mg/dL / Ketone: x  / Bili: x / Urobili: x   Blood: x / Protein: x / Nitrite: x   Leuk Esterase: x / RBC: x / WBC x   Sq Epi: x / Non Sq Epi: x / Bacteria: x        ABG - ( 2023 06:37 )  pH: 7.42  /  pCO2: 28    /  pO2: 133   / HCO3: 18    / Base Excess: -4.9  /  SaO2: 99.0        MEDICATIONS  (STANDING):  acetaminophen     Tablet .. 650 milliGRAM(s) Oral every 6 hours  bacitracin   Ointment 1 Application(s) Topical two times a day  calcium acetate 1334 milliGRAM(s) Oral every 8 hours  chlorhexidine 0.12% Liquid 15 milliLiter(s) Oral Mucosa every 12 hours  chlorhexidine 2% Cloths 1 Application(s) Topical <User Schedule>  dexMEDEtomidine Infusion 0.2 MICROgram(s)/kG/Hr (3.82 mL/Hr) IV Continuous <Continuous>  ferrous    sulfate 325 milliGRAM(s) Oral daily  gabapentin 200 milliGRAM(s) Oral every 8 hours  heparin   Injectable 5000 Unit(s) SubCutaneous every 8 hours  levoFLOXacin IVPB 500 milliGRAM(s) IV Intermittent every 48 hours  meropenem  IVPB 500 milliGRAM(s) IV Intermittent every 12 hours  metoprolol tartrate Injectable 2.5 milliGRAM(s) IV Push every 6 hours  pantoprazole  Injectable 40 milliGRAM(s) IV Push every 12 hours  phenylephrine    Infusion 0.1 MICROgram(s)/kG/Min (1.43 mL/Hr) IV Continuous <Continuous>  sodium bicarbonate 1300 milliGRAM(s) Oral three times a day  sodium chloride 0.9%. 1000 milliLiter(s) (75 mL/Hr) IV Continuous <Continuous>    MEDICATIONS  (PRN):  fentaNYL    Injectable 25 MICROGram(s) IV Push every 3 hours PRN Moderate Pain (4 - 6)  sodium chloride 0.9% lock flush 10 milliLiter(s) IV Push every 1 hour PRN Pre/post blood products, medications, blood draw, and to maintain line patency  sodium chloride 0.9% lock flush 10 milliLiter(s) IV Push every 1 hour PRN Pre/post blood products, medications, blood draw, and to maintain line patency      Diet, NPO after Midnight:      NPO Start Date: 2023,   NPO Start Time: 23:59 (23 @ 08:47)  Diet, NPO with Tube Feed:   Tube Feeding Modality: Nasogastric  Nepro with Carb Steady  Total Volume for 24 Hours (mL): 480  Continuous  Starting Tube Feed Rate mL per Hour: 20  Increase Tube Feed Rate by (mL): 20     Every 4 hours  Until Goal Tube Feed Rate (mL per Hour): 20  Tube Feed Duration (in Hours): 24  Tube Feed Start Time: 09:23 (23 @ 10:01)      PHYSICAL EXAM:  General Appearance: pt in no respiratory distress  Chest: Equal expansion bilaterally, equal breath sounds  CV: S1, S2  Abdomen: Soft + drain in place  Extremities: perfused  Neuro: intubated

## 2023-07-01 NOTE — PROGRESS NOTE ADULT - ASSESSMENT
67M w/ PMH of HTN, grade IV hydronephrosis of L kidney s/p L PCN (placed 5/2023), stutter, hiatal hernia, iron deficiency anemia, H Pylori (untreated), and gastric mass (never biopsied) presents to the ED with at least 4 days of worsening weakness, abdominal distension, and no output from his PCN  . Found to have septic shock, MSOF, lactic acidosis from left emphysematous hydronephrosis, pyelonephritis and possible rupture with pneumoperitoneum along with possible hepatic abscesses, splenic infarct and abdominal and mediastinal lymphadenopathies. he is s/p upsizing of left PCN tube by IR along with RLQ drain placed.   ROJAS/ Hyponatremia/ severe left hydro s/p upsizing of left PCN catheter/ emphysematous pyelo with septic shock/  ROJAS likely ATN iso septic shock  s/p nephrectomy and abdominal washout    on pressors   anuric    change iv fluids to d5 with 3 amp of bicarbonate at 75 cc/h   will give 1 dose of bumex 2 if positive response start drip if no response will need HD   will follow

## 2023-07-01 NOTE — PROGRESS NOTE ADULT - SUBJECTIVE AND OBJECTIVE BOX
CHRISTINE VILLAVICENCIO  [id number]  67y Male    Indication for ICU admission: mechanical ventilatior secondary to urosepsis     Admit Date:06-18-23  ICU Date: 6/18/23  OR Date: 6/18, 6/22, 6/30     24HRS EVENT:  6/30   NIGHT  -s/p ex lap + nephrectomy, pus noted around liver, irrigated; possible lymph node noted encasing renal vein, stapled; 3 VENICE drains in place draining Lerma's pouch, pelvis, and L kidney   -post op ABG 7.25/35/128/35 --> 1 amp bicarb given  -Ca 7.1 corrected 8.7   -mg repleted  -no urine output postop, 1L bolus given per Dr. Brown  -f/u stat labs   Day   -Right axillary A line placed   -Post transfusion CBC 10.6    -RTOR @1 for nephrectomy   -TB quantiferon indeterminate       REVIEW OF SYSTEMS  [ ] A ten-point review of systems was otherwise negative except as noted.  [ x] Due to altered mental status/intubation, subjective information were not able to be obtained from the patient. History was obtained, to the extent possible, from review of the chart and collateral sources of information.    ----------------------------------------------------------------------------------------------------------------------  ----------------------------------------------------------------------------------------------------------------------       CHRISTINE VILLAVICENCIO  [id number]  67y Male    Indication for ICU admission: mechanical ventilatior secondary to urosepsis     Admit Date:06-18-23  ICU Date: 6/18/23  OR Date: 6/18, 6/22, 6/30     24HRS EVENT:  6/30   NIGHT  -s/p ex lap + nephrectomy, pus noted around liver, irrigated; possible lymph node noted encasing renal vein, stapled; 3 VENICE drains in place draining Lerma's pouch, pelvis, and L kidney   -post op ABG 7.25/35/128/35 --> 1 amp bicarb given  -Ca 7.1 corrected 8.7   -mg repleted  -no urine output postop, 1L bolus given per Dr. Brown  -f/u stat labs   Day   -Right axillary A line placed   -Post transfusion CBC 10.6    -RTOR @1 for nephrectomy   -TB quantiferon indeterminate       REVIEW OF SYSTEMS  [ ] A ten-point review of systems was otherwise negative except as noted.  [ x] Due to altered mental status/intubation, subjective information were not able to be obtained from the patient. History was obtained, to the extent possible, from review of the chart and collateral sources of information.    ----------------------------------------------------------------------------------------------------------------------  ----------------------------------------------------------------------------------------------------------------------    Daily Height in cm: 180 (30 Jun 2023 15:33)    Daily     Diet, NPO:   Except Medications (07-01-23 @ 07:19)      CURRENT MEDS:  Neurologic Medications  acetaminophen     Tablet .. 650 milliGRAM(s) Oral every 6 hours  dexMEDEtomidine Infusion 0.2 MICROgram(s)/kG/Hr IV Continuous <Continuous>  fentaNYL    Injectable 25 MICROGram(s) IV Push every 3 hours PRN Moderate Pain (4 - 6)  gabapentin 200 milliGRAM(s) Oral every 8 hours    Respiratory Medications    Cardiovascular Medications  metoprolol tartrate Injectable 2.5 milliGRAM(s) IV Push every 6 hours  norepinephrine Infusion 0.05 MICROgram(s)/kG/Min IV Continuous <Continuous>  phenylephrine    Infusion 0.1 MICROgram(s)/kG/Min IV Continuous <Continuous>    Gastrointestinal Medications  calcium acetate 1334 milliGRAM(s) Oral every 8 hours  calcium gluconate IVPB 2 Gram(s) IV Intermittent once  ferrous    sulfate 325 milliGRAM(s) Oral daily  pantoprazole  Injectable 40 milliGRAM(s) IV Push every 12 hours  sodium bicarbonate 1300 milliGRAM(s) Oral three times a day  sodium bicarbonate  Infusion 0.147 mEq/kG/Hr IV Continuous <Continuous>  sodium chloride 0.9% lock flush 10 milliLiter(s) IV Push every 1 hour PRN Pre/post blood products, medications, blood draw, and to maintain line patency  sodium chloride 0.9% lock flush 10 milliLiter(s) IV Push every 1 hour PRN Pre/post blood products, medications, blood draw, and to maintain line patency    Genitourinary Medications    Hematologic/Oncologic Medications  heparin   Injectable 5000 Unit(s) SubCutaneous every 8 hours    Antimicrobial/Immunologic Medications  levoFLOXacin IVPB 500 milliGRAM(s) IV Intermittent every 48 hours  meropenem  IVPB 500 milliGRAM(s) IV Intermittent every 12 hours    Endocrine/Metabolic Medications    Topical/Other Medications  bacitracin   Ointment 1 Application(s) Topical two times a day  chlorhexidine 0.12% Liquid 15 milliLiter(s) Oral Mucosa every 12 hours  chlorhexidine 2% Cloths 1 Application(s) Topical <User Schedule>      ICU Vital Signs Last 24 Hrs  T(C): 35.6 (01 Jul 2023 04:00), Max: 37.3 (30 Jun 2023 15:33)  T(F): 96 (01 Jul 2023 04:00), Max: 98.6 (30 Jun 2023 16:00)  HR: 102 (01 Jul 2023 07:57) (75 - 144)  BP: 99/56 (01 Jul 2023 01:15) (33/16 - 137/68)  BP(mean): 72 (01 Jul 2023 01:15) (21 - 104)  ABP: 99/68 (01 Jul 2023 07:00) (65/65 - 340/340)  ABP(mean): 80 (01 Jul 2023 07:00) (65 - 340)  RR: 18 (01 Jul 2023 07:00) (1 - 22)  SpO2: 100% (01 Jul 2023 07:57) (80% - 100%)    O2 Parameters below as of 01 Jul 2023 04:00  Patient On (Oxygen Delivery Method): ventilator    O2 Concentration (%): 70      Mode: AC/ CMV (Assist Control/ Continuous Mandatory Ventilation)  RR (machine): 18  TV (machine): 450  FiO2: 70  PEEP: 8  ITime: 1  MAP: 12  PIP: 28    ABG - ( 01 Jul 2023 04:58 )  pH, Arterial: 7.25  pH, Blood: x     /  pCO2: 32    /  pO2: 114   / HCO3: 14    / Base Excess: -12.1 /  SaO2: 98.5            I&O's Summary    30 Jun 2023 07:01  -  01 Jul 2023 07:00  --------------------------------------------------------  IN: 3527.5 mL / OUT: 530 mL / NET: 2997.5 mL      I&O's Detail    30 Jun 2023 07:01  -  01 Jul 2023 07:00  --------------------------------------------------------  IN:    Dexmedetomidine: 191 mL    Dexmedetomidine: 135.9 mL    IV PiggyBack: 100 mL    IV PiggyBack: 100 mL    IV PiggyBack: 50 mL    Norepinephrine: 35.1 mL    Phenylephrine: 57 mL    Phenylephrine: 358.5 mL    sodium chloride 0.9%: 750 mL    sodium chloride 0.9%: 750 mL    Sodium Chloride 0.9% Bolus: 1000 mL  Total IN: 3527.5 mL    OUT:    Drain (mL): 79 mL    Drain (mL): 80 mL    Drain (mL): 130 mL    Drain (mL): 50 mL    Indwelling Catheter - Urethral (mL): 181 mL    VAC (Vacuum Assisted Closure) System (mL): 10 mL  Total OUT: 530 mL    Total NET: 2997.5 mL          PHYSICAL EXAM:    General/Neuro  RASS:             GCS:  11t   = E   / V   / M      Deficits:                    Pupils:    Lungs:      clear to auscultation, Normal expansion/effort.     Cardiovascular : S1, S2.  Regular rate and rhythm.  Peripheral edema   Cardiac Rhythm: Normal Sinus Rhythm    GI: Abdomen soft, Non-tender, Non-distended.     Nasogastric tube in place.  Wound: L VENICE w/ serousang, R 2 drains w/ serousang output    Extremities: Extremities cool, mottled skin x4 ext. Pulses: dopplerable DP b/l, RUE ulnar and brachial pulse dopplerable, LUE Ulnar and brachial pulse dopplerable     Derm: Good skin turgor, no skin breakdown.      :       Mena catheter in place.      CXR:     LABS:  CAPILLARY BLOOD GLUCOSE      POCT Blood Glucose.: 178 mg/dL (01 Jul 2023 06:43)  POCT Blood Glucose.: 122 mg/dL (30 Jun 2023 23:14)  POCT Blood Glucose.: 30 mg/dL (30 Jun 2023 23:10)  POCT Blood Glucose.: 133 mg/dL (30 Jun 2023 13:20)                          12.3   41.35 )-----------( 217      ( 01 Jul 2023 02:27 )             37.2       07-01    136  |  102  |  121<HH>  ----------------------------<  160<H>  5.0   |  14<L>  |  3.9<H>    Ca    7.0<L>      01 Jul 2023 02:27  Phos  9.2     07-01  Mg     2.2     07-01    TPro  3.8<L>  /  Alb  2.0<L>  /  TBili  1.3<H>  /  DBili  x   /  AST  45<H>  /  ALT  11  /  AlkPhos  86  06-30      PT/INR - ( 30 Jun 2023 20:10 )   PT: 17.90 sec;   INR: 1.55 ratio         PTT - ( 30 Jun 2023 20:10 )  PTT:26.7 sec      Urinalysis Basic - ( 01 Jul 2023 02:27 )    Color: x / Appearance: x / SG: x / pH: x  Gluc: 160 mg/dL / Ketone: x  / Bili: x / Urobili: x   Blood: x / Protein: x / Nitrite: x   Leuk Esterase: x / RBC: x / WBC x   Sq Epi: x / Non Sq Epi: x / Bacteria: x

## 2023-07-01 NOTE — PROGRESS NOTE ADULT - ASSESSMENT
Assessment & Plan:  67M w/ PMH of HTN, grade IV hydronephrosis of L kidney s/p L PCN (placed 5/2023), stutter, hiatal hernia, iron deficiency anemia, H Pylori (untreated), and gastric mass (never biopsied) presents to the ED with at least 4 days of worsening weakness, abdominal distension, and no output from his PCN. Admitted with emphysematous pyelonephritis     (6/18): s/p LT PCN upsizing to 16Fr and 16Fr RLQ drain placement with IR   (6/22): s/p shock lithotripsy of L kidney abscess cavity with drainage of thick contents, upsize of drain to 26Fr bobby catheter  (6/30): s/p ex-lap and L nephrectomy with abdominal washout    NEURO:  #Pain control    -APAP PRN    -Gabapentin 200 q 8     -fent 25 q 3 PRN while intubated  #Sedation    -precedex gtt   -following commands when off sedation    RESP:  #acute respiratory failure with left lung mucous plugging (intubated 6/24)    - LL @ 23, 7.5Fr    - 6/24 s/p bronchoscopy secondary to left maintstem mucus plug    - Vent settings: RR (machine): 18, TV (machine): 400, FiO2: 40, PEEP: 8    #Pulmonary nodules / Mediastinal lymphadenopathy    - CT Chest: Right middle and upper lobe solid pulmonary nodules, f/u outpatient pulm   #Activity   - incr as tolerated    CARDS:   #Septic shock    - Phenylephrine and Levophed; titrate to MAP > 65  #New onset Afib w/ RVR with hypotension -- likely 2/2 sepsis    - Metoprolol 2.5 q6 IV  -Digoxin 250mcg q6 load (x4 doses)    - Digoxin level 2.8, f/u 7/1 AM     - rate better controlled    - Cardiology following     #H/O HTN    -Amlodipine 2.5mg HOLDING   Imaging    - ECHO: (6/19) EF 55-60%, mild AS, mild LAE, mild MR/TR, trivial pericardial effusion    GI/NUTR:  #Diet    NPO except meds; Trickle tube feeds (Nepro @ 20cc/hr) held in anticipation for OR   #bowel movements    - 2 bloody BM's noted overnight 6/25; last BM 6/26 AM  - GI consult 6/26- Plan for EGD with EUS + FNA as outpatient, H-pylori treatment after resolution of acute issues   -dignishield in place   -FOBT positive    #GI PPX  -IV Protonix 40 q12 hours  #Gastric mass vs gastrohepatic lymphadenopathy    -CT A/P: lesser curvature mass , Hepatic hypodensities  #Splenic Hypodensities, infarct vs phlegmon/abscess    -CT A/P:  Wedge-shaped region of hypodensity within the spleen    /RENAL:   #Grade IV L hydronephrosis s/p L PCN (5/2023) -- presented to ED with thick dark foul smelling output  #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  -intermittent suction/irrigation per urology; f/u cx and cytology from 6/25     -(6/23) s/p LT PCN upsizing to 26Fr catheter, RLQ 16 Fr drain remains, s/p nephrostolithotomy w/ lithotripsy    -(6/18) s/p LT PCN upsizing to 16Fr and 16Fr RLQ drain placement with IR, flush q8 hr  (6/30): s/p L nephrectomy and abdominal washout     High suspicion of renal malignancy given recent pathology  #volume overload  #ROJAS (baseline Cr 1.0)    - Cr 4.0-->3.6-->3.7   #metabolic acidosis  -Nephrology c/s - no indication for RRT as of 6/30; reevaluate in AM  - PO sodium bicarb 1300mg q8  - s/p 24hr of sodium bicarbonate infusion 6/26  -1 amp sodium bicarb given in OR 6/30, 1 amp given postop 6/30 night   #Hyperkalemia     -now resolved  #Hyperphosphatemia    -Phoslo 1334mg q8    Labs:          BUN/Cr- 111/3.6  -->,  111/3.7  -->          Electrolytes-Na -- // K -- // Mg 2.2 //  Phos 9.2 (07-01 @ 02:27)  #urine output  - IV maintenance fluids: NS @ 75 cc/hr  -0 UOP post-op, 1L NS bolus given 6/30 night     HEME/ONC:   #new onset Afib    - holding heparin infusion in setting of hematochezia     Labs: DVT propylaxis-heparin      Labs:          BUN/Cr- 111/3.6  -->,  111/3.7  -->          Electrolytes-Na -- // K -- // Mg 2.2 //  Phos 9.2 (07-01 @ 02:27)    T&S:ABO RH Interpretation; expires 07/02    #H/o Fe Deficiency anemia    - Iron supplementation  #DVT prophylaxis  -SCDs, heparin subQ   T&S Expires 7/2  Blood Consent- in chart     ID:  #Leukocytosis 2/2 sepsis   WBC- 28.59  --->>,  33.91  --->>,  41.35  --->>  Temp trend- 24hrs T(F): 96 (07-01 @ 00:00), Max: 99.1 (06-30 @ 04:00)  #antibiotics    -Levaquin 500mg q48 (6/21-    -Meropenem 500q12 (6/20-    -As per ID: no need for Vancomycin or Daptomycin in the absence of MRSA    -discontinued caspofungin (as per ID, since fungitell is unremarkable)  #Cultures    L PCN cyto-pathology  6/25: results suggestive of a necrotic neoplasm    Funtitell 6/26: 47    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    BAL 6/24: moderate yeast     UCx 6/19: negative FINAL      Left PCN aspiration 6/18: stenotrophomonas maltophilia  Needs source control    ENDO:  #Glucose monitoring    -A1c (5/7/23): 5.0    -POCT q6 hours while NPO   #Adrenal Insufficiency 2/2 to sepsis     -completed solucortef taper     -Cortisol level - binding globulin 1.1 (low), serum cortisol, 84, free cortisol 76, % free cortisol 91    MSK:  #wound care  Sacrococcygeal DTI: wound care following- cleanse with soap and water, apply triad, gauze, and allevyn BID and PRN for soiling   Venous ulcer right lower leg, R forearm skin tear: Pat dry, apply Xeroform, nonadherent dressing, wrap with Kerlix twice a day, prn for soiling  #Activity    -Advance as tolerated    -PT/rehab once extubated    -Globally deconditioned    LINES/DRAINS:   PIV  Bobby (6/18)  RLQ 16Fr Javier Drain (6/18),   LT PCN 26Fr (6/18)   Left basilic midline  6/21  Right axillary a line (6/30)  Right Radial [place] (6/22-6/30)  L radial [place] (6/18-6/22)  L IJ TLC (6/26)  ETT  OGT    ADVANCED DIRECTIVES:  Full Code  HCP/Emergency Contact- Tracey Adames: [phone]   Palliative following. Last GOC discussion 6/26 day.    INDICATION FOR SICU: New onset atrial fibrillation w/ mechanical ventilation    DISPO: SICU   Assessment & Plan:  67M w/ PMH of HTN, grade IV hydronephrosis of L kidney s/p L PCN (placed 5/2023), stutter, hiatal hernia, iron deficiency anemia, H Pylori (untreated), and gastric mass (never biopsied) presents to the ED with at least 4 days of worsening weakness, abdominal distension, and no output from his PCN. Admitted with emphysematous pyelonephritis     (6/18): s/p LT PCN upsizing to 16Fr and 16Fr RLQ drain placement with IR   (6/22): s/p shock lithotripsy of L kidney abscess cavity with drainage of thick contents, upsize of drain to 26Fr bobby catheter  (6/30): s/p ex-lap and L nephrectomy with abdominal washout    NEURO:  #Pain control    -APAP PRN    -Gabapentin 200 q 8     -fent 25 q 3 PRN while intubated  #Sedation    -precedex gtt   -following commands when off sedation    RESP:  #acute respiratory failure with left lung mucous plugging (intubated 6/24)    - LL @ 23, 7.5Fr    - 6/24 s/p bronchoscopy secondary to left maintstem mucus plug    - Vent settings: RR (machine): 18, TV (machine): 400, FiO2: 40, PEEP: 8    #Pulmonary nodules / Mediastinal lymphadenopathy    - CT Chest: Right middle and upper lobe solid pulmonary nodules, f/u outpatient pulm   #Activity   - incr as tolerated    CARDS:   #Septic shock    - Phenylephrine and Levophed; titrate to MAP > 65  #New onset Afib w/ RVR with hypotension -- likely 2/2 sepsis    - Metoprolol 2.5 q6 IV  -Digoxin 250mcg q6 load (x4 doses)    - Digoxin level 2.8, f/u 7/1 AM     - rate better controlled    - Cardiology following     #H/O HTN    -Amlodipine 2.5mg HOLDING   Imaging    - ECHO: (6/19) EF 55-60%, mild AS, mild LAE, mild MR/TR, trivial pericardial effusion    GI/NUTR:  #Diet    NPO except meds; Trickle tube feeds (Nepro @ 20cc/hr) held; f/u restart in AM  #bowel movements    - 2 bloody BM's noted overnight 6/25; last BM 6/26 AM  - GI consult 6/26- Plan for EGD with EUS + FNA as outpatient, H-pylori treatment after resolution of acute issues   -dignishield in place   -FOBT positive    #GI PPX  -IV Protonix 40 q12 hours  #Gastric mass vs gastrohepatic lymphadenopathy    -CT A/P: lesser curvature mass , Hepatic hypodensities  #Splenic Hypodensities, infarct vs phlegmon/abscess    -CT A/P:  Wedge-shaped region of hypodensity within the spleen    /RENAL:   #Grade IV L hydronephrosis s/p L PCN (5/2023) -- presented to ED with thick dark foul smelling output  #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  -intermittent suction/irrigation per urology; f/u cx and cytology from 6/25     -(6/23) s/p LT PCN upsizing to 26Fr catheter, RLQ 16 Fr drain remains, s/p nephrostolithotomy w/ lithotripsy    -(6/18) s/p LT PCN upsizing to 16Fr and 16Fr RLQ drain placement with IR, flush q8 hr  (6/30): s/p L nephrectomy and abdominal washout     High suspicion of renal malignancy given recent pathology  #volume overload  #ROJAS (baseline Cr 1.0)    - Cr 4.0-->3.6-->3.7   #metabolic acidosis  -Nephrology c/s - no indication for RRT as of 6/30; reevaluate in AM  - PO sodium bicarb 1300mg q8  - s/p 24hr of sodium bicarbonate infusion 6/26  -1 amp sodium bicarb given in OR 6/30, 1 amp given postop 6/30 night   #Hyperkalemia     -now resolved  #Hyperphosphatemia    -Phoslo 1334mg q8    Labs:          BUN/Cr- 111/3.6  -->,  111/3.7  -->          Electrolytes-Na -- // K -- // Mg 2.2 //  Phos 9.2 (07-01 @ 02:27)  #urine output  - IV maintenance fluids: NS @ 75 cc/hr  -0 UOP post-op, 1L NS bolus given 6/30 night     HEME/ONC:   #new onset Afib    - holding heparin infusion in setting of hematochezia     Labs: DVT propylaxis-heparin      Labs:          BUN/Cr- 111/3.6  -->,  111/3.7  -->          Electrolytes-Na -- // K -- // Mg 2.2 //  Phos 9.2 (07-01 @ 02:27)    T&S:ABO RH Interpretation; expires 07/02    #H/o Fe Deficiency anemia    - Iron supplementation  #DVT prophylaxis  -SCDs, heparin subQ   T&S Expires 7/2  Blood Consent- in chart     ID:  #Leukocytosis 2/2 sepsis   WBC- 28.59  --->>,  33.91  --->>,  41.35  --->>  Temp trend- 24hrs T(F): 96 (07-01 @ 00:00), Max: 99.1 (06-30 @ 04:00)  #antibiotics    -Levaquin 500mg q48 (6/21-    -Meropenem 500q12 (6/20-    -As per ID: no need for Vancomycin or Daptomycin in the absence of MRSA    -discontinued caspofungin (as per ID, since fungitell is unremarkable)  #Cultures    L PCN cyto-pathology  6/25: results suggestive of a necrotic neoplasm    Funtitell 6/26: 47    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    BAL 6/24: moderate yeast     UCx 6/19: negative FINAL      Left PCN aspiration 6/18: stenotrophomonas maltophilia  Needs source control    ENDO:  #Glucose monitoring    -A1c (5/7/23): 5.0    -POCT q6 hours while NPO   #Adrenal Insufficiency 2/2 to sepsis     -completed solucortef taper     -Cortisol level - binding globulin 1.1 (low), serum cortisol, 84, free cortisol 76, % free cortisol 91    MSK:  #wound care  Sacrococcygeal DTI: wound care following- cleanse with soap and water, apply triad, gauze, and allevyn BID and PRN for soiling   Venous ulcer right lower leg, R forearm skin tear: Pat dry, apply Xeroform, nonadherent dressing, wrap with Kerlix twice a day, prn for soiling  #Activity    -Advance as tolerated    -PT/rehab once extubated    -Globally deconditioned    LINES/DRAINS:   PIV  Bobby (6/18)  RLQ 16Fr Javier Drain (6/18),   LT PCN 26Fr (6/18)   Left basilic midline  6/21  Right axillary a line (6/30)  Right Radial [place] (6/22-6/30)  L radial [place] (6/18-6/22)  L IJ TLC (6/26)  ETT  OGT    ADVANCED DIRECTIVES:  Full Code  HCP/Emergency Contact- Tracey Adames: [phone]   Palliative following. Last GOC discussion 6/26 day.    INDICATION FOR SICU: New onset atrial fibrillation w/ mechanical ventilation    DISPO: SICU   Assessment & Plan:  67M w/ PMH of HTN, grade IV hydronephrosis of L kidney s/p L PCN (placed 5/2023), stutter, hiatal hernia, iron deficiency anemia, H Pylori (untreated), and gastric mass (never biopsied) presents to the ED with at least 4 days of worsening weakness, abdominal distension, and no output from his PCN. Admitted with emphysematous pyelonephritis     (6/18): s/p LT PCN upsizing to 16Fr and 16Fr RLQ drain placement with IR   (6/22): s/p shock lithotripsy of L kidney abscess cavity with drainage of thick contents, upsize of drain to 26Fr bobby catheter  (6/30): s/p ex-lap and L nephrectomy with abdominal washout    NEURO:  #Pain control    -APAP PRN    -Gabapentin 200 q 8     -fent 25 q 3 PRN while intubated  #Sedation    -precedex gtt   -following commands when off sedation    RESP:  #acute respiratory failure with left lung mucous plugging (intubated 6/24)    - LL @ 23, 7.5Fr    - 6/24 s/p bronchoscopy secondary to left maintstem mucus plug    RR (machine): 18, TV (machine): 450, FiO2: 70, PEEP: 8  07-01 @ 04:58--7.25 / 32 / 114 / 14 / 98.5  O2 98.5  Lac 3.20    06-30 @ 21:56--7.25 / 35 / 128 / 15 / 99.5  O2 99.5  Lac 3.70      #Pulmonary nodules / Mediastinal lymphadenopathy    - CT Chest: Right middle and upper lobe solid pulmonary nodules, f/u outpatient pulm   #Activity   - incr as tolerated    CARDS:   #Septic shock    - Phenylephrine and Levophed; titrate to MAP > 65  #New onset Afib w/ RVR with hypotension -- likely 2/2 sepsis    - Metoprolol 2.5 q6 IV  -Digoxin 250mcg q6 load (x4 doses)    - Digoxin level 2.8, f/u 7/1 AM     - rate better controlled    - Cardiology following     #H/O HTN    -Amlodipine 2.5mg HOLDING   Imaging    - ECHO: (6/19) EF 55-60%, mild AS, mild LAE, mild MR/TR, trivial pericardial effusion    GI/NUTR:  #Diet    NPO except meds; Trickle tube feeds (Nepro @ 20cc/hr) held; f/u restart in AM  #bowel movements    - 2 bloody BM's noted overnight 6/25; last BM 6/26 AM  - GI consult 6/26- Plan for EGD with EUS + FNA as outpatient, H-pylori treatment after resolution of acute issues   -dignishield in place   -FOBT positive    #GI PPX  -IV Protonix 40 q12 hours  #Gastric mass vs gastrohepatic lymphadenopathy    -CT A/P: lesser curvature mass , Hepatic hypodensities  #Splenic Hypodensities, infarct vs phlegmon/abscess    -CT A/P:  Wedge-shaped region of hypodensity within the spleen    /RENAL:   #Grade IV L hydronephrosis s/p L PCN (5/2023) -- presented to ED with thick dark foul smelling output  #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  -intermittent suction/irrigation per urology; f/u cx and cytology from 6/25     -(6/23) s/p LT PCN upsizing to 26Fr catheter, RLQ 16 Fr drain remains, s/p nephrostolithotomy w/ lithotripsy    -(6/18) s/p LT PCN upsizing to 16Fr and 16Fr RLQ drain placement with IR, flush q8 hr  (6/30): s/p L nephrectomy and abdominal washout     High suspicion of renal malignancy given recent pathology  #volume overload  #ROJAS (baseline Cr 1.0)    - Cr 4.0-->3.6-->3.7   #metabolic acidosis  -Nephrology c/s - no indication for RRT as of 6/30; reevaluate in AM  - PO sodium bicarb 1300mg q8  - s/p 24hr of sodium bicarbonate infusion 6/26  -1 amp sodium bicarb given in OR 6/30, 1 amp given postop 6/30 night   #Hyperkalemia     -now resolved  #Hyperphosphatemia    -Phoslo 1334mg q8    Labs:          BUN/Cr- 111/3.6  -->,  111/3.7  -->          Electrolytes-Na -- // K -- // Mg 2.2 //  Phos 9.2 (07-01 @ 02:27)  #urine output  - IV maintenance fluids: NS @ 75 cc/hr  -0 UOP post-op, 1L NS bolus given 6/30 night     HEME/ONC:   #new onset Afib    - holding heparin infusion in setting of hematochezia     Labs: DVT propylaxis-heparin      Labs:          BUN/Cr- 111/3.6  -->,  111/3.7  -->          Electrolytes-Na -- // K -- // Mg 2.2 //  Phos 9.2 (07-01 @ 02:27)    T&S:ABO RH Interpretation; expires 07/02    #H/o Fe Deficiency anemia    - Iron supplementation  #DVT prophylaxis  -SCDs, heparin subQ   T&S Expires 7/2  Blood Consent- in chart     ID:  #Leukocytosis 2/2 sepsis   WBC- 28.59  --->>,  33.91  --->>,  41.35  --->>  Temp trend- 24hrs T(F): 96 (07-01 @ 00:00), Max: 99.1 (06-30 @ 04:00)  #antibiotics    -Levaquin 500mg q48 (6/21-    -Meropenem 500q12 (6/20-    -As per ID: no need for Vancomycin or Daptomycin in the absence of MRSA    -discontinued caspofungin (as per ID, since fungitell is unremarkable)  #Cultures    L PCN cyto-pathology  6/25: results suggestive of a necrotic neoplasm    Funtitell 6/26: 47    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    BAL 6/24: moderate yeast     UCx 6/19: negative FINAL      Left PCN aspiration 6/18: stenotrophomonas maltophilia  Needs source control    ENDO:  #Glucose monitoring    -A1c (5/7/23): 5.0    -POCT q6 hours while NPO   #Adrenal Insufficiency 2/2 to sepsis     -completed solucortef taper     -Cortisol level - binding globulin 1.1 (low), serum cortisol, 84, free cortisol 76, % free cortisol 91    MSK:  #wound care  Sacrococcygeal DTI: wound care following- cleanse with soap and water, apply triad, gauze, and allevyn BID and PRN for soiling   Venous ulcer right lower leg, R forearm skin tear: Pat dry, apply Xeroform, nonadherent dressing, wrap with Kerlix twice a day, prn for soiling  #Activity    -Advance as tolerated    -PT/rehab once extubated    -Globally deconditioned    LINES/DRAINS:   PIV  Bobby (6/18)  RLQ 16Fr Javier Drain (6/18),   LT PCN 26Fr (6/18)   Left basilic midline  6/21  Right axillary a line (6/30)  Right Radial [place] (6/22-6/30)  L radial [place] (6/18-6/22)  L IJ TLC (6/26)  ETT  OGT    ADVANCED DIRECTIVES:  Full Code  HCP/Emergency Contact- Tracey Adames: [phone]   Palliative following. Last GOC discussion 6/26 day.    INDICATION FOR SICU: New onset atrial fibrillation w/ mechanical ventilation    DISPO: SICU   Assessment & Plan:  67M w/ PMH of HTN, grade IV hydronephrosis of L kidney s/p L PCN (placed 5/2023), stutter, hiatal hernia, iron deficiency anemia, H Pylori (untreated), and gastric mass (never biopsied) presents to the ED with at least 4 days of worsening weakness, abdominal distension, and no output from his PCN. Admitted with emphysematous pyelonephritis     (6/18): s/p LT PCN upsizing to 16Fr and 16Fr RLQ drain placement with IR   (6/22): s/p shock lithotripsy of L kidney abscess cavity with drainage of thick contents, upsize of drain to 26Fr bobby catheter  (6/30): s/p ex-lap and L nephrectomy with abdominal washout    NEURO:  #Pain control    -APAP PRN    -Gabapentin 200 q 8     -fent 25 q 3 PRN while intubated  #Sedation    -precedex gtt   -following commands when off sedation    RESP:  #acute respiratory failure with left lung mucous plugging (intubated 6/24)    - LL @ 23, 7.5Fr    - 6/24 s/p bronchoscopy secondary to left maintstem mucus plug    RR (machine): 18, TV (machine): 450, FiO2: 70, PEEP: 8  07-01 @ 04:58--7.25 / 32 / 114 / 14 / 98.5  O2 98.5  Lac 3.20    06-30 @ 21:56--7.25 / 35 / 128 / 15 / 99.5  O2 99.5  Lac 3.70      #Pulmonary nodules / Mediastinal lymphadenopathy    - CT Chest: Right middle and upper lobe solid pulmonary nodules, f/u outpatient pulm   #Activity   - incr as tolerated    CARDS:   #Septic shock    - Phenylephrine and Levophed; titrate to MAP > 65  #New onset Afib w/ RVR with hypotension -- likely 2/2 sepsis    - Metoprolol 2.5 q6 IV  -Digoxin 250mcg q6 load (x4 doses)    - Digoxin level 2.8, f/u 7/1 AM     - rate better controlled    - Cardiology following     #H/O HTN    -Amlodipine 2.5mg HOLDING   Imaging    - ECHO: (6/19) EF 55-60%, mild AS, mild LAE, mild MR/TR, trivial pericardial effusion    GI/NUTR:  #Diet    NPO except meds; Trickle tube feeds (Nepro @ 20cc/hr) held; f/u restart in AM  #bowel movements    - 2 bloody BM's noted overnight 6/25; last BM 6/26 AM  - GI consult 6/26- Plan for EGD with EUS + FNA as outpatient, H-pylori treatment after resolution of acute issues   -dignishield in place   -FOBT positive    #GI PPX  -IV Protonix 40 q12 hours  #Gastric mass vs gastrohepatic lymphadenopathy    -CT A/P: lesser curvature mass , Hepatic hypodensities  #Splenic Hypodensities, infarct vs phlegmon/abscess    -CT A/P:  Wedge-shaped region of hypodensity within the spleen    /RENAL:   #Grade IV L hydronephrosis s/p L PCN (5/2023) -- presented to ED with thick dark foul smelling output  #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  -intermittent suction/irrigation per urology; f/u cx and cytology from 6/25     -(6/23) s/p LT PCN upsizing to 26Fr catheter, RLQ 16 Fr drain remains, s/p nephrostolithotomy w/ lithotripsy    -(6/18) s/p LT PCN upsizing to 16Fr and 16Fr RLQ drain placement with IR, flush q8 hr  (6/30): s/p L nephrectomy and abdominal washout     High suspicion of renal malignancy given recent pathology  #volume overload  #ROJAS (baseline Cr 1.0)    - Cr 4.0-->3.6-->3.7   #metabolic acidosis  -Nephrology c/s -rec bumex 1mg, starting bicarb gtt 3 amp @75cc  - PO sodium bicarb 1300mg q8  - s/p 24hr of sodium bicarbonate infusion 6/26  -1 amp sodium bicarb given in OR 6/30, 1 amp given postop 6/30 night   #Hyperkalemia     -now resolved  #Hyperphosphatemia    -Phoslo 1334mg q8    Labs:          BUN/Cr- 111/3.6  -->,  111/3.7  -->          Electrolytes-Na -- // K -- // Mg 2.2 //  Phos 9.2 (07-01 @ 02:27)  #urine output  - IV maintenance fluids: NS @ 75 cc/hr  -0 UOP post-op, 1L NS bolus given 6/30 night     HEME/ONC:   #new onset Afib    - holding heparin infusion in setting of hematochezia     Labs: DVT propylaxis-heparin      Labs:          BUN/Cr- 111/3.6  -->,  111/3.7  -->          Electrolytes-Na -- // K -- // Mg 2.2 //  Phos 9.2 (07-01 @ 02:27)    T&S:ABO RH Interpretation; expires 07/02    #H/o Fe Deficiency anemia    - Iron supplementation  #DVT prophylaxis  -SCDs, heparin subQ   T&S Expires 7/2  Blood Consent- in chart     ID:  #Leukocytosis 2/2 sepsis   WBC- 28.59  --->>,  33.91  --->>,  41.35  --->>  Temp trend- 24hrs T(F): 96 (07-01 @ 00:00), Max: 99.1 (06-30 @ 04:00)  #antibiotics    -Levaquin 500mg q48 (6/21-    -Meropenem 500q12 (6/20-    -As per ID: no need for Vancomycin or Daptomycin in the absence of MRSA    -discontinued caspofungin (as per ID, since fungitell is unremarkable)  #Cultures    L PCN cyto-pathology  6/25: results suggestive of a necrotic neoplasm    Funtitell 6/26: 47    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    BAL 6/24: moderate yeast     UCx 6/19: negative FINAL      Left PCN aspiration 6/18: stenotrophomonas maltophilia  Needs source control    ENDO:  #Glucose monitoring    -A1c (5/7/23): 5.0    -POCT q6 hours while NPO   #Adrenal Insufficiency 2/2 to sepsis     -completed solucortef taper     -Cortisol level - binding globulin 1.1 (low), serum cortisol, 84, free cortisol 76, % free cortisol 91    MSK:  #wound care  Sacrococcygeal DTI: wound care following- cleanse with soap and water, apply triad, gauze, and allevyn BID and PRN for soiling   Venous ulcer right lower leg, R forearm skin tear: Pat dry, apply Xeroform, nonadherent dressing, wrap with Kerlix twice a day, prn for soiling  #Activity    -Advance as tolerated    -PT/rehab once extubated    -Globally deconditioned    LINES/DRAINS:   PIV  Bobby (6/18)  RLQ 16Fr Javier Drain (6/18),   LT PCN 26Fr (6/18)   Left basilic midline  6/21  Right axillary a line (6/30)  Right Radial [place] (6/22-6/30)  L radial [place] (6/18-6/22)  L IJ TLC (6/26)  ETT  OGT    ADVANCED DIRECTIVES:  Full Code  HCP/Emergency Contact- Tracey Adames: [phone]   Palliative following. Last GOC discussion 6/26 day.    INDICATION FOR SICU: New onset atrial fibrillation w/ mechanical ventilation    DISPO: SICU   Assessment & Plan:  67M w/ PMH of HTN, grade IV hydronephrosis of L kidney s/p L PCN (placed 5/2023), stutter, hiatal hernia, iron deficiency anemia, H Pylori (untreated), and gastric mass (never biopsied) presents to the ED with at least 4 days of worsening weakness, abdominal distension, and no output from his PCN. Admitted with emphysematous pyelonephritis     (6/18): s/p LT PCN upsizing to 16Fr and 16Fr RLQ drain placement with IR   (6/22): s/p shock lithotripsy of L kidney abscess cavity with drainage of thick contents, upsize of drain to 26Fr bobby catheter  (6/30): s/p ex-lap and L nephrectomy with abdominal washout    NEURO:  #Pain control    -APAP PRN    -Gabapentin 200 q 8     -fent 25 q 3 PRN while intubated  #Sedation    -precedex gtt   -following commands when off sedation    RESP:  #acute respiratory failure with left lung mucous plugging (intubated 6/24)    - LL @ 23, 7.5Fr    - 6/24 s/p bronchoscopy secondary to left maintstem mucus plug    RR (machine): 18, TV (machine): 450, FiO2: 70, PEEP: 8      #Pulmonary nodules / Mediastinal lymphadenopathy    - CT Chest: Right middle and upper lobe solid pulmonary nodules, f/u outpatient pulm   #Activity   - incr as tolerated    CARDS:   #Septic shock    - off levo since 3am 7/1  -weaning taj, added levophed  #New onset Afib w/ RVR with hypotension -- likely 2/2 sepsis    - Metoprolol 2.5 q6 IV  -Digoxin 250mcg x1 dose, 125mcg x1 7/1, f/u level in am    - rate better controlled    - Cardiology following     #H/O HTN    -Amlodipine 2.5mg HOLDING   Imaging    - ECHO: (6/19) EF 55-60%, mild AS, mild LAE, mild MR/TR, trivial pericardial effusion    GI/NUTR:  #Diet    NPO except meds; holding TF due to pressor requirement   #bowel movements    - 2 bloody BM's noted overnight 6/25; last BM 6/26 AM  - GI consult 6/26- Plan for EGD with EUS + FNA as outpatient, H-pylori treatment after resolution of acute issues   -dignishield in place   -FOBT positive    #GI PPX  -IV Protonix 40 q12 hours  #Gastric mass vs gastrohepatic lymphadenopathy    -CT A/P: lesser curvature mass , Hepatic hypodensities  #Splenic Hypodensities, infarct vs phlegmon/abscess    -CT A/P:  Wedge-shaped region of hypodensity within the spleen    /RENAL:   #Grade IV L hydronephrosis s/p L PCN (5/2023) -- presented to ED with thick dark foul smelling output  #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  -intermittent suction/irrigation per urology; f/u cx and cytology from 6/25     -(6/23) s/p LT PCN upsizing to 26Fr catheter, RLQ 16 Fr drain remains, s/p nephrostolithotomy w/ lithotripsy    -(6/18) s/p LT PCN upsizing to 16Fr and 16Fr RLQ drain placement with IR, flush q8 hr  (6/30): s/p L nephrectomy and abdominal washout     High suspicion of renal malignancy given recent pathology  #volume overload  #ROJAS (baseline Cr 1.0)    - Cr 4.0-->3.6-->3.7   #metabolic acidosis  -Nephrology c/s - bumex 2mg x1, restart bicarb gtt, will re-eval for HD 7/2  - PO sodium bicarb 1300mg q8  - s/p 24hr of sodium bicarbonate infusion 6/26  -1 amp sodium bicarb given in OR 6/30, 1 amp given postop 6/30 night   #Hyperkalemia     -now resolved  #Hyperphosphatemia    -Phoslo 1334mg q8    Labs:          BUN/Cr- 111/3.6  -->,  111/3.7  -->121/3.9          Electrolytes-  #urine output  -bicarb gtt  -s/p Bumex 2mg x1, no response     HEME/ONC:   #new onset Afib    - holding heparin infusion in setting of hematochezia     Labs: DVT propylaxis- HSQ     Hb/Hct:  12.4/37.8  -->,  12.8/38.9  -->,  12.3/37.2  -->  Plts:  146  -->,  242  -->,  217  -->    T&S:ABO RH Interpretation; expires 07/02    #H/o Fe Deficiency anemia    - Iron supplementation  #DVT prophylaxis  -SCDs, heparin subQ   T&S Expires 7/2  Blood Consent- in chart     ID:  #Leukocytosis 2/2 sepsis   WBC- 28.59  --->>,  33.91  --->>,  41.35  --->>  afebrile   #antibiotics    -Levaquin 500mg q48 (6/21-    -Meropenem 500q12 (6/20-    -As per ID: no need for Vancomycin or Daptomycin in the absence of MRSA    -discontinued caspofungin (as per ID, since fungitell is unremarkable)  #Cultures    L PCN cyto-pathology  6/25: results suggestive of a necrotic neoplasm    Funtitell 6/26: 47    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    BAL 6/24: moderate yeast     UCx 6/19: negative FINAL      Left PCN aspiration 6/18: stenotrophomonas maltophilia  Needs source control    ENDO:  #Glucose monitoring    -A1c (5/7/23): 5.0    -POCT q6 hours while NPO   #Adrenal Insufficiency 2/2 to sepsis     -completed solucortef taper     -Cortisol level - binding globulin 1.1 (low), serum cortisol, 84, free cortisol 76, % free cortisol 91    MSK:  #wound care  Sacrococcygeal DTI: wound care following- cleanse with soap and water, apply triad, gauze, and allevyn BID and PRN for soiling   Venous ulcer right lower leg, R forearm skin tear: Pat dry, apply Xeroform, nonadherent dressing, wrap with Kerlix twice a day, prn for soiling  #Activity    -Advance as tolerated    -PT/rehab once extubated    -Globally deconditioned  #b/l UE, LE mottled skin  -no radial pulses b/l  +ulnar, brachial b/l UE, +DP b/l  -Arterial duplex done, pending read     LINES/DRAINS:   PIV  Bobby (6/18)  L eleazar drain (6/30)  R Lerma pouch #1, pelvic drain #2 (6/30)  Left basilic midline  6/21  Right axillary a line (6/30)  Right Radial [place] (6/22-6/30)  L radial [place] (6/18-6/22)  L IJ TLC (6/26)  ETT  OGT    ADVANCED DIRECTIVES:  Full Code  HCP/Emergency Contact- Tracey Rosangela: [phone]   Palliative following. Last GOC discussion 6/26 day.    INDICATION FOR SICU: New onset atrial fibrillation w/ mechanical ventilation    DISPO: SICU

## 2023-07-01 NOTE — PROGRESS NOTE ADULT - ASSESSMENT
A/P 68y/o M a/w retroperitoneal and intraabdominal abscess s/p IR placement of drain and LEFT PCN 6/18, s/p percutaneous drainage of LEFT renal abscess POD #8 with left 26fr flank catheter in place,   s/p Left radical nephrectomy, Ex-Lap POD#1    continue management per SICU   continue close monitoring   pt seen with surgery team

## 2023-07-01 NOTE — PROGRESS NOTE ADULT - ASSESSMENT
68y/o M a/w retroperitoneal and intraabdominal abscess s/p IR placement of drain and LEFT PCN 6/18, s/p percutaneous drainage of LEFT renal abscess POD #8 with left 26fr flank catheter in place, s/p Left radical nephrectomy, Ex-Lap POD#1    - Case discussed with Dr. Vásquez, plan as per attending   - Continue IV Abx as per ID  - F/u intraop cx  - Strict I&Os, monitor drain outputs   - Continue present SICU care .

## 2023-07-01 NOTE — PROGRESS NOTE ADULT - SUBJECTIVE AND OBJECTIVE BOX
UROLOGY: Pt is a 68y/o M a/w retroperitoneal and intraabdominal abscess s/p IR placement of drain and LEFT PCN 6/18, s/p percutaneous drainage of LEFT renal abscess POD #8 with left 26fr flank catheter in place, s/p Left radical nephrectomy, Ex-Lap POD#1. Patient seen and examined at bedside. No acute events overnight. Remains on pressors.     REVIEW OF SYSTEMS   [ x ] Due to altered mental status/intubation, subjective information were not able to be obtained from patient. History was obtained, to the extent possible, from review of the chart and collateral sources of information.    Vital Signs Last 24 Hrs  T(C): 35.7 (01 Jul 2023 11:00), Max: 37.3 (30 Jun 2023 15:33)  T(F): 96.3 (01 Jul 2023 11:00), Max: 98.6 (30 Jun 2023 16:00)  HR: 99 (01 Jul 2023 11:00) (79 - 144)  BP: 99/56 (01 Jul 2023 01:15) (33/16 - 131/85)  BP(mean): 72 (01 Jul 2023 01:15) (21 - 104)  RR: 20 (01 Jul 2023 11:00) (1 - 25)  SpO2: 99% (01 Jul 2023 11:00) (71% - 100%)    PHYSICAL EXAM:  GEN: Intubated/sedated w/ ETT on pressors  SKIN: Good color, non diaphoretic.  HEENT: NC/AT.  RESP: on ventilator  CARDIO: +S1/S2  ABDO: Soft, improved edema,  no palpable bladder, +#2 Pelvic eleazar drain with serosanguinous fluid, #1 Lerma pouch eleazar drain with serosanguinous fluid   BACK: No CVAT B/L, +#3 LEFT kidney eleazar drain with serosanguinous drainage  : + Penoscrotal edema  + Indwelling bobby in place, draining slightly hematuric urine    LABS:             12.3   41.35 )-----------( 217      ( 01 Jul 2023 02:27 )             37.2     136  |  102  |  121<HH>  ----------------------------<  160<H>  5.0   |  14<L>  |  3.9<H>    Ca    7.0<L>      01 Jul 2023 02:27  Phos  9.2     07-01  Mg     2.2     07-01  TPro  3.8<L>  /  Alb  2.0<L>  /  TBili  1.3<H>  /  DBili  x   /  AST  45<H>  /  ALT  11  /  AlkPhos  86  06-30  PT/INR - ( 30 Jun 2023 20:10 )   PT: 17.90 sec;   INR: 1.55 ratio    PTT - ( 30 Jun 2023 20:10 )  PTT:26.7 sec

## 2023-07-01 NOTE — PROGRESS NOTE ADULT - SUBJECTIVE AND OBJECTIVE BOX
Procedure: s/p ex lap radical left nephrectomy  POD#1    PAST MEDICAL & SURGICAL HISTORY:      24H EVENTS    Vital Signs Last 24 Hrs  T(C): 36.1 (2023 12:00), Max: 37.3 (2023 15:33)  T(F): 96.9 (2023 12:00), Max: 98.6 (2023 16:00)  HR: 98 (2023 12:30) (79 - 144)  BP: 99/56 (2023 01:15) (33/16 - 129/61)  BP(mean): 72 (2023 01:15) (21 - 90)  RR: 20 (2023 12:30) (1 - 25)  SpO2: 96% (2023 12:30) (71% - 100%)    Parameters below as of 2023 12:30  Patient On (Oxygen Delivery Method): ventilator    O2 Concentration (%): 40    Mode: AC/ CMV (Assist Control/ Continuous Mandatory Ventilation)  RR (machine): 18  TV (machine): 450  FiO2: 70  PEEP: 8  ITime: 1  MAP: 12  PIP: 28      I&O's Detail    2023 07:  -  2023 07:00  --------------------------------------------------------  IN:    Dexmedetomidine: 191 mL    Dexmedetomidine: 135.9 mL    IV PiggyBack: 100 mL    IV PiggyBack: 100 mL    IV PiggyBack: 50 mL    Norepinephrine: 35.1 mL    Phenylephrine: 57 mL    Phenylephrine: 358.5 mL    sodium chloride 0.9%: 750 mL    sodium chloride 0.9%: 750 mL    Sodium Chloride 0.9% Bolus: 1000 mL  Total IN: 3527.5 mL    OUT:    Drain (mL): 79 mL    Drain (mL): 80 mL    Drain (mL): 130 mL    Drain (mL): 50 mL    Indwelling Catheter - Urethral (mL): 181 mL    VAC (Vacuum Assisted Closure) System (mL): 10 mL  Total OUT: 530 mL    Total NET: 2997.5 mL      2023 07:01  -  2023 13:52  --------------------------------------------------------  IN:    Dexmedetomidine: 30.6 mL    Phenylephrine: 57.4 mL    sodium chloride 0.9%: 75 mL  Total IN: 163 mL    OUT:    Indwelling Catheter - Urethral (mL): 25 mL    Norepinephrine: 0 mL  Total OUT: 25 mL    Total NET: 138 mL          Urinalysis Basic - ( 2023 02:27 )    Color: x / Appearance: x / SG: x / pH: x  Gluc: 160 mg/dL / Ketone: x  / Bili: x / Urobili: x   Blood: x / Protein: x / Nitrite: x   Leuk Esterase: x / RBC: x / WBC x   Sq Epi: x / Non Sq Epi: x / Bacteria: x                   12.3   41.35 )-----------( 217      (  @ 02:27 )             37.2                12.8   33.91 )-----------( 242      (  @ 22:09 )             38.9                12.4   28.59 )-----------( 146      (  @ 20:29 )             37.8                10.6   33.43 )-----------( 196      (  @ 10:01 )             31.3                8.2    42.21 )-----------( 259      (  @ 20:15 )             25.3                8.3    48.22 )-----------( 284      (  @ 14:26 )             25.4                    136   |  102   |  121                Ca: 7.0    BMP:   ----------------------------< 160    M.2   (23 @ 02:27)             5.0    |  14    | 3.9                Ph: 9.2      LFT:     TPro: 3.8 / Alb: 2.0 / TBili: 1.3 / DBili: x / AST: 45 / ALT: 11 / AlkPhos: 86   (23 @ 22:09)          PT/INR - ( 2023 20:10 )   PT: 17.90 sec;   INR: 1.55 ratio         PTT - ( 2023 20:10 )  PTT:26.7 sec          Urinalysis Basic - ( 2023 02:27 )    Color: x / Appearance: x / SG: x / pH: x  Gluc: 160 mg/dL / Ketone: x  / Bili: x / Urobili: x   Blood: x / Protein: x / Nitrite: x   Leuk Esterase: x / RBC: x / WBC x   Sq Epi: x / Non Sq Epi: x / Bacteria: x        ABG - ( 2023 04:58 )  pH: 7.25  /  pCO2: 32    /  pO2: 114   / HCO3: 14    / Base Excess: -12.1 /  SaO2: 98.5        MEDICATIONS  (STANDING):  acetaminophen     Tablet .. 650 milliGRAM(s) Oral every 6 hours  bacitracin   Ointment 1 Application(s) Topical two times a day  calcium acetate 1334 milliGRAM(s) Oral every 8 hours  chlorhexidine 0.12% Liquid 15 milliLiter(s) Oral Mucosa every 12 hours  chlorhexidine 2% Cloths 1 Application(s) Topical <User Schedule>  dexMEDEtomidine Infusion 0.2 MICROgram(s)/kG/Hr (3.82 mL/Hr) IV Continuous <Continuous>  digoxin     Tablet 125 MICROGram(s) Oral once  ferrous    sulfate 325 milliGRAM(s) Oral daily  gabapentin 200 milliGRAM(s) Oral every 8 hours  heparin   Injectable 5000 Unit(s) SubCutaneous every 8 hours  levoFLOXacin IVPB 500 milliGRAM(s) IV Intermittent every 48 hours  meropenem  IVPB 500 milliGRAM(s) IV Intermittent every 12 hours  metoprolol tartrate Injectable 2.5 milliGRAM(s) IV Push every 6 hours  norepinephrine Infusion 0.05 MICROgram(s)/kG/Min (3.59 mL/Hr) IV Continuous <Continuous>  pantoprazole  Injectable 40 milliGRAM(s) IV Push every 12 hours  phenylephrine    Infusion 0.1 MICROgram(s)/kG/Min (1.43 mL/Hr) IV Continuous <Continuous>  sodium bicarbonate 1300 milliGRAM(s) Oral three times a day  sodium bicarbonate  Infusion 0.147 mEq/kG/Hr (75 mL/Hr) IV Continuous <Continuous>  vasopressin Infusion 0.03 Unit(s)/Min (4.5 mL/Hr) IV Continuous <Continuous>    MEDICATIONS  (PRN):  fentaNYL    Injectable 25 MICROGram(s) IV Push every 3 hours PRN Moderate Pain (4 - 6)  sodium chloride 0.9% lock flush 10 milliLiter(s) IV Push every 1 hour PRN Pre/post blood products, medications, blood draw, and to maintain line patency  sodium chloride 0.9% lock flush 10 milliLiter(s) IV Push every 1 hour PRN Pre/post blood products, medications, blood draw, and to maintain line patency      Diet, NPO:   Except Medications (23 @ 07:19)      PHYSICAL EXAM:  General Appearance: pt in no acute respiratory distress  Chest: + air entry bilat  CV: S1, S2,  Abdomen: Soft, NT, ND drains x 3  in place   Extremities: perfused  Neuro: intubated

## 2023-07-01 NOTE — PROGRESS NOTE ADULT - SUBJECTIVE AND OBJECTIVE BOX
seen and examined  24 h events noted   intubated/ventilated  on pressors        PAST HISTORY  --------------------------------------------------------------------------------  No significant changes to PMH, PSH, FHx, SHx, unless otherwise noted    ALLERGIES & MEDICATIONS  --------------------------------------------------------------------------------  Allergies    No Known Allergies    Intolerances      Standing Inpatient Medications  acetaminophen     Tablet .. 650 milliGRAM(s) Oral every 6 hours  bacitracin   Ointment 1 Application(s) Topical two times a day  calcium acetate 1334 milliGRAM(s) Oral every 8 hours  calcium gluconate IVPB 2 Gram(s) IV Intermittent once  chlorhexidine 0.12% Liquid 15 milliLiter(s) Oral Mucosa every 12 hours  chlorhexidine 2% Cloths 1 Application(s) Topical <User Schedule>  dexMEDEtomidine Infusion 0.2 MICROgram(s)/kG/Hr IV Continuous <Continuous>  ferrous    sulfate 325 milliGRAM(s) Oral daily  gabapentin 200 milliGRAM(s) Oral every 8 hours  heparin   Injectable 5000 Unit(s) SubCutaneous every 8 hours  levoFLOXacin IVPB 500 milliGRAM(s) IV Intermittent every 48 hours  meropenem  IVPB 500 milliGRAM(s) IV Intermittent every 12 hours  metoprolol tartrate Injectable 2.5 milliGRAM(s) IV Push every 6 hours  norepinephrine Infusion 0.05 MICROgram(s)/kG/Min IV Continuous <Continuous>  pantoprazole  Injectable 40 milliGRAM(s) IV Push every 12 hours  phenylephrine    Infusion 0.1 MICROgram(s)/kG/Min IV Continuous <Continuous>  sodium bicarbonate 1300 milliGRAM(s) Oral three times a day  sodium chloride 0.9%. 1000 milliLiter(s) IV Continuous <Continuous>    PRN Inpatient Medications  fentaNYL    Injectable 25 MICROGram(s) IV Push every 3 hours PRN  sodium chloride 0.9% lock flush 10 milliLiter(s) IV Push every 1 hour PRN  sodium chloride 0.9% lock flush 10 milliLiter(s) IV Push every 1 hour PRN            VITALS/PHYSICAL EXAM  --------------------------------------------------------------------------------  T(C): 35.6 (07-01-23 @ 00:00), Max: 37.3 (06-30-23 @ 15:33)  HR: 100 (07-01-23 @ 03:30) (75 - 144)  BP: 99/56 (07-01-23 @ 01:15) (33/16 - 137/82)  RR: 18 (07-01-23 @ 03:30) (1 - 19)  SpO2: 100% (07-01-23 @ 03:30) (80% - 100%)  Wt(kg): --  Height (cm): 180 (06-30-23 @ 16:58)  Weight (kg): 76.3 (06-30-23 @ 16:58)  BMI (kg/m2): 23.5 (06-30-23 @ 16:58)  BSA (m2): 1.96 (06-30-23 @ 16:58)      06-30-23 @ 07:01  -  07-01-23 @ 07:00  --------------------------------------------------------  IN: 3001.5 mL / OUT: 515 mL / NET: 2486.5 mL      Physical Exam:  	Gen: intubated/ventilated  	Pulm:  B/L lorenzo  	CV:  S1S2; no rub  	Abd: +distended  	LE:  edema      LABS/STUDIES  --------------------------------------------------------------------------------              12.3   41.35 >-----------<  217      [07-01-23 @ 02:27]              37.2     136  |  102  |  121  ----------------------------<  160      [07-01-23 @ 02:27]  5.0   |  14  |  3.9        Ca     7.0     [07-01-23 @ 02:27]      Mg     2.2     [07-01-23 @ 02:27]      Phos  9.2     [07-01-23 @ 02:27]    TPro  3.8  /  Alb  2.0  /  TBili  1.3  /  DBili  x   /  AST  45  /  ALT  11  /  AlkPhos  86  [06-30-23 @ 22:09]    PT/INR: PT 17.90, INR 1.55       [06-30-23 @ 20:10]  PTT: 26.7       [06-30-23 @ 20:10]          [06-30-23 @ 20:10]    Creatinine Trend:  SCr 3.9 [07-01 @ 02:27]  SCr 3.7 [06-30 @ 22:09]  SCr 3.6 [06-30 @ 20:29]  SCr 4.0 [06-29 @ 20:15]  SCr 3.9 [06-28 @ 20:51]    Urinalysis - [07-01-23 @ 02:27]      Color  / Appearance  / SG  / pH       Gluc 160 / Ketone   / Bili  / Urobili        Blood  / Protein  / Leuk Est  / Nitrite       RBC  / WBC  / Hyaline  / Gran  / Sq Epi  / Non Sq Epi  / Bacteria     Urine Creatinine 57      [06-29-23 @ 20:15]  Urine Sodium <20.0      [06-29-23 @ 20:15]    Iron 12, TIBC 128, %sat 9      [05-07-23 @ 09:19]  Ferritin 526      [05-07-23 @ 09:19]  Vitamin D (25OH) 26      [05-08-23 @ 07:58]  TSH 0.45      [06-19-23 @ 14:14]       leg/hip(s)

## 2023-07-02 NOTE — PROGRESS NOTE ADULT - ASSESSMENT
67M w/ PMH of HTN, grade IV hydronephrosis of L kidney s/p L PCN (placed 5/2023), stutter, hiatal hernia, iron deficiency anemia, H Pylori (untreated), and gastric mass (never biopsied) presents to the ED with at least 4 days of worsening weakness, abdominal distension, and no output from his PCN  . Found to have septic shock, MSOF, lactic acidosis from left emphysematous hydronephrosis, pyelonephritis and possible rupture with pneumoperitoneum along with possible hepatic abscesses, splenic infarct and abdominal and mediastinal lymphadenopathies.  underwent nephrectomy and abdominal washout on 6/30    sOliguric in spite of bumex, rising dig level, eleavted lactate  ROJAS likely ATN iso septic shock  on pressors   will styart CRRT today once access obtianed  -net negative as is quite ovelroaded, though limited by pressors   monitor K, phos on CRRT  will follow  discussed w/ SICU team

## 2023-07-02 NOTE — PROGRESS NOTE ADULT - ASSESSMENT
68y/o M a/w retroperitoneal and intraabdominal abscess s/p IR placement of drain and LEFT PCN 6/18, s/p percutaneous drainage of LEFT renal abscess POD #8 with left 26fr flank catheter in place, s/p Left radical nephrectomy, Ex-Lap POD#2.    - Case discussed with Dr. Vásquez, plan as per attending   - Continue IV Abx as per ID  - F/u intraop cx - prelim negative  - Strict I&Os, monitor drain outputs   - Continue present SICU care .

## 2023-07-02 NOTE — PROGRESS NOTE ADULT - SUBJECTIVE AND OBJECTIVE BOX
CHRISTINE VILLAVICENCIO  [id number]  67y Male    Indication for ICU admission: mechanical ventilatior secondary to urosepsis     Admit Date:06-18-23  ICU Date: 6/18/23  OR Date: 6/18, 6/22, 6/30     24HRS EVENT:  NIGH:  [x] F/u ABG: Lactate 3.7 11pm from 3.2 from 5pm, continue to trend ABG  [ ] Pelvic fluid creatinine sent, f/u  [x] change levo to taj  [ ] increased Bicarb from 75 to 100  [ ] f/u AM ABG  -f/u CBC, BMP  -PM rounds b/l PT/DP signals, right radial palpable, left brachial signals, taj at 2, remains on vaso, pelvic drain 75cc, morisons pouch 5cc  [ ] HyperK+, HyperPhos, HD tomorrow  [x] Hg drop from 12.3 to 10.9 to 8.1, patient examined, VS unchanged from prior, abd soft, VENICE drain pelvic SS at around 2am, more sanguinous than serous but ligher than dark red sanguinous coloration at around 7-8pm. CBC repeat drawn, T+S obtained, walked samples to lab.       7/1   Day  digoxin 125 mcq x1 for low dig level  Bcicarb gtt as per renal  Taj @ 2 mcq  add Vaso gtt, wean Taj as tolerated  Bumex  2 x 1 as per renal, if U/O improves start Bumex gtt  mottled extremities, no radial pulses b/l, B/L UE Vascular arterial duplex   afternoon ABG 7.29, 33, 116, 16, LA 3.1, decr FIO2 60%    REVIEW OF SYSTEMS  [ ] A ten-point review of systems was otherwise negative except as noted.  [ x] Due to altered mental status/intubation, subjective information were not able to be obtained from the patient. History was obtained, to the extent possible, from review of the chart and collateral sources of information.    ----------------------------------------------------------------------------------------------------------------------  ----------------------------------------------------------------------------------------------------------------------   CHRISTINE VILLAVICENCIO  [id number]  67y Male    Indication for ICU admission: mechanical ventilatior secondary to urosepsis     Admit Date:06-18-23  ICU Date: 6/18/23  OR Date: 6/18, 6/22, 6/30     24HRS EVENT:  NIG 7/1:  [x] F/u ABG: Lactate 3.7 11pm from 3.2 from 5pm, continue to trend ABG  [ ] Pelvic fluid creatinine sent, f/u  [x] changed levo to taj  [x] increased Bicarb from 75 to 100  [ ] f/u AM ABG  [x] PM rounds b/l PT/DP signals, right radial palpable, left brachial signals, taj at 2, remains on vaso, pelvic drain 75cc, morisons pouch 5cc  [ ] HyperK+, HyperPhos, HD/CRRT tomorrow  [x] Hg drop from 12.3 to 10.9 to 8.1, patient examined, VS unchanged from prior, abd soft, VENICE drain pelvic SS at around 2am, more sanguinous than serous but ligher than dark red sanguinous coloration at around 7-8pm. CBC repeat drawn, T+S obtained, walked samples to lab.       DAY 7/1   digoxin 125 mcq x1 for low dig level  Bcicarb gtt as per renal  Taj @ 2 mcq  add Vaso gtt, wean Taj as tolerated  Bumex  2 x 1 as per renal, if U/O improves start Bumex gtt  mottled extremities, no radial pulses b/l, B/L UE Vascular arterial duplex   afternoon ABG 7.29, 33, 116, 16, LA 3.1, decr FIO2 60%    REVIEW OF SYSTEMS  [ ] A ten-point review of systems was otherwise negative except as noted.  [ x] Due to altered mental status/intubation, subjective information were not able to be obtained from the patient. History was obtained, to the extent possible, from review of the chart and collateral sources of information.    ----------------------------------------------------------------------------------------------------------------------  ----------------------------------------------------------------------------------------------------------------------   CHRISTINE VILLAVICENCIO  [id number]  67y Male    Indication for ICU admission: mechanical ventilatior secondary to urosepsis     Admit Date:06-18-23  ICU Date: 6/18/23  OR Date: 6/18, 6/22, 6/30     24HRS EVENT:  NIG 7/1:  [x] F/u ABG: Lactate 3.7 11pm from 3.2 from 5pm, continue to trend ABG  [ ] Pelvic fluid creatinine sent, f/u  [x] changed levo to taj  [x] increased Bicarb from 75 to 100  [ ] f/u AM ABG  [x] PM rounds b/l PT/DP signals, right radial palpable, left brachial signals, taj at 2, remains on vaso, pelvic drain 75cc, morisons pouch 5cc  [ ] HyperK+, HyperPhos, HD/CRRT tomorrow  [x] Hg drop from 12.3 to 10.9 to 8.1, patient examined, VS unchanged from prior, abd soft, VENICE drain pelvic SS at around 2am, more sanguinous than serous but ligher than dark red sanguinous coloration at around 7-8pm. CBC repeat drawn, T+S obtained, walked samples to lab. Resulted sample with Hg 10.8, likely dilutional drop previously. Will follow up AM/Day labs.       DAY 7/1   digoxin 125 mcq x1 for low dig level  Bcicarb gtt as per renal  Taj @ 2 mcq  add Vaso gtt, wean Taj as tolerated  Bumex  2 x 1 as per renal, if U/O improves start Bumex gtt  mottled extremities, no radial pulses b/l, B/L UE Vascular arterial duplex   afternoon ABG 7.29, 33, 116, 16, LA 3.1, decr FIO2 60%    REVIEW OF SYSTEMS  [ ] A ten-point review of systems was otherwise negative except as noted.  [ x] Due to altered mental status/intubation, subjective information were not able to be obtained from the patient. History was obtained, to the extent possible, from review of the chart and collateral sources of information.    ----------------------------------------------------------------------------------------------------------------------  ----------------------------------------------------------------------------------------------------------------------   CHRSITINE VILLAVICENCIO  [id number]  67y Male    Indication for ICU admission: mechanical ventilatior secondary to urosepsis     Admit Date:06-18-23  ICU Date: 6/18/23  OR Date: 6/18, 6/22, 6/30     24HRS EVENT:  NIG 7/1:  [x] F/u ABG: Lactate 3.7 11pm from 3.2 from 5pm, continue to trend ABG  [ ] Pelvic fluid creatinine sent, f/u  [x] changed levo to taj  [x] increased Bicarb from 75 to 100  [ ] f/u AM ABG  [x] PM rounds b/l PT/DP signals, right radial palpable, left brachial signals, taj at 2, remains on vaso, pelvic drain 75cc, morisons pouch 5cc  [ ] HyperK+, HyperPhos, possible HD/CRRT 7/2  [x] Hg drop from 12.3 to 10.9 to 8.1, patient examined, VS unchanged from prior, abd soft, VENICE drain pelvic SS at around 2am, more sanguinous than serous but ligher than dark red sanguinous coloration at around 7-8pm. CBC repeat drawn, T+S obtained, walked samples to lab. Resulted sample with Hg 10.8, likely dilutional drop previously. Will follow up AM/Day labs.       DAY 7/1   digoxin 125 mcq x1 for low dig level  Bcicarb gtt as per renal  Taj @ 2 mcq  add Vaso gtt, wean Taj as tolerated  Bumex  2 x 1 as per renal, if U/O improves start Bumex gtt  mottled extremities, no radial pulses b/l, B/L UE Vascular arterial duplex   afternoon ABG 7.29, 33, 116, 16, LA 3.1, decr FIO2 60%    REVIEW OF SYSTEMS  [ ] A ten-point review of systems was otherwise negative except as noted.  [ x] Due to altered mental status/intubation, subjective information were not able to be obtained from the patient. History was obtained, to the extent possible, from review of the chart and collateral sources of information.    ----------------------------------------------------------------------------------------------------------------------  ----------------------------------------------------------------------------------------------------------------------   CHRISTINE VILLAVICENCIO  [id number]  67y Male    Indication for ICU admission: mechanical ventilatior secondary to urosepsis     Admit Date:06-18-23  ICU Date: 6/18/23  OR Date: 6/18, 6/22, 6/30     24HRS EVENT:  NIG 7/1:  [x] F/u ABG: Lactate 3.7 11pm from 3.2 from 5pm, continue to trend ABG  [ ] Pelvic fluid creatinine sent, f/u  [x] changed levo (secondary tachycardia) to phenylephrine  [x] increased Bicarb from 75 to 100  [ ] f/u AM ABG  [x] PM rounds b/l PT/DP signals, right radial palpable, left brachial signals, taj at 2, remains on vaso, pelvic drain 75cc, morisons pouch 5cc  [ ] HyperK+, HyperPhos, possible HD/CRRT 7/2  [x] Hg drop from 12.3 to 10.9 to 8.1, patient examined, VS unchanged from prior, abd soft, VENICE drain pelvic SS at around 2am, more sanguinous than serous but ligher than dark red sanguinous coloration at around 7-8pm. CBC repeat drawn, T+S obtained, walked samples to lab. Resulted sample with Hg 10.8, likely dilutional drop previously. Will follow up AM/Day labs.       DAY 7/1   digoxin 125 mcq x1 for low dig level  Bcicarb gtt as per renal  Taj @ 2 mcq  add Vaso gtt, wean Taj as tolerated  Bumex  2 x 1 as per renal, if U/O improves start Bumex gtt  mottled extremities, no radial pulses b/l, B/L UE Vascular arterial duplex   afternoon ABG 7.29, 33, 116, 16, LA 3.1, decr FIO2 60%    REVIEW OF SYSTEMS  [ ] A ten-point review of systems was otherwise negative except as noted.  [ x] Due to altered mental status/intubation, subjective information were not able to be obtained from the patient. History was obtained, to the extent possible, from review of the chart and collateral sources of information.    ----------------------------------------------------------------------------------------------------------------------  ----------------------------------------------------------------------------------------------------------------------

## 2023-07-02 NOTE — PROGRESS NOTE ADULT - SUBJECTIVE AND OBJECTIVE BOX
GENERAL SURGERY PROGRESS NOTE     CHRISTINE VILLAVICENCIO  68y  Male  Hospital day :14d  POD:2d  Procedure: Insertion, arterial line, percutaneous    Central venous catheter placement with ultrasound guidance    Midline catheter insertion    Nephrostolithotomy, percutaneous, with lithotripsy, large stone    Change external ureteral stent    Nephrostogram    US guided vascular access    Insertion of arterial line with imaging guidance    Bronchoscopy, flexible, adult    Partial nephrectomy    Nephrostogram via existing catheter    Exploratory laparotomy    Left nephrectomy      OVERNIGHT EVENTS: Low UOP treated w/ Bumex, did not increase. Urine sanguinous. Abd drains x3 w/ Serosanguinous output. WBC downtrending on Levaquin and Ar.     T(F): 97.7 (07-01-23 @ 20:00), Max: 98.2 (07-01-23 @ 16:00)  HR: 89 (07-02-23 @ 02:12) (80 - 122)  BP: --  ABP: 125/73 (07-01-23 @ 23:00) (93/65 - 134/86)  ABP(mean): 90 (07-01-23 @ 23:00) (75 - 108)  RR: 11 (07-01-23 @ 23:00) (10 - 25)  SpO2: 99% (07-02-23 @ 02:12) (71% - 100%)    DIET/FLUIDS: calcium acetate 1334 milliGRAM(s) Oral every 8 hours  ferrous    sulfate 325 milliGRAM(s) Oral daily  sodium bicarbonate 1300 milliGRAM(s) Oral three times a day  sodium bicarbonate  Infusion 0.197 mEq/kG/Hr IV Continuous <Continuous>    NG:                                                                                DRAINS:   06-30-23 @ 07:01  -  07-01-23 @ 07:00  --------------------------------------------------------  OUT: 349 mL      BM:     EMESIS:     URINE:   06-30-23 @ 07:01  -  07-01-23 @ 07:00  --------------------------------------------------------  OUT: 181 mL     Indwelling Urethral Catheter:     Connect To:  Straight Drainage/Gravity    Indication:  Urine Output Monitoring in Critically Ill (07-01-23 @ 10:15)    GI proph:  pantoprazole  Injectable 40 milliGRAM(s) IV Push every 12 hours    AC/ proph: heparin   Injectable 5000 Unit(s) SubCutaneous every 8 hours    ABx: levoFLOXacin IVPB 500 milliGRAM(s) IV Intermittent every 48 hours  meropenem  IVPB 500 milliGRAM(s) IV Intermittent every 12 hours      PHYSICAL EXAM:  GENERAL: arousable, sitting upright in bed  CHEST/LUNG: equal chest rise b/l, intubated.   HEART: Regular rate   ABDOMEN: Soft, Nontender, Nondistended; incisions c/d/i  EXTREMITIES:  No clubbing, cyanosis, or edema      LABS  Labs:  CAPILLARY BLOOD GLUCOSE      POCT Blood Glucose.: 154 mg/dL (01 Jul 2023 19:13)  POCT Blood Glucose.: 131 mg/dL (01 Jul 2023 13:38)  POCT Blood Glucose.: 178 mg/dL (01 Jul 2023 06:43)                          8.1    26.86 )-----------( x        ( 02 Jul 2023 00:35 )             23.9         07-01    136  |  102  |  125<HH>  ----------------------------<  173<H>  5.2<H>   |  16<L>  |  3.9<H>      Calcium: 6.8 mg/dL (07-01-23 @ 20:21)      LFTs:             3.8  | 1.3  | 45       ------------------[86      ( 30 Jun 2023 22:09 )  2.0  | x    | 11          Lipase:x      Amylase:x         Blood Gas Arterial, Lactate: 3.70 mmol/L (07-01-23 @ 22:31)  Blood Gas Arterial, Lactate: 3.10 mmol/L (07-01-23 @ 17:13)  Blood Gas Arterial, Lactate: 3.20 mmol/L (07-01-23 @ 04:58)  Lactate, Blood: 5.3 mmol/L (07-01-23 @ 02:27)  Blood Gas Arterial, Lactate: 3.70 mmol/L (06-30-23 @ 21:56)  Blood Gas Arterial, Lactate: 3.60 mmol/L (06-30-23 @ 06:37)  Blood Gas Arterial, Lactate: 1.70 mmol/L (06-29-23 @ 04:19)    ABG - ( 01 Jul 2023 22:31 )  pH: 7.32  /  pCO2: 32    /  pO2: 100   / HCO3: 16    / Base Excess: -8.6  /  SaO2: 99.6            ABG - ( 01 Jul 2023 17:13 )  pH: 7.29  /  pCO2: 33    /  pO2: 116   / HCO3: 16    / Base Excess: -9.6  /  SaO2: 99.9            ABG - ( 01 Jul 2023 04:58 )  pH: 7.25  /  pCO2: 32    /  pO2: 114   / HCO3: 14    / Base Excess: -12.1 /  SaO2: 98.5              Coags:     17.90  ----< 1.55    ( 30 Jun 2023 20:10 )     26.7                Urinalysis Basic - ( 01 Jul 2023 20:21 )    Color: x / Appearance: x / SG: x / pH: x  Gluc: 173 mg/dL / Ketone: x  / Bili: x / Urobili: x   Blood: x / Protein: x / Nitrite: x   Leuk Esterase: x / RBC: x / WBC x   Sq Epi: x / Non Sq Epi: x / Bacteria: x        Culture - Surgical Swab (collected 30 Jun 2023 22:12)  Source: .Surgical Swab None  Preliminary Report (01 Jul 2023 19:53):    No growth to date.          RADIOLOGY & ADDITIONAL TESTS:  Xray Chest 1 View-PORTABLE IMMEDIATE (07.01.23 @ 14:52)  IMPRESSION:  Unchanged lines and tubes.    Right basal pleural-parenchymal opacities without significant change. No   pneumothorax. Stable cardiomediastinal silhouette.    Unchanged osseous structures.

## 2023-07-02 NOTE — PROCEDURE NOTE - NSINFORMCONSENT_GEN_A_CORE
From daughter, in chart/Benefits, risks, and possible complications of procedure explained to patient/caregiver who verbalized understanding and gave written consent.

## 2023-07-02 NOTE — PROGRESS NOTE ADULT - ASSESSMENT
Assessment & Plan:  67M w/ PMH of HTN, grade IV hydronephrosis of L kidney s/p L PCN (placed 5/2023), stutter, hiatal hernia, iron deficiency anemia, H Pylori (untreated), and gastric mass (never biopsied) presents to the ED with at least 4 days of worsening weakness, abdominal distension, and no output from his PCN. Admitted with emphysematous pyelonephritis     (6/18): s/p LT PCN upsizing to 16Fr and 16Fr RLQ drain placement with IR   (6/22): s/p shock lithotripsy of L kidney abscess cavity with drainage of thick contents, upsize of drain to 26Fr bobby catheter  (6/30): s/p ex-lap and L nephrectomy with abdominal washout    NEURO:  #Pain control    -APAP PRN    -Gabapentin 200 q 8     -fent 25 q 3 PRN while intubated  #Sedation    -precedex gtt   -following commands when off sedation    RESP:  #acute respiratory failure with left lung mucous plugging (intubated 6/24)    - LL @ 23, 7.5Fr    - 6/24 s/p bronchoscopy secondary to left maintstem mucus plug    RR (machine): 18, TV (machine): 450, FiO2: 70, PEEP: 8      #Pulmonary nodules / Mediastinal lymphadenopathy    - CT Chest: Right middle and upper lobe solid pulmonary nodules, f/u outpatient pulm   #Activity   - incr as tolerated    CARDS:   #Septic shock    - off levo since 3am 7/1  -weaning taj, added levophed  #New onset Afib w/ RVR with hypotension -- likely 2/2 sepsis    - Metoprolol 2.5 q6 IV  -Digoxin 250mcg x1 dose, 125mcg x1 7/1, f/u level in am    - rate better controlled    - Cardiology following     #H/O HTN    -Amlodipine 2.5mg HOLDING   Imaging    - ECHO: (6/19) EF 55-60%, mild AS, mild LAE, mild MR/TR, trivial pericardial effusion    GI/NUTR:  #Diet    NPO except meds; holding TF due to pressor requirement   #bowel movements    - 2 bloody BM's noted overnight 6/25; last BM 6/26 AM  - GI consult 6/26- Plan for EGD with EUS + FNA as outpatient, H-pylori treatment after resolution of acute issues   -dignishield in place   -FOBT positive    #GI PPX  -IV Protonix 40 q12 hours  #Gastric mass vs gastrohepatic lymphadenopathy    -CT A/P: lesser curvature mass , Hepatic hypodensities  #Splenic Hypodensities, infarct vs phlegmon/abscess    -CT A/P:  Wedge-shaped region of hypodensity within the spleen    /RENAL:   #Grade IV L hydronephrosis s/p L PCN (5/2023) -- presented to ED with thick dark foul smelling output  #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  -intermittent suction/irrigation per urology; f/u cx and cytology from 6/25     -(6/23) s/p LT PCN upsizing to 26Fr catheter, RLQ 16 Fr drain remains, s/p nephrostolithotomy w/ lithotripsy    -(6/18) s/p LT PCN upsizing to 16Fr and 16Fr RLQ drain placement with IR, flush q8 hr  (6/30): s/p L nephrectomy and abdominal washout     High suspicion of renal malignancy given recent pathology  #volume overload  #ROJAS (baseline Cr 1.0)    - Cr 4.0-->3.6-->3.7   #metabolic acidosis  -Nephrology c/s - bumex 2mg x1, restart bicarb gtt, will re-eval for HD 7/2  - PO sodium bicarb 1300mg q8  - s/p 24hr of sodium bicarbonate infusion 6/26  -1 amp sodium bicarb given in OR 6/30, 1 amp given postop 6/30 night   #Hyperkalemia     -now resolved  #Hyperphosphatemia    -Phoslo 1334mg q8    Labs:          BUN/Cr- 111/3.6  -->,  111/3.7  -->121/3.9          Electrolytes-  #urine output  -bicarb gtt  -s/p Bumex 2mg x1, no response     HEME/ONC:   #new onset Afib    - holding heparin infusion in setting of hematochezia     Labs: DVT propylaxis- HSQ     Hb/Hct:  12.4/37.8  -->,  12.8/38.9  -->,  12.3/37.2  -->  Plts:  146  -->,  242  -->,  217  -->    T&S:ABO RH Interpretation; expires 07/02    #H/o Fe Deficiency anemia    - Iron supplementation  #DVT prophylaxis  -SCDs, heparin subQ   T&S Expires 7/2  Blood Consent- in chart     ID:  #Leukocytosis 2/2 sepsis   WBC- 28.59  --->>,  33.91  --->>,  41.35  --->>  afebrile   #antibiotics    -Levaquin 500mg q48 (6/21-    -Meropenem 500q12 (6/20-    -As per ID: no need for Vancomycin or Daptomycin in the absence of MRSA    -discontinued caspofungin (as per ID, since fungitell is unremarkable)  #Cultures    L PCN cyto-pathology  6/25: results suggestive of a necrotic neoplasm    Funtitell 6/26: 47    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    BAL 6/24: moderate yeast     UCx 6/19: negative FINAL      Left PCN aspiration 6/18: stenotrophomonas maltophilia  Needs source control    ENDO:  #Glucose monitoring    -A1c (5/7/23): 5.0    -POCT q6 hours while NPO   #Adrenal Insufficiency 2/2 to sepsis     -completed solucortef taper     -Cortisol level - binding globulin 1.1 (low), serum cortisol, 84, free cortisol 76, % free cortisol 91    MSK:  #Sacrococcygeal DTI: wound care following- cleanse with soap and water, apply triad, #gauze, and allevyn BID and PRN for soiling   Venous ulcer right lower leg, R forearm skin tear: Pat dry, apply Xeroform, nonadherent dressing, wrap with Kerlix twice a day, prn for soiling  #Activity    -Advance as tolerated    -PT/rehab once extubated    -Globally deconditioned  #b/l UE, LE mottled skin  -no radial pulses b/l  +ulnar, brachial b/l UE, +DP b/l  -Arterial duplex done, pending read     LINES/DRAINS:   PIV  Bobby (6/18)  L eleazar drain (6/30)  L IJ TLC (6/26)  R Lerma pouch #1, pelvic drain #2 (6/30)  Left basilic midline  6/21  Right axillary a line (6/30)  Right Radial [place] (6/22-6/30)  L radial [place] (6/18-6/22)    ETT  OGT    ADVANCED DIRECTIVES:  Full Code  HCP/Emergency Contact- Tracey Adames: [phone]   Palliative following. Last GOC discussion 6/26 day.    INDICATION FOR SICU: New onset atrial fibrillation w/ mechanical ventilation    DISPO: SICU       Assessment & Plan:  67M w/ PMH of HTN, grade IV hydronephrosis of L kidney s/p L PCN (placed 5/2023), stutter, hiatal hernia, iron deficiency anemia, H Pylori (untreated), and gastric mass (never biopsied) presents to the ED with at least 4 days of worsening weakness, abdominal distension, and no output from his PCN. Admitted with emphysematous pyelonephritis     (6/18): s/p LT PCN upsizing to 16Fr and 16Fr RLQ drain placement with IR   (6/22): s/p shock lithotripsy of L kidney abscess cavity with drainage of thick contents, upsize of drain to 26Fr bobby catheter  (6/30): s/p ex-lap and L nephrectomy with abdominal washout    NEURO:  #Pain control    -APAP PRN    -Gabapentin 200 q 8     -fent 25 q 3 PRN while intubated  #Sedation    -precedex gtt    -following commands when off sedation    RESP:  #acute respiratory failure with left lung mucous plugging (intubated 6/24)    - LL @ 23, 7.5Fr    - 6/24 s/p bronchoscopy secondary to left maintstem mucus plug    RR (machine): 18, TV (machine): 450, FiO2: 70, PEEP: 8      #Pulmonary nodules / Mediastinal lymphadenopathy    - CT Chest: Right middle and upper lobe solid pulmonary nodules, f/u outpatient pulm   #Activity    - incr as tolerated    CARDS:   #Septic shock    -Trial of levophed (off josh), patient with resulting tachycardia, switched back to Josh, dc'd Levo  #New onset Afib w/ RVR with hypotension -- likely 2/2 sepsis    - Metoprolol 2.5 q6 IV  -Digoxin 250mcg x1 dose, 125mcg x1 7/1, f/u level in am    - rate better controlled    - Cardiology following     #H/O HTN    -Amlodipine 2.5mg HOLDING   Imaging    - ECHO: (6/19) EF 55-60%, mild AS, mild LAE, mild MR/TR, trivial pericardial effusion    GI/NUTR:  #Diet    NPO except meds; holding TF due to pressor requirement   #bowel movements    - 2 bloody BM's noted overnight 6/25; last BM 6/26 AM  - GI consult 6/26- Plan for EGD with EUS + FNA as outpatient, H-pylori treatment after resolution of acute issues   -dignishield in place   -FOBT positive    #GI PPX  -IV Protonix 40 q12 hours  #Gastric mass vs gastrohepatic lymphadenopathy    -CT A/P: lesser curvature mass , Hepatic hypodensities  #Splenic Hypodensities, infarct vs phlegmon/abscess    -CT A/P:  Wedge-shaped region of hypodensity within the spleen    /RENAL:   #Grade IV L hydronephrosis s/p L PCN (5/2023) -- presented to ED with thick dark foul smelling output  #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  -intermittent suction/irrigation per urology; f/u cx and cytology from 6/25     -(6/23) s/p LT PCN upsizing to 26Fr catheter, RLQ 16 Fr drain remains, s/p nephrostolithotomy w/ lithotripsy    -(6/18) s/p LT PCN upsizing to 16Fr and 16Fr RLQ drain placement with IR, flush q8 hr  (6/30): s/p L nephrectomy and abdominal washout     High suspicion of renal malignancy given recent pathology  #volume overload  #ROJAS (baseline Cr 1.0)    - Cr 4.0-->3.6-->3.7   #metabolic acidosis  -Nephrology c/s - bumex 2mg x1, restart bicarb gtt, will re-eval for HD 7/2  - PO sodium bicarb 1300mg q8  - s/p 24hr of sodium bicarbonate infusion 6/26  -1 amp sodium bicarb given in OR 6/30, 1 amp given postop 6/30 night   #Hyperkalemia     -now resolved  #Hyperphosphatemia    -Phoslo 1334mg q8    Labs:          BUN/Cr- 111/3.6  -->,  111/3.7  -->121/3.9          Electrolytes-  #urine output  -bicarb gtt  -s/p Bumex 2mg x1, no response     HEME/ONC:   #new onset Afib    - holding heparin infusion in setting of hematochezia     Labs: DVT propylaxis- HSQ     Hb/Hct:  12.4/37.8  -->,  12.8/38.9  -->,  12.3/37.2  -->  Plts:  146  -->,  242  -->,  217  -->    T&S:ABO RH Interpretation; expires 07/02    #H/o Fe Deficiency anemia    - Iron supplementation  #DVT prophylaxis  -SCDs, heparin subQ   T&S Expires 7/2  Blood Consent- in chart     ID:  #Leukocytosis 2/2 sepsis   WBC- 28.59  --->>,  33.91  --->>,  41.35  --->>  afebrile   #antibiotics    -Levaquin 500mg q48 (6/21-    -Meropenem 500q12 (6/20-    -As per ID: no need for Vancomycin or Daptomycin in the absence of MRSA    -discontinued caspofungin (as per ID, since fungitell is unremarkable)  #Cultures    L PCN cyto-pathology  6/25: results suggestive of a necrotic neoplasm    Funtitell 6/26: 47    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    BAL 6/24: moderate yeast     UCx 6/19: negative FINAL      Left PCN aspiration 6/18: stenotrophomonas maltophilia  Needs source control    ENDO:  #Glucose monitoring    -A1c (5/7/23): 5.0    -POCT q6 hours while NPO   #Adrenal Insufficiency 2/2 to sepsis     -completed solucortef taper     -Cortisol level - binding globulin 1.1 (low), serum cortisol, 84, free cortisol 76, % free cortisol 91    MSK:  #Sacrococcygeal DTI: wound care following- cleanse with soap and water, apply triad, #gauze, and allevyn BID and PRN for soiling   Venous ulcer right lower leg, R forearm skin tear: Pat dry, apply Xeroform, nonadherent dressing, wrap with Kerlix twice a day, prn for soiling  #Activity    -Advance as tolerated    -PT/rehab once extubated    -Globally deconditioned  #b/l UE, LE mottled skin  -no radial pulses b/l  +ulnar, brachial b/l UE, +DP b/l  -Arterial duplex done, pending read     LINES/DRAINS:   PIV  Bobby (6/18)  L eleazar drain (6/30)  L IJ TLC (6/26)  R Lerma pouch #1, pelvic drain #2 (6/30)  Left basilic midline  6/21  Right axillary a line (6/30)  Right Radial [place] (6/22-6/30)  L radial [place] (6/18-6/22)    ETT  OGT    ADVANCED DIRECTIVES:  Full Code  HCP/Emergency Contact- Tracey Adames: [phone]   Palliative following. Last GOC discussion 6/26 day.    INDICATION FOR SICU: New onset atrial fibrillation w/ mechanical ventilation    DISPO: SICU       Assessment & Plan:  67M w/ PMH of HTN, grade IV hydronephrosis of L kidney s/p L PCN (placed 5/2023), stutter, hiatal hernia, iron deficiency anemia, H Pylori (untreated), and gastric mass (never biopsied) presents to the ED with at least 4 days of worsening weakness, abdominal distension, and no output from his PCN. Admitted with emphysematous pyelonephritis     (6/18): s/p LT PCN upsizing to 16Fr and 16Fr RLQ drain placement with IR   (6/22): s/p shock lithotripsy of L kidney abscess cavity with drainage of thick contents, upsize of drain to 26Fr bobby catheter  (6/30): s/p ex-lap and L nephrectomy with abdominal washout    NEURO:  #Pain control    -APAP PRN    -Gabapentin 200 q 8     -fent 25 q 3 PRN while intubated  #Sedation    -precedex gtt    -following commands when off sedation    RESP:  #acute respiratory failure with left lung mucous plugging (intubated 6/24)    - LL @ 23, 7.5Fr    - 6/24 s/p bronchoscopy secondary to left maintstem mucus plug    RR (machine): 18, TV (machine): 450, FiO2: 70, PEEP: 8      #Pulmonary nodules / Mediastinal lymphadenopathy    - CT Chest: Right middle and upper lobe solid pulmonary nodules, f/u outpatient pulm   #Activity    - incr as tolerated    CARDS:   #Septic shock    -Trial of levophed (off josh), patient with resulting tachycardia, switched back to Josh, dc'd Levo  #New onset Afib w/ RVR with hypotension -- likely 2/2 sepsis    - Metoprolol 2.5 q6 IV  -Digoxin 250mcg x1 dose, 125mcg x1 7/1, f/u level in am    - rate better controlled    - Cardiology following     #H/O HTN    -Amlodipine 2.5mg HOLDING   Imaging    - ECHO: (6/19) EF 55-60%, mild AS, mild LAE, mild MR/TR, trivial pericardial effusion    GI/NUTR:  #Diet    NPO except meds; holding TF due to pressor requirement   #bowel movements    - 2 bloody BM's noted overnight 6/25; last BM 6/26 AM  - GI consult 6/26- Plan for EGD with EUS + FNA as outpatient, H-pylori treatment after resolution of acute issues   -dignishield in place   -FOBT positive    #GI PPX  -IV Protonix 40 q12 hours  #Gastric mass vs gastrohepatic lymphadenopathy    -CT A/P: lesser curvature mass , Hepatic hypodensities  #Splenic Hypodensities, infarct vs phlegmon/abscess    -CT A/P:  Wedge-shaped region of hypodensity within the spleen    /RENAL:   #Grade IV L hydronephrosis s/p L PCN (5/2023) -- presented to ED with thick dark foul smelling output  #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  -intermittent suction/irrigation per urology; f/u cx and cytology from 6/25     -(6/23) s/p LT PCN upsizing to 26Fr catheter, RLQ 16 Fr drain remains, s/p nephrostolithotomy w/ lithotripsy    -(6/18) s/p LT PCN upsizing to 16Fr and 16Fr RLQ drain placement with IR, flush q8 hr  (6/30): s/p L nephrectomy and abdominal washout     High suspicion of renal malignancy given recent pathology  #volume overload  #ROJAS (baseline Cr 1.0)    - Cr 4.0-->3.6-->3.7   #metabolic acidosis  -Nephrology c/s - bumex 2mg x1, restart bicarb gtt, will re-eval for HD 7/2  - PO sodium bicarb 1300mg q8  - s/p 24hr of sodium bicarbonate infusion 6/26  -1 amp sodium bicarb given in OR 6/30, 1 amp given postop 6/30 night   #Hyperkalemia     -now resolved  #Hyperphosphatemia    -Phoslo 1334mg q8    Labs:          BUN/Cr- 111/3.6  -->,  111/3.7  -->121/3.9          Electrolytes-  #urine output  -bicarb gtt  -s/p Bumex 2mg x1, no response     HEME/ONC:   #new onset Afib    - holding heparin infusion in setting of hematochezia     Labs: DVT propylaxis- HSQ     Hb/Hct:  12.4/37.8  -->,  12.8/38.9  -->,  12.3/37.2  -->  Plts:  146  -->,  242  -->,  217  -->    T&S:ABO RH Interpretation; expires 07/02 (redrawn 7/1, sent to lab)      #H/o Fe Deficiency anemia    - Iron supplementation  #DVT prophylaxis  -SCDs, heparin subQ   T&S Expires 7/2  Blood Consent- in chart     ID:  #Leukocytosis 2/2 sepsis   WBC- 28.59  --->>,  33.91  --->>,  41.35  --->>  afebrile   #antibiotics    -Levaquin 500mg q48 (6/21-    -Meropenem 500q12 (6/20-    -As per ID: no need for Vancomycin or Daptomycin in the absence of MRSA    -discontinued caspofungin (as per ID, since fungitell is unremarkable)  #Cultures    L PCN cyto-pathology  6/25: results suggestive of a necrotic neoplasm    Funtitell 6/26: 47    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    BAL 6/24: moderate yeast     UCx 6/19: negative FINAL      Left PCN aspiration 6/18: stenotrophomonas maltophilia  Needs source control    ENDO:  #Glucose monitoring    -A1c (5/7/23): 5.0    -POCT q6 hours while NPO   #Adrenal Insufficiency 2/2 to sepsis     -completed solucortef taper     -Cortisol level - binding globulin 1.1 (low), serum cortisol, 84, free cortisol 76, % free cortisol 91    MSK:  #Sacrococcygeal DTI: wound care following- cleanse with soap and water, apply triad, #gauze, and allevyn BID and PRN for soiling   Venous ulcer right lower leg, R forearm skin tear: Pat dry, apply Xeroform, nonadherent dressing, wrap with Kerlix twice a day, prn for soiling  #Activity    -Advance as tolerated    -PT/rehab once extubated    -Globally deconditioned  #b/l UE, LE mottled skin  -no radial pulses b/l  +ulnar, brachial b/l UE, +DP b/l  -Arterial duplex done, pending read     LINES/DRAINS:   PIV  Bobby (6/18)  L eleazar drain (6/30)  L IJ TLC (6/26)  R Lerma pouch #1, pelvic drain #2 (6/30)  Left basilic midline  6/21  Right axillary a line (6/30)  Right Radial [place] (6/22-6/30)  L radial [place] (6/18-6/22)    ETT  OGT    ADVANCED DIRECTIVES:  Full Code  HCP/Emergency Contact- Tracey Adames: [phone]   Palliative following. Last GOC discussion 6/26 day.    INDICATION FOR SICU: New onset atrial fibrillation w/ mechanical ventilation    DISPO: SICU       Assessment & Plan:  67M w/ PMH of HTN, grade IV hydronephrosis of L kidney s/p L PCN (placed 5/2023), stutter, hiatal hernia, iron deficiency anemia, H Pylori (untreated), and gastric mass (never biopsied) presents to the ED with at least 4 days of worsening weakness, abdominal distension, and no output from his PCN. Admitted with emphysematous pyelonephritis     (6/18): s/p LT PCN upsizing to 16Fr and 16Fr RLQ drain placement with IR   (6/22): s/p shock lithotripsy of L kidney abscess cavity with drainage of thick contents, upsize of drain to 26Fr bobby catheter  (6/30): s/p ex-lap and L nephrectomy with abdominal washout    NEURO:  #Pain control    -APAP PRN    -Gabapentin 200 q 8     -fent 25 q 3 PRN while intubated  #Sedation    -precedex gtt    -following commands when off sedation    RESP:  #acute respiratory failure with left lung mucous plugging (intubated 6/24)    - LL @ 23, 7.5Fr    - 6/24 s/p bronchoscopy secondary to left maintstem mucus plug    RR (machine): 18, TV (machine): 450, FiO2: 70, PEEP: 8      #Pulmonary nodules / Mediastinal lymphadenopathy    - CT Chest: Right middle and upper lobe solid pulmonary nodules, f/u outpatient pulm   #Activity    - incr as tolerated    CARDS:   #Septic shock    -Trial of levophed (off josh), patient with resulting tachycardia, switched back to Josh, dc'd Levo  #New onset Afib w/ RVR with hypotension -- likely 2/2 sepsis    - Metoprolol 2.5 q6 IV  -Digoxin 250mcg x1 dose, 125mcg x1 7/1, f/u level in am    - rate better controlled    - Cardiology following     #H/O HTN    -Amlodipine 2.5mg HOLDING   Imaging    - ECHO: (6/19) EF 55-60%, mild AS, mild LAE, mild MR/TR, trivial pericardial effusion    GI/NUTR:  #Diet    NPO except meds; holding TF due to pressor requirement   #bowel movements    - 2 bloody BM's noted overnight 6/25; last BM 6/26 AM  - GI consult 6/26- Plan for EGD with EUS + FNA as outpatient, H-pylori treatment after resolution of acute issues   -dignishield in place   -FOBT positive    #GI PPX  -IV Protonix 40 q12 hours  #Gastric mass vs gastrohepatic lymphadenopathy    -CT A/P: lesser curvature mass , Hepatic hypodensities  #Splenic Hypodensities, infarct vs phlegmon/abscess    -CT A/P:  Wedge-shaped region of hypodensity within the spleen    /RENAL:   #Grade IV L hydronephrosis s/p L PCN (5/2023) -- presented to ED with thick dark foul smelling output  #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  -intermittent suction/irrigation per urology; f/u cx and cytology from 6/25     -(6/23) s/p LT PCN upsizing to 26Fr catheter, RLQ 16 Fr drain remains, s/p nephrostolithotomy w/ lithotripsy    -(6/18) s/p LT PCN upsizing to 16Fr and 16Fr RLQ drain placement with IR, flush q8 hr  (6/30): s/p L nephrectomy and abdominal washout     High suspicion of renal malignancy given recent pathology  #volume overload  #ROJAS (baseline Cr 1.0)    - Cr 4.0-->3.6-->3.7   #metabolic acidosis  -Nephrology c/s - bumex 2mg x1, restart bicarb gtt, will re-eval for HD 7/2  - PO sodium bicarb 1300mg q8  - s/p 24hr of sodium bicarbonate infusion 6/26  -1 amp sodium bicarb given in OR 6/30, 1 amp given postop 6/30 night   #Hyperkalemia     -now resolved  #Hyperphosphatemia    -Phoslo 1334mg q8    Labs:          BUN/Cr- 111/3.6  -->,  111/3.7  -->121/3.9          Electrolytes-  #urine output  -bicarb gtt  -s/p Bumex 2mg x1, no response     HEME/ONC:   #new onset Afib    - HSQ     Labs: DVT propylaxis- HSQ       Labs: Hb/Hct:  8.1/23.9  -->,  10.8/32.0  -->                      Plts:  108  -->,  160  -->                 PTT/INR:          T&S:ABO RH Interpretation;  (redrawn 7/1)    #H/o Fe Deficiency anemia    - Iron supplementation  #DVT prophylaxis  -SCDs, heparin subQ   Blood Consent- in chart     ID:  #Leukocytosis 2/2 sepsis   CBC Full  -  ( 02 Jul 2023 01:50 )  WBC- 33.99  --->>,  26.86  --->>,  34.29  --->>  Temp trend- 24hrs T(F): 97.7 (07-01 @ 20:00), Max: 98.2 (07-01 @ 16:00)  Current antibiotics-levoFLOXacin IVPB 500 every 48 hours  meropenem  IVPB 500 every 12 hours        afebrile   #antibiotics    -Levaquin 500mg q48 (6/21-    -Meropenem 500q12 (6/20-    -As per ID: no need for Vancomycin or Daptomycin in the absence of MRSA    -discontinued caspofungin (as per ID, since fungitell is unremarkable)  #Cultures    L PCN cyto-pathology  6/25: results suggestive of a necrotic neoplasm    Funtitell 6/26: 47    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    BAL 6/24: moderate yeast     UCx 6/19: negative FINAL      Left PCN aspiration 6/18: stenotrophomonas maltophilia  Needs source control    ENDO:  #Glucose monitoring    -A1c (5/7/23): 5.0    -POCT q6 hours while NPO   #Adrenal Insufficiency 2/2 to sepsis     -completed solucortef taper     -Cortisol level - binding globulin 1.1 (low), serum cortisol, 84, free cortisol 76, % free cortisol 91  CAPILLARY BLOOD GLUCOSE  POCT Blood Glucose.: 154 mg/dL (01 Jul 2023 19:13)  POCT Blood Glucose.: 131 mg/dL (01 Jul 2023 13:38)  POCT Blood Glucose.: 178 mg/dL (01 Jul 2023 06:43)      MSK:  #Sacrococcygeal DTI: wound care following- cleanse with soap and water, apply triad, #gauze, and allevyn BID and PRN for soiling   Venous ulcer right lower leg, R forearm skin tear: Pat dry, apply Xeroform, nonadherent dressing, wrap with Kerlix twice a day, prn for soiling  #Activity    -Advance as tolerated    -PT/rehab once extubated    -Globally deconditioned  #b/l UE, LE mottled skin  -no radial pulses b/l  +ulnar, brachial b/l UE, +DP b/l  -Arterial duplex done, pending read     LINES/DRAINS:   PIV  Bobby (6/18)  L eleazar drain (6/30)  L IJ TLC (6/26)  R Lerma pouch #1, pelvic drain #2 (6/30)  Left basilic midline  6/21  Right axillary a line (6/30)  Right Radial [place] (6/22-6/30)  L radial [place] (6/18-6/22)    ETT  OGT    ADVANCED DIRECTIVES:  Full Code  HCP/Emergency Contact- Tracey Adames: [phone]   Palliative following. Last GOC discussion 6/26 day.    INDICATION FOR SICU: New onset atrial fibrillation w/ mechanical ventilation    DISPO: SICU       Assessment & Plan:  67M w/ PMH of HTN, grade IV hydronephrosis of L kidney s/p L PCN (placed 5/2023), stutter, hiatal hernia, iron deficiency anemia, H Pylori (untreated), and gastric mass (never biopsied) presents to the ED with at least 4 days of worsening weakness, abdominal distension, and no output from his PCN. Admitted with emphysematous pyelonephritis     (6/18): s/p LT PCN upsizing to 16Fr and 16Fr RLQ drain placement with IR   (6/22): s/p shock lithotripsy of L kidney abscess cavity with drainage of thick contents, upsize of drain to 26Fr bobby catheter  (6/30): s/p ex-lap and L nephrectomy with abdominal washout    NEURO:  #Pain control    -APAP PRN    -Gabapentin 200 q 8     -fent 25 q 3 PRN while intubated  #Sedation    -precedex gtt    -following commands when off sedation    RESP:  #acute respiratory failure with left lung mucous plugging (intubated 6/24)    - LL @ 23, 7.5Fr    - 6/24 s/p bronchoscopy secondary to left maintstem mucus plug    RR (machine): 18, TV (machine): 450, FiO2: 70, PEEP: 8      #Pulmonary nodules / Mediastinal lymphadenopathy    - CT Chest: Right middle and upper lobe solid pulmonary nodules, f/u outpatient pulm   #Activity    - incr as tolerated    CARDS:   #Septic shock    -Levophed stopped, patient with resulting tachycardia, switched back to Phenylephrine, dc'd Levo  #New onset Afib w/ RVR with hypotension -- likely 2/2 sepsis    - Metoprolol 2.5 q6 IV  -Digoxin 250mcg x1 dose, 125mcg x1 7/1, f/u level in am    - rate better controlled    - Cardiology following     #H/O HTN    -Amlodipine 2.5mg HOLDING   Imaging    - ECHO: (6/19) EF 55-60%, mild AS, mild LAE, mild MR/TR, trivial pericardial effusion    GI/NUTR:  #Diet    NPO except meds; holding TF due to pressor requirement   #bowel movements    - 2 bloody BM's noted overnight 6/25; last BM 6/26 AM  - GI consult 6/26- Plan for EGD with EUS + FNA as outpatient, H-pylori treatment after resolution of acute issues   -dignishield in place   -FOBT positive    #GI PPX  -IV Protonix 40 q12 hours  #Gastric mass vs gastrohepatic lymphadenopathy    -CT A/P: lesser curvature mass , Hepatic hypodensities  #Splenic Hypodensities, infarct vs phlegmon/abscess    -CT A/P:  Wedge-shaped region of hypodensity within the spleen    /RENAL:   #Grade IV L hydronephrosis s/p L PCN (5/2023) -- presented to ED with thick dark foul smelling output  #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  -intermittent suction/irrigation per urology; f/u cx and cytology from 6/25     -(6/23) s/p LT PCN upsizing to 26Fr catheter, RLQ 16 Fr drain remains, s/p nephrostolithotomy w/ lithotripsy    -(6/18) s/p LT PCN upsizing to 16Fr and 16Fr RLQ drain placement with IR, flush q8 hr  (6/30): s/p L nephrectomy and abdominal washout     High suspicion of renal malignancy given recent pathology  #volume overload  #ROJAS (baseline Cr 1.0)    - Cr 4.0-->3.6-->3.7   #metabolic acidosis  -Nephrology c/s - bumex 2mg x1, restart bicarb gtt, will re-eval for HD 7/2  - PO sodium bicarb 1300mg q8  - s/p 24hr of sodium bicarbonate infusion 6/26  -1 amp sodium bicarb given in OR 6/30, 1 amp given postop 6/30 night   #Hyperkalemia     -now resolved  #Hyperphosphatemia    -Phoslo 1334mg q8    Labs:          BUN/Cr- 111/3.6  -->,  111/3.7  -->121/3.9          Electrolytes-  #urine output  -bicarb gtt  -s/p Bumex 2mg x1, no response     HEME/ONC:   #new onset Afib    - HSQ     Labs: DVT propylaxis- HSQ       Labs: Hb/Hct:  8.1/23.9  -->,  10.8/32.0  -->                      Plts:  108  -->,  160  -->                 PTT/INR:          T&S:ABO RH Interpretation;  (redrawn 7/1)    #H/o Fe Deficiency anemia    - Iron supplementation  #DVT prophylaxis  -SCDs, heparin subQ   Blood Consent- in chart     ID:  #Leukocytosis 2/2 sepsis   CBC Full  -  ( 02 Jul 2023 01:50 )  WBC- 33.99  --->>,  26.86  --->>,  34.29  --->>  Temp trend- 24hrs T(F): 97.7 (07-01 @ 20:00), Max: 98.2 (07-01 @ 16:00)  Current antibiotics-levoFLOXacin IVPB 500 every 48 hours  meropenem  IVPB 500 every 12 hours        afebrile   #antibiotics    -Levaquin 500mg q48 (6/21-    -Meropenem 500q12 (6/20-    -As per ID: no need for Vancomycin or Daptomycin in the absence of MRSA    -discontinued caspofungin (as per ID, since fungitell is unremarkable)  #Cultures    L PCN cyto-pathology  6/25: results suggestive of a necrotic neoplasm    Funtitell 6/26: 47    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    BAL 6/24: moderate yeast     UCx 6/19: negative FINAL      Left PCN aspiration 6/18: stenotrophomonas maltophilia  Needs source control    ENDO:  #Glucose monitoring    -A1c (5/7/23): 5.0    -POCT q6 hours while NPO   #Adrenal Insufficiency 2/2 to sepsis     -completed solucortef taper     -Cortisol level - binding globulin 1.1 (low), serum cortisol, 84, free cortisol 76, % free cortisol 91  CAPILLARY BLOOD GLUCOSE  POCT Blood Glucose.: 154 mg/dL (01 Jul 2023 19:13)  POCT Blood Glucose.: 131 mg/dL (01 Jul 2023 13:38)  POCT Blood Glucose.: 178 mg/dL (01 Jul 2023 06:43)      MSK:  #Sacrococcygeal DTI: wound care following- cleanse with soap and water, apply triad, #gauze, and allevyn BID and PRN for soiling   Venous ulcer right lower leg, R forearm skin tear: Pat dry, apply Xeroform, nonadherent dressing, wrap with Kerlix twice a day, prn for soiling  #Activity    -Advance as tolerated    -PT/rehab once extubated    -Globally deconditioned  #b/l UE, LE mottled skin  -no radial pulses b/l  +ulnar, brachial b/l UE, +DP b/l  -Arterial duplex done, pending read     LINES/DRAINS:   PIV  Bobby (6/18)  L eleazar drain (6/30)  L IJ TLC (6/26)  R Lerma pouch #1, pelvic drain #2 (6/30)  Left basilic midline  6/21  Right axillary a line (6/30)  Right Radial Manzanita (6/22-6/30)  L radial Joleen (6/18-6/22)    ETT  OGT    ADVANCED DIRECTIVES:  Full Code  HCP/Emergency Contact- Tracey Adames: [phone]   Palliative following. Last GOC discussion 6/26 day.    INDICATION FOR SICU: New onset atrial fibrillation w/ mechanical ventilation    DISPO: SICU

## 2023-07-02 NOTE — PROGRESS NOTE ADULT - ASSESSMENT
66y/o M a/w retroperitoneal and intraabdominal abscess s/p IR placement of drain and LEFT PCN 6/18, s/p percutaneous drainage of LEFT renal abscess POD #8 with left 26fr flank catheter in place,   s/p Left radical nephrectomy, Ex-Lap POD#2. Low UOP treated w/ Bumex, did not increase. Urine sanguinous. Abd drains x3 w/ Serosanguinous output. WBC downtrending on Levaquin and Ar.    Plan:  - continue management per SICU   - continue close monitoring

## 2023-07-02 NOTE — PROGRESS NOTE ADULT - SUBJECTIVE AND OBJECTIVE BOX
UROLOGY:  Pt is a 66y/o M a/w retroperitoneal and intraabdominal abscess s/p IR placement of drain and LEFT PCN 6/18, s/p percutaneous drainage of LEFT renal abscess POD #8 with left 26fr flank catheter in place, s/p Left radical nephrectomy, Ex-Lap POD#2. Patient seen and examined at bedside. No acute events overnight. Remains on pressors.     REVIEW OF SYSTEMS   [ x ] Due to altered mental status/intubation, subjective information were not able to be obtained from patient. History was obtained, to the extent possible, from review of the chart and collateral sources of information.    Vital Signs Last 24 Hrs  T(C): 36.4 (02 Jul 2023 08:00), Max: 36.8 (01 Jul 2023 16:00)  T(F): 97.5 (02 Jul 2023 08:00), Max: 98.2 (01 Jul 2023 16:00)  HR: 83 (02 Jul 2023 09:00) (80 - 104)  RR: 19 (02 Jul 2023 09:00) (10 - 22)  SpO2: 96% (02 Jul 2023 09:00) (77% - 100%)    PHYSICAL EXAM:  GEN: Intubated/sedated w/ ETT on pressors  SKIN: Good color, non diaphoretic.  HEENT: NC/AT.  RESP: on ventilator  CARDIO: +S1/S2  ABDO: Soft,  no palpable bladder, +#2 Pelvic eleazar drain with serosanguinous fluid, #1 Lerma pouch eleazar drain with serosanguinous fluid   BACK: No CVAT B/L, +#3 LEFT kidney eleazar drain with serosanguinous drainage  : + Penoscrotal edema  + Indwelling bobby in place, draining clear yellow urine     LABS:             10.8   34.29 )-----------( 160      ( 02 Jul 2023 01:50 )             32.0     135  |  99  |  125<HH>  ----------------------------<  183<H>  4.6   |  18  |  4.0<H>    Ca    6.7<L>      02 Jul 2023 11:44  Phos  7.8     07-02  Mg     2.3     07-02  TPro  3.8<L>  /  Alb  2.0<L>  /  TBili  1.3<H>  /  DBili  x   /  AST  45<H>  /  ALT  11  /  AlkPhos  86  06-30  PT/INR - ( 02 Jul 2023 11:44 )   PT: 20.70 sec;   INR: 1.78 ratio    PTT - ( 02 Jul 2023 11:44 )  PTT:34.0 sec    Urinalysis Basic - ( 02 Jul 2023 11:44 )  Color: x / Appearance: x / SG: x / pH: x  Gluc: 183 mg/dL / Ketone: x  / Bili: x / Urobili: x   Blood: x / Protein: x / Nitrite: x   Leuk Esterase: x / RBC: x / WBC x   Sq Epi: x / Non Sq Epi: x / Bacteria: x

## 2023-07-03 NOTE — PROGRESS NOTE ADULT - ASSESSMENT
Assessment & Plan:  67M w/ PMH of HTN, grade IV hydronephrosis of L kidney s/p L PCN (placed 5/2023), stutter, hiatal hernia, iron deficiency anemia, H Pylori (untreated), and gastric mass (never biopsied) presents to the ED with at least 4 days of worsening weakness, abdominal distension, and no output from his PCN. Admitted with emphysematous pyelonephritis     (6/18): s/p LT PCN upsizing to 16Fr and 16Fr RLQ drain placement with IR   (6/22): s/p shock lithotripsy of L kidney abscess cavity with drainage of thick contents, upsize of drain to 26Fr bobby catheter  (6/30): s/p ex-lap and L nephrectomy with abdominal washout    NEURO:  #Pain control    -APAP PRN    -Gabapentin 200 q 8     -fent 25 q 3 PRN while intubated  #Sedation    -precedex gtt   -following commands when off sedation, GCS 11T    RESP:  #acute respiratory failure with left lung mucous plugging - resolved s/p intubated 6/24 and    and bronchoscopy   RR (machine): 16, TV (machine): 450, FiO2: 40, PEEP: 8  ABG____      #Pulmonary nodules / Mediastinal lymphadenopathy    - CT Chest: Right middle and upper lobe solid pulmonary nodules, f/u outpatient pulm   #Activity   - incr as tolerated    CARDS:   #Septic shock    - off levo since 3am 7/1, remains at [facility] @ 1.5 mcq  #New onset Afib w/ RVR with hypotension -- likely 2/2 sepsis    - HR control with Metoprolol 2.5 q6 IV  -Digoxin 250mcg x1 dose, 125mcg x1 7/1, f/u level in am    - rate better controlled    - Cardiology following   -  last dig level 2.7  #H/O HTN    -Amlodipine 2.5mg HOLDING   Imaging    - ECHO: (6/19) EF 55-60%, mild AS, mild LAE, mild MR/TR, trivial pericardial effusion    GI/NUTR:  #Diet    NPO except meds; holding TF due to pressor requirement  TPN c/s placed   #bowel movements    - 2 bloody BM's noted overnight 6/25; last BM 6/26 AM    - GI consult 6/26- Plan for EGD with EUS + FNA as outpatient, H-pylori treatment after      resolution of acute issues   -dignishield in place   -FOBT positive    #GI PPX  -IV Protonix 40 q12 hours  #Gastric mass vs gastrohepatic lymphadenopathy    -CT A/P: lesser curvature mass , Hepatic hypodensities  #Splenic Hypodensities, infarct vs phlegmon/abscess    -CT A/P:  Wedge-shaped region of hypodensity within the spleen    /RENAL:   #Grade IV L hydronephrosis s/p L PCN (5/2023) -- presented to ED with thick dark foul smelling output  #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  -intermittent suction/irrigation per urology; f/u cx and cytology from 6/25     -(6/23) s/p LT PCN upsizing to 26Fr catheter, RLQ 16 Fr drain remains, s/p nephrostolithotomy w/ lithotripsy    -(6/18) s/p LT PCN upsizing to 16Fr and 16Fr RLQ drain placement with IR, flush q8 hr  (6/30): s/p L nephrectomy and abdominal washout   -R pelvic drain Creatinine 3.99    High suspicion of renal malignancy given recent pathology  #volume overload  #ROJAS (baseline Cr 1.0)    - Cr 3.7-->3.9-->3.9  #metabolic acidosis  -Nephrology c/s - CVVHD started 07/02  - PO sodium bicarb 1300mg q8  -sodium bicarb gtt 150mcg @ 75/hr   #Hyperkalemia   #Hyperphosphatemia    -Phoslo 1334mg q8    Labs:          BUN/Cr- 111/3.6  -->,  111/3.7  -->121/3.9          Electrolytes-  #decreased urine output in the setting of acute renal failure 2/2 septic shock  -CVVHD, titrate to -50ml/hr fluid balance  -s/p Bumex 2mg x1 07/01, no response   -nephrology following    HEME/ONC:   #new onset Afib    - holding heparin infusion in setting of hematochezia     Labs: DVT propylaxis- HSQ     #R radial artery occlusion  -RUE duplex 07/01 prelim + for r radial artery occlusion  -vascular c/s placed   Hb/Hct:  12.4/37.8  -->,  12.8/38.9  -->,  12.3/37.2  -->10/32  Plts:  146  -->,  242  -->,  217  -->160    T&S:ABO RH Interpretation; expires 07/02    #H/o Fe Deficiency anemia    - Iron supplementation  #DVT prophylaxis  -SCDs, heparin subQ   T&S Expires 7/4  Blood Consent- in chart     ID:  #Leukocytosis 2/2 sepsis   WBC- 28.59  --->>,  33.91  --->>,  41.35  --->>34  afebrile   #antibiotics    -Levaquin 500mg q48 (6/21-    -Meropenem 500q12 (6/20-    -As per ID: no need for Vancomycin or Daptomycin in the absence of MRSA    -discontinued caspofungin (as per ID, since fungitell is unremarkable)  #Cultures    L PCN cyto-pathology  6/25: results suggestive of a necrotic neoplasm    Funtitell 6/26: 47    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    BAL 6/24: moderate yeast     UCx 6/19: negative FINAL      Left PCN aspiration 6/18: stenotrophomonas maltophilia  Needs source control    ENDO:  #Glucose monitoring    -A1c (5/7/23): 5.0    -POCT q6 hours while NPO   #Adrenal Insufficiency 2/2 to sepsis     -restarted solucortef 50q6 for 6 days starting 07/02      -Cortisol level - binding globulin 1.1 (low), serum cortisol, 84, free cortisol 76, % free cortisol 91    MSK:  #Sacrococcygeal DTI: wound care following- cleanse with soap and water, apply triad, #gauze, and allevyn BID and PRN for soiling   Venous ulcer right lower leg, R forearm skin tear: Pat dry, apply Xeroform, nonadherent dressing, wrap with Kerlix twice a day, prn for soiling  #Activity    -Advance as tolerated    -PT/rehab once extubated    -Globally deconditioned  #b/l UE, LE mottled skin  -no radial pulses b/l  +ulnar, brachial b/l UE, +DP b/l  -Arterial duplex done, pending read     LINES/DRAINS:   PIV  Bobby (6/18)  L eleazar drain (6/30)  L IJ TLC (6/26)  R IJ Uldall (07/02)  R Lerma pouch #1, pelvic drain #2 (6/30)  Left basilic midline  6/21  Right axillary a line (6/30)  Right Radial [place] (6/22-6/30)  L radial [place] (6/18-6/22)      ETT  OGT    ADVANCED DIRECTIVES:  Full Code  HCP/Emergency Contact- Tracey Adames: [phone]   Palliative following. Last GOC discussion 6/26 day.    INDICATION FOR SICU: New onset atrial fibrillation w/ mechanical ventilation    DISPO: SICU     Assessment & Plan:  67M w/ PMH of HTN, grade IV hydronephrosis of L kidney s/p L PCN (placed 5/2023), stutter, hiatal hernia, iron deficiency anemia, H Pylori (untreated), and gastric mass (never biopsied) presents to the ED with at least 4 days of worsening weakness, abdominal distension, and no output from his PCN. Admitted with emphysematous pyelonephritis     (6/18): s/p LT PCN upsizing to 16Fr and 16Fr RLQ drain placement with IR   (6/22): s/p shock lithotripsy of L kidney abscess cavity with drainage of thick contents, upsize of drain to 26Fr bobby catheter  (6/30): s/p ex-lap and L nephrectomy with abdominal washout    NEURO:  #Pain control    -APAP PRN    -Gabapentin 200 q 8     -fent 25 q 3 PRN while intubated  #Sedation    -precedex gtt   -following commands when off sedation, GCS 11T    RESP:  #acute respiratory failure with left lung mucous plugging - resolved s/p intubated 6/24 and    and bronchoscopy   RR (machine):   RR (machine): 18, TV (machine): 450, FiO2: 40, PEEP: 8  07-02 @ 21:07--7.47 / 28 / 132 / 20 / [id number] 99.2 / Lac 3.10 / iCal 1.01   07-02 @ 11:37--7.40 / 30 / 126 / 19 / [id number] 99.9 / Lac 4.20 / iCal 0.94         #Pulmonary nodules / Mediastinal lymphadenopathy    - CT Chest: Right middle and upper lobe solid pulmonary nodules, f/u outpatient pulm   #Activity   - incr as tolerated    CARDS:   #Septic shock    - off levo since 3am 7/1, remains at [facility] @ 1.5 mcq  #New onset Afib w/ RVR with hypotension -- likely 2/2 sepsis    - HR control with Metoprolol 2.5 q6 IV  -Digoxin 250mcg x1 dose, 125mcg x1 7/1, f/u level in am    - rate better controlled    - Cardiology following   -  last dig level 2.7  #H/O HTN    -Amlodipine 2.5mg HOLDING   Imaging    - ECHO: (6/19) EF 55-60%, mild AS, mild LAE, mild MR/TR, trivial pericardial effusion    GI/NUTR:  #Diet    NPO except meds; holding TF due to pressor requirement  TPN c/s placed   #bowel movements    - 2 bloody BM's noted overnight 6/25; last BM 6/26 AM    - GI consult 6/26- Plan for EGD with EUS + FNA as outpatient, H-pylori treatment after      resolution of acute issues   -dignishield in place   -FOBT positive    #GI PPX  -IV Protonix 40 q12 hours  #Gastric mass vs gastrohepatic lymphadenopathy    -CT A/P: lesser curvature mass , Hepatic hypodensities  #Splenic Hypodensities, infarct vs phlegmon/abscess    -CT A/P:  Wedge-shaped region of hypodensity within the spleen    /RENAL:   #Grade IV L hydronephrosis s/p L PCN (5/2023) -- presented to ED with thick dark foul smelling output  #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  -intermittent suction/irrigation per urology; f/u cx and cytology from 6/25     -(6/23) s/p LT PCN upsizing to 26Fr catheter, RLQ 16 Fr drain remains, s/p nephrostolithotomy w/ lithotripsy    -(6/18) s/p LT PCN upsizing to 16Fr and 16Fr RLQ drain placement with IR, flush q8 hr  (6/30): s/p L nephrectomy and abdominal washout   -R pelvic drain Creatinine 3.99    High suspicion of renal malignancy given recent pathology  #volume overload  #ROJAS (baseline Cr 1.0)  #urine output in critically ill    -indwelling bobby (placed     Labs:          BUN/Cr- 125/4.0  -->,  95/2.9  -->          Electrolytes-Na 137 // K 3.8 // Mg 1.8 //  Phos 5.4 (07-02 @ 21:01)        #metabolic acidosis  -Nephrology c/s - CVVHD started 07/02  - PO sodium bicarb 1300mg q8  -sodium bicarb gtt 150mcg @ 75/hr   #Hyperkalemia   #Hyperphosphatemia    -Phoslo 1334mg q8    Labs:  #urine output in critically ill    -indwelling bobby (placed     Labs:          BUN/Cr- 125/4.0  -->,  95/2.9  -->          Electrolytes-Na 137 // K 3.8 // Mg 1.8 //  Phos 5.4 (07-02 @ 21:01)          Electrolytes-  #decreased urine output in the setting of acute renal failure 2/2 septic shock  -CVVHD, titrate to -50ml/hr fluid balance  -s/p Bumex 2mg x1 07/01, no response   -nephrology following    HEME/ONC:   #new onset Afib    - holding heparin infusion in setting of hematochezia     Labs: DVT propylaxis- HSQ     #R radial artery occlusion  -RUE duplex 07/01 prelim + for r radial artery occlusion  -vascular c/s placed     Labs: Hb/Hct:  10.8/32.0  -->,  9.9/28.2  -->                      Plts:  160  -->,  112  -->                 PTT/INR:  34.0/1.78  --->         T&S:ABO RH Interpretation; expires 07/02    #H/o Fe Deficiency anemia    - Iron supplementation  #DVT prophylaxis  -SCDs, heparin subQ   T&S Expires 7/4  Blood Consent- in chart     ID:  #Leukocytosis 2/2 sepsis   WBC- 26.86  --->>,  34.29  --->>,  33.70  --->>  Temp trend- 24hrs T(F): 95.5 (07-02 @ 21:00), Max: 98.7 (07-02 @ 12:00)  Current antibiotics-levoFLOXacin IVPB 500 every 48 hours  meropenem  IVPB 500 every 12 hours    Afebrile   #antibiotics    -Levaquin 500mg q48 (6/21-    -Meropenem 500q12 (6/20-    -As per ID: no need for Vancomycin or Daptomycin in the absence of MRSA    -discontinued caspofungin (as per ID, since fungitell is unremarkable)  #Cultures    L PCN cyto-pathology  6/25: results suggestive of a necrotic neoplasm    Funtitell 6/26: 47    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    BAL 6/24: moderate yeast     UCx 6/19: negative FINAL      Left PCN aspiration 6/18: stenotrophomonas maltophilia  Needs source control    ENDO:  #Glucose monitoring    -A1c (5/7/23): 5.0    -POCT q6 hours while NPO   #Adrenal Insufficiency 2/2 to sepsis     -restarted solucortef 50q6 for 6 days starting 07/02      -Cortisol level - binding globulin 1.1 (low), serum cortisol, 84, free cortisol 76, % free cortisol 91    MSK:  #Sacrococcygeal DTI: wound care following- cleanse with soap and water, apply triad, #gauze, and allevyn BID and PRN for soiling   Venous ulcer right lower leg, R forearm skin tear: Pat dry, apply Xeroform, nonadherent dressing, wrap with Kerlix twice a day, prn for soiling  #Activity    -Advance as tolerated    -PT/rehab once extubated    -Globally deconditioned  #b/l UE, LE mottled skin  -no radial pulses b/l  +ulnar, brachial b/l UE, +DP b/l  -Arterial duplex done, pending read     LINES/DRAINS:   PIV  Bobby (6/18)  L eleazar drain (6/30)  L IJ TLC (6/26)  R IJ Uldall (07/02)  R Lerma pouch #1, pelvic drain #2 (6/30)  Left basilic midline  6/21  Right axillary a line (6/30)  Right Radial [place] (6/22-6/30)  L radial [place] (6/18-6/22)      ETT  OGT    ADVANCED DIRECTIVES:  Full Code  HCP/Emergency Contact- Tracey Adames: [phone]   Palliative following. Last GOC discussion 6/26 day.    INDICATION FOR SICU: New onset atrial fibrillation w/ mechanical ventilation    DISPO: SICU     Assessment & Plan:  67M w/ PMH of HTN, grade IV hydronephrosis of L kidney s/p L PCN (placed 5/2023), stutter, hiatal hernia, iron deficiency anemia, H Pylori (untreated), and gastric mass (never biopsied) presents to the ED with at least 4 days of worsening weakness, abdominal distension, and no output from his PCN. Admitted with emphysematous pyelonephritis     (6/18): s/p LT PCN upsizing to 16Fr and 16Fr RLQ drain placement with IR   (6/22): s/p shock lithotripsy of L kidney abscess cavity with drainage of thick contents, upsize of drain to 26Fr bobby catheter  (6/30): s/p ex-lap and L nephrectomy with abdominal washout    NEURO:  #Pain control    -APAP PRN    -Gabapentin 200 q 8     -fent 25 q 3 PRN while intubated  #Sedation    -precedex gtt   -following commands when off sedation, GCS 11T    RESP:  #acute respiratory failure with left lung mucous plugging - resolved s/p intubated 6/24 and    and bronchoscopy   RR (machine):   RR (machine): 18, TV (machine): 450, FiO2: 40, PEEP: 8  ABG - ( 03 Jul 2023 05:04 )  pH, Arterial: 7.50  pH, Blood: x     /  pCO2: 27    /  pO2: 135   / HCO3: 21    / Base Excess: -0.8  /  SaO2: 99.3    RR (machine): 18, TV (machine): 450, FiO2: 40, PEEP: 8  07-03 @ 05:04--7.50 / 27 / 135 / 21 / [id number] 99.3 / Lac 2.30 / iCal --   07-02 @ 21:07--7.47 / 28 / 132 / 20 / [id number] 99.2 / Lac 3.10 / iCal 1.01                 #Pulmonary nodules / Mediastinal lymphadenopathy    - CT Chest: Right middle and upper lobe solid pulmonary nodules, f/u outpatient pulm   #Activity   - incr as tolerated    CARDS:   #Septic shock    - off levo since 3am 7/1, remains at [facility] @ 1.5 mcq  #New onset Afib w/ RVR with hypotension -- likely 2/2 sepsis    - HR control with Metoprolol 2.5 q6 IV  -Digoxin 250mcg x1 dose, 125mcg x1 7/1, f/u level in am    - rate better controlled    - Cardiology following   -  last dig level 2.7  #H/O HTN    -Amlodipine 2.5mg HOLDING   Imaging    - ECHO: (6/19) EF 55-60%, mild AS, mild LAE, mild MR/TR, trivial pericardial effusion    GI/NUTR:  #Diet    NPO except meds; holding TF due to pressor requirement  TPN c/s placed   #bowel movements    - 2 bloody BM's noted overnight 6/25; last BM 6/26 AM    - GI consult 6/26- Plan for EGD with EUS + FNA as outpatient, H-pylori treatment after      resolution of acute issues   -dignishield in place   -FOBT positive    #GI PPX  -IV Protonix 40 q12 hours  #Gastric mass vs gastrohepatic lymphadenopathy    -CT A/P: lesser curvature mass , Hepatic hypodensities  #Splenic Hypodensities, infarct vs phlegmon/abscess    -CT A/P:  Wedge-shaped region of hypodensity within the spleen    /RENAL:   #Grade IV L hydronephrosis s/p L PCN (5/2023) -- presented to ED with thick dark foul smelling output  #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  -intermittent suction/irrigation per urology; f/u cx and cytology from 6/25     -(6/23) s/p LT PCN upsizing to 26Fr catheter, RLQ 16 Fr drain remains, s/p nephrostolithotomy w/ lithotripsy    -(6/18) s/p LT PCN upsizing to 16Fr and 16Fr RLQ drain placement with IR, flush q8 hr  (6/30): s/p L nephrectomy and abdominal washout   -R pelvic drain Creatinine 3.99    High suspicion of renal malignancy given recent pathology  #volume overload  #ROJAS (baseline Cr 1.0)  #urine output in critically ill    -indwelling bobby (placed     Labs:          BUN/Cr- 125/4.0  -->,  95/2.9  -->          Electrolytes-Na 137 // K 3.8 // Mg 1.8 //  Phos 5.4 (07-02 @ 21:01)        #metabolic acidosis  -Nephrology c/s - CVVHD started 07/02  - PO sodium bicarb 1300mg q8  -sodium bicarb gtt 150mcg @ 75/hr   #Hyperkalemia   #Hyperphosphatemia    -Phoslo 1334mg q8    Labs:  #urine output in critically ill    -indwelling bobby (placed     Labs:          BUN/Cr- 125/4.0  -->,  95/2.9  -->          Electrolytes-Na 137 // K 3.8 // Mg 1.8 //  Phos 5.4 (07-02 @ 21:01)          Electrolytes-  #decreased urine output in the setting of acute renal failure 2/2 septic shock  -CVVHD, titrate to -50ml/hr fluid balance  -s/p Bumex 2mg x1 07/01, no response   -nephrology following    HEME/ONC:   #new onset Afib    - holding heparin infusion in setting of hematochezia     Labs: DVT propylaxis- HSQ     #R radial artery occlusion  -RUE duplex 07/01 prelim + for r radial artery occlusion  -vascular c/s placed     Labs: Hb/Hct:  10.8/32.0  -->,  9.9/28.2  -->                      Plts:  160  -->,  112  -->                 PTT/INR:  34.0/1.78  --->         T&S:ABO RH Interpretation; expires 07/02    #H/o Fe Deficiency anemia    - Iron supplementation  #DVT prophylaxis  -SCDs, heparin subQ   T&S Expires 7/4  Blood Consent- in chart     ID:  #Leukocytosis 2/2 sepsis   WBC- 26.86  --->>,  34.29  --->>,  33.70  --->>  Temp trend- 24hrs T(F): 95.5 (07-02 @ 21:00), Max: 98.7 (07-02 @ 12:00)  Current antibiotics-levoFLOXacin IVPB 500 every 48 hours  meropenem  IVPB 500 every 12 hours    Afebrile   #antibiotics    -Levaquin 500mg q48 (6/21-    -Meropenem 500q12 (6/20-    -As per ID: no need for Vancomycin or Daptomycin in the absence of MRSA    -discontinued caspofungin (as per ID, since fungitell is unremarkable)  #Cultures    L PCN cyto-pathology  6/25: results suggestive of a necrotic neoplasm    Funtitell 6/26: 47    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    BAL 6/24: moderate yeast     UCx 6/19: negative FINAL      Left PCN aspiration 6/18: stenotrophomonas maltophilia  Needs source control    ENDO:  #Glucose monitoring    -A1c (5/7/23): 5.0    -POCT q6 hours while NPO   #Adrenal Insufficiency 2/2 to sepsis     -restarted solucortef 50q6 for 6 days starting 07/02      -Cortisol level - binding globulin 1.1 (low), serum cortisol, 84, free cortisol 76, % free cortisol 91    MSK:  #Sacrococcygeal DTI: wound care following- cleanse with soap and water, apply triad, #gauze, and allevyn BID and PRN for soiling   Venous ulcer right lower leg, R forearm skin tear: Pat dry, apply Xeroform, nonadherent dressing, wrap with Kerlix twice a day, prn for soiling  #Activity    -Advance as tolerated    -PT/rehab once extubated    -Globally deconditioned  #b/l UE, LE mottled skin  -no radial pulses b/l  +ulnar, brachial b/l UE, +DP b/l  -Arterial duplex done, pending read     LINES/DRAINS:   PIV  Bobby (6/18)  L eleazar drain (6/30)  L IJ TLC (6/26)  R IJ Uldall (07/02)  R Lerma pouch #1, pelvic drain #2 (6/30)  Left basilic midline  6/21  Right axillary a line (6/30)  Right Radial [place] (6/22-6/30)  L radial [place] (6/18-6/22)      ETT  OGT    ADVANCED DIRECTIVES:  Full Code  HCP/Emergency Contact- Tracey Adames: [phone]   Palliative following. Last GOC discussion 6/26 day.    INDICATION FOR SICU: New onset atrial fibrillation w/ mechanical ventilation    DISPO: SICU     Assessment & Plan:  67M w/ PMH of HTN, grade IV hydronephrosis of L kidney s/p L PCN (placed 5/2023), stutter, hiatal hernia, iron deficiency anemia, H Pylori (untreated), and gastric mass (never biopsied) presents to the ED with at least 4 days of worsening weakness, abdominal distension, and no output from his PCN. Admitted with emphysematous pyelonephritis     (6/18): s/p LT PCN upsizing to 16Fr and 16Fr RLQ drain placement with IR   (6/22): s/p shock lithotripsy of L kidney abscess cavity with drainage of thick contents, upsize of drain to 26Fr bobby catheter  (6/30): s/p ex-lap and L nephrectomy with abdominal washout    NEURO:  #Pain control    -APAP PRN    -Gabapentin 200 q 8     -fent 25 q 3 PRN while intubated  #Sedation    -precedex gtt   -following commands when off sedation, GCS 11T    RESP:  #acute respiratory failure with left lung mucous plugging - resolved s/p intubated 6/24 and    and bronchoscopy   RR (machine):   RR (machine): 18, TV (machine): 450, FiO2: 40, PEEP: 8  ABG - ( 03 Jul 2023 05:04 )  pH, Arterial: 7.50  pH, Blood: x     /  pCO2: 27    /  pO2: 135   / HCO3: 21    / Base Excess: -0.8  /  SaO2: 99.3    RR (machine): 18, TV (machine): 450, FiO2: 40, PEEP: 8  07-03 @ 05:04--7.50 / 27 / 135 / 21 / [id number] 99.3 / Lac 2.30 / iCal --   07-02 @ 21:07--7.47 / 28 / 132 / 20 / [id number] 99.2 / Lac 3.10 / iCal 1.01                 #Pulmonary nodules / Mediastinal lymphadenopathy    - CT Chest: Right middle and upper lobe solid pulmonary nodules, f/u outpatient pulm   #Activity   - incr as tolerated    CARDS:   #Septic shock    - off levo since 3am 7/1, remains at [facility] @ 1.5 mcq  #New onset Afib w/ RVR with hypotension -- likely 2/2 sepsis    - HR control with Metoprolol 2.5 q6 IV  -Digoxin 250mcg x1 dose, 125mcg x1 7/1, f/u level in am    - rate better controlled    - Cardiology following   -  last dig level 2.7  #H/O HTN    -Amlodipine 2.5mg HOLDING   Imaging    - ECHO: (6/19) EF 55-60%, mild AS, mild LAE, mild MR/TR, trivial pericardial effusion    GI/NUTR:  #Diet    NPO except meds; holding TF due to pressor requirement  TPN c/s placed   #bowel movements    - 2 bloody BM's noted overnight 6/25; last BM 6/26 AM    - GI consult 6/26- Plan for EGD with EUS + FNA as outpatient, H-pylori treatment after      resolution of acute issues   -dignishield in place   -FOBT positive    #GI PPX  -IV Protonix 40 q12 hours  #Gastric mass vs gastrohepatic lymphadenopathy    -CT A/P: lesser curvature mass , Hepatic hypodensities  #Splenic Hypodensities, infarct vs phlegmon/abscess    -CT A/P:  Wedge-shaped region of hypodensity within the spleen    /RENAL:   #Grade IV L hydronephrosis s/p L PCN (5/2023) -- presented to ED with thick dark foul smelling output  #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  -intermittent suction/irrigation per urology; f/u cx and cytology from 6/25     -(6/23) s/p LT PCN upsizing to 26Fr catheter, RLQ 16 Fr drain remains, s/p nephrostolithotomy w/ lithotripsy    -(6/18) s/p LT PCN upsizing to 16Fr and 16Fr RLQ drain placement with IR, flush q8 hr  (6/30): s/p L nephrectomy and abdominal washout   -R pelvic drain Creatinine 3.99    High suspicion of renal malignancy given recent pathology  #volume overload  #ROJAS (baseline Cr 1.0)  #urine output in critically ill    -indwelling bobby (placed     Labs:          BUN/Cr- 125/4.0  -->,  95/2.9  -->          Electrolytes-Na 137 // K 3.8 // Mg 1.8 //  Phos 5.4 (07-02 @ 21:01)        #metabolic acidosis  -Nephrology c/s - CVVHD started 07/02  - PO sodium bicarb 1300mg q8  -sodium bicarb gtt 150mcg @ 75/hr   #Hyperkalemia   #Hyperphosphatemia    -Phoslo 1334mg q8    Labs:  #urine output in critically ill    -indwelling bobby (placed     Labs:          BUN/Cr- 125/4.0  -->,  95/2.9  -->          Electrolytes-Na 137 // K 3.8 // Mg 1.8 //  Phos 5.4 (07-02 @ 21:01)          Electrolytes-  #decreased urine output in the setting of acute renal failure 2/2 septic shock  -CVVHD, titrate to -50ml/hr fluid balance  -s/p Bumex 2mg x1 07/01, no response   -nephrology following    HEME/ONC:   #new onset Afib    - holding heparin infusion in setting of hematochezia     Labs: DVT propylaxis- HSQ     #R radial artery occlusion  -RUE duplex 07/01 prelim + for r radial artery occlusion  -vascular c/s placed     Labs: Hb/Hct:  10.8/32.0  -->,  9.9/28.2  -->                      Plts:  160  -->,  112  -->                 PTT/INR:  34.0/1.78  --->         T&S:ABO RH Interpretation; expires 07/02    #H/o Fe Deficiency anemia    - Iron supplementation  #DVT prophylaxis  -SCDs, heparin subQ   T&S Expires 7/4  Blood Consent- in chart     ID:  #Leukocytosis 2/2 sepsis   WBC- 26.86  --->>,  34.29  --->>,  33.70  --->>  Temp trend- 24hrs T(F): 95.5 (07-02 @ 21:00), Max: 98.7 (07-02 @ 12:00)  Current antibiotics-levoFLOXacin IVPB 500 every 48 hours  meropenem  IVPB 500 every 12 hours    Afebrile   #antibiotics    -Levaquin 500mg q48 (6/21-    -Meropenem 500q12 (6/20-    -As per ID: no need for Vancomycin or Daptomycin in the absence of MRSA    -discontinued caspofungin (as per ID, since fungitell is unremarkable)  #Cultures    L PCN cyto-pathology  6/25: results suggestive of a necrotic neoplasm    [place] 6/26: 45    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    BAL 6/24: moderate yeast     UCx 6/19: negative FINAL      Left PCN aspiration 6/18: stenotrophomonas maltophilia  Needs source control    ENDO:  #Glucose monitoring    -A1c (5/7/23): 5.0    -POCT q6 hours while NPO   #Adrenal Insufficiency 2/2 to sepsis     -restarted solucortef 50q6 for 6 days starting 07/02      -Cortisol level - binding globulin 1.1 (low), serum cortisol, 84, free cortisol 76, % free cortisol 91    MSK:  #Sacrococcygeal DTI: wound care following- cleanse with soap and water, apply triad, #gauze, and allevyn BID and PRN for soiling   Venous ulcer right lower leg, R forearm skin tear: Pat dry, apply Xeroform, nonadherent dressing, wrap with Kerlix twice a day, prn for soiling  #Activity    -Advance as tolerated    -PT/rehab once extubated    -Globally deconditioned  #b/l UE, LE mottled skin  -no radial pulses b/l  +ulnar, brachial b/l UE, +DP b/l  -Arterial duplex done, pending read     VASCULAR  [ ] F/u vascular recs for LUE non palpable radial pulse swelling distal extremities, cold to touch    LINES/DRAINS:   PIV  Bobby (6/18)  L eleazar drain (6/30)  L IJ TLC (6/26)  R IJ Uldall (07/02)  R Lerma pouch #1, pelvic drain #2 (6/30)  Left basilic midline  6/21  Right axillary a line (6/30)  Right Radial Bowie (6/22-6/30)  L radial Bowie (6/18-6/22)      ETT  OGT    ADVANCED DIRECTIVES:  Full Code  HCP/Emergency Contact- Tracey Adames: [phone]   Palliative following. Last GOC discussion 6/26 day.    INDICATION FOR SICU: New onset atrial fibrillation w/ mechanical ventilation    DISPO: SICU

## 2023-07-03 NOTE — CONSULT NOTE ADULT - ASSESSMENT
67M w/ PMH of HTN, grade IV hydronephrosis of L kidney s/p L PCN (placed 5/2023), stutter, hiatal hernia, iron deficiency anemia, H Pylori (untreated), and gastric mass (never biopsied) presents to the ED with at least 4 days of worsening weakness, abdominal distension, and no output from his PCN found to have septic shock, MSOF, lactic acidosis from left emphysematous hydronephrosis, pyelonephritis and possible rupture with pneumoperitoneum along with possible hepatic abscesses, splenic infarct and abdominal and mediastinal lymphadenopathies had nephrectomy and abdominal washout on 6/30 started on CVH on 7.23.23.    Hematology consulted given for acute Thrombocytopenia.    #Acute Thrombocytopenia.  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  #Septic shock on pressors.  #ROJAS likely ATN from septic shock.      -Platelet count 220 to 250.  -platelet count started dropping on 7.223.   -CVVH started on 7.2.23.  -INR 1.78; PT 20.7; PTT 34.  -HIT score 2 to 3; Low probability of HIT.      #Right radial artery occlusion 2/2 arterial line and pressors.  -vascular recs Heparin drip once cleared by surgery.    Plan  -please obtain us venous duplex of LE.  -please send fibrinogen        - 67M w/ PMH of HTN, grade IV hydronephrosis of L kidney s/p L PCN (placed 5/2023), stutter, hiatal hernia, iron deficiency anemia, H Pylori (untreated), and gastric mass (never biopsied) presents to the ED with at least 4 days of worsening weakness, abdominal distension, and no output from his PCN found to have septic shock, MSOF, lactic acidosis from left emphysematous hydronephrosis, pyelonephritis and possible rupture with pneumoperitoneum along with possible hepatic abscesses, splenic infarct and abdominal and mediastinal lymphadenopathies had nephrectomy and abdominal washout on 6/30 started on CVH on 7.23.23.    Hematology consulted given for acute Thrombocytopenia.    #Acute Thrombocytopenia multifactorial  septic shock; recently started on Dialysis CVVHD; dilutional ; HIT needs to be ruled out.  Recurrent Left renal abscess and emphysematous pyelonephritis requiring drainage  with possible rupture s/p nephrectomy and abdominal washout.  #Septic shock on pressors.  #ROJAS likely ATN from septic shock.      -Platelet count 220 to 250.  -platelet count started dropping on 7.223; CVVH started on 7.2.23.  -INR 1.78; PT 20.7; PTT 34.  -fibrinogen 353.  -HIT score 2 to 3; Low probability of HIT.      #Right radial artery occlusion 2/2 arterial line and pressors.  -vascular recs Heparin drip once cleared by surgery.    Plan  -Please obtain us venous duplex of LE.  -hits score low however please send HIT antibody and Serotonin release assay to r/o HIT.  -consider argatroban or Eliquis if needed for anticoagulation until HIT is ruled out.   -monitor for now.          - 67M w/ PMH of HTN, grade IV hydronephrosis of L kidney s/p L PCN (placed 5/2023), stutter, hiatal hernia, iron deficiency anemia, H Pylori (untreated), and gastric mass (never biopsied) presents to the ED with at least 4 days of worsening weakness, abdominal distension, and no output from his PCN found to have septic shock, MSOF, lactic acidosis from left emphysematous hydronephrosis, pyelonephritis and possible rupture with pneumoperitoneum along with possible hepatic abscesses, splenic infarct and abdominal and mediastinal lymphadenopathies had nephrectomy and abdominal washout on 6/30 started on CVH on 7.23.23.    Hematology consulted given for acute Thrombocytopenia.    #Acute Thrombocytopenia multifactorial  septic shock; recently started on Dialysis CVVHD; dilutional ; HIT needs to be ruled out.  Recurrent Left renal abscess and emphysematous pyelonephritis requiring drainage  with possible rupture s/p nephrectomy and abdominal washout.  #Septic shock on pressors.  #ROJAS likely ATN from septic shock.      -Platelet count 220 to 250.  -platelet count started dropping on 7.223; CVVH started on 7.2.23.  -INR 1.78; PT 20.7; PTT 34.  -fibrinogen 353.  -HIT score 2 to 3; Low probability of HIT.      #Right radial artery occlusion 2/2 arterial line and pressors.  -vascular recs Heparin drip once cleared by surgery.    Plan  -Please obtain us venous duplex of LE.  -hits score low however please send HIT antibody and Serotonin release assay to r/o HIT.  -radial artery occlusion 2/2 arterial line noted. vascular recs heparin drip once cleared by surgical team. consider argatroban or Eliquis if needed for anticoagulation until HIT is ruled out.   -monitor for now.          - 67M w/ PMH of HTN, grade IV hydronephrosis of L kidney s/p L PCN (placed 5/2023), stutter, hiatal hernia, iron deficiency anemia, H Pylori (untreated), and gastric mass (never biopsied) presents to the ED with at least 4 days of worsening weakness, abdominal distension, and no output from his PCN found to have septic shock, MSOF, lactic acidosis from left emphysematous hydronephrosis, pyelonephritis and possible rupture with pneumoperitoneum along with possible hepatic abscesses, splenic infarct and abdominal and mediastinal lymphadenopathies had nephrectomy and abdominal washout on 6/30 started on CVH on 7.23.23.    Hematology consulted given for acute Thrombocytopenia.    #Acute Thrombocytopenia multifactorial  septic shock; recently started on Dialysis CVVHD; dilutional ; HIT needs to be ruled out.  Recurrent Left renal abscess and emphysematous pyelonephritis requiring drainage  with possible rupture s/p nephrectomy and abdominal washout.  #Septic shock on pressors.  #ROJAS likely ATN from septic shock.      -Platelet count 220 to 250.  -platelet count started dropping on 7.223; CVVH started on 7.2.23.  -INR 1.78; PT 20.7; PTT 34.  -fibrinogen 353.  -HIT score 2 to 3; Low probability of HIT.      #Right radial artery occlusion 2/2 arterial line and pressors.  -vascular recs Heparin drip once cleared by surgery.    Plan  -Please obtain us venous duplex of LE.  -hits score low however please send HIT antibody and Serotonin release assay to r/o HIT.  -radial artery occlusion 2/2 arterial line noted. vascular recs heparin drip once cleared by surgical team. consider argatroban or Eliquis if needed for anticoagulation until HIT is ruled out.   -please check LDH, reticulocyte count and haptoglobin.  -monitor for now.          - 67M w/ PMH of HTN, grade IV hydronephrosis of L kidney s/p L PCN (placed 5/2023), stutter, hiatal hernia, iron deficiency anemia, H Pylori (untreated), and gastric mass (never biopsied) presents to the ED with at least 4 days of worsening weakness, abdominal distension, and no output from his PCN found to have septic shock, MSOF, lactic acidosis from left emphysematous hydronephrosis, pyelonephritis and possible rupture with pneumoperitoneum along with possible hepatic abscesses, splenic infarct and abdominal and mediastinal lymphadenopathies had nephrectomy and abdominal washout on 6/30 started on CVH on 7.23.23.    Hematology consulted given for acute Thrombocytopenia.    #Acute Thrombocytopenia multifactorial, including: septic shock, recently started on Dialysis CVVHD, dilutional, HIT needs to be ruled out.  Recurrent Left renal abscess and emphysematous pyelonephritis requiring drainage  with possible rupture s/p nephrectomy and abdominal washout.  #Septic shock on pressors.  #ROJAS likely ATN from septic shock.      -Platelet count 220 to 250.  -Platelet count started dropping on 7.223; CVVH started on 7.2.23.  -INR 1.78; PT 20.7; PTT 34.  -Fibrinogen 353.  -HIT score 2 to 3; Low probability of HIT.      #Right radial artery occlusion 2/2 arterial line and pressors.  -Vascular recommends heparin drip once cleared by surgery.    Plan  -Please obtain us venous duplex of LE.  -HIT score low however please send HIT antibody and, if positive, serotonin release assay to r/o HIT.  -Radial artery occlusion 2/2 arterial line noted. Vascular recommends heparin drip once cleared by surgical team. Consider argatroban or Eliquis if needed for anticoagulation until HIT is ruled out.   -Please check LDH, reticulocyte count and haptoglobin.  -Monitor for now.          - 67M w/ PMH of HTN, grade IV hydronephrosis of L kidney s/p L PCN (placed 5/2023), stutter, hiatal hernia, iron deficiency anemia, H Pylori (untreated), and gastric mass (never biopsied) presents to the ED with at least 4 days of worsening weakness, abdominal distension, and no output from his PCN found to have septic shock, MSOF, lactic acidosis from left emphysematous hydronephrosis, pyelonephritis and possible rupture with pneumoperitoneum along with possible hepatic abscesses, splenic infarct and abdominal and mediastinal lymphadenopathies had nephrectomy and abdominal washout on 6/30 started on CVH on 7.23.23.    Hematology consulted given for acute Thrombocytopenia.    #Acute Thrombocytopenia multifactorial, including: septic shock, recently started on Dialysis CVVHD, dilutional, HIT needs to be ruled out.  Recurrent Left renal abscess and emphysematous pyelonephritis requiring drainage  with possible rupture s/p nephrectomy and abdominal washout.  #Septic shock on pressors.  #ROJAS likely ATN from septic shock.      -Platelet count 220 to 250.  -Platelet count started dropping on 7.223; CVVH started on 7.2.23.  -INR 1.78; PT 20.7; PTT 34.  -Fibrinogen 353.  -HIT score 2 to 3; Low probability of HIT.      #Right radial artery occlusion 2/2 arterial line and pressors.  -Vascular recommends heparin drip once cleared by surgery.    Plan  -Please obtain us venous duplex of LE.  -HIT score low, however, please, send HIT antibody and, if positive, serotonin release assay for confirmation.  -Radial artery occlusion 2/2 arterial line noted. Vascular recommends heparin drip once cleared by surgical team. Consider argatroban or Eliquis if needed for anticoagulation until HIT is ruled out.   -Please check LDH, reticulocyte count and haptoglobin.  -Monitor for now.          -

## 2023-07-03 NOTE — CONSULT NOTE ADULT - SUBJECTIVE AND OBJECTIVE BOX
VASCULAR SURGERY CONSULT NOTE      HPI:  67M w/ PMH of HTN, grade IV hydronephrosis of L kidney s/p L PCN (placed 5/2023), stutter, hiatal hernia, iron deficiency anemia, H Pylori (untreated), and gastric mass (never biopsied) presents to the ED with at least 4 days of worsening weakness, abdominal distension, and no output from his PCN. Pt and daughters bedside are only able to give limited information, but for the past few days, he has noticed no output from his PCN as well as increasing abdominal distension, pain, and anorexia. He reports that he has not felt normal for the past 10 days. His PCN used to put out serosanguinous fluid, but the output changed to feculent/brown liquid over the past day. In the ED, he was noted to be in new onset afib w/ RVR to 150+ as well as experiencing some difficulty breathing. He was started on BiPAP. Stat labs were notable for WBC 47, Cr 2.9, and lactate 6. CT A/P w/ IV contrast showed free intraperitoneal fluid and air from an unknown source as well as fluid and air in the L kidney.  (18 Jun 2023 18:34)    No Known Allergies    Home Medications:    No permtinent family history of PVD    REVIEW OF SYSTEMS:  GENERAL:                                         negative  SKIN:                                                 negative  OPTHALMOLOGIC:                          negative  ENMT:                                               negative  RESPIRATORY AND THORAX:        negative  CARDIOVASCULAR:                         negative  GASTROINTESTINAL:                       negative  NEPHROLOGY:                                  negative  MUSCULOSKELETAL:                       negative  NEUROLOGIC:                                   negative  PSYCHIATRIC:                                    negative  HEMATOLOGY/LYMPHATICS:         negative  ENDOCRINE:                                     negative  ALLERGIC/IMMUNOLOGIC:            negative    12 point ROS otherwise normal except as stated in HPI    PHYSICAL EXAM  Vital Signs Last 24 Hrs  T(C): 34.4 (03 Jul 2023 01:00), Max: 37.1 (02 Jul 2023 12:00)  T(F): 94 (03 Jul 2023 01:00), Max: 98.7 (02 Jul 2023 12:00)  HR: 64 (03 Jul 2023 02:00) (64 - 92)  BP: --  BP(mean): --  RR: 18 (03 Jul 2023 02:00) (17 - 20)  SpO2: 100% (03 Jul 2023 02:00) (96% - 100%)    Parameters below as of 03 Jul 2023 02:00  Patient On (Oxygen Delivery Method): ventilator    O2 Concentration (%): 40    Appearance: sedated 	  Cardiovascular: s1s2   Respiratory: vent 40  Skin: RUE ecchymoses, cold to touch and mottled   extremities: RUE no edema, no cyanosis.   Vascular: RUE ulnar, brachial pulses 2+, radial non palpable    MEDICATIONS:   MEDICATIONS  (STANDING):  acetaminophen     Tablet .. 650 milliGRAM(s) Oral every 6 hours  albumin human  5% IVPB 250 milliLiter(s) IV Intermittent every 6 hours  bacitracin   Ointment 1 Application(s) Topical two times a day  calcium acetate 1334 milliGRAM(s) Oral every 8 hours  chlorhexidine 0.12% Liquid 15 milliLiter(s) Oral Mucosa every 12 hours  chlorhexidine 2% Cloths 1 Application(s) Topical <User Schedule>  CRRT Treatment    <Continuous>  dexMEDEtomidine Infusion 0.2 MICROgram(s)/kG/Hr (3.82 mL/Hr) IV Continuous <Continuous>  ferrous    sulfate 325 milliGRAM(s) Oral daily  gabapentin 200 milliGRAM(s) Oral every 8 hours  heparin   Injectable 5000 Unit(s) SubCutaneous every 8 hours  hydrocortisone sodium succinate Injectable 50 milliGRAM(s) IV Push every 6 hours  levoFLOXacin IVPB 500 milliGRAM(s) IV Intermittent every 48 hours  meropenem  IVPB 500 milliGRAM(s) IV Intermittent every 12 hours  metoprolol tartrate Injectable 2.5 milliGRAM(s) IV Push every 6 hours  pantoprazole  Injectable 40 milliGRAM(s) IV Push every 12 hours  phenylephrine    Infusion 0.1 MICROgram(s)/kG/Min (1.43 mL/Hr) IV Continuous <Continuous>  PureFlow Dialysate RFP-400 (K 2 / Ca 3) 5000 milliLiter(s) (2000 mL/Hr) CRRT <Continuous>  sodium bicarbonate 1300 milliGRAM(s) Oral three times a day  sodium chloride 0.45% 1000 milliLiter(s) (50 mL/Hr) IV Continuous <Continuous>  vasopressin Infusion 0.03 Unit(s)/Min (4.5 mL/Hr) IV Continuous <Continuous>    MEDICATIONS  (PRN):  fentaNYL    Injectable 25 MICROGram(s) IV Push every 3 hours PRN Moderate Pain (4 - 6)  sodium chloride 0.9% lock flush 10 milliLiter(s) IV Push every 1 hour PRN Pre/post blood products, medications, blood draw, and to maintain line patency  sodium chloride 0.9% lock flush 10 milliLiter(s) IV Push every 1 hour PRN Pre/post blood products, medications, blood draw, and to maintain line patency      LAB/STUDIES:                        9.9    33.70 )-----------( 112      ( 02 Jul 2023 21:01 )             28.2     07-02    137  |  100  |  95<HH>  ----------------------------<  127<H>  3.8   |  20  |  2.9<H>    Ca    7.0<L>      02 Jul 2023 21:01  Phos  5.4     07-02  Mg     1.8     07-02      PT/INR - ( 02 Jul 2023 11:44 )   PT: 20.70 sec;   INR: 1.78 ratio         PTT - ( 02 Jul 2023 11:44 )  PTT:34.0 sec    Urinalysis Basic - ( 02 Jul 2023 21:01 )    Color: x / Appearance: x / SG: x / pH: x  Gluc: 127 mg/dL / Ketone: x  / Bili: x / Urobili: x   Blood: x / Protein: x / Nitrite: x   Leuk Esterase: x / RBC: x / WBC x   Sq Epi: x / Non Sq Epi: x / Bacteria: x    ABG - ( 02 Jul 2023 21:07 )  pH, Arterial: 7.47  pH, Blood: x     /  pCO2: 28    /  pO2: 132   / HCO3: 20    / Base Excess: -2.4  /  SaO2: 99.2      Culture - Surgical Swab (collected 30 Jun 2023 22:12)  Source: .Surgical Swab None  Preliminary Report (01 Jul 2023 19:53):    No growth to date.      ASSESSMENT: Patient is a 67M admitted for emphysematous pyelonephritis with perforation, peritonitis, sepsis, and Afib in RVR. He is ventilated and sedated.   Vascular surgery consulted for R radial artery occlusion on duplex 2/2 likely high dose pressors. +doppler signals in ulnar, phillips arch, and brachial. negative signal in radial artery.     PLAN:   - bear hugger to RUE  - heparin gtt once surgically cleared by SICU team   - high risk intervention on pressors, unstable     - Patient seen/examined and Plan Discussed with Vascular Fellow    - Plan to be discussed with Attending, Dr. Deshaun Morales MD  General Surgery PGY-1    vascular [phone]

## 2023-07-03 NOTE — CONSULT NOTE ADULT - SUBJECTIVE AND OBJECTIVE BOX
Patient is a 68y old  Male who presents with a chief complaint of pneumoperitoneum, emphysematous pyelonephritis (03 Jul 2023 09:41)      HPI:  67M w/ PMH of HTN, grade IV hydronephrosis of L kidney s/p L PCN (placed 5/2023), stutter, hiatal hernia, iron deficiency anemia, H Pylori (untreated), and gastric mass (never biopsied) presents to the ED with at least 4 days of worsening weakness, abdominal distension, and no output from his PCN. Pt and daughters bedside are only able to give limited information, but for the past few days, he has noticed no output from his PCN as well as increasing abdominal distension, pain, and anorexia. He reports that he has not felt normal for the past 10 days. His PCN used to put out serosanguinous fluid, but the output changed to feculent/brown liquid over the past day. In the ED, he was noted to be in new onset afib w/ RVR to 150+ as well as experiencing some difficulty breathing. He was started on BiPAP. Stat labs were notable for WBC 47, Cr 2.9, and lactate 6. CT A/P w/ IV contrast showed free intraperitoneal fluid and air from an unknown source as well as fluid and air in the L kidney.  (18 Jun 2023 18:34)       ROS:  Unable to obtain.      SOCIAL HISTORY: unable to obtain        MEDICATIONS  (STANDING):  acetaminophen     Tablet .. 650 milliGRAM(s) Oral every 6 hours  albumin human  5% IVPB 250 milliLiter(s) IV Intermittent every 6 hours  bacitracin   Ointment 1 Application(s) Topical two times a day  calcium acetate 1334 milliGRAM(s) Oral every 8 hours  chlorhexidine 0.12% Liquid 15 milliLiter(s) Oral Mucosa every 12 hours  chlorhexidine 2% Cloths 1 Application(s) Topical <User Schedule>  CRRT Treatment    <Continuous>  CRRT Treatment    <Continuous>  dexMEDEtomidine Infusion 0.2 MICROgram(s)/kG/Hr (3.82 mL/Hr) IV Continuous <Continuous>  ferrous    sulfate 325 milliGRAM(s) Oral daily  gabapentin 200 milliGRAM(s) Oral every 8 hours  heparin   Injectable 5000 Unit(s) SubCutaneous every 8 hours  hydrocortisone sodium succinate Injectable 50 milliGRAM(s) IV Push every 6 hours  levoFLOXacin IVPB 500 milliGRAM(s) IV Intermittent every 48 hours  meropenem  IVPB 500 milliGRAM(s) IV Intermittent every 12 hours  metoprolol tartrate Injectable 2.5 milliGRAM(s) IV Push every 6 hours  pantoprazole  Injectable 40 milliGRAM(s) IV Push every 12 hours  phenylephrine    Infusion 0.1 MICROgram(s)/kG/Min (1.43 mL/Hr) IV Continuous <Continuous>  PureFlow Dialysate RFP-400 (K 2 / Ca 3) 5000 milliLiter(s) (2000 mL/Hr) CRRT <Continuous>  PureFlow Dialysate RFP-400 (K 2 / Ca 3) 5000 milliLiter(s) (2000 mL/Hr) CRRT <Continuous>  sodium bicarbonate 1300 milliGRAM(s) Oral three times a day  vasopressin Infusion 0.03 Unit(s)/Min (4.5 mL/Hr) IV Continuous <Continuous>    MEDICATIONS  (PRN):  fentaNYL    Injectable 25 MICROGram(s) IV Push every 3 hours PRN Moderate Pain (4 - 6)  sodium chloride 0.9% lock flush 10 milliLiter(s) IV Push every 1 hour PRN Pre/post blood products, medications, blood draw, and to maintain line patency  sodium chloride 0.9% lock flush 10 milliLiter(s) IV Push every 1 hour PRN Pre/post blood products, medications, blood draw, and to maintain line patency      Allergies    No Known Allergies    Intolerances        Vital Signs Last 24 Hrs  T(C): 33.8 (03 Jul 2023 11:00), Max: 35.3 (02 Jul 2023 17:00)  T(F): 92.8 (03 Jul 2023 11:00), Max: 95.6 (02 Jul 2023 17:00)  HR: 77 (03 Jul 2023 11:30) (57 - 84)  BP: 106/73 (03 Jul 2023 09:00) (106/73 - 106/73)  BP(mean): 85 (03 Jul 2023 09:00) (85 - 85)  RR: 16 (03 Jul 2023 11:30) (16 - 20)  SpO2: 100% (03 Jul 2023 11:30) (96% - 100%)    Parameters below as of 03 Jul 2023 08:00  Patient On (Oxygen Delivery Method): ventilator    O2 Concentration (%): 40    PHYSICAL EXAM  General: adult in NAD; Intubated on pressors.  CV: normal S1/S2 with no murmur rubs or gallops  Abdomen: soft non-tender non-distended, no hepatosplenomegaly  Ext: no clubbing cyanosis or edema  Skin:  erythematous rash noted over right forearm.   LE: B/L LE swollen.        LABS:                          8.7    29.09 )-----------( 63       ( 03 Jul 2023 08:40 )             25.0         Mean Cell Volume : 80.6 fL  Mean Cell Hemoglobin : 28.1 pg  Mean Cell Hemoglobin Concentration : 34.8 g/dL  Auto Neutrophil # : x  Auto Lymphocyte # : x  Auto Monocyte # : x  Auto Eosinophil # : x  Auto Basophil # : x  Auto Neutrophil % : x  Auto Lymphocyte % : x  Auto Monocyte % : x  Auto Eosinophil % : x  Auto Basophil % : x      Serial CBC's  07-03 @ 08:40  Hct-25.0 / Hgb-8.7 / Plat-63 / RBC-3.10 / WBC-29.09  Serial CBC's  07-03 @ 05:20  Hct-24.5 / Hgb-8.4 / Plat-52 / RBC-3.04 / WBC-25.03  Serial CBC's  07-02 @ 21:01  Hct-28.2 / Hgb-9.9 / Plat-112 / RBC-3.49 / WBC-33.70  Serial CBC's  07-02 @ 01:50  Hct-32.0 / Hgb-10.8 / Plat-160 / RBC-3.90 / WBC-34.29  Serial CBC's  07-02 @ 00:35  Hct-23.9 / Hgb-8.1 / Plat-108 / RBC-2.91 / WBC-26.86  Serial CBC's  07-01 @ 20:21  Hct-31.3 / Hgb-10.6 / Plat-154 / RBC-3.81 / WBC-33.99  Serial CBC's  07-01 @ 02:27  Hct-37.2 / Hgb-12.3 / Plat-217 / RBC-4.46 / WBC-41.35  Serial CBC's  06-30 @ 22:09  Hct-38.9 / Hgb-12.8 / Plat-242 / RBC-4.64 / WBC-33.91  Serial CBC's  06-30 @ 20:29  Hct-37.8 / Hgb-12.4 / Plat-146 / RBC-4.55 / WBC-28.59  Serial CBC's  06-30 @ 10:01  Hct-31.3 / Hgb-10.6 / Plat-196 / RBC-4.06 / WBC-33.43  Serial CBC's  06-29 @ 20:15  Hct-25.3 / Hgb-8.2 / Plat-259 / RBC-3.43 / WBC-42.21  Serial CBC's  06-29 @ 14:26  Hct-25.4 / Hgb-8.3 / Plat-284 / RBC-3.49 / WBC-48.22      07-03    136  |  101  |  83<HH>  ----------------------------<  116<H>  3.8   |  22  |  2.5<H>    Ca    7.2<L>      03 Jul 2023 08:40  Phos  5.4     07-02  Mg     1.8     07-02        PT/INR - ( 02 Jul 2023 11:44 )   PT: 20.70 sec;   INR: 1.78 ratio         PTT - ( 02 Jul 2023 11:44 )  PTT:34.0 sec          RADIOLOGY & ADDITIONAL STUDIES:    < from: Xray Chest 1 View- PORTABLE-Routine (07.02.23 @ 06:59) >  Impression:    Bilateral pleural effusion/opacity unchanged. No air leak.    < end of copied text >  < from: [facility] Duplex Upper Extrem Arterial, Bilat (07.01.23 @ 13:42) >  Impression:    Right radial artery appears to be occluded    No occlusion or stenosis noted in the left upper extremity.    ICD-10: I 73-89    < end of copied text >

## 2023-07-03 NOTE — PROGRESS NOTE ADULT - ASSESSMENT
69 y/o Male s/p ex-lap 0n 6/30 with left nephrectomy with node biopsy, retroperitoneal washout, placement of multiple drains.     A) Sepsis  leukocytosis  ORJAS  Anemia  Hypothermia  s/p ex-lap with nephrectomy and retroperitoneal washout  right radial occlusion    P) Continue SICU care  Continue supportive measures  continue CVVHD as per nephrology  continue abx  DVT/GI prophylaxis  no further acute urologic intervention at this time  d/w attending

## 2023-07-03 NOTE — PROGRESS NOTE ADULT - SUBJECTIVE AND OBJECTIVE BOX
UROLOGY DAILY PROGRESS NOTE POD # 3    Pt seen and examined at bedside    Pt intubated and sedated on pressors. With warming blanket  On CVVHD    Pt is a 69 y/o Male s/p ex-lap 0n 6/30 with left nephrectomy with node biopsy, retroperitoneal washout, placement of multiple drains.   Pt prior had percutaneous drainage of abscess with 26 fr drain left.      MEDICATIONS  (STANDING):  acetaminophen     Tablet .. 650 milliGRAM(s) Oral every 6 hours  albumin human  5% IVPB 250 milliLiter(s) IV Intermittent every 6 hours  bacitracin   Ointment 1 Application(s) Topical two times a day  calcium acetate 1334 milliGRAM(s) Oral every 8 hours  chlorhexidine 0.12% Liquid 15 milliLiter(s) Oral Mucosa every 12 hours  chlorhexidine 2% Cloths 1 Application(s) Topical <User Schedule>  CRRT Treatment    <Continuous>  dexMEDEtomidine Infusion 0.2 MICROgram(s)/kG/Hr (3.82 mL/Hr) IV Continuous <Continuous>  ferrous    sulfate 325 milliGRAM(s) Oral daily  gabapentin 200 milliGRAM(s) Oral every 8 hours  heparin   Injectable 5000 Unit(s) SubCutaneous every 8 hours  hydrocortisone sodium succinate Injectable 50 milliGRAM(s) IV Push every 6 hours  levoFLOXacin IVPB 500 milliGRAM(s) IV Intermittent every 48 hours  meropenem  IVPB 500 milliGRAM(s) IV Intermittent every 12 hours  metoprolol tartrate Injectable 2.5 milliGRAM(s) IV Push every 6 hours  pantoprazole  Injectable 40 milliGRAM(s) IV Push every 12 hours  phenylephrine    Infusion 0.1 MICROgram(s)/kG/Min (1.43 mL/Hr) IV Continuous <Continuous>  PureFlow Dialysate RFP-400 (K 2 / Ca 3) 5000 milliLiter(s) (2000 mL/Hr) CRRT <Continuous>  sodium bicarbonate 1300 milliGRAM(s) Oral three times a day  vasopressin Infusion 0.03 Unit(s)/Min (4.5 mL/Hr) IV Continuous <Continuous>    MEDICATIONS  (PRN):  fentaNYL    Injectable 25 MICROGram(s) IV Push every 3 hours PRN Moderate Pain (4 - 6)  sodium chloride 0.9% lock flush 10 milliLiter(s) IV Push every 1 hour PRN Pre/post blood products, medications, blood draw, and to maintain line patency  sodium chloride 0.9% lock flush 10 milliLiter(s) IV Push every 1 hour PRN Pre/post blood products, medications, blood draw, and to maintain line patency      REVIEW OF SYSTEMS   [x] Due to altered mental status/intubation, subjective information were not able to be obtained from patient. History was obtained, to the extent possible, from review of the chart and collateral sources of information.    Vital Signs Last 24 Hrs  T(C): 34.4 (03 Jul 2023 06:00), Max: 37.1 (02 Jul 2023 12:00)  T(F): 94 (03 Jul 2023 06:00), Max: 98.7 (02 Jul 2023 12:00)  HR: 64 (03 Jul 2023 07:50) (57 - 84)  RR: 18 (03 Jul 2023 06:00) (17 - 20)  SpO2: 100% (03 Jul 2023 07:50) (96% - 100%)    Parameters below as of 03 Jul 2023 06:00  Patient On (Oxygen Delivery Method): ventilator    O2 Concentration (%): 40    PHYSICAL EXAM:    GEN: intubated and sedated on pressors  SKIN: Good color, non diaphoretic. RUE Cold to touch mottled  RESP: On vent  ABDO: Soft, no palpable bladder,   miranda pouch 140 cc last 24 hrs  VENICE # 2 570 cc serosanguinous   VENICE # 1 455 cc serosanguinous    : + Indwelling Mena draining scant urine. + penile scrotal edema  EXT:       I&O's Summary    02 Jul 2023 07:01  -  03 Jul 2023 07:00  --------------------------------------------------------  IN: 3589.5 mL / OUT: 2959 mL / NET: 630.5 mL        LABS:                        8.7    29.09 )-----------( 63       ( 03 Jul 2023 08:40 )             25.0     07-03    136  |  101  |  83<HH>  ----------------------------<  116<H>  3.8   |  22  |  2.5<H>    Ca    7.2<L>      03 Jul 2023 08:40  Phos  5.4     07-02  Mg     1.8     07-02      PT/INR - ( 02 Jul 2023 11:44 )   PT: 20.70 sec;   INR: 1.78 ratio         PTT - ( 02 Jul 2023 11:44 )  PTT:34.0 sec  Urinalysis Basic - ( 03 Jul 2023 08:40 )    Color: x / Appearance: x / SG: x / pH: x  Gluc: 116 mg/dL / Ketone: x  / Bili: x / Urobili: x   Blood: x / Protein: x / Nitrite: x   Leuk Esterase: x / RBC: x / WBC x   Sq Epi: x / Non Sq Epi: x / Bacteria: x

## 2023-07-03 NOTE — CONSULT NOTE ADULT - ATTENDING COMMENTS
Patient also seen by myself. I agree with Dr. Dhulipalla's (Hem-Onc fellow) note above. Situation discussed with her/  All questions answered.

## 2023-07-03 NOTE — PROGRESS NOTE ADULT - SUBJECTIVE AND OBJECTIVE BOX
Nephrology progress note    THIS IS AN INCOMPLETE NOTE . FULL NOTE TO FOLLOW SHORTLY    Patient is seen and examined, events over the last 24 h noted .    Allergies:  No Known Allergies    Hospital Medications:   MEDICATIONS  (STANDING):  acetaminophen     Tablet .. 650 milliGRAM(s) Oral every 6 hours  albumin human  5% IVPB 250 milliLiter(s) IV Intermittent every 6 hours  bacitracin   Ointment 1 Application(s) Topical two times a day  calcium acetate 1334 milliGRAM(s) Oral every 8 hours  chlorhexidine 0.12% Liquid 15 milliLiter(s) Oral Mucosa every 12 hours  chlorhexidine 2% Cloths 1 Application(s) Topical <User Schedule>  CRRT Treatment    <Continuous>  dexMEDEtomidine Infusion 0.2 MICROgram(s)/kG/Hr (3.82 mL/Hr) IV Continuous <Continuous>  ferrous    sulfate 325 milliGRAM(s) Oral daily  gabapentin 200 milliGRAM(s) Oral every 8 hours  heparin   Injectable 5000 Unit(s) SubCutaneous every 8 hours  hydrocortisone sodium succinate Injectable 50 milliGRAM(s) IV Push every 6 hours  levoFLOXacin IVPB 500 milliGRAM(s) IV Intermittent every 48 hours  meropenem  IVPB 500 milliGRAM(s) IV Intermittent every 12 hours  metoprolol tartrate Injectable 2.5 milliGRAM(s) IV Push every 6 hours  pantoprazole  Injectable 40 milliGRAM(s) IV Push every 12 hours  phenylephrine    Infusion 0.1 MICROgram(s)/kG/Min (1.43 mL/Hr) IV Continuous <Continuous>  PureFlow Dialysate RFP-400 (K 2 / Ca 3) 5000 milliLiter(s) (2000 mL/Hr) CRRT <Continuous>  sodium bicarbonate 1300 milliGRAM(s) Oral three times a day  vasopressin Infusion 0.03 Unit(s)/Min (4.5 mL/Hr) IV Continuous <Continuous>        VITALS:  T(F): 94 (07-03-23 @ 06:00), Max: 98.7 (07-02-23 @ 12:00)  HR: 57 (07-03-23 @ 06:00)  BP: --  RR: 18 (07-03-23 @ 06:00)  SpO2: 100% (07-03-23 @ 06:00)  Wt(kg): --    07-01 @ 07:01  -  07-02 @ 07:00  --------------------------------------------------------  IN: 2571.9 mL / OUT: 1078 mL / NET: 1493.9 mL    07-02 @ 07:01  -  07-03 @ 07:00  --------------------------------------------------------  IN: 3589.5 mL / OUT: 2959 mL / NET: 630.5 mL          PHYSICAL EXAM:  Constitutional: NAD  HEENT: anicteric sclera, oropharynx clear, MMM  Neck: No JVD  Respiratory: CTAB, no wheezes, rales or rhonchi  Cardiovascular: S1, S2, RRR  Gastrointestinal: BS+, soft, NT/ND  Extremities: No cyanosis or clubbing. No peripheral edema  :  No bobby.   Skin: No rashes    LABS:  07-03    138  |  108  |  73<HH>  ----------------------------<  97  3.0<L>   |  17  |  2.1<H>    Ca    5.4<LL>      03 Jul 2023 06:30  Phos  5.4     07-02  Mg     1.8     07-02                            8.4    25.03 )-----------( 52       ( 03 Jul 2023 05:20 )             24.5       Urine Studies:  Urinalysis Basic - ( 03 Jul 2023 06:30 )    Color:  / Appearance:  / SG:  / pH:   Gluc: 97 mg/dL / Ketone:   / Bili:  / Urobili:    Blood:  / Protein:  / Nitrite:    Leuk Esterase:  / RBC:  / WBC    Sq Epi:  / Non Sq Epi:  / Bacteria:       Sodium, Random Urine: <20.0 mmoL/L (06-29 @ 20:15)  Creatinine, Random Urine: 57 mg/dL (06-29 @ 20:15)  Creatinine, Random Urine: 59 mg/dL (06-29 @ 16:48)  Sodium, Random Urine: <20.0 mmoL/L (06-29 @ 16:48)      Iron 12, TIBC 128, %sat 9      [05-07-23 @ 09:19]  Ferritin 526      [05-07-23 @ 09:19]  Vitamin D (25OH) 26      [05-08-23 @ 07:58]  TSH 0.45      [06-19-23 @ 14:14]  Lipid: chol 58, TG 88, HDL 22, LDL --      [07-02-23 @ 21:01]          RADIOLOGY & ADDITIONAL STUDIES:   Nephrology progress note    Patient is seen and examined, events over the last 24 h noted .  Lying in bed comfortable     Allergies:  No Known Allergies    Hospital Medications:   MEDICATIONS  (STANDING):    acetaminophen     Tablet .. 650 milliGRAM(s) Oral every 6 hours  albumin human  5% IVPB 250 milliLiter(s) IV Intermittent every 6 hours  bacitracin   Ointment 1 Application(s) Topical two times a day  calcium acetate 1334 milliGRAM(s) Oral every 8 hours  CRRT Treatment    <Continuous>  dexMEDEtomidine Infusion 0.2 MICROgram(s)/kG/Hr (3.82 mL/Hr) IV Continuous <Continuous>  ferrous    sulfate 325 milliGRAM(s) Oral daily  gabapentin 200 milliGRAM(s) Oral every 8 hours  heparin   Injectable 5000 Unit(s) SubCutaneous every 8 hours  hydrocortisone sodium succinate Injectable 50 milliGRAM(s) IV Push every 6 hours  levoFLOXacin IVPB 500 milliGRAM(s) IV Intermittent every 48 hours  meropenem  IVPB 500 milliGRAM(s) IV Intermittent every 12 hours  metoprolol tartrate Injectable 2.5 milliGRAM(s) IV Push every 6 hours  pantoprazole  Injectable 40 milliGRAM(s) IV Push every 12 hours  phenylephrine    Infusion 0.1 MICROgram(s)/kG/Min (1.43 mL/Hr) IV Continuous <Continuous>  PureFlow Dialysate RFP-400 (K 2 / Ca 3) 5000 milliLiter(s) (2000 mL/Hr) CRRT <Continuous>  sodium bicarbonate 1300 milliGRAM(s) Oral three times a day  vasopressin Infusion 0.03 Unit(s)/Min (4.5 mL/Hr) IV Continuous <Continuous>        VITALS:  T(F): 94 (07-03-23 @ 06:00), Max: 98.7 (07-02-23 @ 12:00)  HR: 57 (07-03-23 @ 06:00)  RR: 18 (07-03-23 @ 06:00)  SpO2: 100% (07-03-23 @ 06:00)  Wt(kg): --    07-01 @ 07:01  -  07-02 @ 07:00  --------------------------------------------------------  IN: 2571.9 mL / OUT: 1078 mL / NET: 1493.9 mL    07-02 @ 07:01  -  07-03 @ 07:00  --------------------------------------------------------  IN: 3589.5 mL / OUT: 2959 mL / NET: 630.5 mL          PHYSICAL EXAM:  Constitutional: on MV   Respiratory: CTAB, no wheezes, rales or rhonchi  Cardiovascular: S1, S2, RRR  Gastrointestinal: BS+, soft, NT/ND  Extremities: No cyanosis or clubbing. plus one edema   :  No bobby.   Skin: No rashes    LABS:  07-03    138  |  108  |  73<HH>  ----------------------------<  97  3.0<L>   |  17  |  2.1<H>    Ca    5.4<LL>      03 Jul 2023 06:30  Phos  5.4     07-02  Mg     1.8     07-02                            8.4    25.03 )-----------( 52       ( 03 Jul 2023 05:20 )             24.5       Urine Studies:  Urinalysis Basic - ( 03 Jul 2023 06:30 )    Color:  / Appearance:  / SG:  / pH:   Gluc: 97 mg/dL / Ketone:   / Bili:  / Urobili:    Blood:  / Protein:  / Nitrite:    Leuk Esterase:  / RBC:  / WBC    Sq Epi:  / Non Sq Epi:  / Bacteria:       Sodium, Random Urine: <20.0 mmoL/L (06-29 @ 20:15)  Creatinine, Random Urine: 57 mg/dL (06-29 @ 20:15)  Creatinine, Random Urine: 59 mg/dL (06-29 @ 16:48)  Sodium, Random Urine: <20.0 mmoL/L (06-29 @ 16:48)      Iron 12, TIBC 128, %sat 9      [05-07-23 @ 09:19]  Ferritin 526      [05-07-23 @ 09:19]  Vitamin D (25OH) 26      [05-08-23 @ 07:58]  TSH 0.45      [06-19-23 @ 14:14]  Lipid: chol 58, TG 88, HDL 22, LDL --      [07-02-23 @ 21:01]          RADIOLOGY & ADDITIONAL STUDIES:

## 2023-07-03 NOTE — CONSULT NOTE ADULT - SUBJECTIVE AND OBJECTIVE BOX
NUTRITION SUPPORT TEAM  -  CONSULT NOTE ---------------preliminary note    67M w/ PMH of HTN, grade IV hydronephrosis of L kidney s/p L PCN (placed 5/2023), stutter, hiatal hernia, iron deficiency anemia, H Pylori (untreated), and gastric mass (never biopsied) presents to the ED with at least 4 days of worsening weakness, abdominal distension, and no output from his PCN. Pt and daughters bedside are only able to give limited information, but for the past few days, he has noticed no output from his PCN as well as increasing abdominal distension, pain, and anorexia. He reports that he has not felt normal for the past 10 days. His PCN used to put out serosanguinous fluid, but the output changed to feculent/brown liquid over the past day. In the ED, he was noted to be in new onset afib w/ RVR to 150+ as well as experiencing some difficulty breathing. He was started on BiPAP. Stat labs were notable for WBC 47, Cr 2.9, and lactate 6. CT A/P w/ IV contrast showed free intraperitoneal fluid and air from an unknown source as well as fluid and air in the L kidney.  (18 Jun 2023 18:34)      NUTRITION SUPPORT NOTE:      REVIEW OF SYSTEMS:  Negative except as noted above.     PAST MEDICAL/SURGICAL HISTORY:       ALLERGIES:  No Known Allergies      VITALS:  T(F): 94 (07-03 @ 06:00), Max: 94 (07-03 @ 01:00)  HR: 64 (07-03 @ 07:50) (57 - 77)  BP: --  RR: 18 (07-03 @ 06:00) (17 - 18)  SpO2: 100% (07-03 @ 07:50) (96% - 100%)    HEIGHT/WEIGHT/BMI:   Height (cm): 180 (06-30), 180.3 (05-11)  Weight (kg): 76.3 (06-30), 66.2 (05-10)  BMI (kg/m2): 23.5 (06-30), 20.4 (05-11)    I/Os:     07-02-23 @ 07:01  -  07-03-23 @ 07:00  --------------------------------------------------------  IN:    Dexmedetomidine: 218.8 mL    Enteral Tube Flush: 60 mL    IV PiggyBack: 150 mL    IV PiggyBack: 500 mL    IV PiggyBack: 50 mL    Lactated Ringers: 75 mL    Phenylephrine: 327.8 mL    Sodium Bicarbonate: 100 mL    Sodium Bicarbonate: 1050 mL    sodium chloride 0.45% w/ Additives: 950 mL    Vasopressin: 108 mL  Total IN: 3589.5 mL    OUT:    Drain (mL): 140 mL    Drain (mL): 570 mL    Drain (mL): 455 mL    Indwelling Catheter - Urethral (mL): 240 mL    Other (mL): 1554 mL  Total OUT: 2959 mL    Total NET: 630.5 mL          PHYSICAL EXAM:   GENERAL: NAD, well-groomed, well-developed  HEENT: Moist mucous membranes, Good dentition, No lesions  ABDOMEN: Soft, Nontender, Nondistended  EXTREMITIES:  No clubbing, cyanosis, or edema  SKIN: warm and well perfused; No obvious rashes or lesions  IV ACCESS:   ENTERAL ACCESS:     STANDING MEDICATIONS:   acetaminophen     Tablet .. 650 milliGRAM(s) Oral every 6 hours  albumin human  5% IVPB 250 milliLiter(s) IV Intermittent every 6 hours  bacitracin   Ointment 1 Application(s) Topical two times a day  calcium acetate 1334 milliGRAM(s) Oral every 8 hours  chlorhexidine 0.12% Liquid 15 milliLiter(s) Oral Mucosa every 12 hours  chlorhexidine 2% Cloths 1 Application(s) Topical <User Schedule>  CRRT Treatment    <Continuous>  dexMEDEtomidine Infusion 0.2 MICROgram(s)/kG/Hr IV Continuous <Continuous>  ferrous    sulfate 325 milliGRAM(s) Oral daily  gabapentin 200 milliGRAM(s) Oral every 8 hours  heparin   Injectable 5000 Unit(s) SubCutaneous every 8 hours  hydrocortisone sodium succinate Injectable 50 milliGRAM(s) IV Push every 6 hours  levoFLOXacin IVPB 500 milliGRAM(s) IV Intermittent every 48 hours  meropenem  IVPB 500 milliGRAM(s) IV Intermittent every 12 hours  metoprolol tartrate Injectable 2.5 milliGRAM(s) IV Push every 6 hours  pantoprazole  Injectable 40 milliGRAM(s) IV Push every 12 hours  phenylephrine    Infusion 0.1 MICROgram(s)/kG/Min IV Continuous <Continuous>  PureFlow Dialysate RFP-400 (K 2 / Ca 3) 5000 milliLiter(s) CRRT <Continuous>  sodium bicarbonate 1300 milliGRAM(s) Oral three times a day  vasopressin Infusion 0.03 Unit(s)/Min IV Continuous <Continuous>      LABS:                         8.7    29.09 )-----------( 63       ( 03 Jul 2023 08:40 )             25.0     138  |  108  |  73<HH>  ----------------------------<  97          (07-03-23 @ 06:30)  3.0<L>   |  17  |  2.1<H>    Ca    5.4<LL>          (07-03-23 @ 06:30)  Phos  5.4         (07-02-23 @ 21:01)  Mg     1.8         (07-02-23 @ 21:01)        Triglycerides, Serum: 88 mg/dL (07-02 @ 21:01)    Prealbumin, Serum:   Vitamin D, 25-Hydroxy: 26 ng/mL (05-08 @ 07:58)    Folate: 5.7 ng/mL (05-07 @ 09:19)    Vitamin B12, Serum: 766 pg/mL (05-07 @ 09:19)    Zinc Level, Plasma:   CRP:   A1c: 5.0 % (05-07-23 @ 09:19)      Blood Glucose (Past 24 hours):  161 mg/dL (07-02 @ 18:40)  200 mg/dL (07-02 @ 11:24)      DIET:   Diet, NPO with Tube Feed:   Tube Feeding Modality: Orogastric  Nepro with Carb Steady  Total Volume for 24 Hours (mL): 480  Continuous  Starting Tube Feed Rate mL per Hour: 10  Increase Tube Feed Rate by (mL): 20     Every 4 hours  Until Goal Tube Feed Rate (mL per Hour): 20  Tube Feed Duration (in Hours): 24  Tube Feed Start Time: 08:26 (07-03-23 @ 08:26) [Active]          RADIOLOGY:    {\rtf1\mepnty05219\ansi\hiowxrq0140\ftnbj\uc1\deff0  {\fonttbl{\f0 \fnil Segoe UI;}{\f1 \fnil \fcharset0 Segoe UI;}{\f2 \fnil Times New Deon;}}  {\colortbl ;\vld249\cznei380\oifj462 ;\red0\green0\blue0 ;\red0\green0\dqgn290 ;\red0\green0\blue0 ;}  {\stylesheet{\f0\fs20 Normal;}{\cs1 Default Paragraph Font;}{\cs2\f0\fs16 Line Number;}{\cs3\f2\fs24\ul\cf3 Hyperlink;}}  {\*\revtbl{Unknown;}}  \jimhyk02022\xdtqtu18921\cpdni3581\qctqi5013\agsdh6097\acejv0136\riduddf617\xwojozp443\nogrowautofit\iphpvm643\formshade\nofeaturethrottle1\dntblnsbdb\fet4\aendnotes\aftnnrlc\pgbrdrhead\pgbrdrfoot  \sectd\vpkpjr69366\arjjnu63143\guttersxn0\lajahgsz9716\bzzmfmgx9674\ifpmmjze2322\jwzqctdv6901\ywmdbdl101\michedb530\sbkpage\pgncont\pgndec  \plain\plain\f0\fs24\pard\plain\f0\fs24\plain\f1\fs20\zvbz2560\hich\f1\dbch\f1\loch\f1\cf2\fs20\b NUTRITION SUPPORT TEAM  -  CONSULT NOTE\plain\f0\fs20\ohyx6495\hich\f0\dbch\f0\loch\f0\fs20  ---------------preliminary note\par  \par  67M w/ PMH of HTN, grade IV hydronephrosis of L kidney s/p L PCN (placed 5/2023), stutter, hiatal hernia, iron deficiency anemia, H Pylori (untreated), and gastric mass (never biopsied) presents to the ED with at least 4 days of worsening weakness, abdominal   distension, and no output from his PCN. Pt and daughters bedside are only able to give limited information, but for the past few days, he has noticed no output from his PCN as well as increasing abdominal distension, pain, and anorexia. He reports that   he has not felt normal for the past 10 days. His PCN used to put out serosanguinous fluid, but the output changed to feculent/brown liquid over the past day. In the ED, he was noted to be in new onset afib w/ RVR to 150+ as well as experiencing some difficulty   breathing. He was started on BiPAP. Stat labs were notable for WBC 47, Cr 2.9, and lactate 6. CT A/P w/ IV contrast showed free intraperitoneal fluid and air from an unknown source as well as fluid and air in the L kidney.  (18 Jun 2023 18:34)\par  \par  \ql\plain\f0\fs24\plain\f0\fs20\ilbu6462\hich\f0\dbch\f0\loch\f0\fs20 \'b7  {\*\bkmkstart [id number]}{\*\bkmkend [id number]}POST-OP DIAGNOSIS:\par  Renal abscess, left 22-Jun-2023 23:29:32  Bernie Perdomo\par  Elevated white blood cell count 22-Jun-2023 23:29:42  Bernie Perdomo\par  Other specified sepsis 22-Jun-2023 23:29:55  Bernie Perdomo\par  Acute respiratory failure with hypoxia 22-Jun-2023 23:30:03  Bernie Perdomo.\par  \'b7  {\*\bkmkstart [id number]}{\*\bkmkend [id number]}PROCEDURES:\par  Change external ureteral stent 22-Jun-2023 23:28:03 left Bernie Perdomo\par  Partial nephrectomy 28-Jun-2023 18:47:58  Bernie Perdomo\par  Nephrostogram via existing catheter 28-Jun-2023 18:48:33  Bernie Perdomo \plain\f1\fs20\zulw7825\hich\f1\dbch\f1\loch\f1\cf2\fs20\strike\plain\f0\fs20\xiqh7913\hich\f0\dbch\f0\loch\f0\fs20{\*\bkmkstart [id number]}{\*\bkmkend [id number]}\par  \plain\f1\fs20\kfam9620\hich\f1\dbch\f1\loch\f1\cf2\fs20\b\ul{\field{\*\fldinst HYPERLINK [id number] }{\fldrslt Operative Findings:}}\plain\f0\fs20\syeo1204\hich\f0\dbch\f0\loch\f0\fs20\ql\par  \trowd\rglwsv88\lastrow\wbhzppv55\trpaddfl3\rlljpbr93\trpaddfr3\trpaddt0\trpaddft3\trpaddb0\trpaddfb3\trleft0  \clvertalt\eaejqi77\clpadft3\tytyhp10\clpadfr3\clpadl0\clpadfl3\clpadb0\clpadfb3\yzcvl6042  \clvertalt\vyscxp99\clpadft3\nnicgf86\clpadfr3\clpadl0\clpadfl3\clpadb0\clpadfb3\mlehf7387  \pard\intbl\ssparaaux0\s0\fi-120\li120\ql\plain\f0\fs24{\*\bkmkstart [id number]}{\*\bkmkend [id number]}\plain\f0\fs20\smdc1980\hich\f0\dbch\f0\loch\f0\fs20 \'b7 \plain\f1\fs20\bxwa9426\hich\f1\dbch\f1\loch\f1\cf2\fs20\b Operative Findings\plain\f0\fs20\kvqx5758\hich\f0\dbch\f0\loch\f0\fs20\cell  \pard\intbl\ssparaaux0\s0\ql\plain\f0\fs24\plain\f0\fs20\lpjz1288\hich\f0\dbch\f0\loch\f0\fs20 thick gelatinous material that was difficult to suction out even with the shock pulse. I would alternate 2 prong to remove and break up / shock pulse to suck   out. after an hour where I had no clear planes I elected to stop. irrigation through the 26 bobby took out a lot additional material\par  in ashlyn catch cup 4 x 4 x 4 cm lump of material trapped\par  \par  please note the  procedure codes were fine tuned to what had happened after discussion with pathology and the coders\cell  \intbl\row  \pard\plain\f0\fs24\plain\f0\fs20\vzvx9547\hich\f0\dbch\f0\loch\f0\fs20\par  \ql\plain\f0\fs24\plain\f0\fs20\ezmc5395\hich\f0\dbch\f0\loch\f0\fs20 \'b7  {\*\bkmkstart [id number]}{\*\bkmkend [id number]}POST-OP DIAGNOSIS:\par  Renal cancer, left 30-Jun-2023 21:36:45  Aure Prabhakar.\par  \'b7  {\*\bkmkstart [id number]}{\*\bkmkend [id number]}PROCEDURES:\par  Central venous catheter placement with ultrasound guidance 18-Jun-2023 23:28:12  Aure Prabhakar\par  Exploratory laparotomy 30-Jun-2023 21:35:05  Aure Prabhakar\par  Left nephrectomy 30-Jun-2023 21:35:11  Aure Prbahakar.{\*\bkmkstart [id number]}{\*\bkmkend [id number]}\par  \plain\f1\fs20\gzdk9771\hich\f1\dbch\f1\loch\f1\cf2\fs20\b\ul{\field{\*\fldinst HYPERLINK [id number] }{\fldrslt Operative Findings:}}\plain\f0\fs20\lkrc1938\hich\f0\dbch\f0\loch\f0\fs20\ql\par  \trowd\qcasfn15\lastrow\yclzvfz56\trpaddfl3\ktsvxfq14\trpaddfr3\trpaddt0\trpaddft3\trpaddb0\trpaddfb3\trleft0  \clvertalt\xoygoh84\clpadft3\wehwns58\clpadfr3\clpadl0\clpadfl3\clpadb0\clpadfb3\dfecd6153  \clvertalt\jykcpb64\clpadft3\zoqgzi83\clpadfr3\clpadl0\clpadfl3\clpadb0\clpadfb3\cggzv6732  \pard\intbl\ssparaaux0\s0\fi-120\li120\ql\plain\f0\fs24{\*\bkmkstart [id number]}{\*\bkmkend [id number]}\plain\f0\fs20\vrxq3703\hich\f0\dbch\f0\loch\f0\fs20 \'b7 \plain\f1\fs20\ffdv9020\hich\f1\dbch\f1\loch\f1\cf2\fs20\b Operative Findings\plain\f0\fs20\evmh9207\hich\f0\dbch\f0\loch\f0\fs20\cell  \pard\intbl\ssparaaux0\s0\ql\plain\f0\fs24\plain\f0\fs20\sugq3218\hich\f0\dbch\f0\loch\f0\fs20 Exploratory laparotomy for possible left renal cancer complicated by significant hydronephrosis and pyelonephritis. Left nephrectomy performed, notable for   necrotic bulky tissue adherent to the renal vein, staple line in tact and hemostasis achieved. Copious irrigation of retroperitoneal renal bed and intraperitoneal fibrinous exudate and purulent fluid. Three drains placed, #1 in Lerma's pouch, #2 in   the pelvis, and #3 in the renal bed.\cell  \intbl\row  \pard\plain\f0\fs24\plain\f0\fs20\vuso5968\hich\f0\dbch\f0\loch\f0\fs20\par  \plain\f1\fs20\dctj6463\hich\f1\dbch\f1\loch\f1\cf2\fs20\ul NUTRITION SUPPORT NOTE:\plain\f0\fs20\xrpy0123\hich\f0\dbch\f0\loch\f0\fs20\par  d/w Dr Womack and with sicu PA\par  \par  \plain\f1\fs20\rmyt4525\hich\f1\dbch\f1\loch\f1\cf2\fs20\b REVIEW OF SYSTEMS:\plain\f0\fs20\grph0137\hich\f0\dbch\f0\loch\f0\fs20\par  Negative except as noted above. \par  \par  \plain\f1\fs20\igvp7714\hich\f1\dbch\f1\loch\f1\cf2\fs20\b PAST MEDICAL/SURGICAL HISTORY:\plain\f0\fs20\fztg4921\hich\f0\dbch\f0\loch\f0\fs20  \par  \par  \plain\f1\fs20\xjsj5778\hich\f1\dbch\f1\loch\f1\cf2\fs20\b ALLERGIES:\plain\f0\fs20\wqrq5398\hich\f0\dbch\f0\loch\f0\fs20\par  No Known Allergies\par  \par  \plain\f1\fs20\srey9842\hich\f1\dbch\f1\loch\f1\cf2\fs20\b VITALS:\plain\f0\fs20\wuwg2939\hich\f0\dbch\f0\loch\f0\fs20\par  T(F): 94 (07-03 @ 06:00), Max: 94 (07-03 @ 01:00)\par  HR: 64 (07-03 @ 07:50) (57 - 77)\par  BP: --\par  RR: 18 (07-03 @ 06:00) (17 - 18)\par  SpO2: 100% (07-03 @ 07:50) (96% - 100%)\par  \par  \plain\f1\fs20\uinb2336\hich\f1\dbch\f1\loch\f1\cf2\fs20\b HEIGHT/WEIGHT/BMI:\plain\f0\fs20\gguk2428\hich\f0\dbch\f0\loch\f0\fs20  \par  Height (cm): 180 (06-30), 180.3 (05-11)\par  Weight (kg): 76.3 (06-30), 66.2 (05-10)\par  BMI (kg/m2): 23.5 (06-30), 20.4 (05-11)\par  \par  \plain\f1\fs20\iais6973\hich\f1\dbch\f1\loch\f1\cf2\fs20\b I/Os:\plain\f0\fs20\wxll0996\hich\f0\dbch\f0\loch\f0\fs20  \par  07-02-23 @ 07:01  -  07-03-23 @ 07:00\par  --------------------------------------------------------\par  IN:\par    Dexmedetomidine: 218.8 mL\par    Enteral Tube Flush: 60 mL\par    IV PiggyBack: 150 mL\par    IV PiggyBack: 500 mL\par    IV PiggyBack: 50 mL\par    Lactated Ringers: 75 mL\par    Phenylephrine: 327.8 mL\par    Sodium Bicarbonate: 100 mL\par    Sodium Bicarbonate: 1050 mL\par    sodium chloride 0.45% w/ Additives: 950 mL\par    Vasopressin: 108 mL\par  Total IN: 3589.5 mL\par  OUT:\par    Drain (mL): 140 mL\par    Drain (mL): 570 mL\par    Drain (mL): 455 mL\par    Indwelling Catheter - Urethral (mL): 240 mL\par    Other (mL): 1554 mL\par  Total OUT: 2959 mL\par  \par  Total NET: 630.5 mL\par  \par  \plain\f1\fs20\uwsz3407\hich\f1\dbch\f1\loch\f1\cf2\fs20\b PHYSICAL EXAM:\plain\f0\fs20\ivsx0186\hich\f0\dbch\f0\loch\f0\fs20  \par  GENERAL: intubated, sedated but awake and aware, + pressors but some decrease in dose today\par  HEENT: Moist mucous membranes, + OG Cobb to suction\par  + facial/ temporal wasting\par  ABDOMEN: distended, not quite soft, L VENICE with brownish output, R with serosanguinous low volume\par  + BM overnight\par  EXTREMITIES:  No clubbing, +++ edema, both UE weeping\par  SKIN: warm and well perfused;\par  ++ sacral and scrotal and thigh edema\par  note I&O + about 30 liters since admission\par  IV ACCESS: left UE Midline, left IJ TLC, R Austin 7/2\par  ENTERAL ACCESS: \par  \par  \plain\f1\fs20\tojz4812\hich\f1\dbch\f1\loch\f1\cf2\fs20\b STANDING MEDICATIONS:\plain\f0\fs20\xpes7883\hich\f0\dbch\f0\loch\f0\fs20  \par  acetaminophen     Tablet .. 650 milliGRAM(s) Oral every 6 hours\par  albumin human  5% IVPB 250 milliLiter(s) IV Intermittent every 6 hours\par  bacitracin   Ointment 1 Application(s) Topical two times a day\par  calcium acetate 1334 milliGRAM(s) Oral every 8 hours\par  chlorhexidine 0.12% Liquid 15 milliLiter(s) Oral Mucosa every 12 hours\par  chlorhexidine 2% Cloths 1 Application(s) Topical <User Schedule>\par  CRRT Treatment    <Continuous>\par  dexMEDEtomidine Infusion 0.2 MICROgram(s)/kG/Hr IV Continuous <Continuous>\par  ferrous    sulfate 325 milliGRAM(s) Oral daily\par  gabapentin 200 milliGRAM(s) Oral every 8 hours\par  heparin   Injectable 5000 Unit(s) SubCutaneous every 8 hours\par  hydrocortisone sodium succinate Injectable 50 milliGRAM(s) IV Push every 6 hours\par  levoFLOXacin IVPB 500 milliGRAM(s) IV Intermittent every 48 hours\par  meropenem  IVPB 500 milliGRAM(s) IV Intermittent every 12 hours\par  metoprolol tartrate Injectable 2.5 milliGRAM(s) IV Push every 6 hours\par  pantoprazole  Injectable 40 milliGRAM(s) IV Push every 12 hours\par  phenylephrine    Infusion 0.1 MICROgram(s)/kG/Min IV Continuous <Continuous>\par  PureFlow Dialysate RFP-400 (K 2 / Ca 3) 5000 milliLiter(s) CRRT <Continuous>\par  sodium bicarbonate 1300 milliGRAM(s) Oral three times a day\par  vasopressin Infusion 0.03 Unit(s)/Min IV Continuous <Continuous>\par  \par  \par  \plain\f1\fs20\twyy4461\hich\f1\dbch\f1\loch\f1\cf2\fs20\b LABS:\plain\f0\fs20\hrqz6311\hich\f0\dbch\f0\loch\f0\fs20  \par           \par             8.7  \par  \plain\f1\fs20\faep0809\hich\f1\dbch\f1\loch\f1\fs20\b 29.09 )\plain\f0\fs20\dpbv2860\hich\f0\dbch\f0\loch\f0\fs20 -----------\plain\f1\fs20\puef0846\hich\f1\dbch\f1\loch\f1\fs20\b ( 63   \plain\f0\fs20\gkai3753\hich\f0\dbch\f0\loch\f0\fs20     ( 03 Jul 2023 08:40 )\par             25.0 \par  \par  138  |  108  |  73<HH>\par  ----------------------------<  97          (07-03-23 @ 06:30)\par  \plain\f1\fs20\levh1922\hich\f1\dbch\f1\loch\f1\fs20\b 3.0<L>  \plain\f0\fs20\cmhe5369\hich\f0\dbch\f0\loch\f0\fs20  |  17  |  2.1<H>\par  \par  Ca    5.4<LL>          (07-03-23 @ 06:30)\par  Phos  5.4         (07-02-23 @ 21:01)\par  Mg     1.8         (07-02-23 @ 21:01)\par  \par  \par  \par  \plain\f1\fs20\jiby3987\hich\f1\dbch\f1\loch\f1\cf2\fs20\ul Triglycerides, Serum:\plain\f0\fs20\cynj0623\hich\f0\dbch\f0\loch\f0\fs20  88 mg/dL (07-02 @ 21:01)\par  \par  \plain\f1\fs20\qlvo6051\hich\f1\dbch\f1\loch\f1\cf2\fs20\b\ul Vitamin D, 25-Hydroxy:\plain\f1\fs20\vync0968\hich\f1\dbch\f1\loch\f1\fs20\b  26 ng/mL (05-08 @ 07:58)\plain\f0\fs20\cvmf3302\hich\f0\dbch\f0\loch\f0\fs20\par  \par  \plain\f1\fs20\jgnt9844\hich\f1\dbch\f1\loch\f1\cf2\fs20\ul Folate:\plain\f0\fs20\jlcz5166\hich\f0\dbch\f0\loch\f0\fs20  5.7 ng/mL (05-07 @ 09:19)\par  \par  \plain\f1\fs20\rbho8364\hich\f1\dbch\f1\loch\f1\cf2\fs20\ul Vitamin B12, Serum:\plain\f0\fs20\kfvf4218\hich\f0\dbch\f0\loch\f0\fs20  766 pg/mL (05-07 @ 09:19)\par  \par  \plain\f1\fs20\rwrv8646\hich\f1\dbch\f1\loch\f1\cf2\fs20\ul A1c:\plain\f0\fs20\flzk3912\hich\f0\dbch\f0\loch\f0\fs20  5.0 % (05-07-23 @ 09:19)\par  \par  \par  \plain\f1\fs20\xvmt5354\hich\f1\dbch\f1\loch\f1\cf2\fs20\ul Blood Glucose (Past 24 hours):\plain\f0\fs20\xqxk6497\hich\f0\dbch\f0\loch\f0\fs20\par  161 mg/dL (07-02 @ 18:40)\par  200 mg/dL (07-02 @ 11:24)\par  \par  \par  \plain\f1\fs20\mpqt4504\hich\f1\dbch\f1\loch\f1\cf2\fs20\b DIET:\plain\f0\fs20\crin7282\hich\f0\dbch\f0\loch\f0\fs20  \par  Diet, NPO with Tube Feed: \par  Tube Feeding Modality: Orogastric\par  Nepro with Carb Steady\par  Total Volume for 24 Hours (mL): 480\par  Continuous  Starting Tube Feed Rate mL per Hour: 10\par  Increase Tube Feed Rate by (mL): 20     Every 4 hours\par  Until Goal Tube Feed Rate (mL per Hour): 20\par  Tube Feed Duration (in Hours): 24\par  Tube Feed Start Time: 08:26 (07-03-23 @ 08:26) [Active]\par  \par  \ql\plain\f0\fs24\plain\f0\fs20\txhy0366\hich\f0\dbch\f0\loch\f0\fs20\par  }

## 2023-07-03 NOTE — CONSULT NOTE ADULT - ASSESSMENT
67M w/ PMH of HTN, grade IV hydronephrosis of L kidney s/p L PCN (placed 5/2023), stutter, hiatal hernia, iron deficiency anemia, H Pylori (untreated), and gastric mass (never biopsied) presents to the ED with at least 4 days of worsening weakness, abdominal distension, and no output from his PCN.    Found to have septic shock, MSOF, lactic acidosis from left emphysematous hydronephrosis, pyelonephritis and possible rupture with pneumoperitoneum along with possible hepatic abscesses, splenic infarct and abdominal and mediastinal lymphadenopathies.  s/p surgery       ROJAS likely ATN - on CVVH     Oliguric in spite of bumex, rising dig level, high lactate   septic shock - on 2 pressors currently  K noted replete to 3. 5  acute resp failure, remains vented postop  fluid overload  severe acute malnutrition high risk due to prolonged NPO due to acute circumstances    sicu team plans to try trophic feeds today  ok to trial 20 ml/h Nepro - but watch K - will need to supplement  if able to advance to full feeds tomorrow, change to Peptamen AF with goal 80 ml/h --> 146 gm protein (predom, whey), 1920 k (low carb) with low n6:3fa ratio.  Otherwise, pt warrants TPN

## 2023-07-03 NOTE — PROGRESS NOTE ADULT - SUBJECTIVE AND OBJECTIVE BOX
CHRISTINE VILLAVICENCIO  68y, Male  Allergy: No Known Allergies      LOS  15d    CHIEF COMPLAINT: pneumoperitoneum, emphysematous pyelonephritis (03 Jul 2023 12:43)      INTERVAL EVENTS/HPI  - T(F): , Max: 95.6 (07-02-23 @ 17:00), hypothermic, on pressors  - WBC Count: 29.09 (07-03-23 @ 08:40)  WBC Count: 25.03 (07-03-23 @ 05:20)     - Creatinine: 2.5 (07-03-23 @ 08:40)  Creatinine: 2.1 (07-03-23 @ 06:30)     -   -   -     ROS  cannot obtain secondary to patient's sedation and/or mental status    VITALS:  T(F): 92.8, Max: 95.6 (07-02-23 @ 17:00)  HR: 77  BP: 106/73  RR: 16Vital Signs Last 24 Hrs  T(C): 33.8 (03 Jul 2023 11:00), Max: 35.3 (02 Jul 2023 17:00)  T(F): 92.8 (03 Jul 2023 11:00), Max: 95.6 (02 Jul 2023 17:00)  HR: 77 (03 Jul 2023 11:30) (57 - 84)  BP: 106/73 (03 Jul 2023 09:00) (106/73 - 106/73)  BP(mean): 85 (03 Jul 2023 09:00) (85 - 85)  RR: 16 (03 Jul 2023 11:30) (16 - 18)  SpO2: 100% (03 Jul 2023 11:30) (96% - 100%)    Parameters below as of 03 Jul 2023 08:00  Patient On (Oxygen Delivery Method): ventilator    O2 Concentration (%): 40    PHYSICAL EXAM:  Gen: Intubated  CV: RRR  Lungs: Decreased BS at bases  Abd: Soft  anasarca  Neuro: Sedated  Lines clean, no phlebitis    FH: Non-contributory  Social Hx: Non-contributory    TESTS & MEASUREMENTS:                        8.7    29.09 )-----------( 63       ( 03 Jul 2023 08:40 )             25.0     07-03    136  |  101  |  83<HH>  ----------------------------<  116<H>  3.8   |  22  |  2.5<H>    Ca    7.2<L>      03 Jul 2023 08:40  Phos  5.4     07-02  Mg     1.8     07-02          Urinalysis Basic - ( 03 Jul 2023 08:40 )    Color: x / Appearance: x / SG: x / pH: x  Gluc: 116 mg/dL / Ketone: x  / Bili: x / Urobili: x   Blood: x / Protein: x / Nitrite: x   Leuk Esterase: x / RBC: x / WBC x   Sq Epi: x / Non Sq Epi: x / Bacteria: x        Culture - Surgical Swab (collected 06-30-23 @ 22:12)  Source: .Surgical Swab None  Preliminary Report (07-01-23 @ 19:53):    No growth to date.    Culture - Fungal, Bronchial (collected 06-24-23 @ 11:00)  Source: .Bronchial None  Preliminary Report (06-26-23 @ 11:00):    Moderate Yeast    Culture - Acid Fast - Bronchial w/Smear (collected 06-24-23 @ 11:00)  Source: .Bronchial None  Preliminary Report (06-28-23 @ 15:08):    Culture is being performed.    Culture - Bronchial (collected 06-24-23 @ 11:00)  Source: .Bronchial None  Gram Stain (06-24-23 @ 23:21):    Numerous polymorphonuclear leukocytes per low power field    No squamous epithelial cells per low power field    Rare Yeast like cells per oil power field  Final Report (06-26-23 @ 17:56):    Normal Respiratory Gabbie present    Culture - Fungal, Other (collected 06-22-23 @ 22:11)  Source: .Other None  Preliminary Report (07-01-23 @ 15:03):    No fungus isolated at 1 week.    Culture - Surgical Swab (collected 06-22-23 @ 22:11)  Source: .Surgical Swab None  Final Report (06-25-23 @ 14:14):    Few Finegoldia magna "Susceptibilities not performed"    Culture - Urine (collected 06-19-23 @ 02:50)  Source: Clean Catch Clean Catch (Midstream)  Final Report (06-20-23 @ 10:18):    No growth    Culture - Abscess with Gram Stain (collected 06-18-23 @ 23:08)  Source: .Abscess right lower quadrant (intrabdominal)  Gram Stain (06-19-23 @ 11:56):    Numerous polymorphonuclear leukocytes per low power field    Numerous Gram positive cocci in pairs per oil power field  Final Report (06-21-23 @ 14:13):    Numerous Anaerobic Gram Positive Cocci Most closely resembling    Peptoniphilus species "Susceptibilities not performed"    Culture - Abscess with Gram Stain (collected 06-18-23 @ 23:08)  Source: .Abscess left kidney  Gram Stain (06-19-23 @ 11:58):    Rare polymorphonuclear leukocytes per low power field    Few Gram Positive Cocci per oil power field  Final Report (06-22-23 @ 10:13):    Growth in fluid media only Anaerobic Gram Positive Cocci Most closely    resembling Peptoniphilus species "Susceptibilities not performed"    Rare Stenotrophomonas maltophilia  Organism: Stenotrophomonas maltophilia  Stenotrophomonas maltophilia (06-22-23 @ 10:13)  Organism: Stenotrophomonas maltophilia (06-22-23 @ 10:13)      Method Type: EDMUND      -  Levofloxacin: S 1      -  Trimethoprim/Sulfamethoxazole: S <=0.5/9.5  Organism: Stenotrophomonas maltophilia (06-22-23 @ 10:13)      Method Type: KB      -  Minocycline: S    Culture - Abscess with Gram Stain (collected 06-18-23 @ 20:18)  Source: .Abscess left PCN aspirate  Gram Stain (06-19-23 @ 06:27):    No polymorphonuclear cells seen per low power field    Few Gram Variable Cocci seen per oil power field  Final Report (06-24-23 @ 09:11):    Few Stenotrophomonas maltophilia  Organism: Stenotrophomonas maltophilia (06-24-23 @ 09:11)  Organism: Stenotrophomonas maltophilia (06-24-23 @ 09:11)      Method Type: EDMUND      -  Levofloxacin: S 2      -  Trimethoprim/Sulfamethoxazole: S <=0.5/9.5    Culture - Blood (collected 06-18-23 @ 14:18)  Source: .Blood Blood-Peripheral  Final Report (06-24-23 @ 02:00):    No Growth Final    Culture - Blood (collected 06-18-23 @ 14:18)  Source: .Blood Blood-Peripheral  Final Report (06-24-23 @ 02:00):    No Growth Final        Lactate, Blood: 5.3 mmol/L (07-01-23 @ 02:27)      INFECTIOUS DISEASES TESTING  Fungitell: 47 (06-25-23 @ 15:30)  MRSA PCR Result.: Negative (06-24-23 @ 17:38)      INFLAMMATORY MARKERS      RADIOLOGY & ADDITIONAL TESTS:  I have personally reviewed the last available Chest xray  CXR      CT      CARDIOLOGY TESTING  12 Lead ECG:   Ventricular Rate 89 BPM    Atrial Rate 394 BPM    QRS Duration 84 ms    Q-T Interval 300 ms    QTC Calculation(Bazett) 365 ms    R Axis 50 degrees    T Axis -16 degrees    Diagnosis Line Atrial fibrillation  Low voltage QRS  Nonspecific T wave abnormality  Abnormal ECG    Confirmed by Deon Velasquez ([id number]) on 7/3/2023 8:40:11 AM (07-02-23 @ 07:57)  12 Lead ECG:   Ventricular Rate 96 BPM    Atrial Rate 72 BPM    QRS Duration 82 ms    Q-T Interval 288 ms    QTC Calculation(Bazett) 363 ms    R Axis 37 degrees    T Axis 184 degrees    Diagnosis Line Atrial fibrillation  Low voltage QRS  Nonspecific T wave abnormality  Abnormal ECG    Confirmed by Deon Velasquez ([id number]) on 7/2/2023 1:12:19 PM (07-01-23 @ 08:12)      MEDICATIONS  acetaminophen     Tablet .. 650  albumin human  5% IVPB 250  bacitracin   Ointment 1  calcium acetate 1334  chlorhexidine 0.12% Liquid 15  chlorhexidine 2% Cloths 1  CRRT Treatment   CRRT Treatment   dexMEDEtomidine Infusion 0.2  ferrous    sulfate 325  gabapentin 200  heparin   Injectable 5000  hydrocortisone sodium succinate Injectable 50  levoFLOXacin IVPB 500  lipid, fat emulsion (Fish Oil and Plant Based) 20% Infusion 0.656  meropenem  IVPB 500  metoprolol tartrate Injectable 2.5  pantoprazole  Injectable 40  Parenteral Nutrition - Adult 1  phenylephrine    Infusion 0.1  PureFlow Dialysate RFP-400 (K 2 / Ca 3) 5000  PureFlow Dialysate RFP-400 (K 2 / Ca 3) 5000  sodium bicarbonate 1300  vasopressin Infusion 0.03      WEIGHT  Weight (kg): 76.3 (06-30-23 @ 16:58)  Creatinine: 2.5 mg/dL (07-03-23 @ 08:40)  Creatinine: 2.1 mg/dL (07-03-23 @ 06:30)  Creatinine: 2.9 mg/dL (07-02-23 @ 21:01)      ANTIBIOTICS:  levoFLOXacin IVPB 500 milliGRAM(s) IV Intermittent every 48 hours  meropenem  IVPB 500 milliGRAM(s) IV Intermittent every 12 hours      All available historical records have been reviewed

## 2023-07-03 NOTE — PROGRESS NOTE ADULT - SUBJECTIVE AND OBJECTIVE BOX
GENERAL SURGERY PROGRESS NOTE     CHRISTINE VILLAVICENCIO  [address] [place] [address] day :16d  POD: 3d  Procedure: Insertion, arterial line, percutaneous    Central venous catheter placement with ultrasound guidance    Midline catheter insertion    Nephrostolithotomy, percutaneous, with lithotripsy, large stone    Change external ureteral stent    Nephrostogram    US guided vascular access    Insertion of arterial line with imaging guidance    Bronchoscopy, flexible, adult    Partial nephrectomy    Nephrostogram via existing catheter    Exploratory laparotomy    Left nephrectomy    Insertion, catheter, hemodialysis, non-tunneled, with US guidance    Overnight events: No acute events overnight. Pt was started on CVVHD with a fluid balance of -50cc/hr. Pt remains oliguric despite treatment with bumex. Phenylephrine down to 0.9    T(F): 94 (07-03-23 @ 01:00), Max: 98.7 (07-02-23 @ 12:00)  HR: 64 (07-03-23 @ 02:00) (64 - 92)  BP: --  ABP: 135/80 (07-03-23 @ 02:00) (94/56 - 141/84)  ABP(mean): 100 (07-03-23 @ 02:00) (69 - 105)  RR: 18 (07-03-23 @ 02:00) (17 - 20)  SpO2: 100% (07-03-23 @ 02:00) (96% - 100%)    IN'S / OUT's:    07-01-23 @ 07:01  -  07-02-23 @ 07:00  --------------------------------------------------------  IN:    Dexmedetomidine: 374.8 mL    IV PiggyBack: 100 mL    Phenylephrine: 384.7 mL    Phenylephrine: 101.9 mL    Sodium Bicarbonate: 750 mL    Sodium Bicarbonate: 700 mL    sodium chloride 0.9%: 75 mL    Vasopressin: 85.5 mL  Total IN: 2571.9 mL    OUT:    Drain (mL): 100 mL    Drain (mL): 305 mL    Drain (mL): 505 mL    Indwelling Catheter - Urethral (mL): 168 mL    Norepinephrine: 0 mL  Total OUT: 1078 mL    Total NET: 1493.9 mL      07-02-23 @ 07:01  -  07-03-23 @ 02:16  --------------------------------------------------------  IN:    Dexmedetomidine: 171.3 mL    Enteral Tube Flush: 60 mL    IV PiggyBack: 150 mL    IV PiggyBack: 500 mL    Lactated Ringers: 75 mL    Phenylephrine: 292 mL    Sodium Bicarbonate: 100 mL    Sodium Bicarbonate: 1050 mL    sodium chloride 0.45% w/ Additives: 700 mL    Vasopressin: 85.5 mL  Total IN: 3183.7 mL    OUT:    Drain (mL): 535 mL    Drain (mL): 130 mL    Drain (mL): 335 mL    Indwelling Catheter - Urethral (mL): 210 mL    Other (mL): 1209 mL  Total OUT: 2419 mL    Total NET: 764.7 mL          PHYSICAL EXAM:  GENERAL: NAD, Sedated   CHEST/LUNG: Intubated, Clear to auscultation bilaterally  HEART: Regular rate and rhythm  ABDOMEN: Soft, Nontender, Nondistended;   EXTREMITIES:  No clubbing or cyanosis. Pitting edema +3      LABS  Labs:  CAPILLARY BLOOD GLUCOSE      POCT Blood Glucose.: 161 mg/dL (02 Jul 2023 18:40)  POCT Blood Glucose.: 200 mg/dL (02 Jul 2023 11:24)  POCT Blood Glucose.: 194 mg/dL (02 Jul 2023 07:38)                          9.9    33.70 )-----------( 112      ( 02 Jul 2023 21:01 )             28.2       Auto Neutrophil %: 89.9 % (07-02-23 @ 21:01)  Auto Immature Granulocyte %: 4.0 % (07-02-23 @ 21:01)    07-02    137  |  100  |  95<HH>  ----------------------------<  127<H>  3.8   |  20  |  2.9<H>      Calcium: 7.0 mg/dL (07-02-23 @ 21:01)      LFTs:     Blood Gas Arterial, Lactate: 3.10 mmol/L (07-02-23 @ 21:07)  Blood Gas Arterial, Lactate: 4.20 mmol/L (07-02-23 @ 11:37)  Blood Gas Arterial, Lactate: 4.00 mmol/L (07-02-23 @ 04:53)  Blood Gas Arterial, Lactate: 3.70 mmol/L (07-01-23 @ 22:31)  Blood Gas Arterial, Lactate: 3.10 mmol/L (07-01-23 @ 17:13)  Blood Gas Arterial, Lactate: 3.20 mmol/L (07-01-23 @ 04:58)  Lactate, Blood: 5.3 mmol/L (07-01-23 @ 02:27)  Blood Gas Arterial, Lactate: 3.70 mmol/L (06-30-23 @ 21:56)  Blood Gas Arterial, Lactate: 3.60 mmol/L (06-30-23 @ 06:37)    ABG - ( 02 Jul 2023 21:07 )  pH: 7.47  /  pCO2: 28    /  pO2: 132   / HCO3: 20    / Base Excess: -2.4  /  SaO2: 99.2            ABG - ( 02 Jul 2023 11:37 )  pH: 7.40  /  pCO2: 30    /  pO2: 126   / HCO3: 19    / Base Excess: -5.3  /  SaO2: 99.9            ABG - ( 02 Jul 2023 04:53 )  pH: 7.37  /  pCO2: 30    /  pO2: 141   / HCO3: 17    / Base Excess: -6.9  /  SaO2: 99.4              Coags:     20.70  ----< 1.78    ( 02 Jul 2023 11:44 )     34.0                Urinalysis Basic - ( 02 Jul 2023 21:01 )    Color: x / Appearance: x / SG: x / pH: x  Gluc: 127 mg/dL / Ketone: x  / Bili: x / Urobili: x   Blood: x / Protein: x / Nitrite: x   Leuk Esterase: x / RBC: x / WBC x   Sq Epi: x / Non Sq Epi: x / Bacteria: x        Culture - Surgical Swab (collected 30 Jun 2023 22:12)  Source: .Surgical Swab None  Preliminary Report (01 Jul 2023 19:53):    No growth to date.          RADIOLOGY & ADDITIONAL TESTS:      A/P:  CHRISTINE VILLAVICENCIO is a 68y  w/ PMHx of retroperitoneal and intraabdominal abscess s/p IR placement of drain and LEFT PCN 6/18, s/p percutaneous drainage of LEFT renal abscess POD #8 with left 26fr flank catheter in place,   s/p Left radical nephrectomy, Ex-Lap POD#2. Low UOP treated w/ Bumex, did not increase. Urine sanguinous. Abd drains x3 w/ Serosanguinous output. WBC downtrending on Levaquin and Ar.    PLAN:   - continue with management Per SICU    - continue with CVVHD per nephrology  - F/u K and phos  - continue to monitor urine, drain x3 and NGT output    #Antibiotics: levoFLOXacin IVPB 500 milliGRAM(s) IV Intermittent every 48 hours, 06-30-23 @ 21:00  meropenem  IVPB 500 milliGRAM(s) IV Intermittent every 12 hours, 06-30-23 @ 21:50   Day **  #DVT ppx: heparin   Injectable 5000 Unit(s) SubCutaneous every 8 hours    #GI ppx: pantoprazole  Injectable 40 milliGRAM(s) IV Push every 12 hours    Disposition:  ***    Above plan discussed with Attending Surgeon Dr. Womack  , patient, patient family, and Primary team    Cody Marquez,   General Surgery PGY1  [id number]   GENERAL SURGERY PROGRESS NOTE     CHRISTINE VILLAVICENCIO  [address] [place] [address] day :16d  POD: 3d  Procedure: Insertion, arterial line, percutaneous    Central venous catheter placement with ultrasound guidance    Midline catheter insertion    Nephrostolithotomy, percutaneous, with lithotripsy, large stone    Change external ureteral stent    Nephrostogram    US guided vascular access    Insertion of arterial line with imaging guidance    Bronchoscopy, flexible, adult    Partial nephrectomy    Nephrostogram via existing catheter    Exploratory laparotomy    Left nephrectomy    Insertion, catheter, hemodialysis, non-tunneled, with US guidance    Overnight events: No acute events overnight. Pt was started on CVVHD with a fluid balance of -50cc/hr. Pt remains oliguric despite treatment with bumex. Phenylephrine down to 0.9    T(F): 94 (07-03-23 @ 01:00), Max: 98.7 (07-02-23 @ 12:00)  HR: 64 (07-03-23 @ 02:00) (64 - 92)  BP: --  ABP: 135/80 (07-03-23 @ 02:00) (94/56 - 141/84)  ABP(mean): 100 (07-03-23 @ 02:00) (69 - 105)  RR: 18 (07-03-23 @ 02:00) (17 - 20)  SpO2: 100% (07-03-23 @ 02:00) (96% - 100%)    IN'S / OUT's:    07-01-23 @ 07:01  -  07-02-23 @ 07:00  --------------------------------------------------------  IN:    Dexmedetomidine: 374.8 mL    IV PiggyBack: 100 mL    Phenylephrine: 384.7 mL    Phenylephrine: 101.9 mL    Sodium Bicarbonate: 750 mL    Sodium Bicarbonate: 700 mL    sodium chloride 0.9%: 75 mL    Vasopressin: 85.5 mL  Total IN: 2571.9 mL    OUT:    Drain (mL): 100 mL    Drain (mL): 305 mL    Drain (mL): 505 mL    Indwelling Catheter - Urethral (mL): 168 mL    Norepinephrine: 0 mL  Total OUT: 1078 mL    Total NET: 1493.9 mL      07-02-23 @ 07:01  -  07-03-23 @ 02:16  --------------------------------------------------------  IN:    Dexmedetomidine: 171.3 mL    Enteral Tube Flush: 60 mL    IV PiggyBack: 150 mL    IV PiggyBack: 500 mL    Lactated Ringers: 75 mL    Phenylephrine: 292 mL    Sodium Bicarbonate: 100 mL    Sodium Bicarbonate: 1050 mL    sodium chloride 0.45% w/ Additives: 700 mL    Vasopressin: 85.5 mL  Total IN: 3183.7 mL    OUT:    Drain (mL): 535 mL    Drain (mL): 130 mL    Drain (mL): 335 mL    Indwelling Catheter - Urethral (mL): 210 mL    Other (mL): 1209 mL  Total OUT: 2419 mL    Total NET: 764.7 mL          PHYSICAL EXAM:  GENERAL: NAD, Sedated   CHEST/LUNG: Intubated, Clear to auscultation bilaterally  HEART: Regular rate and rhythm  ABDOMEN: Soft, Nontender, Nondistended;   EXTREMITIES:  No clubbing or cyanosis. Pitting edema +3      LABS  Labs:  CAPILLARY BLOOD GLUCOSE      POCT Blood Glucose.: 161 mg/dL (02 Jul 2023 18:40)  POCT Blood Glucose.: 200 mg/dL (02 Jul 2023 11:24)  POCT Blood Glucose.: 194 mg/dL (02 Jul 2023 07:38)                          9.9    33.70 )-----------( 112      ( 02 Jul 2023 21:01 )             28.2       Auto Neutrophil %: 89.9 % (07-02-23 @ 21:01)  Auto Immature Granulocyte %: 4.0 % (07-02-23 @ 21:01)    07-02    137  |  100  |  95<HH>  ----------------------------<  127<H>  3.8   |  20  |  2.9<H>      Calcium: 7.0 mg/dL (07-02-23 @ 21:01)      LFTs:     Blood Gas Arterial, Lactate: 3.10 mmol/L (07-02-23 @ 21:07)  Blood Gas Arterial, Lactate: 4.20 mmol/L (07-02-23 @ 11:37)  Blood Gas Arterial, Lactate: 4.00 mmol/L (07-02-23 @ 04:53)  Blood Gas Arterial, Lactate: 3.70 mmol/L (07-01-23 @ 22:31)  Blood Gas Arterial, Lactate: 3.10 mmol/L (07-01-23 @ 17:13)  Blood Gas Arterial, Lactate: 3.20 mmol/L (07-01-23 @ 04:58)  Lactate, Blood: 5.3 mmol/L (07-01-23 @ 02:27)  Blood Gas Arterial, Lactate: 3.70 mmol/L (06-30-23 @ 21:56)  Blood Gas Arterial, Lactate: 3.60 mmol/L (06-30-23 @ 06:37)    ABG - ( 02 Jul 2023 21:07 )  pH: 7.47  /  pCO2: 28    /  pO2: 132   / HCO3: 20    / Base Excess: -2.4  /  SaO2: 99.2            ABG - ( 02 Jul 2023 11:37 )  pH: 7.40  /  pCO2: 30    /  pO2: 126   / HCO3: 19    / Base Excess: -5.3  /  SaO2: 99.9            ABG - ( 02 Jul 2023 04:53 )  pH: 7.37  /  pCO2: 30    /  pO2: 141   / HCO3: 17    / Base Excess: -6.9  /  SaO2: 99.4              Coags:     20.70  ----< 1.78    ( 02 Jul 2023 11:44 )     34.0                Urinalysis Basic - ( 02 Jul 2023 21:01 )    Color: x / Appearance: x / SG: x / pH: x  Gluc: 127 mg/dL / Ketone: x  / Bili: x / Urobili: x   Blood: x / Protein: x / Nitrite: x   Leuk Esterase: x / RBC: x / WBC x   Sq Epi: x / Non Sq Epi: x / Bacteria: x        Culture - Surgical Swab (collected 30 Jun 2023 22:12)  Source: .Surgical Swab None  Preliminary Report (01 Jul 2023 19:53):    No growth to date.          RADIOLOGY & ADDITIONAL TESTS:      A/P:  CHRISTINE VILLAVICENCIO is a 68y  w/ PMHx of retroperitoneal and intraabdominal abscess s/p IR placement of drain and LEFT PCN 6/18, s/p percutaneous drainage of LEFT renal abscess POD #8 with left 26fr flank catheter in place,   s/p Left radical nephrectomy, Ex-Lap POD#2. Low UOP treated w/ Bumex, did not increase. Urine sanguinous. Abd drains x3 w/ Serosanguinous output. WBC downtrending on Levaquin and Ar.    PLAN:   - continue with management Per SICU    - continue with CVVHD per nephrology  - F/u K and phos  - continue to monitor urine, drain x3 and NGT output  - F/u intraop cultures- NGTD    #Antibiotics: levoFLOXacin IVPB 500 milliGRAM(s) IV Intermittent every 48 hours, 06-30-23 @ 21:00  meropenem  IVPB 500 milliGRAM(s) IV Intermittent every 12 hours, 06-30-23 @ 21:50   Day **  #DVT ppx: heparin   Injectable 5000 Unit(s) SubCutaneous every 8 hours    #GI ppx: pantoprazole  Injectable 40 milliGRAM(s) IV Push every 12 hours    Disposition:  ***    Above plan discussed with Attending Surgeon Dr. Womack  , patient, patient family, and Primary team    Cody Marquez, DO  General Surgery PGY1  [id number]

## 2023-07-03 NOTE — PROGRESS NOTE ADULT - ASSESSMENT
67M w/ PMH of HTN, grade IV hydronephrosis of L kidney s/p L PCN (placed 5/2023), stutter, hiatal hernia, iron deficiency anemia, H Pylori (untreated), and gastric mass (never biopsied) presents to the ED with at least 4 days of worsening weakness, abdominal distension, and no output from his PCN  . Found to have septic shock, MSOF, lactic acidosis from left emphysematous hydronephrosis, pyelonephritis and possible rupture with pneumoperitoneum along with possible hepatic abscesses, splenic infarct and abdominal and mediastinal lymphadenopathies.  had nephrectomy and abdominal washout on 6/30    Oliguric in spite of bumex, rising dig level, high lactate   ROJAS likely ATN iso septic shock  on pressors   on CVVHD / will keep in neg balance  K noted replete to 3. 5  monitor K, phos on CRRT  will follow  discussed w/ SICU team

## 2023-07-04 NOTE — PROGRESS NOTE ADULT - SUBJECTIVE AND OBJECTIVE BOX
CHRISTINE VILLAVICENCIO  [id number]  67y Male    Indication for ICU admission: mechanical ventilatior secondary to urosepsis     Admit Date:06-18-23  ICU Date: 6/18/23  OR Date: 6/18, 6/22, 6/30     24HRS EVENT:  7/3   Night:  [x] Low ionized calcium, gave 1g IV Ca-gluconate  [ ] 0.8 FROM, 0.9 Josh weaning (vaso same 0.33)  [ ] f/U AMYLASE results from drain 3 & 2  [ ] Per vascula: HIT panel and Serotonin Releasing Assay (if ruled out HIT, consider Eliquis or Argatroban for ACO)  [ ] Venous Duplex LE  [ ] LDH, Reticulocyte Ct, Haptoglobin  [ ] Reticulocyte count  [ ] daily duplexes for peripheral vascular compromise UE      7/3  Day  -D/c bicarb gtt   -SBT - apneic    -Lactate improving 2.3  -CRRT: titrate to -  ml/hr   -Restart trickle feeds   -Wean pressors as tolerated   - fibrinogen 353  -DVT US. Right Posterior tibialis DVT  - heme onc consult --> send fibrinogen, VA duplex bilateral LE; HIT score 2-3 (low risk of HIT)        REVIEW OF SYSTEMS  [ ] A ten-point review of systems was otherwise negative except as noted.  [ x] Due to altered mental status/intubation, subjective information were not able to be obtained from the patient. History was obtained, to the extent possible, from review of the chart and collateral sources of information.    ----------------------------------------------------------------------------------------------------------------------  ---------------------------------------------------------------------------------------------------------------------- CHRISTINE VILLAVICENCIO  [id number]  67y Male    Indication for ICU admission: mechanical ventilatior secondary to urosepsis     Admit Date:06-18-23  ICU Date: 6/18/23  OR Date: 6/18, 6/22, 6/30     24HRS EVENT:  7/3   Night:  [x] Dc'd metoprolol, concerns for unopposed alpha, causing peripheral distal extremity ischemia, HR in 50s (afib on HSQ)  [x] Low ionized calcium, gave 1g IV Ca-gluconate  [ ] 0.8 FROM, 0.9 Josh weaning (vaso same 0.33)  [ ] f/U AMYLASE results from drain 3 & 2  [ ] Per vascula: HIT panel and Serotonin Releasing Assay (if ruled out HIT, consider Eliquis or Argatroban for ACO)  [ ] Venous Duplex LE  [ ] LDH, Reticulocyte Ct, Haptoglobin  [ ] Reticulocyte count  [ ] daily duplexes for peripheral vascular compromise UE      7/3  Day  -D/c bicarb gtt   -SBT - apneic    -Lactate improving 2.3  -CRRT: titrate to -  ml/hr   -Restart trickle feeds   -Wean pressors as tolerated   - fibrinogen 353  -DVT US. Right Posterior tibialis DVT  - heme onc consult --> send fibrinogen, VA duplex bilateral LE; HIT score 2-3 (low risk of HIT)        REVIEW OF SYSTEMS  [ ] A ten-point review of systems was otherwise negative except as noted.  [ x] Due to altered mental status/intubation, subjective information were not able to be obtained from the patient. History was obtained, to the extent possible, from review of the chart and collateral sources of information.    ----------------------------------------------------------------------------------------------------------------------  ---------------------------------------------------------------------------------------------------------------------- CHRISTINE VILLAVICENCIO  [id number]  67y Male    Indication for ICU admission: mechanical ventilatior secondary to urosepsis     Admit Date:06-18-23  ICU Date: 6/18/23  OR Date: 6/18, 6/22, 6/30     24HRS EVENT:  7/3   Night:  [x] Dc'd metoprolol, concerns for unopposed alpha, causing peripheral distal extremity ischemia, HR in 50s (afib on HSQ)  [x] Low ionized calcium, gave 1g IV Ca-gluconate  [ ] 0.8 FROM, 0.9 Josh weaning (vaso same 0.33)  [ ] f/U AMYLASE results from drain 3 & 2  [ ] Per vascula: HIT panel and Serotonin Releasing Assay (if ruled out HIT, consider Eliquis or Argatroban for ACO)  [ ] Venous Duplex LE  [ ] LDH, Reticulocyte Ct, Haptoglobin  [ ] Reticulocyte count  [ ] daily duplexes for peripheral vascular compromise UE      7/3  Day  -D/c bicarb gtt   -SBT - apneic    -Lactate improving 2.3  -CRRT: titrate to -  ml/hr   -Restart trickle feeds   -Wean pressors as tolerated   - fibrinogen 353  -DVT US. Right Posterior tibialis DVT  - heme onc consult --> send fibrinogen, VA duplex bilateral LE; HIT score 2-3 (low risk of HIT)        REVIEW OF SYSTEMS  [ ] A ten-point review of systems was otherwise negative except as noted.  [ x] Due to altered mental status/intubation, subjective information were not able to be obtained from the patient. History was obtained, to the extent possible, from review of the chart and collateral sources of information.    ----------------------------------------------------------------------------------------------------------------------  ----------------------------------------------------------------------------------------------------------------------    Daily     Daily   Diet, NPO with Tube Feed:   Tube Feeding Modality: Orogastric  Nepro with Carb Steady  Total Volume for 24 Hours (mL): 480  Continuous  Starting Tube Feed Rate mL per Hour: 10  Increase Tube Feed Rate by (mL): 20     Every 4 hours  Until Goal Tube Feed Rate (mL per Hour): 20  Tube Feed Duration (in Hours): 24  Tube Feed Start Time: 08:26 (07-03-23 @ 08:26)    CURRENT MEDS:  Neurologic Medications  dexMEDEtomidine Infusion 0.2 MICROgram(s)/kG/Hr IV Continuous <Continuous>  fentaNYL    Injectable 25 MICROGram(s) IV Push every 3 hours PRN Moderate Pain (4 - 6)  gabapentin 200 milliGRAM(s) Oral every 8 hours    Respiratory Medications    Cardiovascular Medications  phenylephrine    Infusion 0.1 MICROgram(s)/kG/Min IV Continuous <Continuous>    Gastrointestinal Medications  albumin human  5% IVPB 250 milliLiter(s) IV Intermittent every 6 hours  calcium acetate 1334 milliGRAM(s) Oral every 8 hours  calcium gluconate IVPB 2 Gram(s) IV Intermittent once  ferrous    sulfate 325 milliGRAM(s) Oral daily  lipid, fat emulsion (Fish Oil and Plant Based) 20% Infusion 0.656 Gm/kG/Day IV Continuous <Continuous>  pantoprazole  Injectable 40 milliGRAM(s) IV Push every 12 hours  Parenteral Nutrition - Adult 1 Each TPN Continuous <Continuous>  potassium chloride  20 mEq/100 mL IVPB 20 milliEquivalent(s) IV Intermittent once  sodium bicarbonate 1300 milliGRAM(s) Oral three times a day  sodium chloride 0.9% lock flush 10 milliLiter(s) IV Push every 1 hour PRN Pre/post blood products, medications, blood draw, and to maintain line patency  sodium chloride 0.9% lock flush 10 milliLiter(s) IV Push every 1 hour PRN Pre/post blood products, medications, blood draw, and to maintain line patency    Genitourinary Medications    Hematologic/Oncologic Medications  heparin   Injectable 5000 Unit(s) SubCutaneous every 8 hours    Antimicrobial/Immunologic Medications  levoFLOXacin IVPB 500 milliGRAM(s) IV Intermittent every 48 hours  meropenem  IVPB 500 milliGRAM(s) IV Intermittent every 12 hours    Endocrine/Metabolic Medications  hydrocortisone sodium succinate Injectable 50 milliGRAM(s) IV Push every 6 hours  vasopressin Infusion 0.03 Unit(s)/Min IV Continuous <Continuous>    Topical/Other Medications  bacitracin   Ointment 1 Application(s) Topical two times a day  chlorhexidine 0.12% Liquid 15 milliLiter(s) Oral Mucosa every 12 hours  chlorhexidine 2% Cloths 1 Application(s) Topical <User Schedule>  CRRT Treatment    <Continuous>  PureFlow Dialysate RFP-400 (K 2 / Ca 3) 5000 milliLiter(s) CRRT <Continuous>    ICU Vital Signs Last 24 Hrs  T(C): 32.8 (04 Jul 2023 01:00), Max: 33.8 (03 Jul 2023 11:00)  T(F): 91 (04 Jul 2023 01:00), Max: 92.8 (03 Jul 2023 11:00)  HR: 70 (04 Jul 2023 05:07) (58 - 89)  BP: 91/60 (03 Jul 2023 14:00) (91/60 - 106/73)  BP(mean): 71 (03 Jul 2023 14:00) (71 - 85)  ABP: 89/55 (04 Jul 2023 05:00) (85/66 - 121/82)  ABP(mean): 70 (04 Jul 2023 05:00) (70 - 102)  RR: 19 (04 Jul 2023 05:00) (13 - 19)  SpO2: 100% (04 Jul 2023 05:07) (66% - 100%)    O2 Parameters below as of 04 Jul 2023 06:00  Patient On (Oxygen Delivery Method): ventilator  O2 Flow (L/min): 40        Mode: AC/ CMV (Assist Control/ Continuous Mandatory Ventilation)  RR (machine): 18  TV (machine): 450  FiO2: 40  PEEP: 8  ITime: 0.7  MAP: 12  PIP: 25    ABG - ( 04 Jul 2023 04:56 )  pH, Arterial: 7.49  pH, Blood: x     /  pCO2: 31    /  pO2: 124   / HCO3: 24    / Base Excess: 0.6   /  SaO2: 99.0              I&O's Summary    02 Jul 2023 07:01  -  03 Jul 2023 07:00  --------------------------------------------------------  IN: 3589.5 mL / OUT: 2959 mL / NET: 630.5 mL    03 Jul 2023 07:01  -  04 Jul 2023 06:40  --------------------------------------------------------  IN: 2018.1 mL / OUT: 3385 mL / NET: -1366.9 mL      I&O's Detail    02 Jul 2023 07:01  -  03 Jul 2023 07:00  --------------------------------------------------------  IN:    Dexmedetomidine: 218.8 mL    Enteral Tube Flush: 60 mL    IV PiggyBack: 150 mL    IV PiggyBack: 500 mL    IV PiggyBack: 50 mL    Lactated Ringers: 75 mL    Phenylephrine: 327.8 mL    Sodium Bicarbonate: 100 mL    Sodium Bicarbonate: 1050 mL    sodium chloride 0.45% w/ Additives: 950 mL    Vasopressin: 108 mL  Total IN: 3589.5 mL    OUT:    Drain (mL): 140 mL    Drain (mL): 570 mL    Drain (mL): 455 mL    Indwelling Catheter - Urethral (mL): 240 mL    Other (mL): 1554 mL  Total OUT: 2959 mL    Total NET: 630.5 mL      03 Jul 2023 07:01  -  04 Jul 2023 06:40  --------------------------------------------------------  IN:    Dexmedetomidine: 166.4 mL    Enteral Tube Flush: 170 mL    Fat Emulsion (Fish Oil &amp; Plant Based) 20% Infusion: 188.1 mL    IV PiggyBack: 100 mL    IV PiggyBack: 150 mL    Nepro with Carb Steady: 260 mL    Phenylephrine: 245.1 mL    sodium chloride 0.45% w/ Additives: 50 mL    TPN (Total Parenteral Nutrition): 585 mL    Vasopressin: 103.5 mL  Total IN: 2018.1 mL    OUT:    Drain (mL): 135 mL    Drain (mL): 170 mL    Drain (mL): 315 mL    Gastric Aspirate - Discarded (mL): 0 mL    Indwelling Catheter - Urethral (mL): 56 mL    Oral Fluid: 0 mL    Other (mL): 2709 mL  Total OUT: 3385 mL    Total NET: -1366.9 mL          PHYSICAL EXAM:     [id number]  follows commands  no acute distress, ventilated  abdomen soft, NT, ND, incisions covered with dressings c/d/i  extremities swollen distally, blisterling of dorsum of b/l feet and hands, tips of index fingers necrotic   urinary cath in place

## 2023-07-04 NOTE — PROGRESS NOTE ADULT - SUBJECTIVE AND OBJECTIVE BOX
GENERAL SURGERY PROGRESS NOTE     CHRISTINE VILLAVICENCIO  68y  Male  Hospital day :16d  POD:  Procedure: Insertion, arterial line, percutaneous    Central venous catheter placement with ultrasound guidance    Midline catheter insertion    Nephrostolithotomy, percutaneous, with lithotripsy, large stone    Change external ureteral stent    Nephrostogram    US guided vascular access    Insertion of arterial line with imaging guidance    Bronchoscopy, flexible, adult    Partial nephrectomy    Nephrostogram via existing catheter    Exploratory laparotomy    Left nephrectomy    Insertion, catheter, hemodialysis, non-tunneled, with US guidance      OVERNIGHT EVENTS: NAEON. Patient continues on CVVHD.     T(F): 91 (07-04-23 @ 01:00), Max: 92.8 (07-03-23 @ 11:00)  HR: 75 (07-04-23 @ 06:00) (58 - 89)  BP: 91/60 (07-03-23 @ 14:00) (91/60 - 106/73)  ABP: 82/66 (07-04-23 @ 06:00) (82/66 - 121/82)  ABP(mean): 75 (07-04-23 @ 06:00) (70 - 102)  RR: 28 (07-04-23 @ 06:00) (13 - 28)  SpO2: 98% (07-04-23 @ 06:00) (66% - 100%)    DIET/FLUIDS: albumin human  5% IVPB 250 milliLiter(s) IV Intermittent every 6 hours  calcium acetate 1334 milliGRAM(s) Oral every 8 hours  ferrous    sulfate 325 milliGRAM(s) Oral daily  lipid, fat emulsion (Fish Oil and Plant Based) 20% Infusion 0.656 Gm/kG/Day IV Continuous <Continuous>  Parenteral Nutrition - Adult 1 Each TPN Continuous <Continuous>  potassium chloride  20 mEq/100 mL IVPB 20 milliEquivalent(s) IV Intermittent once  sodium bicarbonate 1300 milliGRAM(s) Oral three times a day    NG:                                                                                DRAINS:   07-03-23 @ 07:01  -  07-04-23 @ 07:00  --------------------------------------------------------  OUT: 620 mL      BM:     EMESIS:     URINE:   07-03-23 @ 07:01 - 07-04-23 @ 07:00  --------------------------------------------------------  OUT: 56 mL     Indwelling Urethral Catheter:     Connect To:  Straight Drainage/Gravity    Indication:  Urine Output Monitoring in Critically Ill (07-03-23 @ 21:21)    GI proph:  pantoprazole  Injectable 40 milliGRAM(s) IV Push every 12 hours    AC/ proph: heparin   Injectable 5000 Unit(s) SubCutaneous every 8 hours    ABx: levoFLOXacin IVPB 500 milliGRAM(s) IV Intermittent every 48 hours  meropenem  IVPB 500 milliGRAM(s) IV Intermittent every 12 hours      PHYSICAL EXAM:  GENERAL: NAD, well-appearing  CHEST/LUNG: Clear to auscultation bilaterally  HEART: Regular rate and rhythm  ABDOMEN: Soft, Nontender, Nondistended;   EXTREMITIES:  No clubbing, cyanosis, or edema      LABS  Labs:  CAPILLARY BLOOD GLUCOSE      POCT Blood Glucose.: 115 mg/dL (03 Jul 2023 17:47)  POCT Blood Glucose.: 131 mg/dL (03 Jul 2023 13:25)                          8.5    27.25 )-----------( 51       ( 04 Jul 2023 04:30 )             24.8         07-04    136  |  104  |  52<H>  ----------------------------<  176<H>  3.1<L>   |  20  |  1.6<H>      Calcium: 7.0 mg/dL (07-04-23 @ 04:30)      LFTs:     Blood Gas Arterial, Lactate: 2.70 mmol/L (07-04-23 @ 04:56)  Blood Gas Arterial, Lactate: 2.10 mmol/L (07-03-23 @ 15:58)  Blood Gas Arterial, Lactate: 2.30 mmol/L (07-03-23 @ 05:04)  Blood Gas Arterial, Lactate: 3.10 mmol/L (07-02-23 @ 21:07)  Blood Gas Arterial, Lactate: 4.20 mmol/L (07-02-23 @ 11:37)  Blood Gas Arterial, Lactate: 4.00 mmol/L (07-02-23 @ 04:53)  Blood Gas Arterial, Lactate: 3.70 mmol/L (07-01-23 @ 22:31)  Blood Gas Arterial, Lactate: 3.10 mmol/L (07-01-23 @ 17:13)    ABG - ( 04 Jul 2023 04:56 )  pH: 7.49  /  pCO2: 31    /  pO2: 124   / HCO3: 24    / Base Excess: 0.6   /  SaO2: 99.0            ABG - ( 03 Jul 2023 15:58 )  pH: 7.43  /  pCO2: 34    /  pO2: 137   / HCO3: 23    / Base Excess: -1.3  /  SaO2: 100.0           ABG - ( 03 Jul 2023 05:04 )  pH: 7.50  /  pCO2: 27    /  pO2: 135   / HCO3: 21    / Base Excess: -0.8  /  SaO2: 99.3              Coags:     25.70  ----< 2.20    ( 04 Jul 2023 04:30 )     43.1                Urinalysis Basic - ( 04 Jul 2023 04:30 )    Color: x / Appearance: x / SG: x / pH: x  Gluc: 176 mg/dL / Ketone: x  / Bili: x / Urobili: x   Blood: x / Protein: x / Nitrite: x   Leuk Esterase: x / RBC: x / WBC x   Sq Epi: x / Non Sq Epi: x / Bacteria: x            RADIOLOGY & ADDITIONAL TESTS:      A/P         GENERAL SURGERY PROGRESS NOTE    Patient: CHRISTINE VILLAVICENCIO , 68y ([birth year])Male   MRN: [id number]  Location: [address]  Visit: 06-18-23 Inpatient  Date: 07-04-23 @ 16:08    PAST MEDICAL & SURGICAL HISTORY:    Vitals:   T(F): 94.5 (07-04-23 @ 15:00), Max: 95.6 (07-04-23 @ 12:00)  HR: 106 (07-04-23 @ 15:00)  BP: --  RR: 28 (07-04-23 @ 15:00)  SpO2: 99% (07-04-23 @ 15:00)  Mode: PS (Pressure Support)/ Spontaneous, FiO2: 40, PEEP: 8, PS: 10, MAP: 11, PIP: 19    Diet, NPO with Tube Feed:   Tube Feeding Modality: Orogastric  Nepro with Carb Steady  Total Volume for 24 Hours (mL): 480  Continuous  Starting Tube Feed Rate mL per Hour: 10  Increase Tube Feed Rate by (mL): 20     Every 4 hours  Until Goal Tube Feed Rate (mL per Hour): 20  Tube Feed Duration (in Hours): 24  Tube Feed Start Time: 08:26    Fluids:     I & O's:    07-03-23 @ 07:01  -  07-04-23 @ 07:00  --------------------------------------------------------  IN:    Dexmedetomidine: 166.4 mL    Enteral Tube Flush: 170 mL    Fat Emulsion (Fish Oil &amp; Plant Based) 20% Infusion: 209 mL    IV PiggyBack: 100 mL    IV PiggyBack: 150 mL    Nepro with Carb Steady: 260 mL    Phenylephrine: 256.6 mL    sodium chloride 0.45% w/ Additives: 50 mL    TPN (Total Parenteral Nutrition): 650 mL    Vasopressin: 108 mL  Total IN: 2120 mL    OUT:    Drain (mL): 135 mL    Drain (mL): 170 mL    Drain (mL): 315 mL    Gastric Aspirate - Discarded (mL): 0 mL    Indwelling Catheter - Urethral (mL): 56 mL    Oral Fluid: 0 mL    Other (mL): 3202 mL  Total OUT: 3878 mL    Total NET: -1758 mL    PHYSICAL EXAM:  GENERAL: NAD, Sedated   CHEST/LUNG: Intubated, Clear to auscultation bilaterally  HEART: Regular rate and rhythm  ABDOMEN: Soft, Nontender, Nondistended;   EXTREMITIES:  No clubbing or cyanosis. Pitting edema +3    MEDICATIONS  (STANDING):  albumin human  5% IVPB 250 milliLiter(s) IV Intermittent every 6 hours  bacitracin   Ointment 1 Application(s) Topical two times a day  calcium acetate 1334 milliGRAM(s) Oral every 8 hours  chlorhexidine 0.12% Liquid 15 milliLiter(s) Oral Mucosa every 12 hours  chlorhexidine 2% Cloths 1 Application(s) Topical <User Schedule>  CRRT Treatment    <Continuous>  dexMEDEtomidine Infusion 0.2 MICROgram(s)/kG/Hr (3.82 mL/Hr) IV Continuous <Continuous>  ferrous    sulfate 325 milliGRAM(s) Oral daily  gabapentin 200 milliGRAM(s) Oral every 8 hours  heparin   Injectable 5000 Unit(s) SubCutaneous every 8 hours  hydrocortisone sodium succinate Injectable 50 milliGRAM(s) IV Push every 6 hours  levoFLOXacin IVPB 500 milliGRAM(s) IV Intermittent every 48 hours  meropenem  IVPB 500 milliGRAM(s) IV Intermittent every 12 hours  pantoprazole  Injectable 40 milliGRAM(s) IV Push every 12 hours  Parenteral Nutrition - Adult 1 Each (65 mL/Hr) TPN Continuous <Continuous>  Parenteral Nutrition - Adult 1 Each (65 mL/Hr) TPN Continuous <Continuous>  phenylephrine    Infusion 0.1 MICROgram(s)/kG/Min (1.43 mL/Hr) IV Continuous <Continuous>  PureFlow Dialysate RFP-400 (K 2 / Ca 3) 5000 milliLiter(s) (2000 mL/Hr) CRRT <Continuous>  sodium bicarbonate 1300 milliGRAM(s) Oral three times a day  vasopressin Infusion 0.03 Unit(s)/Min (4.5 mL/Hr) IV Continuous <Continuous>    MEDICATIONS  (PRN):  fentaNYL    Injectable 25 MICROGram(s) IV Push every 3 hours PRN Moderate Pain (4 - 6)  sodium chloride 0.9% lock flush 10 milliLiter(s) IV Push every 1 hour PRN Pre/post blood products, medications, blood draw, and to maintain line patency  sodium chloride 0.9% lock flush 10 milliLiter(s) IV Push every 1 hour PRN Pre/post blood products, medications, blood draw, and to maintain line patency    DVT PROPHYLAXIS: heparin   Injectable 5000 Unit(s) SubCutaneous every 8 hours    GI PROPHYLAXIS: pantoprazole  Injectable 40 milliGRAM(s) IV Push every 12 hours    ANTICOAGULATION:   ANTIBIOTICS:  levoFLOXacin IVPB 500 milliGRAM(s)  meropenem  IVPB 500 milliGRAM(s)    LAB/STUDIES:  Labs:  CAPILLARY BLOOD GLUCOSE    POCT Blood Glucose.: 115 mg/dL (03 Jul 2023 17:47)                       8.5    27.25 )-----------( 51       ( 04 Jul 2023 04:30 )             24.8       07-04    136  |  104  |  52<H>  ----------------------------<  176<H>  3.1<L>   |  20  |  1.6<H>    Calcium: 7.0 mg/dL (07-04-23 @ 04:30)    LFTs:             2.4  | 0.3  | 12       ------------------[49      ( 04 Jul 2023 09:20 )  1.2  | 0.2  | 7           Lipase:x      Amylase:x         Blood Gas Arterial, Lactate: 2.70 mmol/L (07-04-23 @ 04:56)  Blood Gas Arterial, Lactate: 2.10 mmol/L (07-03-23 @ 15:58)  Blood Gas Arterial, Lactate: 2.30 mmol/L (07-03-23 @ 05:04)  Blood Gas Arterial, Lactate: 3.10 mmol/L (07-02-23 @ 21:07)  Blood Gas Arterial, Lactate: 4.20 mmol/L (07-02-23 @ 11:37)  Blood Gas Arterial, Lactate: 4.00 mmol/L (07-02-23 @ 04:53)  Blood Gas Arterial, Lactate: 3.70 mmol/L (07-01-23 @ 22:31)  Blood Gas Arterial, Lactate: 3.10 mmol/L (07-01-23 @ 17:13)    ABG - ( 04 Jul 2023 04:56 )  pH: 7.49  /  pCO2: 31    /  pO2: 124   / HCO3: 24    / Base Excess: 0.6   /  SaO2: 99.0      ABG - ( 03 Jul 2023 15:58 )  pH: 7.43  /  pCO2: 34    /  pO2: 137   / HCO3: 23    / Base Excess: -1.3  /  SaO2: 100.0     ABG - ( 03 Jul 2023 05:04 )  pH: 7.50  /  pCO2: 27    /  pO2: 135   / HCO3: 21    / Base Excess: -0.8  /  SaO2: 99.3      Coags:     25.70  ----< 2.20    ( 04 Jul 2023 04:30 )     43.1      Urinalysis Basic - ( 04 Jul 2023 04:30 )    Color: x / Appearance: x / SG: x / pH: x  Gluc: 176 mg/dL / Ketone: x  / Bili: x / Urobili: x   Blood: x / Protein: x / Nitrite: x   Leuk Esterase: x / RBC: x / WBC x   Sq Epi: x / Non Sq Epi: x / Bacteria: x

## 2023-07-04 NOTE — PROGRESS NOTE ADULT - ASSESSMENT
Assessment & Plan:  67M w/ PMH of HTN, grade IV hydronephrosis of L kidney s/p L PCN (placed 5/2023), stutter, hiatal hernia, iron deficiency anemia, H Pylori (untreated), and gastric mass (never biopsied) presents to the ED with at least 4 days of worsening weakness, abdominal distension, and no output from his PCN. Admitted with emphysematous pyelonephritis     (6/18): s/p LT PCN upsizing to 16Fr and 16Fr RLQ drain placement with IR   (6/22): s/p shock lithotripsy of L kidney abscess cavity with drainage of thick contents, upsize of drain to 26Fr bobby catheter  (6/30): s/p ex-lap and L nephrectomy with abdominal washout    NEURO:  #Pain control    -APAP PRN    -Gabapentin 200 q 8     -fent 25 q 3 PRN while intubated  #Sedation    -precedex gtt   -following commands when off sedation, GCS 11T    RESP:  #acute respiratory failure with left lung mucous plugging - resolved s/p intubated 6/24 and    and bronchoscopy     ABG - ( 03 Jul 2023 15:58 )  pH, Arterial: 7.43  pH, Blood: x     /  pCO2: 34    /  pO2: 137   / HCO3: 23    / Base Excess: -1.3  /  SaO2: 100.0   RR (machine): 18, TV (machine): 450, FiO2: 40, PEEP: 8    07-03 @ 15:58--7.43 / 34 / 137 / 23 / [id number] 100.0 / Lac 2.10 / iCal 1.10   07-03 @ 05:04--7.50 / 27 / 135 / 21 / [id number] 99.3 / Lac 2.30 / iCal --     -SBT- initially apneic, second SBT tolerating since this AM    #Pulmonary nodules / Mediastinal lymphadenopathy    - CT Chest: Right middle and upper lobe solid pulmonary nodules, f/u outpatient pulm   #Activity   - incr as tolerated    CARDS:   #Septic shock    - off levo since 3am 7/1, remains at [facility] (wean off), continued on vaso (wean as tolerated)  #New onset Afib w/ RVR with hypotension -- likely 2/2 sepsis    - HR control with Metoprolol 2.5 q6 IV  -Digoxin 250mcg x1 dose, 125mcg x1 7/1, f/u level in am    - rate better controlled    - Cardiology following   -  last dig level 2.7  #H/O HTN    -Amlodipine 2.5mg HOLDING   Imaging    - ECHO: (6/19) EF 55-60%, mild AS, mild LAE, mild MR/TR, trivial pericardial effusion    GI/NUTR:  #Diet    NPO except meds; holding TF due to pressor requirement  TPN c/s placed   #bowel movements    - 2 bloody BM's noted overnight 6/25; last BM 6/26 AM    - GI consult 6/26- Plan for EGD with EUS + FNA as outpatient, H-pylori treatment after      resolution of acute issues   -dignishield in place   -FOBT positive    #GI PPX  -IV Protonix 40 q12 hours  #Gastric mass vs gastrohepatic lymphadenopathy    -CT A/P: lesser curvature mass , Hepatic hypodensities  #Splenic Hypodensities, infarct vs phlegmon/abscess    -CT A/P:  Wedge-shaped region of hypodensity within the spleen  #workup: follow amylase result from drain    /RENAL:   #Grade IV L hydronephrosis s/p L PCN (5/2023) -- presented to ED with thick dark foul smelling output  #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  -intermittent suction/irrigation per urology; f/u cx and cytology from 6/25     -(6/23) s/p LT PCN upsizing to 26Fr catheter, RLQ 16 Fr drain remains, s/p nephrostolithotomy w/ lithotripsy    -(6/18) s/p LT PCN upsizing to 16Fr and 16Fr RLQ drain placement with IR, flush q8 hr  (6/30): s/p L nephrectomy and abdominal washout   -R pelvic drain Creatinine 3.99    High suspicion of renal malignancy given recent pathology  #volume overload  #ROJAS (baseline Cr 1.0)  #urine output in critically ill    -indwelling bobby (placed     Labs:          BUN/Cr- 73/2.1  -->,  83/2.5  -->          Electrolytes-Na 136 // K 3.8 // Mg -- //  Phos -- (07-03 @ 08:40)  #metabolic acidosis, resolved, dc'd bicarbonate ggt  -Nephrology c/s - CVVHD started 07/02  - Bicarbonate dc'd 7/3  #Hyperkalemia   #Hyperphosphatemia    -Phoslo 1334mg q8    Labs:  #urine output in critically ill    -indwelling bobby (placed     Labs:          BUN/Cr- 73/2.1  -->,  83/2.5  -->          Electrolytes-Na 136 // K 3.8 // Mg -- //  Phos -- (07-03 @ 08:40)  #decreased urine output in the setting of acute renal failure 2/2 septic shock  -CVVHD, titrate to -50-100ml/hr fluid balance  -s/p Bumex 2mg x1 07/01, no response   -nephrology following    HEME/ONC:   #new onset Afib    - holding heparin infusion in setting of hematochezia     Labs: DVT propylaxis- HSQ     #R radial artery occlusion  -RUE duplex 07/01 prelim + for r radial artery occlusion  -vascular c/s placed     Labs: Hb/Hct:  8.4/24.5  -->,  8.7/25.0  -->                      Plts:  52  -->,  63  -->      T&S:ABO RH Interpretation; expires 07/02    #H/o Fe Deficiency anemia    - Iron supplementation  #DVT prophylaxis  -SCDs, heparin subQ   T&S Expires 7/4  Blood Consent- in chart     #Thrombocytopenia  Heme consult (7/3):   - low risk for HIT (score 2-3)  - Heme/onc wants full HIT workup although low HIT risk  - send HIT antibody, if positive then send BRUCE  - risk of full anticoagulation outweigh the benefits due to high risk of bleeding (h/o GI bleed and post-nephrectomy), will hold off on AC and will wait for HIT AB screen results, if new DVT then will re-assess; discussed with SICU attending Dr. Orozco who also discussed this with hematology team and Dr. Womack who are all in agreement.  - will keep hep subQ prophylaxis for now    ID:  #Leukocytosis 2/2 sepsis   WBC- 28.59  --->>,  33.91  --->>,  41.35  --->>34  afebrile   #antibiotics    -Levaquin 500mg q48 (6/21-    -Meropenem 500q12 (6/20-    -As per ID: no need for Vancomycin or Daptomycin in the absence of MRSA    -discontinued caspofungin (as per ID, since fungitell is unremarkable)  #Cultures    L PCN cyto-pathology  6/25: results suggestive of a necrotic neoplasm    Funtitell 6/26: 47    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    BAL 6/24: moderate yeast     UCx 6/19: negative FINAL      Left PCN aspiration 6/18: stenotrophomonas maltophilia  Needs source control    ENDO:  #Glucose monitoring    -A1c (5/7/23): 5.0    -POCT q6 hours while NPO   #Adrenal Insufficiency 2/2 to sepsis     -restarted solucortef 50q6 for 6 days starting 07/02      -Cortisol level - binding globulin 1.1 (low), serum cortisol, 84, free cortisol 76, % free cortisol 91    MSK:  #Sacrococcygeal DTI: wound care following- cleanse with soap and water, apply triad, #gauze, and allevyn BID and PRN for soiling   Venous ulcer right lower leg, R forearm skin tear: Pat dry, apply Xeroform, nonadherent dressing, wrap with Kerlix twice a day, prn for soiling  #Activity    -Advance as tolerated    -PT/rehab once extubated    -Globally deconditioned  #b/l UE, LE mottled skin  -no radial pulses b/l  +ulnar, brachial b/l UE, +DP b/l  -Arterial duplex done, pending read     LINES/DRAINS:   PIV  Bobby (6/18)  L eleazar drain (6/30)  L IJ TLC (6/26)  R IJ Uldall (07/02)  R Lerma pouch #1, pelvic drain #2 (6/30)  Left basilic midline  6/21  Right axillary a line (6/30)  Right Radial Steele (6/22-6/30)  L radial Joleen (6/18-6/22)    VASCULAR: follow up recs:  [ ] HIT panel and Serotonin Releasing Assay (if ruled out HIT, consider Eliquis or Argatroban for ACO)  [ ] Venous Duplex LE  [ ] LDH, Reticulocyte Ct, Haptoglobin  [ ] Reticulocyte count      ETT  OGT    ADVANCED DIRECTIVES:  Full Code  HCP/Emergency Contact- Tracey Adames: [phone]   Palliative following. Last GOC discussion 6/26 day.    INDICATION FOR SICU: New onset atrial fibrillation w/ mechanical ventilation    DISPO: SICU     Assessment & Plan:  67M w/ PMH of HTN, grade IV hydronephrosis of L kidney s/p L PCN (placed 5/2023), stutter, hiatal hernia, iron deficiency anemia, H Pylori (untreated), and gastric mass (never biopsied) presents to the ED with at least 4 days of worsening weakness, abdominal distension, and no output from his PCN. Admitted with emphysematous pyelonephritis     (6/18): s/p LT PCN upsizing to 16Fr and 16Fr RLQ drain placement with IR   (6/22): s/p shock lithotripsy of L kidney abscess cavity with drainage of thick contents, upsize of drain to 26Fr bobby catheter  (6/30): s/p ex-lap and L nephrectomy with abdominal washout    NEURO:  #Pain control    -APAP PRN    -Gabapentin 200 q 8     -fent 25 q 3 PRN while intubated  #Sedation    -precedex gtt   -following commands when off sedation, GCS 11T    RESP:  #acute respiratory failure with left lung mucous plugging - resolved s/p intubated 6/24 and    and bronchoscopy     ABG - ( 03 Jul 2023 15:58 )  pH, Arterial: 7.43  pH, Blood: x     /  pCO2: 34    /  pO2: 137   / HCO3: 23    / Base Excess: -1.3  /  SaO2: 100.0   RR (machine): 18, TV (machine): 450, FiO2: 40, PEEP: 8    07-03 @ 15:58--7.43 / 34 / 137 / 23 / [id number] 100.0 / Lac 2.10 / iCal 1.10   07-03 @ 05:04--7.50 / 27 / 135 / 21 / [id number] 99.3 / Lac 2.30 / iCal --     -SBT- initially apneic, second SBT tolerating since this AM    #Pulmonary nodules / Mediastinal lymphadenopathy    - CT Chest: Right middle and upper lobe solid pulmonary nodules, f/u outpatient pulm   #Activity   - incr as tolerated    CARDS:   #Septic shock    - off levo since 3am 7/1, remains at [facility] (wean off), continued on vaso (wean as tolerated)  #New onset Afib w/ RVR with hypotension -- likely 2/2 sepsis    - HR 50s, [x] Dc'd metoprolol, concerns for unopposed alpha, causing peripheral distal extremity ischemia, HR in 50s (afib on HSQ)  -Digoxin 250mcg x1 dose, 125mcg x1 7/1, f/u level in am    - rate better controlled    - Cardiology following   -  last dig level 2.7  #H/O HTN    -Amlodipine 2.5mg HOLDING   Imaging    - ECHO: (6/19) EF 55-60%, mild AS, mild LAE, mild MR/TR, trivial pericardial effusion    GI/NUTR:  #Diet    NPO except meds; holding TF due to pressor requirement  TPN c/s placed   #bowel movements    - 2 bloody BM's noted overnight 6/25; last BM 6/26 AM    - GI consult 6/26- Plan for EGD with EUS + FNA as outpatient, H-pylori treatment after      resolution of acute issues   -dignishield in place   -FOBT positive    #GI PPX  -IV Protonix 40 q12 hours  #Gastric mass vs gastrohepatic lymphadenopathy    -CT A/P: lesser curvature mass , Hepatic hypodensities  #Splenic Hypodensities, infarct vs phlegmon/abscess    -CT A/P:  Wedge-shaped region of hypodensity within the spleen  #workup: follow amylase result from drain    /RENAL:   #Grade IV L hydronephrosis s/p L PCN (5/2023) -- presented to ED with thick dark foul smelling output  #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  -intermittent suction/irrigation per urology; f/u cx and cytology from 6/25     -(6/23) s/p LT PCN upsizing to 26Fr catheter, RLQ 16 Fr drain remains, s/p nephrostolithotomy w/ lithotripsy    -(6/18) s/p LT PCN upsizing to 16Fr and 16Fr RLQ drain placement with IR, flush q8 hr  (6/30): s/p L nephrectomy and abdominal washout   -R pelvic drain Creatinine 3.99    High suspicion of renal malignancy given recent pathology  #volume overload  #ROJAS (baseline Cr 1.0)  #urine output in critically ill    -indwelling bobby (placed     Labs:          BUN/Cr- 73/2.1  -->,  83/2.5  -->          Electrolytes-Na 136 // K 3.8 // Mg -- //  Phos -- (07-03 @ 08:40)  #metabolic acidosis, resolved, dc'd bicarbonate ggt  -Nephrology c/s - CVVHD started 07/02  - Bicarbonate dc'd 7/3  #Hyperkalemia   #Hyperphosphatemia    -Phoslo 1334mg q8    Labs:  #urine output in critically ill    -indwelling bobby (placed     Labs:          BUN/Cr- 73/2.1  -->,  83/2.5  -->          Electrolytes-Na 136 // K 3.8 // Mg -- //  Phos -- (07-03 @ 08:40)  #decreased urine output in the setting of acute renal failure 2/2 septic shock  -CVVHD, titrate to -50-100ml/hr fluid balance  -s/p Bumex 2mg x1 07/01, no response   -nephrology following    HEME/ONC:   #new onset Afib    - holding heparin infusion in setting of hematochezia     Labs: DVT propylaxis- HSQ     #R radial artery occlusion  -RUE duplex 07/01 prelim + for r radial artery occlusion  -vascular c/s placed     Labs: Hb/Hct:  8.4/24.5  -->,  8.7/25.0  -->                      Plts:  52  -->,  63  -->      T&S:ABO RH Interpretation; expires 07/02    #H/o Fe Deficiency anemia    - Iron supplementation  #DVT prophylaxis  -SCDs, heparin subQ   T&S Expires 7/4  Blood Consent- in chart     #Thrombocytopenia  Heme consult (7/3):   - low risk for HIT (score 2-3)  - Heme/onc wants full HIT workup although low HIT risk  - send HIT antibody, if positive then send BRUCE  - risk of full anticoagulation outweigh the benefits due to high risk of bleeding (h/o GI bleed and post-nephrectomy), will hold off on AC and will wait for HIT AB screen results, if new DVT then will re-assess; discussed with SICU attending Dr. Orozco who also discussed this with hematology team and Dr. Womack who are all in agreement.  - will keep hep subQ prophylaxis for now    ID:  #Leukocytosis 2/2 sepsis   WBC- 28.59  --->>,  33.91  --->>,  41.35  --->>34  afebrile   #antibiotics    -Levaquin 500mg q48 (6/21-    -Meropenem 500q12 (6/20-    -As per ID: no need for Vancomycin or Daptomycin in the absence of MRSA    -discontinued caspofungin (as per ID, since fungitell is unremarkable)  #Cultures    L PCN cyto-pathology  6/25: results suggestive of a necrotic neoplasm    Funtitell 6/26: 47    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    BAL 6/24: moderate yeast     UCx 6/19: negative FINAL      Left PCN aspiration 6/18: stenotrophomonas maltophilia  Needs source control    ENDO:  #Glucose monitoring    -A1c (5/7/23): 5.0    -POCT q6 hours while NPO   #Adrenal Insufficiency 2/2 to sepsis     -restarted solucortef 50q6 for 6 days starting 07/02      -Cortisol level - binding globulin 1.1 (low), serum cortisol, 84, free cortisol 76, % free cortisol 91    MSK:  #Sacrococcygeal DTI: wound care following- cleanse with soap and water, apply triad, #gauze, and allevyn BID and PRN for soiling   Venous ulcer right lower leg, R forearm skin tear: Pat dry, apply Xeroform, nonadherent dressing, wrap with Kerlix twice a day, prn for soiling  #Activity    -Advance as tolerated    -PT/rehab once extubated    -Globally deconditioned  #b/l UE, LE mottled skin  -no radial pulses b/l  +ulnar, brachial b/l UE, +DP b/l  -Arterial duplex done, pending read     LINES/DRAINS:   PIV  Bobby (6/18)  L eleazar drain (6/30)  L IJ TLC (6/26)  R IJ Uldall (07/02)  R Lerma pouch #1, pelvic drain #2 (6/30)  Left basilic midline  6/21  Right axillary a line (6/30)  Right Radial [employer] (6/22-6/30)  L radial Joleen (6/18-6/22)    VASCULAR: follow up recs:  [ ] HIT panel and Serotonin Releasing Assay (if ruled out HIT, consider Eliquis or Argatroban for ACO)  [ ] Venous Duplex LE  [ ] LDH, Reticulocyte Ct, Haptoglobin  [ ] Reticulocyte count      ETT  OGT    ADVANCED DIRECTIVES:  Full Code  HCP/Emergency Contact- Tracey Adames: [phone]   Palliative following. Last GOC discussion 6/26 day.    INDICATION FOR SICU: New onset atrial fibrillation w/ mechanical ventilation    DISPO: SICU

## 2023-07-04 NOTE — PROGRESS NOTE ADULT - ASSESSMENT
67M w/ PMH of HTN, grade IV hydronephrosis of L kidney s/p L PCN (placed 5/2023), stutter, hiatal hernia, iron deficiency anemia, H Pylori (untreated), and gastric mass (never biopsied) presents to the ED with at least 4 days of worsening weakness, abdominal distension, and no output from his PCN  . Found to have septic shock, MSOF, lactic acidosis from left emphysematous hydronephrosis, pyelonephritis and possible rupture with pneumoperitoneum along with possible hepatic abscesses, splenic infarct and abdominal and mediastinal lymphadenopathies.  had nephrectomy and abdominal washout on 6/30    Oliguric in spite of bumex, rising dig level, high lactate    ROJAS likely ATN iso septic shock  on pressors   on CVVHD / will keep in neg balance 100 cc per hour   K noted replete to 3. 5  repeat Dig level ok    monitor K, phos daily on CRRT/ noted from today / on binders  dose antibx to CRRT  will follow  discussed w/ SICU team

## 2023-07-04 NOTE — PROGRESS NOTE ADULT - SUBJECTIVE AND OBJECTIVE BOX
UROLOGY DAILY PROGRESS NOTE    Pt is a 68y Male admitted with retroperitoneal and intraabdominal abscess s/p IR placement of drain and LEFT PCN 6/18, s/p percutaneous drainage of LEFT renal abscess POD #10 with left 26fr flank catheter in place, s/p Left radical nephrectomy, Ex-Lap POD#4. Patient seen and examined at bedside. Pt remains intubated, on CVVH and pressor support.       MEDICATIONS  (STANDING):  albumin human  5% IVPB 250 milliLiter(s) IV Intermittent every 6 hours  bacitracin   Ointment 1 Application(s) Topical two times a day  calcium acetate 1334 milliGRAM(s) Oral every 8 hours  chlorhexidine 0.12% Liquid 15 milliLiter(s) Oral Mucosa every 12 hours  chlorhexidine 2% Cloths 1 Application(s) Topical <User Schedule>  CRRT Treatment    <Continuous>  dexMEDEtomidine Infusion 0.2 MICROgram(s)/kG/Hr (3.82 mL/Hr) IV Continuous <Continuous>  ferrous    sulfate 325 milliGRAM(s) Oral daily  gabapentin 200 milliGRAM(s) Oral every 8 hours  heparin   Injectable 5000 Unit(s) SubCutaneous every 8 hours  hydrocortisone sodium succinate Injectable 50 milliGRAM(s) IV Push every 6 hours  levoFLOXacin IVPB 500 milliGRAM(s) IV Intermittent every 48 hours  lipid, fat emulsion (Fish Oil and Plant Based) 20% Infusion 0.656 Gm/kG/Day (20.9 mL/Hr) IV Continuous <Continuous>  meropenem  IVPB 500 milliGRAM(s) IV Intermittent every 12 hours  pantoprazole  Injectable 40 milliGRAM(s) IV Push every 12 hours  Parenteral Nutrition - Adult 1 Each (65 mL/Hr) TPN Continuous <Continuous>  Parenteral Nutrition - Adult 1 Each (65 mL/Hr) TPN Continuous <Continuous>  phenylephrine    Infusion 0.1 MICROgram(s)/kG/Min (1.43 mL/Hr) IV Continuous <Continuous>  potassium chloride  20 mEq/100 mL IVPB 20 milliEquivalent(s) IV Intermittent once  PureFlow Dialysate RFP-400 (K 2 / Ca 3) 5000 milliLiter(s) (2000 mL/Hr) CRRT <Continuous>  sodium bicarbonate 1300 milliGRAM(s) Oral three times a day  vasopressin Infusion 0.03 Unit(s)/Min (4.5 mL/Hr) IV Continuous <Continuous>    MEDICATIONS  (PRN):  fentaNYL    Injectable 25 MICROGram(s) IV Push every 3 hours PRN Moderate Pain (4 - 6)  sodium chloride 0.9% lock flush 10 milliLiter(s) IV Push every 1 hour PRN Pre/post blood products, medications, blood draw, and to maintain line patency  sodium chloride 0.9% lock flush 10 milliLiter(s) IV Push every 1 hour PRN Pre/post blood products, medications, blood draw, and to maintain line patency      REVIEW OF SYSTEMS   [ ] Due to intubation, subjective information were not able to be obtained from patient. History was obtained, to the extent possible, from review of the chart and collateral sources of information.    Vital Signs Last 24 Hrs  T(C): 32.8 (04 Jul 2023 01:00), Max: 33.8 (03 Jul 2023 16:00)  T(F): 91 (04 Jul 2023 01:00), Max: 92.8 (03 Jul 2023 16:00)  HR: 73 (04 Jul 2023 10:00) (58 - 92)  BP: 91/60 (03 Jul 2023 14:00) (91/60 - 91/60)  BP(mean): 71 (03 Jul 2023 14:00) (71 - 71)  RR: 25 (04 Jul 2023 10:00) (13 - 28)  SpO2: 99% (04 Jul 2023 10:00) (66% - 100%)    Parameters below as of 04 Jul 2023 10:00  Patient On (Oxygen Delivery Method): ventilator      PHYSICAL EXAM:    GEN: NAD, awake, intubated.  SKIN: Good color, non diaphoretic.  RESP: orally intubated, vented   CARDIO: +S1/S2  ABDO: Soft, NT, no palpable bladder, +#2 Pelvic eleazar drain with serosanguinous fluid, #1 Lerma pouch eleazar drain with serosanguinous fluid  BACK: No CVAT B/L, +#3 LEFT kidney eleazar drain with serosanguinous drainage  : + Penoscrotal edema  + Indwelling bobby in place, draining concentrated yellow urine  EXT: + UE edema b/l, LE edema decreasing     I&O's Summary    03 Jul 2023 07:01  -  04 Jul 2023 07:00  --------------------------------------------------------  IN: 2120 mL / OUT: 3878 mL / NET: -1758 mL    04 Jul 2023 07:01  -  04 Jul 2023 11:10  --------------------------------------------------------  IN: 446.3 mL / OUT: 852 mL / NET: -405.7 mL      LABS:                        8.5    27.25 )-----------( 51       ( 04 Jul 2023 04:30 )             24.8     07-04    136  |  104  |  52<H>  ----------------------------<  176<H>  3.1<L>   |  20  |  1.6<H>    Ca    7.0<L>      04 Jul 2023 04:30  Phos  5.4     07-02  Mg     1.5     07-04    TPro  2.4<L>  /  Alb  1.2<L>  /  TBili  0.3  /  DBili  0.2  /  AST  12  /  ALT  7   /  AlkPhos  49  07-04    PT/INR - ( 04 Jul 2023 04:30 )   PT: 25.70 sec;   INR: 2.20 ratio         PTT - ( 04 Jul 2023 04:30 )  PTT:43.1 sec  Urinalysis Basic - ( 04 Jul 2023 04:30 )    Color: x / Appearance: x / SG: x / pH: x  Gluc: 176 mg/dL / Ketone: x  / Bili: x / Urobili: x   Blood: x / Protein: x / Nitrite: x   Leuk Esterase: x / RBC: x / WBC x   Sq Epi: x / Non Sq Epi: x / Bacteria: x

## 2023-07-04 NOTE — PROGRESS NOTE ADULT - SUBJECTIVE AND OBJECTIVE BOX
Nephrology progress note    THIS IS AN INCOMPLETE NOTE . FULL NOTE TO FOLLOW SHORTLY    Patient is seen and examined, events over the last 24 h noted .    Allergies:  No Known Allergies    Hospital Medications:   MEDICATIONS  (STANDING):  albumin human  5% IVPB 250 milliLiter(s) IV Intermittent every 6 hours  bacitracin   Ointment 1 Application(s) Topical two times a day  calcium acetate 1334 milliGRAM(s) Oral every 8 hours  chlorhexidine 0.12% Liquid 15 milliLiter(s) Oral Mucosa every 12 hours  chlorhexidine 2% Cloths 1 Application(s) Topical <User Schedule>  CRRT Treatment    <Continuous>  dexMEDEtomidine Infusion 0.2 MICROgram(s)/kG/Hr (3.82 mL/Hr) IV Continuous <Continuous>  ferrous    sulfate 325 milliGRAM(s) Oral daily  gabapentin 200 milliGRAM(s) Oral every 8 hours  heparin   Injectable 5000 Unit(s) SubCutaneous every 8 hours  hydrocortisone sodium succinate Injectable 50 milliGRAM(s) IV Push every 6 hours  levoFLOXacin IVPB 500 milliGRAM(s) IV Intermittent every 48 hours  lipid, fat emulsion (Fish Oil and Plant Based) 20% Infusion 0.656 Gm/kG/Day (20.9 mL/Hr) IV Continuous <Continuous>  meropenem  IVPB 500 milliGRAM(s) IV Intermittent every 12 hours  pantoprazole  Injectable 40 milliGRAM(s) IV Push every 12 hours  Parenteral Nutrition - Adult 1 Each (65 mL/Hr) TPN Continuous <Continuous>  phenylephrine    Infusion 0.1 MICROgram(s)/kG/Min (1.43 mL/Hr) IV Continuous <Continuous>  potassium chloride  20 mEq/100 mL IVPB 20 milliEquivalent(s) IV Intermittent once  PureFlow Dialysate RFP-400 (K 2 / Ca 3) 5000 milliLiter(s) (2000 mL/Hr) CRRT <Continuous>  sodium bicarbonate 1300 milliGRAM(s) Oral three times a day  vasopressin Infusion 0.03 Unit(s)/Min (4.5 mL/Hr) IV Continuous <Continuous>        VITALS:  T(F): 91 (07-04-23 @ 01:00), Max: 92.8 (07-03-23 @ 11:00)  HR: 92 (07-04-23 @ 08:00)  BP: 91/60 (07-03-23 @ 14:00)  RR: 23 (07-04-23 @ 08:00)  SpO2: 100% (07-04-23 @ 08:00)  Wt(kg): --    07-02 @ 07:01  -  07-03 @ 07:00  --------------------------------------------------------  IN: 3589.5 mL / OUT: 2959 mL / NET: 630.5 mL    07-03 @ 07:01  -  07-04 @ 07:00  --------------------------------------------------------  IN: 2120 mL / OUT: 3878 mL / NET: -1758 mL    07-04 @ 07:01  -  07-04 @ 08:33  --------------------------------------------------------  IN: 122.6 mL / OUT: 50 mL / NET: 72.6 mL          PHYSICAL EXAM:  Constitutional: NAD  HEENT: anicteric sclera, oropharynx clear, MMM  Neck: No JVD  Respiratory: CTAB, no wheezes, rales or rhonchi  Cardiovascular: S1, S2, RRR  Gastrointestinal: BS+, soft, NT/ND  Extremities: No cyanosis or clubbing. No peripheral edema  :  No bobby.   Skin: No rashes    LABS:  07-04    136  |  104  |  52<H>  ----------------------------<  176<H>  3.1<L>   |  20  |  1.6<H>    Ca    7.0<L>      04 Jul 2023 04:30  Phos  5.4     07-02  Mg     1.5     07-04                            8.5    27.25 )-----------( 51       ( 04 Jul 2023 04:30 )             24.8       Urine Studies:  Urinalysis Basic - ( 04 Jul 2023 04:30 )    Color:  / Appearance:  / SG:  / pH:   Gluc: 176 mg/dL / Ketone:   / Bili:  / Urobili:    Blood:  / Protein:  / Nitrite:    Leuk Esterase:  / RBC:  / WBC    Sq Epi:  / Non Sq Epi:  / Bacteria:       Sodium, Random Urine: <20.0 mmoL/L (06-29 @ 20:15)  Creatinine, Random Urine: 57 mg/dL (06-29 @ 20:15)  Creatinine, Random Urine: 59 mg/dL (06-29 @ 16:48)  Sodium, Random Urine: <20.0 mmoL/L (06-29 @ 16:48)      Iron 12, TIBC 128, %sat 9      [05-07-23 @ 09:19]  Ferritin 526      [05-07-23 @ 09:19]  Vitamin D (25OH) 26      [05-08-23 @ 07:58]  TSH 0.45      [06-19-23 @ 14:14]  Lipid: chol 58, TG 88, HDL 22, LDL --      [07-02-23 @ 21:01]          RADIOLOGY & ADDITIONAL STUDIES:   Nephrology progress note    Patient is seen and examined, events over the last 24 h noted .  Lying in bed comfortable     Allergies:  No Known Allergies    Hospital Medications:   MEDICATIONS  (STANDING):  albumin human  5% IVPB 250 milliLiter(s) IV Intermittent every 6 hours  bacitracin   Ointment 1 Application(s) Topical two times a day  calcium acetate 1334 milliGRAM(s) Oral every 8 hours>  CRRT Treatment    <Continuous>  dexMEDEtomidine Infusion 0.2 MICROgram(s)/kG/Hr (3.82 mL/Hr) IV Continuous <Continuous>  ferrous    sulfate 325 milliGRAM(s) Oral daily  gabapentin 200 milliGRAM(s) Oral every 8 hours  heparin   Injectable 5000 Unit(s) SubCutaneous every 8 hours  hydrocortisone sodium succinate Injectable 50 milliGRAM(s) IV Push every 6 hours  levoFLOXacin IVPB 500 milliGRAM(s) IV Intermittent every 48 hours  lipid, fat emulsion (Fish Oil and Plant Based) 20% Infusion 0.656 Gm/kG/Day (20.9 mL/Hr) IV Continuous <Continuous>  meropenem  IVPB 500 milliGRAM(s) IV Intermittent every 12 hours  pantoprazole  Injectable 40 milliGRAM(s) IV Push every 12 hours  Parenteral Nutrition - Adult 1 Each (65 mL/Hr) TPN Continuous <Continuous>  phenylephrine    Infusion 0.1 MICROgram(s)/kG/Min (1.43 mL/Hr) IV Continuous <Continuous>  potassium chloride  20 mEq/100 mL IVPB 20 milliEquivalent(s) IV Intermittent once  PureFlow Dialysate RFP-400 (K 2 / Ca 3) 5000 milliLiter(s) (2000 mL/Hr) CRRT <Continuous>  sodium bicarbonate 1300 milliGRAM(s) Oral three times a day  vasopressin Infusion 0.03 Unit(s)/Min (4.5 mL/Hr) IV Continuous <Continuous>        VITALS:  T(F): 91 (07-04-23 @ 01:00), Max: 92.8 (07-03-23 @ 11:00)  HR: 92 (07-04-23 @ 08:00)  BP: 91/60 (07-03-23 @ 14:00)  RR: 23 (07-04-23 @ 08:00)  SpO2: 100% (07-04-23 @ 08:00)      07-02 @ 07:01  -  07-03 @ 07:00  --------------------------------------------------------  IN: 3589.5 mL / OUT: 2959 mL / NET: 630.5 mL    07-03 @ 07:01  -  07-04 @ 07:00  --------------------------------------------------------  IN: 2120 mL / OUT: 3878 mL / NET: -1758 mL    07-04 @ 07:01  -  07-04 @ 08:33  --------------------------------------------------------  IN: 122.6 mL / OUT: 50 mL / NET: 72.6 mL          PHYSICAL EXAM:  Constitutional: intubated/ awake/ follows with eyes   Respiratory: CTAB,   Cardiovascular: S1, S2, RRR  Gastrointestinal: BS+, soft, NT/ND  Extremities: No cyanosis or clubbing. plus one edema   :  kanu.   Skin: No rashes    LABS:  07-04    136  |  104  |  52<H>  ----------------------------<  176<H>  3.1<L>   |  20  |  1.6<H>    Ca    7.0<L>      04 Jul 2023 04:30  Phos  5.4     07-02  Mg     1.5     07-04      Digoxin Level, Serum: 1.1 ng/mL (07.04.23 @ 04:30)                              8.5    27.25 )-----------( 51       ( 04 Jul 2023 04:30 )             24.8       Urine Studies:  Urinalysis Basic - ( 04 Jul 2023 04:30 )    Color:  / Appearance:  / SG:  / pH:   Gluc: 176 mg/dL / Ketone:   / Bili:  / Urobili:    Blood:  / Protein:  / Nitrite:    Leuk Esterase:  / RBC:  / WBC    Sq Epi:  / Non Sq Epi:  / Bacteria:       Sodium, Random Urine: <20.0 mmoL/L (06-29 @ 20:15)  Creatinine, Random Urine: 57 mg/dL (06-29 @ 20:15)  Creatinine, Random Urine: 59 mg/dL (06-29 @ 16:48)  Sodium, Random Urine: <20.0 mmoL/L (06-29 @ 16:48)      Iron 12, TIBC 128, %sat 9      [05-07-23 @ 09:19]  Ferritin 526      [05-07-23 @ 09:19]  Vitamin D (25OH) 26      [05-08-23 @ 07:58]  TSH 0.45      [06-19-23 @ 14:14]  Lipid: chol 58, TG 88, HDL 22, LDL --      [07-02-23 @ 21:01]          RADIOLOGY & ADDITIONAL STUDIES:

## 2023-07-04 NOTE — PROGRESS NOTE ADULT - ASSESSMENT
Pt is a 68y Male admitted with retroperitoneal and intraabdominal abscess s/p IR placement of drain and LEFT PCN 6/18, s/p percutaneous drainage of LEFT renal abscess POD #10 with left 26fr flank catheter in place, s/p Left radical nephrectomy, Ex-Lap POD#4.     ·	cont current care per SICU  ·	monitor drain output q shift  ·	monitor UO, CVVH as per SICU  ·	cont IV abx  ·	cont vent support  ·	dressing changes as needed  ·	f/u path  ·	w.d with attng

## 2023-07-04 NOTE — PROGRESS NOTE ADULT - ASSESSMENT
A/P:  CHRISTINE VILLAVICENCIO is a 68y  w/ PMHx of retroperitoneal and intraabdominal abscess s/p IR placement of drain and LEFT PCN 6/18, s/p percutaneous drainage of LEFT renal abscess POD #8 with left 26fr flank catheter in place,   s/p Left radical nephrectomy, Ex-Lap POD#2. Low UOP treated w/ Bumex, did not increase. Urine sanguinous. Abd drains x3 w/ Serosanguinous output. WBC downtrending on Levaquin and Ar.    PLAN:   - continue with management Per SICU    - continue with CVVHD per nephrology  - monitor electrolytes   - continue to monitor urine, drain x3 and NGT output    [phone]

## 2023-07-05 NOTE — PROGRESS NOTE ADULT - ASSESSMENT
67M w/ PMH of HTN, grade IV hydronephrosis of L kidney s/p L PCN (placed 5/2023), stutter, hiatal hernia, iron deficiency anemia, H Pylori (untreated), and gastric mass (never biopsied) presents to the ED with at least 4 days of worsening weakness, abdominal distension, and no output from his PCN  . Found to have septic shock, MSOF, lactic acidosis from left emphysematous hydronephrosis, pyelonephritis and possible rupture with pneumoperitoneum along with possible hepatic abscesses, splenic infarct and abdominal and mediastinal lymphadenopathies.  had nephrectomy and abdominal washout on 6/30    Oliguric in spite of bumex, rising dig level, high lactate    ROJAS likely ATN iso septic shock  on pressors   on CVVHD / will keep in even balance today   K noted replete to 3. 5  repeat Dig level ok    monitor K, phos daily on CRRT/ noted from today / on binders  dose antibx to CRRT  will follow  discussed w/ SICU team

## 2023-07-05 NOTE — PROGRESS NOTE ADULT - SUBJECTIVE AND OBJECTIVE BOX
Nephrology progress note    THIS IS AN INCOMPLETE NOTE . FULL NOTE TO FOLLOW SHORTLY    Patient is seen and examined, events over the last 24 h noted .    Allergies:  No Known Allergies    Hospital Medications:   MEDICATIONS  (STANDING):  bacitracin   Ointment 1 Application(s) Topical two times a day  calcium acetate 1334 milliGRAM(s) Oral every 8 hours  chlorhexidine 0.12% Liquid 15 milliLiter(s) Oral Mucosa every 12 hours  chlorhexidine 2% Cloths 1 Application(s) Topical <User Schedule>  dexMEDEtomidine Infusion 0.2 MICROgram(s)/kG/Hr (3.82 mL/Hr) IV Continuous <Continuous>  digoxin  Injectable 125 MICROGram(s) IV Push once  ferrous    sulfate 325 milliGRAM(s) Oral daily  gabapentin 200 milliGRAM(s) Oral every 8 hours  heparin   Injectable 5000 Unit(s) SubCutaneous every 8 hours  hydrocortisone sodium succinate Injectable 50 milliGRAM(s) IV Push every 6 hours  levoFLOXacin IVPB 500 milliGRAM(s) IV Intermittent every 48 hours  meropenem  IVPB 500 milliGRAM(s) IV Intermittent every 12 hours  pantoprazole  Injectable 40 milliGRAM(s) IV Push every 12 hours  Parenteral Nutrition - Adult 1 Each (65 mL/Hr) TPN Continuous <Continuous>  phenylephrine    Infusion 0.1 MICROgram(s)/kG/Min (1.43 mL/Hr) IV Continuous <Continuous>  sodium bicarbonate 1300 milliGRAM(s) Oral three times a day  vasopressin Infusion 0.03 Unit(s)/Min (4.5 mL/Hr) IV Continuous <Continuous>        VITALS:  T(F): 95.5 (07-05-23 @ 08:00), Max: 97.2 (07-05-23 @ 04:00)  HR: 89 (07-05-23 @ 08:00)  BP: 98/74 (07-05-23 @ 08:00)  RR: 28 (07-05-23 @ 08:00)  SpO2: 96% (07-05-23 @ 00:00)  Wt(kg): --    07-03 @ 07:01  -  07-04 @ 07:00  --------------------------------------------------------  IN: 2120 mL / OUT: 3878 mL / NET: -1758 mL    07-04 @ 07:01 - 07-05 @ 07:00  --------------------------------------------------------  IN: 3157.6 mL / OUT: 5023 mL / NET: -1865.4 mL    07-05 @ 07:01 - 07-05 @ 08:45  --------------------------------------------------------  IN: 243.3 mL / OUT: 167 mL / NET: 76.3 mL          PHYSICAL EXAM:  Constitutional: NAD  HEENT: anicteric sclera, oropharynx clear, MMM  Neck: No JVD  Respiratory: CTAB, no wheezes, rales or rhonchi  Cardiovascular: S1, S2, RRR  Gastrointestinal: BS+, soft, NT/ND  Extremities: No cyanosis or clubbing. No peripheral edema  :  No bobby.   Skin: No rashes    LABS:  07-05    138  |  103  |  41<H>  ----------------------------<  171<H>  3.4<L>   |  19  |  1.3    Ca    8.4      05 Jul 2023 05:45  Phos  1.7     07-05  Mg     1.9     07-05    TPro  2.4<L>  /  Alb  1.2<L>  /  TBili  0.3  /  DBili  0.2  /  AST  12  /  ALT  7   /  AlkPhos  49  07-04                          9.1    35.37 )-----------( 44       ( 05 Jul 2023 05:45 )             27.4       Urine Studies:  Urinalysis Basic - ( 05 Jul 2023 05:45 )    Color:  / Appearance:  / SG:  / pH:   Gluc: 171 mg/dL / Ketone:   / Bili:  / Urobili:    Blood:  / Protein:  / Nitrite:    Leuk Esterase:  / RBC:  / WBC    Sq Epi:  / Non Sq Epi:  / Bacteria:       Sodium, Random Urine: <20.0 mmoL/L (06-29 @ 20:15)  Creatinine, Random Urine: 57 mg/dL (06-29 @ 20:15)  Creatinine, Random Urine: 59 mg/dL (06-29 @ 16:48)  Sodium, Random Urine: <20.0 mmoL/L (06-29 @ 16:48)      Iron 12, TIBC 128, %sat 9      [05-07-23 @ 09:19]  Ferritin 526      [05-07-23 @ 09:19]  Vitamin D (25OH) 26      [05-08-23 @ 07:58]  TSH 0.45      [06-19-23 @ 14:14]  Lipid: chol 58, TG 88, HDL 22, LDL --      [07-02-23 @ 21:01]          RADIOLOGY & ADDITIONAL STUDIES:   Nephrology progress note  Patient is seen and examined, events over the last 24 h noted .  Lying in bed     Allergies:  No Known Allergies    Hospital Medications:   MEDICATIONS  (STANDING):  bacitracin   Ointment 1 Application(s) Topical two times a day  calcium acetate 1334 milliGRAM(s) Oral every 8 hours  dexMEDEtomidine Infusion 0.2 MICROgram(s)/kG/Hr (3.82 mL/Hr) IV Continuous <Continuous>  digoxin  Injectable 125 MICROGram(s) IV Push once  ferrous    sulfate 325 milliGRAM(s) Oral daily  gabapentin 200 milliGRAM(s) Oral every 8 hours  heparin   Injectable 5000 Unit(s) SubCutaneous every 8 hours  hydrocortisone sodium succinate Injectable 50 milliGRAM(s) IV Push every 6 hours  levoFLOXacin IVPB 500 milliGRAM(s) IV Intermittent every 48 hours  meropenem  IVPB 500 milliGRAM(s) IV Intermittent every 12 hours  pantoprazole  Injectable 40 milliGRAM(s) IV Push every 12 hours  Parenteral Nutrition - Adult 1 Each (65 mL/Hr) TPN Continuous <Continuous>  phenylephrine    Infusion 0.1 MICROgram(s)/kG/Min (1.43 mL/Hr) IV Continuous <Continuous>  sodium bicarbonate 1300 milliGRAM(s) Oral three times a day  vasopressin Infusion 0.03 Unit(s)/Min (4.5 mL/Hr) IV Continuous <Continuous>        VITALS:  T(F): 95.5 (07-05-23 @ 08:00), Max: 97.2 (07-05-23 @ 04:00)  HR: 89 (07-05-23 @ 08:00)  BP: 98/74 (07-05-23 @ 08:00)  RR: 28 (07-05-23 @ 08:00)  SpO2: 96% (07-05-23 @ 00:00)      07-03 @ 07:01  -  07-04 @ 07:00  --------------------------------------------------------  IN: 2120 mL / OUT: 3878 mL / NET: -1758 mL    07-04 @ 07:01  -  07-05 @ 07:00  --------------------------------------------------------  IN: 3157.6 mL / OUT: 5023 mL / NET: -1865.4 mL    07-05 @ 07:01  -  07-05 @ 08:45  --------------------------------------------------------  IN: 243.3 mL / OUT: 167 mL / NET: 76.3 mL          PHYSICAL EXAM:  Constitutional: intubated on MV   Respiratory: CTAB, no wheezes, rales or rhonchi  Cardiovascular: S1, S2, RRR  Gastrointestinal: BS+, soft, NT/ND  Extremities: No cyanosis or clubbing. No peripheral edema  :  No bobby.   Skin: No rashes    LABS:  07-05    138  |  103  |  41<H>  ----------------------------<  171<H>  3.4<L>   |  19  |  1.3    Ca    8.4      05 Jul 2023 05:45  Phos  1.7     07-05  Mg     1.9     07-05    TPro  2.4<L>  /  Alb  1.2<L>  /  TBili  0.3  /  DBili  0.2  /  AST  12  /  ALT  7   /  AlkPhos  49  07-04                          9.1    35.37 )-----------( 44       ( 05 Jul 2023 05:45 )             27.4       Urine Studies:  Urinalysis Basic - ( 05 Jul 2023 05:45 )    Color:  / Appearance:  / SG:  / pH:   Gluc: 171 mg/dL / Ketone:   / Bili:  / Urobili:    Blood:  / Protein:  / Nitrite:    Leuk Esterase:  / RBC:  / WBC    Sq Epi:  / Non Sq Epi:  / Bacteria:       Sodium, Random Urine: <20.0 mmoL/L (06-29 @ 20:15)  Creatinine, Random Urine: 57 mg/dL (06-29 @ 20:15)  Creatinine, Random Urine: 59 mg/dL (06-29 @ 16:48)  Sodium, Random Urine: <20.0 mmoL/L (06-29 @ 16:48)      Iron 12, TIBC 128, %sat 9      [05-07-23 @ 09:19]  Ferritin 526      [05-07-23 @ 09:19]  Vitamin D (25OH) 26      [05-08-23 @ 07:58]  TSH 0.45      [06-19-23 @ 14:14]  Lipid: chol 58, TG 88, HDL 22, LDL --      [07-02-23 @ 21:01]          RADIOLOGY & ADDITIONAL STUDIES:

## 2023-07-05 NOTE — PROGRESS NOTE ADULT - ASSESSMENT
ASSESSMENT  66yo Male with pmh of hypertension, massive Grade IV hydronephrosis s/p left nephrostomy placed in May 2023 by [facility] presents to the ED with little to no urine output from LEFT nephrostomy from about a week. PCN  more recently drainage was changed to brown/purulent fluid. 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/ pneumoperitoneum with suspected liver abscesses & splenic infarct vs abscess    6/30 OR cx     Growth in fluid media only Enterococcus faecium  Exploratory laparotomy 30-Jun-2023 21:35:05  Aure Prabhakar  Left nephrectomy 30-Jun-2023 21:35:11  Aure Prabhakar. "Exploratory laparotomy for possible left renal cancer complicated by significant hydronephrosis and pyelonephritis. Left nephrectomy performed, notable for necrotic bulky tissue adherent to the renal vein, staple line in tact and hemostasis achieved. Copious irrigation of retroperitoneal renal bed and intraperitoneal fibrinous exudate and purulent fluid. Three drains placed, #1 in Lerma's pouch, #2 in the pelvis, and #3 in the renal bed"    6/22 UCX  Few Finegoldia magna "Susceptibilities not performed"  Nephrostolithotomy, percutaneous, with lithotripsy, large stone 22-Jun-2023 23:27:09 left large thick abscess materia dimension of aspirate over 4 x 4 x 4 cm Bernie Perdomo  Change external ureteral stent 22-Jun-2023 23:28:03 left Bernie Perdomo  Nephrostogram 22-Jun-2023 23:28:31 left Bernie Perdomo. "thick gelatinous material that was difficult to suction out even with the shock pulse. I would alternate 2 prong to remove and break up / shock pulse to suck out. after an hour where I had no clear planes I elected to stop. irrigation through the 26 bobby took out a lot additional material  in ashlyn catch cup 4 x 4 x 4 cm lump of material trapped"    6/19 UCX NG; UA Blood: x / Protein: 300 mg/dL / Nitrite: Negative   Leuk Esterase: Large / RBC: 8 /HPF / WBC >720 /HPF   Sq Epi: x / Non Sq Epi: x / Bacteria: Moderate    6/18 L PCN     Few Stenotrophomonas maltophilia Levofloxacin: S 2    6/18 L PCN     Numerous Anaerobic Gram Positive Cocci Most closely resembling  Peptoniphilus species "Susceptibilities not performed"    6/18 L PCN   Numerous Gram positive cocci in pairs per oil power field    6/18 BCX NGTD     s/p 6/18  Left PCN upsized to 16F and 1200cc of very viscous tan colored fluid was aspirated. RLQ 16F drain was placed and 1200cc of tan colored fluid was aspirated (less viscous). A sample from each location was sent for culture.     Repeat CT 6/19 Since one day earlier,  No extraluminal contrast. Oral contrast was last seen in the terminal ileum.  Status post left nephrostomy tube placement. Left enlarged kidney with severe hydronephrosis/collection has decreased, now measuring 16 x 12 cm from 20 x 14 cm  Surgical Pathology Report:   Left renal abscess, possible parenchyma  Left renal abscess, possible parenchyma, percutaneous drainage:  -  Fragments of extensively necrotic tissue, cannot be fullycharacterized  Comment: The microscopic appearance of the necrotic tissue is concerning for a neoplasm with coagulative necrosis. Definitive diagnosis cannot be  made, due to lack of viable material.  Additional sections will be submitted.  Immunohistochemical studies willbe performed, in an attempt to characterise the necrotic tissue.  Based on the H&E appearance, an infectious process such as tuberculosis  appears less likely, but special stains for microorganisms (acid-fastbacilli and fungal) are in progress and will be reported separately.   (06.22.23 @ 22:12)  6/27 CT Study is overall very limited due to lack of intravenous contrast, beam   Willingham artifact.  Persistent bilateral pleural effusions with adjacent consolidations   likely reflecting compressive atelectasis.  Left kidney poorly defined with a heterogeneous collection with air and   fluid which has likely mildly diminished in size compared to the prior   examination but exact comparison limited.  Diffuse ascites again noted. Diffuse anasarca again noted. Persistent   pneumoperitoneum.  Poorly defined lesions within the liver adjacent to the falciform   ligament which may again reflect abscesses or masses, difficult to   evaluate due to the limitations described above.  < from: CT Abdomen and Pelvis w/ IV Cont (06.18.23 @ 15:15) >  1 ) Moderate pneumoperitoneum with partial diffusion throughout moderate   volume ascites and with associated intermittent peritoneal enhancement   and nodularity. Perforation and peritonitis may be related to underlying   emphysematous pyelonephritis (see below). Less likely sources of   perforation include ureteral/bladder disruption, and bowel perforation   which cannot be entirely excluded on the provided images.  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 The compressed residual renal   parenchyma is inseparable from the adjacent fascial/fatty layers   anterosuperiorly and direct rupture into the peritoneum is a possibility   (6-355). The previously placed left-sided nephrostomy tube pigtail is   notedto be within the left renal collecting system.  3) New hepatic hypodensities measuring up to 2.6 cm, suspicious for   development of multiple focal abscesses.  4) Wedge-shaped region of hypodensity within the spleen may reflect   splenic infarct in the correct clinical setting. Developing   phlegmon/abscess cannot be excluded in the current clinical setting  5) Increased extensive retroperitoneal, portacaval and likely   gastrohepatic heterogeneously enhancing lymphadenopathy. Findings may be   reactive.  6) Mediastinal lymphadenopathy measuring up to 2.4 cm short axis.   Underlying etiology may be reactive, infectious/inflammatory, or   neoplastic. A short-term 3 month follow-up CT chest should be considered   for further evaluation.  7)Right middle and upper lobe solid pulmonary nodules which are not well   delineated due to respiratory motion but measure less than 6 mm.   attention on follow-up exam.  #Reintubated, L lung white out, ruled out HAP    6/24 bronch CX NG,   Moderate Yeast- likely colonizer  #Lactic acidosis  #Abx allergy: No Known Allergies  #Prolonged QTC Calculation(Bazett) 526 ms  #Hyponatremia  #AKICreatinine: Creatinine: 1.3 mg/dL (07-05-23 @ 05:45)  Ht 180  Weight (kg): 76.5 (06-18-23 @ 21:27)  crcl 57     RECOMMENDATIONS  - 6/30 WCX e. faecium, often R amp-as increasing WBC, Add Dapto 6mg/kg  q24h IV. Add baseline CPK and monitor weekly (can d/c if isolate is S to ampicillin)  - INCREASE to Ar 1g  IV Intermittent every 12 hours   - INCREASE to levaquin 750mg daily IV   - Trend WBC   - Anticipate antibiotics x 7-10 days post-op (7/7 is 7 days)  - Urology following  - Poor prognosis, GOC    If any questions, please send a message or call on [employer]  Please continue to update ID with any pertinent new laboratory or radiographic findings  #[id number]   ASSESSMENT  68yo Male with pmh of hypertension, massive Grade IV hydronephrosis s/p left nephrostomy placed in May 2023 by [facility] presents to the ED with little to no urine output from LEFT nephrostomy from about a week. PCN  more recently drainage was changed to brown/purulent fluid. 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/ pneumoperitoneum with suspected liver abscesses & splenic infarct vs abscess    6/30 OR cx     Growth in fluid media only Enterococcus faecium  Exploratory laparotomy 30-Jun-2023 21:35:05  Aure Prabhakar  Left nephrectomy 30-Jun-2023 21:35:11  Aure Prabhakar. "Exploratory laparotomy for possible left renal cancer complicated by significant hydronephrosis and pyelonephritis. Left nephrectomy performed, notable for necrotic bulky tissue adherent to the renal vein, staple line in tact and hemostasis achieved. Copious irrigation of retroperitoneal renal bed and intraperitoneal fibrinous exudate and purulent fluid. Three drains placed, #1 in Lerma's pouch, #2 in the pelvis, and #3 in the renal bed"    6/22 UCX  Few Finegoldia magna "Susceptibilities not performed"  Nephrostolithotomy, percutaneous, with lithotripsy, large stone 22-Jun-2023 23:27:09 left large thick abscess materia dimension of aspirate over 4 x 4 x 4 cm Bernie Perdomo  Change external ureteral stent 22-Jun-2023 23:28:03 left Bernie Perdomo  Nephrostogram 22-Jun-2023 23:28:31 left Bernie Perdomo. "thick gelatinous material that was difficult to suction out even with the shock pulse. I would alternate 2 prong to remove and break up / shock pulse to suck out. after an hour where I had no clear planes I elected to stop. irrigation through the 26 bobby took out a lot additional material  in ashlyn catch cup 4 x 4 x 4 cm lump of material trapped"    6/19 UCX NG; UA Blood: x / Protein: 300 mg/dL / Nitrite: Negative   Leuk Esterase: Large / RBC: 8 /HPF / WBC >720 /HPF   Sq Epi: x / Non Sq Epi: x / Bacteria: Moderate    6/18 L PCN     Few Stenotrophomonas maltophilia Levofloxacin: S 2    6/18 L PCN     Numerous Anaerobic Gram Positive Cocci Most closely resembling  Peptoniphilus species "Susceptibilities not performed"    6/18 L PCN   Numerous Gram positive cocci in pairs per oil power field    6/18 BCX NGTD     s/p 6/18  Left PCN upsized to 16F and 1200cc of very viscous tan colored fluid was aspirated. RLQ 16F drain was placed and 1200cc of tan colored fluid was aspirated (less viscous). A sample from each location was sent for culture.     Repeat CT 6/19 Since one day earlier,  No extraluminal contrast. Oral contrast was last seen in the terminal ileum.  Status post left nephrostomy tube placement. Left enlarged kidney with severe hydronephrosis/collection has decreased, now measuring 16 x 12 cm from 20 x 14 cm  Surgical Pathology Report:   Left renal abscess, possible parenchyma  Left renal abscess, possible parenchyma, percutaneous drainage:  -  Fragments of extensively necrotic tissue, cannot be fullycharacterized  Comment: The microscopic appearance of the necrotic tissue is concerning for a neoplasm with coagulative necrosis. Definitive diagnosis cannot be  made, due to lack of viable material.  Additional sections will be submitted.  Immunohistochemical studies willbe performed, in an attempt to characterise the necrotic tissue.  Based on the H&E appearance, an infectious process such as tuberculosis  appears less likely, but special stains for microorganisms (acid-fastbacilli and fungal) are in progress and will be reported separately.   (06.22.23 @ 22:12)  6/27 CT Study is overall very limited due to lack of intravenous contrast, beam   Willingham artifact.  Persistent bilateral pleural effusions with adjacent consolidations   likely reflecting compressive atelectasis.  Left kidney poorly defined with a heterogeneous collection with air and   fluid which has likely mildly diminished in size compared to the prior   examination but exact comparison limited.  Diffuse ascites again noted. Diffuse anasarca again noted. Persistent   pneumoperitoneum.  Poorly defined lesions within the liver adjacent to the falciform   ligament which may again reflect abscesses or masses, difficult to   evaluate due to the limitations described above.  < from: CT Abdomen and Pelvis w/ IV Cont (06.18.23 @ 15:15) >  1 ) Moderate pneumoperitoneum with partial diffusion throughout moderate   volume ascites and with associated intermittent peritoneal enhancement   and nodularity. Perforation and peritonitis may be related to underlying   emphysematous pyelonephritis (see below). Less likely sources of   perforation include ureteral/bladder disruption, and bowel perforation   which cannot be entirely excluded on the provided images.  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 The compressed residual renal   parenchyma is inseparable from the adjacent fascial/fatty layers   anterosuperiorly and direct rupture into the peritoneum is a possibility   (6-355). The previously placed left-sided nephrostomy tube pigtail is   notedto be within the left renal collecting system.  3) New hepatic hypodensities measuring up to 2.6 cm, suspicious for   development of multiple focal abscesses.  4) Wedge-shaped region of hypodensity within the spleen may reflect   splenic infarct in the correct clinical setting. Developing   phlegmon/abscess cannot be excluded in the current clinical setting  5) Increased extensive retroperitoneal, portacaval and likely   gastrohepatic heterogeneously enhancing lymphadenopathy. Findings may be   reactive.  6) Mediastinal lymphadenopathy measuring up to 2.4 cm short axis.   Underlying etiology may be reactive, infectious/inflammatory, or   neoplastic. A short-term 3 month follow-up CT chest should be considered   for further evaluation.  7)Right middle and upper lobe solid pulmonary nodules which are not well   delineated due to respiratory motion but measure less than 6 mm.   attention on follow-up exam.  #Reintubated, L lung white out, ruled out HAP    6/24 bronch CX NG,   Moderate Yeast- likely colonizer  #Lactic acidosis  #Abx allergy: No Known Allergies  #Prolonged QTC Calculation(Bazett) 526 ms  #Hyponatremia  #AKICreatinine: Creatinine: 1.3 mg/dL (07-05-23 @ 05:45)  Ht 180  Weight (kg): 76.5 (06-18-23 @ 21:27)  crcl 57     RECOMMENDATIONS  - 6/30 WCX e. faecium, often R amp-as increasing WBC, Add Dapto 6mg/kg  q24h IV. Add baseline CPK and monitor weekly (can d/c if isolate is S to ampicillin)  - INCREASE to Ar 1g  IV Intermittent every 12 hours CVVH  - levaquin 750mg q48h IV  - Trend WBC   - Anticipate antibiotics x 7-10 days post-op (7/7 is 7 days)  - Urology following  - Poor prognosis, GOC    If any questions, please send a message or call on [employer] Teams  Please continue to update ID with any pertinent new laboratory or radiographic findings  #[id number]   ASSESSMENT  68yo Male with pmh of hypertension, massive Grade IV hydronephrosis s/p left nephrostomy placed in May 2023 by [facility] presents to the ED with little to no urine output from LEFT nephrostomy from about a week. PCN  more recently drainage was changed to brown/purulent fluid. 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/ pneumoperitoneum with suspected liver abscesses & splenic infarct vs abscess    6/30 OR cx     Growth in fluid media only Enterococcus faecium  Exploratory laparotomy 30-Jun-2023 21:35:05  Aure Prabhakar  Left nephrectomy 30-Jun-2023 21:35:11  Aure Prabhakar. "Exploratory laparotomy for possible left renal cancer complicated by significant hydronephrosis and pyelonephritis. Left nephrectomy performed, notable for necrotic bulky tissue adherent to the renal vein, staple line in tact and hemostasis achieved. Copious irrigation of retroperitoneal renal bed and intraperitoneal fibrinous exudate and purulent fluid. Three drains placed, #1 in Lerma's pouch, #2 in the pelvis, and #3 in the renal bed"    6/22 UCX  Few Finegoldia magna "Susceptibilities not performed"  Nephrostolithotomy, percutaneous, with lithotripsy, large stone 22-Jun-2023 23:27:09 left large thick abscess materia dimension of aspirate over 4 x 4 x 4 cm Bernie Perdomo  Change external ureteral stent 22-Jun-2023 23:28:03 left Bernie Perdomo  Nephrostogram 22-Jun-2023 23:28:31 left Bernie Perdomo. "thick gelatinous material that was difficult to suction out even with the shock pulse. I would alternate 2 prong to remove and break up / shock pulse to suck out. after an hour where I had no clear planes I elected to stop. irrigation through the 26 bobby took out a lot additional material  in ashlyn catch cup 4 x 4 x 4 cm lump of material trapped"    6/19 UCX NG; UA Blood: x / Protein: 300 mg/dL / Nitrite: Negative   Leuk Esterase: Large / RBC: 8 /HPF / WBC >720 /HPF   Sq Epi: x / Non Sq Epi: x / Bacteria: Moderate    6/18 L PCN     Few Stenotrophomonas maltophilia Levofloxacin: S 2    6/18 L PCN     Numerous Anaerobic Gram Positive Cocci Most closely resembling  Peptoniphilus species "Susceptibilities not performed"    6/18 L PCN   Numerous Gram positive cocci in pairs per oil power field    6/18 BCX NGTD     s/p 6/18  Left PCN upsized to 16F and 1200cc of very viscous tan colored fluid was aspirated. RLQ 16F drain was placed and 1200cc of tan colored fluid was aspirated (less viscous). A sample from each location was sent for culture.     Repeat CT 6/19 Since one day earlier,  No extraluminal contrast. Oral contrast was last seen in the terminal ileum.  Status post left nephrostomy tube placement. Left enlarged kidney with severe hydronephrosis/collection has decreased, now measuring 16 x 12 cm from 20 x 14 cm  Surgical Pathology Report:   Left renal abscess, possible parenchyma  Left renal abscess, possible parenchyma, percutaneous drainage:  -  Fragments of extensively necrotic tissue, cannot be fullycharacterized  Comment: The microscopic appearance of the necrotic tissue is concerning for a neoplasm with coagulative necrosis. Definitive diagnosis cannot be  made, due to lack of viable material.  Additional sections will be submitted.  Immunohistochemical studies willbe performed, in an attempt to characterise the necrotic tissue.  Based on the H&E appearance, an infectious process such as tuberculosis  appears less likely, but special stains for microorganisms (acid-fastbacilli and fungal) are in progress and will be reported separately.   (06.22.23 @ 22:12)  6/27 CT Study is overall very limited due to lack of intravenous contrast, beam   Willingham artifact.  Persistent bilateral pleural effusions with adjacent consolidations   likely reflecting compressive atelectasis.  Left kidney poorly defined with a heterogeneous collection with air and   fluid which has likely mildly diminished in size compared to the prior   examination but exact comparison limited.  Diffuse ascites again noted. Diffuse anasarca again noted. Persistent   pneumoperitoneum.  Poorly defined lesions within the liver adjacent to the falciform   ligament which may again reflect abscesses or masses, difficult to   evaluate due to the limitations described above.  < from: CT Abdomen and Pelvis w/ IV Cont (06.18.23 @ 15:15) >  1 ) Moderate pneumoperitoneum with partial diffusion throughout moderate   volume ascites and with associated intermittent peritoneal enhancement   and nodularity. Perforation and peritonitis may be related to underlying   emphysematous pyelonephritis (see below). Less likely sources of   perforation include ureteral/bladder disruption, and bowel perforation   which cannot be entirely excluded on the provided images.  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 The compressed residual renal   parenchyma is inseparable from the adjacent fascial/fatty layers   anterosuperiorly and direct rupture into the peritoneum is a possibility   (6-355). The previously placed left-sided nephrostomy tube pigtail is   notedto be within the left renal collecting system.  3) New hepatic hypodensities measuring up to 2.6 cm, suspicious for   development of multiple focal abscesses.  4) Wedge-shaped region of hypodensity within the spleen may reflect   splenic infarct in the correct clinical setting. Developing   phlegmon/abscess cannot be excluded in the current clinical setting  5) Increased extensive retroperitoneal, portacaval and likely   gastrohepatic heterogeneously enhancing lymphadenopathy. Findings may be   reactive.  6) Mediastinal lymphadenopathy measuring up to 2.4 cm short axis.   Underlying etiology may be reactive, infectious/inflammatory, or   neoplastic. A short-term 3 month follow-up CT chest should be considered   for further evaluation.  7)Right middle and upper lobe solid pulmonary nodules which are not well   delineated due to respiratory motion but measure less than 6 mm.   attention on follow-up exam.  #Reintubated, L lung white out, ruled out HAP    6/24 bronch CX NG,   Moderate Yeast- likely colonizer  #Lactic acidosis  #Abx allergy: No Known Allergies  #Prolonged QTC Calculation(Bazett) 526 ms  #Hyponatremia  #AKICreatinine: Creatinine: 1.3 mg/dL (07-05-23 @ 05:45)  Ht 180  Weight (kg): 76.5 (06-18-23 @ 21:27)  crcl 57     RECOMMENDATIONS  - 6/30 WCX e. faecium, often R amp-as increasing WBC, Add Dapto 6mg/kg  q48h IV. Add baseline CPK and monitor weekly (can d/c if isolate is S to ampicillin)  - INCREASE to Ar 1g  IV Intermittent every 12 hours CVVH  - levaquin 750mg q48h IV  - Trend WBC   - Anticipate antibiotics x 7-10 days post-op (7/7 is 7 days)  - Urology following  - Poor prognosis, GOC    If any questions, please send a message or call on [employer] Teams  Please continue to update ID with any pertinent new laboratory or radiographic findings  #[id number]

## 2023-07-05 NOTE — PROGRESS NOTE ADULT - ASSESSMENT
67 year old man with history of hydronephrosis s/p PCN placement, HTN, anemia, H pylori presents with abdominal pain and no output from PCN. HOspital course complicated by septic shock in the setting of emphysematous pyelonephritis, hematochezia, respiratory failure requiring mechanical ventilation. Palliative care consulted for GOC.     Spoke with primary surgical ICU team, pt family will be updated when they come by bedside later today regarding patient's prognosis. Potential family meeting tomorrow at 3pm to discuss prognosis and GOC.    Education about palliative care provided to patient/family.  See Recs below.    Please call [phone] with questions or concerns 24/7.   We will continue to follow.

## 2023-07-05 NOTE — CONSULT NOTE ADULT - ASSESSMENT
Patient is a 68 year old male w/ PMH of HTN, grade IV hydronephrosis of L kidney s/p L PCN (placed 5/2023), stutter, hiatal hernia, iron deficiency anemia, H Pylori (untreated), and gastric mass (never biopsied) presents to the ED with at least 4 days of worsening weakness, abdominal distension, and no output from his PCN. Seen by burn for sacrum and b/l ischial wounds.     Sacrum and b/l ischial wounds  - LWC: Wash with soap and water.  --> sacrum: apply hydrogel and Allevyn pad  --> b/l ischium: Allevyn pad  - offloading/positioning  - For further wound care recommendation please contact wound care nurse specialist

## 2023-07-05 NOTE — PROGRESS NOTE ADULT - SUBJECTIVE AND OBJECTIVE BOX
HPI:  67M w/ PMH of HTN, grade IV hydronephrosis of L kidney s/p L PCN (placed 5/2023), stutter, hiatal hernia, iron deficiency anemia, H Pylori (untreated), and gastric mass (never biopsied) presents to the ED with at least 4 days of worsening weakness, abdominal distension, and no output from his PCN. Pt and daughters bedside are only able to give limited information, but for the past few days, he has noticed no output from his PCN as well as increasing abdominal distension, pain, and anorexia. He reports that he has not felt normal for the past 10 days. His PCN used to put out serosanguinous fluid, but the output changed to feculent/brown liquid over the past day. In the ED, he was noted to be in new onset afib w/ RVR to 150+ as well as experiencing some difficulty breathing. He was started on BiPAP. Stat labs were notable for WBC 47, Cr 2.9, and lactate 6. CT A/P w/ IV contrast showed free intraperitoneal fluid and air from an unknown source as well as fluid and air in the L kidney.  (18 Jun 2023 18:34)    Interval history  -Patient seen at bedside  -He remains intubated and sedated and unable to participate in exam  -s/p nephrectomy     ADVANCE DIRECTIVES:    [x ] Full Code [ ] DNR  MOLST  [ ]  Living Will  [ ]   DECISION MAKER(s):  [x ] Health Care Proxy(s)  [ ] Surrogate(s)  [ ] Guardian           Name(s): Phone Number(s):  Tracey    BASELINE (I)ADL(s) (prior to admission):  [place]: [ ]Total  [ ] Moderate [ ]Dependent  Palliative Performance Status Version 2:         %    http://npcrc.org/files/news/palliative_performance_scale_[email or web address].pdf    Allergies    No Known Allergies    Intolerances    MEDICATIONS  (STANDING):  bacitracin   Ointment 1 Application(s) Topical two times a day  calcium acetate 1334 milliGRAM(s) Oral every 8 hours  chlorhexidine 0.12% Liquid 15 milliLiter(s) Oral Mucosa every 12 hours  chlorhexidine 2% Cloths 1 Application(s) Topical <User Schedule>  CRRT Treatment    <Continuous>  dexMEDEtomidine Infusion 0.2 MICROgram(s)/kG/Hr (3.82 mL/Hr) IV Continuous <Continuous>  ferrous    sulfate 325 milliGRAM(s) Oral daily  gabapentin 200 milliGRAM(s) Oral every 8 hours  heparin   Injectable 5000 Unit(s) SubCutaneous every 8 hours  hydrocortisone sodium succinate Injectable 50 milliGRAM(s) IV Push every 6 hours  levoFLOXacin IVPB 500 milliGRAM(s) IV Intermittent every 48 hours  lipid, fat emulsion (Fish Oil and Plant Based) 20% Infusion 1.311 Gm/kG/Day (31.3 mL/Hr) IV Continuous <Continuous>  meropenem  IVPB 500 milliGRAM(s) IV Intermittent every 12 hours  pantoprazole  Injectable 40 milliGRAM(s) IV Push every 12 hours  Parenteral Nutrition - Adult 1 Each (65 mL/Hr) TPN Continuous <Continuous>  Parenteral Nutrition - Adult 1 Each (65 mL/Hr) TPN Continuous <Continuous>  phenylephrine    Infusion 0.1 MICROgram(s)/kG/Min (1.43 mL/Hr) IV Continuous <Continuous>  PureFlow Dialysate RFP-400 (K 2 / Ca 3) 5000 milliLiter(s) (2000 mL/Hr) CRRT <Continuous>  sodium bicarbonate 1300 milliGRAM(s) Oral three times a day  vasopressin Infusion 0.03 Unit(s)/Min (4.5 mL/Hr) IV Continuous <Continuous>    MEDICATIONS  (PRN):  fentaNYL    Injectable 25 MICROGram(s) IV Push every 3 hours PRN Moderate Pain (4 - 6)  sodium chloride 0.9% lock flush 10 milliLiter(s) IV Push every 1 hour PRN Pre/post blood products, medications, blood draw, and to maintain line patency  sodium chloride 0.9% lock flush 10 milliLiter(s) IV Push every 1 hour PRN Pre/post blood products, medications, blood draw, and to maintain line patency            PRESENT SYMPTOMS: [x]Unable to obtain due to poor mentation   Source if other than patient:  [ ]Family   [ ]Team     Pain: [ ]yes [ ]no  QOL impact -   Location -                    Aggravating factors -  Quality -  Radiation -  Timing-  Severity (0-10 scale):  Minimal acceptable level (0-10 scale):     CPOT:  0  https://www.[email or web address]/getattachment/[email or web address]/Critical-Care-Pain-Observation-Tool-(CPOT)    PAIN AD Score:   http://[email or web address]/painad.pdf (press ctrl +  left click to view)    Dyspnea:                           [ ]None[ ]Mild [ ]Moderate [ ]Severe     Respiratory Distress Observation Scale (RDOS): 0  A score of 0 to 2 signifies little or no respiratory distress, 3 signifies mild distress, scores 4 to 6 indicate moderate distress, and scores greater than 7 signify severe distress  [email or web address]/sites/default/files/PDFS/351953-exblppjpebf-bkwufyqf-fsuepdskaga-jvvcq.pdf    Anxiety:                             [ ]None[ ]Mild [ ]Moderate [ ]Severe   Fatigue:                             [ ]None[ ]Mild [ ]Moderate [ ]Severe   Nausea:                             [ ]None[ ]Mild [ ]Moderate [ ]Severe   Loss of appetite:              [ ]None[ ]Mild [ ]Moderate [ ]Severe   Constipation:                    [ ]None[ ]Mild [ ]Moderate [ ]Severe    Other Symptoms:  [ ]All other review of systems negative     Palliative Performance Status Version 2:        10 %    http://[email or web address]/files/news/palliative_performance_scale_[email or web address].pdf    PHYSICAL EXAM:    ICU Vital Signs Last 24 Hrs  T(C): 35 (05 Jul 2023 12:00), Max: 36.2 (05 Jul 2023 04:00)  T(F): 95 (05 Jul 2023 12:00), Max: 97.2 (05 Jul 2023 04:00)  HR: 86 (05 Jul 2023 12:30) (86 - 109)  BP: 98/65 (05 Jul 2023 11:00) (96/71 - 154/96)  BP(mean): 76 (05 Jul 2023 11:00) (65 - 113)  ABP: 124/65 (05 Jul 2023 12:30) (-2/-2 - 130/74)  ABP(mean): 82 (05 Jul 2023 12:30) (-2 - 93)  RR: 30 (05 Jul 2023 12:30) (24 - 36)  SpO2: 100% (05 Jul 2023 12:30) (77% - 100%)    O2 Parameters below as of 05 Jul 2023 11:00  Patient On (Oxygen Delivery Method): ventilator    O2 Concentration (%): 40        GENERAL:  [ ] No acute distress [ ]Lethargic  [ x]Unarousable  [ ]Verbal  [ ]Non-Verbal [ ]Cachexia    BEHAVIORAL/PSYCH:  [ ]Alert and Oriented x  [ ] Anxiety [ ] Delirium [ ] Agitation [ x] Calm   EYES: [ ] No scleral icterus [ ] Scleral icterus [x ] Closed  ENMT:  [ ]Dry mouth  [x ]No external oral lesions [ ] No external ear or nose lesions  CARDIOVASCULAR:  [ ]Regular [ ]Irregular [ ]Tachy [x ]Not Tachy  [ ]Ben [ ] Edema [ ] No edema  PULMONARY:  [ ]Tachypnea  [ ]Audible excessive secretions [x ] No labored breathing [ ] labored breathing  GASTROINTESTINAL: [ ]Soft  [ ]Distended  [x ]Not distended [ ]Non tender [ ]Tender  MUSCULOSKELETAL: [ ]No clubbing [ ] clubbing  [x ] No cyanosis [ ] cyanosis  NEUROLOGIC: [ ]No focal deficits  [ ]Follows commands  [x ]Does not follow commands  [ ]Cognitive impairment  [ ]Dysphagia  [ ]Dysarthria  [ ]Paresis   SKIN: [ ] Jaundiced [x ] Non-jaundiced [ ]Rash [ ]No Rash [ ] Warm [ ] Dry  MISC/LINES: [x ] ET tube [ ] Trach [ ]NGT/OGT [ ]PEG [ ]Mena    CRITICAL CARE:  [x ] Shock Present  [ ]Septic [ ]Cardiogenic [ ]Neurologic [ ]Hypovolemic  [x ]  Vasopressors [ ]  Inotropes   [x ]Respiratory failure present [ x]Mechanical ventilation [ ]Non-invasive ventilatory support [ ]High flow  [ ]Acute  [ ]Chronic [ ]Hypoxic  [ ]Hypercarbic [ ]Other  [ ]Other organ failure     LABS: reviewed by me                                     9.1    35.37 )-----------( 44       ( 05 Jul 2023 05:45 )             27.4   07-05    138  |  103  |  41<H>  ----------------------------<  171<H>  3.4<L>   |  19  |  1.3    Ca    8.4      05 Jul 2023 05:45  Phos  1.7     07-05  Mg     1.9     07-05    TPro  2.4<L>  /  Alb  1.2<L>  /  TBili  0.3  /  DBili  0.2  /  AST  12  /  ALT  7   /  AlkPhos  49  07-04                RADIOLOGY & ADDITIONAL STUDIES: reviewed by me    < from: Xray Chest 1 View- PORTABLE-Routine (06.28.23 @ 05:40) >  FINDINGS/  IMPRESSION:    NG tube terminating in the left upper quadrant. Unchanged left IJ venous   catheter.    Bibasal opacities without significant change accounting for differences   in positioning. No pneumothorax.    Stable cardiomediastinal silhouette.    Unchanged osseous structures.    < end of copied text >      EKG: reviewed by me  < from: 12 Lead ECG (06.27.23 @ 22:17) >  Ventricular Rate 96 BPM    Atrial Rate 97 BPM    QRS Duration 78 ms    Q-T Interval 290 ms    QTC Calculation(Bazett) 366 ms    R Axis 28 degrees    T Axis 227 degrees    Diagnosis Line Atrial fibrillation  Low voltage QRS  Septal infarct , age undetermined  Abnormal ECG    < end of copied text >      PROTEIN CALORIE MALNUTRITION PRESENT: [ ]mild [ ]moderate [ ]severe [ ]underweight [ ]morbid obesity  https://www.[employer].org/vault/2440/web/files/ONC/Table_Clinical%20Characteristics%20to%20Document%20Malnutrition-White%20JV%20et%20al%631479.pdf    Height (cm): 180 (06-22-23 @ 21:00), 180.3 (05-11-23 @ 08:58)  Weight (kg): 76.476 (06-22-23 @ 21:00), 66.2 (05-10-23 @ 10:54)  BMI (kg/m2): 23.6 (06-22-23 @ 21:00), 20.4 (05-11-23 @ 08:58)    [ ]PPSV2 < or = to 30% [ ]significant weight loss  [ ]poor nutritional intake  [ ]anasarca      [ ]Artificial Nutrition      REFERRALS:   [ ]Chaplaincy  [ ]Hospice  [ ]Child Life  [ x]Social Work  [ ]Case management [ ]Holistic Therapy     Patient discussed with primary medical team MD  Palliative care education provided to patient and/or family  Patient and/or family assessed for spiritual and social needs    Goals of Care Document:

## 2023-07-05 NOTE — PROGRESS NOTE ADULT - SUBJECTIVE AND OBJECTIVE BOX
CHRISTINE VILLAVICENCIO  [id number]  67y Male    Indication for ICU admission: mechanical ventilatior secondary to urosepsis.    Admit Date:06-18-23  ICU Date: 6/18/23  OR Date: 6/18, 6/22, 6/30     24HRS EVENT:  7/4  NIGHT  -PM rounds: Lt VENICE drain murky, Lerma's pouch drain serosang, Pelvis murky, ET @ 23  -CVVH @ -100/hr lowered to -75/hr, increase in pressor requirements.   - ABG 1am with Lactate 7.9,  will repeat 430am  ABG - ( 05 Jul 2023 00:40 )  pH, Arterial: 7.36  pH, Blood: x     /  pCO2: 33    /  pO2: 100   / HCO3: 19    / Base Excess: -6.1  /  SaO2: 99.9        DAY  -AM rounds: B/L DP and PT doppler, left ulnar and radial doppler, right ulnar and brachial doppler, right index finger tip is echhymotic  -keep on trickle feeds due to taj requirement  -monitor midback pressure ulcer, and sacral ulcer  -f/u KIKA panel antibodies  -Drain and L side drain and R #2 --> murky fluid  amylase 4565  -f/u burn c/s for ulcers  digoxin level 1.1, afib, 125mcg x1  DVT sono- prelim R PT dvt        ROS:  [X] A ten-point review of systems was otherwise negative except as noted above.  [  ] Due to altered mental status/intubation, subjective information was not attained from the patient. History was obtained, to the extent possible, from review of the chart and collateral sources of information.    =========================================================================================================================================  =========================================================================================================================================     Current Medications:  albumin human  5% IVPB 250 milliLiter(s) IV Intermittent every 6 hours  bacitracin   Ointment 1 Application(s) Topical two times a day  calcium acetate 1334 milliGRAM(s) Oral every 8 hours  chlorhexidine 0.12% Liquid 15 milliLiter(s) Oral Mucosa every 12 hours  chlorhexidine 2% Cloths 1 Application(s) Topical <User Schedule>  dexMEDEtomidine Infusion 0.2 MICROgram(s)/kG/Hr (3.82 mL/Hr) IV Continuous <Continuous>  fentaNYL    Injectable 25 MICROGram(s) IV Push every 3 hours PRN Moderate Pain (4 - 6)  ferrous    sulfate 325 milliGRAM(s) Oral daily  gabapentin 200 milliGRAM(s) Oral every 8 hours  heparin   Injectable 5000 Unit(s) SubCutaneous every 8 hours  hydrocortisone sodium succinate Injectable 50 milliGRAM(s) IV Push every 6 hours  levoFLOXacin IVPB 500 milliGRAM(s) IV Intermittent every 48 hours  meropenem  IVPB 500 milliGRAM(s) IV Intermittent every 12 hours  pantoprazole  Injectable 40 milliGRAM(s) IV Push every 12 hours  Parenteral Nutrition - Adult 1 Each (65 mL/Hr) TPN Continuous <Continuous>  Parenteral Nutrition - Adult 1 Each (65 mL/Hr) TPN Continuous <Continuous>  phenylephrine    Infusion 0.1 MICROgram(s)/kG/Min (1.43 mL/Hr) IV Continuous <Continuous>  sodium bicarbonate 1300 milliGRAM(s) Oral three times a day  sodium chloride 0.9% lock flush 10 milliLiter(s) IV Push every 1 hour PRN Pre/post blood products, medications, blood draw, and to maintain line patency  sodium chloride 0.9% lock flush 10 milliLiter(s) IV Push every 1 hour PRN Pre/post blood products, medications, blood draw, and to maintain line patency  vasopressin Infusion 0.03 Unit(s)/Min (4.5 mL/Hr) IV Continuous <Continuous>      Vital Sings, Intake/Output (Last 24Hours)  ICU Vital Signs Last 24 Hrs  T(C): 35.3 (04 Jul 2023 20:00), Max: 35.3 (04 Jul 2023 12:00)  T(F): 95.5 (04 Jul 2023 20:00), Max: 95.6 (04 Jul 2023 12:00)  HR: 91 (04 Jul 2023 23:00) (64 - 107)  BP: 116/81 (04 Jul 2023 23:00) (116/81 - 154/96)  BP(mean): 93 (04 Jul 2023 23:00) (93 - 113)  ABP: -1/-3 (04 Jul 2023 23:00) (-1/-3 - 100/60)  ABP(mean): -2 (04 Jul 2023 23:00) (-2 - 86)  RR: 28 (04 Jul 2023 23:00) (18 - 31)  SpO2: 100% (04 Jul 2023 23:00) (53% - 100%)    O2 Parameters below as of 04 Jul 2023 15:00  Patient On (Oxygen Delivery Method): ventilator          I&O's Summary    03 Jul 2023 07:01  -  04 Jul 2023 07:00  --------------------------------------------------------  IN: 2120 mL / OUT: 3878 mL / NET: -1758 mL    04 Jul 2023 07:01  -  05 Jul 2023 00:58  --------------------------------------------------------  IN: 1923.7 mL / OUT: 3868 mL / NET: -1944.3 mL        Physical Exam:  ----------------------------------------------------------------------------------------------------------  GCS:    11  Exam: Intubated and Ventilated. Off Sedation, Interacts and responds nodding head.     RESPIRATORY:  On vent  RR (machine): 18, TV (machine): 450, FiO2: 40, PEEP: 8  07-05 @ 00:40--7.36 / 33 / 100 / 19 / [id number] 99.9 / Lac 7.90 / iCal 1.25   07-04 @ 04:56--7.49 / 31 / 124 / 24 / Ipt52Xth 99.0 / Lac 2.70 / iCal 1.12       CARDIOVASCULAR:  A-line with good curve on monitor, (femoral)  On pressors  Afib, intermittently intermittent rhythm    GASTROINTESTINAL:  Abdomen soft, non-tender, non-distended  Incisions covered with dressing C/D/I  VENICE drains in place, holding suction x3.     MUSCULOSKELETAL:  Extremities cold to touch, swollen, fingertips ischemic to b/l index fingers and big toes.   Swelling with pitting edema +2.  Pulses non palpable/difficult to palpate to b/l upper and lower extremities.    DERM:  Sacral ulcers x2, bacitracin applied to wound and covered with dressing/pad    :   Exam: Mena catheter in place.  Dark urine collected in Mena bag  CVVH connected    Tubes/Lines/Drains   ----------------------------------------------------------------------------------------------------------  [x] Peripheral IV  [x] Urinary Catheter Mena                                               [x] Central Venous Line                   [x] Arterial Line, Femoral		  [x] CVVH  CHRISTINE VILLAVICENCIO  [id number]  67y Male    Indication for ICU admission: mechanical ventilatior secondary to urosepsis.    Admit Date:06-18-23  ICU Date: 6/18/23  OR Date: 6/18, 6/22, 6/30     24HRS EVENT:  7/4  NIGHT  -PM rounds: Lt VENICE drain murky, Lerma's pouch drain serosang, Pelvis murky, ET @ 23  -CVVH @ -100/hr lowered to -75/hr, increase in pressor requirements.   - ABG 1am with Lactate 7.9,  will repeat 430am  ABG - ( 05 Jul 2023 00:40 )  pH, Arterial: 7.36  pH, Blood: x     /  pCO2: 33    /  pO2: 100   / HCO3: 19    / Base Excess: -6.1  /  SaO2: 99.9    - Femoral A-line placed, good waveform on monitor while on higher doses of pressors, however patient has poor peripheral vascular tone, requiring pressors, no pulsatile palpable pulses observed on ultrasound while on lower doses of pressors. Following both A-line and Manual BP recordings.         DAY  -AM rounds: B/L DP and PT doppler, left ulnar and radial doppler, right ulnar and brachial doppler, right index finger tip is echhymotic  -keep on trickle feeds due to taj requirement  -monitor midback pressure ulcer, and sacral ulcer  -f/u KIKA panel antibodies  -Drain and L side drain and R #2 --> murky fluid  amylase 4565  -f/u burn c/s for ulcers  digoxin level 1.1, afib, 125mcg x1  DVT sono- prelim R PT dvt        ROS:  [X] A ten-point review of systems was otherwise negative except as noted above.  [  ] Due to altered mental status/intubation, subjective information was not attained from the patient. History was obtained, to the extent possible, from review of the chart and collateral sources of information.    =========================================================================================================================================  =========================================================================================================================================     Current Medications:  albumin human  5% IVPB 250 milliLiter(s) IV Intermittent every 6 hours  bacitracin   Ointment 1 Application(s) Topical two times a day  calcium acetate 1334 milliGRAM(s) Oral every 8 hours  chlorhexidine 0.12% Liquid 15 milliLiter(s) Oral Mucosa every 12 hours  chlorhexidine 2% Cloths 1 Application(s) Topical <User Schedule>  dexMEDEtomidine Infusion 0.2 MICROgram(s)/kG/Hr (3.82 mL/Hr) IV Continuous <Continuous>  fentaNYL    Injectable 25 MICROGram(s) IV Push every 3 hours PRN Moderate Pain (4 - 6)  ferrous    sulfate 325 milliGRAM(s) Oral daily  gabapentin 200 milliGRAM(s) Oral every 8 hours  heparin   Injectable 5000 Unit(s) SubCutaneous every 8 hours  hydrocortisone sodium succinate Injectable 50 milliGRAM(s) IV Push every 6 hours  levoFLOXacin IVPB 500 milliGRAM(s) IV Intermittent every 48 hours  meropenem  IVPB 500 milliGRAM(s) IV Intermittent every 12 hours  pantoprazole  Injectable 40 milliGRAM(s) IV Push every 12 hours  Parenteral Nutrition - Adult 1 Each (65 mL/Hr) TPN Continuous <Continuous>  Parenteral Nutrition - Adult 1 Each (65 mL/Hr) TPN Continuous <Continuous>  phenylephrine    Infusion 0.1 MICROgram(s)/kG/Min (1.43 mL/Hr) IV Continuous <Continuous>  sodium bicarbonate 1300 milliGRAM(s) Oral three times a day  sodium chloride 0.9% lock flush 10 milliLiter(s) IV Push every 1 hour PRN Pre/post blood products, medications, blood draw, and to maintain line patency  sodium chloride 0.9% lock flush 10 milliLiter(s) IV Push every 1 hour PRN Pre/post blood products, medications, blood draw, and to maintain line patency  vasopressin Infusion 0.03 Unit(s)/Min (4.5 mL/Hr) IV Continuous <Continuous>      Vital Sings, Intake/Output (Last 24Hours)  ICU Vital Signs Last 24 Hrs  T(C): 35.3 (04 Jul 2023 20:00), Max: 35.3 (04 Jul 2023 12:00)  T(F): 95.5 (04 Jul 2023 20:00), Max: 95.6 (04 Jul 2023 12:00)  HR: 91 (04 Jul 2023 23:00) (64 - 107)  BP: 116/81 (04 Jul 2023 23:00) (116/81 - 154/96)  BP(mean): 93 (04 Jul 2023 23:00) (93 - 113)  ABP: -1/-3 (04 Jul 2023 23:00) (-1/-3 - 100/60)  ABP(mean): -2 (04 Jul 2023 23:00) (-2 - 86)  RR: 28 (04 Jul 2023 23:00) (18 - 31)  SpO2: 100% (04 Jul 2023 23:00) (53% - 100%)    O2 Parameters below as of 04 Jul 2023 15:00  Patient On (Oxygen Delivery Method): ventilator          I&O's Summary    03 Jul 2023 07:01  -  04 Jul 2023 07:00  --------------------------------------------------------  IN: 2120 mL / OUT: 3878 mL / NET: -1758 mL    04 Jul 2023 07:01  -  05 Jul 2023 00:58  --------------------------------------------------------  IN: 1923.7 mL / OUT: 3868 mL / NET: -1944.3 mL        Physical Exam:  ----------------------------------------------------------------------------------------------------------  GCS:    11  Exam: Intubated and Ventilated. Off Sedation, Interacts and responds nodding head.     RESPIRATORY:  On vent  RR (machine): 18, TV (machine): 450, FiO2: 40, PEEP: 8  07-05 @ 00:40--7.36 / 33 / 100 / 19 / [id number] 99.9 / Lac 7.90 / iCal 1.25   07-04 @ 04:56--7.49 / 31 / 124 / 24 / Uzh71Qtm 99.0 / Lac 2.70 / iCal 1.12       CARDIOVASCULAR:  A-line with good curve on monitor while on higher doses of pressors, (femoral), otherwise following manual BP: patient has calcified arteries and no pulsatile tone seen on ultrasound  Afib, intermittently intermittent rhythm    GASTROINTESTINAL:  Abdomen soft, non-tender, non-distended  Incisions covered with dressing C/D/I  VENICE drains in place, holding suction x3.     MUSCULOSKELETAL:  Extremities cold to touch, swollen, fingertips ischemic to b/l index fingers and big toes.   Swelling with pitting edema +2.  Pulses non palpable/difficult to palpate to b/l upper and lower extremities.    DERM:  Sacral ulcers x2, bacitracin applied to wound and covered with dressing/pad    :   Exam: Mena catheter in place.  Dark urine collected in Mena bag  CVVH connected    Tubes/Lines/Drains   ----------------------------------------------------------------------------------------------------------  [x] Peripheral IV  [x] Urinary Catheter Mena                                               [x] Central Venous Line                   [x] Arterial Line, Femoral		  [x] CVVH  CHRISTINE VILLAVCIENCIO  [id number]  67y Male    Indication for ICU admission: mechanical ventilatior secondary to urosepsis.    Admit Date:06-18-23  ICU Date: 6/18/23  OR Date: 6/18, 6/22, 6/30     24HRS EVENT:  7/4  NIGHT  -PM rounds: Lt VENICE drain murky, Lerma's pouch drain serosang, Pelvis murky, ET @ 23  -CVVH @ -100/hr lowered to -75/hr, increase in pressor requirements.   - ABG 1am with Lactate 7.9,  will repeat 430am  ABG - ( 05 Jul 2023 00:40 )  pH, Arterial: 7.36  pH, Blood: x     /  pCO2: 33    /  pO2: 100   / HCO3: 19    / Base Excess: -6.1  /  SaO2: 99.9    - Femoral A-line placed, good waveform on monitor while on higher doses of pressors, however patient has poor peripheral vascular tone, requiring pressors, no pulsatile palpable pulses observed on ultrasound while on lower doses of pressors. Following both A-line and Manual BP recordings: ACCURATE. Considering albumin and/or milrinone/midodrine/clonidine. Continued on CVVH      DAY  -AM rounds: B/L DP and PT doppler, left ulnar and radial doppler, right ulnar and brachial doppler, right index finger tip is echhymotic  -keep on trickle feeds due to taj requirement  -monitor midback pressure ulcer, and sacral ulcer  -f/u KIKA panel antibodies  -Drain and L side drain and R #2 --> murky fluid  amylase 4565  -f/u burn c/s for ulcers  digoxin level 1.1, afib, 125mcg x1  DVT sono- prelim R PT dvt        ROS:  [X] A ten-point review of systems was otherwise negative except as noted above.  [  ] Due to altered mental status/intubation, subjective information was not attained from the patient. History was obtained, to the extent possible, from review of the chart and collateral sources of information.    =========================================================================================================================================  =========================================================================================================================================     Current Medications:  albumin human  5% IVPB 250 milliLiter(s) IV Intermittent every 6 hours  bacitracin   Ointment 1 Application(s) Topical two times a day  calcium acetate 1334 milliGRAM(s) Oral every 8 hours  chlorhexidine 0.12% Liquid 15 milliLiter(s) Oral Mucosa every 12 hours  chlorhexidine 2% Cloths 1 Application(s) Topical <User Schedule>  dexMEDEtomidine Infusion 0.2 MICROgram(s)/kG/Hr (3.82 mL/Hr) IV Continuous <Continuous>  fentaNYL    Injectable 25 MICROGram(s) IV Push every 3 hours PRN Moderate Pain (4 - 6)  ferrous    sulfate 325 milliGRAM(s) Oral daily  gabapentin 200 milliGRAM(s) Oral every 8 hours  heparin   Injectable 5000 Unit(s) SubCutaneous every 8 hours  hydrocortisone sodium succinate Injectable 50 milliGRAM(s) IV Push every 6 hours  levoFLOXacin IVPB 500 milliGRAM(s) IV Intermittent every 48 hours  meropenem  IVPB 500 milliGRAM(s) IV Intermittent every 12 hours  pantoprazole  Injectable 40 milliGRAM(s) IV Push every 12 hours  Parenteral Nutrition - Adult 1 Each (65 mL/Hr) TPN Continuous <Continuous>  Parenteral Nutrition - Adult 1 Each (65 mL/Hr) TPN Continuous <Continuous>  phenylephrine    Infusion 0.1 MICROgram(s)/kG/Min (1.43 mL/Hr) IV Continuous <Continuous>  sodium bicarbonate 1300 milliGRAM(s) Oral three times a day  sodium chloride 0.9% lock flush 10 milliLiter(s) IV Push every 1 hour PRN Pre/post blood products, medications, blood draw, and to maintain line patency  sodium chloride 0.9% lock flush 10 milliLiter(s) IV Push every 1 hour PRN Pre/post blood products, medications, blood draw, and to maintain line patency  vasopressin Infusion 0.03 Unit(s)/Min (4.5 mL/Hr) IV Continuous <Continuous>      Vital Sings, Intake/Output (Last 24Hours)  ICU Vital Signs Last 24 Hrs  T(C): 35.3 (04 Jul 2023 20:00), Max: 35.3 (04 Jul 2023 12:00)  T(F): 95.5 (04 Jul 2023 20:00), Max: 95.6 (04 Jul 2023 12:00)  HR: 91 (04 Jul 2023 23:00) (64 - 107)  BP: 116/81 (04 Jul 2023 23:00) (116/81 - 154/96)  BP(mean): 93 (04 Jul 2023 23:00) (93 - 113)  ABP: -1/-3 (04 Jul 2023 23:00) (-1/-3 - 100/60)  ABP(mean): -2 (04 Jul 2023 23:00) (-2 - 86)  RR: 28 (04 Jul 2023 23:00) (18 - 31)  SpO2: 100% (04 Jul 2023 23:00) (53% - 100%)    O2 Parameters below as of 04 Jul 2023 15:00  Patient On (Oxygen Delivery Method): ventilator          I&O's Summary    03 Jul 2023 07:01  -  04 Jul 2023 07:00  --------------------------------------------------------  IN: 2120 mL / OUT: 3878 mL / NET: -1758 mL    04 Jul 2023 07:01  -  05 Jul 2023 00:58  --------------------------------------------------------  IN: 1923.7 mL / OUT: 3868 mL / NET: -1944.3 mL        Physical Exam:  ----------------------------------------------------------------------------------------------------------  GCS:    11  Exam: Intubated and Ventilated. Off Sedation, Interacts and responds nodding head.     RESPIRATORY:  On vent  RR (machine): 18, TV (machine): 450, FiO2: 40, PEEP: 8  07-05 @ 00:40--7.36 / 33 / 100 / 19 / Cgm24Sdc 99.9 / Lac 7.90 / iCal 1.25   07-04 @ 04:56--7.49 / 31 / 124 / 24 / Pql28Ryr 99.0 / Lac 2.70 / iCal 1.12       CARDIOVASCULAR:  A-line with good curve on monitor while on higher doses of pressors, (femoral), otherwise following manual BP: patient has calcified arteries and no pulsatile tone seen on ultrasound  Afib, intermittently intermittent rhythm    GASTROINTESTINAL:  Abdomen soft, non-tender, non-distended  Incisions covered with dressing C/D/I  VENICE drains in place, holding suction x3.     MUSCULOSKELETAL:  Extremities cold to touch, swollen, fingertips ischemic to b/l index fingers and big toes.   Swelling with pitting edema +2.  Pulses non palpable/difficult to palpate to b/l upper and lower extremities.    DERM:  Sacral ulcers x2, bacitracin applied to wound and covered with dressing/pad    :   Exam: Mena catheter in place.  Dark urine collected in Mena bag  CVVH connected    Tubes/Lines/Drains   ----------------------------------------------------------------------------------------------------------  [x] Peripheral IV  [x] Urinary Catheter Mena                                               [x] Central Venous Line                   [x] Arterial Line, Femoral		  [x] CVVH  CHRISTINE VILLAVICENCIO  [id number]  67y Male    Indication for ICU admission: mechanical ventilatior secondary to urosepsis.    Admit Date:06-18-23  ICU Date: 6/18/23  OR Date: 6/18, 6/22, 6/30     24HRS EVENT:  7/4  NIGHT  -PM rounds: Lt VENICE drain murky, Lerma's pouch drain serosang, Pelvis murky, ET @ 23  -CVVH @ -100/hr lowered to -50/hr, increase in pressor requirements.   - ABG 1am with Lactate 7.9,  will repeat 430am  ABG - ( 05 Jul 2023 00:40 )  pH, Arterial: 7.36  pH, Blood: x     /  pCO2: 33    /  pO2: 100   / HCO3: 19    / Base Excess: -6.1  /  SaO2: 99.9    ABG - ( 05 Jul 2023 00:40 )  - Femoral A-line placed, good waveform on monitor while on higher doses of pressors, however patient has poor peripheral vascular tone, requiring pressors, no pulsatile palpable pulses observed on ultrasound while on lower doses of pressors. Following both A-line and Manual BP recordings: ACCURATE. Considering albumin and/or milrinone/midodrine/clonidine. Continued on CVVH      DAY  -AM rounds: B/L DP and PT doppler, left ulnar and radial doppler, right ulnar and brachial doppler, right index finger tip is echhymotic  -keep on trickle feeds due to taj requirement  -monitor midback pressure ulcer, and sacral ulcer  -f/u KIKA panel antibodies  -Drain and L side drain and R #2 --> murky fluid  amylase 4565  -f/u burn c/s for ulcers  digoxin level 1.1, afib, 125mcg x1  DVT sono- prelim R PT dvt        ROS:  [X] A ten-point review of systems was otherwise negative except as noted above.  [  ] Due to altered mental status/intubation, subjective information was not attained from the patient. History was obtained, to the extent possible, from review of the chart and collateral sources of information.    =========================================================================================================================================  =========================================================================================================================================     Current Medications:  albumin human  5% IVPB 250 milliLiter(s) IV Intermittent every 6 hours  bacitracin   Ointment 1 Application(s) Topical two times a day  calcium acetate 1334 milliGRAM(s) Oral every 8 hours  chlorhexidine 0.12% Liquid 15 milliLiter(s) Oral Mucosa every 12 hours  chlorhexidine 2% Cloths 1 Application(s) Topical <User Schedule>  dexMEDEtomidine Infusion 0.2 MICROgram(s)/kG/Hr (3.82 mL/Hr) IV Continuous <Continuous>  fentaNYL    Injectable 25 MICROGram(s) IV Push every 3 hours PRN Moderate Pain (4 - 6)  ferrous    sulfate 325 milliGRAM(s) Oral daily  gabapentin 200 milliGRAM(s) Oral every 8 hours  heparin   Injectable 5000 Unit(s) SubCutaneous every 8 hours  hydrocortisone sodium succinate Injectable 50 milliGRAM(s) IV Push every 6 hours  levoFLOXacin IVPB 500 milliGRAM(s) IV Intermittent every 48 hours  meropenem  IVPB 500 milliGRAM(s) IV Intermittent every 12 hours  pantoprazole  Injectable 40 milliGRAM(s) IV Push every 12 hours  Parenteral Nutrition - Adult 1 Each (65 mL/Hr) TPN Continuous <Continuous>  Parenteral Nutrition - Adult 1 Each (65 mL/Hr) TPN Continuous <Continuous>  phenylephrine    Infusion 0.1 MICROgram(s)/kG/Min (1.43 mL/Hr) IV Continuous <Continuous>  sodium bicarbonate 1300 milliGRAM(s) Oral three times a day  sodium chloride 0.9% lock flush 10 milliLiter(s) IV Push every 1 hour PRN Pre/post blood products, medications, blood draw, and to maintain line patency  sodium chloride 0.9% lock flush 10 milliLiter(s) IV Push every 1 hour PRN Pre/post blood products, medications, blood draw, and to maintain line patency  vasopressin Infusion 0.03 Unit(s)/Min (4.5 mL/Hr) IV Continuous <Continuous>      Vital Sings, Intake/Output (Last 24Hours)  ICU Vital Signs Last 24 Hrs  T(C): 35.3 (04 Jul 2023 20:00), Max: 35.3 (04 Jul 2023 12:00)  T(F): 95.5 (04 Jul 2023 20:00), Max: 95.6 (04 Jul 2023 12:00)  HR: 91 (04 Jul 2023 23:00) (64 - 107)  BP: 116/81 (04 Jul 2023 23:00) (116/81 - 154/96)  BP(mean): 93 (04 Jul 2023 23:00) (93 - 113)  ABP: -1/-3 (04 Jul 2023 23:00) (-1/-3 - 100/60)  ABP(mean): -2 (04 Jul 2023 23:00) (-2 - 86)  RR: 28 (04 Jul 2023 23:00) (18 - 31)  SpO2: 100% (04 Jul 2023 23:00) (53% - 100%)    O2 Parameters below as of 04 Jul 2023 15:00  Patient On (Oxygen Delivery Method): ventilator          I&O's Summary    03 Jul 2023 07:01  -  04 Jul 2023 07:00  --------------------------------------------------------  IN: 2120 mL / OUT: 3878 mL / NET: -1758 mL    04 Jul 2023 07:01  -  05 Jul 2023 00:58  --------------------------------------------------------  IN: 1923.7 mL / OUT: 3868 mL / NET: -1944.3 mL        Physical Exam:  ----------------------------------------------------------------------------------------------------------  GCS:    11  Exam: Intubated and Ventilated. Off Sedation, Interacts and responds nodding head.     RESPIRATORY:  On vent  RR (machine): 18, TV (machine): 450, FiO2: 40, PEEP: 8  07-05 @ 00:40--7.36 / 33 / 100 / 19 / [id number] 99.9 / Lac 7.90 / iCal 1.25   07-04 @ 04:56--7.49 / 31 / 124 / 24 / Bsd29Uhs 99.0 / Lac 2.70 / iCal 1.12       CARDIOVASCULAR:  A-line with good curve on monitor while on higher doses of pressors, (femoral), otherwise following manual BP: patient has calcified arteries and no pulsatile tone seen on ultrasound  Afib, intermittently intermittent rhythm    GASTROINTESTINAL:  Abdomen soft, non-tender, non-distended  Incisions covered with dressing C/D/I  VENICE drains in place, holding suction x3.     MUSCULOSKELETAL:  Extremities cold to touch, swollen, fingertips ischemic to b/l index fingers and big toes.   Swelling with pitting edema +2.  Pulses non palpable/difficult to palpate to b/l upper and lower extremities.    DERM:  Sacral ulcers x2, bacitracin applied to wound and covered with dressing/pad    :   Exam: Mena catheter in place.  Dark urine collected in Mena bag  CVVH connected    Tubes/Lines/Drains   ----------------------------------------------------------------------------------------------------------  [x] Peripheral IV  [x] Urinary Catheter Mena                                               [x] Central Venous Line                   [x] Arterial Line, Femoral		  [x] CVVH

## 2023-07-05 NOTE — PROGRESS NOTE ADULT - ASSESSMENT
Assessment & Plan:  67M w/ PMH of HTN, grade IV hydronephrosis of L kidney s/p L PCN (placed 5/2023), stutter, hiatal hernia, iron deficiency anemia, H Pylori (untreated), and gastric mass (never biopsied) presents to the ED with at least 4 days of worsening weakness, abdominal distension, and no output from his PCN. Admitted with emphysematous pyelonephritis.     (6/18): s/p LT PCN upsizing to 16Fr and 16Fr RLQ drain placement with IR   (6/22): s/p shock lithotripsy of L kidney abscess cavity with drainage of thick contents, upsize of drain to 26Fr bobby catheter  (6/30): s/p ex-lap and L nephrectomy with abdominal washout    NEURO:  #Pain control    -APAP PRN    -Gabapentin 200 q 8     -fent 25 q 3 PRN while intubated  #Sedation    -precedex gtt off, alert responsive and follows commands    -following commands when off sedation, GCS 11T    RESP:  #acute respiratory failure with left lung mucous plugging - resolved s/p intubated 6/24 and    and bronchoscopy   RR (machine): 16, TV (machine): 450, FiO2: 40, PEEP: 8  ABG____  -tolerated PS 10/8    #Pulmonary nodules / Mediastinal lymphadenopathy    - CT Chest: Right middle and upper lobe solid pulmonary nodules, f/u outpatient pulm   #Activity   - incr as tolerated    CARDS:   #Septic shock    - off levo since 3am 7/1, remains at [facility] @ 1.5 mcq  #New onset Afib w/ RVR with hypotension -- likely 2/2 sepsis    - HR control with Metoprolol 2.5 q6 IV (held)  -Digoxin 250mcg x1 dose, 125mcg x1 7/1, 125mcg 7/4, f/u am level     - rate better controlled    - Cardiology following    #H/O HTN    -Amlodipine 2.5mg HOLDING   Imaging    - ECHO: (6/19) EF 55-60%, mild AS, mild LAE, mild MR/TR, trivial pericardial effusion    GI/NUTR:  #Diet    NPO except meds; trickle feeds Nepro @20cc  TPN c/s placed   #bowel movements    - 2 bloody BM's noted overnight 6/25; last BM 6/26 AM    - GI consult 6/26- Plan for EGD with EUS + FNA as outpatient, H-pylori treatment after      resolution of acute issues   -dignishield in place   -FOBT positive    #GI PPX  -IV Protonix 40 q12 hours  #Gastric mass vs gastrohepatic lymphadenopathy    -CT A/P: lesser curvature mass , Hepatic hypodensities  #Splenic Hypodensities, infarct vs phlegmon/abscess    -CT A/P:  Wedge-shaped region of hypodensity within the spleen    /RENAL:   #Grade IV L hydronephrosis s/p L PCN (5/2023) -- presented to ED with thick dark foul smelling output  #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  -intermittent suction/irrigation per urology; f/u cx and cytology from 6/25     -(6/23) s/p LT PCN upsizing to 26Fr catheter, RLQ 16 Fr drain remains, s/p nephrostolithotomy w/ lithotripsy    -(6/18) s/p LT PCN upsizing to 16Fr and 16Fr RLQ drain placement with IR, flush q8 hr  (6/30): s/p L nephrectomy and abdominal washout   -R pelvic drain Creatinine 3.99    High suspicion of renal malignancy given recent pathology  #metabolic acidosis  -Nephrology c/s - CVVHD started 07/02, goal -100cc/h  - PO sodium bicarb 1300mg q8, off bicarb gtt   #Hyperphosphatemia    -Phoslo 1334mg q8    Labs:    ROJAS (baseline Cr 1.0)    - peaked at 4.0, now improving           BUN/Cr- 52/1.6          Electrolytes-  #decreased urine output in the setting of acute renal failure 2/2 septic shock    HEME/ONC:   #new onset Afib    - holding heparin infusion in setting of hematochezia     Labs: DVT propylaxis- HSQ     #R radial artery occlusion  -RUE duplex 07/01 prelim + for r radial artery occlusion  -vascular c/s - rec AC     Labs:   Hb/Hct:  8.4/24.5  -->,  8.7/25.0  -->,  8.5/24.8  -->  Plts:  52  -->,  63  -->,  51  -->    T&S:ABO RH Interpretation; expires 07/02    #H/o Fe Deficiency anemia    - Iron supplementation  #DVT prophylaxis  -SCDs, heparin subQ   T&S Expires 7/4  Blood Consent- in chart     #Thrombocytopenia  Heme consult (7/3):   - low risk for HIT (score 2-3)  - Heme/onc wants full HIT workup although low HIT risk  - send HIT antibody, if positive then send BRUCE  - risk of full anticoagulation outweigh the benefits due to high risk of bleeding (h/o GI bleed and post-nephrectomy), will hold off on AC and will wait for HIT AB screen results, if new DVT then will re-assess; discussed with SICU attending Dr. Orozco who also discussed this with hematology team and Dr. Womack who are all in agreement.  - will keep hep subQ prophylaxis for now    ID:  #Leukocytosis 2/2 sepsis   WBC- 28.59  --->>,  33.91  --->>,  41.35  --->>34  >>27  afebrile   #antibiotics    -Levaquin 500mg q48 (6/21-    -Meropenem 500q12 (6/20-    -As per ID: no need for Vancomycin or Daptomycin in the absence of MRSA    -discontinued caspofungin (as per ID, since fungitell is unremarkable)  #Cultures    L PCN cyto-pathology  6/25: results suggestive of a necrotic neoplasm    Funtitell 6/26: 47    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    BAL 6/24: moderate yeast     UCx 6/19: negative FINAL      Left PCN aspiration 6/18: stenotrophomonas maltophilia  Needs source control    ENDO:  #Glucose monitoring    -A1c (5/7/23): 5.0    -POCT q6 hours while NPO   #Adrenal Insufficiency 2/2 to sepsis     -restarted solucortef 50q6 for 6 days starting 07/02      -Cortisol level - binding globulin 1.1 (low), serum cortisol, 84, free cortisol 76, % free cortisol 91    MSK:  #Sacrococcygeal DTI: wound care following- cleanse with soap and water, apply triad, #gauze, and allevyn BID and PRN for soiling   Venous ulcer right lower leg, R forearm skin tear: Pat dry, apply Xeroform, nonadherent dressing, wrap with Kerlix twice a day, prn for soiling  #Activity    -Advance as tolerated    -PT/rehab once extubated    -Globally deconditioned  #b/l UE, LE mottled skin  -no radial pulses b/l  +ulnar, brachial b/l UE, +DP b/l  -Arterial duplex - R radial occlusion   -DVT sono- prelim R PT DVT     LINES/DRAINS:   PIV  Bobby (6/18)  L eleazar drain (6/30)  L IJ TLC (6/26)  R IJ Uldall (07/02)  R Lerma pouch #1, pelvic drain #2 (6/30)  Left basilic midline  6/21  Right axillary a line (6/30)  Right Radial Jole[place] (6/22-6/30)  L radial [place] (6/18-6/22)      ETT  OGT    ADVANCED DIRECTIVES:  Full Code  HCP/Emergency Contact- Tracey Adames: [phone]   Palliative following. Last GOC discussion 6/26 day.    INDICATION FOR SICU: New onset atrial fibrillation w/ mechanical ventilation    DISPO: SICU     Assessment & Plan:  67M w/ PMH of HTN, grade IV hydronephrosis of L kidney s/p L PCN (placed 5/2023), stutter, hiatal hernia, iron deficiency anemia, H Pylori (untreated), and gastric mass (never biopsied) presents to the ED with at least 4 days of worsening weakness, abdominal distension, and no output from his PCN. Admitted with emphysematous pyelonephritis.     (6/18): s/p LT PCN upsizing to 16Fr and 16Fr RLQ drain placement with IR   (6/22): s/p shock lithotripsy of L kidney abscess cavity with drainage of thick contents, upsize of drain to 26Fr bobby catheter  (6/30): s/p ex-lap and L nephrectomy with abdominal washout    NEURO:  #Pain control    -APAP PRN    -Gabapentin 200 q 8     -fent 25 q 3 PRN while intubated  #Sedation    -precedex gtt off, alert responsive and follows commands    -following commands when off sedation, GCS 11T    RESP:  #acute respiratory failure with left lung mucous plugging - resolved s/p intubated 6/24 and    and bronchoscopy   RR (machine): 16, TV (machine): 450, FiO2: 40, PEEP: 8  ABG  ABG - ( 05 Jul 2023 00:40 )  pH, Arterial: 7.36  pH, Blood: x     /  pCO2: 33    /  pO2: 100   / HCO3: 19    / Base Excess: -6.1  /  SaO2: 99.9    -tolerated PS 10/8    #Pulmonary nodules / Mediastinal lymphadenopathy    - CT Chest: Right middle and upper lobe solid pulmonary nodules, f/u outpatient pulm   #Activity   - incr as tolerated    CARDS:   #Septic shock    - off levo since 3am 7/1, remains at [facility] @ 1.5 mcq  #New onset Afib w/ RVR with hypotension -- likely 2/2 sepsis    - HR control with Metoprolol 2.5 q6 IV (HOLDING)  -Digoxin 250mcg x1 dose, 125mcg x1 7/1, 125mcg 7/4, f/u am level    - rate better controlled: off metoprolol    - Cardiology following  - Volume -2066.5    #H/O HTN    -Amlodipine 2.5mg HOLDING   Imaging    - ECHO: (6/19) EF 55-60%, mild AS, mild LAE, mild MR/TR, trivial pericardial effusion    GI/NUTR:  #Diet    NPO except meds; trickle feeds Nepro @20cc  TPN c/s placed   #bowel movements    - 2 bloody BM's noted overnight 6/25; last BM 6/26 AM    - GI consult 6/26- Plan for EGD with EUS + FNA as outpatient, H-pylori treatment after      resolution of acute issues   -dignishield in place   -FOBT positive    #GI PPX  -IV Protonix 40 q12 hours  #Gastric mass vs gastrohepatic lymphadenopathy    -CT A/P: lesser curvature mass , Hepatic hypodensities  #Splenic Hypodensities, infarct vs phlegmon/abscess    -CT A/P:  Wedge-shaped region of hypodensity within the spleen    /RENAL:   #Grade IV L hydronephrosis s/p L PCN (5/2023) -- presented to ED with thick dark foul smelling output  #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  -intermittent suction/irrigation per urology; f/u cx and cytology from 6/25     -(6/23) s/p LT PCN upsizing to 26Fr catheter, RLQ 16 Fr drain remains, s/p nephrostolithotomy w/ lithotripsy    -(6/18) s/p LT PCN upsizing to 16Fr and 16Fr RLQ drain placement with IR, flush q8 hr  (6/30): s/p L nephrectomy and abdominal washout   -R pelvic drain Creatinine 3.99    High suspicion of renal malignancy given recent pathology  #metabolic acidosis  -Nephrology c/s - CVVHD started 07/02, goal -100cc/h  - PO sodium bicarb 1300mg q8, off bicarb gtt   #Hyperphosphatemia    -Phoslo 1334mg q8    Labs:    ROJAS (baseline Cr 1.0)    - peaked at 4.0, now improving           #urine output in critically ill    -indwelling bobby (placed     Labs:          BUN/Cr- 83/2.5  -->,  52/1.6  -->          Electrolytes-Na 136 // K 3.1 // Mg 1.5 //  Phos -- (07-04 @ 04:30)    #decreased urine output in the setting of acute renal failure 2/2 septic shock    HEME/ONC:   #new onset Afib    - holding heparin infusion in setting of hematochezia     Labs: DVT propylaxis- HSQ     #R radial artery occlusion  -RUE duplex 07/01 prelim + for r radial artery occlusion  -vascular c/s - rec AC     Labs:     Labs: Hb/Hct:  8.7/25.0  -->,  8.5/24.8  -->                      Plts:  63  -->,  51  -->                 PTT/INR:  43.1/2.20  --->     T&S:ABO RH Interpretation; expires 07/02    #H/o Fe Deficiency anemia    - Iron supplementation  #DVT prophylaxis  -SCDs, heparin subQ   T&S Expires 7/4  Blood Consent- in chart     #Thrombocytopenia  Heme consult (7/3):   - low risk for HIT (score 2-3)  - Heme/onc wants full HIT workup although low HIT risk  - send HIT antibody, if positive then send BRUCE  - risk of full anticoagulation outweigh the benefits due to high risk of bleeding (h/o GI bleed and post-nephrectomy), will hold off on AC and will wait for HIT AB screen results, if new DVT then will re-assess; discussed with SICU attending Dr. Orozco who also discussed this with hematology team and Dr. Womack who are all in agreement.  - will keep hep subQ prophylaxis for now    ID:  #Leukocytosis 2/2 sepsis   WBC- 25.03  --->>,  29.09  --->>,  27.25  --->>  Temp trend- 24hrs T(F): 95.5 (07-04 @ 20:00), Max: 95.6 (07-04 @ 12:00)  Current antibiotics-levoFLOXacin IVPB 500 every 48 hours  meropenem  IVPB 500 every 12 hours    Afebrile   #antibiotics    -Levaquin 500mg q48 (6/21-    -Meropenem 500q12 (6/20-    -As per ID: no need for Vancomycin or Daptomycin in the absence of MRSA    -discontinued caspofungin (as per ID, since fungitell is unremarkable)  #Cultures    L PCN cyto-pathology  6/25: results suggestive of a necrotic neoplasm    Funtitell 6/26: 47    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    BAL 6/24: moderate yeast     UCx 6/19: negative FINAL      Left PCN aspiration 6/18: stenotrophomonas maltophilia  Needs source control    ENDO:  #Glucose monitoring    -A1c (5/7/23): 5.0    -POCT q6 hours while NPO   #Adrenal Insufficiency 2/2 to sepsis     -restarted solucortef 50q6 for 6 days starting 07/02      -Cortisol level - binding globulin 1.1 (low), serum cortisol, 84, free cortisol 76, % free cortisol 91    MSK:  #Sacrococcygeal DTI: wound care following- cleanse with soap and water, apply triad, #gauze, and allevyn BID and PRN for soiling   Venous ulcer right lower leg, R forearm skin tear: Pat dry, apply Xeroform, nonadherent dressing, wrap with Kerlix twice a day, prn for soiling  #Activity    -Advance as tolerated    -PT/rehab once extubated    -Globally deconditioned  #b/l UE, LE mottled skin  -no radial pulses b/l  +ulnar, brachial b/l UE, +DP b/l  -Arterial duplex - R radial occlusion   -DVT sono- prelim R PT DVT     LINES/DRAINS:   PIV  Bobby (6/18)  L eleazar drain (6/30)  L IJ TLC (6/26)  R IJ Uldall (07/02)  R Lerma pouch #1, pelvic drain #2 (6/30)  Left basilic midline  6/21  Right axillary a line (6/30)  Right Radial Joleen (6/22-6/30)  L radial Joleen (6/18-6/22)  R-Femoral [place] (7/5- _)      ETT  OGT    ADVANCED DIRECTIVES:  Full Code  HCP/Emergency Contact- Tracey Adames: [phone]   Palliative following. Last GOC discussion 6/26 day.    INDICATION FOR SICU: New onset atrial fibrillation w/ mechanical ventilation    DISPO: SICU     Assessment & Plan:  67M w/ PMH of HTN, grade IV hydronephrosis of L kidney s/p L PCN (placed 5/2023), stutter, hiatal hernia, iron deficiency anemia, H Pylori (untreated), and gastric mass (never biopsied) presents to the ED with at least 4 days of worsening weakness, abdominal distension, and no output from his PCN. Admitted with emphysematous pyelonephritis.     (6/18): s/p LT PCN upsizing to 16Fr and 16Fr RLQ drain placement with IR   (6/22): s/p shock lithotripsy of L kidney abscess cavity with drainage of thick contents, upsize of drain to 26Fr bobby catheter  (6/30): s/p ex-lap and L nephrectomy with abdominal washout    NEURO:  #Pain control    -APAP PRN    -Gabapentin 200 q 8     -fent 25 q 3 PRN while intubated  #Sedation    -precedex gtt off, alert responsive and follows commands    -following commands when off sedation, GCS 11T    RESP:  #acute respiratory failure with left lung mucous plugging - resolved s/p intubated 6/24 and    and bronchoscopy   RR (machine): 16, TV (machine): 450, FiO2: 40, PEEP: 8  ABG  ABG - ( 05 Jul 2023 00:40 )  pH, Arterial: 7.36  pH, Blood: x     /  pCO2: 33    /  pO2: 100   / HCO3: 19    / Base Excess: -6.1  /  SaO2: 99.9    -tolerated PS 10/8    #Pulmonary nodules / Mediastinal lymphadenopathy    - CT Chest: Right middle and upper lobe solid pulmonary nodules, f/u outpatient pulm   #Activity   - incr as tolerated    CARDS:   #Septic shock    - off levo since 3am 7/1, remains at [facility] @ 1.5 mcq  #New onset Afib w/ RVR with hypotension -- likely 2/2 sepsis    - HR control with Metoprolol 2.5 q6 IV (HOLDING)  -Digoxin 250mcg x1 dose, 125mcg x1 7/1, 125mcg 7/4, f/u am level    - rate better controlled: off metoprolol    - Cardiology following  - Volume -2066.5    #H/O HTN    -Amlodipine 2.5mg HOLDING   Imaging    - ECHO: (6/19) EF 55-60%, mild AS, mild LAE, mild MR/TR, trivial pericardial effusion    GI/NUTR:  #Diet    NPO except meds; trickle feeds Nepro @20cc  TPN c/s placed   #bowel movements    - 2 bloody BM's noted overnight 6/25; last BM 6/26 AM    - GI consult 6/26- Plan for EGD with EUS + FNA as outpatient, H-pylori treatment after      resolution of acute issues   -dignishield in place   -FOBT positive    #GI PPX  -IV Protonix 40 q12 hours  #Gastric mass vs gastrohepatic lymphadenopathy    -CT A/P: lesser curvature mass , Hepatic hypodensities  #Splenic Hypodensities, infarct vs phlegmon/abscess    -CT A/P:  Wedge-shaped region of hypodensity within the spleen    /RENAL:   #Grade IV L hydronephrosis s/p L PCN (5/2023) -- presented to ED with thick dark foul smelling output  #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  -intermittent suction/irrigation per urology; f/u cx and cytology from 6/25     -(6/23) s/p LT PCN upsizing to 26Fr catheter, RLQ 16 Fr drain remains, s/p nephrostolithotomy w/ lithotripsy    -(6/18) s/p LT PCN upsizing to 16Fr and 16Fr RLQ drain placement with IR, flush q8 hr  (6/30): s/p L nephrectomy and abdominal washout   -R pelvic drain Creatinine 3.99    High suspicion of renal malignancy given recent pathology  #metabolic acidosis  -Nephrology c/s - CVVHD started 07/02, goal -100cc/h  - PO sodium bicarb 1300mg q8, off bicarb gtt   #Hyperphosphatemia    -Phoslo 1334mg q8    Labs:    ROJAS (baseline Cr 1.0)    - peaked at 4.0, now improving           #urine output in critically ill    -indwelling bobby (placed     Labs:          BUN/Cr- 83/2.5  -->,  52/1.6  -->          Electrolytes-Na 136 // K 3.1 // Mg 1.5 //  Phos -- (07-04 @ 04:30)    #decreased urine output in the setting of acute renal failure 2/2 septic shock    HEME/ONC:   #new onset Afib    - holding heparin infusion in setting of hematochezia     Labs: DVT propylaxis- HSQ     #R radial artery occlusion  -RUE duplex 07/01 prelim + for r radial artery occlusion  -vascular c/s - rec AC     Labs:     Labs: Hb/Hct:  8.7/25.0  -->,  8.5/24.8  -->                      Plts:  63  -->,  51  -->                 PTT/INR:  43.1/2.20  --->     T&S:ABO RH Interpretation; expires 07/02    #H/o Fe Deficiency anemia    - Iron supplementation  #DVT prophylaxis  -SCDs, heparin subQ   T&S Expires 7/4  Blood Consent- in chart     #Thrombocytopenia  Heme consult (7/3):   - low risk for HIT (score 2-3)  - Heme/onc wants full HIT workup although low HIT risk  - send HIT antibody, if positive then send BRUCE  - risk of full anticoagulation outweigh the benefits due to high risk of bleeding (h/o GI bleed and post-nephrectomy), will hold off on AC and will wait for HIT AB screen results, if new DVT then will re-assess; discussed with SICU attending Dr. Orozco who also discussed this with hematology team and Dr. Womack who are all in agreement.  - will keep hep subQ prophylaxis for now    ID:  #Leukocytosis 2/2 sepsis   WBC- 25.03  --->>,  29.09  --->>,  27.25  --->>  Temp trend- 24hrs T(F): 95.5 (07-04 @ 20:00), Max: 95.6 (07-04 @ 12:00)  Current antibiotics-levoFLOXacin IVPB 500 every 48 hours  meropenem  IVPB 500 every 12 hours    Afebrile   #antibiotics    -Levaquin 500mg q48 (6/21-    -Meropenem 500q12 (6/20-    -As per ID: no need for Vancomycin or Daptomycin in the absence of MRSA    -discontinued caspofungin (as per ID, since fungitell is unremarkable)  #Cultures    L PCN cyto-pathology  6/25: results suggestive of a necrotic neoplasm    Funtitell 6/26: 47    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    BAL 6/24: moderate yeast     UCx 6/19: negative FINAL      Left PCN aspiration 6/18: stenotrophomonas maltophilia  Needs source control    ENDO:  #Glucose monitoring    -A1c (5/7/23): 5.0    -POCT q6 hours while NPO   #Adrenal Insufficiency 2/2 to sepsis     -restarted solucortef 50q6 for 6 days starting 07/02      -Cortisol level - binding globulin 1.1 (low), serum cortisol, 84, free cortisol 76, % free cortisol 91    MSK:  #Sacrococcygeal DTI: wound care following- cleanse with soap and water, apply triad, #gauze, and allevyn BID and PRN for soiling   Venous ulcer right lower leg, R forearm skin tear: Pat dry, apply Xeroform, nonadherent dressing, wrap with Kerlix twice a day, prn for soiling  #Activity    -Advance as tolerated    -PT/rehab once extubated    -Globally deconditioned  #b/l UE, LE mottled skin  -no radial pulses b/l  +ulnar, brachial b/l UE, +DP b/l  -Arterial duplex - R radial occlusion   -DVT sono- prelim R PT DVT     LINES/DRAINS:   PIV  Bobby (6/18)  L eleazar drain (6/30)  L IJ TLC (6/26)  R IJ Uldall (07/02)  R Lerma pouch #1, pelvic drain #2 (6/30)  Left basilic midline  6/21  Right axillary a line (6/30-7/4)  Right Radial [place] (6/22-6/30)  L radial [place] (6/18-6/22)  R-Femoral Joleen (7/5- _ )      ETT  OGT    ADVANCED DIRECTIVES:  Full Code  HCP/Emergency Contact- Tracey Adames: [phone]   Palliative following. Last GOC discussion 6/26 day.    INDICATION FOR SICU: New onset atrial fibrillation w/ mechanical ventilation    DISPO: SICU     Assessment & Plan:  67M w/ PMH of HTN, grade IV hydronephrosis of L kidney s/p L PCN (placed 5/2023), stutter, hiatal hernia, iron deficiency anemia, H Pylori (untreated), and gastric mass (never biopsied) presents to the ED with at least 4 days of worsening weakness, abdominal distension, and no output from his PCN. Admitted with emphysematous pyelonephritis.     (6/18): s/p LT PCN upsizing to 16Fr and 16Fr RLQ drain placement with IR   (6/22): s/p shock lithotripsy of L kidney abscess cavity with drainage of thick contents, upsize of drain to 26Fr bobby catheter  (6/30): s/p ex-lap and L nephrectomy with abdominal washout    NEURO:  #Pain control    -APAP PRN    -Gabapentin 200 q 8     -fent 25 q 3 PRN while intubated  #Sedation    -precedex gtt off, alert responsive and follows commands    -following commands when off sedation, GCS 11T    RESP:  #acute respiratory failure with left lung mucous plugging - resolved s/p intubated 6/24 and    and bronchoscopy   RR (machine): 16, TV (machine): 450, FiO2: 40, PEEP: 8  ABG  ABG - ( 05 Jul 2023 00:40 )  pH, Arterial: 7.36  pH, Blood: x     /  pCO2: 33    /  pO2: 100   / HCO3: 19    / Base Excess: -6.1  /  SaO2: 99.9    -tolerated PS 10/8    #Pulmonary nodules / Mediastinal lymphadenopathy    - CT Chest: Right middle and upper lobe solid pulmonary nodules, f/u outpatient pulm   #Activity   - incr as tolerated    CARDS:   #Septic shock    - off levo since 3am 7/1, remains at [facility] @ 1.5 mcq  #New onset Afib w/ RVR with hypotension -- likely 2/2 sepsis    - HR control with Metoprolol 2.5 q6 IV (HOLDING)  -Digoxin 250mcg x1 dose, 125mcg x1 7/1, 125mcg 7/4, f/u am level    - rate better controlled: off metoprolol    - Cardiology following  - Volume -2066.5  - Robinson Femoral placed, ACCURATE, recording hypotension. Considering albumin and/or milrinone/midodrine/clonidine. Continued on CVVH.    #H/O HTN    -Amlodipine 2.5mg HOLDING   Imaging    - ECHO: (6/19) EF 55-60%, mild AS, mild LAE, mild MR/TR, trivial pericardial effusion    GI/NUTR:  #Diet    NPO except meds; trickle feeds Nepro @20cc  TPN c/s placed   #bowel movements    - 2 bloody BM's noted overnight 6/25; last BM 6/26 AM    - GI consult 6/26- Plan for EGD with EUS + FNA as outpatient, H-pylori treatment after      resolution of acute issues   -dignishield in place   -FOBT positive    #GI PPX  -IV Protonix 40 q12 hours  #Gastric mass vs gastrohepatic lymphadenopathy    -CT A/P: lesser curvature mass , Hepatic hypodensities  #Splenic Hypodensities, infarct vs phlegmon/abscess    -CT A/P:  Wedge-shaped region of hypodensity within the spleen    /RENAL:   #Grade IV L hydronephrosis s/p L PCN (5/2023) -- presented to ED with thick dark foul smelling output  #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  -intermittent suction/irrigation per urology; f/u cx and cytology from 6/25     -(6/23) s/p LT PCN upsizing to 26Fr catheter, RLQ 16 Fr drain remains, s/p nephrostolithotomy w/ lithotripsy    -(6/18) s/p LT PCN upsizing to 16Fr and 16Fr RLQ drain placement with IR, flush q8 hr  (6/30): s/p L nephrectomy and abdominal washout   -R pelvic drain Creatinine 3.99    High suspicion of renal malignancy given recent pathology  #metabolic acidosis  -Nephrology c/s - CVVHD started 07/02, goal -100cc/h  - PO sodium bicarb 1300mg q8, off bicarb gtt   #Hyperphosphatemia    -Phoslo 1334mg q8    Labs:    ROJAS (baseline Cr 1.0)    - peaked at 4.0, now improving           #urine output in critically ill    -indwelling bobby (placed     Labs:          BUN/Cr- 83/2.5  -->,  52/1.6  -->          Electrolytes-Na 136 // K 3.1 // Mg 1.5 //  Phos -- (07-04 @ 04:30)    #decreased urine output in the setting of acute renal failure 2/2 septic shock    HEME/ONC:   #new onset Afib    - holding heparin infusion in setting of hematochezia     Labs: DVT propylaxis- HSQ     #R radial artery occlusion  -RUE duplex 07/01 prelim + for r radial artery occlusion  -vascular c/s - rec AC     Labs:     Labs: Hb/Hct:  8.7/25.0  -->,  8.5/24.8  -->                      Plts:  63  -->,  51  -->                 PTT/INR:  43.1/2.20  --->     T&S:ABO RH Interpretation; expires 07/02    #H/o Fe Deficiency anemia    - Iron supplementation  #DVT prophylaxis  -SCDs, heparin subQ   T&S Expires 7/4  Blood Consent- in chart     #Thrombocytopenia  Heme consult (7/3):   - low risk for HIT (score 2-3)  - Heme/onc wants full HIT workup although low HIT risk  - send HIT antibody, if positive then send BRUCE  - risk of full anticoagulation outweigh the benefits due to high risk of bleeding (h/o GI bleed and post-nephrectomy), will hold off on AC and will wait for HIT AB screen results, if new DVT then will re-assess; discussed with SICU attending Dr. Orozco who also discussed this with hematology team and Dr. Womack who are all in agreement.  - will keep hep subQ prophylaxis for now    ID:  #Leukocytosis 2/2 sepsis   WBC- 25.03  --->>,  29.09  --->>,  27.25  --->>  Temp trend- 24hrs T(F): 95.5 (07-04 @ 20:00), Max: 95.6 (07-04 @ 12:00)  Current antibiotics-levoFLOXacin IVPB 500 every 48 hours  meropenem  IVPB 500 every 12 hours    Afebrile   #antibiotics    -Levaquin 500mg q48 (6/21-    -Meropenem 500q12 (6/20-    -As per ID: no need for Vancomycin or Daptomycin in the absence of MRSA    -discontinued caspofungin (as per ID, since fungitell is unremarkable)  #Cultures    L PCN cyto-pathology  6/25: results suggestive of a necrotic neoplasm    Funtitell 6/26: 47    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    BAL 6/24: moderate yeast     UCx 6/19: negative FINAL      Left PCN aspiration 6/18: stenotrophomonas maltophilia  Needs source control    ENDO:  #Glucose monitoring    -A1c (5/7/23): 5.0    -POCT q6 hours while NPO   #Adrenal Insufficiency 2/2 to sepsis     -restarted solucortef 50q6 for 6 days starting 07/02      -Cortisol level - binding globulin 1.1 (low), serum cortisol, 84, free cortisol 76, % free cortisol 91    MSK:  #Sacrococcygeal DTI: wound care following- cleanse with soap and water, apply triad, #gauze, and allevyn BID and PRN for soiling   Venous ulcer right lower leg, R forearm skin tear: Pat dry, apply Xeroform, nonadherent dressing, wrap with Kerlix twice a day, prn for soiling  #Activity    -Advance as tolerated    -PT/rehab once extubated    -Globally deconditioned  #b/l UE, LE mottled skin  -no radial pulses b/l  +ulnar, brachial b/l UE, +DP b/l  -Arterial duplex - R radial occlusion   -DVT sono- prelim R PT DVT     LINES/DRAINS:   PIV  Bobby (6/18)  L eleazar drain (6/30)  L IJ TLC (6/26)  R IJ Uldall (07/02)  R Lerma pouch #1, pelvic drain #2 (6/30)  Left basilic midline  6/21  Right axillary a line (6/30-7/4)  Right Radial [place] (6/22-6/30)  L radial [place] (6/18-6/22)  R-Femoral [place] (7/5- _ )      ETT  OGT    ADVANCED DIRECTIVES:  Full Code  HCP/Emergency Contact- Tracey Adames: [phone]   Palliative following. Last GOC discussion 6/26 day.    INDICATION FOR SICU: New onset atrial fibrillation w/ mechanical ventilation    DISPO: SICU     Assessment & Plan:  67M w/ PMH of HTN, grade IV hydronephrosis of L kidney s/p L PCN (placed 5/2023), stutter, hiatal hernia, iron deficiency anemia, H Pylori (untreated), and gastric mass (never biopsied) presents to the ED with at least 4 days of worsening weakness, abdominal distension, and no output from his PCN. Admitted with emphysematous pyelonephritis.     (6/18): s/p LT PCN upsizing to 16Fr and 16Fr RLQ drain placement with IR   (6/22): s/p shock lithotripsy of L kidney abscess cavity with drainage of thick contents, upsize of drain to 26Fr bobby catheter  (6/30): s/p ex-lap and L nephrectomy with abdominal washout    NEURO:  #Pain control    -APAP PRN    -Gabapentin 200 q 8     -fent 25 q 3 PRN while intubated  #Sedation    -precedex gtt off, alert responsive and follows commands    -following commands when off sedation, GCS 11T    RESP:  #acute respiratory failure with left lung mucous plugging - resolved s/p intubated 6/24 and    and bronchoscopy   RR (machine): 16, TV (machine): 450, FiO2: 40, PEEP: 8  ABG  ABG - ( 05 Jul 2023 00:40 )  pH, Arterial: 7.36  pH, Blood: x     /  pCO2: 33    /  pO2: 100   / HCO3: 19    / Base Excess: -6.1  /  SaO2: 99.9    -tolerated PS 10/8    #Pulmonary nodules / Mediastinal lymphadenopathy    - CT Chest: Right middle and upper lobe solid pulmonary nodules, f/u outpatient pulm   #Activity   - incr as tolerated    CARDS:   #Septic shock    - off levo since 3am 7/1, remains at [facility] @ 1.5 mcq  #New onset Afib w/ RVR with hypotension -- likely 2/2 sepsis    - HR control with Metoprolol 2.5 q6 IV (HOLDING)  -Digoxin 250mcg x1 dose, 125mcg x1 7/1, 125mcg 7/4, f/u am level    - rate better controlled: off metoprolol    - Cardiology following  - Volume -2066.5  - [place] Femoral placed, ACCURATE, recording hypotension. Considering albumin and/or milrinone/midodrine/clonidine. Continued on CVVH. Increasing requirements in pressors.    #H/O HTN    -Amlodipine 2.5mg HOLDING   Imaging    - ECHO: (6/19) EF 55-60%, mild AS, mild LAE, mild MR/TR, trivial pericardial effusion    GI/NUTR:  #Diet    NPO except meds; trickle feeds Nepro @20cc  TPN c/s placed   #bowel movements    - 2 bloody BM's noted overnight 6/25; last BM 6/26 AM    - GI consult 6/26- Plan for EGD with EUS + FNA as outpatient, H-pylori treatment after      resolution of acute issues   -dignishield in place   -FOBT positive    #GI PPX  -IV Protonix 40 q12 hours  #Gastric mass vs gastrohepatic lymphadenopathy    -CT A/P: lesser curvature mass , Hepatic hypodensities  #Splenic Hypodensities, infarct vs phlegmon/abscess    -CT A/P:  Wedge-shaped region of hypodensity within the spleen    /RENAL:   #Grade IV L hydronephrosis s/p L PCN (5/2023) -- presented to ED with thick dark foul smelling output  #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  -intermittent suction/irrigation per urology; f/u cx and cytology from 6/25     -(6/23) s/p LT PCN upsizing to 26Fr catheter, RLQ 16 Fr drain remains, s/p nephrostolithotomy w/ lithotripsy    -(6/18) s/p LT PCN upsizing to 16Fr and 16Fr RLQ drain placement with IR, flush q8 hr  (6/30): s/p L nephrectomy and abdominal washout   -R pelvic drain Creatinine 3.99    High suspicion of renal malignancy given recent pathology  #metabolic acidosis  -Nephrology c/s - CVVHD started 07/02, goal -100cc/h  - PO sodium bicarb 1300mg q8, off bicarb gtt   #Hyperphosphatemia    -Phoslo 1334mg q8    Labs:    ROJAS (baseline Cr 1.0)    - peaked at 4.0, now improving           #urine output in critically ill    -indwelling bobby (placed     Labs:          BUN/Cr- 83/2.5  -->,  52/1.6  -->          Electrolytes-Na 136 // K 3.1 // Mg 1.5 //  Phos -- (07-04 @ 04:30)    #decreased urine output in the setting of acute renal failure 2/2 septic shock    HEME/ONC:   #new onset Afib    - holding heparin infusion in setting of hematochezia     Labs: DVT propylaxis- HSQ     #R radial artery occlusion  -RUE duplex 07/01 prelim + for r radial artery occlusion  -vascular c/s - rec AC     Labs:     Labs: Hb/Hct:  8.7/25.0  -->,  8.5/24.8  -->                      Plts:  63  -->,  51  -->                 PTT/INR:  43.1/2.20  --->     T&S:ABO RH Interpretation; expires 07/02    #H/o Fe Deficiency anemia    - Iron supplementation  #DVT prophylaxis  -SCDs, heparin subQ   T&S Expires 7/4  Blood Consent- in chart     #Thrombocytopenia  Heme consult (7/3):   - low risk for HIT (score 2-3)  - Heme/onc wants full HIT workup although low HIT risk  - send HIT antibody, if positive then send BRUCE  - risk of full anticoagulation outweigh the benefits due to high risk of bleeding (h/o GI bleed and post-nephrectomy), will hold off on AC and will wait for HIT AB screen results, if new DVT then will re-assess; discussed with SICU attending Dr. Orozco who also discussed this with hematology team and Dr. Womack who are all in agreement.  - will keep hep subQ prophylaxis for now    ID:  #Leukocytosis 2/2 sepsis   WBC- 25.03  --->>,  29.09  --->>,  27.25  --->>  Temp trend- 24hrs T(F): 95.5 (07-04 @ 20:00), Max: 95.6 (07-04 @ 12:00)  Current antibiotics-levoFLOXacin IVPB 500 every 48 hours  meropenem  IVPB 500 every 12 hours    Afebrile   #antibiotics    -Levaquin 500mg q48 (6/21-    -Meropenem 500q12 (6/20-    -As per ID: no need for Vancomycin or Daptomycin in the absence of MRSA    -discontinued caspofungin (as per ID, since fungitell is unremarkable)  #Cultures    L PCN cyto-pathology  6/25: results suggestive of a necrotic neoplasm    Funtitell 6/26: 47    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    BAL 6/24: moderate yeast     UCx 6/19: negative FINAL      Left PCN aspiration 6/18: stenotrophomonas maltophilia  Needs source control    ENDO:  #Glucose monitoring    -A1c (5/7/23): 5.0    -POCT q6 hours while NPO   #Adrenal Insufficiency 2/2 to sepsis     -restarted solucortef 50q6 for 6 days starting 07/02      -Cortisol level - binding globulin 1.1 (low), serum cortisol, 84, free cortisol 76, % free cortisol 91    MSK:  #Sacrococcygeal DTI: wound care following- cleanse with soap and water, apply triad, #gauze, and allevyn BID and PRN for soiling   Venous ulcer right lower leg, R forearm skin tear: Pat dry, apply Xeroform, nonadherent dressing, wrap with Kerlix twice a day, prn for soiling  #Activity    -Advance as tolerated    -PT/rehab once extubated    -Globally deconditioned  #b/l UE, LE mottled skin  -no radial pulses b/l  +ulnar, brachial b/l UE, +DP b/l  -Arterial duplex - R radial occlusion   -DVT sono- prelim R PT DVT     LINES/DRAINS:   PIV  Bobby (6/18)  L eleazar drain (6/30)  L IJ TLC (6/26)  R IJ Uldall (07/02)  R Lerma pouch #1, pelvic drain #2 (6/30)  Left basilic midline  6/21  Right axillary a line (6/30-7/4)  Right Radial Joleen (6/22-6/30)  L radial [place] (6/18-6/22)  R-Femoral [place] (7/5- _ )      ETT  OGT    ADVANCED DIRECTIVES:  Full Code  HCP/Emergency Contact- Traceyro Adames: [phone]   Palliative following. Last GOC discussion 6/26 day.    INDICATION FOR SICU: New onset atrial fibrillation w/ mechanical ventilation    DISPO: SICU     Assessment & Plan:  67M w/ PMH of HTN, grade IV hydronephrosis of L kidney s/p L PCN (placed 5/2023), stutter, hiatal hernia, iron deficiency anemia, H Pylori (untreated), and gastric mass (never biopsied) presents to the ED with at least 4 days of worsening weakness, abdominal distension, and no output from his PCN. Admitted with emphysematous pyelonephritis.     (6/18): s/p LT PCN upsizing to 16Fr and 16Fr RLQ drain placement with IR   (6/22): s/p shock lithotripsy of L kidney abscess cavity with drainage of thick contents, upsize of drain to 26Fr bobby catheter  (6/30): s/p ex-lap and L nephrectomy with abdominal washout    NEURO:  #Pain control    -APAP PRN    -Gabapentin 200 q 8     -fent 25 q 3 PRN while intubated  #Sedation    -precedex gtt off, alert responsive and follows commands    -following commands when off sedation, GCS 11T    RESP:  #acute respiratory failure with left lung mucous plugging - resolved s/p intubated 6/24 and    and bronchoscopy   RR (machine): 18, TV (machine): 450, FiO2: 40, PEEP: 8  07-05 @ 04:22--7.41 / 30 / 96 / 19 / 98.5  O2 98.5  Lac 6.90    07-05 @ 00:40--7.36 / 33 / 100 / 19 / 99.9  O2 99.9  Lac 7.90      #Pulmonary nodules / Mediastinal lymphadenopathy    - CT Chest: Right middle and upper lobe solid pulmonary nodules, f/u outpatient pulm   #Activity   - incr as tolerated    CARDS:   #Septic shock    - off levo since 3am 7/1, remains at [facility] @ 1.5 mcq  #New onset Afib w/ RVR with hypotension -- likely 2/2 sepsis    - HR control with Metoprolol 2.5 q6 IV (HOLDING)  -Digoxin 250mcg x1 dose, 125mcg x1 7/1, 125mcg 7/4, f/u am level    - rate better controlled: off metoprolol    - Cardiology following  - Volume -2066.5  - [place] Femoral placed, ACCURATE, recording hypotension. Considering albumin and/or milrinone/midodrine/clonidine. Continued on CVVH. Increasing requirements in pressors.    #H/O HTN    -Amlodipine 2.5mg HOLDING   Imaging    - ECHO: (6/19) EF 55-60%, mild AS, mild LAE, mild MR/TR, trivial pericardial effusion    GI/NUTR:  #Diet    NPO except meds; trickle feeds Nepro @20cc  TPN c/s placed   #bowel movements    - 2 bloody BM's noted overnight 6/25; last BM 6/26 AM    - GI consult 6/26- Plan for EGD with EUS + FNA as outpatient, H-pylori treatment after      resolution of acute issues   -dignishield in place   -FOBT positive    #GI PPX  -IV Protonix 40 q12 hours  #Gastric mass vs gastrohepatic lymphadenopathy    -CT A/P: lesser curvature mass , Hepatic hypodensities  #Splenic Hypodensities, infarct vs phlegmon/abscess    -CT A/P:  Wedge-shaped region of hypodensity within the spleen    /RENAL:   #Grade IV L hydronephrosis s/p L PCN (5/2023) -- presented to ED with thick dark foul smelling output  #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  -intermittent suction/irrigation per urology; f/u cx and cytology from 6/25     -(6/23) s/p LT PCN upsizing to 26Fr catheter, RLQ 16 Fr drain remains, s/p nephrostolithotomy w/ lithotripsy    -(6/18) s/p LT PCN upsizing to 16Fr and 16Fr RLQ drain placement with IR, flush q8 hr  (6/30): s/p L nephrectomy and abdominal washout   -R pelvic drain Creatinine 3.99    High suspicion of renal malignancy given recent pathology  #metabolic acidosis  -Nephrology c/s - CVVHD started 07/02, goal -100cc/h  - PO sodium bicarb 1300mg q8, off bicarb gtt   #elevated lactate  -7.9-->6.9  -5% 250 cc albumin given  #Hyperphosphatemia    -Phoslo 1334mg q8    Labs:    ROJAS (baseline Cr 1.0)    - peaked at 4.0, now improving           #urine output in critically ill    -indwelling bobby (placed     Labs:          BUN/Cr- 83/2.5  -->,  52/1.6  -->          Electrolytes-Na 136 // K 3.1 // Mg 1.5 //  Phos -- (07-04 @ 04:30)    #decreased urine output in the setting of acute renal failure 2/2 septic shock    HEME/ONC:   #new onset Afib    - holding heparin infusion in setting of hematochezia     Labs: DVT propylaxis- HSQ     #R radial artery occlusion  -RUE duplex 07/01 prelim + for r radial artery occlusion  -vascular c/s - rec AC     Labs:     Labs: Hb/Hct:  8.7/25.0  -->,  8.5/24.8  -->                      Plts:  63  -->,  51  -->                 PTT/INR:  43.1/2.20  --->     T&S:ABO RH Interpretation; expires 07/02    #H/o Fe Deficiency anemia    - Iron supplementation  #DVT prophylaxis  -SCDs, heparin subQ   T&S Expires 7/4  Blood Consent- in chart     #Thrombocytopenia  Heme consult (7/3):   - low risk for HIT (score 2-3)  - Heme/onc wants full HIT workup although low HIT risk  - send HIT antibody, if positive then send BRUCE  - risk of full anticoagulation outweigh the benefits due to high risk of bleeding (h/o GI bleed and post-nephrectomy), will hold off on AC and will wait for HIT AB screen results, if new DVT then will re-assess; discussed with SICU attending Dr. Orozco who also discussed this with hematology team and Dr. Womack who are all in agreement.  - will keep hep subQ prophylaxis for now    ID:  #Leukocytosis 2/2 sepsis   WBC- 25.03  --->>,  29.09  --->>,  27.25  --->>  Temp trend- 24hrs T(F): 95.5 (07-04 @ 20:00), Max: 95.6 (07-04 @ 12:00)  Current antibiotics-levoFLOXacin IVPB 500 every 48 hours  meropenem  IVPB 500 every 12 hours    Afebrile   #antibiotics    -Levaquin 500mg q48 (6/21-    -Meropenem 500q12 (6/20-    -As per ID: no need for Vancomycin or Daptomycin in the absence of MRSA    -discontinued caspofungin (as per ID, since fungitell is unremarkable)  #Cultures    L PCN cyto-pathology  6/25: results suggestive of a necrotic neoplasm    Funtitell 6/26: 47    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    BAL 6/24: moderate yeast     UCx 6/19: negative FINAL      Left PCN aspiration 6/18: stenotrophomonas maltophilia  Needs source control    ENDO:  #Glucose monitoring    -A1c (5/7/23): 5.0    -POCT q6 hours while NPO   #Adrenal Insufficiency 2/2 to sepsis     -restarted solucortef 50q6 for 6 days starting 07/02      -Cortisol level - binding globulin 1.1 (low), serum cortisol, 84, free cortisol 76, % free cortisol 91    MSK:  #Sacrococcygeal DTI: wound care following- cleanse with soap and water, apply triad, #gauze, and allevyn BID and PRN for soiling   Venous ulcer right lower leg, R forearm skin tear: Pat dry, apply Xeroform, nonadherent dressing, wrap with Kerlix twice a day, prn for soiling  #Activity    -Advance as tolerated    -PT/rehab once extubated    -Globally deconditioned  #b/l UE, LE mottled skin  -no radial pulses b/l  +ulnar, brachial b/l UE, +DP b/l  -Arterial duplex - R radial occlusion   -DVT sono- prelim R PT DVT     LINES/DRAINS:   PIV  Bobby (6/18)  L eleazar drain (6/30)  L IJ TLC (6/26)  R IJ Uldall (07/02)  R Lerma pouch #1, pelvic drain #2 (6/30)  Left basilic midline  6/21  Right axillary a line (6/30-7/4)  Right Radial [place]ole[place] (6/22-6/30)  L radial [place] (6/18-6/22)  R-Femoral [place] (7/5- _ )      ETT  OGT    ADVANCED DIRECTIVES:  Full Code  HCP/Emergency Contact- Tracey Adames: [phone]   Palliative following. Last GOC discussion 6/26 day.    INDICATION FOR SICU: New onset atrial fibrillation w/ mechanical ventilation    DISPO: SICU

## 2023-07-05 NOTE — PROGRESS NOTE ADULT - SUBJECTIVE AND OBJECTIVE BOX
CHRISTINE VILLAVICENCIO  68y, Male  Allergy: No Known Allergies      LOS  17d    CHIEF COMPLAINT: pneumoperitoneum, emphysematous pyelonephritis (05 Jul 2023 12:54)      INTERVAL EVENTS/HPI  - T(F): , Max: 97.2 (07-05-23 @ 04:00)  - WBC Count: 35.37 (07-05-23 @ 05:45) uptrending   WBC Count: 27.25 (07-04-23 @ 04:30)     - Creatinine: 1.3 (07-05-23 @ 05:45)  Creatinine: 1.6 (07-04-23 @ 04:30)     -   -   -     ROS  cannot obtain secondary to patient's sedation and/or mental status    VITALS:  T(F): 95, Max: 97.2 (07-05-23 @ 04:00)  HR: 86  BP: 98/65  RR: 30Vital Signs Last 24 Hrs  T(C): 35 (05 Jul 2023 12:00), Max: 36.2 (05 Jul 2023 04:00)  T(F): 95 (05 Jul 2023 12:00), Max: 97.2 (05 Jul 2023 04:00)  HR: 86 (05 Jul 2023 12:30) (86 - 109)  BP: 98/65 (05 Jul 2023 11:00) (96/71 - 154/96)  BP(mean): 76 (05 Jul 2023 11:00) (65 - 113)  RR: 30 (05 Jul 2023 12:30) (24 - 36)  SpO2: 100% (05 Jul 2023 12:30) (77% - 100%)    Parameters below as of 05 Jul 2023 11:00  Patient On (Oxygen Delivery Method): ventilator    O2 Concentration (%): 40    PHYSICAL EXAM:  Gen: Intubated  CV: RRR  Lungs: Decreased BS at bases  Abd: Soft  Neuro: Sedated but open eyes  anasarca  JPs  dressings  Lines clean, no phlebitis    FH: Non-contributory  Social Hx: Non-contributory    TESTS & MEASUREMENTS:                        9.1    35.37 )-----------( 44       ( 05 Jul 2023 05:45 )             27.4     07-05    138  |  103  |  41<H>  ----------------------------<  171<H>  3.4<L>   |  19  |  1.3    Ca    8.4      05 Jul 2023 05:45  Phos  1.7     07-05  Mg     1.9     07-05    TPro  2.4<L>  /  Alb  1.2<L>  /  TBili  0.3  /  DBili  0.2  /  AST  12  /  ALT  7   /  AlkPhos  49  07-04      LIVER FUNCTIONS - ( 04 Jul 2023 09:20 )  Alb: 1.2 g/dL / Pro: 2.4 g/dL / ALK PHOS: 49 U/L / ALT: 7 U/L / AST: 12 U/L / GGT: x           Urinalysis Basic - ( 05 Jul 2023 05:45 )    Color: x / Appearance: x / SG: x / pH: x  Gluc: 171 mg/dL / Ketone: x  / Bili: x / Urobili: x   Blood: x / Protein: x / Nitrite: x   Leuk Esterase: x / RBC: x / WBC x   Sq Epi: x / Non Sq Epi: x / Bacteria: x        Culture - Surgical Swab (collected 06-30-23 @ 22:12)  Source: .Surgical Swab None  Preliminary Report (07-04-23 @ 21:45):    Growth in fluid media only Enterococcus faecium    Culture - Fungal, Bronchial (collected 06-24-23 @ 11:00)  Source: .Bronchial None  Preliminary Report (06-26-23 @ 11:00):    Moderate Yeast    Culture - Acid Fast - Bronchial w/Smear (collected 06-24-23 @ 11:00)  Source: .Bronchial None  Preliminary Report (06-28-23 @ 15:08):    Culture is being performed.    Culture - Bronchial (collected 06-24-23 @ 11:00)  Source: .Bronchial None  Gram Stain (06-24-23 @ 23:21):    Numerous polymorphonuclear leukocytes per low power field    No squamous epithelial cells per low power field    Rare Yeast like cells per oil power field  Final Report (06-26-23 @ 17:56):    Normal Respiratory Gabbie present    Culture - Fungal, Other (collected 06-22-23 @ 22:11)  Source: .Other None  Preliminary Report (07-01-23 @ 15:03):    No fungus isolated at 1 week.    Culture - Surgical Swab (collected 06-22-23 @ 22:11)  Source: .Surgical Swab None  Final Report (06-25-23 @ 14:14):    Few Finegoldia magna "Susceptibilities not performed"    Culture - Urine (collected 06-19-23 @ 02:50)  Source: Clean Catch Clean Catch (Midstream)  Final Report (06-20-23 @ 10:18):    No growth    Culture - Abscess with Gram Stain (collected 06-18-23 @ 23:08)  Source: .Abscess right lower quadrant (intrabdominal)  Gram Stain (06-19-23 @ 11:56):    Numerous polymorphonuclear leukocytes per low power field    Numerous Gram positive cocci in pairs per oil power field  Final Report (06-21-23 @ 14:13):    Numerous Anaerobic Gram Positive Cocci Most closely resembling    Peptoniphilus species "Susceptibilities not performed"    Culture - Abscess with Gram Stain (collected 06-18-23 @ 23:08)  Source: .Abscess left kidney  Gram Stain (06-19-23 @ 11:58):    Rare polymorphonuclear leukocytes per low power field    Few Gram Positive Cocci per oil power field  Final Report (06-22-23 @ 10:13):    Growth in fluid media only Anaerobic Gram Positive Cocci Most closely    resembling Peptoniphilus species "Susceptibilities not performed"    Rare Stenotrophomonas maltophilia  Organism: Stenotrophomonas maltophilia  Stenotrophomonas maltophilia (06-22-23 @ 10:13)  Organism: Stenotrophomonas maltophilia (06-22-23 @ 10:13)      Method Type: EDMUND      -  Levofloxacin: S 1      -  Trimethoprim/Sulfamethoxazole: S <=0.5/9.5  Organism: Stenotrophomonas maltophilia (06-22-23 @ 10:13)      Method Type: KB      -  Minocycline: S    Culture - Abscess with Gram Stain (collected 06-18-23 @ 20:18)  Source: .Abscess left PCN aspirate  Gram Stain (06-19-23 @ 06:27):    No polymorphonuclear cells seen per low power field    Few Gram Variable Cocci seen per oil power field  Final Report (06-24-23 @ 09:11):    Few Stenotrophomonas maltophilia  Organism: Stenotrophomonas maltophilia (06-24-23 @ 09:11)  Organism: Stenotrophomonas maltophilia (06-24-23 @ 09:11)      Method Type: EDMUND      -  Levofloxacin: S 2      -  Trimethoprim/Sulfamethoxazole: S <=0.5/9.5    Culture - Blood (collected 06-18-23 @ 14:18)  Source: .Blood Blood-Peripheral  Final Report (06-24-23 @ 02:00):    No Growth Final    Culture - Blood (collected 06-18-23 @ 14:18)  Source: .Blood Blood-Peripheral  Final Report (06-24-23 @ 02:00):    No Growth Final        Lactate, Blood: 5.3 mmol/L (07-01-23 @ 02:27)      INFECTIOUS DISEASES TESTING  Fungitell: 47 (06-25-23 @ 15:30)  MRSA PCR Result.: Negative (06-24-23 @ 17:38)      INFLAMMATORY MARKERS      RADIOLOGY & ADDITIONAL TESTS:  I have personally reviewed the last available Chest xray  CXR      CT      CARDIOLOGY TESTING  12 Lead ECG:   Ventricular Rate 89 BPM    Atrial Rate 394 BPM    QRS Duration 84 ms    Q-T Interval 300 ms    QTC Calculation(Bazett) 365 ms    R Axis 50 degrees    T Axis -16 degrees    Diagnosis Line Atrial fibrillation  Low voltage QRS  Nonspecific T wave abnormality  Abnormal ECG    Confirmed by Deon Velasquez ([id number]) on 7/3/2023 8:40:11 AM (07-02-23 @ 07:57)  12 Lead ECG:   Ventricular Rate 96 BPM    Atrial Rate 72 BPM    QRS Duration 82 ms    Q-T Interval 288 ms    QTC Calculation(Bazett) 363 ms    R Axis 37 degrees    T Axis 184 degrees    Diagnosis Line Atrial fibrillation  Low voltage QRS  Nonspecific T wave abnormality  Abnormal ECG    Confirmed by Deon Velasquez ([id number]) on 7/2/2023 1:12:19 PM (07-01-23 @ 08:12)      MEDICATIONS  bacitracin   Ointment 1  calcium acetate 1334  chlorhexidine 0.12% Liquid 15  chlorhexidine 2% Cloths 1  CRRT Treatment   dexMEDEtomidine Infusion 0.2  ferrous    sulfate 325  gabapentin 200  heparin   Injectable 5000  hydrocortisone sodium succinate Injectable 50  levoFLOXacin IVPB 500  lipid, fat emulsion (Fish Oil and Plant Based) 20% Infusion 1.311  meropenem  IVPB 500  pantoprazole  Injectable 40  Parenteral Nutrition - Adult 1  Parenteral Nutrition - Adult 1  phenylephrine    Infusion 0.1  PureFlow Dialysate RFP-400 (K 2 / Ca 3) 5000  sodium bicarbonate 1300  vasopressin Infusion 0.03      WEIGHT  Weight (kg): 76.3 (06-30-23 @ 16:58)  Creatinine: 1.3 mg/dL (07-05-23 @ 05:45)      ANTIBIOTICS:  levoFLOXacin IVPB 500 milliGRAM(s) IV Intermittent every 48 hours  meropenem  IVPB 500 milliGRAM(s) IV Intermittent every 12 hours      All available historical records have been reviewed

## 2023-07-05 NOTE — CONSULT NOTE ADULT - SUBJECTIVE AND OBJECTIVE BOX
Patient is a 68 year old male w/ PMH of HTN, grade IV hydronephrosis of L kidney s/p L PCN (placed 5/2023), stutter, hiatal hernia, iron deficiency anemia, H Pylori (untreated), and gastric mass (never biopsied) presents to the ED with at least 4 days of worsening weakness, abdominal distension, and no output from his PCN. Pt and daughters bedside are only able to give limited information, but for the past few days, he has noticed no output from his PCN as well as increasing abdominal distension, pain, and anorexia. He reports that he has not felt normal for the past 10 days. His PCN used to put out serosanguinous fluid, but the output changed to feculent/brown liquid over the past day. In the ED, he was noted to be in new onset afib w/ RVR to 150+ as well as experiencing some difficulty breathing. He was started on BiPAP. Stat labs were notable for WBC 47, Cr 2.9, and lactate 6. CT A/P w/ IV contrast showed free intraperitoneal fluid and air from an unknown source as well as fluid and air in the L kidney.      Vital Signs Last 24 Hrs  T(C): 35 (05 Jul 2023 14:00), Max: 36.2 (05 Jul 2023 04:00)  T(F): 95 (05 Jul 2023 14:00), Max: 97.2 (05 Jul 2023 04:00)  HR: 85 (05 Jul 2023 14:45) (83 - 109)  BP: 97/69 (05 Jul 2023 14:45) (96/71 - 154/96)  BP(mean): 79 (05 Jul 2023 14:45) (65 - 113)  ABP: 112/61 (05 Jul 2023 14:45) (-2/-2 - 130/74)  ABP(mean): 74 (05 Jul 2023 14:45) (-2 - 93)  RR: 29 (05 Jul 2023 14:45) (24 - 36)  SpO2: 100% (05 Jul 2023 14:45) (77% - 100%)    O2 Parameters below as of 05 Jul 2023 11:00  Patient On (Oxygen Delivery Method): ventilator    O2 Concentration (%): 40    PHYSICAL EXAM:  GENERAL: Lying in bed in NAD  SKIN:  Sacrum/bilateral ischium: Sacrum with partial thickness wound with some devitalized tissue. bl ischium with small open wound, base is pink and moist. No active bleeding, malodor, drainage or purulence noted.  Patient is a 68 year old male w/ PMH of HTN, grade IV hydronephrosis of L kidney s/p L PCN (placed 5/2023), stutter, hiatal hernia, iron deficiency anemia, H Pylori (untreated), and gastric mass (never biopsied) presents to the ED with at least 4 days of worsening weakness, abdominal distension, and no output from his PCN. Pt and daughters bedside are only able to give limited information, but for the past few days, he has noticed no output from his PCN as well as increasing abdominal distension, pain, and anorexia. He reports that he has not felt normal for the past 10 days. His PCN used to put out serosanguinous fluid, but the output changed to feculent/brown liquid over the past day. In the ED, he was noted to be in new onset afib w/ RVR to 150+ as well as experiencing some difficulty breathing. He was started on BiPAP. Stat labs were notable for WBC 47, Cr 2.9, and lactate 6. CT A/P w/ IV contrast showed free intraperitoneal fluid and air from an unknown source as well as fluid and air in the L kidney.      Vital Signs Last 24 Hrs  T(C): 35 (05 Jul 2023 14:00), Max: 36.2 (05 Jul 2023 04:00)  T(F): 95 (05 Jul 2023 14:00), Max: 97.2 (05 Jul 2023 04:00)  HR: 85 (05 Jul 2023 14:45) (83 - 109)  BP: 97/69 (05 Jul 2023 14:45) (96/71 - 154/96)  BP(mean): 79 (05 Jul 2023 14:45) (65 - 113)  ABP: 112/61 (05 Jul 2023 14:45) (-2/-2 - 130/74)  ABP(mean): 74 (05 Jul 2023 14:45) (-2 - 93)  RR: 29 (05 Jul 2023 14:45) (24 - 36)  SpO2: 100% (05 Jul 2023 14:45) (77% - 100%)    O2 Parameters below as of 05 Jul 2023 11:00  Patient On (Oxygen Delivery Method): ventilator    O2 Concentration (%): 40    PHYSICAL EXAM:  GENERAL: Lying in bed in NAD  SKIN:  Sacrum/bilateral ischium: Sacrum with partial thickness wound dark discoloration/ ecchymosis  of skin with some devitalized tissue. bl ischium with ecchymotic skin and small open wound, base is pink and moist. No active bleeding, malodor, drainage or purulence noted.

## 2023-07-05 NOTE — CONSULT NOTE ADULT - NS ATTEND AMEND GEN_ALL_CORE FT
As above patient seen and discussed during daily team rounds  Consultation requested for sacral wound - also noted during exam to have bilateral ischial wounds    Discolored ecchymotic skin and small open areas- sacrum ~ 8 x 5 cm area and bilateral ischia ~3x 5 cm  consistent with developing pressure ulcers     No surgical intervention    Continue wound care offloading as above  Certified wound care nurse specialist for further recommendations and management

## 2023-07-05 NOTE — PROGRESS NOTE ADULT - SUBJECTIVE AND OBJECTIVE BOX
UROLOGY DAILY PROGRESS NOTE    Pt is a 68y Male with a retroperitoneal and intraabdominal abscess s/p IR placement of drain and LEFT PCN 6/18,   s/p percutaneous drainage of LEFT renal abscess POD # 11 with left 26fr flank catheter in place, s/p Left radical nephrectomy,  Ex-Lap POD # 5.   Patient seen and examined at bedside.   Pt remains intubated, on CVVH and pressor support.     MEDICATIONS  (STANDING):  albumin human  5% IVPB 250 milliLiter(s) IV Intermittent every 2 hours  bacitracin   Ointment 1 Application(s) Topical two times a day  calcium acetate 1334 milliGRAM(s) Oral every 8 hours  chlorhexidine 0.12% Liquid 15 milliLiter(s) Oral Mucosa every 12 hours  chlorhexidine 2% Cloths 1 Application(s) Topical <User Schedule>  dexMEDEtomidine Infusion 0.2 MICROgram(s)/kG/Hr (3.82 mL/Hr) IV Continuous <Continuous>  digoxin  Injectable 125 MICROGram(s) IV Push once  ferrous    sulfate 325 milliGRAM(s) Oral daily  gabapentin 200 milliGRAM(s) Oral every 8 hours  heparin   Injectable 5000 Unit(s) SubCutaneous every 8 hours  hydrocortisone sodium succinate Injectable 50 milliGRAM(s) IV Push every 6 hours  levoFLOXacin IVPB 500 milliGRAM(s) IV Intermittent every 48 hours  meropenem  IVPB 500 milliGRAM(s) IV Intermittent every 12 hours  pantoprazole  Injectable 40 milliGRAM(s) IV Push every 12 hours  Parenteral Nutrition - Adult 1 Each (65 mL/Hr) TPN Continuous <Continuous>  phenylephrine    Infusion 0.1 MICROgram(s)/kG/Min (1.43 mL/Hr) IV Continuous <Continuous>  potassium phosphate IVPB 15 milliMole(s) IV Intermittent once  sodium bicarbonate 1300 milliGRAM(s) Oral three times a day  vasopressin Infusion 0.03 Unit(s)/Min (4.5 mL/Hr) IV Continuous <Continuous>    MEDICATIONS  (PRN):  fentaNYL    Injectable 25 MICROGram(s) IV Push every 3 hours PRN Moderate Pain (4 - 6)  sodium chloride 0.9% lock flush 10 milliLiter(s) IV Push every 1 hour PRN Pre/post blood products, medications, blood draw, and to maintain line patency  sodium chloride 0.9% lock flush 10 milliLiter(s) IV Push every 1 hour PRN Pre/post blood products, medications, blood draw, and to maintain line patency      REVIEW OF SYSTEMS     [x] Due to altered mental status/intubation, subjective information were not able to be obtained from patient. History was obtained, to the extent possible, from review of the chart and collateral sources of information.    Vital Signs Last 24 Hrs  T(C): 35.2 (05 Jul 2023 08:00), Max: 36.2 (05 Jul 2023 04:00)  T(F): 95.5 (05 Jul 2023 08:00), Max: 97.2 (05 Jul 2023 04:00)  HR: 89 (05 Jul 2023 08:00) (73 - 109)  BP: 98/74 (05 Jul 2023 08:00) (96/71 - 154/96)  BP(mean): 82 (05 Jul 2023 08:00) (65 - 113)  RR: 28 (05 Jul 2023 08:00) (24 - 33)  SpO2: 96% (05 Jul 2023 00:00) (90% - 100%)    Parameters below as of 05 Jul 2023 08:00  Patient On (Oxygen Delivery Method): ventilator    O2 Concentration (%): 40    PHYSICAL EXAM:    GEN: NAD, awake and alert. intubated on pressors  SKIN: mottled, cold to the touch.  RESP: vented  ABDO: Soft, NT/ND, no palpable bladder, no suprapubic tenderness, #2 Pelvic eleazar drain, # 1morrison pouch, Drain # 3 in place with serosanguinous drainage.   : + Penile/scrotal edema + indwelling Mena in place, draining blood tinged urine.   EXT: + pitting edema x 4      I&O's Summary    04 Jul 2023 07:01  -  05 Jul 2023 07:00  --------------------------------------------------------  IN: 3157.6 mL / OUT: 5023 mL / NET: -1865.4 mL    05 Jul 2023 07:01  -  05 Jul 2023 08:35  --------------------------------------------------------  IN: 243.3 mL / OUT: 167 mL / NET: 76.3 mL        LABS:                        9.1    35.37 )-----------( 44       ( 05 Jul 2023 05:45 )             27.4     07-05    138  |  103  |  41<H>  ----------------------------<  171<H>  3.4<L>   |  19  |  1.3    Ca    8.4      05 Jul 2023 05:45  Phos  1.7     07-05  Mg     1.9     07-05    TPro  2.4<L>  /  Alb  1.2<L>  /  TBili  0.3  /  DBili  0.2  /  AST  12  /  ALT  7   /  AlkPhos  49  07-04    PT/INR - ( 04 Jul 2023 04:30 )   PT: 25.70 sec;   INR: 2.20 ratio         PTT - ( 04 Jul 2023 04:30 )  PTT:43.1 sec  Urinalysis Basic - ( 05 Jul 2023 05:45 )    Color: x / Appearance: x / SG: x / pH: x  Gluc: 171 mg/dL / Ketone: x  / Bili: x / Urobili: x   Blood: x / Protein: x / Nitrite: x   Leuk Esterase: x / RBC: x / WBC x   Sq Epi: x / Non Sq Epi: x / Bacteria: x

## 2023-07-05 NOTE — PROGRESS NOTE ADULT - ASSESSMENT
68y Male with a retroperitoneal and intraabdominal abscess s/p IR placement of drain and LEFT PCN 6/18,   s/p percutaneous drainage of LEFT renal abscess POD # 11 with left 26fr flank catheter in place, s/p Left radical nephrectomy,  Ex-Lap POD # 5.     A) Increasing leukocytosis  acute blood loss anemia  ROJAS  Hypokalemia  acute respiratory failure  malnutrition  Sepsis    P) Continue SICU management  Continue CVVHD as per nephrology  continue nutritional support  correct electrolytes  awaiting pathology   will d/w attending

## 2023-07-05 NOTE — PROGRESS NOTE ADULT - SUBJECTIVE AND OBJECTIVE BOX
GENERAL SURGERY PROGRESS NOTE    Patient: CHRISTINE VILLAVICENCIO , 68y ([birth year])Male   MRN: [id number]  Location: [address]  Visit: 06-18-23 Inpatient  Date: 07-05-23 @ 01:02    PAST MEDICAL & SURGICAL HISTORY:    Vitals:   T(F): 95.5 (07-04-23 @ 20:00), Max: 95.6 (07-04-23 @ 12:00)  HR: 91 (07-04-23 @ 23:00)  BP: 116/81 (07-04-23 @ 23:00)  RR: 28 (07-04-23 @ 23:00)  SpO2: 100% (07-04-23 @ 23:00)  Mode: AC/ CMV (Assist Control/ Continuous Mandatory Ventilation), RR (machine): 18, TV (machine): 450, FiO2: 40, PEEP: 8, ITime: 0.9, MAP: 11, PIP: 24    Diet, NPO with Tube Feed:   Tube Feeding Modality: Orogastric  Nepro with Carb Steady  Total Volume for 24 Hours (mL): 480  Continuous  Starting Tube Feed Rate mL per Hour: 10  Increase Tube Feed Rate by (mL): 20     Every 4 hours  Until Goal Tube Feed Rate (mL per Hour): 20  Tube Feed Duration (in Hours): 24  Tube Feed Start Time: 08:26    Fluids:     I & O's:    07-03-23 @ 07:01  -  07-04-23 @ 07:00  --------------------------------------------------------  IN:    Dexmedetomidine: 166.4 mL    Enteral Tube Flush: 170 mL    Fat Emulsion (Fish Oil &amp; Plant Based) 20% Infusion: 209 mL    IV PiggyBack: 100 mL    IV PiggyBack: 150 mL    Nepro with Carb Steady: 260 mL    Phenylephrine: 256.6 mL    sodium chloride 0.45% w/ Additives: 50 mL    TPN (Total Parenteral Nutrition): 650 mL    Vasopressin: 108 mL  Total IN: 2120 mL    OUT:    Drain (mL): 135 mL    Drain (mL): 170 mL    Drain (mL): 315 mL    Gastric Aspirate - Discarded (mL): 0 mL    Indwelling Catheter - Urethral (mL): 56 mL    Oral Fluid: 0 mL    Other (mL): 3202 mL  Total OUT: 3878 mL    Total NET: -1758 mL    PHYSICAL EXAM:  GENERAL: NAD, Sedated   CHEST/LUNG: Intubated, Clear to auscultation bilaterally  HEART: Regular rate and rhythm  ABDOMEN: Soft, Nontender, Nondistended;   EXTREMITIES:  No clubbing or cyanosis. Pitting edema +3    MEDICATIONS  (STANDING):  albumin human  5% IVPB 250 milliLiter(s) IV Intermittent every 6 hours  bacitracin   Ointment 1 Application(s) Topical two times a day  calcium acetate 1334 milliGRAM(s) Oral every 8 hours  chlorhexidine 0.12% Liquid 15 milliLiter(s) Oral Mucosa every 12 hours  chlorhexidine 2% Cloths 1 Application(s) Topical <User Schedule>  dexMEDEtomidine Infusion 0.2 MICROgram(s)/kG/Hr (3.82 mL/Hr) IV Continuous <Continuous>  ferrous    sulfate 325 milliGRAM(s) Oral daily  gabapentin 200 milliGRAM(s) Oral every 8 hours  heparin   Injectable 5000 Unit(s) SubCutaneous every 8 hours  hydrocortisone sodium succinate Injectable 50 milliGRAM(s) IV Push every 6 hours  levoFLOXacin IVPB 500 milliGRAM(s) IV Intermittent every 48 hours  meropenem  IVPB 500 milliGRAM(s) IV Intermittent every 12 hours  pantoprazole  Injectable 40 milliGRAM(s) IV Push every 12 hours  Parenteral Nutrition - Adult 1 Each (65 mL/Hr) TPN Continuous <Continuous>  Parenteral Nutrition - Adult 1 Each (65 mL/Hr) TPN Continuous <Continuous>  phenylephrine    Infusion 0.1 MICROgram(s)/kG/Min (1.43 mL/Hr) IV Continuous <Continuous>  sodium bicarbonate 1300 milliGRAM(s) Oral three times a day  vasopressin Infusion 0.03 Unit(s)/Min (4.5 mL/Hr) IV Continuous <Continuous>    MEDICATIONS  (PRN):  fentaNYL    Injectable 25 MICROGram(s) IV Push every 3 hours PRN Moderate Pain (4 - 6)  sodium chloride 0.9% lock flush 10 milliLiter(s) IV Push every 1 hour PRN Pre/post blood products, medications, blood draw, and to maintain line patency  sodium chloride 0.9% lock flush 10 milliLiter(s) IV Push every 1 hour PRN Pre/post blood products, medications, blood draw, and to maintain line patency    DVT PROPHYLAXIS: heparin   Injectable 5000 Unit(s) SubCutaneous every 8 hours    GI PROPHYLAXIS: pantoprazole  Injectable 40 milliGRAM(s) IV Push every 12 hours    ANTICOAGULATION:   ANTIBIOTICS:  levoFLOXacin IVPB 500 milliGRAM(s)  meropenem  IVPB 500 milliGRAM(s)    LAB/STUDIES:  Labs:  CAPILLARY BLOOD GLUCOSE                        8.5    27.25 )-----------( 51       ( 04 Jul 2023 04:30 )             24.8     07-04    136  |  104  |  52<H>  ----------------------------<  176<H>  3.1<L>   |  20  |  1.6<H>    Calcium: 70 mg/dL (07-04-23 @ 04:30)    LFTs:             2.4  | 0.3  | 12       ------------------[49      ( 04 Jul 2023 09:20 )  1.2  | 0.2  | 7           Lipase:x      Amylase:x         Blood Gas Arterial, Lactate: 7.90 mmol/L (07-05-23 @ 00:40)  Blood Gas Arterial, Lactate: 2.70 mmol/L (07-04-23 @ 04:56)  Blood Gas Arterial, Lactate: 2.10 mmol/L (07-03-23 @ 15:58)  Blood Gas Arterial, Lactate: 2.30 mmol/L (07-03-23 @ 05:04)  Blood Gas Arterial, Lactate: 3.10 mmol/L (07-02-23 @ 21:07)  Blood Gas Arterial, Lactate: 4.20 mmol/L (07-02-23 @ 11:37)  Blood Gas Arterial, Lactate: 4.00 mmol/L (07-02-23 @ 04:53)    ABG - ( 05 Jul 2023 00:40 )  pH: 7.36  /  pCO2: 33    /  pO2: 100   / HCO3: 19    / Base Excess: -6.1  /  SaO2: 99.9      ABG - ( 04 Jul 2023 04:56 )  pH: 7.49  /  pCO2: 31    /  pO2: 124   / HCO3: 24    / Base Excess: 0.6   /  SaO2: 99.0      ABG - ( 03 Jul 2023 15:58 )  pH: 7.43  /  pCO2: 34    /  pO2: 137   / HCO3: 23    / Base Excess: -1.3  /  SaO2: 100.0     Coags:     25.70  ----< 2.20    ( 04 Jul 2023 04:30 )     43.1     Urinalysis Basic - ( 04 Jul 2023 04:30 )    Color: x / Appearance: x / SG: x / pH: x  Gluc: 176 mg/dL / Ketone: x  / Bili: x / Urobili: x   Blood: x / Protein: x / Nitrite: x   Leuk Esterase: x / RBC: x / WBC x   Sq Epi: x / Non Sq Epi: x / Bacteria: x

## 2023-07-06 NOTE — PROGRESS NOTE ADULT - ASSESSMENT
67M w/ PMH of HTN, grade IV hydronephrosis of L kidney s/p L PCN (placed 5/2023), stutter, hiatal hernia, iron deficiency anemia, H Pylori (untreated), and gastric mass (never biopsied) presents to the ED with at least 4 days of worsening weakness, abdominal distension, and no output from his PCN  . Found to have septic shock, MSOF, lactic acidosis from left emphysematous hydronephrosis, pyelonephritis and possible rupture with pneumoperitoneum along with possible hepatic abscesses, splenic infarct and abdominal and mediastinal lymphadenopathies.  had nephrectomy and abdominal washout on 6/30    Oliguric in spite of bumex, rising dig level, high lactate    ROJAS likely ATN iso septic shock  on pressors   on CVVHD / will keep in even balance today   K noted replete to 3. 5  IP noted need repletion to 2 / follow with nutrition services to increase IP in TPN   repeat Dig level ok  / follow q48h   monitor K, phos daily on CRRT  dose antibx to CRRT  platelets noted follow trend / ? HIT / hem on case   will follow  discussed w/ SICU team

## 2023-07-06 NOTE — PROGRESS NOTE ADULT - SUBJECTIVE AND OBJECTIVE BOX
UROLOGY DAILY PROGRESS NOTE    Pt is a 69yo Male admitted with retroperitoneal and intraabdominal abscess s/p IR placement of drain and LEFT PCN 6/18, s/p percutaneous drainage of LEFT renal abscess POD # 12 with left 26fr flank catheter in place, s/p Left radical nephrectomy, Ex-Lap POD # 6. Patient seen and examined at bedside. Pt remains intubated, on CVVH and pressor support.     MEDICATIONS  (STANDING):  bacitracin   Ointment 1 Application(s) Topical two times a day  chlorhexidine 0.12% Liquid 15 milliLiter(s) Oral Mucosa every 12 hours  chlorhexidine 2% Cloths 1 Application(s) Topical <User Schedule>  CRRT Treatment    <Continuous>  DAPTOmycin IVPB 460 milliGRAM(s) IV Intermittent every 24 hours  dexMEDEtomidine Infusion 0.2 MICROgram(s)/kG/Hr (3.82 mL/Hr) IV Continuous <Continuous>  digoxin  Injectable 125 MICROGram(s) IV Push once  heparin   Injectable 5000 Unit(s) SubCutaneous every 8 hours  hydrocortisone sodium succinate Injectable 50 milliGRAM(s) IV Push every 6 hours  levoFLOXacin IVPB 750 milliGRAM(s) IV Intermittent daily  lipid, fat emulsion (Fish Oil and Plant Based) 20% Infusion 1.311 Gm/kG/Day (31.3 mL/Hr) IV Continuous <Continuous>  meropenem  IVPB 1000 milliGRAM(s) IV Intermittent every 12 hours  pantoprazole  Injectable 40 milliGRAM(s) IV Push every 12 hours  Parenteral Nutrition - Adult 1 Each (65 mL/Hr) TPN Continuous <Continuous>  phenylephrine    Infusion 0.1 MICROgram(s)/kG/Min (1.43 mL/Hr) IV Continuous <Continuous>  PureFlow Dialysate RFP-400 (K 2 / Ca 3) 5000 milliLiter(s) (2000 mL/Hr) CRRT <Continuous>  sodium phosphate 30 milliMole(s)/500 mL IVPB 30 milliMole(s) IV Intermittent once  vasopressin Infusion 0.03 Unit(s)/Min (4.5 mL/Hr) IV Continuous <Continuous>    MEDICATIONS  (PRN):  fentaNYL    Injectable 25 MICROGram(s) IV Push every 3 hours PRN Moderate Pain (4 - 6)  sodium chloride 0.9% lock flush 10 milliLiter(s) IV Push every 1 hour PRN Pre/post blood products, medications, blood draw, and to maintain line patency  sodium chloride 0.9% lock flush 10 milliLiter(s) IV Push every 1 hour PRN Pre/post blood products, medications, blood draw, and to maintain line patency      REVIEW OF SYSTEMS   [x] Due to intubation, subjective information were not able to be obtained from patient. History was obtained, to the extent possible, from review of the chart and collateral sources of information.    Vital Signs Last 24 Hrs  T(C): 35.8 (06 Jul 2023 08:00), Max: 95.4 (05 Jul 2023 16:00)  T(F): 96.6 (06 Jul 2023 08:00), Max: 203.7 (05 Jul 2023 16:00)  HR: 91 (06 Jul 2023 08:45) (82 - 96)  BP: 103/56 (06 Jul 2023 08:00) (82/63 - 107/55)  BP(mean): 73 (06 Jul 2023 08:00) (65 - 83)  RR: 25 (06 Jul 2023 08:45) (23 - 35)  SpO2: 100% (06 Jul 2023 08:45) (77% - 100%)    Parameters below as of 06 Jul 2023 09:00  Patient On (Oxygen Delivery Method): ventilator    O2 Concentration (%): 40    PHYSICAL EXAM:    GEN: awake, intubated on vent   SKIN: non diaphoretic, mottled   RESP: intubated on vent   ABDO: Soft, NT/ND, no palpable bladder, no suprapubic tenderness,#1 miranda pouch Drain-35cc/24hrs with serosanguinous fluid, #2 Pelvic eleazar drain- 1,210cc/24hrs with dark murky drainage    , #3 left renal bed drain- 165cc/24hrs in place with dark murky drainage    BACK: No CVA tenderness B/L  :  + Penile/scrotal edema + 16Fr indwelling Mena in place, draining pink tinged urine not clots noted   EXT: diffuse, pitting edema x all 4 extremities           I&O's Summary    05 Jul 2023 07:01  -  06 Jul 2023 07:00  --------------------------------------------------------  IN: 3924 mL / OUT: 3793 mL / NET: 131 mL    06 Jul 2023 07:01  -  06 Jul 2023 08:52  --------------------------------------------------------  IN: 249.8 mL / OUT: 684 mL / NET: -434.2 mL    I&O's Detail    05 Jul 2023 07:01  -  06 Jul 2023 07:00  --------------------------------------------------------  IN:    Dexmedetomidine: 112.1 mL    Enteral Tube Flush: 150 mL    Fat Emulsion (Fish Oil &amp; Plant Based) 20% Infusion: 313 mL    IV PiggyBack: 250 mL    IV PiggyBack: 250 mL    IV PiggyBack: 50 mL    IV PiggyBack: 150 mL    IV PiggyBack: 250 mL    Phenylephrine: 530.9 mL    Platelets - Single Donor: 200 mL    TPN (Total Parenteral Nutrition): 1560 mL    Vasopressin: 108 mL  Total IN: 3924 mL    OUT:    Drain (mL): 165 mL    Drain (mL): 1210 mL    Drain (mL): 35 mL    Indwelling Catheter - Urethral (mL): 17 mL    Nasogastric/Oral tube (mL): 850 mL    Nepro with Carb Steady: 0 mL    Other (mL): 1516 mL  Total OUT: 3793 mL    Total NET: 131 mL      06 Jul 2023 07:01  -  06 Jul 2023 09:07  --------------------------------------------------------  IN:    Dexmedetomidine: 5.7 mL    Fat Emulsion (Fish Oil &amp; Plant Based) 20% Infusion: 31.3 mL    IV PiggyBack: 50 mL    IV PiggyBack: 83.3 mL    Phenylephrine: 10 mL    TPN (Total Parenteral Nutrition): 65 mL    Vasopressin: 4.5 mL  Total IN: 249.8 mL    OUT:    Drain (mL): 25 mL    Drain (mL): 5 mL    Drain (mL): 2 mL    Indwelling Catheter - Urethral (mL): 2 mL    Nasogastric/Oral tube (mL): 450 mL    Other (mL): 200 mL  Total OUT: 684 mL    Total NET: -434.2 mL        LABS:                        8.3    24.98 )-----------( 23       ( 06 Jul 2023 04:30 )             25.2     07-06    135  |  101  |  33<H>  ----------------------------<  155<H>  3.7   |  20  |  1.0    Ca    8.3<L>      06 Jul 2023 04:30  Phos  1.0     07-06  Mg     1.9     07-06    TPro  2.4<L>  /  Alb  1.2<L>  /  TBili  0.3  /  DBili  0.2  /  AST  12  /  ALT  7   /  AlkPhos  49  07-04      Urinalysis Basic - ( 06 Jul 2023 04:30 )    Color: x / Appearance: x / SG: x / pH: x  Gluc: 155 mg/dL / Ketone: x  / Bili: x / Urobili: x   Blood: x / Protein: x / Nitrite: x   Leuk Esterase: x / RBC: x / WBC x   Sq Epi: x / Non Sq Epi: x / Bacteria: x            RADIOLOGY & ADDITIONAL STUDIES:

## 2023-07-06 NOTE — PROGRESS NOTE ADULT - SUBJECTIVE AND OBJECTIVE BOX
VASCULAR SURGERY PROGRESS NOTE     CHRISTINE VILLAVICENCIO  68y  Male  Hospital day :18d  Procedure: Insertion, arterial line, percutaneous    Central venous catheter placement with ultrasound guidance    Midline catheter insertion    Nephrostolithotomy, percutaneous, with lithotripsy, large stone    Change external ureteral stent    Nephrostogram    US guided vascular access    Insertion of arterial line with imaging guidance    Bronchoscopy, flexible, adult    Partial nephrectomy    Nephrostogram via existing catheter    Exploratory laparotomy    Left nephrectomy    Insertion, catheter, hemodialysis, non-tunneled, with US guidance      OVERNIGHT EVENTS: Patient remains on pressors, has had periods of hemodynamic instability, and on abx.     T(F): 97.3 (23 @ 14:00), Max: 203.7 (23 @ 16:00)  HR: 90 (23 @ 14:00) (82 - 94)  BP: 100/63 (23 @ 14:00) (82/63 - 108/64)  ABP: 118/51 (23 @ 14:00) (72/62 - 135/74)  ABP(mean): 68 (23 @ 14:00) (64 - 88)  RR: 26 (23 @ 14:00) (23 - 30)  SpO2: 99% (23 @ 14:00) (90% - 100%)    DIET/FLUIDS: lipid, fat emulsion (Fish Oil and Plant Based) 20% Infusion 1.311 Gm/kG/Day IV Continuous <Continuous>  Parenteral Nutrition - Adult 1 Each TPN Continuous <Continuous>  Parenteral Nutrition - Adult 1 Each TPN Continuous <Continuous>    N23 @ 07:  -  23 @ 07:00  --------------------------------------------------------  OUT: 850 mL                                                                                 DRAINS:   23 @ 07:  -  23 @ 07:00  --------------------------------------------------------  OUT: 1410 mL      BM:     EMESIS:     URINE:   23 @ 07:01  -  23 @ 07:00  --------------------------------------------------------  OUT: 17 mL     Indwelling Urethral Catheter:     Connect To:  Straight Drainage/[place]    Indication:  Urinary Retention / Obstruction (23 @ 08:41)    GI proph:  pantoprazole  Injectable 40 milliGRAM(s) IV Push every 12 hours    AC/ proph: heparin   Injectable 5000 Unit(s) SubCutaneous every 8 hours    ABx: meropenem  IVPB 1000 milliGRAM(s) IV Intermittent every 12 hours      PHYSICAL EXAM:  GENERAL: NAD, intubated elderly male, awake  CHEST/LUNG: Clear to auscultation bilaterally  HEART: Regular rate and rhythm  ABDOMEN: Soft, Nontender, Nondistended;   Skin: RUE ecchymoses, cold to touch and mottled   extremities: RUE no edema, no cyanosis.   Vascular: RUE ulnar, brachial pulses 2+, radial non palpable        LABS  Labs:  CAPILLARY BLOOD GLUCOSE      POCT Blood Glucose.: 191 mg/dL (2023 11:13)  POCT Blood Glucose.: 219 mg/dL (2023 06:59)  POCT Blood Glucose.: 153 mg/dL (2023 00:08)  POCT Blood Glucose.: 170 mg/dL (2023 17:30)                          7.0    19.65 )-----------( 26       ( 2023 13:33 )             20.9       Auto Neutrophil %: 94.2 % (23 @ 13:33)  Auto Immature Granulocyte %: 0.5 % (23 @ 13:33)  Auto Neutrophil %: 94.5 % (23 @ 10:30)  Auto Immature Granulocyte %: 0.5 % (23 @ 10:30)  Auto Neutrophil %: 95.4 % (23 @ 04:30)  Auto Immature Granulocyte %: 0.8 % (23 @ 04:30)        134<L>  |  99  |  33<H>  ----------------------------<  188<H>  3.9   |  21  |  0.9      Calcium: 7.8 mg/dL (23 @ 10:30)      LFTs:     Blood Gas Arterial, Lactate: 6.60 mmol/L (23 @ 04:13)  Blood Gas Arterial, Lactate: 6.10 mmol/L (23 @ 14:50)  Blood Gas Arterial, Lactate: 6.90 mmol/L (23 @ 04:22)  Blood Gas Arterial, Lactate: 7.90 mmol/L (23 @ 00:40)  Blood Gas Arterial, Lactate: 2.70 mmol/L (23 @ 04:56)  Blood Gas Arterial, Lactate: 2.10 mmol/L (23 @ 15:58)    ABG - ( 2023 04:13 )  pH: 7.36  /  pCO2: 36    /  pO2: 115   / HCO3: 20    / Base Excess: -4.7  /  SaO2: 99.9            ABG - ( 2023 14:50 )  pH: 7.42  /  pCO2: 32    /  pO2: 96    / HCO3: 21    / Base Excess: -3.1  /  SaO2: 97.9            ABG - ( 2023 04:22 )  pH: 7.41  /  pCO2: 30    /  pO2: 96    / HCO3: 19    / Base Excess: -4.9  /  SaO2: 98.5              Coags:            Urinalysis Basic - ( 2023 10:30 )    Color: x / Appearance: x / SG: x / pH: x  Gluc: 188 mg/dL / Ketone: x  / Bili: x / Urobili: x   Blood: x / Protein: x / Nitrite: x   Leuk Esterase: x / RBC: x / WBC x   Sq Epi: x / Non Sq Epi: x / Bacteria: x            RADIOLOGY & ADDITIONAL TESTS:      A/P    66yo Male with pmh of hypertension, massive Grade IV hydronephrosis s/p left nephrostomy placed in May 2023 by [facility] presents to the ED with little to no urine output from LEFT nephrostomy from about a week. PCN  more recently drainage was changed to brown/purulent fluid. 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    PLAN:  - Patient deemed too high-risk for vascular intervention  - Rec c/w bear hugger    vascular #[id number]           VASCULAR SURGERY PROGRESS NOTE     CHRISTINE VILLAVICENCIO  68y  Male  Hospital day :18d    Procedures:     Insertion, arterial line, percutaneous  Central venous catheter placement with ultrasound guidance  Midline catheter insertion  Nephrostolithotomy, percutaneous, with lithotripsy, large stone  Change external ureteral stent  Nephrostogram  Insertion of arterial line with imaging guidance  Bronchoscopy, flexible, adult  Partial nephrectomy  Nephrostogram via existing catheter  Exploratory laparotomy  Left nephrectomy  Insertion, catheter, hemodialysis, non-tunneled, with US guidance      OVERNIGHT EVENTS: Patient remains on pressors, has had periods of hemodynamic instability, and on abx.     T(F): 97.3 (23 @ 14:00), Max: 203.7 (23 @ 16:00)  HR: 90 (23 @ 14:00) (82 - 94)  BP: 100/63 (23 @ 14:00) (82/63 - 108/64)  ABP: 118/51 (23 @ 14:00) (72/62 - 135/74)  ABP(mean): 68 (23 @ 14:00) (64 - 88)  RR: 26 (23 @ 14:00) (23 - 30)  SpO2: 99% (23 @ 14:00) (90% - 100%)    DIET/FLUIDS: lipid, fat emulsion (Fish Oil and Plant Based) 20% Infusion 1.311 Gm/kG/Day IV Continuous <Continuous>  Parenteral Nutrition - Adult 1 Each TPN Continuous <Continuous>  Parenteral Nutrition - Adult 1 Each TPN Continuous <Continuous>    N23 @ 07:  -  23 @ 07:00  --------------------------------------------------------  OUT: 850 mL                                                                                 DRAINS:   23 @ 07:  -  23 @ 07:00  --------------------------------------------------------  OUT: 1410 mL      BM:     EMESIS:     URINE:   23 @ 07:01  -  23 @ 07:00  --------------------------------------------------------  OUT: 17 mL     Indwelling Urethral Catheter:     Connect To:  Straight Drainage/[place]    Indication:  Urinary Retention / Obstruction (23 @ 08:41)    GI proph:  pantoprazole  Injectable 40 milliGRAM(s) IV Push every 12 hours    AC/ proph: heparin   Injectable 5000 Unit(s) SubCutaneous every 8 hours    ABx: meropenem  IVPB 1000 milliGRAM(s) IV Intermittent every 12 hours      PHYSICAL EXAM:  GENERAL: NAD, intubated elderly male, awake  CHEST/LUNG: Clear to auscultation bilaterally  HEART: Regular rate and rhythm  ABDOMEN: Soft, Nontender, Nondistended;   Skin: RUE ecchymoses, cold to touch and mottled   extremities: RUE no edema, no cyanosis.   Vascular: RUE ulnar, brachial pulses 2+, radial non palpable        LABS  Labs:  CAPILLARY BLOOD GLUCOSE      POCT Blood Glucose.: 191 mg/dL (2023 11:13)  POCT Blood Glucose.: 219 mg/dL (2023 06:59)  POCT Blood Glucose.: 153 mg/dL (2023 00:08)  POCT Blood Glucose.: 170 mg/dL (2023 17:30)                          7.0    19.65 )-----------( 26       ( 2023 13:33 )             20.9       Auto Neutrophil %: 94.2 % (23 @ 13:33)  Auto Immature Granulocyte %: 0.5 % (23 @ 13:33)  Auto Neutrophil %: 94.5 % (23 @ 10:30)  Auto Immature Granulocyte %: 0.5 % (23 @ 10:30)  Auto Neutrophil %: 95.4 % (23 @ 04:30)  Auto Immature Granulocyte %: 0.8 % (23 @ 04:30)        134<L>  |  99  |  33<H>  ----------------------------<  188<H>  3.9   |  21  |  0.9      Calcium: 7.8 mg/dL (23 @ 10:30)      LFTs:     Blood Gas Arterial, Lactate: 6.60 mmol/L (23 @ 04:13)  Blood Gas Arterial, Lactate: 6.10 mmol/L (23 @ 14:50)  Blood Gas Arterial, Lactate: 6.90 mmol/L (23 @ 04:22)  Blood Gas Arterial, Lactate: 7.90 mmol/L (23 @ 00:40)  Blood Gas Arterial, Lactate: 2.70 mmol/L (23 @ 04:56)  Blood Gas Arterial, Lactate: 2.10 mmol/L (23 @ 15:58)    ABG - ( 2023 04:13 )  pH: 7.36  /  pCO2: 36    /  pO2: 115   / HCO3: 20    / Base Excess: -4.7  /  SaO2: 99.9            ABG - ( 2023 14:50 )  pH: 7.42  /  pCO2: 32    /  pO2: 96    / HCO3: 21    / Base Excess: -3.1  /  SaO2: 97.9            ABG - ( 2023 04:22 )  pH: 7.41  /  pCO2: 30    /  pO2: 96    / HCO3: 19    / Base Excess: -4.9  /  SaO2: 98.5              Coags:            Urinalysis Basic - ( 2023 10:30 )    Color: x / Appearance: x / SG: x / pH: x  Gluc: 188 mg/dL / Ketone: x  / Bili: x / Urobili: x   Blood: x / Protein: x / Nitrite: x   Leuk Esterase: x / RBC: x / WBC x   Sq Epi: x / Non Sq Epi: x / Bacteria: x    A/P    66yo Male with pmh of hypertension, massive Grade IV hydronephrosis s/p left nephrostomy placed in May 2023 by [facility] presents to the ED with little to no urine output from LEFT nephrostomy from about a week. PCN  more recently drainage was changed to brown/purulent fluid. 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    He underwent a procedure by surgical oncology and urology last week, during which he underwent nephrectomy and wash out of RP.  Vascular surgery was consulted intra-op for RP bleeding: there was no active bleeding at the time vascular surgery was in the OR    PLAN:    Management pre primary team.  Please call vascular surgery PRN

## 2023-07-06 NOTE — PROGRESS NOTE ADULT - SUBJECTIVE AND OBJECTIVE BOX
GENERAL SURGERY PROGRESS NOTE    Patient: CHRISTINE VILLAVICENCIO , 68y (51)Male   MRN: [id number]  Location: [place] [address] A  Visit: 23 Inpatient  Date: 23 @ 08:34    Hospital Day #: 19  Post-Op Day #: 6    Procedure/Dx/Injuries: L EMPHYSEMATOUS PYELONEPHRITIS    - S/P IR PLACEMENT OF 16FR RLQ DRAIN & UPSIZING L NEPHROSTOMY TUBE   - S/P NEPHROSTOLITHOTOMY W/ LITHOTRIPSY (KATLOWITZ)   - S/P EX LAP, RADICAL LEFT NEPHRECTOMY, WASHOUT  VENICE X3    Events of past 24 hours: Feculent output from pelvic drains, TF held. Lerma pouch 35cc, right pelvic drain 1.2L, left drain, 165cc. Failed TOV, bladder scan revealed 600cc, straight cath 300cc, bobby left in place. ON josh 0.6, vaso 0.03    7 NIGHT  -PM rounds: minimal output from all drains; pelvis & nephrectomy murFlorentin ramsey's serosang, 1.3 Josh, keeping net even on CVVH   -final surgical Cx vanc resistent enterococcus faecium   -am dig level: 1.7   -f/u ID for meropenem approval   - 600+ cc murky/feculent op from pelvic drain noted around 3am, urology aware   -AM AB.36/36/115/20 Lac. 6.6   -am phos 1.0 -> 30Na phos x 2 ordered (able to run in 2 hours by pharmacy), nephro contacted  -platelets 23 1 bag platelets to be given   - 2 U given      DAY  -concern for colonic leak w/ feculant output from JPs--> hold TF, keep OGT to suction, NPO  -HIT neg  -pressor requirement went up, CVVH: keep even   -famly meeting for tomorrow @3 pm ?   -PS 10/8 -3 hr  and then tachypneic   Digoxin level 1.0, HR 110s, gave stat dose 125mcg   -Strict NPO   -per ID recs: added dapto, increased jan and levaquin      PAST MEDICAL & SURGICAL HISTORY:      Vitals:   T(F): 96.6 (07-06-23 @ 08:00), Max: 203.7 (23 @ 16:00)  HR: 91 (23 @ 08:30)  BP: 103/56 (23 @ 08:00)  RR: 24 (23 @ 08:30)  SpO2: 100% (23 @ 08:30)  Mode: AC/ CMV (Assist Control/ Continuous Mandatory Ventilation), RR (machine): 18, TV (machine): 450, FiO2: 40, PEEP: 8, ITime: 0.9, MAP: 12, PIP: 19    Diet, NPO      Fluids:     I & O's:    23 @ 07:01  -  23 @ 07:00  --------------------------------------------------------  IN:    Dexmedetomidine: 112.1 mL    Enteral Tube Flush: 150 mL    Fat Emulsion (Fish Oil &amp; Plant Based) 20% Infusion: 313 mL    IV PiggyBack: 250 mL    IV PiggyBack: 250 mL    IV PiggyBack: 50 mL    IV PiggyBack: 150 mL    IV PiggyBack: 250 mL    Phenylephrine: 530.9 mL    Platelets - Single Donor: 200 mL    TPN (Total Parenteral Nutrition): 1560 mL    Vasopressin: 108 mL  Total IN: 3924 mL    OUT:    Drain (mL): 165 mL    Drain (mL): 1210 mL    Drain (mL): 35 mL    Indwelling Catheter - Urethral (mL): 17 mL    Nasogastric/Oral tube (mL): 850 mL    Nepro with Carb Steady: 0 mL    Other (mL): 1516 mL  Total OUT: 3793 mL    Total NET: 131 mL      PHYSICAL EXAM:  GENERAL: NAD, Sedated   CHEST/LUNG: Intubated, chest rise equal bilaterally  HEART: Regular rate and rhythm  ABDOMEN: Soft, Nontender, Nondistended; 3 drains in place, pelvic drains with feculent output; g-tube in place, NGT in place  EXTREMITIES:  No clubbing or cyanosis.    MEDICATIONS  (STANDING):  bacitracin   Ointment 1 Application(s) Topical two times a day  chlorhexidine 0.12% Liquid 15 milliLiter(s) Oral Mucosa every 12 hours  chlorhexidine 2% Cloths 1 Application(s) Topical <User Schedule>  CRRT Treatment    <Continuous>  DAPTOmycin IVPB 460 milliGRAM(s) IV Intermittent every 24 hours  dexMEDEtomidine Infusion 0.2 MICROgram(s)/kG/Hr (3.82 mL/Hr) IV Continuous <Continuous>  heparin   Injectable 5000 Unit(s) SubCutaneous every 8 hours  hydrocortisone sodium succinate Injectable 50 milliGRAM(s) IV Push every 6 hours  levoFLOXacin IVPB 750 milliGRAM(s) IV Intermittent daily  lipid, fat emulsion (Fish Oil and Plant Based) 20% Infusion 1.311 Gm/kG/Day (31.3 mL/Hr) IV Continuous <Continuous>  meropenem  IVPB 1000 milliGRAM(s) IV Intermittent every 12 hours  pantoprazole  Injectable 40 milliGRAM(s) IV Push every 12 hours  Parenteral Nutrition - Adult 1 Each (65 mL/Hr) TPN Continuous <Continuous>  phenylephrine    Infusion 0.1 MICROgram(s)/kG/Min (1.43 mL/Hr) IV Continuous <Continuous>  PureFlow Dialysate RFP-400 (K 2 / Ca 3) 5000 milliLiter(s) (2000 mL/Hr) CRRT <Continuous>  sodium phosphate 30 milliMole(s)/500 mL IVPB 30 milliMole(s) IV Intermittent once  vasopressin Infusion 0.03 Unit(s)/Min (4.5 mL/Hr) IV Continuous <Continuous>    MEDICATIONS  (PRN):  fentaNYL    Injectable 25 MICROGram(s) IV Push every 3 hours PRN Moderate Pain (4 - 6)  sodium chloride 0.9% lock flush 10 milliLiter(s) IV Push every 1 hour PRN Pre/post blood products, medications, blood draw, and to maintain line patency  sodium chloride 0.9% lock flush 10 milliLiter(s) IV Push every 1 hour PRN Pre/post blood products, medications, blood draw, and to maintain line patency      DVT PROPHYLAXIS: heparin   Injectable 5000 Unit(s) SubCutaneous every 8 hours    GI PROPHYLAXIS: pantoprazole  Injectable 40 milliGRAM(s) IV Push every 12 hours    ANTIBIOTICS:  DAPTOmycin IVPB 460 milliGRAM(s)  levoFLOXacin IVPB 750 milliGRAM(s)  meropenem  IVPB 1000 milliGRAM(s)        LAB/STUDIES:  Labs:  POCT Blood Glucose.: 219 mg/dL (2023 06:59)  POCT Blood Glucose.: 153 mg/dL (2023 00:08)  POCT Blood Glucose.: 170 mg/dL (2023 17:30)                          8.3    24.98 )-----------( 23       ( 2023 04:30 )             25.2       Auto Neutrophil %: 95.4 % (23 @ 04:30)  Auto Immature Granulocyte %: 0.8 % (23 @ 04:30)        135  |  101  |  33<H>  ----------------------------<  155<H>  3.7   |  20  |  1.0      Phosphorus: 1.0 mg/dL (23 @ 05:40)      LFTs:             2.4  | 0.3  | 12       ------------------[49      ( 2023 09:20 )  1.2  | 0.2  | 7                Blood Gas Arterial, Lactate: 6.60 mmol/L (23 @ 04:13)  Blood Gas Arterial, Lactate: 6.10 mmol/L (23 @ 14:50)  Blood Gas Arterial, Lactate: 6.90 mmol/L (23 @ 04:22)  Blood Gas Arterial, Lactate: 7.90 mmol/L (23 @ 00:40)  Blood Gas Arterial, Lactate: 2.70 mmol/L (23 @ 04:56)  Blood Gas Arterial, Lactate: 2.10 mmol/L (23 @ 15:58)    ABG - ( 2023 04:13 )  pH: 7.36  /  pCO2: 36    /  pO2: 115   / HCO3: 20    / Base Excess: -4.7  /  SaO2: 99.9      ABG - ( 2023 14:50 )  pH: 7.42  /  pCO2: 32    /  pO2: 96    / HCO3: 21    / Base Excess: -3.1  /  SaO2: 97.9      ABG - ( 2023 04:22 )  pH: 7.41  /  pCO2: 30    /  pO2: 96    / HCO3: 19    / Base Excess: -4.9  /  SaO2: 98.5        Urinalysis Basic - ( 2023 04:30 )    Color: x / Appearance: x / SG: x / pH: x  Gluc: 155 mg/dL / Ketone: x  / Bili: x / Urobili: x   Blood: x / Protein: x / Nitrite: x   Leuk Esterase: x / RBC: x / WBC x   Sq Epi: x / Non Sq Epi: x / Bacteria: x   GENERAL SURGERY PROGRESS NOTE    Patient: CHRISTINE VILLAVICENCIO , 68y (51)Male   MRN: [id number]  Location: [place] [address] A  Visit: 23 Inpatient  Date: 23 @ 08:34    Hospital Day #: 19  Post-Op Day #: 6    Procedure/Dx/Injuries: L EMPHYSEMATOUS PYELONEPHRITIS    - S/P IR PLACEMENT OF 16FR RLQ DRAIN & UPSIZING L NEPHROSTOMY TUBE   - S/P NEPHROSTOLITHOTOMY W/ LITHOTRIPSY (KATLOWITZ)   - S/P EX LAP, RADICAL LEFT NEPHRECTOMY, WASHOUT  VENICE X3    Events of past 24 hours: Feculent output from pelvic drains, TF held. Lerma pouch 35cc, right pelvic drain 1.2L, left drain, 165cc. Failed TOV, bladder scan revealed 600cc, straight cath 300cc, bobby left in place. ON josh 0.6, vaso 0.03    7 NIGHT  -PM rounds: minimal output from all drains; pelvis & nephrectomy murFlorentin ramsey's serosang, 1.3 Josh, keeping net even on CVVH   -final surgical Cx vanc resistent enterococcus faecium   -am dig level: 1.7   -f/u ID for meropenem approval   - 600+ cc murky/feculent op from pelvic drain noted around 3am, urology aware   -AM AB.36/36/115/20 Lac. 6.6   -am phos 1.0 -> 30Na phos x 2 ordered (able to run in 2 hours by pharmacy), nephro contacted  -platelets 23 1 bag platelets to be given   - 2 U given      DAY  -concern for colonic leak w/ feculant output from JPs--> hold TF, keep OGT to suction, NPO  -HIT neg  -pressor requirement went up, CVVH: keep even   -famly meeting for tomorrow @3 pm ?   -PS 10/8 -3 hr  and then tachypneic   Digoxin level 1.0, HR 110s, gave stat dose 125mcg   -Strict NPO   -per ID recs: added dapto, increased jan and levaquin      PAST MEDICAL & SURGICAL HISTORY:      Vitals:   T(F): 96.6 (07-06-23 @ 08:00), Max: 203.7 (23 @ 16:00)  HR: 91 (23 @ 08:30)  BP: 103/56 (23 @ 08:00)  RR: 24 (23 @ 08:30)  SpO2: 100% (23 @ 08:30)  Mode: AC/ CMV (Assist Control/ Continuous Mandatory Ventilation), RR (machine): 18, TV (machine): 450, FiO2: 40, PEEP: 8, ITime: 0.9, MAP: 12, PIP: 19    Diet, NPO      Fluids:     I & O's:    23 @ 07:01  -  23 @ 07:00  --------------------------------------------------------  IN:    Dexmedetomidine: 112.1 mL    Enteral Tube Flush: 150 mL    Fat Emulsion (Fish Oil &amp; Plant Based) 20% Infusion: 313 mL    IV PiggyBack: 250 mL    IV PiggyBack: 250 mL    IV PiggyBack: 50 mL    IV PiggyBack: 150 mL    IV PiggyBack: 250 mL    Phenylephrine: 530.9 mL    Platelets - Single Donor: 200 mL    TPN (Total Parenteral Nutrition): 1560 mL    Vasopressin: 108 mL  Total IN: 3924 mL    OUT:    Drain (mL): 165 mL    Drain (mL): 1210 mL    Drain (mL): 35 mL    Indwelling Catheter - Urethral (mL): 17 mL    Nasogastric/Oral tube (mL): 850 mL    Nepro with Carb Steady: 0 mL    Other (mL): 1516 mL  Total OUT: 3793 mL    Total NET: 131 mL      PHYSICAL EXAM:  GENERAL: NAD, Sedated   CHEST/LUNG: Intubated, chest rise equal bilaterally  HEART: Regular rate and rhythm  ABDOMEN: Soft, Nontender, Nondistended; 3 drains in place, pelvic drains with feculent output; NGT in place  EXTREMITIES:  No clubbing or cyanosis.    MEDICATIONS  (STANDING):  bacitracin   Ointment 1 Application(s) Topical two times a day  chlorhexidine 0.12% Liquid 15 milliLiter(s) Oral Mucosa every 12 hours  chlorhexidine 2% Cloths 1 Application(s) Topical <User Schedule>  CRRT Treatment    <Continuous>  DAPTOmycin IVPB 460 milliGRAM(s) IV Intermittent every 24 hours  dexMEDEtomidine Infusion 0.2 MICROgram(s)/kG/Hr (3.82 mL/Hr) IV Continuous <Continuous>  heparin   Injectable 5000 Unit(s) SubCutaneous every 8 hours  hydrocortisone sodium succinate Injectable 50 milliGRAM(s) IV Push every 6 hours  levoFLOXacin IVPB 750 milliGRAM(s) IV Intermittent daily  lipid, fat emulsion (Fish Oil and Plant Based) 20% Infusion 1.311 Gm/kG/Day (31.3 mL/Hr) IV Continuous <Continuous>  meropenem  IVPB 1000 milliGRAM(s) IV Intermittent every 12 hours  pantoprazole  Injectable 40 milliGRAM(s) IV Push every 12 hours  Parenteral Nutrition - Adult 1 Each (65 mL/Hr) TPN Continuous <Continuous>  phenylephrine    Infusion 0.1 MICROgram(s)/kG/Min (1.43 mL/Hr) IV Continuous <Continuous>  PureFlow Dialysate RFP-400 (K 2 / Ca 3) 5000 milliLiter(s) (2000 mL/Hr) CRRT <Continuous>  sodium phosphate 30 milliMole(s)/500 mL IVPB 30 milliMole(s) IV Intermittent once  vasopressin Infusion 0.03 Unit(s)/Min (4.5 mL/Hr) IV Continuous <Continuous>    MEDICATIONS  (PRN):  fentaNYL    Injectable 25 MICROGram(s) IV Push every 3 hours PRN Moderate Pain (4 - 6)  sodium chloride 0.9% lock flush 10 milliLiter(s) IV Push every 1 hour PRN Pre/post blood products, medications, blood draw, and to maintain line patency  sodium chloride 0.9% lock flush 10 milliLiter(s) IV Push every 1 hour PRN Pre/post blood products, medications, blood draw, and to maintain line patency      DVT PROPHYLAXIS: heparin   Injectable 5000 Unit(s) SubCutaneous every 8 hours    GI PROPHYLAXIS: pantoprazole  Injectable 40 milliGRAM(s) IV Push every 12 hours    ANTIBIOTICS:  DAPTOmycin IVPB 460 milliGRAM(s)  levoFLOXacin IVPB 750 milliGRAM(s)  meropenem  IVPB 1000 milliGRAM(s)        LAB/STUDIES:  Labs:  POCT Blood Glucose.: 219 mg/dL (2023 06:59)  POCT Blood Glucose.: 153 mg/dL (2023 00:08)  POCT Blood Glucose.: 170 mg/dL (2023 17:30)                          8.3    24.98 )-----------( 23       ( 2023 04:30 )             25.2       Auto Neutrophil %: 95.4 % (23 @ 04:30)  Auto Immature Granulocyte %: 0.8 % (23 @ 04:30)        135  |  101  |  33<H>  ----------------------------<  155<H>  3.7   |  20  |  1.0      Phosphorus: 1.0 mg/dL (23 @ 05:40)      LFTs:             2.4  | 0.3  | 12       ------------------[49      ( 2023 09:20 )  1.2  | 0.2  | 7                Blood Gas Arterial, Lactate: 6.60 mmol/L (23 @ 04:13)  Blood Gas Arterial, Lactate: 6.10 mmol/L (23 @ 14:50)  Blood Gas Arterial, Lactate: 6.90 mmol/L (23 @ 04:22)  Blood Gas Arterial, Lactate: 7.90 mmol/L (23 @ 00:40)  Blood Gas Arterial, Lactate: 2.70 mmol/L (23 @ 04:56)  Blood Gas Arterial, Lactate: 2.10 mmol/L (23 @ 15:58)    ABG - ( 2023 04:13 )  pH: 7.36  /  pCO2: 36    /  pO2: 115   / HCO3: 20    / Base Excess: -4.7  /  SaO2: 99.9      ABG - ( 2023 14:50 )  pH: 7.42  /  pCO2: 32    /  pO2: 96    / HCO3: 21    / Base Excess: -3.1  /  SaO2: 97.9      ABG - ( 2023 04:22 )  pH: 7.41  /  pCO2: 30    /  pO2: 96    / HCO3: 19    / Base Excess: -4.9  /  SaO2: 98.5        Urinalysis Basic - ( 2023 04:30 )    Color: x / Appearance: x / SG: x / pH: x  Gluc: 155 mg/dL / Ketone: x  / Bili: x / Urobili: x   Blood: x / Protein: x / Nitrite: x   Leuk Esterase: x / RBC: x / WBC x   Sq Epi: x / Non Sq Epi: x / Bacteria: x   GENERAL SURGERY PROGRESS NOTE    Patient: CHRISTINE VILLAVICENCIO , 68y (51)Male   MRN: [id number]  Location: [place] [address] A  Visit: 23 Inpatient  Date: 23 @ 08:34    Hospital Day #: 19  Post-Op Day #: 6    Procedure/Dx/Injuries: L EMPHYSEMATOUS PYELONEPHRITIS    - S/P IR PLACEMENT OF 16FR RLQ DRAIN & UPSIZING L NEPHROSTOMY TUBE   - S/P NEPHROSTOLITHOTOMY W/ LITHOTRIPSY (KATLOWITZ)   - S/P EX LAP, RADICAL LEFT NEPHRECTOMY, WASHOUT  VENICE X3    Events of past 24 hours: Feculent output from pelvic drains, TF held. Lerma pouch 35cc, right pelvic drain 1.2L, left drain, 165cc. ON josh 0.6, vaso 0.03     NIGHT  -PM rounds: minimal output from all drains; pelvis & nephrectomy murky, Lerma's serosang, 1.3 Josh, keeping net even on CVVH   -final surgical Cx vanc resistent enterococcus faecium   -am dig level: 1.7   -f/u ID for meropenem approval   - 600+ cc murky/feculent op from pelvic drain noted around 3am, urology aware   -AM AB.36/36/115/20 Lac. 6.6   -am phos 1.0 -> 30Na phos x 2 ordered (able to run in 2 hours by pharmacy), nephro contacted  -platelets 23 1 bag platelets to be given   - 2 U given      DAY  -concern for colonic leak w/ feculant output from JPs--> hold TF, keep OGT to suction, NPO  -HIT neg  -pressor requirement went up, CVVH: keep even   -famly meeting for tomorrow @3 pm ?   -PS 10/8 -3 hr  and then tachypneic   Digoxin level 1.0, HR 110s, gave stat dose 125mcg   -Strict NPO   -per ID recs: added dapto, increased jan and levaquin      PAST MEDICAL & SURGICAL HISTORY:      Vitals:   T(F): 96.6 (23 @ 08:00), Max: 203.7 (23 @ 16:00)  HR: 91 (23 @ 08:30)  BP: 103/56 (23 @ 08:00)  RR: 24 (23 @ 08:30)  SpO2: 100% (23 @ 08:30)  Mode: AC/ CMV (Assist Control/ Continuous Mandatory Ventilation), RR (machine): 18, TV (machine): 450, FiO2: 40, PEEP: 8, ITime: 0.9, MAP: 12, PIP: 19    Diet, NPO      Fluids:     I & O's:    23 @ 07:01  -  23 @ 07:00  --------------------------------------------------------  IN:    Dexmedetomidine: 112.1 mL    Enteral Tube Flush: 150 mL    Fat Emulsion (Fish Oil &amp; Plant Based) 20% Infusion: 313 mL    IV PiggyBack: 250 mL    IV PiggyBack: 250 mL    IV PiggyBack: 50 mL    IV PiggyBack: 150 mL    IV PiggyBack: 250 mL    Phenylephrine: 530.9 mL    Platelets - Single Donor: 200 mL    TPN (Total Parenteral Nutrition): 1560 mL    Vasopressin: 108 mL  Total IN: 3924 mL    OUT:    Drain (mL): 165 mL    Drain (mL): 1210 mL    Drain (mL): 35 mL    Indwelling Catheter - Urethral (mL): 17 mL    Nasogastric/Oral tube (mL): 850 mL    Nepro with Carb Steady: 0 mL    Other (mL): 1516 mL  Total OUT: 3793 mL    Total NET: 131 mL      PHYSICAL EXAM:  GENERAL: NAD, Sedated   CHEST/LUNG: Intubated, chest rise equal bilaterally  HEART: Regular rate and rhythm  ABDOMEN: Soft, Nontender, Nondistended; 3 drains in place, pelvic drains with feculent output; NGT in place  EXTREMITIES:  No clubbing or cyanosis.    MEDICATIONS  (STANDING):  bacitracin   Ointment 1 Application(s) Topical two times a day  chlorhexidine 0.12% Liquid 15 milliLiter(s) Oral Mucosa every 12 hours  chlorhexidine 2% Cloths 1 Application(s) Topical <User Schedule>  CRRT Treatment    <Continuous>  DAPTOmycin IVPB 460 milliGRAM(s) IV Intermittent every 24 hours  dexMEDEtomidine Infusion 0.2 MICROgram(s)/kG/Hr (3.82 mL/Hr) IV Continuous <Continuous>  heparin   Injectable 5000 Unit(s) SubCutaneous every 8 hours  hydrocortisone sodium succinate Injectable 50 milliGRAM(s) IV Push every 6 hours  levoFLOXacin IVPB 750 milliGRAM(s) IV Intermittent daily  lipid, fat emulsion (Fish Oil and Plant Based) 20% Infusion 1.311 Gm/kG/Day (31.3 mL/Hr) IV Continuous <Continuous>  meropenem  IVPB 1000 milliGRAM(s) IV Intermittent every 12 hours  pantoprazole  Injectable 40 milliGRAM(s) IV Push every 12 hours  Parenteral Nutrition - Adult 1 Each (65 mL/Hr) TPN Continuous <Continuous>  phenylephrine    Infusion 0.1 MICROgram(s)/kG/Min (1.43 mL/Hr) IV Continuous <Continuous>  PureFlow Dialysate RFP-400 (K 2 / Ca 3) 5000 milliLiter(s) (2000 mL/Hr) CRRT <Continuous>  sodium phosphate 30 milliMole(s)/500 mL IVPB 30 milliMole(s) IV Intermittent once  vasopressin Infusion 0.03 Unit(s)/Min (4.5 mL/Hr) IV Continuous <Continuous>    MEDICATIONS  (PRN):  fentaNYL    Injectable 25 MICROGram(s) IV Push every 3 hours PRN Moderate Pain (4 - 6)  sodium chloride 0.9% lock flush 10 milliLiter(s) IV Push every 1 hour PRN Pre/post blood products, medications, blood draw, and to maintain line patency  sodium chloride 0.9% lock flush 10 milliLiter(s) IV Push every 1 hour PRN Pre/post blood products, medications, blood draw, and to maintain line patency      DVT PROPHYLAXIS: heparin   Injectable 5000 Unit(s) SubCutaneous every 8 hours    GI PROPHYLAXIS: pantoprazole  Injectable 40 milliGRAM(s) IV Push every 12 hours    ANTIBIOTICS:  DAPTOmycin IVPB 460 milliGRAM(s)  levoFLOXacin IVPB 750 milliGRAM(s)  meropenem  IVPB 1000 milliGRAM(s)        LAB/STUDIES:  Labs:  POCT Blood Glucose.: 219 mg/dL (2023 06:59)  POCT Blood Glucose.: 153 mg/dL (2023 00:08)  POCT Blood Glucose.: 170 mg/dL (2023 17:30)                          8.3    24.98 )-----------( 23       ( 2023 04:30 )             25.2       Auto Neutrophil %: 95.4 % (23 @ 04:30)  Auto Immature Granulocyte %: 0.8 % (23 @ 04:30)        135  |  101  |  33<H>  ----------------------------<  155<H>  3.7   |  20  |  1.0      Phosphorus: 1.0 mg/dL (23 @ 05:40)      LFTs:             2.4  | 0.3  | 12       ------------------[49      ( 2023 09:20 )  1.2  | 0.2  | 7                Blood Gas Arterial, Lactate: 6.60 mmol/L (23 @ 04:13)  Blood Gas Arterial, Lactate: 6.10 mmol/L (23 @ 14:50)  Blood Gas Arterial, Lactate: 6.90 mmol/L (23 @ 04:22)  Blood Gas Arterial, Lactate: 7.90 mmol/L (23 @ 00:40)  Blood Gas Arterial, Lactate: 2.70 mmol/L (23 @ 04:56)  Blood Gas Arterial, Lactate: 2.10 mmol/L (23 @ 15:58)    ABG - ( 2023 04:13 )  pH: 7.36  /  pCO2: 36    /  pO2: 115   / HCO3: 20    / Base Excess: -4.7  /  SaO2: 99.9      ABG - ( 2023 14:50 )  pH: 7.42  /  pCO2: 32    /  pO2: 96    / HCO3: 21    / Base Excess: -3.1  /  SaO2: 97.9      ABG - ( 2023 04:22 )  pH: 7.41  /  pCO2: 30    /  pO2: 96    / HCO3: 19    / Base Excess: -4.9  /  SaO2: 98.5        Urinalysis Basic - ( 2023 04:30 )    Color: x / Appearance: x / SG: x / pH: x  Gluc: 155 mg/dL / Ketone: x  / Bili: x / Urobili: x   Blood: x / Protein: x / Nitrite: x   Leuk Esterase: x / RBC: x / WBC x   Sq Epi: x / Non Sq Epi: x / Bacteria: x

## 2023-07-06 NOTE — PROGRESS NOTE ADULT - ASSESSMENT
67 year old man with history of hydronephrosis s/p PCN placement, HTN, anemia, H pylori presents with abdominal pain and no output from PCN. HOspital course complicated by septic shock in the setting of emphysematous pyelonephritis, hematochezia, respiratory failure requiring mechanical ventilation. Palliative care consulted for [facility].     Had family meeting with daughter Tracey zhao, SICU team, surgery team, and Palliative. Discussed with the family regarding patient's condition and prognosis going forward. The teams gave medical updates as well as the prognosis for the patient considering his condition and diagnoses. Patient was pending final biopsy result with the preliminary result being discussed with the family as well. The subject of DNR was brought up for the patient considering his decompensated status but at this time the daughter was saying to continue to do everything and that she is "holding out hope for him to go home and walk again." Daughter is still interested in a trach for the patient since he has been intubated for an extended period of time but as per the team, he would most likely not do well with a trach and that he has a poor overall prognosis. Family wants to wait for final biopsy results to come back in and for his infection to hopefully clear up.    Education about palliative care provided to patient/family.  See Recs below.    Please call [phone] with questions or concerns 24/7.   We will continue to follow.

## 2023-07-06 NOTE — PROGRESS NOTE ADULT - SUBJECTIVE AND OBJECTIVE BOX
HPI:  67M w/ PMH of HTN, grade IV hydronephrosis of L kidney s/p L PCN (placed 5/2023), stutter, hiatal hernia, iron deficiency anemia, H Pylori (untreated), and gastric mass (never biopsied) presents to the ED with at least 4 days of worsening weakness, abdominal distension, and no output from his PCN. Pt and daughters bedside are only able to give limited information, but for the past few days, he has noticed no output from his PCN as well as increasing abdominal distension, pain, and anorexia. He reports that he has not felt normal for the past 10 days. His PCN used to put out serosanguinous fluid, but the output changed to feculent/brown liquid over the past day. In the ED, he was noted to be in new onset afib w/ RVR to 150+ as well as experiencing some difficulty breathing. He was started on BiPAP. Stat labs were notable for WBC 47, Cr 2.9, and lactate 6. CT A/P w/ IV contrast showed free intraperitoneal fluid and air from an unknown source as well as fluid and air in the L kidney.  (18 Jun 2023 18:34)    Interval history  -Patient seen at bedside  -He remains intubated and sedated and unable to participate in exam  -s/p nephrectomy   -Family meeting 7/6/23    ADVANCE DIRECTIVES:    [x ] Full Code [ ] DNR  MOLST  [ ]  Living Will  [ ]   DECISION MAKER(s):  [x ] Health Care Proxy(s)  [ ] Surrogate(s)  [ ] Guardian           Name(s): Phone Number(s):  Tracey    BASELINE (I)ADL(s) (prior to admission):  [place]: [ ]Total  [ ] Moderate [ ]Dependent  Palliative Performance Status Version 2:         %    http://npcrc.org/files/news/palliative_performance_scale_[email or web address].pdf    Allergies    No Known Allergies    Intolerances    MEDICATIONS  (STANDING):  bacitracin   Ointment 1 Application(s) Topical two times a day  chlorhexidine 0.12% Liquid 15 milliLiter(s) Oral Mucosa every 12 hours  chlorhexidine 2% Cloths 1 Application(s) Topical <User Schedule>  dexMEDEtomidine Infusion 0.2 MICROgram(s)/kG/Hr (3.82 mL/Hr) IV Continuous <Continuous>  heparin   Injectable 5000 Unit(s) SubCutaneous every 8 hours  hydrocortisone sodium succinate Injectable 50 milliGRAM(s) IV Push every 6 hours  lipid, fat emulsion (Fish Oil and Plant Based) 20% Infusion 1.311 Gm/kG/Day (31.3 mL/Hr) IV Continuous <Continuous>  meropenem  IVPB 1000 milliGRAM(s) IV Intermittent every 12 hours  pantoprazole  Injectable 40 milliGRAM(s) IV Push every 12 hours  Parenteral Nutrition - Adult 1 Each (65 mL/Hr) TPN Continuous <Continuous>  Parenteral Nutrition - Adult 1 Each (65 mL/Hr) TPN Continuous <Continuous>  phenylephrine    Infusion 0.1 MICROgram(s)/kG/Min (1.43 mL/Hr) IV Continuous <Continuous>  vasopressin Infusion 0.03 Unit(s)/Min (4.5 mL/Hr) IV Continuous <Continuous>    MEDICATIONS  (PRN):  fentaNYL    Injectable 25 MICROGram(s) IV Push every 3 hours PRN Moderate Pain (4 - 6)  sodium chloride 0.9% lock flush 10 milliLiter(s) IV Push every 1 hour PRN Pre/post blood products, medications, blood draw, and to maintain line patency  sodium chloride 0.9% lock flush 10 milliLiter(s) IV Push every 1 hour PRN Pre/post blood products, medications, blood draw, and to maintain line patency          PRESENT SYMPTOMS: [x]Unable to obtain due to poor mentation   Source if other than patient:  [ ]Family   [ ]Team     Pain: [ ]yes [ ]no  QOL impact -   Location -                    Aggravating factors -  Quality -  Radiation -  Timing-  Severity (0-10 scale):  Minimal acceptable level (0-10 scale):     CPOT:  0  https://www.sccm.org/getattachment/zda72w32-1x3f-1r6z-7p5f-7472t0252v3k/Critical-Care-Pain-Observation-Tool-(CPOT)    PAIN AD Score:   http://geriatrictoolkit.[email or web address]/cog/painad.pdf (press ctrl +  left click to view)    Dyspnea:                           [ ]None[ ]Mild [ ]Moderate [ ]Severe     Respiratory Distress Observation Scale (RDOS): 0  A score of 0 to 2 signifies little or no respiratory distress, 3 signifies mild distress, scores 4 to 6 indicate moderate distress, and scores greater than 7 signify severe distress  [email or web address]/[email or web address]/files/[email or web address]-umaihufnrmr-gwxotqlm-quwzdfqqlvi-tuhoa.pdf    Anxiety:                             [ ]None[ ]Mild [ ]Moderate [ ]Severe   Fatigue:                             [ ]None[ ]Mild [ ]Moderate [ ]Severe   Nausea:                             [ ]None[ ]Mild [ ]Moderate [ ]Severe   Loss of appetite:              [ ]None[ ]Mild [ ]Moderate [ ]Severe   Constipation:                    [ ]None[ ]Mild [ ]Moderate [ ]Severe    Other Symptoms:  [ ]All other review of systems negative     Palliative Performance Status Version 2:        10 %    http://[email or web address]/news/palliative_performance_scale_[email or web address].pdf    PHYSICAL EXAM:    ICU Vital Signs Last 24 Hrs  T(C): 36.7 (06 Jul 2023 16:00), Max: 36.7 (06 Jul 2023 16:00)  T(F): 98.1 (06 Jul 2023 16:00), Max: 98.1 (06 Jul 2023 16:00)  HR: 90 (06 Jul 2023 16:00) (83 - 94)  BP: 101/69 (06 Jul 2023 16:00) (82/63 - 109/66)  BP(mean): 80 (06 Jul 2023 16:00) (65 - 83)  ABP: 125/53 (06 Jul 2023 16:00) (72/62 - 135/74)  ABP(mean): 70 (06 Jul 2023 16:00) (64 - 88)  RR: 27 (06 Jul 2023 16:00) (23 - 30)  SpO2: 100% (06 Jul 2023 16:00) (90% - 100%)    O2 Parameters below as of 06 Jul 2023 15:00  Patient On (Oxygen Delivery Method): BiPAP/CPAP            GENERAL:  [ ] No acute distress [ ]Lethargic  [ x]Unarousable  [ ]Verbal  [ ]Non-Verbal [ ]Cachexia    BEHAVIORAL/PSYCH:  [ ]Alert and Oriented x  [ ] Anxiety [ ] Delirium [ ] Agitation [ x] Calm   EYES: [ ] No scleral icterus [ ] Scleral icterus [x ] Closed  ENMT:  [ ]Dry mouth  [x ]No external oral lesions [ ] No external ear or nose lesions  CARDIOVASCULAR:  [ ]Regular [ ]Irregular [ ]Tachy [x ]Not Tachy  [ ]Ben [ ] Edema [ ] No edema  PULMONARY:  [ ]Tachypnea  [ ]Audible excessive secretions [x ] No labored breathing [ ] labored breathing  GASTROINTESTINAL: [ ]Soft  [ ]Distended  [x ]Not distended [ ]Non tender [ ]Tender  MUSCULOSKELETAL: [ ]No clubbing [ ] clubbing  [x ] No cyanosis [ ] cyanosis  NEUROLOGIC: [ ]No focal deficits  [ ]Follows commands  [x ]Does not follow commands  [ ]Cognitive impairment  [ ]Dysphagia  [ ]Dysarthria  [ ]Paresis   SKIN: [ ] Jaundiced [x ] Non-jaundiced [ ]Rash [ ]No Rash [ ] Warm [ ] Dry  MISC/LINES: [x ] ET tube [ ] Trach [ ]NGT/OGT [ ]PEG [ x]Mena    CRITICAL CARE:  [x ] Shock Present  [ ]Septic [ ]Cardiogenic [ ]Neurologic [ ]Hypovolemic  [x ]  Vasopressors [ ]  Inotropes   [x ]Respiratory failure present [ x]Mechanical ventilation [ ]Non-invasive ventilatory support [ ]High flow  [ ]Acute  [ ]Chronic [ ]Hypoxic  [ ]Hypercarbic [ ]Other  [ ]Other organ failure     LABS: reviewed by me                                   7.0    19.65 )-----------( 26       ( 06 Jul 2023 13:33 )             20.9   07-06    136  |  102  |  35<H>  ----------------------------<  188<H>  4.2   |  21  |  1.0    Ca    7.7<L>      06 Jul 2023 15:30  Phos  2.4     07-06  Mg     1.9     07-06                    RADIOLOGY & ADDITIONAL STUDIES: reviewed by me    < from: Xray Chest 1 View- PORTABLE-Routine (06.28.23 @ 05:40) >  FINDINGS/  IMPRESSION:    NG tube terminating in the left upper quadrant. Unchanged left IJ venous   catheter.    Bibasal opacities without significant change accounting for differences   in positioning. No pneumothorax.    Stable cardiomediastinal silhouette.    Unchanged osseous structures.    < end of copied text >      EKG: reviewed by me  < from: 12 Lead ECG (06.27.23 @ 22:17) >  Ventricular Rate 96 BPM    Atrial Rate 97 BPM    QRS Duration 78 ms    Q-T Interval 290 ms    QTC Calculation(Bazett) 366 ms    R Axis 28 degrees    T Axis 227 degrees    Diagnosis Line Atrial fibrillation  Low voltage QRS  Septal infarct , age undetermined  Abnormal ECG    < end of copied text >      PROTEIN CALORIE MALNUTRITION PRESENT: [ ]mild [ ]moderate [ ]severe [ ]underweight [ ]morbid obesity  https://www.andeal.org/vault/2440/web/files/ONC/Table_Clinical%20Characteristics%20to%20Document%20Malnutrition-White%20JV%20et%20al%694959.pdf    Height (cm): 180 (06-22-23 @ 21:00), 180.3 (05-11-23 @ 08:58)  Weight (kg): 76.476 (06-22-23 @ 21:00), 66.2 (05-10-23 @ 10:54)  BMI (kg/m2): 23.6 (06-22-23 @ 21:00), 20.4 (05-11-23 @ 08:58)    [ ]PPSV2 < or = to 30% [ ]significant weight loss  [ ]poor nutritional intake  [ ]anasarca      [ ]Artificial Nutrition      REFERRALS:   [ ]Chaplaincy  [ ]Hospice  [ ]Child Life  [ x]Social Work  [ ]Case management [ ]Holistic Therapy     Patient discussed with primary medical team MD  Palliative care education provided to patient and/or family  Patient and/or family assessed for spiritual and social needs    Goals of Care Document:

## 2023-07-06 NOTE — PROGRESS NOTE ADULT - SUBJECTIVE AND OBJECTIVE BOX
· Subjective and Objective:   CHRISTINE VILLAVICENCIO  [id number]  67y Male    Indication for ICU admission: mechanical ventilatior secondary to urosepsis.    Admit Date:06-18-23  ICU Date: 6/18/23  OR Date: 6/18, 6/22, 6/30     24HRS EVENT:  7/5  NIGHT  -PM rounds: minimal output from all drains; pelvis & nephrectomy murky, Lerma's serosang, 1.3 Josh, keeping net even on CVVH   -final surgical Cx vanc resistent enterococcus faecium   -f/u ID for meropenem approval     DAY  -concern for colonic leak w/ feculant output from JPs--> hold TF, keep OGT to suction, NPO  -HIT neg  -pressor requirement went up, CVVH: keep even   -famly meeting for tomorrow @3 pm ?   -PS 10/8 -3 hr  and then tachypneic   Digoxin level 1.0, HR 110s, gave stat dose 125mcg   -Strict NPO   -per ID recs: added dapto, increased jan and levaquin    ROS:  [X] A ten-point review of systems was otherwise negative except as noted above.  [  ] Due to altered mental status/intubation, subjective information was not attained from the patient. History was obtained, to the extent possible, from review of the chart and collateral sources of information.    =========================================================================================================================================     · Subjective and Objective:   CHRISTINE VILLAVICENCIO  [id number]  67y Male    Indication for ICU admission: mechanical ventilatior secondary to urosepsis.    Admit Date:23  ICU Date: 23  OR Date: , ,      24HRS EVENT:    NIGHT  -PM rounds: minimal output from all drains; pelvis & nephrectomy murky, Lerma's serosang, 1.3 Josh, keeping net even on CVVH   -final surgical Cx vanc resistent enterococcus faecium   -am dig level: 1.7   -f/u ID for meropenem approval   - 600+ cc murky/feculent op from pelvic drain noted around 3am, urology aware   -AM AB.36/36/115/20 Lac. 6.6   -am phos 1.0 -> 30Na phos x 2 ordered (able to run in 2 hours by pharmacy), nephro contacted  -platelets 23 1 bag platelets to be given     ROS:  [X] A ten-point review of systems was otherwise negative except as noted above.  [  ] Due to altered mental status/intubation, subjective information was not attained from the patient. History was obtained, to the extent possible, from review of the chart and collateral sources of information.    =========================================================================================================================================     · Subjective and Objective:   CHRISTINE VILLAVICENCIO  [id number]  67y Male    Indication for ICU admission: mechanical ventilatior secondary to urosepsis.    Admit Date:23  ICU Date: 23  OR Date: , ,      24HRS EVENT:    NIGHT  -PM rounds: minimal output from all drains; pelvis & nephrectomy murky, Lerma's serosang, 1.3 Josh, keeping net even on CVVH   -final surgical Cx vanc resistent enterococcus faecium   -am dig level: 1.7   -f/u ID for meropenem approval   - 600+ cc murky/feculent op from pelvic drain noted around 3am, urology aware   -AM AB.36/36/115/20 Lac. 6.6   -am phos 1.0 -> 30Na phos x 2 ordered (able to run in 2 hours by pharmacy), nephro contacted  -platelets 23 1 bag platelets to be given     ROS:  [X] A ten-point review of systems was otherwise negative except as noted above.  [  ] Due to altered mental status/intubation, subjective information was not attained from the patient. History was obtained, to the extent possible, from review of the chart and collateral sources of information.    =========================================================================================================================================  Daily     Diet, NPO (23 @ 10:33)      CURRENT MEDS:  Neurologic Medications  dexMEDEtomidine Infusion 0.2 MICROgram(s)/kG/Hr IV Continuous <Continuous>  fentaNYL    Injectable 25 MICROGram(s) IV Push every 3 hours PRN Moderate Pain (4 - 6)    Cardiovascular Medications  phenylephrine    Infusion 0.1 MICROgram(s)/kG/Min IV Continuous <Continuous>    Gastrointestinal Medications  lipid, fat emulsion (Fish Oil and Plant Based) 20% Infusion 1.311 Gm/kG/Day IV Continuous <Continuous>  pantoprazole  Injectable 40 milliGRAM(s) IV Push every 12 hours  Parenteral Nutrition - Adult 1 Each TPN Continuous <Continuous>  Parenteral Nutrition - Adult 1 Each TPN Continuous <Continuous>  sodium chloride 0.9% lock flush 10 milliLiter(s) IV Push every 1 hour PRN Pre/post blood products, medications, blood draw, and to maintain line patency  sodium chloride 0.9% lock flush 10 milliLiter(s) IV Push every 1 hour PRN Pre/post blood products, medications, blood draw, and to maintain line patency    Hematologic/Oncologic Medications  heparin   Injectable 5000 Unit(s) SubCutaneous every 8 hours    Antimicrobial/Immunologic Medications  meropenem  IVPB 1000 milliGRAM(s) IV Intermittent every 12 hours    Endocrine/Metabolic Medications  hydrocortisone sodium succinate Injectable 50 milliGRAM(s) IV Push every 6 hours  vasopressin Infusion 0.03 Unit(s)/Min IV Continuous <Continuous>    Topical/Other Medications  bacitracin   Ointment 1 Application(s) Topical two times a day  chlorhexidine 0.12% Liquid 15 milliLiter(s) Oral Mucosa every 12 hours  chlorhexidine 2% Cloths 1 Application(s) Topical <User Schedule>  CRRT Treatment    <Continuous>  PureFlow Dialysate RFP-400 (K 2 / Ca 3) 5000 milliLiter(s) CRRT <Continuous>      ICU Vital Signs Last 24 Hrs  T(C): 35.7 (2023 12:00), Max: 95.4 (2023 16:00)  T(F): 96.3 (2023 12:00), Max: 203.7 (2023 16:00)  HR: 84 (2023 12:30) (82 - 94)  BP: 102/62 (2023 09:00) (82/63 - 107/55)  BP(mean): 75 (2023 09:00) (65 - 83)  ABP: 116/50 (2023 12:30) (72/62 - 135/74)  ABP(mean): 69 (2023 12:30) (64 - 88)  RR: 25 (2023 12:30) (23 - 31)  SpO2: 100% (2023 12:30) (90% - 100%)    O2 Parameters below as of 2023 12:00  Patient On (Oxygen Delivery Method): BiPAP/CPAP    Mode: AC/ CMV (Assist Control/ Continuous Mandatory Ventilation)  RR (machine): 18  TV (machine): 450  FiO2: 40  PEEP: 8  ITime: 0.9  MAP: 11  PIP: 17    ABG - ( 2023 04:13 )  pH, Arterial: 7.36  pH, Blood: x     /  pCO2: 36    /  pO2: 115   / HCO3: 20    / Base Excess: -4.7  /  SaO2: 99.9                I&O's Summary    2023 07:01  -  2023 07:00  --------------------------------------------------------  IN: 3924 mL / OUT: 3793 mL / NET: 131 mL    2023 07:01  -  2023 12:56  --------------------------------------------------------  IN: 1036.4 mL / OUT: 1360 mL / NET: -323.6 mL      I&O's Detail    2023 07:01  -  2023 07:00  --------------------------------------------------------  IN:    Dexmedetomidine: 112.1 mL    Enteral Tube Flush: 150 mL    Fat Emulsion (Fish Oil &amp; Plant Based) 20% Infusion: 313 mL    IV PiggyBack: 250 mL    IV PiggyBack: 250 mL    IV PiggyBack: 50 mL    IV PiggyBack: 150 mL    IV PiggyBack: 250 mL    Phenylephrine: 530.9 mL    Platelets - Single Donor: 200 mL    TPN (Total Parenteral Nutrition): 1560 mL    Vasopressin: 108 mL  Total IN: 3924 mL    OUT:    Drain (mL): 165 mL    Drain (mL): 1210 mL    Drain (mL): 35 mL    Indwelling Catheter - Urethral (mL): 17 mL    Nasogastric/Oral tube (mL): 850 mL    Nepro with Carb Steady: 0 mL    Other (mL): 1516 mL  Total OUT: 3793 mL    Total NET: 131 mL      2023 07:01  -  2023 12:56  --------------------------------------------------------  IN:    Dexmedetomidine: 25.7 mL    Fat Emulsion (Fish Oil &amp; Plant Based) 20% Infusion: 156.5 mL    IV PiggyBack: 50 mL    IV PiggyBack: 416.5 mL    Phenylephrine: 40.2 mL    TPN (Total Parenteral Nutrition): 325 mL    Vasopressin: 22.5 mL  Total IN: 1036.4 mL    OUT:    Blood Loss (mL): 174 mL    Drain (mL): 75 mL    Drain (mL): 7 mL    Drain (mL): 15 mL    Indwelling Catheter - Urethral (mL): 7 mL    Nasogastric/Oral tube (mL): 700 mL    Other (mL): 382 mL  Total OUT: 1360 mL    Total NET: -323.6 mL          PHYSICAL EXAM:    General/Neuro  RASS:  0 to -1 GCS: 11T   Deficits: Alert & oriented x 3, no focal deficits  Lungs: Clear to auscultation, Normal expansion/effort.   Cardiovascular : Afib. Peripheral edema grade 4  Cardiac Rhythm: Afib.  GI: Abdomen soft, Non-tender, Non-distended.    3 drains in place, drains 1 and 3 murky / feculent fluid. Drain 2 (Lerma's pouch) serosanguinous drainage    Wound:  Extremities: Purple discoloration of Right digits and Left 1st toe, on prolonged pressors. Skin cool, capillary refill delayed.  Derm: Sacrococcygeal DTI  : Scrotal swelling. Mena catheter in place.      CXR:       LABS:  CAPILLARY BLOOD GLUCOSE      POCT Blood Glucose.: 191 mg/dL (2023 11:13)  POCT Blood Glucose.: 219 mg/dL (2023 06:59)  POCT Blood Glucose.: 153 mg/dL (2023 00:08)  POCT Blood Glucose.: 170 mg/dL (2023 17:30)                          7.3    19.39 )-----------( 34       ( 2023 10:30 )             22.1       07-06    134<L>  |  99  |  33<H>  ----------------------------<  188<H>  3.9   |  21  |  0.9    Ca    7.8<L>      2023 10:30  Phos  2.4     07-06  Mg     1.9     07-06              Urinalysis Basic - ( 2023 10:30 )    Color: x / Appearance: x / SG: x / pH: x  Gluc: 188 mg/dL / Ketone: x  / Bili: x / Urobili: x   Blood: x / Protein: x / Nitrite: x   Leuk Esterase: x / RBC: x / WBC x   Sq Epi: x / Non Sq Epi: x / Bacteria: x

## 2023-07-06 NOTE — PROGRESS NOTE ADULT - ASSESSMENT
Assessment & Plan:  67M w/ PMH of HTN, grade IV hydronephrosis of L kidney s/p L PCN (placed 5/2023), stutter, hiatal hernia, iron deficiency anemia, H Pylori (untreated), and gastric mass (never biopsied) presents to the ED with at least 4 days of worsening weakness, abdominal distension, and no output from his PCN. Admitted with emphysematous pyelonephritis.     (6/18): s/p LT PCN upsizing to 16Fr and 16Fr RLQ drain placement with IR   (6/22): s/p shock lithotripsy of L kidney abscess cavity with drainage of thick contents, upsize of drain to 26Fr bobby catheter  (6/30): s/p ex-lap and L nephrectomy with abdominal washout    NEURO:  #Pain control    -APAP PRN    -Gabapentin 200 q 8     -fent 25 q 3 PRN while intubated  #Sedation    -precedex gtt off, alert responsive and follows commands    -following commands when off sedation, GCS 11T    RESP:  #acute respiratory failure with left lung mucous plugging - resolved s/p intubated 6/24 and    and bronchoscopy   RR (machine): 18, TV (machine): 450, FiO2: 40, PEEP: 8  07-05 @ 14:50--7.42 / 32 / 96 / 21 / 97.9  O2 97.9  Lac 6.10    -tolerated PS 10/8    #Pulmonary nodules / Mediastinal lymphadenopathy    - CT Chest: Right middle and upper lobe solid pulmonary nodules, f/u outpatient pulm   #Activity   - incr as tolerated    CARDS:   #Septic shock    - off levo since 3am 7/1, remains at [facility] @ 1.5 mcq  #New onset Afib w/ RVR with hypotension -- likely 2/2 sepsis    - HR control with Metoprolol 2.5 q6 IV (HOLDING)  -Digoxin 250mcg x1 dose, 125mcg x1 7/1, 125mcg 7/4, f/u am level 7/5    - rate better controlled: off metoprolol    - Cardiology following  - Volume -2066.5    #H/O HTN    -Amlodipine 2.5mg HOLDING   Imaging    - ECHO: (6/19) EF 55-60%, mild AS, mild LAE, mild MR/TR, trivial pericardial effusion    GI/NUTR:  #Diet    NPO except meds; trickle feeds Nepro @20cc  TPN c/s placed   #bowel movements    - GI consult 6/26- Plan for EGD with EUS + FNA as outpatient, H-pylori treatment after      resolution of acute issues   -dignishield in place   -FOBT positive    #GI PPX  -IV Protonix 40 q12 hours  #Gastric mass vs gastrohepatic lymphadenopathy    -CT A/P: lesser curvature mass , Hepatic hypodensities  #Splenic Hypodensities, infarct vs phlegmon/abscess    -CT A/P:  Wedge-shaped region of hypodensity within the spleen    /RENAL:   #Grade IV L hydronephrosis s/p L PCN (5/2023) -- presented to ED with thick dark foul smelling output  #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  -intermittent suction/irrigation per urology; f/u cx and cytology from 6/25     -(6/23) s/p LT PCN upsizing to 26Fr catheter, RLQ 16 Fr drain remains, s/p nephrostolithotomy w/ lithotripsy    -(6/18) s/p LT PCN upsizing to 16Fr and 16Fr RLQ drain placement with IR, flush q8 hr  (6/30): s/p L nephrectomy and abdominal washout   -R pelvic drain Creatinine 3.99    High suspicion of renal malignancy given recent pathology  #metabolic acidosis  -Nephrology c/s - CVVHD started 07/02, currentn  goal to keep net even  RR (machine): 18, TV (machine): 450, FiO2: 40, PEEP: 8  07-05 @ 14:50--7.42 / 32 / 96 / 21 / 97.9  O2 97.9  Lac 6.10      #decreased urine output in the setting of acute renal failure 2/2 septic shock    HEME/ONC:   #DVT prophylaxis  -SCDs, heparin subQ   #new onset Afib    - holding heparin infusion in setting of hematochezia     Labs: DVT propylaxis- HSQ     #R radial artery occlusion  -RUE duplex 07/01 + for r radial artery occlusion  #RT posterior popliteal vein DVT  -vascular c/s - rec AC     Labs: Hb/Hct:  8.5/24.8  -->,  9.1/27.4  -->                      Plts:  51  -->,  44  -->                 PTT/INR:        T&S REORDERED    #H/o Fe Deficiency anemia  #Thrombocytopenia  Heme consult (7/3):   - low risk for HIT (score 2-3)  - Heme/onc wants full HIT workup although low HIT risk  - send HIT antibody, if positive then send BRUCE  - risk of full anticoagulation outweigh the benefits due to high risk of bleeding (h/o GI bleed and post-nephrectomy), will hold off on AC and will wait for HIT AB screen results, if new DVT then will re-assess; discussed with SICU attending Dr. Orozco who also discussed this with hematology team and Dr. Womack who are all in agreement.  - will keep hep subQ prophylaxis for now    ID:  #Leukocytosis 2/2 sepsis   WBC- 29.09  --->>,  27.25  --->>,  35.37  --->>  Temp trend- 24hrs T(F): 96.6 (07-06 @ 00:00), Max: 203.7 (07-05 @ 16:00)  Current antibiotics:     -Levaquin 750 QD    -Meropenum 1g q12; PENDING ID APPROVAL     -added Dapto 6mg/kg 460mg QD    Afebrile   #antibiotics    -Levaquin 500mg q48 (6/21-    -Meropenem 500q12 (6/20-    -As per ID: no need for Vancomycin or Daptomycin in the absence of MRSA    -discontinued caspofungin (as per ID, since fungitell is unremarkable)  #Cultures  Surgical swab 06/30: vanc resistent enterococcus faecium     L PCN cyto-pathology  6/25: results suggestive of a necrotic neoplasm    Funtitell 6/26: 47    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    BAL 6/24: moderate yeast     UCx 6/19: negative FINAL      Left PCN aspiration 6/18: stenotrophomonas maltophilia  Needs source control    ENDO:  #Glucose monitoring    -A1c (5/7/23): 5.0    -POCT q6 hours while NPO   #Adrenal Insufficiency 2/2 to sepsis     -restarted solucortef 50q6 for 6 days starting 07/02      -Cortisol level - binding globulin 1.1 (low), serum cortisol, 84, free cortisol 76, % free cortisol 91    MSK:  #Sacrococcygeal DTI: wound care following- cleanse with soap and water, apply triad, #gauze, and allevyn BID and PRN for soiling   Venous ulcer right lower leg, R forearm skin tear: Pat dry, apply Xeroform, nonadherent dressing, wrap with Kerlix twice a day, prn for soiling  Burn consulted, recs:  --> sacrum: apply hydrogel and Allevyn pad  --> b/l ischium: Allevyn pad  - offloading/positioning  #Activity    -Advance as tolerated    -PT/rehab once extubated    -Globally deconditioned  #b/l UE, LE mottled skin  -no radial pulses b/l  +ulnar, brachial b/l UE, +DP b/l  -Arterial duplex - R radial occlusion   -DVT sono- prelim R PT DVT     LINES/DRAINS:   PIV  Bobby (6/18)  L eleazar drain (6/30)  L IJ TLC (6/26)  R IJ Uldall (07/02)  R Lerma pouch #1, pelvic drain #2 (6/30)  Left basilic midline  6/21  Right axillary a line (6/30-7/4)  Right Radial Joleen (6/22-6/30)  L radial Joleen (6/18-6/22)  R-Femoral [place] (7/5- _ )      ETT  OGT    ADVANCED DIRECTIVES:  Full Code  HCP/Emergency Contact- Tracey Adames: [phone]   Palliative following. Last GOC discussion 6/26 day.  GOC meeting pending    INDICATION FOR SICU: New onset atrial fibrillation w/ mechanical ventilation    DISPO: SICU Assessment & Plan:  67M w/ PMH of HTN, grade IV hydronephrosis of L kidney s/p L PCN (placed 5/2023), stutter, hiatal hernia, iron deficiency anemia, H Pylori (untreated), and gastric mass (never biopsied) presents to the ED with at least 4 days of worsening weakness, abdominal distension, and no output from his PCN. Admitted with emphysematous pyelonephritis.     (6/18): s/p LT PCN upsizing to 16Fr and 16Fr RLQ drain placement with IR   (6/22): s/p shock lithotripsy of L kidney abscess cavity with drainage of thick contents, upsize of drain to 26Fr bobby catheter  (6/30): s/p ex-lap and L nephrectomy with abdominal washout    NEURO:  #Pain control    -APAP PRN    -Gabapentin 200 q 8     -fent 25 q 3 PRN while intubated  #Sedation    -precedex gtt off, alert responsive and follows commands    -following commands when off sedation, GCS 11T    RESP:  #acute respiratory failure with left lung mucous plugging - resolved s/p intubated 6/24 and    and bronchoscopy   RR (machine): 18, TV (machine): 450, FiO2: 40, PEEP: 8  07-05 @ 14:50--7.42 / 32 / 96 / 21 / 97.9  O2 97.9  Lac 6.10    -tolerated PS 10/8    #Pulmonary nodules / Mediastinal lymphadenopathy    - CT Chest: Right middle and upper lobe solid pulmonary nodules, f/u outpatient pulm   #Activity   - incr as tolerated    CARDS:   #Septic shock    - off levo since 3am 7/1, remains at [facility]  #New onset Afib w/ RVR with hypotension -- likely 2/2 sepsis    - HR control with Metoprolol 2.5 q6 IV (HOLDING)  -Digoxin 250mcg x1 dose, 125mcg x1 7/1, 125mcg 7/4, f/u am level 7/5    - rate better controlled: off metoprolol    - Cardiology following    #H/O HTN    -Amlodipine 2.5mg HOLDING   Imaging    - ECHO: (6/19) EF 55-60%, mild AS, mild LAE, mild MR/TR, trivial pericardial effusion    GI/NUTR:  #Diet: TPN  #bowel movements    - GI consult 6/26- Plan for EGD with EUS + FNA as outpatient, H-pylori treatment after resolution of acute issues   -dignishield in place   -FOBT positive    #GI PPX  -IV Protonix 40 q12 hours  #Gastric mass vs gastrohepatic lymphadenopathy    -CT A/P: lesser curvature mass , Hepatic hypodensities  #Splenic Hypodensities, infarct vs phlegmon/abscess    -CT A/P:  Wedge-shaped region of hypodensity within the spleen    /RENAL:   #Grade IV L hydronephrosis s/p L PCN (5/2023) -- presented to ED with thick dark foul smelling output  #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  -intermittent suction/irrigation per urology; f/u cx and cytology from 6/25     -(6/23) s/p LT PCN upsizing to 26Fr catheter, RLQ 16 Fr drain remains, s/p nephrostolithotomy w/ lithotripsy    -(6/18) s/p LT PCN upsizing to 16Fr and 16Fr RLQ drain placement with IR, flush q8 hr  (6/30): s/p L nephrectomy and abdominal washout   -R pelvic drain Creatinine 3.99    High suspicion of renal malignancy given recent pathology  #metabolic acidosis  -Nephrology c/s - CVVHD started 07/02, current goal to keep net even    #decreased urine output in the setting of acute renal failure 2/2 septic shock    HEME/ONC:   #DVT prophylaxis  -SCDs, heparin subQ   #new onset Afib    - holding heparin infusion in setting of hematochezia     Labs: DVT propylaxis- HSQ     #R radial artery occlusion  -RUE duplex 07/01 + for r radial artery occlusion  #RT posterior popliteal vein DVT  -vascular c/s - rec AC     Labs: Hb/Hct:  8.5/24.8  -->,  9.1/27.4  -->                      Plts:  51  -->,  44  -->                 PTT/INR:      T&S REORDERED    #H/o Fe Deficiency anemia  #Thrombocytopenia  Heme consult (7/3):   - low risk for HIT (score 2-3)  - Heme/onc wants full HIT workup although low HIT risk  - HIT negative, BRUCE pending   - risk of full anticoagulation outweigh the benefits due to high risk of bleeding (h/o GI bleed and post-nephrectomy), will hold off on AC and will wait for HIT AB screen results, if new DVT then will re-assess; discussed with SICU attending Dr. Orozco who also discussed this with hematology team and Dr. Womack who are all in agreement.  - will keep hep subQ prophylaxis for now    ID:  #Leukocytosis 2/2 sepsis   WBC- 29.09  --->>,  27.25  --->>,  35.37  --->>  Temp trend- 24hrs T(F): 96.6 (07-06 @ 00:00), Max: 203.7 (07-05 @ 16:00)  Current antibiotics:     -Levaquin 750 QD    -Meropenum 1g q12; PENDING ID APPROVAL     -added Dapto 6mg/kg 460mg QD    Afebrile   #antibiotics    -Levaquin 500mg q48 (6/21-    -Meropenem 500q12 (6/20-    -As per ID: no need for Vancomycin or Daptomycin in the absence of MRSA    -discontinued caspofungin (as per ID, since fungitell is unremarkable)  #Cultures  Surgical swab 06/30: vanc resistent enterococcus faecium     L PCN cyto-pathology  6/25: results suggestive of a necrotic neoplasm    Funtitell 6/26: 47    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    BAL 6/24: moderate yeast     UCx 6/19: negative FINAL      Left PCN aspiration 6/18: stenotrophomonas maltophilia  Needs source control    ENDO:  #Glucose monitoring    -A1c (5/7/23): 5.0    -POCT q6 hours while NPO   #Adrenal Insufficiency 2/2 to sepsis     -restarted solucortef 50q6 for 6 days starting 07/02      -Cortisol level - binding globulin 1.1 (low), serum cortisol, 84, free cortisol 76, % free cortisol 91    MSK:  #Sacrococcygeal DTI: wound care following- cleanse with soap and water, apply triad, #gauze, and allevyn BID and PRN for soiling   Venous ulcer right lower leg, R forearm skin tear: Pat dry, apply Xeroform, nonadherent dressing, wrap with Kerlix twice a day, prn for soiling  Burn consulted, recs:  --> sacrum: apply hydrogel and Allevyn pad  --> b/l ischium: Allevyn pad  - offloading/positioning  #Activity    -Advance as tolerated    -PT/rehab once extubated    -Globally deconditioned  #b/l UE, LE mottled skin  -no radial pulses b/l  +ulnar, brachial b/l UE, +DP b/l  -Arterial duplex - R radial occlusion   -DVT sono- prelim R PT DVT     LINES/DRAINS:   PIV  Bobby (6/18)  L eleazar drain (6/30)  L IJ TLC (6/26)  R IJ Uldall (07/02)  R Lerma pouch #1, pelvic drain #2 (6/30)  Left basilic midline  6/21  Right axillary a line (6/30-7/4)  Right Radial Joleen (6/22-6/30)  L radial Joleen (6/18-6/22)  R-Femoral [place] (7/5- _ )      ETT  OGT    ADVANCED DIRECTIVES:  Full Code  HCP/Emergency Contact- Tracey Adames: [phone]   Palliative following. Last GOC discussion 6/26 day.  GOC meeting pending    INDICATION FOR SICU: New onset atrial fibrillation w/ mechanical ventilation    DISPO: SICU Assessment & Plan:  67M w/ PMH of HTN, grade IV hydronephrosis of L kidney s/p L PCN (placed 5/2023), stutter, hiatal hernia, iron deficiency anemia, H Pylori (untreated), and gastric mass (never biopsied) presents to the ED with at least 4 days of worsening weakness, abdominal distension, and no output from his PCN. Admitted with emphysematous pyelonephritis.     (6/18): s/p LT PCN upsizing to 16Fr and 16Fr RLQ drain placement with IR   (6/22): s/p shock lithotripsy of L kidney abscess cavity with drainage of thick contents, upsize of drain to 26Fr bobby catheter  (6/30): s/p ex-lap and L nephrectomy with abdominal washout    NEURO:  #Pain control    -APAP PRN    -Gabapentin 200 q 8     -fent 25 q 3 PRN while intubated  #Sedation    -precedex gtt off, alert responsive and follows commands    -following commands when off sedation, GCS 11T    RESP:  #acute respiratory failure with left lung mucous plugging - resolved s/p intubated 6/24 and    and bronchoscopy   RR (machine): 18, TV (machine): 450, FiO2: 40, PEEP: 8  07-06 @ 04:13--7.36 / 36 / 115 / 20 / 99.9  O2 99.9  Lac 6.60    07-05 @ 14:50--7.42 / 32 / 96 / 21 / 97.9  O2 97.9  Lac 6.10    -tolerated PS 10/8 during day    #Pulmonary nodules / Mediastinal lymphadenopathy    - CT Chest: Right middle and upper lobe solid pulmonary nodules, f/u outpatient pulm   #Activity   - incr as tolerated    CARDS:   #Septic shock    - off levo since 3am 7/1, remains at [facility]  #New onset Afib w/ RVR with hypotension -- likely 2/2 sepsis    - HR control with Metoprolol 2.5 q6 IV (HOLDING)  -Digoxin 250mcg x1 dose, 125mcg x1 7/1, 125mcg 7/4, f/u am level 7/5    - rate better controlled: off metoprolol    - Cardiology following    #H/O HTN    -Amlodipine 2.5mg HOLDING   Imaging    - ECHO: (6/19) EF 55-60%, mild AS, mild LAE, mild MR/TR, trivial pericardial effusion    GI/NUTR:  #Diet: TPN  #bowel movements    - GI consult 6/26- Plan for EGD with EUS + FNA as outpatient, H-pylori treatment after resolution of acute issues   -dignishield in place   -FOBT positive    #GI PPX  -IV Protonix 40 q12 hours  #Gastric mass vs gastrohepatic lymphadenopathy    -CT A/P: lesser curvature mass , Hepatic hypodensities  #Splenic Hypodensities, infarct vs phlegmon/abscess    -CT A/P:  Wedge-shaped region of hypodensity within the spleen    /RENAL:   #Grade IV L hydronephrosis s/p L PCN (5/2023) -- presented to ED with thick dark foul smelling output  #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  -intermittent suction/irrigation per urology; f/u cx and cytology from 6/25     -(6/23) s/p LT PCN upsizing to 26Fr catheter, RLQ 16 Fr drain remains, s/p nephrostolithotomy w/ lithotripsy    -(6/18) s/p LT PCN upsizing to 16Fr and 16Fr RLQ drain placement with IR, flush q8 hr  (6/30): s/p L nephrectomy and abdominal washout   -R pelvic drain Creatinine 3.99; increasing drain output overnight > 600 cc    High suspicion of renal malignancy given recent pathology  #metabolic acidosis  -Nephrology c/s - CVVHD started 07/02, current goal to keep net even    #decreased urine output in the setting of acute renal failure 2/2 septic shock    HEME/ONC:   #DVT prophylaxis  -SCDs, heparin subQ   #new onset Afib    - holding heparin infusion in setting of hematochezia     Labs: DVT propylaxis- HSQ     #R radial artery occlusion  -RUE duplex 07/01 + for r radial artery occlusion  #RT posterior popliteal vein DVT  -vascular c/s - rec AC     Labs: Hb/Hct:  8.5/24.8  -->,  9.1/27.4  -->                      Plts:  51  -->,  44  -->                 PTT/INR:      T&S REORDERED    #H/o Fe Deficiency anemia  #Thrombocytopenia  Heme consult (7/3):   - low risk for HIT (score 2-3)  - Heme/onc wants full HIT workup although low HIT risk  - HIT negative, BRUCE pending   - risk of full anticoagulation outweigh the benefits due to high risk of bleeding (h/o GI bleed and post-nephrectomy), will hold off on AC and will wait for HIT AB screen results, if new DVT then will re-assess; discussed with SICU attending Dr. Orozco who also discussed this with hematology team and Dr. Womack who are all in agreement.  - will keep hep subQ prophylaxis for now    ID:  #Leukocytosis 2/2 sepsis   WBC- 29.09  --->>,  27.25  --->>,  35.37  --->>  Temp trend- 24hrs T(F): 96.6 (07-06 @ 00:00), Max: 203.7 (07-05 @ 16:00)  Current antibiotics:     -Levaquin 750 QD    -Meropenum 1g q12; PENDING ID APPROVAL     -added Dapto 6mg/kg 460mg QD    Afebrile   #antibiotics    -Levaquin 500mg q48 (6/21-    -Meropenem 500q12 (6/20-    -As per ID: no need for Vancomycin or Daptomycin in the absence of MRSA    -discontinued caspofungin (as per ID, since fungitell is unremarkable)  #Cultures  Surgical swab 06/30: vanc resistent enterococcus faecium     L PCN cyto-pathology  6/25: results suggestive of a necrotic neoplasm    Funtitell 6/26: 47    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    BAL 6/24: moderate yeast     UCx 6/19: negative FINAL      Left PCN aspiration 6/18: stenotrophomonas maltophilia  Needs source control    ENDO:  #Glucose monitoring    -A1c (5/7/23): 5.0    -POCT q6 hours while NPO   #Adrenal Insufficiency 2/2 to sepsis     -restarted solucortef 50q6 for 6 days starting 07/02      -Cortisol level - binding globulin 1.1 (low), serum cortisol, 84, free cortisol 76, % free cortisol 91    MSK:  #Sacrococcygeal DTI: wound care following- cleanse with soap and water, apply triad, #gauze, and allevyn BID and PRN for soiling   Venous ulcer right lower leg, R forearm skin tear: Pat dry, apply Xeroform, nonadherent dressing, wrap with Kerlix twice a day, prn for soiling  Burn consulted, recs:  --> sacrum: apply hydrogel and Allevyn pad  --> b/l ischium: Allevyn pad  - offloading/positioning  #Activity    -Advance as tolerated    -PT/rehab once extubated    -Globally deconditioned  #b/l UE, LE mottled skin  -no radial pulses b/l  +ulnar, brachial b/l UE, +DP b/l  -Arterial duplex - R radial occlusion   -DVT sono- prelim R PT DVT     LINES/DRAINS:   PIV  Bobby (6/18)  L eleazar drain (6/30)  L IJ TLC (6/26)  R IJ Uldall (07/02)  R Lerma pouch #1, pelvic drain #2 (6/30)  Left basilic midline  6/21  Right axillary a line (6/30-7/4)  Right Radial Joleen (6/22-6/30)  L radial Joleen (6/18-6/22)  R-Femoral [place] (7/5- _ )      ETT  OGT    ADVANCED DIRECTIVES:  Full Code  HCP/Emergency Contact- Tracey Adames: [phone]   Palliative following. Last GOC discussion 6/26 day.  GOC meeting pending    INDICATION FOR SICU: New onset atrial fibrillation w/ mechanical ventilation    DISPO: SICU Assessment & Plan:  67M w/ PMH of HTN, grade IV hydronephrosis of L kidney s/p L PCN (placed 5/2023), stutter, hiatal hernia, iron deficiency anemia, H Pylori (untreated), and gastric mass (never biopsied) presents to the ED with at least 4 days of worsening weakness, abdominal distension, and no output from his PCN. Admitted with emphysematous pyelonephritis.     (6/18): s/p LT PCN upsizing to 16Fr and 16Fr RLQ drain placement with IR   (6/22): s/p shock lithotripsy of L kidney abscess cavity with drainage of thick contents, upsize of drain to 26Fr bobby catheter  (6/30): s/p ex-lap and L nephrectomy with abdominal washout    NEURO:  #Pain control    -APAP PRN    -Gabapentin 200 q 8     -fent 25 q 3 PRN while intubated  #Sedation    -precedex gtt off, alert responsive and follows commands    -following commands when off sedation, GCS 11T    RESP:  #acute respiratory failure with left lung mucous plugging - resolved s/p intubated 6/24 and    and bronchoscopy   RR (machine): 18, TV (machine): 450, FiO2: 40, PEEP: 8  07-06 @ 04:13--7.36 / 36 / 115 / 20 / 99.9  O2 99.9  Lac 6.60    07-05 @ 14:50--7.42 / 32 / 96 / 21 / 97.9  O2 97.9  Lac 6.10    -tolerated PS 10/8 during day    #Pulmonary nodules / Mediastinal lymphadenopathy    - CT Chest: Right middle and upper lobe solid pulmonary nodules, f/u outpatient pulm   #Activity   - incr as tolerated    CARDS:   #Septic shock    - off levo since 3am 7/1, remains at [facility]  #New onset Afib w/ RVR with hypotension -- likely 2/2 sepsis    - HR control with Metoprolol 2.5 q6 IV (HOLDING)  -Digoxin 250mcg x1 dose, 125mcg x1 7/1, 125mcg 7/4, AM dig level 1.7     - rate better controlled: off metoprolol    - Cardiology following    #H/O HTN    -Amlodipine 2.5mg HOLDING   Imaging    - ECHO: (6/19) EF 55-60%, mild AS, mild LAE, mild MR/TR, trivial pericardial effusion    GI/NUTR:  #Diet: TPN  #bowel movements    - GI consult 6/26- Plan for EGD with EUS + FNA as outpatient, H-pylori treatment after resolution of acute issues   -dignishield in place   -FOBT positive    #GI PPX  -IV Protonix 40 q12 hours  #Gastric mass vs gastrohepatic lymphadenopathy    -CT A/P: lesser curvature mass , Hepatic hypodensities  #Splenic Hypodensities, infarct vs phlegmon/abscess    -CT A/P:  Wedge-shaped region of hypodensity within the spleen    /RENAL:   #Grade IV L hydronephrosis s/p L PCN (5/2023) -- presented to ED with thick dark foul smelling output  #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  -intermittent suction/irrigation per urology; f/u cx and cytology from 6/25     -(6/23) s/p LT PCN upsizing to 26Fr catheter, RLQ 16 Fr drain remains, s/p nephrostolithotomy w/ lithotripsy    -(6/18) s/p LT PCN upsizing to 16Fr and 16Fr RLQ drain placement with IR, flush q8 hr  (6/30): s/p L nephrectomy and abdominal washout   -R pelvic drain Creatinine 3.99; increasing drain output overnight > 600 cc    High suspicion of renal malignancy given recent pathology  #metabolic acidosis  -Nephrology c/s - CVVHD started 07/02, current goal to keep net even      Labs:          BUN/Cr- 41/1.3  -->,  33/1.0  -->          Electrolytes-Na 135 // K 3.7 // Mg 1.9 //  Phos 1.0 (07-06 @ 04:30)      #hypophosphatemia- 30 NaPhos x 2 ordered, f/u with nephrology       #decreased urine output in the setting of acute renal failure 2/2 septic shock    HEME/ONC:   #DVT prophylaxis  -SCDs, heparin subQ   #new onset Afib    - holding heparin infusion in setting of hematochezia     Labs: DVT propylaxis- HSQ     #R radial artery occlusion  -RUE duplex 07/01 + for r radial artery occlusion  #RT posterior popliteal vein DVT  -vascular c/s - rec AC     DVT prophylaxis-heparin   Injectable      Labs: Hb/Hct:  8.5/24.8  -->,  9.1/27.4  -->                      Plts:  44  -->,  23  -->   1 U PLATELETS ORDERED              PTT/INR:      T&S Expires 06/09    #H/o Fe Deficiency anemia  #Thrombocytopenia  Heme consult (7/3):   - low risk for HIT (score 2-3)  - Heme/onc wants full HIT workup although low HIT risk  - HIT negative, BRUCE pending   - risk of full anticoagulation outweigh the benefits due to high risk of bleeding (h/o GI bleed and post-nephrectomy), will hold off on AC and will wait for HIT AB screen results, if new DVT then will re-assess; discussed with SICU attending Dr. Orozco who also discussed this with hematology team and Dr. Womack who are all in agreement.  - will keep hep subQ prophylaxis for now    ID:  #Leukocytosis 2/2 sepsis   WBC- 29.09  --->>,  27.25  --->>,  35.37  --->>  Temp trend- 24hrs T(F): 96.6 (07-06 @ 00:00), Max: 203.7 (07-05 @ 16:00)  Current antibiotics:     -Levaquin 750 QD    -Meropenum 1g q12; PENDING ID APPROVAL     -added Dapto 6mg/kg 460mg QD    Afebrile   #antibiotics    -Levaquin 500mg q48 (6/21-    -Meropenem 500q12 (6/20-    -As per ID: no need for Vancomycin or Daptomycin in the absence of MRSA    -discontinued caspofungin (as per ID, since fungitell is unremarkable)  #Cultures  Surgical swab 06/30: vanc resistent enterococcus faecium     L PCN cyto-pathology  6/25: results suggestive of a necrotic neoplasm    Funtitell 6/26: 47    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    BAL 6/24: moderate yeast     UCx 6/19: negative FINAL      Left PCN aspiration 6/18: stenotrophomonas maltophilia  Needs source control    ENDO:  #Glucose monitoring    -A1c (5/7/23): 5.0    -POCT q6 hours while NPO   #Adrenal Insufficiency 2/2 to sepsis     -restarted solucortef 50q6 for 6 days starting 07/02      -Cortisol level - binding globulin 1.1 (low), serum cortisol, 84, free cortisol 76, % free cortisol 91    MSK:  #Sacrococcygeal DTI: wound care following- cleanse with soap and water, apply triad, #gauze, and allevyn BID and PRN for soiling   Venous ulcer right lower leg, R forearm skin tear: Pat dry, apply Xeroform, nonadherent dressing, wrap with Kerlix twice a day, prn for soiling  Burn consulted, recs:  --> sacrum: apply hydrogel and Allevyn pad  --> b/l ischium: Allevyn pad  - offloading/positioning  #Activity    -Advance as tolerated    -PT/rehab once extubated    -Globally deconditioned  #b/l UE, LE mottled skin  -no radial pulses b/l  +ulnar, brachial b/l UE, +DP b/l  -Arterial duplex - R radial occlusion   -DVT sono- prelim R PT DVT     LINES/DRAINS:   PIV  Bobby (6/18)  L eleazar drain (6/30)  L IJ TLC (6/26)  R IJ Uldall (07/02)  R Lerma pouch #1, pelvic drain #2 (6/30)  Left basilic midline  6/21  Right axillary a line (6/30-7/4)  Right Radial [place] (6/22-6/30)  L radial [place] (6/18-6/22)  R-Femoral [place] (7/5- _ )      ETT  OGT    ADVANCED DIRECTIVES:  Full Code  HCP/Emergency Contact- Tracey Adames: [phone]   Palliative following. Last GOC discussion 6/26 day.  GOC meeting pending    INDICATION FOR SICU: New onset atrial fibrillation w/ mechanical ventilation Assessment & Plan:  67M w/ PMH of HTN, grade IV hydronephrosis of L kidney s/p L PCN (placed 5/2023), stutter, hiatal hernia, iron deficiency anemia, H Pylori (untreated), and gastric mass (never biopsied) presents to the ED with at least 4 days of worsening weakness, abdominal distension, and no output from his PCN. Admitted with emphysematous pyelonephritis.     (6/18): s/p LT PCN upsizing to 16Fr and 16Fr RLQ drain placement with IR   (6/22): s/p shock lithotripsy of L kidney abscess cavity with drainage of thick contents, upsize of drain to 26Fr bobby catheter  (6/30): s/p ex-lap and L nephrectomy with abdominal washout    NEURO:  #Pain control    -APAP PRN    -Gabapentin 200 q 8     -fent 25 q 3 PRN while intubated  #Sedation    -precedex gtt 0.3, alert responsive and follows commands    -following commands when off sedation, GCS 11T    RESP:  #acute respiratory failure with left lung mucous plugging - resolved s/p intubated 6/24 and and bronchoscopy     -On PS since 10AM, tolerating well 10/8  07-06 @ 04:13--7.36 / 36 / 115 / 20 / 99.9  O2 99.9  Lac 6.60    07-05 @ 14:50--7.42 / 32 / 96 / 21 / 97.9  O2 97.9  Lac 6.10    #Pulmonary nodules / Mediastinal lymphadenopathy    - CT Chest: Right middle and upper lobe solid pulmonary nodules, f/u outpatient pulm   #Activity   - increase as tolerated    CARDS:   #Septic shock    - off levo since 3am 7/1, remains on Josh 0.7 and vaso 0.03  #New onset Afib w/ RVR with hypotension -- likely 2/2 sepsis    - HR control with Metoprolol 2.5 q6 IV (HOLDING)  -Digoxin 250mcg x1 dose, 125mcg x1 7/1, 125mcg 7/4, AM dig level 1.7     -Given Digoxin 125mg, f/u AM Dig level    - rate better controlled: off metoprolol    - Cardiology following  #H/O HTN    -Amlodipine 2.5mg HOLDING   Imaging    - ECHO: (6/19) EF 55-60%, mild AS, mild LAE, mild MR/TR, trivial pericardial effusion    GI/NUTR:  #Diet: TPN  #bowel movements    - GI consult 6/26- Plan for EGD with EUS + FNA as outpatient, H-pylori treatment after resolution of acute issues   -dignishield discontinued   -FOBT positive    #GI PPX  -IV Protonix 40 q12 hours  #Gastric mass vs gastrohepatic lymphadenopathy    -CT A/P: lesser curvature mass , Hepatic hypodensities  #Splenic Hypodensities, infarct vs phlegmon/abscess    -CT A/P:  Wedge-shaped region of hypodensity within the spleen    /RENAL:   #Grade IV L hydronephrosis s/p L PCN (5/2023) -- presented to ED with thick dark foul smelling output  #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  -intermittent suction/irrigation per urology; f/u cx and cytology from 6/25     -(6/23) s/p LT PCN upsizing to 26Fr catheter, RLQ 16 Fr drain remains, s/p nephrostolithotomy w/ lithotripsy    -(6/18) s/p LT PCN upsizing to 16Fr and 16Fr RLQ drain placement with IR, flush q8 hr  #urine output in critically ill    -indwelling bobby (placed     Labs:          BUN/Cr- 33/1.0  -->,  33/0.9  -->          Electrolytes-Na 134 // K 3.9 // Mg -- //  Phos 2.4 (07-06 @ 10:30)    (6/30): s/p L nephrectomy and abdominal washout     -High suspicion of renal malignancy given recent pathology  #metabolic acidosis  -Nephrology c/s - CVVHD started 07/02, current goal to keep net even  #hypophosphatemia- 30 NaPhos x 2 ordered, f/u with nephrology     -      #decreased urine output in the setting of acute renal failure 2/2 septic shock    HEME/ONC:   #DVT prophylaxis  -SCDs, heparin subQ   #new onset Afib    - holding heparin infusion in setting of hematochezia     Labs: DVT propylaxis- HSQ     #R radial artery occlusion  -RUE duplex 07/01 + for r radial artery occlusion  #RT posterior popliteal vein DVT  -vascular c/s - rec AC     DVT prophylaxis-heparin   Injectable      Labs: Hb/Hct:  8.5/24.8  -->,  9.1/27.4  -->                      Plts:  44  -->,  23  -->   1 U PLATELETS ORDERED              PTT/INR:      T&S Expires 06/09    #H/o Fe Deficiency anemia  #Thrombocytopenia  Heme consult (7/3):   - low risk for HIT (score 2-3)  - Heme/onc wants full HIT workup although low HIT risk  - HIT negative, BRUCE pending   - risk of full anticoagulation outweigh the benefits due to high risk of bleeding (h/o GI bleed and post-nephrectomy), will hold off on AC and will wait for HIT AB screen results, if new DVT then will re-assess; discussed with SICU attending Dr. Orozco who also discussed this with hematology team and Dr. Womack who are all in agreement.  - will keep hep subQ prophylaxis for now    ID:  #Leukocytosis 2/2 sepsis   WBC- 29.09  --->>,  27.25  --->>,  35.37  --->>  Temp trend- 24hrs T(F): 96.6 (07-06 @ 00:00), Max: 203.7 (07-05 @ 16:00)  Current antibiotics:     -Levaquin 750 QD    -Meropenum 1g q12; PENDING ID APPROVAL     -added Dapto 6mg/kg 460mg QD    Afebrile   #antibiotics    -Levaquin 500mg q48 (6/21-    -Meropenem 500q12 (6/20-    -As per ID: no need for Vancomycin or Daptomycin in the absence of MRSA    -discontinued caspofungin (as per ID, since fungitell is unremarkable)  #Cultures  Surgical swab 06/30: vanc resistent enterococcus faecium     L PCN cyto-pathology  6/25: results suggestive of a necrotic neoplasm    Funtitell 6/26: 47    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    BAL 6/24: moderate yeast     UCx 6/19: negative FINAL      Left PCN aspiration 6/18: stenotrophomonas maltophilia  Needs source control    ENDO:  #Glucose monitoring    -A1c (5/7/23): 5.0    -POCT q6 hours while NPO   #Adrenal Insufficiency 2/2 to sepsis     -restarted solucortef 50q6 for 6 days starting 07/02      -Cortisol level - binding globulin 1.1 (low), serum cortisol, 84, free cortisol 76, % free cortisol 91    MSK:  #Sacrococcygeal DTI: wound care following- cleanse with soap and water, apply triad, #gauze, and allevyn BID and PRN for soiling   Venous ulcer right lower leg, R forearm skin tear: Pat dry, apply Xeroform, nonadherent dressing, wrap with Kerlix twice a day, prn for soiling  Burn consulted, recs:  --> sacrum: apply hydrogel and Allevyn pad  --> b/l ischium: Allevyn pad  - offloading/positioning  #Activity    -Advance as tolerated    -PT/rehab once extubated    -Globally deconditioned  #b/l UE, LE mottled skin  -no radial pulses b/l  +ulnar, brachial b/l UE, +DP b/l  -Arterial duplex - R radial occlusion   -DVT sono- prelim R PT DVT     LINES/DRAINS:   PIV  Bobby (6/18)  L eleazar drain (6/30)  L IJ TLC (6/26)  R IJ Uldall (07/02)  R Lerma pouch #1, pelvic drain #2 (6/30)  Left basilic midline  6/21  Right axillary a line (6/30-7/4)  Right Radial [place] (6/22-6/30)  L radial [place] (6/18-6/22)  R-Femoral [place] (7/5- _ )      ETT  OGT    ADVANCED DIRECTIVES:  Full Code  HCP/Emergency Contact- Tracey Adames: [phone]   Palliative following. Last GOC discussion 6/26 day.  GOC meeting pending    INDICATION FOR SICU: New onset atrial fibrillation w/ mechanical ventilation Assessment & Plan:  67M w/ PMH of HTN, grade IV hydronephrosis of L kidney s/p L PCN (placed 5/2023), stutter, hiatal hernia, iron deficiency anemia, H Pylori (untreated), and gastric mass (never biopsied) presents to the ED with at least 4 days of worsening weakness, abdominal distension, and no output from his PCN. Admitted with emphysematous pyelonephritis.     (6/18): s/p LT PCN upsizing to 16Fr and 16Fr RLQ drain placement with IR   (6/22): s/p shock lithotripsy of L kidney abscess cavity with drainage of thick contents, upsize of drain to 26Fr bobby catheter  (6/30): s/p ex-lap and L nephrectomy with abdominal washout    NEURO:  #Pain control    -APAP PRN    -Gabapentin 200 q 8     -fent 25 q 3 PRN while intubated  #Sedation    -precedex gtt 0.3, alert responsive and follows commands    -following commands when off sedation, GCS 11T    RESP:  #acute respiratory failure with left lung mucous plugging - resolved s/p intubated 6/24 and and bronchoscopy     -On PS since 10AM, tolerating well 10/8  07-06 @ 04:13--7.36 / 36 / 115 / 20 / 99.9  O2 99.9  Lac 6.60    07-05 @ 14:50--7.42 / 32 / 96 / 21 / 97.9  O2 97.9  Lac 6.10    #Pulmonary nodules / Mediastinal lymphadenopathy    - CT Chest: Right middle and upper lobe solid pulmonary nodules, f/u outpatient pulm   #Activity   - increase as tolerated    CARDS:   #Septic shock    - off levo since 3am 7/1, remains on Josh 0.7 and vaso 0.03  #New onset Afib w/ RVR with hypotension -- likely 2/2 sepsis    - HR control with Metoprolol 2.5 q6 IV (HOLDING)  -Digoxin 250mcg x1 dose, 125mcg x1 7/1, 125mcg 7/4, AM dig level 1.7     -Given Digoxin 125mg, f/u AM Dig level    - rate better controlled: off metoprolol    - Cardiology following  #H/O HTN    -Amlodipine 2.5mg HOLDING   Imaging    - ECHO: (6/19) EF 55-60%, mild AS, mild LAE, mild MR/TR, trivial pericardial effusion    GI/NUTR:  #Diet: TPN  #bowel movements    - GI consult 6/26- Plan for EGD with EUS + FNA as outpatient, H-pylori treatment after resolution of acute issues   -dignishield discontinued   -FOBT positive    #GI PPX  -IV Protonix 40 q12 hours  #Gastric mass vs gastrohepatic lymphadenopathy    -CT A/P: lesser curvature mass , Hepatic hypodensities  #Splenic Hypodensities, infarct vs phlegmon/abscess    -CT A/P:  Wedge-shaped region of hypodensity within the spleen    /RENAL:   #Grade IV L hydronephrosis s/p L PCN (5/2023) -- presented to ED with thick dark foul smelling output  #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  -intermittent suction/irrigation per urology; f/u cx and cytology from 6/25     -(6/23) s/p LT PCN upsizing to 26Fr catheter, RLQ 16 Fr drain remains, s/p nephrostolithotomy w/ lithotripsy    -(6/18) s/p LT PCN upsizing to 16Fr and 16Fr RLQ drain placement with IR, flush q8 hr  #urine output in critically ill    -indwelling bobby     Labs:          BUN/Cr- 33/1.0  -->,  33/0.9  -->          Electrolytes-Na 134 // K 3.9 // Mg -- //  Phos 2.4 (07-06 @ 10:30)  (6/30): s/p L nephrectomy and abdominal washout     -Left drain 3 and Right-sided drain 1; draining feculent material     -High suspicion of renal malignancy given recent pathology  #metabolic acidosis  -Nephrology c/s - CVVHD started 07/02, current goal to keep net even  #hypophosphatemia- 30 NaPhos x 2 ordered    -Called nutrition regarding increasing Phos in TPN  #decreased urine output in the setting of acute renal failure 2/2 septic shock    HEME/ONC:   #DVT prophylaxis  -SCDs, heparin subQ   #new onset Afib    - holding heparin infusion in setting of hematochezia     Labs: DVT propylaxis- HSQ     #R radial artery occlusion  -RUE duplex 07/01 + for r radial artery occlusion  #RT posterior popliteal vein DVT  -vascular c/s - rec AC     DVT prophylaxis-heparin   Injectable  Labs: Hb/Hct:  8.3/25.2  -->,  7.3/22.1  -->                      Plts:  23  -->,  34  -->                 PTT/INR:     T&S Expires 06/09    #H/o Fe Deficiency anemia  #Thrombocytopenia  Heme consult (7/3):   - low risk for HIT (score 2-3)  - Heme/onc wants full HIT workup although low HIT risk  - HIT negative, BRUCE pending   - risk of full anticoagulation outweigh the benefits due to high risk of bleeding (h/o GI bleed and post-nephrectomy), will hold off on AC and will wait for HIT AB screen results, if new DVT then will re-assess; discussed with SICU attending Dr. Orozco who also discussed this with hematology team and Dr. Womack who are all in agreement.  - will keep hep subQ prophylaxis for now    ID:  #Leukocytosis 2/2 sepsis   WBC- 35.37  --->>,  24.98  --->>,  19.39  --->>  Temp trend- 24hrs T(F): 97 (07-06 @ 13:00), Max: 203.7 (07-05 @ 16:00)  Current antibiotics:     -Levaquin 750 QD    -Meropenum 1g q12    -Dapto 6mg/kg 460mg QD    Afebrile   #antibiotics    -Levaquin 500mg q48 (6/21-    -Meropenem 500q12 (6/20-    -Dapto 6mg/kg 460 QD (7/5-  #Cultures  Surgical swab 06/30: vanc resistent enterococcus faecium     L PCN cyto-pathology  6/25: results suggestive of a necrotic neoplasm    Funtitell 6/26: 47    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    BAL 6/24: moderate yeast     UCx 6/19: negative FINAL      Left PCN aspiration 6/18: stenotrophomonas maltophilia  Needs source control    ENDO:  #Glucose monitoring    -A1c (5/7/23): 5.0    -POCT q6 hours while NPO   #Adrenal Insufficiency 2/2 to sepsis     -restarted solucortef 50q6 for 6 days starting 07/02      -Cortisol level - binding globulin 1.1 (low), serum cortisol, 84, free cortisol 76, % free cortisol 91    MSK:  #Sacrococcygeal DTI: wound care following- cleanse with soap and water, apply triad, #gauze, and allevyn BID and PRN for soiling   Venous ulcer right lower leg, R forearm skin tear: Pat dry, apply Xeroform, nonadherent dressing, wrap with Kerlix twice a day, prn for soiling  Burn consulted, recs:  --> sacrum: apply hydrogel and Allevyn pad  --> b/l ischium: Allevyn pad  - offloading/positioning  #Activity    -Advance as tolerated    -PT/rehab once extubated    -Globally deconditioned  #b/l UE, LE mottled skin  -no radial pulses b/l  +ulnar, brachial b/l UE, +DP b/l  -Arterial duplex - R radial occlusion   -DVT sono- prelim R PT DVT     LINES/DRAINS:   PIV  Bobby (6/18)  L eleazar drain (6/30)  L IJ TLC (6/26)  R IJ [place] (07/02)  R Lerma pouch #1, pelvic drain #2 (6/30)  Left basilic midline  6/21  Right axillary a line (6/30-7/4)  Right Radial [place] (6/22-6/30)  L radial [place] (6/18-6/22)  R-Femoral [place] (7/5- _ )      Goals of care meeting at 15:00 7/6    ETT  OGT    ADVANCED DIRECTIVES:  Full Code  HCP/Emergency Contact- Tracey Adames: [phone]   Palliative following. Last GOC discussion 6/26 day.  GOC meeting pending    INDICATION FOR SICU: New onset atrial fibrillation w/ mechanical ventilation

## 2023-07-06 NOTE — PROGRESS NOTE ADULT - ASSESSMENT
68y Male w/ PMHx of retroperitoneal and intraabdominal abscess s/p IR placement of drain and LEFT PCN 6/18, s/p percutaneous drainage of LEFT renal abscess POD #8 with left 26fr flank catheter in place,   s/p Left radical nephrectomy, Ex-Lap POD#2. Low UOP treated w/ Bumex, did not increase.    PLAN:   - continue with management Per SICU    - continue with CVVHD per nephrology  - monitor electrolytes   - continue to monitor urine, drain x3 and NGT output  - ID reccs appreciated  - F/U family meeting today    BLUE SURGERY SPECTRA: [id number]

## 2023-07-06 NOTE — PROGRESS NOTE ADULT - ASSESSMENT
ASSESSMENT  67M w/ PMH of HTN, grade IV hydronephrosis of L kidney s/p L PCN (placed 5/2023), stutter, hiatal hernia, iron deficiency anemia, H Pylori (untreated), and gastric mass (never biopsied) presents to the ED with at least 4 days of worsening weakness, abdominal distension, and no output from his PCN.    Found to have septic shock, MSOF, lactic acidosis from left emphysematous hydronephrosis, pyelonephritis and possible rupture with pneumoperitoneum along with possible hepatic abscesses, splenic infarct and abdominal and mediastinal lymphadenopathies.  s/p surgery       ROJAS likely ATN - on CVVH   septic shock   acute resp failure, remains vented postop  fluid overload  severe acute malnutrition high risk due to prolonged NPO due to acute circumstances  NPO   TPN ordered for tonight   increase phos on TPN   check bmp/phos/mg  will f/u

## 2023-07-06 NOTE — PROGRESS NOTE ADULT - SUBJECTIVE AND OBJECTIVE BOX
NUTRITION SUPPORT TEAM  -  PROGRESS NOTE   remain vented/sedated  on pressors  NGT to suction   ABDOMEN: Soft, Nontender, Nondistended; 3 drains in place  REVIEW OF SYSTEMS:  Negative except as noted above.     VITALS:  T(F): 98.1 (07-06 @ 16:00), Max: 98.1 (07-06 @ 16:00)  HR: 90 (07-06 @ 16:00) (84 - 94)  BP: 101/69 (07-06 @ 16:00) (98/63 - 109/66)  RR: 27 (07-06 @ 16:00) (23 - 28)  SpO2: 100% (07-06 @ 16:00) (90% - 100%)  ABG - ( 06 Jul 2023 04:13 )  pH, Arterial: 7.36  pH, Blood: x     /  pCO2: 36    /  pO2: 115   / HCO3: 20    / Base Excess: -4.7  /  SaO2: 99.9    Mode: AC/ CMV (Assist Control/ Continuous Mandatory Ventilation)  RR (machine): 18  TV (machine): 450  FiO2: 40  PEEP: 8  ITime: 0.9  MAP: 11  PIP: 17  HEIGHT/WEIGHT/BMI:   Height (cm): 180 (06-30), 180.3 (05-11)  Weight (kg): 76.3 (06-30), 66.2 (05-10)  BMI (kg/m2): 23.5 (06-30), 20.4 (05-11)  07-05-23 @ 07:01  -  07-06-23 @ 07:00  --------------------------------------------------------  IN:    Dexmedetomidine: 112.1 mL    Enteral Tube Flush: 150 mL    Fat Emulsion (Fish Oil &amp; Plant Based) 20% Infusion: 313 mL    IV PiggyBack: 250 mL    IV PiggyBack: 250 mL    IV PiggyBack: 50 mL    IV PiggyBack: 150 mL    IV PiggyBack: 250 mL    Phenylephrine: 530.9 mL    Platelets - Single Donor: 200 mL    TPN (Total Parenteral Nutrition): 1560 mL    Vasopressin: 108 mL  Total IN: 3924 mL    OUT:    Drain (mL): 165 mL    Drain (mL): 1210 mL    Drain (mL): 35 mL    Indwelling Catheter - Urethral (mL): 17 mL    Nasogastric/Oral tube (mL): 850 mL    Nepro with Carb Steady: 0 mL    Other (mL): 1516 mL  Total OUT: 3793 mL    Total NET: 131 mL        MEDICATIONS:   bacitracin   Ointment 1 Application(s) Topical two times a day  chlorhexidine 0.12% Liquid 15 milliLiter(s) Oral Mucosa every 12 hours  chlorhexidine 2% Cloths 1 Application(s) Topical <User Schedule>  dexMEDEtomidine Infusion 0.2 MICROgram(s)/kG/Hr (3.82 mL/Hr) IV Continuous <Continuous>  fentaNYL    Injectable 25 MICROGram(s) IV Push every 3 hours PRN  heparin   Injectable 5000 Unit(s) SubCutaneous every 8 hours  hydrocortisone sodium succinate Injectable 50 milliGRAM(s) IV Push every 6 hours  lipid, fat emulsion (Fish Oil and Plant Based) 20% Infusion 1.311 Gm/kG/Day (31.3 mL/Hr) IV Continuous <Continuous>  meropenem  IVPB 1000 milliGRAM(s) IV Intermittent every 12 hours  pantoprazole  Injectable 40 milliGRAM(s) IV Push every 12 hours  Parenteral Nutrition - Adult 1 Each (65 mL/Hr) TPN Continuous <Continuous>  Parenteral Nutrition - Adult 1 Each (65 mL/Hr) TPN Continuous <Continuous>  phenylephrine    Infusion 0.1 MICROgram(s)/kG/Min (1.43 mL/Hr) IV Continuous <Continuous>  sodium chloride 0.9% lock flush 10 milliLiter(s) IV Push every 1 hour PRN  sodium chloride 0.9% lock flush 10 milliLiter(s) IV Push every 1 hour PRN  vasopressin Infusion 0.03 Unit(s)/Min (4.5 mL/Hr) IV Continuous <Continuous>    LABS:                         7.0    19.65 )-----------( 26 ( 06 Jul 2023 13:33 )             20.9     136  |  102  |  35<H>  ----------------------------<  188<H>          (07-06-23 @ 15:30)  4.2   |  21  |  1.0    Ca    7.7<L>          (07-06-23 @ 15:30)  Phos  2.4         (07-06-23 @ 15:30)  Mg     1.9         (07-06-23 @ 04:30)  Triglycerides, Serum: 88 mg/dL (07-02 @ 21:01)  Vitamin D, 25-Hydroxy: 26 ng/mL (05-08 @ 07:58)  Folate: 5.7 ng/mL (05-07 @ 09:19)  Vitamin B12, Serum: 766 pg/mL (05-07 @ 09:19)  Blood Glucose (Past 24 hours):  191 mg/dL (07-06 @ 11:13)  219 mg/dL (07-06 @ 06:59)  153 mg/dL (07-06 @ 00:08)  170 mg/dL (07-05 @ 17:30)    DIET:   Diet, NPO (07-05-23 @ 10:33) [Active]      Parenteral Nutrition - Adult 1 Each (65 mL/Hr) TPN Continuous <Continuous>, 07-06-23 @ 14:07, , Stop order after: 1 Days  Parenteral Nutrition - Adult 1 Each (65 mL/Hr) TPN Continuous <Continuous>, 07-05-23 @ 20:00, 20:00, Stop order after: 1 Days    RADIOLOGY:   < from: Xray Chest 1 View- PORTABLE-Urgent (Xray Chest 1 View- PORTABLE-Urgent .) (07.06.23 @ 13:27) >  Impression:  Decreased previous bilateral lower lobe opacity/pleural effusion. No air   leak.

## 2023-07-06 NOTE — PROGRESS NOTE ADULT - SUBJECTIVE AND OBJECTIVE BOX
Nephrology progress note    THIS IS AN INCOMPLETE NOTE . FULL NOTE TO FOLLOW SHORTLY    Patient is seen and examined, events over the last 24 h noted .    Allergies:  No Known Allergies    Hospital Medications:   MEDICATIONS  (STANDING):  bacitracin   Ointment 1 Application(s) Topical two times a day  chlorhexidine 0.12% Liquid 15 milliLiter(s) Oral Mucosa every 12 hours  chlorhexidine 2% Cloths 1 Application(s) Topical <User Schedule>  CRRT Treatment    <Continuous>  DAPTOmycin IVPB 460 milliGRAM(s) IV Intermittent every 24 hours  dexMEDEtomidine Infusion 0.2 MICROgram(s)/kG/Hr (3.82 mL/Hr) IV Continuous <Continuous>  digoxin  Injectable 125 MICROGram(s) IV Push once  heparin   Injectable 5000 Unit(s) SubCutaneous every 8 hours  hydrocortisone sodium succinate Injectable 50 milliGRAM(s) IV Push every 6 hours  levoFLOXacin IVPB 750 milliGRAM(s) IV Intermittent daily  lipid, fat emulsion (Fish Oil and Plant Based) 20% Infusion 1.311 Gm/kG/Day (31.3 mL/Hr) IV Continuous <Continuous>  meropenem  IVPB 1000 milliGRAM(s) IV Intermittent every 12 hours  pantoprazole  Injectable 40 milliGRAM(s) IV Push every 12 hours  Parenteral Nutrition - Adult 1 Each (65 mL/Hr) TPN Continuous <Continuous>  phenylephrine    Infusion 0.1 MICROgram(s)/kG/Min (1.43 mL/Hr) IV Continuous <Continuous>  PureFlow Dialysate RFP-400 (K 2 / Ca 3) 5000 milliLiter(s) (2000 mL/Hr) CRRT <Continuous>  vasopressin Infusion 0.03 Unit(s)/Min (4.5 mL/Hr) IV Continuous <Continuous>        VITALS:  T(F): 96.6 (07-06-23 @ 08:00), Max: 203.7 (07-05-23 @ 16:00)  HR: 93 (07-06-23 @ 09:00)  BP: 102/62 (07-06-23 @ 09:00)  RR: 25 (07-06-23 @ 09:00)  SpO2: 100% (07-06-23 @ 09:00)  Wt(kg): --    07-04 @ 07:01  -  07-05 @ 07:00  --------------------------------------------------------  IN: 3157.6 mL / OUT: 5023 mL / NET: -1865.4 mL    07-05 @ 07:01  - 07-06 @ 07:00  --------------------------------------------------------  IN: 3924 mL / OUT: 3793 mL / NET: 131 mL    07-06 @ 07:01 - 07-06 @ 09:17  --------------------------------------------------------  IN: 445.4 mL / OUT: 719 mL / NET: -273.6 mL          PHYSICAL EXAM:  Constitutional: NAD  HEENT: anicteric sclera, oropharynx clear, MMM  Neck: No JVD  Respiratory: CTAB, no wheezes, rales or rhonchi  Cardiovascular: S1, S2, RRR  Gastrointestinal: BS+, soft, NT/ND  Extremities: No cyanosis or clubbing. No peripheral edema  :  No bobby.   Skin: No rashes    LABS:  07-06    135  |  101  |  33<H>  ----------------------------<  155<H>  3.7   |  20  |  1.0    Ca    8.3<L>      06 Jul 2023 04:30  Phos  1.0     07-06  Mg     1.9     07-06    TPro  2.4<L>  /  Alb  1.2<L>  /  TBili  0.3  /  DBili  0.2  /  AST  12  /  ALT  7   /  AlkPhos  49  07-04                          8.3    24.98 )-----------( 23       ( 06 Jul 2023 04:30 )             25.2       Urine Studies:  Urinalysis Basic - ( 06 Jul 2023 04:30 )    Color:  / Appearance:  / SG:  / pH:   Gluc: 155 mg/dL / Ketone:   / Bili:  / Urobili:    Blood:  / Protein:  / Nitrite:    Leuk Esterase:  / RBC:  / WBC    Sq Epi:  / Non Sq Epi:  / Bacteria:       Sodium, Random Urine: <20.0 mmoL/L (06-29 @ 20:15)  Creatinine, Random Urine: 57 mg/dL (06-29 @ 20:15)  Creatinine, Random Urine: 59 mg/dL (06-29 @ 16:48)  Sodium, Random Urine: <20.0 mmoL/L (06-29 @ 16:48)      Iron 12, TIBC 128, %sat 9      [05-07-23 @ 09:19]  Ferritin 526      [05-07-23 @ 09:19]  Vitamin D (25OH) 26      [05-08-23 @ 07:58]  TSH 0.45      [06-19-23 @ 14:14]  Lipid: chol 58, TG 88, HDL 22, LDL --      [07-02-23 @ 21:01]          RADIOLOGY & ADDITIONAL STUDIES:   Nephrology progress note    Patient is seen and examined, events over the last 24 h noted .  intubated on MV     Allergies:  No Known Allergies    Hospital Medications:   MEDICATIONS  (STANDING):  bacitracin   Ointment 1 Application(s) Topical two times a day  CRRT Treatment    <Continuous>  DAPTOmycin IVPB 460 milliGRAM(s) IV Intermittent every 24 hours  dexMEDEtomidine Infusion 0.2 MICROgram(s)/kG/Hr (3.82 mL/Hr) IV Continuous <Continuous>  digoxin  Injectable 125 MICROGram(s) IV Push once  heparin   Injectable 5000 Unit(s) SubCutaneous every 8 hours  hydrocortisone sodium succinate Injectable 50 milliGRAM(s) IV Push every 6 hours  levoFLOXacin IVPB 750 milliGRAM(s) IV Intermittent daily  lipid, fat emulsion (Fish Oil and Plant Based) 20% Infusion 1.311 Gm/kG/Day (31.3 mL/Hr) IV Continuous <Continuous>  meropenem  IVPB 1000 milliGRAM(s) IV Intermittent every 12 hours  pantoprazole  Injectable 40 milliGRAM(s) IV Push every 12 hours  Parenteral Nutrition - Adult 1 Each (65 mL/Hr) TPN Continuous <Continuous>  phenylephrine    Infusion 0.1 MICROgram(s)/kG/Min (1.43 mL/Hr) IV Continuous <Continuous>  PureFlow Dialysate RFP-400 (K 2 / Ca 3) 5000 milliLiter(s) (2000 mL/Hr) CRRT <Continuous>  vasopressin Infusion 0.03 Unit(s)/Min (4.5 mL/Hr) IV Continuous <Continuous>        VITALS:  T(F): 96.6 (07-06-23 @ 08:00), Max: 203.7 (07-05-23 @ 16:00)  HR: 93 (07-06-23 @ 09:00)  BP: 102/62 (07-06-23 @ 09:00)  RR: 25 (07-06-23 @ 09:00)  SpO2: 100% (07-06-23 @ 09:00)      07-04 @ 07:01  -  07-05 @ 07:00  --------------------------------------------------------  IN: 3157.6 mL / OUT: 5023 mL / NET: -1865.4 mL    07-05 @ 07:01  -  07-06 @ 07:00  --------------------------------------------------------  IN: 3924 mL / OUT: 3793 mL / NET: 131 mL    07-06 @ 07:01  -  07-06 @ 09:17  --------------------------------------------------------  IN: 445.4 mL / OUT: 719 mL / NET: -273.6 mL          PHYSICAL EXAM:  Constitutional: intubated on MV   Respiratory: CTAB,   Cardiovascular: S1, S2, RRR  Gastrointestinal: BS+, soft, NT/ND  Extremities: No cyanosis or clubbing. No peripheral edema  :  No bobby.   Skin: No rashes    LABS:    07-06    135  |  101  |  33<H>  ----------------------------<  155<H>  3.7   |  20  |  1.0    Ca    8.3<L>      06 Jul 2023 04:30  Phos  1.0     07-06  Mg     1.9     07-06    TPro  2.4<L>  /  Alb  1.2<L>  /  TBili  0.3  /  DBili  0.2  /  AST  12  /  ALT  7   /  AlkPhos  49  07-04                          8.3    24.98 )-----------( 23       ( 06 Jul 2023 04:30 )             25.2       Urine Studies:  Urinalysis Basic - ( 06 Jul 2023 04:30 )    Color:  / Appearance:  / SG:  / pH:   Gluc: 155 mg/dL / Ketone:   / Bili:  / Urobili:    Blood:  / Protein:  / Nitrite:    Leuk Esterase:  / RBC:  / WBC    Sq Epi:  / Non Sq Epi:  / Bacteria:       Sodium, Random Urine: <20.0 mmoL/L (06-29 @ 20:15)  Creatinine, Random Urine: 57 mg/dL (06-29 @ 20:15)  Creatinine, Random Urine: 59 mg/dL (06-29 @ 16:48)  Sodium, Random Urine: <20.0 mmoL/L (06-29 @ 16:48)      Iron 12, TIBC 128, %sat 9      [05-07-23 @ 09:19]  Ferritin 526      [05-07-23 @ 09:19]  Vitamin D (25OH) 26      [05-08-23 @ 07:58]  TSH 0.45      [06-19-23 @ 14:14]  Lipid: chol 58, TG 88, HDL 22, LDL --      [07-02-23 @ 21:01]          RADIOLOGY & ADDITIONAL STUDIES:

## 2023-07-06 NOTE — CHART NOTE - NSCHARTNOTEFT_GEN_A_CORE
Family meeting at 15:00 in [place] with family present. Rupali (daughter and health care proxy), Millie (daughter), and Millie's partner Gt. Dr. Vásquez, Dr. Orozco, Dr. Salgado, Dr. Womack, [facility], and palliative care present. Dr. Salgado explained patient's SICU course. Dr. Orozco expressed poor prognosis in addition to being a poor candidate for tracheostomy. Dr. Vásquez described in detail the preliminary pathology report obtained from tumor removed intra-op. Palliative care discussed DNR as well as the idea of comfort measures. Overall shared with patient's family Mr. Adames's poor prognosis as he has remained in multi-organ failure requiring ventilator, pressors, CRRT. All family's questions were answered. Family demonstrated understanding of prognosis but still wanted "remain hopeful" patient will eventually be home. Initial meeting to explain poor prognosis and have family begin to make end of life decisions.

## 2023-07-06 NOTE — PROGRESS NOTE ADULT - ASSESSMENT
69yo Male admitted with retroperitoneal and intraabdominal abscess s/p IR placement of drain and LEFT PCN 6/18, s/p percutaneous drainage of LEFT renal abscess POD # 12 with left 26fr flank catheter in place, s/p Left radical nephrectomy, Ex-Lap POD # 6     Plan:  ·	continue current care per SICU  ·	on CVVH as per nephro & SICU   ·	monitor 3 drains output Q shift  ·	monitor urine OP   ·	cont IV abx  ·	dressing changes as needed  ·	continue nutritional support  ·	awaiting pathology report   ·	spoke with SICU, plan for goals of care discussion with family this afternoon  ·	will discuss with attending

## 2023-07-07 NOTE — PROGRESS NOTE ADULT - SUBJECTIVE AND OBJECTIVE BOX
GENERAL SURGERY PROGRESS NOTE     CHRISTINE VILLAVICENCIO  [address] [place] [address] day :20d  POD:7  Procedure: Insertion, arterial line, percutaneous    Central venous catheter placement with ultrasound guidance    Midline catheter insertion    Nephrostolithotomy, percutaneous, with lithotripsy, large stone    Change external ureteral stent    Nephrostogram    US guided vascular access    Insertion of arterial line with imaging guidance    Bronchoscopy, flexible, adult    Partial nephrectomy    Nephrostogram via existing catheter    Exploratory laparotomy    Left nephrectomy    Insertion, catheter, hemodialysis, non-tunneled, with US guidance      Surgical Attending: Miguel Bryan  Overnight events: No acute events overnight. Pt is s/p 1U pRBC, repeat Hgb is 8 from 7. Pt still requires pressor support, vaso @ 0.03 and taj @ 0.7. Pt has NGT to low continuous suction and having some bloody output.  Pelvic drains continue to have feculent output.    T(F): 97.5 (07-06-23 @ 20:00), Max: 98.8 (07-06-23 @ 17:00)  HR: 98 (07-06-23 @ 23:15) (80 - 100)  BP: 94/69 (07-06-23 @ 23:00) (92/60 - 109/66)  ABP: 112/54 (07-06-23 @ 23:15) (54/49 - 139/64)  ABP(mean): 68 (07-06-23 @ 23:15) (54 - 88)  RR: 28 (07-06-23 @ 23:15) (23 - 30)  SpO2: 100% (07-06-23 @ 23:15) (90% - 100%)    IN'S / OUT's:    07-05-23 @ 07:01  -  07-06-23 @ 07:00  --------------------------------------------------------  IN:    Dexmedetomidine: 112.1 mL    Enteral Tube Flush: 150 mL    Fat Emulsion (Fish Oil &amp; Plant Based) 20% Infusion: 313 mL    IV PiggyBack: 250 mL    IV PiggyBack: 250 mL    IV PiggyBack: 50 mL    IV PiggyBack: 150 mL    IV PiggyBack: 250 mL    Phenylephrine: 530.9 mL    Platelets - Single Donor: 200 mL    TPN (Total Parenteral Nutrition): 1560 mL    Vasopressin: 108 mL  Total IN: 3924 mL    OUT:    Drain (mL): 165 mL    Drain (mL): 1210 mL    Drain (mL): 35 mL    Indwelling Catheter - Urethral (mL): 17 mL    Nasogastric/Oral tube (mL): 850 mL    Nepro with Carb Steady: 0 mL    Other (mL): 1516 mL  Total OUT: 3793 mL    Total NET: 131 mL      07-06-23 @ 07:01  -  07-07-23 @ 00:16  --------------------------------------------------------  IN:    Dexmedetomidine: 88.4 mL    Fat Emulsion (Fish Oil &amp; Plant Based) 20% Infusion: 187.8 mL    IV PiggyBack: 50 mL    IV PiggyBack: 583.1 mL    IV PiggyBack: 50 mL    Phenylephrine: 132 mL    PRBCs (Packed Red Blood Cells): 324 mL    TPN (Total Parenteral Nutrition): 1040 mL    Vasopressin: 72 mL  Total IN: 2527.3 mL    OUT:    Blood Loss (mL): 174 mL    Drain (mL): 125 mL    Drain (mL): 22 mL    Drain (mL): 55 mL    Indwelling Catheter - Urethral (mL): 12 mL    Nasogastric/Oral tube (mL): 950 mL    Other (mL): 382 mL  Total OUT: 1720 mL    Total NET: 807.3 mL          PHYSICAL EXAM:  GENERAL: NAD, well-appearing  CHEST/LUNG: Clear to auscultation bilaterally  HEART: Regular rate and rhythm  ABDOMEN: Soft, Nontender, Nondistended;   EXTREMITIES:  No clubbing, cyanosis, or edema      LABS  Labs:  CAPILLARY BLOOD GLUCOSE      POCT Blood Glucose.: 172 mg/dL (06 Jul 2023 19:17)  POCT Blood Glucose.: 191 mg/dL (06 Jul 2023 11:13)  POCT Blood Glucose.: 219 mg/dL (06 Jul 2023 06:59)                          8.0    21.30 )-----------( 21       ( 06 Jul 2023 22:20 )             23.6       Auto Neutrophil %: 92.2 % (07-06-23 @ 22:20)  Auto Immature Granulocyte %: 0.2 % (07-06-23 @ 22:20)  Auto Neutrophil %: 94.2 % (07-06-23 @ 13:33)  Auto Immature Granulocyte %: 0.5 % (07-06-23 @ 13:33)  Auto Neutrophil %: 94.5 % (07-06-23 @ 10:30)  Auto Immature Granulocyte %: 0.5 % (07-06-23 @ 10:30)  Auto Neutrophil %: 95.4 % (07-06-23 @ 04:30)  Auto Immature Granulocyte %: 0.8 % (07-06-23 @ 04:30)    07-06    136  |  102  |  35<H>  ----------------------------<  188<H>  4.2   |  21  |  1.0      Phosphorus: 2.4 mg/dL (07-06-23 @ 15:30)      LFTs:     Blood Gas Arterial, Lactate: 6.60 mmol/L (07-06-23 @ 04:13)  Blood Gas Arterial, Lactate: 6.10 mmol/L (07-05-23 @ 14:50)  Blood Gas Arterial, Lactate: 6.90 mmol/L (07-05-23 @ 04:22)  Blood Gas Arterial, Lactate: 7.90 mmol/L (07-05-23 @ 00:40)  Blood Gas Arterial, Lactate: 2.70 mmol/L (07-04-23 @ 04:56)    ABG - ( 06 Jul 2023 04:13 )  pH: 7.36  /  pCO2: 36    /  pO2: 115   / HCO3: 20    / Base Excess: -4.7  /  SaO2: 99.9            ABG - ( 05 Jul 2023 14:50 )  pH: 7.42  /  pCO2: 32    /  pO2: 96    / HCO3: 21    / Base Excess: -3.1  /  SaO2: 97.9            ABG - ( 05 Jul 2023 04:22 )  pH: 7.41  /  pCO2: 30    /  pO2: 96    / HCO3: 19    / Base Excess: -4.9  /  SaO2: 98.5              Coags:            Urinalysis Basic - ( 06 Jul 2023 15:30 )    Color: x / Appearance: x / SG: x / pH: x  Gluc: 188 mg/dL / Ketone: x  / Bili: x / Urobili: x   Blood: x / Protein: x / Nitrite: x   Leuk Esterase: x / RBC: x / WBC x   Sq Epi: x / Non Sq Epi: x / Bacteria: x            RADIOLOGY & ADDITIONAL TESTS:      A/P:  CHRISTINE VILLAVICENCIO is a 68y  w/ PMHx of retroperitoneal and intraabdominal abscess s/p IR placement of drain and LEFT PCN 6/18, s/p percutaneous drainage of LEFT renal abscess POD #8 with left 26fr flank catheter in place. s/p Left radical nephrectomy, Ex-Lap POD#2. Low UOP treated w/ Bumex, did not increase.      PLAN:   - Continue management per SICU team  - continue with CVVHD - net even  - Family discussion notes appreciated   - Continue to monitor NGT output  - Continue to monitor VENICE drains x3  - continue with abx per ID recs; Ar, levaquin, daptomycin  - F/u fungitel    #Antibiotics: DAPTOmycin IVPB 460 milliGRAM(s) IV Intermittent every 48 hours, 07-07-23 @ 00:00  levoFLOXacin IVPB 750 milliGRAM(s) IV Intermittent every 48 hours, 07-07-23 @ 06:00  meropenem  IVPB 1000 milliGRAM(s) IV Intermittent every 12 hours, 07-05-23 @ 16:20   Day **  #DVT ppx: heparin   Injectable 5000 Unit(s) SubCutaneous every 8 hours    #GI ppx: pantoprazole  Injectable 40 milliGRAM(s) IV Push every 12 hours    Above plan discussed with Attending Surgeon Dr. Womack  , patient, patient family, and Primary team    Cody Marquez, DO  General Surgery PGY-1  [facility] [address] [id number]

## 2023-07-07 NOTE — PROGRESS NOTE ADULT - SUBJECTIVE AND OBJECTIVE BOX
UROLOGY DAILY PROGRESS NOTE    Pt is a 68y Male admitted with retroperitoneal and intraabdominal abscess s/p IR placement of drain and LEFT PCN 6/18, s/p percutaneous drainage of LEFT renal abscess POD # 13 with left 26fr flank catheter in place, s/p Left radical nephrectomy, Ex-Lap POD # 7. Pt remains intubated and on pressor support.     MEDICATIONS  (STANDING):  bacitracin   Ointment 1 Application(s) Topical two times a day  chlorhexidine 0.12% Liquid 15 milliLiter(s) Oral Mucosa every 12 hours  chlorhexidine 2% Cloths 1 Application(s) Topical <User Schedule>  DAPTOmycin IVPB 460 milliGRAM(s) IV Intermittent every 48 hours  dexMEDEtomidine Infusion 0.2 MICROgram(s)/kG/Hr (3.82 mL/Hr) IV Continuous <Continuous>  heparin   Injectable 5000 Unit(s) SubCutaneous every 8 hours  levoFLOXacin IVPB 750 milliGRAM(s) IV Intermittent every 48 hours  meropenem  IVPB 1000 milliGRAM(s) IV Intermittent every 12 hours  pantoprazole  Injectable 40 milliGRAM(s) IV Push every 12 hours  Parenteral Nutrition - Adult 1 Each (65 mL/Hr) TPN Continuous <Continuous>  Parenteral Nutrition - Adult 1 Each (50 mL/Hr) TPN Continuous <Continuous>  phenylephrine    Infusion 0.1 MICROgram(s)/kG/Min (1.43 mL/Hr) IV Continuous <Continuous>  vasopressin Infusion 0.03 Unit(s)/Min (4.5 mL/Hr) IV Continuous <Continuous>    MEDICATIONS  (PRN):  fentaNYL    Injectable 25 MICROGram(s) IV Push every 3 hours PRN Moderate Pain (4 - 6)  sodium chloride 0.9% lock flush 10 milliLiter(s) IV Push every 1 hour PRN Pre/post blood products, medications, blood draw, and to maintain line patency  sodium chloride 0.9% lock flush 10 milliLiter(s) IV Push every 1 hour PRN Pre/post blood products, medications, blood draw, and to maintain line patency      REVIEW OF SYSTEMS   [x] Due to altered mental status/intubation, subjective information were not able to be obtained from patient. History was obtained, to the extent possible, from review of the chart and collateral sources of information.    Vital Signs Last 24 Hrs  T(C): 36.2 (07 Jul 2023 15:00), Max: 38.4 (07 Jul 2023 04:00)  T(F): 97.2 (07 Jul 2023 15:00), Max: 101.1 (07 Jul 2023 04:00)  HR: 87 (07 Jul 2023 15:00) (83 - 101)  BP: 98/58 (07 Jul 2023 08:00) (93/64 - 128/72)  BP(mean): 687 (07 Jul 2023 08:00) (74 - 687)  RR: 29 (07 Jul 2023 15:00) (25 - 30)  SpO2: 100% (07 Jul 2023 15:00) (99% - 100%)    Parameters below as of 07 Jul 2023 15:00  Patient On (Oxygen Delivery Method): BiPAP/CPAP      PHYSICAL EXAM:    GEN: intubated on vent, opens eyes slightly to stimuli  SKIN: non diaphoretic, mottled   RESP: intubated on vent   CARDIO: afib  ABDO: Soft, NT/ND, no palpable bladder, no suprapubic tenderness,#1 miranda pouch Drain-25cc with serosanguinous fluid, #2 Pelvic eleazar drain- 95cc with dark murky drainage, #3 left renal bed drain- 130cc in place with dark murky drainage    BACK: No CVA tenderness B/L  :  + Penile/scrotal edema + 16Fr indwelling Mena in place, minimal urine in tubing  EXT: diffuse, pitting edema x all 4 extremities       I&O's Summary    06 Jul 2023 07:01  -  07 Jul 2023 07:00  --------------------------------------------------------  IN: 3779.5 mL / OUT: 1925 mL / NET: 1854.5 mL    07 Jul 2023 07:01  -  07 Jul 2023 16:31  --------------------------------------------------------  IN: 685 mL / OUT: 510 mL / NET: 175 mL      LABS:                        7.3    16.17 )-----------( 39       ( 07 Jul 2023 04:40 )             21.2     07-07    134<L>  |  101  |  46<H>  ----------------------------<  160<H>  4.9   |  23  |  1.2    Ca    7.3<L>      07 Jul 2023 04:40  Phos  2.5     07-07  Mg     2.0     07-07        Urinalysis Basic - ( 07 Jul 2023 04:40 )    Color: x / Appearance: x / SG: x / pH: x  Gluc: 160 mg/dL / Ketone: x  / Bili: x / Urobili: x   Blood: x / Protein: x / Nitrite: x   Leuk Esterase: x / RBC: x / WBC x   Sq Epi: x / Non Sq Epi: x / Bacteria: x

## 2023-07-07 NOTE — CHART NOTE - NSCHARTNOTEFT_GEN_A_CORE
Patient currently on CVVHD, day team experienced difficulty with dialysis catheter. TPA was inserted in catheter, began working at that time. Later in the evening nursing staff reported complications with CVVHD, stating one port no longer flushing as well as prior. Catheter was manipulated however ultimately unsuccessful. Chest xray was obtained, consistent with prior imaging. Plan to replace catheter, consent attempted to be obtained however daughter was unable to be reached (contacted multiple times throughout night by SICU team). Discussed with attending, current catheter to remain in place until the morning once consent can be obtained.

## 2023-07-07 NOTE — PROGRESS NOTE ADULT - SUBJECTIVE AND OBJECTIVE BOX
· Subjective and Objective:   CHRISTINE VILLAVICENCIO  [id number]  67y Male    Indication for ICU admission: mechanical ventilatior secondary to urosepsis.    Admit Date:06-18-23  ICU Date: 6/18/23  OR Date: 6/18, 6/22, 6/30     24HRS EVENT:  7/6   Night  -post transfusion hgb 8  -bloody output from ngt 26 --> 21 -> plateletsx1  -cvvh delayed as machine had error message --machined replaced and line pulled back to 13 from 15 -> no draw back from venous port, tried to get consent from daughter to replace line, daughter's phone turned off and unable to contact     Day  -GOC at 1500  -Redose digoxin 0.125mg  -EKG to confirm NSR   -Keep patient net even with CVVH  -Hgb 7.0->1pRBC  -Phos increased in TPN  -CVVH clotted, blue port not flushing or blood return- Cathflo instilled @1600, CVVH to be      resumed, clotted again when attemped to resume    [X] A ten-point review of systems was otherwise negative except as noted above.  [  ] Due to altered mental status/intubation, subjective information was not attained from the patient. History was obtained, to the extent possible, from review of the chart and collateral sources of information.    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    Admit Date:06-18-23  ICU Date: 6/18/23  OR Date: 6/18, 6/22, 6/30     24HRS EVENT:  7/6   Night  -post transfusion hgb 8  -bloody output from ngt 26 --> 21 -> plateletsx1  -cvvh delayed as machine had error message --machined replaced and line pulled back to 13 from 15 -> no draw back from venous port, tried to get consent from daughter to replace line, daughter's phone turned off and unable to contact     Day  -GOC at 1500  -Redose digoxin 0.125mg  -EKG to confirm NSR   -Keep patient net even with CVVH  -Hgb 7.0->1pRBC  -Phos increased in TPN  -CVVH clotted, blue port not flushing or blood return- Cathflo instilled @1600, CVVH to be      resumed, clotted again when attemped to resume    [X] A ten-point review of systems was otherwise negative except as noted above.  [  ] Due to altered mental status/intubation, subjective information was not attained from the patient. History was obtained, to the extent possible, from review of the chart and collateral sources of information.    =========================================================================================================================================

## 2023-07-07 NOTE — PROGRESS NOTE ADULT - ASSESSMENT
67M w/ PMH of HTN, grade IV hydronephrosis of L kidney s/p L PCN (placed 5/2023), stutter, hiatal hernia, iron deficiency anemia, H Pylori (untreated), and gastric mass (never biopsied) presents to the ED with at least 4 days of worsening weakness, abdominal distension, and no output from his PCN.    Found to have septic shock, MSOF, lactic acidosis from left emphysematous hydronephrosis, pyelonephritis and possible rupture with pneumoperitoneum along with possible hepatic abscesses, splenic infarct and abdominal and mediastinal lymphadenopathies    ROJAS likely ATN - to hold CVVH today, f/u with nephrology daily  septic shock   acute resp failure, remains vented postop  fluid overload  severe acute malnutrition high risk due to prolonged NPO due to acute circumstances  + feculent material from drains    cont TPN  hold lipids depending on platelets and bleeding issues  decrease TPN volume as much as possible - will decrease AA content while off CVVH  increase phos on TPN

## 2023-07-07 NOTE — PROGRESS NOTE ADULT - SUBJECTIVE AND OBJECTIVE BOX
NUTRITION SUPPORT TEAM  -  PROGRESS NOTE     Interval Events:  d/w SICU team on am rounds  pt remains vented, sedated does respond/ open eyes  + vaso only  CVVH off - access clotted again  NG with bloody output yesterday, R prior drain site and pelvic drain + feculent material  left IJ TLC c/d/i - d/w nurses concern with dressing integrity r/t his beard and diaphoresis  + fever 101.1 overnight, received iv tylenol  demarcation on R fingers and L great toe noted    VITALS:  T(F): 98.2 (07-07 @ 10:00), Max: 101.1 (07-07 @ 04:00)  HR: 85 (07-07 @ 10:00) (84 - 101)  BP: 98/58 (07-07 @ 08:00) (94/69 - 128/72)  RR: 26 (07-07 @ 10:00) (26 - 29)  SpO2: 100% (07-07 @ 10:00) (100% - 100%)    HEIGHT/WEIGHT/BMI:   Height (cm): 180 (06-30), 180.3 (05-11)  Weight (kg): 76.3 (06-30), 66.2 (05-10)  BMI (kg/m2): 23.5 (06-30), 20.4 (05-11)    I/Os:     07-06-23 @ 07:01  -  07-07-23 @ 07:00  --------------------------------------------------------  IN:    Dexmedetomidine: 134 mL    Fat Emulsion (Fish Oil &amp; Plant Based) 20% Infusion: 187.8 mL    IV PiggyBack: 50 mL    IV PiggyBack: 583.1 mL    IV PiggyBack: 50 mL    Phenylephrine: 191.6 mL    PRBCs (Packed Red Blood Cells): 324 mL    TPN (Total Parenteral Nutrition): 2151 mL    Vasopressin: 108 mL  Total IN: 3779.5 mL    OUT:    Blood Loss (mL): 174 mL    Drain (mL): 22 mL    Drain (mL): 70 mL    Drain (mL): 205 mL    Indwelling Catheter - Urethral (mL): 22 mL    Nasogastric/Oral tube (mL): 1050 mL    Other (mL): 382 mL  Total OUT: 1925 mL    Total NET:  +1854.5 mL - still + close to 30 liters since admission    STANDING MEDICATIONS:   bacitracin   Ointment 1 Application(s) Topical two times a day  chlorhexidine 0.12% Liquid 15 milliLiter(s) Oral Mucosa every 12 hours  chlorhexidine 2% Cloths 1 Application(s) Topical <User Schedule>  DAPTOmycin IVPB 460 milliGRAM(s) IV Intermittent every 48 hours  dexMEDEtomidine Infusion 0.2 MICROgram(s)/kG/Hr IV Continuous <Continuous>  heparin   Injectable 5000 Unit(s) SubCutaneous every 8 hours  hydrocortisone sodium succinate Injectable 50 milliGRAM(s) IV Push every 6 hours  levoFLOXacin IVPB 750 milliGRAM(s) IV Intermittent every 48 hours  meropenem  IVPB 1000 milliGRAM(s) IV Intermittent every 12 hours  pantoprazole  Injectable 40 milliGRAM(s) IV Push every 12 hours  Parenteral Nutrition - Adult 1 Each TPN Continuous <Continuous>  phenylephrine    Infusion 0.1 MICROgram(s)/kG/Min IV Continuous <Continuous>  vasopressin Infusion 0.03 Unit(s)/Min IV Continuous <Continuous>    LABS:                         7.3    16.17 )-----------( 39       ( 07 Jul 2023 04:40 )             21.2   Blood Gas Arterial, Lactate: 3.70: Elevated lactate. Consider ordering follow-up lactate to trend. mmol/L (07.07.23 @ 04:17) trending down    134<L>  |  101  |  46<H>  ----------------------------<  160<H>          (07-07-23 @ 04:40)  4.9   |  23  |  1.2    Ca    7.3<L>          (07-07-23 @ 04:40)  Phos  2.1         (07-07-23 @ 04:40)  Mg     2.0         (07-07-23 @ 04:40)    Triglycerides, Serum: 88 mg/dL (07-02 @ 21:01)    Vitamin D, 25-Hydroxy: 26 ng/mL (05-08 @ 07:58)    Folate: 5.7 ng/mL (05-07 @ 09:19)    Vitamin B12, Serum: 766 pg/mL (05-07 @ 09:19)    Zinc Level, Plasma:     Blood Glucose (Past 24 hours):  148 mg/dL (07-07 @ 06:51)  156 mg/dL (07-07 @ 04:35)  154 mg/dL (07-07 @ 02:42)  172 mg/dL (07-06 @ 19:17)  191 mg/dL (07-06 @ 11:13)      DIET:   Diet, NPO (07-05-23 @ 10:33) [Active]

## 2023-07-07 NOTE — PROGRESS NOTE ADULT - ASSESSMENT
Pt is a 68y Male admitted with retroperitoneal and intraabdominal abscess s/p IR placement of drain and LEFT PCN 6/18, s/p percutaneous drainage of LEFT renal abscess POD # 13 with left 26fr flank catheter in place, s/p Left radical nephrectomy, Ex-Lap POD # 7.    ·	cont care per SICU  ·	pressor support  ·	cont TPN, nutrition following  ·	cont to monitor drain outputs q shift  ·	monitor UO, cont CVVH as per renal  ·	cont IV abx, ID following  ·	w/d with attng

## 2023-07-07 NOTE — PROGRESS NOTE ADULT - SUBJECTIVE AND OBJECTIVE BOX
Nephrology progress note    THIS IS AN INCOMPLETE NOTE . FULL NOTE TO FOLLOW SHORTLY    Patient is seen and examined, events over the last 24 h noted .    Allergies:  No Known Allergies    Hospital Medications:   MEDICATIONS  (STANDING):  bacitracin   Ointment 1 Application(s) Topical two times a day  chlorhexidine 0.12% Liquid 15 milliLiter(s) Oral Mucosa every 12 hours  chlorhexidine 2% Cloths 1 Application(s) Topical <User Schedule>  DAPTOmycin IVPB 460 milliGRAM(s) IV Intermittent every 48 hours  dexMEDEtomidine Infusion 0.2 MICROgram(s)/kG/Hr (3.82 mL/Hr) IV Continuous <Continuous>  heparin   Injectable 5000 Unit(s) SubCutaneous every 8 hours  hydrocortisone sodium succinate Injectable 50 milliGRAM(s) IV Push every 6 hours  levoFLOXacin IVPB 750 milliGRAM(s) IV Intermittent every 48 hours  meropenem  IVPB 1000 milliGRAM(s) IV Intermittent every 12 hours  pantoprazole  Injectable 40 milliGRAM(s) IV Push every 12 hours  Parenteral Nutrition - Adult 1 Each (65 mL/Hr) TPN Continuous <Continuous>  phenylephrine    Infusion 0.1 MICROgram(s)/kG/Min (1.43 mL/Hr) IV Continuous <Continuous>  vasopressin Infusion 0.03 Unit(s)/Min (4.5 mL/Hr) IV Continuous <Continuous>        VITALS:  T(F): 98.4 (07-07-23 @ 09:00), Max: 101.1 (07-07-23 @ 04:00)  HR: 84 (07-07-23 @ 09:00)  BP: 98/58 (07-07-23 @ 08:00)  RR: 27 (07-07-23 @ 09:00)  SpO2: 100% (07-07-23 @ 09:00)  Wt(kg): --    07-05 @ 07:01  -  07-06 @ 07:00  --------------------------------------------------------  IN: 3924 mL / OUT: 3793 mL / NET: 131 mL    07-06 @ 07:01  -  07-07 @ 07:00  --------------------------------------------------------  IN: 3779.5 mL / OUT: 1925 mL / NET: 1854.5 mL    07-07 @ 07:01  -  07-07 @ 09:40  --------------------------------------------------------  IN: 196.6 mL / OUT: 377 mL / NET: -180.4 mL          PHYSICAL EXAM:  Constitutional: NAD  HEENT: anicteric sclera, oropharynx clear, MMM  Neck: No JVD  Respiratory: CTAB, no wheezes, rales or rhonchi  Cardiovascular: S1, S2, RRR  Gastrointestinal: BS+, soft, NT/ND  Extremities: No cyanosis or clubbing. No peripheral edema  :  No bobby.   Skin: No rashes    LABS:  07-07    134<L>  |  101  |  46<H>  ----------------------------<  160<H>  4.9   |  23  |  1.2    Ca    7.3<L>      07 Jul 2023 04:40  Phos  2.1     07-07  Mg     2.0     07-07                            7.3    16.17 )-----------( 39       ( 07 Jul 2023 04:40 )             21.2       Urine Studies:  Urinalysis Basic - ( 07 Jul 2023 04:40 )    Color:  / Appearance:  / SG:  / pH:   Gluc: 160 mg/dL / Ketone:   / Bili:  / Urobili:    Blood:  / Protein:  / Nitrite:    Leuk Esterase:  / RBC:  / WBC    Sq Epi:  / Non Sq Epi:  / Bacteria:           Iron 12, TIBC 128, %sat 9      [05-07-23 @ 09:19]  Ferritin 526      [05-07-23 @ 09:19]  Vitamin D (25OH) 26      [05-08-23 @ 07:58]  TSH 0.45      [06-19-23 @ 14:14]  Lipid: chol 58, TG 88, HDL 22, LDL --      [07-02-23 @ 21:01]          RADIOLOGY & ADDITIONAL STUDIES:   Nephrology progress note    Patient is seen and examined, events over the last 24 h noted .  Lying in bed intubated on MV     Allergies:  No Known Allergies    Hospital Medications:   MEDICATIONS  (STANDING):  bacitracin   Ointment 1 Application(s) Topical two times a day  DAPTOmycin IVPB 460 milliGRAM(s) IV Intermittent every 48 hours  dexMEDEtomidine Infusion 0.2 MICROgram(s)/kG/Hr (3.82 mL/Hr) IV Continuous <Continuous>  heparin   Injectable 5000 Unit(s) SubCutaneous every 8 hours  hydrocortisone sodium succinate Injectable 50 milliGRAM(s) IV Push every 6 hours  levoFLOXacin IVPB 750 milliGRAM(s) IV Intermittent every 48 hours  meropenem  IVPB 1000 milliGRAM(s) IV Intermittent every 12 hours  pantoprazole  Injectable 40 milliGRAM(s) IV Push every 12 hours  Parenteral Nutrition - Adult 1 Each (65 mL/Hr) TPN Continuous <Continuous>  phenylephrine    Infusion 0.1 MICROgram(s)/kG/Min (1.43 mL/Hr) IV Continuous <Continuous>  vasopressin Infusion 0.03 Unit(s)/Min (4.5 mL/Hr) IV Continuous <Continuous>        VITALS:  T(F): 98.4 (07-07-23 @ 09:00), Max: 101.1 (07-07-23 @ 04:00)  HR: 84 (07-07-23 @ 09:00)  BP: 98/58 (07-07-23 @ 08:00)  RR: 27 (07-07-23 @ 09:00)  SpO2: 100% (07-07-23 @ 09:00)      07-05 @ 07:01  -  07-06 @ 07:00  --------------------------------------------------------  IN: 3924 mL / OUT: 3793 mL / NET: 131 mL    07-06 @ 07:01  -  07-07 @ 07:00  --------------------------------------------------------  IN: 3779.5 mL / OUT: 1925 mL / NET: 1854.5 mL    07-07 @ 07:01  -  07-07 @ 09:40  --------------------------------------------------------  IN: 196.6 mL / OUT: 377 mL / NET: -180.4 mL          PHYSICAL EXAM:  Constitutional: intubated on MV   Respiratory: CTAB, no wheezes, rales or rhonchi  Cardiovascular: S1, S2, RRR  Gastrointestinal: BS+, soft, NT/ND  Extremities: No cyanosis or clubbing. No peripheral edema/ mottled digits   :  No bobby.   Skin: No rashes    LABS:  07-07    134<L>  |  101  |  46<H>  ----------------------------<  160<H>  4.9   |  23  |  1.2    Ca    7.3<L>      07 Jul 2023 04:40  Phos  2.1     07-07  Mg     2.0     07-07                            7.3    16.17 )-----------( 39       ( 07 Jul 2023 04:40 )             21.2       Urine Studies:  Urinalysis Basic - ( 07 Jul 2023 04:40 )    Color:  / Appearance:  / SG:  / pH:   Gluc: 160 mg/dL / Ketone:   / Bili:  / Urobili:    Blood:  / Protein:  / Nitrite:    Leuk Esterase:  / RBC:  / WBC    Sq Epi:  / Non Sq Epi:  / Bacteria:           Iron 12, TIBC 128, %sat 9      [05-07-23 @ 09:19]  Ferritin 526      [05-07-23 @ 09:19]  Vitamin D (25OH) 26      [05-08-23 @ 07:58]  TSH 0.45      [06-19-23 @ 14:14]  Lipid: chol 58, TG 88, HDL 22, LDL --      [07-02-23 @ 21:01]          RADIOLOGY & ADDITIONAL STUDIES:

## 2023-07-07 NOTE — PROGRESS NOTE ADULT - ASSESSMENT
67M w/ PMH of HTN, grade IV hydronephrosis of L kidney s/p L PCN (placed 2023), stutter, hiatal hernia, iron deficiency anemia, H Pylori (untreated), and gastric mass (never biopsied) presents to the ED with at least 4 days of worsening weakness, abdominal distension, and no output from his PCN. Admitted with emphysematous pyelonephritis.     (): s/p LT PCN upsizing to 16Fr and 16Fr RLQ drain placement with IR   (): s/p shock lithotripsy of L kidney abscess cavity with drainage of thick contents, upsize of drain to 26Fr bobby catheter  (): s/p ex-lap and L nephrectomy with abdominal washout    NEURO:  #Pain control    -APAP PRN    -fent 25 q 3 PRN while intubated  #Sedation    -precedex gtt    -following commands when off sedation, GCS 11T    RESP:  #acute respiratory failure with left lung mucous plugging - resolved s/p intubated  and    and bronchoscopy   -AC//18/40/8  PS tolerated again   AB.36/36/115/20    #Pulmonary nodules / Mediastinal lymphadenopathy    - CT Chest: Right middle and upper lobe solid pulmonary nodules, f/u outpatient pulm   #Activity   - incr as tolerated    CARDS:   #Septic shock    - off levo since 3am , remains on Josh and Vasopressin drips  #New onset Afib w/ RVR with hypotension -- likely 2/2 sepsis    - HR control with Metoprolol 2.5 q6 IV (OFF currently)    -Digoxin 250mcg x1 dose, 125mcg x1 , 125mcg , 125 mcg on , repeat level in      am     - Cardiology following    #H/O HTN    -Amlodipine 2.5mg HOLDING due to ongoing pressor requirement  Imaging    - ECHO: () EF 55-60%, mild AS, mild LAE, mild MR/TR, trivial pericardial effusion    GI/NUTR:  #Diet: NPO on TPN  #bowel movements    - GI consult - Plan for EGD with EUS + FNA as outpatient, H-pylori treatment after resolution of acute issues   -dignishield in place   -FOBT positive    #GI PPX  -IV Protonix 40 q12 hours  #Gastric mass vs gastrohepatic lymphadenopathy    -CT A/P: lesser curvature mass , Hepatic hypodensities  #Splenic Hypodensities, infarct vs phlegmon/abscess    -CT A/P:  Wedge-shaped region of hypodensity within the spleen    /RENAL:   #Grade IV L hydronephrosis s/p L PCN (2023) -- presented to ED with thick dark foul smelling output  #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    -() s/p LT PCN upsizing to 26Fr catheter, RLQ 16 Fr drain remains, s/p nephrostolithotomy w/ lithotripsy    -() s/p LT PCN upsizing to 16Fr and 16Fr RLQ drain placement with IR, flush q8 hr  (): s/p L nephrectomy and abdominal washout   -R pelvic drain Creatinine 3.99; increasing drain output overnight > 600 cc    High suspicion of renal malignancy given recent pathology  Follow path from nephrectomy ()    #metabolic acidosis  -Nephrology c/s - CVVHD started , current goal to keep net even  -( PM): cvvh delayed as machine had error message --machined replaced and line pulled back to 13 from 15 -> no draw back from venous port, tried to get consent from daughter to replace line, daughter's phone turned off and unable to contact       Labs:          BUN/Cr- 41/1.3  -->,  33/1.0  --> 35/1.0          Electrolytes- Na 136 / K 4.2 / Mg1.9 /Phos 2.4     #hypophosphatemia  -received 60 m-mol phosphate on ordered, discussed with nephrology rec: increasing phos in TPN (done )  #decreased urine output in the setting of acute renal failure 2/2 septic shock    HEME/ONC:   #DVT prophylaxis  -SCDs, heparin subQ   #new onset Afib  -holding heparin infusion in setting of hematochezia     Labs: DVT propylaxis- HSQ     #R radial artery occlusion  -RUE duplex  + for r radial artery occlusion  #RT posterior popliteal vein DVT  -vascular c/s - rec AC     DVT prophylaxis-heparin   Injectable  #Acute anemia    -Hgb to 7.0, given 1u [facility]      Labs: Hb/Hct: 7.0/20.9 -> 8.0/23.6 (s/p 1 PRBC)               Plts:  26 ->21 -> __ (s/p 1 platelet)              PTT/INR:        #H/o Fe Deficiency anemia  #Thrombocytopenia  Heme consult (7/3):   - low risk for HIT (score 2-3)  - Heme/onc wants full HIT workup although low HIT risk  - HIT negative, BRUCE pending   - risk of full anticoagulation outweigh the benefits due to high risk of bleeding (h/o GI bleed and post-nephrectomy), will hold off on AC and will wait for HIT AB screen results, if new DVT then will re-assess; discussed with SICU attending Dr. Orozco who also discussed this with hematology team and Dr. Womack who are all in agreement.  - will keep hep subQ prophylaxis for now  - ( PM): platelet 21 -> plateletx1 -> platelets     ID:  #Leukocytosis 2/2 sepsis   WBC- 19.39 ->19.65 -> 21.30  Temp trend- 24hrs T(F): 96.6 ( @ 00:00), Max: 203.7 ( @ 16:00)  #Current antibiotics:    -Levaquin 750 QD    -Meropenum 1g q12; PENDING ID APPROVAL     -added Dapto 6mg/kg 460mg QD    -As per ID: no need for Vancomycin or Daptomycin in the absence of MRSA    -discontinued caspofungin (as per ID, since fungitell is unremarkable)  #Cultures  Surgical swab : vanc resistent enterococcus faecium     L PCN cyto-pathology  : results suggestive of a necrotic neoplasm    Funtitell : 47    OR Cx : Finegoldia magna    BAL : moderate yeast     UCx : negative FINAL      Left PCN aspiration : stenotrophomonas maltophilia  Needs source control    ENDO:  #Glucose monitoring    -A1c (23): 5.0    -POCT q6 hours while NPO   #Adrenal Insufficiency 2/2 to sepsis     -restarted solucortef 50q6 for 6 days starting       -Cortisol level - binding globulin 1.1 (low), serum cortisol, 84, free cortisol 76, % free cortisol 91    MSK:  #Sacrococcygeal DTI: wound care following- cleanse with soap and water, apply triad, #gauze, and allevyn BID and PRN for soiling   Venous ulcer right lower leg, R forearm skin tear: Pat dry, apply Xeroform, nonadherent dressing, wrap with Kerlix twice a day, prn for soiling  Burn consulted, recs:  --> sacrum: apply hydrogel and Allevyn pad  --> b/l ischium: Allevyn pad  - offloading/positioning  #Activity    -Advance as tolerated    -PT/rehab once extubated    -Globally deconditioned  #b/l UE, LE mottled skin  -no radial pulses b/l  +ulnar, brachial b/l UE, +DP b/l  -Arterial duplex - R radial occlusion   -DVT sono- prelim R PT DVT     LINES/DRAINS:   PIV  Bobby ()  L eleazar drain ()  L IJ TLC ()  R IJ Uldall ()  R Lerma pouch #1, pelvic drain #2 ()  Left basilic midline    Right axillary a line (-)  Right Radial [place] (-)  L radial [place] (-)  R-Femoral Joleen (- _ )  ETT  OGT    ADVANCED DIRECTIVES:  Full Code  HCP/Emergency Contact- Tracey Adames: [phone]   Palliative following. Last GOC discussion )  GOC meeting pending    INDICATION FOR SICU: New onset atrial fibrillation w/ mechanical ventilation   67M w/ PMH of HTN, grade IV hydronephrosis of L kidney s/p L PCN (placed 2023), stutter, hiatal hernia, iron deficiency anemia, H Pylori (untreated), and gastric mass (never biopsied) presents to the ED with at least 4 days of worsening weakness, abdominal distension, and no output from his PCN. Admitted with emphysematous pyelonephritis.     (): s/p LT PCN upsizing to 16Fr and 16Fr RLQ drain placement with IR   (): s/p shock lithotripsy of L kidney abscess cavity with drainage of thick contents, upsize of drain to 26Fr bobby catheter  (): s/p ex-lap and L nephrectomy with abdominal washout    NEURO:  #Pain control    -APAP PRN    -fent 25 q 3 PRN while intubated  #Sedation    -precedex gtt    -following commands when off sedation, GCS 11T    RESP:  #acute respiratory failure with left lung mucous plugging - resolved s/p intubated  and    and bronchoscopy   -AC//18/40/8  PS tolerated again   AB.36/36/115/20    #Pulmonary nodules / Mediastinal lymphadenopathy    - CT Chest: Right middle and upper lobe solid pulmonary nodules, f/u outpatient pulm   #Activity   - incr as tolerated    CARDS:   #Septic shock    - Titrate vaso gtt to MAP > 65  #New onset Afib w/ RVR with hypotension -- likely 2/2 sepsis    - Digoxin 250mcg dosed , 7, 7/6    - AM dose held given level of 2.7    - Cardiology following    #H/O HTN    -Home norvasc held  Imaging    - ECHO: () EF 55-60%, mild AS, mild LAE, mild MR/TR, trivial pericardial effusion    GI/NUTR:  #Diet: NPO on TPN  #bowel movements    - GI consult - Plan for EGD with EUS + FNA as outpatient, H-pylori treatment after resolution of acute issues   -dignishield in place   -FOBT positive    #GI PPX  -IV Protonix 40 q12 hours  #Gastric mass vs gastrohepatic lymphadenopathy    -CT A/P: lesser curvature mass , Hepatic hypodensities  #Splenic Hypodensities, infarct vs phlegmon/abscess    -CT A/P:  Wedge-shaped region of hypodensity within the spleen    /RENAL:   #Grade IV L hydronephrosis s/p L PCN (2023) -- presented to ED with thick dark foul smelling output  #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    -() s/p LT PCN upsizing to 26Fr catheter, RLQ 16 Fr drain remains, s/p nephrostolithotomy w/ lithotripsy    -() s/p LT PCN upsizing to 16Fr and 16Fr RLQ drain placement with IR, flush q8 hr  (): s/p L nephrectomy and abdominal washout   -R pelvic drain Creatinine 3.99; increasing drain output overnight > 600 cc    High suspicion of renal malignancy given recent pathology  Follow path from nephrectomy ()    #metabolic acidosis  -Nephrology c/s - CVVHD started , current goal to keep net even  -( PM): cvvh delayed as machine had error message --machined replaced and line pulled back to 13 from 15 -> no draw back from venous port, tried to get consent from daughter to replace line, daughter's phone turned off and unable to contact       Labs:          BUN/Cr- 41/1.3  -->,  33/1.0  --> 35/1.0          Electrolytes- Na 136 / K 4.2 / Mg1.9 /Phos 2.4     #hypophosphatemia  -received 60 m-mol phosphate on ordered, discussed with nephrology rec: increasing phos in TPN (done )  #decreased urine output in the setting of acute renal failure 2/2 septic shock    HEME/ONC:   #DVT prophylaxis  -SCDs, heparin subQ   #new onset Afib  -holding heparin infusion in setting of hematochezia     Labs: DVT propylaxis- HSQ     #R radial artery occlusion  -RUE duplex  + for r radial artery occlusion  #RT posterior popliteal vein DVT  -vascular c/s - rec AC     DVT prophylaxis-heparin   Injectable  #Acute anemia    -Hgb to 7.0, given 1u [facility]      Labs: Hb/Hct: 7.0/20.9 -> 8.0/23.6 (s/p 1 PRBC)               Plts:  26 ->21 -> __ (s/p 1 platelet)              PTT/INR:        #H/o Fe Deficiency anemia  #Thrombocytopenia  Heme consult (7/3):   - low risk for HIT (score 2-3)  - Heme/onc wants full HIT workup although low HIT risk  - HIT negative, BRUCE pending   - risk of full anticoagulation outweigh the benefits due to high risk of bleeding (h/o GI bleed and post-nephrectomy), will hold off on AC and will wait for HIT AB screen results, if new DVT then will re-assess; discussed with SICU attending Dr. Orozco who also discussed this with hematology team and Dr. Womack who are all in agreement.  - will keep hep subQ prophylaxis for now  - ( PM): platelet 21 -> plateletx1 -> platelets     ID:  #Leukocytosis 2/2 sepsis   WBC- 19.39 ->19.65 -> 21.30  Temp trend- 24hrs T(F): 96.6 ( @ 00:00), Max: 203.7 ( @ 16:00)  #Current antibiotics:    -Levaquin 750 QD    -Meropenum 1g q12     -added Dapto 6mg/kg 460mg QD for VRE  #Cultures  Surgical swab : vanc resistant enterococcus faecium     L PCN cyto-pathology  : results suggestive of a necrotic neoplasm    Funtitell : 47    OR Cx : Finegoldia magna    BAL : moderate yeast     UCx : negative FINAL      Left PCN aspiration : stenotrophomonas maltophilia    ENDO:  #Glucose monitoring    -A1c (23): 5.0    -POCT q6 hours while NPO   #Adrenal Insufficiency 2/2 to sepsis     -restarted solucortef 50q6 for 6 days starting       -Cortisol level - binding globulin 1.1 (low), serum cortisol, 84, free cortisol 76, % free cortisol 91    MSK:  #Sacrococcygeal DTI: wound care following- cleanse with soap and water, apply triad, #gauze, and allevyn BID and PRN for soiling   Venous ulcer right lower leg, R forearm skin tear: Pat dry, apply Xeroform, nonadherent dressing, wrap with Kerlix twice a day, prn for soiling  Burn consulted, recs:  --> sacrum: apply hydrogel and Allevyn pad  --> b/l ischium: Allevyn pad  - offloading/positioning  #Activity    -Advance as tolerated    -PT/rehab once extubated    -Globally deconditioned  #b/l UE, LE mottled skin  -no radial pulses b/l  +ulnar, brachial b/l UE, +DP b/l  -Arterial duplex - R radial occlusion   -DVT sono- prelim R PT DVT     LINES/DRAINS:   PIV  Bobby ()  L eleazar drain ()  L IJ TLC ()  R IJ Uldall ()  R Lerma pouch #1, pelvic drain #2 ()  Left basilic midline    Right axillary a line (-)  Right Radial [place] (-)  L radial [place] (-)  R-Femoral Joleen (- _ )  ETT  OGT    ADVANCED DIRECTIVES:  Full Code  HCP/Emergency Contact- Tracey Adames: [phone]   Palliative following. Last GOC discussion )  GOC meeting pending    INDICATION FOR SICU: New onset atrial fibrillation w/ mechanical ventilation

## 2023-07-07 NOTE — PROGRESS NOTE ADULT - ASSESSMENT
ASSESSMENT  68yo Male with pmh of hypertension, massive Grade IV hydronephrosis s/p left nephrostomy placed in May 2023 by [facility] presents to the ED with little to no urine output from LEFT nephrostomy from about a week. PCN  more recently drainage was changed to brown/purulent fluid. 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 "Exploratory laparotomy for possible left renal cancer complicated by significant hydronephrosis and pyelonephritis. Left nephrectomy performed, notable for necrotic bulky tissue adherent to the renal vein, staple line in tact and hemostasis achieved. Copious irrigation of retroperitoneal renal bed and intraperitoneal fibrinous exudate and purulent fluid. Three drains placed, #1 in Lerma's pouch, #2 in the pelvis, and #3 in the renal bed"    6/22 UCX  Few Finegoldia magna "Susceptibilities not performed"  Nephrostolithotomy, percutaneous, with lithotripsy, large stone 22-Jun-2023 23:27:09 left large thick abscess materia dimension of aspirate over 4 x 4 x 4 cm Bernie Perdomo  Change external ureteral stent 22-Jun-2023 23:28:03 left Bernie Perdomo  Nephrostogram 22-Jun-2023 23:28:31 left Bernie Perdomo. "thick gelatinous material that was difficult to suction out even with the shock pulse. I would alternate 2 prong to remove and break up / shock pulse to suck out. after an hour where I had no clear planes I elected to stop. irrigation through the 26 bobby took out a lot additional material  in ashlyn catch cup 4 x 4 x 4 cm lump of material trapped"    6/19 UCX NG; UA Blood: x / Protein: 300 mg/dL / Nitrite: Negative   Leuk Esterase: Large / RBC: 8 /HPF / WBC >720 /HPF   Sq Epi: x / Non Sq Epi: x / Bacteria: Moderate    6/18 L PCN     Few Stenotrophomonas maltophilia Levofloxacin: S 2    6/18 L PCN     Numerous Anaerobic Gram Positive Cocci Most closely resembling  Peptoniphilus species "Susceptibilities not performed"    6/18 L PCN   Numerous Gram positive cocci in pairs per oil power field    6/18 BCX NGTD     s/p 6/18  Left PCN upsized to 16F and 1200cc of very viscous tan colored fluid was aspirated. RLQ 16F drain was placed and 1200cc of tan colored fluid was aspirated (less viscous). A sample from each location was sent for culture.     Repeat CT 6/19 Since one day earlier,  No extraluminal contrast. Oral contrast was last seen in the terminal ileum.  Status post left nephrostomy tube placement. Left enlarged kidney with severe hydronephrosis/collection has decreased, now measuring 16 x 12 cm from 20 x 14 cm  Surgical Pathology Report:   Left renal abscess, possible parenchyma  Left renal abscess, possible parenchyma, percutaneous drainage:  -  Fragments of extensively necrotic tissue, cannot be fullycharacterized  Comment: The microscopic appearance of the necrotic tissue is concerning for a neoplasm with coagulative necrosis. Definitive diagnosis cannot be  made, due to lack of viable material.  Additional sections will be submitted.  Immunohistochemical studies willbe performed, in an attempt to characterise the necrotic tissue.  Based on the H&E appearance, an infectious process such as tuberculosis  appears less likely, but special stains for microorganisms (acid-fastbacilli and fungal) are in progress and will be reported separately.   (06.22.23 @ 22:12)  6/27 CT Study is overall very limited due to lack of intravenous contrast, beam   Willingham artifact.  Persistent bilateral pleural effusions with adjacent consolidations   likely reflecting compressive atelectasis.  Left kidney poorly defined with a heterogeneous collection with air and   fluid which has likely mildly diminished in size compared to the prior   examination but exact comparison limited.  Diffuse ascites again noted. Diffuse anasarca again noted. Persistent   pneumoperitoneum.  Poorly defined lesions within the liver adjacent to the falciform   ligament which may again reflect abscesses or masses, difficult to   evaluate due to the limitations described above.  < from: CT Abdomen and Pelvis w/ IV Cont (06.18.23 @ 15:15) >  1 ) Moderate pneumoperitoneum with partial diffusion throughout moderate   volume ascites and with associated intermittent peritoneal enhancement   and nodularity. Perforation and peritonitis may be related to underlying   emphysematous pyelonephritis (see below). Less likely sources of   perforation include ureteral/bladder disruption, and bowel perforation   which cannot be entirely excluded on the provided images.  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 The compressed residual renal   parenchyma is inseparable from the adjacent fascial/fatty layers   anterosuperiorly and direct rupture into the peritoneum is a possibility   (6-355). The previously placed left-sided nephrostomy tube pigtail is   notedto be within the left renal collecting system.  3) New hepatic hypodensities measuring up to 2.6 cm, suspicious for   development of multiple focal abscesses.  4) Wedge-shaped region of hypodensity within the spleen may reflect   splenic infarct in the correct clinical setting. Developing   phlegmon/abscess cannot be excluded in the current clinical setting  5) Increased extensive retroperitoneal, portacaval and likely   gastrohepatic heterogeneously enhancing lymphadenopathy. Findings may be   reactive.  6) Mediastinal lymphadenopathy measuring up to 2.4 cm short axis.   Underlying etiology may be reactive, infectious/inflammatory, or   neoplastic. A short-term 3 month follow-up CT chest should be considered   for further evaluation.  7)Right middle and upper lobe solid pulmonary nodules which are not well   delineated due to respiratory motion but measure less than 6 mm.   attention on follow-up exam.  #Reintubated, L lung white out, ruled out HAP    6/24 bronch CX NG,   Moderate Yeast- likely colonizer  #Lactic acidosis  #Abx allergy: No Known Allergies  #Prolonged QTC Calculation(Bazett) 526 ms  #Hyponatremia  #AKICreatinine: Creatinine: 1.3 mg/dL (07-05-23 @ 05:45)  Ht 180  Weight (kg): 76.5 (06-18-23 @ 21:27)  crcl 57     RECOMMENDATIONS  - Fever overnight- send BCX x2 (none collected)  - TLC 6/26, midline 6/21, UDALL 7/2, bobby 6/18 -- change when possible  - CVVH dosing:   DAPTOmycin IVPB 460 milliGRAM(s) IV Intermittent every 48 hours  levoFLOXacin IVPB 750 milliGRAM(s) IV Intermittent every 48 hours  meropenem  IVPB 1000 milliGRAM(s) IV Intermittent every 12 hours  - if worsening hemodynamic compromise, add caspo 70mg x1 then 50mg q24h IV and dose amikacin 7.5 mg/kd x1  - Trend WBC   - Today is 7 days post-op, continue antibiotics given fever  - Urology following  - Poor prognosis, GOC    If any questions, please send a message or call on [employer] Teams  Please continue to update ID with any pertinent new laboratory or radiographic findings  #[id number]

## 2023-07-07 NOTE — PROGRESS NOTE ADULT - SUBJECTIVE AND OBJECTIVE BOX
CHRISTINE VILLAVICENCIO  68y, Male  Allergy: No Known Allergies      LOS  19d    CHIEF COMPLAINT: pneumoperitoneum, emphysematous pyelonephritis (07 Jul 2023 09:40)      INTERVAL EVENTS/HPI  - T(F): , Max: 101.1 (07-07-23 @ 04:00)- fever, on 1 pressor, having issue with CVVH cath   - WBC Count: 16.17 (07-07-23 @ 04:40) downtrending   WBC Count: 21.30 (07-06-23 @ 22:20)     - Creatinine: 1.2 (07-07-23 @ 04:40)  Creatinine: 1.0 (07-06-23 @ 15:30)     -   -   -     ROS  cannot obtain secondary to patient's sedation and/or mental status    VITALS:  T(F): 98.4, Max: 101.1 (07-07-23 @ 04:00)  HR: 84  BP: 98/58  RR: 27Vital Signs Last 24 Hrs  T(C): 36.9 (07 Jul 2023 09:00), Max: 38.4 (07 Jul 2023 04:00)  T(F): 98.4 (07 Jul 2023 09:00), Max: 101.1 (07 Jul 2023 04:00)  HR: 84 (07 Jul 2023 09:00) (80 - 101)  BP: 98/58 (07 Jul 2023 08:00) (93/64 - 128/72)  BP(mean): 687 (07 Jul 2023 08:00) (74 - 687)  RR: 27 (07 Jul 2023 09:00) (25 - 30)  SpO2: 100% (07 Jul 2023 09:00) (90% - 100%)    Parameters below as of 07 Jul 2023 09:00  Patient On (Oxygen Delivery Method): ventilator    O2 Concentration (%): 40    PHYSICAL EXAM:  ***     FH: Non-contributory  Social Hx: Non-contributory    TESTS & MEASUREMENTS:                        7.3    16.17 )-----------( 39       ( 07 Jul 2023 04:40 )             21.2     07-07    134<L>  |  101  |  46<H>  ----------------------------<  160<H>  4.9   |  23  |  1.2    Ca    7.3<L>      07 Jul 2023 04:40  Phos  2.1     07-07  Mg     2.0     07-07          Urinalysis Basic - ( 07 Jul 2023 04:40 )    Color: x / Appearance: x / SG: x / pH: x  Gluc: 160 mg/dL / Ketone: x  / Bili: x / Urobili: x   Blood: x / Protein: x / Nitrite: x   Leuk Esterase: x / RBC: x / WBC x   Sq Epi: x / Non Sq Epi: x / Bacteria: x        Culture - Surgical Swab (collected 06-30-23 @ 22:12)  Source: .Surgical Swab None  Final Report (07-05-23 @ 21:47):    Growth in fluid media only Enterococcus faecium (vancomycin resistant)  Organism: Enterococcus faecium (vancomycin resistant) (07-05-23 @ 21:47)  Organism: Enterococcus faecium (vancomycin resistant) (07-05-23 @ 21:47)      -  Levofloxacin: R >4      -  Daptomycin: SDD 4 The breakpoint for SDD (susceptible dose dependent)is based on a dosage regimen of 8-12 mg/kg administered every 24 h in adults and is intended for serious infections due to E. faecium. Consultation with an infectious diseases specialist is recommended.      -  Linezolid: S 1      -  Vancomycin: R >16      -  Ampicillin: R >8 Predicts results to ampicillin/sulbactam, amoxacillin-clavulanate and  piperacillin-tazobactam.      -  Tetracycline: R >8      Method Type: EDMUND    Culture - Acid Fast - Bronchial w/Smear (collected 06-24-23 @ 11:00)  Source: .Bronchial None  Preliminary Report (07-05-23 @ 15:08):    No acid-fast bacilli isolated at 1 week. ****Acid-fast cultures are held    for 6 weeks.****    Culture - Fungal, Bronchial (collected 06-24-23 @ 11:00)  Source: .Bronchial None  Preliminary Report (06-26-23 @ 11:00):    Moderate Yeast    Culture - Bronchial (collected 06-24-23 @ 11:00)  Source: .Bronchial None  Gram Stain (06-24-23 @ 23:21):    Numerous polymorphonuclear leukocytes per low power field    No squamous epithelial cells per low power field    Rare Yeast like cells per oil power field  Final Report (06-26-23 @ 17:56):    Normal Respiratory Gabbie present    Culture - Surgical Swab (collected 06-22-23 @ 22:11)  Source: .Surgical Swab None  Final Report (06-25-23 @ 14:14):    Few Finegoldia magna "Susceptibilities not performed"    Culture - Fungal, Other (collected 06-22-23 @ 22:11)  Source: .Other None  Preliminary Report (07-01-23 @ 15:03):    No fungus isolated at 1 week.    Culture - Urine (collected 06-19-23 @ 02:50)  Source: Clean Catch Clean Catch (Midstream)  Final Report (06-20-23 @ 10:18):    No growth    Culture - Abscess with Gram Stain (collected 06-18-23 @ 23:08)  Source: .Abscess right lower quadrant (intrabdominal)  Gram Stain (06-19-23 @ 11:56):    Numerous polymorphonuclear leukocytes per low power field    Numerous Gram positive cocci in pairs per oil power field  Final Report (06-21-23 @ 14:13):    Numerous Anaerobic Gram Positive Cocci Most closely resembling    Peptoniphilus species "Susceptibilities not performed"    Culture - Abscess with Gram Stain (collected 06-18-23 @ 23:08)  Source: .Abscess left kidney  Gram Stain (06-19-23 @ 11:58):    Rare polymorphonuclear leukocytes per low power field    Few Gram Positive Cocci per oil power field  Final Report (06-22-23 @ 10:13):    Growth in fluid media only Anaerobic Gram Positive Cocci Most closely    resembling Peptoniphilus species "Susceptibilities not performed"    Rare Stenotrophomonas maltophilia  Organism: Stenotrophomonas maltophilia  Stenotrophomonas maltophilia (06-22-23 @ 10:13)  Organism: Stenotrophomonas maltophilia (06-22-23 @ 10:13)      -  Levofloxacin: S 1      Method Type: EDMUND      -  Trimethoprim/Sulfamethoxazole: S <=0.5/9.5  Organism: Stenotrophomonas maltophilia (06-22-23 @ 10:13)      -  Minocycline: S      Method Type: KB    Culture - Abscess with Gram Stain (collected 06-18-23 @ 20:18)  Source: .Abscess left PCN aspirate  Gram Stain (06-19-23 @ 06:27):    No polymorphonuclear cells seen per low power field    Few Gram Variable Cocci seen per oil power field  Final Report (06-24-23 @ 09:11):    Few Stenotrophomonas maltophilia  Organism: Stenotrophomonas maltophilia (06-24-23 @ 09:11)  Organism: Stenotrophomonas maltophilia (06-24-23 @ 09:11)      -  Levofloxacin: S 2      Method Type: EDMUND      -  Trimethoprim/Sulfamethoxazole: S <=0.5/9.5    Culture - Blood (collected 06-18-23 @ 14:18)  Source: .Blood Blood-Peripheral  Final Report (06-24-23 @ 02:00):    No Growth Final    Culture - Blood (collected 06-18-23 @ 14:18)  Source: .Blood Blood-Peripheral  Final Report (06-24-23 @ 02:00):    No Growth Final            INFECTIOUS DISEASES TESTING  Fungitell: 47 (06-25-23 @ 15:30)  MRSA PCR Result.: Negative (06-24-23 @ 17:38)      INFLAMMATORY MARKERS      RADIOLOGY & ADDITIONAL TESTS:  I have personally reviewed the last available Chest xray  CXR  Xray Chest 1 View- PORTABLE-Urgent:   ACC: [id number] EXAM:  XR CHEST PORTABLE URGENT 1V   ORDERED BY: CALEB AMIN     PROCEDURE DATE:  07/06/2023          INTERPRETATION:  Clinical History / Reason for exam: Respiratory distress    Comparison : Chest radiograph 7/6/2023.    Technique/Positioning: Frontal.    Findings:    Support devices: ET tube mid trachea NG tube in stomach central venous   line superior vena cava    Cardiac/mediastinum/hilum: Unremarkable.    Lung parenchyma/Pleura: Decreased previous bilateral lower lobe   opacity/pleural effusion. No air leak.    Skeleton/soft tissues: Unremarkable.    Impression:    Decreased previous bilateral lower lobe opacity/pleural effusion. No air   leak.        --- End of Report ---            RONNELL GAGE MD; AttendingRadiologist  This document has been electronically signed. Jul 6 2023  1:42PM (07-06-23 @ 13:27)      CT      CARDIOLOGY TESTING  12 Lead ECG:   Ventricular Rate 89 BPM    Atrial Rate 394 BPM    QRS Duration 84 ms    Q-T Interval 300 ms    QTC Calculation(Bazett) 365 ms    R Axis 50 degrees    T Axis -16 degrees    Diagnosis Line Atrial fibrillation  Low voltage QRS  Nonspecific T wave abnormality  Abnormal ECG    Confirmed by Deon Velasquez ([id number]) on 7/3/2023 8:40:11 AM (07-02-23 @ 07:57)  12 Lead ECG:   Ventricular Rate 96 BPM    Atrial Rate 72 BPM    QRS Duration 82 ms    Q-T Interval 288 ms    QTC Calculation(Bazett) 363 ms    R Axis 37 degrees    T Axis 184 degrees    Diagnosis Line Atrial fibrillation  Low voltage QRS  Nonspecific T wave abnormality  Abnormal ECG    Confirmed by Deon Velasquez ([id number]) on 7/2/2023 1:12:19 PM (07-01-23 @ 08:12)      MEDICATIONS  bacitracin   Ointment 1  chlorhexidine 0.12% Liquid 15  chlorhexidine 2% Cloths 1  DAPTOmycin IVPB 460  dexMEDEtomidine Infusion 0.2  heparin   Injectable 5000  hydrocortisone sodium succinate Injectable 50  levoFLOXacin IVPB 750  meropenem  IVPB 1000  pantoprazole  Injectable 40  Parenteral Nutrition - Adult 1  phenylephrine    Infusion 0.1  vasopressin Infusion 0.03      WEIGHT  Weight (kg): 76.3 (06-30-23 @ 16:58)  Creatinine: 1.2 mg/dL (07-07-23 @ 04:40)  Creatinine: 1.0 mg/dL (07-06-23 @ 15:30)  Creatinine: 0.9 mg/dL (07-06-23 @ 10:30)      ANTIBIOTICS:  DAPTOmycin IVPB 460 milliGRAM(s) IV Intermittent every 48 hours  levoFLOXacin IVPB 750 milliGRAM(s) IV Intermittent every 48 hours  meropenem  IVPB 1000 milliGRAM(s) IV Intermittent every 12 hours      All available historical records have been reviewed   CHRISTINE VILLAVICENCIO  68y, Male  Allergy: No Known Allergies      LOS  19d    CHIEF COMPLAINT: pneumoperitoneum, emphysematous pyelonephritis (07 Jul 2023 09:40)      INTERVAL EVENTS/HPI  - T(F): , Max: 101.1 (07-07-23 @ 04:00)- fever, on 1 pressor, having issue with CVVH cath   - WBC Count: 16.17 (07-07-23 @ 04:40) downtrending   WBC Count: 21.30 (07-06-23 @ 22:20)     - Creatinine: 1.2 (07-07-23 @ 04:40)  Creatinine: 1.0 (07-06-23 @ 15:30)     -   -   -     ROS  cannot obtain secondary to patient's sedation and/or mental status    VITALS:  T(F): 98.4, Max: 101.1 (07-07-23 @ 04:00)  HR: 84  BP: 98/58  RR: 27Vital Signs Last 24 Hrs  T(C): 36.9 (07 Jul 2023 09:00), Max: 38.4 (07 Jul 2023 04:00)  T(F): 98.4 (07 Jul 2023 09:00), Max: 101.1 (07 Jul 2023 04:00)  HR: 84 (07 Jul 2023 09:00) (80 - 101)  BP: 98/58 (07 Jul 2023 08:00) (93/64 - 128/72)  BP(mean): 687 (07 Jul 2023 08:00) (74 - 687)  RR: 27 (07 Jul 2023 09:00) (25 - 30)  SpO2: 100% (07 Jul 2023 09:00) (90% - 100%)    Parameters below as of 07 Jul 2023 09:00  Patient On (Oxygen Delivery Method): ventilator    O2 Concentration (%): 40    PHYSICAL EXAM:  General: intubated  HEENT: NCAT  CV: RRR  Lungs: symmetric chest expansion, decreased BS at bases  Abd: Soft JPs 1 dark fluid  Skin: no rash  Neuro:opens eyes  anasarca  skin breakdown scrotum, sacrum   Lines: no phlebitis     FH: Non-contributory  Social Hx: Non-contributory    TESTS & MEASUREMENTS:                        7.3    16.17 )-----------( 39       ( 07 Jul 2023 04:40 )             21.2     07-07    134<L>  |  101  |  46<H>  ----------------------------<  160<H>  4.9   |  23  |  1.2    Ca    7.3<L>      07 Jul 2023 04:40  Phos  2.1     07-07  Mg     2.0     07-07          Urinalysis Basic - ( 07 Jul 2023 04:40 )    Color: x / Appearance: x / SG: x / pH: x  Gluc: 160 mg/dL / Ketone: x  / Bili: x / Urobili: x   Blood: x / Protein: x / Nitrite: x   Leuk Esterase: x / RBC: x / WBC x   Sq Epi: x / Non Sq Epi: x / Bacteria: x        Culture - Surgical Swab (collected 06-30-23 @ 22:12)  Source: .Surgical Swab None  Final Report (07-05-23 @ 21:47):    Growth in fluid media only Enterococcus faecium (vancomycin resistant)  Organism: Enterococcus faecium (vancomycin resistant) (07-05-23 @ 21:47)  Organism: Enterococcus faecium (vancomycin resistant) (07-05-23 @ 21:47)      -  Levofloxacin: R >4      -  Daptomycin: SDD 4 The breakpoint for SDD (susceptible dose dependent)is based on a dosage regimen of 8-12 mg/kg administered every 24 h in adults and is intended for serious infections due to E. faecium. Consultation with an infectious diseases specialist is recommended.      -  Linezolid: S 1      -  Vancomycin: R >16      -  Ampicillin: R >8 Predicts results to ampicillin/sulbactam, amoxacillin-clavulanate and  piperacillin-tazobactam.      -  Tetracycline: R >8      Method Type: EDMUND    Culture - Acid Fast - Bronchial w/Smear (collected 06-24-23 @ 11:00)  Source: .Bronchial None  Preliminary Report (07-05-23 @ 15:08):    No acid-fast bacilli isolated at 1 week. ****Acid-fast cultures are held    for 6 weeks.****    Culture - Fungal, Bronchial (collected 06-24-23 @ 11:00)  Source: .Bronchial None  Preliminary Report (06-26-23 @ 11:00):    Moderate Yeast    Culture - Bronchial (collected 06-24-23 @ 11:00)  Source: .Bronchial None  Gram Stain (06-24-23 @ 23:21):    Numerous polymorphonuclear leukocytes per low power field    No squamous epithelial cells per low power field    Rare Yeast like cells per oil power field  Final Report (06-26-23 @ 17:56):    Normal Respiratory Gabbie present    Culture - Surgical Swab (collected 06-22-23 @ 22:11)  Source: .Surgical Swab None  Final Report (06-25-23 @ 14:14):    Few Finegoldia magna "Susceptibilities not performed"    Culture - Fungal, Other (collected 06-22-23 @ 22:11)  Source: .Other None  Preliminary Report (07-01-23 @ 15:03):    No fungus isolated at 1 week.    Culture - Urine (collected 06-19-23 @ 02:50)  Source: Clean Catch Clean Catch (Midstream)  Final Report (06-20-23 @ 10:18):    No growth    Culture - Abscess with Gram Stain (collected 06-18-23 @ 23:08)  Source: .Abscess right lower quadrant (intrabdominal)  Gram Stain (06-19-23 @ 11:56):    Numerous polymorphonuclear leukocytes per low power field    Numerous Gram positive cocci in pairs per oil power field  Final Report (06-21-23 @ 14:13):    Numerous Anaerobic Gram Positive Cocci Most closely resembling    Peptoniphilus species "Susceptibilities not performed"    Culture - Abscess with Gram Stain (collected 06-18-23 @ 23:08)  Source: .Abscess left kidney  Gram Stain (06-19-23 @ 11:58):    Rare polymorphonuclear leukocytes per low power field    Few Gram Positive Cocci per oil power field  Final Report (06-22-23 @ 10:13):    Growth in fluid media only Anaerobic Gram Positive Cocci Most closely    resembling Peptoniphilus species "Susceptibilities not performed"    Rare Stenotrophomonas maltophilia  Organism: Stenotrophomonas maltophilia  Stenotrophomonas maltophilia (06-22-23 @ 10:13)  Organism: Stenotrophomonas maltophilia (06-22-23 @ 10:13)      -  Levofloxacin: S 1      Method Type: EDMUND      -  Trimethoprim/Sulfamethoxazole: S <=0.5/9.5  Organism: Stenotrophomonas maltophilia (06-22-23 @ 10:13)      -  Minocycline: S      Method Type: KB    Culture - Abscess with Gram Stain (collected 06-18-23 @ 20:18)  Source: .Abscess left PCN aspirate  Gram Stain (06-19-23 @ 06:27):    No polymorphonuclear cells seen per low power field    Few Gram Variable Cocci seen per oil power field  Final Report (06-24-23 @ 09:11):    Few Stenotrophomonas maltophilia  Organism: Stenotrophomonas maltophilia (06-24-23 @ 09:11)  Organism: Stenotrophomonas maltophilia (06-24-23 @ 09:11)      -  Levofloxacin: S 2      Method Type: EDMUND      -  Trimethoprim/Sulfamethoxazole: S <=0.5/9.5    Culture - Blood (collected 06-18-23 @ 14:18)  Source: .Blood Blood-Peripheral  Final Report (06-24-23 @ 02:00):    No Growth Final    Culture - Blood (collected 06-18-23 @ 14:18)  Source: .Blood Blood-Peripheral  Final Report (06-24-23 @ 02:00):    No Growth Final            INFECTIOUS DISEASES TESTING  Fungitell: 47 (06-25-23 @ 15:30)  MRSA PCR Result.: Negative (06-24-23 @ 17:38)      INFLAMMATORY MARKERS      RADIOLOGY & ADDITIONAL TESTS:  I have personally reviewed the last available Chest xray  CXR  Xray Chest 1 View- PORTABLE-Urgent:   ACC: [id number] EXAM:  XR CHEST PORTABLE URGENT 1V   ORDERED BY: CALEB AIMN     PROCEDURE DATE:  07/06/2023          INTERPRETATION:  Clinical History / Reason for exam: Respiratory distress    Comparison : Chest radiograph 7/6/2023.    Technique/Positioning: Frontal.    Findings:    Support devices: ET tube mid trachea NG tube in stomach central venous   line superior vena cava    Cardiac/mediastinum/hilum: Unremarkable.    Lung parenchyma/Pleura: Decreased previous bilateral lower lobe   opacity/pleural effusion. No air leak.    Skeleton/soft tissues: Unremarkable.    Impression:    Decreased previous bilateral lower lobe opacity/pleural effusion. No air   leak.        --- End of Report ---            RONNELL GAGE MD; AttendingRadiologist  This document has been electronically signed. Jul 6 2023  1:42PM (07-06-23 @ 13:27)      CT      CARDIOLOGY TESTING  12 Lead ECG:   Ventricular Rate 89 BPM    Atrial Rate 394 BPM    QRS Duration 84 ms    Q-T Interval 300 ms    QTC Calculation(Bazett) 365 ms    R Axis 50 degrees    T Axis -16 degrees    Diagnosis Line Atrial fibrillation  Low voltage QRS  Nonspecific T wave abnormality  Abnormal ECG    Confirmed by Deon Velasquez ([id number]) on 7/3/2023 8:40:11 AM (07-02-23 @ 07:57)  12 Lead ECG:   Ventricular Rate 96 BPM    Atrial Rate 72 BPM    QRS Duration 82 ms    Q-T Interval 288 ms    QTC Calculation(Bazett) 363 ms    R Axis 37 degrees    T Axis 184 degrees    Diagnosis Line Atrial fibrillation  Low voltage QRS  Nonspecific T wave abnormality  Abnormal ECG    Confirmed by Deon Velasquez ([id number]) on 7/2/2023 1:12:19 PM (07-01-23 @ 08:12)      MEDICATIONS  bacitracin   Ointment 1  chlorhexidine 0.12% Liquid 15  chlorhexidine 2% Cloths 1  DAPTOmycin IVPB 460  dexMEDEtomidine Infusion 0.2  heparin   Injectable 5000  hydrocortisone sodium succinate Injectable 50  levoFLOXacin IVPB 750  meropenem  IVPB 1000  pantoprazole  Injectable 40  Parenteral Nutrition - Adult 1  phenylephrine    Infusion 0.1  vasopressin Infusion 0.03      WEIGHT  Weight (kg): 76.3 (06-30-23 @ 16:58)  Creatinine: 1.2 mg/dL (07-07-23 @ 04:40)  Creatinine: 1.0 mg/dL (07-06-23 @ 15:30)  Creatinine: 0.9 mg/dL (07-06-23 @ 10:30)      ANTIBIOTICS:  DAPTOmycin IVPB 460 milliGRAM(s) IV Intermittent every 48 hours  levoFLOXacin IVPB 750 milliGRAM(s) IV Intermittent every 48 hours  meropenem  IVPB 1000 milliGRAM(s) IV Intermittent every 12 hours      All available historical records have been reviewed

## 2023-07-07 NOTE — PROGRESS NOTE ADULT - ASSESSMENT
67M w/ PMH of HTN, grade IV hydronephrosis of L kidney s/p L PCN (placed 5/2023), stutter, hiatal hernia, iron deficiency anemia, H Pylori (untreated), and gastric mass (never biopsied) presents to the ED with at least 4 days of worsening weakness, abdominal distension, and no output from his PCN  . Found to have septic shock, MSOF, lactic acidosis from left emphysematous hydronephrosis, pyelonephritis and possible rupture with pneumoperitoneum along with possible hepatic abscesses, splenic infarct and abdominal and mediastinal lymphadenopathies.  had nephrectomy and abdominal washout on 6/30    Oliguric in spite of bumex, rising dig level, high lactate    ROJAS likely ATN iso septic shock  on pressors   will hold CVVHD today   IP noted - sp repletion   repeat Dig level ok  / follow q48h   monitor K, phos  will need new catheter if resuming CVVHD in AM   platelets noted follow trend / ? HIT / hem on case   will follow  discussed w/ SICU team

## 2023-07-08 NOTE — PROGRESS NOTE ADULT - ASSESSMENT
A/P:  CHRISTINE VILLAVICENCIO is a 68y  w/ PMHx of retroperitoneal and intraabdominal abscess s/p IR placement of drain and LEFT PCN 6/18, s/p percutaneous drainage of LEFT renal abscess POD #8 with left 26fr flank catheter in place. s/p Left radical nephrectomy, Ex-Lap with increased pressor requirements and symptoms of ischemia in distal extremities.       PLAN:   - Continue management per SICU team  - Need replacement UDAL  - Continue to monitor NGT output  - Continue to monitor VENICE drains x3  - continue with abx per ID recs; Ar, levaquin, daptomycin  - F/u fungitel  - Continue GI, DVT ppx  - Wean pressors as tolerated while maintaining MAP goals

## 2023-07-08 NOTE — PROGRESS NOTE ADULT - ASSESSMENT
67M w/ PMH of HTN, grade IV hydronephrosis of L kidney s/p L PCN (placed 5/2023), stutter, hiatal hernia, iron deficiency anemia, H Pylori (untreated), and gastric mass (never biopsied) presents to the ED with at least 4 days of worsening weakness, abdominal distension, and no output from his PCN  . Found to have septic shock, MSOF, lactic acidosis from left emphysematous hydronephrosis, pyelonephritis and possible rupture with pneumoperitoneum along with possible hepatic abscesses, splenic infarct and abdominal and mediastinal lymphadenopathies.  had nephrectomy and abdominal washout on 6/30    Oliguric in spite of bumex, rising dig level, high lactate  ROJAS likely ATN iso septic shock  on pressors   will resume CVVHD   hyperkalemic will give d50/ insulin / no calcium in view of elevated dig level   repeat dig level daily   platelets noted follow trend /  negative  HIT / hem on case   ID notes appreciated   will follow  discussed w/ SICU team

## 2023-07-08 NOTE — PROGRESS NOTE ADULT - SUBJECTIVE AND OBJECTIVE BOX
GENERAL SURGERY PROGRESS NOTE     CHRISTINE VILLAVICENCIO  68y  Male  Hospital day :21  POD:7    Procedure: Insertion, arterial line, percutaneous    Central venous catheter placement with ultrasound guidance    Midline catheter insertion    Nephrostolithotomy, percutaneous, with lithotripsy, large stone    Change external ureteral stent    Nephrostogram    US guided vascular access    Insertion of arterial line with imaging guidance    Bronchoscopy, flexible, adult    Partial nephrectomy    Nephrostogram via existing catheter    Exploratory laparotomy    Left nephrectomy    Insertion, catheter, hemodialysis, non-tunneled, with US guidance      OVERNIGHT EVENTS: UDAL nonfunctioning, increasing pressor requirements, OG w/ bloody bilious output, leakage from RLQ wound. Vent settings: PSV, FiO2 40%, , PEEP 8, Rate 30    T(F): 100 (23 @ 20:00), Max: 101.1 (23 @ 04:00)  HR: 85 (23 @ 00:00) (84 - 98)  BP: 98/58 (23 @ 08:00) (98/58 - 128/72)  ABP: 126/57 (23 @ 00:00) (106/48 - 147/90)  ABP(mean): 78 (23 @ 00:00) (65 - 108)  RR: 30 (23 @ 00:00) (26 - 33)  SpO2: 100% (23 @ 00:00) (99% - 100%)    DIET/FLUIDS: Parenteral Nutrition - Adult 1 Each TPN Continuous <Continuous>    N23 @ 07:  -  23 @ 07:00  --------------------------------------------------------  OUT: 1050 mL                                                                                 DRAINS:   23 @ 07:  -  23 @ 07:00  --------------------------------------------------------  OUT: 297 mL      URINE:   23 @ 07:01  -  23 @ 07:00  --------------------------------------------------------  OUT: 22 mL     Indwelling Urethral Catheter:     Connect To:  Straight Drainage/[place]    Indication:  Urinary Retention / Obstruction (23 @ 03:21)    GI proph:  pantoprazole  Injectable 40 milliGRAM(s) IV Push every 12 hours    AC/ proph: heparin   Injectable 5000 Unit(s) SubCutaneous every 8 hours    ABx: DAPTOmycin IVPB 460 milliGRAM(s) IV Intermittent every 48 hours  levoFLOXacin IVPB 750 milliGRAM(s) IV Intermittent every 48 hours  meropenem  IVPB 1000 milliGRAM(s) IV Intermittent every 12 hours      PHYSICAL EXAM:  GENERAL: Laying in bed, intubated, sedated  CHEST/LUNG: equal chest rise bilaterally  HEART: sinus tachycardia  ABDOMEN: multiple drains, lines. Incisions c/d/i  EXTREMITIES:  Diffuse echymosis, necrosis of L and R index fingers, L great toe.   Drains: OG tube with bloody, bilious output, RUQ drain with serosanguinous output, Pelvic drain with brown output, Renal bed drain with brown fluid ouput.      LABS  Labs:  CAPILLARY BLOOD GLUCOSE      POCT Blood Glucose.: 139 mg/dL (2023 17:19)  POCT Blood Glucose.: 145 mg/dL (2023 11:22)  POCT Blood Glucose.: 148 mg/dL (2023 06:51)  POCT Blood Glucose.: 156 mg/dL (2023 04:35)  POCT Blood Glucose.: 154 mg/dL (2023 02:42)                          7.3    16.17 )-----------( 39       ( 2023 04:40 )             21.2             134<L>  |  101  |  46<H>  ----------------------------<  160<H>  4.9   |  23  |  1.2      Phosphorus: 2.5 mg/dL (23 @ 11:20)      LFTs:     Blood Gas Arterial, Lactate: 3.70 mmol/L (23 @ 04:17)  Blood Gas Arterial, Lactate: 6.60 mmol/L (23 @ 04:13)  Blood Gas Arterial, Lactate: 6.10 mmol/L (23 @ 14:50)  Blood Gas Arterial, Lactate: 6.90 mmol/L (23 @ 04:22)    ABG - ( 2023 04:17 )  pH: 7.46  /  pCO2: 31    /  pO2: 132   / HCO3: 22    / Base Excess: -1.5  /  SaO2: 100.0           ABG - ( 2023 04:13 )  pH: 7.36  /  pCO2: 36    /  pO2: 115   / HCO3: 20    / Base Excess: -4.7  /  SaO2: 99.9            ABG - ( 2023 14:50 )  pH: 7.42  /  pCO2: 32    /  pO2: 96    / HCO3: 21    / Base Excess: -3.1  /  SaO2: 97.9        RADIOLOGY & ADDITIONAL TESTS:  Xray Chest 1 View- PORTABLE-Routine (23 @ 06:44) >  Stable bilateral opacities. No pneumothorax

## 2023-07-08 NOTE — PROGRESS NOTE ADULT - SUBJECTIVE AND OBJECTIVE BOX
UROLOGY PROGRESS NOTE    Pt is a 68y Male admitted with retroperitoneal and intraabdominal abscess s/p IR placement of drain and LEFT PCN 6/18, s/p percutaneous drainage of LEFT renal abscess POD # 14 with left 26fr flank catheter in place, s/p Left radical nephrectomy, Ex-Lap POD # 8. Pt remains intubated and on pressor support.     MEDICATIONS  (STANDING):  bacitracin   Ointment 1 Application(s) Topical two times a day  chlorhexidine 0.12% Liquid 15 milliLiter(s) Oral Mucosa every 12 hours  chlorhexidine 2% Cloths 1 Application(s) Topical <User Schedule>  CRRT Treatment    <Continuous>  DAPTOmycin IVPB 460 milliGRAM(s) IV Intermittent every 48 hours  dexMEDEtomidine Infusion 0.2 MICROgram(s)/kG/Hr (3.82 mL/Hr) IV Continuous <Continuous>  heparin   Injectable 5000 Unit(s) SubCutaneous every 8 hours  levoFLOXacin IVPB 750 milliGRAM(s) IV Intermittent every 48 hours  meropenem  IVPB 1000 milliGRAM(s) IV Intermittent every 12 hours  pantoprazole  Injectable 40 milliGRAM(s) IV Push every 12 hours  Parenteral Nutrition - Adult 1 Each (50 mL/Hr) TPN Continuous <Continuous>  Parenteral Nutrition - Adult 1 Each (55 mL/Hr) TPN Continuous <Continuous>  phenylephrine    Infusion 0.1 MICROgram(s)/kG/Min (1.43 mL/Hr) IV Continuous <Continuous>  PureFlow Dialysate RFP-400 (K 2 / Ca 3) 5000 milliLiter(s) (2000 mL/Hr) CRRT <Continuous>  vasopressin Infusion 0.03 Unit(s)/Min (4.5 mL/Hr) IV Continuous <Continuous>    MEDICATIONS  (PRN):  sodium chloride 0.9% lock flush 10 milliLiter(s) IV Push every 1 hour PRN Pre/post blood products, medications, blood draw, and to maintain line patency  sodium chloride 0.9% lock flush 10 milliLiter(s) IV Push every 1 hour PRN Pre/post blood products, medications, blood draw, and to maintain line patency      REVIEW OF SYSTEMS   [x] A ten-point review of systems was otherwise negative except as noted.      Vital Signs Last 24 Hrs  T(C): 37.5 (08 Jul 2023 10:00), Max: 38.1 (08 Jul 2023 08:00)  T(F): 99.5 (08 Jul 2023 10:00), Max: 100.6 (08 Jul 2023 08:00)  HR: 61 (08 Jul 2023 10:00) (55 - 87)  RR: 19 (08 Jul 2023 10:00) (16 - 33)  SpO2: 100% (08 Jul 2023 10:00) (99% - 100%)    Parameters below as of 08 Jul 2023 04:00  Patient On (Oxygen Delivery Method): BiPAP/CPAP      PHYSICAL EXAM:  GEN: intubated on vent, opens eyes slightly to stimuli  SKIN: non diaphoretic, mottled   RESP: intubated on vent   ABDO: #1 miranda pouch Drain-with serosanguinous fluid, #2 Pelvic eleazar drain-  with dark murky drainage, #3 left renal bed drain- with dark murky drainage    :  + Penile/scrotal edema + 16Fr indwelling Mena in place, minimal dark tea colored urine in tubing  EXT: diffuse, pitting edema x all 4 ext; cyanosis noted to R index finger, L great toe       I&O's Summary    07 Jul 2023 07:01  -  08 Jul 2023 07:00  --------------------------------------------------------  IN: 1659.7 mL / OUT: 2968 mL / NET: -1308.3 mL    08 Jul 2023 07:01  -  08 Jul 2023 11:01  --------------------------------------------------------  IN: 593.5 mL / OUT: 20 mL / NET: 573.5 mL        LABS:                        6.6    14.95 )-----------( 20       ( 08 Jul 2023 04:30 )             20.5     07-08    138  |  105  |  77<HH>  ----------------------------<  149<H>  6.3<HH>   |  23  |  1.6<H>    Ca    7.1<L>      08 Jul 2023 04:30  Phos  3.9     07-08  Mg     2.4     07-08      Urinalysis Basic - ( 08 Jul 2023 04:30 )  Color: x / Appearance: x / SG: x / pH: x  Gluc: 149 mg/dL / Ketone: x  / Bili: x / Urobili: x   Blood: x / Protein: x / Nitrite: x   Leuk Esterase: x / RBC: x / WBC x   Sq Epi: x / Non Sq Epi: x / Bacteria: x      RADIOLOGY & ADDITIONAL STUDIES:

## 2023-07-08 NOTE — PROGRESS NOTE ADULT - ASSESSMENT
Pt is a 68y Male admitted with retroperitoneal and intraabdominal abscess s/p IR placement of drain and LEFT PCN 6/18, s/p percutaneous drainage of LEFT renal abscess POD # 14 with left 26fr flank catheter in place, s/p Left radical nephrectomy, Ex-Lap POD # 8. Pt remains intubated and on pressor support.     ·	cont care per SICU  ·	cont TPN, nutrition following  ·	cont to monitor drain outputs q shift  ·	monitor UO, CVVH as per renal  ·	cont IV abx, ID following   ·	w/d with attng

## 2023-07-08 NOTE — PROGRESS NOTE ADULT - SUBJECTIVE AND OBJECTIVE BOX
· Subjective and Objective:   CHRISTINE VILLAVICENCIO  [id number]  67y Male    Indication for ICU admission: mechanical ventilatior secondary to urosepsis.    Admit Date:06-18-23  ICU Date: 6/18/23  OR Date: 6/18, 6/22, 6/30     24HRS EVENT:  7/7  NIGHT  -restarted precedex  -AM dig level    Day   - Required only vaso majority of the day, progressively more hypothermic despite saira hugger  - Hypotensive, taj added onto vaso  - hold CVVH, reassess tomorrow  - RLQ dressed with ostomy per sugery  - heparin BRUCE (-)ve    [X] A ten-point review of systems was otherwise negative except as noted above.  [  ] Due to altered mental status/intubation, subjective information was not attained from the patient. History was obtained, to the extent possible, from review of the chart and collateral sources of information.    =========================================================================================================================================     · Subjective and Objective:   CHRISTINE VILLAVICENCIO  [id number]  67y Male    Indication for ICU admission: mechanical ventilatior secondary to urosepsis.    Admit Date:06-18-23  ICU Date: 6/18/23  OR Date: 6/18, 6/22, 6/30     24HRS EVENT:  7/7  NIGHT  -restarted precedex  -AM dig level    Day   - Required only vaso majority of the day, progressively more hypothermic despite saira hugger  - Hypotensive, taj added onto vaso  - hold CVVH, reassess tomorrow  - RLQ dressed with ostomy per sugery  - heparin BRUCE (-)ve    [X] A ten-point review of systems was otherwise negative except as noted above.  [  ] Due to altered mental status/intubation, subjective information was not attained from the patient. History was obtained, to the extent possible, from review of the chart and collateral sources of information.    =========================================================================================================================================    Daily     Daily     Diet, NPO (07-05-23 @ 10:33)      CURRENT MEDS:  Neurologic Medications  dexMEDEtomidine Infusion 0.2 MICROgram(s)/kG/Hr IV Continuous <Continuous>    Respiratory Medications    Cardiovascular Medications  phenylephrine    Infusion 0.1 MICROgram(s)/kG/Min IV Continuous <Continuous>    Gastrointestinal Medications  pantoprazole  Injectable 40 milliGRAM(s) IV Push every 12 hours  Parenteral Nutrition - Adult 1 Each TPN Continuous <Continuous>  Parenteral Nutrition - Adult 1 Each TPN Continuous <Continuous>  sodium chloride 0.9% lock flush 10 milliLiter(s) IV Push every 1 hour PRN Pre/post blood products, medications, blood draw, and to maintain line patency  sodium chloride 0.9% lock flush 10 milliLiter(s) IV Push every 1 hour PRN Pre/post blood products, medications, blood draw, and to maintain line patency    Genitourinary Medications    Hematologic/Oncologic Medications  heparin   Injectable 5000 Unit(s) SubCutaneous every 8 hours    Antimicrobial/Immunologic Medications  DAPTOmycin IVPB 460 milliGRAM(s) IV Intermittent every 48 hours  levoFLOXacin IVPB 750 milliGRAM(s) IV Intermittent every 48 hours  meropenem  IVPB 1000 milliGRAM(s) IV Intermittent every 12 hours    Endocrine/Metabolic Medications  vasopressin Infusion 0.03 Unit(s)/Min IV Continuous <Continuous>    Topical/Other Medications  bacitracin   Ointment 1 Application(s) Topical two times a day  chlorhexidine 0.12% Liquid 15 milliLiter(s) Oral Mucosa every 12 hours  chlorhexidine 2% Cloths 1 Application(s) Topical <User Schedule>  CRRT Treatment    <Continuous>  PureFlow Dialysate RFP-400 (K 2 / Ca 3) 5000 milliLiter(s) CRRT <Continuous>      ICU Vital Signs Last 24 Hrs  T(C): 37.4 (08 Jul 2023 11:00), Max: 38.1 (08 Jul 2023 08:00)  T(F): 99.3 (08 Jul 2023 11:00), Max: 100.6 (08 Jul 2023 08:00)  HR: 59 (08 Jul 2023 11:00) (55 - 87)  BP: --  BP(mean): --  ABP: 141/52 (08 Jul 2023 11:00) (106/48 - 141/52)  ABP(mean): 76 (08 Jul 2023 11:00) (65 - 84)  RR: 19 (08 Jul 2023 11:00) (16 - 33)  SpO2: 100% (08 Jul 2023 11:00) (99% - 100%)    O2 Parameters below as of 08 Jul 2023 04:00  Patient On (Oxygen Delivery Method): BiPAP/CPAP            Adult Advanced Hemodynamics Last 24 Hrs  CVP(mm Hg): --  CVP(cm H2O): --  CO: --  CI: --  PA: --  PA(mean): --  PCWP: --  SVR: --  SVRI: --  PVR: --  PVRI: --    Mode: CPAP with PS  FiO2: 40  PEEP: 8  PS: 10  MAP: 11  PIP: 19    ABG - ( 08 Jul 2023 04:37 )  pH, Arterial: 7.41  pH, Blood: x     /  pCO2: 36    /  pO2: 144   / HCO3: 23    / Base Excess: -1.4  /  SaO2: 100.0               I&O's Summary    07 Jul 2023 07:01  -  08 Jul 2023 07:00  --------------------------------------------------------  IN: 1659.7 mL / OUT: 2968 mL / NET: -1308.3 mL    08 Jul 2023 07:01  -  08 Jul 2023 12:27  --------------------------------------------------------  IN: 648 mL / OUT: 30 mL / NET: 618 mL      I&O's Detail    07 Jul 2023 07:01  -  08 Jul 2023 07:00  --------------------------------------------------------  IN:    Dexmedetomidine: 59.8 mL    Enteral Tube Flush: 25 mL    IV PiggyBack: 50 mL    IV PiggyBack: 50 mL    Phenylephrine: 41.4 mL    TPN (Total Parenteral Nutrition): 1330 mL    Vasopressin: 103.5 mL  Total IN: 1659.7 mL    OUT:    Drain (mL): 1045 mL    Drain (mL): 45 mL    Drain (mL): 318 mL    Indwelling Catheter - Urethral (mL): 60 mL    Nasogastric/Oral tube (mL): 1500 mL  Total OUT: 2968 mL    Total NET: -1308.3 mL      08 Jul 2023 07:01  -  08 Jul 2023 12:27  --------------------------------------------------------  IN:    Dexmedetomidine: 1.5 mL    IV PiggyBack: 100 mL    Phenylephrine: 3.5 mL    PRBCs (Packed Red Blood Cells): 325 mL    TPN (Total Parenteral Nutrition): 200 mL    Vasopressin: 18 mL  Total IN: 648 mL    OUT:    Indwelling Catheter - Urethral (mL): 30 mL  Total OUT: 30 mL    Total NET: 618 mL          PHYSICAL EXAM:    General/Neuro  RASS:    0    GCS:  11T   = E 4  / V 1T  / M 6     Deficits:     awake, no focal deficits, moving all bilateral extremities  Pupils: PERRLA bilaterally    Lungs:      clear to auscultation bilaterally, lung sounds present bilaterally, Normal expansion/effort.     Cardiovascular : S1, S2.  Regular rate and rhythm.  Bilateral peripheral edema  Cardiac Rhythm: Normal Sinus Rhythm    GI: Abdomen soft, Non-tender, Non-distended.    Gastrostomy / Jejunostomy tube in place.  Nasogastric tube in place.  Colostomy / Ileostomy.    Wound:    Extremities: Extremities warm, pink, well-perfused. Pulses:Rt     Lt    Derm: Good skin turgor, no skin breakdown.      :       Mena catheter in place.      CXR:     LABS:  CAPILLARY BLOOD GLUCOSE      POCT Blood Glucose.: 146 mg/dL (08 Jul 2023 08:08)  POCT Blood Glucose.: 139 mg/dL (07 Jul 2023 17:19)                          6.6    14.95 )-----------( 20       ( 08 Jul 2023 04:30 )             20.5       07-08    138  |  105  |  77<HH>  ----------------------------<  149<H>  6.3<HH>   |  23  |  1.6<H>    Ca    7.1<L>      08 Jul 2023 04:30  Phos  3.9     07-08  Mg     2.4     07-08              Urinalysis Basic - ( 08 Jul 2023 04:30 )    Color: x / Appearance: x / SG: x / pH: x  Gluc: 149 mg/dL / Ketone: x  / Bili: x / Urobili: x   Blood: x / Protein: x / Nitrite: x   Leuk Esterase: x / RBC: x / WBC x   Sq Epi: x / Non Sq Epi: x / Bacteria: x         · Subjective and Objective:   CHRISTINE VILLAVICENCIO  [id number]  67y Male    Indication for ICU admission: mechanical ventilatior secondary to urosepsis.    Admit Date:06-18-23  ICU Date: 6/18/23  OR Date: 6/18, 6/22, 6/30     24HRS EVENT:  7/7  NIGHT  -restarted precedex  -AM dig level    Day   - Required only vaso majority of the day, progressively more hypothermic despite saira hugger  - Hypotensive, taj added onto vaso  - hold CVVH, reassess tomorrow  - RLQ dressed with ostomy per sugery  - heparin BRUCE (-)ve    [X] A ten-point review of systems was otherwise negative except as noted above.  [  ] Due to altered mental status/intubation, subjective information was not attained from the patient. History was obtained, to the extent possible, from review of the chart and collateral sources of information.    =========================================================================================================================================    Daily     Daily     Diet, NPO (07-05-23 @ 10:33)      CURRENT MEDS:  Neurologic Medications  dexMEDEtomidine Infusion 0.2 MICROgram(s)/kG/Hr IV Continuous <Continuous>    Respiratory Medications    Cardiovascular Medications  phenylephrine    Infusion 0.1 MICROgram(s)/kG/Min IV Continuous <Continuous>    Gastrointestinal Medications  pantoprazole  Injectable 40 milliGRAM(s) IV Push every 12 hours  Parenteral Nutrition - Adult 1 Each TPN Continuous <Continuous>  Parenteral Nutrition - Adult 1 Each TPN Continuous <Continuous>  sodium chloride 0.9% lock flush 10 milliLiter(s) IV Push every 1 hour PRN Pre/post blood products, medications, blood draw, and to maintain line patency  sodium chloride 0.9% lock flush 10 milliLiter(s) IV Push every 1 hour PRN Pre/post blood products, medications, blood draw, and to maintain line patency    Genitourinary Medications    Hematologic/Oncologic Medications  heparin   Injectable 5000 Unit(s) SubCutaneous every 8 hours    Antimicrobial/Immunologic Medications  DAPTOmycin IVPB 460 milliGRAM(s) IV Intermittent every 48 hours  levoFLOXacin IVPB 750 milliGRAM(s) IV Intermittent every 48 hours  meropenem  IVPB 1000 milliGRAM(s) IV Intermittent every 12 hours    Endocrine/Metabolic Medications  vasopressin Infusion 0.03 Unit(s)/Min IV Continuous <Continuous>    Topical/Other Medications  bacitracin   Ointment 1 Application(s) Topical two times a day  chlorhexidine 0.12% Liquid 15 milliLiter(s) Oral Mucosa every 12 hours  chlorhexidine 2% Cloths 1 Application(s) Topical <User Schedule>  CRRT Treatment    <Continuous>  PureFlow Dialysate RFP-400 (K 2 / Ca 3) 5000 milliLiter(s) CRRT <Continuous>      ICU Vital Signs Last 24 Hrs  T(C): 37.4 (08 Jul 2023 11:00), Max: 38.1 (08 Jul 2023 08:00)  T(F): 99.3 (08 Jul 2023 11:00), Max: 100.6 (08 Jul 2023 08:00)  HR: 59 (08 Jul 2023 11:00) (55 - 87)  BP: --  BP(mean): --  ABP: 141/52 (08 Jul 2023 11:00) (106/48 - 141/52)  ABP(mean): 76 (08 Jul 2023 11:00) (65 - 84)  RR: 19 (08 Jul 2023 11:00) (16 - 33)  SpO2: 100% (08 Jul 2023 11:00) (99% - 100%)    O2 Parameters below as of 08 Jul 2023 04:00  Patient On (Oxygen Delivery Method): BiPAP/CPAP            Adult Advanced Hemodynamics Last 24 Hrs  CVP(mm Hg): --  CVP(cm H2O): --  CO: --  CI: --  PA: --  PA(mean): --  PCWP: --  SVR: --  SVRI: --  PVR: --  PVRI: --    Mode: CPAP with PS  FiO2: 40  PEEP: 8  PS: 10  MAP: 11  PIP: 19    ABG - ( 08 Jul 2023 04:37 )  pH, Arterial: 7.41  pH, Blood: x     /  pCO2: 36    /  pO2: 144   / HCO3: 23    / Base Excess: -1.4  /  SaO2: 100.0               I&O's Summary    07 Jul 2023 07:01  -  08 Jul 2023 07:00  --------------------------------------------------------  IN: 1659.7 mL / OUT: 2968 mL / NET: -1308.3 mL    08 Jul 2023 07:01  -  08 Jul 2023 12:27  --------------------------------------------------------  IN: 648 mL / OUT: 30 mL / NET: 618 mL      I&O's Detail    07 Jul 2023 07:01  -  08 Jul 2023 07:00  --------------------------------------------------------  IN:    Dexmedetomidine: 59.8 mL    Enteral Tube Flush: 25 mL    IV PiggyBack: 50 mL    IV PiggyBack: 50 mL    Phenylephrine: 41.4 mL    TPN (Total Parenteral Nutrition): 1330 mL    Vasopressin: 103.5 mL  Total IN: 1659.7 mL    OUT:    Drain (mL): 1045 mL    Drain (mL): 45 mL    Drain (mL): 318 mL    Indwelling Catheter - Urethral (mL): 60 mL    Nasogastric/Oral tube (mL): 1500 mL  Total OUT: 2968 mL    Total NET: -1308.3 mL      08 Jul 2023 07:01  -  08 Jul 2023 12:27  --------------------------------------------------------  IN:    Dexmedetomidine: 1.5 mL    IV PiggyBack: 100 mL    Phenylephrine: 3.5 mL    PRBCs (Packed Red Blood Cells): 325 mL    TPN (Total Parenteral Nutrition): 200 mL    Vasopressin: 18 mL  Total IN: 648 mL    OUT:    Indwelling Catheter - Urethral (mL): 30 mL  Total OUT: 30 mL    Total NET: 618 mL          PHYSICAL EXAM:    General/Neuro  RASS:    0    GCS:  11T   = E 4  / V 1T  / M 6     Deficits:     awake, no focal deficits, moving all bilateral extremities  Pupils: PERRLA bilaterally    Lungs:      clear to auscultation bilaterally, lung sounds present bilaterally, Normal expansion/effort. Intubated on CPAP pressure support 10/8.    Cardiovascular : S1, S2.  Regular rate and rhythm.  Bilateral peripheral edema  Cardiac Rhythm: Normal Sinus Rhythm    GI: Abdomen soft, Non-tender, Non-distended.    NG tube in place, VENICE#1 with serosanguinous fluid, VENICE#2 and #3 with brown fluid  Wound: surgical incision with some ischemic tissue at left-most side of incision    Extremities: Extremities warm and dry. Right index finger tip and left big toe with purple discoloration. Pulses: bilateral ulnar/PT/DP pulses present on doppler.    Derm: Good skin turgor    :       Mena catheter in place.      CXR:     LABS:  CAPILLARY BLOOD GLUCOSE      POCT Blood Glucose.: 146 mg/dL (08 Jul 2023 08:08)  POCT Blood Glucose.: 139 mg/dL (07 Jul 2023 17:19)                          6.6    14.95 )-----------( 20       ( 08 Jul 2023 04:30 )             20.5       07-08    138  |  105  |  77<HH>  ----------------------------<  149<H>  6.3<HH>   |  23  |  1.6<H>    Ca    7.1<L>      08 Jul 2023 04:30  Phos  3.9     07-08  Mg     2.4     07-08              Urinalysis Basic - ( 08 Jul 2023 04:30 )    Color: x / Appearance: x / SG: x / pH: x  Gluc: 149 mg/dL / Ketone: x  / Bili: x / Urobili: x   Blood: x / Protein: x / Nitrite: x   Leuk Esterase: x / RBC: x / WBC x   Sq Epi: x / Non Sq Epi: x / Bacteria: x

## 2023-07-08 NOTE — PROGRESS NOTE ADULT - ASSESSMENT
Assessment & Plan:  67M w/ PMH of HTN, grade IV hydronephrosis of L kidney s/p L PCN (placed 5/2023), stutter, hiatal hernia, iron deficiency anemia, H Pylori (untreated), and gastric mass (never biopsied) presents to the ED with at least 4 days of worsening weakness, abdominal distension, and no output from his PCN. Admitted with emphysematous pyelonephritis.     (6/18): s/p LT PCN upsizing to 16Fr and 16Fr RLQ drain placement with IR   (6/22): s/p shock lithotripsy of L kidney abscess cavity with drainage of thick contents, upsize of drain to 26Fr bobby catheter  (6/30): s/p ex-lap and L nephrectomy with abdominal washout    NEURO:  #Pain control    -APAP PRN    -fent 25 q 3 PRN while intubated  #Sedation    -precedex gtt    -following commands when off sedation, GCS 11T    RESP:  #acute respiratory failure with left lung mucous plugging - resolved s/p intubated 6/24 and    and bronchoscopy   -AC//18/40/8  PS tolerated again 7/6  ABG: pending    #Pulmonary nodules / Mediastinal lymphadenopathy    - CT Chest: Right middle and upper lobe solid pulmonary nodules, f/u outpatient pulm   #Activity   - incr as tolerated    CARDS:   #Septic shock    - off levo since 3am 7/1, remains on Josh and Vasopressin drips  #New onset Afib w/ RVR with hypotension -- likely 2/2 sepsis    - HR control with Metoprolol 2.5 q6 IV (OFF currently)    -Digoxin 250mcg x1 dose, 125mcg x1 7/1, 125mcg 7/4, 125 mcg on 7/6, repeat level in      am 7/7 2.7 =>am dose held, 7/8 pending    - Cardiology following    #H/O HTN    -Amlodipine 2.5mg HOLDING due to ongoing pressor requirement  Imaging    - ECHO: (6/19) EF 55-60%, mild AS, mild LAE, mild MR/TR, trivial pericardial effusion    GI/NUTR:  #Diet: NPO on TPN  #bowel movements    - GI consult 6/26- Plan for EGD with EUS + FNA as outpatient, H-pylori treatment after resolution of acute issues   -FOBT positive    #GI PPX  -IV Protonix 40 q12 hours  #Gastric mass vs gastrohepatic lymphadenopathy    -CT A/P: lesser curvature mass , Hepatic hypodensities  #Splenic Hypodensities, infarct vs phlegmon/abscess    -CT A/P:  Wedge-shaped region of hypodensity within the spleen    /RENAL:   #Grade IV L hydronephrosis s/p L PCN (5/2023) -- presented to ED with thick dark foul smelling output  #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    -(6/23) s/p LT PCN upsizing to 26Fr catheter, RLQ 16 Fr drain remains, s/p nephrostolithotomy w/ lithotripsy    -(6/18) s/p LT PCN upsizing to 16Fr and 16Fr RLQ drain placement with IR, flush q8 hr  (6/30): s/p L nephrectomy and abdominal washout   -R pelvic drain Creatinine 3.99; increasing drain output overnight > 600 cc    High suspicion of renal malignancy given recent pathology  Follow path from nephrectomy (6/30)    #metabolic acidosis  -Nephrology c/s - CVVHD started 07/02, current goal to keep net even  -(7/6 PM): cvvh delayed as machine had error message --machined replaced and line pulled back to 13 from 15 -> no draw back from venous port, tried to get consent from daughter to replace line, daughter's phone turned off and unable to contact   Labs:          BUN/Cr- 46/1.2          Electrolytes- Na 134 / K 4.9 / Mg 2.0 /Phos 2.5   #decreased urine output in the setting of acute renal failure 2/2 septic shock    HEME/ONC:   #DVT prophylaxis  -SCDs, heparin subQ   #new onset Afib  -holding heparin infusion in setting of hematochezia     Labs: DVT propylaxis- HSQ     #R radial artery occlusion  -RUE duplex 07/01 + for r radial artery occlusion  #RT posterior popliteal vein DVT  -vascular c/s - rec AC     DVT prophylaxis-heparin   Injectable    Labs: Hb/Hct: 7.3/21              Plts:  39              PTT/INR:        #H/o Fe Deficiency anemia  #Thrombocytopenia  Heme consult (7/3):   - low risk for HIT (score 2-3)  - Heme/onc wants full HIT workup although low HIT risk  - HIT negative, BRUCE pending   - risk of full anticoagulation outweigh the benefits due to high risk of bleeding (h/o GI bleed and post-nephrectomy), will hold off on AC and will wait for HIT AB screen results, if new DVT then will re-assess; discussed with SICU attending Dr. Orozco who also discussed this with hematology team and Dr. Womack who are all in agreement.    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     L PCN cyto-pathology  6/25: results suggestive of a necrotic neoplasm    Funtitell 6/26: 47    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    BAL 6/24: moderate yeast     UCx 6/19: negative FINAL      Left PCN aspiration 6/18: stenotrophomonas maltophilia  Needs source control    ENDO:  #Glucose monitoring    -A1c (5/7/23): 5.0    -POCT q6 hours while NPO   #Adrenal Insufficiency 2/2 to sepsis     -restarted solucortef 50q6 for 6 days starting 07/02      -Cortisol level - binding globulin 1.1 (low), serum cortisol, 84, free cortisol 76, % free cortisol 91    MSK:  #Sacrococcygeal DTI: wound care following- cleanse with soap and water, apply triad, #gauze, and allevyn BID and PRN for soiling   Venous ulcer right lower leg, R forearm skin tear: Pat dry, apply Xeroform, nonadherent dressing, wrap with Kerlix twice a day, prn for soiling  Burn consulted, recs:  --> sacrum: apply hydrogel and Allevyn pad  --> b/l ischium: Allevyn pad  - offloading/positioning  #Activity    -Advance as tolerated    -PT/rehab once extubated    -Globally deconditioned  #b/l UE, LE mottled skin  -no radial pulses b/l  +ulnar, brachial b/l UE, +DP b/l  -Arterial duplex - R radial occlusion   -DVT sono- prelim R PT DVT     LINES/DRAINS:   PIV  Bobby (6/18)  L eleazar drain (6/30)  L IJ TLC (6/26)  R IJ Uldall (07/02)  R Lerma pouch #1, pelvic drain #2 (6/30)  Left basilic midline  6/21  Right axillary a line (6/30-7/4)  Right Radial Joleen (6/22-6/30)  L radial [place] (6/18-6/22)  R-Femoral Joleen   ETT  OGT    ADVANCED DIRECTIVES:  Full Code  HCP/Emergency Contact- Tracey Adames: [phone]   Palliative following. Last GOC discussion 7/7)  GO meeting pending    INDICATION FOR SICU: New onset atrial fibrillation w/ mechanical ventilation

## 2023-07-09 NOTE — PATIENT PROFILE ADULT - NSTRANSFERBELONGINGSDISPO_GEN_A_NUR
Daily Note     Today's date: 2018  Patient name: Red Schwab  : 1957  MRN: [id number]  Referring provider: Magdy Markham DO  Dx: LEVAR      Speciality Daily Treatment Diary  Re-eval Date:18    Date 18       Visit Count 4/8       Pain In 1/10       Pain Out 1/10       Time In/Out 11-12:00                 Subjective: Patient is non-verbal, but able to answer simple yes/no questions  Gave blood and feeling tired  Objective: See treatment diary below  Precautions   MAFO right LE  Falls    Specialty Daily Treatment Diary     Manual  18       Hamstring Stretch prn       Quad stretch prn       Iliacus Stretch prn                           Exercise Diary         Seated LE TE 20 mins       Hip ABduction 2x10 Red TB       ADductions  2x10 Red TB       hamstring curl 2x10 Red TB       DF/PF  2x10 Red TB       Hip flexion 2 x 10 2#       LAQ 2 x 10 2#                        Left UE TE 10 mins       Biceps 2# 2 x 10 ea       Triceps 2 x 10 Red       IR 2 x 10 Red       ER 2 x 10 Red       MTP/LTP 2 x 10 Red               Ambulation 15 minutes                 Ambulation with HW - min assist to move from sit to stand from w/c; close supervision from plint  Ambulates 60 feet with supervision to CGA, able to clear foot, requires cues for technique for turning to sit  Slow and deliberate steps this date  Modalities         prn                             Neuro Exam :     Cognition   Following Directions: follows one step commands    Problem Solving: moderately impaired    Judgement: intact      Functional Outcome Measures   Single leg stance left eyes open:  Unable  Single leg stance left eyes closed:  Unable  Single leg stance right eyes open:  Unable  Single leg stance right eyes closed:  Unable          Assessment: Tolerated treatment fair  Patient demonstrated fatigue post treatment    She demonstrates increased fatigue this date compared to last sessions due to donating blood per
report  Remain unsure about trial of ambulation at home with [facility], however, doing well  Son supportive of mother attempting  Patient uneasy about son assisting  Encouraged son and patient to trial next session  Plan: Progress treatment as tolerated 
with patient
with patient

## 2023-07-09 NOTE — PROGRESS NOTE ADULT - SUBJECTIVE AND OBJECTIVE BOX
UROLOGY DAILY PROGRESS NOTE    Pt is a 68y Male admitted with retroperitoneal and intraabdominal abscess s/p IR placement of drain and LEFT PCN 6/18, s/p percutaneous drainage of LEFT renal abscess POD # 15 with left 26fr flank catheter in place, s/p Left radical nephrectomy, Ex-Lap POD # 9. Patient remains intubated on pressor support this AM (on Josh 0.02). Per RN, attempted weaning patient off yesterday night.     MEDICATIONS  (STANDING):  bacitracin   Ointment 1 Application(s) Topical two times a day  caspofungin IVPB      caspofungin IVPB 50 milliGRAM(s) IV Intermittent every 24 hours  chlorhexidine 0.12% Liquid 15 milliLiter(s) Oral Mucosa every 12 hours  chlorhexidine 2% Cloths 1 Application(s) Topical <User Schedule>  CRRT Treatment    <Continuous>  DAPTOmycin IVPB 460 milliGRAM(s) IV Intermittent every 48 hours  dexMEDEtomidine Infusion 0.1 MICROgram(s)/kG/Hr (1.91 mL/Hr) IV Continuous <Continuous>  heparin   Injectable 5000 Unit(s) SubCutaneous every 8 hours  levoFLOXacin IVPB 750 milliGRAM(s) IV Intermittent every 48 hours  meropenem  IVPB 1000 milliGRAM(s) IV Intermittent every 12 hours  pantoprazole  Injectable 40 milliGRAM(s) IV Push every 12 hours  Parenteral Nutrition - Adult 1 Each (58 mL/Hr) TPN Continuous <Continuous>  Parenteral Nutrition - Adult 1 Each (55 mL/Hr) TPN Continuous <Continuous>  phenylephrine    Infusion 0.1 MICROgram(s)/kG/Min (1.43 mL/Hr) IV Continuous <Continuous>  PureFlow Dialysate RFP-400 (K 2 / Ca 3) 5000 milliLiter(s) (2000 mL/Hr) CRRT <Continuous>  vasopressin Infusion 0.03 Unit(s)/Min (4.5 mL/Hr) IV Continuous <Continuous>    MEDICATIONS  (PRN):  sodium chloride 0.9% lock flush 10 milliLiter(s) IV Push every 1 hour PRN Pre/post blood products, medications, blood draw, and to maintain line patency  sodium chloride 0.9% lock flush 10 milliLiter(s) IV Push every 1 hour PRN Pre/post blood products, medications, blood draw, and to maintain line patency      REVIEW OF SYSTEMS   [ ] Due to altered mental status/intubation, subjective information were not able to be obtained from patient. History was obtained, to the extent possible, from review of the chart and collateral sources of information.    Vital Signs Last 24 Hrs  T(C): 35.8 (09 Jul 2023 10:00), Max: 37.4 (08 Jul 2023 11:00)  T(F): 96.4 (09 Jul 2023 10:00), Max: 99.3 (08 Jul 2023 11:00)  HR: 71 (09 Jul 2023 10:00) (51 - 78)  RR: 17 (09 Jul 2023 10:00) (17 - 30)  SpO2: 100% (09 Jul 2023 10:00) (86% - 100%)    Parameters below as of 09 Jul 2023 10:00  Patient On (Oxygen Delivery Method): BiPAP/CPAP    O2 Concentration (%): 40    PHYSICAL EXAM:    GEN: intubated on vent, opens eyes slightly to stimuli  SKIN: non diaphoretic, mottled   RESP: intubated on vent   ABDO: #1 miranda pouch Drain-with serosanguinous fluid, #2 Pelvic eleazar drain-  with dark murky brown drainage, #3 left renal bed drain- with dark murky brown drainage    :  + Penile/scrotal edema + 16Fr indwelling Mena in place, minimal dark tea colored urine in tubing  EXT: diffuse, pitting edema x all 4 ext; cyanosis noted to R index finger, L great toe       I&O's Summary    08 Jul 2023 07:01  -  09 Jul 2023 07:00  --------------------------------------------------------  IN: 2379 mL / OUT: 1806 mL / NET: 573 mL    09 Jul 2023 07:01  -  09 Jul 2023 10:39  --------------------------------------------------------  IN: 170.7 mL / OUT: 141 mL / NET: 29.7 mL        LABS:                        8.0    10.29 )-----------( 23       ( 09 Jul 2023 04:22 )             24.0     07-09    136  |  103  |  58<H>  ----------------------------<  116<H>  4.6   |  22  |  1.0    Ca    7.5<L>      09 Jul 2023 04:22  Phos  2.9     07-09  Mg     2.1     07-09    TPro  3.6<L>  /  Alb  1.6<L>  /  TBili  0.7  /  DBili  0.4<H>  /  AST  35  /  ALT  16  /  AlkPhos  97  07-09      Urinalysis Basic - ( 09 Jul 2023 04:22 )    Color: x / Appearance: x / SG: x / pH: x  Gluc: 116 mg/dL / Ketone: x  / Bili: x / Urobili: x   Blood: x / Protein: x / Nitrite: x   Leuk Esterase: x / RBC: x / WBC x   Sq Epi: x / Non Sq Epi: x / Bacteria: x            RADIOLOGY & ADDITIONAL STUDIES:

## 2023-07-09 NOTE — PROGRESS NOTE ADULT - SUBJECTIVE AND OBJECTIVE BOX
· Subjective and Objective:   CHRISTINE VILLAVICENCIO  [id number]  67y Male    Indication for ICU admission: mechanical ventilatior secondary to urosepsis.    Admit Date:06-18-23  ICU Date: 6/18/23  OR Date: 6/18, 6/22, 6/30     24HRS EVENT:  7/8 Night:  - MAP>65, off precedex,   - 1prbc and 1 platelets today, f/u 7/9 430am labs  - u/o low 3-5cc/hr, ss  - AC vent 8pm  - ulnar RUE+ doppler (+ radial), also DP rle, LLE +dp, +ulnar +radial  - b/l LE pitting edema +2      7/8  DAY  -Exchanged dialysis catheter with Mahurkar 16cm  -CVVH today   -AC at night, CPAP in the day  -1uRBC, 1uPLT  - Fungitell > 500, caspo added  - 6pm BMP, hyperkalemia --> treated      [X] A ten-point review of systems was otherwise negative except as noted above.  [  ] Due to altered mental status/intubation, subjective information was not attained from the patient. History was obtained, to the extent possible, from review of the chart and collateral sources of information.      =========================================================================================================================================  =========================================================================================================================================    PMH  PAST MEDICAL & SURGICAL HISTORY:    Home Meds:  Home Medications:     Allergies  Allergies    No Known Allergies    Intolerances       Current Medications:  bacitracin   Ointment 1 Application(s) Topical two times a day  caspofungin IVPB      caspofungin IVPB 50 milliGRAM(s) IV Intermittent every 24 hours  chlorhexidine 0.12% Liquid 15 milliLiter(s) Oral Mucosa every 12 hours  chlorhexidine 2% Cloths 1 Application(s) Topical <User Schedule>  CRRT Treatment    <Continuous>  DAPTOmycin IVPB 460 milliGRAM(s) IV Intermittent every 48 hours  dexMEDEtomidine Infusion 0.1 MICROgram(s)/kG/Hr (1.91 mL/Hr) IV Continuous <Continuous>  heparin   Injectable 5000 Unit(s) SubCutaneous every 8 hours  levoFLOXacin IVPB 750 milliGRAM(s) IV Intermittent every 48 hours  meropenem  IVPB 1000 milliGRAM(s) IV Intermittent every 12 hours  pantoprazole  Injectable 40 milliGRAM(s) IV Push every 12 hours  Parenteral Nutrition - Adult 1 Each (50 mL/Hr) TPN Continuous <Continuous>  Parenteral Nutrition - Adult 1 Each (55 mL/Hr) TPN Continuous <Continuous>  phenylephrine    Infusion 0.1 MICROgram(s)/kG/Min (1.43 mL/Hr) IV Continuous <Continuous>  PureFlow Dialysate RFP-400 (K 2 / Ca 3) 5000 milliLiter(s) (2000 mL/Hr) CRRT <Continuous>  sodium chloride 0.9% lock flush 10 milliLiter(s) IV Push every 1 hour PRN Pre/post blood products, medications, blood draw, and to maintain line patency  sodium chloride 0.9% lock flush 10 milliLiter(s) IV Push every 1 hour PRN Pre/post blood products, medications, blood draw, and to maintain line patency  vasopressin Infusion 0.03 Unit(s)/Min (4.5 mL/Hr) IV Continuous <Continuous>      Vital Sings, Intake/Output (Last 24Hours)  ICU Vital Signs Last 24 Hrs  T(C): 35.6 (09 Jul 2023 00:00), Max: 38.1 (08 Jul 2023 08:00)  T(F): 96.1 (09 Jul 2023 00:00), Max: 100.6 (08 Jul 2023 08:00)  HR: 54 (09 Jul 2023 00:00) (51 - 82)  BP: --  BP(mean): --  ABP: 115/59 (09 Jul 2023 00:00) (99/46 - 141/52)  ABP(mean): 76 (09 Jul 2023 00:00) (62 - 84)  RR: 25 (09 Jul 2023 00:00) (16 - 30)  SpO2: 100% (09 Jul 2023 00:00) (98% - 100%)    O2 Parameters below as of 09 Jul 2023 00:00  Patient On (Oxygen Delivery Method): ventilator    O2 Concentration (%): 40      I&O's Summary    07 Jul 2023 07:01  -  08 Jul 2023 07:00  --------------------------------------------------------  IN: 1659.7 mL / OUT: 2968 mL / NET: -1308.3 mL    08 Jul 2023 07:01  -  09 Jul 2023 01:26  --------------------------------------------------------  IN: 1897.5 mL / OUT: 1141 mL / NET: 756.5 mL        Physical Exam:  ----------------------------------------------------------------------------------------------------------  General/Neuro  RASS:    0    GCS:  11T   = E 4  / V 1T  / M 6     Deficits:     awake, no focal deficits, moving all bilateral extremities  Pupils: PERRLA bilaterally    Lungs:      clear to auscultation bilaterally, lung sounds present bilaterally, Normal expansion/effort. Intubated on CPAP pressure support 10/8.    Cardiovascular : S1, S2.  Regular rate and rhythm.  Bilateral peripheral edema  Cardiac Rhythm: Normal Sinus Rhythm    GI: Abdomen soft, Non-tender, Non-distended.    NG tube in place, VENICE#1 with serosanguinous fluid, VENICE#2 and #3 with brown fluid  Wound: surgical incision with some ischemic tissue at left-most side of incision    Extremities: Extremities warm and dry. Right index finger tip and left big toe with purple discoloration. Pulses: bilateral ulnar/PT/DP pulses present on doppler.    Derm: Good skin turgor    :       Mena catheter in place.    ----------------------------------------------------------------------------------------------------------  LINES/DRAINS:   PIV  Mena (6/18)  L eleazar drain (6/30)  L IJ TLC (6/26)  R IJ Uldall (07/02)  R Lerma pouch #1, pelvic drain #2 (6/30)  Left basilic midline  6/21  Right axillary a line (6/30-7/4)  Right Radial Joleen (6/22-6/30)  L radial Joleen (6/18-6/22)  R-Femoral Benton   ETT  OGT  	       · Subjective and Objective:   CHRISTINE VILLAVICENCIO  [id number]  67y Male    Indication for ICU admission: mechanical ventilatior secondary to urosepsis.    Admit Date:06-18-23  ICU Date: 6/18/23  OR Date: 6/18, 6/22, 6/30     24HRS EVENT:  7/8 Night:  - MAP>65, off precedex,   - 1prbc and 1 platelets today, f/u 7/9 430am labs  - u/o low 3-5cc/hr, ss  - AC vent 8pm  - ulnar RUE+ doppler (+ radial), also DP rle, LLE +dp, +ulnar +radial  - b/l LE pitting edema +2      7/8  DAY  -Exchanged dialysis catheter with Mahurkar 16cm  -CVVH today   -AC at night, CPAP in the day  -1uRBC, 1uPLT  - Fungitell > 500, caspo added  - 6pm BMP, hyperkalemia --> treated      [X] A ten-point review of systems was otherwise negative except as noted above.  [  ] Due to altered mental status/intubation, subjective information was not attained from the patient. History was obtained, to the extent possible, from review of the chart and collateral sources of information.      =========================================================================================================================================  =========================================================================================================================================    Diet, NPO (07-05-23 @ 10:33)      CURRENT MEDS:  Neurologic Medications  dexMEDEtomidine Infusion 0.1 MICROgram(s)/kG/Hr IV Continuous <Continuous>    Respiratory Medications    Cardiovascular Medications  phenylephrine    Infusion 0.1 MICROgram(s)/kG/Min IV Continuous <Continuous>    Gastrointestinal Medications  pantoprazole  Injectable 40 milliGRAM(s) IV Push every 12 hours  Parenteral Nutrition - Adult 1 Each TPN Continuous <Continuous>  Parenteral Nutrition - Adult 1 Each TPN Continuous <Continuous>  sodium chloride 0.9% lock flush 10 milliLiter(s) IV Push every 1 hour PRN Pre/post blood products, medications, blood draw, and to maintain line patency  sodium chloride 0.9% lock flush 10 milliLiter(s) IV Push every 1 hour PRN Pre/post blood products, medications, blood draw, and to maintain line patency    Genitourinary Medications    Hematologic/Oncologic Medications  heparin   Injectable 5000 Unit(s) SubCutaneous every 8 hours    Antimicrobial/Immunologic Medications  caspofungin IVPB      caspofungin IVPB 50 milliGRAM(s) IV Intermittent every 24 hours  DAPTOmycin IVPB 460 milliGRAM(s) IV Intermittent every 48 hours  levoFLOXacin IVPB 750 milliGRAM(s) IV Intermittent every 48 hours  meropenem  IVPB 1000 milliGRAM(s) IV Intermittent every 12 hours    Endocrine/Metabolic Medications  vasopressin Infusion 0.03 Unit(s)/Min IV Continuous <Continuous>    Topical/Other Medications  bacitracin   Ointment 1 Application(s) Topical two times a day  chlorhexidine 0.12% Liquid 15 milliLiter(s) Oral Mucosa every 12 hours  chlorhexidine 2% Cloths 1 Application(s) Topical <User Schedule>  CRRT Treatment    <Continuous>  PureFlow Dialysate RFP-400 (K 2 / Ca 3) 5000 milliLiter(s) CRRT <Continuous>      ICU Vital Signs Last 24 Hrs  T(C): 35.8 (09 Jul 2023 10:00), Max: 37.4 (08 Jul 2023 11:00)  T(F): 96.4 (09 Jul 2023 10:00), Max: 99.3 (08 Jul 2023 11:00)  HR: 71 (09 Jul 2023 10:00) (51 - 78)  BP: --  BP(mean): --  ABP: 120/54 (09 Jul 2023 10:00) (88/58 - 141/52)  ABP(mean): 74 (09 Jul 2023 10:00) (62 - 79)  RR: 17 (09 Jul 2023 10:00) (17 - 30)  SpO2: 100% (09 Jul 2023 10:00) (86% - 100%)    O2 Parameters below as of 09 Jul 2023 10:00  Patient On (Oxygen Delivery Method): BiPAP/CPAP    O2 Concentration (%): 40        Adult Advanced Hemodynamics Last 24 Hrs  CVP(mm Hg): --  CVP(cm H2O): --  CO: --  CI: --  PA: --  PA(mean): --  PCWP: --  SVR: --  SVRI: --  PVR: --  PVRI: --    Mode: CPAP with PS  FiO2: 40  PEEP: 8  PS: 10  MAP: 11  PIP: 19    ABG - ( 09 Jul 2023 04:14 )  pH, Arterial: 7.41  pH, Blood: x     /  pCO2: 37    /  pO2: 153   / HCO3: 24    / Base Excess: -1.0  /  SaO2: 100.0               I&O's Summary    08 Jul 2023 07:01  -  09 Jul 2023 07:00  --------------------------------------------------------  IN: 2379 mL / OUT: 1806 mL / NET: 573 mL    09 Jul 2023 07:01  -  09 Jul 2023 10:43  --------------------------------------------------------  IN: 170.7 mL / OUT: 141 mL / NET: 29.7 mL      I&O's Detail    08 Jul 2023 07:01  -  09 Jul 2023 07:00  --------------------------------------------------------  IN:    Dexmedetomidine: 1.5 mL    Dexmedetomidine: 1 mL    IV PiggyBack: 100 mL    IV PiggyBack: 50 mL    IV PiggyBack: 250 mL    IV PiggyBack: 50 mL    IV PiggyBack: 50 mL    IV PiggyBack: 50 mL    Phenylephrine: 3.5 mL    Platelets - Single Donor: 190 mL    PRBCs (Packed Red Blood Cells): 325 mL    TPN (Total Parenteral Nutrition): 1200 mL    Vasopressin: 108 mL  Total IN: 2379 mL    OUT:    Drain (mL): 30 mL    Drain (mL): 50 mL    Drain (mL): 30 mL    Drain (mL): 90 mL    Indwelling Catheter - Urethral (mL): 74 mL    Nasogastric/Oral tube (mL): 10 mL    Other (mL): 1522 mL  Total OUT: 1806 mL    Total NET: 573 mL      09 Jul 2023 07:01  -  09 Jul 2023 10:43  --------------------------------------------------------  IN:    Phenylephrine: 7.2 mL    TPN (Total Parenteral Nutrition): 150 mL    Vasopressin: 13.5 mL  Total IN: 170.7 mL    OUT:    Drain (mL): 20 mL    Other (mL): 121 mL  Total OUT: 141 mL    Total NET: 29.7 mL      CXR:     LABS:  CAPILLARY BLOOD GLUCOSE      POCT Blood Glucose.: 119 mg/dL (09 Jul 2023 02:21)  POCT Blood Glucose.: 127 mg/dL (08 Jul 2023 19:59)  POCT Blood Glucose.: 123 mg/dL (08 Jul 2023 17:29)  POCT Blood Glucose.: 123 mg/dL (08 Jul 2023 13:22)                          8.0    10.29 )-----------( 23       ( 09 Jul 2023 04:22 )             24.0       07-09    136  |  103  |  58<H>  ----------------------------<  116<H>  4.6   |  22  |  1.0    Ca    7.5<L>      09 Jul 2023 04:22  Phos  2.9     07-09  Mg     2.1     07-09    TPro  3.6<L>  /  Alb  1.6<L>  /  TBili  0.7  /  DBili  0.4<H>  /  AST  35  /  ALT  16  /  AlkPhos  97  07-09            Urinalysis Basic - ( 09 Jul 2023 04:22 )    Color: x / Appearance: x / SG: x / pH: x  Gluc: 116 mg/dL / Ketone: x  / Bili: x / Urobili: x   Blood: x / Protein: x / Nitrite: x   Leuk Esterase: x / RBC: x / WBC x   Sq Epi: x / Non Sq Epi: x / Bacteria: x              Physical Exam:  ----------------------------------------------------------------------------------------------------------  General/Neuro  RASS:    0    GCS:  11T   = E 4  / V 1T  / M 6     Deficits:     awake, no focal deficits, moving all bilateral extremities  Pupils: PERRLA bilaterally    Lungs:   clear to auscultation bilaterally, lung sounds present bilaterally, Normal expansion/effort. Intubated on CPAP pressure support 10/8.    Cardiovascular : S1, S2.  Regular rate and rhythm.  Bilateral peripheral edema  Cardiac Rhythm: Normal Sinus Rhythm    GI: Abdomen soft, Non-tender, Non-distended.    NG tube in place, VENICE#1 with serosanguinous fluid, VENICE#2 and #3 with brown fluid  Wound: surgical incision with some ischemic tissue around incision    Extremities: Extremities warm and dry. Right and left index finger tips and left big toe with purple discoloration. Pulses: bilateral ulnar/PT/DP pulses present on doppler.    Derm: Good skin turgor    :       Mena catheter in place.    ----------------------------------------------------------------------------------------------------------  LINES/DRAINS:   PIV  Mena (6/18)  L eleazar drain (6/30)  L IJ TLC (6/26)  R IJ Cristianedall (07/02)  R Lerma pouch #1, pelvic drain #2 (6/30)  Left basilic midline  6/21  Right axillary a line (6/30-7/4)  Right Radial Foley (6/22-6/30)  L radial Joleen (6/18-6/22)  R-Femoral Joleen   ETT  OGT

## 2023-07-09 NOTE — PROGRESS NOTE ADULT - SUBJECTIVE AND OBJECTIVE BOX
Nephrology progress note    THIS IS AN INCOMPLETE NOTE . FULL NOTE TO FOLLOW SHORTLY    Patient is seen and examined, events over the last 24 h noted .    Allergies:  No Known Allergies    Hospital Medications:   MEDICATIONS  (STANDING):  bacitracin   Ointment 1 Application(s) Topical two times a day  caspofungin IVPB      caspofungin IVPB 50 milliGRAM(s) IV Intermittent every 24 hours  chlorhexidine 0.12% Liquid 15 milliLiter(s) Oral Mucosa every 12 hours  chlorhexidine 2% Cloths 1 Application(s) Topical <User Schedule>  CRRT Treatment    <Continuous>  DAPTOmycin IVPB 460 milliGRAM(s) IV Intermittent every 48 hours  dexMEDEtomidine Infusion 0.1 MICROgram(s)/kG/Hr (1.91 mL/Hr) IV Continuous <Continuous>  heparin   Injectable 5000 Unit(s) SubCutaneous every 8 hours  levoFLOXacin IVPB 750 milliGRAM(s) IV Intermittent every 48 hours  meropenem  IVPB 1000 milliGRAM(s) IV Intermittent every 12 hours  pantoprazole  Injectable 40 milliGRAM(s) IV Push every 12 hours  Parenteral Nutrition - Adult 1 Each (55 mL/Hr) TPN Continuous <Continuous>  phenylephrine    Infusion 0.1 MICROgram(s)/kG/Min (1.43 mL/Hr) IV Continuous <Continuous>  PureFlow Dialysate RFP-400 (K 2 / Ca 3) 5000 milliLiter(s) (2000 mL/Hr) CRRT <Continuous>  vasopressin Infusion 0.03 Unit(s)/Min (4.5 mL/Hr) IV Continuous <Continuous>        VITALS:  T(F): 95.7 (07-09-23 @ 06:00), Max: 99.9 (07-08-23 @ 09:00)  HR: 77 (07-09-23 @ 08:00)  BP: --  RR: 20 (07-09-23 @ 08:00)  SpO2: 96% (07-09-23 @ 08:00)  Wt(kg): --    07-07 @ 07:01  -  07-08 @ 07:00  --------------------------------------------------------  IN: 1659.7 mL / OUT: 2968 mL / NET: -1308.3 mL    07-08 @ 07:01  -  07-09 @ 07:00  --------------------------------------------------------  IN: 2379 mL / OUT: 1806 mL / NET: 573 mL    07-09 @ 07:01  -  07-09 @ 08:35  --------------------------------------------------------  IN: 114.8 mL / OUT: 54 mL / NET: 60.8 mL          PHYSICAL EXAM:  Constitutional: NAD  HEENT: anicteric sclera, oropharynx clear, MMM  Neck: No JVD  Respiratory: CTAB, no wheezes, rales or rhonchi  Cardiovascular: S1, S2, RRR  Gastrointestinal: BS+, soft, NT/ND  Extremities: No cyanosis or clubbing. No peripheral edema  :  No bobby.   Skin: No rashes    LABS:  07-09    136  |  103  |  58<H>  ----------------------------<  116<H>  4.6   |  22  |  1.0    Ca    7.5<L>      09 Jul 2023 04:22  Phos  2.9     07-09  Mg     2.1     07-09    TPro  3.6<L>  /  Alb  1.6<L>  /  TBili  0.7  /  DBili  0.4<H>  /  AST  35  /  ALT  16  /  AlkPhos  97  07-09                          8.0    10.29 )-----------( 23       ( 09 Jul 2023 04:22 )             24.0       Urine Studies:  Urinalysis Basic - ( 09 Jul 2023 04:22 )    Color:  / Appearance:  / SG:  / pH:   Gluc: 116 mg/dL / Ketone:   / Bili:  / Urobili:    Blood:  / Protein:  / Nitrite:    Leuk Esterase:  / RBC:  / WBC    Sq Epi:  / Non Sq Epi:  / Bacteria:           Iron 12, TIBC 128, %sat 9      [05-07-23 @ 09:19]  Ferritin 526      [05-07-23 @ 09:19]  Vitamin D (25OH) 26      [05-08-23 @ 07:58]  TSH 0.45      [06-19-23 @ 14:14]  Lipid: chol 58, TG 88, HDL 22, LDL --      [07-02-23 @ 21:01]          RADIOLOGY & ADDITIONAL STUDIES:   Nephrology progress note    Patient is seen and examined, events over the last 24 h noted .  back on CVVHD     Allergies:  No Known Allergies    Hospital Medications:   MEDICATIONS  (STANDING):  bacitracin   Ointment 1 Application(s) Topical two times a day   caspofungin IVPB 50 milliGRAM(s) IV Intermittent every 24 hours  CRRT Treatment    <Continuous>  DAPTOmycin IVPB 460 milliGRAM(s) IV Intermittent every 48 hours  dexMEDEtomidine Infusion 0.1 MICROgram(s)/kG/Hr (1.91 mL/Hr) IV Continuous <Continuous>  heparin   Injectable 5000 Unit(s) SubCutaneous every 8 hours  levoFLOXacin IVPB 750 milliGRAM(s) IV Intermittent every 48 hours  meropenem  IVPB 1000 milliGRAM(s) IV Intermittent every 12 hours  pantoprazole  Injectable 40 milliGRAM(s) IV Push every 12 hours  Parenteral Nutrition - Adult 1 Each (55 mL/Hr) TPN Continuous <Continuous>  phenylephrine    Infusion 0.1 MICROgram(s)/kG/Min (1.43 mL/Hr) IV Continuous <Continuous>  PureFlow Dialysate RFP-400 (K 2 / Ca 3) 5000 milliLiter(s) (2000 mL/Hr) CRRT <Continuous>  vasopressin Infusion 0.03 Unit(s)/Min (4.5 mL/Hr) IV Continuous <Continuous>        VITALS:  T(F): 95.7 (07-09-23 @ 06:00), Max: 99.9 (07-08-23 @ 09:00)  HR: 77 (07-09-23 @ 08:00)  RR: 20 (07-09-23 @ 08:00)  SpO2: 96% (07-09-23 @ 08:00)  Wt(kg): --    07-07 @ 07:01  -  07-08 @ 07:00  --------------------------------------------------------  IN: 1659.7 mL / OUT: 2968 mL / NET: -1308.3 mL    07-08 @ 07:01  -  07-09 @ 07:00  --------------------------------------------------------  IN: 2379 mL / OUT: 1806 mL / NET: 573 mL    07-09 @ 07:01  -  07-09 @ 08:35  --------------------------------------------------------  IN: 114.8 mL / OUT: 54 mL / NET: 60.8 mL          PHYSICAL EXAM:  Constitutional: intubated on MV   Respiratory: CTAB  Cardiovascular: S1, S2, RRR  Gastrointestinal: BS+, soft, NT/ND  Extremities: No peripheral edema  Skin: No rashes    LABS:  07-09    136  |  103  |  58<H>  ----------------------------<  116<H>  4.6   |  22  |  1.0    Ca    7.5<L>      09 Jul 2023 04:22  Phos  2.9     07-09  Mg     2.1     07-09    Digoxin Level, Serum: 1.9 ng/mL (07.09.23 @ 04:22)      TPro  3.6<L>  /  Alb  1.6<L>  /  TBili  0.7  /  DBili  0.4<H>  /  AST  35  /  ALT  16  /  AlkPhos  97  07-09                          8.0    10.29 )-----------( 23       ( 09 Jul 2023 04:22 )             24.0       Urine Studies:  Urinalysis Basic - ( 09 Jul 2023 04:22 )    Color:  / Appearance:  / SG:  / pH:   Gluc: 116 mg/dL / Ketone:   / Bili:  / Urobili:    Blood:  / Protein:  / Nitrite:    Leuk Esterase:  / RBC:  / WBC    Sq Epi:  / Non Sq Epi:  / Bacteria:           Iron 12, TIBC 128, %sat 9      [05-07-23 @ 09:19]  Ferritin 526      [05-07-23 @ 09:19]  Vitamin D (25OH) 26      [05-08-23 @ 07:58]  TSH 0.45      [06-19-23 @ 14:14]  Lipid: chol 58, TG 88, HDL 22, LDL --      [07-02-23 @ 21:01]          RADIOLOGY & ADDITIONAL STUDIES:

## 2023-07-09 NOTE — PROGRESS NOTE ADULT - ASSESSMENT
Assessment & Plan:  67M w/ PMH of HTN, grade IV hydronephrosis of L kidney s/p L PCN (placed 5/2023), stutter, hiatal hernia, iron deficiency anemia, H Pylori (untreated), and gastric mass (never biopsied) presents to the ED with at least 4 days of worsening weakness, abdominal distension, and no output from his PCN. Admitted with emphysematous pyelonephritis.     (6/18): s/p LT PCN upsizing to 16Fr and 16Fr RLQ drain placement with IR   (6/22): s/p shock lithotripsy of L kidney abscess cavity with drainage of thick contents, upsize of drain to 26Fr bobby catheter  (6/30): s/p ex-lap and L nephrectomy with abdominal washout    NEURO:  #Pain control    -APAP PRN    -fent 25 q 3 PRN while intubated  #Sedation    -precedex gtt currently off    -following commands when off sedation, GCS 11T    RESP:  RR (machine): 18, TV (machine): 450, FiO2: 40, PEEP: 8  07-08 @ 21:15--7.38 / 40 / 158 / 24 / Lmh24Sne 100.0 / Lac 2.40 / iCal 1.15   07-08 @ 04:37--7.41 / 36 / 144 / 23 / Yym18Uar 100.0 / Lac 2.50 / iCal --   ABG - ( 08 Jul 2023 21:15 )  pH, Arterial: 7.38  pH, Blood: x     /  pCO2: 40    /  pO2: 158   / HCO3: 24    / Base Excess: -1.3  /  SaO2: 100.0   #acute respiratory failure with left lung mucous plugging - resolved s/p intubated 6/24 and    and bronchoscopy     PS tolerated 7/8 --> place back on vent overnight  ABG:     #Pulmonary nodules / Mediastinal lymphadenopathy    - CT Chest: Right middle and upper lobe solid pulmonary nodules, f/u outpatient pulm   #Activity   - incr as tolerated    CARDS:   #Septic shock    - off levo since 3am 7/1, remains on Josh and Vasopressin drips  #New onset Afib w/ RVR with hypotension -- likely 2/2 sepsis    - HR control with Metoprolol 2.5 q6 IV (OFF currently)    -Digoxin 250mcg x1 dose, 125mcg x1 7/1, 125mcg 7/4, 125 mcg on 7/6, repeat level in      am 7/7 2.7 =>am dose held, 7/8 2.9 --> 7/9 AM dose pending, f/u    - Cardiology following    #H/O HTN    -Amlodipine 2.5mg HOLDING due to ongoing pressor requirement  Imaging    - ECHO: (6/19) EF 55-60%, mild AS, mild LAE, mild MR/TR, trivial pericardial effusion    GI/NUTR:  #Diet: NPO on TPN @55cc/hr w/o K+  #bowel movements    - GI consult 6/26- Plan for EGD with EUS + FNA as outpatient, H-pylori treatment after resolution of acute issues   -FOBT positive    #GI PPX  -IV Protonix 40 q12 hours  #Gastric mass vs gastrohepatic lymphadenopathy    -CT A/P: lesser curvature mass , Hepatic hypodensities  #Splenic Hypodensities, infarct vs phlegmon/abscess    -CT A/P:  Wedge-shaped region of hypodensity within the spleen    /RENAL:   #Grade IV L hydronephrosis s/p L PCN (5/2023) -- presented to ED with thick dark foul smelling output  #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    -(6/23) s/p LT PCN upsizing to 26Fr catheter, RLQ 16 Fr drain remains, s/p nephrostolithotomy w/ lithotripsy    -(6/18) s/p LT PCN upsizing to 16Fr and 16Fr RLQ drain placement with IR, flush q8 hr  (6/30): s/p L nephrectomy and abdominal washout   -R pelvic drain Creatinine 3.99    High suspicion of renal malignancy given recent pathology  Follow path from nephrectomy (6/30)    #metabolic acidosis  - ( 08 Jul 2023 21:15 ) Lactate 2.4 from 2.5 from 3.7, downtrending, f/u 7/9  -Nephrology c/s - CVVHD started 07/02, current goal to keep net even  -(7/6 PM): cvvh delayed as machine had error message  - (7/8 day) --> right IJ Carpio replaced over wire  Labs:  #urine output in critically ill    -indwelling bobby (placed     Labs:          BUN/Cr- 67/1.4  -->,  61/1.1  -->          Electrolytes-Na 138 // K 5.0 // Mg 2.2 //  Phos 2.9 (07-08 @ 23:41)  #decreased urine output in the setting of acute renal failure 2/2 septic shock    HEME/ONC:   #DVT prophylaxis  -SCDs, heparin subQ   #new onset Afib  -holding heparin infusion in setting of hematochezia     Labs: DVT propylaxis- HSQ     #R radial artery occlusion  -RUE duplex 07/01 + for r radial artery occlusion  #RT posterior popliteal vein DVT  -vascular c/s - rec AC     DVT prophylaxis-heparin   Injectable    Labs:    Labs: Hb/Hct:  6.6/20.5  -->,  8.0/23.5  -->                      Plts:  20  -->,  36  -->                 PTT/INR:           #H/o Fe Deficiency anemia  #Thrombocytopenia  Heme consult (7/3):   - low risk for HIT (score 2-3)  - Heme/onc wants full HIT workup although low HIT risk  - HIT negative, BRUCE pending   - risk of full anticoagulation outweigh the benefits due to high risk of bleeding (h/o GI bleed and post-nephrectomy), will hold off on AC and will wait for HIT AB screen results, if new DVT then will re-assess; discussed with SICU attending Dr. Orozco who also discussed this with hematology team and Dr. Womack who are all in agreement.    ID:  #Leukocytosis 2/2 sepsis   WBC- 16.17  --->>,  14.95  --->>,  11.95  --->>  Temp trend- 24hrs T(F): 96.1 (07-09 @ 00:00), Max: 100.6 (07-08 @ 08:00)  Current antibiotics-caspofungin IVPB    caspofungin IVPB 50 every 24 hours  DAPTOmycin IVPB 460 every 48 hours  levoFLOXacin IVPB 750 every 48 hours  meropenem  IVPB 1000 every 12 hours  #Current antibiotics:    -Levaquin 750 QD    -Meropenum 1g q12; PENDING ID APPROVAL     -added Dapto 6mg/kg 460mg QD, continued    -As per ID: no need for Vancomycin or Daptomycin in the absence of MRSA    -discontinued caspofungin (as per ID, since fungitell is unremarkable)  #Cultures  Surgical swab 06/30: vanc resistent enterococcus faecium     L PCN cyto-pathology  6/25: results suggestive of a necrotic neoplasm    Funtitell 6/26: 47    Obtain BCx 7/9    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    BAL 6/24: moderate yeast     UCx 6/19: negative FINAL      Left PCN aspiration 6/18: stenotrophomonas maltophilia  Needs source control    ENDO:  #Glucose monitoring    -A1c (5/7/23): 5.0    -POCT q6 hours while NPO   #Adrenal Insufficiency 2/2 to sepsis     -restarted solucortef 50q6 for 6 days starting 07/02      -Cortisol level - binding globulin 1.1 (low), serum cortisol, 84, free cortisol 76, % free cortisol 91    MSK:  #Sacrococcygeal DTI: wound care following- cleanse with soap and water, apply triad, #gauze, and allevyn BID and PRN for soiling   Venous ulcer right lower leg, R forearm skin tear: Pat dry, apply Xeroform, nonadherent dressing, wrap with Kerlix twice a day, prn for soiling  Burn consulted, recs:  --> sacrum: apply hydrogel and Allevyn pad  --> b/l ischium: Allevyn pad  - offloading/positioning  #Activity    -Advance as tolerated    -PT/rehab once extubated    -Globally deconditioned  #b/l UE, LE mottled skin  -no radial pulses b/l  +ulnar, brachial b/l UE, +DP b/l  -Arterial duplex - R radial occlusion   -DVT sono- prelim R PT DVT     LINES/DRAINS:   PIV  Bobby (6/18)  L eleazar drain (6/30)  L IJ TLC (6/26)  R IJ Uldall (07/02)  R Lerma pouch #1, pelvic drain #2 (6/30)  Left basilic midline  6/21  Right axillary a line (6/30-7/4)  Right Radial [place] (6/22-6/30)  L radial Joleen (6/18-6/22)  R-Femoral [place]   ETT  OGT    ADVANCED DIRECTIVES:  Full Code  HCP/Emergency Contact- Tracey Adames: [phone]   Palliative following. Last GOC discussion 7/7)  GOC meeting pending    INDICATION FOR SICU: New onset atrial fibrillation w/ mechanical ventilation Assessment and Plan:   67M w/ PMH of HTN, grade IV hydronephrosis of L kidney s/p L PCN (placed 5/2023), stutter, hiatal hernia, iron deficiency anemia, H Pylori (untreated), and gastric mass (never biopsied) presents to the ED with at least 4 days of worsening weakness, abdominal distension, and no output from his PCN. Admitted with emphysematous pyelonephritis.     (6/18): s/p LT PCN upsizing to 16Fr and 16Fr RLQ drain placement with IR   (6/22): s/p shock lithotripsy of L kidney abscess cavity with drainage of thick contents, upsize of drain to 26Fr bobby catheter  (6/30): s/p ex-lap and L nephrectomy with abdominal washout    NEURO:  #Pain control    -APAP PRN    -fent 25 q 3 PRN while intubated  #Sedation    -precedex gtt PRN, currently off    -following commands when off sedation, GCS 11T    RESP:  RR (machine): 18, TV (machine): 450, FiO2: 40, PEEP: 8  ABG (7/9 AM): 7.41/37/153/24   lac 2.5  #acute respiratory failure with left lung mucous plugging - resolved s/p intubated 6/24 and    and bronchoscopy     PS tolerated 7/8 --> place back on vent overnight    #Pulmonary nodules / Mediastinal lymphadenopathy    - CT Chest: Right middle and upper lobe solid pulmonary nodules, f/u outpatient pulm   #Activity   - incr as tolerated    CARDS:   #Septic shock    - off levo since 3am 7/1, remains on phenylephrine and Vasopressin infusions  #New onset Afib w/ RVR with hypotension -- likely 2/2 sepsis    - HR control with Metoprolol 2.5 q6 IV (OFF currently)    -Digoxin 250mcg x1 dose, 125mcg x1 7/1, 125mcg 7/4, 125 mcg on 7/6, repeat level in      am 7/7 2.7 =>am dose held, 7/8 2.9 --> 7/9 AM dose pending, f/u    - Cardiology following    #H/O HTN    -Amlodipine 2.5mg HOLDING due to ongoing pressor requirement  Imaging    - ECHO: (6/19) EF 55-60%, mild AS, mild LAE, mild MR/TR, trivial pericardial effusion    GI/NUTR:  #Diet: NPO on TPN @55cc/hr w/o K+  #bowel movements    - GI consult 6/26- Plan for EGD with EUS + FNA as outpatient, H-pylori treatment after resolution of acute issues   -FOBT positive    #GI PPX  -IV Protonix 40 q12 hours  #Gastric mass vs gastrohepatic lymphadenopathy    -CT A/P: lesser curvature mass , Hepatic hypodensities  #Splenic Hypodensities, infarct vs phlegmon/abscess    -CT A/P:  Wedge-shaped region of hypodensity within the spleen    /RENAL:   #Grade IV L hydronephrosis s/p L PCN (5/2023) -- presented to ED with thick dark foul smelling output  #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    -(6/23) s/p LT PCN upsizing to 26Fr catheter, RLQ 16 Fr drain remains, s/p nephrostolithotomy w/ lithotripsy    -(6/18) s/p LT PCN upsizing to 16Fr and 16Fr RLQ drain placement with IR, flush q8 hr  (6/30): s/p L nephrectomy and abdominal washout   -R pelvic drain Creatinine 3.99    High suspicion of renal malignancy given recent pathology  Follow path from nephrectomy (6/30)    #metabolic acidosis  - ( 08 Jul 2023 21:15 ) Lactate 2.4 from 2.5 from 3.7, downtrending, f/u 7/9  -Nephrology c/s - CVVHD started 07/02, current goal to keep net even  -(7/6 PM): cvvh delayed as machine had error message  - (7/8 day) --> right IJ hemodialysis catheter replaced over wire  Labs:  #urine output in critically ill    -indwelling bobby (placed     Labs:          BUN/Cr- 61/1.1  -->,  58/1.0  -->          Electrolytes-Na 136 // K 4.6 // Mg 2.1 //  Phos 2.9 (07-09 @ 04:22)  #decreased urine output in the setting of acute renal failure 2/2 septic shock    HEME/ONC:   #DVT prophylaxis  -SCDs, heparin subQ   #new onset Afib  -holding heparin infusion in setting of hematochezia     Labs: DVT propylaxis- HSQ     #R radial artery occlusion  -RUE duplex 07/01 + for right radial artery occlusion  #RT posterior popliteal vein DVT  -vascular c/s - rec AC     DVT prophylaxis-heparin   Injectable    Labs: Hb/Hct:  8.0/23.5  -->,  8.0/24.0  -->                      Plts:  36  -->,  23  -->                 PTT/INR:      #H/o Fe Deficiency anemia  #Thrombocytopenia  Heme consult (7/3):   - low risk for HIT (score 2-3)  - Heme/onc wants full HIT workup although low HIT risk  - HIT workup negative  - risk of full anticoagulation outweigh the benefits due to high risk of bleeding (h/o GI bleed and post-nephrectomy), will hold off on AC and will wait for HIT AB screen results, if new DVT then will re-assess; discussed with SICU attending Dr. Orozco who also discussed this with hematology team and Dr. Womack who are all in agreement.    ID:  #Leukocytosis 2/2 sepsis   WBC- 14.95  --->>,  11.95  --->>,  10.29  --->>  Temp trend- 24hrs T(F): 95.7 (07-09 @ 06:00), Max: 99.3 (07-08 @ 11:00)  Current antibiotics-caspofungin IVPB    caspofungin IVPB 50 every 24 hours  DAPTOmycin IVPB 460 every 48 hours  levoFLOXacin IVPB 750 every 48 hours  meropenem  IVPB 1000 every 12 hours  #Current antibiotics:    -Levaquin 750 q48 hours    -Meropenum 1g q12    -Daptomycin 6mg/kg 460mg QD, continued    -added empiric caspofungin (fungitell >500)    #Cultures  Surgical swab 06/30: vanc resistent enterococcus faecium     L PCN cyto-pathology  6/25: results suggestive of a necrotic neoplasm    Funtitell 6/26: 47 --> >500 (7/6)    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    BAL 6/24: moderate yeast     UCx 6/19: negative FINAL      Left PCN aspiration 6/18: stenotrophomonas maltophilia    Surgical swab (6/30): VRE    ENDO:  #Glucose monitoring    -A1c (5/7/23): 5.0    -POCT q6 hours while NPO   #Adrenal Insufficiency 2/2 to sepsis     -completed course of solu-cortef    -Cortisol level - binding globulin 1.1 (low), serum cortisol, 84, free cortisol 76, % free cortisol 91    MSK:  #Sacrococcygeal DTI: wound care following- cleanse with soap and water, apply triad, #gauze, and allevyn BID and PRN for soiling   Venous ulcer right lower leg, R forearm skin tear: Pat dry, apply Xeroform, nonadherent dressing, wrap with Kerlix twice a day, prn for soiling  Burn consulted, recs:  --> sacrum: apply hydrogel and Allevyn pad  --> b/l ischium: Allevyn pad  - offloading/positioning  #Activity    -Advance as tolerated    -PT/rehab once extubated    -Globally deconditioned  #b/l UE, LE mottled skin  -no radial pulses b/l  +ulnar, brachial b/l UE, +DP b/l  -Arterial duplex - R radial occlusion   -DVT sono- prelim R PT DVT     LINES/DRAINS:   PIV  Bobby (6/18)  L eleazar drain (6/30)  L IJ TLC (6/26)  R IJ Uldall (07/02)  R Lerma pouch #1, pelvic drain #2 (6/30)  Left basilic midline  6/21  Right axillary a line (6/30-7/4)  Right Radial [place] (6/22-6/30)  L radial Joleen (6/18-6/22)  R-Femoral [place]   ETT  OGT    ADVANCED DIRECTIVES:  Full Code  HCP/Emergency Contact- Tracey Adames: [phone]   Palliative following. Last [facility] discussion 7/7)  GO meeting pending    INDICATION FOR SICU: New onset atrial fibrillation w/ mechanical ventilation    DISPOSITION: SICU

## 2023-07-09 NOTE — PROGRESS NOTE ADULT - ASSESSMENT
Pt is a 68y Male admitted with retroperitoneal and intraabdominal abscess s/p IR placement of drain and LEFT PCN 6/18, s/p percutaneous drainage of LEFT renal abscess POD # 15 with left 26fr flank catheter in place, s/p Left radical nephrectomy, Ex-Lap POD # 9. Patient remains intubated on pressor support this AM (on Josh 0.02).    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 Patient remains intubated on pressor support this AM (on Josh 0.02).    Plan:   Case discussed with Dr. 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 Patient remains intubated on pressor support this AM (on Josh 0.02).    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 Vásquez, plan as follows:     Plan:   - No further acute  surgical intervention at this time

## 2023-07-09 NOTE — PATIENT PROFILE ADULT - FUNCTIONAL SCREEN CURRENT LEVEL: COMMUNICATION, MLM
2 = difficulty understanding and speaking (not related to language barrier)
0 = understands/communicates without difficulty

## 2023-07-09 NOTE — PATIENT PROFILE ADULT - VISION (WITH CORRECTIVE LENSES IF THE PATIENT USUALLY WEARS THEM):
unable to assess pt intubated unable to assess pt intubated/Normal vision: sees adequately in most situations; can see medication labels, newsprint

## 2023-07-09 NOTE — PROGRESS NOTE ADULT - ASSESSMENT
69 y/o M w/ PMHx of retroperitoneal and intraabdominal abscess s/p IR placement of drain and LEFT PCN 6/18, s/p percutaneous drainage of LEFT renal abscess POD #9 with left 26fr flank catheter in place. s/p Left radical nephrectomy, Ex-Lap with increased pressor requirements and symptoms of ischemia in distal extremities.       PLAN:   - Continue management per SICU team  - Continue to monitor drain outputs q shift  - Continue to monitor NGT output  - Monitor UO, CVVHD as per renal   - Continue with Abx per ID recs: Ar, Levaquin, Daptomycin   - Wean pressors as tolerated while maintaining MAP goals

## 2023-07-09 NOTE — PROGRESS NOTE ADULT - SUBJECTIVE AND OBJECTIVE BOX
GENERAL SURGERY PROGRESS NOTE     CHRISTINE VILLAVICENCIO  68y  Male  Hospital day: 22d  POD: 9d  Procedure:   - Nephrostolithotomy, percutaneous, with lithotripsy, large stone  - Partial nephrectomy  - Exploratory laparotomy  - Left nephrectomy  - Insertion, catheter, hemodialysis, non-tunneled, with US guidance      OVERNIGHT EVENTS: Naeon. Received 1 prbc and 1 platelets yesterday, f/u 430am labs. MAP65, off precedex. U/o low at 3-5cc/hr, ss. AC vent 8pm. NGT 10cc output overnight.      T(F): 95.9 (23 @ 04:00), Max: 100.6 (23 @ 08:00)  HR: 55 (23 @ 04:00) (51 - 82)  BP: --  ABP: 115/51 (23 @ 04:00) (99/46 - 141/52)  ABP(mean): 71 (23 @ 04:00) (62 - 79)  RR: 28 (23 @ 04:00) (16 - 30)  SpO2: 97% (23 @ 04:00) (86% - 100%)    DIET/FLUIDS: Parenteral Nutrition - Adult 1 Each TPN Continuous <Continuous>    N23 @ 07:  -  23 @ 07:00  --------------------------------------------------------  OUT: 1500 mL                                                                                 DRAINS:   23 @ 07:  -  23 @ 07:00  --------------------------------------------------------  OUT: 1408 mL      URINE:   23 @ 07:  -  23 @ 07:00  --------------------------------------------------------  OUT: 60 mL       GI proph:  pantoprazole  Injectable 40 milliGRAM(s) IV Push every 12 hours    AC/ proph: heparin   Injectable 5000 Unit(s) SubCutaneous every 8 hours    ABx: caspofungin IVPB      caspofungin IVPB 50 milliGRAM(s) IV Intermittent every 24 hours  DAPTOmycin IVPB 460 milliGRAM(s) IV Intermittent every 48 hours  levoFLOXacin IVPB 750 milliGRAM(s) IV Intermittent every 48 hours  meropenem  IVPB 1000 milliGRAM(s) IV Intermittent every 12 hours      PHYSICAL EXAM:  GENERAL: NAD, well-appearing  CHEST/LUNG: Good chest rise   HEART: Regular rate and rhythm  ABDOMEN: Soft, Nontender, Nondistended;   EXTREMITIES:  No edema      LABS:   POCT Blood Glucose.: 119 mg/dL (2023 02:21)  POCT Blood Glucose.: 127 mg/dL (2023 19:59)  POCT Blood Glucose.: 123 mg/dL (2023 17:29)  POCT Blood Glucose.: 123 mg/dL (2023 13:22)  POCT Blood Glucose.: 146 mg/dL (2023 08:08)                          8.0    11.95 )-----------( 36       ( 2023 13:55 )             23.5       Auto Neutrophil %: 91.4 % (23 @ 13:55)  Auto Immature Granulocyte %: 0.4 % (23 @ 13:55)        138  |  102  |  61<HH>  ----------------------------<  112<H>  5.0   |  24  |  1.1      Calcium: 7.3 mg/dL (23 @ 23:41)       Blood Gas Arterial, Lactate: 2.50 mmol/L (23 @ 04:14)  Blood Gas Arterial, Lactate: 2.40 mmol/L (23 @ 21:15)  Blood Gas Arterial, Lactate: 2.50 mmol/L (23 @ 04:37)  Blood Gas Arterial, Lactate: 3.70 mmol/L (23 @ 04:17)    ABG - ( 2023 04:14 )  pH: 7.41  /  pCO2: 37    /  pO2: 153   / HCO3: 24    / Base Excess: -1.0  /  SaO2: 100.0       ABG - ( 2023 21:15 )  pH: 7.38  /  pCO2: 40    /  pO2: 158   / HCO3: 24    / Base Excess: -1.3  /  SaO2: 100.0       ABG - ( 2023 04:37 )  pH: 7.41  /  pCO2: 36    /  pO2: 144   / HCO3: 23    / Base Excess: -1.4  /  SaO2: 100.0       Urinalysis Basic - ( 2023 23:41 )    Color: x / Appearance: x / SG: x / pH: x  Gluc: 112 mg/dL / Ketone: x  / Bili: x / Urobili: x   Blood: x / Protein: x / Nitrite: x   Leuk Esterase: x / RBC: x / WBC x   Sq Epi: x / Non Sq Epi: x / Bacteria: x          RADIOLOGY & ADDITIONAL TESTS:    < from: Xray Chest 1 View-PORTABLE IMMEDIATE (Xray Chest 1 View-PORTABLE IMMEDIATE .) (23 @ 12:30) >  IMPRESSION:      Unchanged bilateral opacities.    < end of copied text >

## 2023-07-09 NOTE — PATIENT PROFILE ADULT - FALL HARM RISK - HARM RISK INTERVENTIONS

## 2023-07-09 NOTE — PROGRESS NOTE ADULT - ASSESSMENT
67M w/ PMH of HTN, grade IV hydronephrosis of L kidney s/p L PCN (placed 5/2023), stutter, hiatal hernia, iron deficiency anemia, H Pylori (untreated), and gastric mass (never biopsied) presents to the ED with at least 4 days of worsening weakness, abdominal distension, and no output from his PCN  . Found to have septic shock, MSOF, lactic acidosis from left emphysematous hydronephrosis, pyelonephritis and possible rupture with pneumoperitoneum along with possible hepatic abscesses, splenic infarct and abdominal and mediastinal lymphadenopathies.  had nephrectomy and abdominal washout on 6/30    Oliguric in spite of bumex, rising dig level, high lactate  ROJAS likely ATN iso septic shock  on pressors   back on CVVHD / will keep in even balance tonight   repeat dig level daily / better noted today   platelets noted follow trend /  negative  HIT / hem on case   ID notes appreciated   will follow  discussed w/ SICU team

## 2023-07-10 NOTE — PROGRESS NOTE ADULT - SUBJECTIVE AND OBJECTIVE BOX
GENERAL SURGERY PROGRESS NOTE    Patient: CHRISTINE VILLAVICENICO , 68y ([birth year])Male   MRN: [id number]  Location: [address]  Visit: 06-18-23 Inpatient  Date: 07-10-23 @ 03:19    Hospital Day #: 23  Post-Op Day #: 10    Procedure/Dx/Injuries: s/p exploratory laparotomy, radical left nephrectomy, abdominal washout     Events of past 24 hours:  -patient with increasing pressor requirements (Josh @ 0.7, Vaso @ 0.03)  -minimal NGT output  -s/p 1U Platelets    PAST MEDICAL & SURGICAL HISTORY:    Vitals:   T(F): 97.6 (07-10-23 @ 02:00), Max: 97.6 (07-10-23 @ 02:00)  HR: 83 (07-10-23 @ 02:00)  BP: --  RR: 23 (07-10-23 @ 02:00)  SpO2: 100% (07-10-23 @ 02:00)  Mode: AC/ CMV (Assist Control/ Continuous Mandatory Ventilation),BELLAVISTA, RR (machine): 18, TV (machine): 450, FiO2: 40, PEEP: 5, MAP: 10, PIP: 15    Diet, NPO    Fluids:     I & O's:    07-08-23 @ 07:01  -  07-09-23 @ 07:00  --------------------------------------------------------  IN:    Dexmedetomidine: 1.5 mL    Dexmedetomidine: 1 mL    IV PiggyBack: 100 mL    IV PiggyBack: 50 mL    IV PiggyBack: 250 mL    IV PiggyBack: 50 mL    IV PiggyBack: 50 mL    IV PiggyBack: 50 mL    Phenylephrine: 3.5 mL    Platelets - Single Donor: 190 mL    PRBCs (Packed Red Blood Cells): 325 mL    TPN (Total Parenteral Nutrition): 1200 mL    Vasopressin: 108 mL  Total IN: 2379 mL    OUT:    Drain (mL): 30 mL    Drain (mL): 50 mL    Drain (mL): 30 mL    Drain (mL): 90 mL    Indwelling Catheter - Urethral (mL): 74 mL    Nasogastric/Oral tube (mL): 10 mL    Other (mL): 1522 mL  Total OUT: 1806 mL    Total NET: 573 mL    PHYSICAL EXAM:  General: NAD, AAOx3, calm and cooperative  HEENT: NCAT, AGUSTÍN, EOMI, Trachea ML, Neck supple  Cardiac: RRR S1, S2, no Murmurs, rubs or gallops  Respiratory: CTAB, normal respiratory effort, breath sounds equal BL, no wheeze, rhonchi or crackles  Abdomen: Soft, non-distended, non-tender, no rebound, no guarding. +BS.  Rectal: Good tone, +stool, no blood, no darnell-anal masses/lesions, no fistulas, fissures, hemorrhoids  Musculoskeletal: Strength 5/5 BL UE/LE, ROM intact, compartments soft  Neuro: Sensation grossly intact and equal throughout, no focal deficits  Vascular: Pulses 2+ throughout, extremities well perfused  Skin: Warm/dry, normal color, no jaundice  Incision/wound: healing well, dressings in place, clean, dry and intact    MEDICATIONS  (STANDING):  bacitracin   Ointment 1 Application(s) Topical two times a day  caspofungin IVPB      caspofungin IVPB 50 milliGRAM(s) IV Intermittent every 24 hours  chlorhexidine 0.12% Liquid 15 milliLiter(s) Oral Mucosa every 12 hours  chlorhexidine 2% Cloths 1 Application(s) Topical <User Schedule>  CRRT Treatment    <Continuous>  DAPTOmycin IVPB 460 milliGRAM(s) IV Intermittent every 48 hours  dexMEDEtomidine Infusion 0.1 MICROgram(s)/kG/Hr (1.91 mL/Hr) IV Continuous <Continuous>  heparin   Injectable 5000 Unit(s) SubCutaneous every 8 hours  levoFLOXacin IVPB 750 milliGRAM(s) IV Intermittent every 48 hours  meropenem  IVPB 1000 milliGRAM(s) IV Intermittent every 12 hours  pantoprazole  Injectable 40 milliGRAM(s) IV Push every 12 hours  Parenteral Nutrition - Adult 1 Each (58 mL/Hr) TPN Continuous <Continuous>  phenylephrine    Infusion 0.1 MICROgram(s)/kG/Min (1.43 mL/Hr) IV Continuous <Continuous>  PureFlow Dialysate RFP-400 (K 2 / Ca 3) 5000 milliLiter(s) (2000 mL/Hr) CRRT <Continuous>  vasopressin Infusion 0.03 Unit(s)/Min (4.5 mL/Hr) IV Continuous <Continuous>    MEDICATIONS  (PRN):  sodium chloride 0.9% lock flush 10 milliLiter(s) IV Push every 1 hour PRN Pre/post blood products, medications, blood draw, and to maintain line patency  sodium chloride 0.9% lock flush 10 milliLiter(s) IV Push every 1 hour PRN Pre/post blood products, medications, blood draw, and to maintain line patency    DVT PROPHYLAXIS: heparin   Injectable 5000 Unit(s) SubCutaneous every 8 hours    GI PROPHYLAXIS: pantoprazole  Injectable 40 milliGRAM(s) IV Push every 12 hours    ANTICOAGULATION:   ANTIBIOTICS:  caspofungin IVPB    caspofungin IVPB 50 milliGRAM(s)  DAPTOmycin IVPB 460 milliGRAM(s)  levoFLOXacin IVPB 750 milliGRAM(s)  meropenem  IVPB 1000 milliGRAM(s)    LAB/STUDIES:  Labs:  CAPILLARY BLOOD GLUCOSE    POCT Blood Glucose.: 107 mg/dL (10 Jul 2023 00:49)  POCT Blood Glucose.: 90 mg/dL (09 Jul 2023 20:01)  POCT Blood Glucose.: 93 mg/dL (09 Jul 2023 18:25)  POCT Blood Glucose.: 103 mg/dL (09 Jul 2023 12:26)                        9.7    12.39 )-----------( 270      ( 10 Jul 2023 01:30 )             29.3     07-10    135  |  102  |  13  ----------------------------<  218<H>  3.7   |  25  |  <0.5<L>    Calcium: 7.0 mg/dL (07-10-23 @ 01:30)    LFTs:             3.6  | 0.7  | 35       ------------------[97      ( 09 Jul 2023 04:22 )  1.6  | 0.4  | 16          Lipase:x      Amylase:x         Blood Gas Arterial, Lactate: 2.50 mmol/L (07-09-23 @ 04:14)  Blood Gas Arterial, Lactate: 2.40 mmol/L (07-08-23 @ 21:15)  Blood Gas Arterial, Lactate: 2.50 mmol/L (07-08-23 @ 04:37)  Blood Gas Arterial, Lactate: 3.70 mmol/L (07-07-23 @ 04:17)    ABG - ( 09 Jul 2023 04:14 )  pH: 7.41  /  pCO2: 37    /  pO2: 153   / HCO3: 24    / Base Excess: -1.0  /  SaO2: 100.0     ABG - ( 08 Jul 2023 21:15 )  pH: 7.38  /  pCO2: 40    /  pO2: 158   / HCO3: 24    / Base Excess: -1.3  /  SaO2: 100.0     ABG - ( 08 Jul 2023 04:37 )  pH: 7.41  /  pCO2: 36    /  pO2: 144   / HCO3: 23    / Base Excess: -1.4  /  SaO2: 100.0     Coags:    Urinalysis Basic - ( 10 Jul 2023 01:30 )    Color: x / Appearance: x / SG: x / pH: x  Gluc: 218 mg/dL / Ketone: x  / Bili: x / Urobili: x   Blood: x / Protein: x / Nitrite: x   Leuk Esterase: x / RBC: x / WBC x   Sq Epi: x / Non Sq Epi: x / Bacteria: x       GENERAL SURGERY PROGRESS NOTE    Patient: CHRISTINE VILLAVICENCIO , 68y ([birth year])Male   MRN: [id number]  Location: [address]  Visit: 06-18-23 Inpatient  Date: 07-10-23 @ 03:19    Hospital Day #: 23  Post-Op Day #: 10    Procedure/Dx/Injuries: s/p exploratory laparotomy, radical left nephrectomy, abdominal washout     Events of past 24 hours:  -patient with increasing pressor requirements (Josh @ 0.7, Vaso @ 0.03)  -minimal NGT output  -s/p 1U Platelets    PAST MEDICAL & SURGICAL HISTORY:    Vitals:   T(F): 97.6 (07-10-23 @ 02:00), Max: 97.6 (07-10-23 @ 02:00)  HR: 83 (07-10-23 @ 02:00)  BP: --  RR: 23 (07-10-23 @ 02:00)  SpO2: 100% (07-10-23 @ 02:00)  Mode: AC/ CMV (Assist Control/ Continuous Mandatory Ventilation),BELLAVISTA, RR (machine): 18, TV (machine): 450, FiO2: 40, PEEP: 5, MAP: 10, PIP: 15    Diet, NPO    Fluids:     I & O's:    07-08-23 @ 07:01  -  07-09-23 @ 07:00  --------------------------------------------------------  IN:    Dexmedetomidine: 1.5 mL    Dexmedetomidine: 1 mL    IV PiggyBack: 100 mL    IV PiggyBack: 50 mL    IV PiggyBack: 250 mL    IV PiggyBack: 50 mL    IV PiggyBack: 50 mL    IV PiggyBack: 50 mL    Phenylephrine: 3.5 mL    Platelets - Single Donor: 190 mL    PRBCs (Packed Red Blood Cells): 325 mL    TPN (Total Parenteral Nutrition): 1200 mL    Vasopressin: 108 mL  Total IN: 2379 mL    OUT:    Drain (mL): 30 mL    Drain (mL): 50 mL    Drain (mL): 30 mL    Drain (mL): 90 mL    Indwelling Catheter - Urethral (mL): 74 mL    Nasogastric/Oral tube (mL): 10 mL    Other (mL): 1522 mL  Total OUT: 1806 mL    Total NET: 573 mL    PHYSICAL EXAM:  GENERAL: NAD, ill-appearing  CHEST/LUNG: Equal bilateral chest rise, on AC 40/8  HEART: Regular rate and rhythm, S1, S2  ABDOMEN: Soft, Nontender, Nondistended, R VENICE #1 serosanguinous, R VENICE # 2 & L VENICE dark, murky, feculent appearing     MEDICATIONS  (STANDING):  bacitracin   Ointment 1 Application(s) Topical two times a day  caspofungin IVPB      caspofungin IVPB 50 milliGRAM(s) IV Intermittent every 24 hours  chlorhexidine 0.12% Liquid 15 milliLiter(s) Oral Mucosa every 12 hours  chlorhexidine 2% Cloths 1 Application(s) Topical <User Schedule>  CRRT Treatment    <Continuous>  DAPTOmycin IVPB 460 milliGRAM(s) IV Intermittent every 48 hours  dexMEDEtomidine Infusion 0.1 MICROgram(s)/kG/Hr (1.91 mL/Hr) IV Continuous <Continuous>  heparin   Injectable 5000 Unit(s) SubCutaneous every 8 hours  levoFLOXacin IVPB 750 milliGRAM(s) IV Intermittent every 48 hours  meropenem  IVPB 1000 milliGRAM(s) IV Intermittent every 12 hours  pantoprazole  Injectable 40 milliGRAM(s) IV Push every 12 hours  Parenteral Nutrition - Adult 1 Each (58 mL/Hr) TPN Continuous <Continuous>  phenylephrine    Infusion 0.1 MICROgram(s)/kG/Min (1.43 mL/Hr) IV Continuous <Continuous>  PureFlow Dialysate RFP-400 (K 2 / Ca 3) 5000 milliLiter(s) (2000 mL/Hr) CRRT <Continuous>  vasopressin Infusion 0.03 Unit(s)/Min (4.5 mL/Hr) IV Continuous <Continuous>    MEDICATIONS  (PRN):  sodium chloride 0.9% lock flush 10 milliLiter(s) IV Push every 1 hour PRN Pre/post blood products, medications, blood draw, and to maintain line patency  sodium chloride 0.9% lock flush 10 milliLiter(s) IV Push every 1 hour PRN Pre/post blood products, medications, blood draw, and to maintain line patency    DVT PROPHYLAXIS: heparin   Injectable 5000 Unit(s) SubCutaneous every 8 hours    GI PROPHYLAXIS: pantoprazole  Injectable 40 milliGRAM(s) IV Push every 12 hours    ANTICOAGULATION:   ANTIBIOTICS:  caspofungin IVPB    caspofungin IVPB 50 milliGRAM(s)  DAPTOmycin IVPB 460 milliGRAM(s)  levoFLOXacin IVPB 750 milliGRAM(s)  meropenem  IVPB 1000 milliGRAM(s)    LAB/STUDIES:  Labs:  CAPILLARY BLOOD GLUCOSE    POCT Blood Glucose.: 107 mg/dL (10 Jul 2023 00:49)  POCT Blood Glucose.: 90 mg/dL (09 Jul 2023 20:01)  POCT Blood Glucose.: 93 mg/dL (09 Jul 2023 18:25)  POCT Blood Glucose.: 103 mg/dL (09 Jul 2023 12:26)                        9.7    12.39 )-----------( 270      ( 10 Jul 2023 01:30 )             29.3     07-10    135  |  102  |  13  ----------------------------<  218<H>  3.7   |  25  |  <0.5<L>    Calcium: 7.0 mg/dL (07-10-23 @ 01:30)    LFTs:             3.6  | 0.7  | 35       ------------------[97      ( 09 Jul 2023 04:22 )  1.6  | 0.4  | 16          Lipase:x      Amylase:x         Blood Gas Arterial, Lactate: 2.50 mmol/L (07-09-23 @ 04:14)  Blood Gas Arterial, Lactate: 2.40 mmol/L (07-08-23 @ 21:15)  Blood Gas Arterial, Lactate: 2.50 mmol/L (07-08-23 @ 04:37)  Blood Gas Arterial, Lactate: 3.70 mmol/L (07-07-23 @ 04:17)    ABG - ( 09 Jul 2023 04:14 )  pH: 7.41  /  pCO2: 37    /  pO2: 153   / HCO3: 24    / Base Excess: -1.0  /  SaO2: 100.0     ABG - ( 08 Jul 2023 21:15 )  pH: 7.38  /  pCO2: 40    /  pO2: 158   / HCO3: 24    / Base Excess: -1.3  /  SaO2: 100.0     ABG - ( 08 Jul 2023 04:37 )  pH: 7.41  /  pCO2: 36    /  pO2: 144   / HCO3: 23    / Base Excess: -1.4  /  SaO2: 100.0     Coags:    Urinalysis Basic - ( 10 Jul 2023 01:30 )    Color: x / Appearance: x / SG: x / pH: x  Gluc: 218 mg/dL / Ketone: x  / Bili: x / Urobili: x   Blood: x / Protein: x / Nitrite: x   Leuk Esterase: x / RBC: x / WBC x   Sq Epi: x / Non Sq Epi: x / Bacteria: x

## 2023-07-10 NOTE — PROGRESS NOTE ADULT - SUBJECTIVE AND OBJECTIVE BOX
CHRISTINE VELÁZQUEZTO  [id number]  67y Male    Indication for ICU admission: mechanical ventilatior secondary to urosepsis.    Admit Date:06-18-23  ICU Date: 6/18/23  OR Date: 6/18, 6/22, 6/30     24HRS EVENT:  7/9  Night:  - 1u platelets   - Josh 0.25, vaso 0.03  - AC overnight     7/9  DAY  - Advance ETT 2.5cm  - CVVH ongoing  - 8pm CBC  -f/u 4pm BMP      [] A ten-point review of systems was otherwise negative except as noted above.  [x  ] Due to altered mental status/intubation, subjective information was not attained from the patient. History was obtained, to the extent possible, from review of the chart and collateral sources of information.      ================================================================================================================================       CHRISTINE VILLAVICENCIO  [id number]  67y Male    Indication for ICU admission: mechanical ventilatior secondary to urosepsis.    Admit Date:06-18-23  ICU Date: 6/18/23  OR Date: 6/18, 6/22, 6/30     24HRS EVENT:  7/9  Night:  - 1u platelets   - Josh 0.25, vaso 0.03  - AC overnight     7/9  DAY  - Advance ETT 2.5cm  - CVVH ongoing  - 8pm CBC  -f/u 4pm BMP      [] A ten-point review of systems was otherwise negative except as noted above.  [x  ] Due to altered mental status/intubation, subjective information was not attained from the patient. History was obtained, to the extent possible, from review of the chart and collateral sources of information.      ================================================================================================================================    Daily     Daily     Diet, NPO (07-05-23 @ 10:33)      CURRENT MEDS:  Neurologic Medications    Respiratory Medications    Cardiovascular Medications  phenylephrine    Infusion 0.1 MICROgram(s)/kG/Min IV Continuous <Continuous>    Gastrointestinal Medications  pantoprazole  Injectable 40 milliGRAM(s) IV Push every 12 hours  Parenteral Nutrition - Adult 1 Each TPN Continuous <Continuous>  Parenteral Nutrition - Adult 1 Each TPN Continuous <Continuous>  sodium chloride 0.9% lock flush 10 milliLiter(s) IV Push every 1 hour PRN Pre/post blood products, medications, blood draw, and to maintain line patency  sodium chloride 0.9% lock flush 10 milliLiter(s) IV Push every 1 hour PRN Pre/post blood products, medications, blood draw, and to maintain line patency    Genitourinary Medications    Hematologic/Oncologic Medications  heparin   Injectable 5000 Unit(s) SubCutaneous every 8 hours    Antimicrobial/Immunologic Medications  caspofungin IVPB      caspofungin IVPB 50 milliGRAM(s) IV Intermittent every 24 hours  DAPTOmycin IVPB 460 milliGRAM(s) IV Intermittent every 48 hours  levoFLOXacin IVPB 750 milliGRAM(s) IV Intermittent every 48 hours  meropenem  IVPB 1000 milliGRAM(s) IV Intermittent every 12 hours    Endocrine/Metabolic Medications  vasopressin Infusion 0.03 Unit(s)/Min IV Continuous <Continuous>    Topical/Other Medications  bacitracin   Ointment 1 Application(s) Topical two times a day  chlorhexidine 0.12% Liquid 15 milliLiter(s) Oral Mucosa every 12 hours  chlorhexidine 2% Cloths 1 Application(s) Topical <User Schedule>  CRRT Treatment    <Continuous>  CRRT Treatment    <Continuous>  diatrizoate meglumine/diatrizoate sodium. 30 milliLiter(s) Oral once  PureFlow Dialysate RFP-400 (K 2 / Ca 3) 5000 milliLiter(s) CRRT <Continuous>  PureFlow Dialysate RFP-400 (K 2 / Ca 3) 5000 milliLiter(s) CRRT <Continuous>      ICU Vital Signs Last 24 Hrs  T(C): 37.5 (10 Jul 2023 13:00), Max: 37.5 (10 Jul 2023 13:00)  T(F): 99.5 (10 Jul 2023 13:00), Max: 99.5 (10 Jul 2023 13:00)  HR: 82 (10 Jul 2023 13:00) (59 - 83)  BP: --  BP(mean): --  ABP: 119/57 (10 Jul 2023 13:00) (81/61 - 146/64)  ABP(mean): 77 (10 Jul 2023 13:00) (60 - 91)  RR: 29 (10 Jul 2023 13:00) (17 - 35)  SpO2: 100% (10 Jul 2023 13:00) (85% - 100%)    O2 Parameters below as of 10 Jul 2023 13:00  Patient On (Oxygen Delivery Method): ventilator    O2 Concentration (%): 60        Adult Advanced Hemodynamics Last 24 Hrs  CVP(mm Hg): --  CVP(cm H2O): --  CO: --  CI: --  PA: --  PA(mean): --  PCWP: --  SVR: --  SVRI: --  PVR: --  PVRI: --    Mode: AC/ CMV (Assist Control/ Continuous Mandatory Ventilation)  RR (machine): 28  TV (machine): 450  FiO2: 60  PEEP: 8  ITime: 1  MAP: 14  PIP: 28    ABG - ( 10 Jul 2023 06:26 )  pH, Arterial: 7.23  pH, Blood: x     /  pCO2: 58    /  pO2: 366   / HCO3: 24    / Base Excess: -3.4  /  SaO2: 100.0               I&O's Summary    09 Jul 2023 07:01  -  10 Jul 2023 07:00  --------------------------------------------------------  IN: 2018.8 mL / OUT: 2233 mL / NET: -214.2 mL    10 Jul 2023 07:01  -  10 Jul 2023 13:31  --------------------------------------------------------  IN: 289.4 mL / OUT: 5 mL / NET: 284.4 mL      I&O's Detail    09 Jul 2023 07:01  -  10 Jul 2023 07:00  --------------------------------------------------------  IN:    IV PiggyBack: 250 mL    IV PiggyBack: 100 mL    Phenylephrine: 131.8 mL    Platelets - Single Donor: 229 mL    TPN (Total Parenteral Nutrition): 1200 mL    Vasopressin: 108 mL  Total IN: 2018.8 mL    OUT:    Dexmedetomidine: 0 mL    Drain (mL): 130 mL    Drain (mL): 20 mL    Drain (mL): 105 mL    Drain (mL): 110 mL    Indwelling Catheter - Urethral (mL): 22 mL    Nasogastric/Oral tube (mL): 10 mL    Other (mL): 1836 mL  Total OUT: 2233 mL    Total NET: -214.2 mL      10 Jul 2023 07:01  -  10 Jul 2023 13:31  --------------------------------------------------------  IN:    Phenylephrine: 71.4 mL    TPN (Total Parenteral Nutrition): 200 mL    Vasopressin: 18 mL  Total IN: 289.4 mL    OUT:    Indwelling Catheter - Urethral (mL): 5 mL  Total OUT: 5 mL    Total NET: 284.4 mL          PHYSICAL EXAM:    General/Neuro  RASS:    -1    GCS:  10T   = E 4  / V 1T  / M 5     Deficits:     awake, no focal deficits, moving all bilateral extremities  Pupils: PERRLA bilaterally  Lungs:   clear to auscultation bilaterally, lung sounds present bilaterally, Normal expansion/effort. Intubated on A/C 450/8/28/60%  Cardiovascular : S1, S2.  Regular rate and rhythm.  Bilateral peripheral edema  Cardiac Rhythm: Afib, with rate control   GI: Abdomen soft, Non-tender, Non-distended.   NG tube in place, VENICE#1 with serosanguinous fluid, VENICE#2 and #3 with brown fluid  Wound: surgical incision with some ischemic tissue around incision  Extremities: Extremities warm and dry. Right and left index finger tips and left big toe with purple discoloration. Pulses: bilateral ulnar/PT/DP pulses present on doppler.  Derm: Good skin turgor  :       Mena catheter in place.        CXR:     LABS:  CAPILLARY BLOOD GLUCOSE      POCT Blood Glucose.: 94 mg/dL (10 Jul 2023 12:34)  POCT Blood Glucose.: 107 mg/dL (10 Jul 2023 00:49)  POCT Blood Glucose.: 90 mg/dL (09 Jul 2023 20:01)  POCT Blood Glucose.: 93 mg/dL (09 Jul 2023 18:25)                          8.4    10.45 )-----------( 39       ( 10 Jul 2023 03:16 )             25.5       07-10    136  |  101  |  43<H>  ----------------------------<  96  3.8   |  23  |  0.8    Ca    7.7<L>      10 Jul 2023 03:16  Phos  2.5     07-10  Mg     2.0     07-10    TPro  3.6<L>  /  Alb  1.6<L>  /  TBili  0.7  /  DBili  0.4<H>  /  AST  35  /  ALT  16  /  AlkPhos  97  07-09            Urinalysis Basic - ( 10 Jul 2023 03:16 )

## 2023-07-10 NOTE — PROCEDURE NOTE - NSTIMEOUT_GEN_A_CORE
Patient's first and last name, , procedure, and correct site confirmed prior to the start of procedure.

## 2023-07-10 NOTE — PROCEDURE NOTE - NSINFORMCONSENT_GEN_A_CORE
Patient's healthcare proxy/daughter phone consent (Tracey Adames)/Benefits, risks, and possible complications of procedure explained to patient/caregiver who verbalized understanding and gave verbal consent.

## 2023-07-10 NOTE — PROCEDURE NOTE - NSBRONCHPROCDETAILS_GEN_A_CORE_FT
Timeout performed. Patient, procedure, site, imaging confirmed  1% Lidocaine 5ml introduced endotracheal.  Bronchoscope introduced to ETT, complete bilateral lobar examination performed. No BAL or suctioning performed./

## 2023-07-10 NOTE — PROCEDURAL SAFETY CHECKLIST WITH OR WITHOUT SEDATION - NSPRESEDATIONFT_GEN_ALL_CORE
Physician confirms case reviewed for anesthesia consultation requirements.

## 2023-07-10 NOTE — PROCEDURAL SAFETY CHECKLIST WITH OR WITHOUT SEDATION - NSPOSTDEBRIEFDT_GEN_ALL_CORE
30-Jun-2023 11:07
10-Jul-2023 18:10
08-Jul-2023 12:15
05-Jul-2023 00:20
02-Jul-2023 13:21
10-Jul-2023 16:10
10-Jul-2023 17:45
10-Jul-2023 16:20
24-Jun-2023 10:56

## 2023-07-10 NOTE — PROCEDURAL SAFETY CHECKLIST WITH OR WITHOUT SEDATION - NSDXIMAGING_GEN_ALL_CORE
done
done
not applicable
done
not applicable
not applicable
done
not applicable

## 2023-07-10 NOTE — PROCEDURAL SAFETY CHECKLIST WITH OR WITHOUT SEDATION - NSPX2BRECORDED_GEN_ALL_CORE
MELLISA MADRID
Left femoral [place] placement
Rt axillary [place]
bronchoscopy
RIGHT IJ TLC
Right IJ [place]
Flexible Broncoscopy
L femoral Arterial line placement
SUHAIL TLC

## 2023-07-10 NOTE — PROCEDURE NOTE - NSPOSTCAREGUIDE_GEN_A_CORE
Care for catheter as per unit/ICU protocols
Verbal/written post procedure instructions were given to patient/caregiver/Instructed patient/caregiver regarding signs and symptoms of infection/Keep the cast/splint/dressing clean and dry/Care for catheter as per unit/ICU protocols
Care for catheter as per unit/ICU protocols
Verbal/written post procedure instructions were given to patient/caregiver/Care for catheter as per unit/ICU protocols
Verbal/written post procedure instructions were given to patient/caregiver/Instructed patient/caregiver regarding signs and symptoms of infection/Keep the cast/splint/dressing clean and dry/Care for catheter as per unit/ICU protocols
Verbal/written post procedure instructions were given to patient/caregiver/Instructed patient/caregiver regarding signs and symptoms of infection/Keep the cast/splint/dressing clean and dry/Care for catheter as per unit/ICU protocols
Care for catheter as per unit/ICU protocols
Verbal/written post procedure instructions were given to patient/caregiver/Instructed patient/caregiver regarding signs and symptoms of infection/Keep the cast/splint/dressing clean and dry/Care for catheter as per unit/ICU protocols
Instructed patient/caregiver regarding signs and symptoms of infection/Keep the cast/splint/dressing clean and dry
Verbal/written post procedure instructions were given to patient/caregiver/Instructed patient/caregiver regarding signs and symptoms of infection/Keep the cast/splint/dressing clean and dry/Care for catheter as per unit/ICU protocols

## 2023-07-10 NOTE — PROCEDURE NOTE - NSANATOMICLLOCATION_GEN_A_CORE
left/femoral artery
left/basilic vein
right/radial artery
right/forearm
left/radial artery
right/femoral artery
axillary artery/right

## 2023-07-10 NOTE — PROCEDURAL SAFETY CHECKLIST WITH OR WITHOUT SEDATION - NSPRESURGSED_GEN_ALL_CORE
Present, accurate, and signed

## 2023-07-10 NOTE — PROGRESS NOTE ADULT - ASSESSMENT
67M w/ PMH of HTN, grade IV hydronephrosis of L kidney s/p L PCN (placed 5/2023), stutter, hiatal hernia, iron deficiency anemia, H Pylori (untreated), and gastric mass (never biopsied) presents to the ED with at least 4 days of worsening weakness, abdominal distension, and no output from his PCN  . Found to have septic shock, MSOF, lactic acidosis from left emphysematous hydronephrosis, pyelonephritis and possible rupture with pneumoperitoneum along with possible hepatic abscesses, splenic infarct and abdominal and mediastinal lymphadenopathies.  had nephrectomy and abdominal washout on 6/30    Oliguric in spite of bumex, rising dig level, high lactate  ROJAS likely ATN iso septic shock  on pressors   back on CVVHD / will keep in even balance / may need new catheter   dig level low   platelets noted follow trend /  negative  HIT / hem on case   ID notes appreciated from today and meds dosing to CVVHD noted   will follow  discussed w/ SICU team

## 2023-07-10 NOTE — PROGRESS NOTE ADULT - ASSESSMENT
Assessment and Plan:   67M w/ PMH of HTN, grade IV hydronephrosis of L kidney s/p L PCN (placed 5/2023), stutter, hiatal hernia, iron deficiency anemia, H Pylori (untreated), and gastric mass (never biopsied) presents to the ED with at least 4 days of worsening weakness, abdominal distension, and no output from his PCN. Admitted with emphysematous pyelonephritis.     (6/18): s/p LT PCN upsizing to 16Fr and 16Fr RLQ drain placement with IR   (6/22): s/p shock lithotripsy of L kidney abscess cavity with drainage of thick contents, upsize of drain to 26Fr bobby catheter  (6/30): s/p ex-lap and L nephrectomy with abdominal washout    NEURO:  #Pain control    -APAP PRN    -fent 25 q 3 PRN while intubated  #Sedation    -precedex gtt PRN, currently off    -following commands when off sedation, GCS 11T    RESP:  RR (machine): 18, TV (machine): 450, FiO2: 40, PEEP: 8  ABG (7/9 AM): 7.41/37/153/24   lac 2.5  #acute respiratory failure with left lung mucous plugging - resolved s/p intubated 6/24 and    and bronchoscopy   PS tolerated 7/9 --> place back on vent overnight  #Pulmonary nodules / Mediastinal lymphadenopathy    - CT Chest: Right middle and upper lobe solid pulmonary nodules, f/u outpatient pulm   #Activity   - incr as tolerated    CARDS:   #Septic shock    - off levo since 3am 7/1, remains on phenylephrine and Vasopressin infusions  #New onset Afib w/ RVR with hypotension -- likely 2/2 sepsis    - HR control with Metoprolol 2.5 q6 IV (OFF currently)    -Digoxin 250mcg x1 dose, 125mcg x1 7/1, 125mcg 7/4, 125 mcg on 7/6, repeat level in      am 7/7 2.7 =>am dose held, 7/8 2.9 --> 7/9 AM dose pending, f/u    - Cardiology following  #H/O HTN    -Amlodipine 2.5mg HOLDING due to ongoing pressor requirement  Imaging    - ECHO: (6/19) EF 55-60%, mild AS, mild LAE, mild MR/TR, trivial pericardial effusion    GI/NUTR:  #Diet: NPO on TPN @55cc/hr w/o potassium  #bowel movements    - GI consult 6/26- Plan for EGD with EUS + FNA as outpatient, H-pylori treatment after resolution of acute issues   -FOBT positive    #GI PPX  -IV Protonix 40 q12 hours  #Gastric mass vs gastrohepatic lymphadenopathy    -CT A/P: lesser curvature mass , Hepatic hypodensities  #Splenic Hypodensities, infarct vs phlegmon/abscess    -CT A/P:  Wedge-shaped region of hypodensity within the spleen    /RENAL:   #Grade IV L hydronephrosis s/p L PCN (5/2023) -- presented to ED with thick dark foul smelling output  #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    -(6/23) s/p LT PCN upsizing to 26Fr catheter, RLQ 16 Fr drain remains, s/p nephrostolithotomy w/ lithotripsy    -(6/18) s/p LT PCN upsizing to 16Fr and 16Fr RLQ drain placement with IR, flush q8 hr  (6/30): s/p L nephrectomy and abdominal washout   -R pelvic drain Creatinine 3.99  #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       #metabolic acidosis  -Nephrology c/s - CVVHD started 07/02, current goal to keep net even  Labs:  #urine output in critically ill    -indwelling bobby     Labs:          BUN/Cr- 58/1.0  -->,  51/0.8  -->          Electrolytes-Na 136 // K 4.2 // Mg -- //  Phos -- (07-09 @ 17:16)  #decreased urine output in the setting of acute renal failure 2/2 septic shock    HEME/ONC:   #DVT prophylaxis  -SCDs, heparin subQ   #new onset Afib  -holding heparin infusion in setting of hematochezia    DVT propylaxis- HSQ     #R radial artery occlusion  -RUE duplex 07/01 + for right radial artery occlusion  #RT posterior popliteal vein DVT  -vascular c/s - rec AC   Labs: Hb/Hct:  8.0/24.0  -->,  7.9/24.5  -->                      Plts:  23  -->,  19  -->  1u platelets > f/u AM CBC              PTT/INR:      #H/o Fe Deficiency anemia  #Thrombocytopenia  Heme consult (7/3):   - low risk for HIT (score 2-3)  - Heme/onc wants full HIT workup although low HIT risk  - HIT workup negative  - risk of full anticoagulation outweigh the benefits due to high risk of bleeding (h/o GI bleed and post-nephrectomy), will hold off on AC and will wait for HIT AB screen results, if new DVT then will re-assess; discussed with SICU attending Dr. Orozco who also discussed this with hematology team and Dr. Womack who are all in agreement.    ID:  #Leukocytosis 2/2 sepsis   WBC- 11.95  --->>,  10.29  --->>,  9.87  --->>  Temp trend- 24hrs T(F): 96.6 (07-09 @ 23:00), Max: 96.6 (07-09 @ 11:00)  Current antibiotics-  - caspofungin IVPB 50 every 24 hours  - DAPTOmycin IVPB 460 every 48 hours  - levoFLOXacin IVPB 750 every 48 hours  - meropenem  IVPB 1000 every 12 hours    #Cultures  Surgical swab 06/30: vanc resistent enterococcus faecium     L PCN cyto-pathology  6/25: results suggestive of a necrotic neoplasm    Fungitell 6/26: 47 --> >500 (7/6)    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    BAL 6/24: moderate yeast     UCx 6/19: negative FINAL      Left PCN aspiration 6/18: stenotrophomonas maltophilia    Surgical swab (6/30): VRE    ENDO:  #Glucose monitoring    -A1c (5/7/23): 5.0    -POCT q6 hours while NPO   #Adrenal Insufficiency 2/2 to sepsis     -completed course of solu-cortef    -Cortisol level - binding globulin 1.1 (low), serum cortisol, 84, free cortisol 76, % free cortisol 91    MSK:  #Sacrococcygeal DTI: wound care following- cleanse with soap and water, apply triad, #gauze, and allevyn BID and PRN for soiling   Venous ulcer right lower leg, R forearm skin tear: Pat dry, apply Xeroform, nonadherent dressing, wrap with Kerlix twice a day, prn for soiling  Burn consulted, recs:  --> sacrum: apply hydrogel and Allevyn pad  --> b/l ischium: Allevyn pad  - offloading/positioning  #Activity    -Advance as tolerated    -PT/rehab once extubated    -Globally deconditioned  #b/l UE, LE mottled skin  +ulnar/DP/PT pulses present on doppler  -Arterial duplex bilateral upper extremities (7/1)  - Right radial artery occlusion   -VA Duplex bilateral lower extremities (7/3) - Right PT vein DVT     LINES/DRAINS:   PIV  Bobby (6/18)  L eleazar drain (6/30)  L IJ TLC (6/26)  R IJ Uldall (07/02)  R Lerma pouch #1, pelvic drain #2 (6/30)  Left basilic midline  6/21  Right axillary a line (6/30-7/4)  Right Radial Ford (6/22-6/30)  L radial Joleen (6/18-6/22)  R-Femoral Joleen (7/5 - )  ETT  OGT    ADVANCED DIRECTIVES:  Full Code  HCP/Emergency Contact- Tracey Adames: [phone]   Palliative following. Last [facility] discussion 7/7    INDICATION FOR SICU: New onset atrial fibrillation w/ mechanical ventilation    DISPOSITION: SICU

## 2023-07-10 NOTE — PROCEDURAL SAFETY CHECKLIST WITH OR WITHOUT SEDATION - NSPREIMAGEREVIEW_GEN_ALL_CORE
not applicable
not applicable
done
not applicable
done
not applicable
done
not applicable

## 2023-07-10 NOTE — PROCEDURE NOTE - NSICDXIRPREOP_GEN_A_CORE_FT
PRE-OP DIAGNOSIS:  Mucus plugging of bronchi 24-Jun-2023 11:13:22  Shasha Carey  
PRE-OP DIAGNOSIS:  Acute respiratory failure with hypoxia 22-Jun-2023 23:32:30  Bernie Perdomo  Mucus plugging of bronchi 24-Jun-2023 11:13:22  Shasha Carey

## 2023-07-10 NOTE — PROCEDURE NOTE - SUPERVISORY STATEMENT
I performed the procedure
I performed the procedure as described above.  Patient tolerated the procedure well without any complications.

## 2023-07-10 NOTE — PROGRESS NOTE ADULT - TIME BILLING
clinical course was reviewed with PA  chart reviewed  events of last evening appreciated    pathology - Stage IV Collecting Duct Carcinoma  carries poor prognosis  discussed with family and ICU staff    critical condition  poor prognosis
patient for a left nephrectomy  discussed at length regarding the necessity of the procedure  discussed the risks and benefits  I had Dr. Womack discuss his role in the procedure  he had been consulted for the surgery    consent will be obtained from the family
events noted  reviewed chart and clinical course with PA  critical condition  poor prognosis  pathology reviewed    - will f/u
patient seen and evaluated  I agree with the plan of care as outlined
patient seen and evaluated  patients CT scan reviewed  discussed with Dr. Womack    poor prognosis  cont ICU care
as above
as above

## 2023-07-10 NOTE — PROCEDURAL SAFETY CHECKLIST WITH OR WITHOUT SEDATION - NSPREPROCDATE6_GEN_ALL_CORE
10-Jul-2023 15:00
10-Jul-2023 15:25
05-Jul-2023 00:20
10-Jul-2023 18:00
24-Jun-2023 10:22
30-Jun-2023 10:58
25-Jun-2023 02:00
02-Jul-2023 12:45
10-Jul-2023 17:00
08-Jul-2023 11:50

## 2023-07-10 NOTE — PROGRESS NOTE ADULT - SUBJECTIVE AND OBJECTIVE BOX
CHRISTINE VILLAVICENCIO  68y, Male  Allergy: No Known Allergies      LOS  22d    CHIEF COMPLAINT: pneumoperitoneum, emphysematous pyelonephritis (10 Jul 2023 09:21)      INTERVAL EVENTS/HPI  - T(F): , Max: 97.7 (07-10-23 @ 03:00) Afebrile , on pressors, intubated on mechanical ventilation   - WBC Count: 10.45 (07-10-23 @ 03:16)  WBC Count: 12.39 (07-10-23 @ 01:30)     - Creatinine: 0.8 (07-10-23 @ 03:16)  Creatinine: <0.5 (07-10-23 @ 01:30)     -   -   -     ROS  cannot obtain secondary to patient's sedation and/or mental status    VITALS:  T(F): 96.8, Max: 97.7 (07-10-23 @ 03:00)  HR: 72  BP: --  RR: 29Vital Signs Last 24 Hrs  T(C): 36 (10 Jul 2023 09:00), Max: 36.5 (10 Jul 2023 03:00)  T(F): 96.8 (10 Jul 2023 09:00), Max: 97.7 (10 Jul 2023 03:00)  HR: 72 (10 Jul 2023 09:00) (59 - 83)  BP: --  BP(mean): --  RR: 29 (10 Jul 2023 09:00) (17 - 35)  SpO2: 100% (10 Jul 2023 09:00) (85% - 100%)    Parameters below as of 10 Jul 2023 09:00  Patient On (Oxygen Delivery Method): ventilator    O2 Concentration (%): 80    PHYSICAL EXAM:  ***     FH: Non-contributory  Social Hx: Non-contributory    TESTS & MEASUREMENTS:                        8.4    10.45 )-----------( 39       ( 10 Jul 2023 03:16 )             25.5     07-10    136  |  101  |  43<H>  ----------------------------<  96  3.8   |  23  |  0.8    Ca    7.7<L>      10 Jul 2023 03:16  Phos  2.5     07-10  Mg     2.0     07-10    TPro  3.6<L>  /  Alb  1.6<L>  /  TBili  0.7  /  DBili  0.4<H>  /  AST  35  /  ALT  16  /  AlkPhos  97  07-09      LIVER FUNCTIONS - ( 09 Jul 2023 04:22 )  Alb: 1.6 g/dL / Pro: 3.6 g/dL / ALK PHOS: 97 U/L / ALT: 16 U/L / AST: 35 U/L / GGT: x           Urinalysis Basic - ( 10 Jul 2023 03:16 )    Color: x / Appearance: x / SG: x / pH: x  Gluc: 96 mg/dL / Ketone: x  / Bili: x / Urobili: x   Blood: x / Protein: x / Nitrite: x   Leuk Esterase: x / RBC: x / WBC x   Sq Epi: x / Non Sq Epi: x / Bacteria: x        Culture - Surgical Swab (collected 06-30-23 @ 22:12)  Source: .Surgical Swab None  Final Report (07-05-23 @ 21:47):    Growth in fluid media only Enterococcus faecium (vancomycin resistant)  Organism: Enterococcus faecium (vancomycin resistant) (07-05-23 @ 21:47)  Organism: Enterococcus faecium (vancomycin resistant) (07-05-23 @ 21:47)      Method Type: EDMUND      -  Ampicillin: R >8 Predicts results to ampicillin/sulbactam, amoxacillin-clavulanate and  piperacillin-tazobactam.      -  Daptomycin: SDD 4 The breakpoint for SDD (susceptible dose dependent)is based on a dosage regimen of 8-12 mg/kg administered every 24 h in adults and is intended for serious infections due to E. faecium. Consultation with an infectious diseases specialist is recommended.      -  Levofloxacin: R >4      -  Linezolid: S 1      -  Tetracycline: R >8      -  Vancomycin: R >16    Culture - Fungal, Bronchial (collected 06-24-23 @ 11:00)  Source: .Bronchial None  Preliminary Report (06-26-23 @ 11:00):    Moderate Yeast    Culture - Acid Fast - Bronchial w/Smear (collected 06-24-23 @ 11:00)  Source: .Bronchial None  Preliminary Report (07-05-23 @ 15:08):    No acid-fast bacilli isolated at 1 week. ****Acid-fast cultures are held    for 6 weeks.****    Culture - Bronchial (collected 06-24-23 @ 11:00)  Source: .Bronchial None  Gram Stain (06-24-23 @ 23:21):    Numerous polymorphonuclear leukocytes per low power field    No squamous epithelial cells per low power field    Rare Yeast like cells per oil power field  Final Report (06-26-23 @ 17:56):    Normal Respiratory Gabbie present    Culture - Fungal, Other (collected 06-22-23 @ 22:11)  Source: .Other None  Preliminary Report (07-08-23 @ 15:02):    No fungus isolated at 2 weeks.    Culture - Surgical Swab (collected 06-22-23 @ 22:11)  Source: .Surgical Swab None  Final Report (06-25-23 @ 14:14):    Few Finegoldia magna "Susceptibilities not performed"    Culture - Urine (collected 06-19-23 @ 02:50)  Source: Clean Catch Clean Catch (Midstream)  Final Report (06-20-23 @ 10:18):    No growth    Culture - Abscess with Gram Stain (collected 06-18-23 @ 23:08)  Source: .Abscess right lower quadrant (intrabdominal)  Gram Stain (06-19-23 @ 11:56):    Numerous polymorphonuclear leukocytes per low power field    Numerous Gram positive cocci in pairs per oil power field  Final Report (06-21-23 @ 14:13):    Numerous Anaerobic Gram Positive Cocci Most closely resembling    Peptoniphilus species "Susceptibilities not performed"    Culture - Abscess with Gram Stain (collected 06-18-23 @ 23:08)  Source: .Abscess left kidney  Gram Stain (06-19-23 @ 11:58):    Rare polymorphonuclear leukocytes per low power field    Few Gram Positive Cocci per oil power field  Final Report (06-22-23 @ 10:13):    Growth in fluid media only Anaerobic Gram Positive Cocci Most closely    resembling Peptoniphilus species "Susceptibilities not performed"    Rare Stenotrophomonas maltophilia  Organism: Stenotrophomonas maltophilia  Stenotrophomonas maltophilia (06-22-23 @ 10:13)  Organism: Stenotrophomonas maltophilia (06-22-23 @ 10:13)      Method Type: EDMUND      -  Levofloxacin: S 1      -  Trimethoprim/Sulfamethoxazole: S <=0.5/9.5  Organism: Stenotrophomonas maltophilia (06-22-23 @ 10:13)      Method Type: KB      -  Minocycline: S    Culture - Abscess with Gram Stain (collected 06-18-23 @ 20:18)  Source: .Abscess left PCN aspirate  Gram Stain (06-19-23 @ 06:27):    No polymorphonuclear cells seen per low power field    Few Gram Variable Cocci seen per oil power field  Final Report (06-24-23 @ 09:11):    Few Stenotrophomonas maltophilia  Organism: Stenotrophomonas maltophilia (06-24-23 @ 09:11)  Organism: Stenotrophomonas maltophilia (06-24-23 @ 09:11)      Method Type: EDMUND      -  Levofloxacin: S 2      -  Trimethoprim/Sulfamethoxazole: S <=0.5/9.5    Culture - Blood (collected 06-18-23 @ 14:18)  Source: .Blood Blood-Peripheral  Final Report (06-24-23 @ 02:00):    No Growth Final    Culture - Blood (collected 06-18-23 @ 14:18)  Source: .Blood Blood-Peripheral  Final Report (06-24-23 @ 02:00):    No Growth Final            INFECTIOUS DISEASES TESTING  Fungitell: >500 (07-06-23 @ 04:30)  Fungitell: 47 (06-25-23 @ 15:30)  MRSA PCR Result.: Negative (06-24-23 @ 17:38)      INFLAMMATORY MARKERS  C-Reactive Protein, Serum: 245.1 mg/L (07-10-23 @ 01:30)      RADIOLOGY & ADDITIONAL TESTS:  I have personally reviewed the last available Chest xray  CXR  Xray Chest 1 View AP/PA:   ACC: [id number] EXAM:  XR CHEST 1 VIEW   ORDERED BY: BALJIT JESUS     PROCEDURE DATE:  07/09/2023          INTERPRETATION:  Clinical History / Reason for exam: ETT advancement    Comparison : Chest radiograph: 7/9/2023    Technique/Positioning: Frontal view of the chest    Findings:    Support devices: The endotracheal tube is poorly visualized. There is an   enteric tube coursing below the diaphragm. Right vascular sheath and left   IJ central venous line in stable position    Cardiac/mediastinum/hilum: No significant change    Skeleton/soft tissues: No significant change.    Lung parenchyma/Pleura: Stable bibasilar opacities. No definite   pneumothorax.    Impression:  Stable bibasilar opacities.    Lines and support devices as described above. The endotracheal tube is   poorly visualized. Recommend repeat examination.        --- End of Report ---            JULIANNE HI MD; Attending Radiologist  This document has been electronically signed. Jul 9 2023  1:31PM (07-09-23 @ 11:49)      CT      CARDIOLOGY TESTING  12 Lead ECG:   Ventricular Rate 89 BPM    Atrial Rate 394 BPM    QRS Duration 84 ms    Q-T Interval 300 ms    QTC Calculation(Bazett) 365 ms    R Axis 50 degrees    T Axis -16 degrees    Diagnosis Line Atrial fibrillation  Low voltage QRS  Nonspecific T wave abnormality  Abnormal ECG    Confirmed by Deon Velasquez ([id number]) on 7/3/2023 8:40:11 AM (07-02-23 @ 07:57)  12 Lead ECG:   Ventricular Rate 96 BPM    Atrial Rate 72 BPM    QRS Duration 82 ms    Q-T Interval 288 ms    QTC Calculation(Bazett) 363 ms    R Axis 37 degrees    T Axis 184 degrees    Diagnosis Line Atrial fibrillation  Low voltage QRS  Nonspecific T wave abnormality  Abnormal ECG    Confirmed by Deon Velasquez ([id number]) on 7/2/2023 1:12:19 PM (07-01-23 @ 08:12)      MEDICATIONS  bacitracin   Ointment 1  caspofungin IVPB   caspofungin IVPB 50  chlorhexidine 0.12% Liquid 15  chlorhexidine 2% Cloths 1  CRRT Treatment   DAPTOmycin IVPB 460  dexMEDEtomidine Infusion 0.1  heparin   Injectable 5000  levoFLOXacin IVPB 750  meropenem  IVPB 1000  pantoprazole  Injectable 40  Parenteral Nutrition - Adult 1  phenylephrine    Infusion 0.1  PureFlow Dialysate RFP-400 (K 2 / Ca 3) 5000  vasopressin Infusion 0.03      WEIGHT  Weight (kg): 76.3 (06-30-23 @ 16:58)  Creatinine: 0.8 mg/dL (07-10-23 @ 03:16)  Creatinine: <0.5 mg/dL (07-10-23 @ 01:30)  Creatinine: 0.8 mg/dL (07-09-23 @ 17:16)      ANTIBIOTICS:  caspofungin IVPB      caspofungin IVPB 50 milliGRAM(s) IV Intermittent every 24 hours  DAPTOmycin IVPB 460 milliGRAM(s) IV Intermittent every 48 hours  levoFLOXacin IVPB 750 milliGRAM(s) IV Intermittent every 48 hours  meropenem  IVPB 1000 milliGRAM(s) IV Intermittent every 12 hours      All available historical records have been reviewed   CHRISTINE VILLAVICENCIO  68y, Male  Allergy: No Known Allergies      LOS  22d    CHIEF COMPLAINT: pneumoperitoneum, emphysematous pyelonephritis (10 Jul 2023 09:21)      INTERVAL EVENTS/HPI  - T(F): , Max: 97.7 (07-10-23 @ 03:00) Afebrile , on pressors, intubated on mechanical ventilation   - WBC Count: 10.45 (07-10-23 @ 03:16)  WBC Count: 12.39 (07-10-23 @ 01:30)     - Creatinine: 0.8 (07-10-23 @ 03:16)  Creatinine: <0.5 (07-10-23 @ 01:30)     -   -   -     ROS  cannot obtain secondary to patient's sedation and/or mental status    VITALS:  T(F): 96.8, Max: 97.7 (07-10-23 @ 03:00)  HR: 72  BP: --  RR: 29Vital Signs Last 24 Hrs  T(C): 36 (10 Jul 2023 09:00), Max: 36.5 (10 Jul 2023 03:00)  T(F): 96.8 (10 Jul 2023 09:00), Max: 97.7 (10 Jul 2023 03:00)  HR: 72 (10 Jul 2023 09:00) (59 - 83)  BP: --  BP(mean): --  RR: 29 (10 Jul 2023 09:00) (17 - 35)  SpO2: 100% (10 Jul 2023 09:00) (85% - 100%)    Parameters below as of 10 Jul 2023 09:00  Patient On (Oxygen Delivery Method): ventilator    O2 Concentration (%): 80    PHYSICAL EXAM:  General: intubated  HEENT: NCAT  CV: RRR  Lungs: symmetric chest expansion, decreased BS at bases  Abd: Soft, dressings drains  Skin: no rash  Neuro: sedated  Lines: no phlebitis   anasarca    FH: Non-contributory  Social Hx: Non-contributory    TESTS & MEASUREMENTS:                        8.4    10.45 )-----------( 39       ( 10 Jul 2023 03:16 )             25.5     07-10    136  |  101  |  43<H>  ----------------------------<  96  3.8   |  23  |  0.8    Ca    7.7<L>      10 Jul 2023 03:16  Phos  2.5     07-10  Mg     2.0     07-10    TPro  3.6<L>  /  Alb  1.6<L>  /  TBili  0.7  /  DBili  0.4<H>  /  AST  35  /  ALT  16  /  AlkPhos  97  07-09      LIVER FUNCTIONS - ( 09 Jul 2023 04:22 )  Alb: 1.6 g/dL / Pro: 3.6 g/dL / ALK PHOS: 97 U/L / ALT: 16 U/L / AST: 35 U/L / GGT: x           Urinalysis Basic - ( 10 Jul 2023 03:16 )    Color: x / Appearance: x / SG: x / pH: x  Gluc: 96 mg/dL / Ketone: x  / Bili: x / Urobili: x   Blood: x / Protein: x / Nitrite: x   Leuk Esterase: x / RBC: x / WBC x   Sq Epi: x / Non Sq Epi: x / Bacteria: x        Culture - Surgical Swab (collected 06-30-23 @ 22:12)  Source: .Surgical Swab None  Final Report (07-05-23 @ 21:47):    Growth in fluid media only Enterococcus faecium (vancomycin resistant)  Organism: Enterococcus faecium (vancomycin resistant) (07-05-23 @ 21:47)  Organism: Enterococcus faecium (vancomycin resistant) (07-05-23 @ 21:47)      Method Type: EDMUND      -  Ampicillin: R >8 Predicts results to ampicillin/sulbactam, amoxacillin-clavulanate and  piperacillin-tazobactam.      -  Daptomycin: SDD 4 The breakpoint for SDD (susceptible dose dependent)is based on a dosage regimen of 8-12 mg/kg administered every 24 h in adults and is intended for serious infections due to E. faecium. Consultation with an infectious diseases specialist is recommended.      -  Levofloxacin: R >4      -  Linezolid: S 1      -  Tetracycline: R >8      -  Vancomycin: R >16    Culture - Fungal, Bronchial (collected 06-24-23 @ 11:00)  Source: .Bronchial None  Preliminary Report (06-26-23 @ 11:00):    Moderate Yeast    Culture - Acid Fast - Bronchial w/Smear (collected 06-24-23 @ 11:00)  Source: .Bronchial None  Preliminary Report (07-05-23 @ 15:08):    No acid-fast bacilli isolated at 1 week. ****Acid-fast cultures are held    for 6 weeks.****    Culture - Bronchial (collected 06-24-23 @ 11:00)  Source: .Bronchial None  Gram Stain (06-24-23 @ 23:21):    Numerous polymorphonuclear leukocytes per low power field    No squamous epithelial cells per low power field    Rare Yeast like cells per oil power field  Final Report (06-26-23 @ 17:56):    Normal Respiratory Gabbie present    Culture - Fungal, Other (collected 06-22-23 @ 22:11)  Source: .Other None  Preliminary Report (07-08-23 @ 15:02):    No fungus isolated at 2 weeks.    Culture - Surgical Swab (collected 06-22-23 @ 22:11)  Source: .Surgical Swab None  Final Report (06-25-23 @ 14:14):    Few Finegoldia magna "Susceptibilities not performed"    Culture - Urine (collected 06-19-23 @ 02:50)  Source: Clean Catch Clean Catch (Midstream)  Final Report (06-20-23 @ 10:18):    No growth    Culture - Abscess with Gram Stain (collected 06-18-23 @ 23:08)  Source: .Abscess right lower quadrant (intrabdominal)  Gram Stain (06-19-23 @ 11:56):    Numerous polymorphonuclear leukocytes per low power field    Numerous Gram positive cocci in pairs per oil power field  Final Report (06-21-23 @ 14:13):    Numerous Anaerobic Gram Positive Cocci Most closely resembling    Peptoniphilus species "Susceptibilities not performed"    Culture - Abscess with Gram Stain (collected 06-18-23 @ 23:08)  Source: .Abscess left kidney  Gram Stain (06-19-23 @ 11:58):    Rare polymorphonuclear leukocytes per low power field    Few Gram Positive Cocci per oil power field  Final Report (06-22-23 @ 10:13):    Growth in fluid media only Anaerobic Gram Positive Cocci Most closely    resembling Peptoniphilus species "Susceptibilities not performed"    Rare Stenotrophomonas maltophilia  Organism: Stenotrophomonas maltophilia  Stenotrophomonas maltophilia (06-22-23 @ 10:13)  Organism: Stenotrophomonas maltophilia (06-22-23 @ 10:13)      Method Type: EDMUND      -  Levofloxacin: S 1      -  Trimethoprim/Sulfamethoxazole: S <=0.5/9.5  Organism: Stenotrophomonas maltophilia (06-22-23 @ 10:13)      Method Type: KB      -  Minocycline: S    Culture - Abscess with Gram Stain (collected 06-18-23 @ 20:18)  Source: .Abscess left PCN aspirate  Gram Stain (06-19-23 @ 06:27):    No polymorphonuclear cells seen per low power field    Few Gram Variable Cocci seen per oil power field  Final Report (06-24-23 @ 09:11):    Few Stenotrophomonas maltophilia  Organism: Stenotrophomonas maltophilia (06-24-23 @ 09:11)  Organism: Stenotrophomonas maltophilia (06-24-23 @ 09:11)      Method Type: EDMUND      -  Levofloxacin: S 2      -  Trimethoprim/Sulfamethoxazole: S <=0.5/9.5    Culture - Blood (collected 06-18-23 @ 14:18)  Source: .Blood Blood-Peripheral  Final Report (06-24-23 @ 02:00):    No Growth Final    Culture - Blood (collected 06-18-23 @ 14:18)  Source: .Blood Blood-Peripheral  Final Report (06-24-23 @ 02:00):    No Growth Final            INFECTIOUS DISEASES TESTING  Fungitell: >500 (07-06-23 @ 04:30)  Fungitell: 47 (06-25-23 @ 15:30)  MRSA PCR Result.: Negative (06-24-23 @ 17:38)      INFLAMMATORY MARKERS  C-Reactive Protein, Serum: 245.1 mg/L (07-10-23 @ 01:30)      RADIOLOGY & ADDITIONAL TESTS:  I have personally reviewed the last available Chest xray  CXR  Xray Chest 1 View AP/PA:   ACC: [id number] EXAM:  XR CHEST 1 VIEW   ORDERED BY: BALJIT JESUS     PROCEDURE DATE:  07/09/2023          INTERPRETATION:  Clinical History / Reason for exam: ETT advancement    Comparison : Chest radiograph: 7/9/2023    Technique/Positioning: Frontal view of the chest    Findings:    Support devices: The endotracheal tube is poorly visualized. There is an   enteric tube coursing below the diaphragm. Right vascular sheath and left   IJ central venous line in stable position    Cardiac/mediastinum/hilum: No significant change    Skeleton/soft tissues: No significant change.    Lung parenchyma/Pleura: Stable bibasilar opacities. No definite   pneumothorax.    Impression:  Stable bibasilar opacities.    Lines and support devices as described above. The endotracheal tube is   poorly visualized. Recommend repeat examination.        --- End of Report ---            JULIANNE HI MD; Attending Radiologist  This document has been electronically signed. Jul 9 2023  1:31PM (07-09-23 @ 11:49)      CT      CARDIOLOGY TESTING  12 Lead ECG:   Ventricular Rate 89 BPM    Atrial Rate 394 BPM    QRS Duration 84 ms    Q-T Interval 300 ms    QTC Calculation(Bazett) 365 ms    R Axis 50 degrees    T Axis -16 degrees    Diagnosis Line Atrial fibrillation  Low voltage QRS  Nonspecific T wave abnormality  Abnormal ECG    Confirmed by Deon Velasquez ([id number]) on 7/3/2023 8:40:11 AM (07-02-23 @ 07:57)  12 Lead ECG:   Ventricular Rate 96 BPM    Atrial Rate 72 BPM    QRS Duration 82 ms    Q-T Interval 288 ms    QTC Calculation(Bazett) 363 ms    R Axis 37 degrees    T Axis 184 degrees    Diagnosis Line Atrial fibrillation  Low voltage QRS  Nonspecific T wave abnormality  Abnormal ECG    Confirmed by Deon Velasquez ([id number]) on 7/2/2023 1:12:19 PM (07-01-23 @ 08:12)      MEDICATIONS  bacitracin   Ointment 1  caspofungin IVPB   caspofungin IVPB 50  chlorhexidine 0.12% Liquid 15  chlorhexidine 2% Cloths 1  CRRT Treatment   DAPTOmycin IVPB 460  dexMEDEtomidine Infusion 0.1  heparin   Injectable 5000  levoFLOXacin IVPB 750  meropenem  IVPB 1000  pantoprazole  Injectable 40  Parenteral Nutrition - Adult 1  phenylephrine    Infusion 0.1  PureFlow Dialysate RFP-400 (K 2 / Ca 3) 5000  vasopressin Infusion 0.03      WEIGHT  Weight (kg): 76.3 (06-30-23 @ 16:58)  Creatinine: 0.8 mg/dL (07-10-23 @ 03:16)  Creatinine: <0.5 mg/dL (07-10-23 @ 01:30)  Creatinine: 0.8 mg/dL (07-09-23 @ 17:16)      ANTIBIOTICS:  caspofungin IVPB      caspofungin IVPB 50 milliGRAM(s) IV Intermittent every 24 hours  DAPTOmycin IVPB 460 milliGRAM(s) IV Intermittent every 48 hours  levoFLOXacin IVPB 750 milliGRAM(s) IV Intermittent every 48 hours  meropenem  IVPB 1000 milliGRAM(s) IV Intermittent every 12 hours      All available historical records have been reviewed

## 2023-07-10 NOTE — CHART NOTE - NSCHARTNOTEFT_GEN_A_CORE
Patient had a set of labs that had resulted at 1:30 AM on 7/10 that were inconsistent with previous trends. At around 3:00AM, a stat set of labs were sent for the present patient as well as the other patient. I notified the charge nurse as well as the laboratory. Laboratory has made a notation on their end of the error and an incident report has been made on [facility].

## 2023-07-10 NOTE — PROCEDURAL SAFETY CHECKLIST WITH OR WITHOUT SEDATION - NSPRESITESIDESED_GEN_ALL_CORE
done...
Left IJ
done...
done...
Rt axillay/done...
not applicable
done...

## 2023-07-10 NOTE — PROCEDURAL SAFETY CHECKLIST WITH OR WITHOUT SEDATION - NSTEAMPROVIDERFT_GEN_ALL_CORE
JENNIFER Pulliam, JENNIFER Hinojosa,
JANINE Kendrick, Jose
Carolina RODRIGUEZ
LATRICIA RODRIGUEZ
LATRICIA RODRIGUEZ
Jovani TURNER
LYLE VALADEZ, JENNIFER MERINO, JENNIFER JESUS

## 2023-07-10 NOTE — PROGRESS NOTE ADULT - ASSESSMENT
This patient called back and I informed her of the message below.  She stated she has improved.   s/p Exploratory Laparotomy, left Radical Nephrectomy w/ abdominal washout  Prognosis is guarded  Continue monitoring closely by ICU team [place]

## 2023-07-10 NOTE — PROCEDURAL SAFETY CHECKLIST WITH OR WITHOUT SEDATION - NSPREPROCEDSEDAT_GEN_ALL_CORE
without sedation
without sedation
lidocaine/without sedation
without sedation
with sedation
without sedation
precedex/with sedation
Ot on precedex/with sedation
without sedation
without sedation

## 2023-07-10 NOTE — PROCEDURAL SAFETY CHECKLIST WITH OR WITHOUT SEDATION - NSTIMEOUTDATE_GEN_ALL_CORE
02-Jul-2023 12:48
10-Jul-2023 18:00
05-Jul-2023 00:05
10-Jul-2023 17:02
10-Jul-2023 15:25
10-Jul-2023 17:00
26-Jun-2023 02:21
30-Jun-2023 11:00
24-Jun-2023 10:23
08-Jul-2023 11:50

## 2023-07-10 NOTE — PROCEDURAL SAFETY CHECKLIST WITH OR WITHOUT SEDATION - NSBLDPRODCTAVAIL_GEN_ALL_CORE
not applicable
plt given/done
not applicable

## 2023-07-10 NOTE — PROGRESS NOTE ADULT - SUBJECTIVE AND OBJECTIVE BOX
Nephrology progress note    THIS IS AN INCOMPLETE NOTE . FULL NOTE TO FOLLOW SHORTLY    Patient is seen and examined, events over the last 24 h noted .    Allergies:  No Known Allergies    Hospital Medications:   MEDICATIONS  (STANDING):  bacitracin   Ointment 1 Application(s) Topical two times a day  caspofungin IVPB      caspofungin IVPB 50 milliGRAM(s) IV Intermittent every 24 hours  chlorhexidine 0.12% Liquid 15 milliLiter(s) Oral Mucosa every 12 hours  chlorhexidine 2% Cloths 1 Application(s) Topical <User Schedule>  CRRT Treatment    <Continuous>  DAPTOmycin IVPB 460 milliGRAM(s) IV Intermittent every 48 hours  dexMEDEtomidine Infusion 0.1 MICROgram(s)/kG/Hr (1.91 mL/Hr) IV Continuous <Continuous>  heparin   Injectable 5000 Unit(s) SubCutaneous every 8 hours  levoFLOXacin IVPB 750 milliGRAM(s) IV Intermittent every 48 hours  meropenem  IVPB 1000 milliGRAM(s) IV Intermittent every 12 hours  pantoprazole  Injectable 40 milliGRAM(s) IV Push every 12 hours  Parenteral Nutrition - Adult 1 Each (58 mL/Hr) TPN Continuous <Continuous>  phenylephrine    Infusion 0.1 MICROgram(s)/kG/Min (1.43 mL/Hr) IV Continuous <Continuous>  PureFlow Dialysate RFP-400 (K 2 / Ca 3) 5000 milliLiter(s) (2000 mL/Hr) CRRT <Continuous>  vasopressin Infusion 0.03 Unit(s)/Min (4.5 mL/Hr) IV Continuous <Continuous>        VITALS:  T(F): 96.8 (07-10-23 @ 09:00), Max: 97.7 (07-10-23 @ 03:00)  HR: 72 (07-10-23 @ 09:00)  BP: --  RR: 29 (07-10-23 @ 09:00)  SpO2: 100% (07-10-23 @ 09:00)  Wt(kg): --    07-08 @ 07:01  -  07-09 @ 07:00  --------------------------------------------------------  IN: 2379 mL / OUT: 1806 mL / NET: 573 mL    07-09 @ 07:01  -  07-10 @ 07:00  --------------------------------------------------------  IN: 2018.8 mL / OUT: 2233 mL / NET: -214.2 mL          PHYSICAL EXAM:  Constitutional: NAD  HEENT: anicteric sclera, oropharynx clear, MMM  Neck: No JVD  Respiratory: CTAB, no wheezes, rales or rhonchi  Cardiovascular: S1, S2, RRR  Gastrointestinal: BS+, soft, NT/ND  Extremities: No cyanosis or clubbing. No peripheral edema  :  No bobby.   Skin: No rashes    LABS:  07-10    136  |  101  |  43<H>  ----------------------------<  96  3.8   |  23  |  0.8    Ca    7.7<L>      10 Jul 2023 03:16  Phos  2.5     07-10  Mg     2.0     07-10    TPro  3.6<L>  /  Alb  1.6<L>  /  TBili  0.7  /  DBili  0.4<H>  /  AST  35  /  ALT  16  /  AlkPhos  97  07-09                          8.4    10.45 )-----------( 39       ( 10 Jul 2023 03:16 )             25.5       Urine Studies:  Urinalysis Basic - ( 10 Jul 2023 03:16 )    Color:  / Appearance:  / SG:  / pH:   Gluc: 96 mg/dL / Ketone:   / Bili:  / Urobili:    Blood:  / Protein:  / Nitrite:    Leuk Esterase:  / RBC:  / WBC    Sq Epi:  / Non Sq Epi:  / Bacteria:           Iron 12, TIBC 128, %sat 9      [05-07-23 @ 09:19]  Ferritin 526      [05-07-23 @ 09:19]  Vitamin D (25OH) 26      [05-08-23 @ 07:58]  TSH 0.45      [06-19-23 @ 14:14]  Lipid: chol 58, TG 88, HDL 22, LDL --      [07-02-23 @ 21:01]          RADIOLOGY & ADDITIONAL STUDIES:   Nephrology progress note    Patient is seen and examined, events over the last 24 h noted .  Intubated on MV   had issues with blood flow on CVVHD this morning     Allergies:  No Known Allergies    Hospital Medications:   MEDICATIONS  (STANDING):  bacitracin   Ointment 1 Application(s) Topical two times a day  caspofungin IVPB 50 milliGRAM(s) IV Intermittent every 24 hours  CRRT Treatment    <Continuous>  DAPTOmycin IVPB 460 milliGRAM(s) IV Intermittent every 48 hours  dexMEDEtomidine Infusion 0.1 MICROgram(s)/kG/Hr (1.91 mL/Hr) IV Continuous <Continuous>  heparin   Injectable 5000 Unit(s) SubCutaneous every 8 hours  levoFLOXacin IVPB 750 milliGRAM(s) IV Intermittent every 48 hours  meropenem  IVPB 1000 milliGRAM(s) IV Intermittent every 12 hours  pantoprazole  Injectable 40 milliGRAM(s) IV Push every 12 hours  Parenteral Nutrition - Adult 1 Each (58 mL/Hr) TPN Continuous <Continuous>  phenylephrine    Infusion 0.1 MICROgram(s)/kG/Min (1.43 mL/Hr) IV Continuous <Continuous>  PureFlow Dialysate RFP-400 (K 2 / Ca 3) 5000 milliLiter(s) (2000 mL/Hr) CRRT <Continuous>  vasopressin Infusion 0.03 Unit(s)/Min (4.5 mL/Hr) IV Continuous <Continuous>        VITALS:  T(F): 96.8 (07-10-23 @ 09:00), Max: 97.7 (07-10-23 @ 03:00)  HR: 72 (07-10-23 @ 09:00)  RR: 29 (07-10-23 @ 09:00)  SpO2: 100% (07-10-23 @ 09:00)      07-08 @ 07:01  -  07-09 @ 07:00  --------------------------------------------------------  IN: 2379 mL / OUT: 1806 mL / NET: 573 mL    07-09 @ 07:01  -  07-10 @ 07:00  --------------------------------------------------------  IN: 2018.8 mL / OUT: 2233 mL / NET: -214.2 mL          PHYSICAL EXAM:  Constitutional: intubated on MV   Respiratory: CTAB  Cardiovascular: S1, S2, RRR  Gastrointestinal: BS+, soft, NT/ND  Extremities: No cyanosis or clubbing. No peripheral edema  :  No bobby.   Skin: No rashes    LABS:  07-10    136  |  101  |  43<H>  ----------------------------<  96  3.8   |  23  |  0.8    Ca    7.7<L>      10 Jul 2023 03:16  Phos  2.5     07-10  Mg     2.0     07-10    TPro  3.6<L>  /  Alb  1.6<L>  /  TBili  0.7  /  DBili  0.4<H>  /  AST  35  /  ALT  16  /  AlkPhos  97  07-09                          8.4    10.45 )-----------( 39       ( 10 Jul 2023 03:16 )             25.5     Digoxin Level, Serum: <0.3 ng/mL (07.10.23 @ 01:30)        Urine Studies:  Urinalysis Basic - ( 10 Jul 2023 03:16 )    Color:  / Appearance:  / SG:  / pH:   Gluc: 96 mg/dL / Ketone:   / Bili:  / Urobili:    Blood:  / Protein:  / Nitrite:    Leuk Esterase:  / RBC:  / WBC    Sq Epi:  / Non Sq Epi:  / Bacteria:           Iron 12, TIBC 128, %sat 9      [05-07-23 @ 09:19]  Ferritin 526      [05-07-23 @ 09:19]  Vitamin D (25OH) 26      [05-08-23 @ 07:58]  TSH 0.45      [06-19-23 @ 14:14]  Lipid: chol 58, TG 88, HDL 22, LDL --      [07-02-23 @ 21:01]          RADIOLOGY & ADDITIONAL STUDIES:

## 2023-07-10 NOTE — PROGRESS NOTE ADULT - SUBJECTIVE AND OBJECTIVE BOX
Pt remains on ventilator and requiring pressors.      MEDICATIONS  (STANDING):  bacitracin   Ointment 1 Application(s) Topical two times a day  caspofungin IVPB      caspofungin IVPB 50 milliGRAM(s) IV Intermittent every 24 hours  chlorhexidine 0.12% Liquid 15 milliLiter(s) Oral Mucosa every 12 hours  chlorhexidine 2% Cloths 1 Application(s) Topical <User Schedule>  CRRT Treatment    <Continuous>  DAPTOmycin IVPB 460 milliGRAM(s) IV Intermittent every 48 hours  heparin   Injectable 5000 Unit(s) SubCutaneous every 8 hours  levoFLOXacin IVPB 750 milliGRAM(s) IV Intermittent every 48 hours  meropenem  IVPB 1000 milliGRAM(s) IV Intermittent every 12 hours  pantoprazole  Injectable 40 milliGRAM(s) IV Push every 12 hours  Parenteral Nutrition - Adult 1 Each (58 mL/Hr) TPN Continuous <Continuous>  Parenteral Nutrition - Adult 1 Each (58 mL/Hr) TPN Continuous <Continuous>  phenylephrine    Infusion 0.1 MICROgram(s)/kG/Min (1.43 mL/Hr) IV Continuous <Continuous>  PureFlow Dialysate RFP-400 (K 2 / Ca 3) 5000 milliLiter(s) (2000 mL/Hr) CRRT <Continuous>  vasopressin Infusion 0.03 Unit(s)/Min (4.5 mL/Hr) IV Continuous <Continuous>    MEDICATIONS  (PRN):  sodium chloride 0.9% lock flush 10 milliLiter(s) IV Push every 1 hour PRN Pre/post blood products, medications, blood draw, and to maintain line patency  sodium chloride 0.9% lock flush 10 milliLiter(s) IV Push every 1 hour PRN Pre/post blood products, medications, blood draw, and to maintain line patency      Allergies    No Known Allergies    Intolerances        SOCIAL HISTORY: No illicit drug use    FAMILY HISTORY:          Vital Signs Last 24 Hrs  T(C): 37.5 (10 Jul 2023 16:00), Max: 37.6 (10 Jul 2023 14:00)  T(F): 99.5 (10 Jul 2023 16:00), Max: 99.7 (10 Jul 2023 14:00)  HR: 80 (10 Jul 2023 16:30) (59 - 85)      PHYSICAL EXAM:    GEN: On mechanical ventilation, NAD  SKIN: Areas of ischemia, b/l UE dressed w/ xeroform dressings  RESP: controlled on ventilator  CARDIO: +S1/S2  ABDO: Surgical incision with discoloration, ostomy bag covering old drain site collecting brown fluid, Left Renal bed VENICE draining brown fluid, Pelvic drain appears to be bilious in color.    : some penile edema, bobby catheter removed and being straight cathed by ICU staff  EXT: Left [place] catheter in place, Right hand first digit with ischemic changes, Left great toe with ischemic changes      I&O's Summary    09 Jul 2023 07:01  -  10 Jul 2023 07:00  --------------------------------------------------------  IN: 2018.8 mL / OUT: 2233 mL / NET: -214.2 mL    10 Jul 2023 07:01  -  10 Jul 2023 18:43  --------------------------------------------------------  IN: 791.8 mL / OUT: 170 mL / NET: 621.8 mL        LABS:                        8.4    10.45 )-----------( 39       ( 10 Jul 2023 03:16 )             25.5     07-10    136  |  101  |  43<H>  ----------------------------<  96  3.8   |  23  |  0.8    Ca    7.7<L>      10 Jul 2023 03:16  Phos  2.5     07-10  Mg     2.0     07-10    TPro  3.6<L>  /  Alb  1.6<L>  /  TBili  0.7  /  DBili  0.4<H>  /  AST  35  /  ALT  16  /  AlkPhos  97  07-09 Pt seen and examined, remains on ventilator and inotrope.      MEDICATIONS  (STANDING):  bacitracin   Ointment 1 Application(s) Topical two times a day  caspofungin IVPB      caspofungin IVPB 50 milliGRAM(s) IV Intermittent every 24 hours  chlorhexidine 0.12% Liquid 15 milliLiter(s) Oral Mucosa every 12 hours  chlorhexidine 2% Cloths 1 Application(s) Topical <User Schedule>  CRRT Treatment    <Continuous>  DAPTOmycin IVPB 460 milliGRAM(s) IV Intermittent every 48 hours  heparin   Injectable 5000 Unit(s) SubCutaneous every 8 hours  levoFLOXacin IVPB 750 milliGRAM(s) IV Intermittent every 48 hours  meropenem  IVPB 1000 milliGRAM(s) IV Intermittent every 12 hours  pantoprazole  Injectable 40 milliGRAM(s) IV Push every 12 hours  Parenteral Nutrition - Adult 1 Each (58 mL/Hr) TPN Continuous <Continuous>  Parenteral Nutrition - Adult 1 Each (58 mL/Hr) TPN Continuous <Continuous>  phenylephrine    Infusion 0.1 MICROgram(s)/kG/Min (1.43 mL/Hr) IV Continuous <Continuous>  PureFlow Dialysate RFP-400 (K 2 / Ca 3) 5000 milliLiter(s) (2000 mL/Hr) CRRT <Continuous>  vasopressin Infusion 0.03 Unit(s)/Min (4.5 mL/Hr) IV Continuous <Continuous>    MEDICATIONS  (PRN):  sodium chloride 0.9% lock flush 10 milliLiter(s) IV Push every 1 hour PRN Pre/post blood products, medications, blood draw, and to maintain line patency  sodium chloride 0.9% lock flush 10 milliLiter(s) IV Push every 1 hour PRN Pre/post blood products, medications, blood draw, and to maintain line patency      Allergies    No Known Allergies    Intolerances        SOCIAL HISTORY: No illicit drug use    FAMILY HISTORY:          Vital Signs Last 24 Hrs  T(C): 37.5 (10 Jul 2023 16:00), Max: 37.6 (10 Jul 2023 14:00)  T(F): 99.5 (10 Jul 2023 16:00), Max: 99.7 (10 Jul 2023 14:00)  HR: 80 (10 Jul 2023 16:30) (59 - 85)      PHYSICAL EXAM:    GEN: On mechanical ventilation, NAD  SKIN: Areas of ischemia, b/l UE dressed w/ xeroform dressings  RESP: controlled on ventilator  CARDIO: +S1/S2  ABDO: Surgical incision with discoloration, ostomy bag covering old drain site collecting brown fluid, Left Renal bed VENICE draining brown fluid, Pelvic drain appears to be bilious in color.    : some penile edema, bobby catheter removed and being straight cathed by ICU staff  EXT: Left [place] catheter in place, Right hand first digit with ischemic changes, Left great toe with ischemic changes      I&O's Summary    09 Jul 2023 07:01  -  10 Jul 2023 07:00  --------------------------------------------------------  IN: 2018.8 mL / OUT: 2233 mL / NET: -214.2 mL    10 Jul 2023 07:01  -  10 Jul 2023 18:43  --------------------------------------------------------  IN: 791.8 mL / OUT: 170 mL / NET: 621.8 mL        LABS:                        8.4    10.45 )-----------( 39       ( 10 Jul 2023 03:16 )             25.5     07-10    136  |  101  |  43<H>  ----------------------------<  96  3.8   |  23  |  0.8    Ca    7.7<L>      10 Jul 2023 03:16  Phos  2.5     07-10  Mg     2.0     07-10    TPro  3.6<L>  /  Alb  1.6<L>  /  TBili  0.7  /  DBili  0.4<H>  /  AST  35  /  ALT  16  /  AlkPhos  97  07-09

## 2023-07-10 NOTE — PROGRESS NOTE ADULT - ASSESSMENT
69 y/o M w/ PMHx of retroperitoneal and intraabdominal abscess s/p IR placement of drain and LEFT PCN 6/18, s/p percutaneous drainage of LEFT renal abscess POD #9 with left 26fr flank catheter in place. s/p Left radical nephrectomy, Ex-Lap with increased pressor requirements and symptoms of ischemia in distal extremities.     PLAN:   - Care per SICU team  - Continue to monitor drain outputs q shift, quality of Right VENICE #2 (pelvis) and Left VENICE (L renal pelvis)  - Continue to monitor NGT output (minimal at this time)  - Monitor UOP, CVVHD per nephro, maintain net even  - Abx per ID: Dapto, Ar, Caspo, Levaquin   - Wean pressors as tolerated/monitor requirements    [phone]

## 2023-07-10 NOTE — PROCEDURE NOTE - NSINFORMCONSENT_GEN_A_CORE
Patient's healtcare proxy/daughter phone consent (Tracey Adames)/Benefits, risks, and possible complications of procedure explained to patient/caregiver who verbalized understanding and gave verbal consent.

## 2023-07-10 NOTE — PROCEDURAL SAFETY CHECKLIST WITH OR WITHOUT SEDATION - NSSPECIMENRECONSD_GEN_ALL_CORE
not applicable
done
not applicable

## 2023-07-10 NOTE — PROCEDURAL SAFETY CHECKLIST WITH OR WITHOUT SEDATION - NSPRESEDATION2FT_GEN_ALL_CORE
Anesthesia confirms case reviewed for anesthesia risk alert.

## 2023-07-10 NOTE — PROGRESS NOTE ADULT - ASSESSMENT
ASSESSMENT  66yo Male with pmh of hypertension, massive Grade IV hydronephrosis s/p left nephrostomy placed in May 2023 by [facility] presents to the ED with little to no urine output from LEFT nephrostomy from about a week. PCN  more recently drainage was changed to brown/purulent fluid. 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      IMPRESSION  #Fever , resolved   Elevated fungitell- unclear significant. 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 "Exploratory laparotomy for possible left renal cancer complicated by significant hydronephrosis and pyelonephritis. Left nephrectomy performed, notable for necrotic bulky tissue adherent to the renal vein, staple line in tact and hemostasis achieved. Copious irrigation of retroperitoneal renal bed and intraperitoneal fibrinous exudate and purulent fluid. Three drains placed, #1 in Lerma's pouch, #2 in the pelvis, and #3 in the renal bed"    6/22 UCX  Few Finegoldia magna "Susceptibilities not performed"  Nephrostolithotomy, percutaneous, with lithotripsy, large stone 22-Jun-2023 23:27:09 left large thick abscess materia dimension of aspirate over 4 x 4 x 4 cm Bernie Perdomo  Change external ureteral stent 22-Jun-2023 23:28:03 left Bernie Perdomo  Nephrostogram 22-Jun-2023 23:28:31 left Bernie Perdomo. "thick gelatinous material that was difficult to suction out even with the shock pulse. I would alternate 2 prong to remove and break up / shock pulse to suck out. after an hour where I had no clear planes I elected to stop. irrigation through the 26 bobby took out a lot additional material  in ashlyn catch cup 4 x 4 x 4 cm lump of material trapped"    6/19 UCX NG; UA Blood: x / Protein: 300 mg/dL / Nitrite: Negative   Leuk Esterase: Large / RBC: 8 /HPF / WBC >720 /HPF   Sq Epi: x / Non Sq Epi: x / Bacteria: Moderate    6/18 L PCN     Few Stenotrophomonas maltophilia Levofloxacin: S 2    6/18 L PCN     Numerous Anaerobic Gram Positive Cocci Most closely resembling  Peptoniphilus species "Susceptibilities not performed"    6/18 L PCN   Numerous Gram positive cocci in pairs per oil power field    6/18 BCX NGTD     s/p 6/18  Left PCN upsized to 16F and 1200cc of very viscous tan colored fluid was aspirated. RLQ 16F drain was placed and 1200cc of tan colored fluid was aspirated (less viscous). A sample from each location was sent for culture.     Repeat CT 6/19 Since one day earlier,  No extraluminal contrast. Oral contrast was last seen in the terminal ileum.  Status post left nephrostomy tube placement. Left enlarged kidney with severe hydronephrosis/collection has decreased, now measuring 16 x 12 cm from 20 x 14 cm  Surgical Pathology Report:   Left renal abscess, possible parenchyma  Left renal abscess, possible parenchyma, percutaneous drainage:  -  Fragments of extensively necrotic tissue, cannot be fullycharacterized  Comment: The microscopic appearance of the necrotic tissue is concerning for a neoplasm with coagulative necrosis. Definitive diagnosis cannot be  made, due to lack of viable material.  Additional sections will be submitted.  Immunohistochemical studies willbe performed, in an attempt to characterise the necrotic tissue.  Based on the H&E appearance, an infectious process such as tuberculosis  appears less likely, but special stains for microorganisms (acid-fastbacilli and fungal) are in progress and will be reported separately.   (06.22.23 @ 22:12)  6/27 CT Study is overall very limited due to lack of intravenous contrast, beam   [place]illing[place] artifact.  Persistent bilateral pleural effusions with adjacent consolidations   likely reflecting compressive atelectasis.  Left kidney poorly defined with a heterogeneous collection with air and   fluid which has likely mildly diminished in size compared to the prior   examination but exact comparison limited.  Diffuse ascites again noted. Diffuse anasarca again noted. Persistent   pneumoperitoneum.  Poorly defined lesions within the liver adjacent to the falciform   ligament which may again reflect abscesses or masses, difficult to   evaluate due to the limitations described above.  < from: CT Abdomen and Pelvis w/ IV Cont (06.18.23 @ 15:15) >  1 ) Moderate pneumoperitoneum with partial diffusion throughout moderate   volume ascites and with associated intermittent peritoneal enhancement   and nodularity. Perforation and peritonitis may be related to underlying   emphysematous pyelonephritis (see below). Less likely sources of   perforation include ureteral/bladder disruption, and bowel perforation   which cannot be entirely excluded on the provided images.  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 The compressed residual renal   parenchyma is inseparable from the adjacent fascial/fatty layers   anterosuperiorly and direct rupture into the peritoneum is a possibility   (6-355). The previously placed left-sided nephrostomy tube pigtail is   notedto be within the left renal collecting system.  3) New hepatic hypodensities measuring up to 2.6 cm, suspicious for   development of multiple focal abscesses.  4) Wedge-shaped region of hypodensity within the spleen may reflect   splenic infarct in the correct clinical setting. Developing   phlegmon/abscess cannot be excluded in the current clinical setting  5) Increased extensive retroperitoneal, portacaval and likely   gastrohepatic heterogeneously enhancing lymphadenopathy. Findings may be   reactive.  6) Mediastinal lymphadenopathy measuring up to 2.4 cm short axis.   Underlying etiology may be reactive, infectious/inflammatory, or   neoplastic. A short-term 3 month follow-up CT chest should be considered   for further evaluation.  7)Right middle and upper lobe solid pulmonary nodules which are not well   delineated due to respiratory motion but measure less than 6 mm.   attention on follow-up exam.  #Reintubated, L lung white out, ruled out HAP    6/24 bronch CX NG,   Moderate Yeast- likely colonizer  #Lactic acidosis  #Abx allergy: No Known Allergies  #Prolonged QTC Calculation(Bazett) 526 ms  #Hyponatremia  #AKICreatinine: Creatinine: 1.3 mg/dL (07-05-23 @ 05:45)  Ht 180  Weight (kg): 76.5 (06-18-23 @ 21:27)  crcl 57     RECOMMENDATIONS  - Had fever, no repeat BCX collected, elevated fungitell  - SEND BCX , none ever sent with fever episode (ideally would have sent prior to Caspofungin initiation), cannot rule out candidemia   - On Caspo 50mg q24h IV, repeat Fungitell on Day 7   - CVVH dosing:   DAPTOmycin IVPB 460 milliGRAM(s) IV Intermittent every 48 hours  levoFLOXacin IVPB 750 milliGRAM(s) IV Intermittent every 48 hours  meropenem  IVPB 1000 milliGRAM(s) IV Intermittent every 12 hours  - Trend WBC   - ABX x 14 days post-op end 7/13 (will prolong caspo course as unclear etiology)  - Urology following  - Poor prognosis, GOC    If any questions, please send a message or call on [employer] Teams  Please continue to update ID with any pertinent new laboratory or radiographic findings  #[id number]

## 2023-07-10 NOTE — PROCEDURE NOTE - NSCVLACTUALSITE_VASC_A_CORE
left/internal jugular
left/femoral vein
right/internal jugular
left
right/internal jugular
right/internal jugular

## 2023-07-10 NOTE — PROCEDURE NOTE - NSICDXPROCEDURE_GEN_ALL_CORE_FT
PROCEDURES:  Central venous catheter placement with ultrasound guidance 18-Jun-2023 23:28:12  Aure Prabhakar  
Patient, Dao A Paty calling for medication refill. Medication(s) set up as pending orders from medication list.    Caller has been advised that their call does not guarantee an immediate refill. This refill will be reviewed within 24-48 hours by a qualified provider who will determine whether he or she can refill the medication.      Additional information:     Patient is completely out of medications    Patient’s preferred pharmacy has been noted and populated.       [facility] [address] [place] [address] & [address] [address]  Phone: [phone] Fax: [phone]      
PROCEDURES:  Bronchoscopy, flexible, adult 24-Jun-2023 11:07:08  Shasha Carey  
PROCEDURES:  Insertion, arterial line, percutaneous 18-Jun-2023 19:40:55  Francois Bradshaw  
PROCEDURES:  Central venous catheter placement with ultrasound guidance 18-Jun-2023 23:28:12  Aure Prabhakar  US guided vascular access 23-Jun-2023 07:06:54  Prince Jose  
PROCEDURES:  Bronchoscopy, flexible, adult 24-Jun-2023 11:07:08  Shasha Carey  
PROCEDURES:  US guided vascular access 23-Jun-2023 07:06:54  Prince Jose  
PROCEDURES:  Insertion of arterial line with imaging guidance 23-Jun-2023 07:14:51 right radial Prince Jose  
PROCEDURES:  Insertion, catheter, hemodialysis, non-tunneled, with US guidance 02-Jul-2023 13:59:03  Shasha Carey  
PROCEDURES:  Midline catheter insertion 21-Jun-2023 15:46:46  Eva Guillory

## 2023-07-11 NOTE — CHART NOTE - NSCHARTNOTEFT_GEN_A_CORE
We were notified that the patient had increasing left VENICE drain output and increasing pressor requirements.  There is now 1L of feculent brown malodorous output from the left VENICE Drain and neosynepherine has increased from 0.9 to 1.4 mck/kg/hr.  Dr. Womack's team was notified at [id number].    Michi Salgado  [facility] Fellow

## 2023-07-11 NOTE — PROGRESS NOTE ADULT - SUBJECTIVE AND OBJECTIVE BOX
CHRISTINE VILLAVICENCIO  [id number]  67y Male    Indication for ICU admission: mechanical ventilatior secondary to urosepsis.    Admit Date:06-18-23  ICU Date: 6/18/23  OR Date: 6/18, 6/22, 6/30     24HRS EVENT:  7/10  Night  -PM rounds: Josh @1.2, Vaso @0.03, tpn @58, pupils 3mm b/l and brisk, nonresponsive off sedation, abd soft incision +caro, R VENICE #1 (MP) SS, R VENICE#2 (pelvis) green/turbid, L VENICE #3 (renal bed) green/turbid  -CT head:neg   -CT chest: small b/l pleural effusions with atelectasis, L lower lobe oppacity   -CT AP: Increased number and size of hepatic lesions/suspected abscess 5 cm, proximal small bowel leak suspected, mod free fluid c/w peritonitis   -BCx sent  -Octreotide started per Dr Womack =>Hypertensive to SBP 200s and bradycardic to 36 after octreotide, held  -Ammonia 33  -CVVH down 2/2 air in circuit => return only 1/2 of blood => repeat CBC    DAY  -pending blood cx  -CT head, chest, abdomen, pelvis with PO and IV contrast  -d/c contact precautions   -switch out lines -> L femoral a-line, L femoral Uldall, RIJ TLC   -bronchoscopy negative   -1u platelets  -Follow up CMP (AST/ALT AMMONIA)      [] A ten-point review of systems was otherwise negative except as noted above.  [x  ] Due to altered mental status/intubation, subjective information was not attained from the patient. History was obtained, to the extent possible, from review of the chart and collateral sources of information.      =========================================================================================================================================   CHRISTINE VILLAVICENCIO  [id number]  67y Male    Indication for ICU admission: mechanical ventilatior secondary to urosepsis.    Admit Date:06-18-23  ICU Date: 6/18/23  OR Date: 6/18, 6/22, 6/30     24HRS EVENT:  7/10  Night  -PM rounds: Josh @1.2, Vaso @0.03, tpn @58, pupils 3mm b/l and brisk, nonresponsive off sedation, abd soft incision +caro, R VENICE #1 (MP) SS, R VENICE#2 (pelvis) green/turbid, L VENICE #3 (renal bed) green/turbid  -CT head:neg   -CT chest: small b/l pleural effusions with atelectasis, L lower lobe oppacity   -CT AP: Increased number and size of hepatic lesions/suspected abscess 5 cm, proximal small bowel leak suspected, mod free fluid c/w peritonitis   -BCx sent  -Octreotide started per Dr Womack =>Hypertensive to SBP 200s and bradycardic to 36 after octreotide, held  -Ammonia 33  -CVVH down 2/2 air in circuit => return only 1/2 of blood => repeat CBC    DAY  -pending blood cx  -CT head, chest, abdomen, pelvis .with PO and IV contrast  -d/c contact precautions   -switch out lines -> L femoral a-line, L femoral U[place], RIJ TLC   -bronchoscopy negative   -1u platelets  -Follow up CMP (AST/ALT AMMONIA)      [] A ten-point review of systems was otherwise negative except as noted above.  [x  ] Due to altered mental status/intubation, subjective information was not attained from the patient. History was obtained, to the extent possible, from review of the chart and collateral sources of information.      =========================================================================================================================================  Daily     Daily     Diet, NPO (07-05-23 @ 10:33)      CURRENT MEDS:  Neurologic Medications    Respiratory Medications    Cardiovascular Medications  phenylephrine    Infusion 0.1 MICROgram(s)/kG/Min IV Continuous <Continuous>    Gastrointestinal Medications  pantoprazole  Injectable 40 milliGRAM(s) IV Push every 12 hours  Parenteral Nutrition - Adult 1 Each TPN Continuous <Continuous>  sodium chloride 0.9% lock flush 10 milliLiter(s) IV Push every 1 hour PRN Pre/post blood products, medications, blood draw, and to maintain line patency  sodium chloride 0.9% lock flush 10 milliLiter(s) IV Push every 1 hour PRN Pre/post blood products, medications, blood draw, and to maintain line patency    Genitourinary Medications    Hematologic/Oncologic Medications  heparin   Injectable 5000 Unit(s) SubCutaneous every 8 hours    Antimicrobial/Immunologic Medications  caspofungin IVPB      caspofungin IVPB 50 milliGRAM(s) IV Intermittent every 24 hours  DAPTOmycin IVPB 460 milliGRAM(s) IV Intermittent every 48 hours  levoFLOXacin IVPB 750 milliGRAM(s) IV Intermittent every 48 hours  meropenem  IVPB 1000 milliGRAM(s) IV Intermittent every 12 hours    Endocrine/Metabolic Medications  vasopressin Infusion 0.03 Unit(s)/Min IV Continuous <Continuous>    Topical/Other Medications  bacitracin   Ointment 1 Application(s) Topical two times a day  chlorhexidine 0.12% Liquid 15 milliLiter(s) Oral Mucosa every 12 hours  chlorhexidine 2% Cloths 1 Application(s) Topical <User Schedule>  CRRT Treatment    <Continuous>  PureFlow Dialysate RFP-400 (K 2 / Ca 3) 5000 milliLiter(s) CRRT <Continuous>      ICU Vital Signs Last 24 Hrs  T(C): 34.7 (11 Jul 2023 08:00), Max: 43 (11 Jul 2023 04:00)  T(F): 94.6 (11 Jul 2023 08:00), Max: 109.4 (11 Jul 2023 04:00)  HR: 66 (11 Jul 2023 08:15) (58 - 85)  BP: 121/66 (11 Jul 2023 07:00) (98/60 - 133/78)  BP(mean): 86 (11 Jul 2023 07:00) (74 - 103)  ABP: 112/57 (11 Jul 2023 08:15) (64/49 - 156/104)  ABP(mean): 75 (11 Jul 2023 08:15) (58 - 158)  RR: 30 (11 Jul 2023 08:15) (26 - 33)  SpO2: 100% (11 Jul 2023 08:15) (96% - 100%)    O2 Parameters below as of 11 Jul 2023 08:00  Patient On (Oxygen Delivery Method): ventilator    O2 Concentration (%): 30        Mode: AC/ CMV (Assist Control/ Continuous Mandatory Ventilation)  RR (machine): 28  TV (machine): 450  FiO2: 30  PEEP: 8  ITime: 0.7  MAP: 14  PIP: 25    ABG - ( 11 Jul 2023 05:00 )  pH, Arterial: 7.38  pH, Blood: x     /  pCO2: 31    /  pO2: 131   / HCO3: 18    / Base Excess: -6.2  /  SaO2: 100.0               I&O's Summary    10 Jul 2023 07:01  -  11 Jul 2023 07:00  --------------------------------------------------------  IN: 2682 mL / OUT: 994 mL / NET: 1688 mL    11 Jul 2023 07:01  -  11 Jul 2023 08:37  --------------------------------------------------------  IN: 75 mL / OUT: 219 mL / NET: -144 mL      I&O's Detail    10 Jul 2023 07:01  -  11 Jul 2023 07:00  --------------------------------------------------------  IN:    Enteral Tube Flush: 160 mL    IV PiggyBack: 250 mL    IV PiggyBack: 50 mL    IV PiggyBack: 50 mL    IV PiggyBack: 50 mL    IV PiggyBack: 150 mL    Phenylephrine: 343 mL    Platelets - Single Donor: 225 mL    TPN (Total Parenteral Nutrition): 1296 mL    Vasopressin: 108 mL  Total IN: 2682 mL    OUT:    Drain (mL): 30 mL    Drain (mL): 185 mL    Drain (mL): 20 mL    Drain (mL): 35 mL    Indwelling Catheter - Urethral (mL): 10 mL    Intermittent Catheterization - Urethral (mL): 10 mL    Nasogastric/Oral tube (mL): 115 mL    Other (mL): 150 mL    Other (mL): 439 mL    Voided (mL): 0 mL  Total OUT: 994 mL    Total NET: 1688 mL      11 Jul 2023 07:01  -  11 Jul 2023 08:37  --------------------------------------------------------  IN:    Phenylephrine: 12.5 mL    TPN (Total Parenteral Nutrition): 58 mL    Vasopressin: 4.5 mL  Total IN: 75 mL    OUT:    Drain (mL): 10 mL    Drain (mL): 0 mL    Drain (mL): 30 mL    Drain (mL): 50 mL    Nasogastric/Oral tube (mL): 0 mL    Other (mL): 129 mL    Voided (mL): 0 mL  Total OUT: 219 mL    Total NET: -144 mL          PHYSICAL EXAM:  General/Neuro  RASS:    -1    GCS:  10T   = E 4  / V 1T  / M 5     Deficits:     awake, no focal deficits, moving all bilateral extremities  Pupils: PERRLA bilaterally  Lungs:   clear to auscultation bilaterally, lung sounds present bilaterally, Normal expansion/effort. Intubated on A/C 450/8/28/30%  Cardiovascular : S1, S2.  Afib, with rate control ,Bilateral peripheral edema  GI: Abdomen soft, Non-tender, Non-distended.: surgical incision with some ischemic tissue around incision   NG tube in place, VENICE#1 with serosanguinous fluid, VENICE#2 and #3 with brown fluid  Wound: surgical incision with some ischemic tissue around incision  Extremities: Extremities warm and dry. Right and left index finger tips and left big toe with purple discoloration. Pulses: bilateral ulnar/PT/DP pulses present on doppler.  Derm: Good skin turgor  :  scrotal edema.         CXR: reviewed    CT A/P WITH IV CONTRAST 7/10:  IMPRESSION:    1. Large volume extraluminal left upper quadrant/retroperitoneal oral   contrast; proximal small bowel leak suspected.    2. Moderate volume free fluid with enhancement of peritoneal reflections,   consistent with peritonitis. Evaluation for focal abscesses difficult   given extensive background free fluid.    3. Increased number and size of bilobar hepatic hypodense suspected   hepatic abscesses, including segment IV/III multiseptated suspected   abscess measuring 5 cm, previously 2.4 cm on 6/18/2023.    4. Small bilateral pleural effusions. Left lower lobe consolidative  opacity, may represent pneumonia in the appropriate clinical setting.    5. Unchanged retroperitoneal lymphadenopathy.    6. Persistent pneumoperitoneum.      CT CHEST 7/10:  Interval development of pneumomediastinum. Stable enlarged paratracheal   adenopathy.    Stable ascending thoracic aortic aneurysm (4.6 cm)    . Interval increase in size of bilateral pleural effusions with adjacent   consolidation both lower lobes. No pneumothorax    CT abdomen and pelvis  performed on the same day,see separate report.    CT head without contrast 7/10  MPRESSION:    No evidence of acute intracranial pathology.    Partially visualized subcutaneous emphysema in the facial fascial planes   originating from the pneumomediastinum, see same-day CT chest.      LABS:  CAPILLARY BLOOD GLUCOSE      POCT Blood Glucose.: 124 mg/dL (11 Jul 2023 05:42)  POCT Blood Glucose.: 85 mg/dL (11 Jul 2023 00:07)  POCT Blood Glucose.: 94 mg/dL (10 Jul 2023 12:34)                          6.6    7.47  )-----------( 36       ( 11 Jul 2023 03:30 )             20.6       07-11    138  |  105  |  59<H>  ----------------------------<  120<H>  3.7   |  19  |  0.9    Ca    7.2<L>      11 Jul 2023 05:25  Phos  3.5     07-11  Mg     2.1     07-11    TPro  3.3<L>  /  Alb  1.3<L>  /  TBili  0.8  /  DBili  x   /  AST  30  /  ALT  13  /  AlkPhos  179<H>  07-10            Urinalysis Basic - ( 11 Jul 2023 05:25 )

## 2023-07-11 NOTE — PROGRESS NOTE ADULT - ASSESSMENT
67M w/ PMH of HTN, grade IV hydronephrosis of L kidney s/p L PCN (placed 5/2023), stutter, hiatal hernia, iron deficiency anemia, H Pylori (untreated), and gastric mass (never biopsied) presents to the ED with at least 4 days of worsening weakness, abdominal distension, and no output from his PCN  . Found to have septic shock, MSOF, lactic acidosis from left emphysematous hydronephrosis, pyelonephritis and possible rupture with pneumoperitoneum along with possible hepatic abscesses, splenic infarct and abdominal and mediastinal lymphadenopathies.  had nephrectomy and abdominal washout on 6/30    Oliguric in spite of bumex, rising dig level, high lactate, now on CVVH  ROJAS likely ATN iso septic shock  on pressors   back on CVVHD / will keep in even balance /   platelets noted follow trend /  negative  HIT / hem on case   ID notes appreciated from today and meds dosing to CVVHD noted   will follow

## 2023-07-11 NOTE — PROGRESS NOTE ADULT - SUBJECTIVE AND OBJECTIVE BOX
NUTRITION SUPPORT TEAM  -  PROGRESS NOTE   remain vented/sedated  on pressors  NGT to suction   ABDOMEN: Soft, Nontender, Nondistended; 3 drains in place   on cvvh/ renal f/u noted    REVIEW OF SYSTEMS:  Negative except as noted above.     VITALS:  T(F): 109.4 (07-11 @ 17:00), Max: 109.4 (07-11 @ 06:00)  HR: 71 (07-11 @ 17:15) (59 - 81)  BP: 121/66 (07-11 @ 07:00) (103/61 - 121/66)  RR: 25 (07-11 @ 17:15) (21 - 33)  SpO2: 99% (07-11 @ 17:15) (96% - 100%)  ABG - ( 11 Jul 2023 17:15 )  pH, Arterial: 7.36  pH, Blood: x     /  pCO2: 35    /  pO2: 118   / HCO3: 20    / Base Excess: -5.1  /  SaO2: 99.9    Mode: SIMV with PS  RR (machine): 25  TV (machine): 450  FiO2: 30  PEEP: 8  PS: 10  ITime: 1  MAP: 12  PIP: 23    HEIGHT/WEIGHT/BMI:   Height (cm): 180 (06-30), 180.3 (05-11)  Weight (kg): 76.3 (06-30), 66.2 (05-10)  BMI (kg/m2): 23.5 (06-30), 20.4 (05-11)    07-10-23 @ 07:01  -  07-11-23 @ 07:00  --------------------------------------------------------  IN:    Enteral Tube Flush: 160 mL    IV PiggyBack: 250 mL    IV PiggyBack: 50 mL    IV PiggyBack: 50 mL    IV PiggyBack: 50 mL    IV PiggyBack: 150 mL    Phenylephrine: 343 mL    Platelets - Single Donor: 225 mL    TPN (Total Parenteral Nutrition): 1296 mL    Vasopressin: 108 mL  Total IN: 2682 mL    OUT:    Drain (mL): 30 mL    Drain (mL): 185 mL    Drain (mL): 20 mL    Drain (mL): 35 mL    Indwelling Catheter - Urethral (mL): 10 mL    Intermittent Catheterization - Urethral (mL): 10 mL    Nasogastric/Oral tube (mL): 115 mL    Other (mL): 150 mL    Other (mL): 439 mL    Voided (mL): 0 mL  Total OUT: 994 mL    Total NET: 1688 mL        MEDICATIONS:   bacitracin   Ointment 1 Application(s) Topical two times a day  caspofungin IVPB      caspofungin IVPB 50 milliGRAM(s) IV Intermittent every 24 hours  chlorhexidine 0.12% Liquid 15 milliLiter(s) Oral Mucosa every 12 hours  chlorhexidine 2% Cloths 1 Application(s) Topical <User Schedule>  CRRT Treatment    <Continuous>  DAPTOmycin IVPB 460 milliGRAM(s) IV Intermittent every 48 hours  heparin   Injectable 5000 Unit(s) SubCutaneous every 8 hours  levoFLOXacin IVPB 750 milliGRAM(s) IV Intermittent every 48 hours  meropenem  IVPB 1000 milliGRAM(s) IV Intermittent every 12 hours  norepinephrine Infusion 0.01 MICROgram(s)/kG/Min (0.72 mL/Hr) IV Continuous <Continuous>  octreotide  Infusion 25 MICROgram(s)/Hr (5 mL/Hr) IV Continuous <Continuous>  pantoprazole  Injectable 40 milliGRAM(s) IV Push every 12 hours  Parenteral Nutrition - Adult 1 Each (58 mL/Hr) TPN Continuous <Continuous>  Parenteral Nutrition - Adult 1 Each (58 mL/Hr) TPN Continuous <Continuous>  phenylephrine    Infusion 0.1 MICROgram(s)/kG/Min (1.43 mL/Hr) IV Continuous <Continuous>  PureFlow Dialysate RFP-400 (K 2 / Ca 3) 5000 milliLiter(s) (2000 mL/Hr) CRRT <Continuous>  sodium chloride 0.9% lock flush 10 milliLiter(s) IV Push every 1 hour PRN  sodium chloride 0.9% lock flush 10 milliLiter(s) IV Push every 1 hour PRN  vasopressin Infusion 0.03 Unit(s)/Min (4.5 mL/Hr) IV Continuous <Continuous>    LABS:                         6.6    7.47  )-----------( 36       ( 11 Jul 2023 03:30 )             20.6     138  |  105  |  59<H>  ----------------------------<  120<H>          (07-11-23 @ 05:25)  3.7   |  19  |  0.9    Ca    7.2<L>          (07-11-23 @ 05:25)  Phos  3.5         (07-11-23 @ 05:25)  Mg     2.1         (07-11-23 @ 05:25)  TPro  3.3<L>  /  Alb  1.3<L>  /  TBili  0.8  /  DBili  x   /  AST  30  /  ALT  13  /  AlkPhos  179<H>       07-10-23 @ 22:09  Triglycerides, Serum: 88 mg/dL (07-02 @ 21:01)  Prealbumin, Serum: 3 mg/dL (07-09-23 @ 04:22)  Vitamin D, 25-Hydroxy: 26 ng/mL (05-08 @ 07:58)  Folate: 5.7 ng/mL (05-07 @ 09:19)  Vitamin B12, Serum: 766 pg/mL (05-07 @ 09:19)  Blood Glucose (Past 24 hours):  124 mg/dL (07-11 @ 05:42)  85 mg/dL (07-11 @ 00:07)    DIET:   Diet, NPO (07-05-23 @ 10:33) [Active]  Parenteral Nutrition - Adult 1 Each (58 mL/Hr) TPN Continuous <Continuous>, 07-10-23 @ 20:00, 20:00, Stop order after: 1 Days  Parenteral Nutrition - Adult 1 Each (58 mL/Hr) TPN Continuous <Continuous>, 07-11-23 @ 20:00, 20:00, Stop order after: 1 Days    RADIOLOGY:   < from: Xray Chest 1 View- PORTABLE-Routine (07.11.23 @ 06:40) >  IMPRESSION:    Left basilar opacity/effusion, right basilar opacity/atelectasis. Known   right basilar effusion is not well delineated on frontal radiograph.  Redemonstration of extensive subcutaneous emphysema and mild   pneumomediastinum, as above.  Right IJ central venous catheter terminates within the inferior right   atrium.

## 2023-07-11 NOTE — PROGRESS NOTE ADULT - SUBJECTIVE AND OBJECTIVE BOX
CHRISTINE VILLAVICENCIO  68y, Male  Allergy: No Known Allergies      LOS  23d    CHIEF COMPLAINT: pneumoperitoneum, emphysematous pyelonephritis (11 Jul 2023 03:09)      INTERVAL EVENTS/HPI  - WBC Count: 7.47 (07-11-23 @ 03:30)  WBC Count: 10.45 (07-10-23 @ 03:16)     - Creatinine: 0.9 (07-11-23 @ 05:25)  Creatinine: 1.0 (07-10-23 @ 22:09)     -   -   -     ROS  cannot obtain secondary to patient's sedation and/or mental status    VITALS:  T(F): 94.5, Max: error 07-11-23 @ 04:00)  HR: 67  BP: 121/66  RR: 31Vital Signs Last 24 Hrs  T(C): 34.7 (11 Jul 2023 09:00), Max: 43 (11 Jul 2023 04:00)  T(F): 94.5 (11 Jul 2023 09:00), Max: 109.4 (11 Jul 2023 04:00)  HR: 67 (11 Jul 2023 09:00) (58 - 85)  BP: 121/66 (11 Jul 2023 07:00) (98/60 - 133/78)  BP(mean): 86 (11 Jul 2023 07:00) (74 - 103)  RR: 31 (11 Jul 2023 09:00) (26 - 33)  SpO2: 100% (11 Jul 2023 09:00) (96% - 100%)    Parameters below as of 11 Jul 2023 09:00  Patient On (Oxygen Delivery Method): ventilator    O2 Concentration (%): 30    PHYSICAL EXAM:  ***   FH: Non-contributory  Social Hx: Non-contributory    TESTS & MEASUREMENTS:                        6.6    7.47  )-----------( 36       ( 11 Jul 2023 03:30 )             20.6     07-11    138  |  105  |  59<H>  ----------------------------<  120<H>  3.7   |  19  |  0.9    Ca    7.2<L>      11 Jul 2023 05:25  Phos  3.5     07-11  Mg     2.1     07-11    TPro  3.3<L>  /  Alb  1.3<L>  /  TBili  0.8  /  DBili  x   /  AST  30  /  ALT  13  /  AlkPhos  179<H>  07-10      LIVER FUNCTIONS - ( 10 Jul 2023 22:09 )  Alb: 1.3 g/dL / Pro: 3.3 g/dL / ALK PHOS: 179 U/L / ALT: 13 U/L / AST: 30 U/L / GGT: x           Urinalysis Basic - ( 11 Jul 2023 05:25 )    Color: x / Appearance: x / SG: x / pH: x  Gluc: 120 mg/dL / Ketone: x  / Bili: x / Urobili: x   Blood: x / Protein: x / Nitrite: x   Leuk Esterase: x / RBC: x / WBC x   Sq Epi: x / Non Sq Epi: x / Bacteria: x        Culture - Surgical Swab (collected 06-30-23 @ 22:12)  Source: .Surgical Swab None  Final Report (07-05-23 @ 21:47):    Growth in fluid media only Enterococcus faecium (vancomycin resistant)  Organism: Enterococcus faecium (vancomycin resistant) (07-05-23 @ 21:47)  Organism: Enterococcus faecium (vancomycin resistant) (07-05-23 @ 21:47)      Method Type: EDMUND      -  Ampicillin: R >8 Predicts results to ampicillin/sulbactam, amoxacillin-clavulanate and  piperacillin-tazobactam.      -  Daptomycin: SDD 4 The breakpoint for SDD (susceptible dose dependent)is based on a dosage regimen of 8-12 mg/kg administered every 24 h in adults and is intended for serious infections due to E. faecium. Consultation with an infectious diseases specialist is recommended.      -  Levofloxacin: R >4      -  Linezolid: S 1      -  Tetracycline: R >8      -  Vancomycin: R >16    Culture - Fungal, Bronchial (collected 06-24-23 @ 11:00)  Source: .Bronchial None  Preliminary Report (06-26-23 @ 11:00):    Moderate Yeast    Culture - Acid Fast - Bronchial w/Smear (collected 06-24-23 @ 11:00)  Source: .Bronchial None  Preliminary Report (07-05-23 @ 15:08):    No acid-fast bacilli isolated at 1 week. ****Acid-fast cultures are held    for 6 weeks.****    Culture - Bronchial (collected 06-24-23 @ 11:00)  Source: .Bronchial None  Gram Stain (06-24-23 @ 23:21):    Numerous polymorphonuclear leukocytes per low power field    No squamous epithelial cells per low power field    Rare Yeast like cells per oil power field  Final Report (06-26-23 @ 17:56):    Normal Respiratory Gabbie present    Culture - Fungal, Other (collected 06-22-23 @ 22:11)  Source: .Other None  Preliminary Report (07-08-23 @ 15:02):    No fungus isolated at 2 weeks.    Culture - Surgical Swab (collected 06-22-23 @ 22:11)  Source: .Surgical Swab None  Final Report (06-25-23 @ 14:14):    Few Finegoldia magna "Susceptibilities not performed"    Culture - Urine (collected 06-19-23 @ 02:50)  Source: Clean Catch Clean Catch (Midstream)  Final Report (06-20-23 @ 10:18):    No growth    Culture - Abscess with Gram Stain (collected 06-18-23 @ 23:08)  Source: .Abscess right lower quadrant (intrabdominal)  Gram Stain (06-19-23 @ 11:56):    Numerous polymorphonuclear leukocytes per low power field    Numerous Gram positive cocci in pairs per oil power field  Final Report (06-21-23 @ 14:13):    Numerous Anaerobic Gram Positive Cocci Most closely resembling    Peptoniphilus species "Susceptibilities not performed"    Culture - Abscess with Gram Stain (collected 06-18-23 @ 23:08)  Source: .Abscess left kidney  Gram Stain (06-19-23 @ 11:58):    Rare polymorphonuclear leukocytes per low power field    Few Gram Positive Cocci per oil power field  Final Report (06-22-23 @ 10:13):    Growth in fluid media only Anaerobic Gram Positive Cocci Most closely    resembling Peptoniphilus species "Susceptibilities not performed"    Rare Stenotrophomonas maltophilia  Organism: Stenotrophomonas maltophilia  Stenotrophomonas maltophilia (06-22-23 @ 10:13)  Organism: Stenotrophomonas maltophilia (06-22-23 @ 10:13)      Method Type: EDMUND      -  Levofloxacin: S 1      -  Trimethoprim/Sulfamethoxazole: S <=0.5/9.5  Organism: Stenotrophomonas maltophilia (06-22-23 @ 10:13)      Method Type: KB      -  Minocycline: S    Culture - Abscess with Gram Stain (collected 06-18-23 @ 20:18)  Source: .Abscess left PCN aspirate  Gram Stain (06-19-23 @ 06:27):    No polymorphonuclear cells seen per low power field    Few Gram Variable Cocci seen per oil power field  Final Report (06-24-23 @ 09:11):    Few Stenotrophomonas maltophilia  Organism: Stenotrophomonas maltophilia (06-24-23 @ 09:11)  Organism: Stenotrophomonas maltophilia (06-24-23 @ 09:11)      Method Type: EDMUND      -  Levofloxacin: S 2      -  Trimethoprim/Sulfamethoxazole: S <=0.5/9.5    Culture - Blood (collected 06-18-23 @ 14:18)  Source: .Blood Blood-Peripheral  Final Report (06-24-23 @ 02:00):    No Growth Final    Culture - Blood (collected 06-18-23 @ 14:18)  Source: .Blood Blood-Peripheral  Final Report (06-24-23 @ 02:00):    No Growth Final            INFECTIOUS DISEASES TESTING  Fungitell: >500 (07-06-23 @ 04:30)  Fungitell: 47 (06-25-23 @ 15:30)  MRSA PCR Result.: Negative (06-24-23 @ 17:38)      INFLAMMATORY MARKERS  C-Reactive Protein, Serum: 245.1 mg/L (07-10-23 @ 01:30)      RADIOLOGY & ADDITIONAL TESTS:  I have personally reviewed the last available Chest xray  CXR  Xray Chest 1 View AP/PA:   ACC: [id number] EXAM:  XR CHEST 1 VIEW   ORDERED BY: BALJIT JESUS     PROCEDURE DATE:  07/09/2023          INTERPRETATION:  Clinical History / Reason for exam: ETT advancement    Comparison : Chest radiograph: 7/9/2023    Technique/Positioning: Frontal view of the chest    Findings:    Support devices: The endotracheal tube is poorly visualized. There is an   enteric tube coursing below the diaphragm. Right vascular sheath and left   IJ central venous line in stable position    Cardiac/mediastinum/hilum: No significant change    Skeleton/soft tissues: No significant change.    Lung parenchyma/Pleura: Stable bibasilar opacities. No definite   pneumothorax.    Impression:  Stable bibasilar opacities.    Lines and support devices as described above. The endotracheal tube is   poorly visualized. Recommend repeat examination.        --- End of Report ---            JULIANNE HI MD; Attending Radiologist  This document has been electronically signed. Jul 9 2023  1:31PM (07-09-23 @ 11:49)      CT  CT Abdomen and Pelvis w/ Oral Cont and w/ IV Cont:   ACC: [id number] EXAM:  CT ABDOMEN AND PELVIS OC IC   ORDERED BY: ZAYDA BARKLEY     PROCEDURE DATE:  07/10/2023          INTERPRETATION:  CLINICAL STATEMENT: Renal cell carcinoma.    TECHNIQUE: Contiguous axial CT images were obtained from the lower chest   to the pubic symphysis following administration of 95 cc Omnipaque 350   intravenous contrast.  Oral contrast was administered.  Reformatted   images in the coronal and sagittal planes were acquired. 5 cc contrast   discarded    Comparison madewith  FINDINGS:    LOWER CHEST: Small bilateral pleural effusions with overlying compressive   atelectasis. Left lower lobe consolidative opacity, may represent   pneumonia in the appropriate clinical setting.    HEPATOBILIARY: Increased number and size of bilobar hepatic hypodense   lesions, including segment IV/III multiseptated suspected abscess   measuring 5 cm, previously 2.4 cm on 6/18/2023 (series 2, image 22).    Patent portal vein. Periportal edema. Gallbladder unremarkable. No   biliarydilation.    SPLEEN: Involving splenic infarction.    PANCREAS: Not well evaluated.    ADRENAL GLANDS: Not well evaluated.    KIDNEYS: Post left nephrectomy. Right renal cyst.    ABDOMINOPELVIC NODES: Unchanged retroperitoneal lymphadenopathy.    PELVIC ORGANS: Urinary bladder collapsed. BPH.    PERITONEUM/MESENTERY/BOWEL: Large volume extraluminal left upper   quadrant/retroperitoneal oral contrast; proximal small bowel leak   suspected. Moderate volume free fluid with enhancement of peritoneal   reflections, consistent with peritonitis.    Multiple intra-abdominal drainage catheters noted. Persistent   pneumoperitoneum.    No bowel obstruction. Rectal tube. Diffuse abnormal small and large bowel   circumferential wall thickening; likely reactive 2 peritonitis.    BONES/SOFT TISSUES: Degenerative change, lumbar spine. Anasarca..    OTHER: Vascular calcifications. Left femoral venous catheter.      IMPRESSION:    1. Large volume extraluminal left upper quadrant/retroperitoneal oral   contrast; proximal small bowel leak suspected.    2. Moderate volume free fluid with enhancement of peritoneal reflections,   consistent with peritonitis. Evaluation for focal abscesses difficult   given extensive background free fluid.    3. Increased number and size of bilobar hepatic hypodense suspected   hepatic abscesses, including segment IV/III multiseptated suspected   abscess measuring 5 cm, previously 2.4 cm on 6/18/2023.    4. Small bilateral pleural effusions. Left lower lobe consolidative  opacity, may represent pneumonia in the appropriate clinical setting.    5. Unchanged retroperitoneal lymphadenopathy.    6. Persistent pneumoperitoneum.    Spoke with Dr. Vargas    --- End of Report ---            VIANCA GORDON MD; Attending Radiologist  This document has been electronically signed. Jul 10 2023 10:30PM (07-10-23 @ 21:06)      CARDIOLOGY TESTING  12 Lead ECG:   Ventricular Rate 89 BPM    Atrial Rate 394 BPM    QRS Duration 84 ms    Q-T Interval 300 ms    QTC Calculation(Bazett) 365 ms    R Axis 50 degrees    T Axis -16 degrees    Diagnosis Line Atrial fibrillation  Low voltage QRS  Nonspecific T wave abnormality  Abnormal ECG    Confirmed by Deon Velasquez ([id number]) on 7/3/2023 8:40:11 AM (07-02-23 @ 07:57)  12 Lead ECG:   Ventricular Rate 96 BPM    Atrial Rate 72 BPM    QRS Duration 82 ms    Q-T Interval 288 ms    QTC Calculation(Bazett) 363 ms    R Axis 37 degrees    T Axis 184 degrees    Diagnosis Line Atrial fibrillation  Low voltage QRS  Nonspecific T wave abnormality  Abnormal ECG    Confirmed by Deon Velasquez [id number]) on 7/2/2023 1:12:19 PM (07-01-23 @ 08:12)      MEDICATIONS  bacitracin   Ointment 1  caspofungin IVPB 50  caspofungin IVPB   chlorhexidine 0.12% Liquid 15  chlorhexidine 2% Cloths 1  CRRT Treatment   DAPTOmycin IVPB 460  heparin   Injectable 5000  levoFLOXacin IVPB 750  meropenem  IVPB 1000  pantoprazole  Injectable 40  Parenteral Nutrition - Adult 1  phenylephrine    Infusion 0.1  PureFlow Dialysate RFP-400 (K 2 / Ca 3) 5000  vasopressin Infusion 0.03      WEIGHT  Weight (kg): 76.3 (06-30-23 @ 16:58)  Creatinine: 0.9 mg/dL (07-11-23 @ 05:25)  Creatinine: 1.0 mg/dL (07-10-23 @ 22:09)      ANTIBIOTICS:  caspofungin IVPB 50 milliGRAM(s) IV Intermittent every 24 hours  caspofungin IVPB      DAPTOmycin IVPB 460 milliGRAM(s) IV Intermittent every 48 hours  levoFLOXacin IVPB 750 milliGRAM(s) IV Intermittent every 48 hours  meropenem  IVPB 1000 milliGRAM(s) IV Intermittent every 12 hours      All available historical records have been reviewed   CHRISTINE VILLAVICENCIO  68y, Male  Allergy: No Known Allergies      LOS  23d    CHIEF COMPLAINT: pneumoperitoneum, emphysematous pyelonephritis (11 Jul 2023 03:09)      INTERVAL EVENTS/HPI  - WBC Count: 7.47 (07-11-23 @ 03:30)  WBC Count: 10.45 (07-10-23 @ 03:16)     - Creatinine: 0.9 (07-11-23 @ 05:25)  Creatinine: 1.0 (07-10-23 @ 22:09)     -   -   -     ROS  cannot obtain secondary to patient's sedation and/or mental status    VITALS:  T(F): 94.5, Max: error 07-11-23 @ 04:00)  HR: 67  BP: 121/66  RR: 31Vital Signs Last 24 Hrs  T(C): 34.7 (11 Jul 2023 09:00), Max: 43 (11 Jul 2023 04:00)  T(F): 94.5 (11 Jul 2023 09:00), Max: 109.4 (11 Jul 2023 04:00)  HR: 67 (11 Jul 2023 09:00) (58 - 85)  BP: 121/66 (11 Jul 2023 07:00) (98/60 - 133/78)  BP(mean): 86 (11 Jul 2023 07:00) (74 - 103)  RR: 31 (11 Jul 2023 09:00) (26 - 33)  SpO2: 100% (11 Jul 2023 09:00) (96% - 100%)    Parameters below as of 11 Jul 2023 09:00  Patient On (Oxygen Delivery Method): ventilator    O2 Concentration (%): 30    PHYSICAL EXAM:  General: intubated  HEENT: NCAT  CV: RRR  Lungs: symmetric chest expansion, decreased BS at bases  Abd: Soft JPS  Skin: no rash  Neuro: sedated  Lines: no phlebitis   FH: Non-contributory  Social Hx: Non-contributory    TESTS & MEASUREMENTS:                        6.6    7.47  )-----------( 36       ( 11 Jul 2023 03:30 )             20.6     07-11    138  |  105  |  59<H>  ----------------------------<  120<H>  3.7   |  19  |  0.9    Ca    7.2<L>      11 Jul 2023 05:25  Phos  3.5     07-11  Mg     2.1     07-11    TPro  3.3<L>  /  Alb  1.3<L>  /  TBili  0.8  /  DBili  x   /  AST  30  /  ALT  13  /  AlkPhos  179<H>  07-10      LIVER FUNCTIONS - ( 10 Jul 2023 22:09 )  Alb: 1.3 g/dL / Pro: 3.3 g/dL / ALK PHOS: 179 U/L / ALT: 13 U/L / AST: 30 U/L / GGT: x           Urinalysis Basic - ( 11 Jul 2023 05:25 )    Color: x / Appearance: x / SG: x / pH: x  Gluc: 120 mg/dL / Ketone: x  / Bili: x / Urobili: x   Blood: x / Protein: x / Nitrite: x   Leuk Esterase: x / RBC: x / WBC x   Sq Epi: x / Non Sq Epi: x / Bacteria: x        Culture - Surgical Swab (collected 06-30-23 @ 22:12)  Source: .Surgical Swab None  Final Report (07-05-23 @ 21:47):    Growth in fluid media only Enterococcus faecium (vancomycin resistant)  Organism: Enterococcus faecium (vancomycin resistant) (07-05-23 @ 21:47)  Organism: Enterococcus faecium (vancomycin resistant) (07-05-23 @ 21:47)      Method Type: EDMUND      -  Ampicillin: R >8 Predicts results to ampicillin/sulbactam, amoxacillin-clavulanate and  piperacillin-tazobactam.      -  Daptomycin: SDD 4 The breakpoint for SDD (susceptible dose dependent)is based on a dosage regimen of 8-12 mg/kg administered every 24 h in adults and is intended for serious infections due to E. faecium. Consultation with an infectious diseases specialist is recommended.      -  Levofloxacin: R >4      -  Linezolid: S 1      -  Tetracycline: R >8      -  Vancomycin: R >16    Culture - Fungal, Bronchial (collected 06-24-23 @ 11:00)  Source: .Bronchial None  Preliminary Report (06-26-23 @ 11:00):    Moderate Yeast    Culture - Acid Fast - Bronchial w/Smear (collected 06-24-23 @ 11:00)  Source: .Bronchial None  Preliminary Report (07-05-23 @ 15:08):    No acid-fast bacilli isolated at 1 week. ****Acid-fast cultures are held    for 6 weeks.****    Culture - Bronchial (collected 06-24-23 @ 11:00)  Source: .Bronchial None  Gram Stain (06-24-23 @ 23:21):    Numerous polymorphonuclear leukocytes per low power field    No squamous epithelial cells per low power field    Rare Yeast like cells per oil power field  Final Report (06-26-23 @ 17:56):    Normal Respiratory Gabbie present    Culture - Fungal, Other (collected 06-22-23 @ 22:11)  Source: .Other None  Preliminary Report (07-08-23 @ 15:02):    No fungus isolated at 2 weeks.    Culture - Surgical Swab (collected 06-22-23 @ 22:11)  Source: .Surgical Swab None  Final Report (06-25-23 @ 14:14):    Few Finegoldia magna "Susceptibilities not performed"    Culture - Urine (collected 06-19-23 @ 02:50)  Source: Clean Catch Clean Catch (Midstream)  Final Report (06-20-23 @ 10:18):    No growth    Culture - Abscess with Gram Stain (collected 06-18-23 @ 23:08)  Source: .Abscess right lower quadrant (intrabdominal)  Gram Stain (06-19-23 @ 11:56):    Numerous polymorphonuclear leukocytes per low power field    Numerous Gram positive cocci in pairs per oil power field  Final Report (06-21-23 @ 14:13):    Numerous Anaerobic Gram Positive Cocci Most closely resembling    Peptoniphilus species "Susceptibilities not performed"    Culture - Abscess with Gram Stain (collected 06-18-23 @ 23:08)  Source: .Abscess left kidney  Gram Stain (06-19-23 @ 11:58):    Rare polymorphonuclear leukocytes per low power field    Few Gram Positive Cocci per oil power field  Final Report (06-22-23 @ 10:13):    Growth in fluid media only Anaerobic Gram Positive Cocci Most closely    resembling Peptoniphilus species "Susceptibilities not performed"    Rare Stenotrophomonas maltophilia  Organism: Stenotrophomonas maltophilia  Stenotrophomonas maltophilia (06-22-23 @ 10:13)  Organism: Stenotrophomonas maltophilia (06-22-23 @ 10:13)      Method Type: EDMUND      -  Levofloxacin: S 1      -  Trimethoprim/Sulfamethoxazole: S <=0.5/9.5  Organism: Stenotrophomonas maltophilia (06-22-23 @ 10:13)      Method Type: KB      -  Minocycline: S    Culture - Abscess with Gram Stain (collected 06-18-23 @ 20:18)  Source: .Abscess left PCN aspirate  Gram Stain (06-19-23 @ 06:27):    No polymorphonuclear cells seen per low power field    Few Gram Variable Cocci seen per oil power field  Final Report (06-24-23 @ 09:11):    Few Stenotrophomonas maltophilia  Organism: Stenotrophomonas maltophilia (06-24-23 @ 09:11)  Organism: Stenotrophomonas maltophilia (06-24-23 @ 09:11)      Method Type: EDMUND      -  Levofloxacin: S 2      -  Trimethoprim/Sulfamethoxazole: S <=0.5/9.5    Culture - Blood (collected 06-18-23 @ 14:18)  Source: .Blood Blood-Peripheral  Final Report (06-24-23 @ 02:00):    No Growth Final    Culture - Blood (collected 06-18-23 @ 14:18)  Source: .Blood Blood-Peripheral  Final Report (06-24-23 @ 02:00):    No Growth Final            INFECTIOUS DISEASES TESTING  Fungitell: >500 (07-06-23 @ 04:30)  Fungitell: 47 (06-25-23 @ 15:30)  MRSA PCR Result.: Negative (06-24-23 @ 17:38)      INFLAMMATORY MARKERS  C-Reactive Protein, Serum: 245.1 mg/L (07-10-23 @ 01:30)      RADIOLOGY & ADDITIONAL TESTS:  I have personally reviewed the last available Chest xray  CXR  Xray Chest 1 View AP/PA:   ACC: [id number] EXAM:  XR CHEST 1 VIEW   ORDERED BY: BALJIT JESUS     PROCEDURE DATE:  07/09/2023          INTERPRETATION:  Clinical History / Reason for exam: ETT advancement    Comparison : Chest radiograph: 7/9/2023    Technique/Positioning: Frontal view of the chest    Findings:    Support devices: The endotracheal tube is poorly visualized. There is an   enteric tube coursing below the diaphragm. Right vascular sheath and left   IJ central venous line in stable position    Cardiac/mediastinum/hilum: No significant change    Skeleton/soft tissues: No significant change.    Lung parenchyma/Pleura: Stable bibasilar opacities. No definite   pneumothorax.    Impression:  Stable bibasilar opacities.    Lines and support devices as described above. The endotracheal tube is   poorly visualized. Recommend repeat examination.        --- End of Report ---            JULIANNE HI MD; Attending Radiologist  This document has been electronically signed. Jul 9 2023  1:31PM (07-09-23 @ 11:49)      CT  CT Abdomen and Pelvis w/ Oral Cont and w/ IV Cont:   ACC: [id number] EXAM:  CT ABDOMEN AND PELVIS OC IC   ORDERED BY: ZAYDA BARKLEY     PROCEDURE DATE:  07/10/2023          INTERPRETATION:  CLINICAL STATEMENT: Renal cell carcinoma.    TECHNIQUE: Contiguous axial CT images were obtained from the lower chest   to the pubic symphysis following administration of 95 cc Omnipaque 350   intravenous contrast.  Oral contrast was administered.  Reformatted   images in the coronal and sagittal planes were acquired. 5 cc contrast   discarded    Comparison madewith  FINDINGS:    LOWER CHEST: Small bilateral pleural effusions with overlying compressive   atelectasis. Left lower lobe consolidative opacity, may represent   pneumonia in the appropriate clinical setting.    HEPATOBILIARY: Increased number and size of bilobar hepatic hypodense   lesions, including segment IV/III multiseptated suspected abscess   measuring 5 cm, previously 2.4 cm on 6/18/2023 (series 2, image 22).    Patent portal vein. Periportal edema. Gallbladder unremarkable. No   biliarydilation.    SPLEEN: Involving splenic infarction.    PANCREAS: Not well evaluated.    ADRENAL GLANDS: Not well evaluated.    KIDNEYS: Post left nephrectomy. Right renal cyst.    ABDOMINOPELVIC NODES: Unchanged retroperitoneal lymphadenopathy.    PELVIC ORGANS: Urinary bladder collapsed. BPH.    PERITONEUM/MESENTERY/BOWEL: Large volume extraluminal left upper   quadrant/retroperitoneal oral contrast; proximal small bowel leak   suspected. Moderate volume free fluid with enhancement of peritoneal   reflections, consistent with peritonitis.    Multiple intra-abdominal drainage catheters noted. Persistent   pneumoperitoneum.    No bowel obstruction. Rectal tube. Diffuse abnormal small and large bowel   circumferential wall thickening; likely reactive 2 peritonitis.    BONES/SOFT TISSUES: Degenerative change, lumbar spine. Anasarca..    OTHER: Vascular calcifications. Left femoral venous catheter.      IMPRESSION:    1. Large volume extraluminal left upper quadrant/retroperitoneal oral   contrast; proximal small bowel leak suspected.    2. Moderate volume free fluid with enhancement of peritoneal reflections,   consistent with peritonitis. Evaluation for focal abscesses difficult   given extensive background free fluid.    3. Increased number and size of bilobar hepatic hypodense suspected   hepatic abscesses, including segment IV/III multiseptated suspected   abscess measuring 5 cm, previously 2.4 cm on 6/18/2023.    4. Small bilateral pleural effusions. Left lower lobe consolidative  opacity, may represent pneumonia in the appropriate clinical setting.    5. Unchanged retroperitoneal lymphadenopathy.    6. Persistent pneumoperitoneum.    Spoke with Dr. Vargas    --- End of Report ---            VIANCA GORDON MD; Attending Radiologist  This document has been electronically signed. Jul 10 2023 10:30PM (07-10-23 @ 21:06)      CARDIOLOGY TESTING  12 Lead ECG:   Ventricular Rate 89 BPM    Atrial Rate 394 BPM    QRS Duration 84 ms    Q-T Interval 300 ms    QTC Calculation(Bazett) 365 ms    R Axis 50 degrees    T Axis -16 degrees    Diagnosis Line Atrial fibrillation  Low voltage QRS  Nonspecific T wave abnormality  Abnormal ECG    Confirmed by Deon Velasquez ([id number]) on 7/3/2023 8:40:11 AM (07-02-23 @ 07:57)  12 Lead ECG:   Ventricular Rate 96 BPM    Atrial Rate 72 BPM    QRS Duration 82 ms    Q-T Interval 288 ms    QTC Calculation(Bazett) 363 ms    R Axis 37 degrees    T Axis 184 degrees    Diagnosis Line Atrial fibrillation  Low voltage QRS  Nonspecific T wave abnormality  Abnormal ECG    Confirmed by Deon Velasquez ([id number]) on 7/2/2023 1:12:19 PM (07-01-23 @ 08:12)      MEDICATIONS  bacitracin   Ointment 1  caspofungin IVPB 50  caspofungin IVPB   chlorhexidine 0.12% Liquid 15  chlorhexidine 2% Cloths 1  CRRT Treatment   DAPTOmycin IVPB 460  heparin   Injectable 5000  levoFLOXacin IVPB 750  meropenem  IVPB 1000  pantoprazole  Injectable 40  Parenteral Nutrition - Adult 1  phenylephrine    Infusion 0.1  PureFlow Dialysate RFP-400 (K 2 / Ca 3) 5000  vasopressin Infusion 0.03      WEIGHT  Weight (kg): 76.3 (06-30-23 @ 16:58)  Creatinine: 0.9 mg/dL (07-11-23 @ 05:25)  Creatinine: 1.0 mg/dL (07-10-23 @ 22:09)      ANTIBIOTICS:  caspofungin IVPB 50 milliGRAM(s) IV Intermittent every 24 hours  caspofungin IVPB      DAPTOmycin IVPB 460 milliGRAM(s) IV Intermittent every 48 hours  levoFLOXacin IVPB 750 milliGRAM(s) IV Intermittent every 48 hours  meropenem  IVPB 1000 milliGRAM(s) IV Intermittent every 12 hours      All available historical records have been reviewed

## 2023-07-11 NOTE — PROGRESS NOTE ADULT - ASSESSMENT
ASSESSMENT  67M w/ PMH of HTN, grade IV hydronephrosis of L kidney s/p L PCN (placed 5/2023), stutter, hiatal hernia, iron deficiency anemia, H Pylori (untreated), and gastric mass (never biopsied) presents to the ED with at least 4 days of worsening weakness, abdominal distension, and no output from his PCN.    Found to have septic shock, MSOF, lactic acidosis from left emphysematous hydronephrosis, pyelonephritis and possible rupture with pneumoperitoneum along with possible hepatic abscesses, splenic infarct and abdominal and mediastinal lymphadenopathies    ROJAS likely ATN - to hold CVVH today, f/u with nephrology daily  septic shock   acute resp failure, remains vented postop  fluid overload  severe acute malnutrition high risk due to prolonged NPO due to acute circumstances  + feculent material from drains    cont TPN  hold lipids depending on platelets and bleeding issues  check bmp/phos/mg  will f/u

## 2023-07-11 NOTE — PROGRESS NOTE ADULT - ASSESSMENT
68M w/ PMHx of retroperitoneal and intraabdominal abscess s/p IR placement of drain and LEFT PCN 6/18, s/p percutaneous drainage of LEFT renal abscess POD #9 with left 26fr flank catheter in place. s/p Left radical nephrectomy, Ex-Lap with increased pressor requirements and symptoms of ischemia in distal extremities.     PLAN:   - Continue to monitor drain outputs q shift, quality of Right VENICE #2 (pelvis) and Left VENICE (L renal pelvis)  - Continue to monitor NGT output and bowel function  - Monitor UOP, CVVHD per nephro, maintain net even  - Abx per ID: Dapto, Ar, Caspo, Levaquin   - Wean pressors as tolerated/monitor requirements 68M w/ PMHx of retroperitoneal and intraabdominal abscess s/p IR placement of drain and LEFT PCN 6/18, s/p percutaneous drainage of LEFT renal abscess POD #9 with left 26fr flank catheter in place. s/p Left radical nephrectomy, Ex-Lap with increased pressor requirements and symptoms of ischemia in distal extremities.     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

## 2023-07-11 NOTE — CONSULT NOTE ADULT - SUBJECTIVE AND OBJECTIVE BOX
INTERVENTIONAL RADIOLOGY CONSULT:     Procedure Requested: intra-abdominal abscess drainage    HPI:  67M w/ PMH of HTN, grade IV hydronephrosis of L kidney s/p L PCN (placed 5/2023), stutter, hiatal hernia, iron deficiency anemia, H Pylori (untreated), and gastric mass (never biopsied) presents to the ED with at least 4 days of worsening weakness, abdominal distension, and no output from his PCN. Pt and daughters bedside are only able to give limited information, but for the past few days, he has noticed no output from his PCN as well as increasing abdominal distension, pain, and anorexia. He reports that he has not felt normal for the past 10 days. His PCN used to put out serosanguinous fluid, but the output changed to feculent/brown liquid over the past day. In the ED, he was noted to be in new onset afib w/ RVR to 150+ as well as experiencing some difficulty breathing. He was started on BiPAP. Stat labs were notable for WBC 47, Cr 2.9, and lactate 6. CT A/P w/ IV contrast showed free intraperitoneal fluid and air from an unknown source as well as fluid and air in the L kidney.  (18 Jun 2023 18:34)      PAST MEDICAL & SURGICAL HISTORY:      MEDICATIONS  (STANDING):  albumin human 25% IVPB 100 milliLiter(s) IV Intermittent once  bacitracin   Ointment 1 Application(s) Topical two times a day  caspofungin IVPB      caspofungin IVPB 50 milliGRAM(s) IV Intermittent every 24 hours  chlorhexidine 0.12% Liquid 15 milliLiter(s) Oral Mucosa every 12 hours  chlorhexidine 2% Cloths 1 Application(s) Topical <User Schedule>  CRRT Treatment    <Continuous>  DAPTOmycin IVPB 460 milliGRAM(s) IV Intermittent every 48 hours  heparin   Injectable 5000 Unit(s) SubCutaneous every 8 hours  levoFLOXacin IVPB 750 milliGRAM(s) IV Intermittent every 48 hours  meropenem  IVPB 1000 milliGRAM(s) IV Intermittent every 12 hours  norepinephrine Infusion 0.01 MICROgram(s)/kG/Min (0.72 mL/Hr) IV Continuous <Continuous>  octreotide  Infusion 25 MICROgram(s)/Hr (5 mL/Hr) IV Continuous <Continuous>  pantoprazole  Injectable 40 milliGRAM(s) IV Push every 12 hours  Parenteral Nutrition - Adult 1 Each (58 mL/Hr) TPN Continuous <Continuous>  Parenteral Nutrition - Adult 1 Each (58 mL/Hr) TPN Continuous <Continuous>  phenylephrine    Infusion 0.1 MICROgram(s)/kG/Min (1.43 mL/Hr) IV Continuous <Continuous>  PureFlow Dialysate RFP-400 (K 2 / Ca 3) 5000 milliLiter(s) (2000 mL/Hr) CRRT <Continuous>  vasopressin Infusion 0.03 Unit(s)/Min (4.5 mL/Hr) IV Continuous <Continuous>    MEDICATIONS  (PRN):  sodium chloride 0.9% lock flush 10 milliLiter(s) IV Push every 1 hour PRN Pre/post blood products, medications, blood draw, and to maintain line patency  sodium chloride 0.9% lock flush 10 milliLiter(s) IV Push every 1 hour PRN Pre/post blood products, medications, blood draw, and to maintain line patency      Allergies    No Known Allergies    Intolerances    Physical Exam:   Vital Signs Last 24 Hrs  T(C): 35.3 (11 Jul 2023 12:00), Max: 43 (11 Jul 2023 04:00)  T(F): 95.7 (11 Jul 2023 12:00), Max: 109.4 (11 Jul 2023 04:00)  HR: 62 (11 Jul 2023 13:42) (58 - 85)  BP: 121/66 (11 Jul 2023 07:00) (98/60 - 133/78)  BP(mean): 86 (11 Jul 2023 07:00) (74 - 103)  RR: 24 (11 Jul 2023 12:45) (22 - 33)  SpO2: 100% (11 Jul 2023 13:42) (96% - 100%)    Parameters below as of 11 Jul 2023 12:00  Patient On (Oxygen Delivery Method): ventilator    O2 Concentration (%): 30    Labs:                         6.6    7.47  )-----------( 36       ( 11 Jul 2023 03:30 )             20.6     07-11    138  |  105  |  59<H>  ----------------------------<  120<H>  3.7   |  19  |  0.9    Ca    7.2<L>      11 Jul 2023 05:25  Phos  3.5     07-11  Mg     2.1     07-11    TPro  3.3<L>  /  Alb  1.3<L>  /  TBili  0.8  /  DBili  x   /  AST  30  /  ALT  13  /  AlkPhos  179<H>  07-10        Pertinent labs:                      6.6    7.47  )-----------( 36       ( 11 Jul 2023 03:30 )             20.6       07-11    138  |  105  |  59<H>  ----------------------------<  120<H>  3.7   |  19  |  0.9    Ca    7.2<L>      11 Jul 2023 05:25  Phos  3.5     07-11  Mg     2.1     07-11    TPro  3.3<L>  /  Alb  1.3<L>  /  TBili  0.8  /  DBili  x   /  AST  30  /  ALT  13  /  AlkPhos  179<H>  07-10      Radiology & Additional Studies:     Radiology imaging reviewed.       ASSESSMENT/ PLAN:   67M w/ PMH of HTN, grade IV hydronephrosis of L kidney s/p L PCN (placed 5/2023), stutter, hiatal hernia, iron deficiency anemia, H Pylori (untreated), and gastric mass (never biopsied) presents to the ED with at least 4 days of worsening weakness, abdominal distension, and no output from his PCN. Pt and daughters bedside are only able to give limited information, but for the past few days, he has noticed no output from his PCN as well as increasing abdominal distension, pain, and anorexia. He reports that he has not felt normal for the past 10 days. His PCN used to put out serosanguinous fluid, but the output changed to feculent/brown liquid over the past day. In the ED, he was noted to be in new onset afib w/ RVR to 150+ as well as experiencing some difficulty breathing. He was started on BiPAP. Stat labs were notable for WBC 47, Cr 2.9, and lactate 6. CT A/P w/ IV contrast showed free intraperitoneal fluid and air from an unknown source as well as fluid and air in the L kidney.  (18 Jun 2023 18:34)  - acute condition likely due to intraperitoneal fluid and air, surgical intervention may be warranted  - no acute IR intervention warranted at this time due to patients status, currently intubated on pressor support  - may consider intervention once patient is stable   - reconsult once HD stable as needed     Thank you for the courtesy of this consult, please call [phone] with any further questions.    INTERVENTIONAL RADIOLOGY CONSULT:     Procedure Requested: intra-abdominal abscess drainage    HPI:  67M w/ PMH of HTN, grade IV hydronephrosis of L kidney s/p L PCN (placed 5/2023), stutter, hiatal hernia, iron deficiency anemia, H Pylori (untreated), and gastric mass (never biopsied) presents to the ED with at least 4 days of worsening weakness, abdominal distension, and no output from his PCN. Pt and daughters bedside are only able to give limited information, but for the past few days, he has noticed no output from his PCN as well as increasing abdominal distension, pain, and anorexia. He reports that he has not felt normal for the past 10 days. His PCN used to put out serosanguinous fluid, but the output changed to feculent/brown liquid over the past day. In the ED, he was noted to be in new onset afib w/ RVR to 150+ as well as experiencing some difficulty breathing. He was started on BiPAP. Stat labs were notable for WBC 47, Cr 2.9, and lactate 6. CT A/P w/ IV contrast showed free intraperitoneal fluid and air from an unknown source as well as fluid and air in the L kidney.  (18 Jun 2023 18:34)      PAST MEDICAL & SURGICAL HISTORY:      MEDICATIONS  (STANDING):  albumin human 25% IVPB 100 milliLiter(s) IV Intermittent once  bacitracin   Ointment 1 Application(s) Topical two times a day  caspofungin IVPB      caspofungin IVPB 50 milliGRAM(s) IV Intermittent every 24 hours  chlorhexidine 0.12% Liquid 15 milliLiter(s) Oral Mucosa every 12 hours  chlorhexidine 2% Cloths 1 Application(s) Topical <User Schedule>  CRRT Treatment    <Continuous>  DAPTOmycin IVPB 460 milliGRAM(s) IV Intermittent every 48 hours  heparin   Injectable 5000 Unit(s) SubCutaneous every 8 hours  levoFLOXacin IVPB 750 milliGRAM(s) IV Intermittent every 48 hours  meropenem  IVPB 1000 milliGRAM(s) IV Intermittent every 12 hours  norepinephrine Infusion 0.01 MICROgram(s)/kG/Min (0.72 mL/Hr) IV Continuous <Continuous>  octreotide  Infusion 25 MICROgram(s)/Hr (5 mL/Hr) IV Continuous <Continuous>  pantoprazole  Injectable 40 milliGRAM(s) IV Push every 12 hours  Parenteral Nutrition - Adult 1 Each (58 mL/Hr) TPN Continuous <Continuous>  Parenteral Nutrition - Adult 1 Each (58 mL/Hr) TPN Continuous <Continuous>  phenylephrine    Infusion 0.1 MICROgram(s)/kG/Min (1.43 mL/Hr) IV Continuous <Continuous>  PureFlow Dialysate RFP-400 (K 2 / Ca 3) 5000 milliLiter(s) (2000 mL/Hr) CRRT <Continuous>  vasopressin Infusion 0.03 Unit(s)/Min (4.5 mL/Hr) IV Continuous <Continuous>    MEDICATIONS  (PRN):  sodium chloride 0.9% lock flush 10 milliLiter(s) IV Push every 1 hour PRN Pre/post blood products, medications, blood draw, and to maintain line patency  sodium chloride 0.9% lock flush 10 milliLiter(s) IV Push every 1 hour PRN Pre/post blood products, medications, blood draw, and to maintain line patency      Allergies    No Known Allergies    Intolerances    Physical Exam:   Vital Signs Last 24 Hrs  T(C): 35.3 (11 Jul 2023 12:00), Max: 43 (11 Jul 2023 04:00)  T(F): 95.7 (11 Jul 2023 12:00), Max: 109.4 (11 Jul 2023 04:00)  HR: 62 (11 Jul 2023 13:42) (58 - 85)  BP: 121/66 (11 Jul 2023 07:00) (98/60 - 133/78)  BP(mean): 86 (11 Jul 2023 07:00) (74 - 103)  RR: 24 (11 Jul 2023 12:45) (22 - 33)  SpO2: 100% (11 Jul 2023 13:42) (96% - 100%)    Parameters below as of 11 Jul 2023 12:00  Patient On (Oxygen Delivery Method): ventilator    O2 Concentration (%): 30    Labs:                         6.6    7.47  )-----------( 36       ( 11 Jul 2023 03:30 )             20.6     07-11    138  |  105  |  59<H>  ----------------------------<  120<H>  3.7   |  19  |  0.9    Ca    7.2<L>      11 Jul 2023 05:25  Phos  3.5     07-11  Mg     2.1     07-11    TPro  3.3<L>  /  Alb  1.3<L>  /  TBili  0.8  /  DBili  x   /  AST  30  /  ALT  13  /  AlkPhos  179<H>  07-10        Pertinent labs:                      6.6    7.47  )-----------( 36       ( 11 Jul 2023 03:30 )             20.6       07-11    138  |  105  |  59<H>  ----------------------------<  120<H>  3.7   |  19  |  0.9    Ca    7.2<L>      11 Jul 2023 05:25  Phos  3.5     07-11  Mg     2.1     07-11    TPro  3.3<L>  /  Alb  1.3<L>  /  TBili  0.8  /  DBili  x   /  AST  30  /  ALT  13  /  AlkPhos  179<H>  07-10      Radiology & Additional Studies:     Radiology imaging reviewed.       ASSESSMENT/ PLAN:   67M w/ PMH of HTN, grade IV hydronephrosis of L kidney s/p L PCN (placed 5/2023), stutter, hiatal hernia, iron deficiency anemia, H Pylori (untreated), and gastric mass (never biopsied) presents to the ED with at least 4 days of worsening weakness, abdominal distension, and no output from his PCN. Pt and daughters bedside are only able to give limited information, but for the past few days, he has noticed no output from his PCN as well as increasing abdominal distension, pain, and anorexia. He reports that he has not felt normal for the past 10 days. His PCN used to put out serosanguinous fluid, but the output changed to feculent/brown liquid over the past day. In the ED, he was noted to be in new onset afib w/ RVR to 150+ as well as experiencing some difficulty breathing. He was started on BiPAP. Stat labs were notable for WBC 47, Cr 2.9, and lactate 6. CT A/P w/ IV contrast showed free intraperitoneal fluid and air from an unknown source as well as fluid and air in the L kidney.  (18 Jun 2023 18:34)  - acute condition likely due to intraperitoneal fluid and air, surgical intervention may be warranted  - no acute IR intervention warranted at this time due to patients status, currently intubated on pressor support  - multiple abscesses that cannot be controlled by percutaneous drainage  - IR intervention not recommended, recommend conservative / surgical management     Thank you for the courtesy of this consult, please call [phone] with any further questions.

## 2023-07-11 NOTE — PROGRESS NOTE ADULT - SUBJECTIVE AND OBJECTIVE BOX
UROLOGY DAILY PROGRESS NOTE    Pt is a 68y Male  admitted with retroperitoneal and intraabdominal abscess s/p IR placement of drain and LEFT PCN 6/18, s/p percutaneous drainage of LEFT renal abscess POD # 16 with left 26fr flank catheter in place, s/p Left radical nephrectomy, Ex-Lap POD # 10. Seen and examined at bedside. Patient remains intubated w/ ETT on vasopressin/phenylephrine/caspofungin/Daptomycin     MEDICATIONS  (STANDING):  bacitracin   Ointment 1 Application(s) Topical two times a day  caspofungin IVPB      caspofungin IVPB 50 milliGRAM(s) IV Intermittent every 24 hours  chlorhexidine 0.12% Liquid 15 milliLiter(s) Oral Mucosa every 12 hours  chlorhexidine 2% Cloths 1 Application(s) Topical <User Schedule>  CRRT Treatment    <Continuous>  DAPTOmycin IVPB 460 milliGRAM(s) IV Intermittent every 48 hours  heparin   Injectable 5000 Unit(s) SubCutaneous every 8 hours  levoFLOXacin IVPB 750 milliGRAM(s) IV Intermittent every 48 hours  meropenem  IVPB 1000 milliGRAM(s) IV Intermittent every 12 hours  pantoprazole  Injectable 40 milliGRAM(s) IV Push every 12 hours  Parenteral Nutrition - Adult 1 Each (58 mL/Hr) TPN Continuous <Continuous>  phenylephrine    Infusion 0.1 MICROgram(s)/kG/Min (1.43 mL/Hr) IV Continuous <Continuous>  PureFlow Dialysate RFP-400 (K 2 / Ca 3) 5000 milliLiter(s) (2000 mL/Hr) CRRT <Continuous>  vasopressin Infusion 0.03 Unit(s)/Min (4.5 mL/Hr) IV Continuous <Continuous>    MEDICATIONS  (PRN):  sodium chloride 0.9% lock flush 10 milliLiter(s) IV Push every 1 hour PRN Pre/post blood products, medications, blood draw, and to maintain line patency  sodium chloride 0.9% lock flush 10 milliLiter(s) IV Push every 1 hour PRN Pre/post blood products, medications, blood draw, and to maintain line patency      REVIEW OF SYSTEMS   [x] A ten-point review of systems was otherwise negative except as noted.    Vital Signs Last 24 Hrs  T(C): 34.7 (11 Jul 2023 09:00), Max: 43 (11 Jul 2023 04:00)  T(F): 94.5 (11 Jul 2023 09:00), Max: 109.4 (11 Jul 2023 04:00)  HR: 67 (11 Jul 2023 09:00) (58 - 85)  BP: 121/66 (11 Jul 2023 07:00) (98/60 - 133/78)  BP(mean): 86 (11 Jul 2023 07:00) (74 - 103)  RR: 31 (11 Jul 2023 09:00) (26 - 33)  SpO2: 100% (11 Jul 2023 09:00) (96% - 100%)    Parameters below as of 11 Jul 2023 09:00  Patient On (Oxygen Delivery Method): ventilator    O2 Concentration (%): 30    PHYSICAL EXAM:    GEN: intubated/sedated   SKIN: Good color, non diaphoretic.  ABDO:  no palpable bladder, no suprapubic tenderness #1 Lerma pouch eleazar draining serosanguinous fluid, #2 Pelvic pouch eleazar drain draining murky brown drainage. +Right abd pouch with murky brown drainage  BACK: No CVAT B/L, #3 Left abd eleazar drain draining murky brown drainage   : +Penoscrotal edema, slightly improved  MSK:  Left [place] catheter in place. +Right hand first digit with ischemic changes & Left first digit with ischemic changes.  Left great toe with ischemic changes      I&O's Summary    10 Jul 2023 07:01  -  11 Jul 2023 07:00  --------------------------------------------------------  IN: 2682 mL / OUT: 994 mL / NET: 1688 mL    11 Jul 2023 07:01  -  11 Jul 2023 09:41  --------------------------------------------------------  IN: 210 mL / OUT: 370 mL / NET: -160 mL        LABS:                        6.6    7.47  )-----------( 36       ( 11 Jul 2023 03:30 )             20.6     07-11    138  |  105  |  59<H>  ----------------------------<  120<H>  3.7   |  19  |  0.9    Ca    7.2<L>      11 Jul 2023 05:25  Phos  3.5     07-11  Mg     2.1     07-11    TPro  3.3<L>  /  Alb  1.3<L>  /  TBili  0.8  /  DBili  x   /  AST  30  /  ALT  13  /  AlkPhos  179<H>  07-10      Urinalysis Basic - ( 11 Jul 2023 05:25 )    Color: x / Appearance: x / SG: x / pH: x  Gluc: 120 mg/dL / Ketone: x  / Bili: x / Urobili: x   Blood: x / Protein: x / Nitrite: x   Leuk Esterase: x / RBC: x / WBC x   Sq Epi: x / Non Sq Epi: x / Bacteria: x            RADIOLOGY & ADDITIONAL STUDIES:  < from: CT Abdomen and Pelvis w/ Oral Cont and w/ IV Cont (07.10.23 @ 21:06) >  ACC: [id number] EXAM:  CT ABDOMEN AND PELVIS OC IC   ORDERED BY: ZAYDA BARKLEY     PROCEDURE DATE:  07/10/2023          INTERPRETATION:  CLINICAL STATEMENT: Renal cell carcinoma.    TECHNIQUE: Contiguous axial CT images were obtained from the lower chest   to the pubic symphysis following administration of 95 cc Omnipaque 350   intravenous contrast.  Oral contrast was administered.  Reformatted   images in the coronal and sagittal planes were acquired. 5 cc contrast   discarded    Comparison madewith  FINDINGS:    LOWER CHEST: Small bilateral pleural effusions with overlying compressive   atelectasis. Left lower lobe consolidative opacity, may represent   pneumonia in the appropriate clinical setting.    HEPATOBILIARY: Increased number and size of bilobar hepatic hypodense   lesions, including segment IV/III multiseptated suspected abscess   measuring 5 cm, previously 2.4 cm on 6/18/2023 (series 2, image 22).    Patent portal vein. Periportal edema. Gallbladder unremarkable. No   biliarydilation.    SPLEEN: Involving splenic infarction.    PANCREAS: Not well evaluated.    ADRENAL GLANDS: Not well evaluated.    KIDNEYS: Post left nephrectomy. Right renal cyst.    ABDOMINOPELVIC NODES: Unchanged retroperitoneal lymphadenopathy.    PELVIC ORGANS: Urinary bladder collapsed. BPH.    PERITONEUM/MESENTERY/BOWEL: Large volume extraluminal left upper   quadrant/retroperitoneal oral contrast; proximal small bowel leak   suspected. Moderate volume free fluid with enhancement of peritoneal   reflections, consistent with peritonitis.    Multiple intra-abdominal drainage catheters noted. Persistent   pneumoperitoneum.    No bowel obstruction. Rectal tube. Diffuse abnormal small and large bowel   circumferential wall thickening; likely reactive 2 peritonitis.    BONES/SOFT TISSUES: Degenerative change, lumbar spine. Anasarca..    OTHER: Vascular calcifications. Left femoral venous catheter.      IMPRESSION:    1. Large volume extraluminal left upper quadrant/retroperitoneal oral   contrast; proximal small bowel leak suspected.    2. Moderate volume free fluid with enhancement of peritoneal reflections,   consistent with peritonitis. Evaluation for focal abscesses difficult   given extensive background free fluid.    3. Increased number and size of bilobar hepatic hypodense suspected   hepatic abscesses, including segment IV/III multiseptated suspected   abscess measuring 5 cm, previously 2.4 cm on 6/18/2023.    4. Small bilateral pleural effusions. Left lower lobe consolidative  opacity, may represent pneumonia in the appropriate clinical setting.    5. Unchanged retroperitoneal lymphadenopathy.    6. Persistent pneumoperitoneum.    Spoke with Dr. Vargas    --- End of Report ---      VIANCA GORDON MD; Attending Radiologist  This document has been electronically signed. Jul 10 2023 10:30PM    < end of copied text >   UROLOGY DAILY PROGRESS NOTE    Pt is a 68y Male  admitted with retroperitoneal and intraabdominal abscess s/p IR placement of drain and LEFT PCN 6/18, s/p percutaneous drainage of LEFT renal abscess POD # 16 with left 26fr flank catheter in place, s/p Left radical nephrectomy, Ex-Lap POD # 10. Seen and examined at bedside. Patient remains intubated w/ ETT on vasopressin/phenylephrine/caspofungin/Daptomycin     MEDICATIONS  (STANDING):  bacitracin   Ointment 1 Application(s) Topical two times a day  caspofungin IVPB      caspofungin IVPB 50 milliGRAM(s) IV Intermittent every 24 hours  chlorhexidine 0.12% Liquid 15 milliLiter(s) Oral Mucosa every 12 hours  chlorhexidine 2% Cloths 1 Application(s) Topical <User Schedule>  CRRT Treatment    <Continuous>  DAPTOmycin IVPB 460 milliGRAM(s) IV Intermittent every 48 hours  heparin   Injectable 5000 Unit(s) SubCutaneous every 8 hours  levoFLOXacin IVPB 750 milliGRAM(s) IV Intermittent every 48 hours  meropenem  IVPB 1000 milliGRAM(s) IV Intermittent every 12 hours  pantoprazole  Injectable 40 milliGRAM(s) IV Push every 12 hours  Parenteral Nutrition - Adult 1 Each (58 mL/Hr) TPN Continuous <Continuous>  phenylephrine    Infusion 0.1 MICROgram(s)/kG/Min (1.43 mL/Hr) IV Continuous <Continuous>  PureFlow Dialysate RFP-400 (K 2 / Ca 3) 5000 milliLiter(s) (2000 mL/Hr) CRRT <Continuous>  vasopressin Infusion 0.03 Unit(s)/Min (4.5 mL/Hr) IV Continuous <Continuous>    MEDICATIONS  (PRN):  sodium chloride 0.9% lock flush 10 milliLiter(s) IV Push every 1 hour PRN Pre/post blood products, medications, blood draw, and to maintain line patency  sodium chloride 0.9% lock flush 10 milliLiter(s) IV Push every 1 hour PRN Pre/post blood products, medications, blood draw, and to maintain line patency      REVIEW OF SYSTEMS   [x] A ten-point review of systems was otherwise negative except as noted.    Vital Signs Last 24 Hrs  T(C): 34.7 (11 Jul 2023 09:00), Max: 43 (11 Jul 2023 04:00)  T(F): 94.5 (11 Jul 2023 09:00), Max: 109.4 (11 Jul 2023 04:00)  HR: 67 (11 Jul 2023 09:00) (58 - 85)  BP: 121/66 (11 Jul 2023 07:00) (98/60 - 133/78)  BP(mean): 86 (11 Jul 2023 07:00) (74 - 103)  RR: 31 (11 Jul 2023 09:00) (26 - 33)  SpO2: 100% (11 Jul 2023 09:00) (96% - 100%)    Parameters below as of 11 Jul 2023 09:00  Patient On (Oxygen Delivery Method): ventilator    O2 Concentration (%): 30    PHYSICAL EXAM:    GEN: intubated/sedated not really responsive  SKIN: Good color, non diaphoretic.  ABDO:  incision with necrotic edges, abd non  distened #1 Lerma pouch eleazar draining serosanguinous fluid, #2 Pelvic pouch eleazar drain draining murky brown drainage. +Right abd pouch with murky brown drainage  BACK:  #3 Left abd eleazar drain draining murky brown drainage   : +Penoscrotal edema, slightly improved, bobby out as minimal drainage  MSK:  Left [place] catheter in place. +Right hand first digit with ischemic changes & Left first digit with ischemic changes.  Left great toe with ischemic changes      I&O's Summary    10 Jul 2023 07:01  -  11 Jul 2023 07:00  --------------------------------------------------------  IN: 2682 mL / OUT: 994 mL / NET: 1688 mL    11 Jul 2023 07:01  -  11 Jul 2023 09:41  --------------------------------------------------------  IN: 210 mL / OUT: 370 mL / NET: -160 mL        LABS:                        6.6    7.47  )-----------( 36       ( 11 Jul 2023 03:30 )             20.6     07-11    138  |  105  |  59<H>  ----------------------------<  120<H>  3.7   |  19  |  0.9    Ca    7.2<L>      11 Jul 2023 05:25  Phos  3.5     07-11  Mg     2.1     07-11    TPro  3.3<L>  /  Alb  1.3<L>  /  TBili  0.8  /  DBili  x   /  AST  30  /  ALT  13  /  AlkPhos  179<H>  07-10      Urinalysis Basic - ( 11 Jul 2023 05:25 )    Color: x / Appearance: x / SG: x / pH: x  Gluc: 120 mg/dL / Ketone: x  / Bili: x / Urobili: x   Blood: x / Protein: x / Nitrite: x   Leuk Esterase: x / RBC: x / WBC x   Sq Epi: x / Non Sq Epi: x / Bacteria: x            RADIOLOGY & ADDITIONAL STUDIES:  < from: CT Abdomen and Pelvis w/ Oral Cont and w/ IV Cont (07.10.23 @ 21:06) >  ACC: [id number] EXAM:  CT ABDOMEN AND PELVIS OC IC   ORDERED BY: ZAYDA BARKLEY     PROCEDURE DATE:  07/10/2023          INTERPRETATION:  CLINICAL STATEMENT: Renal cell carcinoma.    TECHNIQUE: Contiguous axial CT images were obtained from the lower chest   to the pubic symphysis following administration of 95 cc Omnipaque 350   intravenous contrast.  Oral contrast was administered.  Reformatted   images in the coronal and sagittal planes were acquired. 5 cc contrast   discarded    Comparison made with  FINDINGS:    LOWER CHEST: Small bilateral pleural effusions with overlying compressive   atelectasis. Left lower lobe consolidative opacity, may represent   pneumonia in the appropriate clinical setting.    HEPATOBILIARY: Increased number and size of bilobar hepatic hypodense   lesions, including segment IV/III multiseptated suspected abscess   measuring 5 cm, previously 2.4 cm on 6/18/2023 (series 2, image 22).    Patent portal vein. Periportal edema. Gallbladder unremarkable. No   biliarydilation.    SPLEEN: Involving splenic infarction.    PANCREAS: Not well evaluated.    ADRENAL GLANDS: Not well evaluated.    KIDNEYS: Post left nephrectomy. Right renal cyst.    ABDOMINOPELVIC NODES: Unchanged retroperitoneal lymphadenopathy.    PELVIC ORGANS: Urinary bladder collapsed. BPH.    PERITONEUM/MESENTERY/BOWEL: Large volume extraluminal left upper   quadrant/retroperitoneal oral contrast; proximal small bowel leak   suspected. Moderate volume free fluid with enhancement of peritoneal   reflections, consistent with peritonitis.    Multiple intra-abdominal drainage catheters noted. Persistent   pneumoperitoneum.    No bowel obstruction. Rectal tube. Diffuse abnormal small and large bowel   circumferential wall thickening; likely reactive 2 peritonitis.    BONES/SOFT TISSUES: Degenerative change, lumbar spine. Anasarca..    OTHER: Vascular calcifications. Left femoral venous catheter.      IMPRESSION:    1. Large volume extraluminal left upper quadrant/retroperitoneal oral   contrast; proximal small bowel leak suspected.    2. Moderate volume free fluid with enhancement of peritoneal reflections,   consistent with peritonitis. Evaluation for focal abscesses difficult   given extensive background free fluid.    3. Increased number and size of bilobar hepatic hypodense suspected   hepatic abscesses, including segment IV/III multiseptated suspected   abscess measuring 5 cm, previously 2.4 cm on 6/18/2023.    4. Small bilateral pleural effusions. Left lower lobe consolidative  opacity, may represent pneumonia in the appropriate clinical setting.    5. Unchanged retroperitoneal lymphadenopathy.    6. Persistent pneumoperitoneum.    Spoke with Dr. Vargas    --- End of Report ---      VIANCA GORDON MD; Attending Radiologist  This document has been electronically signed. Jul 10 2023 10:30PM    < end of copied text >

## 2023-07-11 NOTE — PROGRESS NOTE ADULT - ASSESSMENT
Assessment and Plan:   67M w/ PMH of HTN, grade IV hydronephrosis of L kidney s/p L PCN (placed 5/2023), stutter, hiatal hernia, iron deficiency anemia, H Pylori (untreated), and gastric mass (never biopsied) presents to the ED with at least 4 days of worsening weakness, abdominal distension, and no output from his PCN. Admitted with emphysematous pyelonephritis.     (6/18): s/p LT PCN upsizing to 16Fr and 16Fr RLQ drain placement with IR   (6/22): s/p shock lithotripsy of L kidney abscess cavity with drainage of thick contents, upsize of drain to 26Fr bobby catheter  (6/30): s/p ex-lap and L nephrectomy with abdominal washout    NEURO:  #Pain control    -APAP PRN  #Sedation    -Off Precedex     -not following commands when off sedation, GCS 10T  #Change in Mental Status     -CT head 7/10: No acute intracranial pathology      RESP:  Vent Settings: 450/28/30/8   ABG: ABG - ( 10 Jul 2023 06:26 )  pH, Arterial: 7.23  pH, Blood: x     /  pCO2: 58    /  pO2: 366   / HCO3: 24    / Base Excess: -3.4  /  SaO2: 100.0   #acute respiratory failure with left lung mucous plugging - resolved s/p intubated 6/24 and bronchoscopy     -Bronchoscopy 7/10: No mucous plug found    -CT Chest 7/10: Interval increase in size of bilateral pleural effusions with adjacent consolidation both lower lobes.   #Pulmonary nodules / Mediastinal lymphadenopathy    - CT Chest: Right middle and upper lobe solid pulmonary nodules, f/u outpatient pulm   #Activity   - incr as tolerated    CARDS:   #Septic shock    - off levo since 3am 7/1, remains on phenylephrine and Vasopressin infusions  #New onset Afib w/ RVR with hypotension -- likely 2/2 sepsis    - HR control with Metoprolol 2.5 q6 IV (OFF currently)    -Digoxin 250mcg x1 dose, 125mcg x1 7/1, 125mcg 7/4, 125 mcg on 7/6, repeat level in am 7/7 2.7 =>am dose held, 7/8 2.9 --> 7/9 1.9     - Cardiology following  #H/O HTN    -Amlodipine 2.5mg HOLDING due to ongoing pressor requirement  Imaging    - ECHO: (6/19) EF 55-60%, mild AS, mild LAE, mild MR/TR, trivial pericardial effusion    GI/NUTR:  #Diet: NPO on TPN @58cc/hr w/o potassium  CT AP PO/IV/rectal contrast 7/10:     -Large volume extraluminal left upper quadrant/retroperitoneal oral contrast; proximal small bowel leak suspected.    -Moderate volume free fluid with enhancement of peritoneal reflections, consistent with peritonitis. Evaluation for focal abscesses difficult   given extensive background free fluid.    -Increased number and size of bilobar hepatic hypodense suspected hepatic abscesses, including segment IV/III multiseptated suspected   abscess measuring 5 cm, previously 2.4 cm on 6/18/2023.    -Unchanged retroperitoneal lymphadenopathy    -Persistent pneumoperitoneum.    -Per Dr. Womack, start Octreotide =>Hypertensive to SBP 200s and bradycardic to 36 after octreotide, discontinued   #bowel movements    - GI consult 6/26- Plan for EGD with EUS + FNA as outpatient, H-pylori treatment after resolution of acute issues   -FOBT positive    #GI PPX  -IV Protonix 40 q12 hours  #Gastric mass vs gastrohepatic lymphadenopathy    -CT A/P: lesser curvature mass , Hepatic hypodensities  #Splenic Hypodensities, infarct vs phlegmon/abscess    -CT A/P:  Wedge-shaped region of hypodensity within the spleen    /RENAL:   #Grade IV L hydronephrosis s/p L PCN (5/2023) -- presented to ED with thick dark foul smelling output  #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    -(6/23) s/p LT PCN upsizing to 26Fr catheter, RLQ 16 Fr drain remains, s/p nephrostolithotomy w/ lithotripsy    -(6/18) s/p LT PCN upsizing to 16Fr and 16Fr RLQ drain placement with IR, flush q8 hr  (6/30): s/p L nephrectomy and abdominal washout   -R pelvic drain Creatinine 3.99  #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   #metabolic acidosis  -Nephrology c/s - CVVHD started 07/02, current goal to keep net even  #urine output in critically ill    -Straight cath every 12 hours  #decreased urine output in the setting of acute renal failure 2/2 septic shock  Labs:       HEME/ONC:   #DVT prophylaxis  -SCDs, heparin subQ   #new onset Afib  -holding heparin infusion in setting of hematochezia    DVT propylaxis- HSQ     #R radial artery occlusion  -RUE duplex 07/01 + for right radial artery occlusion  #RT posterior popliteal vein DVT  -vascular c/s - rec AC   #H/o Fe Deficiency anemia  #Thrombocytopenia  Heme consult (7/3):   - low risk for HIT (score 2-3)  - Heme/onc wants full HIT workup although low HIT risk  - HIT workup negative  - risk of full anticoagulation outweigh the benefits due to high risk of bleeding (h/o GI bleed and post-nephrectomy), will hold off on AC and will wait for HIT AB screen results, if new DVT then will re-assess; discussed with SICU attending Dr. Orozco who also discussed this with hematology team and Dr. Womack who are all in agreement.  Labs:     ID:  #Leukocytosis 2/2 sepsis   WBC- 11.95  --->>,  10.29  --->>,  9.87  --->> 10.4  Temp trend- 24hrs T(F): 96.6 (07-09 @ 23:00), Max: 96.6 (07-09 @ 11:00)  Current antibiotics-  - caspofungin IVPB 50 every 24 hours  - DAPTOmycin IVPB 460 every 48 hours  - levoFLOXacin IVPB 750 every 48 hours  - meropenem  IVPB 1000 every 12 hours  -Blood cultures 7/10 follow up results.     #Cultures  Surgical swab 06/30: vanc resistent enterococcus faecium     L PCN cyto-pathology  6/25: results suggestive of a necrotic neoplasm    Fungitell 6/26: 47 --> >500 (7/6)    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    BAL 6/24: moderate yeast     UCx 6/19: negative FINAL      Left PCN aspiration 6/18: stenotrophomonas maltophilia    Surgical swab (6/30): VRE     Blood cultures: 7/10 Pending results - received     ENDO:  #Glucose monitoring    -A1c (5/7/23): 5.0    -POCT q6 hours while NPO   #Adrenal Insufficiency 2/2 to sepsis     -completed course of solu-cortef    -Cortisol level - binding globulin 1.1 (low), serum cortisol, 84, free cortisol 76, % free cortisol 91    MSK:  #Sacrococcygeal DTI: wound care following- cleanse with soap and water, apply triad, #gauze, and allevyn BID and PRN for soiling   Venous ulcer right lower leg, R forearm skin tear: Pat dry, apply Xeroform, nonadherent dressing, wrap with Kerlix twice a day, prn for soiling  Burn consulted, recs:  --> sacrum: apply hydrogel and Allevyn pad  --> b/l ischium: Allevyn pad  - offloading/positioning  #Activity    -Advance as tolerated    -PT/rehab once extubated    -Globally deconditioned  #b/l UE, LE mottled skin  +ulnar/DP/PT pulses present on doppler  -Arterial duplex bilateral upper extremities (7/1)  - Right radial artery occlusion   -VA Duplex bilateral lower extremities (7/3) - Right PT vein DVT     LINES/DRAINS:   PIV  Bobby (6/18-7/10)  L eleazar drain (6/30)  R Lerma pouch #1, pelvic drain #2 (6/30)  Left basilic midline  6/21  Right axillary a line (6/30-7/4)  Right Radial [place] (6/22-6/30)  L-Femoral [place] (7/10 - )  L-Femoral [place] (7/10 - )  RIJ TLC (7/10-  )  ETT  OGT    ADVANCED DIRECTIVES:  Full Code  HCP/Emergency Contact- Tracey Adames: [phone]   Palliative following. Last [facility] discussion 7/7    INDICATION FOR SICU: New onset atrial fibrillation w/ mechanical ventilation    DISPOSITION: SICU Assessment and Plan:   67M w/ PMH of HTN, grade IV hydronephrosis of L kidney s/p L PCN (placed 5/2023), stutter, hiatal hernia, iron deficiency anemia, H Pylori (untreated), and gastric mass (never biopsied) presents to the ED with at least 4 days of worsening weakness, abdominal distension, and no output from his PCN. Admitted with emphysematous pyelonephritis.     (6/18): s/p LT PCN upsizing to 16Fr and 16Fr RLQ drain placement with IR   (6/22): s/p shock lithotripsy of L kidney abscess cavity with drainage of thick contents, upsize of drain to 26Fr bobby catheter  (6/30): s/p ex-lap and L nephrectomy with abdominal washout    NEURO:  #Pain control    -APAP PRN  #Sedation    -Off Precedex     -not following commands when off sedation, GCS 10T  #Change in Mental Status     -CT head 7/10: No acute intracranial pathology      RESP:  Vent Settings: 450/28/30/8   ABG: ABG - ( 10 Jul 2023 06:26 )  pH, Arterial: 7.23  pH, Blood: x     /  pCO2: 58    /  pO2: 366   / HCO3: 24    / Base Excess: -3.4  /  SaO2: 100.0   #acute respiratory failure with left lung mucous plugging - resolved s/p intubated 6/24 and bronchoscopy     -Bronchoscopy 7/10: No mucous plug found    -CT Chest 7/10: Interval increase in size of bilateral pleural effusions with adjacent consolidation both lower lobes.   #Pulmonary nodules / Mediastinal lymphadenopathy    - CT Chest: Right middle and upper lobe solid pulmonary nodules, f/u outpatient pulm   #Activity   - incr as tolerated    CARDS:   #Septic shock    - off levo since 3am 7/1, remains on phenylephrine and Vasopressin infusions  #New onset Afib w/ RVR with hypotension -- likely 2/2 sepsis    - HR control with Metoprolol 2.5 q6 IV (OFF currently)    -Digoxin 250mcg x1 dose, 125mcg x1 7/1, 125mcg 7/4, 125 mcg on 7/6, repeat level in am 7/7 2.7 =>am dose held, 7/8 2.9 --> 7/9 1.9     - Cardiology following  #H/O HTN    -Amlodipine 2.5mg HOLDING due to ongoing pressor requirement  Imaging    - ECHO: (6/19) EF 55-60%, mild AS, mild LAE, mild MR/TR, trivial pericardial effusion    GI/NUTR:  #Diet: NPO on TPN @58cc/hr w/o potassium  CT AP PO/IV/rectal contrast 7/10:     -Large volume extraluminal left upper quadrant/retroperitoneal oral contrast; proximal small bowel leak suspected.    -Moderate volume free fluid with enhancement of peritoneal reflections, consistent with peritonitis. Evaluation for focal abscesses difficult   given extensive background free fluid.    -Increased number and size of bilobar hepatic hypodense suspected hepatic abscesses, including segment IV/III multiseptated suspected   abscess measuring 5 cm, previously 2.4 cm on 6/18/2023.    -Unchanged retroperitoneal lymphadenopathy    -Persistent pneumoperitoneum.    -Per Dr. Womack, start Octreotide =>Hypertensive to SBP 200s and bradycardic to 36 after octreotide, discontinued   #bowel movements    - GI consult 6/26- Plan for EGD with EUS + FNA as outpatient, H-pylori treatment after resolution of acute issues   -FOBT positive    #GI PPX  -IV Protonix 40 q12 hours  #Gastric mass vs gastrohepatic lymphadenopathy    -CT A/P: lesser curvature mass , Hepatic hypodensities  #Splenic Hypodensities, infarct vs phlegmon/abscess    -CT A/P:  Wedge-shaped region of hypodensity within the spleen    /RENAL:   #Grade IV L hydronephrosis s/p L PCN (5/2023) -- presented to ED with thick dark foul smelling output  #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    -(6/23) s/p LT PCN upsizing to 26Fr catheter, RLQ 16 Fr drain remains, s/p nephrostolithotomy w/ lithotripsy    -(6/18) s/p LT PCN upsizing to 16Fr and 16Fr RLQ drain placement with IR, flush q8 hr  (6/30): s/p L nephrectomy and abdominal washout   -R pelvic drain Creatinine 3.99  #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   #metabolic acidosis  -Nephrology c/s - CVVHD started 07/02, current goal to keep net even  #urine output in critically ill    -Straight cath every 12 hours  #decreased urine output in the setting of acute renal failure 2/2 septic shock  Labs:       HEME/ONC:   #DVT prophylaxis  -SCDs, heparin subQ   #new onset Afib  -holding heparin infusion in setting of hematochezia    DVT propylaxis- HSQ     #R radial artery occlusion  -RUE duplex 07/01 + for right radial artery occlusion  #RT posterior popliteal vein DVT  -vascular c/s - rec AC   #H/o Fe Deficiency anemia  #Thrombocytopenia  Heme consult (7/3):   - low risk for HIT (score 2-3)  - Heme/onc wants full HIT workup although low HIT risk  - HIT workup negative  - risk of full anticoagulation outweigh the benefits due to high risk of bleeding (h/o GI bleed and post-nephrectomy), will hold off on AC and will wait for HIT AB screen results, if new DVT then will re-assess; discussed with SICU attending Dr. Orozco who also discussed this with hematology team and Dr. Womack who are all in agreement.  Labs:     ID:  #Leukocytosis 2/2 sepsis   WBC- 11.95  --->>,  10.29  --->>,  9.87  --->> 10.4  Temp trend- 24hrs T(F): 96.6 (07-09 @ 23:00), Max: 96.6 (07-09 @ 11:00)  Current antibiotics-  - caspofungin IVPB 50 every 24 hours  - DAPTOmycin IVPB 460 every 48 hours  - levoFLOXacin IVPB 750 every 48 hours  - meropenem  IVPB 1000 every 12 hours    #Cultures  Surgical swab 06/30: vanc resistent enterococcus faecium     L PCN cyto-pathology  6/25: results suggestive of a necrotic neoplasm    Fungitell 6/26: 47 --> >500 (7/6)    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    BAL 6/24: moderate yeast     UCx 6/19: negative FINAL      Left PCN aspiration 6/18: stenotrophomonas maltophilia    Surgical swab (6/30): VRE     Blood cultures: 7/10 Pending results - received     ENDO:  #Glucose monitoring    -A1c (5/7/23): 5.0    -POCT q6 hours while NPO   #Adrenal Insufficiency 2/2 to sepsis     -completed course of solu-cortef    -Cortisol level - binding globulin 1.1 (low), serum cortisol, 84, free cortisol 76, % free cortisol 91    MSK:  #Sacrococcygeal DTI: wound care following- cleanse with soap and water, apply triad, #gauze, and allevyn BID and PRN for soiling   Venous ulcer right lower leg, R forearm skin tear: Pat dry, apply Xeroform, nonadherent dressing, wrap with Kerlix twice a day, prn for soiling  Burn consulted, recs:  --> sacrum: apply hydrogel and Allevyn pad  --> b/l ischium: Allevyn pad  - offloading/positioning  #Activity    -Advance as tolerated    -PT/rehab once extubated    -Globally deconditioned  #b/l UE, LE mottled skin  +ulnar/DP/PT pulses present on doppler  -Arterial duplex bilateral upper extremities (7/1)  - Right radial artery occlusion   -VA Duplex bilateral lower extremities (7/3) - Right PT vein DVT     LINES/DRAINS:   PIV  Bobby (6/18-7/10)  L eleazar drain (6/30)  R Lerma pouch #1, pelvic drain #2 (6/30)  Left basilic midline  6/21  Right axillary a line (6/30-7/4)  Right Radial [place] (6/22-6/30)  L-Femoral [place] (7/10 - )  L-Femoral [place] (7/10 - )  RIJ TLC (7/10-  )  ETT  OGT    ADVANCED DIRECTIVES:  Full Code  HCP/Emergency Contact- Tracey Adames: [phone]   Palliative following. Last [facility] discussion 7/7    INDICATION FOR SICU: New onset atrial fibrillation w/ mechanical ventilation    DISPOSITION: SICU Assessment and Plan:   67M w/ PMH of HTN, grade IV hydronephrosis of L kidney s/p L PCN (placed 5/2023), stutter, hiatal hernia, iron deficiency anemia, H Pylori (untreated), and gastric mass (never biopsied) presents to the ED with at least 4 days of worsening weakness, abdominal distension, and no output from his PCN. Admitted with emphysematous pyelonephritis.     (6/18): s/p LT PCN upsizing to 16Fr and 16Fr RLQ drain placement with IR   (6/22): s/p shock lithotripsy of L kidney abscess cavity with drainage of thick contents, upsize of drain to 26Fr bobby catheter  (6/30): s/p ex-lap and L nephrectomy with abdominal washout    NEURO:  #Pain control    -APAP PRN  #Sedation    -Off Precedex     -not following commands when off sedation, GCS 10T  #Change in Mental Status     -CT head 7/10: No acute intracranial pathology      RESP:  Vent Settings: 450/28/30/8   ABG: ABG - ( 10 Jul 2023 06:26 )  pH, Arterial: 7.23  pH, Blood: x     /  pCO2: 58    /  pO2: 366   / HCO3: 24    / Base Excess: -3.4  /  SaO2: 100.0   #acute respiratory failure with left lung mucous plugging - resolved s/p intubated 6/24 and bronchoscopy     -Bronchoscopy 7/10: No mucous plug found    -CT Chest 7/10: Interval increase in size of bilateral pleural effusions with adjacent consolidation both lower lobes.   #Pulmonary nodules / Mediastinal lymphadenopathy    - CT Chest: Right middle and upper lobe solid pulmonary nodules, f/u outpatient pulm   #Activity   - incr as tolerated    CARDS:   #Septic shock    - off levo since 3am 7/1, remains on phenylephrine and Vasopressin infusions  #New onset Afib w/ RVR with hypotension -- likely 2/2 sepsis    - HR control with Metoprolol 2.5 q6 IV (OFF currently)    -Digoxin 250mcg x1 dose, 125mcg x1 7/1, 125mcg 7/4, 125 mcg on 7/6, repeat level in am 7/7 2.7 =>am dose held, 7/8 2.9 --> 7/9 1.9     - Cardiology following  #H/O HTN    -Amlodipine 2.5mg HOLDING due to ongoing pressor requirement  Imaging    - ECHO: (6/19) EF 55-60%, mild AS, mild LAE, mild MR/TR, trivial pericardial effusion    GI/NUTR:  #Diet: NPO on TPN @58cc/hr w/o potassium  CT AP PO/IV/rectal contrast 7/10:     -Large volume extraluminal left upper quadrant/retroperitoneal oral contrast; proximal small bowel leak suspected.    -Moderate volume free fluid with enhancement of peritoneal reflections, consistent with peritonitis. Evaluation for focal abscesses difficult   given extensive background free fluid.    -Increased number and size of bilobar hepatic hypodense suspected hepatic abscesses, including segment IV/III multiseptated suspected   abscess measuring 5 cm, previously 2.4 cm on 6/18/2023.    -Unchanged retroperitoneal lymphadenopathy    -Persistent pneumoperitoneum.    -Per Dr. Womack, start Octreotide =>Hypertensive to SBP 200s and bradycardic to 36 after octreotide, discontinued   #bowel movements    - GI consult 6/26- Plan for EGD with EUS + FNA as outpatient, H-pylori treatment after resolution of acute issues   -FOBT positive    #GI PPX  -IV Protonix 40 q12 hours  #Gastric mass vs gastrohepatic lymphadenopathy    -CT A/P: lesser curvature mass , Hepatic hypodensities  #Splenic Hypodensities, infarct vs phlegmon/abscess    -CT A/P:  Wedge-shaped region of hypodensity within the spleen    /RENAL:   #Grade IV L hydronephrosis s/p L PCN (5/2023) -- presented to ED with thick dark foul smelling output  #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    -(6/23) s/p LT PCN upsizing to 26Fr catheter, RLQ 16 Fr drain remains, s/p nephrostolithotomy w/ lithotripsy    -(6/18) s/p LT PCN upsizing to 16Fr and 16Fr RLQ drain placement with IR, flush q8 hr  (6/30): s/p L nephrectomy and abdominal washout   -R pelvic drain Creatinine 3.99  #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   #metabolic acidosis  -Nephrology c/s - CVVHD started 07/02, current goal to keep net even  #urine output in critically ill    -Straight cath every 12 hours  #decreased urine output in the setting of acute renal failure 2/2 septic shock    Labs:          BUN/Cr- 59/1.0  -->,  59/0.9  -->          Electrolytes-Na 138 // K 3.7 // Mg 2.1 //  Phos 3.5 (07-11 @ 05:25)    HEME/ONC:   #DVT prophylaxis  -SCDs, heparin subQ   #new onset Afib  -holding heparin infusion in setting of hematochezia    DVT propylaxis- HSQ     #R radial artery occlusion  -RUE duplex 07/01 + for right radial artery occlusion  #RT posterior popliteal vein DVT  -vascular c/s - rec AC   #H/o Fe Deficiency anemia  #Thrombocytopenia  Heme consult (7/3):   - low risk for HIT (score 2-3)  - Heme/onc wants full HIT workup although low HIT risk  - HIT workup negative  - risk of full anticoagulation outweigh the benefits due to high risk of bleeding (h/o GI bleed and post-nephrectomy), will hold off on AC and will wait for HIT AB screen results, if new DVT then will re-assess; discussed with SICU attending Dr. Orozco who also discussed this with hematology team and Dr. Womack who are all in agreement.    Labs: Hb/Hct:  8.4/25.5  -->,  6.6/20.6  -->                      Plts:  39  -->,  36  -->                 PTT/INR:          ID:  WBC- 12.39  --->>,  10.45  --->>,  7.47  --->>  Temp trend- 24hrs T(F): 109.4 (07-11 @ 06:00), Max: 109.4 (07-11 @ 04:00)  Antibiotics-caspofungin IVPB    caspofungin IVPB 50 every 24 hours  DAPTOmycin IVPB 460 every 48 hours  levoFLOXacin IVPB 750 every 48 hours  meropenem  IVPB 1000 every 12 hours  #Cultures  Surgical swab 06/30: vanc resistent enterococcus faecium     L PCN cyto-pathology  6/25: results suggestive of a necrotic neoplasm    Fungitell 6/26: 47 --> >500 (7/6)    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    BAL 6/24: moderate yeast     UCx 6/19: negative FINAL      Left PCN aspiration 6/18: stenotrophomonas maltophilia    Surgical swab (6/30): VRE     Blood cultures: 7/10 Pending results - received     ENDO:  #Glucose monitoring    -A1c (5/7/23): 5.0    -POCT q6 hours while NPO   #Adrenal Insufficiency 2/2 to sepsis     -completed course of solu-cortef    -Cortisol level - binding globulin 1.1 (low), serum cortisol, 84, free cortisol 76, % free cortisol 91    MSK:  #Sacrococcygeal DTI: wound care following- cleanse with soap and water, apply triad, #gauze, and allevyn BID and PRN for soiling   Venous ulcer right lower leg, R forearm skin tear: Pat dry, apply Xeroform, nonadherent dressing, wrap with Kerlix twice a day, prn for soiling  Burn consulted, recs:  --> sacrum: apply hydrogel and Allevyn pad  --> b/l ischium: Allevyn pad  - offloading/positioning  #Activity    -Advance as tolerated    -PT/rehab once extubated    -Globally deconditioned  #b/l UE, LE mottled skin  +ulnar/DP/PT pulses present on doppler  -Arterial duplex bilateral upper extremities (7/1)  - Right radial artery occlusion   -VA Duplex bilateral lower extremities (7/3) - Right PT vein DVT     LINES/DRAINS:   PIV  Bobby (6/18-7/10)  L eleazar drain (6/30)  R Lerma pouch #1, pelvic drain #2 (6/30)  Left basilic midline  6/21  Right axillary a line (6/30-7/4)  Right Radial [place] (6/22-6/30)  L-Femoral [place] (7/10 - )  L-Femoral [place] (7/10 - )  RIJ TLC (7/10-  )  ETT  OGT    ADVANCED DIRECTIVES:  Full Code  HCP/Emergency Contact- Tracey Adames: [phone]   Palliative following. Last [facility] discussion 7/7    INDICATION FOR SICU: New onset atrial fibrillation w/ mechanical ventilation    DISPOSITION: SICU Assessment and Plan:   67M w/ PMH of HTN, grade IV hydronephrosis of L kidney s/p L PCN (placed 5/2023), stutter, hiatal hernia, iron deficiency anemia, H Pylori (untreated), and gastric mass (never biopsied) presents to the ED with at least 4 days of worsening weakness, abdominal distension, and no output from his PCN. Admitted with emphysematous pyelonephritis.     (6/18): s/p LT PCN upsizing to 16Fr and 16Fr RLQ drain placement with IR   (6/22): s/p shock lithotripsy of L kidney abscess cavity with drainage of thick contents, upsize of drain to 26Fr bobby catheter  (6/30): s/p ex-lap and L nephrectomy with abdominal washout    NEURO:  #Pain control    -APAP PRN  #Sedation    -Off Precedex     -not following commands when off sedation, GCS 10T  #Change in Mental Status     -CT head 7/10: No acute intracranial pathology      RESP:  Vent Settings: 450/28/30/8   ABG: ABG - ( 10 Jul 2023 06:26 )  pH, Arterial: 7.23  pH, Blood: x     /  pCO2: 58    /  pO2: 366   / HCO3: 24    / Base Excess: -3.4  /  SaO2: 100.0   #acute respiratory failure with left lung mucous plugging - resolved s/p intubated 6/24 and bronchoscopy     -Bronchoscopy 7/10: No mucous plug found    -CT Chest 7/10: Interval increase in size of bilateral pleural effusions with adjacent consolidation both lower lobes.   #Pulmonary nodules / Mediastinal lymphadenopathy    - CT Chest: Right middle and upper lobe solid pulmonary nodules, f/u outpatient pulm   #Activity   - incr as tolerated    CARDS:   #Septic shock    - off levo since 3am 7/1, remains on phenylephrine and Vasopressin infusions  #New onset Afib w/ RVR with hypotension -- likely 2/2 sepsis    - HR control with Metoprolol 2.5 q6 IV (OFF currently)    -Digoxin 250mcg x1 dose, 125mcg x1 7/1, 125mcg 7/4, 125 mcg on 7/6, repeat level in am 7/7 2.7 =>am dose held, 7/8 2.9 --> 7/9 1.9     - Cardiology following  #H/O HTN    -Amlodipine 2.5mg HOLDING due to ongoing pressor requirement  Imaging    - ECHO: (6/19) EF 55-60%, mild AS, mild LAE, mild MR/TR, trivial pericardial effusion    GI/NUTR:  #Diet: NPO on TPN @58cc/hr w/o potassium  CT AP PO/IV/rectal contrast 7/10:     -Large volume extraluminal left upper quadrant/retroperitoneal oral contrast; proximal small bowel leak suspected.    -Moderate volume free fluid with enhancement of peritoneal reflections, consistent with peritonitis. Evaluation for focal abscesses difficult   given extensive background free fluid.    -Increased number and size of bilobar hepatic hypodense suspected hepatic abscesses, including segment IV/III multiseptated suspected   abscess measuring 5 cm, previously 2.4 cm on 6/18/2023.    -Unchanged retroperitoneal lymphadenopathy    -Persistent pneumoperitoneum.    -Per Dr. Womack, start Octreotide =>Hypertensive to SBP 200s and bradycardic to 36 after octreotide, discontinued   #bowel movements    - GI consult 6/26- Plan for EGD with EUS + FNA as outpatient, H-pylori treatment after resolution of acute issues   -FOBT positive    #GI PPX  -IV Protonix 40 q12 hours  #Gastric mass vs gastrohepatic lymphadenopathy    -CT A/P: lesser curvature mass , Hepatic hypodensities  #Splenic Hypodensities, infarct vs phlegmon/abscess    -CT A/P:  Wedge-shaped region of hypodensity within the spleen    /RENAL:   #Grade IV L hydronephrosis s/p L PCN (5/2023) -- presented to ED with thick dark foul smelling output  #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    -(6/23) s/p LT PCN upsizing to 26Fr catheter, RLQ 16 Fr drain remains, s/p nephrostolithotomy w/ lithotripsy    -(6/18) s/p LT PCN upsizing to 16Fr and 16Fr RLQ drain placement with IR, flush q8 hr  (6/30): s/p L nephrectomy and abdominal washout   -R pelvic drain Creatinine 3.99  #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   #metabolic acidosis  -Nephrology c/s - CVVHD started 07/02, current goal to keep net even  #urine output in critically ill    -Straight cath every 12 hours  #decreased urine output in the setting of acute renal failure 2/2 septic shock    Labs:          BUN/Cr- 59/1.0  -->,  59/0.9  -->          Electrolytes-Na 138 // K 3.7 // Mg 2.1 //  Phos 3.5 (07-11 @ 05:25)    HEME/ONC:   #DVT prophylaxis  -SCDs, heparin subQ   #new onset Afib  -holding heparin infusion in setting of hematochezia    DVT propylaxis- HSQ     #R radial artery occlusion  -RUE duplex 07/01 + for right radial artery occlusion  #RT posterior popliteal vein DVT  -vascular c/s - rec AC   #H/o Fe Deficiency anemia  #Thrombocytopenia  Heme consult (7/3):   - low risk for HIT (score 2-3)  - Heme/onc wants full HIT workup although low HIT risk  - HIT workup negative  - risk of full anticoagulation outweigh the benefits due to high risk of bleeding (h/o GI bleed and post-nephrectomy), will hold off on AC and will wait for HIT AB screen results, if new DVT then will re-assess; discussed with SICU attending Dr. Orozco who also discussed this with hematology team and Dr. Womack who are all in agreement.    Labs: Hb/Hct:  8.4/25.5  -->,  6.6/20.6  -->                      Plts:  39  -->,  36  -->                 PTT/INR:          ID:  WBC- 12.39  --->>,  10.45  --->>,  7.47  --->>  Temp trend- 24hrs T(F): 109.4 (07-11 @ 06:00), Max: 109.4 (07-11 @ 04:00)  Antibiotics-caspofungin IVPB    caspofungin IVPB 50 every 24 hours  DAPTOmycin IVPB 460 every 48 hours  levoFLOXacin IVPB 750 every 48 hours  meropenem  IVPB 1000 every 12 hours  #Cultures  Surgical swab 06/30: vanc resistent enterococcus faecium     L PCN cyto-pathology  6/25: results suggestive of a necrotic neoplasm    Fungitell 6/26: 47 --> >500 (7/6), Next Fungitell 7/11     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    BAL 6/24: moderate yeast     UCx 6/19: negative FINAL      Left PCN aspiration 6/18: stenotrophomonas maltophilia    Surgical swab (6/30): VRE     Blood cultures: 7/10 Pending results - received     ENDO:  #Glucose monitoring    -A1c (5/7/23): 5.0    -POCT q6 hours while NPO   #Adrenal Insufficiency 2/2 to sepsis     -completed course of solu-cortef    -Cortisol level - binding globulin 1.1 (low), serum cortisol, 84, free cortisol 76, % free cortisol 91    MSK:  #Sacrococcygeal DTI: wound care following- cleanse with soap and water, apply triad, #gauze, and allevyn BID and PRN for soiling   Venous ulcer right lower leg, R forearm skin tear: Pat dry, apply Xeroform, nonadherent dressing, wrap with Kerlix twice a day, prn for soiling  Burn consulted, recs:  --> sacrum: apply hydrogel and Allevyn pad  --> b/l ischium: Allevyn pad  - offloading/positioning  #Activity    -Advance as tolerated    -PT/rehab once extubated    -Globally deconditioned  #b/l UE, LE mottled skin  +ulnar/DP/PT pulses present on doppler  -Arterial duplex bilateral upper extremities (7/1)  - Right radial artery occlusion   -VA Duplex bilateral lower extremities (7/3) - Right PT vein DVT     LINES/DRAINS:   PIV  Bobby (6/18-7/10)  L eleazar drain (6/30)  R Lerma pouch #1, pelvic drain #2 (6/30)  Left basilic midline  6/21  Right axillary a line (6/30-7/4)  Right Radial Novi (6/22-6/30)  L-Femoral Joleen (7/10 - )  L-Femoral Uldall (7/10 - )  RIJ TLC (7/10-  )  ETT  OGT    ADVANCED DIRECTIVES:  Full Code  HCP/Emergency Contact- Tracey Adames: [phone]   Palliative following. Last GOC discussion 7/7    INDICATION FOR SICU: New onset atrial fibrillation w/ mechanical ventilation    DISPOSITION: SICU

## 2023-07-11 NOTE — PROGRESS NOTE ADULT - ASSESSMENT
68y Male  admitted with retroperitoneal and intraabdominal abscess s/p IR placement of drain and LEFT PCN 6/18, s/p percutaneous drainage of LEFT renal abscess POD # 16 with left 26fr flank catheter in place, s/p Left radical nephrectomy, Ex-Lap POD # 10.  Patient remains intubated w/ ETT on vasopressin/phenylephrine/caspofungin/Daptomycin.     Plan:   - CT A/P reviewed by Dr. Vásquez & Gen[facility]   - Continue management per SICU   - Poor prognosis   - Will d/w attending   68y Male  admitted with retroperitoneal and intraabdominal abscess s/p IR placement of drain and LEFT PCN 6/18, s/p percutaneous drainage of LEFT renal abscess POD # 16 with left 26fr flank catheter in place, s/p Left radical nephrectomy, Ex-Lap POD # 10.  Patient remains intubated w/ ETT on vasopressin/phenylephrine/caspofungin/Daptomycin.     Plan:   - CT A/P reviewed by Dr. Vásquez & GenSx   - Continue management per SICU   - Poor prognosis   - Will d/w attending   pt seen on afternoon rounds, there is nothing  can do at present prognosis is poor

## 2023-07-11 NOTE — PROGRESS NOTE ADULT - SUBJECTIVE AND OBJECTIVE BOX
Nephrology progress note    Patient was seen and examined, events over the last 24 h noted .  On CVVH    Allergies:  No Known Allergies    Hospital Medications:   MEDICATIONS  (STANDING):  bacitracin   Ointment 1 Application(s) Topical two times a day  caspofungin IVPB      caspofungin IVPB 50 milliGRAM(s) IV Intermittent every 24 hours  chlorhexidine 0.12% Liquid 15 milliLiter(s) Oral Mucosa every 12 hours  chlorhexidine 2% Cloths 1 Application(s) Topical <User Schedule>  CRRT Treatment    <Continuous>  DAPTOmycin IVPB 460 milliGRAM(s) IV Intermittent every 48 hours  heparin   Injectable 5000 Unit(s) SubCutaneous every 8 hours  levoFLOXacin IVPB 750 milliGRAM(s) IV Intermittent every 48 hours  meropenem  IVPB 1000 milliGRAM(s) IV Intermittent every 12 hours  pantoprazole  Injectable 40 milliGRAM(s) IV Push every 12 hours  Parenteral Nutrition - Adult 1 Each (58 mL/Hr) TPN Continuous <Continuous>  phenylephrine    Infusion 0.1 MICROgram(s)/kG/Min (1.43 mL/Hr) IV Continuous <Continuous>  PureFlow Dialysate RFP-400 (K 2 / Ca 3) 5000 milliLiter(s) (2000 mL/Hr) CRRT <Continuous>  vasopressin Infusion 0.03 Unit(s)/Min (4.5 mL/Hr) IV Continuous <Continuous>        VITALS:  T(F): 94.5 (07-11-23 @ 09:00), Max: 109.4 (07-11-23 @ 04:00)  HR: 67 (07-11-23 @ 09:00)  BP: 121/66 (07-11-23 @ 07:00)  RR: 31 (07-11-23 @ 09:00)  SpO2: 100% (07-11-23 @ 09:00)  Wt(kg): --    07-09 @ 07:01  -  07-10 @ 07:00  --------------------------------------------------------  IN: 2018.8 mL / OUT: 2233 mL / NET: -214.2 mL    07-10 @ 07:01  -  07-11 @ 07:00  --------------------------------------------------------  IN: 2682 mL / OUT: 994 mL / NET: 1688 mL    07-11 @ 07:01  -  07-11 @ 09:45  --------------------------------------------------------  IN: 210 mL / OUT: 370 mL / NET: -160 mL          PHYSICAL EXAM:  Constitutional: intubated on MV   Respiratory: CTAB  Cardiovascular: S1, S2, RRR  Gastrointestinal: BS+, soft, NT/ND  Extremities: No cyanosis or clubbing. No peripheral edema  :  No bobby.   Skin: No rashes      LABS:  07-11    138  |  105  |  59<H>  ----------------------------<  120<H>  3.7   |  19  |  0.9    Ca    7.2<L>      11 Jul 2023 05:25  Phos  3.5     07-11  Mg     2.1     07-11    TPro  3.3<L>  /  Alb  1.3<L>  /  TBili  0.8  /  DBili      /  AST  30  /  ALT  13  /  AlkPhos  179<H>  07-10                          6.6    7.47  )-----------( 36       ( 11 Jul 2023 03:30 )             20.6       Urine Studies:  Urinalysis Basic - ( 11 Jul 2023 05:25 )    Color:  / Appearance:  / SG:  / pH:   Gluc: 120 mg/dL / Ketone:   / Bili:  / Urobili:    Blood:  / Protein:  / Nitrite:    Leuk Esterase:  / RBC:  / WBC    Sq Epi:  / Non Sq Epi:  / Bacteria:         RADIOLOGY & ADDITIONAL STUDIES:

## 2023-07-11 NOTE — PROGRESS NOTE ADULT - SUBJECTIVE AND OBJECTIVE BOX
GENERAL SURGERY PROGRESS NOTE     CHRISTINE VILLAVICENCIO  [address] [place] [address] day :23d    POD:  Procedure: Insertion, arterial line, percutaneous    Central venous catheter placement with ultrasound guidance    Midline catheter insertion    Nephrostolithotomy, percutaneous, with lithotripsy, large stone    Change external ureteral stent    Nephrostogram    US guided vascular access    Insertion of arterial line with imaging guidance    Bronchoscopy, flexible, adult    Partial nephrectomy    Nephrostogram via existing catheter    Exploratory laparotomy    Left nephrectomy    Insertion, catheter, hemodialysis, non-tunneled, with US guidance      Surgical Attending: Miguel Bryan  Overnight events:  - Pan scan performed showing large volume contrast extravasation in the abdomen c/f proximal small bowel leak  - octreotide started, became hypertensive and bradycardic; held  - continued on CVVH  - remained on taj and vaso  - small BM smear after CT    PHYSICAL EXAM:  GENERAL: non-responsive to commands off sedation  ABDOMEN: L subcostal incision with surrounding ecchymoses, drains with dark/possibly feculent output  EXTREMITIES: diffuse pitting edema in all extremities      T(F): 94.5 (07-11-23 @ 09:00), Max: 109.4 (07-11-23 @ 04:00)  HR: 67 (07-11-23 @ 09:00) (58 - 85)  BP: 121/66 (07-11-23 @ 07:00) (98/60 - 133/78)  ABP: 112/57 (07-11-23 @ 09:00) (64/49 - 156/104)  ABP(mean): 74 (07-11-23 @ 09:00) (58 - 158)  RR: 31 (07-11-23 @ 09:00) (26 - 33)  SpO2: 100% (07-11-23 @ 09:00) (96% - 100%)    IN'S / OUT's:    07-10-23 @ 07:01  -  07-11-23 @ 07:00  --------------------------------------------------------  IN:    Enteral Tube Flush: 160 mL    IV PiggyBack: 250 mL    IV PiggyBack: 50 mL    IV PiggyBack: 50 mL    IV PiggyBack: 50 mL    IV PiggyBack: 150 mL    Phenylephrine: 343 mL    Platelets - Single Donor: 225 mL    TPN (Total Parenteral Nutrition): 1296 mL    Vasopressin: 108 mL  Total IN: 2682 mL    OUT:    Drain (mL): 30 mL    Drain (mL): 185 mL    Drain (mL): 20 mL    Drain (mL): 35 mL    Indwelling Catheter - Urethral (mL): 10 mL    Intermittent Catheterization - Urethral (mL): 10 mL    Nasogastric/Oral tube (mL): 115 mL    Other (mL): 150 mL    Other (mL): 439 mL    Voided (mL): 0 mL  Total OUT: 994 mL    Total NET: 1688 mL      07-11-23 @ 07:01  -  07-11-23 @ 09:16  --------------------------------------------------------  IN:    Enteral Tube Flush: 60 mL    Phenylephrine: 25 mL    TPN (Total Parenteral Nutrition): 116 mL    Vasopressin: 9 mL  Total IN: 210 mL    OUT:    Drain (mL): 10 mL    Drain (mL): 0 mL    Drain (mL): 30 mL    Drain (mL): 50 mL    Nasogastric/Oral tube (mL): 0 mL    Other (mL): 280 mL    Voided (mL): 0 mL  Total OUT: 370 mL    Total NET: -160 mL          MEDICATIONS  (STANDING):  bacitracin   Ointment 1 Application(s) Topical two times a day  caspofungin IVPB      caspofungin IVPB 50 milliGRAM(s) IV Intermittent every 24 hours  chlorhexidine 0.12% Liquid 15 milliLiter(s) Oral Mucosa every 12 hours  chlorhexidine 2% Cloths 1 Application(s) Topical <User Schedule>  CRRT Treatment    <Continuous>  DAPTOmycin IVPB 460 milliGRAM(s) IV Intermittent every 48 hours  heparin   Injectable 5000 Unit(s) SubCutaneous every 8 hours  levoFLOXacin IVPB 750 milliGRAM(s) IV Intermittent every 48 hours  meropenem  IVPB 1000 milliGRAM(s) IV Intermittent every 12 hours  pantoprazole  Injectable 40 milliGRAM(s) IV Push every 12 hours  Parenteral Nutrition - Adult 1 Each (58 mL/Hr) TPN Continuous <Continuous>  phenylephrine    Infusion 0.1 MICROgram(s)/kG/Min (1.43 mL/Hr) IV Continuous <Continuous>  PureFlow Dialysate RFP-400 (K 2 / Ca 3) 5000 milliLiter(s) (2000 mL/Hr) CRRT <Continuous>  vasopressin Infusion 0.03 Unit(s)/Min (4.5 mL/Hr) IV Continuous <Continuous>    MEDICATIONS  (PRN):  sodium chloride 0.9% lock flush 10 milliLiter(s) IV Push every 1 hour PRN Pre/post blood products, medications, blood draw, and to maintain line patency  sodium chloride 0.9% lock flush 10 milliLiter(s) IV Push every 1 hour PRN Pre/post blood products, medications, blood draw, and to maintain line patency      LABS  Labs:  CAPILLARY BLOOD GLUCOSE      POCT Blood Glucose.: 124 mg/dL (11 Jul 2023 05:42)  POCT Blood Glucose.: 85 mg/dL (11 Jul 2023 00:07)  POCT Blood Glucose.: 94 mg/dL (10 Jul 2023 12:34)                          6.6    7.47  )-----------( 36       ( 11 Jul 2023 03:30 )             20.6       Auto Neutrophil %: 93.8 % (07-11-23 @ 03:30)  Auto Immature Granulocyte %: 0.3 % (07-11-23 @ 03:30)    07-11    138  |  105  |  59<H>  ----------------------------<  120<H>  3.7   |  19  |  0.9      Calcium: 7.2 mg/dL (07-11-23 @ 05:25)      LFTs:             3.3  | 0.8  | 30       ------------------[179     ( 10 Jul 2023 22:09 )  1.3  | x    | 13           Blood Gas Arterial, Lactate: 4.80 mmol/L (07-11-23 @ 05:00)  Blood Gas Arterial, Lactate: 3.10 mmol/L (07-10-23 @ 06:26)  Blood Gas Arterial, Lactate: 3.10 mmol/L (07-10-23 @ 04:42)  Blood Gas Arterial, Lactate: 2.50 mmol/L (07-09-23 @ 04:14)  Blood Gas Arterial, Lactate: 2.40 mmol/L (07-08-23 @ 21:15)    ABG - ( 11 Jul 2023 05:00 )  pH: 7.38  /  pCO2: 31    /  pO2: 131   / HCO3: 18    / Base Excess: -6.2  /  SaO2: 100.0     ABG - ( 10 Jul 2023 06:26 )  pH: 7.23  /  pCO2: 58    /  pO2: 366   / HCO3: 24    / Base Excess: -3.4  /  SaO2: 100.0     ABG - ( 10 Jul 2023 04:42 )  pH: 7.23  /  pCO2: 59    /  pO2: 202   / HCO3: 25    / Base Excess: -3.0  /  SaO2: 100.0     Urinalysis Basic - ( 11 Jul 2023 05:25 )    Color: x / Appearance: x / SG: x / pH: x  Gluc: 120 mg/dL / Ketone: x  / Bili: x / Urobili: x   Blood: x / Protein: x / Nitrite: x   Leuk Esterase: x / RBC: x / WBC x   Sq Epi: x / Non Sq Epi: x / Bacteria: x    RADIOLOGY & ADDITIONAL TESTS:  < from: CT Head No Cont (07.10.23 @ 21:00) >  IMPRESSION:    No evidence of acute intracranial pathology.    Partially visualized subcutaneous emphysema in the facial fascial planes   originating from the pneumomediastinum, see same-day CT chest.    < end of copied text >    < from: CT Chest No Cont (07.10.23 @ 21:00) >  IMPRESSION:    Since 6/18/2023    Interval development of pneumomediastinum. Stable enlarged paratracheal   adenopathy.    Stable ascending thoracic aortic aneurysm (4.6 cm)    . Interval increase in size of bilateral pleural effusions with adjacent   consolidation both lower lobes. No pneumothorax    CT abdomen and pelvis  performed on the same day,see separate report.    < end of copied text >      < from: CT Abdomen and Pelvis w/ Oral Cont and w/ IV Cont (07.10.23 @ 21:06) >  IMPRESSION:    1. Large volume extraluminal left upper quadrant/retroperitoneal oral   contrast; proximal small bowel leak suspected.    2. Moderate volume free fluid with enhancement of peritoneal reflections,   consistent with peritonitis. Evaluation for focal abscesses difficult   given extensive background free fluid.    3. Increased number and size of bilobar hepatic hypodense suspected   hepatic abscesses, including segment IV/III multiseptated suspected   abscess measuring 5 cm, previously 2.4 cm on 6/18/2023.    4. Small bilateral pleural effusions. Left lower lobe consolidative  opacity, may represent pneumonia in the appropriate clinical setting.    5. Unchanged retroperitoneal lymphadenopathy.    6. Persistent pneumoperitoneum.    < end of copied text >

## 2023-07-11 NOTE — PROGRESS NOTE ADULT - NS ATTEND AMEND GEN_ALL_CORE FT
I personally saw and examined the patient, reviewed the chart and available data. I discussed the situation with the patients nurse (family not at the bedside) and  team. I also reviewed and/or amended the note as necessary.

## 2023-07-11 NOTE — CHART NOTE - NSCHARTNOTEFT_GEN_A_CORE
Patient's Daughter Tracey was present at bedside.  A conversion with myself, Dr. Quinonez, Nurse Heavenly, and Attending Dr. Cohn about the patient's status was undertaken.  The patient's mutli-system organ failure of the pulmonary, renal, cardiovascular, and gastrointestinal systems was discussed.  The patient's increase in pressor requirements after large volume feculent output from the left VENICE drain today was discussed.  Questions about the patient's renal carcinoma and its treatment were referred to Dr. Vásquez.   The patient's likely bowel perforation was discussed along with the significant inflammation and difficulty of the previous surgery.  The family had some questions about the timing of surgery.  Questions about the patient's critical condition for any movement off of the ICU were answered.  Questions about nutrition were also answered.  A family meeting was planned for Wednesday 12 July 2023 at 3 pm with a backup date of Friday 14 July 2023 at 3 pm pending availability of Dr. Womack.    Michi Salgado  [facility]

## 2023-07-11 NOTE — PROGRESS NOTE ADULT - ASSESSMENT
ASSESSMENT  66yo Male with pmh of hypertension, massive Grade IV hydronephrosis s/p left nephrostomy placed in May 2023 by [facility] presents to the ED with little to no urine output from LEFT nephrostomy from about a week. PCN  more recently drainage was changed to brown/purulent fluid. 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      IMPRESSION  #Fever , resolved  CTAP 1. Large volume extraluminal left upper quadrant/retroperitoneal oral contrast; proximal small bowel leak suspected.  2. Moderate volume free fluid with enhancement of peritoneal reflections, consistent with peritonitis. Evaluation for focal abscesses difficult   given extensive background free fluid.  3. Increased number and size of bilobar hepatic hypodense suspected hepatic abscesses, including segment IV/III multiseptated suspected   abscess measuring 5 cm, previously 2.4 cm on 6/18/2023.  4. Small bilateral pleural effusions. Left lower lobe consolidativeopacity, may represent pneumonia in the appropriate clinical setting.  5. Unchanged retroperitoneal lymphadenopathy.  6. Persistent pneumoperitoneum.   Elevated fungitell- . 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 "Exploratory laparotomy for possible left renal cancer complicated by significant hydronephrosis and pyelonephritis. Left nephrectomy performed, notable for necrotic bulky tissue adherent to the renal vein, staple line in tact and hemostasis achieved. Copious irrigation of retroperitoneal renal bed and intraperitoneal fibrinous exudate and purulent fluid. Three drains placed, #1 in Lerma's pouch, #2 in the pelvis, and #3 in the renal bed"    6/22 UCX  Few Finegoldia magna "Susceptibilities not performed"  Nephrostolithotomy, percutaneous, with lithotripsy, large stone 22-Jun-2023 23:27:09 left large thick abscess materia dimension of aspirate over 4 x 4 x 4 cm Bernie Perdomo  Change external ureteral stent 22-Jun-2023 23:28:03 left Bernie Perdomo  Nephrostogram 22-Jun-2023 23:28:31 left Bernie Perdomo. "thick gelatinous material that was difficult to suction out even with the shock pulse. I would alternate 2 prong to remove and break up / shock pulse to suck out. after an hour where I had no clear planes I elected to stop. irrigation through the 26 bobby took out a lot additional material  in ashlyn catch cup 4 x 4 x 4 cm lump of material trapped"    6/19 UCX NG; UA Blood: x / Protein: 300 mg/dL / Nitrite: Negative   Leuk Esterase: Large / RBC: 8 /HPF / WBC >720 /HPF   Sq Epi: x / Non Sq Epi: x / Bacteria: Moderate    6/18 L PCN     Few Stenotrophomonas maltophilia Levofloxacin: S 2    6/18 L PCN     Numerous Anaerobic Gram Positive Cocci Most closely resembling  Peptoniphilus species "Susceptibilities not performed"    6/18 L PCN   Numerous Gram positive cocci in pairs per oil power field    6/18 BCX NGTD     s/p 6/18  Left PCN upsized to 16F and 1200cc of very viscous tan colored fluid was aspirated. RLQ 16F drain was placed and 1200cc of tan colored fluid was aspirated (less viscous). A sample from each location was sent for culture.     Repeat CT 6/19 Since one day earlier,  No extraluminal contrast. Oral contrast was last seen in the terminal ileum.  Status post left nephrostomy tube placement. Left enlarged kidney with severe hydronephrosis/collection has decreased, now measuring 16 x 12 cm from 20 x 14 cm  Surgical Pathology Report:   Left renal abscess, possible parenchyma  Left renal abscess, possible parenchyma, percutaneous drainage:  -  Fragments of extensively necrotic tissue, cannot be fullycharacterized  Comment: The microscopic appearance of the necrotic tissue is concerning for a neoplasm with coagulative necrosis. Definitive diagnosis cannot be  made, due to lack of viable material.  Additional sections will be submitted.  Immunohistochemical studies willbe performed, in an attempt to characterise the necrotic tissue.  Based on the H&E appearance, an infectious process such as tuberculosis  appears less likely, but special stains for microorganisms (acid-fastbacilli and fungal) are in progress and will be reported separately.   (06.22.23 @ 22:12)  6/27 CT Study is overall very limited due to lack of intravenous contrast, beam   Willingham artifact.  Persistent bilateral pleural effusions with adjacent consolidations   likely reflecting compressive atelectasis.  Left kidney poorly defined with a heterogeneous collection with air and   fluid which has likely mildly diminished in size compared to the prior   examination but exact comparison limited.  Diffuse ascites again noted. Diffuse anasarca again noted. Persistent   pneumoperitoneum.  Poorly defined lesions within the liver adjacent to the falciform   ligament which may again reflect abscesses or masses, difficult to   evaluate due to the limitations described above.  < from: CT Abdomen and Pelvis w/ IV Cont (06.18.23 @ 15:15) >  1 ) Moderate pneumoperitoneum with partial diffusion throughout moderate   volume ascites and with associated intermittent peritoneal enhancement   and nodularity. Perforation and peritonitis may be related to underlying   emphysematous pyelonephritis (see below). Less likely sources of   perforation include ureteral/bladder disruption, and bowel perforation   which cannot be entirely excluded on the provided images.  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 The compressed residual renal   parenchyma is inseparable from the adjacent fascial/fatty layers   anterosuperiorly and direct rupture into the peritoneum is a possibility   (6-355). The previously placed left-sided nephrostomy tube pigtail is   notedto be within the left renal collecting system.  3) New hepatic hypodensities measuring up to 2.6 cm, suspicious for   development of multiple focal abscesses.  4) Wedge-shaped region of hypodensity within the spleen may reflect   splenic infarct in the correct clinical setting. Developing   phlegmon/abscess cannot be excluded in the current clinical setting  5) Increased extensive retroperitoneal, portacaval and likely   gastrohepatic heterogeneously enhancing lymphadenopathy. Findings may be   reactive.  6) Mediastinal lymphadenopathy measuring up to 2.4 cm short axis.   Underlying etiology may be reactive, infectious/inflammatory, or   neoplastic. A short-term 3 month follow-up CT chest should be considered   for further evaluation.  7)Right middle and upper lobe solid pulmonary nodules which are not well   delineated due to respiratory motion but measure less than 6 mm.   attention on follow-up exam.  #Reintubated, L lung white out, ruled out HAP    6/24 bronch CX NG,   Moderate Yeast- likely colonizer  #Lactic acidosis  #Abx allergy: No Known Allergies  #Prolonged QTC Calculation(Bazett) 526 ms  #Hyponatremia  #AKICreatinine: Creatinine: 1.3 mg/dL (07-05-23 @ 05:45)  Ht 180  Weight (kg): 76.5 (06-18-23 @ 21:27)  crcl 57     RECOMMENDATIONS  - CT results noted, suspected leak, liver abscesses  - Will need prolonged ~6 week antibiotics course given liver abscess  - Consult IR to see if possible aspiration to send for G/S & CX of liver 5cm abscess  - f/u BCX  - On Caspo 50mg q24h IV, repeat Fungitell on Day 7 (7/15)   - CVVH dosing:   DAPTOmycin IVPB 460 milliGRAM(s) IV Intermittent every 48 hours  levoFLOXacin IVPB 750 milliGRAM(s) IV Intermittent every 48 hours  meropenem  IVPB 1000 milliGRAM(s) IV Intermittent every 12 hours  - Trend WBC   - Urology following  - Poor prognosis, GOC    If any questions, please send a message or call on [employer] Teams  Please continue to update ID with any pertinent new laboratory or radiographic findings  #[id number]

## 2023-07-12 NOTE — PROGRESS NOTE ADULT - ASSESSMENT
ASSESSMENT  66yo Male with pmh of hypertension, massive Grade IV hydronephrosis s/p left nephrostomy placed in May 2023 by [facility] presents to the ED with little to no urine output from LEFT nephrostomy from about a week. PCN  more recently drainage was changed to brown/purulent fluid. 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      IMPRESSION  #Fever , resolved    7/11 BCX NG   CTAP 1. Large volume extraluminal left upper quadrant/retroperitoneal oral contrast; proximal small bowel leak suspected.  2. Moderate volume free fluid with enhancement of peritoneal reflections, consistent with peritonitis. Evaluation for focal abscesses difficult   given extensive background free fluid.  3. Increased number and size of bilobar hepatic hypodense suspected hepatic abscesses, including segment IV/III multiseptated suspected   abscess measuring 5 cm, previously 2.4 cm on 6/18/2023.  4. Small bilateral pleural effusions. Left lower lobe consolidativeopacity, may represent pneumonia in the appropriate clinical setting.  5. Unchanged retroperitoneal lymphadenopathy.  6. Persistent pneumoperitoneum.   Elevated fungitell- . 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 "Exploratory laparotomy for possible left renal cancer complicated by significant hydronephrosis and pyelonephritis. Left nephrectomy performed, notable for necrotic bulky tissue adherent to the renal vein, staple line in tact and hemostasis achieved. Copious irrigation of retroperitoneal renal bed and intraperitoneal fibrinous exudate and purulent fluid. Three drains placed, #1 in Lerma's pouch, #2 in the pelvis, and #3 in the renal bed"    6/22 UCX  Few Finegoldia magna "Susceptibilities not performed"  Nephrostolithotomy, percutaneous, with lithotripsy, large stone 22-Jun-2023 23:27:09 left large thick abscess materia dimension of aspirate over 4 x 4 x 4 cm Bernie Perdomo  Change external ureteral stent 22-Jun-2023 23:28:03 left Yanitomásjami Bernie CLIFFORD  Nephrostogram 22-Jun-2023 23:28:31 left Yanitomásjami Bernie CLIFFORD. "thick gelatinous material that was difficult to suction out even with the shock pulse. I would alternate 2 prong to remove and break up / shock pulse to suck out. after an hour where I had no clear planes I elected to stop. irrigation through the 26 bobby took out a lot additional material  in ashlyn catch cup 4 x 4 x 4 cm lump of material trapped"    6/19 UCX NG; UA Blood: x / Protein: 300 mg/dL / Nitrite: Negative   Leuk Esterase: Large / RBC: 8 /HPF / WBC >720 /HPF   Sq Epi: x / Non Sq Epi: x / Bacteria: Moderate    6/18 L PCN     Few Stenotrophomonas maltophilia Levofloxacin: S 2    6/18 L PCN     Numerous Anaerobic Gram Positive Cocci Most closely resembling  Peptoniphilus species "Susceptibilities not performed"    6/18 L PCN   Numerous Gram positive cocci in pairs per oil power field    6/18 BCX NGTD     s/p 6/18  Left PCN upsized to 16F and 1200cc of very viscous tan colored fluid was aspirated. RLQ 16F drain was placed and 1200cc of tan colored fluid was aspirated (less viscous). A sample from each location was sent for culture.     Repeat CT 6/19 Since one day earlier,  No extraluminal contrast. Oral contrast was last seen in the terminal ileum.  Status post left nephrostomy tube placement. Left enlarged kidney with severe hydronephrosis/collection has decreased, now measuring 16 x 12 cm from 20 x 14 cm  Surgical Pathology Report:   Left renal abscess, possible parenchyma  Left renal abscess, possible parenchyma, percutaneous drainage:  -  Fragments of extensively necrotic tissue, cannot be fullycharacterized  Comment: The microscopic appearance of the necrotic tissue is concerning for a neoplasm with coagulative necrosis. Definitive diagnosis cannot be  made, due to lack of viable material.  Additional sections will be submitted.  Immunohistochemical studies willbe performed, in an attempt to characterise the necrotic tissue.  Based on the H&E appearance, an infectious process such as tuberculosis  appears less likely, but special stains for microorganisms (acid-fastbacilli and fungal) are in progress and will be reported separately.   (06.22.23 @ 22:12)  6/27 CT Study is overall very limited due to lack of intravenous contrast, beam   Willingham artifact.  Persistent bilateral pleural effusions with adjacent consolidations   likely reflecting compressive atelectasis.  Left kidney poorly defined with a heterogeneous collection with air and   fluid which has likely mildly diminished in size compared to the prior   examination but exact comparison limited.  Diffuse ascites again noted. Diffuse anasarca again noted. Persistent   pneumoperitoneum.  Poorly defined lesions within the liver adjacent to the falciform   ligament which may again reflect abscesses or masses, difficult to   evaluate due to the limitations described above.  < from: CT Abdomen and Pelvis w/ IV Cont (06.18.23 @ 15:15) >  1 ) Moderate pneumoperitoneum with partial diffusion throughout moderate   volume ascites and with associated intermittent peritoneal enhancement   and nodularity. Perforation and peritonitis may be related to underlying   emphysematous pyelonephritis (see below). Less likely sources of   perforation include ureteral/bladder disruption, and bowel perforation   which cannot be entirely excluded on the provided images.  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 The compressed residual renal   parenchyma is inseparable from the adjacent fascial/fatty layers   anterosuperiorly and direct rupture into the peritoneum is a possibility   (6-355). The previously placed left-sided nephrostomy tube pigtail is   notedto be within the left renal collecting system.  3) New hepatic hypodensities measuring up to 2.6 cm, suspicious for   development of multiple focal abscesses.  4) Wedge-shaped region of hypodensity within the spleen may reflect   splenic infarct in the correct clinical setting. Developing   phlegmon/abscess cannot be excluded in the current clinical setting  5) Increased extensive retroperitoneal, portacaval and likely   gastrohepatic heterogeneously enhancing lymphadenopathy. Findings may be   reactive.  6) Mediastinal lymphadenopathy measuring up to 2.4 cm short axis.   Underlying etiology may be reactive, infectious/inflammatory, or   neoplastic. A short-term 3 month follow-up CT chest should be considered   for further evaluation.  7)Right middle and upper lobe solid pulmonary nodules which are not well   delineated due to respiratory motion but measure less than 6 mm.   attention on follow-up exam.  #Reintubated, L lung white out, ruled out HAP    6/24 bronch CX NG,   Moderate Yeast- likely colonizer  #Lactic acidosis  #Abx allergy: No Known Allergies  #Prolonged QTC Calculation(Bazett) 526 ms  #Hyponatremia  #AKICreatinine: Creatinine: 1.3 mg/dL (07-05-23 @ 05:45)  Ht 180  Weight (kg): 76.5 (06-18-23 @ 21:27)  crcl 57     RECOMMENDATIONS  - CT results noted, suspected leak, liver abscesses  - Will need prolonged ~6 week antibiotics course given liver abscesses  - Appreciate IR consult- no role for liver aspiration  - On Caspo 50mg q24h IV, repeat Fungitell on Day 7 (7/15)   - CVVH dosing:   DAPTOmycin IVPB 460 milliGRAM(s) IV Intermittent every 48 hours  levoFLOXacin IVPB 750 milliGRAM(s) IV Intermittent every 48 hours  meropenem  IVPB 1000 milliGRAM(s) IV Intermittent every 12 hours  - Trend WBC   - Urology following  - Poor prognosis, GOC    If any questions, please send a message or call on [facility] Teams  Please continue to update ID with any pertinent new laboratory or radiographic findings  #[id number]

## 2023-07-12 NOTE — PROGRESS NOTE ADULT - SUBJECTIVE AND OBJECTIVE BOX
GENERAL SURGERY PROGRESS NOTE    Patient: CHRISTINE VILLAVICENCIO , 68y ([birth year])Male   MRN: [id number]  Location: [address]  Visit: 06-18-23 Inpatient  Date: 07-12-23 @ 10:10    24 Hour Events:  Increased feculent drainage from pelvic and renal bed drains (~2.4L of drainage over 24 hours)  Increased pressor requirement during the day, now down trending  Opens eyes to voice, but not following commands currently    Vitals:  T(F): 109.4 (07-12-23 @ 08:00), Max: 109.4 (07-11-23 @ 13:00)  HR: 72 (07-12-23 @ 09:09)  BP: --  RR: 25 (07-12-23 @ 08:55)  SpO2: 100% (07-12-23 @ 09:09)  Mode: SIMV (Synchronized Intermittent Mandatory Ventilation), RR (machine): 25, TV (machine): 450, FiO2: 30, PEEP: 8, PS: 10, ITime: 0.7, MAP: 17, PIP: 24  Diet, NPO    Fluids:   I & O's:    07-11-23 @ 07:01  -  07-12-23 @ 07:00  --------------------------------------------------------  IN:    Enteral Tube Flush: 60 mL    IV PiggyBack: 250 mL    IV PiggyBack: 100 mL    Norepinephrine: 39 mL    Octreotide: 80 mL    Phenylephrine: 138.3 mL    PRBCs (Packed Red Blood Cells): 350 mL    TPN (Total Parenteral Nutrition): 1392 mL    Vasopressin: 108 mL  Total IN: 2517.3 mL    OUT:    Drain (mL): 50 mL    Drain (mL): 1005 mL    Drain (mL): 1935 mL    Drain (mL): 25 mL    Intermittent Catheterization - Urethral (mL): 43 mL    Nasogastric/Oral tube (mL): 110 mL    Other (mL): 2318 mL    Voided (mL): 0 mL  Total OUT: 5486 mL    Total NET: -2968.7 mL    PHYSICAL EXAM:  General: RASS -2, not following commands  Cardiac: Irregular rhythm, rate controlled  Respiratory: ETT in place, subQ emphysema with neck swelling observed  Abdomen: Soft, mildy distended, 3 VENICE drains in place with R sided pouch   - Lerma's pouch drain with SS output, feculent output from remaining  Skin: Warm/dry, normal color, no jaundice  Incision/wound: Left abdominal incision with inferior necrosis slightly worse than prior    MEDICATIONS  (STANDING):  bacitracin   Ointment 1 Application(s) Topical two times a day  caspofungin IVPB      caspofungin IVPB 50 milliGRAM(s) IV Intermittent every 24 hours  chlorhexidine 0.12% Liquid 15 milliLiter(s) Oral Mucosa every 12 hours  chlorhexidine 2% Cloths 1 Application(s) Topical <User Schedule>  CRRT Treatment    <Continuous>  DAPTOmycin IVPB 460 milliGRAM(s) IV Intermittent every 48 hours  heparin   Injectable 5000 Unit(s) SubCutaneous every 8 hours  levoFLOXacin IVPB 750 milliGRAM(s) IV Intermittent every 48 hours  norepinephrine Infusion 0.01 MICROgram(s)/kG/Min (0.72 mL/Hr) IV Continuous <Continuous>  octreotide  Infusion 25 MICROgram(s)/Hr (5 mL/Hr) IV Continuous <Continuous>  pantoprazole  Injectable 40 milliGRAM(s) IV Push every 12 hours  Parenteral Nutrition - Adult 1 Each (58 mL/Hr) TPN Continuous <Continuous>  potassium chloride  20 mEq/100 mL IVPB 20 milliEquivalent(s) IV Intermittent every 1 hour  PureFlow Dialysate RFP-400 (K 2 / Ca 3) 5000 milliLiter(s) (2000 mL/Hr) CRRT <Continuous>    MEDICATIONS  (PRN):  sodium chloride 0.9% lock flush 10 milliLiter(s) IV Push every 1 hour PRN Pre/post blood products, medications, blood draw, and to maintain line patency  sodium chloride 0.9% lock flush 10 milliLiter(s) IV Push every 1 hour PRN Pre/post blood products, medications, blood draw, and to maintain line patency    DVT PROPHYLAXIS: heparin   Injectable 5000 Unit(s) SubCutaneous every 8 hours    GI PROPHYLAXIS: pantoprazole  Injectable 40 milliGRAM(s) IV Push every 12 hours    ANTICOAGULATION:   ANTIBIOTICS:  caspofungin IVPB    caspofungin IVPB 50 milliGRAM(s)  DAPTOmycin IVPB 460 milliGRAM(s)  levoFLOXacin IVPB 750 milliGRAM(s)    LAB/STUDIES:  Labs:  CAPILLARY BLOOD GLUCOSE    POCT Blood Glucose.: 139 mg/dL (11 Jul 2023 18:06)                          8.3    5.92  )-----------( 36       ( 12 Jul 2023 05:23 )             25.7       Auto Neutrophil %: 90.7 % (07-12-23 @ 05:23)  Auto Immature Granulocyte %: 0.3 % (07-12-23 @ 05:23)  Auto Neutrophil %: 87.7 % (07-11-23 @ 19:36)  Auto Immature Granulocyte %: 0.4 % (07-11-23 @ 19:36)    07-12    136  |  102  |  46<H>  ----------------------------<  135<H>  3.4<L>   |  21  |  0.7      Calcium: 7.7 mg/dL (07-12-23 @ 05:23)    LFTs:             3.3  | 0.8  | 30       ------------------[179     ( 10 Jul 2023 22:09 )  1.3  | x    | 13          Lipase:x      Amylase:x         Blood Gas Arterial, Lactate: 4.60 mmol/L (07-12-23 @ 04:39)  Blood Gas Arterial, Lactate: 4.20 mmol/L (07-11-23 @ 17:15)  Blood Gas Arterial, Lactate: 4.80 mmol/L (07-11-23 @ 05:00)  Blood Gas Arterial, Lactate: 3.10 mmol/L (07-10-23 @ 06:26)  Blood Gas Arterial, Lactate: 3.10 mmol/L (07-10-23 @ 04:42)    ABG - ( 12 Jul 2023 04:39 )  pH: 7.34  /  pCO2: 37    /  pO2: 138   / HCO3: 20    / Base Excess: -5.3  /  SaO2: 100.0     ABG - ( 11 Jul 2023 17:15 )  pH: 7.36  /  pCO2: 35    /  pO2: 118   / HCO3: 20    / Base Excess: -5.1  /  SaO2: 99.9      ABG - ( 11 Jul 2023 05:00 )  pH: 7.38  /  pCO2: 31    /  pO2: 131   / HCO3: 18    / Base Excess: -6.2  /  SaO2: 100.0     Urinalysis Basic - ( 12 Jul 2023 05:23 )    Color: x / Appearance: x / SG: x / pH: x  Gluc: 135 mg/dL / Ketone: x  / Bili: x / Urobili: x   Blood: x / Protein: x / Nitrite: x   Leuk Esterase: x / RBC: x / WBC x   Sq Epi: x / Non Sq Epi: x / Bacteria: x    Culture - Blood (collected 11 Jul 2023 02:25)  Source: .Blood Blood-Peripheral  Preliminary Report (12 Jul 2023 09:01):    No growth at 24 hours

## 2023-07-12 NOTE — PROGRESS NOTE ADULT - ASSESSMENT
Pt is a 68y Male  admitted with retroperitoneal and intraabdominal abscess s/p IR placement of drain and LEFT PCN 6/18, s/p percutaneous drainage of LEFT renal abscess POD # 17 with left 26fr flank catheter in place, s/p Left radical nephrectomy, Ex-Lap POD # 11.     Plan:   - Continue management per SICU   - Poor prognosis   - Will d/w attending    06-Jul-2018

## 2023-07-12 NOTE — PROGRESS NOTE ADULT - ASSESSMENT
67M w/ PMH of HTN, grade IV hydronephrosis of L kidney s/p L PCN (placed 5/2023), stutter, hiatal hernia, iron deficiency anemia, H Pylori (untreated), and gastric mass (never biopsied) presents to the ED with at least 4 days of worsening weakness, abdominal distension, and no output from his PCN  . Found to have septic shock, MSOF, lactic acidosis from left emphysematous hydronephrosis, pyelonephritis and possible rupture with pneumoperitoneum along with possible hepatic abscesses, splenic infarct and abdominal and mediastinal lymphadenopathies.  had nephrectomy and abdominal washout on 6/30    Oliguric in spite of bumex, rising dig level, high lactate, now on CVVH  ROJAS likely ATN iso septic shock  on pressors   continue CVVHD / will keep in negative balance 50 cc per hour today   platelets noted follow trend /  negative  HIT / hem on case   ID notes appreciated and meds dosing to CVVHD noted   will follow

## 2023-07-12 NOTE — PROGRESS NOTE ADULT - PROBLEM SELECTOR PROBLEM 4
Palliative care by specialist

## 2023-07-12 NOTE — PROGRESS NOTE ADULT - SUBJECTIVE AND OBJECTIVE BOX
Nephrology progress note    THIS IS AN INCOMPLETE NOTE . FULL NOTE TO FOLLOW SHORTLY    Patient is seen and examined, events over the last 24 h noted .    Allergies:  No Known Allergies    Hospital Medications:   MEDICATIONS  (STANDING):  bacitracin   Ointment 1 Application(s) Topical two times a day  caspofungin IVPB      caspofungin IVPB 50 milliGRAM(s) IV Intermittent every 24 hours  chlorhexidine 0.12% Liquid 15 milliLiter(s) Oral Mucosa every 12 hours  chlorhexidine 2% Cloths 1 Application(s) Topical <User Schedule>  CRRT Treatment    <Continuous>  DAPTOmycin IVPB 460 milliGRAM(s) IV Intermittent every 48 hours  heparin   Injectable 5000 Unit(s) SubCutaneous every 8 hours  levoFLOXacin IVPB 750 milliGRAM(s) IV Intermittent every 48 hours  norepinephrine Infusion 0.01 MICROgram(s)/kG/Min (0.72 mL/Hr) IV Continuous <Continuous>  octreotide  Infusion 25 MICROgram(s)/Hr (5 mL/Hr) IV Continuous <Continuous>  pantoprazole  Injectable 40 milliGRAM(s) IV Push every 12 hours  Parenteral Nutrition - Adult 1 Each (58 mL/Hr) TPN Continuous <Continuous>  potassium chloride  20 mEq/100 mL IVPB 20 milliEquivalent(s) IV Intermittent once  PureFlow Dialysate RFP-400 (K 2 / Ca 3) 5000 milliLiter(s) (2000 mL/Hr) CRRT <Continuous>  vasopressin Infusion 0.03 Unit(s)/Min (4.5 mL/Hr) IV Continuous <Continuous>        VITALS:  T(F): 109.4 (07-12-23 @ 08:00), Max: 109.4 (07-11-23 @ 13:00)  HR: 72 (07-12-23 @ 09:09)  BP: --  RR: 25 (07-12-23 @ 08:55)  SpO2: 100% (07-12-23 @ 09:09)  Wt(kg): --    07-10 @ 07:01  -  07-11 @ 07:00  --------------------------------------------------------  IN: 2682 mL / OUT: 994 mL / NET: 1688 mL    07-11 @ 07:01 - 07-12 @ 07:00  --------------------------------------------------------  IN: 2517.3 mL / OUT: 5486 mL / NET: -2968.7 mL    07-12 @ 07:01  - 07-12 @ 09:17  --------------------------------------------------------  IN: 140.8 mL / OUT: 246 mL / NET: -105.2 mL          PHYSICAL EXAM:  Constitutional: NAD  HEENT: anicteric sclera, oropharynx clear, MMM  Neck: No JVD  Respiratory: CTAB, no wheezes, rales or rhonchi  Cardiovascular: S1, S2, RRR  Gastrointestinal: BS+, soft, NT/ND  Extremities: No cyanosis or clubbing. No peripheral edema  :  No bobby.   Skin: No rashes    LABS:  07-12    136  |  102  |  46<H>  ----------------------------<  135<H>  3.4<L>   |  21  |  0.7    Ca    7.7<L>      12 Jul 2023 05:23  Phos  2.8     07-12  Mg     2.0     07-12    TPro  3.3<L>  /  Alb  1.3<L>  /  TBili  0.8  /  DBili      /  AST  30  /  ALT  13  /  AlkPhos  179<H>  07-10                          8.3    5.92  )-----------( 36       ( 12 Jul 2023 05:23 )             25.7       Urine Studies:  Urinalysis Basic - ( 12 Jul 2023 05:23 )    Color:  / Appearance:  / SG:  / pH:   Gluc: 135 mg/dL / Ketone:   / Bili:  / Urobili:    Blood:  / Protein:  / Nitrite:    Leuk Esterase:  / RBC:  / WBC    Sq Epi:  / Non Sq Epi:  / Bacteria:           Iron 12, TIBC 128, %sat 9      [05-07-23 @ 09:19]  Ferritin 526      [05-07-23 @ 09:19]  Vitamin D (25OH) 26      [05-08-23 @ 07:58]  TSH 0.45      [06-19-23 @ 14:14]  Lipid: chol 58, TG 88, HDL 22, LDL --      [07-02-23 @ 21:01]          RADIOLOGY & ADDITIONAL STUDIES:   Nephrology progress note    Patient is seen and examined, events over the last 24 h noted .  Lying in bed   intubated on MV     Allergies:  No Known Allergies    Hospital Medications:   MEDICATIONS  (STANDING):  bacitracin   Ointment 1 Application(s) Topical two times a day   caspofungin IVPB 50 milliGRAM(s) IV Intermittent every 24 hours  CRRT Treatment    <Continuous>  DAPTOmycin IVPB 460 milliGRAM(s) IV Intermittent every 48 hours  heparin   Injectable 5000 Unit(s) SubCutaneous every 8 hours  levoFLOXacin IVPB 750 milliGRAM(s) IV Intermittent every 48 hours  norepinephrine Infusion 0.01 MICROgram(s)/kG/Min (0.72 mL/Hr) IV Continuous <Continuous>  octreotide  Infusion 25 MICROgram(s)/Hr (5 mL/Hr) IV Continuous <Continuous>  pantoprazole  Injectable 40 milliGRAM(s) IV Push every 12 hours  Parenteral Nutrition - Adult 1 Each (58 mL/Hr) TPN Continuous <Continuous>  potassium chloride  20 mEq/100 mL IVPB 20 milliEquivalent(s) IV Intermittent once  PureFlow Dialysate RFP-400 (K 2 / Ca 3) 5000 milliLiter(s) (2000 mL/Hr) CRRT <Continuous>  vasopressin Infusion 0.03 Unit(s)/Min (4.5 mL/Hr) IV Continuous <Continuous>        VITALS:  T(F): 109.4 (07-12-23 @ 08:00), Max: 109.4 (07-11-23 @ 13:00)  HR: 72 (07-12-23 @ 09:09)  RR: 25 (07-12-23 @ 08:55)  SpO2: 100% (07-12-23 @ 09:09)  Wt(kg): --    07-10 @ 07:01  -  07-11 @ 07:00  --------------------------------------------------------  IN: 2682 mL / OUT: 994 mL / NET: 1688 mL    07-11 @ 07:01  -  07-12 @ 07:00  --------------------------------------------------------  IN: 2517.3 mL / OUT: 5486 mL / NET: -2968.7 mL    07-12 @ 07:01  -  07-12 @ 09:17  --------------------------------------------------------  IN: 140.8 mL / OUT: 246 mL / NET: -105.2 mL          PHYSICAL EXAM:  Constitutional: intubated on MV  Respiratory: CTAB,   Cardiovascular: S1, S2, RRR  Gastrointestinal: BS+, soft, NT/ND/ colostomy  Extremities: No cyanosis or clubbing. No peripheral edema  :  bobby.   Skin: No rashes    LABS:  07-12    136  |  102  |  46<H>  ----------------------------<  135<H>  3.4<L>   |  21  |  0.7    Ca    7.7<L>      12 Jul 2023 05:23  Phos  2.8     07-12  Mg     2.0     07-12    TPro  3.3<L>  /  Alb  1.3<L>  /  TBili  0.8  /  DBili      /  AST  30  /  ALT  13  /  AlkPhos  179<H>  07-10                          8.3    5.92  )-----------( 36       ( 12 Jul 2023 05:23 )             25.7       Urine Studies:  Urinalysis Basic - ( 12 Jul 2023 05:23 )    Color:  / Appearance:  / SG:  / pH:   Gluc: 135 mg/dL / Ketone:   / Bili:  / Urobili:    Blood:  / Protein:  / Nitrite:    Leuk Esterase:  / RBC:  / WBC    Sq Epi:  / Non Sq Epi:  / Bacteria:           Iron 12, TIBC 128, %sat 9      [05-07-23 @ 09:19]  Ferritin 526      [05-07-23 @ 09:19]  Vitamin D (25OH) 26      [05-08-23 @ 07:58]  TSH 0.45      [06-19-23 @ 14:14]  Lipid: chol 58, TG 88, HDL 22, LDL --      [07-02-23 @ 21:01]          RADIOLOGY & ADDITIONAL STUDIES:

## 2023-07-12 NOTE — PROGRESS NOTE ADULT - SUBJECTIVE AND OBJECTIVE BOX
NUTRITION SUPPORT TEAM  -  PROGRESS NOTE   remain vented/sedated  on pressors  NGT to suction   ABDOMEN: Soft, Nontender, Nondistended; 3 drains in place   on cvvh/ renal f/u noted       REVIEW OF SYSTEMS:  Negative except as noted above.     VITALS:  T(F): 109.4 (07-12 @ 13:00), Max: 109.4 (07-12 @ 05:00)  HR: 70 (07-12 @ 13:30) (66 - 92)  RR: 25 (07-12 @ 13:30) (19 - 30)  SpO2: 100% (07-12 @ 13:30) (97% - 100%)    HEIGHT/WEIGHT/BMI:   Height (cm): 180 (06-30), 180.3 (05-11)  Weight (kg): 76.3 (06-30), 66.2 (05-10)  BMI (kg/m2): 23.5 (06-30), 20.4 (05-11)  07-11-23 @ 07:01  -  07-12-23 @ 07:00  --------------------------------------------------------  IN:    Enteral Tube Flush: 60 mL    IV PiggyBack: 250 mL    IV PiggyBack: 100 mL    Norepinephrine: 39 mL    Octreotide: 80 mL    Phenylephrine: 138.3 mL    PRBCs (Packed Red Blood Cells): 350 mL    TPN (Total Parenteral Nutrition): 1392 mL    Vasopressin: 108 mL  Total IN: 2517.3 mL    OUT:    Drain (mL): 50 mL    Drain (mL): 1005 mL    Drain (mL): 1935 mL    Drain (mL): 25 mL    Intermittent Catheterization - Urethral (mL): 43 mL    Nasogastric/Oral tube (mL): 110 mL    Other (mL): 2318 mL    Voided (mL): 0 mL  Total OUT: 5486 mL    Total NET: -2968.7 mL        MEDICATIONS:   bacitracin   Ointment 1 Application(s) Topical two times a day  caspofungin IVPB      caspofungin IVPB 50 milliGRAM(s) IV Intermittent every 24 hours  chlorhexidine 0.12% Liquid 15 milliLiter(s) Oral Mucosa every 12 hours  chlorhexidine 2% Cloths 1 Application(s) Topical <User Schedule>  CRRT Treatment    <Continuous>  DAPTOmycin IVPB 460 milliGRAM(s) IV Intermittent every 48 hours  heparin   Injectable 5000 Unit(s) SubCutaneous every 8 hours  levoFLOXacin IVPB 750 milliGRAM(s) IV Intermittent every 48 hours  lipid, fat emulsion (Fish Oil and Plant Based) 20% Infusion 0.656 Gm/kG/Day (31.3 mL/Hr) IV Continuous <Continuous>  meropenem  IVPB 1000 milliGRAM(s) IV Intermittent every 12 hours  norepinephrine Infusion 0.01 MICROgram(s)/kG/Min (0.72 mL/Hr) IV Continuous <Continuous>  octreotide  Infusion 25 MICROgram(s)/Hr (5 mL/Hr) IV Continuous <Continuous>  pantoprazole  Injectable 40 milliGRAM(s) IV Push every 12 hours  Parenteral Nutrition - Adult 1 Each (63 mL/Hr) TPN Continuous <Continuous>  Parenteral Nutrition - Adult 1 Each (58 mL/Hr) TPN Continuous <Continuous>  PureFlow Dialysate RFP-400 (K 2 / Ca 3) 5000 milliLiter(s) (2000 mL/Hr) CRRT <Continuous>  sodium chloride 0.9% lock flush 10 milliLiter(s) IV Push every 1 hour PRN  sodium chloride 0.9% lock flush 10 milliLiter(s) IV Push every 1 hour PRN  vasopressin Infusion 0.03 Unit(s)/Min (4.5 mL/Hr) IV Continuous <Continuous>    LABS:                         8.3    5.92  )-----------( 36 ( 12 Jul 2023 05:23 )             25.7     136  |  102  |  46<H>  ----------------------------<  135<H>          (07-12-23 @ 05:23)  3.4<L>   |  21  |  0.7    Ca    7.7<L>          (07-12-23 @ 05:23)  Phos  2.8         (07-12-23 @ 05:23)  Mg     2.0         (07-12-23 @ 05:23)    TPro  3.3<L>  /  Alb  1.3<L>  /  TBili  0.8  /  DBili  x   /  AST  30  /  ALT  13  /  AlkPhos  179<H>       07-10-23 @ 22:09  Triglycerides, Serum: 88 mg/dL (07-02 @ 21:01)  Prealbumin, Serum: 3 mg/dL (07-09-23 @ 04:22)  Vitamin D, 25-Hydroxy: 26 ng/mL (05-08 @ 07:58)  Folate: 5.7 ng/mL (05-07 @ 09:19)  Vitamin B12, Serum: 766 pg/mL (05-07 @ 09:19)  Blood Glucose (Past 24 hours):  146 mg/dL (07-12 @ 13:22)  139 mg/dL (07-11 @ 18:06)    DIET:   Diet, NPO (07-05-23 @ 10:33) [Active]    Parenteral Nutrition - Adult 1 Each (58 mL/Hr) TPN Continuous <Continuous>, 07-11-23 @ 20:00, 20:00, Stop order after: 1 Days  Parenteral Nutrition - Adult 1 Each (63 mL/Hr) TPN Continuous <Continuous>, 07-12-23 @ 20:00, 20:00, Stop order after: 1 Days    RADIOLOGY:   < from: Xray Chest 1 View- PORTABLE-Routine (07.12.23 @ 05:47) >  Impression:  Stable bilateral opacities. No definite pneumothorax   NUTRITION SUPPORT TEAM  -  PROGRESS NOTE   remain vented/sedated  on pressors  NGT to suction   ABDOMEN: Soft, Nontender, Nondistended; 3 drains in place   on cvvh/ renal f/u noted   d/w surgeon daily  albumin and prealbumin levels - and CRP - discussed    REVIEW OF SYSTEMS:  Negative except as noted above.     VITALS:  T(F): 109.4 (07-12 @ 13:00), Max: 109.4 (07-12 @ 05:00)  HR: 70 (07-12 @ 13:30) (66 - 92)  RR: 25 (07-12 @ 13:30) (19 - 30)  SpO2: 100% (07-12 @ 13:30) (97% - 100%)    HEIGHT/WEIGHT/BMI:   Height (cm): 180 (06-30), 180.3 (05-11)  Weight (kg): 76.3 (06-30), 66.2 (05-10)  BMI (kg/m2): 23.5 (06-30), 20.4 (05-11)  07-11-23 @ 07:01  -  07-12-23 @ 07:00  --------------------------------------------------------  IN:    Enteral Tube Flush: 60 mL    IV PiggyBack: 250 mL    IV PiggyBack: 100 mL    Norepinephrine: 39 mL    Octreotide: 80 mL    Phenylephrine: 138.3 mL    PRBCs (Packed Red Blood Cells): 350 mL    TPN (Total Parenteral Nutrition): 1392 mL    Vasopressin: 108 mL  Total IN: 2517.3 mL    OUT:    Drain (mL): 50 mL    Drain (mL): 1005 mL    Drain (mL): 1935 mL    Drain (mL): 25 mL    Intermittent Catheterization - Urethral (mL): 43 mL    Nasogastric/Oral tube (mL): 110 mL    Other (mL): 2318 mL    Voided (mL): 0 mL  Total OUT: 5486 mL    Total NET: -2968.7 mL    MEDICATIONS:   bacitracin   Ointment 1 Application(s) Topical two times a day  caspofungin IVPB      caspofungin IVPB 50 milliGRAM(s) IV Intermittent every 24 hours  chlorhexidine 0.12% Liquid 15 milliLiter(s) Oral Mucosa every 12 hours  chlorhexidine 2% Cloths 1 Application(s) Topical <User Schedule>  CRRT Treatment    <Continuous>  DAPTOmycin IVPB 460 milliGRAM(s) IV Intermittent every 48 hours  heparin   Injectable 5000 Unit(s) SubCutaneous every 8 hours  levoFLOXacin IVPB 750 milliGRAM(s) IV Intermittent every 48 hours  lipid, fat emulsion (Fish Oil and Plant Based) 20% Infusion 0.656 Gm/kG/Day (31.3 mL/Hr) IV Continuous <Continuous>  meropenem  IVPB 1000 milliGRAM(s) IV Intermittent every 12 hours  norepinephrine Infusion 0.01 MICROgram(s)/kG/Min (0.72 mL/Hr) IV Continuous <Continuous>  octreotide  Infusion 25 MICROgram(s)/Hr (5 mL/Hr) IV Continuous <Continuous>  pantoprazole  Injectable 40 milliGRAM(s) IV Push every 12 hours  Parenteral Nutrition - Adult 1 Each (63 mL/Hr) TPN Continuous <Continuous>  Parenteral Nutrition - Adult 1 Each (58 mL/Hr) TPN Continuous <Continuous>  PureFlow Dialysate RFP-400 (K 2 / Ca 3) 5000 milliLiter(s) (2000 mL/Hr) CRRT <Continuous>  sodium chloride 0.9% lock flush 10 milliLiter(s) IV Push every 1 hour PRN  sodium chloride 0.9% lock flush 10 milliLiter(s) IV Push every 1 hour PRN  vasopressin Infusion 0.03 Unit(s)/Min (4.5 mL/Hr) IV Continuous <Continuous>    LABS:                         8.3    5.92  )-----------( 36 ( 12 Jul 2023 05:23 )             25.7     136  |  102  |  46<H>  ----------------------------<  135<H>          (07-12-23 @ 05:23)  3.4<L>   |  21  |  0.7    Ca    7.7<L>          (07-12-23 @ 05:23)  Phos  2.8         (07-12-23 @ 05:23)  Mg     2.0         (07-12-23 @ 05:23)    TPro  3.3<L>  /  Alb  1.3<L>  /  TBili  0.8  /  DBili  x   /  AST  30  /  ALT  13  /  AlkPhos  179<H>       07-10-23 @ 22:09  Triglycerides, Serum: 88 mg/dL (07-02 @ 21:01)  Prealbumin, Serum: 3 mg/dL (07-09-23 @ 04:22)  Vitamin D, 25-Hydroxy: 26 ng/mL (05-08 @ 07:58)  Folate: 5.7 ng/mL (05-07 @ 09:19)  Vitamin B12, Serum: 766 pg/mL (05-07 @ 09:19)  Blood Glucose (Past 24 hours):  146 mg/dL (07-12 @ 13:22)  139 mg/dL (07-11 @ 18:06)  C-Reactive Protein, Serum: 245.1 mg/L (07.10.23 @ 01:30)   DIET:   Diet, NPO (07-05-23 @ 10:33) [Active]    RADIOLOGY:   < from: Xray Chest 1 View- PORTABLE-Routine (07.12.23 @ 05:47) >  Impression:  Stable bilateral opacities. No definite pneumothorax

## 2023-07-12 NOTE — PROGRESS NOTE ADULT - ASSESSMENT
ASSESSMENT  67M w/ PMH of HTN, grade IV hydronephrosis of L kidney s/p L PCN (placed 5/2023), stutter, hiatal hernia, iron deficiency anemia, H Pylori (untreated), and gastric mass (never biopsied) presents to the ED with at least 4 days of worsening weakness, abdominal distension, and no output from his PCN.    Found to have septic shock, MSOF, lactic acidosis from left emphysematous hydronephrosis, pyelonephritis and possible rupture with pneumoperitoneum along with possible hepatic abscesses, splenic infarct and abdominal and mediastinal lymphadenopathies    ROJAS likely ATN - to hold CVVH today, f/u with nephrology daily  septic shock   acute resp failure, remains vented postop  fluid overload  severe acute malnutrition high risk due to prolonged NPO due to acute circumstances  + feculent material from drains  hypokalemia    cont TPN  will add lipids/incrase K+ in TPN today  will increase AA in TPN  check bmp/phos/mg and correct lytes ASSESSMENT  67M w/ PMH of HTN, grade IV hydronephrosis of L kidney s/p L PCN (placed 5/2023), stutter, hiatal hernia, iron deficiency anemia, H Pylori (untreated), and gastric mass (never biopsied) presents to the ED with at least 4 days of worsening weakness, abdominal distension, and no output from his PCN.    Found to have septic shock, MSOF, lactic acidosis from left emphysematous hydronephrosis, pyelonephritis and possible rupture with pneumoperitoneum along with possible hepatic abscesses, splenic infarct and abdominal and mediastinal lymphadenopathies    ROJAS likely ATN - to hold CVVH today, f/u with nephrology daily  septic shock   acute resp failure, remains vented postop  fluid overload  severe acute malnutrition high risk due to prolonged NPO due to acute circumstances  + feculent material from drains  hypokalemia    cont TPN  will add lipids tonight, follow platelets closely  increase K+ in TPN today, f/u with renal and CVVH  will increase AA in TPN  follow CRP and prealbumin together for trend - that is what is clinically applicable  check bmp/phos/mg and correct lytes

## 2023-07-12 NOTE — PROGRESS NOTE ADULT - PROBLEM SELECTOR PLAN 4
-full code  -ongoing medical management  -HCP is serene [facility] as appropriate.
-full code  -ongoing medical management  -HCP is serene [facility]  -HCP does want surgical intervention  -GOC as appropriate.
-full code  -ongoing medical management  -HCP is daughter Tracey  -potential family meeting tomorrow 3pm
-full code  -ongoing medical management  -HCP is daughter Tracey  -pt planned for LEFT radical nephrectomy and abdominal washout tomorrow.   -GOC as appropriate.
-full code  -ongoing medical management  -HCP is daughter Tracey  -potential family meeting tomorrow 3pm
-full code  -ongoing medical management  -HCP is daughter Tracey

## 2023-07-12 NOTE — PROGRESS NOTE ADULT - SUBJECTIVE AND OBJECTIVE BOX
HPI:  67M w/ PMH of HTN, grade IV hydronephrosis of L kidney s/p L PCN (placed 5/2023), stutter, hiatal hernia, iron deficiency anemia, H Pylori (untreated), and gastric mass (never biopsied) presents to the ED with at least 4 days of worsening weakness, abdominal distension, and no output from his PCN. Pt and daughters bedside are only able to give limited information, but for the past few days, he has noticed no output from his PCN as well as increasing abdominal distension, pain, and anorexia. He reports that he has not felt normal for the past 10 days. His PCN used to put out serosanguinous fluid, but the output changed to feculent/brown liquid over the past day. In the ED, he was noted to be in new onset afib w/ RVR to 150+ as well as experiencing some difficulty breathing. He was started on BiPAP. Stat labs were notable for WBC 47, Cr 2.9, and lactate 6. CT A/P w/ IV contrast showed free intraperitoneal fluid and air from an unknown source as well as fluid and air in the L kidney.  (18 Jun 2023 18:34)    Interval history  -Patient seen at bedside  -He remains intubated and sedated and unable to participate in exam  -s/p nephrectomy   -Family meeting 7/6/23  -Family meeting 7/12/23    ADVANCE DIRECTIVES:    [x ] Full Code [ ] DNR  MOLST  [ ]  Living Will  [ ]   DECISION MAKER(s):  [x ] Health Care Proxy(s)  [ ] Surrogate(s)  [ ] Guardian           Name(s): Phone Number(s):  Tracey    BASELINE (I)ADL(s) (prior to admission):  [place]: [ ]Total  [ ] Moderate [ ]Dependent  Palliative Performance Status Version 2:         %    http://npcrc.org/files/news/palliative_performance_scale_ppsv2.pdf    Allergies    No Known Allergies    Intolerances    MEDICATIONS  (STANDING):  bacitracin   Ointment 1 Application(s) Topical two times a day  caspofungin IVPB      caspofungin IVPB 50 milliGRAM(s) IV Intermittent every 24 hours  chlorhexidine 0.12% Liquid 15 milliLiter(s) Oral Mucosa every 12 hours  chlorhexidine 2% Cloths 1 Application(s) Topical <User Schedule>  CRRT Treatment    <Continuous>  DAPTOmycin IVPB 460 milliGRAM(s) IV Intermittent every 48 hours  heparin   Injectable 5000 Unit(s) SubCutaneous every 8 hours  levoFLOXacin IVPB 750 milliGRAM(s) IV Intermittent every 48 hours  lipid, fat emulsion (Fish Oil and Plant Based) 20% Infusion 0.656 Gm/kG/Day (31.3 mL/Hr) IV Continuous <Continuous>  meropenem  IVPB 1000 milliGRAM(s) IV Intermittent every 12 hours  norepinephrine Infusion 0.01 MICROgram(s)/kG/Min (0.72 mL/Hr) IV Continuous <Continuous>  octreotide  Infusion 25 MICROgram(s)/Hr (5 mL/Hr) IV Continuous <Continuous>  pantoprazole  Injectable 40 milliGRAM(s) IV Push every 12 hours  Parenteral Nutrition - Adult 1 Each (58 mL/Hr) TPN Continuous <Continuous>  Parenteral Nutrition - Adult 1 Each (63 mL/Hr) TPN Continuous <Continuous>  PureFlow Dialysate RFP-400 (K 2 / Ca 3) 5000 milliLiter(s) (2000 mL/Hr) CRRT <Continuous>  vasopressin Infusion 0.03 Unit(s)/Min (4.5 mL/Hr) IV Continuous <Continuous>    MEDICATIONS  (PRN):  sodium chloride 0.9% lock flush 10 milliLiter(s) IV Push every 1 hour PRN Pre/post blood products, medications, blood draw, and to maintain line patency  sodium chloride 0.9% lock flush 10 milliLiter(s) IV Push every 1 hour PRN Pre/post blood products, medications, blood draw, and to maintain line patency            PRESENT SYMPTOMS: [x]Unable to obtain due to poor mentation   Source if other than patient:  [ ]Family   [ ]Team     Pain: [ ]yes [ ]no  QOL impact -   Location -                    Aggravating factors -  Quality -  Radiation -  Timing-  Severity (0-10 scale):  Minimal acceptable level (0-10 scale):     CPOT:  0  https://www.sccm.org/getattachment/miw31j67-5p9s-9k0h-6a7m-8849u5342m6x/Critical-Care-Pain-Observation-Tool-(CPOT)    PAIN AD Score:   [email or web address]/painad.pdf (press ctrl +  left click to view)    Dyspnea:                           [ ]None[ ]Mild [ ]Moderate [ ]Severe     Respiratory Distress Observation Scale (RDOS): 0  A score of 0 to 2 signifies little or no respiratory distress, 3 signifies mild distress, scores 4 to 6 indicate moderate distress, and scores greater than 7 signify severe distress  [email or web address]://[email or web address]/sites/default/files/PDFS/031589-icsyvnllesf-meeshzsx-bpvgolserjz-uhpqk.pdf    Anxiety:                             [ ]None[ ]Mild [ ]Moderate [ ]Severe   Fatigue:                             [ ]None[ ]Mild [ ]Moderate [ ]Severe   Nausea:                             [ ]None[ ]Mild [ ]Moderate [ ]Severe   Loss of appetite:              [ ]None[ ]Mild [ ]Moderate [ ]Severe   Constipation:                    [ ]None[ ]Mild [ ]Moderate [ ]Severe    Other Symptoms:  [ ]All other review of systems negative     Palliative Performance Status Version 2:        10 %    http://[email or web address]/news/palliative_performance_scale_[email or web address].pdf    PHYSICAL EXAM:    ICU Vital Signs Last 24 Hrs  T(C): 35.9 (12 Jul 2023 14:00), Max: 43 (11 Jul 2023 17:00)  T(F): 109.4 (12 Jul 2023 14:00), Max: 109.4 (11 Jul 2023 17:00)  HR: 72 (12 Jul 2023 14:00) (66 - 92)  BP: --  BP(mean): --  ABP: 116/51 (12 Jul 2023 14:00) (92/45 - 147/70)  ABP(mean): 69 (12 Jul 2023 14:00) (56 - 90)  RR: 25 (12 Jul 2023 14:00) (19 - 30)  SpO2: 100% (12 Jul 2023 14:00) (97% - 100%)    O2 Parameters below as of 12 Jul 2023 14:00  Patient On (Oxygen Delivery Method): ventilator    O2 Concentration (%): 30            GENERAL:  [ ] No acute distress [x ]Lethargic  [ ]Unarousable  [ ]Verbal  [ ]Non-Verbal [ ]Cachexia    BEHAVIORAL/PSYCH:  [ ]Alert and Oriented x  [ ] Anxiety [ ] Delirium [ ] Agitation [ x] Calm   EYES: [ ] No scleral icterus [ ] Scleral icterus [x ] Closed  ENMT:  [ ]Dry mouth  [x ]No external oral lesions [ ] No external ear or nose lesions  CARDIOVASCULAR:  [ ]Regular [ ]Irregular [ ]Tachy [x ]Not Tachy  [ ]Ben [ ] Edema [ ] No edema  PULMONARY:  [ ]Tachypnea  [ ]Audible excessive secretions [x ] No labored breathing [ ] labored breathing  GASTROINTESTINAL: [ ]Soft  [ ]Distended  [x ]Not distended [ ]Non tender [ ]Tender  MUSCULOSKELETAL: [ ]No clubbing [ ] clubbing  [x ] No cyanosis [ ] cyanosis  NEUROLOGIC: [ ]No focal deficits  [ ]Follows commands  [x ]Does not follow commands  [ ]Cognitive impairment  [ ]Dysphagia  [ ]Dysarthria  [ ]Paresis   SKIN: [ ] Jaundiced [x ] Non-jaundiced [ ]Rash [ ]No Rash [ ] Warm [ ] Dry  MISC/LINES: [x ] ET tube [ ] Trach [ ]NGT/OGT [ ]PEG [ x]Mena    CRITICAL CARE:  [x ] Shock Present  [ ]Septic [ ]Cardiogenic [ ]Neurologic [ ]Hypovolemic  [x ]  Vasopressors [ ]  Inotropes   [x ]Respiratory failure present [ x]Mechanical ventilation [ ]Non-invasive ventilatory support [ ]High flow  [ ]Acute  [ ]Chronic [ ]Hypoxic  [ ]Hypercarbic [ ]Other  [ ]Other organ failure     LABS: reviewed by me                            8.3    5.92  )-----------( 36       ( 12 Jul 2023 05:23 )             25.7              07-12    136  |  102  |  46<H>  ----------------------------<  135<H>  3.4<L>   |  21  |  0.7    Ca    7.7<L>      12 Jul 2023 05:23  Phos  2.8     07-12  Mg     2.0     07-12    TPro  3.3<L>  /  Alb  1.3<L>  /  TBili  0.8  /  DBili  x   /  AST  30  /  ALT  13  /  AlkPhos  179<H>  07-10                RADIOLOGY & ADDITIONAL STUDIES: reviewed by me    < from: Xray Chest 1 View- PORTABLE-Routine (06.28.23 @ 05:40) >  FINDINGS/  IMPRESSION:    NG tube terminating in the left upper quadrant. Unchanged left IJ venous   catheter.    Bibasal opacities without significant change accounting for differences   in positioning. No pneumothorax.    Stable cardiomediastinal silhouette.    Unchanged osseous structures.    < end of copied text >      EKG: reviewed by me  < from: 12 Lead ECG (06.27.23 @ 22:17) >  Ventricular Rate 96 BPM    Atrial Rate 97 BPM    QRS Duration 78 ms    Q-T Interval 290 ms    QTC Calculation(Bazett) 366 ms    R Axis 28 degrees    T Axis 227 degrees    Diagnosis Line Atrial fibrillation  Low voltage QRS  Septal infarct , age undetermined  Abnormal ECG    < end of copied text >      PROTEIN CALORIE MALNUTRITION PRESENT: [ ]mild [ ]moderate [ ]severe [ ]underweight [ ]morbid obesity  https://www.andeal.org/vault/2440/web/files/ONC/Table_Clinical%20Characteristics%20to%20Document%20Malnutrition-White%20JV%20et%20al%202012.pdf    Height (cm): 180 (06-22-23 @ 21:00), 180.3 (05-11-23 @ 08:58)  Weight (kg): 76.476 (06-22-23 @ 21:00), 66.2 (05-10-23 @ 10:54)  BMI (kg/m2): 23.6 (06-22-23 @ 21:00), 20.4 (05-11-23 @ 08:58)    [ ]PPSV2 < or = to 30% [ ]significant weight loss  [ ]poor nutritional intake  [ ]anasarca      [ ]Artificial Nutrition      REFERRALS:   [ ]Chaplaincy  [ ]Hospice  [ ]Child Life  [ x]Social Work  [ ]Case management [ ]Holistic Therapy     Patient discussed with primary medical team MD  Palliative care education provided to patient and/or family  Patient and/or family assessed for spiritual and social needs    Goals of Care Document:

## 2023-07-12 NOTE — ADVANCED PRACTICE NURSE CONSULT - RECOMMEDATIONS
Cleanse wound to spine with soap and water  Pat dry, apply triad and Allevyn twice a day, prn for soiling    Cleanse wound to left heel with soap and water  Pat dry, apply Cavilon, leave open to air twice a day, offload heels  Recommend offloading boots    Cleanse wound to abdomen with Vashe wound cleanser (stocked in the store room)  Pat dry, apply Xeroform and abdominal pad (no tape) twice a day, prn for soiling  Recommend follow up with Burn for further recommendation    Cleanse wound to upper extremities with soap and water  Pat dry, apply Xeroform, Aquacel, wrap with Kerlix twice a day, prn for soiling    Cleanse wounds to scrotum and right groin with soap and water  Pat dry, apply triad twice a day, prn for soiling    Recommend cleanse wound to sacrococcygeal region with soap and water  Pat dry, apply triad and Allevyn twice a day, prn for soiling, as per Burn team's discretion    Maintain pressure injury prevention.   Keep skin clean.   Maintain incontinence care.   Monitor wound for changes and notify provider   Case discussed with primary RN

## 2023-07-12 NOTE — PROGRESS NOTE ADULT - PROBLEM SELECTOR PLAN 1
With septic shock on pressors. In setting of kidney abscess and emphysematous pyelo  -continue levaquin, meropenem  -pressors per primary team  -HCP in agreement with surgery as appropriate   -s/p LEFT radical nephrectomy and abdominal washout  -f/u cultures  -f/u ID
With septic shock on pressors. In setting of kidney abscess and emphysematous pyelo  -continue levaquin, meropenem, caspofungin  -pressors per primary team  -continue bicarb infusion  -HCP in agreement with surgery as appropriate - urology/SICU to follow up  -f/u cultures  -f/u ID
With septic shock on pressors. In setting of kidney abscess and emphysematous pyelo  -continue levaquin, meropenem  -pressors per primary team  -HCP in agreement with surgery as appropriate   -s/p LEFT radical nephrectomy and abdominal washout  -f/u cultures  -f/u ID
With septic shock on pressors. In setting of kidney abscess and emphysematous pyelo  -continue levaquin, meropenem, caspofungin  -pressors per primary team  -continue bicarb infusion  -HCP in agreement with surgery as appropriate   -pt planned for LEFT radical nephrectomy and abdominal washout tomorrow.   -f/u cultures  -f/u ID
With septic shock on pressors. In setting of kidney abscess and emphysematous pyelo  -continue levaquin, meropenem  -pressors per primary team  -HCP in agreement with surgery as appropriate   -s/p LEFT radical nephrectomy and abdominal washout  -f/u cultures  -f/u ID
With septic shock on pressors. In setting of kidney abscess and emphysematous pyelo  -continue levaquin, meropenem, caspofungin  -pressors per primary team  -continue bicarb infusion  -HCP in agreement with surgery as appropriate   -pt planned for LEFT radical nephrectomy and abdominal washout tomorrow.   -f/u cultures  -f/u ID

## 2023-07-12 NOTE — PROGRESS NOTE ADULT - ASSESSMENT
Assessment and Plan:   67M w/ PMH of HTN, grade IV hydronephrosis of L kidney s/p L PCN (placed 5/2023), stutter, hiatal hernia, iron deficiency anemia, H Pylori (untreated), and gastric mass (never biopsied) presents to the ED with at least 4 days of worsening weakness, abdominal distension, and no output from his PCN. Admitted with emphysematous pyelonephritis.     (6/18): s/p LT PCN upsizing to 16Fr and 16Fr RLQ drain placement with IR   (6/22): s/p shock lithotripsy of L kidney abscess cavity with drainage of thick contents, upsize of drain to 26Fr bobby catheter  (6/30): s/p ex-lap and L nephrectomy with abdominal washout    NEURO:  #Pain control    -APAP PRN  #Sedation    -Off Precedex     -not following commands when off sedation, GCS 10T  #Change in Mental Status     -CT head 7/10: No acute intracranial pathology      RESP:  Vent Settings: 450/28/30/8   ABG:   #acute respiratory failure with left lung mucous plugging - resolved s/p intubated 6/24 and bronchoscopy     -Bronchoscopy 7/10: No mucous plug found    -CT Chest 7/10: Interval increase in size of bilateral pleural effusions with adjacent consolidation both lower lobes.   #Pulmonary nodules / Mediastinal lymphadenopathy    - CT Chest: Right middle and upper lobe solid pulmonary nodules, f/u outpatient pulm   #Activity   - incr as tolerated    CARDS:   #Septic shock    - off levo since 3am 7/1, remains on phenylephrine and Vasopressin infusions  #New onset Afib w/ RVR with hypotension -- likely 2/2 sepsis    - HR control with Metoprolol 2.5 q6 IV (OFF currently)    -Digoxin 250mcg x1 dose, 125mcg x1 7/1, 125mcg 7/4, 125 mcg on 7/6, repeat level in am 7/7 2.7 =>am dose held, 7/8 2.9 --> 7/9 1.9     - Cardiology following  #H/O HTN    -Amlodipine 2.5mg HOLDING due to ongoing pressor requirement  Imaging    - ECHO: (6/19) EF 55-60%, mild AS, mild LAE, mild MR/TR, trivial pericardial effusion    GI/NUTR:  #Diet: NPO on TPN @58cc/hr w/o potassium  CT AP PO/IV/rectal contrast 7/10: proximal small bowel leak suspected, moderate volume free fluid with enhancement of peritoneal reflections, consistent with peritonitis, increased number and size of bilobar hepatic hypodense suspected hepatic abscesses, including segment IV/III multiseptated suspected abscess measuring 5 cm, previously 2.4 cm on 6/18/2023.    -Octreotide gtt  #bowel movements    - GI consult 6/26- Plan for EGD with EUS + FNA as outpatient, H-pylori treatment after resolution of acute issues   -FOBT positive    #GI PPX  -IV Protonix 40 q12 hours  #Gastric mass vs gastrohepatic lymphadenopathy    -CT A/P: lesser curvature mass , Hepatic hypodensities  #Splenic Hypodensities, infarct vs phlegmon/abscess    -CT A/P:  Wedge-shaped region of hypodensity within the spleen    /RENAL:   #Grade IV L hydronephrosis s/p L PCN (5/2023) -- presented to ED with thick dark foul smelling output  #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    -(6/23) s/p LT PCN upsizing to 26Fr catheter, RLQ 16 Fr drain remains, s/p nephrostolithotomy w/ lithotripsy    -(6/18) s/p LT PCN upsizing to 16Fr and 16Fr RLQ drain placement with IR, flush q8 hr  (6/30): s/p L nephrectomy and abdominal washout   -R pelvic drain Creatinine 3.99  #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   #metabolic acidosis  -Nephrology c/s - CVVHD started 07/02, current goal to keep net even  #urine output in critically ill    -Straight cath every 12 hours  #decreased urine output in the setting of acute renal failure 2/2 septic shock    Labs:          BUN/Cr- 59/1.0  -->,  59/0.9  -->          Electrolytes-Na 138 // K 3.7 // Mg 2.1 //  Phos 3.5 (07-11 @ 05:25)    HEME/ONC:   #DVT prophylaxis  -SCDs, heparin subQ   #new onset Afib  -holding heparin infusion in setting of hematochezia   #R radial artery occlusion  -RUE duplex 07/01 + for right radial artery occlusion  #RT posterior popliteal vein DVT  -vascular c/s - rec AC   #H/o Fe Deficiency anemia  #Thrombocytopenia  Heme consult (7/3):   - low risk for HIT (score 2-3)  - Heme/onc wants full HIT workup although low HIT risk  - HIT workup negative  - risk of full anticoagulation outweigh the benefits due to high risk of bleeding (h/o GI bleed and post-nephrectomy), will hold off on AC and will wait for HIT AB screen results, if new DVT then will re-assess; discussed with SICU attending Dr. Orozco who also discussed this with hematology team and Dr. Womack who are all in agreement.    Labs: Hb/Hct:  6.6/20.6  -->,  7.8/24.0  -->                      Plts:  36  -->,  32  -->                 PTT/INR:      Type and Screen expires: 7/14     ID:  WBC- 10.45  --->>,  7.47  --->>,  4.87  --->>  Temp trend- 24hrs T(F): 97.5 (07-12 @ 02:00), Max: 109.4 (07-11 @ 04:00)  Antibiotics-caspofungin IVPB    caspofungin IVPB 50 every 24 hours  DAPTOmycin IVPB 460 every 48 hours  levoFLOXacin IVPB 750 every 48 hours  meropenem  IVPB 1000 every 12 hours  #Cultures  Surgical swab 06/30: vanc resistent enterococcus faecium     L PCN cyto-pathology  6/25: results suggestive of a necrotic neoplasm    Fungitell 6/26: 47 --> >500 (7/6)    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    BAL 6/24: moderate yeast     UCx 6/19: negative FINAL      Left PCN aspiration 6/18: stenotrophomonas maltophilia    Surgical swab (6/30): VRE     Blood cultures: 7/10 Pending results - received     ENDO:  #Glucose monitoring    -A1c (5/7/23): 5.0    -POCT q6 hours while NPO   #Adrenal Insufficiency 2/2 to sepsis     -completed course of solu-cortef    -Cortisol level - binding globulin 1.1 (low), serum cortisol, 84, free cortisol 76, % free cortisol 91    MSK:  #Sacrococcygeal DTI: wound care following- cleanse with soap and water, apply triad, #gauze, and allevyn BID and PRN for soiling   Venous ulcer right lower leg, R forearm skin tear: Pat dry, apply Xeroform, nonadherent dressing, wrap with Kerlix twice a day, prn for soiling  Burn consulted, recs:  --> sacrum: apply hydrogel and Allevyn pad  --> b/l ischium: Allevyn pad  - offloading/positioning  #Activity    -Advance as tolerated    -PT/rehab once extubated    -Globally deconditioned  #b/l UE, LE mottled skin  +ulnar/DP/PT pulses present on doppler  -Arterial duplex bilateral upper extremities (7/1)  - Right radial artery occlusion   -VA Duplex bilateral lower extremities (7/3) - Right PT vein DVT     LINES/DRAINS:   PIV  Bobby (6/18-7/10)  L eleazar drain (6/30)  R Lerma pouch #1, pelvic drain #2 (6/30)  Left basilic midline  6/21  Right axillary a line (6/30-7/4)  Right Radial [place] (6/22-6/30)  L-Femoral [place] (7/10 - )  L-Femoral [place] (7/10 - )  RIJ TLC (7/10-  )  ETT  OGT    ADVANCED DIRECTIVES:  Full Code  HCP/Emergency Contact- Tracey Adames: [phone]   Palliative following. Last [facility] discussion 7/7    INDICATION FOR SICU: New onset atrial fibrillation w/ mechanical ventilation    DISPOSITION: SICU Assessment and Plan:   67M w/ PMH of HTN, grade IV hydronephrosis of L kidney s/p L PCN (placed 5/2023), stutter, hiatal hernia, iron deficiency anemia, H Pylori (untreated), and gastric mass (never biopsied) presents to the ED with at least 4 days of worsening weakness, abdominal distension, and no output from his PCN. Admitted with emphysematous pyelonephritis.     (6/18): s/p LT PCN upsizing to 16Fr and 16Fr RLQ drain placement with IR   (6/22): s/p shock lithotripsy of L kidney abscess cavity with drainage of thick contents, upsize of drain to 26Fr bobby catheter  (6/30): s/p ex-lap and L nephrectomy with abdominal washout    NEURO:  #Pain control    -APAP PRN  #Sedation    -Off Precedex     -not following commands when off sedation, GCS 10T  #Change in Mental Status     -CT head 7/10: No acute intracranial pathology      RESP:  Vent Settings: 450/28/30/8   ABG: ABG - ( 12 Jul 2023 04:39 )  pH, Arterial: 7.34  pH, Blood: x     /  pCO2: 37    /  pO2: 138   / HCO3: 20    / Base Excess: -5.3  /  SaO2: 100.0   #acute respiratory failure with left lung mucous plugging - resolved s/p intubated 6/24 and bronchoscopy     -Bronchoscopy 7/10: No mucous plug found    -CT Chest 7/10: Interval increase in size of bilateral pleural effusions with adjacent consolidation both lower lobes.   #Pulmonary nodules / Mediastinal lymphadenopathy    - CT Chest: Right middle and upper lobe solid pulmonary nodules, f/u outpatient pulm   #Activity   - incr as tolerated    CARDS:   #Septic shock    - off levo since 3am 7/1, remains on phenylephrine and Vasopressin infusions  #New onset Afib w/ RVR with hypotension -- likely 2/2 sepsis    - HR control with Metoprolol 2.5 q6 IV (OFF currently)    -Digoxin 250mcg x1 dose, 125mcg x1 7/1, 125mcg 7/4, 125 mcg on 7/6, repeat level in am 7/7 2.7 =>am dose held, 7/8 2.9 --> 7/9 1.9     - Cardiology following  #H/O HTN    -Amlodipine 2.5mg HOLDING due to ongoing pressor requirement  Imaging    - ECHO: (6/19) EF 55-60%, mild AS, mild LAE, mild MR/TR, trivial pericardial effusion    GI/NUTR:  #Diet: NPO on TPN @58cc/hr w/o potassium  CT AP PO/IV/rectal contrast 7/10: proximal small bowel leak suspected, moderate volume free fluid with enhancement of peritoneal reflections, consistent with peritonitis, increased number and size of bilobar hepatic hypodense suspected hepatic abscesses, including segment IV/III multiseptated suspected abscess measuring 5 cm, previously 2.4 cm on 6/18/2023.    -Octreotide gtt  #bowel movements    - GI consult 6/26- Plan for EGD with EUS + FNA as outpatient, H-pylori treatment after resolution of acute issues   -FOBT positive    #GI PPX  -IV Protonix 40 q12 hours  #Gastric mass vs gastrohepatic lymphadenopathy    -CT A/P: lesser curvature mass , Hepatic hypodensities  #Splenic Hypodensities, infarct vs phlegmon/abscess    -CT A/P:  Wedge-shaped region of hypodensity within the spleen    /RENAL:   #Grade IV L hydronephrosis s/p L PCN (5/2023) -- presented to ED with thick dark foul smelling output  #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    -(6/23) s/p LT PCN upsizing to 26Fr catheter, RLQ 16 Fr drain remains, s/p nephrostolithotomy w/ lithotripsy    -(6/18) s/p LT PCN upsizing to 16Fr and 16Fr RLQ drain placement with IR, flush q8 hr  (6/30): s/p L nephrectomy and abdominal washout   -R pelvic drain Creatinine 3.99  #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   #metabolic acidosis  -Nephrology c/s - CVVHD started 07/02, current goal to keep net even  #urine output in critically ill    -Straight cath every 12 hours  #decreased urine output in the setting of acute renal failure 2/2 septic shock    Labs:          BUN/Cr- 59/1.0  -->,  59/0.9  -->          Electrolytes-Na 138 // K 3.7 // Mg 2.1 //  Phos 3.5 (07-11 @ 05:25)    HEME/ONC:   #DVT prophylaxis  -SCDs, heparin subQ   #new onset Afib  -holding heparin infusion in setting of hematochezia   #R radial artery occlusion  -RUE duplex 07/01 + for right radial artery occlusion  #RT posterior popliteal vein DVT  -vascular c/s - rec AC   #H/o Fe Deficiency anemia  #Thrombocytopenia  Heme consult (7/3):   - low risk for HIT (score 2-3)  - Heme/onc wants full HIT workup although low HIT risk  - HIT workup negative  - risk of full anticoagulation outweigh the benefits due to high risk of bleeding (h/o GI bleed and post-nephrectomy), will hold off on AC and will wait for HIT AB screen results, if new DVT then will re-assess; discussed with SICU attending Dr. Orozco who also discussed this with hematology team and Dr. Womack who are all in agreement.    Labs: Hb/Hct:  6.6/20.6  -->,  7.8/24.0  -->                      Plts:  36  -->,  32  -->                 PTT/INR:      Type and Screen expires: 7/14     ID:  WBC- 10.45  --->>,  7.47  --->>,  4.87  --->>  Temp trend- 24hrs T(F): 97.5 (07-12 @ 02:00), Max: 109.4 (07-11 @ 04:00)  Antibiotics-caspofungin IVPB    caspofungin IVPB 50 every 24 hours  DAPTOmycin IVPB 460 every 48 hours  levoFLOXacin IVPB 750 every 48 hours  meropenem  IVPB 1000 every 12 hours  #Cultures  Surgical swab 06/30: vanc resistent enterococcus faecium     L PCN cyto-pathology  6/25: results suggestive of a necrotic neoplasm    Fungitell 6/26: 47 --> >500 (7/6)    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    BAL 6/24: moderate yeast     UCx 6/19: negative FINAL      Left PCN aspiration 6/18: stenotrophomonas maltophilia    Surgical swab (6/30): VRE     Blood cultures: 7/10 Pending results - received     ENDO:  #Glucose monitoring    -A1c (5/7/23): 5.0    -POCT q6 hours while NPO   #Adrenal Insufficiency 2/2 to sepsis     -completed course of solu-cortef    -Cortisol level - binding globulin 1.1 (low), serum cortisol, 84, free cortisol 76, % free cortisol 91    MSK:  #Sacrococcygeal DTI: wound care following- cleanse with soap and water, apply triad, #gauze, and allevyn BID and PRN for soiling   Venous ulcer right lower leg, R forearm skin tear: Pat dry, apply Xeroform, nonadherent dressing, wrap with Kerlix twice a day, prn for soiling  Burn consulted, recs:  --> sacrum: apply hydrogel and Allevyn pad  --> b/l ischium: Allevyn pad  - offloading/positioning  #Activity    -Advance as tolerated    -PT/rehab once extubated    -Globally deconditioned  #b/l UE, LE mottled skin  +ulnar/DP/PT pulses present on doppler  -Arterial duplex bilateral upper extremities (7/1)  - Right radial artery occlusion   -VA Duplex bilateral lower extremities (7/3) - Right PT vein DVT     LINES/DRAINS:   PIV  Bobby (6/18-7/10)  L eleazar drain (6/30)  R Lerma pouch #1, pelvic drain #2 (6/30)  Left basilic midline  6/21  Right axillary a line (6/30-7/4)  Right Radial [place] (6/22-6/30)  L-Femoral [place] (7/10 - )  L-Femoral [place] (7/10 - )  RIJ TLC (7/10-  )  ETT  OGT    ADVANCED DIRECTIVES:  Full Code  HCP/Emergency Contact- Tracey Adames: [phone]   Palliative following. Last [facility] discussion 7/7    INDICATION FOR SICU: New onset atrial fibrillation w/ mechanical ventilation    DISPOSITION: SICU Assessment and Plan:   67M w/ PMH of HTN, grade IV hydronephrosis of L kidney s/p L PCN (placed 5/2023), stutter, hiatal hernia, iron deficiency anemia, H Pylori (untreated), and gastric mass (never biopsied) presents to the ED with at least 4 days of worsening weakness, abdominal distension, and no output from his PCN. Admitted with emphysematous pyelonephritis.     (6/18): s/p LT PCN upsizing to 16Fr and 16Fr RLQ drain placement with IR   (6/22): s/p shock lithotripsy of L kidney abscess cavity with drainage of thick contents, upsize of drain to 26Fr bobby catheter  (6/30): s/p ex-lap and L nephrectomy with abdominal washout    NEURO:  #Pain control    -APAP PRN  #Sedation    -Off Precedex     -not following commands when off sedation, GCS 10T  #Change in Mental Status     -CT head 7/10: No acute intracranial pathology      RESP:  Vent Settings: 450/28/30/8   ABG: ABG - ( 12 Jul 2023 04:39 )  pH, Arterial: 7.34  pH, Blood: x     /  pCO2: 37    /  pO2: 138   / HCO3: 20    / Base Excess: -5.3  /  SaO2: 100.0   #acute respiratory failure with left lung mucous plugging - resolved s/p intubated 6/24 and bronchoscopy     -Bronchoscopy 7/10: No mucous plug found    -CT Chest 7/10: Interval increase in size of bilateral pleural effusions with adjacent consolidation both lower lobes.   #Pulmonary nodules / Mediastinal lymphadenopathy    - CT Chest: Right middle and upper lobe solid pulmonary nodules, f/u outpatient pulm   #Activity   - incr as tolerated    CARDS:   #Septic shock    - off levo since 3am 7/1, remains on phenylephrine and Vasopressin infusions  #New onset Afib w/ RVR with hypotension -- likely 2/2 sepsis    - HR control with Metoprolol 2.5 q6 IV (OFF currently)    -Digoxin 250mcg x1 dose, 125mcg x1 7/1, 125mcg 7/4, 125 mcg on 7/6, repeat level in am 7/7 2.7 =>am dose held, 7/8 2.9 --> 7/9 1.9     - Cardiology following  #H/O HTN    -Amlodipine 2.5mg HOLDING due to ongoing pressor requirement  Imaging    - ECHO: (6/19) EF 55-60%, mild AS, mild LAE, mild MR/TR, trivial pericardial effusion    GI/NUTR:  #Diet: NPO on TPN @58cc/hr w/o potassium  CT AP PO/IV/rectal contrast 7/10: proximal small bowel leak suspected, moderate volume free fluid with enhancement of peritoneal reflections, consistent with peritonitis, increased number and size of bilobar hepatic hypodense suspected hepatic abscesses, including segment IV/III multiseptated suspected abscess measuring 5 cm, previously 2.4 cm on 6/18/2023.    -Octreotide gtt  #bowel movements    - GI consult 6/26- Plan for EGD with EUS + FNA as outpatient, H-pylori treatment after resolution of acute issues   -FOBT positive    #GI PPX  -IV Protonix 40 q12 hours  #Gastric mass vs gastrohepatic lymphadenopathy    -CT A/P: lesser curvature mass , Hepatic hypodensities  #Splenic Hypodensities, infarct vs phlegmon/abscess    -CT A/P:  Wedge-shaped region of hypodensity within the spleen    /RENAL:   #Grade IV L hydronephrosis s/p L PCN (5/2023) -- presented to ED with thick dark foul smelling output  #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    -(6/23) s/p LT PCN upsizing to 26Fr catheter, RLQ 16 Fr drain remains, s/p nephrostolithotomy w/ lithotripsy    -(6/18) s/p LT PCN upsizing to 16Fr and 16Fr RLQ drain placement with IR, flush q8 hr  (6/30): s/p L nephrectomy and abdominal washout   -R pelvic drain Creatinine 3.99  #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   #metabolic acidosis  -Nephrology c/s - CVVHD started 07/02, current goal to keep net even  #urine output in critically ill    -Straight cath every 12 hours  #decreased urine output in the setting of acute renal failure 2/2 septic shock    Labs:          BUN/Cr- 59/1.0  -->,  59/0.9  -->          Electrolytes-Na 138 // K 3.7 // Mg 2.1 //  Phos 3.5 (07-11 @ 05:25)    HEME/ONC:   #DVT prophylaxis  -SCDs, heparin subQ   #new onset Afib  -holding heparin infusion in setting of hematochezia   #R radial artery occlusion  -RUE duplex 07/01 + for right radial artery occlusion  #RT posterior popliteal vein DVT  -vascular c/s - rec AC   #H/o Fe Deficiency anemia  #Thrombocytopenia  Heme consult (7/3):   - low risk for HIT (score 2-3)  - Heme/onc wants full HIT workup although low HIT risk  - HIT workup negative  - risk of full anticoagulation outweigh the benefits due to high risk of bleeding (h/o GI bleed and post-nephrectomy), will hold off on AC and will wait for HIT AB screen results, if new DVT then will re-assess; discussed with SICU attending Dr. Orozco who also discussed this with hematology team and Dr. Womack who are all in agreement.    Labs: Hb/Hct:  7.8/24.0  -->,  8.3/25.7  -->                      Plts:  36  -->,  32  -->                 PTT/INR:      Type and Screen expires: 7/14     ID:  WBC- 10.45  --->>,  7.47  --->>,  4.87  --->>  Temp trend- 24hrs T(F): 97.5 (07-12 @ 02:00), Max: 109.4 (07-11 @ 04:00)  Antibiotics-caspofungin IVPB    caspofungin IVPB 50 every 24 hours  DAPTOmycin IVPB 460 every 48 hours  levoFLOXacin IVPB 750 every 48 hours  meropenem  IVPB 1000 every 12 hours  #Cultures  Surgical swab 06/30: vanc resistent enterococcus faecium     L PCN cyto-pathology  6/25: results suggestive of a necrotic neoplasm    Fungitell 6/26: 47 --> >500 (7/6)    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    BAL 6/24: moderate yeast     UCx 6/19: negative FINAL      Left PCN aspiration 6/18: stenotrophomonas maltophilia    Surgical swab (6/30): VRE     Blood cultures: 7/10 Pending results - received     ENDO:  #Glucose monitoring    -A1c (5/7/23): 5.0    -POCT q6 hours while NPO   #Adrenal Insufficiency 2/2 to sepsis     -completed course of solu-cortef    -Cortisol level - binding globulin 1.1 (low), serum cortisol, 84, free cortisol 76, % free cortisol 91    MSK:  #Sacrococcygeal DTI: wound care following- cleanse with soap and water, apply triad, #gauze, and allevyn BID and PRN for soiling   Venous ulcer right lower leg, R forearm skin tear: Pat dry, apply Xeroform, nonadherent dressing, wrap with Kerlix twice a day, prn for soiling  Burn consulted, recs:  --> sacrum: apply hydrogel and Allevyn pad  --> b/l ischium: Allevyn pad  - offloading/positioning  #Activity    -Advance as tolerated    -PT/rehab once extubated    -Globally deconditioned  #b/l UE, LE mottled skin  +ulnar/DP/PT pulses present on doppler  -Arterial duplex bilateral upper extremities (7/1)  - Right radial artery occlusion   -VA Duplex bilateral lower extremities (7/3) - Right PT vein DVT     LINES/DRAINS:   PIV  Bobby (6/18-7/10)  L eleazar drain (6/30)  R Lerma pouch #1, pelvic drain #2 (6/30)  Left basilic midline  6/21  Right axillary a line (6/30-7/4)  Right Radial [place] (6/22-6/30)  L-Femoral [place] (7/10 - )  L-Femoral [place] (7/10 - )  RIJ TLC (7/10-  )  ETT  OGT    ADVANCED DIRECTIVES:  Full Code  HCP/Emergency Contact- Tracey Adames: [phone]   Palliative following. Last [facility] discussion 7/7    INDICATION FOR SICU: New onset atrial fibrillation w/ mechanical ventilation    DISPOSITION: SICU Assessment and Plan:   67M w/ PMH of HTN, grade IV hydronephrosis of L kidney s/p L PCN (placed 5/2023), stutter, hiatal hernia, iron deficiency anemia, H Pylori (untreated), and gastric mass (never biopsied) presents to the ED with at least 4 days of worsening weakness, abdominal distension, and no output from his PCN. Admitted with emphysematous pyelonephritis.     (6/18): s/p LT PCN upsizing to 16Fr and 16Fr RLQ drain placement with IR   (6/22): s/p shock lithotripsy of L kidney abscess cavity with drainage of thick contents, upsize of drain to 26Fr bobby catheter  (6/30): s/p ex-lap and L nephrectomy with abdominal washout    NEURO:  #Pain control    -APAP PRN  #Sedation    -Off Precedex     -not following commands when off sedation, GCS 10T  #Change in Mental Status     -CT head 7/10: No acute intracranial pathology      RESP:  Vent Settings: 450/28/30/8   ABG: ABG - ( 12 Jul 2023 04:39 )  pH, Arterial: 7.34  pH, Blood: x     /  pCO2: 37    /  pO2: 138   / HCO3: 20    / Base Excess: -5.3  /  SaO2: 100.0   #acute respiratory failure with left lung mucous plugging - resolved s/p intubated 6/24 and bronchoscopy     -Bronchoscopy 7/10: No mucous plug found    -CT Chest 7/10: Interval increase in size of bilateral pleural effusions with adjacent consolidation both lower lobes.   #Pulmonary nodules / Mediastinal lymphadenopathy    - CT Chest: Right middle and upper lobe solid pulmonary nodules, f/u outpatient pulm   #Activity   - incr as tolerated    CARDS:   #Septic shock    - off levo since 3am 7/1, remains on phenylephrine and Vasopressin infusions  #New onset Afib w/ RVR with hypotension -- likely 2/2 sepsis    - HR control with Metoprolol 2.5 q6 IV (OFF currently)    -Digoxin 250mcg x1 dose, 125mcg x1 7/1, 125mcg 7/4, 125 mcg on 7/6, repeat level in am 7/7 2.7 =>am dose held, 7/8 2.9 --> 7/9 1.9     - Cardiology following  #H/O HTN    -Amlodipine 2.5mg HOLDING due to ongoing pressor requirement  Imaging    - ECHO: (6/19) EF 55-60%, mild AS, mild LAE, mild MR/TR, trivial pericardial effusion    GI/NUTR:  #Diet: NPO on TPN @58cc/hr w/o potassium  CT AP PO/IV/rectal contrast 7/10: proximal small bowel leak suspected, moderate volume free fluid with enhancement of peritoneal reflections, consistent with peritonitis, increased number and size of bilobar hepatic hypodense suspected hepatic abscesses, including segment IV/III multiseptated suspected abscess measuring 5 cm, previously 2.4 cm on 6/18/2023.    -Octreotide gtt  #bowel movements    - GI consult 6/26- Plan for EGD with EUS + FNA as outpatient, H-pylori treatment after resolution of acute issues   -FOBT positive    #GI PPX  -IV Protonix 40 q12 hours  #Gastric mass vs gastrohepatic lymphadenopathy    -CT A/P: lesser curvature mass , Hepatic hypodensities  #Splenic Hypodensities, infarct vs phlegmon/abscess    -CT A/P:  Wedge-shaped region of hypodensity within the spleen    /RENAL:   #Grade IV L hydronephrosis s/p L PCN (5/2023) -- presented to ED with thick dark foul smelling output  #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    -(6/23) s/p LT PCN upsizing to 26Fr catheter, RLQ 16 Fr drain remains, s/p nephrostolithotomy w/ lithotripsy    -(6/18) s/p LT PCN upsizing to 16Fr and 16Fr RLQ drain placement with IR, flush q8 hr  (6/30): s/p L nephrectomy and abdominal washout   -R pelvic drain Creatinine 3.99  #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   #metabolic acidosis  -Nephrology c/s - CVVHD started 07/02, current goal to keep net even  #urine output in critically ill    -Straight cath every 12 hours  #decreased urine output in the setting of acute renal failure 2/2 septic shock    Labs:          BUN/Cr- 59/0.9  -->,  46/0.7  -->          Electrolytes-Na 136 // K 3.4 // Mg 2.0 //  Phos 2.8 (07-12 @ 05:23)              HEME/ONC:   #DVT prophylaxis  -SCDs, heparin subQ   #new onset Afib  -holding heparin infusion in setting of hematochezia   #R radial artery occlusion  -RUE duplex 07/01 + for right radial artery occlusion  #RT posterior popliteal vein DVT  -vascular c/s - rec AC   #H/o Fe Deficiency anemia  #Thrombocytopenia  Heme consult (7/3):   - low risk for HIT (score 2-3)  - Heme/onc wants full HIT workup although low HIT risk  - HIT workup negative  - risk of full anticoagulation outweigh the benefits due to high risk of bleeding (h/o GI bleed and post-nephrectomy), will hold off on AC and will wait for HIT AB screen results, if new DVT then will re-assess; discussed with SICU attending Dr. Orozco who also discussed this with hematology team and Dr. Womack who are all in agreement.    Labs: Hb/Hct:  7.8/24.0  -->,  8.3/25.7  -->                      Plts:  36  -->,  32  -->                 PTT/INR:      Type and Screen expires: 7/14     ID:  WBC- 10.45  --->>,  7.47  --->>,  4.87  --->>  Temp trend- 24hrs T(F): 97.5 (07-12 @ 02:00), Max: 109.4 (07-11 @ 04:00)  Antibiotics-caspofungin IVPB    caspofungin IVPB 50 every 24 hours  DAPTOmycin IVPB 460 every 48 hours  levoFLOXacin IVPB 750 every 48 hours  meropenem  IVPB 1000 every 12 hours  #Cultures  Surgical swab 06/30: vanc resistent enterococcus faecium     L PCN cyto-pathology  6/25: results suggestive of a necrotic neoplasm    Fungitell 6/26: 47 --> >500 (7/6)    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    BAL 6/24: moderate yeast     UCx 6/19: negative FINAL      Left PCN aspiration 6/18: stenotrophomonas maltophilia    Surgical swab (6/30): VRE     Blood cultures: 7/10 Pending results - received     ENDO:  #Glucose monitoring    -A1c (5/7/23): 5.0    -POCT q6 hours while NPO   #Adrenal Insufficiency 2/2 to sepsis     -completed course of solu-cortef    -Cortisol level - binding globulin 1.1 (low), serum cortisol, 84, free cortisol 76, % free cortisol 91    MSK:  #Sacrococcygeal DTI: wound care following- cleanse with soap and water, apply triad, #gauze, and allevyn BID and PRN for soiling   Venous ulcer right lower leg, R forearm skin tear: Pat dry, apply Xeroform, nonadherent dressing, wrap with Kerlix twice a day, prn for soiling  Burn consulted, recs:  --> sacrum: apply hydrogel and Allevyn pad  --> b/l ischium: Allevyn pad  - offloading/positioning  #Activity    -Advance as tolerated    -PT/rehab once extubated    -Globally deconditioned  #b/l UE, LE mottled skin  +ulnar/DP/PT pulses present on doppler  -Arterial duplex bilateral upper extremities (7/1)  - Right radial artery occlusion   -VA Duplex bilateral lower extremities (7/3) - Right PT vein DVT     LINES/DRAINS:   PIV  Bobby (6/18-7/10)  L eleazar drain (6/30)  R Lerma pouch #1, pelvic drain #2 (6/30)  Left basilic midline  6/21  Right axillary a line (6/30-7/4)  Right Radial [place] (6/22-6/30)  L-Femoral [place] (7/10 - )  L-Femoral [place] (7/10 - )  RIJ TLC (7/10-  )  ETT  OGT    ADVANCED DIRECTIVES:  Full Code  HCP/Emergency Contact- Tracey Adames: [phone]   Palliative following. Last [facility] discussion 7/7    INDICATION FOR SICU: New onset atrial fibrillation w/ mechanical ventilation    DISPOSITION: SICU Assessment and Plan:   67M w/ PMH of HTN, grade IV hydronephrosis of L kidney s/p L PCN (placed 5/2023), stutter, hiatal hernia, iron deficiency anemia, H Pylori (untreated), and gastric mass (never biopsied) presents to the ED with at least 4 days of worsening weakness, abdominal distension, and no output from his PCN. Admitted with emphysematous pyelonephritis.     (6/18): s/p LT PCN upsizing to 16Fr and 16Fr RLQ drain placement with IR   (6/22): s/p shock lithotripsy of L kidney abscess cavity with drainage of thick contents, upsize of drain to 26Fr bobby catheter  (6/30): s/p ex-lap and L nephrectomy with abdominal washout    NEURO:  #Pain control    -APAP PRN  #Sedation    -Off Precedex     -not following commands when off sedation, GCS 10T  #Change in Mental Status     -CT head 7/10: No acute intracranial pathology      RESP:  Vent Settings: 450/28/30/8   ABG: ABG - ( 12 Jul 2023 04:39 )  pH, Arterial: 7.34  pH, Blood: x     /  pCO2: 37    /  pO2: 138   / HCO3: 20    / Base Excess: -5.3  /  SaO2: 100.0   #acute respiratory failure with left lung mucous plugging - resolved s/p intubated 6/24 and bronchoscopy     -Bronchoscopy 7/10: No mucous plug found    -CT Chest 7/10: Interval increase in size of bilateral pleural effusions with adjacent consolidation both lower lobes.   #Pulmonary nodules / Mediastinal lymphadenopathy    - CT Chest: Right middle and upper lobe solid pulmonary nodules, f/u outpatient pulm   #Activity   - incr as tolerated    CARDS:   #Septic shock    - off levo since 3am 7/1, remains on phenylephrine and Vasopressin infusions  #New onset Afib w/ RVR with hypotension -- likely 2/2 sepsis    - HR control with Metoprolol 2.5 q6 IV (OFF currently)    -Digoxin 250mcg x1 dose, 125mcg x1 7/1, 125mcg 7/4, 125 mcg on 7/6, repeat level in am 7/7 2.7 =>am dose held, 7/8 2.9 --> 7/9 1.9     - Cardiology following  #H/O HTN    -Amlodipine 2.5mg HOLDING due to ongoing pressor requirement  Imaging    - ECHO: (6/19) EF 55-60%, mild AS, mild LAE, mild MR/TR, trivial pericardial effusion    GI/NUTR:  #Diet: NPO on TPN @58cc/hr w/o potassium  CT AP PO/IV/rectal contrast 7/10: proximal small bowel leak suspected, moderate volume free fluid with enhancement of peritoneal reflections, consistent with peritonitis, increased number and size of bilobar hepatic hypodense suspected hepatic abscesses, including segment IV/III multiseptated suspected abscess measuring 5 cm, previously 2.4 cm on 6/18/2023.    -Octreotide gtt  #bowel movements    - GI consult 6/26- Plan for EGD with EUS + FNA as outpatient, H-pylori treatment after resolution of acute issues   -FOBT positive    #GI PPX  -IV Protonix 40 q12 hours  #Gastric mass vs gastrohepatic lymphadenopathy    -CT A/P: lesser curvature mass , Hepatic hypodensities  #Splenic Hypodensities, infarct vs phlegmon/abscess    -CT A/P:  Wedge-shaped region of hypodensity within the spleen    /RENAL:   #Grade IV L hydronephrosis s/p L PCN (5/2023) -- presented to ED with thick dark foul smelling output  #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    -(6/23) s/p LT PCN upsizing to 26Fr catheter, RLQ 16 Fr drain remains, s/p nephrostolithotomy w/ lithotripsy    -(6/18) s/p LT PCN upsizing to 16Fr and 16Fr RLQ drain placement with IR, flush q8 hr  (6/30): s/p L nephrectomy and abdominal washout   -R pelvic drain Creatinine 3.99  #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   #metabolic acidosis  -Nephrology c/s - CVVHD started 07/02, current goal to keep net even  #urine output in critically ill    -Straight cath every 12 hours  #decreased urine output in the setting of acute renal failure 2/2 septic shock    Labs:          BUN/Cr- 59/0.9  -->,  46/0.7  -->          Electrolytes-Na 136 // K 3.4 // Mg 2.0 //  Phos 2.8 (07-12 @ 05:23)              HEME/ONC:   #DVT prophylaxis  -SCDs, heparin subQ   #new onset Afib  -holding heparin infusion in setting of hematochezia   #R radial artery occlusion  -RUE duplex 07/01 + for right radial artery occlusion  #RT posterior popliteal vein DVT  -vascular c/s - rec AC   #H/o Fe Deficiency anemia  #Thrombocytopenia  Heme consult (7/3):   - low risk for HIT (score 2-3)  - Heme/onc wants full HIT workup although low HIT risk  - HIT workup negative  - risk of full anticoagulation outweigh the benefits due to high risk of bleeding (h/o GI bleed and post-nephrectomy), will hold off on AC and will wait for HIT AB screen results, if new DVT then will re-assess; discussed with SICU attending Dr. Orozco who also discussed this with hematology team and Dr. Womack who are all in agreement.    Labs: Hb/Hct:  7.8/24.0  -->,  8.3/25.7  -->                      Plts:  32  -->,  36  -->                 PTT/INR:      Type and Screen expires: 7/14     ID:  WBC- 7.47  --->>,  4.87  --->>,  5.92  --->>  Temp trend- 24hrs T(F): 96.3 (07-12 @ 06:00), Max: 109.4 (07-11 @ 13:00)  Antibiotics-caspofungin IVPB    caspofungin IVPB 50 every 24 hours  DAPTOmycin IVPB 460 every 48 hours  levoFLOXacin IVPB 750 every 48 hours  #Cultures  Surgical swab 06/30: vanc resistent enterococcus faecium     L PCN cyto-pathology  6/25: results suggestive of a necrotic neoplasm    Fungitell 6/26: 47 --> >500 (7/6)    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    BAL 6/24: moderate yeast     UCx 6/19: negative FINAL      Left PCN aspiration 6/18: stenotrophomonas maltophilia    Surgical swab (6/30): VRE     Blood cultures: 7/10 Pending results - received     ENDO:  #Glucose monitoring    -A1c (5/7/23): 5.0    -POCT q6 hours while NPO   #Adrenal Insufficiency 2/2 to sepsis     -completed course of solu-cortef    -Cortisol level - binding globulin 1.1 (low), serum cortisol, 84, free cortisol 76, % free cortisol 91    MSK:  #Sacrococcygeal DTI: wound care following- cleanse with soap and water, apply triad, #gauze, and allevyn BID and PRN for soiling   Venous ulcer right lower leg, R forearm skin tear: Pat dry, apply Xeroform, nonadherent dressing, wrap with Kerlix twice a day, prn for soiling  Burn consulted, recs:  --> sacrum: apply hydrogel and Allevyn pad  --> b/l ischium: Allevyn pad  - offloading/positioning  #Activity    -Advance as tolerated    -PT/rehab once extubated    -Globally deconditioned  #b/l UE, LE mottled skin  +ulnar/DP/PT pulses present on doppler  -Arterial duplex bilateral upper extremities (7/1)  - Right radial artery occlusion   -VA Duplex bilateral lower extremities (7/3) - Right PT vein DVT     LINES/DRAINS:   PIV  Bobby (6/18-7/10)  L eleazar drain (6/30)  R Lerma pouch #1, pelvic drain #2 (6/30)  Left basilic midline  6/21  Right axillary a line (6/30-7/4)  Right Radial [place] (6/22-6/30)  L-Femoral [place] (7/10 - )  L-Femoral [place] (7/10 - )  RIJ TLC (7/10-  )  ETT  OGT    ADVANCED DIRECTIVES:  Full Code  HCP/Emergency Contact- Tracey Adames: [phone]   Palliative following. Last [facility] discussion 7/7    INDICATION FOR SICU: New onset atrial fibrillation w/ mechanical ventilation    DISPOSITION: SICU Assessment and Plan:   67M w/ PMH of HTN, grade IV hydronephrosis of L kidney s/p L PCN (placed 5/2023), stutter, hiatal hernia, iron deficiency anemia, H Pylori (untreated), and gastric mass (never biopsied) presents to the ED with at least 4 days of worsening weakness, abdominal distension, and no output from his PCN. Admitted with emphysematous pyelonephritis.     (6/18): s/p LT PCN upsizing to 16Fr and 16Fr RLQ drain placement with IR   (6/22): s/p shock lithotripsy of L kidney abscess cavity with drainage of thick contents, upsize of drain to 26Fr bobby catheter  (6/30): s/p ex-lap and L nephrectomy with abdominal washout    NEURO:  #Pain control    -APAP PRN  #Sedation    -Off Precedex     -not following commands when off sedation, GCS 10T  #Change in Mental Status     -CT head 7/10: No acute intracranial pathology      RESP:  Vent Settings: 450/28/30/8   ABG: ABG - ( 12 Jul 2023 04:39 )  pH, Arterial: 7.34  pH, Blood: x     /  pCO2: 37    /  pO2: 138   / HCO3: 20    / Base Excess: -5.3  /  SaO2: 100.0   #acute respiratory failure with left lung mucous plugging - resolved s/p intubated 6/24 and bronchoscopy     -Bronchoscopy 7/10: No mucous plug found    -CT Chest 7/10: Interval increase in size of bilateral pleural effusions with adjacent consolidation both lower lobes.   #Pulmonary nodules / Mediastinal lymphadenopathy    - CT Chest: Right middle and upper lobe solid pulmonary nodules, f/u outpatient pulm   #Activity   - incr as tolerated    CARDS:   #Septic shock    - off levo since 3am 7/1, remains on phenylephrine and Vasopressin infusions  #New onset Afib w/ RVR with hypotension -- likely 2/2 sepsis    - HR control with Metoprolol 2.5 q6 IV (OFF currently)    -Digoxin 250mcg x1 dose, 125mcg x1 7/1, 125mcg 7/4, 125 mcg on 7/6, repeat level in am 7/7 2.7 =>am dose held, 7/8 2.9 --> 7/9 1.9     - Cardiology following  #H/O HTN    -Amlodipine 2.5mg HOLDING due to ongoing pressor requirement  Imaging    - ECHO: (6/19) EF 55-60%, mild AS, mild LAE, mild MR/TR, trivial pericardial effusion    GI/NUTR:  #Diet: NPO on TPN @58cc/hr w/o potassium  CT AP PO/IV/rectal contrast 7/10: proximal small bowel leak suspected, moderate volume free fluid with enhancement of peritoneal reflections, consistent with peritonitis, increased number and size of bilobar hepatic hypodense suspected hepatic abscesses, including segment IV/III multiseptated suspected abscess measuring 5 cm, previously 2.4 cm on 6/18/2023.    -Octreotide gtt  #bowel movements    - GI consult 6/26- Plan for EGD with EUS + FNA as outpatient, H-pylori treatment after resolution of acute issues   -FOBT positive    #GI PPX  -IV Protonix 40 q12 hours  #Gastric mass vs gastrohepatic lymphadenopathy    -CT A/P: lesser curvature mass , Hepatic hypodensities  #Splenic Hypodensities, infarct vs phlegmon/abscess    -CT A/P:  Wedge-shaped region of hypodensity within the spleen    /RENAL:   #Grade IV L hydronephrosis s/p L PCN (5/2023) -- presented to ED with thick dark foul smelling output  #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    -(6/23) s/p LT PCN upsizing to 26Fr catheter, RLQ 16 Fr drain remains, s/p nephrostolithotomy w/ lithotripsy    -(6/18) s/p LT PCN upsizing to 16Fr and 16Fr RLQ drain placement with IR, flush q8 hr  (6/30): s/p L nephrectomy and abdominal washout   -R pelvic drain Creatinine 3.99  #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   #metabolic acidosis  -Nephrology c/s - CVVHD started 07/02, current goal to keep net even  #urine output in critically ill    -Straight cath every 12 hours  #decreased urine output in the setting of acute renal failure 2/2 septic shock    Labs:          BUN/Cr- 59/0.9  -->,  46/0.7  -->          Electrolytes-Na 136 // K 3.4 // Mg 2.0 //  Phos 2.8 (07-12 @ 05:23)              HEME/ONC:   #DVT prophylaxis  -SCDs, heparin subQ   #new onset Afib  -holding heparin infusion in setting of hematochezia   #R radial artery occlusion  -RUE duplex 07/01 + for right radial artery occlusion  #RT posterior popliteal vein DVT  -vascular c/s - rec AC   #H/o Fe Deficiency anemia  #Thrombocytopenia  Heme consult (7/3):   - low risk for HIT (score 2-3)  - HIT workup negative    Labs: Hb/Hct:  7.8/24.0  -->,  8.3/25.7  -->                      Plts:  32  -->,  36  -->                 PTT/INR:      Type and Screen expires: 7/14     ID:  WBC- 7.47  --->>,  4.87  --->>,  5.92  --->>  Temp trend- 24hrs T(F): 96.3 (07-12 @ 06:00), Max: 109.4 (07-11 @ 13:00)  Antibiotics-caspofungin IVPB    caspofungin IVPB 50 every 24 hours  DAPTOmycin IVPB 460 every 48 hours  levoFLOXacin IVPB 750 every 48 hours  #Cultures  Surgical swab 06/30: vanc resistent enterococcus faecium     L PCN cyto-pathology  6/25: results suggestive of a necrotic neoplasm    Fungitell 6/26: 47 --> >500 (7/6)    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    BAL 6/24: moderate yeast     UCx 6/19: negative FINAL      Left PCN aspiration 6/18: stenotrophomonas maltophilia    Surgical swab (6/30): VRE     Blood cultures: 7/10 Pending results - received     ENDO:  #Glucose monitoring    -A1c (5/7/23): 5.0    -POCT q6 hours while NPO   #Adrenal Insufficiency 2/2 to sepsis     -completed course of solu-cortef    -Cortisol level - binding globulin 1.1 (low), serum cortisol, 84, free cortisol 76, % free cortisol 91    MSK:  #Sacrococcygeal DTI: wound care following- cleanse with soap and water, apply triad, #gauze, and allevyn BID and PRN for soiling   Venous ulcer right lower leg, R forearm skin tear: Pat dry, apply Xeroform, nonadherent dressing, wrap with Kerlix twice a day, prn for soiling  Burn consulted, recs:  --> sacrum: apply hydrogel and Allevyn pad  --> b/l ischium: Allevyn pad  - offloading/positioning  #Activity    -Advance as tolerated    -PT/rehab once extubated    -Globally deconditioned  #b/l UE, LE mottled skin  +ulnar/DP/PT pulses present on doppler  -Arterial duplex bilateral upper extremities (7/1)  - Right radial artery occlusion   -VA Duplex bilateral lower extremities (7/3) - Right PT vein DVT     LINES/DRAINS:   PIV  Bobby (6/18-7/10)  L eleazar drain (6/30)  R Lerma pouch #1, pelvic drain #2 (6/30)  Left basilic midline  6/21  Right axillary a line (6/30-7/4)  Right Radial [place] (6/22-6/30)  L-Femoral [place] (7/10 - )  L-Femoral [place] (7/10 - )  RIJ TLC (7/10-  )  ETT  OGT    ADVANCED DIRECTIVES:  Full Code  HCP/Emergency Contact- Tracey Adames: [phone]   Palliative following. Last [facility] discussion 7/7    INDICATION FOR SICU: New onset atrial fibrillation w/ mechanical ventilation    DISPOSITION: SICU

## 2023-07-12 NOTE — PROGRESS NOTE ADULT - ASSESSMENT
67 year old man with history of hydronephrosis s/p PCN placement, HTN, anemia, H pylori presents with abdominal pain and no output from PCN. HOspital course complicated by septic shock in the setting of emphysematous pyelonephritis, hematochezia, respiratory failure requiring mechanical ventilation. Palliative care consulted for [facility].     Had family meeting with daughter Tracey and extensive family and friends present, SICU team, surgery team, and Palliative. Discussed with the family regarding patient's condition and prognosis going forward. The teams gave medical updates as well as the prognosis for the patient considering his condition and diagnoses. Patient final biopsy resulted and discussed with the family as well. The subject of DNR was brought up for the patient considering his decompensated status but at this time the daughter was saying to continue to do everything at this time. The option of trach was discussed with the family but no finalized decision was made yet since family wanted to discuss amongst themselves first.    Education about palliative care provided to patient/family.  See Recs below.    Please call [phone] with questions or concerns 24/7.   We will continue to follow.

## 2023-07-12 NOTE — CHART NOTE - NSCHARTNOTEFT_GEN_A_CORE
Called by SICU fellow, Dr Michi Salgado regarding urology presence at family meeting regarding GOC at 3pm. Yesterday evening, Dr. Vásquez contact information given to SICU team to discuss availability for planned meeting. As discussed with Dr. Vásquez today, he is unavailable for today's meeting due to scheduled OR cases at [facility] and is unable to call in to meeting. Stated that Dr. Womack will be present at meeting today and will re-iterate discussion from previous meeting last week regarding poor prognosis.   Will attempt to discuss with on-call urologist to see if he will be available for meeting. If on-call urologist unable to attend, will have primary team presence to relay discussion/questions to Dr. Vásquez. Called by SICU fellow, Dr Michi Salgado regarding urology presence at family meeting regarding GOC at 3pm. Yesterday evening, Dr. Vásquez contact information given to SICU team to discuss availability for planned meeting. As discussed with Dr. Vásquez today, he is unavailable for today's meeting due to scheduled OR cases at [facility] and is unable to call in to meeting. Stated that Dr. Womack will be present at meeting today and will re-iterate discussion from previous meeting last week regarding poor prognosis.     Will attempt to discuss with on-call urologist to see if he will be available for meeting. If on-call urologist unable to attend, will have primary team presence to relay discussion/questions to Dr. Vásquez.      ADDENDUM 7/12 @ [id number]:  -Family meeting had earlier this afternoon approximately 1530 w/ numerous family & friends present as well as Dr. Cohn & Dr. Orozco (SICU), Dr. Womack (surgical oncology), Dr. Bryan (urology, via telephone), myself ( PA), SICU PA, palliative NP, and SICU RN. Discussion included patient's current condition as well as prognosis at this time. Long-term goals of care & DNR discussion had. Currently, family wishes to continue full code/management whilst they discuss long-term plan amongst themselves. All questions answered and repeat family meeting discussed tentatively for next week, with emergency meeting if needed.

## 2023-07-12 NOTE — ADVANCED PRACTICE NURSE CONSULT - ASSESSMENT
History of Present Illness:   67M w/ PMH of HTN, grade IV hydronephrosis of L kidney s/p L PCN (placed 5/2023), stutter, hiatal hernia, iron deficiency anemia, H Pylori (untreated), and gastric mass (never biopsied) presents to the ED with at least 4 days of worsening weakness, abdominal distension, and no output from his PCN. Pt and daughters bedside are only able to give limited information, but for the past few days, he has noticed no output from his PCN as well as increasing abdominal distension, pain, and anorexia. He reports that he has not felt normal for the past 10 days. His PCN used to put out serosanguinous fluid, but the output changed to feculent/brown liquid over the past day. In the ED, he was noted to be in new onset afib w/ RVR to 150+ as well as experiencing some difficulty breathing. He was started on BiPAP. Stat labs were notable for WBC 47, Cr 2.9, and lactate 6. CT A/P w/ IV contrast showed free intraperitoneal fluid and air from an unknown source as well as fluid and air in the L kidney.       Allergies and Intolerances:        Allergies:  	No Known Allergies:     Home Medications:   * Patient Currently Takes Medications as of 18-Jun-2023 17:16 documented in Structured Notes  · 	Norvasc 2.5 mg oral tablet: Last Dose Taken:  , 1 tab(s) orally once a day    Patient received lying in bed. Intubated and sedated.  Limited mobility. On pressors. High risk for pressure injury.    Wound #1  Type of Wound: Deep Tissue Injury  Location: Thoracic Spine  Measurements: ~[id number]  Tunneling /Undermining: No  Wound bed: Dark purple intact skin  Wound edges: Intact  Periwound: Intact  Wound exudate: None  Wound odor: No  Induration, erythema, warmth: No  Wound pain: No    Wound#2  Type of Wound: Deep Tissue Injury  Location: Left heel  Measurements: ~[id number]  Tunneling /Undermining: No  Wound bed: Dark purple intact skin  Wound edges: Intact  Periwound: Intact  Wound exudate: None  Wound odor: No  Induration, erythema, warmth: No  Wound pain: No    Wound #3  Type of Wound: Surgical Incision  Location: Abdomen  Tunneling /Undermining: No  Wound bed: Dry dark devitalized tissue  Wound edges: Intact  Periwound: Intact  Wound exudate: None  Wound odor: No  Induration, erythema, warmth: No  Wound pain: No    Wound #4  Skin tears to bilateral upper extremities. Multiple areas of pink partial thickness skin loss. Ecchymosis to periwound. Weeping to upper extremities. As per primary RN dressing has to be changed multiple times a day due to weeping.     Wound #5  Moisture Associated Skin Damage noted to scrotum and right groin. Multiple areas of pink partial thickness skin loss. No exudate, no odor.     Wound #6  Deep Tissue Injury with progression to sacrococcygeal region extending to buttocks and ischium as per picture via teams. Maroon with areas of pink partial thickness skin loss. Burn following for this wound.    History of Present Illness:   67M w/ PMH of HTN, grade IV hydronephrosis of L kidney s/p L PCN (placed 5/2023), stutter, hiatal hernia, iron deficiency anemia, H Pylori (untreated), and gastric mass (never biopsied) presents to the ED with at least 4 days of worsening weakness, abdominal distension, and no output from his PCN. Pt and daughters bedside are only able to give limited information, but for the past few days, he has noticed no output from his PCN as well as increasing abdominal distension, pain, and anorexia. He reports that he has not felt normal for the past 10 days. His PCN used to put out serosanguinous fluid, but the output changed to feculent/brown liquid over the past day. In the ED, he was noted to be in new onset afib w/ RVR to 150+ as well as experiencing some difficulty breathing. He was started on BiPAP. Stat labs were notable for WBC 47, Cr 2.9, and lactate 6. CT A/P w/ IV contrast showed free intraperitoneal fluid and air from an unknown source as well as fluid and air in the L kidney.       Allergies and Intolerances:        Allergies:  	No Known Allergies:     Home Medications:   * Patient Currently Takes Medications as of 18-Jun-2023 17:16 documented in Structured Notes  · 	Norvasc 2.5 mg oral tablet: Last Dose Taken:  , 1 tab(s) orally once a day    Patient received lying in bed. Intubated and sedated.  Limited mobility. On pressors. High risk for pressure injury.    Wound #1  Type of Wound: Deep Tissue Injury  Location: Thoracic Spine  Measurements: ~85rgx6nv  Tunneling /Undermining: No  Wound bed: Dark purple intact skin  Wound edges: Intact  Periwound: Intact  Wound exudate: None  Wound odor: No  Induration, erythema, warmth: No  Wound pain: No    Wound#2  Type of Wound: Deep Tissue Injury  Location: Left heel  Measurements: ~1klm2wu  Tunneling /Undermining: No  Wound bed: Dark purple intact skin  Wound edges: Intact  Periwound: Intact  Wound exudate: None  Wound odor: No  Induration, erythema, warmth: No  Wound pain: No    Vascular skin changes noted to left foot second toe.     Wound #3  Type of Wound: Surgical Incision  Location: Abdomen  Tunneling /Undermining: No  Wound bed: Dry dark devitalized tissue  Wound edges: Intact  Periwound: Intact  Wound exudate: None  Wound odor: No  Induration, erythema, warmth: No  Wound pain: No    Wound #4  Skin tears to bilateral upper extremities. Multiple areas of pink partial thickness skin loss. Ecchymosis to periwound. Weeping to upper extremities. As per primary RN dressing has to be changed multiple times a day due to weeping.     Wound #5  Moisture Associated Skin Damage noted to scrotum and right groin. Multiple areas of pink partial thickness skin loss. No exudate, no odor.     Wound #6  Deep Tissue Injury with progression to sacrococcygeal region extending to buttocks and ischium as per picture via teams. Maroon with areas of pink partial thickness skin loss. Burn following for this wound.

## 2023-07-12 NOTE — PROGRESS NOTE ADULT - PROBLEM SELECTOR PLAN 3
with ROJAS  -continue bicarb ggt as above  -recommendations per renal   -no need for RRT currently  -f/u renal  -f/u urology
with ROJAS  -recommendations per renal   -c/w CVVH  -s/p LEFT radical nephrectomy and abdominal washout  -f/u renal  -f/u urology
with ROJAS  -continue bicarb ggt as above  -recommendations per renal   -no need for RRT currently  -f/u renal  -f/u urology
with ROJAS  -recommendations per renal   -c/w CVVH  -s/p LEFT radical nephrectomy and abdominal washout  -f/u renal  -f/u urology
with ROJAS  -continue bicarb ggt as above  -recommendations per renal   -no need for RRT currently  -pt planned for LEFT radical nephrectomy and abdominal washout later today.  -f/u renal  -f/u urology
with ROJAS  -recommendations per renal   -c/w CVVH  -s/p LEFT radical nephrectomy and abdominal washout  -f/u renal  -f/u urology

## 2023-07-12 NOTE — CHART NOTE - NSCHARTNOTEFT_GEN_A_CORE
A family meeting was set up for 3 pm today after talking with the patient's daughter Tracey yesterday.  The urology service was called at extension [phone] at 1020 on 12 July 2023 to request a representative from their service to be at the family meeting.  I spoke with JENNIFER Mckeon.    Michi Salgado  [facility] Center[facility] Fellow

## 2023-07-12 NOTE — PROGRESS NOTE ADULT - ASSESSMENT
68M w/ PMHx of retroperitoneal and intraabdominal abscess s/p IR placement of drain and LEFT PCN 6/18, s/p percutaneous drainage of LEFT renal abscess with left 26fr flank catheter in place. s/p Left radical nephrectomy, Ex-Lap (6/30) with increased pressor requirements and symptoms of ischemia in distal extremities. Feculent drain output with concern of bowel perforation. CT scan (7/10) with extravasation of oral contrast in the proximal small bowel.    PLAN:    - Continue to monitor drain outputs q shift, quality of Right VENICE #2 (pelvis) and Left VENICE (L renal pelvis), flush as needed   - Continue to monitor NGT output and bowel function   - Monitor UOP, CVVHD per nephro, maintain net even   - Abx per ID: Dapto, Ar, Caspo, Levaquin    - Wean pressors as tolerated/monitor requirements   - F/u family discussion today for updated GOC

## 2023-07-12 NOTE — PROGRESS NOTE ADULT - PROBLEM SELECTOR PLAN 2
Patient on mechanical ventilation  -sedation per primary team  -patient may need trach/PEG - family was open to trach placement  -full code
Patient on mechanical ventilation  -off sedation at this time  -patient may need trach - family was open to trach placement  -full code
Patient on mechanical ventilation  -sedation per primary team  -patient may need trach/PEG - family was open to trach placement  -full code

## 2023-07-12 NOTE — PROGRESS NOTE ADULT - CONVERSATION DETAILS
Spoke with primary surgical ICU team. They noted the patient was stable enough for surgery and that the patient's daughter did not wish to pursue surgery at that time.  Spoke with son at bedside and he went to speak with the patient's daughter/HCP, Tracey. Received a call from Tracey later in the day. She noted that she does want surgery, but only suggested that it be delayed until the patient is more stable. She noted that if the surgical team feels more urgent surgical intervention is required/appropriate, she will be in agreement with surgery.   Alerted the primary team SICU team of this discussion, and they noted they would reach out to Urology.  All questions answered.
Had family meeting with daughter Tracey and extensive family and friends present, SICU team, surgery team, and Palliative. Discussed with the family regarding patient's condition and prognosis going forward. The teams gave medical updates as well as the prognosis for the patient considering his condition and diagnoses. Patient final biopsy resulted and discussed with the family as well. The subject of DNR was brought up for the patient considering his decompensated status but at this time the daughter was saying to continue to do everything at this time. The option of trach was discussed with the family but no finalized decision was made yet since family wanted to discuss amongst themselves first.
Had family meeting with daughter Tracey zhao, SICU team, surgery team, and Palliative. Discussed with the family regarding patient's condition and prognosis going forward. The teams gave medical updates as well as the prognosis for the patient considering his condition and diagnoses. Patient was pending final biopsy result with the preliminary result being discussed with the family as well. The subject of DNR was brought up for the patient considering his decompensated status but at this time the daughter was saying to continue to do everything and that she is "holding out hope for him to go home and walk again." Daughter is still interested in a trach for the patient since he has been intubated for an extended period of time but as per the team, he would most likely not do well with a trach and that he has a poor overall prognosis. Family wants to wait for final biopsy results to come back in and for his infection to hopefully clear up.

## 2023-07-12 NOTE — PROGRESS NOTE ADULT - SUBJECTIVE AND OBJECTIVE BOX
UROLOGY DAILY PROGRESS NOTE    Pt is a 68y Male  admitted with retroperitoneal and intraabdominal abscess s/p IR placement of drain and LEFT PCN 6/18, s/p percutaneous drainage of LEFT renal abscess POD # 17 with left 26fr flank catheter in place, s/p Left radical nephrectomy, Ex-Lap POD # 11. Seen and examined at bedside. Patient remains intubated w/ ETT on levo     MEDICATIONS  (STANDING):  bacitracin   Ointment 1 Application(s) Topical two times a day  caspofungin IVPB      caspofungin IVPB 50 milliGRAM(s) IV Intermittent every 24 hours  chlorhexidine 0.12% Liquid 15 milliLiter(s) Oral Mucosa every 12 hours  chlorhexidine 2% Cloths 1 Application(s) Topical <User Schedule>  CRRT Treatment    <Continuous>  DAPTOmycin IVPB 460 milliGRAM(s) IV Intermittent every 48 hours  heparin   Injectable 5000 Unit(s) SubCutaneous every 8 hours  levoFLOXacin IVPB 750 milliGRAM(s) IV Intermittent every 48 hours  norepinephrine Infusion 0.01 MICROgram(s)/kG/Min (0.72 mL/Hr) IV Continuous <Continuous>  octreotide  Infusion 25 MICROgram(s)/Hr (5 mL/Hr) IV Continuous <Continuous>  pantoprazole  Injectable 40 milliGRAM(s) IV Push every 12 hours  Parenteral Nutrition - Adult 1 Each (58 mL/Hr) TPN Continuous <Continuous>  potassium chloride  20 mEq/100 mL IVPB 20 milliEquivalent(s) IV Intermittent every 1 hour  PureFlow Dialysate RFP-400 (K 2 / Ca 3) 5000 milliLiter(s) (2000 mL/Hr) CRRT <Continuous>    MEDICATIONS  (PRN):  sodium chloride 0.9% lock flush 10 milliLiter(s) IV Push every 1 hour PRN Pre/post blood products, medications, blood draw, and to maintain line patency  sodium chloride 0.9% lock flush 10 milliLiter(s) IV Push every 1 hour PRN Pre/post blood products, medications, blood draw, and to maintain line patency      REVIEW OF SYSTEMS   [x] A ten-point review of systems was otherwise negative except as noted.    Vital Signs Last 24 Hrs  T(C): 35.6 (12 Jul 2023 08:00), Max: 43 (11 Jul 2023 13:00)  T(F): 109.4 (12 Jul 2023 08:00), Max: 109.4 (11 Jul 2023 13:00)  HR: 72 (12 Jul 2023 09:09) (62 - 88)  BP: --  BP(mean): --  RR: 25 (12 Jul 2023 08:55) (21 - 30)  SpO2: 100% (12 Jul 2023 09:09) (97% - 100%)    Parameters below as of 12 Jul 2023 08:00  Patient On (Oxygen Delivery Method): ventilator    O2 Concentration (%): 30    PHYSICAL EXAM:    GEN: intubated/sedated not really responsive  SKIN: Good color, non diaphoretic.  ABDO:  incision with necrotic edges, abd non  distened #1 Lerma pouch eleazar draining serosanguinous fluid, #2 Pelvic pouch eleazar drain draining murky brown drainage. +Right abd pouch with murky brown drainage  BACK:  #3 Left abd eleazar drain draining murky brown drainage   : +Penoscrotal edema, slightly improved, bobby out as minimal drainage  MSK:  Left [place] catheter in place. +Right hand first digit with ischemic changes & Left first digit with ischemic changes.  Left great toe with ischemic changes      I&O's Summary    11 Jul 2023 07:01  -  12 Jul 2023 07:00  --------------------------------------------------------  IN: 2517.3 mL / OUT: 5486 mL / NET: -2968.7 mL    12 Jul 2023 07:01  -  12 Jul 2023 10:49  --------------------------------------------------------  IN: 140.8 mL / OUT: 246 mL / NET: -105.2 mL        LABS:                        8.3    5.92  )-----------( 36       ( 12 Jul 2023 05:23 )             25.7     07-12    136  |  102  |  46<H>  ----------------------------<  135<H>  3.4<L>   |  21  |  0.7    Ca    7.7<L>      12 Jul 2023 05:23  Phos  2.8     07-12  Mg     2.0     07-12    TPro  3.3<L>  /  Alb  1.3<L>  /  TBili  0.8  /  DBili  x   /  AST  30  /  ALT  13  /  AlkPhos  179<H>  07-10      Urinalysis Basic - ( 12 Jul 2023 05:23 )    Color: x / Appearance: x / SG: x / pH: x  Gluc: 135 mg/dL / Ketone: x  / Bili: x / Urobili: x   Blood: x / Protein: x / Nitrite: x   Leuk Esterase: x / RBC: x / WBC x   Sq Epi: x / Non Sq Epi: x / Bacteria: x            RADIOLOGY & ADDITIONAL STUDIES:

## 2023-07-12 NOTE — PROGRESS NOTE ADULT - SUBJECTIVE AND OBJECTIVE BOX
CHRISTINE VILLAVICENCIO  [id number]  67y Male    Indication for ICU admission: mechanical ventilatior secondary to urosepsis.    Admit Date:06-18-23  ICU Date: 6/18/23  OR Date: 6/18, 6/22, 6/30     24HRS EVENT:  7/11  Night:  Hg 7.8 from 6.6  PM rounds: Not following commands, opens eyes to voice, Octreotide @25 mcg, Levo 0.04, vaso 0.03, abd soft incision + caro, R VENICE #1 (MP) SS, R VENICE#2 (pelvis) feculent, L VENICE #3 (renal bed) feculent, CVVH       7/11  DAY  -Continue TPN, CVVH  -Wean pressors as able  -Repeat CBC post 1uPRBC  -PRVC/SIMV   -Straight cath BID  -IR c/s cancelled due to 2.3L feculant material drained from L and R pelvic drains  -Start octreotide drip   -Levophed  started  -Albumin 25% 100 ml given  f/u blood culture   -LA 4.8 >4.2, continue CVVH  -Family meeting tomorrow 3PM       [] A ten-point review of systems was otherwise negative except as noted above.  [x  ] Due to altered mental status/intubation, subjective information was not attained from the patient. History was obtained, to the extent possible, from review of the chart and collateral sources of information.      =========================================================================================================================================   CHRISTINE VILLAVICENCIO  [id number]  67y Male    Indication for ICU admission: mechanical ventilatior secondary to urosepsis.    Admit Date:23  ICU Date: 23  OR Date: , ,      24HRS EVENT:    Night:  Hg 7.8 from 6.6  PM rounds: Not following commands, opens eyes to voice, Octreotide @25 mcg, Levo 0.04, vaso 0.03, abd soft incision + caro, R VENICE #1 (MP) SS, R VENICE#2 (pelvis) feculent, L VENICE #3 (renal bed) feculent, CVVH         DAY  -Continue TPN, CVVH  -Wean pressors as able  -Repeat CBC post 1uPRBC  -PRVC/SIMV   -Straight cath BID  -IR c/s cancelled due to 2.3L feculant material drained from L and R pelvic drains  -Start octreotide drip   -Levophed  started  -Albumin 25% 100 ml given  f/u blood culture   -LA 4.8 >4.2, continue CVVH  -Family meeting tomorrow 3PM       [] A ten-point review of systems was otherwise negative except as noted above.  [x  ] Due to altered mental status/intubation, subjective information was not attained from the patient. History was obtained, to the extent possible, from review of the chart and collateral sources of information.      =========================================================================================================================================  Daily     Daily Weight in k.1 (2023 06:00)    Diet, NPO (23 @ 10:33)      CURRENT MEDS:  Neurologic Medications    Respiratory Medications    Cardiovascular Medications  norepinephrine Infusion 0.01 MICROgram(s)/kG/Min IV Continuous <Continuous>    Gastrointestinal Medications  pantoprazole  Injectable 40 milliGRAM(s) IV Push every 12 hours  Parenteral Nutrition - Adult 1 Each TPN Continuous <Continuous>  sodium chloride 0.9% lock flush 10 milliLiter(s) IV Push every 1 hour PRN Pre/post blood products, medications, blood draw, and to maintain line patency  sodium chloride 0.9% lock flush 10 milliLiter(s) IV Push every 1 hour PRN Pre/post blood products, medications, blood draw, and to maintain line patency    Genitourinary Medications    Hematologic/Oncologic Medications  heparin   Injectable 5000 Unit(s) SubCutaneous every 8 hours    Antimicrobial/Immunologic Medications  caspofungin IVPB      caspofungin IVPB 50 milliGRAM(s) IV Intermittent every 24 hours  DAPTOmycin IVPB 460 milliGRAM(s) IV Intermittent every 48 hours  levoFLOXacin IVPB 750 milliGRAM(s) IV Intermittent every 48 hours    Endocrine/Metabolic Medications  octreotide  Infusion 25 MICROgram(s)/Hr IV Continuous <Continuous>  vasopressin Infusion 0.03 Unit(s)/Min IV Continuous <Continuous>    Topical/Other Medications  bacitracin   Ointment 1 Application(s) Topical two times a day  chlorhexidine 0.12% Liquid 15 milliLiter(s) Oral Mucosa every 12 hours  chlorhexidine 2% Cloths 1 Application(s) Topical <User Schedule>  CRRT Treatment    <Continuous>  PureFlow Dialysate RFP-400 (K 2 / Ca 3) 5000 milliLiter(s) CRRT <Continuous>      ICU Vital Signs Last 24 Hrs  T(C): 35.9 (2023 07:00), Max: 43 (2023 13:00)  T(F): 96.6 (2023 07:00), Max: 109.4 (2023 13:00)  HR: 76 (2023 07:00) (61 - 88)  BP: --  BP(mean): --  ABP: 112/50 (2023 07:00) (95/49 - 147/70)  ABP(mean): 65 (2023 07:00) (59 - 90)  RR: 25 (2023 07:00) (21 - 31)  SpO2: 100% (2023 07:00) (98% - 100%)    O2 Parameters below as of 2023 07:00  Patient On (Oxygen Delivery Method): ventilator    O2 Concentration (%): 30          Mode: SIMV with PS  RR (machine): 25  TV (machine): 450  FiO2: 30  PEEP: 8  PS: 10  ITime: 1.2  MAP: 12  PIP: 26    ABG - ( 2023 04:39 )  pH, Arterial: 7.34  pH, Blood: x     /  pCO2: 37    /  pO2: 138   / HCO3: 20    / Base Excess: -5.3  /  SaO2: 100.0               I&O's Summary    2023 07:01  -  2023 07:00  --------------------------------------------------------  IN: 2517.3 mL / OUT: 5413 mL / NET: -2895.7 mL      I&O's Detail    2023 07:01  -  2023 07:00  --------------------------------------------------------  IN:    Enteral Tube Flush: 60 mL    IV PiggyBack: 250 mL    IV PiggyBack: 100 mL    Norepinephrine: 39 mL    Octreotide: 80 mL    Phenylephrine: 138.3 mL    PRBCs (Packed Red Blood Cells): 350 mL    TPN (Total Parenteral Nutrition): 1392 mL    Vasopressin: 108 mL  Total IN: 2517.3 mL    OUT:    Drain (mL): 50 mL    Drain (mL): 1005 mL    Drain (mL): 1935 mL    Drain (mL): 25 mL    Intermittent Catheterization - Urethral (mL): 43 mL    Nasogastric/Oral tube (mL): 110 mL    Other (mL): 2245 mL    Voided (mL): 0 mL  Total OUT: 5413 mL    Total NET: -2895.7 mL          PHYSICAL EXAM:    General/Neuro: GCS 8T. Opening eyes to voice, following commands. PEERLA.  Lungs:   Clear to auscultation, Normal expansion/effort.     Cardiovascular : S1, S2.  Regular rate and rhythm.  Peripheral edema   Cardiac Rhythm: Normal Sinus Rhythm    GI: Abdomen soft, Non-tender, Non-distended.  3 VENICE drains in place. R VENICE (pelvis) serosanginous. R VENICE (Lerma's pouch) and L VENICE with feculent OP.     Extremities: Extremities warm, pink, well-perfused.     Derm: Good skin turgor, no skin breakdown.      :       Mena catheter in place.      LABS:  CAPILLARY BLOOD GLUCOSE      POCT Blood Glucose.: 139 mg/dL (2023 18:06)                          8.3    5.92  )-----------( 36       ( 2023 05:23 )             25.7       07-12    136  |  102  |  46<H>  ----------------------------<  135<H>  3.4<L>   |  21  |  0.7    Ca    7.7<L>      2023 05:23  Phos  2.8       Mg     2.0     12    TPro  3.3<L>  /  Alb  1.3<L>  /  TBili  0.8  /  DBili  x   /  AST  30  /  ALT  13  /  AlkPhos  179<H>  07-10            Urinalysis Basic - ( 2023 05:23 )    Color: x / Appearance: x / SG: x / pH: x  Gluc: 135 mg/dL / Ketone: x  / Bili: x / Urobili: x   Blood: x / Protein: x / Nitrite: x   Leuk Esterase: x / RBC: x / WBC x   Sq Epi: x / Non Sq Epi: x / Bacteria: x         CHRISTINE VILLAVICENCIO  [id number]  67y Male    Indication for ICU admission: mechanical ventilatior secondary to urosepsis.    Admit Date:23  ICU Date: 23  OR Date: , ,      24HRS EVENT:    Night:  Hg 7.8 from 6.6  PM rounds: Not following commands, opens eyes to voice, Octreotide @25 mcg, Levo 0.04, vaso 0.03, abd soft incision + caro, R VENICE #1 (MP) SS, R VENICE#2 (pelvis) feculent, L VENICE #3 (renal bed) feculent, CVVH         DAY  -Continue TPN, CVVH  -Wean pressors as able  -Repeat CBC post 1uPRBC  -PRVC/SIMV   -Straight cath BID  -IR c/s cancelled due to 2.3L feculant material drained from L and R pelvic drains  -Start octreotide drip   -Levophed  started  -Albumin 25% 100 ml given  f/u blood culture   -LA 4.8 >4.2, continue CVVH  -Family meeting tomorrow 3PM       [] A ten-point review of systems was otherwise negative except as noted above.  [x  ] Due to altered mental status/intubation, subjective information was not attained from the patient. History was obtained, to the extent possible, from review of the chart and collateral sources of information.      =========================================================================================================================================  Daily     Daily Weight in k.1 (2023 06:00)    Diet, NPO (23 @ 10:33)      CURRENT MEDS:  Neurologic Medications    Respiratory Medications    Cardiovascular Medications  norepinephrine Infusion 0.01 MICROgram(s)/kG/Min IV Continuous <Continuous>    Gastrointestinal Medications  pantoprazole  Injectable 40 milliGRAM(s) IV Push every 12 hours  Parenteral Nutrition - Adult 1 Each TPN Continuous <Continuous>  sodium chloride 0.9% lock flush 10 milliLiter(s) IV Push every 1 hour PRN Pre/post blood products, medications, blood draw, and to maintain line patency  sodium chloride 0.9% lock flush 10 milliLiter(s) IV Push every 1 hour PRN Pre/post blood products, medications, blood draw, and to maintain line patency    Genitourinary Medications    Hematologic/Oncologic Medications  heparin   Injectable 5000 Unit(s) SubCutaneous every 8 hours    Antimicrobial/Immunologic Medications  caspofungin IVPB      caspofungin IVPB 50 milliGRAM(s) IV Intermittent every 24 hours  DAPTOmycin IVPB 460 milliGRAM(s) IV Intermittent every 48 hours  levoFLOXacin IVPB 750 milliGRAM(s) IV Intermittent every 48 hours    Endocrine/Metabolic Medications  octreotide  Infusion 25 MICROgram(s)/Hr IV Continuous <Continuous>  vasopressin Infusion 0.03 Unit(s)/Min IV Continuous <Continuous>    Topical/Other Medications  bacitracin   Ointment 1 Application(s) Topical two times a day  chlorhexidine 0.12% Liquid 15 milliLiter(s) Oral Mucosa every 12 hours  chlorhexidine 2% Cloths 1 Application(s) Topical <User Schedule>  CRRT Treatment    <Continuous>  PureFlow Dialysate RFP-400 (K 2 / Ca 3) 5000 milliLiter(s) CRRT <Continuous>      ICU Vital Signs Last 24 Hrs  T(C): 35.9 (2023 07:00), Max: 43 (2023 13:00)  T(F): 96.6 (2023 07:00), Max: 109.4 (2023 13:00)  HR: 76 (2023 07:00) (61 - 88)  BP: --  BP(mean): --  ABP: 112/50 (2023 07:00) (95/49 - 147/70)  ABP(mean): 65 (2023 07:00) (59 - 90)  RR: 25 (2023 07:00) (21 - 31)  SpO2: 100% (2023 07:00) (98% - 100%)    O2 Parameters below as of 2023 07:00  Patient On (Oxygen Delivery Method): ventilator    O2 Concentration (%): 30          Mode: SIMV with PS  RR (machine): 25  TV (machine): 450  FiO2: 30  PEEP: 8  PS: 10  ITime: 1.2  MAP: 12  PIP: 26    ABG - ( 2023 04:39 )  pH, Arterial: 7.34  pH, Blood: x     /  pCO2: 37    /  pO2: 138   / HCO3: 20    / Base Excess: -5.3  /  SaO2: 100.0               I&O's Summary    2023 07:01  -  2023 07:00  --------------------------------------------------------  IN: 2517.3 mL / OUT: 5413 mL / NET: -2895.7 mL      I&O's Detail    2023 07:01  -  2023 07:00  --------------------------------------------------------  IN:    Enteral Tube Flush: 60 mL    IV PiggyBack: 250 mL    IV PiggyBack: 100 mL    Norepinephrine: 39 mL    Octreotide: 80 mL    Phenylephrine: 138.3 mL    PRBCs (Packed Red Blood Cells): 350 mL    TPN (Total Parenteral Nutrition): 1392 mL    Vasopressin: 108 mL  Total IN: 2517.3 mL    OUT:    Drain (mL): 50 mL    Drain (mL): 1005 mL    Drain (mL): 1935 mL    Drain (mL): 25 mL    Intermittent Catheterization - Urethral (mL): 43 mL    Nasogastric/Oral tube (mL): 110 mL    Other (mL): 2245 mL    Voided (mL): 0 mL  Total OUT: 5413 mL    Total NET: -2895.7 mL          PHYSICAL EXAM:    General/Neuro: GCS 8T. Opening eyes to voice, following commands. PEERLA.  Lungs:   Clear to auscultation, Normal expansion/effort.     Cardiovascular : S1, S2.  Regular rate and rhythm.  Peripheral edema   Cardiac Rhythm: Normal Sinus Rhythm    GI: Abdomen soft, Non-tender, Non-distended.  3 VENICE drains in place. R VENICE (pelvis) serosanginous. R VENICE (Lerma's pouch) and L VENICE with feculent OP.     Extremities: Extremities warm, pink, well-perfused.     Derm: Poor skin integrity. Multiple sacral decubitus ulcers. Fingers mottled.     :       Mena catheter in place.      LABS:  CAPILLARY BLOOD GLUCOSE      POCT Blood Glucose.: 139 mg/dL (2023 18:06)                          8.3    5.92  )-----------( 36       ( 2023 05:23 )             25.7       07-12    136  |  102  |  46<H>  ----------------------------<  135<H>  3.4<L>   |  21  |  0.7    Ca    7.7<L>      2023 05:23  Phos  2.8     07-12  Mg     2.0     -12    TPro  3.3<L>  /  Alb  1.3<L>  /  TBili  0.8  /  DBili  x   /  AST  30  /  ALT  13  /  AlkPhos  179<H>  07-10            Urinalysis Basic - ( 2023 05:23 )    Color: x / Appearance: x / SG: x / pH: x  Gluc: 135 mg/dL / Ketone: x  / Bili: x / Urobili: x   Blood: x / Protein: x / Nitrite: x   Leuk Esterase: x / RBC: x / WBC x   Sq Epi: x / Non Sq Epi: x / Bacteria: x

## 2023-07-13 NOTE — PROGRESS NOTE ADULT - SUBJECTIVE AND OBJECTIVE BOX
UROLOGY: Pt is a 69y/o M a/w retroperitoneal and intraabdominal abscess s/p IR placement of drain and LEFT PCN 6/18, s/p percutaneous drainage of LEFT renal abscess POD # 18 with left 26fr flank catheter in place, s/p Left radical nephrectomy, Ex-Lap POD # 12. Seen and examined at bedside with Dr. Bryan. Patient remains intubated w/ ETT on levo     REVIEW OF SYSTEMS   [ x ] Due to altered mental status/intubation, subjective information were not able to be obtained from patient. History was obtained, to the extent possible, from review of the chart and collateral sources of information.    Vital Signs Last 24 Hrs  T(C): 36.9 (13 Jul 2023 16:00), Max: 36.9 (13 Jul 2023 16:00)  T(F): 98.4 (13 Jul 2023 16:00), Max: 98.4 (13 Jul 2023 16:00)  HR: 87 (13 Jul 2023 19:45) (68 - 96)  RR: 23 (13 Jul 2023 19:45) (20 - 38)  SpO2: 99% (13 Jul 2023 19:45) (98% - 100%)    PHYSICAL EXAM:  GEN: intubated/sedated  SKIN: ischemic changes  ABDO:  incision with necrotic edges, abd non distened #1 Lerma pouch eleazar draining serosanguinous fluid, #2 Pelvic pouch eleazar drain draining murky brown drainage. +Right abd pouch with murky brown drainage  BACK:  #3 Left abd eleazar drain draining murky brown drainage   : +Penoscrotal edema  MSK:  Left [place] catheter in place. +Right hand first digit with ischemic changes & Left first digit with ischemic changes.  Left great toe with ischemic changes    LABS:             7.9    6.46  )-----------( 36       ( 13 Jul 2023 04:20 )             24.5     129<L>  |  98  |  40<H>  ----------------------------<  170<H>  3.4<L>   |  20  |  0.6<L>    Ca    8.1<L>      13 Jul 2023 04:20  Phos  1.8     07-13  Mg     1.9     07-13  TPro  4.0<L>  /  Alb  2.2<L>  /  TBili  1.4<H>  /  DBili  0.7<H>  /  AST  11  /  ALT  8   /  AlkPhos  84  07-13    Urinalysis Basic - ( 13 Jul 2023 04:20 )  Color: x / Appearance: x / SG: x / pH: x  Gluc: 170 mg/dL / Ketone: x  / Bili: x / Urobili: x   Blood: x / Protein: x / Nitrite: x   Leuk Esterase: x / RBC: x / WBC x   Sq Epi: x / Non Sq Epi: x / Bacteria: x

## 2023-07-13 NOTE — PROGRESS NOTE ADULT - SUBJECTIVE AND OBJECTIVE BOX
GENERAL SURGERY PROGRESS NOTE     CHRISTINE VILLAVICENCIO  [address] [place] [address] day :26d  POD:13d  Procedure: Insertion, arterial line, percutaneous    Central venous catheter placement with ultrasound guidance    Midline catheter insertion    Nephrostolithotomy, percutaneous, with lithotripsy, large stone    Change external ureteral stent    Nephrostogram    US guided vascular access    Insertion of arterial line with imaging guidance    Bronchoscopy, flexible, adult    Partial nephrectomy    Nephrostogram via existing catheter    Exploratory laparotomy    Left nephrectomy    Insertion, catheter, hemodialysis, non-tunneled, with US guidance      Surgical Attending: Miguel Bryan  Overnight events: No acute events overnight. pt remains intubated and remains on levo and vaso intermittently. He continues to open his eyes to voice but does not follow commands. Feculent output from drains decreasing from previous day. He remains on CVVHD net even as per nephro.     T(F): 97.2 (07-12-23 @ 23:00), Max: 109.4 (07-12-23 @ 05:00)  HR: 79 (07-12-23 @ 23:15) (66 - 92)  BP: --  ABP: 116/49 (07-12-23 @ 23:15) (92/45 - 147/70)  ABP(mean): 67 (07-12-23 @ 23:15) (56 - 90)  RR: 25 (07-12-23 @ 23:15) (19 - 30)  SpO2: 99% (07-12-23 @ 23:15) (97% - 100%)    IN'S / OUT's:    07-11-23 @ 07:01  -  07-12-23 @ 07:00  --------------------------------------------------------  IN:    Enteral Tube Flush: 60 mL    IV PiggyBack: 250 mL    IV PiggyBack: 100 mL    Norepinephrine: 39 mL    Octreotide: 80 mL    Phenylephrine: 138.3 mL    PRBCs (Packed Red Blood Cells): 350 mL    TPN (Total Parenteral Nutrition): 1392 mL    Vasopressin: 108 mL  Total IN: 2517.3 mL    OUT:    Drain (mL): 50 mL    Drain (mL): 1005 mL    Drain (mL): 1935 mL    Drain (mL): 25 mL    Intermittent Catheterization - Urethral (mL): 43 mL    Nasogastric/Oral tube (mL): 110 mL    Other (mL): 2318 mL    Voided (mL): 0 mL  Total OUT: 5486 mL    Total NET: -2968.7 mL      07-12-23 @ 07:01  -  07-13-23 @ 01:54  --------------------------------------------------------  IN:    Fat Emulsion (Fish Oil &amp; Plant Based) 20% Infusion: 128 mL    IV PiggyBack: 200 mL    IV PiggyBack: 100 mL    IV PiggyBack: 250 mL    Norepinephrine: 8.7 mL    Norepinephrine: 24.9 mL    Octreotide: 85 mL    TPN (Total Parenteral Nutrition): 986 mL    Vasopressin: 9 mL    Vasopressin: 63 mL  Total IN: 1854.6 mL    OUT:    Drain (mL): 15 mL    Drain (mL): 120 mL    Drain (mL): 70 mL    Drain (mL): 100 mL    Intermittent Catheterization - Urethral (mL): 10 mL    Nasogastric/Oral tube (mL): 0 mL    Other (mL): 2268 mL  Total OUT: 2583 mL    Total NET: -728.4 mL          PHYSICAL EXAM:  GENERAL: RASS -2, opens eyes to voice but not following commands  CHEST/LUNG: ETT in place, Clear to auscultation bilaterally, SubQ emphysema of the neck appreciated  HEART: Irregular rhythm, rate controlled   ABDOMEN: Soft, Nontender, Nondistended; 3 VENICE drains in place with R sided pouch  EXTREMITIES:  No clubbing, cyanosis, or edema      LABS  Labs:  CAPILLARY BLOOD GLUCOSE      POCT Blood Glucose.: 149 mg/dL (13 Jul 2023 00:09)  POCT Blood Glucose.: 134 mg/dL (12 Jul 2023 18:52)  POCT Blood Glucose.: 146 mg/dL (12 Jul 2023 13:22)                          8.3    5.92  )-----------( 36       ( 12 Jul 2023 05:23 )             25.7       Auto Neutrophil %: 90.7 % (07-12-23 @ 05:23)  Auto Immature Granulocyte %: 0.3 % (07-12-23 @ 05:23)    07-12    136  |  102  |  46<H>  ----------------------------<  135<H>  3.4<L>   |  21  |  0.7      Calcium: 7.7 mg/dL (07-12-23 @ 05:23)      LFTs:     Blood Gas Arterial, Lactate: 4.60 mmol/L (07-12-23 @ 04:39)  Blood Gas Arterial, Lactate: 4.20 mmol/L (07-11-23 @ 17:15)  Blood Gas Arterial, Lactate: 4.80 mmol/L (07-11-23 @ 05:00)  Blood Gas Arterial, Lactate: 3.10 mmol/L (07-10-23 @ 06:26)  Blood Gas Arterial, Lactate: 3.10 mmol/L (07-10-23 @ 04:42)    ABG - ( 12 Jul 2023 04:39 )  pH: 7.34  /  pCO2: 37    /  pO2: 138   / HCO3: 20    / Base Excess: -5.3  /  SaO2: 100.0           ABG - ( 11 Jul 2023 17:15 )  pH: 7.36  /  pCO2: 35    /  pO2: 118   / HCO3: 20    / Base Excess: -5.1  /  SaO2: 99.9            ABG - ( 11 Jul 2023 05:00 )  pH: 7.38  /  pCO2: 31    /  pO2: 131   / HCO3: 18    / Base Excess: -6.2  /  SaO2: 100.0             Coags:            Urinalysis Basic - ( 12 Jul 2023 05:23 )    Color: x / Appearance: x / SG: x / pH: x  Gluc: 135 mg/dL / Ketone: x  / Bili: x / Urobili: x   Blood: x / Protein: x / Nitrite: x   Leuk Esterase: x / RBC: x / WBC x   Sq Epi: x / Non Sq Epi: x / Bacteria: x        Culture - Blood (collected 11 Jul 2023 02:25)  Source: .Blood Blood-Peripheral  Preliminary Report (12 Jul 2023 09:01):    No growth at 24 hours          RADIOLOGY & ADDITIONAL TESTS:      A/P:  CHRISTINE VILLAVICENCIO is a 68M w/ PMHx of retroperitoneal and intraabdominal abscess s/p IR placement of drain and LEFT PCN 6/18, s/p percutaneous drainage of LEFT renal abscess with left 26fr flank catheter in place. s/p Left radical nephrectomy, Ex-Lap (6/30) with increased pressor requirements and symptoms of ischemia in distal extremities. Feculent drain output with concern of bowel perforation. CT scan (7/10) with extravasation of oral contrast in the proximal small bowel.        PLAN:   - continue to monitor drain outputs and quality of output  - monitor NGT output  - monitor urine output, CVVHD keep net even per Nephro   - Abx per ID: Dapto, Ar, Caspo, Levaquin   - ween pressors as tolerated, vaso .03 and levo.2  - continue octreotide gtt  - management per SICU  - notes from family meeting appreciated      #Antibiotics: caspofungin IVPB    , 07-08-23 @ 17:07  caspofungin IVPB 50 milliGRAM(s) IV Intermittent every 24 hours, 07-09-23 @ 17:07  levoFLOXacin IVPB 750 milliGRAM(s) IV Intermittent every 48 hours, 07-07-23 @ 06:00  meropenem  IVPB 1000 milliGRAM(s) IV Intermittent every 12 hours, 07-12-23 @ 12:55    #DVT ppx: heparin   Injectable 5000 Unit(s) SubCutaneous every 8 hours    #GI ppx: pantoprazole  Injectable 40 milliGRAM(s) IV Push every 12 hours     Above plan discussed with Attending Surgeon Dr. Womack  , patient, patient family, and Primary team    Cody Marquez, DO  General Surgery PGY-1  [id number]

## 2023-07-13 NOTE — PROGRESS NOTE ADULT - ASSESSMENT
ASSESSMENT  66yo Male with pmh of hypertension, massive Grade IV hydronephrosis s/p left nephrostomy placed in May 2023 by [facility] presents to the ED with little to no urine output from LEFT nephrostomy from about a week. PCN  more recently drainage was changed to brown/purulent fluid. 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      IMPRESSION  #Fever , resolved    7/11 BCX NG   CTAP 1. Large volume extraluminal left upper quadrant/retroperitoneal oral contrast; proximal small bowel leak suspected.  2. Moderate volume free fluid with enhancement of peritoneal reflections, consistent with peritonitis. Evaluation for focal abscesses difficult   given extensive background free fluid.  3. Increased number and size of bilobar hepatic hypodense suspected hepatic abscesses, including segment IV/III multiseptated suspected   abscess measuring 5 cm, previously 2.4 cm on 6/18/2023.  4. Small bilateral pleural effusions. Left lower lobe consolidativeopacity, may represent pneumonia in the appropriate clinical setting.  5. Unchanged retroperitoneal lymphadenopathy.  6. Persistent pneumoperitoneum.   Elevated fungitell- . 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 "Exploratory laparotomy for possible left renal cancer complicated by significant hydronephrosis and pyelonephritis. Left nephrectomy performed, notable for necrotic bulky tissue adherent to the renal vein, staple line in tact and hemostasis achieved. Copious irrigation of retroperitoneal renal bed and intraperitoneal fibrinous exudate and purulent fluid. Three drains placed, #1 in Lerma's pouch, #2 in the pelvis, and #3 in the renal bed"    6/22 UCX  Few Finegoldia magna "Susceptibilities not performed"  Nephrostolithotomy, percutaneous, with lithotripsy, large stone 22-Jun-2023 23:27:09 left large thick abscess materia dimension of aspirate over 4 x 4 x 4 cm Bernie Perdomo  Change external ureteral stent 22-Jun-2023 23:28:03 left Yanitomásjami Bernie CLIFFORD  Nephrostogram 22-Jun-2023 23:28:31 left Yanitomásjami Bernie CLIFFORD. "thick gelatinous material that was difficult to suction out even with the shock pulse. I would alternate 2 prong to remove and break up / shock pulse to suck out. after an hour where I had no clear planes I elected to stop. irrigation through the 26 bobby took out a lot additional material  in ashlyn catch cup 4 x 4 x 4 cm lump of material trapped"    6/19 UCX NG; UA Blood: x / Protein: 300 mg/dL / Nitrite: Negative   Leuk Esterase: Large / RBC: 8 /HPF / WBC >720 /HPF   Sq Epi: x / Non Sq Epi: x / Bacteria: Moderate    6/18 L PCN     Few Stenotrophomonas maltophilia Levofloxacin: S 2    6/18 L PCN     Numerous Anaerobic Gram Positive Cocci Most closely resembling  Peptoniphilus species "Susceptibilities not performed"    6/18 L PCN   Numerous Gram positive cocci in pairs per oil power field    6/18 BCX NGTD     s/p 6/18  Left PCN upsized to 16F and 1200cc of very viscous tan colored fluid was aspirated. RLQ 16F drain was placed and 1200cc of tan colored fluid was aspirated (less viscous). A sample from each location was sent for culture.     Repeat CT 6/19 Since one day earlier,  No extraluminal contrast. Oral contrast was last seen in the terminal ileum.  Status post left nephrostomy tube placement. Left enlarged kidney with severe hydronephrosis/collection has decreased, now measuring 16 x 12 cm from 20 x 14 cm  Surgical Pathology Report:   Left renal abscess, possible parenchyma  Left renal abscess, possible parenchyma, percutaneous drainage:  -  Fragments of extensively necrotic tissue, cannot be fullycharacterized  Comment: The microscopic appearance of the necrotic tissue is concerning for a neoplasm with coagulative necrosis. Definitive diagnosis cannot be  made, due to lack of viable material.  Additional sections will be submitted.  Immunohistochemical studies willbe performed, in an attempt to characterise the necrotic tissue.  Based on the H&E appearance, an infectious process such as tuberculosis  appears less likely, but special stains for microorganisms (acid-fastbacilli and fungal) are in progress and will be reported separately.   (06.22.23 @ 22:12)  6/27 CT Study is overall very limited due to lack of intravenous contrast, beam   Willingham artifact.  Persistent bilateral pleural effusions with adjacent consolidations   likely reflecting compressive atelectasis.  Left kidney poorly defined with a heterogeneous collection with air and   fluid which has likely mildly diminished in size compared to the prior   examination but exact comparison limited.  Diffuse ascites again noted. Diffuse anasarca again noted. Persistent   pneumoperitoneum.  Poorly defined lesions within the liver adjacent to the falciform   ligament which may again reflect abscesses or masses, difficult to   evaluate due to the limitations described above.  < from: CT Abdomen and Pelvis w/ IV Cont (06.18.23 @ 15:15) >  1 ) Moderate pneumoperitoneum with partial diffusion throughout moderate   volume ascites and with associated intermittent peritoneal enhancement   and nodularity. Perforation and peritonitis may be related to underlying   emphysematous pyelonephritis (see below). Less likely sources of   perforation include ureteral/bladder disruption, and bowel perforation   which cannot be entirely excluded on the provided images.  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 The compressed residual renal   parenchyma is inseparable from the adjacent fascial/fatty layers   anterosuperiorly and direct rupture into the peritoneum is a possibility   (6-355). The previously placed left-sided nephrostomy tube pigtail is   notedto be within the left renal collecting system.  3) New hepatic hypodensities measuring up to 2.6 cm, suspicious for   development of multiple focal abscesses.  4) Wedge-shaped region of hypodensity within the spleen may reflect   splenic infarct in the correct clinical setting. Developing   phlegmon/abscess cannot be excluded in the current clinical setting  5) Increased extensive retroperitoneal, portacaval and likely   gastrohepatic heterogeneously enhancing lymphadenopathy. Findings may be   reactive.  6) Mediastinal lymphadenopathy measuring up to 2.4 cm short axis.   Underlying etiology may be reactive, infectious/inflammatory, or   neoplastic. A short-term 3 month follow-up CT chest should be considered   for further evaluation.  7)Right middle and upper lobe solid pulmonary nodules which are not well   delineated due to respiratory motion but measure less than 6 mm.   attention on follow-up exam.  #Reintubated, L lung white out, ruled out HAP    6/24 bronch CX NG,   Moderate Yeast- likely colonizer  #Lactic acidosis  #Abx allergy: No Known Allergies  #Prolonged QTC Calculation(Bazett) 526 ms  #Hyponatremia  #AKICreatinine: Creatinine: 1.3 mg/dL (07-05-23 @ 05:45)  Ht 180  Weight (kg): 76.5 (06-18-23 @ 21:27)  crcl 57     RECOMMENDATIONS  - CT results noted, suspected leak, liver abscesses  - Will need prolonged ~6 week antibiotics course given liver abscesses  - Appreciate IR consult- no role for liver aspiration  - Can narrow meropenem to Ceftriaxone 2g q24h IV + PO flagyl 500mg TID (never grew ESBL)  - Continue Caspo 50mg q24h IV, repeat Fungitell on Day 7 (7/15)   - Continue DAPTOmycin IVPB 460 milliGRAM(s) IV Intermittent every 48 hours  - Continue levoFLOXacin IVPB 750 milliGRAM(s) IV Intermittent every 48 hours  - Trend WBC   - Urology following  - Poor prognosis, GOC    If any questions, please send a message or call on [employer]  Please continue to update ID with any pertinent new laboratory or radiographic findings  #[id number]

## 2023-07-13 NOTE — PROGRESS NOTE ADULT - SUBJECTIVE AND OBJECTIVE BOX
CHRISTINE VILLAVICENCIO  [id number]  67y Male    Indication for ICU admission: mechanical ventilator management in the setting of septic shock    Admit Date:06-18-23  ICU Date: 6/18/23  OR Date: 6/18 (IR), 6/22, 6/30     24HRS EVENT:  7/12  Night  PM rounds: SIMV 450/25/30/8, levo 0.02, vaso 0.03, TPN @58, octreotide gtt @25, does not follow commands, open eyes to voice, abd soft, R VENICE #1 (MP) SS, R VENICE#2 (pelvis) dark green, L VENICE #3 (renal bed) dark green   10 beats of V tach --> self resolved   SC: 10 cc     DAY  -intermittently on levo and vaso   -GOC discussion @ 3pm --> discussed patient's prognosis in detail with family, CMO and DNR discussed, patient to remain full code and team to continue current management at this time   -CVVH net -50/hr to as tolerated  - albumin 25% 100cc  -2 K riders given --> incr K in TPN  - On Caspo 50mg q24h IV, dapto 460 q 48, levofloxacin 750 q 48, jan 1g q 12       [] A ten-point review of systems was otherwise negative except as noted above.  [x  ] Due to altered mental status/intubation, subjective information was not attained from the patient. History was obtained, to the extent possible, from review of the chart and collateral sources of information.      =========================================================================================================================================   CHRISTINE VILLAVICENCIO  [id number]  67y Male    Indication for ICU admission: mechanical ventilator management in the setting of septic shock    Admit Date:06-18-23  ICU Date: 6/18/23  OR Date: 6/18 (IR), 6/22, 6/30     24HRS EVENT:  7/12  Night  PM rounds: SIMV 450/25/30/8, levo 0.02, vaso 0.03, TPN @58, octreotide gtt @25, does not follow commands, open eyes to voice, abd soft, R VENICE #1 (MP) SS, R VENICE#2 (pelvis) dark green, L VENICE #3 (renal bed) dark green   10 beats of V tach --> self resolved   SC: 10 cc     DAY  -intermittently on levo and vaso   -GOC discussion @ 3pm --> discussed patient's prognosis in detail with family, CMO and DNR discussed, patient to remain full code and team to continue current management at this time   -CVVH net -50/hr to as tolerated  - albumin 25% 100cc  -2 K riders given --> incr K in TPN  - On Caspo 50mg q24h IV, dapto 460 q 48, levofloxacin 750 q 48, jan 1g q 12       [] A ten-point review of systems was otherwise negative except as noted above.  [x  ] Due to altered mental status/intubation, subjective information was not attained from the patient. History was obtained, to the extent possible, from review of the chart and collateral sources of information.      =========================================================================================================================================  Daily     Daily     Diet, NPO (07-05-23 @ 10:33)      CURRENT MEDS:  Neurologic Medications  OLANZapine Injectable 2.5 milliGRAM(s) IntraMuscular at bedtime    Respiratory Medications    Cardiovascular Medications  norepinephrine Infusion 0.01 MICROgram(s)/kG/Min IV Continuous <Continuous>    Gastrointestinal Medications  pantoprazole  Injectable 40 milliGRAM(s) IV Push every 12 hours  Parenteral Nutrition - Adult 1 Each TPN Continuous <Continuous>  Parenteral Nutrition - Adult 1 Each TPN Continuous <Continuous>  sodium chloride 0.9% lock flush 10 milliLiter(s) IV Push every 1 hour PRN Pre/post blood products, medications, blood draw, and to maintain line patency  sodium chloride 0.9% lock flush 10 milliLiter(s) IV Push every 1 hour PRN Pre/post blood products, medications, blood draw, and to maintain line patency    Genitourinary Medications    Hematologic/Oncologic Medications  heparin   Injectable 5000 Unit(s) SubCutaneous every 8 hours    Antimicrobial/Immunologic Medications  caspofungin IVPB 50 milliGRAM(s) IV Intermittent every 24 hours  caspofungin IVPB      levoFLOXacin IVPB 750 milliGRAM(s) IV Intermittent every 48 hours  meropenem  IVPB 1000 milliGRAM(s) IV Intermittent every 12 hours    Endocrine/Metabolic Medications  octreotide  Infusion 50 MICROgram(s)/Hr IV Continuous <Continuous>  vasopressin Infusion 0.03 Unit(s)/Min IV Continuous <Continuous>    Topical/Other Medications  bacitracin   Ointment 1 Application(s) Topical two times a day  chlorhexidine 0.12% Liquid 15 milliLiter(s) Oral Mucosa every 12 hours  chlorhexidine 2% Cloths 1 Application(s) Topical <User Schedule>  CRRT Treatment    <Continuous>  PureFlow Dialysate RFP-400 (K 2 / Ca 3) 5000 milliLiter(s) CRRT <Continuous>      ICU Vital Signs Last 24 Hrs  T(C): 36 (13 Jul 2023 13:00), Max: 43 (12 Jul 2023 15:00)  T(F): 96.8 (13 Jul 2023 13:00), Max: 109.4 (12 Jul 2023 15:00)  HR: 82 (13 Jul 2023 13:30) (66 - 92)  BP: --  BP(mean): --  ABP: 109/50 (13 Jul 2023 13:30) (101/45 - 171/69)  ABP(mean): 68 (13 Jul 2023 13:30) (58 - 101)  RR: 23 (13 Jul 2023 13:30) (20 - 38)  SpO2: 99% (13 Jul 2023 13:30) (98% - 100%)    O2 Parameters below as of 13 Jul 2023 08:00  Patient On (Oxygen Delivery Method): ventilator    O2 Concentration (%): 30        Adult Advanced Hemodynamics Last 24 Hrs  CVP(mm Hg): --  CVP(cm H2O): --  CO: --  CI: --  PA: --  PA(mean): --  PCWP: --  SVR: --  SVRI: --  PVR: --  PVRI: --    Mode: SIMV with PS  RR (machine): 25  TV (machine): 450  FiO2: 30  PEEP: 8  PS: 10  ITime: 1  MAP: 15  PIP: 26    ABG - ( 13 Jul 2023 04:05 )  pH, Arterial: 7.36  pH, Blood: x     /  pCO2: 36    /  pO2: 123   / HCO3: 20    / Base Excess: -4.6  /  SaO2: 99.7                I&O's Summary    12 Jul 2023 07:01  -  13 Jul 2023 07:00  --------------------------------------------------------  IN: 2940.2 mL / OUT: 4215 mL / NET: -1274.8 mL    13 Jul 2023 07:01  -  13 Jul 2023 14:00  --------------------------------------------------------  IN: 807.7 mL / OUT: 1211 mL / NET: -403.3 mL      I&O's Detail    12 Jul 2023 07:01  -  13 Jul 2023 07:00  --------------------------------------------------------  IN:    Fat Emulsion (Fish Oil &amp; Plant Based) 20% Infusion: 320 mL    IV PiggyBack: 200 mL    IV PiggyBack: 250 mL    IV PiggyBack: 50 mL    IV PiggyBack: 100 mL    IV PiggyBack: 150 mL    IV PiggyBack: 200 mL    Norepinephrine: 8.7 mL    Norepinephrine: 46 mL    Octreotide: 120 mL    TPN (Total Parenteral Nutrition): 1392 mL    Vasopressin: 9 mL    Vasopressin: 94.5 mL  Total IN: 2940.2 mL    OUT:    Drain (mL): 140 mL    Drain (mL): 150 mL    Drain (mL): 130 mL    Drain (mL): 65 mL    Intermittent Catheterization - Urethral (mL): 23 mL    Nasogastric/Oral tube (mL): 0 mL    Other (mL): 3707 mL  Total OUT: 4215 mL    Total NET: -1274.8 mL      13 Jul 2023 07:01  -  13 Jul 2023 14:00  --------------------------------------------------------  IN:    IV PiggyBack: 332 mL    Norepinephrine: 25.7 mL    Octreotide: 15 mL    Octreotide: 10 mL    Octreotide: 10 mL    Octreotide: 10 mL    TPN (Total Parenteral Nutrition): 378 mL    Vasopressin: 27 mL  Total IN: 807.7 mL    OUT:    Drain (mL): 5 mL    Drain (mL): 5 mL    Drain (mL): 10 mL    Drain (mL): 60 mL    Fat Emulsion (Fish Oil &amp; Plant Based) 20% Infusion: 0 mL    Intermittent Catheterization - Urethral (mL): 5 mL    Nasogastric/Oral tube (mL): 0 mL    Oral Fluid: 0 mL    Other (mL): 1126 mL    Voided (mL): 0 mL  Total OUT: 1211 mL    Total NET: -403.3 mL          PHYSICAL EXAM:  General/Neuro  RASS:    -1    GCS:  10T   = E 4  / V 1T  / M 5     Deficits:     awake, no focal deficits, moving all bilateral extremities  Pupils: PERRLA bilaterally  Lungs:   clear to auscultation bilaterally, lung sounds present bilaterally, Normal expansion/effort. Intubated on A/C 450/8/28/30%  Cardiovascular : S1, S2.  Afib, with rate control ,Bilateral peripheral edema  GI: Abdomen soft, Non-tender, Non-distended.: surgical incision with some ischemic tissue around incision   NG tube in place, VENICE#1 with serosanguinous fluid, VENICE#2 and #3 with brown fluid  Wound: surgical incision with some ischemic tissue around incision  Extremities: Extremities warm and dry. Right and left index finger tips and left big toe with purple discoloration. Pulses: bilateral ulnar/PT/DP pulses present on doppler.  Derm: Poor skin integrity. Multiple sacral decubitus ulcers. Fingers mottled.   :  scrotal edema.       CXR:     LABS:  CAPILLARY BLOOD GLUCOSE      POCT Blood Glucose.: 179 mg/dL (13 Jul 2023 06:46)  POCT Blood Glucose.: 149 mg/dL (13 Jul 2023 00:09)  POCT Blood Glucose.: 134 mg/dL (12 Jul 2023 18:52)                          7.9    6.46  )-----------( 36       ( 13 Jul 2023 04:20 )             24.5       07-13    129<L>  |  98  |  40<H>  ----------------------------<  170<H>  3.4<L>   |  20  |  0.6<L>    Ca    8.1<L>      13 Jul 2023 04:20  Phos  1.8     07-13  Mg     1.9     07-13    TPro  4.0<L>  /  Alb  2.2<L>  /  TBili  1.4<H>  /  DBili  0.7<H>  /  AST  11  /  ALT  8   /  AlkPhos  84  07-13            Urinalysis Basic - ( 13 Jul 2023 04:20 )    Color: x / Appearance: x / SG: x / pH: x  Gluc: 170 mg/dL / Ketone: x  / Bili: x / Urobili: x   Blood: x / Protein: x / Nitrite: x   Leuk Esterase: x / RBC: x / WBC x   Sq Epi: x / Non Sq Epi: x / Bacteria: x        Culture - Blood (collected 11 Jul 2023 02:25)  Source: .Blood Blood-Peripheral  Preliminary Report (13 Jul 2023 09:01):    No growth at 48 Hours

## 2023-07-13 NOTE — PROGRESS NOTE ADULT - ASSESSMENT
67M w/ PMH of HTN, grade IV hydronephrosis of L kidney s/p L PCN (placed 5/2023), stutter, hiatal hernia, iron deficiency anemia, H Pylori (untreated), and gastric mass (never biopsied) presents to the ED with at least 4 days of worsening weakness, abdominal distension, and no output from his PCN  . Found to have septic shock, MSOF, lactic acidosis from left emphysematous hydronephrosis, pyelonephritis and possible rupture with pneumoperitoneum along with possible hepatic abscesses, splenic infarct and abdominal and mediastinal lymphadenopathies.  had nephrectomy and abdominal washout on 6/30    Oliguric in spite of bumex, rising dig level, high lactate, now on CVVH  ROJAS likely ATN iso septic shock  will hold CVVHD today   on pressors   hold CVVHD / will follow in AM / may need to switch to IHD if off pressors and stable   platelets noted follow trend /  negative  HIT / hem on case   ID notes appreciated and meds dosing to GFR < 30  will follow

## 2023-07-13 NOTE — PROGRESS NOTE ADULT - SUBJECTIVE AND OBJECTIVE BOX
CHRISTINE VILLAVICENCIO  68y, Male  Allergy: No Known Allergies      LOS  25d    CHIEF COMPLAINT: pneumoperitoneum, emphysematous pyelonephritis (13 Jul 2023 09:04)      INTERVAL EVENTS/HPI  - WBC Count: 6.46 (07-13-23 @ 04:20)  WBC Count: 5.92 (07-12-23 @ 05:23)     - Creatinine: 0.6 (07-13-23 @ 04:20)  Creatinine: 0.7 (07-12-23 @ 05:23)     -   -   -     ROS  cannot obtain secondary to patient's sedation and/or mental status    VITALS:  T(F): 96.8, Max: 109.4 (07-12-23 @ 14:00)  HR: 73  BP: --  RR: 25Vital Signs Last 24 Hrs  T(C): 36 (13 Jul 2023 11:00), Max: 43 (12 Jul 2023 14:00)  T(F): 96.8 (13 Jul 2023 11:00), Max: 109.4 (12 Jul 2023 14:00)  HR: 73 (13 Jul 2023 10:45) (66 - 89)  BP: --  BP(mean): --  RR: 25 (13 Jul 2023 10:45) (22 - 38)  SpO2: 100% (13 Jul 2023 10:45) (98% - 100%)    Parameters below as of 13 Jul 2023 08:00  Patient On (Oxygen Delivery Method): ventilator    O2 Concentration (%): 30    PHYSICAL EXAM:  Gen: Intubated  CV: RRR  Lungs: Decreased BS at bases  Abd: Soft, dressings J P x3   Neuro: Sedated  Lines clean, no phlebitis    FH: Non-contributory  Social Hx: Non-contributory    TESTS & MEASUREMENTS:                        7.9    6.46  )-----------( 36       ( 13 Jul 2023 04:20 )             24.5     07-13    129<L>  |  98  |  40<H>  ----------------------------<  170<H>  3.4<L>   |  20  |  0.6<L>    Ca    8.1<L>      13 Jul 2023 04:20  Phos  1.8     07-13  Mg     1.9     07-13    TPro  4.0<L>  /  Alb  2.2<L>  /  TBili  1.4<H>  /  DBili  0.7<H>  /  AST  11  /  ALT  8   /  AlkPhos  84  07-13      LIVER FUNCTIONS - ( 13 Jul 2023 04:20 )  Alb: 2.2 g/dL / Pro: 4.0 g/dL / ALK PHOS: 84 U/L / ALT: 8 U/L / AST: 11 U/L / GGT: x           Urinalysis Basic - ( 13 Jul 2023 04:20 )    Color: x / Appearance: x / SG: x / pH: x  Gluc: 170 mg/dL / Ketone: x  / Bili: x / Urobili: x   Blood: x / Protein: x / Nitrite: x   Leuk Esterase: x / RBC: x / WBC x   Sq Epi: x / Non Sq Epi: x / Bacteria: x        Culture - Blood (collected 07-11-23 @ 02:25)  Source: .Blood Blood-Peripheral  Preliminary Report (07-13-23 @ 09:01):    No growth at 48 Hours    Culture - Surgical Swab (collected 06-30-23 @ 22:12)  Source: .Surgical Swab None  Final Report (07-05-23 @ 21:47):    Growth in fluid media only Enterococcus faecium (vancomycin resistant)  Organism: Enterococcus faecium (vancomycin resistant) (07-05-23 @ 21:47)  Organism: Enterococcus faecium (vancomycin resistant) (07-05-23 @ 21:47)      Method Type: EDMUND      -  Ampicillin: R >8 Predicts results to ampicillin/sulbactam, amoxacillin-clavulanate and  piperacillin-tazobactam.      -  Daptomycin: SDD 4 The breakpoint for SDD (susceptible dose dependent)is based on a dosage regimen of 8-12 mg/kg administered every 24 h in adults and is intended for serious infections due to E. faecium. Consultation with an infectious diseases specialist is recommended.      -  Levofloxacin: R >4      -  Linezolid: S 1      -  Tetracycline: R >8      -  Vancomycin: R >16    Culture - Fungal, Bronchial (collected 06-24-23 @ 11:00)  Source: .Bronchial None  Preliminary Report (06-26-23 @ 11:00):    Moderate Yeast    Culture - Acid Fast - Bronchial w/Smear (collected 06-24-23 @ 11:00)  Source: .Bronchial None  Preliminary Report (07-12-23 @ 15:06):    No acid-fast bacilli isolated at 2 weeks.    Culture - Bronchial (collected 06-24-23 @ 11:00)  Source: .Bronchial None  Gram Stain (06-24-23 @ 23:21):    Numerous polymorphonuclear leukocytes per low power field    No squamous epithelial cells per low power field    Rare Yeast like cells per oil power field  Final Report (06-26-23 @ 17:56):    Normal Respiratory Gabbie present    Culture - Fungal, Other (collected 06-22-23 @ 22:11)  Source: .Other None  Preliminary Report (07-08-23 @ 15:02):    No fungus isolated at 2 weeks.    Culture - Surgical Swab (collected 06-22-23 @ 22:11)  Source: .Surgical Swab None  Final Report (06-25-23 @ 14:14):    Few Finegoldia magna "Susceptibilities not performed"    Culture - Urine (collected 06-19-23 @ 02:50)  Source: Clean Catch Clean Catch (Midstream)  Final Report (06-20-23 @ 10:18):    No growth    Culture - Abscess with Gram Stain (collected 06-18-23 @ 23:08)  Source: .Abscess right lower quadrant (intrabdominal)  Gram Stain (06-19-23 @ 11:56):    Numerous polymorphonuclear leukocytes per low power field    Numerous Gram positive cocci in pairs per oil power field  Final Report (06-21-23 @ 14:13):    Numerous Anaerobic Gram Positive Cocci Most closely resembling    Peptoniphilus species "Susceptibilities not performed"    Culture - Abscess with Gram Stain (collected 06-18-23 @ 23:08)  Source: .Abscess left kidney  Gram Stain (06-19-23 @ 11:58):    Rare polymorphonuclear leukocytes per low power field    Few Gram Positive Cocci per oil power field  Final Report (06-22-23 @ 10:13):    Growth in fluid media only Anaerobic Gram Positive Cocci Most closely    resembling Peptoniphilus species "Susceptibilities not performed"    Rare Stenotrophomonas maltophilia  Organism: Stenotrophomonas maltophilia  Stenotrophomonas maltophilia (06-22-23 @ 10:13)  Organism: Stenotrophomonas maltophilia (06-22-23 @ 10:13)      Method Type: EDMUND      -  Levofloxacin: S 1      -  Trimethoprim/Sulfamethoxazole: S <=0.5/9.5  Organism: Stenotrophomonas maltophilia (06-22-23 @ 10:13)      Method Type: KB      -  Minocycline: S    Culture - Abscess with Gram Stain (collected 06-18-23 @ 20:18)  Source: .Abscess left PCN aspirate  Gram Stain (06-19-23 @ 06:27):    No polymorphonuclear cells seen per low power field    Few Gram Variable Cocci seen per oil power field  Final Report (06-24-23 @ 09:11):    Few Stenotrophomonas maltophilia  Organism: Stenotrophomonas maltophilia (06-24-23 @ 09:11)  Organism: Stenotrophomonas maltophilia (06-24-23 @ 09:11)      Method Type: EDMUND      -  Levofloxacin: S 2      -  Trimethoprim/Sulfamethoxazole: S <=0.5/9.5    Culture - Blood (collected 06-18-23 @ 14:18)  Source: .Blood Blood-Peripheral  Final Report (06-24-23 @ 02:00):    No Growth Final    Culture - Blood (collected 06-18-23 @ 14:18)  Source: .Blood Blood-Peripheral  Final Report (06-24-23 @ 02:00):    No Growth Final            INFECTIOUS DISEASES TESTING  Fungitell: >500 (07-06-23 @ 04:30)  Fungitell: 47 (06-25-23 @ 15:30)  MRSA PCR Result.: Negative (06-24-23 @ 17:38)      INFLAMMATORY MARKERS  C-Reactive Protein, Serum: 245.1 mg/L (07-10-23 @ 01:30)      RADIOLOGY & ADDITIONAL TESTS:  I have personally reviewed the last available Chest xray  CXR      CT  CT Abdomen and Pelvis w/ Oral Cont and w/ IV Cont:   ACC: [id number] EXAM:  CT ABDOMEN AND PELVIS OC IC   ORDERED BY: ZAYDA BARKLEY     PROCEDURE DATE:  07/10/2023          INTERPRETATION:  CLINICAL STATEMENT: Renal cell carcinoma.    TECHNIQUE: Contiguous axial CT images were obtained from the lower chest   to the pubic symphysis following administration of 95 cc Omnipaque 350   intravenous contrast.  Oral contrast was administered.  Reformatted   images in the coronal and sagittal planes were acquired. 5 cc contrast   discarded    Comparison madewith  FINDINGS:    LOWER CHEST: Small bilateral pleural effusions with overlying compressive   atelectasis. Left lower lobe consolidative opacity, may represent   pneumonia in the appropriate clinical setting.    HEPATOBILIARY: Increased number and size of bilobar hepatic hypodense   lesions, including segment IV/III multiseptated suspected abscess   measuring 5 cm, previously 2.4 cm on 6/18/2023 (series 2, image 22).    Patent portal vein. Periportal edema. Gallbladder unremarkable. No   biliarydilation.    SPLEEN: Involving splenic infarction.    PANCREAS: Not well evaluated.    ADRENAL GLANDS: Not well evaluated.    KIDNEYS: Post left nephrectomy. Right renal cyst.    ABDOMINOPELVIC NODES: Unchanged retroperitoneal lymphadenopathy.    PELVIC ORGANS: Urinary bladder collapsed. BPH.    PERITONEUM/MESENTERY/BOWEL: Large volume extraluminal left upper   quadrant/retroperitoneal oral contrast; proximal small bowel leak   suspected. Moderate volume free fluid with enhancement of peritoneal   reflections, consistent with peritonitis.    Multiple intra-abdominal drainage catheters noted. Persistent   pneumoperitoneum.    No bowel obstruction. Rectal tube. Diffuse abnormal small and large bowel   circumferential wall thickening; likely reactive 2 peritonitis.    BONES/SOFT TISSUES: Degenerative change, lumbar spine. Anasarca..    OTHER: Vascular calcifications. Left femoral venous catheter.      IMPRESSION:    1. Large volume extraluminal left upper quadrant/retroperitoneal oral   contrast; proximal small bowel leak suspected.    2. Moderate volume free fluid with enhancement of peritoneal reflections,   consistent with peritonitis. Evaluation for focal abscesses difficult   given extensive background free fluid.    3. Increased number and size of bilobar hepatic hypodense suspected   hepatic abscesses, including segment IV/III multiseptated suspected   abscess measuring 5 cm, previously 2.4 cm on 6/18/2023.    4. Small bilateral pleural effusions. Left lower lobe consolidative  opacity, may represent pneumonia in the appropriate clinical setting.    5. Unchanged retroperitoneal lymphadenopathy.    6. Persistent pneumoperitoneum.    Spoke with Dr. Vargas    --- End of Report ---            VIANCA GORDON MD; Attending Radiologist  This document has been electronically signed. Jul 10 2023 10:30PM (07-10-23 @ 21:06)      CARDIOLOGY TESTING  12 Lead ECG:   Ventricular Rate 89 BPM    Atrial Rate 394 BPM    QRS Duration 84 ms    Q-T Interval 300 ms    QTC Calculation(Bazett) 365 ms    R Axis 50 degrees    T Axis -16 degrees    Diagnosis Line Atrial fibrillation  Low voltage QRS  Nonspecific T wave abnormality  Abnormal ECG    Confirmed by Deon Velasquez ([id number]) on 7/3/2023 8:40:11 AM (07-02-23 @ 07:57)  12 Lead ECG:   Ventricular Rate 96 BPM    Atrial Rate 72 BPM    QRS Duration 82 ms    Q-T Interval 288 ms    QTC Calculation(Bazett) 363 ms    R Axis 37 degrees    T Axis 184 degrees    Diagnosis Line Atrial fibrillation  Low voltage QRS  Nonspecific T wave abnormality  Abnormal ECG    Confirmed by Deon Velasquez ([id number]) on 7/2/2023 1:12:19 PM (07-01-23 @ 08:12)      MEDICATIONS  bacitracin   Ointment 1  caspofungin IVPB   caspofungin IVPB 50  chlorhexidine 0.12% Liquid 15  chlorhexidine 2% Cloths 1  CRRT Treatment   heparin   Injectable 5000  levoFLOXacin IVPB 750  lipid, fat emulsion (Fish Oil and Plant Based) 20% Infusion 0.656  meropenem  IVPB 1000  norepinephrine Infusion 0.01  octreotide  Infusion 50  OLANZapine Injectable 2.5  pantoprazole  Injectable 40  Parenteral Nutrition - Adult 1  Parenteral Nutrition - Adult 1  PureFlow Dialysate RFP-400 (K 2 / Ca 3) 5000  vasopressin Infusion 0.03      WEIGHT  Weight (kg): 76.3 (06-30-23 @ 16:58)  Creatinine: 0.6 mg/dL (07-13-23 @ 04:20)      ANTIBIOTICS:  caspofungin IVPB      caspofungin IVPB 50 milliGRAM(s) IV Intermittent every 24 hours  levoFLOXacin IVPB 750 milliGRAM(s) IV Intermittent every 48 hours  meropenem  IVPB 1000 milliGRAM(s) IV Intermittent every 12 hours      All available historical records have been reviewed

## 2023-07-13 NOTE — PROGRESS NOTE ADULT - SUBJECTIVE AND OBJECTIVE BOX
Nephrology progress note    THIS IS AN INCOMPLETE NOTE . FULL NOTE TO FOLLOW SHORTLY    Patient is seen and examined, events over the last 24 h noted .    Allergies:  No Known Allergies    Hospital Medications:   MEDICATIONS  (STANDING):  bacitracin   Ointment 1 Application(s) Topical two times a day  caspofungin IVPB      caspofungin IVPB 50 milliGRAM(s) IV Intermittent every 24 hours  chlorhexidine 0.12% Liquid 15 milliLiter(s) Oral Mucosa every 12 hours  chlorhexidine 2% Cloths 1 Application(s) Topical <User Schedule>  CRRT Treatment    <Continuous>  heparin   Injectable 5000 Unit(s) SubCutaneous every 8 hours  levoFLOXacin IVPB 750 milliGRAM(s) IV Intermittent every 48 hours  lipid, fat emulsion (Fish Oil and Plant Based) 20% Infusion 0.656 Gm/kG/Day (31.3 mL/Hr) IV Continuous <Continuous>  lipid, fat emulsion (Fish Oil and Plant Based) 20% Infusion 0.656 Gm/kG/Day (31.3 mL/Hr) IV Continuous <Continuous>  meropenem  IVPB 1000 milliGRAM(s) IV Intermittent every 12 hours  norepinephrine Infusion 0.01 MICROgram(s)/kG/Min (0.72 mL/Hr) IV Continuous <Continuous>  octreotide  Infusion 25 MICROgram(s)/Hr (5 mL/Hr) IV Continuous <Continuous>  pantoprazole  Injectable 40 milliGRAM(s) IV Push every 12 hours  Parenteral Nutrition - Adult 1 Each (75 mL/Hr) TPN Continuous <Continuous>  Parenteral Nutrition - Adult 1 Each (63 mL/Hr) TPN Continuous <Continuous>  potassium phosphate IVPB 30 milliMole(s) IV Intermittent once  PureFlow Dialysate RFP-400 (K 2 / Ca 3) 5000 milliLiter(s) (2000 mL/Hr) CRRT <Continuous>  vasopressin Infusion 0.03 Unit(s)/Min (4.5 mL/Hr) IV Continuous <Continuous>        VITALS:  T(F): 97 (07-13-23 @ 08:00), Max: 109.4 (07-12-23 @ 10:00)  HR: 75 (07-13-23 @ 08:45)  BP: --  RR: 34 (07-13-23 @ 08:45)  SpO2: 100% (07-13-23 @ 08:45)  Wt(kg): --    07-11 @ 07:01 - 07-12 @ 07:00  --------------------------------------------------------  IN: 2517.3 mL / OUT: 5486 mL / NET: -2968.7 mL    07-12 @ 07:01 - 07-13 @ 07:00  --------------------------------------------------------  IN: 2940.2 mL / OUT: 4215 mL / NET: -1274.8 mL    07-13 @ 07:01 - 07-13 @ 09:04  --------------------------------------------------------  IN: 155.6 mL / OUT: 310 mL / NET: -154.4 mL          PHYSICAL EXAM:  Constitutional: NAD  HEENT: anicteric sclera, oropharynx clear, MMM  Neck: No JVD  Respiratory: CTAB, no wheezes, rales or rhonchi  Cardiovascular: S1, S2, RRR  Gastrointestinal: BS+, soft, NT/ND  Extremities: No cyanosis or clubbing. No peripheral edema  :  No bobby.   Skin: No rashes    LABS:  07-13    129<L>  |  98  |  40<H>  ----------------------------<  170<H>  3.4<L>   |  20  |  0.6<L>    Ca    8.1<L>      13 Jul 2023 04:20  Phos  1.8     07-13  Mg     1.9     07-13    TPro  4.0<L>  /  Alb  2.2<L>  /  TBili  1.4<H>  /  DBili  0.7<H>  /  AST  11  /  ALT  8   /  AlkPhos  84  07-13                          7.9    6.46  )-----------( 36       ( 13 Jul 2023 04:20 )             24.5       Urine Studies:  Urinalysis Basic - ( 13 Jul 2023 04:20 )    Color:  / Appearance:  / SG:  / pH:   Gluc: 170 mg/dL / Ketone:   / Bili:  / Urobili:    Blood:  / Protein:  / Nitrite:    Leuk Esterase:  / RBC:  / WBC    Sq Epi:  / Non Sq Epi:  / Bacteria:           Iron 12, TIBC 128, %sat 9      [05-07-23 @ 09:19]  Ferritin 526      [05-07-23 @ 09:19]  Vitamin D (25OH) 26      [05-08-23 @ 07:58]  TSH 0.45      [06-19-23 @ 14:14]  Lipid: chol 58, TG 88, HDL 22, LDL --      [07-02-23 @ 21:01]          RADIOLOGY & ADDITIONAL STUDIES:   Nephrology progress note  Patient is seen and examined, events over the last 24 h noted .  Lying in bed comfortable     Allergies:  No Known Allergies    Hospital Medications:   MEDICATIONS  (STANDING):    bacitracin   Ointment 1 Application(s) Topical two times a day  caspofungin IVPB 50 milliGRAM(s) IV Intermittent every 24 hours  CRRT Treatment    <Continuous>  heparin   Injectable 5000 Unit(s) SubCutaneous every 8 hours  levoFLOXacin IVPB 750 milliGRAM(s) IV Intermittent every 48 hours  lipid, fat emulsion (Fish Oil and Plant Based) 20% Infusion 0.656 Gm/kG/Day (31.3 mL/Hr) IV Continuous <Continuous>  lipid, fat emulsion (Fish Oil and Plant Based) 20% Infusion 0.656 Gm/kG/Day (31.3 mL/Hr) IV Continuous <Continuous>  meropenem  IVPB 1000 milliGRAM(s) IV Intermittent every 12 hours  norepinephrine Infusion 0.01 MICROgram(s)/kG/Min (0.72 mL/Hr) IV Continuous <Continuous>  octreotide  Infusion 25 MICROgram(s)/Hr (5 mL/Hr) IV Continuous <Continuous>  pantoprazole  Injectable 40 milliGRAM(s) IV Push every 12 hours  Parenteral Nutrition - Adult 1 Each (75 mL/Hr) TPN Continuous <Continuous>  Parenteral Nutrition - Adult 1 Each (63 mL/Hr) TPN Continuous <Continuous>  potassium phosphate IVPB 30 milliMole(s) IV Intermittent once  PureFlow Dialysate RFP-400 (K 2 / Ca 3) 5000 milliLiter(s) (2000 mL/Hr) CRRT <Continuous>  vasopressin Infusion 0.03 Unit(s)/Min (4.5 mL/Hr) IV Continuous <Continuous>        VITALS:  T(F): 97 (07-13-23 @ 08:00), Max: 109.4 (07-12-23 @ 10:00)  HR: 75 (07-13-23 @ 08:45)  RR: 34 (07-13-23 @ 08:45)  SpO2: 100% (07-13-23 @ 08:45)  Wt(kg): --    07-11 @ 07:01  -  07-12 @ 07:00  --------------------------------------------------------  IN: 2517.3 mL / OUT: 5486 mL / NET: -2968.7 mL    07-12 @ 07:01 - 07-13 @ 07:00  --------------------------------------------------------  IN: 2940.2 mL / OUT: 4215 mL / NET: -1274.8 mL    07-13 @ 07:01 - 07-13 @ 09:04  --------------------------------------------------------  IN: 155.6 mL / OUT: 310 mL / NET: -154.4 mL          PHYSICAL EXAM:  Constitutional: NAD  Respiratory: CTAB,   Cardiovascular: S1, S2, RRR  Gastrointestinal: BS+, soft, NT/ND  Extremities: No cyanosis or clubbing. No peripheral edema  :  No bobby.   Skin: No rashes    LABS:  07-13    129<L>  |  98  |  40<H>  ----------------------------<  170<H>  3.4<L>   |  20  |  0.6<L>    Ca    8.1<L>      13 Jul 2023 04:20  Phos  1.8     07-13  Mg     1.9     07-13    TPro  4.0<L>  /  Alb  2.2<L>  /  TBili  1.4<H>  /  DBili  0.7<H>  /  AST  11  /  ALT  8   /  AlkPhos  84  07-13                          7.9    6.46  )-----------( 36       ( 13 Jul 2023 04:20 )             24.5       Urine Studies:  Urinalysis Basic - ( 13 Jul 2023 04:20 )    Color:  / Appearance:  / SG:  / pH:   Gluc: 170 mg/dL / Ketone:   / Bili:  / Urobili:    Blood:  / Protein:  / Nitrite:    Leuk Esterase:  / RBC:  / WBC    Sq Epi:  / Non Sq Epi:  / Bacteria:           Iron 12, TIBC 128, %sat 9      [05-07-23 @ 09:19]  Ferritin 526      [05-07-23 @ 09:19]  Vitamin D (25OH) 26      [05-08-23 @ 07:58]  TSH 0.45      [06-19-23 @ 14:14]  Lipid: chol 58, TG 88, HDL 22, LDL --      [07-02-23 @ 21:01]          RADIOLOGY & ADDITIONAL STUDIES:

## 2023-07-13 NOTE — CHART NOTE - NSCHARTNOTEFT_GEN_A_CORE
Patient seen and evaluated by palliative care. Pain managed with current medications ordered and goals of care clear with plan of care in place.    Signing off    Reconsult as needed

## 2023-07-13 NOTE — PHYSICAL THERAPY INITIAL EVALUATION ADULT - SPECIFY REASON(S)
pt is currently intubated.  PT to follow when medically cleared.
Hold PT, pt remains intubated. PT to f/u when appropriate.
pt is currently on bedrest as of 6:02 am, PT to follow when pt medically cleared
pt remains intubated/sedated. Will hold PT and follow up as appropriate.
Hold PT; at this time pt s intubated/ vented. Will f/u as appropriate.
pt is currently on an non rebreather and recieving care from medical and nursing staff.  PT to follow.
pt remains on bed rest and intubated.   PT to follow when medically cleared for activity

## 2023-07-13 NOTE — PROGRESS NOTE ADULT - ASSESSMENT
Assessment and Plan:   67M w/ PMH of HTN, grade IV hydronephrosis of L kidney s/p L PCN (placed 5/2023), stutter, hiatal hernia, iron deficiency anemia, H Pylori (untreated), and gastric mass (never biopsied) presents to the ED with at least 4 days of worsening weakness, abdominal distension, and no output from his PCN. Admitted with emphysematous pyelonephritis.     (6/18): s/p LT PCN upsizing to 16Fr and 16Fr RLQ drain placement with IR   (6/22): s/p shock lithotripsy of L kidney abscess cavity with drainage of thick contents, upsize of drain to 26Fr bobby catheter  (6/30): s/p ex-lap and L nephrectomy with abdominal washout    NEURO:  #Pain control    -APAP PRN  #Sedation    -trialing off until following commands   #altred mental status    -CT head 7/10: No acute intracranial pathology    RESP:  #acute respiratory failure secondary to septic shock  -intubated 6/24  RR (machine): 25, TV (machine): 450, FiO2: 30, PEEP: 8  ABG:   #acute respiratory failure with left lung mucous plugging     -Bronchoscopy 7/10: No mucous plug found    -CT Chest 7/10: Interval increase in size of bilateral pleural effusions with adjacent consolidation both lower lobes.   #Pulmonary nodules / Mediastinal lymphadenopathy    - CT Chest: Right middle and upper lobe solid pulmonary nodules, f/u outpatient pulm   #Activity   - increase as tolerated    CARDS:   #Septic shock     -levo and vaso   #New onset Afib w/ RVR with hypotension -- likely 2/2 sepsis    - HR control with Metoprolol 2.5 q6 IV HOLDING in the setting of HYPOtension    -Digoxin administered, currently HOLDING, last level <0.3    -Cardiology c/s, allow permissive tachycardia, cardizem for rate control  #H/O HTN    -Amlodipine 2.5mg HOLDING   Imaging    - ECHO: (6/19) EF 55-60%, mild AS, mild LAE, mild MR/TR, trivial pericardial effusion    GI/NUTR:  #Diet: NPO on TPN @58cc/hr   CT AP PO/IV/rectal contrast 7/10: proximal small bowel leak suspected, increased number and size of bilobar hepatic hypodense suspected hepatic abscesses, including segment IV/III multiseptated suspected abscess measuring 5 cm, previously 2.4 cm on 6/18/2023.    -Octreotide gtt   #bowel movements    - GI consult 6/26- Plan for EGD with EUS + FNA as outpatient, H-pylori treatment after resolution of acute issues   -FOBT positive    #GI PPX  -IV Protonix 40 q12 hours  #Gastric mass vs gastrohepatic lymphadenopathy    -CT A/P: lesser curvature mass , Hepatic hypodensities  #Splenic Hypodensities, infarct vs phlegmon/abscess    -CT A/P:  Wedge-shaped region of hypodensity within the spleen    /RENAL:   #Grade IV L hydronephrosis s/p L PCN (5/2023)   #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    -(6/23) s/p LT PCN upsizing to 26Fr catheter, RLQ 16 Fr drain remains, s/p nephrostolithotomy w/ lithotripsy    -(6/18) s/p LT PCN upsizing to 16Fr and 16Fr RLQ drain placement with IR, flush q8 hr  (6/30): s/p L nephrectomy and abdominal washout     - VENICE drains x2 pelvis and morrisons pouch    - L nephrectomy site; feculent OP noted  #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   #metabolic acidosis    -Nephrology c/s - CVVHD started 07/02,  goal to keep net negative 50/hr to as tolerated   #urine output in critically ill    -Straight cath every 12 hours  #decreased urine output in the setting of acute renal failure 2/2 septic shock               HEME/ONC:   #DVT prophylaxis    -SCDs, heparin subQ   #new onset Afib    -holding FULL AC   #R radial artery occlusion    -RUE duplex 07/01 + for right radial artery occlusion  #RT posterior popliteal vein DVT    -vascular c/s - recommend AC   #H/o Fe Deficiency anemia  #Thrombocytopenia  Heme consult (7/3):     - HIT workup negative  Type and Screen expires: 7/14     ID:  WBC- 7.47  --->>,  4.87  --->>,  5.92  --->>  Temp trend- 24hrs T(F): 96.3 (07-12 @ 06:00), Max: 109.4 (07-11 @ 13:00)  Antibiotics  caspofungin IVPB 50 every 24 hours  DAPTOmycin IVPB 460 every 48 hours  levoFLOXacin IVPB 750 every 48 hours  #Cultures  Surgical swab 06/30: vanc resistent enterococcus faecium     L PCN cyto-pathology  6/25: results suggestive of a necrotic neoplasm    Fungitell 6/26: 47 --> >500 (7/6)    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    BAL 6/24: moderate yeast     UCx 6/19: negative FINAL      Left PCN aspiration 6/18: stenotrophomonas maltophilia    Surgical swab (6/30): VRE     Blood cultures: 7/10 NGTD    ENDO:  #Glucose monitoring    -A1c (5/7/23): 5.0    -FSG q6 hours while NPO  #Adrenal Insufficiency 2/2 to sepsis     -completed course of solu-cortef    -Cortisol level - binding globulin 1.1 (low), serum cortisol, 84, free cortisol 76, % free cortisol 91    MSK:  #Sacrococcygeal DTI: wound care following- cleanse with soap and water, apply triad, #gauze, and allevyn BID and PRN for soiling   Venous ulcer right lower leg, R forearm skin tear: Pat dry, apply Xeroform, nonadherent dressing, wrap with Kerlix twice a day, prn for soiling  Burn consulted, recs:  --> sacrum: apply hydrogel and Allevyn pad  --> b/l ischium: Allevyn pad  - offloading/positioning  #Activity    -Advance as tolerated    -PT/rehab once extubated    -Globally deconditioned  #b/l UE, LE mottled skin    +ulnar/DP/PT pulses present on doppler    -Arterial duplex bilateral upper extremities (7/1)  - Right radial artery occlusion     -VA Duplex bilateral lower extremities (7/3) - Right PT vein DVT     LINES/DRAINS:   PIV  Bobby (6/18-7/10)  L eleazar drain (6/30)  R Lerma pouch #1, pelvic drain #2 (6/30)  Left basilic midline  6/21  Right axillary a line (6/30-7/4)  Right Radial [place] (6/22-6/30)  L-Femoral [place] (7/10 - )  L-Femoral [place] (7/10 - )  RIJ TLC (7/10-  )  ETT  OGT    ADVANCED DIRECTIVES:  Full Code  HCP/Emergency Contact- Tracey [place]: [phone]   Palliative following. GOC discussions continue to be held with family.     INDICATION FOR SICU: New onset atrial fibrillation w/ mechanical ventilation    DISPOSITION: SICU   Assessment and Plan:   67M w/ PMH of HTN, grade IV hydronephrosis of L kidney s/p L PCN (placed 2023), stutter, hiatal hernia, iron deficiency anemia, H Pylori (untreated), and gastric mass (never biopsied) presents to the ED with at least 4 days of worsening weakness, abdominal distension, and no output from his PCN. Admitted with emphysematous pyelonephritis.     (): s/p LT PCN upsizing to 16Fr and 16Fr RLQ drain placement with IR   (): s/p shock lithotripsy of L kidney abscess cavity with drainage of thick contents, upsize of drain to 26Fr bobby catheter  (): s/p ex-lap and L nephrectomy with abdominal washout    NEURO:  #Pain control    -APAP PRN  #Sedation    -trialing off until following commands   #altred mental status    -CT head 7/10: No acute intracranial pathology    RESP:  #acute respiratory failure secondary to septic shock  -intubated   RR (machine): 25, TV (machine): 450, FiO2: 30, PEEP: 8  AB.36/36/123/20/-4.6  #acute respiratory failure with left lung mucous plugging     -Bronchoscopy 7/10: No mucous plug found    -CT Chest 7/10: Interval increase in size of bilateral pleural effusions with adjacent consolidation both lower lobes.   #Pulmonary nodules / Mediastinal lymphadenopathy    - CT Chest: Right middle and upper lobe solid pulmonary nodules, f/u outpatient pulm   #Subcutaneous emphysema of the chest  -Expecting management.     CARDS:   #Septic shock     -levo and vaso   #New onset Afib w/ RVR with hypotension -- likely 2/2 sepsis    - HR control with Metoprolol 2.5 q6 IV HOLDING in the setting of HYPOtension    -Digoxin administered, currently HOLDING, last level <0.3    -Cardiology c/s, allow permissive tachycardia, cardizem for rate control  #H/O HTN    -Amlodipine 2.5mg HOLDING   Imaging    - ECHO: () EF 55-60%, mild AS, mild LAE, mild MR/TR, trivial pericardial effusion    GI/NUTR:  #Diet: NPO on TPN @58cc/hr   CT AP PO/IV/rectal contrast 7/10: proximal small bowel leak suspected, increased number and size of bilobar hepatic hypodense suspected hepatic abscesses, including segment IV/III multiseptated suspected abscess measuring 5 cm, previously 2.4 cm on 2023.    -Octreotide gtt   #bowel movements    - GI consult - Plan for EGD with EUS + FNA as outpatient, H-pylori treatment after resolution of acute issues   -FOBT positive    #GI PPX  -IV Protonix 40 q12 hours  #Gastric mass vs gastrohepatic lymphadenopathy    -CT A/P: lesser curvature mass , Hepatic hypodensities  #Splenic Hypodensities, infarct vs phlegmon/abscess    -CT A/P:  Wedge-shaped region of hypodensity within the spleen    /RENAL:   #Grade IV L hydronephrosis s/p L PCN (2023)   #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    -() s/p LT PCN upsizing to 26Fr catheter, RLQ 16 Fr drain remains, s/p nephrostolithotomy w/ lithotripsy    -() s/p LT PCN upsizing to 16Fr and 16Fr RLQ drain placement with IR, flush q8 hr  (): s/p L nephrectomy and abdominal washout     - VENICE drains x2 pelvis and morrisons pouch    - L nephrectomy site; feculent OP noted  #Pathology from nephrectomy ()- Collecting duct carcinoma, Grade 4, with extensive necrosis and abscess formation, 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   #metabolic acidosis    -Nephrology c/s - CVVHD started , HOLDING ON   #urine output in critically ill    -Straight cath every 12 hours  #decreased urine output in the setting of acute renal failure 2/2 septic shock               HEME/ONC:   #DVT prophylaxis    -SCDs, heparin subQ   #new onset Afib    -holding FULL AC   #R radial artery occlusion    -RUE duplex  + for right radial artery occlusion  #RT posterior popliteal vein DVT    -vascular c/s - recommend AC   #H/o Fe Deficiency anemia  #Thrombocytopenia  Heme consult (7/3):     - HIT workup negative  Type and Screen expires:      ID:  WBC- 7.47  --->>,  4.87  --->>,  5.92  --->>  Temp trend- 24hrs T(F): 96.3 (- @ 06:00), Max: 109.4 ( @ 13:00)  Antibiotics  caspofungin IVPB 50 every 24 hours  DAPTOmycin IVPB 460 every 48 hours  levoFLOXacin IVPB 750 every 48 hours  #Cultures  Surgical swab : vanc resistent enterococcus faecium     L PCN cyto-pathology  : results suggestive of a necrotic neoplasm    Fungitell : 47 --> >500 ()    OR Cx : Finegoldia magna    BAL : moderate yeast     UCx : negative FINAL      Left PCN aspiration : stenotrophomonas maltophilia    Surgical swab (): VRE     Blood cultures: 7/10 NGTD    ENDO:  #Glucose monitoring    -A1c (23): 5.0    -FSG q6 hours while NPO  #Adrenal Insufficiency 2/2 to sepsis     -completed course of solu-cortef    -Cortisol level - binding globulin 1.1 (low), serum cortisol, 84, free cortisol 76, % free cortisol 91    MSK:  #Sacrococcygeal DTI: wound care following- cleanse with soap and water, apply triad, #gauze, and allevyn BID and PRN for soiling   Venous ulcer right lower leg, R forearm skin tear: Pat dry, apply Xeroform, nonadherent dressing, wrap with Kerlix twice a day, prn for soiling  Burn consulted, recs:  --> sacrum: apply hydrogel and Allevyn pad  --> b/l ischium: Allevyn pad  - offloading/positioning  #Activity    -Advance as tolerated    -PT/rehab once extubated    -Globally deconditioned  #b/l UE, LE mottled skin    +ulnar/DP/PT pulses present on doppler    -Arterial duplex bilateral upper extremities ()  - Right radial artery occlusion     -VA Duplex bilateral lower extremities (7/3) - Right PT vein DVT     LINES/DRAINS:   PIV  Bobby (-7/10)  L eleazar drain ()  R Lerma pouch #1, pelvic drain #2 ()  Left basilic midline    Right axillary a line (-)  Right Radial [place] (-)  L-Femoral [place] (7/10 - )  L-Femoral [place] (7/10 - )  RIJ TLC (7/10-  )  ETT  OGT    ADVANCED DIRECTIVES:  Full Code  HCP/Emergency Contact- Tracey Adames: [phone]   Palliative following. GOC discussions continue to be held with family.     INDICATION FOR SICU: New onset atrial fibrillation w/ mechanical ventilation    DISPOSITION: SICU

## 2023-07-13 NOTE — PROGRESS NOTE ADULT - ASSESSMENT
67y/o M a/w retroperitoneal and intraabdominal abscess s/p IR placement of drain and LEFT PCN 6/18, s/p percutaneous drainage of LEFT renal abscess POD # 18 with left 26fr flank catheter in place, s/p Left radical nephrectomy, Ex-Lap POD # 12.    - Pt seen and examined with Dr. Bryan, plan as per attending   - Continue management as per SICU .

## 2023-07-14 NOTE — PROGRESS NOTE ADULT - SUBJECTIVE AND OBJECTIVE BOX
GENERAL SURGERY PROGRESS NOTE     CHRISTINE VILLAVICENCIO  [address] [place] [address] day :24d    POD:  Procedure: Insertion, arterial line, percutaneous    Central venous catheter placement with ultrasound guidance    Midline catheter insertion    Nephrostolithotomy, percutaneous, with lithotripsy, large stone    Change external ureteral stent    Nephrostogram    US guided vascular access    Insertion of arterial line with imaging guidance    Bronchoscopy, flexible, adult    Partial nephrectomy    Nephrostogram via existing catheter    Exploratory laparotomy    Left nephrectomy    Insertion, catheter, hemodialysis, non-tunneled, with US guidance    Surgical Attending: Miguel Bryan  Overnight events:  - continues on octreotide gtt  - levo 0.09 and vaso 0.03  - TPN increased to 60  - stopped CVVH  - subcutaneous emphysema improved since yesterday  - pelvic, renal bed drain, and ostomy pouch w/ feculent output, Lerma's pouch w/ minimal serosanguinous output    PHYSICAL EXAM:  GENERAL: does not follow commands, wincing to sternal rub  CHEST/LUNG: intubated, decreasing subcutaneous emphysema from upper chest to neck  ABDOMEN: soft, non-distended, left subcostal incision staple line w/ surrounding ?necrotic skin  EXTREMITIES: diffusely edematous    T(F): 99.5 (07-14-23 @ 06:30), Max: 99.9 (07-13-23 @ 19:09)  HR: 84 (07-14-23 @ 06:30) (72 - 96)  ABP: 117/49 (07-14-23 @ 06:30) (102/49 - 136/54)  ABP(mean): 65 (07-14-23 @ 06:30) (62 - 83)  RR: 25 (07-14-23 @ 06:30) (20 - 38)  SpO2: 99% (07-14-23 @ 06:30) (98% - 100%)    IN'S / OUT's:    07-13-23 @ 07:01  -  07-14-23 @ 07:00  --------------------------------------------------------  IN:    IV PiggyBack: 498 mL    IV PiggyBack: 250 mL    IV PiggyBack: 50 mL    IV PiggyBack: 50 mL    Norepinephrine: 49.7 mL    Norepinephrine: 57.5 mL    Octreotide: 15 mL    Octreotide: 10 mL    Octreotide: 10 mL    Octreotide: 190 mL    TPN (Total Parenteral Nutrition): 1476 mL    Vasopressin: 108 mL  Total IN: 2764.2 mL    OUT:    Drain (mL): 25 mL    Drain (mL): 20 mL    Drain (mL): 155 mL    Drain (mL): 10 mL    Fat Emulsion (Fish Oil &amp; Plant Based) 20% Infusion: 0 mL    Intermittent Catheterization - Urethral (mL): 30 mL    Nasogastric/Oral tube (mL): 0 mL    Oral Fluid: 0 mL    Other (mL): 1126 mL    Voided (mL): 0 mL  Total OUT: 1366 mL    Total NET: 1398.2 mL      MEDICATIONS  (STANDING):  bacitracin   Ointment 1 Application(s) Topical two times a day  caspofungin IVPB      caspofungin IVPB 50 milliGRAM(s) IV Intermittent every 24 hours  cefTRIAXone   IVPB      cefTRIAXone   IVPB 2000 milliGRAM(s) IV Intermittent every 24 hours  chlorhexidine 0.12% Liquid 15 milliLiter(s) Oral Mucosa every 12 hours  chlorhexidine 2% Cloths 1 Application(s) Topical <User Schedule>  heparin   Injectable 5000 Unit(s) SubCutaneous every 8 hours  levoFLOXacin IVPB 750 milliGRAM(s) IV Intermittent every 48 hours  metroNIDAZOLE  IVPB 500 milliGRAM(s) IV Intermittent every 8 hours  metroNIDAZOLE  IVPB      norepinephrine Infusion 0.01 MICROgram(s)/kG/Min (0.72 mL/Hr) IV Continuous <Continuous>  octreotide  Infusion 50 MICROgram(s)/Hr (10 mL/Hr) IV Continuous <Continuous>  OLANZapine Injectable 2.5 milliGRAM(s) IntraMuscular at bedtime  pantoprazole  Injectable 40 milliGRAM(s) IV Push every 12 hours  Parenteral Nutrition - Adult 1 Each (60 mL/Hr) TPN Continuous <Continuous>  vasopressin Infusion 0.03 Unit(s)/Min (4.5 mL/Hr) IV Continuous <Continuous>    MEDICATIONS  (PRN):  sodium chloride 0.9% lock flush 10 milliLiter(s) IV Push every 1 hour PRN Pre/post blood products, medications, blood draw, and to maintain line patency  sodium chloride 0.9% lock flush 10 milliLiter(s) IV Push every 1 hour PRN Pre/post blood products, medications, blood draw, and to maintain line patency      LABS  Labs:  CAPILLARY BLOOD GLUCOSE      POCT Blood Glucose.: 128 mg/dL (13 Jul 2023 23:33)  POCT Blood Glucose.: 154 mg/dL (13 Jul 2023 17:29)                          7.5    6.64  )-----------( 43       ( 14 Jul 2023 04:41 )             23.6       Auto Neutrophil %: 89.8 % (07-14-23 @ 04:41)  Auto Immature Granulocyte %: 0.5 % (07-14-23 @ 04:41)    07-14    134<L>  |  102  |  57<H>  ----------------------------<  120<H>  4.5   |  20  |  0.9      Magnesium: 2.0 mg/dL (07-14-23 @ 04:41)      LFTs:             3.8  | 0.7  | 15       ------------------[95      ( 14 Jul 2023 04:41 )  1.9  | 0.4  | 9           Lipase:x      Amylase:x         Blood Gas Arterial, Lactate: 4.90 mmol/L (07-13-23 @ 04:05)  Blood Gas Arterial, Lactate: 4.60 mmol/L (07-12-23 @ 04:39)  Blood Gas Arterial, Lactate: 4.20 mmol/L (07-11-23 @ 17:15)    ABG - ( 13 Jul 2023 04:05 )  pH: 7.36  /  pCO2: 36    /  pO2: 123   / HCO3: 20    / Base Excess: -4.6  /  SaO2: 99.7            ABG - ( 12 Jul 2023 04:39 )  pH: 7.34  /  pCO2: 37    /  pO2: 138   / HCO3: 20    / Base Excess: -5.3  /  SaO2: 100.0     ABG - ( 11 Jul 2023 17:15 )  pH: 7.36  /  pCO2: 35    /  pO2: 118   / HCO3: 20    / Base Excess: -5.1  /  SaO2: 99.9     Urinalysis Basic - ( 14 Jul 2023 04:41 )    Color: x / Appearance: x / SG: x / pH: x  Gluc: 120 mg/dL / Ketone: x  / Bili: x / Urobili: x   Blood: x / Protein: x / Nitrite: x   Leuk Esterase: x / RBC: x / WBC x   Sq Epi: x / Non Sq Epi: x / Bacteria: x

## 2023-07-14 NOTE — PROGRESS NOTE ADULT - SUBJECTIVE AND OBJECTIVE BOX
Nephrology progress note    THIS IS AN INCOMPLETE NOTE . FULL NOTE TO FOLLOW SHORTLY    Patient is seen and examined, events over the last 24 h noted .    Allergies:  No Known Allergies    Hospital Medications:   MEDICATIONS  (STANDING):  bacitracin   Ointment 1 Application(s) Topical two times a day  caspofungin IVPB      caspofungin IVPB 50 milliGRAM(s) IV Intermittent every 24 hours  cefTRIAXone   IVPB      cefTRIAXone   IVPB 2000 milliGRAM(s) IV Intermittent every 24 hours  chlorhexidine 0.12% Liquid 15 milliLiter(s) Oral Mucosa every 12 hours  chlorhexidine 2% Cloths 1 Application(s) Topical <User Schedule>  heparin   Injectable 5000 Unit(s) SubCutaneous every 8 hours  levoFLOXacin IVPB 750 milliGRAM(s) IV Intermittent every 48 hours  metroNIDAZOLE  IVPB 500 milliGRAM(s) IV Intermittent every 8 hours  metroNIDAZOLE  IVPB      norepinephrine Infusion 0.01 MICROgram(s)/kG/Min (0.72 mL/Hr) IV Continuous <Continuous>  octreotide  Infusion 50 MICROgram(s)/Hr (10 mL/Hr) IV Continuous <Continuous>  OLANZapine Injectable 2.5 milliGRAM(s) IntraMuscular at bedtime  pantoprazole  Injectable 40 milliGRAM(s) IV Push every 12 hours  Parenteral Nutrition - Adult 1 Each (60 mL/Hr) TPN Continuous <Continuous>  vasopressin Infusion 0.03 Unit(s)/Min (4.5 mL/Hr) IV Continuous <Continuous>        VITALS:  T(F): 99.5 (07-14-23 @ 06:30), Max: 99.9 (07-13-23 @ 19:09)  HR: 88 (07-14-23 @ 08:10)  BP: --  RR: 25 (07-14-23 @ 06:30)  SpO2: 100% (07-14-23 @ 08:10)  Wt(kg): --    07-12 @ 07:01  -  07-13 @ 07:00  --------------------------------------------------------  IN: 2940.2 mL / OUT: 4215 mL / NET: -1274.8 mL    07-13 @ 07:01  -  07-14 @ 07:00  --------------------------------------------------------  IN: 2764.2 mL / OUT: 1366 mL / NET: 1398.2 mL      13 Jul 2023 07:01  -  14 Jul 2023 07:00  --------------------------------------------------------  IN:    IV PiggyBack: 498 mL    IV PiggyBack: 250 mL    IV PiggyBack: 50 mL    IV PiggyBack: 50 mL    Norepinephrine: 49.7 mL    Norepinephrine: 57.5 mL    Octreotide: 15 mL    Octreotide: 10 mL    Octreotide: 10 mL    Octreotide: 190 mL    TPN (Total Parenteral Nutrition): 1476 mL    Vasopressin: 108 mL  Total IN: 2764.2 mL    OUT:    Drain (mL): 25 mL    Drain (mL): 20 mL    Drain (mL): 155 mL    Drain (mL): 10 mL    Fat Emulsion (Fish Oil &amp; Plant Based) 20% Infusion: 0 mL    Intermittent Catheterization - Urethral (mL): 30 mL    Nasogastric/Oral tube (mL): 0 mL    Oral Fluid: 0 mL    Other (mL): 1126 mL    Voided (mL): 0 mL  Total OUT: 1366 mL    Total NET: 1398.2 mL            PHYSICAL EXAM:  Constitutional: NAD  HEENT: anicteric sclera, oropharynx clear, MMM  Neck: No JVD  Respiratory: CTAB, no wheezes, rales or rhonchi  Cardiovascular: S1, S2, RRR  Gastrointestinal: BS+, soft, NT/ND  Extremities: No cyanosis or clubbing. No peripheral edema  :  No bobby.   Skin: No rashes    LABS:  07-14    134<L>  |  102  |  57<H>  ----------------------------<  120<H>  4.5   |  20  |  0.9    Ca    7.5<L>      14 Jul 2023 04:41  Phos  3.7     07-14  Mg     2.0     07-14    TPro  3.8<L>  /  Alb  1.9<L>  /  TBili  0.7  /  DBili  0.4<H>  /  AST  15  /  ALT  9   /  AlkPhos  95  07-14                          7.5    6.64  )-----------( 43       ( 14 Jul 2023 04:41 )             23.6       Urine Studies:  Urinalysis Basic - ( 14 Jul 2023 04:41 )    Color:  / Appearance:  / SG:  / pH:   Gluc: 120 mg/dL / Ketone:   / Bili:  / Urobili:    Blood:  / Protein:  / Nitrite:    Leuk Esterase:  / RBC:  / WBC    Sq Epi:  / Non Sq Epi:  / Bacteria:           Iron 12, TIBC 128, %sat 9      [05-07-23 @ 09:19]  Ferritin 526      [05-07-23 @ 09:19]  Vitamin D (25OH) 26      [05-08-23 @ 07:58]  TSH 0.45      [06-19-23 @ 14:14]  Lipid: chol 58, TG 88, HDL 22, LDL --      [07-02-23 @ 21:01]          RADIOLOGY & ADDITIONAL STUDIES:   Nephrology progress note    Patient is seen and examined, events over the last 24 h noted .  Lying in bed   intubated on MV   on 2 pressors     Allergies:  No Known Allergies    Hospital Medications:   MEDICATIONS  (STANDING):    bacitracin   Ointment 1 Application(s) Topical two times a day    caspofungin IVPB 50 milliGRAM(s) IV Intermittent every 24 hours     cefTRIAXone   IVPB 2000 milliGRAM(s) IV Intermittent every 24 hours  heparin   Injectable 5000 Unit(s) SubCutaneous every 8 hours  levoFLOXacin IVPB 750 milliGRAM(s) IV Intermittent every 48 hours  metroNIDAZOLE  IVPB 500 milliGRAM(s) IV Intermittent every 8 hours   norepinephrine Infusion 0.01 MICROgram(s)/kG/Min (0.72 mL/Hr) IV Continuous <Continuous>  octreotide  Infusion 50 MICROgram(s)/Hr (10 mL/Hr) IV Continuous <Continuous>  OLANZapine Injectable 2.5 milliGRAM(s) IntraMuscular at bedtime  pantoprazole  Injectable 40 milliGRAM(s) IV Push every 12 hours  Parenteral Nutrition - Adult 1 Each (60 mL/Hr) TPN Continuous <Continuous>  vasopressin Infusion 0.03 Unit(s)/Min (4.5 mL/Hr) IV Continuous <Continuous>        VITALS:  T(F): 99.5 (07-14-23 @ 06:30), Max: 99.9 (07-13-23 @ 19:09)  HR: 88 (07-14-23 @ 08:10)  RR: 25 (07-14-23 @ 06:30)  SpO2: 100% (07-14-23 @ 08:10)  Wt(kg): --    07-12 @ 07:01  -  07-13 @ 07:00  --------------------------------------------------------  IN: 2940.2 mL / OUT: 4215 mL / NET: -1274.8 mL    07-13 @ 07:01  -  07-14 @ 07:00  --------------------------------------------------------  IN: 2764.2 mL / OUT: 1366 mL / NET: 1398.2 mL      13 Jul 2023 07:01  -  14 Jul 2023 07:00  --------------------------------------------------------  IN:    IV PiggyBack: 498 mL    IV PiggyBack: 250 mL    IV PiggyBack: 50 mL    IV PiggyBack: 50 mL    Norepinephrine: 49.7 mL    Norepinephrine: 57.5 mL    Octreotide: 15 mL    Octreotide: 10 mL    Octreotide: 10 mL    Octreotide: 190 mL    TPN (Total Parenteral Nutrition): 1476 mL    Vasopressin: 108 mL  Total IN: 2764.2 mL    OUT:    Drain (mL): 25 mL    Drain (mL): 20 mL    Drain (mL): 155 mL    Drain (mL): 10 mL    Fat Emulsion (Fish Oil &amp; Plant Based) 20% Infusion: 0 mL    Intermittent Catheterization - Urethral (mL): 30 mL    Nasogastric/Oral tube (mL): 0 mL    Oral Fluid: 0 mL    Other (mL): 1126 mL    Voided (mL): 0 mL  Total OUT: 1366 mL    Total NET: 1398.2 mL            PHYSICAL EXAM:  Constitutional: intubated on MV   Respiratory: CTAB,   Cardiovascular: S1, S2, RRR  Gastrointestinal: BS+, soft, NT/ND  Extremities: No cyanosis or clubbing. plus one  peripheral edema  :  bobby.   Skin: No rashes    LABS:  07-14    134<L>  |  102  |  57<H>  ----------------------------<  120<H>  4.5   |  20  |  0.9    Ca    7.5<L>      14 Jul 2023 04:41  Phos  3.7     07-14  Mg     2.0     07-14    TPro  3.8<L>  /  Alb  1.9<L>  /  TBili  0.7  /  DBili  0.4<H>  /  AST  15  /  ALT  9   /  AlkPhos  95  07-14                          7.5    6.64  )-----------( 43       ( 14 Jul 2023 04:41 )             23.6       Urine Studies:  Urinalysis Basic - ( 14 Jul 2023 04:41 )    Color:  / Appearance:  / SG:  / pH:   Gluc: 120 mg/dL / Ketone:   / Bili:  / Urobili:    Blood:  / Protein:  / Nitrite:    Leuk Esterase:  / RBC:  / WBC    Sq Epi:  / Non Sq Epi:  / Bacteria:           Iron 12, TIBC 128, %sat 9      [05-07-23 @ 09:19]  Ferritin 526      [05-07-23 @ 09:19]  Vitamin D (25OH) 26      [05-08-23 @ 07:58]  TSH 0.45      [06-19-23 @ 14:14]  Lipid: chol 58, TG 88, HDL 22, LDL --      [07-02-23 @ 21:01]          RADIOLOGY & ADDITIONAL STUDIES:

## 2023-07-14 NOTE — PROGRESS NOTE ADULT - SUBJECTIVE AND OBJECTIVE BOX
CHRISTINE VILLAVICENCIO  68y, Male  Allergy: No Known Allergies      LOS  26d    CHIEF COMPLAINT: pneumoperitoneum, emphysematous pyelonephritis (14 Jul 2023 10:34)      INTERVAL EVENTS/HPI  - T(F): , Max: 99.9 (07-13-23 @ 19:09)  - WBC Count: 6.64 (07-14-23 @ 04:41)  WBC Count: 6.46 (07-13-23 @ 04:20)     - Creatinine: 0.9 (07-14-23 @ 04:41)  Creatinine: 0.7 (07-13-23 @ 20:20)     -   -   -     ROS  cannot obtain secondary to patient's sedation and/or mental status    VITALS:  T(F): 99.5, Max: 99.9 (07-13-23 @ 19:09)  HR: 91  BP: --  RR: 28Vital Signs Last 24 Hrs  T(C): 37.5 (14 Jul 2023 12:00), Max: 37.7 (13 Jul 2023 19:09)  T(F): 99.5 (14 Jul 2023 12:00), Max: 99.9 (13 Jul 2023 19:09)  HR: 91 (14 Jul 2023 13:00) (73 - 96)  BP: --  BP(mean): --  RR: 28 (14 Jul 2023 13:00) (20 - 33)  SpO2: 99% (14 Jul 2023 13:00) (97% - 100%)    Parameters below as of 14 Jul 2023 12:00  Patient On (Oxygen Delivery Method): ventilator        PHYSICAL EXAM:  Gen: Intubated  CV: RRR  Lungs: Decreased BS at bases  Abd: Soft JPs  anasarca  Neuro: Sedated  Lines clean, no phlebitis    FH: Non-contributory  Social Hx: Non-contributory    TESTS & MEASUREMENTS:                        7.5    6.64  )-----------( 43       ( 14 Jul 2023 04:41 )             23.6     07-14    134<L>  |  102  |  57<H>  ----------------------------<  120<H>  4.5   |  20  |  0.9    Ca    7.5<L>      14 Jul 2023 04:41  Phos  3.7     07-14  Mg     2.0     07-14    TPro  3.8<L>  /  Alb  1.9<L>  /  TBili  0.7  /  DBili  0.4<H>  /  AST  15  /  ALT  9   /  AlkPhos  95  07-14      LIVER FUNCTIONS - ( 14 Jul 2023 04:41 )  Alb: 1.9 g/dL / Pro: 3.8 g/dL / ALK PHOS: 95 U/L / ALT: 9 U/L / AST: 15 U/L / GGT: x           Urinalysis Basic - ( 14 Jul 2023 04:41 )    Color: x / Appearance: x / SG: x / pH: x  Gluc: 120 mg/dL / Ketone: x  / Bili: x / Urobili: x   Blood: x / Protein: x / Nitrite: x   Leuk Esterase: x / RBC: x / WBC x   Sq Epi: x / Non Sq Epi: x / Bacteria: x        Culture - Blood (collected 07-11-23 @ 02:25)  Source: .Blood Blood-Peripheral  Preliminary Report (07-14-23 @ 09:01):    No growth at 72 Hours    Culture - Surgical Swab (collected 06-30-23 @ 22:12)  Source: .Surgical Swab None  Final Report (07-05-23 @ 21:47):    Growth in fluid media only Enterococcus faecium (vancomycin resistant)  Organism: Enterococcus faecium (vancomycin resistant) (07-05-23 @ 21:47)  Organism: Enterococcus faecium (vancomycin resistant) (07-05-23 @ 21:47)      -  Levofloxacin: R >4      -  Daptomycin: SDD 4 The breakpoint for SDD (susceptible dose dependent)is based on a dosage regimen of 8-12 mg/kg administered every 24 h in adults and is intended for serious infections due to E. faecium. Consultation with an infectious diseases specialist is recommended.      -  Linezolid: S 1      -  Vancomycin: R >16      -  Ampicillin: R >8 Predicts results to ampicillin/sulbactam, amoxacillin-clavulanate and  piperacillin-tazobactam.      -  Tetracycline: R >8      Method Type: EDMUND    Culture - Acid Fast - Bronchial w/Smear (collected 06-24-23 @ 11:00)  Source: .Bronchial None  Preliminary Report (07-12-23 @ 15:06):    No acid-fast bacilli isolated at 2 weeks.    Culture - Fungal, Bronchial (collected 06-24-23 @ 11:00)  Source: .Bronchial None  Preliminary Report (06-26-23 @ 11:00):    Moderate Yeast    Culture - Bronchial (collected 06-24-23 @ 11:00)  Source: .Bronchial None  Gram Stain (06-24-23 @ 23:21):    Numerous polymorphonuclear leukocytes per low power field    No squamous epithelial cells per low power field    Rare Yeast like cells per oil power field  Final Report (06-26-23 @ 17:56):    Normal Respiratory Gabbie present    Culture - Surgical Swab (collected 06-22-23 @ 22:11)  Source: .Surgical Swab None  Final Report (06-25-23 @ 14:14):    Few Finegoldia magna "Susceptibilities not performed"    Culture - Fungal, Other (collected 06-22-23 @ 22:11)  Source: .Other None  Preliminary Report (07-08-23 @ 15:02):    No fungus isolated at 2 weeks.    Culture - Urine (collected 06-19-23 @ 02:50)  Source: Clean Catch Clean Catch (Midstream)  Final Report (06-20-23 @ 10:18):    No growth    Culture - Abscess with Gram Stain (collected 06-18-23 @ 23:08)  Source: .Abscess right lower quadrant (intrabdominal)  Gram Stain (06-19-23 @ 11:56):    Numerous polymorphonuclear leukocytes per low power field    Numerous Gram positive cocci in pairs per oil power field  Final Report (06-21-23 @ 14:13):    Numerous Anaerobic Gram Positive Cocci Most closely resembling    Peptoniphilus species "Susceptibilities not performed"    Culture - Abscess with Gram Stain (collected 06-18-23 @ 23:08)  Source: .Abscess left kidney  Gram Stain (06-19-23 @ 11:58):    Rare polymorphonuclear leukocytes per low power field    Few Gram Positive Cocci per oil power field  Final Report (06-22-23 @ 10:13):    Growth in fluid media only Anaerobic Gram Positive Cocci Most closely    resembling Peptoniphilus species "Susceptibilities not performed"    Rare Stenotrophomonas maltophilia  Organism: Stenotrophomonas maltophilia  Stenotrophomonas maltophilia (06-22-23 @ 10:13)  Organism: Stenotrophomonas maltophilia (06-22-23 @ 10:13)      -  Levofloxacin: S 1      Method Type: EDMUND      -  Trimethoprim/Sulfamethoxazole: S <=0.5/9.5  Organism: Stenotrophomonas maltophilia (06-22-23 @ 10:13)      -  Minocycline: S      Method Type: KB    Culture - Abscess with Gram Stain (collected 06-18-23 @ 20:18)  Source: .Abscess left PCN aspirate  Gram Stain (06-19-23 @ 06:27):    No polymorphonuclear cells seen per low power field    Few Gram Variable Cocci seen per oil power field  Final Report (06-24-23 @ 09:11):    Few Stenotrophomonas maltophilia  Organism: Stenotrophomonas maltophilia (06-24-23 @ 09:11)  Organism: Stenotrophomonas maltophilia (06-24-23 @ 09:11)      -  Levofloxacin: S 2      Method Type: EDMUND      -  Trimethoprim/Sulfamethoxazole: S <=0.5/9.5    Culture - Blood (collected 06-18-23 @ 14:18)  Source: .Blood Blood-Peripheral  Final Report (06-24-23 @ 02:00):    No Growth Final    Culture - Blood (collected 06-18-23 @ 14:18)  Source: .Blood Blood-Peripheral  Final Report (06-24-23 @ 02:00):    No Growth Final            INFECTIOUS DISEASES TESTING  Fungitell: >500 (07-06-23 @ 04:30)  Fungitell: 47 (06-25-23 @ 15:30)  MRSA PCR Result.: Negative (06-24-23 @ 17:38)      INFLAMMATORY MARKERS  C-Reactive Protein, Serum: 106.7 mg/L (07-14-23 @ 04:41)  C-Reactive Protein, Serum: 245.1 mg/L (07-10-23 @ 01:30)      RADIOLOGY & ADDITIONAL TESTS:  I have personally reviewed the last available Chest xray  CXR      CT      CARDIOLOGY TESTING  12 Lead ECG:   Ventricular Rate 89 BPM    Atrial Rate 394 BPM    QRS Duration 84 ms    Q-T Interval 300 ms    QTC Calculation(Bazett) 365 ms    R Axis 50 degrees    T Axis -16 degrees    Diagnosis Line Atrial fibrillation  Low voltage QRS  Nonspecific T wave abnormality  Abnormal ECG    Confirmed by Deon Velasquez ([id number]) on 7/3/2023 8:40:11 AM (07-02-23 @ 07:57)  12 Lead ECG:   Ventricular Rate 96 BPM    Atrial Rate 72 BPM    QRS Duration 82 ms    Q-T Interval 288 ms    QTC Calculation(Bazett) 363 ms    R Axis 37 degrees    T Axis 184 degrees    Diagnosis Line Atrial fibrillation  Low voltage QRS  Nonspecific T wave abnormality  Abnormal ECG    Confirmed by Deon Velasquez ([id number]) on 7/2/2023 1:12:19 PM (07-01-23 @ 08:12)      MEDICATIONS  bacitracin   Ointment 1  caspofungin IVPB   caspofungin IVPB 50  cefTRIAXone   IVPB 2000  cefTRIAXone   IVPB   chlorhexidine 0.12% Liquid 15  chlorhexidine 2% Cloths 1  heparin   Injectable 5000  levoFLOXacin IVPB 750  metroNIDAZOLE  IVPB 500  metroNIDAZOLE  IVPB   norepinephrine Infusion 0.01  octreotide  Infusion 50  OLANZapine Injectable 2.5  pantoprazole  Injectable 40  Parenteral Nutrition - Adult 1  Parenteral Nutrition - Adult 1  vasopressin Infusion 0.03      WEIGHT  Weight (kg): 76.3 (06-30-23 @ 16:58)  Creatinine: 0.9 mg/dL (07-14-23 @ 04:41)  Creatinine: 0.7 mg/dL (07-13-23 @ 20:20)      ANTIBIOTICS:  caspofungin IVPB      caspofungin IVPB 50 milliGRAM(s) IV Intermittent every 24 hours  cefTRIAXone   IVPB      cefTRIAXone   IVPB 2000 milliGRAM(s) IV Intermittent every 24 hours  levoFLOXacin IVPB 750 milliGRAM(s) IV Intermittent every 48 hours  metroNIDAZOLE  IVPB 500 milliGRAM(s) IV Intermittent every 8 hours  metroNIDAZOLE  IVPB          All available historical records have been reviewed

## 2023-07-14 NOTE — PROGRESS NOTE ADULT - NS ATTEND AMEND GEN_ALL_CORE FT
I personally saw and examined the patient, reviewed the chart and available data. I discussed the situation with the patients nurse, Dr Womack and RAMON RODRIGUEZ. I also reviewed and/or amended the note as necessary.

## 2023-07-14 NOTE — PROGRESS NOTE ADULT - ASSESSMENT
ASSESSMENT  66yo Male with pmh of hypertension, massive Grade IV hydronephrosis s/p left nephrostomy placed in May 2023 by [facility] presents to the ED with little to no urine output from LEFT nephrostomy from about a week. PCN  more recently drainage was changed to brown/purulent fluid. 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      IMPRESSION  #Fever , resolved    7/11 BCX NG   CTAP 1. Large volume extraluminal left upper quadrant/retroperitoneal oral contrast; proximal small bowel leak suspected.  2. Moderate volume free fluid with enhancement of peritoneal reflections, consistent with peritonitis. Evaluation for focal abscesses difficult   given extensive background free fluid.  3. Increased number and size of bilobar hepatic hypodense suspected hepatic abscesses, including segment IV/III multiseptated suspected   abscess measuring 5 cm, previously 2.4 cm on 6/18/2023.  4. Small bilateral pleural effusions. Left lower lobe consolidativeopacity, may represent pneumonia in the appropriate clinical setting.  5. Unchanged retroperitoneal lymphadenopathy.  6. Persistent pneumoperitoneum.   Elevated fungitell- . 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 "Exploratory laparotomy for possible left renal cancer complicated by significant hydronephrosis and pyelonephritis. Left nephrectomy performed, notable for necrotic bulky tissue adherent to the renal vein, staple line in tact and hemostasis achieved. Copious irrigation of retroperitoneal renal bed and intraperitoneal fibrinous exudate and purulent fluid. Three drains placed, #1 in Lerma's pouch, #2 in the pelvis, and #3 in the renal bed"    6/22 UCX  Few Finegoldia magna "Susceptibilities not performed"  Nephrostolithotomy, percutaneous, with lithotripsy, large stone 22-Jun-2023 23:27:09 left large thick abscess materia dimension of aspirate over 4 x 4 x 4 cm Bernie Perdomo  Change external ureteral stent 22-Jun-2023 23:28:03 left Yanitomásjami Bernie CLIFFORD  Nephrostogram 22-Jun-2023 23:28:31 left Yanitomásjami Bernie CLIFFORD. "thick gelatinous material that was difficult to suction out even with the shock pulse. I would alternate 2 prong to remove and break up / shock pulse to suck out. after an hour where I had no clear planes I elected to stop. irrigation through the 26 bobby took out a lot additional material  in ashlyn catch cup 4 x 4 x 4 cm lump of material trapped"    6/19 UCX NG; UA Blood: x / Protein: 300 mg/dL / Nitrite: Negative   Leuk Esterase: Large / RBC: 8 /HPF / WBC >720 /HPF   Sq Epi: x / Non Sq Epi: x / Bacteria: Moderate    6/18 L PCN     Few Stenotrophomonas maltophilia Levofloxacin: S 2    6/18 L PCN     Numerous Anaerobic Gram Positive Cocci Most closely resembling  Peptoniphilus species "Susceptibilities not performed"    6/18 L PCN   Numerous Gram positive cocci in pairs per oil power field    6/18 BCX NGTD     s/p 6/18  Left PCN upsized to 16F and 1200cc of very viscous tan colored fluid was aspirated. RLQ 16F drain was placed and 1200cc of tan colored fluid was aspirated (less viscous). A sample from each location was sent for culture.     Repeat CT 6/19 Since one day earlier,  No extraluminal contrast. Oral contrast was last seen in the terminal ileum.  Status post left nephrostomy tube placement. Left enlarged kidney with severe hydronephrosis/collection has decreased, now measuring 16 x 12 cm from 20 x 14 cm  Surgical Pathology Report:   Left renal abscess, possible parenchyma  Left renal abscess, possible parenchyma, percutaneous drainage:  -  Fragments of extensively necrotic tissue, cannot be fullycharacterized  Comment: The microscopic appearance of the necrotic tissue is concerning for a neoplasm with coagulative necrosis. Definitive diagnosis cannot be  made, due to lack of viable material.  Additional sections will be submitted.  Immunohistochemical studies willbe performed, in an attempt to characterise the necrotic tissue.  Based on the H&E appearance, an infectious process such as tuberculosis  appears less likely, but special stains for microorganisms (acid-fastbacilli and fungal) are in progress and will be reported separately.   (06.22.23 @ 22:12)  6/27 CT Study is overall very limited due to lack of intravenous contrast, beam   Willingham artifact.  Persistent bilateral pleural effusions with adjacent consolidations   likely reflecting compressive atelectasis.  Left kidney poorly defined with a heterogeneous collection with air and   fluid which has likely mildly diminished in size compared to the prior   examination but exact comparison limited.  Diffuse ascites again noted. Diffuse anasarca again noted. Persistent   pneumoperitoneum.  Poorly defined lesions within the liver adjacent to the falciform   ligament which may again reflect abscesses or masses, difficult to   evaluate due to the limitations described above.  < from: CT Abdomen and Pelvis w/ IV Cont (06.18.23 @ 15:15) >  1 ) Moderate pneumoperitoneum with partial diffusion throughout moderate   volume ascites and with associated intermittent peritoneal enhancement   and nodularity. Perforation and peritonitis may be related to underlying   emphysematous pyelonephritis (see below). Less likely sources of   perforation include ureteral/bladder disruption, and bowel perforation   which cannot be entirely excluded on the provided images.  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 The compressed residual renal   parenchyma is inseparable from the adjacent fascial/fatty layers   anterosuperiorly and direct rupture into the peritoneum is a possibility   (6-355). The previously placed left-sided nephrostomy tube pigtail is   notedto be within the left renal collecting system.  3) New hepatic hypodensities measuring up to 2.6 cm, suspicious for   development of multiple focal abscesses.  4) Wedge-shaped region of hypodensity within the spleen may reflect   splenic infarct in the correct clinical setting. Developing   phlegmon/abscess cannot be excluded in the current clinical setting  5) Increased extensive retroperitoneal, portacaval and likely   gastrohepatic heterogeneously enhancing lymphadenopathy. Findings may be   reactive.  6) Mediastinal lymphadenopathy measuring up to 2.4 cm short axis.   Underlying etiology may be reactive, infectious/inflammatory, or   neoplastic. A short-term 3 month follow-up CT chest should be considered   for further evaluation.  7)Right middle and upper lobe solid pulmonary nodules which are not well   delineated due to respiratory motion but measure less than 6 mm.   attention on follow-up exam.  #Reintubated, L lung white out, ruled out HAP    6/24 bronch CX NG,   Moderate Yeast- likely colonizer  #Lactic acidosis  #Abx allergy: No Known Allergies  #Prolonged QTC Calculation(Bazett) 526 ms  #Hyponatremia  #AKICreatinine: Creatinine: 1.3 mg/dL (07-05-23 @ 05:45)  Ht 180  Weight (kg): 76.5 (06-18-23 @ 21:27)  crcl 57     RECOMMENDATIONS  - CT results noted, suspected leak, liver abscesses  - Will need prolonged ~6 week antibiotics course given liver abscesses  - Appreciate IR consult- no role for liver aspiration  - Continue Ceftriaxone 2g q24h IV + IV flagyl 500mg q8h   - Continue Caspo 50mg q24h IV, repeat Fungitell on Day 7 (7/15)   - Continue DAPTOmycin IVPB 460 milliGRAM(s) IV Intermittent every 48 hours  - Continue levoFLOXacin IVPB 750 milliGRAM(s) IV Intermittent every 48 hours  - Trend WBC   - Urology following  - Poor prognosis, GOC    If any questions, please send a message or call on [employer]  Please continue to update ID with any pertinent new laboratory or radiographic findings  #[id number]

## 2023-07-14 NOTE — PROGRESS NOTE ADULT - ASSESSMENT
Pt is a 69y/o M a/w retroperitoneal and intraabdominal abscess s/p IR placement of drain and LEFT PCN 6/18, s/p percutaneous drainage of LEFT renal abscess POD # 19 with left 26fr flank catheter in place, s/p Left radical nephrectomy, Ex-Lap POD # 13.     ·	Seen and examined at bedside with Dr. Perdomo.   ·	Cont care as per SICU team    Pt is a 69y/o M a/w retroperitoneal and intraabdominal abscess s/p IR placement of drain and LEFT PCN 6/18, s/p percutaneous drainage of LEFT renal abscess POD # 19 with left 26fr flank catheter in place, s/p Left radical nephrectomy, Ex-Lap POD # 13.     prognosis poor    ·	Seen and examined at bedside with Dr. Perdomo.   ·	Cont care as per SICU team

## 2023-07-14 NOTE — PROGRESS NOTE ADULT - ASSESSMENT
Assessment and Plan:   67M w/ PMH of HTN, grade IV hydronephrosis of L kidney s/p L PCN (placed 5/2023), stutter, hiatal hernia, iron deficiency anemia, H Pylori (untreated), and gastric mass (never biopsied) presents to the ED with at least 4 days of worsening weakness, abdominal distension, and no output from his PCN. Admitted with emphysematous pyelonephritis.     (6/18): s/p LT PCN upsizing to 16Fr and 16Fr RLQ drain placement with IR   (6/22): s/p shock lithotripsy of L kidney abscess cavity with drainage of thick contents, upsize of drain to 26Fr bobby catheter  (6/30): s/p ex-lap and L nephrectomy with abdominal washout    NEURO:  #Pain control    -APAP PRN  #Sedation    -trialing off until following commands   #altered mental status    -CT head 7/10: No acute intracranial pathology    -Elevated Urea     RESP:  #acute respiratory failure secondary to septic shock  -intubated 6/24  Mode: SIMV 450/16/30/8  #acute respiratory failure with left lung mucous plugging     -Bronchoscopy 7/10: No mucous plug found    -CT Chest 7/10: Interval increase in size of bilateral pleural effusions with adjacent consolidation both lower lobes.   #Pulmonary nodules / Mediastinal lymphadenopathy    - CT Chest: Right middle and upper lobe solid pulmonary nodules, f/u outpatient pulm   #Subcutaneous Emphysema   #Activity   - increase as tolerated    CARDS:   #Septic shock     -levo and vaso gtt  #New onset Afib w/ RVR with hypotension -- likely 2/2 sepsis    -HR control with Metoprolol 2.5 q6 IV HOLDING in the setting of HYPOtension    -Digoxin administered, currently HOLDING, last level <0.3    -Cardiology c/s, allow permissive tachycardia, cardizem for rate control  #H/O HTN    -Amlodipine 2.5mg HOLDING   Imaging    - ECHO: (6/19) EF 55-60%, mild AS, mild LAE, mild MR/TR, trivial pericardial effusion    GI/NUTR:  #Diet: NPO on TPN @60cc/hr  + lipids  CT AP PO/IV/rectal contrast 7/10: proximal small bowel leak suspected, increased number and size of bilobar hepatic hypodense suspected hepatic abscesses, including segment IV/III multiseptated suspected abscess measuring 5 cm, previously 2.4 cm on 6/18/2023.    -Octreotide gtt   #bowel movements    - GI consult 6/26- Plan for EGD with EUS + FNA as outpatient, H-pylori treatment after resolution of acute issues   -FOBT positive    #GI PPX  -IV Protonix 40 q12 hours  #Gastric mass vs gastrohepatic lymphadenopathy    -CT A/P: lesser curvature mass , Hepatic hypodensities  #Splenic Hypodensities, infarct vs phlegmon/abscess    -CT A/P:  Wedge-shaped region of hypodensity within the spleen    /RENAL:   #Grade IV L hydronephrosis s/p L PCN (5/2023)   #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    -(6/23) s/p LT PCN upsizing to 26Fr catheter, RLQ 16 Fr drain remains, s/p nephrostolithotomy w/ lithotripsy    -(6/18) s/p LT PCN upsizing to 16Fr and 16Fr RLQ drain placement with IR, flush q8 hr  (6/30): s/p L nephrectomy and abdominal washout     - VENICE drains x2 pelvis and Lerma's pouch    - L nephrectomy site: dark green/murky   #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   #metabolic acidosis    -Nephrology c/s - CVVHD started 07/02,  CVVHD held on 07/13   #urine output in critically ill    -Straight cath every 12 hours  #decreased urine output in the setting of acute renal failure 2/2 septic shock               HEME/ONC:   #DVT prophylaxis    -SCDs, heparin subQ   #new onset Afib    -holding FULL AC   #R radial artery occlusion    -RUE duplex 07/01 + for right radial artery occlusion  #RT posterior popliteal vein DVT    -vascular c/s - recommend AC   #H/o Fe Deficiency anemia  #Thrombocytopenia  Heme consult (7/3):     - HIT workup negative  Type and Screen expires: 7/14     ID:  WBC- 7.47  --->>,  4.87  --->>,  5.92  --->>  Temp trend- 24hrs T(F): 96.3 (07-12 @ 06:00), Max: 109.4 (07-11 @ 13:00)  Antibiotics  caspofungin IVPB 50 every 24 hours  DAPTOmycin IVPB 460 every 48 hours  levoFLOXacin IVPB 750 every 48 hours  #Cultures  Surgical swab 06/30: vanc resistent enterococcus faecium     L PCN cyto-pathology  6/25: results suggestive of a necrotic neoplasm    Fungitell 6/26: 47 --> >500 (7/6)    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    BAL 6/24: moderate yeast     UCx 6/19: negative FINAL      Left PCN aspiration 6/18: stenotrophomonas maltophilia    Surgical swab (6/30): VRE     Blood cultures: 7/10 NGTD    ENDO:  #Glucose monitoring    -A1c (5/7/23): 5.0    -FSG q6 hours while NPO  #Adrenal Insufficiency 2/2 to sepsis     -completed course of solu-cortef    -Cortisol level - binding globulin 1.1 (low), serum cortisol, 84, free cortisol 76, % free cortisol 91    MSK:  #Sacrococcygeal DTI: wound care following- cleanse with soap and water, apply triad, #gauze, and allevyn BID and PRN for soiling   Venous ulcer right lower leg, R forearm skin tear: Pat dry, apply Xeroform, nonadherent dressing, wrap with Kerlix twice a day, prn for soiling  Burn consulted, recs:  --> sacrum: apply hydrogel and Allevyn pad  --> b/l ischium: Allevyn pad  - offloading/positioning  #Activity    -Advance as tolerated    -PT/rehab once extubated    -Globally deconditioned  #b/l UE, LE mottled skin    +ulnar/DP/PT pulses present on doppler    -Arterial duplex bilateral upper extremities (7/1)  - Right radial artery occlusion     -VA Duplex bilateral lower extremities (7/3) - Right PT vein DVT     LINES/DRAINS:   PIV  Bobby (6/18-7/10)  L eleazar drain (6/30)  R Lerma pouch #1, pelvic drain #2 (6/30)  Left basilic midline  6/21  Right axillary a line (6/30-7/4)  Right Radial [place] (6/22-6/30)  L-Femoral [place] (7/10 - )  L-Femoral [place] (7/10 - )  RIJ TLC (7/10-  )  ETT  OGT    ADVANCED DIRECTIVES:  Full Code  HCP/Emergency Contact- Tracey Adames: [phone]   Palliative following. GOC discussions continue to be held with family.     INDICATION FOR SICU: New onset atrial fibrillation w/ mechanical ventilation    DISPOSITION: SICU   Assessment and Plan:   67M w/ PMH of HTN, grade IV hydronephrosis of L kidney s/p L PCN (placed 5/2023), stutter, hiatal hernia, iron deficiency anemia, H Pylori (untreated), and gastric mass (never biopsied) presents to the ED with at least 4 days of worsening weakness, abdominal distension, and no output from his PCN. Admitted with emphysematous pyelonephritis.     (6/18): s/p LT PCN upsizing to 16Fr and 16Fr RLQ drain placement with IR   (6/22): s/p shock lithotripsy of L kidney abscess cavity with drainage of thick contents, upsize of drain to 26Fr bobby catheter  (6/30): s/p ex-lap and L nephrectomy with abdominal washout    NEURO:  #Pain control    -APAP PRN  #Sedation    -trialing off until following commands   #altered mental status    -CT head 7/10: No acute intracranial pathology    -Elevated Urea     RESP:  #acute respiratory failure secondary to septic shock  -intubated 6/24  Mode: SIMV 450/16/30/8  #Subcutaneous Emphysema   #acute respiratory failure with left lung mucous plugging     -Bronchoscopy 7/10: No mucous plug found    -CT Chest 7/10: Interval increase in size of bilateral pleural effusions with adjacent consolidation both lower lobes.   #Pulmonary nodules / Mediastinal lymphadenopathy    - CT Chest: Right middle and upper lobe solid pulmonary nodules, f/u outpatient pulm   #Activity   - increase as tolerated    CARDS:   #Septic shock     -levo and vaso gtt  #New onset Afib w/ RVR with hypotension -- likely 2/2 sepsis    -HR control with Metoprolol 2.5 q6 IV HOLDING in the setting of HYPOtension    -Digoxin administered, currently HOLDING, last level <0.3    -Cardiology c/s, allow permissive tachycardia, cardizem for rate control  #H/O HTN    -Amlodipine 2.5mg HOLDING   Imaging    - ECHO: (6/19) EF 55-60%, mild AS, mild LAE, mild MR/TR, trivial pericardial effusion    GI/NUTR:  #Diet: NPO on TPN @60cc/hr  + lipids  CT AP PO/IV/rectal contrast 7/10: proximal small bowel leak suspected, increased number and size of bilobar hepatic hypodense suspected hepatic abscesses, including segment IV/III multiseptated suspected abscess measuring 5 cm, previously 2.4 cm on 6/18/2023.    -Octreotide gtt   #bowel movements    - GI consult 6/26- Plan for EGD with EUS + FNA as outpatient, H-pylori treatment after resolution of acute issues   -FOBT positive    #GI PPX  -IV Protonix 40 q12 hours  #Gastric mass vs gastrohepatic lymphadenopathy    -CT A/P: lesser curvature mass , Hepatic hypodensities  #Splenic Hypodensities, infarct vs phlegmon/abscess    -CT A/P:  Wedge-shaped region of hypodensity within the spleen    /RENAL:   #Grade IV L hydronephrosis s/p L PCN (5/2023)   #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    -(6/23) s/p LT PCN upsizing to 26Fr catheter, RLQ 16 Fr drain remains, s/p nephrostolithotomy w/ lithotripsy    -(6/18) s/p LT PCN upsizing to 16Fr and 16Fr RLQ drain placement with IR, flush q8 hr  (6/30): s/p L nephrectomy and abdominal washout     - VENICE drains x2 pelvis and Lerma's pouch    - L nephrectomy site: dark green/murky   #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   #metabolic acidosis    -Nephrology c/s - CVVHD started 07/02,  CVVHD held on 07/13   #urine output in critically ill    -Straight cath every 12 hours  #decreased urine output in the setting of acute renal failure 2/2 septic shock  Labs:               HEME/ONC:   #DVT prophylaxis    -SCDs, heparin subQ   #new onset Afib    -holding FULL AC   #R radial artery occlusion    -RUE duplex 07/01 + for right radial artery occlusion  #RT posterior popliteal vein DVT    -vascular c/s - recommend AC   #H/o Fe Deficiency anemia  #Thrombocytopenia  Heme consult (7/3):     - HIT workup negative  Type and Screen expires: 7/14   Labs:     ID:  WBC- 4.87  --->>,  5.92  --->>,  6.46  --->>  Temp trend- 24hrs T(F): 99.5 (07-14 @ 00:15), Max: 99.9 (07-13 @ 19:09)  Antibiotics-caspofungin IVPB    caspofungin IVPB 50 every 24 hours  cefTRIAXone   IVPB 2000 every 24 hours  levoFLOXacin IVPB 750 every 48 hours  metroNIDAZOLE  IVPB 500 every 8 hours  #Cultures  Surgical swab 06/30: vanc resistent enterococcus faecium     L PCN cyto-pathology  6/25: results suggestive of a necrotic neoplasm    Fungitell 6/26: 47 --> >500 (7/6)    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    BAL 6/24: moderate yeast     UCx 6/19: negative FINAL      Left PCN aspiration 6/18: stenotrophomonas maltophilia    Surgical swab (6/30): VRE     Blood cultures: 7/10 NGTD    ENDO:  #Glucose monitoring    -A1c (5/7/23): 5.0    -FSG q6 hours while NPO  #Adrenal Insufficiency 2/2 to sepsis     -completed course of solu-cortef    -Cortisol level - binding globulin 1.1 (low), serum cortisol, 84, free cortisol 76, % free cortisol 91    MSK:  #Sacrococcygeal DTI: wound care following- cleanse with soap and water, apply triad, #gauze, and allevyn BID and PRN for soiling   Venous ulcer right lower leg, R forearm skin tear: Pat dry, apply Xeroform, nonadherent dressing, wrap with Kerlix twice a day, prn for soiling  Burn consulted, recs:  --> sacrum: apply hydrogel and Allevyn pad  --> b/l ischium: Allevyn pad  - offloading/positioning  #Activity    -Advance as tolerated    -PT/rehab once extubated    -Globally deconditioned  #b/l UE, LE mottled skin    +ulnar/DP/PT pulses present on doppler    -Arterial duplex bilateral upper extremities (7/1)  - Right radial artery occlusion     -VA Duplex bilateral lower extremities (7/3) - Right PT vein DVT     LINES/DRAINS:   PIV  Bobby (6/18-7/10)  L eleazar drain (6/30)  R Lerma pouch #1, pelvic drain #2 (6/30)  Left basilic midline  6/21  Right axillary a line (6/30-7/4)  Right Radial [place] (6/22-6/30)  L-Femoral [place] (7/10 - )  L-Femoral [place] (7/10 - )  RIJ TLC (7/10-  )  ETT  OGT    ADVANCED DIRECTIVES:  Full Code  HCP/Emergency Contact- Traceyro Adames: [phone]   Palliative following. GOC discussions continue to be held with family.     INDICATION FOR SICU: New onset atrial fibrillation w/ mechanical ventilation    DISPOSITION: SICU   Assessment and Plan:   67M w/ PMH of HTN, grade IV hydronephrosis of L kidney s/p L PCN (placed 5/2023), stutter, hiatal hernia, iron deficiency anemia, H Pylori (untreated), and gastric mass (never biopsied) presents to the ED with at least 4 days of worsening weakness, abdominal distension, and no output from his PCN. Admitted with emphysematous pyelonephritis.     (6/18): s/p LT PCN upsizing to 16Fr and 16Fr RLQ drain placement with IR   (6/22): s/p shock lithotripsy of L kidney abscess cavity with drainage of thick contents, upsize of drain to 26Fr bobby catheter  (6/30): s/p ex-lap and L nephrectomy with abdominal washout    NEURO:  #Pain control    -APAP PRN  #Sedation    -trialing off until following commands   #altered mental status    -CT head 7/10: No acute intracranial pathology    -Elevated Urea     RESP:  #acute respiratory failure secondary to septic shock  -intubated 6/24  Mode: SIMV 450/16/30/8  #Subcutaneous Emphysema   #acute respiratory failure with left lung mucous plugging     -Bronchoscopy 7/10: No mucous plug found    -CT Chest 7/10: Interval increase in size of bilateral pleural effusions with adjacent consolidation both lower lobes.   #Pulmonary nodules / Mediastinal lymphadenopathy    - CT Chest: Right middle and upper lobe solid pulmonary nodules, f/u outpatient pulm   #Activity   - increase as tolerated    CARDS:   #Septic shock     -levo and vaso gtt  #New onset Afib w/ RVR with hypotension -- likely 2/2 sepsis    -HR control with Metoprolol 2.5 q6 IV HOLDING in the setting of HYPOtension    -Digoxin administered, currently HOLDING, last level <0.3    -Cardiology c/s, allow permissive tachycardia, cardizem for rate control  #H/O HTN    -Amlodipine 2.5mg HOLDING   Imaging    - ECHO: (6/19) EF 55-60%, mild AS, mild LAE, mild MR/TR, trivial pericardial effusion    GI/NUTR:  #Diet: NPO on TPN @60cc/hr  + lipids  CT AP PO/IV/rectal contrast 7/10: proximal small bowel leak suspected, increased number and size of bilobar hepatic hypodense suspected hepatic abscesses, including segment IV/III multiseptated suspected abscess measuring 5 cm, previously 2.4 cm on 6/18/2023.    -Octreotide gtt   #bowel movements    - GI consult 6/26- Plan for EGD with EUS + FNA as outpatient, H-pylori treatment after resolution of acute issues   -FOBT positive    #GI PPX  -IV Protonix 40 q12 hours  #Gastric mass vs gastrohepatic lymphadenopathy    -CT A/P: lesser curvature mass , Hepatic hypodensities  #Splenic Hypodensities, infarct vs phlegmon/abscess    -CT A/P:  Wedge-shaped region of hypodensity within the spleen    /RENAL:   #Grade IV L hydronephrosis s/p L PCN (5/2023)   #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    -(6/23) s/p LT PCN upsizing to 26Fr catheter, RLQ 16 Fr drain remains, s/p nephrostolithotomy w/ lithotripsy    -(6/18) s/p LT PCN upsizing to 16Fr and 16Fr RLQ drain placement with IR, flush q8 hr  (6/30): s/p L nephrectomy and abdominal washout     - VENICE drains x2 pelvis and Lerma's pouch    - L nephrectomy site: dark green/murky   #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   #metabolic acidosis    -Nephrology c/s - CVVHD started 07/02,  CVVHD held on 07/13   #urine output in critically ill    -Straight cath every 12 hours  #decreased urine output in the setting of acute renal failure 2/2 septic shock    Labs:          BUN/Cr- 46/0.7  -->,  57/0.9  -->          Electrolytes-Na 134 // K 4.5 // Mg 2.0 //  Phos 3.7 (07-14 @ 04:41)    HEME/ONC:   #DVT prophylaxis    -SCDs, heparin subQ   #new onset Afib    -holding FULL AC   #R radial artery occlusion    -RUE duplex 07/01 + for right radial artery occlusion  #RT posterior popliteal vein DVT    -vascular c/s - recommend AC   #H/o Fe Deficiency anemia  #Thrombocytopenia  Heme consult (7/3):     - HIT workup negative  Type and Screen expires: 7/14     Labs: Hb/Hct:  7.9/24.5  -->,  7.5/23.6  -->                      Plts:  36  -->,  43  -->                 PTT/INR:        ID:  WBC- 5.92  --->>,  6.46  --->>,  6.64  --->>  Temp trend- 24hrs T(F): 99.5 (07-14 @ 00:15), Max: 99.9 (07-13 @ 19:09)  Antibiotics-  caspofungin IVPB 50 every 24 hours  cefTRIAXone   IVPB 2000 every 24 hours  levoFLOXacin IVPB 750 every 48 hours  metroNIDAZOLE  IVPB 500 every 8 hours  #Cultures  Surgical swab 06/30: vanc resistent enterococcus faecium     L PCN cyto-pathology  6/25: results suggestive of a necrotic neoplasm    Fungitell 6/26: 47 --> >500 (7/6)    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    BAL 6/24: moderate yeast     UCx 6/19: negative FINAL      Left PCN aspiration 6/18: stenotrophomonas maltophilia    Surgical swab (6/30): VRE     Blood cultures: 7/10 NGTD    ENDO:  #Glucose monitoring    -A1c (5/7/23): 5.0    -FSG q6 hours while NPO  #Adrenal Insufficiency 2/2 to sepsis     -completed course of solu-cortef    -Cortisol level - binding globulin 1.1 (low), serum cortisol, 84, free cortisol 76, % free cortisol 91    MSK:  #Sacrococcygeal DTI: wound care following- cleanse with soap and water, apply triad, #gauze, and allevyn BID and PRN for soiling   Venous ulcer right lower leg, R forearm skin tear: Pat dry, apply Xeroform, nonadherent dressing, wrap with Kerlix twice a day, prn for soiling  Burn consulted, recs:  --> sacrum: apply hydrogel and Allevyn pad  --> b/l ischium: Allevyn pad  - offloading/positioning  #Activity    -Advance as tolerated    -PT/rehab once extubated    -Globally deconditioned  #b/l UE, LE mottled skin    +ulnar/DP/PT pulses present on doppler    -Arterial duplex bilateral upper extremities (7/1)  - Right radial artery occlusion     -VA Duplex bilateral lower extremities (7/3) - Right PT vein DVT     LINES/DRAINS:   PIV  Bobby (6/18-7/10)  L eleazar drain (6/30)  R Lerma pouch #1, pelvic drain #2 (6/30)  Left basilic midline  6/21  Right axillary a line (6/30-7/4)  Right Radial [place] (6/22-6/30)  L-Femoral [place] (7/10 - )  L-Femoral [place] (7/10 - )  RIJ TLC (7/10-  )  ETT  OGT    ADVANCED DIRECTIVES:  Full Code  HCP/Emergency Contact- Tracey Adames: [phone]   Palliative following. GOC discussions continue to be held with family.     INDICATION FOR SICU: New onset atrial fibrillation w/ mechanical ventilation    DISPOSITION: SICU   Assessment and Plan:   67M w/ PMH of HTN, grade IV hydronephrosis of L kidney s/p L PCN (placed 5/2023), stutter, hiatal hernia, iron deficiency anemia, H Pylori (untreated), and gastric mass (never biopsied) presents to the ED with at least 4 days of worsening weakness, abdominal distension, and no output from his PCN. Admitted with emphysematous pyelonephritis.     (6/18): s/p LT PCN upsizing to 16Fr and 16Fr RLQ drain placement with IR   (6/22): s/p shock lithotripsy of L kidney abscess cavity with drainage of thick contents, upsize of drain to 26Fr bobby catheter  (6/30): s/p ex-lap and L nephrectomy with abdominal washout    NEURO:  #Pain control    -APAP PRN  #Sedation    -trialing off until following commands   #altered mental status    -CT head 7/10: No acute intracranial pathology    -currently not following commands but able to open eyes to voice and withdraw from pain  RESP:  #acute respiratory failure secondary to septic shock  -intubated 6/24  Mode: SIMV 450/16/30/8  LL @ 23 cm  #Subcutaneous Emphysema   #acute respiratory failure with left lung mucous plugging     -Bronchoscopy 7/10: No mucous plug found    -CT Chest 7/10: Interval increase in size of bilateral pleural effusions with adjacent consolidation both lower lobes.   #Pulmonary nodules / Mediastinal lymphadenopathy    - CT Chest: Right middle and upper lobe solid pulmonary nodules, f/u outpatient pulm   #Activity   - increase as tolerated    CARDS:   #Septic shock     -levo and vaso gtt  #New onset Afib w/ RVR with hypotension -- likely 2/2 sepsis    -HR control with Metoprolol 2.5 q6 IV HOLDING in the setting of HYPOtension    -Digoxin administered, currently HOLDING, last level <0.3    -Cardiology c/s, allow permissive tachycardia, cardizem for rate control  #H/O HTN    -Amlodipine 2.5mg HOLDING   Imaging    - ECHO: (6/19) EF 55-60%, mild AS, mild LAE, mild MR/TR, trivial pericardial effusion    GI/NUTR:  #Diet: NPO on TPN @60cc/hr  + lipids  CT AP PO/IV/rectal contrast 7/10: proximal small bowel leak suspected, increased number and size of bilobar hepatic hypodense suspected hepatic abscesses, including segment IV/III multiseptated suspected abscess measuring 5 cm, previously 2.4 cm on 6/18/2023.    -Octreotide gtt @ 50  #bowel movements    - GI consult 6/26- Plan for EGD with EUS + FNA as outpatient, H-pylori treatment after resolution of acute issues   -FOBT positive    #GI PPX  -IV Protonix 40 q12 hours  #Gastric mass vs gastrohepatic lymphadenopathy    -CT A/P: lesser curvature mass , Hepatic hypodensities  #Splenic Hypodensities, infarct vs phlegmon/abscess    -CT A/P:  Wedge-shaped region of hypodensity within the spleen    /RENAL:   #Grade IV L hydronephrosis s/p L PCN (5/2023)   #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    -(6/23) s/p LT PCN upsizing to 26Fr catheter, RLQ 16 Fr drain remains, s/p nephrostolithotomy w/ lithotripsy    -(6/18) s/p LT PCN upsizing to 16Fr and 16Fr RLQ drain placement with IR, flush q8 hr  (6/30): s/p L nephrectomy and abdominal washout     - VENICE drains x2 pelvis and Lerma's pouch    - L nephrectomy site: dark green/murky   #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   #metabolic acidosis    -Nephrology c/s - CVVHD started 07/02,  CVVHD held on 07/13   #urine output in critically ill    -Straight cath every 12 hours  #decreased urine output in the setting of acute renal failure 2/2 septic shock    Labs:          BUN/Cr- 46/0.7  -->,  57/0.9  -->          Electrolytes-Na 134 // K 4.5 // Mg 2.0 //  Phos 3.7 (07-14 @ 04:41)    HEME/ONC:   #DVT prophylaxis    -SCDs, heparin subQ   #new onset Afib    -holding FULL AC   #R radial artery occlusion    -RUE duplex 07/01 + for right radial artery occlusion  #RT posterior popliteal vein DVT    -vascular c/s - recommend AC   #H/o Fe Deficiency anemia  #Thrombocytopenia  Heme consult (7/3):     - HIT workup negative    Labs: Hb/Hct:  7.9/24.5  -->,  7.5/23.6  -->                      Plts:  36  -->,  43  -->        Type and Screen expires: 7/14             ID:  WBC- 5.92  --->>,  6.46  --->>,  6.64  --->>  Temp trend- 24hrs T(F): 99.5 (07-14 @ 06:30), Max: 99.9 (07-13 @ 19:09)  Antibiotics:  caspofungin IVPB 50 every 24 hours  cefTRIAXone   IVPB 2000 every 24 hours  levoFLOXacin IVPB 750 every 48 hours  metroNIDAZOLE  IVPB 500 every 8 hours    #Cultures  Surgical swab 06/30: vanc resistent enterococcus faecium     L PCN cyto-pathology  6/25: results suggestive of a necrotic neoplasm    Fungitell 6/26: 47 --> >500 (7/6)    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    BAL 6/24: moderate yeast     UCx 6/19: negative FINAL      Left PCN aspiration 6/18: stenotrophomonas maltophilia    Surgical swab (6/30): VRE     Blood cultures: 7/10 NGTD    ENDO:  #Glucose monitoring    -A1c (5/7/23): 5.0    -FSG q6 hours while NPO  #Adrenal Insufficiency 2/2 to sepsis     -completed course of solu-cortef    -Cortisol level - binding globulin 1.1 (low), serum cortisol, 84, free cortisol 76, % free cortisol 91    MSK:  #Sacrococcygeal DTI: wound care following- cleanse with soap and water, apply triad, #gauze, and allevyn BID and PRN for soiling   Venous ulcer right lower leg, R forearm skin tear: Pat dry, apply Xeroform, nonadherent dressing, wrap with Kerlix twice a day, prn for soiling  Burn consulted, recs:  --> sacrum: apply hydrogel and Allevyn pad  --> b/l ischium: Allevyn pad  - offloading/positioning  #Activity    -Advance as tolerated    -PT/rehab once extubated    -Globally deconditioned  #b/l UE, LE mottled skin    +ulnar/DP/PT pulses present on doppler    -Arterial duplex bilateral upper extremities (7/1)  - Right radial artery occlusion     -VA Duplex bilateral lower extremities (7/3) - Right PT vein DVT     LINES/DRAINS:   PIV  Bobby (6/18-7/10)  L eleazar drain (6/30)  R Lerma pouch #1, pelvic drain #2 (6/30)  Left basilic midline  6/21  Right axillary a line (6/30-7/4)  Right Radial Kimmell (6/22-6/30)  L-Femoral Joleen (7/10 - )  L-Femoral Uldall (7/10 - )  RIJ TLC (7/10-  )  ETT  OGT    ADVANCED DIRECTIVES:  Full Code  HCP/Emergency Contact- Tracey Adames: [phone]   Palliative following. GOC discussions continue to be held with family.     INDICATION FOR SICU: New onset atrial fibrillation w/ mechanical ventilation    DISPOSITION: SICU

## 2023-07-14 NOTE — PROGRESS NOTE ADULT - ASSESSMENT
67M w/ PMH of HTN, grade IV hydronephrosis of L kidney s/p L PCN (placed 5/2023), stutter, hiatal hernia, iron deficiency anemia, H Pylori (untreated), and gastric mass (never biopsied) presents to the ED with at least 4 days of worsening weakness, abdominal distension, and no output from his PCN  . Found to have septic shock, MSOF, lactic acidosis from left emphysematous hydronephrosis, pyelonephritis and possible rupture with pneumoperitoneum along with possible hepatic abscesses, splenic infarct and abdominal and mediastinal lymphadenopathies.  had nephrectomy and abdominal washout on 6/30    Oliguric in spite of bumex, rising dig level, high lactate, sp CVVHD   ROJAS likely ATN iso septic shock  CVVHD on hold. MAy need to resume iN AM   on pressors   platelets noted follow trend /  negative  HIT / hem on case   ID notes appreciated and meds dosing to GFR < 30  will follow  prognosis guarded

## 2023-07-14 NOTE — PROGRESS NOTE ADULT - ASSESSMENT
68M w/ PMHx of retroperitoneal and intraabdominal abscess s/p IR placement of drain and LEFT PCN 6/18, s/p percutaneous drainage of LEFT renal abscess with left 26fr flank catheter in place. s/p Left radical nephrectomy, Ex-Lap (6/30) with increased pressor requirements and symptoms of ischemia in distal extremities. Now with likely small bowel perforation.     PLAN:   - continue to monitor drain output and quality  - Abx per ID  - wean pressors as tolerated  - continue octreotide gtt 68M w/ PMHx of retroperitoneal and intraabdominal abscess s/p IR placement of drain and LEFT PCN 6/18, s/p percutaneous drainage of LEFT renal abscess with left 26fr flank catheter in place. s/p Left radical nephrectomy, Ex-Lap (6/30) with increased pressor requirements and symptoms of ischemia in distal extremities. Now with likely small bowel perforation.     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

## 2023-07-14 NOTE — PROGRESS NOTE ADULT - ASSESSMENT
PLAN:  cont TPN  lipids ? every other day depending on pleatelets and other factors  AA, K, phos, and volume in TPN adjusted daily depending on renal function and RRT  follow CRP and prealbumin together for trend - that is what is clinically applicable - to repeat both together on 7/17 please  check Zn level  (could f/u 25oh vit D but can't treat easily until enteral route usable)

## 2023-07-14 NOTE — PROGRESS NOTE ADULT - SUBJECTIVE AND OBJECTIVE BOX
CHRISTINE VILLAVICENCIO  [id number]  67y Male    Indication for ICU admission: mechanical ventilator management in the setting of septic shock    Admit Date:06-18-23  ICU Date: 6/18/23  OR Date: 6/18 (IR), 6/22, 6/30     24HRS EVENT:  7/13  NIGHT  -PM rounds: off CVVH, +subcutaneous emphysema, levo 0.09, vaso 0.03, octreotide gtt @50, VENICE drain #2 and #3: dark green/murky   -BMP WNL  -Begin ID recs, narrow jan to ceftriaxone and flagyl (okay for IV), continue levo and dapto    7/13   DAY  -attempt to regulate sleep-wake cycle--> zyprex qhs  -CVVH to net -100/hr ---> held for today, f/u BMP, reassess in AM   octreotide @ 50    [] A ten-point review of systems was otherwise negative except as noted above.  [x  ] Due to altered mental status/intubation, subjective information was not attained from the patient. History was obtained, to the extent possible, from review of the chart and collateral sources of information.      =========================================================================================================================================   CHRISTINE VILLAVICENCIO  [id number]  67y Male    Indication for ICU admission: mechanical ventilator management in the setting of septic shock    Admit Date:06-18-23  ICU Date: 6/18/23  OR Date: 6/18 (IR), 6/22, 6/30     24HRS EVENT:  7/13  NIGHT  -PM rounds: off CVVH, +subcutaneous emphysema, levo 0.09, vaso 0.03, octreotide gtt @50, VENICE drain #2 and #3: dark green/murky   -BMP WNL  -Begin ID recs, narrow jan to ceftriaxone and flagyl (okay for IV), continue levo and dapto    7/13   DAY  -attempt to regulate sleep-wake cycle--> zyprex qhs  -CVVH to net -100/hr ---> held for today, f/u BMP, reassess in AM   octreotide @ 50    [] A ten-point review of systems was otherwise negative except as noted above.  [x  ] Due to altered mental status/intubation, subjective information was not attained from the patient. History was obtained, to the extent possible, from review of the chart and collateral sources of information.      =========================================================================================================================================  Daily     Daily     Diet, NPO (07-05-23 @ 10:33)      CURRENT MEDS:  Neurologic Medications  OLANZapine Injectable 2.5 milliGRAM(s) IntraMuscular at bedtime    Respiratory Medications    Cardiovascular Medications  norepinephrine Infusion 0.01 MICROgram(s)/kG/Min IV Continuous <Continuous>    Gastrointestinal Medications  pantoprazole  Injectable 40 milliGRAM(s) IV Push every 12 hours  Parenteral Nutrition - Adult 1 Each TPN Continuous <Continuous>  sodium chloride 0.9% lock flush 10 milliLiter(s) IV Push every 1 hour PRN Pre/post blood products, medications, blood draw, and to maintain line patency  sodium chloride 0.9% lock flush 10 milliLiter(s) IV Push every 1 hour PRN Pre/post blood products, medications, blood draw, and to maintain line patency    Genitourinary Medications    Hematologic/Oncologic Medications  heparin   Injectable 5000 Unit(s) SubCutaneous every 8 hours    Antimicrobial/Immunologic Medications  caspofungin IVPB      caspofungin IVPB 50 milliGRAM(s) IV Intermittent every 24 hours  cefTRIAXone   IVPB      cefTRIAXone   IVPB 2000 milliGRAM(s) IV Intermittent every 24 hours  levoFLOXacin IVPB 750 milliGRAM(s) IV Intermittent every 48 hours  metroNIDAZOLE  IVPB      metroNIDAZOLE  IVPB 500 milliGRAM(s) IV Intermittent every 8 hours    Endocrine/Metabolic Medications  octreotide  Infusion 50 MICROgram(s)/Hr IV Continuous <Continuous>  vasopressin Infusion 0.03 Unit(s)/Min IV Continuous <Continuous>    Topical/Other Medications  bacitracin   Ointment 1 Application(s) Topical two times a day  chlorhexidine 0.12% Liquid 15 milliLiter(s) Oral Mucosa every 12 hours  chlorhexidine 2% Cloths 1 Application(s) Topical <User Schedule>      ICU Vital Signs Last 24 Hrs  T(C): 37.5 (14 Jul 2023 06:30), Max: 37.7 (13 Jul 2023 19:09)  T(F): 99.5 (14 Jul 2023 06:30), Max: 99.9 (13 Jul 2023 19:09)  HR: 84 (14 Jul 2023 06:30) (72 - 96)  BP: --  BP(mean): --  ABP: 117/49 (14 Jul 2023 06:30) (102/49 - 136/54)  ABP(mean): 65 (14 Jul 2023 06:30) (62 - 83)  RR: 25 (14 Jul 2023 06:30) (20 - 38)  SpO2: 99% (14 Jul 2023 06:30) (98% - 100%)    O2 Parameters below as of 13 Jul 2023 16:00  Patient On (Oxygen Delivery Method): ventilator    O2 Concentration (%): 30          Mode: SIMV (Synchronized Intermittent Mandatory Ventilation)  RR (machine): 25  TV (machine): 450  FiO2: 30  PEEP: 8  ITime: 1  MAP: 8  PIP: 13    ABG - ( 13 Jul 2023 04:05 )  pH, Arterial: 7.36  pH, Blood: x     /  pCO2: 36    /  pO2: 123   / HCO3: 20    / Base Excess: -4.6  /  SaO2: 99.7                I&O's Summary    13 Jul 2023 07:01  -  14 Jul 2023 07:00  --------------------------------------------------------  IN: 2764.2 mL / OUT: 1366 mL / NET: 1398.2 mL      I&O's Detail    13 Jul 2023 07:01  -  14 Jul 2023 07:00  --------------------------------------------------------  IN:    IV PiggyBack: 498 mL    IV PiggyBack: 250 mL    IV PiggyBack: 50 mL    IV PiggyBack: 50 mL    Norepinephrine: 49.7 mL    Norepinephrine: 57.5 mL    Octreotide: 15 mL    Octreotide: 10 mL    Octreotide: 10 mL    Octreotide: 190 mL    TPN (Total Parenteral Nutrition): 1476 mL    Vasopressin: 108 mL  Total IN: 2764.2 mL    OUT:    Drain (mL): 25 mL    Drain (mL): 20 mL    Drain (mL): 155 mL    Drain (mL): 10 mL    Fat Emulsion (Fish Oil &amp; Plant Based) 20% Infusion: 0 mL    Intermittent Catheterization - Urethral (mL): 30 mL    Nasogastric/Oral tube (mL): 0 mL    Oral Fluid: 0 mL    Other (mL): 1126 mL    Voided (mL): 0 mL  Total OUT: 1366 mL    Total NET: 1398.2 mL          PHYSICAL EXAM:    PHYSICAL EXAM:  General/Neuro  GCS:  8T   = E 3 / V 1T  / M 4  Pupils: PERRLA bilaterally  Lungs:  CTAB. Intubated on SIMV 450/16/30/8 lip line @ 23  Cardiovascular : S1, S2.  Afib, with rate control ,Bilateral peripheral edema  GI: Abdomen soft, Non-tender, Non-distended.: surgical incision with some ischemic tissue around incision   OG tube in place, VENICE#1 with serosanguinous fluid, VENICE#2 and #3 with brown fluid  Extremities: Extremities warm and dry. Right and left index finger tips and left big toe with purple discoloration.   Derm: Poor skin integrity. Multiple sacral decubitus ulcers. Fingers mottled.   :  scrotal edema.     LABS:  CAPILLARY BLOOD GLUCOSE      POCT Blood Glucose.: 128 mg/dL (13 Jul 2023 23:33)  POCT Blood Glucose.: 154 mg/dL (13 Jul 2023 17:29)                          7.5    6.64  )-----------( 43       ( 14 Jul 2023 04:41 )             23.6       07-14    134<L>  |  102  |  57<H>  ----------------------------<  120<H>  4.5   |  20  |  0.9    Ca    7.5<L>      14 Jul 2023 04:41  Phos  3.7     07-14  Mg     2.0     07-14    TPro  3.8<L>  /  Alb  1.9<L>  /  TBili  0.7  /  DBili  0.4<H>  /  AST  15  /  ALT  9   /  AlkPhos  95  07-14            Urinalysis Basic - ( 14 Jul 2023 04:41 )    Color: x / Appearance: x / SG: x / pH: x  Gluc: 120 mg/dL / Ketone: x  / Bili: x / Urobili: x   Blood: x / Protein: x / Nitrite: x   Leuk Esterase: x / RBC: x / WBC x   Sq Epi: x / Non Sq Epi: x / Bacteria: x

## 2023-07-14 NOTE — PROGRESS NOTE ADULT - SUBJECTIVE AND OBJECTIVE BOX
UROLOGY PROGRESS NOTE  Pt is a 67y/o M a/w retroperitoneal and intraabdominal abscess s/p IR placement of drain and LEFT PCN 6/18, s/p percutaneous drainage of LEFT renal abscess POD # 19 with left 26fr flank catheter in place, s/p Left radical nephrectomy, Ex-Lap POD # 13. Seen and examined at bedside with Dr. Perdomo. Patient remains intubated w/ ETT on pressors.     MEDICATIONS  (STANDING):  bacitracin   Ointment 1 Application(s) Topical two times a day  caspofungin IVPB      caspofungin IVPB 50 milliGRAM(s) IV Intermittent every 24 hours  cefTRIAXone   IVPB 2000 milliGRAM(s) IV Intermittent every 24 hours  cefTRIAXone   IVPB      chlorhexidine 0.12% Liquid 15 milliLiter(s) Oral Mucosa every 12 hours  chlorhexidine 2% Cloths 1 Application(s) Topical <User Schedule>  heparin   Injectable 5000 Unit(s) SubCutaneous every 8 hours  levoFLOXacin IVPB 750 milliGRAM(s) IV Intermittent every 48 hours  metroNIDAZOLE  IVPB      metroNIDAZOLE  IVPB 500 milliGRAM(s) IV Intermittent every 8 hours  norepinephrine Infusion 0.01 MICROgram(s)/kG/Min (0.72 mL/Hr) IV Continuous <Continuous>  octreotide  Infusion 50 MICROgram(s)/Hr (10 mL/Hr) IV Continuous <Continuous>  OLANZapine Injectable 2.5 milliGRAM(s) IntraMuscular at bedtime  pantoprazole  Injectable 40 milliGRAM(s) IV Push every 12 hours  Parenteral Nutrition - Adult 1 Each (58 mL/Hr) TPN Continuous <Continuous>  Parenteral Nutrition - Adult 1 Each (60 mL/Hr) TPN Continuous <Continuous>  vasopressin Infusion 0.03 Unit(s)/Min (4.5 mL/Hr) IV Continuous <Continuous>    MEDICATIONS  (PRN):  sodium chloride 0.9% lock flush 10 milliLiter(s) IV Push every 1 hour PRN Pre/post blood products, medications, blood draw, and to maintain line patency  sodium chloride 0.9% lock flush 10 milliLiter(s) IV Push every 1 hour PRN Pre/post blood products, medications, blood draw, and to maintain line patency      REVIEW OF SYSTEMS   [X] Due to altered mental status/intubation, subjective information were not able to be obtained from patient. History was obtained, to the extent possible, from review of the chart and collateral sources of information.    Vital Signs Last 24 Hrs  T(C): 37.5 (14 Jul 2023 12:00), Max: 37.7 (13 Jul 2023 19:09)  T(F): 99.5 (14 Jul 2023 12:00), Max: 99.9 (13 Jul 2023 19:09)  HR: 87 (14 Jul 2023 16:55) (78 - 96)  RR: 25 (14 Jul 2023 15:00) (21 - 33)  SpO2: 99% (14 Jul 2023 16:55) (97% - 100%)    Parameters below as of 14 Jul 2023 12:00  Patient On (Oxygen Delivery Method): ventilator      PHYSICAL EXAM:  GEN: intubated/sedated  ABDO:  incision with necrotic edges, #1 Lerma pouch eleazar draining serosanguinous fluid, #2 Pelvic pouch eleazar drain draining murky brown drainage. +Right abd pouch with murky brown drainage  BACK:  #3 Left abd eleazar drain draining murky brown drainage   : +Penoscrotal edema  MSK: +Right hand first digit with ischemic changes & Left first digit with ischemic changes.  Left great toe with ischemic changes    I&O's Summary    13 Jul 2023 07:01  -  14 Jul 2023 07:00  --------------------------------------------------------  IN: 2764.2 mL / OUT: 1366 mL / NET: 1398.2 mL    14 Jul 2023 07:01  -  14 Jul 2023 17:48  --------------------------------------------------------  IN: 517 mL / OUT: 175 mL / NET: 342 mL      LABS:                        7.5    6.64  )-----------( 43       ( 14 Jul 2023 04:41 )             23.6     07-14    134<L>  |  102  |  57<H>  ----------------------------<  120<H>  4.5   |  20  |  0.9    Ca    7.5<L>      14 Jul 2023 04:41  Phos  3.7     07-14  Mg     2.0     07-14    TPro  3.8<L>  /  Alb  1.9<L>  /  TBili  0.7  /  DBili  0.4<H>  /  AST  15  /  ALT  9   /  AlkPhos  95  07-14    PT/INR - ( 14 Jul 2023 09:31 )   PT: 19.10 sec;   INR: 1.65 ratio      PTT - ( 14 Jul 2023 09:31 )  PTT:37.6 sec

## 2023-07-14 NOTE — PROGRESS NOTE ADULT - SUBJECTIVE AND OBJECTIVE BOX
NUTRITION SUPPORT TEAM  -  PROGRESS NOTE     Interval Events:        VITALS:  T(F): 99.5 (07-14 @ 06:30), Max: 99.5 (07-14 @ 00:15)  HR: 93 (07-14 @ 09:45) (78 - 94)  BP: --  RR: 25 (07-14 @ 09:45) (21 - 33)  SpO2: 98% (07-14 @ 09:45) (98% - 100%)    HEIGHT/WEIGHT/BMI:   Height (cm): 180 (06-30), 180.3 (05-11)  Weight (kg): 76.3 (06-30), 66.2 (05-10)  BMI (kg/m2): 23.5 (06-30), 20.4 (05-11)    I/Os:     07-13-23 @ 07:01  -  07-14-23 @ 07:00  --------------------------------------------------------  IN:    IV PiggyBack: 498 mL    IV PiggyBack: 250 mL    IV PiggyBack: 50 mL    IV PiggyBack: 50 mL    Norepinephrine: 49.7 mL    Norepinephrine: 57.5 mL    Octreotide: 15 mL    Octreotide: 10 mL    Octreotide: 10 mL    Octreotide: 190 mL    TPN (Total Parenteral Nutrition): 1476 mL    Vasopressin: 108 mL  Total IN: 2764.2 mL    OUT:    Drain (mL): 25 mL    Drain (mL): 20 mL    Drain (mL): 155 mL    Drain (mL): 10 mL    Fat Emulsion (Fish Oil &amp; Plant Based) 20% Infusion: 0 mL    Intermittent Catheterization - Urethral (mL): 30 mL    Nasogastric/Oral tube (mL): 0 mL    Oral Fluid: 0 mL    Other (mL): 1126 mL    Voided (mL): 0 mL  Total OUT: 1366 mL    Total NET: 1398.2 mL          PHYSICAL EXAM:   GENERAL: NAD, well-groomed, well-developed  HEENT: Moist mucous membranes, Good dentition, No lesions  ABDOMEN: Soft, Nontender, Nondistended  EXTREMITIES:  No clubbing, cyanosis, or edema  SKIN: warm and well perfused; No obvious rashes or lesions  IV ACCESS:   ENTERAL ACCESS:     STANDING MEDICATIONS:   bacitracin   Ointment 1 Application(s) Topical two times a day  caspofungin IVPB      caspofungin IVPB 50 milliGRAM(s) IV Intermittent every 24 hours  cefTRIAXone   IVPB 2000 milliGRAM(s) IV Intermittent every 24 hours  cefTRIAXone   IVPB      chlorhexidine 0.12% Liquid 15 milliLiter(s) Oral Mucosa every 12 hours  chlorhexidine 2% Cloths 1 Application(s) Topical <User Schedule>  heparin   Injectable 5000 Unit(s) SubCutaneous every 8 hours  levoFLOXacin IVPB 750 milliGRAM(s) IV Intermittent every 48 hours  metroNIDAZOLE  IVPB 500 milliGRAM(s) IV Intermittent every 8 hours  metroNIDAZOLE  IVPB      norepinephrine Infusion 0.01 MICROgram(s)/kG/Min IV Continuous <Continuous>  octreotide  Infusion 50 MICROgram(s)/Hr IV Continuous <Continuous>  OLANZapine Injectable 2.5 milliGRAM(s) IntraMuscular at bedtime  pantoprazole  Injectable 40 milliGRAM(s) IV Push every 12 hours  Parenteral Nutrition - Adult 1 Each TPN Continuous <Continuous>  vasopressin Infusion 0.03 Unit(s)/Min IV Continuous <Continuous>      LABS:                         7.5    6.64  )-----------( 43       ( 14 Jul 2023 04:41 )             23.6     134<L>  |  102  |  57<H>  ----------------------------<  120<H>          (07-14-23 @ 04:41)  4.5   |  20  |  0.9    Ca    7.5<L>          (07-14-23 @ 04:41)  Phos  3.7         (07-14-23 @ 04:41)  Mg     2.0         (07-14-23 @ 04:41)    TPro  3.8<L>  /  Alb  1.9<L>  /  TBili  0.7  /  DBili  0.4<H>  /  AST  15  /  ALT  9   /  AlkPhos  95       07-14-23 @ 04:41      Triglycerides, Serum: 88 mg/dL (07-02 @ 21:01)    Prealbumin, Serum: 3 mg/dL (07-09-23 @ 04:22)    Vitamin D, 25-Hydroxy: 26 ng/mL (05-08 @ 07:58)    Folate: 5.7 ng/mL (05-07 @ 09:19)    Vitamin B12, Serum: 766 pg/mL (05-07 @ 09:19)    Zinc Level, Plasma:   CRP:   C-Reactive Protein, Serum: 106.7 mg/L (07.14.23 @ 04:41)     C-Reactive Protein, Serum: 245.1 mg/L (07.10.23 @ 01:30)   Blood Glucose (Past 24 hours):  128 mg/dL (07-13 @ 23:33)  154 mg/dL (07-13 @ 17:29)    DIET:   Diet, NPO (07-05-23 @ 10:33) [Active]     NUTRITION SUPPORT TEAM  -  PROGRESS NOTE     Interval Events:  d/w SICU attending and team and with bedside RN  pt remains vented, sedated  ~ 20 ml urine on straight cath  CVC c/d/i  abd min distended, LUQ wound with black eschar noted  R first finger and L gr toe necrotic  CVVH remains on hold  thrombocytopenia fairly stable  CRP elevated but actually lower than previous sample    VITALS:  T(F): 99.5 (07-14 @ 06:30), Max: 99.5 (07-14 @ 00:15)  HR: 93 (07-14 @ 09:45) (78 - 94)  BP: --  RR: 25 (07-14 @ 09:45) (21 - 33)  SpO2: 98% (07-14 @ 09:45) (98% - 100%)    HEIGHT/WEIGHT/BMI:   Height (cm): 180 (06-30), 180.3 (05-11)  Weight (kg): 76.3 (06-30), 66.2 (05-10)  BMI (kg/m2): 23.5 (06-30), 20.4 (05-11)    I/Os:     07-13-23 @ 07:01  -  07-14-23 @ 07:00  --------------------------------------------------------  IN:    IV PiggyBack: 498 mL    IV PiggyBack: 250 mL    IV PiggyBack: 50 mL    IV PiggyBack: 50 mL    Norepinephrine: 49.7 mL    Norepinephrine: 57.5 mL    Octreotide: 15 mL    Octreotide: 10 mL    Octreotide: 10 mL    Octreotide: 190 mL    TPN (Total Parenteral Nutrition): 1476 mL    Vasopressin: 108 mL  Total IN: 2764.2 mL    OUT:    Drain (mL): 25 mL    Drain (mL): 20 mL    Drain (mL): 155 mL    Drain (mL): 10 mL    Fat Emulsion (Fish Oil &amp; Plant Based) 20% Infusion: 0 mL    Intermittent Catheterization - Urethral (mL): 30 mL    Nasogastric/Oral tube (mL): 0 mL    Oral Fluid: 0 mL    Other (mL): 1126 mL    Voided (mL): 0 mL  Total OUT: 1366 mL    Total NET: 1398.2 mL    STANDING MEDICATIONS:   bacitracin   Ointment 1 Application(s) Topical two times a day  caspofungin IVPB      caspofungin IVPB 50 milliGRAM(s) IV Intermittent every 24 hours  cefTRIAXone   IVPB 2000 milliGRAM(s) IV Intermittent every 24 hours  cefTRIAXone   IVPB      chlorhexidine 0.12% Liquid 15 milliLiter(s) Oral Mucosa every 12 hours  chlorhexidine 2% Cloths 1 Application(s) Topical <User Schedule>  heparin   Injectable 5000 Unit(s) SubCutaneous every 8 hours  levoFLOXacin IVPB 750 milliGRAM(s) IV Intermittent every 48 hours  metroNIDAZOLE  IVPB 500 milliGRAM(s) IV Intermittent every 8 hours  metroNIDAZOLE  IVPB      norepinephrine Infusion 0.01 MICROgram(s)/kG/Min IV Continuous <Continuous>  octreotide  Infusion 50 MICROgram(s)/Hr IV Continuous <Continuous>  OLANZapine Injectable 2.5 milliGRAM(s) IntraMuscular at bedtime  pantoprazole  Injectable 40 milliGRAM(s) IV Push every 12 hours  Parenteral Nutrition - Adult 1 Each TPN Continuous <Continuous>  vasopressin Infusion 0.03 Unit(s)/Min IV Continuous <Continuous>      LABS:                         7.5    6.64  )-----------( 43       ( 14 Jul 2023 04:41 )             23.6     134<L>  |  102  |  57<H>  ----------------------------<  120<H>          (07-14-23 @ 04:41)  4.5   |  20  |  0.9    Ca    7.5<L>          (07-14-23 @ 04:41)  Phos  3.7         (07-14-23 @ 04:41)  Mg     2.0         (07-14-23 @ 04:41)    TPro  3.8<L>  /  Alb  1.9<L>  /  TBili  0.7  /  DBili  0.4<H>  /  AST  15  /  ALT  9   /  AlkPhos  95       07-14-23 @ 04:41      Triglycerides, Serum: 88 mg/dL (07-02 @ 21:01)    Prealbumin, Serum: 3 mg/dL (07-09-23 @ 04:22)    Vitamin D, 25-Hydroxy: 26 ng/mL (05-08 @ 07:58)    Folate: 5.7 ng/mL (05-07 @ 09:19)    Vitamin B12, Serum: 766 pg/mL (05-07 @ 09:19)    Zinc Level, Plasma:   CRP:   C-Reactive Protein, Serum: 106.7 mg/L (07.14.23 @ 04:41)     C-Reactive Protein, Serum: 245.1 mg/L (07.10.23 @ 01:30)   Blood Glucose (Past 24 hours):  128 mg/dL (07-13 @ 23:33)  154 mg/dL (07-13 @ 17:29)    DIET:   Diet, NPO (07-05-23 @ 10:33) [Active]

## 2023-07-15 NOTE — CONSULT NOTE ADULT - ASSESSMENT
Patient is a 68y old  Male who presents with a chief complaint of pneumoperitoneum, emphysematous pyelonephritis (15 Jul 2023 11:26)    multiple wounds with black eschar  - recommend local wound care with santyl, abd/allovyn, tape  - no surgical intervention at this time, pt with overall poor prognosis with goal of hospice per primary team

## 2023-07-15 NOTE — PROGRESS NOTE ADULT - SUBJECTIVE AND OBJECTIVE BOX
CHRISTINE VILLAVICENCIO  [id number]  67y Male    Indication for ICU admission: mechanical ventilator management in the setting of septic shock    Admit Date: 6/18/23  ICU Date: 6/18/23  OR Date: 6/18 (IR), 6/22, 6/30     24HRS EVENT:  7/14  NIGHT  - PM rounds: levo 0.1, vaso 0.03, drain #2 and #3: dark green/murky, ostomy bag: feculent    DAY  - remains with altered MS, intermittently opening eyes to voice vs stimulation  - PSV 5/5 30% (~8AM to 5)  - f/u azab re: octreotide plan--> 50 subq q 6   - straight cath 6AM 15cc  - burn re-c/s for eschar? @incision => will see 7/15  - Thyroid panel/8AM cortisol for tomorrow labs  - Bcx prelim negative  - INR 1.65  - ABG 7.37/36/101/21, lactate 2.7   - levo 0.09-->0.1   - 50cc end of shift straight cath    [] A ten-point review of systems was otherwise negative except as noted above.  [x] Due to altered mental status/intubation, subjective information was not attained from the patient. History was obtained, to the extent possible, from review of the chart and collateral sources of information.       CHRISTINE VILLAVICENCIO  [id number]  67y Male    Indication for ICU admission: mechanical ventilator management in the setting of septic shock    Admit Date: 6/18/23  ICU Date: 6/18/23  OR Date: 6/18 (IR), 6/22, 6/30     24HRS EVENT:  7/14  NIGHT  - PM rounds: levo 0.1, vaso 0.03, drain #2 and #3: dark green/murky, ostomy bag: feculent    DAY  - remains with altered MS, intermittently opening eyes to voice vs stimulation  - PSV 5/5 30% (~8AM to 5)  - f/u azab re: octreotide plan--> 50 subq q 6   - straight cath 6AM 15cc  - burn re-c/s for eschar? @incision => will see 7/15  - Thyroid panel/8AM cortisol for tomorrow labs  - Bcx prelim negative  - INR 1.65  - ABG 7.37/36/101/21, lactate 2.7   - levo 0.09-->0.1   - 50cc end of shift straight cath    [] A ten-point review of systems was otherwise negative except as noted above.  [x] Due to altered mental status/intubation, subjective information was not attained from the patient. History was obtained, to the extent possible, from review of the chart and collateral sources of information.    Daily     Daily     Diet, NPO (07-05-23 @ 10:33)      CURRENT MEDS:  Neurologic Medications  HYDROmorphone  Injectable 0.5 milliGRAM(s) IV Push every 4 hours PRN Severe Pain (7 - 10)  OLANZapine Injectable 2.5 milliGRAM(s) IntraMuscular at bedtime    Respiratory Medications    Cardiovascular Medications  norepinephrine Infusion 0.01 MICROgram(s)/kG/Min IV Continuous <Continuous>    Gastrointestinal Medications  lipid, fat emulsion (Fish Oil and Plant Based) 20% Infusion 0.656 Gm/kG/Day IV Continuous <Continuous>  pantoprazole  Injectable 40 milliGRAM(s) IV Push every 12 hours  Parenteral Nutrition - Adult 1 Each TPN Continuous <Continuous>  Parenteral Nutrition - Adult 1 Each TPN Continuous <Continuous>  phytonadione  IVPB 10 milliGRAM(s) IV Intermittent once  sodium chloride 0.9% lock flush 10 milliLiter(s) IV Push every 1 hour PRN Pre/post blood products, medications, blood draw, and to maintain line patency  sodium chloride 0.9% lock flush 10 milliLiter(s) IV Push every 1 hour PRN Pre/post blood products, medications, blood draw, and to maintain line patency    Genitourinary Medications    Hematologic/Oncologic Medications  heparin   Injectable 5000 Unit(s) SubCutaneous every 8 hours    Antimicrobial/Immunologic Medications  caspofungin IVPB      caspofungin IVPB 50 milliGRAM(s) IV Intermittent every 24 hours  cefTRIAXone   IVPB 2000 milliGRAM(s) IV Intermittent every 24 hours  cefTRIAXone   IVPB      DAPTOmycin IVPB 450 milliGRAM(s) IV Intermittent every 48 hours  levoFLOXacin IVPB 750 milliGRAM(s) IV Intermittent every 48 hours  metroNIDAZOLE  IVPB      metroNIDAZOLE  IVPB 500 milliGRAM(s) IV Intermittent every 8 hours    Endocrine/Metabolic Medications  octreotide  Injectable 50 MICROGram(s) SubCutaneous every 6 hours  vasopressin Infusion 0.03 Unit(s)/Min IV Continuous <Continuous>    Topical/Other Medications  bacitracin   Ointment 1 Application(s) Topical two times a day  chlorhexidine 0.12% Liquid 15 milliLiter(s) Oral Mucosa every 12 hours  chlorhexidine 2% Cloths 1 Application(s) Topical <User Schedule>  CRRT Treatment    <Continuous>  PureFlow Dialysate RFP-400 (K 2 / Ca 3) 5000 milliLiter(s) CRRT <Continuous>      ICU Vital Signs Last 24 Hrs  T(C): 36.3 (15 Jul 2023 08:00), Max: 37.6 (14 Jul 2023 16:00)  T(F): 97.3 (15 Jul 2023 08:00), Max: 99.7 (14 Jul 2023 16:00)  HR: 82 (15 Jul 2023 09:30) (80 - 93)  BP: --  BP(mean): --  ABP: 136/61 (15 Jul 2023 08:00) (110/45 - 147/60)  ABP(mean): 85 (15 Jul 2023 08:00) (60 - 88)  RR: 24 (15 Jul 2023 08:00) (19 - 30)  SpO2: 100% (15 Jul 2023 09:30) (97% - 100%)    O2 Parameters below as of 15 Jul 2023 08:00  Patient On (Oxygen Delivery Method): ventilator            Mode: CPAP with PS  FiO2: 30  PEEP: 5  PS: 10  MAP: 7  PIP: 16    ABG - ( 15 Jul 2023 02:55 )  pH, Arterial: 7.32  pH, Blood: x     /  pCO2: 38    /  pO2: 110   / HCO3: 20    / Base Excess: -6.0  /  SaO2: 98.2                I&O's Summary    14 Jul 2023 07:01  -  15 Jul 2023 07:00  --------------------------------------------------------  IN: 2247.5 mL / OUT: 490 mL / NET: 1757.5 mL    15 Jul 2023 07:01  -  15 Jul 2023 10:25  --------------------------------------------------------  IN: 284.6 mL / OUT: 159 mL / NET: 125.6 mL      I&O's Detail    14 Jul 2023 07:01  -  15 Jul 2023 07:00  --------------------------------------------------------  IN:    IV PiggyBack: 150 mL    IV PiggyBack: 300 mL    Norepinephrine: 158 mL    Octreotide: 120 mL    TPN (Total Parenteral Nutrition): 1416 mL    Vasopressin: 103.5 mL  Total IN: 2247.5 mL    OUT:    Drain (mL): 125 mL    Drain (mL): 20 mL    Drain (mL): 65 mL    Drain (mL): 230 mL    Intermittent Catheterization - Urethral (mL): 50 mL  Total OUT: 490 mL    Total NET: 1757.5 mL      15 Jul 2023 07:01  -  15 Jul 2023 10:25  --------------------------------------------------------  IN:    Norepinephrine: 34.6 mL    TPN (Total Parenteral Nutrition): 232 mL    Vasopressin: 18 mL  Total IN: 284.6 mL    OUT:    Other (mL): 129 mL    Voided (mL): 30 mL  Total OUT: 159 mL    Total NET: 125.6 mL          PHYSICAL EXAM:    General/Neuro  PHYSICAL EXAM:  General/Neuro  GCS:  8T   = E 3 / V 1T  / M 4  Pupils: PERRLA bilaterally  Lungs:  CTAB. Intubated on SIMV 450/16/30/8 lip line @ 23  Cardiovascular : S1, S2.  Afib, with rate control ,Bilateral peripheral edema  GI: Abdomen soft, Non-tender, Non-distended.: surgical incision with some ischemic tissue around incision   OG tube in place, VENICE#1 (Lerma pouch) with serosanguinous fluid, VENICE#2 and #3 with brown fluid  Extremities: Extremities warm and dry. Right and left index finger tips and left big toe with purple discoloration.   Derm: Poor skin integrity. Multiple sacral decubitus ulcers. Fingers mottled.   :  scrotal edema.     CXR: Reviewed    LABS:  CAPILLARY BLOOD GLUCOSE      POCT Blood Glucose.: 115 mg/dL (14 Jul 2023 17:49)                          7.3    8.68  )-----------( 47       ( 15 Jul 2023 04:25 )             23.2       07-15    133<L>  |  101  |  87<HH>  ----------------------------<  131<H>  5.0   |  19  |  1.2    Ca    7.4<L>      15 Jul 2023 04:25  Phos  4.9     07-15  Mg     2.2     07-15    TPro  3.8<L>  /  Alb  1.9<L>  /  TBili  0.7  /  DBili  0.4<H>  /  AST  15  /  ALT  9   /  AlkPhos  95  07-14      PT/INR - ( 15 Jul 2023 04:25 )   PT: 21.90 sec;   INR: 1.88 ratio         PTT - ( 15 Jul 2023 04:25 )  PTT:37.3 sec      Urinalysis Basic - ( 15 Jul 2023 04:25 )

## 2023-07-15 NOTE — PROGRESS NOTE ADULT - ASSESSMENT
67M w/ PMH of HTN, grade IV hydronephrosis of L kidney s/p L PCN (placed 5/2023), stutter, hiatal hernia, iron deficiency anemia, H Pylori (untreated), and gastric mass (never biopsied) presents to the ED with at least 4 days of worsening weakness, abdominal distension, and no output from his PCN  . Found to have septic shock, MSOF, lactic acidosis from left emphysematous hydronephrosis, pyelonephritis and possible rupture with pneumoperitoneum along with possible hepatic abscesses, splenic infarct and abdominal and mediastinal lymphadenopathies.  had nephrectomy and abdominal washout on 6/30  Oliguric in spite of bumex, rising dig level, high lactate, sp CVVHD   ROJAS likely ATN iso septic shock  CVVHD will resume / 50 cc negative / h   anuric   on pressors   ph noted / check daily   ID notes appreciated and meds dosing patient ion cvvhd   will follow  prognosis guarded

## 2023-07-15 NOTE — PROGRESS NOTE ADULT - SUBJECTIVE AND OBJECTIVE BOX
seen and examined  24 h events noted   on pressors         PAST HISTORY  --------------------------------------------------------------------------------  No significant changes to PMH, PSH, FHx, SHx, unless otherwise noted    ALLERGIES & MEDICATIONS  --------------------------------------------------------------------------------  Allergies    No Known Allergies    Intolerances      Standing Inpatient Medications  bacitracin   Ointment 1 Application(s) Topical two times a day  caspofungin IVPB      caspofungin IVPB 50 milliGRAM(s) IV Intermittent every 24 hours  cefTRIAXone   IVPB 2000 milliGRAM(s) IV Intermittent every 24 hours  cefTRIAXone   IVPB      chlorhexidine 0.12% Liquid 15 milliLiter(s) Oral Mucosa every 12 hours  chlorhexidine 2% Cloths 1 Application(s) Topical <User Schedule>  DAPTOmycin IVPB 450 milliGRAM(s) IV Intermittent every 48 hours  heparin   Injectable 5000 Unit(s) SubCutaneous every 8 hours  levoFLOXacin IVPB 750 milliGRAM(s) IV Intermittent every 48 hours  metroNIDAZOLE  IVPB 500 milliGRAM(s) IV Intermittent every 8 hours  metroNIDAZOLE  IVPB      norepinephrine Infusion 0.01 MICROgram(s)/kG/Min IV Continuous <Continuous>  octreotide  Injectable 50 MICROGram(s) SubCutaneous every 6 hours  OLANZapine Injectable 2.5 milliGRAM(s) IntraMuscular at bedtime  pantoprazole  Injectable 40 milliGRAM(s) IV Push every 12 hours  Parenteral Nutrition - Adult 1 Each TPN Continuous <Continuous>  vasopressin Infusion 0.03 Unit(s)/Min IV Continuous <Continuous>    PRN Inpatient Medications  sodium chloride 0.9% lock flush 10 milliLiter(s) IV Push every 1 hour PRN  sodium chloride 0.9% lock flush 10 milliLiter(s) IV Push every 1 hour PRN        VITALS/PHYSICAL EXAM  --------------------------------------------------------------------------------  T(C): 37.1 (07-15-23 @ 06:00), Max: 37.6 (07-14-23 @ 16:00)  HR: 83 (07-15-23 @ 06:00) (80 - 93)  BP: --  RR: 21 (07-15-23 @ 06:00) (19 - 30)  SpO2: 98% (07-15-23 @ 06:00) (97% - 100%)  Wt(kg): --        07-13-23 @ 07:01  -  07-14-23 @ 07:00  --------------------------------------------------------  IN: 2764.2 mL / OUT: 1366 mL / NET: 1398.2 mL    07-14-23 @ 07:01  -  07-15-23 @ 06:47  --------------------------------------------------------  IN: 2247.5 mL / OUT: 490 mL / NET: 1757.5 mL      Physical Exam:  	Gen: intubated.ventilated   	Abd: +distended  	LE: edema  	Vascular access: jeff     LABS/STUDIES  --------------------------------------------------------------------------------              7.3    8.68  >-----------<  47       [07-15-23 @ 04:25]              23.2     133  |  101  |  87  ----------------------------<  131      [07-15-23 @ 04:25]  5.0   |  19  |  1.2        Ca     7.4     [07-15-23 @ 04:25]      Mg     2.2     [07-15-23 @ 04:25]      Phos  4.9     [07-15-23 @ 04:25]    TPro  3.8  /  Alb  1.9  /  TBili  0.7  /  DBili  0.4  /  AST  15  /  ALT  9   /  AlkPhos  95  [07-14-23 @ 04:41]    PT/INR: PT 21.90, INR 1.88       [07-15-23 @ 04:25]  PTT: 37.3       [07-15-23 @ 04:25]    Creatinine Trend:  SCr 1.2 [07-15 @ 04:25]  SCr 1.0 [07-14 @ 17:56]  SCr 0.9 [07-14 @ 04:41]  SCr 0.7 [07-13 @ 20:20]  SCr 0.6 [07-13 @ 04:20]    Urinalysis - [07-15-23 @ 04:25]      Color  / Appearance  / SG  / pH       Gluc 131 / Ketone   / Bili  / Urobili        Blood  / Protein  / Leuk Est  / Nitrite       RBC  / WBC  / Hyaline  / Gran  / Sq Epi  / Non Sq Epi  / Bacteria       Iron 12, TIBC 128, %sat 9      [05-07-23 @ 09:19]  Ferritin 526      [05-07-23 @ 09:19]  Vitamin D (25OH) 26      [05-08-23 @ 07:58]  TSH 0.45      [06-19-23 @ 14:14]  Lipid: chol 58, TG 88, HDL 22, LDL --      [07-02-23 @ 21:01]

## 2023-07-15 NOTE — PROGRESS NOTE ADULT - SUBJECTIVE AND OBJECTIVE BOX
UROLOGY: Pt is a 67y/o M a/w retroperitoneal and intraabdominal abscess s/p IR placement of drain and LEFT PCN 6/18, s/p percutaneous drainage of LEFT renal abscess POD # 20 with left 26fr flank catheter in place, s/p Left radical nephrectomy, Ex-Lap POD # 14. Patient remains intubated w/ ETT on pressors.     REVIEW OF SYSTEMS   [ x ] Due to altered mental status/intubation, subjective information were not able to be obtained from patient. History was obtained, to the extent possible, from review of the chart and collateral sources of information.    Vital Signs Last 24 Hrs  T(C): 36.3 (15 Jul 2023 08:00), Max: 37.6 (14 Jul 2023 16:00)  T(F): 97.3 (15 Jul 2023 08:00), Max: 99.7 (14 Jul 2023 16:00)  HR: 82 (15 Jul 2023 09:30) (80 - 93)  RR: 24 (15 Jul 2023 08:00) (19 - 30)  SpO2: 100% (15 Jul 2023 09:30) (97% - 100%)    PHYSICAL EXAM:  GEN: intubated/sedated  ABDO:  incision with necrotic edges, #1 Lerma pouch eleazar draining serosanguinous fluid, #2 Pelvic pouch eleazar drain draining murky brown drainage. +Right abd pouch with murky brown drainage  BACK:  #3 Left abd eleazar drain draining murky brown drainage   : +Penoscrotal edema  MSK: +Right hand first digit with ischemic changes & Left first digit with ischemic changes.  Left great toe with ischemic changes    LABS:             7.3    8.68  )-----------( 47       ( 15 Jul 2023 04:25 )             23.2     133<L>  |  101  |  87<HH>  ----------------------------<  131<H>  5.0   |  19  |  1.2

## 2023-07-15 NOTE — PROGRESS NOTE ADULT - NS ATTEND AMEND GEN_ALL_CORE FT
I personally saw and examined the patient, reviewed the chart and available data. I discussed the situation with the patient Patient's care team and urology PA. I also reviewed and/or amended the note as necessary.      patient seen on the day in question. Note not reviewed and cosigned until now due to scheduling issues

## 2023-07-15 NOTE — PROGRESS NOTE ADULT - ASSESSMENT
Assessment and Plan:   67M w/ PMH of HTN, grade IV hydronephrosis of L kidney s/p L PCN (placed 5/2023), stutter, hiatal hernia, iron deficiency anemia, H Pylori (untreated), and gastric mass (never biopsied) presents to the ED with at least 4 days of worsening weakness, abdominal distension, and no output from his PCN. Admitted with emphysematous pyelonephritis.     (6/18): s/p LT PCN upsizing to 16Fr and 16Fr RLQ drain placement with IR   (6/22): s/p shock lithotripsy of L kidney abscess cavity with drainage of thick contents, upsize of drain to 26Fr bobby catheter  (6/30): s/p ex-lap and L nephrectomy with abdominal washout    NEURO:  #Pain control  - APAP PRN  #Sedation  - trialing off drips until following commands  - zyprexa at bedtime  #altered mental status  - CT head 7/10: No acute intracranial pathology  - currently not following commands but able to open eyes to voice and withdraw from pain    RESP:  #acute respiratory failure secondary to septic shock  - intubated 6/24  - Mode: SIMV 450/16/30/8  - Tolerating intermittent PS  - LL @ 23 cm  #subcutaneous emphysema   #acute respiratory failure with L lung mucus plugging   - Bronchoscopy 7/10: No mucous plug found  - CT Chest 7/10: Interval increase in size of bilateral pleural effusions with adjacent consolidation both lower lobes.   #Pulmonary nodules / Mediastinal lymphadenopathy  - CT Chest: Right middle and upper lobe solid pulmonary nodules, f/u outpatient pulm   #Activity  - increase as tolerated    CARDS:   #Septic shock  - levo and vaso gtt; wean as tolerated  #New onset Afib w/ RVR with hypotension -- likely 2/2 sepsis  - HR control with Metoprolol 2.5 q6 IV HOLDING in the setting of HYPOtension  - Digoxin administered, currently HOLDING, last level <0.3  - Cardiology c/s, allow permissive tachycardia, cardizem for rate control  #H/O HTN  - Amlodipine 2.5mg HOLDING  #Imaging  - ECHO: (6/19) EF 55-60%, mild AS, mild LAE, mild MR/TR, trivial pericardial effusion    GI/NUTR:  #proximal small bowel leak  - CT AP PO/IV/rectal contrast 7/10: proximal small bowel leak suspected  - diet: NPO on TPN @60cc/hr  + lipids  - Octreotide 50 mcg subq Q6  #bowel movements  - GI consult 6/26- Plan for EGD with EUS + FNA as outpatient, H-pylori treatment after resolution of acute issues   #GI PPX  - IV Protonix 40 q12 hours  #Gastric mass vs gastrohepatic lymphadenopathy  - CT A/P: lesser curvature mass, Hepatic hypodensities  #Splenic Hypodensities, infarct vs phlegmon/abscess  - CT A/P: Wedge-shaped region of hypodensity within the spleen    /RENAL:   #Grade IV L hydronephrosis s/p L PCN (5/2023)   #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  - (6/23) s/p LT PCN upsizing to 26Fr catheter, RLQ 16 Fr drain remains, s/p nephrostolithotomy w/ lithotripsy  - (6/18) s/p LT PCN upsizing to 16Fr and 16Fr RLQ drain placement with IR, flush q8 hr  - (6/30): s/p L nephrectomy and abdominal washout   - VENICE drains x3 pelvis, Lerma's pouch, L nephrectomy site; L VENICE and R VENICE (pelvis) with dark green/murky/feculent OP  #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   #metabolic acidosis  - Nephrology c/s - CVVHD started 07/02,  CVVHD held on 07/13   #urine output in critically ill  - Straight cath every 12 hours  #decreased urine output in the setting of acute renal failure 2/2 septic shock    HEME/ONC:   #DVT prophylaxis  - SCDs, heparin subQ   #new onset Afib  - holding FULL AC   #R radial artery occlusion  - RUE duplex 07/01 + for right radial artery occlusion  #RT posterior popliteal vein DVT  - vascular c/s - recommend AC   #H/o Fe Deficiency anemia  #Thrombocytopenia  - Heme consult (7/3): HIT workup negative    Labs: Hb/Hct:  7.9/24.5  -->,  7.5/23.6  -->                      Plts:  36  -->,  43  -->                 PTT/INR:  37.6/1.65  --->        Type and Screen expires: 7/14             ID:  multiorgan system failure secondary to emphysematous pyelonephritis s/p left nephrectomy  Antibiotics     - Daptomycin (7/14 -    - Ceftriaxone (7/13 -     - Metronidazole ( 7/13 -     - Levofloxacin (6/25 -   WBC- 5.92  --->>,  6.46  --->>,  6.64  --->>  Temp trend- 24hrs T(F): 99.5 (07-14 @ 06:30), Max: 99.9 (07-13 @ 19:09)    #Cultures  Surgical swab 06/30: vancomycin resistant enterococcus faecium     L PCN cyto-pathology  6/25: results suggestive of a necrotic neoplasm    Fungitell 6/26: 47 --> >500 (7/6)    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    BAL 6/24: moderate yeast     UCx 6/19: negative FINAL      Left PCN aspiration 6/18: stenotrophomonas maltophilia    Surgical swab (6/30): VRE     Blood cultures: 7/10 NGTD    ENDO:  #Glucose monitoring  - A1c (5/7/23): 5.0  - FSG q6 hours while NPO  #Adrenal Insufficiency 2/2 to sepsis   - completed course of solu-cortef  - Cortisol level - binding globulin 1.1 (low), serum cortisol, 84, free cortisol 76, % free cortisol 91  - f/u cortisol 7/15 8AM  #assess thyroid function in setting of catabolic state   - f/u thyroid panel    MSK:  #thoracic spine DTI  - wound care recs: cleanse with soap and water, triad and allevyn BID prn for soiling  #L heel DTI  - wound care recs: pat dry, apply Cavilon, leave open to air twice a day, offload heels, offloading boots  #scrotum and R groin wound: pat dry, triad BID, PRN for soiling  #Sacrococcygeal DTI: wound care following- cleanse with soap and water, apply triad, gauze, and allevyn BID and PRN for soiling  - burn reconsulted   #Venous ulcer right lower leg, R forearm skin tear: Pat dry, apply Xeroform, nonadherent dressing, wrap with Kerlix twice a day, prn for soiling  #decubitus ulcer b/l ischium  #poor wound healing of abdominal incision  - wound care recs: Vashe wound cleanser, Xeroform and abdominal pad BID, PRN for soiling  - burn consulted  #Activity  - Advance as tolerated  - PT/rehab once extubated  - Globally deconditioned  #b/l UE, LE mottled skin  - +ulnar/DP/PT pulses present on doppler  - Arterial duplex bilateral upper extremities (7/1)  - Right radial artery occlusion   - VA Duplex bilateral lower extremities (7/3) - Right PT vein DVT     LINES/DRAINS:   PIV  Bobby (6/18-7/10)  L eleazar drain (6/30)  R Lerma pouch #1, pelvic drain #2 (6/30)  Left basilic midline  6/21  Right axillary a line (6/30-7/4)  Right Radial Globe (6/22-6/30)  L-Femoral Globe (7/10 - )  L-Femoral Uldall (7/10 - )  RIJ TLC (7/10-  )  ETT  OGT    ADVANCED DIRECTIVES:  Full Code  HCP/Emergency Contact- Tracey Adames: [phone]   GOC discussions continue to be held with family.     INDICATION FOR SICU: New onset atrial fibrillation with mechanical ventilation    DISPOSITION: SICU Assessment and Plan:   67M w/ PMH of HTN, grade IV hydronephrosis of L kidney s/p L PCN (placed 5/2023), stutter, hiatal hernia, iron deficiency anemia, H Pylori (untreated), and gastric mass (never biopsied) presents to the ED with at least 4 days of worsening weakness, abdominal distension, and no output from his PCN. Admitted with emphysematous pyelonephritis.     (6/18): s/p LT PCN upsizing to 16Fr and 16Fr RLQ drain placement with IR   (6/22): s/p shock lithotripsy of L kidney abscess cavity with drainage of thick contents, upsize of drain to 26Fr bobby catheter  (6/30): s/p ex-lap and L nephrectomy with abdominal washout    NEURO:  #Pain control  - APAP PRN  #Sedation  - trialing off drips until following commands  - zyprexa at bedtime  #altered mental status  - CT head 7/10: No acute intracranial pathology  - currently not following commands but able to open eyes to voice and withdraw from pain    RESP:  #acute respiratory failure secondary to septic shock  - intubated 6/24  - Mode: SIMV 450/16/30/8  - Tolerating intermittent PS during AM  - LL @ 23 cm  #subcutaneous emphysema   #acute respiratory failure with L lung mucus plugging   - Bronchoscopy 7/10: No mucous plug found  - CT Chest 7/10: Interval increase in size of bilateral pleural effusions with adjacent consolidation both lower lobes.   #Pulmonary nodules / Mediastinal lymphadenopathy  - CT Chest: Right middle and upper lobe solid pulmonary nodules, f/u outpatient pulm   #Activity  - increase as tolerated    CARDS:   #Septic shock  - levo and vaso gtt; wean as tolerated  #New onset Afib w/ RVR with hypotension -- likely 2/2 sepsis  - HR control with Metoprolol 2.5 q6 IV HOLDING in the setting of HYPOtension  - Digoxin administered, currently HOLDING, last level <0.3  - Cardiology c/s, allow permissive tachycardia, cardizem for rate control  #H/O HTN  - Amlodipine 2.5mg HOLDING  #Imaging  - ECHO: (6/19) EF 55-60%, mild AS, mild LAE, mild MR/TR, trivial pericardial effusion    GI/NUTR:  #proximal small bowel leak  - CT AP PO/IV/rectal contrast 7/10: proximal small bowel leak suspected  - diet: NPO on TPN @60cc/hr  + lipids  - Octreotide 50 mcg subq Q6  #bowel movements  - GI consult 6/26- Plan for EGD with EUS + FNA as outpatient, H-pylori treatment after resolution of acute issues   #GI PPX  - IV Protonix 40 q12 hours  #Gastric mass vs gastrohepatic lymphadenopathy  - CT A/P: lesser curvature mass, Hepatic hypodensities  #Splenic Hypodensities, infarct vs phlegmon/abscess  - CT A/P: Wedge-shaped region of hypodensity within the spleen    /RENAL:   #Grade IV L hydronephrosis s/p L PCN (5/2023)   #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  - (6/23) s/p LT PCN upsizing to 26Fr catheter, RLQ 16 Fr drain remains, s/p nephrostolithotomy w/ lithotripsy  - (6/18) s/p LT PCN upsizing to 16Fr and 16Fr RLQ drain placement with IR, flush q8 hr  - (6/30): s/p L nephrectomy and abdominal washout   - VENICE drains x3 pelvis, Lerma's pouch, L nephrectomy site; L VENICE and R VENICE (pelvis) with dark green/murky/feculent OP  #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   #metabolic acidosis  - Nephrology c/s - CVVHD started 07/02,  CVVHD held on 07/13   #urine output in critically ill  - Straight cath every 12 hours  #decreased urine output in the setting of acute renal failure 2/2 septic shock    HEME/ONC:   #DVT prophylaxis  - SCDs, heparin subQ   #new onset Afib  - holding FULL AC   #R radial artery occlusion  - RUE duplex 07/01 + for right radial artery occlusion  #RT posterior popliteal vein DVT  - vascular c/s - recommend AC   #H/o Fe Deficiency anemia  #Thrombocytopenia  - Heme consult (7/3): HIT workup negative    Labs: Hb/Hct:  7.9/24.5  -->,  7.5/23.6  -->                      Plts:  36  -->,  43  -->                 PTT/INR:  37.6/1.65  --->        Type and Screen expires: 7/14             ID:  multiorgan system failure secondary to emphysematous pyelonephritis s/p left nephrectomy  Antibiotics     - Daptomycin (7/14 -    - Ceftriaxone (7/13 -     - Metronidazole ( 7/13 -     - Levofloxacin (6/25 -   WBC- 5.92  --->>,  6.46  --->>,  6.64  --->>  Temp trend- 24hrs T(F): 99.5 (07-14 @ 06:30), Max: 99.9 (07-13 @ 19:09)    #Cultures  Surgical swab 06/30: vancomycin resistant enterococcus faecium     L PCN cyto-pathology  6/25: results suggestive of a necrotic neoplasm    Fungitell 6/26: 47 --> >500 (7/6)    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    BAL 6/24: moderate yeast     UCx 6/19: negative FINAL      Left PCN aspiration 6/18: stenotrophomonas maltophilia    Surgical swab (6/30): VRE     Blood cultures: 7/10 NGTD    ENDO:  #Glucose monitoring  - A1c (5/7/23): 5.0  - FSG q6 hours while NPO  #Adrenal Insufficiency 2/2 to sepsis   - completed course of solu-cortef  - Cortisol level - binding globulin 1.1 (low), serum cortisol, 84, free cortisol 76, % free cortisol 91  - f/u cortisol 7/15 8AM  #assess thyroid function in setting of catabolic state   - f/u thyroid panel    MSK:  #thoracic spine DTI  - wound care recs: cleanse with soap and water, triad and allevyn BID prn for soiling  #L heel DTI  - wound care recs: pat dry, apply Cavilon, leave open to air twice a day, offload heels, offloading boots  #scrotum and R groin wound: pat dry, triad BID, PRN for soiling  #Sacrococcygeal DTI: wound care following- cleanse with soap and water, apply triad, gauze, and allevyn BID and PRN for soiling  - burn reconsulted   #Venous ulcer right lower leg, R forearm skin tear: Pat dry, apply Xeroform, nonadherent dressing, wrap with Kerlix twice a day, prn for soiling  #decubitus ulcer b/l ischium  #poor wound healing of abdominal incision  - wound care recs: Vashe wound cleanser, Xeroform and abdominal pad BID, PRN for soiling  - pending burn recommendations  #Activity  - Advance as tolerated  - PT/rehab once extubated  - Globally deconditioned  #b/l UE, LE mottled skin  - +ulnar/DP/PT pulses present on doppler  - Arterial duplex bilateral upper extremities (7/1)  - Right radial artery occlusion   - VA Duplex bilateral lower extremities (7/3) - Right PT vein DVT     LINES/DRAINS:   PIV  Bobby (6/18-7/10)  L eleazar drain (6/30)  R Lerma pouch #1, pelvic drain #2 (6/30)  Left basilic midline  6/21  Right axillary a line (6/30-7/4)  Right Radial Ethel (6/22-6/30)  L-Femoral Ethel (7/10 - )  L-Femoral Uldall (7/10 - )  RIJ TLC (7/10-  )  ETT  OGT    ADVANCED DIRECTIVES:  Full Code  HCP/Emergency Contact- Tracey Adames: [phone]   C discussions continue to be held with family.     INDICATION FOR SICU: New onset atrial fibrillation with mechanical ventilation    DISPOSITION: SICU

## 2023-07-15 NOTE — CONSULT NOTE ADULT - SUBJECTIVE AND OBJECTIVE BOX
Patient is a 68y old  Male who presents with a chief complaint of pneumoperitoneum, emphysematous pyelonephritis (15 Jul 2023 11:26)      HPI:  67M w/ PMH of HTN, grade IV hydronephrosis of L kidney s/p L PCN (placed 5/2023), stutter, hiatal hernia, iron deficiency anemia, H Pylori (untreated), and gastric mass (never biopsied) presents to the ED with at least 4 days of worsening weakness, abdominal distension, and no output from his PCN. Pt and daughters bedside are only able to give limited information, but for the past few days, he has noticed no output from his PCN as well as increasing abdominal distension, pain, and anorexia. He reports that he has not felt normal for the past 10 days. His PCN used to put out serosanguinous fluid, but the output changed to feculent/brown liquid over the past day. In the ED, he was noted to be in new onset afib w/ RVR to 150+ as well as experiencing some difficulty breathing. He was started on BiPAP. Stat labs were notable for WBC 47, Cr 2.9, and lactate 6. CT A/P w/ IV contrast showed free intraperitoneal fluid and air from an unknown source as well as fluid and air in the L kidney.  (18 Jun 2023 18:34)    Pt seen today with attending. We were called for evaluation of abdominal wound, sacral wound, scapula wounds      Allergies  No Known Allergies  Intolerances    PHYSICAL EXAM    ICU Vital Signs Last 24 Hrs  T(C): 36.3 (15 Jul 2023 08:00), Max: 37.5 (14 Jul 2023 20:00)  T(F): 97.3 (15 Jul 2023 08:00), Max: 99.5 (14 Jul 2023 20:00)  HR: 69 (15 Jul 2023 14:00) (69 - 94)  ABP: 112/53 (15 Jul 2023 14:00) (75/35 - 167/81)  ABP(mean): 72 (15 Jul 2023 14:00) (45 - 111)  RR: 14 (15 Jul 2023 14:00) (10 - 27)  SpO2: 97% (15 Jul 2023 14:00) (97% - 100%)    O2 Parameters below as of 15 Jul 2023 08:00  Patient On (Oxygen Delivery Method): ventilator    PHYSICAL EXAM:  GENERAL: NA  HEAD:  Atraumatic, Normocephalic  EYES: EOMI, PERRLA, conjunctiva and sclera clear  CHEST/LUNG:  intubated  ABDOMEN: Soft, Nontender, Nondistended horizontal incision to left side of abdomen. dry with black eschar to inferior skin edge and staples in place  SKIN: multiple wounds to scapula, left buttocks, lumbar spine/sacrum with black eschar noted

## 2023-07-15 NOTE — PROGRESS NOTE ADULT - ASSESSMENT
69y/o M a/w retroperitoneal and intraabdominal abscess s/p IR placement of drain and LEFT PCN 6/18, s/p percutaneous drainage of LEFT renal abscess POD # 20 with left 26fr flank catheter in place, s/p Left radical nephrectomy, Ex-Lap POD # 14.     - Continue care as per SICU  - Will d/w attending .  69y/o M a/w retroperitoneal and intraabdominal abscess s/p IR placement of drain and LEFT PCN 6/18, s/p percutaneous drainage of LEFT renal abscess POD # 20 with left 26fr flank catheter in place, s/p Left radical nephrectomy, Ex-Lap POD # 14.     - Continue care as per SICU  - Will d/w attending .     Addendum:  Patient seen with and examined by Dr. Perdomo. This note to be reviewed and amended where necessary by Dr. Perdomo  tomorrow.   69y/o M a/w retroperitoneal and intraabdominal abscess s/p IR placement of drain and LEFT PCN 6/18, s/p percutaneous drainage of LEFT renal abscess POD # 20 with left 26fr flank catheter in place, s/p Left radical nephrectomy, Ex-Lap POD # 14.       Limited urine output undergoing CIC twice per day    - Continue care as per SICU      Addendum:  Patient seen with and examined by Dr. Perdomo. This note to be reviewed and amended where necessary by Dr. Perdomo  tomorrow.

## 2023-07-16 NOTE — PROGRESS NOTE ADULT - ASSESSMENT
67M w/ PMH of HTN, grade IV hydronephrosis of L kidney s/p L PCN (placed 5/2023), stutter, hiatal hernia, iron deficiency anemia, H Pylori (untreated), and gastric mass (never biopsied) presents to the ED with at least 4 days of worsening weakness, abdominal distension, and no output from his PCN  . Found to have septic shock, MSOF, lactic acidosis from left emphysematous hydronephrosis, pyelonephritis and possible rupture with pneumoperitoneum along with possible hepatic abscesses, splenic infarct and abdominal and mediastinal lymphadenopathies.  had nephrectomy and abdominal washout on 6/30  Oliguric in spite of bumex, rising dig level, high lactate, started CVVHD     ROJAS likely ATN iso septic shock  CVVHD will resume / 50 cc negative /   can add heparin if lcottign is an issue  anuric   on pressors   ph noted / check daily   ID notes appreciated and meds dosing patient ion cvvhd   will follow  prognosis guarded

## 2023-07-16 NOTE — PROGRESS NOTE ADULT - ASSESSMENT
Assessment and Plan:   67M w/ PMH of HTN, grade IV hydronephrosis of L kidney s/p L PCN (placed 5/2023), stutter, hiatal hernia, iron deficiency anemia, H Pylori (untreated), and gastric mass (never biopsied) presents to the ED with at least 4 days of worsening weakness, abdominal distension, and no output from his PCN. Admitted with emphysematous pyelonephritis.     (6/18): s/p LT PCN upsizing to 16Fr and 16Fr RLQ drain placement with IR   (6/22): s/p shock lithotripsy of L kidney abscess cavity with drainage of thick contents, upsize of drain to 26Fr bobby catheter  (6/30): s/p ex-lap and L nephrectomy with abdominal washout    NEURO:  #Pain control  - APAP PRN  - Dilaudid PRN  #Sedation  - off all sedation  - zyprexa at bedtime   #altered mental status- resolving  - CT head 7/10: No acute intracranial pathology  - currently not following commands but able to open eyes to voice and withdraw from pain    RESP:  #acute respiratory failure secondary to septic shock  - intubated 6/24  - Mode: SIMV 450/16/40/8  - Tolerating intermittent PS  - LL @ 23 cm  -AM ABG:   #subcutaneous emphysema   #acute respiratory failure with L lung mucus plugging   - Bronchoscopy 7/10: No mucous plug found  - CT Chest 7/10: Interval increase in size of bilateral pleural effusions with adjacent consolidation both lower lobes.   #Pulmonary nodules / Mediastinal lymphadenopathy  - CT Chest: Right middle and upper lobe solid pulmonary nodules, f/u outpatient pulm   #Activity  - increase as tolerated    CARDS:   #Septic shock  - levo and vaso gtt; wean as tolerated  #New onset Afib w/ RVR with hypotension -- likely 2/2 sepsis  - HR control with Metoprolol 2.5 q6 IV HOLDING in the setting of HYPOtension  - Digoxin administered, currently HOLDING, last level <0.3  - Cardiology c/s, allow permissive tachycardia, cardizem for rate control  #H/O HTN  - Amlodipine 2.5mg HOLDING  #Imaging  - ECHO: (6/19) EF 55-60%, mild AS, mild LAE, mild MR/TR, trivial pericardial effusion    GI/NUTR:  #proximal small bowel leak  - CT AP PO/IV/rectal contrast 7/10: proximal small bowel leak suspected  - diet: NPO on TPN @60cc/hr  + lipids  - Octreotide 50 mcg subq Q6  #bowel movements  - GI consult 6/26- Plan for EGD with EUS + FNA as outpatient, H-pylori treatment after resolution of acute issues   #GI PPX  - IV Protonix 40 q12 hours  #Gastric mass vs gastrohepatic lymphadenopathy  - CT A/P: lesser curvature mass, Hepatic hypodensities  #Splenic Hypodensities, infarct vs phlegmon/abscess  - CT A/P: Wedge-shaped region of hypodensity within the spleen    /RENAL:   #Grade IV L hydronephrosis s/p L PCN (5/2023)   #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  - (6/23) s/p LT PCN upsizing to 26Fr catheter, RLQ 16 Fr drain remains, s/p nephrostolithotomy w/ lithotripsy  - (6/18) s/p LT PCN upsizing to 16Fr and 16Fr RLQ drain placement with IR, flush q8 hr  - (6/30): s/p L nephrectomy and abdominal washout   - VENICE drains x3 pelvis, Lerma's pouch, L nephrectomy site; L VENICE and R VENICE (pelvis) with dark green/murky/feculent OP  #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   #metabolic acidosis  - Nephrology c/s - CVVHD started 07/02,  CVVHD held on 07/13, resumed on 7/15  #urine output in critically ill  - Straight cath every 12 hours  #decreased urine output in the setting of acute renal failure 2/2 septic shock    HEME/ONC:   #DVT prophylaxis  - SCDs, heparin subQ   #new onset Afib  - holding FULL AC   #R radial artery occlusion  - RUE duplex 07/01 + for right radial artery occlusion  #RT posterior popliteal vein DVT  - vascular c/s - recommend AC   #H/o Fe Deficiency anemia  #Thrombocytopenia  - Heme consult (7/3): HIT workup negative    Labs: Hb/Hct: 7.5/23.6  --> 7.3/23.2 -> 9.5/30.3 ->                      Plts: 43 -> 47 -> 43              PTT/INR:  37.6/1.88 -> Vitamin K x1      Type and Screen expires: 7/14       # Anemia- symptomatic     Hgb 7.3    -Tranfsued 1 PRBC today in lieu of increasing pressor requirements while on CVVH    ID:  multiorgan system failure secondary to emphysematous pyelonephritis s/p left nephrectomy  Antibiotics     - Daptomycin (7/14 -    - Ceftriaxone (7/13 -     - Metronidazole ( 7/13 -     - Levofloxacin (6/25 -   WBC- 6.46  --->>,  6.64  --->> 8.68 --> 18.12  Trend Temps    #Cultures  Surgical swab 06/30: vancomycin resistant enterococcus faecium     L PCN cyto-pathology  6/25: results suggestive of a necrotic neoplasm    Fungitell 6/26: 47 --> >500 (7/6)    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    BAL 6/24: moderate yeast     UCx 6/19: negative FINAL      Left PCN aspiration 6/18: stenotrophomonas maltophilia    Surgical swab (6/30): VRE     Blood cultures: 7/10 NGTD    ENDO:  #Glucose monitoring  - A1c (5/7/23): 5.0  - FSG q6 hours while NPO  #Adrenal Insufficiency 2/2 to sepsis   - completed course of solu-cortef  - Cortisol level - binding globulin 1.1 (low), serum cortisol, 84, free cortisol 76, % free cortisol 91  - f/u cortisol 7/15 8AM  #assess thyroid function in setting of catabolic state   - f/u thyroid panel    MSK:  #thoracic spine DTI  - wound care recs: cleanse with soap and water, triad and allevyn BID prn for soiling  #L heel DTI  - wound care recs: pat dry, apply Cavilon, leave open to air twice a day, offload heels, offloading boots  #scrotum and R groin wound: pat dry, triad BID, PRN for soiling  #Sacrococcygeal DTI: wound care following- cleanse with soap and water, apply triad, gauze, and allevyn BID and PRN for soiling  - burn reconsulted   #Venous ulcer right lower leg, R forearm skin tear: Pat dry, apply Xeroform, nonadherent dressing, wrap with Kerlix twice a day, prn for soiling  #decubitus ulcer b/l ischium  #poor wound healing of abdominal incision  - wound care recs: Vashe wound cleanser, Xeroform and abdominal pad BID, PRN for soiling  - burn consulted  #Activity  - Advance as tolerated  - PT/rehab once extubated  - Globally deconditioned  #b/l UE, LE mottled skin  - +ulnar/DP/PT pulses present on doppler  - Arterial duplex bilateral upper extremities (7/1)  - Right radial artery occlusion   - VA Duplex bilateral lower extremities (7/3) - Right PT vein DVT     LINES/DRAINS:   PIV  Bobby (6/18-7/10)  L eleazar drain (6/30)  R Lerma pouch #1, pelvic drain #2 (6/30)  Left basilic midline  6/21  Right axillary a line (6/30-7/4)  Right Radial [place] (6/22-6/30)  L-Femoral Joleen (7/10 - )  L-Femoral [place] (7/10 - )  RIJ TLC (7/10-  )  ETT  OGT    ADVANCED DIRECTIVES:  Full Code  HCP/Emergency Contact- Tracey Adames: [phone]   [facility] discussions continue to be held with family.     INDICATION FOR SICU: New onset atrial fibrillation with mechanical ventilation    DISPOSITION: SICU Assessment and Plan:   67M w/ PMH of HTN, grade IV hydronephrosis of L kidney s/p L PCN (placed 2023), stutter, hiatal hernia, iron deficiency anemia, H Pylori (untreated), and gastric mass (never biopsied) presents to the ED with at least 4 days of worsening weakness, abdominal distension, and no output from his PCN. Admitted with emphysematous pyelonephritis.     (): s/p LT PCN upsizing to 16Fr and 16Fr RLQ drain placement with IR   (): s/p shock lithotripsy of L kidney abscess cavity with drainage of thick contents, upsize of drain to 26Fr bobby catheter  (): s/p ex-lap and L nephrectomy with abdominal washout    NEURO:  #Pain control  - APAP PRN  - Dilaudid PRN  #Sedation  - off all sedation  - zyprexa at bedtime   #altered mental status- resolving  - CT head 7/10: No acute intracranial pathology  - following commands intermittently, but able to open eyes to voice and withdraw from pain    RESP:  #acute respiratory failure secondary to septic shock  - intubated   - Mode: SIMV 450/16/40/8  - Tolerating intermittent PS, AB./41/13  #subcutaneous emphysema - stable  #acute respiratory failure with L lung mucus plugging   - Bronchoscopy 7/10: No mucous plug found  - CT Chest 7/10: Interval increase in size of bilateral pleural effusions with adjacent consolidation both lower lobes.   #Pulmonary nodules / Mediastinal lymphadenopathy  - CT Chest: Right middle and upper lobe solid pulmonary nodules, f/u outpatient pulm   #Activity  - increase as tolerated    CARDS:   #Septic shock  - levo and vaso gtt; wean as tolerated  #New onset Afib w/ RVR with hypotension -- likely 2/2 sepsis  - HR control with Metoprolol 2.5 q6 IV HOLDING in the setting of HYPOtension  - Digoxin administered, currently HOLDING, last level <0.3  - Cardiology c/s, allow permissive tachycardia, cardizem for rate control  #H/O HTN  - Amlodipine 2.5mg HOLDING  #Imaging  - ECHO: () EF 55-60%, mild AS, mild LAE, mild MR/TR, trivial pericardial effusion    GI/NUTR:  #proximal small bowel leak  - CT AP PO/IV/rectal contrast 7/10: proximal small bowel leak suspected  - diet: NPO on TPN @60cc/hr  + lipids  - Octreotide 50 mcg subq Q6  #bowel movements  - GI consult - Plan for EGD with EUS + FNA as outpatient, H-pylori treatment after resolution of acute issues   #GI PPX  - IV Protonix 40 q12 hours  #Gastric mass vs gastrohepatic lymphadenopathy  - CT A/P: lesser curvature mass, Hepatic hypodensities  #Splenic Hypodensities, infarct vs phlegmon/abscess  - CT A/P: Wedge-shaped region of hypodensity within the spleen    /RENAL:   #Grade IV L hydronephrosis s/p L PCN (2023)   #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  - () s/p LT PCN upsizing to 26Fr catheter, RLQ 16 Fr drain remains, s/p nephrostolithotomy w/ lithotripsy  - () s/p LT PCN upsizing to 16Fr and 16Fr RLQ drain placement with IR, flush q8 hr  - (): s/p L nephrectomy and abdominal washout   - VENICE drains x3 pelvis, Lerma's pouch, L nephrectomy site; L VENICE and R VENICE (pelvis) with dark green/murky/feculent OP  #Pathology from nephrectomy ()- Collecting duct carcinoma, Grade 4, with extensive necrosis and abscess formation, 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   #metabolic acidosis  - Nephrology c/s - CVVHD started ,  CVVHD held on , resumed on 7/15  #urine output in critically ill  - Straight cath every 12 hours  #decreased urine output in the setting of acute renal failure 2/2 septic shock    HEME/ONC:   #DVT prophylaxis  - SCDs, heparin subQ   #new onset Afib  - holding FULL AC   #R radial artery occlusion  - RUE duplex  + for right radial artery occlusion  #RT posterior popliteal vein DVT  - vascular c/s - recommend AC   #H/o Fe Deficiency anemia  #Thrombocytopenia  - Heme consult (7/3): HIT workup negative    Labs: Hb/Hct: 7.5/23.6  --> 7.3/23.2 -> 9.5/30.3 ->8.4/26                      Plts: 43 -> 47 -> 43-->23              PTT/INR:  37.6/1.88 -> Vitamin K x1      Type and Screen expires:          ID:  multiorgan system failure secondary to emphysematous pyelonephritis s/p left nephrectomy  Antibiotics     - Daptomycin ( -    - Ceftriaxone ( -     - Metronidazole (  -     - Levofloxacin ( -   WBC- 6.46  --->>,  6.64  --->> 8.68 --> 18.12-->16.6  Trend Temps    #Cultures  Surgical swab : vancomycin resistant enterococcus faecium     L PCN cyto-pathology  : results suggestive of a necrotic neoplasm    Fungitell : 47 --> >500 ()    OR Cx : Finegoldia magna    BAL : moderate yeast     UCx : negative FINAL      Left PCN aspiration : stenotrophomonas maltophilia    Surgical swab (): VRE     Blood cultures: 7/10 NGTD    ENDO:  #Glucose monitoring  - A1c (23): 5.0  - FSG q6 hours while NPO  #Adrenal Insufficiency 2/2 to sepsis   - completed course of solu-cortef  - Cortisol level - binding globulin 1.1 (low), serum cortisol, 84, free cortisol 76, % free cortisol 91  - f/u cortisol 7/15 8AM  #assess thyroid function in setting of catabolic state   - f/u thyroid panel    MSK:  #thoracic spine DTI  - wound care recs: cleanse with soap and water, triad and allevyn BID prn for soiling  #L heel DTI  - wound care recs: pat dry, apply Cavilon, leave open to air twice a day, offload heels, offloading boots  #scrotum and R groin wound: pat dry, triad BID, PRN for soiling  #Sacrococcygeal DTI: wound care following- cleanse with soap and water, apply triad, gauze, and allevyn BID and PRN for soiling  - burn reconsulted   #Venous ulcer right lower leg, R forearm skin tear: Pat dry, apply Xeroform, nonadherent dressing, wrap with Kerlix twice a day, prn for soiling  #decubitus ulcer b/l ischium  #poor wound healing of abdominal incision  - wound care recs: Vashe wound cleanser, Xeroform and abdominal pad BID, PRN for soiling  - burn consulted  #Activity  - Advance as tolerated  - PT/rehab once extubated  - Globally deconditioned  #b/l UE, LE mottled skin  - +ulnar/DP/PT pulses present on doppler  - Arterial duplex bilateral upper extremities ()  - Right radial artery occlusion   - VA Duplex bilateral lower extremities (7/3) - Right PT vein DVT     LINES/DRAINS:   PIV  Bobby (-7/10)  L eleazar drain ()  R Lerma pouch #1, pelvic drain #2 ()  Left basilic midline    Right axillary a line (-)  Right Radial [place] (-)  L-Femoral Joleen (7/10 - )  L-Femoral [place] (7/10 - )  RIJ TLC (7/10-  )  ETT  OGT    ADVANCED DIRECTIVES:  Full Code  HCP/Emergency Contact- Tracey Adames: [phone]   C discussions continue to be held with family.     INDICATION FOR SICU: New onset atrial fibrillation with mechanical ventilation    DISPOSITION: SICU

## 2023-07-16 NOTE — PROGRESS NOTE ADULT - SUBJECTIVE AND OBJECTIVE BOX
CHRISTINE VILLAVICENCIO  68y, Male  Allergy: No Known Allergies      LOS  28d    CHIEF COMPLAINT: pneumoperitoneum, emphysematous pyelonephritis (16 Jul 2023 09:27)      INTERVAL EVENTS/HPI  - T(F): , Max: 96.9 (07-16-23 @ 04:00)  - WBC Count: 16.66 (07-16-23 @ 04:12) , was uptrending now downtrending   WBC Count: 18.12 (07-15-23 @ 17:46)     - Creatinine: 1.1 (07-16-23 @ 04:12)  Creatinine: 1.2 (07-15-23 @ 04:25)     -   -   -     ROS  cannot obtain secondary to patient's sedation and/or mental status    VITALS:  T(F): 96, Max: 96.9 (07-16-23 @ 04:00)  HR: 96  BP: --  RR: 22Vital Signs Last 24 Hrs  T(C): 35.6 (16 Jul 2023 08:00), Max: 36.1 (16 Jul 2023 04:00)  T(F): 96 (16 Jul 2023 08:00), Max: 96.9 (16 Jul 2023 04:00)  HR: 96 (16 Jul 2023 09:45) (69 - 102)  BP: --  BP(mean): --  RR: 22 (16 Jul 2023 09:45) (11 - 27)  SpO2: 100% (16 Jul 2023 09:45) (72% - 100%)    Parameters below as of 16 Jul 2023 08:00  Patient On (Oxygen Delivery Method): ventilator    O2 Concentration (%): 40    PHYSICAL EXAM:  Gen: Intubated  CV: RRR  Lungs: Decreased  BS at bases  Abd: Soft JPs  Neuro: Sedated  Lines clean, no phlebitis    FH: Non-contributory  Social Hx: Non-contributory    TESTS & MEASUREMENTS:                        8.4    16.66 )-----------( 23       ( 16 Jul 2023 04:12 )             26.8     07-16    132<L>  |  102  |  78<HH>  ----------------------------<  178<H>  4.2   |  18  |  1.1    Ca    7.4<L>      16 Jul 2023 04:12  Phos  4.9     07-16  Mg     2.1     07-16          Urinalysis Basic - ( 16 Jul 2023 04:12 )    Color: x / Appearance: x / SG: x / pH: x  Gluc: 178 mg/dL / Ketone: x  / Bili: x / Urobili: x   Blood: x / Protein: x / Nitrite: x   Leuk Esterase: x / RBC: x / WBC x   Sq Epi: x / Non Sq Epi: x / Bacteria: x        Culture - Blood (collected 07-11-23 @ 02:25)  Source: .Blood Blood-Peripheral  Final Report (07-16-23 @ 09:01):    No growth at 5 days    Culture - Surgical Swab (collected 06-30-23 @ 22:12)  Source: .Surgical Swab None  Final Report (07-05-23 @ 21:47):    Growth in fluid media only Enterococcus faecium (vancomycin resistant)  Organism: Enterococcus faecium (vancomycin resistant) (07-05-23 @ 21:47)  Organism: Enterococcus faecium (vancomycin resistant) (07-05-23 @ 21:47)      Method Type: EDMUND      -  Ampicillin: R >8 Predicts results to ampicillin/sulbactam, amoxacillin-clavulanate and  piperacillin-tazobactam.      -  Daptomycin: SDD 4 The breakpoint for SDD (susceptible dose dependent)is based on a dosage regimen of 8-12 mg/kg administered every 24 h in adults and is intended for serious infections due to E. faecium. Consultation with an infectious diseases specialist is recommended.      -  Levofloxacin: R >4      -  Linezolid: S 1      -  Tetracycline: R >8      -  Vancomycin: R >16    Culture - Fungal, Bronchial (collected 06-24-23 @ 11:00)  Source: .Bronchial None  Preliminary Report (06-26-23 @ 11:00):    Moderate Yeast    Culture - Acid Fast - Bronchial w/Smear (collected 06-24-23 @ 11:00)  Source: .Bronchial None  Preliminary Report (07-12-23 @ 15:06):    No acid-fast bacilli isolated at 2 weeks.    Culture - Bronchial (collected 06-24-23 @ 11:00)  Source: .Bronchial None  Gram Stain (06-24-23 @ 23:21):    Numerous polymorphonuclear leukocytes per low power field    No squamous epithelial cells per low power field    Rare Yeast like cells per oil power field  Final Report (06-26-23 @ 17:56):    Normal Respiratory Gabbie present    Culture - Fungal, Other (collected 06-22-23 @ 22:11)  Source: .Other None  Preliminary Report (07-15-23 @ 15:01):    No fungus isolated at 3 weeks.    Culture - Surgical Swab (collected 06-22-23 @ 22:11)  Source: .Surgical Swab None  Final Report (06-25-23 @ 14:14):    Few Finegoldia magna "Susceptibilities not performed"    Culture - Urine (collected 06-19-23 @ 02:50)  Source: Clean Catch Clean Catch (Midstream)  Final Report (06-20-23 @ 10:18):    No growth    Culture - Abscess with Gram Stain (collected 06-18-23 @ 23:08)  Source: .Abscess right lower quadrant (intrabdominal)  Gram Stain (06-19-23 @ 11:56):    Numerous polymorphonuclear leukocytes per low power field    Numerous Gram positive cocci in pairs per oil power field  Final Report (06-21-23 @ 14:13):    Numerous Anaerobic Gram Positive Cocci Most closely resembling    Peptoniphilus species "Susceptibilities not performed"    Culture - Abscess with Gram Stain (collected 06-18-23 @ 23:08)  Source: .Abscess left kidney  Gram Stain (06-19-23 @ 11:58):    Rare polymorphonuclear leukocytes per low power field    Few Gram Positive Cocci per oil power field  Final Report (06-22-23 @ 10:13):    Growth in fluid media only Anaerobic Gram Positive Cocci Most closely    resembling Peptoniphilus species "Susceptibilities not performed"    Rare Stenotrophomonas maltophilia  Organism: Stenotrophomonas maltophilia  Stenotrophomonas maltophilia (06-22-23 @ 10:13)  Organism: Stenotrophomonas maltophilia (06-22-23 @ 10:13)      Method Type: EDMUND      -  Levofloxacin: S 1      -  Trimethoprim/Sulfamethoxazole: S <=0.5/9.5  Organism: Stenotrophomonas maltophilia (06-22-23 @ 10:13)      Method Type: KB      -  Minocycline: S    Culture - Abscess with Gram Stain (collected 06-18-23 @ 20:18)  Source: .Abscess left PCN aspirate  Gram Stain (06-19-23 @ 06:27):    No polymorphonuclear cells seen per low power field    Few Gram Variable Cocci seen per oil power field  Final Report (06-24-23 @ 09:11):    Few Stenotrophomonas maltophilia  Organism: Stenotrophomonas maltophilia (06-24-23 @ 09:11)  Organism: Stenotrophomonas maltophilia (06-24-23 @ 09:11)      Method Type: EDMUND      -  Levofloxacin: S 2      -  Trimethoprim/Sulfamethoxazole: S <=0.5/9.5    Culture - Blood (collected 06-18-23 @ 14:18)  Source: .Blood Blood-Peripheral  Final Report (06-24-23 @ 02:00):    No Growth Final    Culture - Blood (collected 06-18-23 @ 14:18)  Source: .Blood Blood-Peripheral  Final Report (06-24-23 @ 02:00):    No Growth Final            INFECTIOUS DISEASES TESTING  Fungitell: >500 (07-06-23 @ 04:30)  Fungitell: 47 (06-25-23 @ 15:30)  MRSA PCR Result.: Negative (06-24-23 @ 17:38)      INFLAMMATORY MARKERS  C-Reactive Protein, Serum: 106.7 mg/L (07-14-23 @ 04:41)  C-Reactive Protein, Serum: 245.1 mg/L (07-10-23 @ 01:30)      RADIOLOGY & ADDITIONAL TESTS:  I have personally reviewed the last available Chest xray  CXR      CT      CARDIOLOGY TESTING      MEDICATIONS  alteplase for catheter clearance 2  bacitracin   Ointment 1  caspofungin IVPB   caspofungin IVPB 50  cefTRIAXone   IVPB 2000  cefTRIAXone   IVPB   chlorhexidine 0.12% Liquid 15  chlorhexidine 2% Cloths 1  collagenase Ointment 1  DAPTOmycin IVPB 450  heparin   Injectable 5000  levoFLOXacin IVPB 750  lipid, fat emulsion (Fish Oil and Plant Based) 20% Infusion 0.656  metroNIDAZOLE  IVPB 500  metroNIDAZOLE  IVPB   norepinephrine Infusion 0.01  octreotide  Injectable 50  OLANZapine Injectable 2.5  pantoprazole  Injectable 40  Parenteral Nutrition - Adult 1  Parenteral Nutrition - Adult 1  vasopressin Infusion 0.03      WEIGHT  Weight (kg): 76.3 (06-30-23 @ 16:58)  Creatinine: 1.1 mg/dL (07-16-23 @ 04:12)      ANTIBIOTICS:  caspofungin IVPB      caspofungin IVPB 50 milliGRAM(s) IV Intermittent every 24 hours  cefTRIAXone   IVPB 2000 milliGRAM(s) IV Intermittent every 24 hours  cefTRIAXone   IVPB      DAPTOmycin IVPB 450 milliGRAM(s) IV Intermittent every 48 hours  levoFLOXacin IVPB 750 milliGRAM(s) IV Intermittent every 48 hours  metroNIDAZOLE  IVPB      metroNIDAZOLE  IVPB 500 milliGRAM(s) IV Intermittent every 8 hours      All available historical records have been reviewed

## 2023-07-16 NOTE — PROCEDURE NOTE - NSPERFORMEDBY_GEN_A_CORE
Myself
Myself
Dr. Cristiano Hahn/Myself
Myself
Attending
Myself

## 2023-07-16 NOTE — PROCEDURE NOTE - NSCVLATTEMPTSITEVASC_A_CORE
left/internal jugular
right/internal jugular
left
left/internal jugular
right/internal jugular
right/internal jugular
left/femoral vein

## 2023-07-16 NOTE — PROGRESS NOTE ADULT - SUBJECTIVE AND OBJECTIVE BOX
UROLOGY DAILY PROGRESS NOTE    Pt is a 68y Male admitted with retroperitoneal and intraabdominal abscess s/p IR placement of drain and LEFT PCN 6/18, s/p percutaneous drainage of LEFT renal abscess POD # 21 with left 26fr flank catheter in place, s/p Left radical nephrectomy, Ex-Lap POD # 15. Patient remains intubated w/ ETT on pressors.     MEDICATIONS  (STANDING):  alteplase for catheter clearance 2 milliGRAM(s) Catheter once  alteplase for catheter clearance 2 milliGRAM(s) Catheter once  bacitracin   Ointment 1 Application(s) Topical two times a day  caspofungin IVPB      caspofungin IVPB 50 milliGRAM(s) IV Intermittent every 24 hours  cefTRIAXone   IVPB      cefTRIAXone   IVPB 2000 milliGRAM(s) IV Intermittent every 24 hours  chlorhexidine 0.12% Liquid 15 milliLiter(s) Oral Mucosa every 12 hours  chlorhexidine 2% Cloths 1 Application(s) Topical <User Schedule>  collagenase Ointment 1 Application(s) Topical two times a day  CRRT Treatment    <Continuous>  DAPTOmycin IVPB 450 milliGRAM(s) IV Intermittent every 48 hours  heparin   Injectable 5000 Unit(s) SubCutaneous every 8 hours  levoFLOXacin IVPB 750 milliGRAM(s) IV Intermittent every 48 hours  lipid, fat emulsion (Fish Oil and Plant Based) 20% Infusion 0.656 Gm/kG/Day (31.3 mL/Hr) IV Continuous <Continuous>  metroNIDAZOLE  IVPB 500 milliGRAM(s) IV Intermittent every 8 hours  metroNIDAZOLE  IVPB      norepinephrine Infusion 0.01 MICROgram(s)/kG/Min (0.72 mL/Hr) IV Continuous <Continuous>  octreotide  Injectable 50 MICROGram(s) SubCutaneous every 6 hours  OLANZapine Injectable 2.5 milliGRAM(s) IntraMuscular at bedtime  pantoprazole  Injectable 40 milliGRAM(s) IV Push every 12 hours  Parenteral Nutrition - Adult 1 Each (62 mL/Hr) TPN Continuous <Continuous>  PureFlow Dialysate RFP-400 (K 2 / Ca 3) 5000 milliLiter(s) (2000 mL/Hr) CRRT <Continuous>  vasopressin Infusion 0.03 Unit(s)/Min (4.5 mL/Hr) IV Continuous <Continuous>    MEDICATIONS  (PRN):  HYDROmorphone  Injectable 0.5 milliGRAM(s) IV Push every 4 hours PRN Severe Pain (7 - 10)  sodium chloride 0.9% lock flush 10 milliLiter(s) IV Push every 1 hour PRN Pre/post blood products, medications, blood draw, and to maintain line patency      REVIEW OF SYSTEMS   [ ] Due to altered mental status/intubation, subjective information were not able to be obtained from patient. History was obtained, to the extent possible, from review of the chart and collateral sources of information.    Vital Signs Last 24 Hrs  T(C): 35.6 (16 Jul 2023 08:00), Max: 36.1 (16 Jul 2023 04:00)  T(F): 96 (16 Jul 2023 08:00), Max: 96.9 (16 Jul 2023 04:00)  HR: 94 (16 Jul 2023 08:15) (69 - 102)  RR: 23 (16 Jul 2023 08:15) (10 - 27)  SpO2: 100% (16 Jul 2023 08:15) (72% - 100%)    Parameters below as of 16 Jul 2023 08:00  Patient On (Oxygen Delivery Method): ventilator    O2 Concentration (%): 40    PHYSICAL EXAM:  GEN: intubated/sedated  ABDO:  incision with necrotic edges, #1 Lerma pouch eleazar draining serosanguinous fluid, #2 Pelvic pouch eleazar drain draining murky brown drainage. +Right abd pouch with murky brown drainage  BACK:  #3 Left abd eleazar drain draining murky brown drainage   : +Penoscrotal edema  MSK: +Right hand first digit with ischemic changes & Left first digit with ischemic changes.  Left great toe with ischemic changes      I&O's Summary    15 Jul 2023 07:01  -  16 Jul 2023 07:00  --------------------------------------------------------  IN: 2788.6 mL / OUT: 2569 mL / NET: 219.6 mL    16 Jul 2023 07:01  -  16 Jul 2023 09:28  --------------------------------------------------------  IN: 146.6 mL / OUT: 380 mL / NET: -233.4 mL        LABS:                        8.4    16.66 )-----------( 23       ( 16 Jul 2023 04:12 )             26.8     07-16    132<L>  |  102  |  78<HH>  ----------------------------<  178<H>  4.2   |  18  |  1.1    Ca    7.4<L>      16 Jul 2023 04:12  Phos  4.9     07-16  Mg     2.1     07-16      PT/INR - ( 15 Jul 2023 04:25 )   PT: 21.90 sec;   INR: 1.88 ratio         PTT - ( 15 Jul 2023 04:25 )  PTT:37.3 sec  Urinalysis Basic - ( 16 Jul 2023 04:12 )    Color: x / Appearance: x / SG: x / pH: x  Gluc: 178 mg/dL / Ketone: x  / Bili: x / Urobili: x   Blood: x / Protein: x / Nitrite: x   Leuk Esterase: x / RBC: x / WBC x   Sq Epi: x / Non Sq Epi: x / Bacteria: x     UROLOGY DAILY PROGRESS NOTE    Pt is a 68y Male admitted with retroperitoneal and intraabdominal abscess s/p IR placement of drain and LEFT PCN 6/18, s/p percutaneous drainage of LEFT renal abscess POD # 21 with left 26fr flank catheter in place, s/p Left radical nephrectomy, Ex-Lap POD # 15. Patient remains intubated w/ ETT on pressors.     MEDICATIONS  (STANDING):  alteplase for catheter clearance 2 milliGRAM(s) Catheter once  alteplase for catheter clearance 2 milliGRAM(s) Catheter once  bacitracin   Ointment 1 Application(s) Topical two times a day  caspofungin IVPB      caspofungin IVPB 50 milliGRAM(s) IV Intermittent every 24 hours  cefTRIAXone   IVPB      cefTRIAXone   IVPB 2000 milliGRAM(s) IV Intermittent every 24 hours  chlorhexidine 0.12% Liquid 15 milliLiter(s) Oral Mucosa every 12 hours  chlorhexidine 2% Cloths 1 Application(s) Topical <User Schedule>  collagenase Ointment 1 Application(s) Topical two times a day  CRRT Treatment    <Continuous>  DAPTOmycin IVPB 450 milliGRAM(s) IV Intermittent every 48 hours  heparin   Injectable 5000 Unit(s) SubCutaneous every 8 hours  levoFLOXacin IVPB 750 milliGRAM(s) IV Intermittent every 48 hours  lipid, fat emulsion (Fish Oil and Plant Based) 20% Infusion 0.656 Gm/kG/Day (31.3 mL/Hr) IV Continuous <Continuous>  metroNIDAZOLE  IVPB 500 milliGRAM(s) IV Intermittent every 8 hours  metroNIDAZOLE  IVPB      norepinephrine Infusion 0.01 MICROgram(s)/kG/Min (0.72 mL/Hr) IV Continuous <Continuous>  octreotide  Injectable 50 MICROGram(s) SubCutaneous every 6 hours  OLANZapine Injectable 2.5 milliGRAM(s) IntraMuscular at bedtime  pantoprazole  Injectable 40 milliGRAM(s) IV Push every 12 hours  Parenteral Nutrition - Adult 1 Each (62 mL/Hr) TPN Continuous <Continuous>  PureFlow Dialysate RFP-400 (K 2 / Ca 3) 5000 milliLiter(s) (2000 mL/Hr) CRRT <Continuous>  vasopressin Infusion 0.03 Unit(s)/Min (4.5 mL/Hr) IV Continuous <Continuous>    MEDICATIONS  (PRN):  HYDROmorphone  Injectable 0.5 milliGRAM(s) IV Push every 4 hours PRN Severe Pain (7 - 10)  sodium chloride 0.9% lock flush 10 milliLiter(s) IV Push every 1 hour PRN Pre/post blood products, medications, blood draw, and to maintain line patency      REVIEW OF SYSTEMS   [ ] Due to altered mental status/intubation, subjective information were not able to be obtained from patient. History was obtained, to the extent possible, from review of the chart and collateral sources of information.    Vital Signs Last 24 Hrs  T(C): 35.6 (16 Jul 2023 08:00), Max: 36.1 (16 Jul 2023 04:00)  T(F): 96 (16 Jul 2023 08:00), Max: 96.9 (16 Jul 2023 04:00)  HR: 94 (16 Jul 2023 08:15) (69 - 102)  RR: 23 (16 Jul 2023 08:15) (10 - 27)  SpO2: 100% (16 Jul 2023 08:15) (72% - 100%)    Parameters below as of 16 Jul 2023 08:00  Patient On (Oxygen Delivery Method): ventilator    O2 Concentration (%): 40    PHYSICAL EXAM:  GEN: intubated/sedated  ABDO:  incision with necrotic edges, #1 Lerma pouch eleazar draining serosanguinous fluid, #2 Pelvic pouch eleazar drain draining murky brown drainage. +Right abd pouch with murky brown drainage  BACK:  #3 Left abd eleazar drain draining murky brown drainage   : +Penoscrotal edema  MSK: +Right hand first digit with ischemic changes & Left first digit with ischemic changes.  Left great toe with ischemic changes  numerous pressure sores with dressings  being changed      I&O's Summary    15 Jul 2023 07:01  -  16 Jul 2023 07:00  --------------------------------------------------------  IN: 2788.6 mL / OUT: 2569 mL / NET: 219.6 mL    16 Jul 2023 07:01  -  16 Jul 2023 09:28  --------------------------------------------------------  IN: 146.6 mL / OUT: 380 mL / NET: -233.4 mL        LABS:                        8.4    16.66 )-----------( 23       ( 16 Jul 2023 04:12 )             26.8     07-16    132<L>  |  102  |  78<HH>  ----------------------------<  178<H>  4.2   |  18  |  1.1    Ca    7.4<L>      16 Jul 2023 04:12  Phos  4.9     07-16  Mg     2.1     07-16      PT/INR - ( 15 Jul 2023 04:25 )   PT: 21.90 sec;   INR: 1.88 ratio         PTT - ( 15 Jul 2023 04:25 )  PTT:37.3 sec  Urinalysis Basic - ( 16 Jul 2023 04:12 )    Color: x / Appearance: x / SG: x / pH: x  Gluc: 178 mg/dL / Ketone: x  / Bili: x / Urobili: x   Blood: x / Protein: x / Nitrite: x   Leuk Esterase: x / RBC: x / WBC x   Sq Epi: x / Non Sq Epi: x / Bacteria: x

## 2023-07-16 NOTE — PROCEDURE NOTE - NSSITEPREP_SKIN_A_CORE
chlorhexidine/Adherence to aseptic technique: hand hygiene prior to donning barriers (gown, gloves), don cap and mask, sterile drape over patient
chlorhexidine
chlorhexidine
chlorhexidine/Adherence to aseptic technique: hand hygiene prior to donning barriers (gown, gloves), don cap and mask, sterile drape over patient
chlorhexidine/Adherence to aseptic technique: hand hygiene prior to donning barriers (gown, gloves), don cap and mask, sterile drape over patient
chlorhexidine
chlorhexidine/Adherence to aseptic technique: hand hygiene prior to donning barriers (gown, gloves), don cap and mask, sterile drape over patient
chlorhexidine
chlorhexidine/Adherence to aseptic technique: hand hygiene prior to donning barriers (gown, gloves), don cap and mask, sterile drape over patient
chlorhexidine
chlorhexidine/Adherence to aseptic technique: hand hygiene prior to donning barriers (gown, gloves), don cap and mask, sterile drape over patient
chlorhexidine/Adherence to aseptic technique: hand hygiene prior to donning barriers (gown, gloves), don cap and mask, sterile drape over patient

## 2023-07-16 NOTE — PROCEDURE NOTE - NSPATIENTPOSTION_GEN_A_CORE
Trendelenburg
Trendelenburg
(semi) fowlers
supine
(semi) fowlers
supine
Trendelenburg

## 2023-07-16 NOTE — PROCEDURE NOTE - NSINDICATIONS_GEN_A_CORE
dialysis/CRRT
dialysis/CRRT
critical patient/monitoring purposes
arterial puncture to obtain ABG's/critical patient/monitoring purposes
critical illness/hypertonic/irritant infusion/venous access
arterial puncture to obtain ABG's/blood sampling/cannulation purposes/critical patient/monitoring purposes
critical illness/dialysis/CRRT
critical illness/hemodynamic monitoring
critical illness/emergency venous access
critical patient/monitoring purposes
arterial puncture to obtain ABG's/blood sampling/cannulation purposes/critical patient/monitoring purposes
dialysis/CRRT

## 2023-07-16 NOTE — PROCEDURE NOTE - NSPOSTPRCRAD_GEN_A_CORE
no pneumothorax/post-procedure radiography performed
Pending
left femoral vein, confirmed via ultrasound/central line located in the
Chest x ray pending
post-procedure radiography performed

## 2023-07-16 NOTE — PROCEDURE NOTE - NSPROCNAME_GEN_A_CORE
Arterial Puncture/Cannulation
Central Line Insertion
Arterial Puncture/Cannulation
Bronchoscopy
Central Line Insertion
Bronchoscopy
Central Line Insertion
Arterial Puncture/Cannulation
Peripheral Line Insertion
Central Line Insertion
Central Line Insertion
Arterial Puncture/Cannulation
Midline Insertion
Central Line Insertion
Arterial Puncture/Cannulation
Central Line Insertion

## 2023-07-16 NOTE — PROCEDURE NOTE - NSNUMOFLUMENS_VASC_A_CORE
double lumen
triple lumen
single lumen
double lumen
triple lumen
triple lumen
double lumen
triple lumen

## 2023-07-16 NOTE — PROCEDURE NOTE - ATTENDING PROVIDER
Dr. Cohn
Ernesto
Dr. Fontaine
Dr. Gutiérrez
Dr. Orozco
Jaceya
Dr. Cohn
MARIAN
Dr. Cohn
Dr. Fontaine
Dr. Cohn
Dr. Gutiérrez

## 2023-07-16 NOTE — PROGRESS NOTE ADULT - NS ATTEND AMEND GEN_ALL_CORE FT
I personally saw and examined the patient, reviewed the chart and available data. I discussed the situation with the patients care team and RAMON RODRIGUEZ. I also reviewed and/or amended the note as necessary.

## 2023-07-16 NOTE — PROGRESS NOTE ADULT - CRITICAL CARE ATTENDING COMMENT
Critical Care: [id number]   This patient has a high probability of sudden, clinically significant deterioration, which requires the highest level of physician preparedness to intervene urgently. I managed/supervised life or organ supporting interventions that required frequent physician assessment. I have reviewed and agree with note above. I devoted my full attention to the direct care of this patient for the period of time indicated, including reviewing relevant labs and imaging, discussing treatment plan with the SICU team, nurses, and primary team at the time of multidisciplinary rounds, and reviewing findings with patient and family. This does not include time devoted to teaching any trainees and to performing any procedures.    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    Nabeel Cohn MD, [email or web address]
Critical Care: [id number]   This patient has a high probability of sudden, clinically significant deterioration, which requires the highest level of physician preparedness to intervene urgently. I managed/supervised life or organ supporting interventions that required frequent physician assessment. I have reviewed and agree with note above. I devoted my full attention to the direct care of this patient for the period of time indicated, including reviewing relevant labs and imaging, discussing treatment plan with the SICU team, nurses, and primary team at the time of multidisciplinary rounds, and reviewing findings with patient and family. This does not include time devoted to teaching any trainees and to performing any procedures.    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, now with persistent septic shock and bowel leak    Issues we are addressing today:    minimize sedation, mental status concerning for hypoactive delirium  lines new since 7/10  CRRT to be held today, evaluate electrolytes  wean pressors as tolerated  plt transfusion for procedures  wean vent as tolerated  feculent drainage from multiple lines, octreotide to decrease output per dr kennedy  CT A/P showed possible small vs large bowel leak  abx ongoing  wound care/burn for decub wounds    Given poor healing from wounds and metastatic cancer, in my opinion patient has extremely poor prognosis and will never live independently. I believe trach would be palliative in nature only.  in my opinion the best case scenario is this patient makes it to home or inpatient hospice on trach collar.      family meeting yesterday, son in law was very loud and verbally abusive with the staff and at times with other family members. Family was informed of possibilities of trach vs keeping ETT in place, Full Code vs DNR/DNI, CRRT vs iHD vs renal recovery, and comfort-focused care. more info in palliative's note.    The above note is NOT written at the time of rounds and will reflect all changes throughout management of the patient for the day note is written for.    Nabeel Cohn MD, D.KAREN.
Critical Care: [id number]   This patient has a high probability of sudden, clinically significant deterioration, which requires the highest level of physician preparedness to intervene urgently. I managed/supervised life or organ supporting interventions that required frequent physician assessment. I have reviewed and agree with note above. I devoted my full attention to the direct care of this patient for the period of time indicated, including reviewing relevant labs and imaging, discussing treatment plan with the SICU team, nurses, and primary team at the time of multidisciplinary rounds, and reviewing findings with patient and family. This does not include time devoted to teaching any trainees and to performing any procedures.    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, now with persistent septic shock and bowel leak    Issues we are addressing today:    minimize sedation, mental status concerning for hypoactive delirium but improving with nightly zyprexa   lines new since 7/10  CRRT restart today for metabolic clearance  wean pressors as tolerated  prbc transfusion today  plt transfusion for procedures  wean vent as tolerated, trach planning  feculent drainage from multiple lines, octreotide subq to decrease output per dr kennedy  CT A/P showed possible small vs large bowel leak  abx ongoing  wound care/burn for decub wounds    Given poor healing from wounds and metastatic cancer, in my opinion patient has extremely poor prognosis and will never live independently. I believe trach would be palliative in nature only.  in my opinion the best case scenario is this patient makes it to home or inpatient hospice on trach collar.      The above note is NOT written at the time of rounds and will reflect all changes throughout management of the patient for the day note is written for.    Nabeel Cohn MD, D.KAREN.
Critical Care: [id number]   This patient has a high probability of sudden, clinically significant deterioration, which requires the highest level of physician preparedness to intervene urgently. I managed/supervised life or organ supporting interventions that required frequent physician assessment. I have reviewed and agree with note above. I devoted my full attention to the direct care of this patient for the period of time indicated, including reviewing relevant labs and imaging, discussing treatment plan with the SICU team, nurses, and primary team at the time of multidisciplinary rounds, and reviewing findings with patient and family. This does not include time devoted to teaching any trainees and to performing any procedures.    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, now with persistent septic shock and bowel leak    Issues we are addressing today:    minimize sedation, mental status concerning for hypoactive delirium but improving with nightly zyprexa   CRRT restart today for metabolic clearance, new HD line needed due to line thrombosis  wean pressors as tolerated  plt transfusion for procedures  wean vent as tolerated, trach planning  feculent drainage from multiple lines, octreotide subq to decrease output per dr kennedy  CT A/P showed possible small vs large bowel leak  abx ongoing  wound care/burn for decub wounds    Given poor healing from wounds and metastatic cancer, in my opinion patient has extremely poor prognosis and will never live independently. I believe trach would be palliative in nature only but would help with discharge to hospice.  in my opinion the best case scenario is this patient makes it to home or inpatient hospice on trach collar.      The above note is NOT written at the time of rounds and will reflect all changes throughout management of the patient for the day note is written for.    Nabeel Conh MD, D.KAREN.
This patient has a high probability of sudden, clinically significant deterioration, which requires the highest level of physician preparedness to intervene urgently. I managed/supervised life or organ supporting interventions that required frequent physician assessment. I have reviewed and agree with note above. I devoted my full attention to the direct care of this patient for the period of time indicated, including reviewing relevant labs and imaging, discussing treatment plan with the SICU team, nurses, and primary team at the time of multidisciplinary rounds, and reviewing findings with patient and family. This does not include time devoted to teaching any trainees and to performing any procedures.    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    Nabeel Cohn MD, D.KAREN.
Critical Care: [id number]   This patient has a high probability of sudden, clinically significant deterioration, which requires the highest level of physician preparedness to intervene urgently. I managed/supervised life or organ supporting interventions that required frequent physician assessment. I have reviewed and agree with note above. I devoted my full attention to the direct care of this patient for the period of time indicated, including reviewing relevant labs and imaging, discussing treatment plan with the SICU team, nurses, and primary team at the time of multidisciplinary rounds, and reviewing findings with patient and family. This does not include time devoted to teaching any trainees and to performing any procedures.    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, now with persistent septic shock and bowel leak    Issues we are addressing today:    minimize sedation, mental status concerning for hypoactive delirium  lines new since 7/10  CRRT to be held again today, monitor metabolic panel  wean pressors as tolerated  plt transfusion for procedures  wean vent as tolerated  feculent drainage from multiple lines, octreotide to decrease output per dr kennedy  CT A/P showed possible small vs large bowel leak  abx ongoing  wound care/burn for decub wounds    Given poor healing from wounds and metastatic cancer, in my opinion patient has extremely poor prognosis and will never live independently. I believe trach would be palliative in nature only.  in my opinion the best case scenario is this patient makes it to home or inpatient hospice on trach collar.      The above note is NOT written at the time of rounds and will reflect all changes throughout management of the patient for the day note is written for.    Nabeel Cohn MD, D.KAREN.
Critical Care: [id number]   This patient has a high probability of sudden, clinically significant deterioration, which requires the highest level of physician preparedness to intervene urgently. I managed/supervised life or organ supporting interventions that required frequent physician assessment. I have reviewed and agree with note above. I devoted my full attention to the direct care of this patient for the period of time indicated, including reviewing relevant labs and imaging, discussing treatment plan with the SICU team, nurses, and primary team at the time of multidisciplinary rounds, and reviewing findings with patient and family. This does not include time devoted to teaching any trainees and to performing any procedures.    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  I believe trach would be palliative in nature only.  Family meeting at 3pm today.    The above note is NOT written at the time of rounds and will reflect all changes throughout management of the patient for the day note is written for.    Nabeel Cohn MD, D.KAREN.

## 2023-07-16 NOTE — PROCEDURE NOTE - ESTIMATED BLOOD LOSS
Minimal
None
Minimal
None
Minimal
Minimal

## 2023-07-16 NOTE — PROGRESS NOTE ADULT - SUBJECTIVE AND OBJECTIVE BOX
CHRISTINE VELÁZQUEZTO  [id number]  67y Male    Indication for ICU admission: mechanical ventilator management in the setting of septic shock    Admit Date:06-18-23  ICU Date: 6/18/23  OR Date: 6/18 (IR), 6/22, 6/30     24HRS EVENT:  7/15  NIGHT   -PM rounds: levo .12, vaso .03, opens eyes to voice, not following commands      Day  start Diaudid 0.5 PRN  VIt K x 1  CVVH resumed- net neg 50  pressor requirements going up when pulling on CVVH, will transfuse 1 PRBC  TSH for am labs  CVP-11  6/22- fungal c/x- no growth  6 pm-femoral dialysis catheter clotting off, cvvh paused and blood returned  LLQ renal VENICE # 3 with increasing output  	      [] A ten-point review of systems was otherwise negative except as noted above.  [x  ] Due to altered mental status/intubation, subjective information was not attained from the patient. History was obtained, to the extent possible, from review of the chart and collateral sources of information.      =========================================================================================================================================     CHRISTINE VILLAVICENCIO  [id number]  67y Male    Indication for ICU admission: mechanical ventilator management in the setting of septic shock    Admit Date:06-18-23  ICU Date: 6/18/23  OR Date: 6/18 (IR), 6/22, 6/30     24HRS EVENT:  7/15  NIGHT   -PM rounds: levo .12, vaso .03, opens eyes to voice, not following commands      Day  start Diaudid 0.5 PRN  VIt K x 1  CVVH resumed- net neg 50  pressor requirements going up when pulling on CVVH, will transfuse 1 PRBC  TSH for am labs  CVP-11  6/22- fungal c/x- no growth  6 pm-femoral dialysis catheter clotting off, cvvh paused and blood returned  LLQ renal VENICE # 3 with increasing output  	    Physical Exam:  Neuro- open eyes spontaneously and to voice, follow commands intermittently, withdraws to pain  Resp- lungs with fine crackles at the bases, crepitus to B/L chest wall and axilla , no wheezing  Cardiac- S1, S2, RRR  Abd- dressing applied to surgical wounds, VENICE drains in place, ileostomy functional with stool output  Skin- necrotic index finger of right hand up to the proximal IP joint, necrotic index finger of the left hand up to the distal IP joint, necrotic halux of the RLE    [] A ten-point review of systems was otherwise negative except as noted above.  [x  ] Due to altered mental status/intubation, subjective information was not attained from the patient. History was obtained, to the extent possible, from review of the chart and collateral sources of information.      =========================================================================================================================================

## 2023-07-16 NOTE — PROCEDURE NOTE - NSPROCDETAILS_GEN_ALL_CORE
guidewire recovered/lumen(s) aspirated and flushed/sterile dressing applied/sterile technique, catheter placed/ultrasound guidance with use of sterile gel and probe cove
Ijeoma 16cm HD catheter exchanged over wire/guidewire recovered/lumen(s) aspirated and flushed
guidewire recovered/lumen(s) aspirated and flushed/sterile dressing applied/sterile technique, catheter placed/ultrasound guidance with use of sterile gel and probe cove
location identified, draped/prepped, sterile technique used, needle inserted/introduced/positive blood return obtained via catheter/connected to a pressurized flush line/sutured in place/Seldinger technique/all materials/supplies accounted for at end of procedure
location identified, draped/prepped, sterile technique used, needle inserted/introduced/connected to a pressurized flush line
location identified, draped/prepped, sterile technique used, needle inserted/introduced/positive blood return obtained via catheter/connected to a pressurized flush line/sutured in place/hemostasis with direct pressure, dressing applied/Seldinger technique/all materials/supplies accounted for at end of procedure
location identified, draped/prepped, sterile technique used
location identified, draped/prepped, sterile technique used/sterile dressing applied/sterile technique, catheter placed
guidewire recovered/lumen(s) aspirated and flushed/sterile dressing applied/sterile technique, catheter placed/ultrasound guidance with use of sterile gel and probe cove
location identified, draped/prepped, sterile technique used, needle inserted/introduced/positive blood return obtained via catheter/connected to a pressurized flush line/sutured in place/Seldinger technique/all materials/supplies accounted for at end of procedure
Ultrasound guided left femoral vein hemodialysis catheter insertion/guidewire recovered/lumen(s) aspirated and flushed/sterile dressing applied/sterile technique, catheter placed/ultrasound guidance with use of sterile gel and probe cove
location identified, draped/prepped, sterile technique used, needle inserted/introduced/positive blood return obtained via catheter/connected to a pressurized flush line/sutured in place/Seldinger technique/all materials/supplies accounted for at end of procedure
guidewire recovered/lumen(s) aspirated and flushed/sterile dressing applied/sterile technique, catheter placed/ultrasound guidance with use of sterile gel and probe cove

## 2023-07-16 NOTE — PROGRESS NOTE ADULT - ASSESSMENT
ASSESSMENT  66yo Male with pmh of hypertension, massive Grade IV hydronephrosis s/p left nephrostomy placed in May 2023 by [facility] presents to the ED with little to no urine output from LEFT nephrostomy from about a week. PCN  more recently drainage was changed to brown/purulent fluid. 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      IMPRESSION  #Fever , resolved    7/11 BCX NG   CTAP 1. Large volume extraluminal left upper quadrant/retroperitoneal oral contrast; proximal small bowel leak suspected.  2. Moderate volume free fluid with enhancement of peritoneal reflections, consistent with peritonitis. Evaluation for focal abscesses difficult   given extensive background free fluid.  3. Increased number and size of bilobar hepatic hypodense suspected hepatic abscesses, including segment IV/III multiseptated suspected   abscess measuring 5 cm, previously 2.4 cm on 6/18/2023.  4. Small bilateral pleural effusions. Left lower lobe consolidativeopacity, may represent pneumonia in the appropriate clinical setting.  5. Unchanged retroperitoneal lymphadenopathy.  6. Persistent pneumoperitoneum.   Elevated fungitell- . No yeast in BCX or OR CX, possible related to SB leak   Fungitell: >500 (07-06-23 @ 04:30)  Fungitell: 47 (06-25-23 @ 15:30)  #Septic shock on admission due to Emphysematous pyelonephritis with suspected perforation/ pneumoperitoneum with suspected liver abscesses & splenic infarct vs abscess    6/30 OR cx     Growth in fluid media only Enterococcus faecium  Exploratory laparotomy 30-Jun-2023 21:35:05  Aure Prabhakar  Left nephrectomy 30-Jun-2023 21:35:11  Arue Prabhakar. "Exploratory laparotomy for possible left renal cancer complicated by significant hydronephrosis and pyelonephritis. Left nephrectomy performed, notable for necrotic bulky tissue adherent to the renal vein, staple line in tact and hemostasis achieved. Copious irrigation of retroperitoneal renal bed and intraperitoneal fibrinous exudate and purulent fluid. Three drains placed, #1 in Lerma's pouch, #2 in the pelvis, and #3 in the renal bed"    6/22 UCX  Few Finegoldia magna "Susceptibilities not performed"  Nephrostolithotomy, percutaneous, with lithotripsy, large stone 22-Jun-2023 23:27:09 left large thick abscess materia dimension of aspirate over 4 x 4 x 4 cm Bernie Perdomo  Change external ureteral stent 22-Jun-2023 23:28:03 left Yanitomásjami Bernie CLIFFORD  Nephrostogram 22-Jun-2023 23:28:31 left Yanitomásjami Bernie CLIFFORD. "thick gelatinous material that was difficult to suction out even with the shock pulse. I would alternate 2 prong to remove and break up / shock pulse to suck out. after an hour where I had no clear planes I elected to stop. irrigation through the 26 bobby took out a lot additional material  in ashlyn catch cup 4 x 4 x 4 cm lump of material trapped"    6/19 UCX NG; UA Blood: x / Protein: 300 mg/dL / Nitrite: Negative   Leuk Esterase: Large / RBC: 8 /HPF / WBC >720 /HPF   Sq Epi: x / Non Sq Epi: x / Bacteria: Moderate    6/18 L PCN     Few Stenotrophomonas maltophilia Levofloxacin: S 2    6/18 L PCN     Numerous Anaerobic Gram Positive Cocci Most closely resembling  Peptoniphilus species "Susceptibilities not performed"    6/18 L PCN   Numerous Gram positive cocci in pairs per oil power field    6/18 BCX NGTD     s/p 6/18  Left PCN upsized to 16F and 1200cc of very viscous tan colored fluid was aspirated. RLQ 16F drain was placed and 1200cc of tan colored fluid was aspirated (less viscous). A sample from each location was sent for culture.     Repeat CT 6/19 Since one day earlier,  No extraluminal contrast. Oral contrast was last seen in the terminal ileum.  Status post left nephrostomy tube placement. Left enlarged kidney with severe hydronephrosis/collection has decreased, now measuring 16 x 12 cm from 20 x 14 cm  Surgical Pathology Report:   Left renal abscess, possible parenchyma  Left renal abscess, possible parenchyma, percutaneous drainage:  -  Fragments of extensively necrotic tissue, cannot be fullycharacterized  Comment: The microscopic appearance of the necrotic tissue is concerning for a neoplasm with coagulative necrosis. Definitive diagnosis cannot be  made, due to lack of viable material.  Additional sections will be submitted.  Immunohistochemical studies willbe performed, in an attempt to characterise the necrotic tissue.  Based on the H&E appearance, an infectious process such as tuberculosis  appears less likely, but special stains for microorganisms (acid-fastbacilli and fungal) are in progress and will be reported separately.   (06.22.23 @ 22:12)  6/27 CT Study is overall very limited due to lack of intravenous contrast, beam   Willingham artifact.  Persistent bilateral pleural effusions with adjacent consolidations   likely reflecting compressive atelectasis.  Left kidney poorly defined with a heterogeneous collection with air and   fluid which has likely mildly diminished in size compared to the prior   examination but exact comparison limited.  Diffuse ascites again noted. Diffuse anasarca again noted. Persistent   pneumoperitoneum.  Poorly defined lesions within the liver adjacent to the falciform   ligament which may again reflect abscesses or masses, difficult to   evaluate due to the limitations described above.  < from: CT Abdomen and Pelvis w/ IV Cont (06.18.23 @ 15:15) >  1 ) Moderate pneumoperitoneum with partial diffusion throughout moderate   volume ascites and with associated intermittent peritoneal enhancement   and nodularity. Perforation and peritonitis may be related to underlying   emphysematous pyelonephritis (see below). Less likely sources of   perforation include ureteral/bladder disruption, and bowel perforation   which cannot be entirely excluded on the provided images.  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 The compressed residual renal   parenchyma is inseparable from the adjacent fascial/fatty layers   anterosuperiorly and direct rupture into the peritoneum is a possibility   (6-355). The previously placed left-sided nephrostomy tube pigtail is   notedto be within the left renal collecting system.  3) New hepatic hypodensities measuring up to 2.6 cm, suspicious for   development of multiple focal abscesses.  4) Wedge-shaped region of hypodensity within the spleen may reflect   splenic infarct in the correct clinical setting. Developing   phlegmon/abscess cannot be excluded in the current clinical setting  5) Increased extensive retroperitoneal, portacaval and likely   gastrohepatic heterogeneously enhancing lymphadenopathy. Findings may be   reactive.  6) Mediastinal lymphadenopathy measuring up to 2.4 cm short axis.   Underlying etiology may be reactive, infectious/inflammatory, or   neoplastic. A short-term 3 month follow-up CT chest should be considered   for further evaluation.  7)Right middle and upper lobe solid pulmonary nodules which are not well   delineated due to respiratory motion but measure less than 6 mm.   attention on follow-up exam.  #Reintubated, L lung white out, ruled out HAP    6/24 bronch CX NG,   Moderate Yeast- likely colonizer  #Lactic acidosis  #Abx allergy: No Known Allergies  #Prolonged QTC Calculation(Bazett) 526 ms  #Hyponatremia  #AKICreatinine: Creatinine: 1.3 mg/dL (07-05-23 @ 05:45)  Ht 180  Weight (kg): 76.5 (06-18-23 @ 21:27)  crcl 57     RECOMMENDATIONS  - CT results noted, suspected leak, liver abscesses  - Will need prolonged ~6 week antibiotics course given liver abscesses  - Appreciate IR consult- no role for liver aspiration  - Continue Ceftriaxone 2g q24h IV + IV flagyl 500mg q8h   - WBC uptrending now downtrending , monitor closely if hemodynamic compromise, will broaden back to meropenem, though did not grow any MDRO  - Continue Caspo 50mg q24h IV, f/u repeat fungitell   - Continue DAPTOmycin IVPB 460 milliGRAM(s) IV Intermittent every 48 hours  - Continue levoFLOXacin IVPB 750 milliGRAM(s) IV Intermittent every 48 hours  - Trend WBC   - Urology following  - Poor prognosis, GOC    If any questions, please send a message or call on [employer] Teams  Please continue to update ID with any pertinent new laboratory or radiographic findings  #[id number]

## 2023-07-16 NOTE — PROGRESS NOTE ADULT - ASSESSMENT
68y Male admitted with retroperitoneal and intraabdominal abscess s/p IR placement of drain and LEFT PCN 6/18, s/p percutaneous drainage of LEFT renal abscess POD # 21 with left 26fr flank catheter in place, s/p Left radical nephrectomy, Ex-Lap POD # 15. Patient remains intubated w/ ETT on pressors.     Plan:   - Continue management per SICU team   - Will d/w attending  68y Male admitted with retroperitoneal and intraabdominal abscess s/p IR placement of drain and LEFT PCN 6/18, s/p percutaneous drainage of LEFT renal abscess POD # 21 with left 26fr flank catheter in place, s/p Left radical nephrectomy, Ex-Lap POD # 15. Patient remains intubated w/ ETT on pressors.     Plan:   - Continue management per SICU team   - Spoke with the ACP in charge of the patient.  He is getting twice daily CIC but only an ounce and a half being obtained.  I am not sure why that is being done.  I was told it is current protocol and I would recommend scanning him twice a day especially now that the dialysis has been stopped pending a better catheter.  However if he has limited fluid in the bladder at most I would catheterize him once a day.

## 2023-07-16 NOTE — PROCEDURE NOTE - NSTOLERANCE_GEN_A_CORE
Patient tolerated procedure well.

## 2023-07-16 NOTE — PROCEDURE NOTE - PRACTITIONER PERFORMING THE TIME OUT
Francois Bradshaw PA-C
Francois Bradshaw PA-C
JAMIE Hahn
Prince Garcia
Prince Garcia
Gracia Thibodeaux
John Hahn
Shasha Carey PA-C
Francois Bradshaw PA-C
Eva Major
Francois Bradshaw PA-C

## 2023-07-17 NOTE — PROGRESS NOTE ADULT - SUBJECTIVE AND OBJECTIVE BOX
Nephrology progress note    Patient is seen and examined, events over the last 24 h noted .  Intubated on MV     Allergies:  No Known Allergies    Hospital Medications:   MEDICATIONS  (STANDING):  bacitracin   Ointment 1 Application(s) Topical two times a day   caspofungin IVPB 50 milliGRAM(s) IV Intermittent every 24 hours  cefTRIAXone   IVPB 2000 milliGRAM(s) IV Intermittent every 24 hours  collagenase Ointment 1 Application(s) Topical two times a day  CRRT Treatment    <Continuous>  heparin   Injectable 5000 Unit(s) SubCutaneous every 8 hours  levoFLOXacin IVPB 750 milliGRAM(s) IV Intermittent every 48 hours  metroNIDAZOLE  IVPB 500 milliGRAM(s) IV Intermittent every 8 hours    norepinephrine Infusion 0.01 MICROgram(s)/kG/Min (0.72 mL/Hr) IV Continuous <Continuous>  octreotide  Injectable 50 MICROGram(s) SubCutaneous every 6 hours  OLANZapine Injectable 2.5 milliGRAM(s) IntraMuscular at bedtime  pantoprazole  Injectable 40 milliGRAM(s) IV Push every 12 hours  Parenteral Nutrition - Adult 1 Each (65 mL/Hr) TPN Continuous <Continuous>  PureFlow Dialysate RFP-400 (K 2 / Ca 3) 5000 milliLiter(s) (2000 mL/Hr) CRRT <Continuous>  vasopressin Infusion 0.03 Unit(s)/Min (4.5 mL/Hr) IV Continuous <Continuous>        VITALS:  T(F): 94.8 (07-17-23 @ 08:00), Max: 96.5 (07-16-23 @ 20:00)  HR: 114 (07-17-23 @ 09:00)  RR: 25 (07-17-23 @ 09:00)  SpO2: 100% (07-17-23 @ 09:00)  Wt(kg): --    07-15 @ 07:01  -  07-16 @ 07:00  --------------------------------------------------------  IN: 2788.6 mL / OUT: 2569 mL / NET: 219.6 mL    07-16 @ 07:01  -  07-17 @ 07:00  --------------------------------------------------------  IN: 3639.4 mL / OUT: 2022 mL / NET: 1617.4 mL          PHYSICAL EXAM:  Constitutional: intubated on MV   Respiratory: CTAB,  Cardiovascular: S1, S2, RRR  Gastrointestinal: BS+, soft, NT/ND  Extremities: No cyanosis or clubbing. No peripheral edema  :  No bobby.   Skin: No rashes    LABS:    07-17    136  |  104  |  84<HH>  ----------------------------<  114<H>  3.8   |  18  |  1.2    Ca    7.6<L>      17 Jul 2023 05:05  Phos  3.7     07-17  Mg     2.1     07-17                            7.0    13.00 )-----------( x        ( 17 Jul 2023 06:25 )             22.6       Urine Studies:  Urinalysis Basic - ( 17 Jul 2023 05:05 )    Color:  / Appearance:  / SG:  / pH:   Gluc: 114 mg/dL / Ketone:   / Bili:  / Urobili:    Blood:  / Protein:  / Nitrite:    Leuk Esterase:  / RBC:  / WBC    Sq Epi:  / Non Sq Epi:  / Bacteria:           Iron 12, TIBC 128, %sat 9      [05-07-23 @ 09:19]  Ferritin 526      [05-07-23 @ 09:19]  Vitamin D (25OH) 26      [05-08-23 @ 07:58]  TSH 0.30      [07-14-23 @ 17:56]  Lipid: chol 58, TG 88, HDL 22, LDL --      [07-02-23 @ 21:01]          RADIOLOGY & ADDITIONAL STUDIES:

## 2023-07-17 NOTE — PROGRESS NOTE ADULT - ASSESSMENT
Assessment and Plan:   67M w/ PMH of HTN, grade IV hydronephrosis of L kidney s/p L PCN (placed 5/2023), stutter, hiatal hernia, iron deficiency anemia, H Pylori (untreated), and gastric mass (never biopsied) presents to the ED with at least 4 days of worsening weakness, abdominal distension, and no output from his PCN. Admitted with emphysematous pyelonephritis.     (6/18): s/p LT PCN upsizing to 16Fr and 16Fr RLQ drain placement with IR   (6/22): s/p shock lithotripsy of L kidney abscess cavity with drainage of thick contents, upsize of drain to 26Fr bobby catheter  (6/30): s/p ex-lap and L nephrectomy with abdominal washout    NEURO:  #Pain control  - APAP PRN  - Dilaudid PRN  #Sedation  - off all sedation  - zyprexa at bedtime   #altered mental status- resolving  - CT head 7/10: No acute intracranial pathology  - currently not following commands but able to open eyes to voice and withdraw from pain    RESP:  #acute respiratory failure secondary to septic shock  - intubated 6/24  - Mode: SIMV 450/16/40/8  - Tolerating intermittent PS  - LL @ 23 cm  -AM ABG:   #subcutaneous emphysema   #acute respiratory failure with L lung mucus plugging   - Bronchoscopy 7/10: No mucous plug found  - CT Chest 7/10: Interval increase in size of bilateral pleural effusions with adjacent consolidation both lower lobes.   #Pulmonary nodules / Mediastinal lymphadenopathy  - CT Chest: Right middle and upper lobe solid pulmonary nodules, f/u outpatient pulm   #Activity  - increase as tolerated    CARDS:   #Septic shock  - levo and vaso gtt; wean as tolerated  #New onset Afib w/ RVR with hypotension -- likely 2/2 sepsis  - HR control with Metoprolol 2.5 q6 IV HOLDING in the setting of HYPOtension  - Digoxin administered, currently HOLDING, last level <0.3  - Cardiology c/s, allow permissive tachycardia, cardizem for rate control  #H/O HTN  - Amlodipine 2.5mg HOLDING  #Imaging  - ECHO: (6/19) EF 55-60%, mild AS, mild LAE, mild MR/TR, trivial pericardial effusion    GI/NUTR:  #proximal small bowel leak  - CT AP PO/IV/rectal contrast 7/10: proximal small bowel leak suspected  - diet: NPO on TPN @60cc/hr  + lipids  - Octreotide 50 mcg subq Q6  #bowel movements  - GI consult 6/26- Plan for EGD with EUS + FNA as outpatient, H-pylori treatment after resolution of acute issues   #GI PPX  - IV Protonix 40 q12 hours  #Gastric mass vs gastrohepatic lymphadenopathy  - CT A/P: lesser curvature mass, Hepatic hypodensities  #Splenic Hypodensities, infarct vs phlegmon/abscess  - CT A/P: Wedge-shaped region of hypodensity within the spleen    /RENAL:   #Grade IV L hydronephrosis s/p L PCN (5/2023)   #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  - (6/23) s/p LT PCN upsizing to 26Fr catheter, RLQ 16 Fr drain remains, s/p nephrostolithotomy w/ lithotripsy  - (6/18) s/p LT PCN upsizing to 16Fr and 16Fr RLQ drain placement with IR, flush q8 hr  - (6/30): s/p L nephrectomy and abdominal washout   - VENICE drains x3 pelvis, Lerma's pouch, L nephrectomy site; L VENICE and R VENICE (pelvis) with dark green/murky/feculent OP  #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   #metabolic acidosis  - Nephrology c/s - CVVHD started 07/02,  CVVHD held on 07/13, resumed on 7/15  #urine output in critically ill  - Straight cath every 12 hours  #decreased urine output in the setting of acute renal failure 2/2 septic shock  #Labs           BUN/Cr 28/1.1           Electrolytes-Na 132 // K 4.2 // Mg 2.1 //  Phos 4.9 07-16 @ 04:12      HEME/ONC:   #DVT prophylaxis  - SCDs, heparin subQ   #new onset Afib  - holding FULL AC   #R radial artery occlusion  - RUE duplex 07/01 + for right radial artery occlusion  #RT posterior popliteal vein DVT  - vascular c/s - recommend AC   #H/o Fe Deficiency anemia  #Thrombocytopenia  - Heme consult (7/3): HIT workup negative    Labs: Hb/Hct:  7.3/23.2  -->, 1PRBC  9.5/30.3  -->,  8.4/26.8  -->           Plts:  47  -->,  43  -->,  23  -->              PTT/INR:  37.6/1.88 -> Vitamin K x1      Type and Screen expires: 7/14       # Anemia- symptomatic    -1pbc 7/15    ID:  multiorgan system failure secondary to emphysematous pyelonephritis s/p left nephrectomy  Antibiotics     - Daptomycin (7/14 -    - Ceftriaxone (7/13 -     - Metronidazole ( 7/13 -     - Levofloxacin (6/25 -   WBC- 6.46  --->>,  6.64  --->> 8.68 --> 18.12 > 16.6  Trend Temps    #Cultures  Surgical swab 06/30: vancomycin resistant enterococcus faecium     L PCN cyto-pathology  6/25: results suggestive of a necrotic neoplasm    Fungitell 6/26: 47 --> >500 (7/6)    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    BAL 6/24: moderate yeast     UCx 6/19: negative FINAL      Left PCN aspiration 6/18: stenotrophomonas maltophilia    Surgical swab (6/30): VRE     Blood cultures: 7/10 NGTD  BCx 7/11-NEGATIVE FINAL    ENDO:  #Glucose monitoring  - A1c (5/7/23): 5.0  - FSG q6 hours while NPO  #Adrenal Insufficiency 2/2 to sepsis   - completed course of solu-cortef  - Cortisol level - binding globulin 1.1 (low), serum cortisol, 84, free cortisol 76, % free cortisol 91  - f/u cortisol 7/15 8AM  #assess thyroid function in setting of catabolic state   - f/u thyroid panel    MSK:  #thoracic spine DTI  - wound care recs: cleanse with soap and water, triad and allevyn BID prn for soiling  #L heel DTI  - wound care recs: pat dry, apply Cavilon, leave open to air twice a day, offload heels, offloading boots  #scrotum and R groin wound: pat dry, triad BID, PRN for soiling  #Sacrococcygeal DTI: wound care following- cleanse with soap and water, apply triad, gauze, and allevyn BID and PRN for soiling  - burn reconsulted   #Venous ulcer right lower leg, R forearm skin tear: Pat dry, apply Xeroform, nonadherent dressing, wrap with Kerlix twice a day, prn for soiling  #decubitus ulcer b/l ischium  #poor wound healing of abdominal incision  - wound care recs: Vashe wound cleanser, Xeroform and abdominal pad BID, PRN for soiling  - burn consulted  #Activity  - Advance as tolerated  - Globally deconditioned  - PT/rehab once status improves  #b/l UE, LE mottled skin  - +ulnar/DP/PT pulses present on doppler  - Arterial duplex bilateral upper extremities (7/1)  - Right radial artery occlusion   - VA Duplex bilateral lower extremities (7/3) - Right PT vein DVT     LINES/DRAINS:   PIV  Bobby (6/18-7/10)  L eleaazr drain (6/30)  R Lerma pouch #1, pelvic drain #2 (6/30)  Left basilic midline  6/21  Right axillary a line (6/30-7/4)  Right Radial Joleen (6/22-6/30)  L-Femoral [place] (7/10 - )  L-Femoral [place] (7/10 -7/16 )  L IJ UDALL (7/16-)  RIJ TLC (7/10-  )  ETT  OGT    ADVANCED DIRECTIVES:  Full Code  HCP/Emergency Contact- Tracey Adames: [phone]   C discussions continue to be held with family.     INDICATION FOR SICU: New onset atrial fibrillation with mechanical ventilation    DISPOSITION: SICU

## 2023-07-17 NOTE — PROGRESS NOTE ADULT - NS ATTEND OPT1 GEN_ALL_CORE

## 2023-07-17 NOTE — PROGRESS NOTE ADULT - SUBJECTIVE AND OBJECTIVE BOX
UROLOGY DAILY PROGRESS NOTE    Pt is a 67 y/o Male with retroperitoneal and intraabdominal abscess s/p IR placement of drain and LEFT PCN 6/18, s/p percutaneous drainage of LEFT renal abscess POD # 22 with left 26fr flank catheter in place, s/p Left radical nephrectomy, Ex-Lap POD # 16. Patient remains intubated w/ ETT on pressors.     MEDICATIONS  (STANDING):  alteplase for catheter clearance 2 milliGRAM(s) Catheter once  bacitracin   Ointment 1 Application(s) Topical two times a day  caspofungin IVPB      caspofungin IVPB 50 milliGRAM(s) IV Intermittent every 24 hours  cefTRIAXone   IVPB 2000 milliGRAM(s) IV Intermittent every 24 hours  cefTRIAXone   IVPB      chlorhexidine 0.12% Liquid 15 milliLiter(s) Oral Mucosa every 12 hours  chlorhexidine 2% Cloths 1 Application(s) Topical <User Schedule>  chlorhexidine 4% Liquid 1 Application(s) Topical <User Schedule>  collagenase Ointment 1 Application(s) Topical two times a day  CRRT Treatment    <Continuous>  heparin   Injectable 5000 Unit(s) SubCutaneous every 8 hours  levoFLOXacin IVPB 750 milliGRAM(s) IV Intermittent every 48 hours  metroNIDAZOLE  IVPB      metroNIDAZOLE  IVPB 500 milliGRAM(s) IV Intermittent every 8 hours  norepinephrine Infusion 0.01 MICROgram(s)/kG/Min (0.72 mL/Hr) IV Continuous <Continuous>  octreotide  Injectable 50 MICROGram(s) SubCutaneous every 6 hours  OLANZapine Injectable 2.5 milliGRAM(s) IntraMuscular at bedtime  pantoprazole  Injectable 40 milliGRAM(s) IV Push every 12 hours  Parenteral Nutrition - Adult 1 Each (65 mL/Hr) TPN Continuous <Continuous>  PureFlow Dialysate RFP-400 (K 2 / Ca 3) 5000 milliLiter(s) (2000 mL/Hr) CRRT <Continuous>  vasopressin Infusion 0.03 Unit(s)/Min (4.5 mL/Hr) IV Continuous <Continuous>    MEDICATIONS  (PRN):  HYDROmorphone  Injectable 0.5 milliGRAM(s) IV Push every 4 hours PRN Severe Pain (7 - 10)  sodium chloride 0.9% lock flush 10 milliLiter(s) IV Push every 1 hour PRN Pre/post blood products, medications, blood draw, and to maintain line patency  sodium chloride 0.9% lock flush 10 milliLiter(s) IV Push every 1 hour PRN Pre/post blood products, medications, blood draw, and to maintain line patency      REVIEW OF SYSTEMS   [x] Due to altered mental status/intubation, subjective information were not able to be obtained from patient. History was obtained, to the extent possible, from review of the chart and collateral sources of information.    Vital Signs Last 24 Hrs  T(C): 34.9 (17 Jul 2023 08:00), Max: 35.8 (16 Jul 2023 20:00)  T(F): 94.8 (17 Jul 2023 08:00), Max: 96.5 (16 Jul 2023 20:00)  HR: 109 (17 Jul 2023 10:45) (72 - 118)  RR: 26 (17 Jul 2023 10:45) (11 - 29)  SpO2: 100% (17 Jul 2023 10:45) (94% - 100%)    Parameters below as of 17 Jul 2023 09:00  Patient On (Oxygen Delivery Method): ventilator        PHYSICAL EXAM:    GEN: NAD, intubated sedated  SKIN: cyanotic digits, non diaphoretic.  RESP: vented  ABDO: Incision cyanotic, Soft, #1 Lerma pouch eleazar draining serosanguinous fluid, #2 Pelvic pouch eleazar drain draining murky brown drainage. +Right abd pouch with murky brown drainage  BACK:  #3 Left abd eleazar drain draining murky brown drainage   : + penile/scrotal edema   EXT: + cyanosis      I&O's Summary    16 Jul 2023 07:01  -  17 Jul 2023 07:00  --------------------------------------------------------  IN: 3639.4 mL / OUT: 2022 mL / NET: 1617.4 mL    17 Jul 2023 07:01  -  17 Jul 2023 11:23  --------------------------------------------------------  IN: 286.9 mL / OUT: 40 mL / NET: 246.9 mL        LABS:                        7.0    13.00 )-----------( 22       ( 17 Jul 2023 06:25 )             22.6     07-17    136  |  104  |  84<HH>  ----------------------------<  114<H>  3.8   |  18  |  1.2    Ca    7.6<L>      17 Jul 2023 05:05  Phos  3.7     07-17  Mg     2.1     07-17      PT/INR - ( 17 Jul 2023 05:05 )   PT: 25.80 sec;   INR: 2.21 ratio         PTT - ( 17 Jul 2023 05:05 )  PTT:45.0 sec  Urinalysis Basic - ( 17 Jul 2023 05:05 )    Color: x / Appearance: x / SG: x / pH: x  Gluc: 114 mg/dL / Ketone: x  / Bili: x / Urobili: x   Blood: x / Protein: x / Nitrite: x   Leuk Esterase: x / RBC: x / WBC x   Sq Epi: x / Non Sq Epi: x / Bacteria: x            RADIOLOGY & ADDITIONAL STUDIES:

## 2023-07-17 NOTE — PROGRESS NOTE ADULT - ASSESSMENT
67 y/o Male with retroperitoneal and intraabdominal abscess s/p IR placement of drain and LEFT PCN 6/18, s/p percutaneous drainage of LEFT renal abscess POD # 22 with left 26fr flank catheter in place, s/p Left radical nephrectomy, Ex-Lap POD # 16. Patient remains intubated w/ ETT on pressors.      A) critically ill  Abdominal abscess  hyponatremia  leukocytosis    P) Continue management as per SICU  No further acute urologic intervention  d/w attending

## 2023-07-17 NOTE — PROGRESS NOTE ADULT - SUBJECTIVE AND OBJECTIVE BOX
GENERAL SURGERY PROGRESS NOTE    Patient: CHRISTINE VILLAVICENCIO , 68y ([birth year])Male   MRN: [id number]  Location: [address]  Visit: 06-18-23 Inpatient  Date: 07-17-23 @ 07:52    Hospital Day #:30  Post-Op Day #:17    Procedure/Dx/Injuries: L EMPHYSEMATOUS PYELONEPHRITIS   6/18 - S/P IR PLACEMENT OF 16FR RLQ DRAIN & UPSIZING L NEPHROSTOMY TUBE  6/22 - S/P NEPHROSTOLITHOTOMY W/ LITHOTRIPSY ([facility]ITZ)  6/30 - S/P EX LAP, RADICAL LEFT NEPHRECTOMY, WASHOUTJP X3    Events of past 24 hours: On pressors, levo 0.32, vaso 0.03. CVVH restarted. Left drain 1L output, R drain 10cc, Lerma pouch 10cc output.     PAST MEDICAL & SURGICAL HISTORY:      Vitals:   T(F): 95 (07-17-23 @ 04:00), Max: 96.5 (07-16-23 @ 20:00)  HR: 100 (07-17-23 @ 06:00)  BP: --  RR: 23 (07-17-23 @ 06:00)  SpO2: 98% (07-17-23 @ 06:00)  Mode: AC/ CMV (Assist Control/ Continuous Mandatory Ventilation), RR (machine): 18, TV (machine): 450, FiO2: 40, PEEP: 5, ITime: 1, MAP: 7, PIP: 36    Diet, NPO      Fluids:     I & O's:    07-16-23 @ 07:01  -  07-17-23 @ 07:00  --------------------------------------------------------  IN:    Fat Emulsion (Fish Oil &amp; Plant Based) 20% Infusion: 250.4 mL    IV PiggyBack: 250 mL    IV PiggyBack: 150 mL    IV PiggyBack: 1.3 mL    IV PiggyBack: 200 mL    IV PiggyBack: 150 mL    IV PiggyBack: 150 mL    Norepinephrine: 343.7 mL    Plasma: 348 mL    Platelets - Single Donor: 200 mL    TPN (Total Parenteral Nutrition): 1488 mL    Vasopressin: 108 mL  Total IN: 3639.4 mL    OUT:    Drain (mL): 15 mL    Drain (mL): 1150 mL    Drain (mL): 85 mL    Drain (mL): 15 mL    Nasogastric/Oral tube (mL): 600 mL    Other (mL): 107 mL    Voided (mL): 50 mL  Total OUT: 2022 mL    Total NET: 1617.4 mL      PHYSICAL EXAM:  General: Responds to pain, but does not follow commands  Cardiac: RRR  Respiratory: intubated, chest rise equal   Abdomen: Soft, non-distended, non-tender, 3 drains in place  Skin: Warm/dry, normal color, no jaundice  Incision/wound: dressings in place, clean, dry and intact    MEDICATIONS  (STANDING):  bacitracin   Ointment 1 Application(s) Topical two times a day  caspofungin IVPB      caspofungin IVPB 50 milliGRAM(s) IV Intermittent every 24 hours  cefTRIAXone   IVPB 2000 milliGRAM(s) IV Intermittent every 24 hours  cefTRIAXone   IVPB      chlorhexidine 0.12% Liquid 15 milliLiter(s) Oral Mucosa every 12 hours  chlorhexidine 2% Cloths 1 Application(s) Topical <User Schedule>  chlorhexidine 4% Liquid 1 Application(s) Topical <User Schedule>  collagenase Ointment 1 Application(s) Topical two times a day  CRRT Treatment    <Continuous>  heparin   Injectable 5000 Unit(s) SubCutaneous every 8 hours  levoFLOXacin IVPB 750 milliGRAM(s) IV Intermittent every 48 hours  metroNIDAZOLE  IVPB 500 milliGRAM(s) IV Intermittent every 8 hours  metroNIDAZOLE  IVPB      norepinephrine Infusion 0.01 MICROgram(s)/kG/Min (0.72 mL/Hr) IV Continuous <Continuous>  octreotide  Injectable 50 MICROGram(s) SubCutaneous every 6 hours  OLANZapine Injectable 2.5 milliGRAM(s) IntraMuscular at bedtime  pantoprazole  Injectable 40 milliGRAM(s) IV Push every 12 hours  Parenteral Nutrition - Adult 1 Each (65 mL/Hr) TPN Continuous <Continuous>  PureFlow Dialysate RFP-400 (K 2 / Ca 3) 5000 milliLiter(s) (2000 mL/Hr) CRRT <Continuous>  vasopressin Infusion 0.03 Unit(s)/Min (4.5 mL/Hr) IV Continuous <Continuous>    MEDICATIONS  (PRN):  HYDROmorphone  Injectable 0.5 milliGRAM(s) IV Push every 4 hours PRN Severe Pain (7 - 10)  sodium chloride 0.9% lock flush 10 milliLiter(s) IV Push every 1 hour PRN Pre/post blood products, medications, blood draw, and to maintain line patency  sodium chloride 0.9% lock flush 10 milliLiter(s) IV Push every 1 hour PRN Pre/post blood products, medications, blood draw, and to maintain line patency      DVT PROPHYLAXIS: heparin   Injectable 5000 Unit(s) SubCutaneous every 8 hours    GI PROPHYLAXIS: pantoprazole  Injectable 40 milliGRAM(s) IV Push every 12 hours    ANTICOAGULATION:   ANTIBIOTICS:  caspofungin IVPB    caspofungin IVPB 50 milliGRAM(s)  cefTRIAXone   IVPB 2000 milliGRAM(s)  cefTRIAXone   IVPB    levoFLOXacin IVPB 750 milliGRAM(s)  metroNIDAZOLE  IVPB 500 milliGRAM(s)  metroNIDAZOLE  IVPB              LAB/STUDIES:  Labs:  CAPILLARY BLOOD GLUCOSE      POCT Blood Glucose.: 131 mg/dL (16 Jul 2023 17:25)  POCT Blood Glucose.: 156 mg/dL (16 Jul 2023 11:55)                          7.0    13.00 )-----------( x        ( 17 Jul 2023 06:25 )             22.6       Auto Neutrophil %: 91.0 % (07-17-23 @ 05:05)  Auto Immature Granulocyte %: 0.9 % (07-17-23 @ 05:05)    07-17    136  |  104  |  84<HH>  ----------------------------<  114<H>  3.8   |  18  |  1.2      Calcium: 7.6 mg/dL (07-17-23 @ 05:05)      LFTs:     Blood Gas Arterial, Lactate: 3.70 mmol/L (07-17-23 @ 04:03)  Blood Gas Arterial, Lactate: 3.10 mmol/L (07-16-23 @ 18:43)  Blood Gas Arterial, Lactate: 4.20 mmol/L (07-16-23 @ 10:08)  Blood Gas Arterial, Lactate: 4.20 mmol/L (07-16-23 @ 03:26)  Blood Gas Arterial, Lactate: 2.40 mmol/L (07-15-23 @ 02:55)  Blood Gas Arterial, Lactate: 2.70 mmol/L (07-14-23 @ 10:04)    ABG - ( 17 Jul 2023 04:03 )  pH: 7.23  /  pCO2: 42    /  pO2: 73    / HCO3: 18    / Base Excess: -9.6  /  SaO2: 97.1      ABG - ( 16 Jul 2023 18:43 )  pH: 7.20  /  pCO2: 50    /  pO2: 122   / HCO3: 20    / Base Excess: -8.1  /  SaO2: 99.3      ABG - ( 16 Jul 2023 10:08 )  pH: 7.22  /  pCO2: 41    /  pO2: 130   / HCO3: 17    / Base Excess: -10.2 /  SaO2: 99.6        Coags:     25.80  ----< 2.21    ( 17 Jul 2023 05:05 )     45.0        Urinalysis Basic - ( 17 Jul 2023 05:05 )    Color: x / Appearance: x / SG: x / pH: x  Gluc: 114 mg/dL / Ketone: x  / Bili: x / Urobili: x   Blood: x / Protein: x / Nitrite: x   Leuk Esterase: x / RBC: x / WBC x   Sq Epi: x / Non Sq Epi: x / Bacteria: x

## 2023-07-17 NOTE — PROGRESS NOTE ADULT - ASSESSMENT
68M w/ PMHx of retroperitoneal and intraabdominal abscess s/p IR placement of drain and LEFT PCN 6/18, s/p percutaneous drainage of LEFT renal abscess with left 26fr flank catheter in place. s/p Left radical nephrectomy, Ex-Lap (6/30) with increased pressor requirements and symptoms of ischemia in distal extremities. Now with likely small bowel perforation.     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

## 2023-07-17 NOTE — PROGRESS NOTE ADULT - SUBJECTIVE AND OBJECTIVE BOX
NUTRITION SUPPORT TEAM  -  PROGRESS NOTE     Interval Events:  pt remains intubated, sedated, does open eyes  CVC c/d/i  CVVH resumed 7/15, held much of yesterday, running now  d/w SICU team daily    VITALS:  T(F): 94.8 (07-17 @ 08:00), Max: 95 (07-17 @ 04:00)  HR: 109 (07-17 @ 10:45) (87 - 118)  BP: --  RR: 26 (07-17 @ 10:45) (18 - 28)  SpO2: 100% (07-17 @ 10:45) (94% - 100%)    HEIGHT/WEIGHT/BMI:   Height (cm): 180 (06-30), 180.3 (05-11)  Weight (kg): 76.3 (06-30), 66.2 (05-10)  BMI (kg/m2): 23.5 (06-30), 20.4 (05-11)    Mode: AC/ CMV (Assist Control/ Continuous Mandatory Ventilation)  RR (machine): 18  TV (machine): 450  FiO2: 40  PEEP: 5  ABG - ( 17 Jul 2023 04:03 )  pH, Arterial: 7.23  pH, Blood: x     /  pCO2: 42    /  pO2: 73    / HCO3: 18    / Base Excess: -9.6  /  SaO2: 97.1      I/Os:     07-16-23 @ 07:01  -  07-17-23 @ 07:00  --------------------------------------------------------  IN:    Fat Emulsion (Fish Oil &amp; Plant Based) 20% Infusion: 250.4 mL    IV PiggyBack: 250 mL    IV PiggyBack: 150 mL    IV PiggyBack: 1.3 mL    IV PiggyBack: 200 mL    IV PiggyBack: 150 mL    IV PiggyBack: 150 mL    Norepinephrine: 343.7 mL    Plasma: 348 mL    Platelets - Single Donor: 200 mL    TPN (Total Parenteral Nutrition): 1488 mL    Vasopressin: 108 mL  Total IN: 3639.4 mL    OUT:    Drain (mL): 15 mL    Drain (mL): 1150 mL    Drain (mL): 85 mL    Drain (mL): 15 mL    Nasogastric/Oral tube (mL): 600 mL    Other (mL): 107 mL    Voided (mL): 50 mL  Total OUT: 2022 mL    Total NET: 1617.4 mL    STANDING MEDICATIONS:   alteplase for catheter clearance 2 milliGRAM(s) Catheter once  alteplase for catheter clearance 2 milliGRAM(s) Catheter once  bacitracin   Ointment 1 Application(s) Topical two times a day  caspofungin IVPB      caspofungin IVPB 50 milliGRAM(s) IV Intermittent every 24 hours  cefTRIAXone   IVPB 2000 milliGRAM(s) IV Intermittent every 24 hours  cefTRIAXone   IVPB      chlorhexidine 0.12% Liquid 15 milliLiter(s) Oral Mucosa every 12 hours  chlorhexidine 2% Cloths 1 Application(s) Topical <User Schedule>  chlorhexidine 4% Liquid 1 Application(s) Topical <User Schedule>  collagenase Ointment 1 Application(s) Topical two times a day  CRRT Treatment    <Continuous>  heparin   Injectable 5000 Unit(s) SubCutaneous every 8 hours  levoFLOXacin IVPB 750 milliGRAM(s) IV Intermittent every 48 hours  metroNIDAZOLE  IVPB 500 milliGRAM(s) IV Intermittent every 8 hours  metroNIDAZOLE  IVPB      norepinephrine Infusion 0.01 MICROgram(s)/kG/Min IV Continuous <Continuous>  octreotide  Injectable 50 MICROGram(s) SubCutaneous every 6 hours  OLANZapine Injectable 2.5 milliGRAM(s) IntraMuscular at bedtime  pantoprazole  Injectable 40 milliGRAM(s) IV Push every 12 hours  Parenteral Nutrition - Adult 1 Each TPN Continuous <Continuous>  PureFlow Dialysate RFP-400 (K 2 / Ca 3) 5000 milliLiter(s) CRRT <Continuous>  vasopressin Infusion 0.03 Unit(s)/Min IV Continuous <Continuous>    LABS:                         7.0    13.00 )-----------( 22 ( 17 Jul 2023 06:25 )             22.6     136  |  104  |  84<HH>  ----------------------------<  114<H>          (07-17-23 @ 05:05)  3.8   |  18  |  1.2    Ca    7.6<L>          (07-17-23 @ 05:05)  Phos  3.7         (07-17-23 @ 05:05)  Mg     2.1         (07-17-23 @ 05:05)    Triglycerides, Serum: 88 mg/dL (07-02 @ 21:01)    Vitamin D, 25-Hydroxy: 26 ng/mL (05-08 @ 07:58)    Folate: 5.7 ng/mL (05-07 @ 09:19)    Vitamin B12, Serum: 766 pg/mL (05-07 @ 09:19)    Zinc Level, Plasma: ??  C-Reactive Protein, Serum: 106.7 mg/L (07.14.23 @ 04:41)   C-Reactive Protein, Serum: 245.1 mg/L (07.10.23 @ 01:30)     Blood Glucose (Past 24 hours):  127 mg/dL (07-17 @ 09:20)  131 mg/dL (07-16 @ 17:25)    DIET:   Diet, NPO (07-05-23 @ 10:33) [Active]

## 2023-07-17 NOTE — PROGRESS NOTE ADULT - SUBJECTIVE AND OBJECTIVE BOX
CHRISTINE VELÁZQUEZTO  [id number]  67y Male    Indication for ICU admission: mechanical ventilator management in the setting of septic shock    Admit Date:06-18-23  ICU Date: 6/18/23  OR Date: 6/18 (IR), 6/22, 6/30     24HRS EVENT:  7/16  OV  L drain increased output  Levo 0.16  R IJ UDALL placed     Day  -Restart CVVH   -ABG 7.22/41/130/17  -Left IJ U Dall placement   -1 U platelet, 1 FFP   -q24 straight cath      [] A ten-point review of systems was otherwise negative except as noted above.  [x  ] Due to altered mental status/intubation, subjective information was not attained from the patient. History was obtained, to the extent possible, from review of the chart and collateral sources of information.      =========================================================================================================================================

## 2023-07-17 NOTE — PROGRESS NOTE ADULT - ASSESSMENT
PLAN:  cont TPN  lipids ? every other day depending on pleatelets and other factors  AA (and therefore volume) in TPN increased once CVVH re-established  follow CRP and prealbumin together for trend - that is what is clinically applicable - to repeat both together on 7/17 please  check Zn level  (could f/u 25oh vit D but can't treat easily until enteral route usable)

## 2023-07-17 NOTE — PROGRESS NOTE ADULT - ASSESSMENT
67M w/ PMH of HTN, grade IV hydronephrosis of L kidney s/p L PCN (placed 5/2023), stutter, hiatal hernia, iron deficiency anemia, H Pylori (untreated), and gastric mass (never biopsied) presents to the ED with at least 4 days of worsening weakness, abdominal distension, and no output from his PCN  . Found to have septic shock, MSOF, lactic acidosis from left emphysematous hydronephrosis, pyelonephritis and possible rupture with pneumoperitoneum along with possible hepatic abscesses, splenic infarct and abdominal and mediastinal lymphadenopathies.  had nephrectomy and abdominal washout on 6/30  Oliguric in spite of bumex, rising dig level, high lactate, started CVVHD     ROJAS likely ATN iso septic shock  CVVHD resumed will keep neg 50 cc per hour   anuric   on pressors   ph noted / check daily   ID notes appreciated and meds dosing for  CVVHD    will follow  prognosis guarded

## 2023-07-18 NOTE — PROGRESS NOTE ADULT - SUBJECTIVE AND OBJECTIVE BOX
CHRISTINE VILLAVICENCIO  [id number]  67y Male    Indication for ICU admission: mechanical ventilator management in the setting of septic shock    Admit Date:23  ICU Date: 23  OR Date:  (IR), ,      24HRS EVENT:    Night  - error with CVVH machine --> will attempt to troubleshoot and restart, [place] has good flow  - PM AB.07/53/247/15   Lac 3.8  - Increased RR to 22 (from 18) --> increased RR to 26  - 1 amp sodium bicarb given  - 50cc 25% albumin bolus given with no improvement in BP  - increasing levophed requirements, added taj, discussed with Dr. Orozco  - Noted to be hypotensive in the setting of acidosis --> sodium bicarb x2 amps w/ improvement in BP and starting bicarb infusion  - hyperkalemia potassium 6.1 and hypoglycemic --> 10 units insulin and 1amp D50    DAY  Off precedex  Minimal MV, vaso/levo ongoing  UDALL clotted, cathflo with improved flow -> resumed CVVH  Ethics consult   changed octreotide SQ to 100q8 IV      [] A ten-point review of systems was otherwise negative except as noted above.  [x  ] Due to altered mental status/intubation, subjective information was not attained from the patient. History was obtained, to the extent possible, from review of the chart and collateral sources of information.

## 2023-07-18 NOTE — CHART NOTE - NSCHARTNOTEFT_GEN_A_CORE
Extensive conversation held with Dr. Mitchell from ethics regarding prognosis. Decision was made to arrange a family meeting today given the patient's rapid decline in the setting of worsening metabolic and respiratory acidosis, hemodynamic instability on pressors, and respiratory failure. Tracey was contacted to arrange however proxy requested additional personnel to coordinate. Tracey was recontacted  shortly thereafter and she did not answer the phone. Discussed with Dr. Fontaine

## 2023-07-18 NOTE — CONSULT NOTE ADULT - SUBJECTIVE AND OBJECTIVE BOX
The case was discussed with NP Baylee Thibodeaux who stated the patient is critically ill in the SICU and has been hospitalized for a month. The patient is on a ventilator in septic shock, not following commands. the patiens daughter Rupali is the surrogated decision maker and is requesting a full code. During a family meeting last week she has been told that it is unlikely that her father will leave the hospital. The patients' medical condition has worsened, now requiring increasing medication to maintain his blood pressure and currently receiving a blood transfusion. Rupali has been updated on her fathers' condition and is requesting full code status. I am available today for a family meeting.

## 2023-07-18 NOTE — PROGRESS NOTE ADULT - ASSESSMENT
ASSESSMENT  66yo Male with pmh of hypertension, massive Grade IV hydronephrosis s/p left nephrostomy placed in May 2023 by [facility] presents to the ED with little to no urine output from LEFT nephrostomy from about a week. PCN  more recently drainage was changed to brown/purulent fluid. 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      IMPRESSION  #Fluctuating WBC   #Fever , resolved    7/11 BCX NG   CTAP 1. Large volume extraluminal left upper quadrant/retroperitoneal oral contrast; proximal small bowel leak suspected.  2. Moderate volume free fluid with enhancement of peritoneal reflections, consistent with peritonitis. Evaluation for focal abscesses difficult   given extensive background free fluid.  3. Increased number and size of bilobar hepatic hypodense suspected hepatic abscesses, including segment IV/III multiseptated suspected   abscess measuring 5 cm, previously 2.4 cm on 6/18/2023.  4. Small bilateral pleural effusions. Left lower lobe consolidativeopacity, may represent pneumonia in the appropriate clinical setting.  5. Unchanged retroperitoneal lymphadenopathy.  6. Persistent pneumoperitoneum.   Elevated fungitell- . 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 "Exploratory laparotomy for possible left renal cancer complicated by significant hydronephrosis and pyelonephritis. Left nephrectomy performed, notable for necrotic bulky tissue adherent to the renal vein, staple line in tact and hemostasis achieved. Copious irrigation of retroperitoneal renal bed and intraperitoneal fibrinous exudate and purulent fluid. Three drains placed, #1 in Lerma's pouch, #2 in the pelvis, and #3 in the renal bed"    6/22 UCX  Few Finegoldia magna "Susceptibilities not performed"  Nephrostolithotomy, percutaneous, with lithotripsy, large stone 22-Jun-2023 23:27:09 left large thick abscess materia dimension of aspirate over 4 x 4 x 4 cm Bernie Perdomo  Change external ureteral stent 22-Jun-2023 23:28:03 left Yanidella Bernie CLIFFORD  Nephrostogram 22-Jun-2023 23:28:31 left Yanidella Bernie HORTON. "thick gelatinous material that was difficult to suction out even with the shock pulse. I would alternate 2 prong to remove and break up / shock pulse to suck out. after an hour where I had no clear planes I elected to stop. irrigation through the 26 bobby took out a lot additional material  in ashlyn catch cup 4 x 4 x 4 cm lump of material trapped"    6/19 UCX NG; UA Blood: x / Protein: 300 mg/dL / Nitrite: Negative   Leuk Esterase: Large / RBC: 8 /HPF / WBC >720 /HPF   Sq Epi: x / Non Sq Epi: x / Bacteria: Moderate    6/18 L PCN     Few Stenotrophomonas maltophilia Levofloxacin: S 2    6/18 L PCN     Numerous Anaerobic Gram Positive Cocci Most closely resembling  Peptoniphilus species "Susceptibilities not performed"    6/18 L PCN   Numerous Gram positive cocci in pairs per oil power field    6/18 BCX NGTD     s/p 6/18  Left PCN upsized to 16F and 1200cc of very viscous tan colored fluid was aspirated. RLQ 16F drain was placed and 1200cc of tan colored fluid was aspirated (less viscous). A sample from each location was sent for culture.     Repeat CT 6/19 Since one day earlier,  No extraluminal contrast. Oral contrast was last seen in the terminal ileum.  Status post left nephrostomy tube placement. Left enlarged kidney with severe hydronephrosis/collection has decreased, now measuring 16 x 12 cm from 20 x 14 cm  Surgical Pathology Report:   Left renal abscess, possible parenchyma  Left renal abscess, possible parenchyma, percutaneous drainage:  -  Fragments of extensively necrotic tissue, cannot be fullycharacterized  Comment: The microscopic appearance of the necrotic tissue is concerning for a neoplasm with coagulative necrosis. Definitive diagnosis cannot be  made, due to lack of viable material.  Additional sections will be submitted.  Immunohistochemical studies willbe performed, in an attempt to characterise the necrotic tissue.  Based on the H&E appearance, an infectious process such as tuberculosis  appears less likely, but special stains for microorganisms (acid-fastbacilli and fungal) are in progress and will be reported separately.   (06.22.23 @ 22:12)  6/27 CT Study is overall very limited due to lack of intravenous contrast, beam   Willingham artifact.  Persistent bilateral pleural effusions with adjacent consolidations   likely reflecting compressive atelectasis.  Left kidney poorly defined with a heterogeneous collection with air and   fluid which has likely mildly diminished in size compared to the prior   examination but exact comparison limited.  Diffuse ascites again noted. Diffuse anasarca again noted. Persistent   pneumoperitoneum.  Poorly defined lesions within the liver adjacent to the falciform   ligament which may again reflect abscesses or masses, difficult to   evaluate due to the limitations described above.  < from: CT Abdomen and Pelvis w/ IV Cont (06.18.23 @ 15:15) >  1 ) Moderate pneumoperitoneum with partial diffusion throughout moderate   volume ascites and with associated intermittent peritoneal enhancement   and nodularity. Perforation and peritonitis may be related to underlying   emphysematous pyelonephritis (see below). Less likely sources of   perforation include ureteral/bladder disruption, and bowel perforation   which cannot be entirely excluded on the provided images.  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 The compressed residual renal   parenchyma is inseparable from the adjacent fascial/fatty layers   anterosuperiorly and direct rupture into the peritoneum is a possibility   (6-355). The previously placed left-sided nephrostomy tube pigtail is   notedto be within the left renal collecting system.  3) New hepatic hypodensities measuring up to 2.6 cm, suspicious for   development of multiple focal abscesses.  4) Wedge-shaped region of hypodensity within the spleen may reflect   splenic infarct in the correct clinical setting. Developing   phlegmon/abscess cannot be excluded in the current clinical setting  5) Increased extensive retroperitoneal, portacaval and likely   gastrohepatic heterogeneously enhancing lymphadenopathy. Findings may be   reactive.  6) Mediastinal lymphadenopathy measuring up to 2.4 cm short axis.   Underlying etiology may be reactive, infectious/inflammatory, or   neoplastic. A short-term 3 month follow-up CT chest should be considered   for further evaluation.  7)Right middle and upper lobe solid pulmonary nodules which are not well   delineated due to respiratory motion but measure less than 6 mm.   attention on follow-up exam.  #Reintubated, L lung white out, ruled out HAP    6/24 bronch CX NG,   Moderate Yeast- likely colonizer  #Lactic acidosis  #Abx allergy: No Known Allergies  #Prolonged QTC Calculation(Bazett) 526 ms  #Hyponatremia  #AKICreatinine: Creatinine: 1.3 mg/dL (07-05-23 @ 05:45)  Ht 180  Weight (kg): 76.5 (06-18-23 @ 21:27)  crcl 57     RECOMMENDATIONS  - CT results noted, suspected leak, liver abscesses  - Will need prolonged ~6 week antibiotics course given liver abscesses  - Doubt cure with antibiotics alone  - Appreciate IR consult- no role for liver aspiration  - Continue Ceftriaxone 2g q24h IV + IV flagyl 500mg q8h   - WBC uptrending now downtrending , monitor closely if hemodynamic compromise, will broaden back to meropenem, though did not grow any MDRO  - Continue Caspo 50mg q24h IV, f/u repeat fungitell   - Continue DAPTOmycin IVPB 460 milliGRAM(s) IV Intermittent every 48 hours  - Continue levoFLOXacin IVPB 750 milliGRAM(s) IV Intermittent every 48 hours  - Trend WBC   - Urology following  - Poor prognosis, GOC    If any questions, please send a message or call on [employer]  Please continue to update ID with any pertinent new laboratory or radiographic findings  #[id number]

## 2023-07-18 NOTE — PROGRESS NOTE ADULT - SUBJECTIVE AND OBJECTIVE BOX
Pt is a 67 y/o Male with retroperitoneal and intraabdominal abscess s/p IR placement of drain and LEFT PCN 6/18, s/p percutaneous drainage of LEFT renal abscess POD # 23 with left 26fr flank catheter in place, s/p Left radical nephrectomy, Ex-Lap POD # 17. Patient remains intubated w/ ETT on pressors.     MEDICATIONS  (STANDING):  bacitracin   Ointment 1 Application(s) Topical two times a day  caspofungin IVPB      caspofungin IVPB 50 milliGRAM(s) IV Intermittent every 24 hours  cefTRIAXone   IVPB 2000 milliGRAM(s) IV Intermittent every 24 hours  cefTRIAXone   IVPB      chlorhexidine 0.12% Liquid 15 milliLiter(s) Oral Mucosa every 12 hours  chlorhexidine 2% Cloths 1 Application(s) Topical <User Schedule>  chlorhexidine 4% Liquid 1 Application(s) Topical <User Schedule>  collagenase Ointment 1 Application(s) Topical two times a day  CRRT Treatment    <Continuous>  heparin   Injectable 5000 Unit(s) SubCutaneous every 8 hours  levoFLOXacin IVPB 750 milliGRAM(s) IV Intermittent every 48 hours  metroNIDAZOLE  IVPB      metroNIDAZOLE  IVPB 500 milliGRAM(s) IV Intermittent every 8 hours  norepinephrine Infusion 0.01 MICROgram(s)/kG/Min (0.72 mL/Hr) IV Continuous <Continuous>  octreotide  Injectable 100 MICROGram(s) IV Push every 8 hours  pantoprazole  Injectable 40 milliGRAM(s) IV Push every 12 hours  Parenteral Nutrition - Adult 1 Each (65 mL/Hr) TPN Continuous <Continuous>  phenylephrine    Infusion 0.1 MICROgram(s)/kG/Min (1.43 mL/Hr) IV Continuous <Continuous>  PureFlow Dialysate RFP-400 (K 2 / Ca 3) 5000 milliLiter(s) (2000 mL/Hr) CRRT <Continuous>  sodium bicarbonate  Infusion 0.147 mEq/kG/Hr (75 mL/Hr) IV Continuous <Continuous>  vasopressin Infusion 0.03 Unit(s)/Min (4.5 mL/Hr) IV Continuous <Continuous>    MEDICATIONS  (PRN):  HYDROmorphone  Injectable 0.5 milliGRAM(s) IV Push every 4 hours PRN Severe Pain (7 - 10)  sodium chloride 0.9% lock flush 10 milliLiter(s) IV Push every 1 hour PRN Pre/post blood products, medications, blood draw, and to maintain line patency  sodium chloride 0.9% lock flush 10 milliLiter(s) IV Push every 1 hour PRN Pre/post blood products, medications, blood draw, and to maintain line patency    REVIEW OF SYSTEMS   [X] Due to altered mental status/intubation, subjective information were not able to be obtained from patient. History was obtained, to the extent possible, from review of the chart and collateral sources of information.    Vital Signs Last 24 Hrs  T(C): 36.1 (18 Jul 2023 07:54), Max: 37 (17 Jul 2023 20:00)  T(F): 96.9 (18 Jul 2023 07:54), Max: 98.6 (17 Jul 2023 20:00)  HR: 72 (18 Jul 2023 09:00) (72 - 127)  BP: 74/46 (18 Jul 2023 09:00) (74/46 - 145/72)  BP(mean): 54 (18 Jul 2023 09:00) (54 - 97)  RR: 26 (18 Jul 2023 09:00) (20 - 31)  SpO2: 97% (18 Jul 2023 09:00) (75% - 100%)    Parameters below as of 17 Jul 2023 12:00  Patient On (Oxygen Delivery Method): ventilator    O2 Concentration (%): 100    PHYSICAL EXAM:  GEN: intubated sedated  RESP: vented  ABD: +drains in place   EXT: +edema       I&O's Summary    17 Jul 2023 07:01  -  18 Jul 2023 07:00  --------------------------------------------------------  IN: 5208.5 mL / OUT: 1124 mL / NET: 4084.5 mL    18 Jul 2023 07:01  -  18 Jul 2023 09:50  --------------------------------------------------------  IN: 0 mL / OUT: 400 mL / NET: -400 mL        LABS:                        6.5    15.95 )-----------( 44       ( 18 Jul 2023 01:50 )             21.7     07-18    136  |  104  |  85<HH>  ----------------------------<  129<H>  5.3<H>   |  14<L>  |  1.4    Ca    7.3<L>      18 Jul 2023 04:15  Phos  7.6     07-18  Mg     2.4     07-18      PT/INR - ( 17 Jul 2023 05:05 )   PT: 25.80 sec;   INR: 2.21 ratio      PTT - ( 17 Jul 2023 05:05 )  PTT:45.0 sec

## 2023-07-18 NOTE — PROGRESS NOTE ADULT - ASSESSMENT
67M w/ PMH of HTN, grade IV hydronephrosis of L kidney s/p L PCN (placed 5/2023), stutter, hiatal hernia, iron deficiency anemia, H Pylori (untreated), and gastric mass (never biopsied) presents to the ED with at least 4 days of worsening weakness, abdominal distension, and no output from his PCN  . Found to have septic shock, MSOF, lactic acidosis from left emphysematous hydronephrosis, pyelonephritis and possible rupture with pneumoperitoneum along with possible hepatic abscesses, splenic infarct and abdominal and mediastinal lymphadenopathies.  had nephrectomy and abdominal washout on 6/30  Oliguric in spite of bumex, rising dig level, high lactate, started CVVHD     ROJAS likely ATN iso septic shock  cont CVVHD   anuric   on pressors   phos noted / check daily   ID notes appreciated and meds dosing for  CVVHD    will follow  prognosis guarded

## 2023-07-18 NOTE — PROGRESS NOTE ADULT - SUBJECTIVE AND OBJECTIVE BOX
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)      INTERVAL EVENTS/HPI  - T(F): , Max: 98.6 (07-17-23 @ 20:00), fluctuating WBC, issues with CVVH, increasing pressors  - WBC Count: 15.95 (07-18-23 @ 01:50) downtrending   WBC Count: 20.55 (07-17-23 @ 20:25)     - Creatinine: 1.4 (07-18-23 @ 04:15)  Creatinine: 1.3 (07-18-23 @ 01:50)     -   -   -     ROS  cannot obtain secondary to patient's sedation and/or mental status    VITALS:  T(F): 96.9, Max: 98.6 (07-17-23 @ 20:00)  HR: 104  BP: 116/68  RR: 29Vital Signs Last 24 Hrs  T(C): 36.1 (18 Jul 2023 07:54), Max: 37 (17 Jul 2023 20:00)  T(F): 96.9 (18 Jul 2023 07:54), Max: 98.6 (17 Jul 2023 20:00)  HR: 104 (18 Jul 2023 06:00) (104 - 127)  BP: 116/68 (18 Jul 2023 05:00) (85/57 - 132/71)  BP(mean): 89 (18 Jul 2023 05:00) (63 - 96)  RR: 29 (18 Jul 2023 06:00) (20 - 31)  SpO2: 100% (18 Jul 2023 06:00) (75% - 100%)    Parameters below as of 17 Jul 2023 12:00  Patient On (Oxygen Delivery Method): ventilator    O2 Concentration (%): 100    PHYSICAL EXAM:  ***     FH: Non-contributory  Social Hx: Non-contributory    TESTS & MEASUREMENTS:                        6.5    15.95 )-----------( 44       ( 18 Jul 2023 01:50 )             21.7     07-18    136  |  104  |  85<HH>  ----------------------------<  129<H>  5.3<H>   |  14<L>  |  1.4    Ca    7.3<L>      18 Jul 2023 04:15  Phos  7.6     07-18  Mg     2.4     07-18          Urinalysis Basic - ( 18 Jul 2023 04:15 )    Color: x / Appearance: x / SG: x / pH: x  Gluc: 129 mg/dL / Ketone: x  / Bili: x / Urobili: x   Blood: x / Protein: x / Nitrite: x   Leuk Esterase: x / RBC: x / WBC x   Sq Epi: x / Non Sq Epi: x / Bacteria: x        Culture - Blood (collected 07-11-23 @ 02:25)  Source: .Blood Blood-Peripheral  Final Report (07-16-23 @ 09:01):    No growth at 5 days    Culture - Surgical Swab (collected 06-30-23 @ 22:12)  Source: .Surgical Swab None  Final Report (07-05-23 @ 21:47):    Growth in fluid media only Enterococcus faecium (vancomycin resistant)  Organism: Enterococcus faecium (vancomycin resistant) (07-05-23 @ 21:47)  Organism: Enterococcus faecium (vancomycin resistant) (07-05-23 @ 21:47)      Method Type: EDMUND      -  Ampicillin: R >8 Predicts results to ampicillin/sulbactam, amoxacillin-clavulanate and  piperacillin-tazobactam.      -  Daptomycin: SDD 4 The breakpoint for SDD (susceptible dose dependent)is based on a dosage regimen of 8-12 mg/kg administered every 24 h in adults and is intended for serious infections due to E. faecium. Consultation with an infectious diseases specialist is recommended.      -  Levofloxacin: R >4      -  Linezolid: S 1      -  Tetracycline: R >8      -  Vancomycin: R >16    Culture - Fungal, Bronchial (collected 06-24-23 @ 11:00)  Source: .Bronchial None  Preliminary Report (06-26-23 @ 11:00):    Moderate Yeast    Culture - Acid Fast - Bronchial w/Smear (collected 06-24-23 @ 11:00)  Source: .Bronchial None  Preliminary Report (07-12-23 @ 15:06):    No acid-fast bacilli isolated at 2 weeks.    Culture - Bronchial (collected 06-24-23 @ 11:00)  Source: .Bronchial None  Gram Stain (06-24-23 @ 23:21):    Numerous polymorphonuclear leukocytes per low power field    No squamous epithelial cells per low power field    Rare Yeast like cells per oil power field  Final Report (06-26-23 @ 17:56):    Normal Respiratory Gabbei present    Culture - Fungal, Other (collected 06-22-23 @ 22:11)  Source: .Other None  Preliminary Report (07-15-23 @ 15:01):    No fungus isolated at 3 weeks.    Culture - Surgical Swab (collected 06-22-23 @ 22:11)  Source: .Surgical Swab None  Final Report (06-25-23 @ 14:14):    Few Finegoldia magna "Susceptibilities not performed"    Culture - Urine (collected 06-19-23 @ 02:50)  Source: Clean Catch Clean Catch (Midstream)  Final Report (06-20-23 @ 10:18):    No growth    Culture - Abscess with Gram Stain (collected 06-18-23 @ 23:08)  Source: .Abscess right lower quadrant (intrabdominal)  Gram Stain (06-19-23 @ 11:56):    Numerous polymorphonuclear leukocytes per low power field    Numerous Gram positive cocci in pairs per oil power field  Final Report (06-21-23 @ 14:13):    Numerous Anaerobic Gram Positive Cocci Most closely resembling    Peptoniphilus species "Susceptibilities not performed"    Culture - Abscess with Gram Stain (collected 06-18-23 @ 23:08)  Source: .Abscess left kidney  Gram Stain (06-19-23 @ 11:58):    Rare polymorphonuclear leukocytes per low power field    Few Gram Positive Cocci per oil power field  Final Report (06-22-23 @ 10:13):    Growth in fluid media only Anaerobic Gram Positive Cocci Most closely    resembling Peptoniphilus species "Susceptibilities not performed"    Rare Stenotrophomonas maltophilia  Organism: Stenotrophomonas maltophilia  Stenotrophomonas maltophilia (06-22-23 @ 10:13)  Organism: Stenotrophomonas maltophilia (06-22-23 @ 10:13)      Method Type: EDMUND      -  Levofloxacin: S 1      -  Trimethoprim/Sulfamethoxazole: S <=0.5/9.5  Organism: Stenotrophomonas maltophilia (06-22-23 @ 10:13)      Method Type: KB      -  Minocycline: S    Culture - Abscess with Gram Stain (collected 06-18-23 @ 20:18)  Source: .Abscess left PCN aspirate  Gram Stain (06-19-23 @ 06:27):    No polymorphonuclear cells seen per low power field    Few Gram Variable Cocci seen per oil power field  Final Report (06-24-23 @ 09:11):    Few Stenotrophomonas maltophilia  Organism: Stenotrophomonas maltophilia (06-24-23 @ 09:11)  Organism: Stenotrophomonas maltophilia (06-24-23 @ 09:11)      Method Type: EDMUND      -  Levofloxacin: S 2      -  Trimethoprim/Sulfamethoxazole: S <=0.5/9.5    Culture - Blood (collected 06-18-23 @ 14:18)  Source: .Blood Blood-Peripheral  Final Report (06-24-23 @ 02:00):    No Growth Final    Culture - Blood (collected 06-18-23 @ 14:18)  Source: .Blood Blood-Peripheral  Final Report (06-24-23 @ 02:00):    No Growth Final            INFECTIOUS DISEASES TESTING  Fungitell: >500 (07-06-23 @ 04:30)  Fungitell: 47 (06-25-23 @ 15:30)  MRSA PCR Result.: Negative (06-24-23 @ 17:38)      INFLAMMATORY MARKERS  C-Reactive Protein, Serum: 42.7 mg/L (07-18-23 @ 04:15)  C-Reactive Protein, Serum: 106.7 mg/L (07-14-23 @ 04:41)  C-Reactive Protein, Serum: 245.1 mg/L (07-10-23 @ 01:30)      RADIOLOGY & ADDITIONAL TESTS:  I have personally reviewed the last available Chest xray  CXR      CT      CARDIOLOGY TESTING      MEDICATIONS  bacitracin   Ointment 1  caspofungin IVPB   caspofungin IVPB 50  cefTRIAXone   IVPB 2000  cefTRIAXone   IVPB   chlorhexidine 0.12% Liquid 15  chlorhexidine 2% Cloths 1  chlorhexidine 4% Liquid 1  collagenase Ointment 1  CRRT Treatment   heparin   Injectable 5000  levoFLOXacin IVPB 750  metroNIDAZOLE  IVPB 500  metroNIDAZOLE  IVPB   norepinephrine Infusion 0.01  octreotide  Injectable 100  OLANZapine Injectable 2.5  pantoprazole  Injectable 40  Parenteral Nutrition - Adult 1  phenylephrine    Infusion 0.1  PureFlow Dialysate RFP-400 (K 2 / Ca 3) 5000  sodium bicarbonate  Infusion 0.147  vasopressin Infusion 0.03      WEIGHT  Weight (kg): 76.3 (06-30-23 @ 16:58)  Creatinine: 1.4 mg/dL (07-18-23 @ 04:15)  Creatinine: 1.3 mg/dL (07-18-23 @ 01:50)      ANTIBIOTICS:  caspofungin IVPB      caspofungin IVPB 50 milliGRAM(s) IV Intermittent every 24 hours  cefTRIAXone   IVPB      cefTRIAXone   IVPB 2000 milliGRAM(s) IV Intermittent every 24 hours  levoFLOXacin IVPB 750 milliGRAM(s) IV Intermittent every 48 hours  metroNIDAZOLE  IVPB 500 milliGRAM(s) IV Intermittent every 8 hours  metroNIDAZOLE  IVPB          All available historical records have been reviewed   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)      INTERVAL EVENTS/HPI  - T(F): , Max: 98.6 (07-17-23 @ 20:00), fluctuating WBC, issues with CVVH, increasing pressors  - WBC Count: 15.95 (07-18-23 @ 01:50) downtrending   WBC Count: 20.55 (07-17-23 @ 20:25)     - Creatinine: 1.4 (07-18-23 @ 04:15)  Creatinine: 1.3 (07-18-23 @ 01:50)     -   -   -     ROS  cannot obtain secondary to patient's sedation and/or mental status    VITALS:  T(F): 96.9, Max: 98.6 (07-17-23 @ 20:00)  HR: 104  BP: 116/68  RR: 29Vital Signs Last 24 Hrs  T(C): 36.1 (18 Jul 2023 07:54), Max: 37 (17 Jul 2023 20:00)  T(F): 96.9 (18 Jul 2023 07:54), Max: 98.6 (17 Jul 2023 20:00)  HR: 104 (18 Jul 2023 06:00) (104 - 127)  BP: 116/68 (18 Jul 2023 05:00) (85/57 - 132/71)  BP(mean): 89 (18 Jul 2023 05:00) (63 - 96)  RR: 29 (18 Jul 2023 06:00) (20 - 31)  SpO2: 100% (18 Jul 2023 06:00) (75% - 100%)    Parameters below as of 17 Jul 2023 12:00  Patient On (Oxygen Delivery Method): ventilator    O2 Concentration (%): 100    PHYSICAL EXAM:  General: intubated  HEENT: NCAT  CV: RRR  Lungs: symmetric chest expansion, decreased BS at bases  Abd: Soft jps  anasarca  Skin: no rash  Neuro: sedated  Lines: no phlebitis     FH: Non-contributory  Social Hx: Non-contributory    TESTS & MEASUREMENTS:                        6.5    15.95 )-----------( 44       ( 18 Jul 2023 01:50 )             21.7     07-18    136  |  104  |  85<HH>  ----------------------------<  129<H>  5.3<H>   |  14<L>  |  1.4    Ca    7.3<L>      18 Jul 2023 04:15  Phos  7.6     07-18  Mg     2.4     07-18          Urinalysis Basic - ( 18 Jul 2023 04:15 )    Color: x / Appearance: x / SG: x / pH: x  Gluc: 129 mg/dL / Ketone: x  / Bili: x / Urobili: x   Blood: x / Protein: x / Nitrite: x   Leuk Esterase: x / RBC: x / WBC x   Sq Epi: x / Non Sq Epi: x / Bacteria: x        Culture - Blood (collected 07-11-23 @ 02:25)  Source: .Blood Blood-Peripheral  Final Report (07-16-23 @ 09:01):    No growth at 5 days    Culture - Surgical Swab (collected 06-30-23 @ 22:12)  Source: .Surgical Swab None  Final Report (07-05-23 @ 21:47):    Growth in fluid media only Enterococcus faecium (vancomycin resistant)  Organism: Enterococcus faecium (vancomycin resistant) (07-05-23 @ 21:47)  Organism: Enterococcus faecium (vancomycin resistant) (07-05-23 @ 21:47)      Method Type: EDMUND      -  Ampicillin: R >8 Predicts results to ampicillin/sulbactam, amoxacillin-clavulanate and  piperacillin-tazobactam.      -  Daptomycin: SDD 4 The breakpoint for SDD (susceptible dose dependent)is based on a dosage regimen of 8-12 mg/kg administered every 24 h in adults and is intended for serious infections due to E. faecium. Consultation with an infectious diseases specialist is recommended.      -  Levofloxacin: R >4      -  Linezolid: S 1      -  Tetracycline: R >8      -  Vancomycin: R >16    Culture - Fungal, Bronchial (collected 06-24-23 @ 11:00)  Source: .Bronchial None  Preliminary Report (06-26-23 @ 11:00):    Moderate Yeast    Culture - Acid Fast - Bronchial w/Smear (collected 06-24-23 @ 11:00)  Source: .Bronchial None  Preliminary Report (07-12-23 @ 15:06):    No acid-fast bacilli isolated at 2 weeks.    Culture - Bronchial (collected 06-24-23 @ 11:00)  Source: .Bronchial None  Gram Stain (06-24-23 @ 23:21):    Numerous polymorphonuclear leukocytes per low power field    No squamous epithelial cells per low power field    Rare Yeast like cells per oil power field  Final Report (06-26-23 @ 17:56):    Normal Respiratory Gabbie present    Culture - Fungal, Other (collected 06-22-23 @ 22:11)  Source: .Other None  Preliminary Report (07-15-23 @ 15:01):    No fungus isolated at 3 weeks.    Culture - Surgical Swab (collected 06-22-23 @ 22:11)  Source: .Surgical Swab None  Final Report (06-25-23 @ 14:14):    Few Finegoldia magna "Susceptibilities not performed"    Culture - Urine (collected 06-19-23 @ 02:50)  Source: Clean Catch Clean Catch (Midstream)  Final Report (06-20-23 @ 10:18):    No growth    Culture - Abscess with Gram Stain (collected 06-18-23 @ 23:08)  Source: .Abscess right lower quadrant (intrabdominal)  Gram Stain (06-19-23 @ 11:56):    Numerous polymorphonuclear leukocytes per low power field    Numerous Gram positive cocci in pairs per oil power field  Final Report (06-21-23 @ 14:13):    Numerous Anaerobic Gram Positive Cocci Most closely resembling    Peptoniphilus species "Susceptibilities not performed"    Culture - Abscess with Gram Stain (collected 06-18-23 @ 23:08)  Source: .Abscess left kidney  Gram Stain (06-19-23 @ 11:58):    Rare polymorphonuclear leukocytes per low power field    Few Gram Positive Cocci per oil power field  Final Report (06-22-23 @ 10:13):    Growth in fluid media only Anaerobic Gram Positive Cocci Most closely    resembling Peptoniphilus species "Susceptibilities not performed"    Rare Stenotrophomonas maltophilia  Organism: Stenotrophomonas maltophilia  Stenotrophomonas maltophilia (06-22-23 @ 10:13)  Organism: Stenotrophomonas maltophilia (06-22-23 @ 10:13)      Method Type: EDMUND      -  Levofloxacin: S 1      -  Trimethoprim/Sulfamethoxazole: S <=0.5/9.5  Organism: Stenotrophomonas maltophilia (06-22-23 @ 10:13)      Method Type: KB      -  Minocycline: S    Culture - Abscess with Gram Stain (collected 06-18-23 @ 20:18)  Source: .Abscess left PCN aspirate  Gram Stain (06-19-23 @ 06:27):    No polymorphonuclear cells seen per low power field    Few Gram Variable Cocci seen per oil power field  Final Report (06-24-23 @ 09:11):    Few Stenotrophomonas maltophilia  Organism: Stenotrophomonas maltophilia (06-24-23 @ 09:11)  Organism: Stenotrophomonas maltophilia (06-24-23 @ 09:11)      Method Type: EDMUND      -  Levofloxacin: S 2      -  Trimethoprim/Sulfamethoxazole: S <=0.5/9.5    Culture - Blood (collected 06-18-23 @ 14:18)  Source: .Blood Blood-Peripheral  Final Report (06-24-23 @ 02:00):    No Growth Final    Culture - Blood (collected 06-18-23 @ 14:18)  Source: .Blood Blood-Peripheral  Final Report (06-24-23 @ 02:00):    No Growth Final            INFECTIOUS DISEASES TESTING  Fungitell: >500 (07-06-23 @ 04:30)  Fungitell: 47 (06-25-23 @ 15:30)  MRSA PCR Result.: Negative (06-24-23 @ 17:38)      INFLAMMATORY MARKERS  C-Reactive Protein, Serum: 42.7 mg/L (07-18-23 @ 04:15)  C-Reactive Protein, Serum: 106.7 mg/L (07-14-23 @ 04:41)  C-Reactive Protein, Serum: 245.1 mg/L (07-10-23 @ 01:30)      RADIOLOGY & ADDITIONAL TESTS:  I have personally reviewed the last available Chest xray  CXR      CT      CARDIOLOGY TESTING      MEDICATIONS  bacitracin   Ointment 1  caspofungin IVPB   caspofungin IVPB 50  cefTRIAXone   IVPB 2000  cefTRIAXone   IVPB   chlorhexidine 0.12% Liquid 15  chlorhexidine 2% Cloths 1  chlorhexidine 4% Liquid 1  collagenase Ointment 1  CRRT Treatment   heparin   Injectable 5000  levoFLOXacin IVPB 750  metroNIDAZOLE  IVPB 500  metroNIDAZOLE  IVPB   norepinephrine Infusion 0.01  octreotide  Injectable 100  OLANZapine Injectable 2.5  pantoprazole  Injectable 40  Parenteral Nutrition - Adult 1  phenylephrine    Infusion 0.1  PureFlow Dialysate RFP-400 (K 2 / Ca 3) 5000  sodium bicarbonate  Infusion 0.147  vasopressin Infusion 0.03      WEIGHT  Weight (kg): 76.3 (06-30-23 @ 16:58)  Creatinine: 1.4 mg/dL (07-18-23 @ 04:15)  Creatinine: 1.3 mg/dL (07-18-23 @ 01:50)      ANTIBIOTICS:  caspofungin IVPB      caspofungin IVPB 50 milliGRAM(s) IV Intermittent every 24 hours  cefTRIAXone   IVPB      cefTRIAXone   IVPB 2000 milliGRAM(s) IV Intermittent every 24 hours  levoFLOXacin IVPB 750 milliGRAM(s) IV Intermittent every 48 hours  metroNIDAZOLE  IVPB 500 milliGRAM(s) IV Intermittent every 8 hours  metroNIDAZOLE  IVPB          All available historical records have been reviewed

## 2023-07-18 NOTE — PROGRESS NOTE ADULT - ASSESSMENT
Assessment and Plan:   67M w/ PMH of HTN, grade IV hydronephrosis of L kidney s/p L PCN (placed 5/2023), stutter, hiatal hernia, iron deficiency anemia, H Pylori (untreated), and gastric mass (never biopsied) presents to the ED with at least 4 days of worsening weakness, abdominal distension, and no output from his PCN. Admitted with emphysematous pyelonephritis.     (6/18): s/p LT PCN upsizing to 16Fr and 16Fr RLQ drain placement with IR   (6/22): s/p shock lithotripsy of L kidney abscess cavity with drainage of thick contents, upsize of drain to 26Fr bobby catheter  (6/30): s/p ex-lap and L nephrectomy with abdominal washout    NEURO:  #Pain control  - APAP PRN  - Dilaudid PRN  #Sedation  - off all sedation  - zyprexa at bedtime   #altered mental status- resolving  - CT head 7/10: No acute intracranial pathology  - currently not following commands but able to open eyes to voice and withdraw from pain    RESP:  #acute respiratory failure secondary to septic shock  - intubated 6/24  - Mode: SIMV 450/26/100/5  - Tolerating intermittent PS  - LL @ 23 cm  - AM ABG:   #subcutaneous emphysema   #acute respiratory failure with L lung mucus plugging   - Bronchoscopy 7/10: No mucous plug found  - CT Chest 7/10: Interval increase in size of bilateral pleural effusions with adjacent consolidation both lower lobes.   #Pulmonary nodules / Mediastinal lymphadenopathy  - CT Chest: Right middle and upper lobe solid pulmonary nodules, f/u outpatient pulm   #Activity  - increase as tolerated    CARDS:   #Septic shock  - levo and vaso gtt  - added taj due increasing levophed requirements  #New onset Afib w/ RVR with hypotension -- likely 2/2 sepsis  - HR control with Metoprolol 2.5 q6 IV HOLDING in the setting of HYPOtension  - Digoxin administered, currently HOLDING, last level <0.3  - Cardiology c/s, allow permissive tachycardia, cardizem for rate control  #H/O HTN  - Amlodipine 2.5mg HOLDING  #Imaging  - ECHO: (6/19) EF 55-60%, mild AS, mild LAE, mild MR/TR, trivial pericardial effusion    GI/NUTR:  #proximal small bowel leak  - CT AP PO/IV/rectal contrast 7/10: proximal small bowel leak suspected  - diet: NPO on TPN @60cc/hr  + lipids  - Octreotide 100q8 IVP   #bowel movements  - GI consult 6/26- Plan for EGD with EUS + FNA as outpatient, H-pylori treatment after resolution of acute issues   #GI PPX  - IV Protonix 40 q12 hours  #Gastric mass vs gastrohepatic lymphadenopathy  - CT A/P: lesser curvature mass, Hepatic hypodensities  #Splenic Hypodensities, infarct vs phlegmon/abscess  - CT A/P: Wedge-shaped region of hypodensity within the spleen    /RENAL:   #Grade IV L hydronephrosis s/p L PCN (5/2023)   #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  - (6/23) s/p LT PCN upsizing to 26Fr catheter, RLQ 16 Fr drain remains, s/p nephrostolithotomy w/ lithotripsy  - (6/18) s/p LT PCN upsizing to 16Fr and 16Fr RLQ drain placement with IR, flush q8 hr  - (6/30): s/p L nephrectomy and abdominal washout   - VENICE drains x3 pelvis, Lerma's pouch, L nephrectomy site; L VENICE and R VENICE (pelvis) with dark green/murky/feculent OP  #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   #metabolic acidosis  - Nephrology c/s - CVVHD started 07/02,  CVVHD held on 07/13, resumed on 7/15  - restarted sodium bicarb infusion (150 mEq NaHCO3 in d5% at 75 cc/hr)  #urine output in critically ill  - Straight cath every 12 hours  #decreased urine output in the setting of acute renal failure 2/2 septic shock    Labs:          BUN/Cr- 84/1.2  -->,  84/1.3  -->          Electrolytes-Na 137 // K 6.1 // Mg 2.4 //  Phos 7.0 (07-18 @ 01:50)      HEME/ONC:   #DVT prophylaxis  - SCDs, heparin subQ   #new onset Afib  - holding FULL AC   #R radial artery occlusion  - RUE duplex 07/01 + for right radial artery occlusion  #RT posterior popliteal vein DVT  - vascular c/s - recommend AC   #H/o Fe Deficiency anemia  #Thrombocytopenia  - Heme consult (7/3): HIT workup negative    Labs: Hb/Hct:  7.8/25.2  -->,  6.5/21.7  -->                      Plts:  40  -->,  44  -->                 PTT/INR:  45.0/2.21  --->        Type and Screen expires: 7/21  # Anemia- symptomatic    -1 unit prbc 7/15    -1 unit prbc 7/18    ID:  multiorgan system failure secondary to emphysematous pyelonephritis s/p left nephrectomy  Antibiotics     - Ceftriaxone (7/13 -     - Metronidazole ( 7/13 -     - Levofloxacin (6/25 -   WBC- 13.00  --->>,  20.55  --->>,  15.95  --->>  Temp trend- 24hrs T(F): 98.6 (07-17 @ 20:00), Max: 98.6 (07-17 @ 20:00)    #Cultures  Surgical swab 06/30: vancomycin resistant enterococcus faecium     L PCN cyto-pathology  6/25: results suggestive of a necrotic neoplasm    Fungitell 6/26: 47 --> >500 (7/6)    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    BAL 6/24: moderate yeast     UCx 6/19: negative FINAL      Left PCN aspiration 6/18: stenotrophomonas maltophilia    Surgical swab (6/30): VRE     Blood cultures: 7/10 NGTD  BCx 7/11-NEGATIVE FINAL    ENDO:  #Glucose monitoring  - A1c (5/7/23): 5.0  - FSG q6 hours while NPO  #Adrenal Insufficiency 2/2 to sepsis   - completed course of solu-cortef  - Cortisol level - binding globulin 1.1 (low), serum cortisol, 84, free cortisol 76, % free cortisol 91  - f/u cortisol 7/15 8AM  #assess thyroid function in setting of catabolic state   - f/u thyroid panel    MSK:  #thoracic spine DTI  - wound care recs: cleanse with soap and water, triad and allevyn BID prn for soiling  #L heel DTI  - wound care recs: pat dry, apply Cavilon, leave open to air twice a day, offload heels, offloading boots  #scrotum and R groin wound: pat dry, triad BID, PRN for soiling  #Sacrococcygeal DTI: wound care following- cleanse with soap and water, apply triad, gauze, and allevyn BID and PRN for soiling  - burn reconsulted   #Venous ulcer right lower leg, R forearm skin tear: Pat dry, apply Xeroform, nonadherent dressing, wrap with Kerlix twice a day, prn for soiling  #decubitus ulcer b/l ischium  #poor wound healing of abdominal incision  - wound care recs: Vashe wound cleanser, Xeroform and abdominal pad BID, PRN for soiling  - burn consulted  #Activity  - Advance as tolerated  - Globally deconditioned  - PT/rehab once status improves  #b/l UE, LE mottled skin  - +ulnar/DP/PT pulses present on doppler  - Arterial duplex bilateral upper extremities (7/1)  - Right radial artery occlusion   - VA Duplex bilateral lower extremities (7/3) - Right PT vein DVT     LINES/DRAINS:   PIV  Bobby (6/18-7/10)  L eleazar drain (6/30)  R Lerma pouch #1, pelvic drain #2 (6/30)  Left basilic midline  6/21  Right axillary a line (6/30-7/4)  Right Radial Joleen (6/22-6/30)  L-Femoral Joleen (7/10 - )  L-Femoral Uldall (7/10 -7/16 )  L IJ UDALL (7/16-)  RIJ TLC (7/10-  )  ETT  OGT    -Ethics consult    ADVANCED DIRECTIVES:  Full Code  HCP/Emergency Contact- Tracey Adames: [phone]   [facility] discussions continue to be held with family.     INDICATION FOR SICU: New onset atrial fibrillation with mechanical ventilation    DISPOSITION: SICU   Assessment and Plan:   67M w/ PMH of HTN, grade IV hydronephrosis of L kidney s/p L PCN (placed 5/2023), stutter, hiatal hernia, iron deficiency anemia, H Pylori (untreated), and gastric mass (never biopsied) presents to the ED with at least 4 days of worsening weakness, abdominal distension, and no output from his PCN. Admitted with emphysematous pyelonephritis.     (6/18): s/p LT PCN upsizing to 16Fr and 16Fr RLQ drain placement with IR   (6/22): s/p shock lithotripsy of L kidney abscess cavity with drainage of thick contents, upsize of drain to 26Fr bobby catheter  (6/30): s/p ex-lap and L nephrectomy with abdominal washout    NEURO:  #Pain control  - APAP PRN  - Dilaudid PRN  #Sedation  - off all sedation, not following commands or opening eyes spontaneously  #altered mental status- resolving  - CT head 7/10: No acute intracranial pathology    RESP:  #acute respiratory failure secondary to septic shock  - intubated 6/24  - Mode: SIMV 450/26/100/5  - Tolerating intermittent PS  - LL @ 23 cm  #subcutaneous emphysema   #acute respiratory failure with L lung mucus plugging   - Bronchoscopy 7/10: No mucous plug found  - CT Chest 7/10: Interval increase in size of bilateral pleural effusions with adjacent consolidation both lower lobes.   #Pulmonary nodules / Mediastinal lymphadenopathy  - CT Chest: Right middle and upper lobe solid pulmonary nodules, f/u outpatient pulm   #Activity  - increase as tolerated    CARDS:   #Septic shock  - levo, vaso, taj gtts ongoing  #H/O HTN  - Amlodipine 2.5mg HOLDING  #Imaging  - ECHO: (6/19) EF 55-60%, mild AS, mild LAE, mild MR/TR, trivial pericardial effusion    GI/NUTR:  #proximal small bowel leak  - CT AP PO/IV/rectal contrast 7/10: proximal small bowel leak suspected  - diet: NPO on TPN @60cc/hr  + lipids  - Octreotide 100q8 IVP   #bowel movements  - GI consult 6/26- Plan for EGD with EUS + FNA as outpatient, H-pylori treatment after resolution of acute issues   #GI PPX  - IV Protonix 40 q12 hours  #Gastric mass vs gastrohepatic lymphadenopathy  - CT A/P: lesser curvature mass, Hepatic hypodensities  #Splenic Hypodensities, infarct vs phlegmon/abscess  - CT A/P: Wedge-shaped region of hypodensity within the spleen  #UGIB  - Bloody output noted from OGT placed to suction this am, given 2uRBC, 2uFFP this am    /RENAL:   #Grade IV L hydronephrosis s/p L PCN (5/2023)   #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  - (6/23) s/p LT PCN upsizing to 26Fr catheter, RLQ 16 Fr drain remains, s/p nephrostolithotomy w/ lithotripsy  - (6/18) s/p LT PCN upsizing to 16Fr and 16Fr RLQ drain placement with IR, flush q8 hr  - (6/30): s/p L nephrectomy and abdominal washout   - VENICE drains x3 pelvis, Lerma's pouch, L nephrectomy site; L VENICE and R VENICE (pelvis) with dark green/murky/feculent OP  #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   #metabolic acidosis  - Nephrology c/s - CVVHD started 07/02,  CVVHD held on 07/13, resumed on 7/15  - restarted sodium bicarb infusion (150 mEq NaHCO3 in d5% at 75 cc/hr)  #urine output in critically ill  - Straight cath every 12 hours  #decreased urine output in the setting of acute renal failure 2/2 septic shock    Labs:          BUN/Cr- 84/1.2  -->,  84/1.3  -->          Electrolytes-Na 137 // K 6.1 // Mg 2.4 //  Phos 7.0 (07-18 @ 01:50)      HEME/ONC:   #DVT prophylaxis  - SCDs, heparin subQ   #new onset Afib  - holding FULL AC   #R radial artery occlusion  - RUE duplex 07/01 + for right radial artery occlusion  #RT posterior popliteal vein DVT  - vascular c/s - recommend AC   #H/o Fe Deficiency anemia  #Thrombocytopenia  - Heme consult (7/3): HIT workup negative    Labs: Hb/Hct:  7.8/25.2  -->,  6.5/21.7  -->                      Plts:  40  -->,  44  -->                 PTT/INR:  45.0/2.21  --->        Type and Screen expires: 7/21  #Anemia   - Received 2uRBC, 2uFFP; ordered for additional 1uPLT, 1uFFP, 1uPRBC in light of GIB    ID:  multiorgan system failure secondary to emphysematous pyelonephritis s/p left nephrectomy  Antibiotics     - Ceftriaxone (7/13 -     - Metronidazole ( 7/13 -     - Levofloxacin (6/25 -   WBC- 13.00  --->>,  20.55  --->>,  15.95  --->>  Temp trend- 24hrs T(F): 98.6 (07-17 @ 20:00), Max: 98.6 (07-17 @ 20:00)    #Cultures  Surgical swab 06/30: vancomycin resistant enterococcus faecium     L PCN cyto-pathology  6/25: results suggestive of a necrotic neoplasm    Fungitell >500 (7/6, 7/18)    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    BAL 6/24: moderate yeast     UCx 6/19: negative FINAL      Left PCN aspiration 6/18: stenotrophomonas maltophilia    Surgical swab (6/30): VRE     Blood cultures: 7/10 NGTD  BCx 7/11-NEGATIVE FINAL    ENDO:  #Glucose monitoring  - A1c (5/7/23): 5.0  - FSG q6 hours while NPO  #Adrenal Insufficiency 2/2 to sepsis   - completed course of solu-cortef  - Cortisol level - binding globulin 1.1 (low), serum cortisol, 84, free cortisol 76, % free cortisol 91  - f/u cortisol 7/15 8AM  #assess thyroid function in setting of catabolic state   - f/u thyroid panel    MSK:  #thoracic spine DTI  - wound care recs: cleanse with soap and water, triad and allevyn BID prn for soiling  #L heel DTI  - wound care recs: pat dry, apply Cavilon, leave open to air twice a day, offload heels, offloading boots  #scrotum and R groin wound: pat dry, triad BID, PRN for soiling  #Sacrococcygeal DTI: wound care following- cleanse with soap and water, apply triad, gauze, and allevyn BID and PRN for soiling  - burn reconsulted   #Venous ulcer right lower leg, R forearm skin tear: Pat dry, apply Xeroform, nonadherent dressing, wrap with Kerlix twice a day, prn for soiling  #decubitus ulcer b/l ischium  #poor wound healing of abdominal incision  - wound care recs: Vashe wound cleanser, Xeroform and abdominal pad BID, PRN for soiling  - burn consulted  #Activity  - Advance as tolerated  - Globally deconditioned  - PT/rehab once status improves  #b/l UE, LE mottled skin  - +ulnar/DP/PT pulses present on doppler  - Arterial duplex bilateral upper extremities (7/1)  - Right radial artery occlusion   - VA Duplex bilateral lower extremities (7/3) - Right PT vein DVT     LINES/DRAINS:   PIV  Bobby (6/18-7/10)  L eleazar drain (6/30)  R Lerma pouch #1, pelvic drain #2 (6/30)  Left basilic midline  6/21  Right axillary a line (6/30-7/4)  Right Radial Joleen (6/22-6/30)  L-Femoral Joleen (7/10 - )  L-Femoral Uldall (7/10 -7/16 )  L IJ UDALL (7/16-)  RIJ TLC (7/10-  )  ETT  OGT    -Ethics consult    ADVANCED DIRECTIVES:  Full Code  HCP/Emergency Contact- Tracey Adames: [phone]   [facility] discussions continue to be held with family.     INDICATION FOR SICU: New onset atrial fibrillation with mechanical ventilation    DISPOSITION: SICU

## 2023-07-18 NOTE — CONSULT NOTE ADULT - REASON FOR ADMISSION
pneumoperitoneum, emphysematous pyelonephritis
emphysematous pyelonephritis with perforation and peritonitis
pneumoperitoneum, emphysematous pyelonephritis

## 2023-07-18 NOTE — CONSULT NOTE ADULT - CONSULT REASON
Atrial fibrillation w/ RVR, sepsis
Intra-op bleeding
eval for PN
radial artery occlusion
Hematochezia
Sacral + b/l ischial wounds
A Fib with RVT
Emergent Nephrectomy
Surrogate decision maker, patients daughter Rupali wants a full code and medical staff states patient is critically ill.
Thrombocytopenia
intra-abdominal abscess drainage
left nephrostomy tube not draining for 1 week, r/o kidney rupture
ROJAS
multiple wounds
pyelonephritis
GOC

## 2023-07-18 NOTE — CHART NOTE - NSCHARTNOTESELECT_GEN_ALL_CORE
Anesthesia/Event Note
Event Note
Event Note
Family Discussion/Event Note
Palliative Care - Social Work/Event Note
Palliative Care - Social Work/Event Note
/Event Note
Anesthesia PACU note
Event Note
Follow-up
Follow-up
Labs/Event Note
PACU/SICU admission note
POST OP CHECK/Event Note
PallCare/Event Note
Palliative/Off Service Note
Urology - family meeting/Event Note

## 2023-07-18 NOTE — PROGRESS NOTE ADULT - ASSESSMENT
A/P 	  Pt is a 67 y/o Male with retroperitoneal and intraabdominal abscess s/p IR placement of drain and LEFT PCN 6/18, s/p percutaneous drainage of LEFT renal abscess POD # 23 with left 26fr flank catheter in place, s/p Left radical nephrectomy, Ex-Lap POD # 17. Patient remains intubated w/ ETT on pressors.     Continue management as per SICU  continue observation  attending note to follow

## 2023-07-18 NOTE — CONSULT NOTE ADULT - PROVIDER SPECIALTY LIST ADULT
Urology
Burn
Ethics
Nephrology
Nutrition Support
SICU
Gastroenterology
Vascular Surgery
Burn
Infectious Disease
Surgery
Vascular Surgery
Cardiology
Heme/Onc
Intervent Radiology
Palliative Care

## 2023-07-18 NOTE — PROGRESS NOTE ADULT - SUBJECTIVE AND OBJECTIVE BOX
Nephrology progress note    Patient was seen and examined, events over the last 24 h noted .  On CRRT    Allergies:  No Known Allergies    Hospital Medications:   MEDICATIONS  (STANDING):  bacitracin   Ointment 1 Application(s) Topical two times a day  caspofungin IVPB      caspofungin IVPB 50 milliGRAM(s) IV Intermittent every 24 hours  cefTRIAXone   IVPB      cefTRIAXone   IVPB 2000 milliGRAM(s) IV Intermittent every 24 hours  chlorhexidine 0.12% Liquid 15 milliLiter(s) Oral Mucosa every 12 hours  chlorhexidine 2% Cloths 1 Application(s) Topical <User Schedule>  chlorhexidine 4% Liquid 1 Application(s) Topical <User Schedule>  collagenase Ointment 1 Application(s) Topical two times a day  CRRT Treatment    <Continuous>  CRRT Treatment    <Continuous>  heparin   Injectable 5000 Unit(s) SubCutaneous every 8 hours  levoFLOXacin IVPB 750 milliGRAM(s) IV Intermittent every 48 hours  metroNIDAZOLE  IVPB      metroNIDAZOLE  IVPB 500 milliGRAM(s) IV Intermittent every 8 hours  norepinephrine Infusion 0.01 MICROgram(s)/kG/Min (0.72 mL/Hr) IV Continuous <Continuous>  octreotide  Injectable 100 MICROGram(s) IV Push every 8 hours  pantoprazole  Injectable 40 milliGRAM(s) IV Push every 12 hours  Parenteral Nutrition - Adult 1 Each (65 mL/Hr) TPN Continuous <Continuous>  Parenteral Nutrition - Adult 1 Each (65 mL/Hr) TPN Continuous <Continuous>  phenylephrine    Infusion 0.1 MICROgram(s)/kG/Min (1.43 mL/Hr) IV Continuous <Continuous>  PureFlow Dialysate RFP-400 (K 2 / Ca 3) 5000 milliLiter(s) (2000 mL/Hr) CRRT <Continuous>  PureFlow Dialysate RFP-400 (K 2 / Ca 3) 5000 milliLiter(s) (2000 mL/Hr) CRRT <Continuous>  sodium bicarbonate  Infusion 0.147 mEq/kG/Hr (75 mL/Hr) IV Continuous <Continuous>  vasopressin Infusion 0.03 Unit(s)/Min (4.5 mL/Hr) IV Continuous <Continuous>        VITALS:  T(F): 93 (07-18-23 @ 12:15), Max: 98.6 (07-17-23 @ 20:00)  HR: 96 (07-18-23 @ 12:30)  BP: 140/80 (07-18-23 @ 12:30)  RR: 26 (07-18-23 @ 12:30)  SpO2: 83% (07-18-23 @ 12:30)  Wt(kg): --    07-16 @ 07:01  -  07-17 @ 07:00  --------------------------------------------------------  IN: 3639.4 mL / OUT: 2022 mL / NET: 1617.4 mL    07-17 @ 07:01  -  07-18 @ 07:00  --------------------------------------------------------  IN: 5208.5 mL / OUT: 1124 mL / NET: 4084.5 mL    07-18 @ 07:01  -  07-18 @ 13:18  --------------------------------------------------------  IN: 3694.5 mL / OUT: 1190 mL / NET: 2504.5 mL          PHYSICAL EXAM:  Constitutional: intubated on MV   Respiratory: CTAB,  Cardiovascular: S1, S2, RRR  Gastrointestinal: BS+, soft, NT/ND  Extremities: No cyanosis or clubbing. No peripheral edema  :  No bobby.   Skin: No rashes      LABS:  07-18    136  |  104  |  85<HH>  ----------------------------<  129<H>  5.3<H>   |  14<L>  |  1.4    Ca    7.3<L>      18 Jul 2023 04:15  Phos  7.6     07-18  Mg     2.4     07-18                            6.5    15.95 )-----------( 44       ( 18 Jul 2023 01:50 )             21.7       Urine Studies:  Urinalysis Basic - ( 18 Jul 2023 04:15 )    Color:  / Appearance:  / SG:  / pH:   Gluc: 129 mg/dL / Ketone:   / Bili:  / Urobili:    Blood:  / Protein:  / Nitrite:    Leuk Esterase:  / RBC:  / WBC    Sq Epi:  / Non Sq Epi:  / Bacteria:         RADIOLOGY & ADDITIONAL STUDIES:

## 2023-07-18 NOTE — CONSULT NOTE ADULT - CONSULT REQUESTED DATE/TIME
02-Jul-2023 02:45
03-Jul-2023 09:03
18-Jul-2023 12:20
18-Jun-2023 16:25
18-Jun-2023 18:39
20-Jun-2023 11:41
19-Jun-2023 10:50
26-Jun-2023 16:27
03-Jul-2023 12:43
11-Jul-2023 14:01
19-Jun-2023 00:03
29-Jun-2023 09:47
30-Jun-2023
05-Jul-2023 12:13
15-Jul-2023 12:02
26-Jun-2023 18:33

## 2023-07-18 NOTE — PROGRESS NOTE ADULT - ASSESSMENT
Pt is a 69 y/o Male with retroperitoneal and intraabdominal abscess s/p IR placement of drain and LEFT PCN 6/18, s/p percutaneous drainage of LEFT renal abscess POD # 23 with left 26fr flank catheter in place, s/p Left radical nephrectomy, Ex-Lap POD # 17. Patient remains intubated w/ ETT on pressors.     ·	Continue management as per SICU  ·	Will d/w  attending

## 2023-07-18 NOTE — PROGRESS NOTE ADULT - SUBJECTIVE AND OBJECTIVE BOX
UROLOGY PROGRESS NOTE    Pt is a 69 y/o Male with retroperitoneal and intraabdominal abscess s/p IR placement of drain and LEFT PCN 6/18, s/p percutaneous drainage of LEFT renal abscess POD # 23 with left 26fr flank catheter in place, s/p Left radical nephrectomy, Ex-Lap POD # 17. Patient remains intubated w/ ETT on pressors.     MEDICATIONS  (STANDING):  bacitracin   Ointment 1 Application(s) Topical two times a day  caspofungin IVPB      caspofungin IVPB 50 milliGRAM(s) IV Intermittent every 24 hours  cefTRIAXone   IVPB 2000 milliGRAM(s) IV Intermittent every 24 hours  cefTRIAXone   IVPB      chlorhexidine 0.12% Liquid 15 milliLiter(s) Oral Mucosa every 12 hours  chlorhexidine 2% Cloths 1 Application(s) Topical <User Schedule>  chlorhexidine 4% Liquid 1 Application(s) Topical <User Schedule>  collagenase Ointment 1 Application(s) Topical two times a day  CRRT Treatment    <Continuous>  heparin   Injectable 5000 Unit(s) SubCutaneous every 8 hours  levoFLOXacin IVPB 750 milliGRAM(s) IV Intermittent every 48 hours  metroNIDAZOLE  IVPB      metroNIDAZOLE  IVPB 500 milliGRAM(s) IV Intermittent every 8 hours  norepinephrine Infusion 0.01 MICROgram(s)/kG/Min (0.72 mL/Hr) IV Continuous <Continuous>  octreotide  Injectable 100 MICROGram(s) IV Push every 8 hours  pantoprazole  Injectable 40 milliGRAM(s) IV Push every 12 hours  Parenteral Nutrition - Adult 1 Each (65 mL/Hr) TPN Continuous <Continuous>  phenylephrine    Infusion 0.1 MICROgram(s)/kG/Min (1.43 mL/Hr) IV Continuous <Continuous>  PureFlow Dialysate RFP-400 (K 2 / Ca 3) 5000 milliLiter(s) (2000 mL/Hr) CRRT <Continuous>  sodium bicarbonate  Infusion 0.147 mEq/kG/Hr (75 mL/Hr) IV Continuous <Continuous>  vasopressin Infusion 0.03 Unit(s)/Min (4.5 mL/Hr) IV Continuous <Continuous>    MEDICATIONS  (PRN):  HYDROmorphone  Injectable 0.5 milliGRAM(s) IV Push every 4 hours PRN Severe Pain (7 - 10)  sodium chloride 0.9% lock flush 10 milliLiter(s) IV Push every 1 hour PRN Pre/post blood products, medications, blood draw, and to maintain line patency  sodium chloride 0.9% lock flush 10 milliLiter(s) IV Push every 1 hour PRN Pre/post blood products, medications, blood draw, and to maintain line patency    REVIEW OF SYSTEMS   [X] Due to altered mental status/intubation, subjective information were not able to be obtained from patient. History was obtained, to the extent possible, from review of the chart and collateral sources of information.    Vital Signs Last 24 Hrs  T(C): 36.1 (18 Jul 2023 07:54), Max: 37 (17 Jul 2023 20:00)  T(F): 96.9 (18 Jul 2023 07:54), Max: 98.6 (17 Jul 2023 20:00)  HR: 72 (18 Jul 2023 09:00) (72 - 127)  BP: 74/46 (18 Jul 2023 09:00) (74/46 - 145/72)  BP(mean): 54 (18 Jul 2023 09:00) (54 - 97)  RR: 26 (18 Jul 2023 09:00) (20 - 31)  SpO2: 97% (18 Jul 2023 09:00) (75% - 100%)    Parameters below as of 17 Jul 2023 12:00  Patient On (Oxygen Delivery Method): ventilator    O2 Concentration (%): 100    PHYSICAL EXAM:  GEN: NAD, intubated sedated  SKIN: cyanotic digits  RESP: vented  ABDO/BACK: +dressing in place. #2 Pelvic pouch eleazar drain draining murky brown drainage. #3 Left abd eleazar drain draining murky brown drainage. +Two abd pouches with murky brown drainage.   : + penile/scrotal edema   EXT: +edema       I&O's Summary    17 Jul 2023 07:01  -  18 Jul 2023 07:00  --------------------------------------------------------  IN: 5208.5 mL / OUT: 1124 mL / NET: 4084.5 mL    18 Jul 2023 07:01  -  18 Jul 2023 09:50  --------------------------------------------------------  IN: 0 mL / OUT: 400 mL / NET: -400 mL        LABS:                        6.5    15.95 )-----------( 44       ( 18 Jul 2023 01:50 )             21.7     07-18    136  |  104  |  85<HH>  ----------------------------<  129<H>  5.3<H>   |  14<L>  |  1.4    Ca    7.3<L>      18 Jul 2023 04:15  Phos  7.6     07-18  Mg     2.4     07-18      PT/INR - ( 17 Jul 2023 05:05 )   PT: 25.80 sec;   INR: 2.21 ratio      PTT - ( 17 Jul 2023 05:05 )  PTT:45.0 sec

## 2023-07-18 NOTE — CONSULT NOTE ADULT - CONSULT REQUESTED BY NAME
Medicine
x
Dr. Gutiérrez
Miguel Bryan
SICU
Urology
Urology
primary team
SICU
service
Dr Brock Bah
ED
Primary team
SHAY Thibodeaux
SICU
SICU team

## 2023-07-19 NOTE — PROGRESS NOTE ADULT - ATTENDING COMMENTS
ACS Attending  Note Attestation    Patient is examined and evaluated at the bedside with the residents/PAs. Treatment plan discussed with the team, nurses, and consulting physicians and consulting teams. Medications, radiological studies and all other relevant studies reviewed.     CHRISTINE VILLAVICENCIO Patient is a 67y old  Male who presents with a chief complaint of pneumoperitoneum, emphysematous pyelonephritis with peritonitis due to peylonephritis    Vital Signs Last 24 Hrs  T(C): 38 (19 Jun 2023 11:00), Max: 38 (19 Jun 2023 11:00)  T(F): 100.4 (19 Jun 2023 11:00), Max: 100.4 (19 Jun 2023 11:00)  HR: 104 (19 Jun 2023 14:53) (86 - 169)  BP: 95/69 (19 Jun 2023 14:00) (89/62 - 160/101)  BP(mean): 77 (19 Jun 2023 14:00) (71 - 91)  RR: 21 (19 Jun 2023 14:00) (8 - 36)  SpO2: 100% (19 Jun 2023 14:53) (70% - 100%)    Parameters below as of 19 Jun 2023 11:00  Patient On (Oxygen Delivery Method): ventilator    O2 Concentration (%): 40                        7.1    30.08 )-----------( 351      ( 19 Jun 2023 13:55 )             21.6     06-19    124<L>  |  89<L>  |  86<HH>  ----------------------------<  128<H>  4.8   |  20  |  2.6<H>    Ca    7.3<L>      19 Jun 2023 09:00  Phos  5.7     06-19  Mg     2.0     06-19    TPro  4.2<L>  /  Alb  2.5<L>  /  TBili  0.9  /  DBili  0.7<H>  /  AST  57<H>  /  ALT  42<H>  /  AlkPhos  83  06-19      Diagnosis: emphysematous pyelonephritis    Plan:	  - supportive care as per SICU team and Urology   - GI/DVT prophylaxis  - pain management  - follow up consults  - repeat studies as needed  - replace electrolytes  - case management evaluation     Angela Fontaine MD, FACS  Trauma/ACS/Surgical Critical Care Attending
Critical Care: [id number]   This patient has a high probability of sudden, clinically significant deterioration, which requires the highest level of physician preparedness to intervene urgently. I managed/supervised life or organ supporting interventions that required frequent physician assessment. I devoted my full attention in the ICU to the direct care of this patient for the period of time indicated below. Time I spent with the family or surrogate(s) is included only if the patient was incapable of providing the necessary information or participating in medical decision making. Time devoted to teaching and to any procedures I billed separately is not included.     CHRISTINE VILLAVICENCIO 67y Male admitted to [x ] SICU /[ ] SDU with emphysematous pyelonephritis with severe sepsis multiple nephrostomies, and purulent ascites, with respiratory failure, hemodynamic instability, electrolytes abnormalities, renal failure worsening, S/P left nephrectomy 6/30/23 with EBL 1000ml, remained septic and poor prognosis through entire stay in SICU, continues to requires multiple pressors, CVVH and ventilatory support.  Patient is examined and evaluated at the bedside with SICU team. Treatment plan discussed with SICU team, nurses and primary team.   Chest X-ray and all relevant studies reviewed during rounds.  Will continue hemodynamic monitoring as per protocol in SICU.    Neuro:  GCS [10T ]   [x ]  Neurovascular checks as per SICU protocol [x] Sedation vacation.                [ ] 3% NaCl     [ ] 2% NaCl                [ ] Keppra  [ ] Lamictal  [ ] Depakote  [ ] Dilantin                Pharmacological Paralysis [ ] Yes  [x ]  No      Sedated/Pain control with                 [ ] Dilaudid drip, [ ]  Ketamine drip, [ ] Fentanyl drip, [ ] Propofol, [ ] Precedex, [ ] Versed drip, [ ] Ativan drip,                           [ ] OxyContin standing,  [ ] OxyContin PRN, [ x] Dilaudid PRN pushes, [ ] Fentanyl PRN pushes, [ ] PCA,                [x ] Tylenol IV/PO, [x ] Gabapentin, [ ]  Ketorolac, [ ] Tramadol,  [ ] Lidoderm Patch       Other Medications               [ ] Seroquel, [ x] Zyprexa, [ ] Haldol,  [ ] Clonazepam [ ] Xanax, [ ] Versed/Ativan PRN, [ ] Valium [ ] None               [ ] Robaxin   [ ] Baclofen  [ ] Flexeril               [ ] CIWA (Ativan/Valium/ Librium)  CV: continue to monitor,   On pressors [x ]  Yes  [ ]  No           [x ]  Levophed@0.4mcg, [ ] Josh-Synephrine, [x ] Vasopressin@0.03units, [ ]  Epinephrine          [ ] Dobutamine, [ ] Milrinone, [ ]  Midodrine,  [ ] Digoxin  [ ] Others    Other Cardiac Meds          [ ] Amiodarone IV/PO, [ ] Digoxin, [ ] Cardizem drip, [ ] Cleviprex drip, [ ] Esmolol drip, [ ] Cardene drip  Respiratory: Acute respiratory insufficiency -> continue monitoring, CXR bilateral lungs fields expanded                        None Invasive Support  [ ] Incentive Spirometer                                  [ ] BiPAP   [ ] CPAP/NIV   [ ] HFNC   [ ] NR Face Mask  [ ] NC  [ ] Trach Caller [ ] Room Air                        Ventilatory support  [x ] Yes [ ] No                            [ ] SBT                                  [ ] PC    [ ] VC   [ x] AC/PRVC   [ ] BiVent/APRV   [ ]CPAP                                  Vent 450/18/5/40%                                   ABG 7.23/4273/18   LA 3.7  GI  [x ]  bowel regiment none [ x] BM 400ml in 24 hrs [x ] Flatus +             [ ] Ostomy   [x ] NG tube              [x] VENICE left 1150ml/24hrs    [x] VENICE#1 right 15ml/24hrs   [x] VENICE #2 right 85ml/24hrs        Prophylaxis [ x] PPI  [ ] H2 Blockers  [ ] Others  Nutrition: continue   [x ] Diet NPO  [x ] TPN/PPN   [ ] calories count   [ ]  Tube Feeds   Renal: Continue I&Os monitoring, Mena catheter  [ ] Yes  [ x]  No  [ ] Consideration for discontinuation [ ] Taxes cath/PrimaFit  [ ] TOV                                                               [x ] HD/CVVH   [ ] Urine output        Lytes/Acid-base: replete hypokalemia, hypomagnesemia, hypocalcemia, hypophosphatemia        IV Fluids   [ ] LR [x ] NS@KVO  [ ] D5W1/2NS [ ] Bicarbonate Drip [ ] Albumin [ ] Lasix drip/push  [ ] Bumex drip/push  ID: leukocytosis -> continue to monitor: remains septic with low grade fever, WBC 34        IV Abx [x ] Yes, [ ] No;  ID consulted [x ] Yes, [ ] No        Cultures send  [ ] Respiratory   [ ] Blood   [x ] Urine  [ ] Fluids  [ x] Tissue  [ ]  None  Lines:   [ x] Right   [ ] Left  [ ] Bilateral                     [ ] Subclavian TLC        [x ]  Internal Jugular TLC     [ ]  Femoral TLC                     [ ] Subclavian Cordis    [ ] Internal Jugular Cordis   [ ] Femoral Cordis                     [ ] HD catheter    [ ] PICC     [ ]  Midline   [x ] Peripheral IVs                                                 [ ] Right   [x ] Left   [ ] None                     [ ] Radial A-Line    [x ] Femoral A-Line   [ ] Axillary A-Line               Heme: continue to evaluate for acute blood loss anemia- trend Hg/Hct                     AC Reversal Indicated [ ] Yes  [x ] No                                      [ ] Kcentra,  [ ] 3Factors concentrate, [ ] Andexxa                     Transfused  indicated  [ ] Yes, [x ] No    [ ] PRBCs   [ ] Platelets   [ ] FFPs   [ ] Cryoprecipitate                    Should be started on or continued with  following  [ ] Yes,  [x ] No,                               [ ] Lovenox  [ ] Coumadin  [ ] Heparin drip  [ ] DOVACs  [ ] ASA  [ ] Antiplatelets   Endocrine: Prevent and treat hyperglycemia with insulin as needed,                                 Insuline drip for hyperglycemia [ ] Yes, [x ] No   PV: follow pulse exam  Skin: decub precautions  DVT Prophylaxis:  [x ] SCDs  [ x] Heparin SQ  [ ] Lovenox  SQ  Stress Gastritis Prophylaxis: PPI/H2 Blockers if indicated  Mobility: patient is evaluated at the bedside with mobility team and the goals for today are discussed with PT [ x] bed rest at this time    PATIENT/FAMILY/SURROGATE CONFERENCE:  [x ] Yes with patient's family at the bedside. [ ] No  PURPOSE: To obtain necessary information, To discuss treatment options under consideration today.  Palliative care consult for GOC discussion with family-> full code  I saw and evaluated the patient personally. I have reviewed and agree with note above. Treatment plan discussed with SICU team, nurses and primary team at the time of the multidisciplinary rounds. The above note is NOT written at the time of rounds and will reflect all changes throughout management of the patient for the day note is written for.    Angela Fontaine MD, FACS  Trauma/ACS/SCC Attending
NICOM, increase octreotide as tolerated
Patient had CT Abd yesterday that showed left hydro decreased.  The nephrostomy drained about 500 ml sero-sanguinous.  RLQ abdominal drain had 70 ml serous.  This am CXR shows complete left lung opacification due to most likely mucus plug.  Patient was intubated around 7 am  Postintubation SCR shows the same.  On Vent support, AC, FiO2 100%, PEEP 8  Off Levo this am.  Creat 2.8, better.  Abdomen: mildly distended, nontender.  Hb 7.9 this am.    ASSESSMENT:  68 y/o male with Left Emphysematous Pyelonephritis.  UTI present on admission.  Acute respiratory failure with hypoxia.  Septic shock present on admission.  New onset A.fib with RVR  Lactic acidosis.  Pneumoperitoneum.  ROJAS.  Acute blood loss anemia.    PLAN:  - sedation - on Precedex, use Fentanyl prn  - continue Vent support; will need Bronchoscopy today due to left lung with mucus plug  - follow CXR and ABG  - keep MAP>65; euvolemic at this time  - A.fib - wean Cardizem drip and use Lopressor 2.5-5 mg iv q6h for rate control  - start tube feeds after bronch and advance to goal; needs bowel regiment  - follow serum electrolytes and UOP  - on Ar and Levaquin; add Vanco  - on Heparin drip - will hold it for the bronchoscopy  - follow Hb  - keep on DVT prophylaxis    Patient's daughter was at bedside and was updated on all the above.  I explained to her the findings on CXR this am and the need for bronchoscopy today and the risk if not done to worsen the respiratory failure.  She wanted me to explain to her more the need, risks and benefits of bronchoscopy and to show her the CXR before she decides to sign consent for the procedure.  I showed her the CXR this am and explained again the benefits and risk of the procedure to her and she agreed with it.    Continue SICU care
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     Patient seen and examined. No acute events over night.     General - intubated and sedated  Abdomen - soft, distended, dressings in place.  Drains with sanguineous output    Vitals and labs reviewed    Plan  - NPO  - NGT  - IVF  - wean pressors  - continue antibiotics  - monitor drains  - trend hgb
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     Patient seen and examined. No acute events over night.     General - intubated and sedated  Abdomen - soft, distended, dressings in place.  Drains with sanguineous output  peripheral/scrotal edema    Vitals and labs reviewed    Plan  - NPO  - NGT  - IVF  - wean pressors  - continue antibiotics  - monitor drains  - trend hgb
Critical Care: [id number]   This patient has a high probability of sudden, clinically significant deterioration, which requires the highest level of physician preparedness to intervene urgently. I managed/supervised life or organ supporting interventions that required frequent physician assessment. I devoted my full attention in the ICU to the direct care of this patient for the period of time indicated below. Time I spent with the family or surrogate(s) is included only if the patient was incapable of providing the necessary information or participating in medical decision making. Time devoted to teaching and to any procedures I billed separately is not included.     CHRISTINE VILLAVICENCIO 67y Male admitted to [x ] SICU /[ ] SDU with emphysematous pyelonephritis with sepsis and purulent ascites, with respiratory failure, hemodynamic instability, electrolytes abnormalities, renal failure worsening  Patient is examined and evaluated at the bedside with SICU team. Treatment plan discussed with SICU team, nurses and primary team.   Chest X-ray and all relevant studies reviewed during rounds.  Will continue hemodynamic monitoring as per protocol in SICU.    Neuro:  GCS [11T ]   [x ]  Neurovascular checks as per SICU protocol [x] ACT                [ ] 3% NaCl     [ ] 2% NaCl                [ ] Keppra  [ ] Lamictal  [ ] Depakote  [ ] Dilantin                Pharmacological Paralysis [ ] Yes  [x ]  No      Sedated/Pain control with                 [ ] Dilaudid drip, [ ]  Ketamine drip, [ ] Fentanyl drip, [ ] Propofol, [x ] Precedex, [ ] Versed drip, [ ] Ativan drip,                           [ ] OxyContin standing,  [ ] OxyContin PRN, [ ] Dilaudid PRN pushes, [ x] Fentanyl PRN pushes, [ ] PCA,                [x ] Tylenol IV/PO, [x ] Gabapentin, [ ]  Ketorolac, [ x] Tramadol,  [ ] Lidoderm Patch       Other Medications               [ ] Seroquel, [ ] Zyprexa, [ ] Haldol,  [ ] Clonazepam [ ] Xanax, [ ] Versed/Ativan PRN, [ ] Valium [ ] None               [ ] Robaxin   [ ] Baclofen  [ ] Flexeril               [ ] CIWA (Ativan/Valium/ Librium)  CV: continue to monitor, persistently tachycardic in 130s', which improved on Digoxin   On pressors [x ]  Yes  [ ]  No          [ ]  Levophed, [x ] Josh-Synephrine, [ ] Vasopressin, [ ]  Epinephrine          [ ] Dobutamine, [ ] Milrinone, [ ]  Midodrine,  [ ] Others    Other Cardiac Meds          [ ] Amiodarone IV/PO, [ ] Digoxin, [ ] Cardizem drip, [ ] Cleviprex drip, [ ] Esmolol drip, [ ] Cardene drip  Respiratory: Acute respiratory insufficiency -> continue monitoring, CXR bilateral lungs fields expanded                        None Invasive Support  [ ] Incentive Spirometer                                  [ ] BiPAP   [ ] CPAP/NIV   [ ] HFNC   [ ] NR Face Mask  [ ] NC  [ ] Trach Caller [ ] Room Air                        Ventilatory support  [x ] Yes [ ] No                            [ ] SBT                                  [ ] PC    [ ] VC   [ x] AC/PRVC   [ ] BiVent/APRV   [ ]CPAP                                  Vent 450/22/8/40%                                    ABG 7.47/30/131/22   LA 4.2  GI  [x ]  bowel regiment none [ x] BM none [x ] Flatus none             [ ] Ostomy   [x ] NG tube         Prophylaxis [ ] PPI  [ ] H2 Blockers  [ ] Others  Nutrition: continue   [x ] Diet NPO  [ ] TPN/PPN   [ ] calories count   [ ]  Tube Feeds     Renal: Continue I&Os monitoring, Mena catheter  [ ] Yes  [ ]  No  [ ] Consideration for discontinuation [ ] Taxes cath/PrimaFit  [ ] TOV                                                               [ ] HD/CVVH   [x ] Urine output 570ml/24hrs           [x] VENICE right 40 ml/24hrs   [x] Nephrostomy 170ml/24hrs       Lytes/Acid-base: replete hypokalemia, hypomagnesemia, hypocalcemia, hypophosphatemia        IV Fluids   [x ] LR @75ml/hr [ ] NS  [ ] D5W1/2NS [x ] Bicarbonate Drip [ ] Albumin [ ] Lasix drip/push  [ ] Bumex drip/push  ID: leukocytosis -> continue to monitor: remains septic         IV Abx [x ] Yes, [ ] No;  ID consulted [x ] Yes, [ ] No        Cultures send  [ ] Respiratory   [ ] Blood   [x ] Urine  [ ] Fluids  [ ] Tissue  [ ]  None  Lines:   [ ] Right   [ x] Left  [ ] Bilateral                     [ ] Subclavian TLC        [x ]  Internal Jugular TLC     [ ]  Femoral TLC                     [ ] Subclavian Cordis    [ ] Internal Jugular Cordis   [ ] Femoral Cordis                     [ ] HD catheter    [ ] PICC     [ ]  Midline   [x ] Peripheral IVs                                                 [x ] Right   [ ] Left   [ ] None                     [ x] Radial A-Line    [ ] Femoral A-Line   [ ] Axillary A-Line               Heme: continue to evaluate for acute blood loss anemia- trend Hg/Hct                     AC Reversal Indicated [ ] Yes  [x ] No                                      [ ] Kcentra,  [ ] 3Factors concentrate, [ ] Andexxa                     Transfused  indicated  [ ] Yes, [x ] No    [ ] PRBCs   [ ] Platelets   [ ] FFPs   [ ] Cryoprecipitate                    Should be started on or continued with  following  [ ] Yes,  [x ] No, due to GI bleed                               [ ] Lovenox  [ ] Coumadin  [ ] Heparin drip  [ ] DOVACs  [ ] ASA  [ ] Antiplatelets   Endocrine: Prevent and treat hyperglycemia with insulin as needed,                                 Insuline drip for hyperglycemia [ ] Yes, [x ] No   PV: follow pulse exam  Skin: decub precautions  DVT Prophylaxis:  [x ] SCDs  [ x] Heparin SQ  [ ] Lovenox  SQ  Stress Gastritis Prophylaxis: PPI/H2 Blockers if indicated  Mobility: patient is evaluated at the bedside with mobility team and the goals for today are discussed with PT [ x] bed rest at this time    PATIENT/FAMILY/SURROGATE CONFERENCE:  [x ] Yes with patient and family at the bedside. [ ] No  PURPOSE: To obtain necessary information, To discuss treatment options under consideration today.  Palliative care consult for GOC discussion with family-> full code  I saw and evaluated the patient personally. I have reviewed and agree with note above. Treatment plan discussed with SICU team, nurses and primary team at the time of the multidisciplinary rounds. The above note is NOT written at the time of rounds and will reflect all changes throughout management of the patient for the day note is written for.    Angela Fontaine MD, FACS  Trauma/ACS/SCC Attending
Patient placed on PSV this am.  Arousable.  On Josh at 0.1 mcg/kg/min and Vaso 0.03  Skin around the surgical incision is ischemic.  Right index finger the tip is ischemic.  VENICE drains with feculent discharge.  On TPN.  Had elevated K - was treated    ASSESSMENT:  67 y/o male with Left Emphysematous Pyelonephritis.  UTI present on admission.  Left Renal Caner. S/P Left Nephrectomy.  Acute respiratory failure with hypoxia.  Septic shock present on admission.  A.fib with RVR  ROJAS.  Acute blood loss anemia.  Dysphagia.  Moderate Protein Malnutrition.    PLAN:  - sedation - on Precedex as needed  - put on PSV during the day and Vent, AC at night  - keep MAP>65; euvolemic; wean Josh as tolerated   - continue NPO, on TPN  - follow serum electrolytes and UOP  - resume CVVHD due to hyperkalemia  - continue Ar, Dapto, Levaquin  - DVT prophylaxis    Patient is severely deconditioned.  Has bowel (most likely colon) perforation with stool leaking in the abdomen. This was discussed with Dr Womack on a multiple occasions and as per him patient is no longer a surgical candidate. Will need to set up a family meeting again with Surgery present to discuss the GOC further as ,I think, tracheostomy should not be planned due his very poor overall prognosis.
please see attending note above
waiting for CT with po and rectal and iv contrast for both abdomen and chest    cont current SICU management  possible bronchoscopy today
will eval patient tomorrow for weaning pressor and if possible extubation (less likely)
Critical Care: [id number]   This patient has a high probability of sudden, clinically significant deterioration, which requires the highest level of physician preparedness to intervene urgently. I managed/supervised life or organ supporting interventions that required frequent physician assessment. I devoted my full attention in the ICU to the direct care of this patient for the period of time indicated below. Time I spent with the family or surrogate(s) is included only if the patient was incapable of providing the necessary information or participating in medical decision making. Time devoted to teaching and to any procedures I billed separately is not included.     CHRISTINE VILLAVICENCIO 67y Male admitted to [x ] SICU /[ ] SDU with emphysematous pyelonephritis with severe sepsis and purulent ascites, with respiratory failure, hemodynamic instability, electrolytes abnormalities, renal failure worsening  Patient is examined and evaluated at the bedside with SICU team. Treatment plan discussed with SICU team, nurses and primary team.   Chest X-ray and all relevant studies reviewed during rounds.  Will continue hemodynamic monitoring as per protocol in SICU.    Neuro:  GCS [11T ]   [x ]  Neurovascular checks as per SICU protocol [x] ACT                [ ] 3% NaCl     [ ] 2% NaCl                [ ] Keppra  [ ] Lamictal  [ ] Depakote  [ ] Dilantin                Pharmacological Paralysis [ ] Yes  [x ]  No      Sedated/Pain control with                 [ ] Dilaudid drip, [ ]  Ketamine drip, [ ] Fentanyl drip, [ ] Propofol, [x ] Precedex, [ ] Versed drip, [ ] Ativan drip,                           [ ] OxyContin standing,  [ ] OxyContin PRN, [ ] Dilaudid PRN pushes, [ x] Fentanyl PRN pushes, [ ] PCA,                [x ] Tylenol IV/PO, [x ] Gabapentin, [ ]  Ketorolac, [ x] Tramadol,  [ ] Lidoderm Patch       Other Medications               [ ] Seroquel, [ ] Zyprexa, [ ] Haldol,  [ ] Clonazepam [ ] Xanax, [ ] Versed/Ativan PRN, [ ] Valium [ ] None               [ ] Robaxin   [ ] Baclofen  [ ] Flexeril               [ ] CIWA (Ativan/Valium/ Librium)  CV: continue to monitor, persistently tachycardic in 130s', which improved on Digoxin   On pressors [x ]  Yes  [ ]  No  [x] Digoxin level 3.2          [ ]  Levophed, [x ] Josh-Synephrine@0.7 mcg, [ ] Vasopressin, [ ]  Epinephrine          [ ] Dobutamine, [ ] Milrinone, [ ]  Midodrine,  [ ] Others    Other Cardiac Meds          [ ] Amiodarone IV/PO, [ ] Digoxin, [ ] Cardizem drip, [ ] Cleviprex drip, [ ] Esmolol drip, [ ] Cardene drip  Respiratory: Acute respiratory insufficiency -> continue monitoring, CXR bilateral lungs fields expanded                        None Invasive Support  [ ] Incentive Spirometer                                  [ ] BiPAP   [ ] CPAP/NIV   [ ] HFNC   [ ] NR Face Mask  [ ] NC  [ ] Trach Caller [ ] Room Air                        Ventilatory support  [x ] Yes [ ] No                            [ ] SBT                                  [ ] PC    [ ] VC   [ x] AC/PRVC   [ ] BiVent/APRV   [ ]CPAP                                  Vent 450/18/8/40%                                   ABG 7.45/28/160/20   LA 1.7  GI  [x ]  bowel regiment none [ x] BM 400ml in 24 hrs [x ] Flatus +             [ ] Ostomy   [x ] NG tube         Prophylaxis [ x] PPI  [ ] H2 Blockers  [ ] Others  Nutrition: continue   [x ] Diet NPO  [ ] TPN/PPN   [ ] calories count   [x ]  Tube Feeds   Nepro@20ml/hr  Renal: Continue I&Os monitoring, Mena catheter  [x ] Yes  [ ]  No  [ ] Consideration for discontinuation [ ] Taxes cath/PrimaFit  [ ] TOV                                                               [ ] HD/CVVH   [x ] Urine output 595ml/24hrs           [x] VENICE right 25 ml/24hrs   [x] Nephrostomy 495ml/24hrs       Lytes/Acid-base: replete hypokalemia, hypomagnesemia, hypocalcemia, hypophosphatemia        IV Fluids   [ ] LR [x ] NS@75ml/hr  [ ] D5W1/2NS [x ] Bicarbonate Drip [ ] Albumin [ ] Lasix drip/push  [ ] Bumex drip/push  ID: leukocytosis -> continue to monitor: remains septic with low grade fever, WBC 44        IV Abx [x ] Yes, [ ] No;  ID consulted [x ] Yes, [ ] No        Cultures send  [ ] Respiratory   [ ] Blood   [x ] Urine  [ ] Fluids  [ x] Tissue  [ ]  None  Lines:   [ ] Right   [ x] Left  [ ] Bilateral                     [ ] Subclavian TLC        [x ]  Internal Jugular TLC     [ ]  Femoral TLC                     [ ] Subclavian Cordis    [ ] Internal Jugular Cordis   [ ] Femoral Cordis                     [ ] HD catheter    [ ] PICC     [ ]  Midline   [x ] Peripheral IVs                                                 [x ] Right   [ ] Left   [ ] None                     [ x] Radial A-Line    [ ] Femoral A-Line   [ ] Axillary A-Line               Heme: continue to evaluate for acute blood loss anemia- trend Hg/Hct                     AC Reversal Indicated [ ] Yes  [x ] No                                      [ ] Kcentra,  [ ] 3Factors concentrate, [ ] Andexxa                     Transfused  indicated  [ ] Yes, [x ] No    [ ] PRBCs   [ ] Platelets   [ ] FFPs   [ ] Cryoprecipitate                    Should be started on or continued with  following  [ ] Yes,  [x ] No, due to GI bleed                               [ ] Lovenox  [ ] Coumadin  [ ] Heparin drip  [ ] DOVACs  [ ] ASA  [ ] Antiplatelets   Endocrine: Prevent and treat hyperglycemia with insulin as needed,                                 Insuline drip for hyperglycemia [ ] Yes, [x ] No   PV: follow pulse exam  Skin: decub precautions  DVT Prophylaxis:  [x ] SCDs  [ x] Heparin SQ  [ ] Lovenox  SQ  Stress Gastritis Prophylaxis: PPI/H2 Blockers if indicated  Mobility: patient is evaluated at the bedside with mobility team and the goals for today are discussed with PT [ x] bed rest at this time    PATIENT/FAMILY/SURROGATE CONFERENCE:  [x ] Yes with patient and family at the bedside. [ ] No  PURPOSE: To obtain necessary information, To discuss treatment options under consideration today.  Palliative care consult for GOC discussion with family-> full code  I saw and evaluated the patient personally. I have reviewed and agree with note above. Treatment plan discussed with SICU team, nurses and primary team at the time of the multidisciplinary rounds. The above note is NOT written at the time of rounds and will reflect all changes throughout management of the patient for the day note is written for.    Angela Fontaine MD, FACS  Trauma/ACS/SCC Attending
Critical Care: [id number]   This patient has a high probability of sudden, clinically significant deterioration, which requires the highest level of physician preparedness to intervene urgently. I managed/supervised life or organ supporting interventions that required frequent physician assessment. I devoted my full attention in the ICU to the direct care of this patient for the period of time indicated below. Time I spent with the family or surrogate(s) is included only if the patient was incapable of providing the necessary information or participating in medical decision making. Time devoted to teaching and to any procedures I billed separately is not included.     CHRISTINE VILLAVICENCIO 67y Male admitted to [x ] SICU /[ ] SDU with emphysematous pyelonephritis with sepsis and purulent ascites, with respiratory failure, hemodynamic instability, electrolytes abnormalities   Patient is examined and evaluated at the bedside with SICU team. Treatment plan discussed with SICU team, nurses and primary team.   Chest X-ray and all relevant studies reviewed during rounds.  Will continue hemodynamic monitoring as per protocol in SICU.    Neuro:  GCS [11T ]   [x ]  Neurovascular checks as per SICU protocol [x] ACT                [ ] 3% NaCl     [ ] 2% NaCl                [ ] Keppra  [ ] Lamictal  [ ] Depakote  [ ] Dilantin                Pharmacological Paralysis [ ] Yes  [x ]  No      Sedated/Pain control with                 [ ] Dilaudid drip, [ ]  Ketamine drip, [ ] Fentanyl drip, [ ] Propofol, [x ] Precedex, [ ] Versed drip, [ ] Ativan drip,                           [ ] OxyContin standing,  [ ] OxyContin PRN, [ ] Dilaudid PRN pushes, [ x] Fentanyl PRN pushes, [ ] PCA,                [x ] Tylenol IV/PO, [x ] Gabapentin, [ ]  Ketorolac, [ x] Tramadol,  [ ] Lidoderm Patch       Other Medications               [ ] Seroquel, [ ] Zyprexa, [ ] Haldol,  [ ] Clonazepam [ ] Xanax, [ ] Versed/Ativan PRN, [ ] Valium [ ] None               [ ] Robaxin   [ ] Baclofen  [ ] Flexeril               [ ] CIWA (Ativan/Valium/ Librium)  CV: continue to monitor, persistently tachycardic in 130s', will start Digoxin   On pressors [x ]  Yes  [ ]  No          [ ]  Levophed, [x ] Josh-Synephrine, [ ] Vasopressin, [ ]  Epinephrine          [ ] Dobutamine, [ ] Milrinone, [ ]  Midodrine,  [ ] Others    Other Cardiac Meds          [ ] Amiodarone IV/PO, [ ] Digoxin, [ ] Cardizem drip, [ ] Cleviprex drip, [ ] Esmolol drip, [ ] Cardene drip  Respiratory: Acute respiratory insufficiency -> continue monitoring                        None Invasive Support  [ ] Incentive Spirometer                                  [ ] BiPAP   [ ] CPAP/NIV   [ ] HFNC   [ ] NR Face Mask  [ ] NC  [ ] Trach Caller [ ] Room Air                        Ventilatory support  [x ] Yes [ ] No                            [ ] SBT                                  [ ] PC    [ ] VC   [ x] AC/PRVC   [ ] BiVent/APRV   [ ]CPAP                                  Vent 450/22/8/40%                                    ABG 7.36/32/189/18   LA 3  GI  [x ]  bowel regiment none [ x] BM none [x ] Flatus none             [ ] Ostomy   [x ] NG tube         Prophylaxis [ ] PPI  [ ] H2 Blockers  [ ] Others  Nutrition: continue   [x ] Diet NPO  [ ] TPN/PPN   [ ] calories count   [ ]  Tube Feeds     Renal: Continue I&Os monitoring, Mena catheter  [ ] Yes  [ ]  No  [ ] Consideration for discontinuation [ ] Taxes cath/PrimaFit  [ ] TOV                                                               [ ] HD/CVVH   [ ] Urine output           [x] VENICE right 50 ml/24hrs   [x] Nephrostomy 370ml/24hrs       Lytes/Acid-base: replete hypokalemia, hypomagnesemia, hypocalcemia, hypophosphatemia        IV Fluids   [x ] LR  [ ] NS  [ ] D5W1/2NS [x ] Bicarbonate Drip @100ml/hr [ ] Albumin [ ] Lasix drip/push  [ ] Bumex drip/push  ID: leukocytosis -> continue to monitor: remains septic         IV Abx [x ] Yes, [ ] No;  ID consulted [x ] Yes, [ ] No        Cultures send  [ ] Respiratory   [ ] Blood   [x ] Urine  [ ] Fluids  [ ] Tissue  [ ]  None  Lines:   [ ] Right   [ x] Left  [ ] Bilateral                     [ ] Subclavian TLC        [x ]  Internal Jugular TLC     [ ]  Femoral TLC                     [ ] Subclavian Cordis    [ ] Internal Jugular Cordis   [ ] Femoral Cordis                     [ ] HD catheter    [ ] PICC     [ ]  Midline   [x ] Peripheral IVs                                                 [x ] Right   [ ] Left   [ ] None                     [ x] Radial A-Line    [ ] Femoral A-Line   [ ] Axillary A-Line               Heme: continue to evaluate for acute blood loss anemia- trend Hg/Hct                     AC Reversal Indicated [ ] Yes  [x ] No                                      [ ] Kcentra,  [ ] 3Factors concentrate, [ ] Andexxa                     Transfused  indicated  [ ] Yes, [x ] No    [ ] PRBCs   [ ] Platelets   [ ] FFPs   [ ] Cryoprecipitate                    Should be started on or continued with  following  [ ] Yes,  [x ] No, due to GI bleed                               [ ] Lovenox  [ ] Coumadin  [ ] Heparin drip  [ ] DOVACs  [ ] ASA  [ ] Antiplatelets   Endocrine: Prevent and treat hyperglycemia with insulin as needed,                                 Insuline drip for hyperglycemia [ ] Yes, [x ] No   PV: follow pulse exam  Skin: decub precautions  DVT Prophylaxis:  [x ] SCDs  [ ] Heparin SQ  [ ] Lovenox  SQ  Stress Gastritis Prophylaxis: PPI/H2 Blockers if indicated  Mobility: patient is evaluated at the bedside with mobility team and the goals for today are discussed with PT [ x] bed rest at this time    PATIENT/FAMILY/SURROGATE CONFERENCE:  [x ] Yes with patient and family at the bedside. [ ] No  PURPOSE: To obtain necessary information, To discuss treatment options under consideration today.  Palliative care consult for GOC discussion with family  I saw and evaluated the patient personally. I have reviewed and agree with note above. Treatment plan discussed with SICU team, nurses and primary team at the time of the multidisciplinary rounds. The above note is NOT written at the time of rounds and will reflect all changes throughout management of the patient for the day note is written for.    Angela Fontaine MD, FACS  Trauma/ACS/SCC Attending
Critical Care: [id number]   This patient has a high probability of sudden, clinically significant deterioration, which requires the highest level of physician preparedness to intervene urgently. I managed/supervised life or organ supporting interventions that required frequent physician assessment. I devoted my full attention in the ICU to the direct care of this patient for the period of time indicated below. Time I spent with the family or surrogate(s) is included only if the patient was incapable of providing the necessary information or participating in medical decision making. Time devoted to teaching and to any procedures I billed separately is not included.     CHRISTINE VILLAVICENCIO 67y Male admitted to [x ] SICU /[ ] SDU with emphysematous pyelonephritis with severe sepsis multiple nephrostomies, and purulent ascites, with respiratory failure, hemodynamic instability, electrolytes abnormalities, renal failure worsening, S/P left nephrectomy 6/30/23 with EBL 1000ml, remained septic and poor prognosis through entire stay in SICU, continues to requires multiple pressors, CVVH and ventilatory support.  Patient is examined and evaluated at the bedside with SICU team. Treatment plan discussed with SICU team, nurses and primary team.   Chest X-ray and all relevant studies reviewed during rounds.  Will continue hemodynamic monitoring as per protocol in SICU.    Neuro:  GCS [3T ]   [x ]  Neurovascular checks as per SICU protocol [ ] Sedation vacation -> off sedation for a few days                [ ] 3% NaCl     [ ] 2% NaCl                [ ] Keppra  [ ] Lamictal  [ ] Depakote  [ ] Dilantin                Pharmacological Paralysis [ ] Yes  [x ]  No      Sedated/Pain control with                 [ ] Dilaudid drip, [ ]  Ketamine drip, [ ] Fentanyl drip, [ ] Propofol, [ ] Precedex, [ ] Versed drip, [ ] Ativan drip,                           [ ] OxyContin standing,  [ ] OxyContin PRN, [ x] Dilaudid PRN pushes, [ ] Fentanyl PRN pushes, [ ] PCA,                [x ] Tylenol IV/PO, [ ] Gabapentin, [ ]  Ketorolac, [ ] Tramadol,  [ ] Lidoderm Patch       Other Medications               [ ] Seroquel, [ ] Zyprexa, [ ] Haldol,  [ ] Clonazepam [ ] Xanax, [ ] Versed/Ativan PRN, [ ] Valium [ ] None               [ ] Robaxin   [ ] Baclofen  [ ] Flexeril               [ ] CIWA (Ativan/Valium/ Librium)  CV: continue to monitor,   On pressors [x ]  Yes  [ ]  No           [x ]  Levophed@3.25mcg, [ ] Josh-Synephrine@4mcg, [x ] Vasopressin@0.03units, [ ]  Epinephrine          [ ] Dobutamine, [ ] Milrinone, [ ]  Midodrine,  [ ] Digoxin  [ ] Others    Other Cardiac Meds          [ ] Amiodarone IV/PO, [ ] Digoxin, [ ] Cardizem drip, [ ] Cleviprex drip, [ ] Esmolol drip, [ ] Cardene drip  Respiratory: Acute respiratory insufficiency -> continue monitoring, CXR bilateral lungs congestion with patchy infiltrates                        None Invasive Support  [ ] Incentive Spirometer                                  [ ] BiPAP   [ ] CPAP/NIV   [ ] HFNC   [ ] NR Face Mask  [ ] NC  [ ] Trach Caller [ ] Room Air                        Ventilatory support  [x ] Yes [ ] No                            [ ] SBT                                  [ ] PC    [ ] VC   [ x] AC/PRVC   [ ] BiVent/APRV   [ ]CPAP                                  Vent 450/26/5/70%                                   ABG 7.04/57/220/15   LA 10.3  GI  [x ]  bowel regiment none [ x] BM 400ml in 24 hrs [x ] Flatus +             [ ] Ostomy   [x ] NG tube 800ml/24hrs             [x] VENICE left 305ml/24hrs    [x] VENICE#1 right 40ml/24hrs   [x] VENICE #2 right 240ml/24hrs        Prophylaxis [ x] PPI  [ ] H2 Blockers  [ ] Others  Nutrition: continue   [x ] Diet NPO  [x ] TPN/PPN   [ ] calories count   [ ]  Tube Feeds   Renal: Continue I&Os monitoring, Mena catheter  [ ] Yes  [ x]  No  [ ] Consideration for discontinuation [ ] Taxes cath/PrimaFit  [ ] TOV                                                               [x ] HD/CVVH   [ ] Urine output        Lytes/Acid-base: replete hypokalemia, hypomagnesemia, hypocalcemia, hypophosphatemia        IV Fluids   [ ] LR [x ] NS@KVO  [ ] D5W1/2NS [ x] Bicarbonate Drip @75ml/hr[ ] Albumin [ ] Lasix drip/push  [ ] Bumex drip/push  ID: leukocytosis -> continue to monitor: remains septic with low grade fever, WBC 34        IV Abx [x ] Yes, [ ] No;  ID consulted [x ] Yes, [ ] No        Cultures send  [ ] Respiratory   [ ] Blood   [x ] Urine  [ ] Fluids  [ x] Tissue  [ ]  None  Lines:   [ x] Right   [ ] Left  [ ] Bilateral                     [ ] Subclavian TLC        [x ]  Internal Jugular TLC     [ ]  Femoral TLC                     [ ] Subclavian Cordis    [ ] Internal Jugular Cordis   [ ] Femoral Cordis                     [x ] HD catheter -> Lt IJ   [ ] PICC     [ ]  Midline   [x ] Peripheral IVs                                                 [ ] Right   [x ] Left   [ ] None                     [ ] Radial A-Line    [x ] Femoral A-Line   [ ] Axillary A-Line               Heme: continue to evaluate for acute blood loss anemia- trend Hg/Hct                     AC Reversal Indicated [ ] Yes  [x ] No                                      [ ] Kcentra,  [ ] 3Factors concentrate, [ ] Andexxa                     Transfused  indicated  [ ] Yes, [x ] No    [ ] PRBCs   [ ] Platelets   [ ] FFPs   [ ] Cryoprecipitate                    Should be started on or continued with  following  [ ] Yes,  [x ] No,                               [ ] Lovenox  [ ] Coumadin  [ ] Heparin drip  [ ] DOVACs  [ ] ASA  [ ] Antiplatelets   Endocrine: Prevent and treat hyperglycemia with insulin as needed,                                 Insuline drip for hyperglycemia [ ] Yes, [x ] No   PV: follow pulse exam  Skin: decub precautions  DVT Prophylaxis:  [x ] SCDs  [ x] Heparin SQ  [ ] Lovenox  SQ  Stress Gastritis Prophylaxis: PPI/H2 Blockers if indicated  Mobility: patient is evaluated at the bedside with mobility team and the goals for today are discussed with PT [ x] bed rest at this time    PATIENT/FAMILY/SURROGATE CONFERENCE:  [x ] Yes with patient's family at the bedside. [ ] No  PURPOSE: To obtain necessary information, To discuss treatment options under consideration today.  Palliative care consult for GOC discussion with family-> full code  I saw and evaluated the patient personally. I have reviewed and agree with note above. Treatment plan discussed with SICU team, nurses and primary team at the time of the multidisciplinary rounds. The above note is NOT written at the time of rounds and will reflect all changes throughout management of the patient for the day note is written for.    Angela Fontaine MD, FACS  Trauma/ACS/SCC Attending
Critical Care: [id number]   This patient has a high probability of sudden, clinically significant deterioration, which requires the highest level of physician preparedness to intervene urgently. I managed/supervised life or organ supporting interventions that required frequent physician assessment. I devoted my full attention in the ICU to the direct care of this patient for the period of time indicated below. Time I spent with the family or surrogate(s) is included only if the patient was incapable of providing the necessary information or participating in medical decision making. Time devoted to teaching and to any procedures I billed separately is not included.     CHRISTINE VILLAVICENCIO 67y Male admitted to [x ] SICU /[ ] SDU with emphysematous pyelonephritis with severe sepsis multiple nephrostomies, and purulent ascites, with respiratory failure, hemodynamic instability, electrolytes abnormalities, renal failure worsening, S/P left nephrectomy 6/30/23 with EBL 1000ml, remained septic and poor prognosis through entire stay in SICU, continues to requires multiple pressors, CVVH and ventilatory support. GOC discussed with family -> DNR now. GI bleed requiring multiple units transfusion   Patient is examined and evaluated at the bedside with SICU team. Treatment plan discussed with SICU team, nurses and primary team.   Chest X-ray and all relevant studies reviewed during rounds.  Will continue hemodynamic monitoring as per protocol in SICU.    Neuro:  GCS [3T ]   [x ]  Neurovascular checks as per SICU protocol [ ] Sedation vacation -> off sedation for a few days                [ ] 3% NaCl     [ ] 2% NaCl                [ ] Keppra  [ ] Lamictal  [ ] Depakote  [ ] Dilantin                Pharmacological Paralysis [ ] Yes  [x ]  No      Sedated/Pain control with                 [ ] Dilaudid drip, [ ]  Ketamine drip, [ ] Fentanyl drip, [ ] Propofol, [ ] Precedex, [ ] Versed drip, [ ] Ativan drip,                           [ ] OxyContin standing,  [ ] OxyContin PRN, [ x] Dilaudid PRN pushes, [ ] Fentanyl PRN pushes, [ ] PCA,                [x ] Tylenol IV/PO, [ ] Gabapentin, [ ]  Ketorolac, [ ] Tramadol,  [ ] Lidoderm Patch       Other Medications               [ ] Seroquel, [ ] Zyprexa, [ ] Haldol,  [ ] Clonazepam [ ] Xanax, [ ] Versed/Ativan PRN, [ ] Valium [x ] None               [ ] Robaxin   [ ] Baclofen  [ ] Flexeril               [ ] CIWA (Ativan/Valium/ Librium)  CV: continue to monitor,   On pressors [x ]  Yes  [ ]  No           [x ]  Levophed@3.6mcg, [ ] Josh-Synephrine, [x ] Vasopressin@0.03units, [ ]  Epinephrine          [ ] Dobutamine, [ ] Milrinone, [ ]  Midodrine,  [ ] Digoxin  [ ] Others    Other Cardiac Meds          [ ] Amiodarone IV/PO, [ ] Digoxin, [ ] Cardizem drip, [ ] Cleviprex drip, [ ] Esmolol drip, [ ] Cardene drip  Respiratory: Acute respiratory insufficiency -> continue monitoring, CXR bilateral lungs congestion with patchy infiltrates                        None Invasive Support  [ ] Incentive Spirometer                                  [ ] BiPAP   [ ] CPAP/NIV   [ ] HFNC   [ ] NR Face Mask  [ ] NC  [ ] Trach Caller [ ] Room Air                        Ventilatory support  [x ] Yes [ ] No                            [ ] SBT                                  [ ] PC    [ ] VC   [ x] AC/PRVC   [ ] BiVent/APRV   [ ]CPAP                                  Vent 450/28/10/80%                                   ABG 6.99/55/208/15   LA 14.3  GI  [x ]  bowel regiment none [ x] BM + [x ] Flatus +             [ ] Ostomy   [x ] NG tube 1900ml/24hrs             [x] VENICE left 590ml/24hrs    [x] VENICE#1 right 100ml/24hrs   [x] VENICE #2 right 178ml/24hrs        Prophylaxis [ x] PPI  [ ] H2 Blockers  [ ] Others  Nutrition: continue   [x ] Diet NPO  [x ] TPN/PPN   [ ] calories count   [ ]  Tube Feeds   Renal: Continue I&Os monitoring, Mena catheter  [ ] Yes  [ x]  No  [ ] Consideration for discontinuation [ ] Taxes cath/PrimaFit  [ ] TOV                                                               [ ] HD/CVVH   [ ] Urine output        Lytes/Acid-base: replete hypokalemia, hypomagnesemia, hypocalcemia, hypophosphatemia        IV Fluids   [ ] LR [x ] NS@KVO  [ ] D5W1/2NS [ x] Bicarbonate Drip @100ml/hr[ ] Albumin [ ] Lasix drip/push  [ ] Bumex drip/push  ID: leukocytosis -> continue to monitor: remains septic with low grade fever, WBC 34        IV Abx [x ] Yes, [ ] No;  ID consulted [x ] Yes, [ ] No        Cultures send  [ ] Respiratory   [ ] Blood   [x ] Urine  [ ] Fluids  [ x] Tissue  [ ]  None  Lines:   [ x] Right   [ ] Left  [ ] Bilateral                     [ ] Subclavian TLC        [x ]  Internal Jugular TLC     [ ]  Femoral TLC                     [ ] Subclavian Cordis    [ ] Internal Jugular Cordis   [ ] Femoral Cordis                     [x ] HD catheter -> Lt IJ   [ ] PICC     [ ]  Midline   [x ] Peripheral IVs                                                 [ ] Right   [x ] Left   [ ] None                     [ ] Radial A-Line    [x ] Femoral A-Line   [ ] Axillary A-Line               Heme: continue to evaluate for acute blood loss anemia- trend Hg/Hct                     AC Reversal Indicated [ ] Yes  [x ] No                                      [ ] Kcentra,  [ ] 3Factors concentrate, [ ] Andexxa                     Transfused  indicated  [ ] Yes, [x ] No    [ ] PRBCs   [ ] Platelets   [ ] FFPs   [ ] Cryoprecipitate                    Should be started on or continued with  following  [ ] Yes,  [x ] No,                               [ ] Lovenox  [ ] Coumadin  [ ] Heparin drip  [ ] DOVACs  [ ] ASA  [ ] Antiplatelets   Endocrine: Prevent and treat hyperglycemia with insulin as needed,                                 Insuline drip for hyperglycemia [ ] Yes, [x ] No   PV: follow pulse exam  Skin: decub precautions  DVT Prophylaxis:  [x ] SCDs  [ x] Heparin SQ  [ ] Lovenox  SQ  Stress Gastritis Prophylaxis: PPI/H2 Blockers if indicated  Mobility: patient is evaluated at the bedside with mobility team and the goals for today are discussed with PT [ x] bed rest at this time    PATIENT/FAMILY/SURROGATE CONFERENCE:  [x ] Yes with patient's family at the bedside. [ ] No  PURPOSE: To obtain necessary information, To discuss treatment options under consideration today.  Palliative care consult for GOC discussion with family-> DNR  I saw and evaluated the patient personally. I have reviewed and agree with note above. Treatment plan discussed with SICU team, nurses and primary team at the time of the multidisciplinary rounds. The above note is NOT written at the time of rounds and will reflect all changes throughout management of the patient for the day note is written for.    Angela Fontaine MD, FACS  Trauma/ACS/SCC Attending
Plan for EGD/EUs and Colonoscopy as outpatient once acute issues reolved.
cont SICU management, family is aware of the gravity of his disease
high risk for MACE  proceed as planned  iv Cardizem for rate control  allow permissive tachycardia - 100s  may interrupt ac as needed
neuro: more awake, follow simple commands  cardio: off pressors almost today  pulm: minimum vent setting, will obtain NIF and RSBI tomorrow  GI: suspect intestinal leak, but not surgical candidate given his hostile abdomen and improving vitals and WBC, cont OGT suction and TPN, Obtain LFTs and prealbumin  Renal: 5cc/h urine output better than couple days ago, still need CVVH  ID: Caspofungin, levo,jan  hem: stable h/h, low platelet 2/2 sepsis
Patient had [place] catheter exchanged and restarted on CVVHD for hyperkalemia.  K better this am.  Remains intubated and on Vent support.  Currently placed on PSV.  From s brown discharge.  Has distal pedal pulses doppler on all extremities.  Added Caspofungin as per ID yesterday.  WBC 10 today.  On Josh at 0.2 mcg/kg/min and Vaso 0.03  NPO and on TPN    ASSESSMENT:  67 y/o male with Left Emphysematous Pyelonephritis.  UTI present on admission.  Left Renal Caner. S/P Left Nephrectomy.  Acute respiratory failure with hypoxia.  Septic shock present on admission.  A.fib with RVR  ROJAS.  Acute blood loss anemia.  Dysphagia.  Moderate Protein Malnutrition.    PLAN:  - sedation as needed, currently held  - PSV during the day; use Vent, AC at night  - keep MAP>65; euvolemic; wean Josh and Vaso as tolerated   - continue NPO, on TPN  - follow serum electrolytes and UOP  - CVVHD, target UF -50 ml/h as tolerated  - ID: on Ar, Dapto, Levaquin, Caspofungin  - DVT prophylaxis
Patient remains intubated.  Placed on CPAP 10/6 this am.  Arousable, follows commands.  From Left nephrostomy around 700 ml pus  UOP about 300 ml/24h  K 5.2 this am  Had CT Abd/Pelvis that showed decreased left hydronephrosis.  Had 1 unit PRBC transfused yesterday with good response.  On Levo this am at 0.02 mcg/kg/min  Remains in A.fib but rate controlled, HR 80-90/min    ASSESSMENT:  68 y/o male with Left Emphysematous Pyelonephritis.  UTI present on admission.  Acute respiratory failure.  Septic shock present on admission.  New onset A.fib with RVR  Lactic acidosis.  Large Pneumoperitoneum.  ROJAS (Creat 2.9)  Dysphagia.    PLAN:  - sedation - has SAT today; resume when needed Precedex and Fentanyl prn  - on CPAP today; will put on SIMV; FiO2 40%, PEEP 6  - keep MAP>65; euvolemic by now; wean Levo off  - start trickle feeds at 20 ml/h  - follow serum electrolytes and UOP  - Nephrology f/u; will consider Bumex after Levo off  - ID: on Zosyn and Vanco  - follow cultures; ID f/u  - on Heparin drip as per Cardiology due to new onset A.fib  Continue SICU care
Patient remains on Vent support.  Tolerates PSV during the day.  Arousable.  Off Josh this am. On Vaso only.  CVVHD held last night due to [place] not working.  Had PRBC 1 unit and Plat 1 transfused.  From  drains feculent materials.    ASSESSMENT:  67 y/o male with Left Emphysematous Pyelonephritis.  UTI present on admission.  Left Renal Caner. S/P Left Nephrectomy.  Acute respiratory failure with hypoxia.  Septic shock present on admission.  A.fib with RVR  ROJAS.  Acute blood loss anemia.  Dysphagia.  Moderate Protein Malnutrition.    PLAN:  - sedation - on Precedex for RASS -1  - put on PSV follow ABG; put on Vent at night  - keep MAP>65; euvolemic at this time  - NPO, on TPN  - follow serum electrolytes and UOP  - hold CVVH today  - on Ar, Dapto, Levaquin  - ID f/u needed  - DVT prophylaxis    Had family meeting yesterday - will continue same level of care for now  As per surgery, not a candidate for OR despite feculent from s
We were consulted intraoperatively for bleeding and by the time vascular surgery was in OR, the bleeding was controlled.  Patient is managed by SICU.  Please call us PRN.
Critical Care: [id number]   This patient has a high probability of sudden, clinically significant deterioration, which requires the highest level of physician preparedness to intervene urgently. I managed/supervised life or organ supporting interventions that required frequent physician assessment. I devoted my full attention in the ICU to the direct care of this patient for the period of time indicated below. Time I spent with the family or surrogate(s) is included only if the patient was incapable of providing the necessary information or participating in medical decision making. Time devoted to teaching and to any procedures I billed separately is not included.     CHRISTINE VILLAVICENCIO 67y Male admitted to [x ] SICU /[ ] SDU with emphysematous pyelonephritis with severe sepsis and purulent ascites, with respiratory failure, hemodynamic instability, electrolytes abnormalities, renal failure worsening  Patient is examined and evaluated at the bedside with SICU team. Treatment plan discussed with SICU team, nurses and primary team.   Chest X-ray and all relevant studies reviewed during rounds.  Will continue hemodynamic monitoring as per protocol in SICU.    Neuro:  GCS [11T ]   [x ]  Neurovascular checks as per SICU protocol [x] ACT                [ ] 3% NaCl     [ ] 2% NaCl                [ ] Keppra  [ ] Lamictal  [ ] Depakote  [ ] Dilantin                Pharmacological Paralysis [ ] Yes  [x ]  No      Sedated/Pain control with                 [ ] Dilaudid drip, [ ]  Ketamine drip, [ ] Fentanyl drip, [ ] Propofol, [x ] Precedex, [ ] Versed drip, [ ] Ativan drip,                           [ ] OxyContin standing,  [ ] OxyContin PRN, [ ] Dilaudid PRN pushes, [ x] Fentanyl PRN pushes, [ ] PCA,                [x ] Tylenol IV/PO, [x ] Gabapentin, [ ]  Ketorolac, [ x] Tramadol,  [ ] Lidoderm Patch       Other Medications               [ ] Seroquel, [ ] Zyprexa, [ ] Haldol,  [ ] Clonazepam [ ] Xanax, [ ] Versed/Ativan PRN, [ ] Valium [ ] None               [ ] Robaxin   [ ] Baclofen  [ ] Flexeril               [ ] CIWA (Ativan/Valium/ Librium)  CV: continue to monitor, persistently tachycardic in 130s', which improved on Digoxin   On pressors [x ]  Yes  [ ]  No  [x] Digoxin level 3.2          [ ]  Levophed, [x ] Josh-Synephrine@0.6 mcg, [ ] Vasopressin, [ ]  Epinephrine          [ ] Dobutamine, [ ] Milrinone, [ ]  Midodrine,  [ ] Others    Other Cardiac Meds          [ ] Amiodarone IV/PO, [ ] Digoxin, [ ] Cardizem drip, [ ] Cleviprex drip, [ ] Esmolol drip, [ ] Cardene drip  Respiratory: Acute respiratory insufficiency -> continue monitoring, CXR bilateral lungs fields expanded                        None Invasive Support  [ ] Incentive Spirometer                                  [ ] BiPAP   [ ] CPAP/NIV   [ ] HFNC   [ ] NR Face Mask  [ ] NC  [ ] Trach Caller [ ] Room Air                        Ventilatory support  [x ] Yes [ ] No                            [ ] SBT                                  [ ] PC    [ ] VC   [ x] AC/PRVC   [ ] BiVent/APRV   [ ]CPAP                                  Vent 450/22/8/40%   -> rate 18                                 ABG 7.52/27/132/22   LA 1.7  GI  [x ]  bowel regiment none [ x] BM 400ml in 24 hrs [x ] Flatus +             [ ] Ostomy   [x ] NG tube         Prophylaxis [ x] PPI  [ ] H2 Blockers  [ ] Others  Nutrition: continue   [x ] Diet NPO  [ ] TPN/PPN   [ ] calories count   [x ]  Tube Feeds   start Nepro  Renal: Continue I&Os monitoring, Mena catheter  [x ] Yes  [ ]  No  [ ] Consideration for discontinuation [ ] Taxes cath/PrimaFit  [ ] TOV                                                               [ ] HD/CVVH   [x ] Urine output 515ml/24hrs           [x] VENICE right 40 ml/24hrs   [x] Nephrostomy 240ml/24hrs       Lytes/Acid-base: replete hypokalemia, hypomagnesemia, hypocalcemia, hypophosphatemia        IV Fluids   [ ] LR [x ] NS@75ml/hr  [ ] D5W1/2NS [x ] Bicarbonate Drip [ ] Albumin [ ] Lasix drip/push  [ ] Bumex drip/push  ID: leukocytosis -> continue to monitor: remains septic with low grade fever, WBC 58        IV Abx [x ] Yes, [ ] No;  ID consulted [x ] Yes, [ ] No        Cultures send  [ ] Respiratory   [ ] Blood   [x ] Urine  [ ] Fluids  [ x] Tissue  [ ]  None  Lines:   [ ] Right   [ x] Left  [ ] Bilateral                     [ ] Subclavian TLC        [x ]  Internal Jugular TLC     [ ]  Femoral TLC                     [ ] Subclavian Cordis    [ ] Internal Jugular Cordis   [ ] Femoral Cordis                     [ ] HD catheter    [ ] PICC     [ ]  Midline   [x ] Peripheral IVs                                                 [x ] Right   [ ] Left   [ ] None                     [ x] Radial A-Line    [ ] Femoral A-Line   [ ] Axillary A-Line               Heme: continue to evaluate for acute blood loss anemia- trend Hg/Hct                     AC Reversal Indicated [ ] Yes  [x ] No                                      [ ] Kcentra,  [ ] 3Factors concentrate, [ ] Andexxa                     Transfused  indicated  [ ] Yes, [x ] No    [ ] PRBCs   [ ] Platelets   [ ] FFPs   [ ] Cryoprecipitate                    Should be started on or continued with  following  [ ] Yes,  [x ] No, due to GI bleed                               [ ] Lovenox  [ ] Coumadin  [ ] Heparin drip  [ ] DOVACs  [ ] ASA  [ ] Antiplatelets   Endocrine: Prevent and treat hyperglycemia with insulin as needed,                                 Insuline drip for hyperglycemia [ ] Yes, [x ] No   PV: follow pulse exam  Skin: decub precautions  DVT Prophylaxis:  [x ] SCDs  [ x] Heparin SQ  [ ] Lovenox  SQ  Stress Gastritis Prophylaxis: PPI/H2 Blockers if indicated  Mobility: patient is evaluated at the bedside with mobility team and the goals for today are discussed with PT [ x] bed rest at this time    PATIENT/FAMILY/SURROGATE CONFERENCE:  [x ] Yes with patient and family at the bedside. [ ] No  PURPOSE: To obtain necessary information, To discuss treatment options under consideration today.  Palliative care consult for GOC discussion with family-> full code  I saw and evaluated the patient personally. I have reviewed and agree with note above. Treatment plan discussed with SICU team, nurses and primary team at the time of the multidisciplinary rounds. The above note is NOT written at the time of rounds and will reflect all changes throughout management of the patient for the day note is written for.    Angela Fontaine MD, FACS  Trauma/ACS/SCC Attending
Patient still on a vent this am.  FiO2 30%, PEEP 6.  Placed on CPAP this am.  On Precedex but follows commands, looks comfortable.  Antibiotics changed yesterday as per ID.  Started on trickle feeds via OG tibe  Off Levo, on Midodrine.  Remains oliguric - UOP about 425 ml/24h  Right drain 210 ml  Left nephrostomy - 500 ml   Creat 3.1.    ASSESSMENT:  66 y/o male with Left Emphysematous Pyelonephritis.  UTI present on admission.  Acute respiratory failure.  Septic shock present on admission.  New onset A.fib with RVR  Lactic acidosis.  Large Pneumoperitoneum.  ROJAS.  Dysphagia.    PLAN:  - sedation - on Precedex; needs SAT for SBT  - on CPAP this am - get weaning parameters for possible extubation today  - keep MAP>65; euvolemic currently; off pressors  - A.fib - on Metoprolol for rate control  - hold tube feeds for SBT; may need S&S eval if extubated  - follow serum electrolytes and UOP  - give Lasix 80 mg x1 today to try to stimulate dieresis   - Nephrology f/u needed  - ID eval appreciated - on Ar, Dapto and Monocycline currently  -  planning possible left nephrectomy next week  - on Heparin drip for A.fib - follow PTT, now 66  Continue SICU care
Patient was extubated yesterday.  No signs of respiratory distress at mercedes time.  On CXR - increasing atelectasis, R>L.  Had Lasix 80 mg iv x2 yesterday - made about 830 ml UOP. Creat 3.3 this am.  Was started on liquid diet.  In A.fib with RVR this am.  Abdomen remains moderately distended. Passing flatus.  WBC 48 this am.  Right drain - 180 ml ascitic.  Left nephrostomy - 435 ml murky    ASSESSMENT:  68 y/o male with Left Emphysematous Pyelonephritis.  UTI present on admission.  Acute respiratory failure.  Septic shock present on admission.  New onset A.fib with RVR  Lactic acidosis.  Large Pneumoperitoneum.  ROJAS.    PLAN:  - pain control as needed - avoid opioids  - aggressive chest PT  - incentive spirometer  - on O2 with NC; consider nebulizer treatments  - A.fib with RVR - put on Cardizem drip, needs rate control  - Cardiology follow up needed as well as preoperative risk stratification  - on liquid diet  - aspiration precautions at all time  - follow serum electrolytes and UOP  - give Lasix 80 mg iv again today  - ID - on Meropenem and Levaquin (for Stenotrophomonas)  - DVT prophy - patient is anticoagulated with Hep drip for A.fib, PTT 65  The case was discussed with Dr Thalia NAVARRO - plan is for surgical drainage of left emphysematous pyelonephritis for better source control
Patient had bronchoscopy yesterday with good result - had left lung fully expanded after thick secretions suctioned out.  On Precedex for sedation.  Remains on Vent support, FiO2 50%, PEEP 8.  ABG 7.25/42/132  On Levo at 0.05 mcg/kg/min.  A.fib is rate controlled with Lopressor iv  Oh Heparin drip at 1600 u/h, PTT 65.7 this am.  Creat 3.8, worse. Had K 5.6 this am, treated.  On tube feeds, had bowel movement yesterday.  Abdomen: soft, nontender.   irrigated left nephrostomy yesterday and puss was drained.   ml/24h  Left nephrostomy - 575 ml/24h  Right abdominal drain - 190 ml serous.    ASSESSMENT:  68 y/o male with Left Emphysematous Pyelonephritis.  UTI present on admission.  Acute respiratory failure with hypoxia.  Septic shock present on admission.  New onset A.fib with RVR  Lactic acidosis.  Pneumoperitoneum.  ROJAS.  Acute blood loss anemia.    PLAN:  - sedation - on Precedex for RASS -1; will do SAT  - on Vent support; put on CPAP this am as tolerated; no plan for extubation  today  - keep MAP>65; total body fluid overloaded, but intravascularly is euvolemic; wean Levo  - A.fib - on Lopressor 5 mg iv q6h with parameters  - advance tube feeds to goal; bowel regiment  - follow serum electrolytes and UOP; give Bumex 2 ng iv x1 today  - Nephrology f/u needed - patient may require HD within the next 24-48 h if no improvement of ROJAS  - ID: on Ar and Levaquin; d/c Vanco; follow BAL culture from yesterday  - DVT prophylaxis:  on Heparin drip for A.fib  Prognosis guarded
Patient is arousable, follows simple commands.  On vent support, FiO2 40%, PEEP 8.  Placed on CPAP this am.  On  Josh at 0.83 mcg/kg/min and Vaso at 0.03  On CVVHD - with 100 ml/h negative fluid balance     ASSESSMENT:  66 y/o male with Left Emphysematous Pyelonephritis.  UTI present on admission.  Left Renal Caner. S/P Left Nephrectomy.  Acute respiratory failure with hypoxia.  Septic shock present on admission.  A.fib with RVR  ROJAS.  Acute blood loss anemia.  Dysphagia.  Moderate Protein Malnutrition.    PLAN:  - sedation - on Precedex as needed  - on CPAP today as tolerated; follow ABG  - keep MAP>65; wean Josh ofv today  - on 20 ml/h feeds; on TPN  - follow serum electrolytes and UOP; on CVVH - have fluid balance -100/h as tolerated  - ID: on Meropenem and Levaquin  - DVT prophylaxis     Case was discussed in details with Hem/Onc Dr Manuel and Dr Womack - both agreed to send anti-PF4 panel but not to put him on argatroban due to high risk of bleeding on this patient with recent GI bleed and etc
Critical Care: [id number]   This patient has a high probability of sudden, clinically significant deterioration, which requires the highest level of physician preparedness to intervene urgently. I managed/supervised life or organ supporting interventions that required frequent physician assessment. I devoted my full attention in the ICU to the direct care of this patient for the period of time indicated below. Time I spent with the family or surrogate(s) is included only if the patient was incapable of providing the necessary information or participating in medical decision making. Time devoted to teaching and to any procedures I billed separately is not included.     CHRISTINE VILLAVICENCIO 67y Male admitted to [x ] SICU /[ ] SDU with emphysematous pyelonephritis with severe sepsis and purulent ascites, with respiratory failure, hemodynamic instability, electrolytes abnormalities, renal failure worsening, S/P left nephrectomy 6/30/23 with EBL 1000ml  Patient is examined and evaluated at the bedside with SICU team. Treatment plan discussed with SICU team, nurses and primary team.   Chest X-ray and all relevant studies reviewed during rounds.  Will continue hemodynamic monitoring as per protocol in SICU.    Neuro:  GCS [11T ]   [x ]  Neurovascular checks as per SICU protocol [x] ACT                [ ] 3% NaCl     [ ] 2% NaCl                [ ] Keppra  [ ] Lamictal  [ ] Depakote  [ ] Dilantin                Pharmacological Paralysis [ ] Yes  [x ]  No      Sedated/Pain control with                 [ ] Dilaudid drip, [ ]  Ketamine drip, [ ] Fentanyl drip, [ ] Propofol, [x ] Precedex, [ ] Versed drip, [ ] Ativan drip,                           [ ] OxyContin standing,  [ ] OxyContin PRN, [ ] Dilaudid PRN pushes, [ x] Fentanyl PRN pushes, [ ] PCA,                [x ] Tylenol IV/PO, [x ] Gabapentin, [ ]  Ketorolac, [ x] Tramadol,  [ ] Lidoderm Patch       Other Medications               [ ] Seroquel, [ ] Zyprexa, [ ] Haldol,  [ ] Clonazepam [ ] Xanax, [ ] Versed/Ativan PRN, [ ] Valium [ x] None               [ ] Robaxin   [ ] Baclofen  [ ] Flexeril               [ ] CIWA (Ativan/Valium/ Librium)  CV: continue to monitor, persistently tachycardic in 130s', which improved on Digoxin   On pressors [x ]  Yes  [ ]  No  [x] Digoxin level 1.4          [ ]  Levophed, [x ] Josh-Synephrine@2.0 mcg, [x ] Vasopressin, [ ]  Epinephrine          [ ] Dobutamine, [ ] Milrinone, [ ]  Midodrine,  [ ] Others    Other Cardiac Meds          [ ] Amiodarone IV/PO, [ ] Digoxin, [ ] Cardizem drip, [ ] Cleviprex drip, [ ] Esmolol drip, [ ] Cardene drip  Respiratory: Acute respiratory insufficiency -> continue monitoring, CXR bilateral lungs fields expanded                        None Invasive Support  [ ] Incentive Spirometer                                  [ ] BiPAP   [ ] CPAP/NIV   [ ] HFNC   [ ] NR Face Mask  [ ] NC  [ ] Trach Caller [ ] Room Air                        Ventilatory support  [x ] Yes [ ] No                            [ ] SBT                                  [ ] PC    [ ] VC   [ x] AC/PRVC   [ ] BiVent/APRV   [ ]CPAP                                  Vent 450/18/8/70%                                   ABG 7.25/32/114/14   LA 3.2  GI  [x ]  bowel regiment none [ x] BM 400ml in 24 hrs [x ] Flatus +             [ ] Ostomy   [x ] NG tube         Prophylaxis [ x] PPI  [ ] H2 Blockers  [ ] Others  Nutrition: continue   [x ] Diet NPO  [ ] TPN/PPN   [ ] calories count   [x ]  Tube Feeds   Nepro@20ml/hr on hold to OR  Renal: Continue I&Os monitoring, Mena catheter  [x ] Yes  [ ]  No  [ ] Consideration for discontinuation [ ] Taxes cath/PrimaFit  [ ] TOV                                                               [ ] HD/CVVH   [x ] Urine output 181ml/24hrs           [x]Javier left 80 ml since OR   [x] Javier Right pelvis 130ml since OR  [x] Javier Right perihepatic 50ml since OR       Lytes/Acid-base: replete hypokalemia, hypomagnesemia, hypocalcemia, hypophosphatemia        IV Fluids   [ ] LR [x ] NS@KVO  [ ] D5W1/2NS [x ] Bicarbonate Drip@75ml/hr [ ] Albumin [ ] Lasix drip/push  [ ] Bumex drip/push  ID: leukocytosis -> continue to monitor: remains septic with low grade fever, WBC 44        IV Abx [x ] Yes, [ ] No;  ID consulted [x ] Yes, [ ] No        Cultures send  [ ] Respiratory   [ ] Blood   [x ] Urine  [ ] Fluids  [ x] Tissue  [ ]  None  Lines:   [ ] Right   [ x] Left  [ ] Bilateral                     [ ] Subclavian TLC        [x ]  Internal Jugular TLC     [ ]  Femoral TLC                     [ ] Subclavian Cordis    [ ] Internal Jugular Cordis   [ ] Femoral Cordis                     [ ] HD catheter    [ ] PICC     [ ]  Midline   [x ] Peripheral IVs                                                 [x ] Right   [ ] Left   [ ] None                     [ ] Radial A-Line    [ ] Femoral A-Line   x[ ] Axillary A-Line               Heme: continue to evaluate for acute blood loss anemia- trend Hg/Hct                     AC Reversal Indicated [ ] Yes  [x ] No                                      [ ] Kcentra,  [ ] 3Factors concentrate, [ ] Andexxa                     Transfused  indicated  [ ] Yes, [x ] No    [ ] PRBCs   [ ] Platelets   [ ] FFPs   [ ] Cryoprecipitate                    Should be started on or continued with  following  [ ] Yes,  [x ] No, due to GI bleed                               [ ] Lovenox  [ ] Coumadin  [ ] Heparin drip  [ ] DOVACs  [ ] ASA  [ ] Antiplatelets   Endocrine: Prevent and treat hyperglycemia with insulin as needed,                                 Insuline drip for hyperglycemia [ ] Yes, [x ] No   PV: follow pulse exam  Skin: decub precautions  DVT Prophylaxis:  [x ] SCDs  [ x] Heparin SQ  [ ] Lovenox  SQ  Stress Gastritis Prophylaxis: PPI/H2 Blockers if indicated  Mobility: patient is evaluated at the bedside with mobility team and the goals for today are discussed with PT [ x] bed rest at this time    PATIENT/FAMILY/SURROGATE CONFERENCE:  [x ] Yes with patient and family at the bedside. [ ] No  PURPOSE: To obtain necessary information, To discuss treatment options under consideration today.  Palliative care consult for GOC discussion with family-> full code  I saw and evaluated the patient personally. I have reviewed and agree with note above. Treatment plan discussed with SICU team, nurses and primary team at the time of the multidisciplinary rounds. The above note is NOT written at the time of rounds and will reflect all changes throughout management of the patient for the day note is written for.    Angela Fontaine MD, FACS  Trauma/ACS/SCC Attending
Critical Care: [id number]   This patient has a high probability of sudden, clinically significant deterioration, which requires the highest level of physician preparedness to intervene urgently. I managed/supervised life or organ supporting interventions that required frequent physician assessment. I devoted my full attention in the ICU to the direct care of this patient for the period of time indicated below. Time I spent with the family or surrogate(s) is included only if the patient was incapable of providing the necessary information or participating in medical decision making. Time devoted to teaching and to any procedures I billed separately is not included.     CHRISTINE VILLAVICENCIO 67y Male admitted to [x ] SICU /[ ] SDU with emphysematous pyelonephritis with severe sepsis and purulent ascites, with respiratory failure, hemodynamic instability, electrolytes abnormalities, renal failure worsening  Patient is examined and evaluated at the bedside with SICU team. Treatment plan discussed with SICU team, nurses and primary team.   Chest X-ray and all relevant studies reviewed during rounds.  Will continue hemodynamic monitoring as per protocol in SICU.    Neuro:  GCS [10T ]   [x ]  Neurovascular checks as per SICU protocol [x] ACT                [ ] 3% NaCl     [ ] 2% NaCl                [ ] Keppra  [ ] Lamictal  [ ] Depakote  [ ] Dilantin                Pharmacological Paralysis [ ] Yes  [x ]  No      Sedated/Pain control with                 [ ] Dilaudid drip, [ ]  Ketamine drip, [ ] Fentanyl drip, [ ] Propofol, [x ] Precedex, [ ] Versed drip, [ ] Ativan drip,                           [ ] OxyContin standing,  [ ] OxyContin PRN, [ ] Dilaudid PRN pushes, [ x] Fentanyl PRN pushes, [ ] PCA,                [x ] Tylenol IV/PO, [x ] Gabapentin, [ ]  Ketorolac, [ x] Tramadol,  [ ] Lidoderm Patch       Other Medications               [ ] Seroquel, [ ] Zyprexa, [ ] Haldol,  [ ] Clonazepam [ ] Xanax, [ ] Versed/Ativan PRN, [ ] Valium [ ] None               [ ] Robaxin   [ ] Baclofen  [ ] Flexeril               [ ] CIWA (Ativan/Valium/ Librium)  CV: continue to monitor, persistently tachycardic in 130s', which improved on Digoxin   On pressors [x ]  Yes  [ ]  No  [x] Digoxin level 2.6          [ ]  Levophed, [x ] Josh-Synephrine@0.4 mcg, [ ] Vasopressin, [ ]  Epinephrine          [ ] Dobutamine, [ ] Milrinone, [ ]  Midodrine,  [ ] Others    Other Cardiac Meds          [ ] Amiodarone IV/PO, [ ] Digoxin, [ ] Cardizem drip, [ ] Cleviprex drip, [ ] Esmolol drip, [ ] Cardene drip  Respiratory: Acute respiratory insufficiency -> continue monitoring, CXR bilateral lungs fields expanded                        None Invasive Support  [ ] Incentive Spirometer                                  [ ] BiPAP   [ ] CPAP/NIV   [ ] HFNC   [ ] NR Face Mask  [ ] NC  [ ] Trach Caller [ ] Room Air                        Ventilatory support  [x ] Yes [ ] No                            [ ] SBT                                  [ ] PC    [ ] VC   [ x] AC/PRVC   [ ] BiVent/APRV   [ ]CPAP                                  Vent 450/18/8/40%                                   ABG 7.42/28/133/18   LA 3.6  GI  [x ]  bowel regiment none [ x] BM 400ml in 24 hrs [x ] Flatus +             [ ] Ostomy   [x ] NG tube         Prophylaxis [ x] PPI  [ ] H2 Blockers  [ ] Others  Nutrition: continue   [x ] Diet NPO  [ ] TPN/PPN   [ ] calories count   [x ]  Tube Feeds   Nepro@20ml/hr on hold to OR  Renal: Continue I&Os monitoring, Mena catheter  [x ] Yes  [ ]  No  [ ] Consideration for discontinuation [ ] Taxes cath/PrimaFit  [ ] TOV                                                               [ ] HD/CVVH   [x ] Urine output 250ml/24hrs           [x] VENICE right 12 ml/24hrs   [x] Nephrostomy 190ml/24hrs       Lytes/Acid-base: replete hypokalemia, hypomagnesemia, hypocalcemia, hypophosphatemia        IV Fluids   [ ] LR [x ] NS@75ml/hr  [ ] D5W1/2NS [x ] Bicarbonate Drip [ ] Albumin [ ] Lasix drip/push  [ ] Bumex drip/push  ID: leukocytosis -> continue to monitor: remains septic with low grade fever, WBC 42        IV Abx [x ] Yes, [ ] No;  ID consulted [x ] Yes, [ ] No        Cultures send  [ ] Respiratory   [ ] Blood   [x ] Urine  [ ] Fluids  [ x] Tissue  [ ]  None  Lines:   [ ] Right   [ x] Left  [ ] Bilateral                     [ ] Subclavian TLC        [x ]  Internal Jugular TLC     [ ]  Femoral TLC                     [ ] Subclavian Cordis    [ ] Internal Jugular Cordis   [ ] Femoral Cordis                     [ ] HD catheter    [ ] PICC     [ ]  Midline   [x ] Peripheral IVs                                                 [x ] Right   [ ] Left   [ ] None                     [ x] Radial A-Line    [ ] Femoral A-Line   [ ] Axillary A-Line               Heme: continue to evaluate for acute blood loss anemia- trend Hg/Hct                     AC Reversal Indicated [ ] Yes  [x ] No                                      [ ] Kcentra,  [ ] 3Factors concentrate, [ ] Andexxa                     Transfused  indicated  [ ] Yes, [x ] No    [ ] PRBCs   [ ] Platelets   [ ] FFPs   [ ] Cryoprecipitate                    Should be started on or continued with  following  [ ] Yes,  [x ] No, due to GI bleed                               [ ] Lovenox  [ ] Coumadin  [ ] Heparin drip  [ ] DOVACs  [ ] ASA  [ ] Antiplatelets   Endocrine: Prevent and treat hyperglycemia with insulin as needed,                                 Insuline drip for hyperglycemia [ ] Yes, [x ] No   PV: follow pulse exam  Skin: decub precautions  DVT Prophylaxis:  [x ] SCDs  [ x] Heparin SQ  [ ] Lovenox  SQ  Stress Gastritis Prophylaxis: PPI/H2 Blockers if indicated  Mobility: patient is evaluated at the bedside with mobility team and the goals for today are discussed with PT [ x] bed rest at this time    PATIENT/FAMILY/SURROGATE CONFERENCE:  [x ] Yes with patient and family at the bedside. [ ] No  PURPOSE: To obtain necessary information, To discuss treatment options under consideration today.  Palliative care consult for GOC discussion with family-> full code  I saw and evaluated the patient personally. I have reviewed and agree with note above. Treatment plan discussed with SICU team, nurses and primary team at the time of the multidisciplinary rounds. The above note is NOT written at the time of rounds and will reflect all changes throughout management of the patient for the day note is written for.    Angela Fontaine MD, FACS  Trauma/ACS/SCC Attending
Patient went to OR with  yesterday - had Left Nephrostolithotomy and drainage.  Has bloody discharge from left nephrostomy.  Intubated this am  Arousable, follows commands  On FiO2 50%, PEEP 5.  ABG 7.32/33/272  On Levo at 0.05 mcg/kg/min  Creat 2.5 this am. UOP marginal.  Left nephrostomy - drained 300 ml  Right drain - 150 ml    ASSESSMENT:  66 y/o male with Left Emphysematous Pyelonephritis.  UTI present on admission.  Acute respiratory failure.  Septic shock present on admission.  New onset A.fib with RVR  Lactic acidosis.  Large Pneumoperitoneum.  ROJAS.  Acute blood loss anemia.    PLAN:  - sedation with Precedex - hold for SAT  - put on PSV and get extubation parameters  - keep MAP>65; euvolemic at this time; wean Levo off  - A.fib - rate controlled- off Cardizem drip; put on Lopressor 5mg iv 16h  - S&S eval if extubated  - follow serum electrolytes and UOP  - ID - on Meropenem and Levaquin  - hold anticoagulation due to  bloody discharge from left nephrostomy  - follow H&H  - put on DVT prophylaxis with Hep sq  Continue SICU care
Critical Care: [id number]   This patient has a high probability of sudden, clinically significant deterioration, which requires the highest level of physician preparedness to intervene urgently. I managed/supervised life or organ supporting interventions that required frequent physician assessment. I devoted my full attention in the ICU to the direct care of this patient for the period of time indicated below. Time I spent with the family or surrogate(s) is included only if the patient was incapable of providing the necessary information or participating in medical decision making. Time devoted to teaching and to any procedures I billed separately is not included.     CHRISTINE VILLAVICENCIO 67y Male admitted to [x ] SICU /[ ] SDU with emphysematous pyelonephritis with severe sepsis multiple nephrostomies, and purulent ascites, with respiratory failure, hemodynamic instability, electrolytes abnormalities, renal failure worsening, S/P left nephrectomy 6/30/23 with EBL 1000ml  Patient is examined and evaluated at the bedside with SICU team. Treatment plan discussed with SICU team, nurses and primary team.   Chest X-ray and all relevant studies reviewed during rounds.  Will continue hemodynamic monitoring as per protocol in SICU.    Neuro:  GCS [11T ]   [x ]  Neurovascular checks as per SICU protocol [x] Sedation vacation.                [ ] 3% NaCl     [ ] 2% NaCl                [ ] Keppra  [ ] Lamictal  [ ] Depakote  [ ] Dilantin                Pharmacological Paralysis [ ] Yes  [x ]  No      Sedated/Pain control with                 [ ] Dilaudid drip, [ ]  Ketamine drip, [ ] Fentanyl drip, [ ] Propofol, [x ] Precedex@1.2mg, [ ] Versed drip, [ ] Ativan drip,                           [ ] OxyContin standing,  [ ] OxyContin PRN, [ ] Dilaudid PRN pushes, [ x] Fentanyl PRN pushes, [ ] PCA,                [x ] Tylenol IV/PO, [x ] Gabapentin, [ ]  Ketorolac, [ x] Tramadol,  [ ] Lidoderm Patch       Other Medications               [ ] Seroquel, [ ] Zyprexa, [ ] Haldol,  [ ] Clonazepam [ ] Xanax, [ ] Versed/Ativan PRN, [ ] Valium [ x] None               [ ] Robaxin   [ ] Baclofen  [ ] Flexeril               [ ] CIWA (Ativan/Valium/ Librium)  CV: continue to monitor, persistently tachycardic in 130s', which improved on Digoxin   On pressors [x ]  Yes  [ ]  No  [x] Digoxin level 2.7          [ ]  Levophed, [x ] Josh-Synephrine@1.6 mcg, [x ] Vasopressin@0.03units, [ ]  Epinephrine          [ ] Dobutamine, [ ] Milrinone, [ ]  Midodrine,  [ ] Others    Other Cardiac Meds          [ ] Amiodarone IV/PO, [ ] Digoxin, [ ] Cardizem drip, [ ] Cleviprex drip, [ ] Esmolol drip, [ ] Cardene drip  Respiratory: Acute respiratory insufficiency -> continue monitoring, CXR bilateral lungs fields expanded                        None Invasive Support  [ ] Incentive Spirometer                                  [ ] BiPAP   [ ] CPAP/NIV   [ ] HFNC   [ ] NR Face Mask  [ ] NC  [ ] Trach Caller [ ] Room Air                        Ventilatory support  [x ] Yes [ ] No                            [ ] SBT                                  [ ] PC    [ ] VC   [ x] AC/PRVC   [ ] BiVent/APRV   [ ]CPAP                                  Vent 450/18/8/60%                                   ABG 7.37/30/141/17   LA 4.0  GI  [x ]  bowel regiment none [ x] BM 400ml in 24 hrs [x ] Flatus +             [ ] Ostomy   [x ] NG tube         Prophylaxis [ x] PPI  [ ] H2 Blockers  [ ] Others  Nutrition: continue   [x ] Diet NPO  [ ] TPN/PPN   [ ] calories count   [ ]  Tube Feeds   Renal: Continue I&Os monitoring, Mena catheter  [x ] Yes  [ ]  No  [ ] Consideration for discontinuation [ ] Taxes cath/PrimaFit  [ ] TOV                                                               [ ] HD/CVVH   [x ] Urine output 181ml/24hrs           [x]Javier left 305ml/24hrs  [x] Javier Right pelvis 505ml/24hrs  [x] Javier Right perihepatic 100ml/24hrs       Lytes/Acid-base: replete hypokalemia, hypomagnesemia, hypocalcemia, hypophosphatemia        IV Fluids   [ ] LR [x ] NS@KVO  [ ] D5W1/2NS [x ] Bicarbonate Drip@100ml/hr [ ] Albumin [ ] Lasix drip/push  [ ] Bumex drip/push  ID: leukocytosis -> continue to monitor: remains septic with low grade fever, WBC 34        IV Abx [x ] Yes, [ ] No;  ID consulted [x ] Yes, [ ] No        Cultures send  [ ] Respiratory   [ ] Blood   [x ] Urine  [ ] Fluids  [ x] Tissue  [ ]  None  Lines:   [ ] Right   [ x] Left  [ ] Bilateral                     [ ] Subclavian TLC        [x ]  Internal Jugular TLC     [ ]  Femoral TLC                     [ ] Subclavian Cordis    [ ] Internal Jugular Cordis   [ ] Femoral Cordis                     [ ] HD catheter    [ ] PICC     [ ]  Midline   [x ] Peripheral IVs                                                 [x ] Right   [ ] Left   [ ] None                     [ ] Radial A-Line    [ ] Femoral A-Line   x[ ] Axillary A-Line               Heme: continue to evaluate for acute blood loss anemia- trend Hg/Hct                     AC Reversal Indicated [ ] Yes  [x ] No                                      [ ] Kcentra,  [ ] 3Factors concentrate, [ ] Andexxa                     Transfused  indicated  [ ] Yes, [x ] No    [ ] PRBCs   [ ] Platelets   [ ] FFPs   [ ] Cryoprecipitate                    Should be started on or continued with  following  [ ] Yes,  [x ] No, due to GI bleed                               [ ] Lovenox  [ ] Coumadin  [ ] Heparin drip  [ ] DOVACs  [ ] ASA  [ ] Antiplatelets   Endocrine: Prevent and treat hyperglycemia with insulin as needed,                                 Insuline drip for hyperglycemia [ ] Yes, [x ] No   PV: follow pulse exam  Skin: decub precautions  DVT Prophylaxis:  [x ] SCDs  [ x] Heparin SQ  [ ] Lovenox  SQ  Stress Gastritis Prophylaxis: PPI/H2 Blockers if indicated  Mobility: patient is evaluated at the bedside with mobility team and the goals for today are discussed with PT [ x] bed rest at this time    PATIENT/FAMILY/SURROGATE CONFERENCE:  [x ] Yes with patient and family at the bedside. [ ] No  PURPOSE: To obtain necessary information, To discuss treatment options under consideration today.  Palliative care consult for GOC discussion with family-> full code  I saw and evaluated the patient personally. I have reviewed and agree with note above. Treatment plan discussed with SICU team, nurses and primary team at the time of the multidisciplinary rounds. The above note is NOT written at the time of rounds and will reflect all changes throughout management of the patient for the day note is written for.    Angela Fontaine MD, FACS  Trauma/ACS/SCC Attending
POD 3, S/P Left Nephrectomy.  Arousable, RASS -1  Patient remains on vent support.  FiO2 40%, PEEP 8.  On Josh at 0.6 mcg/kg/min. and Vasopressin  On CVVH since yesterday.    ASSESSMENT:  68 y/o male with Left Emphysematous Pyelonephritis.  UTI present on admission.  Left Renal Caner. S/P Left Nephrectomy.  Acute respiratory failure with hypoxia.  Septic shock present on admission.  A.fib with RVR  ROJAS.  Acute blood loss anemia.  Dysphagia.  Moderate Protein Malnutrition.    PLAN:  - sedation - on Precedex, use Fentanyl for pain control prn  - on Vent support - will try PSV as tolerated  - keep MAP>65; euvolemic at this time; wean Josh  - start trickle feeds; on TPN  - follow serum electrolytes and UOP; on CVVH - discussed with Nephrology to have fluide balance -100/h as tolerated  - ID: on Meropenem and Levaquin  - ID f/u needed  - DVT prophylaxis
Patient remains intubated and on vent support.  Placed on PSV this am again.  Was more hypotensive overnight, CVVH changed to run even.  On Josh at 3.2 mcg/kg/min and Vaso 0.03.   From abdominal drains now feculent discharge.    ASSESSMENT:  66 y/o male with Left Emphysematous Pyelonephritis.  UTI present on admission.  Left Renal Caner. S/P Left Nephrectomy.  Acute respiratory failure with hypoxia.  Septic shock present on admission.  A.fib with RVR  ROJAS.  Acute blood loss anemia.  Dysphagia.  Moderate Protein Malnutrition.    PLAN:  - sedation as needed  - on PSV as tolerated  - keep MAP>65; keep fluid balance even  - A.fib with RVR - on Digoxin   - keep NPO for bowel perforation and feculent peritonitis - as per Surgery not a candidate for OR  - on CVVHD - keep fluid balance even  - on Meropenem and Levaquin   - HIT panel (-)  - on Heparin for DVT prophylaxis    Will arrange family meeting with  and Gen surgery present to discuss the goals of care further.
Patient is on Josh at 0.7 mcg/kg/min, and Vaso.  Lactic acid remains around 6.  VENICE drains with feculent output.  On Vent, FiO2 40%, PEEP 8  On CVVH  HIT panel negative, no HIT    ASSESSMENT:  67 y/o male with Left Emphysematous Pyelonephritis.  UTI present on admission.  Left Renal Caner. S/P Left Nephrectomy.  Acute respiratory failure with hypoxia.  Septic shock present on admission.  A.fib with RVR  ROJAS.  Acute blood loss anemia.  Dysphagia.  Moderate Protein Malnutrition.    PLAN;  - Precedex for sedation to RASS -1  - continue Vent support, follow ABG  - keep MAP>65; continue volume even  - NPO, on TPN  - follow serum electrolytes and UOP  - Nephrology f/u appreciated  - ID: on Ar, Levaquin and Dapto  - DVT prophylaxis    Planning family meeting today to discuss the goals of care
Patient remains intubated.  On FiO2 40 %, PEEP 8.  Remains oliguric.  From left nephrostomy drain draining pus.  RLQ drain with murky output  On Levo at 0.11 mcg/kg/min  LA 3.8 this am.  Still in A.fib with RVR; -120/min    ASSESSMENT:  66 y/o male with Left Emphysematous Pyelonephritis.  UTI present on admission.  Acute respiratory failure.  Septic shock present on admission.  New onset A.fib with RVR  Lactic acidosis.  Large Pneumoperitoneum.  ROJAS (Creat 2.9)    PLAN:  - sedation - put on Precedex; use Fentanyl prn  - continue Vent support; put on PEEP 6, FiO2 40%; follow ABG  - keep MAP>65; still hypovolemic; on Levo; give Alb 5% 250ml q6h x4  - A.fib - put on Lopressor 5 mg iv q6h  - Cardiology f/u needed  - get 2D Echo  - NPO; NGT to suction  - needs CT Abd/Pelvis with po and iv contrast to r/o perforated viscus  - follow serum electrolytes and UOP  - Nephrology eval  - ID: on Zosyn and Vanco; needs ID eval  - DVT prophylaxis

## 2023-07-19 NOTE — PROGRESS NOTE ADULT - SUBJECTIVE AND OBJECTIVE BOX
CHRISTINE VILLAVICENCIO  68y, Male  Allergy: No Known Allergies      LOS  31d    CHIEF COMPLAINT: pneumoperitoneum, emphysematous pyelonephritis (19 Jul 2023 08:45)      INTERVAL EVENTS/HPI  - T(F): , Max: 94 (07-18-23 @ 16:00)  - WBC Count: 8.45 (07-19-23 @ 04:20)  WBC Count: 11.69 (07-19-23 @ 00:55)     - Creatinine: 0.7 (07-19-23 @ 04:20)  Creatinine: 0.9 (07-18-23 @ 23:04)     - Procalcitonin, Serum: 0.97 ng/mL (07-18-23 @ 23:04)    -   -     ROS  cannot obtain secondary to patient's sedation and/or mental status    VITALS:  T(F): 91.1, Max: 94 (07-18-23 @ 16:00)  HR: 83  BP: 90/67  RR: 28Vital Signs Last 24 Hrs  T(C): 32.8 (19 Jul 2023 12:00), Max: 34.4 (18 Jul 2023 16:00)  T(F): 91.1 (19 Jul 2023 12:00), Max: 94 (18 Jul 2023 16:00)  HR: 83 (19 Jul 2023 12:00) (80 - 113)  BP: 90/67 (19 Jul 2023 12:00) (78/59 - 154/91)  BP(mean): 75 (19 Jul 2023 12:00) (53 - 117)  RR: 28 (19 Jul 2023 12:00) (14 - 29)  SpO2: 81% (19 Jul 2023 12:00) (62% - 98%)    Parameters below as of 19 Jul 2023 12:00  Patient On (Oxygen Delivery Method): ventilator    O2 Concentration (%): 80    PHYSICAL EXAM:  Gen: Intubated  CV: RRR  Lungs: Decreased BS at bases  Abd: Soft JPs  Neuro: Sedated  Lines clean, no phlebitis    FH: Non-contributory  Social Hx: Non-contributory    TESTS & MEASUREMENTS:                        6.0    8.45  )-----------( 33       ( 19 Jul 2023 04:20 )             20.2     07-19    138  |  104  |  43<H>  ----------------------------<  188<H>  4.4   |  14<L>  |  0.7    Ca    7.0<L>      19 Jul 2023 04:20  Phos  5.8     07-19  Mg     1.8     07-19    TPro  2.7<L>  /  Alb  1.3<L>  /  TBili  1.9<H>  /  DBili  x   /  AST  >7000<H>  /  ALT  1074<H>  /  AlkPhos  127<H>  07-18      LIVER FUNCTIONS - ( 18 Jul 2023 13:30 )  Alb: 1.3 g/dL / Pro: 2.7 g/dL / ALK PHOS: 127 U/L / ALT: 1074 U/L / AST: >7000 U/L / GGT: x           Urinalysis Basic - ( 19 Jul 2023 04:20 )    Color: x / Appearance: x / SG: x / pH: x  Gluc: 188 mg/dL / Ketone: x  / Bili: x / Urobili: x   Blood: x / Protein: x / Nitrite: x   Leuk Esterase: x / RBC: x / WBC x   Sq Epi: x / Non Sq Epi: x / Bacteria: x        Culture - Blood (collected 07-11-23 @ 02:25)  Source: .Blood Blood-Peripheral  Final Report (07-16-23 @ 09:01):    No growth at 5 days    Culture - Surgical Swab (collected 06-30-23 @ 22:12)  Source: .Surgical Swab None  Final Report (07-05-23 @ 21:47):    Growth in fluid media only Enterococcus faecium (vancomycin resistant)  Organism: Enterococcus faecium (vancomycin resistant) (07-05-23 @ 21:47)  Organism: Enterococcus faecium (vancomycin resistant) (07-05-23 @ 21:47)      Method Type: EDMUND      -  Ampicillin: R >8 Predicts results to ampicillin/sulbactam, amoxacillin-clavulanate and  piperacillin-tazobactam.      -  Daptomycin: SDD 4 The breakpoint for SDD (susceptible dose dependent)is based on a dosage regimen of 8-12 mg/kg administered every 24 h in adults and is intended for serious infections due to E. faecium. Consultation with an infectious diseases specialist is recommended.      -  Levofloxacin: R >4      -  Linezolid: S 1      -  Tetracycline: R >8      -  Vancomycin: R >16    Culture - Fungal, Bronchial (collected 06-24-23 @ 11:00)  Source: .Bronchial None  Final Report (07-19-23 @ 12:13):    Moderate Yeast    Culture - Acid Fast - Bronchial w/Smear (collected 06-24-23 @ 11:00)  Source: .Bronchial None  Preliminary Report (07-12-23 @ 15:06):    No acid-fast bacilli isolated at 2 weeks.    Culture - Bronchial (collected 06-24-23 @ 11:00)  Source: .Bronchial None  Gram Stain (06-24-23 @ 23:21):    Numerous polymorphonuclear leukocytes per low power field    No squamous epithelial cells per low power field    Rare Yeast like cells per oil power field  Final Report (06-26-23 @ 17:56):    Normal Respiratory Gabbie present    Culture - Fungal, Other (collected 06-22-23 @ 22:11)  Source: .Other None  Preliminary Report (07-15-23 @ 15:01):    No fungus isolated at 3 weeks.    Culture - Surgical Swab (collected 06-22-23 @ 22:11)  Source: .Surgical Swab None  Final Report (06-25-23 @ 14:14):    Few Finegoldia magna "Susceptibilities not performed"            INFECTIOUS DISEASES TESTING  Procalcitonin, Serum: 0.97 (07-18-23 @ 23:04)  Fungitell: >500 (07-15-23 @ 04:25)  Fungitell: >500 (07-06-23 @ 04:30)  Fungitell: 47 (06-25-23 @ 15:30)  MRSA PCR Result.: Negative (06-24-23 @ 17:38)      INFLAMMATORY MARKERS  C-Reactive Protein, Serum: 17.9 mg/L (07-18-23 @ 23:04)  C-Reactive Protein, Serum: 42.7 mg/L (07-18-23 @ 04:15)  C-Reactive Protein, Serum: 106.7 mg/L (07-14-23 @ 04:41)  C-Reactive Protein, Serum: 245.1 mg/L (07-10-23 @ 01:30)      RADIOLOGY & ADDITIONAL TESTS:  I have personally reviewed the last available Chest xray  CXR      CT      CARDIOLOGY TESTING      MEDICATIONS  bacitracin   Ointment 1  caspofungin IVPB   caspofungin IVPB 50  cefTRIAXone   IVPB 2000  cefTRIAXone   IVPB   chlorhexidine 0.12% Liquid 15  chlorhexidine 2% Cloths 1  collagenase Ointment 1  CRRT Treatment   levoFLOXacin IVPB 750  metroNIDAZOLE  IVPB 500  metroNIDAZOLE  IVPB   norepinephrine Infusion 0.01  octreotide  Injectable 100  pantoprazole Infusion 8  Parenteral Nutrition - Adult 1  phenylephrine    Infusion 0.1  PureFlow Dialysate RFP-400 (K 2 / Ca 3) 5000  sodium bicarbonate  Infusion 0.197  vasopressin Infusion 0.03      WEIGHT  Weight (kg): 76.3 (06-30-23 @ 16:58)  Creatinine: 0.7 mg/dL (07-19-23 @ 04:20)  Creatinine: 0.9 mg/dL (07-18-23 @ 23:04)      ANTIBIOTICS:  caspofungin IVPB      caspofungin IVPB 50 milliGRAM(s) IV Intermittent every 24 hours  cefTRIAXone   IVPB      cefTRIAXone   IVPB 2000 milliGRAM(s) IV Intermittent every 24 hours  levoFLOXacin IVPB 750 milliGRAM(s) IV Intermittent every 48 hours  metroNIDAZOLE  IVPB 500 milliGRAM(s) IV Intermittent every 8 hours  metroNIDAZOLE  IVPB          All available historical records have been reviewed   CHRISTINE VILLAVICENCIO  68y, Male  Allergy: No Known Allergies      LOS  31d    CHIEF COMPLAINT: pneumoperitoneum, emphysematous pyelonephritis (19 Jul 2023 08:45)      INTERVAL EVENTS/HPI  - T(F): , Max: 94 (07-18-23 @ 16:00), elevated lactate, on pressors  - WBC Count: 8.45 (07-19-23 @ 04:20)  WBC Count: 11.69 (07-19-23 @ 00:55)     - Creatinine: 0.7 (07-19-23 @ 04:20)  Creatinine: 0.9 (07-18-23 @ 23:04)     - Procalcitonin, Serum: 0.97 ng/mL (07-18-23 @ 23:04)    -   -     ROS  cannot obtain secondary to patient's sedation and/or mental status    VITALS:  T(F): 91.1, Max: 94 (07-18-23 @ 16:00)  HR: 83  BP: 90/67  RR: 28Vital Signs Last 24 Hrs  T(C): 32.8 (19 Jul 2023 12:00), Max: 34.4 (18 Jul 2023 16:00)  T(F): 91.1 (19 Jul 2023 12:00), Max: 94 (18 Jul 2023 16:00)  HR: 83 (19 Jul 2023 12:00) (80 - 113)  BP: 90/67 (19 Jul 2023 12:00) (78/59 - 154/91)  BP(mean): 75 (19 Jul 2023 12:00) (53 - 117)  RR: 28 (19 Jul 2023 12:00) (14 - 29)  SpO2: 81% (19 Jul 2023 12:00) (62% - 98%)    Parameters below as of 19 Jul 2023 12:00  Patient On (Oxygen Delivery Method): ventilator    O2 Concentration (%): 80    PHYSICAL EXAM:  Gen: Intubated  CV: RRR  Lungs: Decreased BS at bases  Abd: Soft JPs  Neuro: Sedated  Lines clean, no phlebitis    FH: Non-contributory  Social Hx: Non-contributory    TESTS & MEASUREMENTS:                        6.0    8.45  )-----------( 33       ( 19 Jul 2023 04:20 )             20.2     07-19    138  |  104  |  43<H>  ----------------------------<  188<H>  4.4   |  14<L>  |  0.7    Ca    7.0<L>      19 Jul 2023 04:20  Phos  5.8     07-19  Mg     1.8     07-19    TPro  2.7<L>  /  Alb  1.3<L>  /  TBili  1.9<H>  /  DBili  x   /  AST  >7000<H>  /  ALT  1074<H>  /  AlkPhos  127<H>  07-18      LIVER FUNCTIONS - ( 18 Jul 2023 13:30 )  Alb: 1.3 g/dL / Pro: 2.7 g/dL / ALK PHOS: 127 U/L / ALT: 1074 U/L / AST: >7000 U/L / GGT: x           Urinalysis Basic - ( 19 Jul 2023 04:20 )    Color: x / Appearance: x / SG: x / pH: x  Gluc: 188 mg/dL / Ketone: x  / Bili: x / Urobili: x   Blood: x / Protein: x / Nitrite: x   Leuk Esterase: x / RBC: x / WBC x   Sq Epi: x / Non Sq Epi: x / Bacteria: x        Culture - Blood (collected 07-11-23 @ 02:25)  Source: .Blood Blood-Peripheral  Final Report (07-16-23 @ 09:01):    No growth at 5 days    Culture - Surgical Swab (collected 06-30-23 @ 22:12)  Source: .Surgical Swab None  Final Report (07-05-23 @ 21:47):    Growth in fluid media only Enterococcus faecium (vancomycin resistant)  Organism: Enterococcus faecium (vancomycin resistant) (07-05-23 @ 21:47)  Organism: Enterococcus faecium (vancomycin resistant) (07-05-23 @ 21:47)      Method Type: EDMUND      -  Ampicillin: R >8 Predicts results to ampicillin/sulbactam, amoxacillin-clavulanate and  piperacillin-tazobactam.      -  Daptomycin: SDD 4 The breakpoint for SDD (susceptible dose dependent)is based on a dosage regimen of 8-12 mg/kg administered every 24 h in adults and is intended for serious infections due to E. faecium. Consultation with an infectious diseases specialist is recommended.      -  Levofloxacin: R >4      -  Linezolid: S 1      -  Tetracycline: R >8      -  Vancomycin: R >16    Culture - Fungal, Bronchial (collected 06-24-23 @ 11:00)  Source: .Bronchial None  Final Report (07-19-23 @ 12:13):    Moderate Yeast    Culture - Acid Fast - Bronchial w/Smear (collected 06-24-23 @ 11:00)  Source: .Bronchial None  Preliminary Report (07-12-23 @ 15:06):    No acid-fast bacilli isolated at 2 weeks.    Culture - Bronchial (collected 06-24-23 @ 11:00)  Source: .Bronchial None  Gram Stain (06-24-23 @ 23:21):    Numerous polymorphonuclear leukocytes per low power field    No squamous epithelial cells per low power field    Rare Yeast like cells per oil power field  Final Report (06-26-23 @ 17:56):    Normal Respiratory Gabbie present    Culture - Fungal, Other (collected 06-22-23 @ 22:11)  Source: .Other None  Preliminary Report (07-15-23 @ 15:01):    No fungus isolated at 3 weeks.    Culture - Surgical Swab (collected 06-22-23 @ 22:11)  Source: .Surgical Swab None  Final Report (06-25-23 @ 14:14):    Few Finegoldia magna "Susceptibilities not performed"            INFECTIOUS DISEASES TESTING  Procalcitonin, Serum: 0.97 (07-18-23 @ 23:04)  Fungitell: >500 (07-15-23 @ 04:25)  Fungitell: >500 (07-06-23 @ 04:30)  Fungitell: 47 (06-25-23 @ 15:30)  MRSA PCR Result.: Negative (06-24-23 @ 17:38)      INFLAMMATORY MARKERS  C-Reactive Protein, Serum: 17.9 mg/L (07-18-23 @ 23:04)  C-Reactive Protein, Serum: 42.7 mg/L (07-18-23 @ 04:15)  C-Reactive Protein, Serum: 106.7 mg/L (07-14-23 @ 04:41)  C-Reactive Protein, Serum: 245.1 mg/L (07-10-23 @ 01:30)      RADIOLOGY & ADDITIONAL TESTS:  I have personally reviewed the last available Chest xray  CXR      CT      CARDIOLOGY TESTING      MEDICATIONS  bacitracin   Ointment 1  caspofungin IVPB   caspofungin IVPB 50  cefTRIAXone   IVPB 2000  cefTRIAXone   IVPB   chlorhexidine 0.12% Liquid 15  chlorhexidine 2% Cloths 1  collagenase Ointment 1  CRRT Treatment   levoFLOXacin IVPB 750  metroNIDAZOLE  IVPB 500  metroNIDAZOLE  IVPB   norepinephrine Infusion 0.01  octreotide  Injectable 100  pantoprazole Infusion 8  Parenteral Nutrition - Adult 1  phenylephrine    Infusion 0.1  PureFlow Dialysate RFP-400 (K 2 / Ca 3) 5000  sodium bicarbonate  Infusion 0.197  vasopressin Infusion 0.03      WEIGHT  Weight (kg): 76.3 (06-30-23 @ 16:58)  Creatinine: 0.7 mg/dL (07-19-23 @ 04:20)  Creatinine: 0.9 mg/dL (07-18-23 @ 23:04)      ANTIBIOTICS:  caspofungin IVPB      caspofungin IVPB 50 milliGRAM(s) IV Intermittent every 24 hours  cefTRIAXone   IVPB      cefTRIAXone   IVPB 2000 milliGRAM(s) IV Intermittent every 24 hours  levoFLOXacin IVPB 750 milliGRAM(s) IV Intermittent every 48 hours  metroNIDAZOLE  IVPB 500 milliGRAM(s) IV Intermittent every 8 hours  metroNIDAZOLE  IVPB          All available historical records have been reviewed

## 2023-07-19 NOTE — PROGRESS NOTE ADULT - ASSESSMENT
Assessment and Plan:   67M w/ PMH of HTN, grade IV hydronephrosis of L kidney s/p L PCN (placed 5/2023), stutter, hiatal hernia, iron deficiency anemia, H Pylori (untreated), and gastric mass (never biopsied) presents to the ED with at least 4 days of worsening weakness, abdominal distension, and no output from his PCN. Admitted with emphysematous pyelonephritis.     (6/18): s/p LT PCN upsizing to 16Fr and 16Fr RLQ drain placement with IR   (6/22): s/p shock lithotripsy of L kidney abscess cavity with drainage of thick contents, upsize of drain to 26Fr bobby catheter  (6/30): s/p ex-lap and L nephrectomy with abdominal washout    NEURO:  #Pain control  - Dilaudid PRN  #Sedation  - off all sedation  #altered mental status  - CT head 7/10: No acute intracranial pathology  - currently not following commands but able to open eyes to voice and withdraw from pain    RESP:  #acute respiratory failure secondary to septic shock  - intubated 6/24  - Mode: SIMV 450/28/80/10  - LL @ 23 cm  - AM ABG:   #subcutaneous emphysema   #acute hyercapnic respiratory failure   - Bronchoscopy 7/10: No mucous plug found  - CT Chest 7/10: Interval increase in size of bilateral pleural effusions with adjacent consolidation both lower lobes.   #Pulmonary nodules / Mediastinal lymphadenopathy  - CT Chest: Right middle and upper lobe solid pulmonary nodules, f/u outpatient pulm   #Activity  - increase as tolerated    CARDS:   #Septic shock  - currently on levophed, vasopressin, and phenylephrine infusions  #New onset Afib w/ RVR with hypotension -- likely 2/2 sepsis  - HR control with Metoprolol 2.5 q6 IV HOLDING in the setting of HYPOtension  - Digoxin administered, currently HOLDING, last level <0.3  - Cardiology c/s, allow permissive tachycardia, cardizem for rate control  #H/O HTN  - Amlodipine 2.5mg HOLDING  #Imaging  - ECHO: (6/19) EF 55-60%, mild AS, mild LAE, mild MR/TR, trivial pericardial effusion    GI/NUTR:  #proximal small bowel leak  - CT AP PO/IV/rectal contrast 7/10: proximal small bowel leak suspected  - diet: NPO on TPN @60cc/hr  + lipids  - Octreotide 100mcg q8 IVP   #bowel movements  - GI consult 6/26- Plan for EGD with EUS + FNA as outpatient, H-pylori treatment after resolution of acute issues   #GI PPX  - Protonix IV infusion (@ 8mg/hr)  #Gastric mass vs gastrohepatic lymphadenopathy  - CT A/P: lesser curvature mass, Hepatic hypodensities  #Splenic Hypodensities, infarct vs phlegmon/abscess  - CT A/P: Wedge-shaped region of hypodensity within the spleen    /RENAL:   #Grade IV L hydronephrosis s/p L PCN (5/2023)   #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  - (6/23) s/p LT PCN upsizing to 26Fr catheter, RLQ 16 Fr drain remains, s/p nephrostolithotomy w/ lithotripsy  - (6/18) s/p LT PCN upsizing to 16Fr and 16Fr RLQ drain placement with IR, flush q8 hr  - (6/30): s/p L nephrectomy and abdominal washout   - VENICE drains x3 pelvis, Lerma's pouch, L nephrectomy site; L VENICE and R VENICE (pelvis) with dark green/murky/feculent OP  #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   #metabolic acidosis  - Nephrology c/s - CVVHD started 07/02  - sodium bicarb infusion (150 mEq NaHCO3 in d5% at 75 cc/hr)  - requires intermittent pushes of 50cc sodium bicarb  #urine output in critically ill  - Straight cath every 12 hours  #decreased urine output in the setting of acute renal failure 2/2 septic shock    Labs:          BUN/Cr- 71/1.1  -->,  60/0.9  -->          Electrolytes-Na 142 // K 4.6 // Mg 2.1 //  Phos 6.4 (07-18 @ 23:04)  #Hyperphosphatemia  - continue CVVH  - trend levels    HEME/ONC:   #DVT prophylaxis  - SCDs  - discontinued heparin subQ due to active bleeding (7/18 PM)  #new onset Afib  - holding FULL AC   #R radial artery occlusion  - RUE duplex 07/01 + for right radial artery occlusion  #RT posterior popliteal vein DVT  - vascular c/s - recommend AC   #H/o Fe Deficiency anemia  #Thrombocytopenia  - Heme consult (7/3): HIT workup negative    Labs: Hb/Hct:  7.1/23.6  -->,  5.3/17.5  -->                      Plts:  11  -->,  65  -->                 PTT/INR:  69.2/3.07  --->,  75.4/4.15  --->     Type and Screen expires: 7/21  # Anemia- symptomatic    - (7/15): 1 unit prbc    - (7/18 AM): 1 unit prbc    - (7/18 day): 3 units PRBCs, 3 units FFP    - (7/18 PM): 1 unit PLT, 2 units PRBCs, 2 units FFP    ID:  multiorgan system failure secondary to emphysematous pyelonephritis s/p left nephrectomy  Antibiotics     - Ceftriaxone (7/13 -     - Metronidazole ( 7/13 -     - Levofloxacin (6/25 -     - Caspofungin (7/8 -   WBC- 17.52  --->>,  13.74  --->>,  11.69  --->>  Temp trend- 24hrs T(F): 91.6 (07-19 @ 02:00), Max: 96.9 (07-18 @ 07:54)    #Cultures  Surgical swab 06/30: vancomycin resistant enterococcus faecium     L PCN cyto-pathology  6/25: results suggestive of a necrotic neoplasm    Fungitell 6/26: 47 --> >500 (7/6) --> >500 (7/15)    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    BAL 6/24: moderate yeast     UCx 6/19: negative FINAL      Left PCN aspiration 6/18: stenotrophomonas maltophilia    Surgical swab (6/30): VRE     Blood cultures: 7/10 NGTD  BCx 7/11-NEGATIVE FINAL    ENDO:  #Glucose monitoring (hypoglycemia)  - A1c (5/7/23): 5.0  - FSG q1 hours  #Adrenal Insufficiency 2/2 to sepsis   - completed course of solu-cortef  - Cortisol level - binding globulin 1.1 (low), serum cortisol, 84, free cortisol 76, % free cortisol 91  - f/u cortisol 7/15 8AM  #assess thyroid function in setting of catabolic state   - f/u thyroid panel    MSK:  #thoracic spine DTI  - wound care recs: cleanse with soap and water, triad and allevyn BID prn for soiling  #L heel DTI  - wound care recs: pat dry, apply Cavilon, leave open to air twice a day, offload heels, offloading boots  #scrotum and R groin wound: pat dry, triad BID, PRN for soiling  #Sacrococcygeal DTI: wound care following- cleanse with soap and water, apply triad, gauze, and allevyn BID and PRN for soiling  - burn reconsulted   #Venous ulcer right lower leg, R forearm skin tear: Pat dry, apply Xeroform, nonadherent dressing, wrap with Kerlix twice a day, prn for soiling  #decubitus ulcer b/l ischium  #poor wound healing of abdominal incision  - wound care recs: Vashe wound cleanser, Xeroform and abdominal pad BID, PRN for soiling  - burn consulted  #Activity  - Advance as tolerated  - Globally deconditioned  - PT/rehab once status improves  #b/l UE, LE mottled skin  - +ulnar/DP/PT pulses present on doppler  - Arterial duplex bilateral upper extremities (7/1)  - Right radial artery occlusion   - VA Duplex bilateral lower extremities (7/3) - Right PT vein DVT     LINES/DRAINS:   PIV  Bobby (6/18-7/10)  L eleazar drain (6/30)  R Lerma pouch VENICE#1 (removed 7/17), pelvic drain #2 (6/30)  Left basilic midline  6/21  Right axillary [place] (6/30-7/4)  Right Radial [place] (6/22-6/30)  L-Femoral [place] (7/10 - )  L-Femoral [place] (7/10 -7/16 )  L IJ UDALL (7/16-)  RIJ TLC (7/10-  )  ETT  OGT    ADVANCED DIRECTIVES:  DNR   HCP/Emergency Contact- Tracey Adames: [phone]   [facility] discussions continue to be held with family.     INDICATION FOR SICU: New onset atrial fibrillation with mechanical ventilation    DISPOSITION: SICU   Assessment and Plan:   67M w/ PMH of HTN, grade IV hydronephrosis of L kidney s/p L PCN (placed 5/2023), stutter, hiatal hernia, iron deficiency anemia, H Pylori (untreated), and gastric mass (never biopsied) presents to the ED with at least 4 days of worsening weakness, abdominal distension, and no output from his PCN. Admitted with emphysematous pyelonephritis.     (6/18): s/p LT PCN upsizing to 16Fr and 16Fr RLQ drain placement with IR   (6/22): s/p shock lithotripsy of L kidney abscess cavity with drainage of thick contents, upsize of drain to 26Fr bobby catheter  (6/30): s/p ex-lap and L nephrectomy with abdominal washout    NEURO:  #Pain control  - Dilaudid PRN  #Sedation  - off all sedation  #altered mental status  - CT head 7/10: No acute intracranial pathology  - currently not following commands but able to open eyes to voice and withdraw from pain    RESP:  #acute respiratory failure secondary to septic shock  - intubated 6/24  - Mode: SIMV 450/28/80/10  - LL @ 23 cm  - AM ABG:   #subcutaneous emphysema   #acute hyercapnic respiratory failure   - Bronchoscopy 7/10: No mucous plug found  - CT Chest 7/10: Interval increase in size of bilateral pleural effusions with adjacent consolidation both lower lobes.   #Pulmonary nodules / Mediastinal lymphadenopathy  - CT Chest: Right middle and upper lobe solid pulmonary nodules, f/u outpatient pulm   #Activity  - increase as tolerated    CARDS:   #Septic shock  - currently on levophed, vasopressin, and phenylephrine infusions  #New onset Afib w/ RVR with hypotension -- likely 2/2 sepsis  - HR control with Metoprolol 2.5 q6 IV HOLDING in the setting of HYPOtension  - Digoxin administered, currently HOLDING, last level <0.3  - Cardiology c/s, allow permissive tachycardia, cardizem for rate control  #H/O HTN  - Amlodipine 2.5mg HOLDING  #Imaging  - ECHO: (6/19) EF 55-60%, mild AS, mild LAE, mild MR/TR, trivial pericardial effusion    GI/NUTR:  #proximal small bowel leak  - CT AP PO/IV/rectal contrast 7/10: proximal small bowel leak suspected  - diet: NPO on TPN @60cc/hr  + lipids  - Octreotide 100mcg q8 IVP   #bowel movements  - GI consult 6/26- Plan for EGD with EUS + FNA as outpatient, H-pylori treatment after resolution of acute issues   #Shock liver  - elevated transaminases  - INR 4.1  - requiring multiple blood products  #GI bleed  - sanguinous fluid draining  #GI PPX  - Protonix IV infusion (@ 8mg/hr)  #Gastric mass vs gastrohepatic lymphadenopathy  - CT A/P: lesser curvature mass, Hepatic hypodensities  #Splenic Hypodensities, infarct vs phlegmon/abscess  - CT A/P: Wedge-shaped region of hypodensity within the spleen    /RENAL:   #Grade IV L hydronephrosis s/p L PCN (5/2023)   #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  - (6/23) s/p LT PCN upsizing to 26Fr catheter, RLQ 16 Fr drain remains, s/p nephrostolithotomy w/ lithotripsy  - (6/18) s/p LT PCN upsizing to 16Fr and 16Fr RLQ drain placement with IR, flush q8 hr  - (6/30): s/p L nephrectomy and abdominal washout   - VENICE drains x3 pelvis, Lerma's pouch, L nephrectomy site; L VENICE and R VENICE (pelvis) with dark green/murky/feculent OP  #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   #metabolic acidosis  - Nephrology c/s - CVVHD started 07/02  - sodium bicarb infusion (150 mEq NaHCO3 in d5% at 75 cc/hr)  - requires intermittent pushes of 50cc sodium bicarb  #urine output in critically ill  - Straight cath every 12 hours  #decreased urine output in the setting of acute renal failure 2/2 septic shock    Labs:          BUN/Cr- 71/1.1  -->,  60/0.9  -->          Electrolytes-Na 142 // K 4.6 // Mg 2.1 //  Phos 6.4 (07-18 @ 23:04)  #Hyperphosphatemia  - continue CVVH  - trend levels    HEME/ONC:   #DVT prophylaxis  - SCDs  - discontinued heparin subQ due to active bleeding (7/18 PM)  #new onset Afib  - holding FULL AC   #R radial artery occlusion  - RUE duplex 07/01 + for right radial artery occlusion  #RT posterior popliteal vein DVT  - vascular c/s - recommend AC   #H/o Fe Deficiency anemia  #Thrombocytopenia  - Heme consult (7/3): HIT workup negative    Labs: Hb/Hct:  7.1/23.6  -->,  5.3/17.5  -->                      Plts:  11  -->,  65  -->                 PTT/INR:  69.2/3.07  --->,  75.4/4.15  --->     Type and Screen expires: 7/21  # Anemia- symptomatic    - (7/15): 1 unit prbc    - (7/18 AM): 1 unit prbc    - (7/18 day): 3 units PRBCs, 3 units FFP    - (7/18 PM): 1 unit PLT, 2 units PRBCs, 2 units FFP    ID:  multiorgan system failure secondary to emphysematous pyelonephritis s/p left nephrectomy  Antibiotics     - Ceftriaxone (7/13 -     - Metronidazole ( 7/13 -     - Levofloxacin (6/25 -     - Caspofungin (7/8 -   WBC- 17.52  --->>,  13.74  --->>,  11.69  --->>  Temp trend- 24hrs T(F): 91.6 (07-19 @ 02:00), Max: 96.9 (07-18 @ 07:54)    #Cultures  Surgical swab 06/30: vancomycin resistant enterococcus faecium     L PCN cyto-pathology  6/25: results suggestive of a necrotic neoplasm    Fungitell 6/26: 47 --> >500 (7/6) --> >500 (7/15)    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    BAL 6/24: moderate yeast     UCx 6/19: negative FINAL      Left PCN aspiration 6/18: stenotrophomonas maltophilia    Surgical swab (6/30): VRE     Blood cultures: 7/10 NGTD  BCx 7/11-NEGATIVE FINAL    ENDO:  #Glucose monitoring (hypoglycemia)  - A1c (5/7/23): 5.0  - FSG q1 hours  #Adrenal Insufficiency 2/2 to sepsis   - completed course of solu-cortef  - Cortisol level - binding globulin 1.1 (low), serum cortisol, 84, free cortisol 76, % free cortisol 91  - f/u cortisol 7/15 8AM  #assess thyroid function in setting of catabolic state   - f/u thyroid panel    MSK:  #thoracic spine DTI  - wound care recs: cleanse with soap and water, triad and allevyn BID prn for soiling  #L heel DTI  - wound care recs: pat dry, apply Cavilon, leave open to air twice a day, offload heels, offloading boots  #scrotum and R groin wound: pat dry, triad BID, PRN for soiling  #Sacrococcygeal DTI: wound care following- cleanse with soap and water, apply triad, gauze, and allevyn BID and PRN for soiling  - burn reconsulted   #Venous ulcer right lower leg, R forearm skin tear: Pat dry, apply Xeroform, nonadherent dressing, wrap with Kerlix twice a day, prn for soiling  #decubitus ulcer b/l ischium  #poor wound healing of abdominal incision  - wound care recs: Vashe wound cleanser, Xeroform and abdominal pad BID, PRN for soiling  - burn consulted  #Activity  - Advance as tolerated  - Globally deconditioned  - PT/rehab once status improves  #b/l UE, LE mottled skin  - +ulnar/DP/PT pulses present on doppler  - Arterial duplex bilateral upper extremities (7/1)  - Right radial artery occlusion   - VA Duplex bilateral lower extremities (7/3) - Right PT vein DVT     LINES/DRAINS:   PIV  Bobby (6/18-7/10)  L eleazar drain (6/30)  R Lerma pouch VENICE#1 (removed 7/17), pelvic drain #2 (6/30)  Left basilic midline  6/21  Right axillary [place] (6/30-7/4)  Right Radial [place] (6/22-6/30)  L-Femoral [place] (7/10 - )  L-Femoral [place] (7/10 -7/16 )  L IJ UDALL (7/16-)  RIJ TLC (7/10-  )  ETT  OGT    ADVANCED DIRECTIVES:  DNR   HCP/Emergency Contact- Tracey Adames: [phone]   [facility] discussions continue to be held with family.     INDICATION FOR SICU: New onset atrial fibrillation with mechanical ventilation    DISPOSITION: SICU   Assessment and Plan:   67M w/ PMH of HTN, grade IV hydronephrosis of L kidney s/p L PCN (placed 5/2023), stutter, hiatal hernia, iron deficiency anemia, H Pylori (untreated), and gastric mass (never biopsied) presents to the ED with at least 4 days of worsening weakness, abdominal distension, and no output from his PCN. Admitted with emphysematous pyelonephritis.     (6/18): s/p LT PCN upsizing to 16Fr and 16Fr RLQ drain placement with IR   (6/22): s/p shock lithotripsy of L kidney abscess cavity with drainage of thick contents, upsize of drain to 26Fr bobby catheter  (6/30): s/p ex-lap and L nephrectomy with abdominal washout    NEURO:  #Pain control  - Dilaudid PRN  #Sedation  - off all sedation  #altered mental status  - CT head 7/10: No acute intracranial pathology  - currently not following commands but able to open eyes to voice and withdraw from pain    RESP:  #acute respiratory failure secondary to septic shock  - intubated 6/24  - Mode: SIMV 450/28/80/10  - LL @ 23 cm  - AM ABG:   #subcutaneous emphysema   #acute hyercapnic respiratory failure   - Bronchoscopy 7/10: No mucous plug found  - CT Chest 7/10: Interval increase in size of bilateral pleural effusions with adjacent consolidation both lower lobes.   #Pulmonary nodules / Mediastinal lymphadenopathy  - CT Chest: Right middle and upper lobe solid pulmonary nodules, f/u outpatient pulm   #Activity  - increase as tolerated    CARDS:   #Septic shock  - currently on levophed, vasopressin, and phenylephrine infusions  #New onset Afib w/ RVR with hypotension -- likely 2/2 sepsis  - HR control with Metoprolol 2.5 q6 IV HOLDING in the setting of HYPOtension  - Digoxin administered, currently HOLDING, last level <0.3  - Cardiology c/s, allow permissive tachycardia, cardizem for rate control  #H/O HTN  - Amlodipine 2.5mg HOLDING  #Imaging  - ECHO: (6/19) EF 55-60%, mild AS, mild LAE, mild MR/TR, trivial pericardial effusion    GI/NUTR:  #proximal small bowel leak  - CT AP PO/IV/rectal contrast 7/10: proximal small bowel leak suspected  - diet: NPO on TPN @60cc/hr  + lipids  - Octreotide 100mcg q8 IVP   #bowel movements  - GI consult 6/26- Plan for EGD with EUS + FNA as outpatient, H-pylori treatment after resolution of acute issues   #Shock liver  - elevated transaminases  - INR 4.1  - requiring multiple blood products  #GI bleed  - sanguinous fluid draining  #GI PPX  - Protonix IV infusion (@ 8mg/hr)  #Gastric mass vs gastrohepatic lymphadenopathy  - CT A/P: lesser curvature mass, Hepatic hypodensities  #Splenic Hypodensities, infarct vs phlegmon/abscess  - CT A/P: Wedge-shaped region of hypodensity within the spleen    /RENAL:   #Grade IV L hydronephrosis s/p L PCN (5/2023)   #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  - (6/23) s/p LT PCN upsizing to 26Fr catheter, RLQ 16 Fr drain remains, s/p nephrostolithotomy w/ lithotripsy  - (6/18) s/p LT PCN upsizing to 16Fr and 16Fr RLQ drain placement with IR, flush q8 hr  - (6/30): s/p L nephrectomy and abdominal washout   - VENICE drains x3 pelvis, Lerma's pouch, L nephrectomy site; L VENICE and R VENICE (pelvis) with dark green/murky/feculent OP  #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   #metabolic acidosis  - Nephrology c/s - CVVHD started 07/02  - sodium bicarb infusion (150 mEq NaHCO3 in d5% at 75 cc/hr)  - requires intermittent pushes of 50cc sodium bicarb  #urine output in critically ill  - Straight cath every 12 hours  #decreased urine output in the setting of acute renal failure 2/2 septic shock    Labs:          BUN/Cr- 71/1.1  -->,  60/0.9  -->          Electrolytes-Na 142 // K 4.6 // Mg 2.1 //  Phos 6.4 (07-18 @ 23:04)  #Hyperphosphatemia  - continue CVVH  - trend levels    HEME/ONC:   #DVT prophylaxis  - SCDs  - discontinued heparin subQ due to active bleeding (7/18 PM)  #new onset Afib  - holding FULL AC   #R radial artery occlusion  - RUE duplex 07/01 + for right radial artery occlusion  #RT posterior popliteal vein DVT  - vascular c/s - recommend AC   #H/o Fe Deficiency anemia  #Thrombocytopenia  - Heme consult (7/3): HIT workup negative    Labs: Hb/Hct:  7.1/23.6  -->,  5.3/17.5  -->                      Plts:  11  -->,  65  -->                 PTT/INR:  69.2/3.07  --->,  75.4/4.15  --->     Type and Screen expires: 7/21  # Acute blood loss anemia 2/2 Upper GI bleeding/ Coagulopathy 2/2 acute liver failure-- symptomatic    - (7/15): 1 unit prbc    - (7/18 AM): 1 unit prbc    - (7/18 day): 3 units PRBCs, 3 units FFP    - (7/18 PM): 1 unit PLT, 2 units PRBCs, 2 units FFP    ID:  multiorgan system failure secondary to emphysematous pyelonephritis s/p left nephrectomy  Antibiotics     - Ceftriaxone (7/13 -     - Metronidazole ( 7/13 -     - Levofloxacin (6/25 -     - Caspofungin (7/8 -   WBC- 17.52  --->>,  13.74  --->>,  11.69  --->>  Temp trend- 24hrs T(F): 91.6 (07-19 @ 02:00), Max: 96.9 (07-18 @ 07:54)    #Cultures  Surgical swab 06/30: vancomycin resistant enterococcus faecium     L PCN cyto-pathology  6/25: results suggestive of a necrotic neoplasm    Fungitell 6/26: 47 --> >500 (7/6) --> >500 (7/15)    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    BAL 6/24: moderate yeast     UCx 6/19: negative FINAL      Left PCN aspiration 6/18: stenotrophomonas maltophilia    Surgical swab (6/30): VRE     Blood cultures: 7/10 NGTD  BCx 7/11-NEGATIVE FINAL    ENDO:  #Glucose monitoring (hypoglycemia)  - A1c (5/7/23): 5.0  - FSG q1 hours  #Adrenal Insufficiency 2/2 to sepsis   - completed course of solu-cortef  - Cortisol level - binding globulin 1.1 (low), serum cortisol, 84, free cortisol 76, % free cortisol 91  - f/u cortisol 7/15 8AM  #assess thyroid function in setting of catabolic state   - f/u thyroid panel    MSK:  #thoracic spine DTI  - wound care recs: cleanse with soap and water, triad and allevyn BID prn for soiling  #L heel DTI  - wound care recs: pat dry, apply Cavilon, leave open to air twice a day, offload heels, offloading boots  #scrotum and R groin wound: pat dry, triad BID, PRN for soiling  #Sacrococcygeal DTI: wound care following- cleanse with soap and water, apply triad, gauze, and allevyn BID and PRN for soiling  - burn reconsulted   #Venous ulcer right lower leg, R forearm skin tear: Pat dry, apply Xeroform, nonadherent dressing, wrap with Kerlix twice a day, prn for soiling  #decubitus ulcer b/l ischium  #poor wound healing of abdominal incision  - wound care recs: Vashe wound cleanser, Xeroform and abdominal pad BID, PRN for soiling  - burn consulted  #Activity  - Advance as tolerated  - Globally deconditioned  - PT/rehab once status improves  #b/l UE, LE mottled skin  - +ulnar/DP/PT pulses present on doppler  - Arterial duplex bilateral upper extremities (7/1)  - Right radial artery occlusion   - VA Duplex bilateral lower extremities (7/3) - Right PT vein DVT     LINES/DRAINS:   PIV  Bobby (6/18-7/10)  L eleazar drain (6/30)  R Lerma pouch VENICE#1 (removed 7/17), pelvic drain #2 (6/30)  Left basilic midline  6/21  Right axillary [place] (6/30-7/4)  Right Radial [place] (6/22-6/30)  L-Femoral [place] (7/10 - )  L-Femoral [place] (7/10 -7/16 )  L IJ UDALL (7/16-)  RIJ TLC (7/10-  )  ETT  OGT    ADVANCED DIRECTIVES:  DNR   HCP/Emergency Contact- Tracey Adames: [phone]   [facility] discussions continue to be held with family.     INDICATION FOR SICU: New onset atrial fibrillation with mechanical ventilation    DISPOSITION: SICU

## 2023-07-19 NOTE — PROGRESS NOTE ADULT - SUBJECTIVE AND OBJECTIVE BOX
Nephrology progress note    THIS IS AN INCOMPLETE NOTE . FULL NOTE TO FOLLOW SHORTLY    Patient is seen and examined, events over the last 24 h noted .    Allergies:  No Known Allergies    Hospital Medications:   MEDICATIONS  (STANDING):  bacitracin   Ointment 1 Application(s) Topical two times a day  caspofungin IVPB      caspofungin IVPB 50 milliGRAM(s) IV Intermittent every 24 hours  cefTRIAXone   IVPB      cefTRIAXone   IVPB 2000 milliGRAM(s) IV Intermittent every 24 hours  chlorhexidine 0.12% Liquid 15 milliLiter(s) Oral Mucosa every 12 hours  chlorhexidine 2% Cloths 1 Application(s) Topical <User Schedule>  chlorhexidine 4% Liquid 1 Application(s) Topical <User Schedule>  collagenase Ointment 1 Application(s) Topical two times a day  CRRT Treatment    <Continuous>  levoFLOXacin IVPB 750 milliGRAM(s) IV Intermittent every 48 hours  metroNIDAZOLE  IVPB 500 milliGRAM(s) IV Intermittent every 8 hours  metroNIDAZOLE  IVPB      norepinephrine Infusion 0.01 MICROgram(s)/kG/Min (0.72 mL/Hr) IV Continuous <Continuous>  octreotide  Injectable 100 MICROGram(s) IV Push every 8 hours  pantoprazole Infusion 8 mG/Hr (10 mL/Hr) IV Continuous <Continuous>  Parenteral Nutrition - Adult 1 Each (65 mL/Hr) TPN Continuous <Continuous>  phenylephrine    Infusion 0.1 MICROgram(s)/kG/Min (1.43 mL/Hr) IV Continuous <Continuous>  PureFlow Dialysate RFP-400 (K 2 / Ca 3) 5000 milliLiter(s) (2000 mL/Hr) CRRT <Continuous>  sodium bicarbonate  Infusion 0.147 mEq/kG/Hr (75 mL/Hr) IV Continuous <Continuous>  vasopressin Infusion 0.03 Unit(s)/Min (4.5 mL/Hr) IV Continuous <Continuous>        VITALS:  T(F): 91.6 (07-19-23 @ 02:00), Max: 94 (07-18-23 @ 16:00)  HR: 109 (07-19-23 @ 05:38)  BP: 123/77 (07-19-23 @ 04:00)  RR: 28 (07-19-23 @ 05:38)  SpO2: 76% (07-19-23 @ 05:38)  Wt(kg): --    07-17 @ 07:01  -  07-18 @ 07:00  --------------------------------------------------------  IN: 5208.5 mL / OUT: 1124 mL / NET: 4084.5 mL    07-18 @ 07:01  -  07-19 @ 07:00  --------------------------------------------------------  IN: 22463 mL / OUT: 2884 mL / NET: 66688 mL          PHYSICAL EXAM:  Constitutional: NAD  HEENT: anicteric sclera, oropharynx clear, MMM  Neck: No JVD  Respiratory: CTAB, no wheezes, rales or rhonchi  Cardiovascular: S1, S2, RRR  Gastrointestinal: BS+, soft, NT/ND  Extremities: No cyanosis or clubbing. No peripheral edema  :  No bobby.   Skin: No rashes    LABS:  07-19    138  |  104  |  43<H>  ----------------------------<  188<H>  4.4   |  14<L>  |  0.7    Ca    7.0<L>      19 Jul 2023 04:20  Phos  5.8     07-19  Mg     1.8     07-19    TPro  2.7<L>  /  Alb  1.3<L>  /  TBili  1.9<H>  /  DBili      /  AST  >7000<H>  /  ALT  1074<H>  /  AlkPhos  127<H>  07-18                          6.0    8.45  )-----------( 33       ( 19 Jul 2023 04:20 )             20.2       Urine Studies:  Urinalysis Basic - ( 19 Jul 2023 04:20 )    Color:  / Appearance:  / SG:  / pH:   Gluc: 188 mg/dL / Ketone:   / Bili:  / Urobili:    Blood:  / Protein:  / Nitrite:    Leuk Esterase:  / RBC:  / WBC    Sq Epi:  / Non Sq Epi:  / Bacteria:           Iron 12, TIBC 128, %sat 9      [05-07-23 @ 09:19]  Ferritin 526      [05-07-23 @ 09:19]  Vitamin D (25OH) 26      [05-08-23 @ 07:58]  TSH 0.35      [07-16-23 @ 04:12]  Lipid: chol 58, TG 88, HDL 22, LDL --      [07-02-23 @ 21:01]          RADIOLOGY & ADDITIONAL STUDIES:   Nephrology progress note  Patient is seen and examined, events over the last 24 h noted .  Lying in bed   on multiple pressors   CVVHD on hold     Allergies:  No Known Allergies    Hospital Medications:   MEDICATIONS  (STANDING):  bacitracin   Ointment 1 Application(s) Topical two times a day   caspofungin IVPB 50 milliGRAM(s) IV Intermittent every 24 hours    cefTRIAXone   IVPB 2000 milliGRAM(s) IV Intermittent every 24 hours  collagenase Ointment 1 Application(s) Topical two times a day  CRRT Treatment    <Continuous>  levoFLOXacin IVPB 750 milliGRAM(s) IV Intermittent every 48 hours  metroNIDAZOLE  IVPB 500 milliGRAM(s) IV Intermittent every 8 hours   norepinephrine Infusion 0.01 MICROgram(s)/kG/Min (0.72 mL/Hr) IV Continuous <Continuous>  octreotide  Injectable 100 MICROGram(s) IV Push every 8 hours  pantoprazole Infusion 8 mG/Hr (10 mL/Hr) IV Continuous <Continuous>  Parenteral Nutrition - Adult 1 Each (65 mL/Hr) TPN Continuous <Continuous>  phenylephrine    Infusion 0.1 MICROgram(s)/kG/Min (1.43 mL/Hr) IV Continuous <Continuous>  PureFlow Dialysate RFP-400 (K 2 / Ca 3) 5000 milliLiter(s) (2000 mL/Hr) CRRT <Continuous>  sodium bicarbonate  Infusion 0.147 mEq/kG/Hr (75 mL/Hr) IV Continuous <Continuous>  vasopressin Infusion 0.03 Unit(s)/Min (4.5 mL/Hr) IV Continuous <Continuous>        VITALS:  T(F): 91.6 (07-19-23 @ 02:00), Max: 94 (07-18-23 @ 16:00)  HR: 109 (07-19-23 @ 05:38)  BP: 123/77 (07-19-23 @ 04:00)  RR: 28 (07-19-23 @ 05:38)  SpO2: 76% (07-19-23 @ 05:38)      07-17 @ 07:01  -  07-18 @ 07:00  --------------------------------------------------------  IN: 5208.5 mL / OUT: 1124 mL / NET: 4084.5 mL    07-18 @ 07:01  -  07-19 @ 07:00  --------------------------------------------------------  IN: 46450 mL / OUT: 2884 mL / NET: 06739 mL          PHYSICAL EXAM:  Constitutional: intubated on MV  Respiratory: CTAB,   Cardiovascular: S1, S2, RRR  Gastrointestinal: BS+, soft, NT/ND  Extremities: No cyanosis or clubbing. No peripheral edema  :  No bobby.   Skin: No rashes    LABS:    07-19    138  |  104  |  43<H>  ----------------------------<  188<H>  4.4   |  14<L>  |  0.7    Ca    7.0<L>      19 Jul 2023 04:20  Phos  5.8     07-19  Mg     1.8     07-19    TPro  2.7<L>  /  Alb  1.3<L>  /  TBili  1.9<H>  /  DBili      /  AST  >7000<H>  /  ALT  1074<H>  /  AlkPhos  127<H>  07-18                          6.0    8.45  )-----------( 33       ( 19 Jul 2023 04:20 )             20.2       Urine Studies:  Urinalysis Basic - ( 19 Jul 2023 04:20 )    Color:  / Appearance:  / SG:  / pH:   Gluc: 188 mg/dL / Ketone:   / Bili:  / Urobili:    Blood:  / Protein:  / Nitrite:    Leuk Esterase:  / RBC:  / WBC    Sq Epi:  / Non Sq Epi:  / Bacteria:           Iron 12, TIBC 128, %sat 9      [05-07-23 @ 09:19]  Ferritin 526      [05-07-23 @ 09:19]  Vitamin D (25OH) 26      [05-08-23 @ 07:58]  TSH 0.35      [07-16-23 @ 04:12]  Lipid: chol 58, TG 88, HDL 22, LDL --      [07-02-23 @ 21:01]          RADIOLOGY & ADDITIONAL STUDIES:

## 2023-07-19 NOTE — PROGRESS NOTE ADULT - SUBJECTIVE AND OBJECTIVE BOX
Urology Progress Note:   Pt is a 69 y/o Male with retroperitoneal and intraabdominal abscess s/p IR placement of drain and LEFT PCN 6/18, s/p percutaneous drainage of LEFT renal abscess POD # 24 with left 26fr flank catheter in place, s/p Left radical nephrectomy, Ex-Lap POD #18. Patient remains intubated w/ ETT on pressors.     [ x ]Due to altered mental status/intubation, subjective information was not able to be obtained from the patient. History was obtained to the extent possible from review of the chart and collateral sources of information.     MEDICATIONS  (STANDING):  bacitracin   Ointment 1 Application(s) Topical two times a day  caspofungin IVPB      caspofungin IVPB 50 milliGRAM(s) IV Intermittent every 24 hours  cefTRIAXone   IVPB 2000 milliGRAM(s) IV Intermittent every 24 hours  cefTRIAXone   IVPB      chlorhexidine 0.12% Liquid 15 milliLiter(s) Oral Mucosa every 12 hours  chlorhexidine 2% Cloths 1 Application(s) Topical <User Schedule>  collagenase Ointment 1 Application(s) Topical two times a day  CRRT Treatment    <Continuous>  levoFLOXacin IVPB 750 milliGRAM(s) IV Intermittent every 48 hours  metroNIDAZOLE  IVPB      metroNIDAZOLE  IVPB 500 milliGRAM(s) IV Intermittent every 8 hours  norepinephrine Infusion 0.01 MICROgram(s)/kG/Min (0.72 mL/Hr) IV Continuous <Continuous>  octreotide  Injectable 100 MICROGram(s) IV Push every 8 hours  pantoprazole Infusion 8 mG/Hr (10 mL/Hr) IV Continuous <Continuous>  Parenteral Nutrition - Adult 1 Each (65 mL/Hr) TPN Continuous <Continuous>  phenylephrine    Infusion 0.1 MICROgram(s)/kG/Min (1.43 mL/Hr) IV Continuous <Continuous>  PureFlow Dialysate RFP-400 (K 2 / Ca 3) 5000 milliLiter(s) (2000 mL/Hr) CRRT <Continuous>  sodium bicarbonate  Infusion 0.197 mEq/kG/Hr (100 mL/Hr) IV Continuous <Continuous>  vasopressin Infusion 0.03 Unit(s)/Min (4.5 mL/Hr) IV Continuous <Continuous>    MEDICATIONS  (PRN):  dextrose 50% Injectable 50 milliLiter(s) IV Push every 15 minutes PRN FS < 70  HYDROmorphone  Injectable 0.5 milliGRAM(s) IV Push every 4 hours PRN Severe Pain (7 - 10)  sodium chloride 0.9% lock flush 10 milliLiter(s) IV Push every 1 hour PRN Pre/post blood products, medications, blood draw, and to maintain line patency  sodium chloride 0.9% lock flush 10 milliLiter(s) IV Push every 1 hour PRN Pre/post blood products, medications, blood draw, and to maintain line patency      Allergies: No Known Allergies    SOCIAL HISTORY: No illicit drug use    Vital Signs Last 24 Hrs  T(C): 32.8 (19 Jul 2023 12:00), Max: 33.9 (18 Jul 2023 20:00)  T(F): 91.1 (19 Jul 2023 12:00), Max: 93 (18 Jul 2023 20:00)  HR: 106 (19 Jul 2023 14:58) (55 - 110)  BP: 87/65 (19 Jul 2023 14:00) (78/58 - 154/91)  BP(mean): 72 (19 Jul 2023 14:00) (53 - 117)  RR: 28 (19 Jul 2023 14:30) (14 - 29)  SpO2: 97% (19 Jul 2023 14:58) (62% - 97%)    Parameters below as of 19 Jul 2023 12:00  Patient On (Oxygen Delivery Method): ventilator  O2 Concentration (%): 80    PHYSICAL EXAM:  GEN: NAD, intubated sedated  SKIN: cyanotic digits  RESP: vented  ABDO/BACK: +dressing in place. #2 Pelvic pouch eleazar drain draining murky brown drainage. #3 Left abd eleazar drain draining murky brown drainage. +Two abd pouches with murky brown drainage.   : + penile/scrotal edema   EXT: +edema     I&O's Summary  18 Jul 2023 07:01  -  19 Jul 2023 07:00  --------------------------------------------------------  IN: 25560 mL / OUT: 2884 mL / NET: 21337 mL    19 Jul 2023 07:01  -  19 Jul 2023 16:19  --------------------------------------------------------  IN: 3423 mL / OUT: 90 mL / NET: 3333 mL      LABS:                     6.0    8.45  )-----------( 33       ( 19 Jul 2023 04:20 )             20.2     07-19    138  |  104  |  43<H>  ----------------------------<  188<H>  4.4   |  14<L>  |  0.7    Ca    7.0<L>      19 Jul 2023 04:20  Phos  5.8     07-19  Mg     1.8     07-19    TPro  2.7<L>  /  Alb  1.3<L>  /  TBili  1.9<H>  /  DBili  x   /  AST  >7000<H>  /  ALT  1074<H>  /  AlkPhos  127<H>  07-18    PT/INR - ( 19 Jul 2023 00:55 )   PT: >40.00 sec;   INR: 4.15 ratio         PTT - ( 19 Jul 2023 00:55 )  PTT:75.4 sec  Urinalysis Basic - ( 19 Jul 2023 04:20 )    Color: x / Appearance: x / SG: x / pH: x  Gluc: 188 mg/dL / Ketone: x  / Bili: x / Urobili: x   Blood: x / Protein: x / Nitrite: x   Leuk Esterase: x / RBC: x / WBC x   Sq Epi: x / Non Sq Epi: x / Bacteria: x

## 2023-07-19 NOTE — DISCHARGE NOTE FOR THE EXPIRED PATIENT - HOSPITAL COURSE
67M w/ PMH of HTN, grade IV hydronephrosis of L kidney s/p L PCN (placed 5/2023), stutter, hiatal hernia, iron deficiency anemia, H Pylori (untreated), and gastric mass (never biopsied) presents to the ED with at least 4 days of worsening weakness, abdominal distension, and no output from his PCN. Admitted with emphysematous pyelonephritis. Patient underwent multiple OR procedures.   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 Patient had local invasion to liver and colon with bowel contents extravasating. Unable to gain source control patient was septic with OR cultures positive for Enterococcus faecium (VRE). ICU corse complicated by multiple pressors, bowel perforation, and PNA (with positve BAL culture).  67M w/ PMH of HTN, grade IV hydronephrosis of L kidney s/p L PCN (placed 5/2023), stutter, hiatal hernia, iron deficiency anemia, H Pylori (untreated), and gastric mass (never biopsied) presents to the ED with at least 4 days of worsening weakness, abdominal distension, and no output from his PCN. Admitted with emphysematous pyelonephritis. Patient underwent multiple OR procedures.   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 Patient had local invasion to liver and colon with bowel contents extravasating. Unable to gain source control patient was septic with OR cultures positive for Enterococcus faecium (VRE). ICU corse complicated by multiple pressors, bowel perforation, and PNA (with positve BAL culture).     Patient hospital stay complicated by HYPOtension secondary to acute pyleonephritis s/p radical nephrectomy. Acute renal failure, requiring CRRT. Further complicated by acute GI bleed. Multiple family discussions held with family and patient made DNR on 7/18, MOLST updated. Overnight patient clinical status worsened with acute anemia minimally responsive to PRBC/Platelet/Plasma transfusion. Patient continued 7/19 AM to require maximum dose of vasopressors with out improvement. Patient without pulse and pronounced at 1556. 67M w/ PMH of HTN, grade IV hydronephrosis of L kidney s/p L PCN (placed 5/2023), stutter, hiatal hernia, iron deficiency anemia, H Pylori (untreated), and gastric mass (never biopsied) presents to the ED with at least 4 days of worsening weakness, abdominal distension, and no output from his PCN. Admitted with emphysematous pyelonephritis. Patient underwent multiple OR procedures.   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 Patient had local invasion to liver and colon with bowel contents extravasating. Unable to gain source control patient was septic with OR cultures positive for Enterococcus faecium (VRE). ICU corse complicated by multiple pressors, bowel perforation, and PNA (with positve BAL culture).     Patient hospital stay complicated by HYPOtension secondary to acute pyleonephritis s/p radical nephrectomy. Acute renal failure, requiring CRRT. Further complicated by acute GI bleed. Multiple family discussions held with family and patient made DNR on 7/18, MOLST updated. Overnight patient clinical status worsened with acute anemia minimally responsive to PRBC/Platelet/Plasma transfusion. Patient continued 7/19 AM to require maximum dose of vasopressors with out improvement. Patient without pulse and pronounced at 1550.

## 2023-07-19 NOTE — GOALS OF CARE CONVERSATION - ADVANCED CARE PLANNING - WHAT MATTERS MOST
Family is divided- half state he still wants to fight and go home, other's realize that the patient is suffering and would like him to be comfortable and pass with no further interventions

## 2023-07-19 NOTE — PROGRESS NOTE ADULT - PROVIDER SPECIALTY LIST ADULT
Infectious Disease
Infectious Disease
Nephrology
Nutrition Support
Nutrition Support
SICU
SICU
Surgery
Urology
Infectious Disease
Intervent Radiology
Nephrology
Nutrition Support
SICU
Surgery
Urology
Vascular Surgery
Gastroenterology
Infectious Disease
Intervent Radiology
Nephrology
Nutrition Support
SICU
Surgery
Urology
Cardiology
Infectious Disease
Nephrology
Nutrition Support
Nutrition Support
SICU
Surgery
Urology
Nephrology
Palliative Care
SICU
Palliative Care

## 2023-07-19 NOTE — PROGRESS NOTE ADULT - ATTENDING SUPERVISION STATEMENT
Resident
Fellow
Fellow
Resident
Resident/Fellow
Resident/Fellow
Resident
Resident/Fellow
Resident
Resident/Fellow
Resident/Fellow
Resident
Resident/Fellow
Resident
Resident/Fellow
Resident
Resident/Fellow
Resident
Resident/Fellow

## 2023-07-19 NOTE — PROGRESS NOTE ADULT - REASON FOR ADMISSION
pneumoperitoneum, emphysematous pyelonephritis

## 2023-07-19 NOTE — PROGRESS NOTE ADULT - SUBJECTIVE AND OBJECTIVE BOX
GENERAL SURGERY PROGRESS NOTE    Patient: CHRISTINE VILLAVICENCIO , 68y ([birth year])Male   MRN: [id number]  Location: [address]  Visit: 06-18-23 Inpatient  Date: 07-19-23 @ 08:25      Patient is a 69 y/o Male with retroperitoneal and intraabdominal abscess s/p IR placement of drain and LEFT PCN 6/18, s/p percutaneous drainage of LEFT renal abscess POD # 23 with left 26fr flank catheter in place, s/p Left radical nephrectomy, Ex-Lap POD # 19. Patient remains intubated w/ ETT on pressors.    Recent GOC conversation with family-> Patient now DNR/DNI     PAST MEDICAL & SURGICAL HISTORY:      Vitals:   T(F): 91.6 (07-19-23 @ 02:00), Max: 94 (07-18-23 @ 16:00)  HR: 109 (07-19-23 @ 05:38)  BP: 123/77 (07-19-23 @ 04:00)  RR: 28 (07-19-23 @ 05:38)  SpO2: 76% (07-19-23 @ 05:38)  Mode: AC/ CMV (Assist Control/ Continuous Mandatory Ventilation), RR (machine): 28, TV (machine): 450, FiO2: 100, PEEP: 10, ITime: 1, MAP: 15, PC: 5, PIP: 24    Diet, NPO      Fluids:     I & O's:    07-18-23 @ 07:01  -  07-19-23 @ 07:00  --------------------------------------------------------  IN:    Enteral Tube Flush: 500 mL    IV PiggyBack: 100 mL    IV PiggyBack: 50 mL    IV PiggyBack: 300 mL    IV PiggyBack: 150 mL    Norepinephrine: 5262 mL    Pantoprazole: 90 mL    Phenylephrine: 428 mL    Plasma: 1589 mL    Platelets - Single Donor: 225 mL    PRBCs (Packed Red Blood Cells): 1472 mL    Sodium Bicarbonate: 1800 mL    TPN (Total Parenteral Nutrition): 1524 mL    Vasopressin: 108 mL  Total IN: 69506 mL    OUT:    Drain (mL): 10 mL    Drain (mL): 178 mL    Drain (mL): 100 mL    Drain (mL): 590 mL    Nasogastric/Oral tube (mL): 1900 mL    Other (mL): 106 mL  Total OUT: 2884 mL    Total NET: 50234 mL    MEDICATIONS  (STANDING):  bacitracin   Ointment 1 Application(s) Topical two times a day  caspofungin IVPB      caspofungin IVPB 50 milliGRAM(s) IV Intermittent every 24 hours  cefTRIAXone   IVPB 2000 milliGRAM(s) IV Intermittent every 24 hours  cefTRIAXone   IVPB      chlorhexidine 0.12% Liquid 15 milliLiter(s) Oral Mucosa every 12 hours  chlorhexidine 2% Cloths 1 Application(s) Topical <User Schedule>  chlorhexidine 4% Liquid 1 Application(s) Topical <User Schedule>  collagenase Ointment 1 Application(s) Topical two times a day  CRRT Treatment    <Continuous>  levoFLOXacin IVPB 750 milliGRAM(s) IV Intermittent every 48 hours  metroNIDAZOLE  IVPB      metroNIDAZOLE  IVPB 500 milliGRAM(s) IV Intermittent every 8 hours  norepinephrine Infusion 0.01 MICROgram(s)/kG/Min (0.72 mL/Hr) IV Continuous <Continuous>  octreotide  Injectable 100 MICROGram(s) IV Push every 8 hours  pantoprazole Infusion 8 mG/Hr (10 mL/Hr) IV Continuous <Continuous>  Parenteral Nutrition - Adult 1 Each (65 mL/Hr) TPN Continuous <Continuous>  phenylephrine    Infusion 0.1 MICROgram(s)/kG/Min (1.43 mL/Hr) IV Continuous <Continuous>  PureFlow Dialysate RFP-400 (K 2 / Ca 3) 5000 milliLiter(s) (2000 mL/Hr) CRRT <Continuous>  sodium bicarbonate  Infusion 0.147 mEq/kG/Hr (75 mL/Hr) IV Continuous <Continuous>  vasopressin Infusion 0.03 Unit(s)/Min (4.5 mL/Hr) IV Continuous <Continuous>    MEDICATIONS  (PRN):  dextrose 50% Injectable 50 milliLiter(s) IV Push every 15 minutes PRN FS < 70  HYDROmorphone  Injectable 0.5 milliGRAM(s) IV Push every 4 hours PRN Severe Pain (7 - 10)  sodium chloride 0.9% lock flush 10 milliLiter(s) IV Push every 1 hour PRN Pre/post blood products, medications, blood draw, and to maintain line patency  sodium chloride 0.9% lock flush 10 milliLiter(s) IV Push every 1 hour PRN Pre/post blood products, medications, blood draw, and to maintain line patency      DVT PROPHYLAXIS:   GI PROPHYLAXIS: pantoprazole Infusion 8 mG/Hr IV Continuous <Continuous>    ANTICOAGULATION:   ANTIBIOTICS:  caspofungin IVPB    caspofungin IVPB 50 milliGRAM(s)  cefTRIAXone   IVPB    cefTRIAXone   IVPB 2000 milliGRAM(s)  levoFLOXacin IVPB 750 milliGRAM(s)  metroNIDAZOLE  IVPB 500 milliGRAM(s)  metroNIDAZOLE  IVPB              LAB/STUDIES:  Labs:  CAPILLARY BLOOD GLUCOSE      POCT Blood Glucose.: 201 mg/dL (19 Jul 2023 03:56)  POCT Blood Glucose.: 178 mg/dL (19 Jul 2023 00:59)  POCT Blood Glucose.: 158 mg/dL (18 Jul 2023 22:20)  POCT Blood Glucose.: 180 mg/dL (18 Jul 2023 21:20)  POCT Blood Glucose.: 168 mg/dL (18 Jul 2023 12:24)  POCT Blood Glucose.: 151 mg/dL (18 Jul 2023 08:33)                          6.0    8.45  )-----------( 33       ( 19 Jul 2023 04:20 )             20.2       Auto Neutrophil %: 85.9 % (07-19-23 @ 04:20)  Auto Immature Granulocyte %: 3.2 % (07-19-23 @ 04:20)  Auto Neutrophil %: 73.7 % (07-19-23 @ 00:55)  Band Neutrophils %: 4.1 % (07-19-23 @ 00:55)    07-19    138  |  104  |  43<H>  ----------------------------<  188<H>  4.4   |  14<L>  |  0.7      Calcium: 7.0 mg/dL (07-19-23 @ 04:20)      LFTs:             2.7  | 1.9  | >7000    ------------------[127     ( 18 Jul 2023 13:30 )  1.3  | x    | 1074        Lipase:x      Amylase:x         Blood Gas Arterial, Lactate: 14.30 mmol/L (07-19-23 @ 04:20)  Blood Gas Arterial, Lactate: 13.40 mmol/L (07-19-23 @ 00:08)  Blood Gas Arterial, Lactate: 13.70 mmol/L (07-18-23 @ 18:54)  Blood Gas Arterial, Lactate: 12.60 mmol/L (07-18-23 @ 13:00)  Blood Gas Arterial, Lactate: 10.30 mmol/L (07-18-23 @ 08:00)  Blood Gas Arterial, Lactate: 10.20 mmol/L (07-18-23 @ 05:15)  Blood Gas Arterial, Lactate: 8.80 mmol/L (07-18-23 @ 01:30)  Blood Gas Arterial, Lactate: 3.80 mmol/L (07-17-23 @ 22:44)  Blood Gas Arterial, Lactate: 3.70 mmol/L (07-17-23 @ 04:03)  Blood Gas Arterial, Lactate: 3.10 mmol/L (07-16-23 @ 18:43)  Blood Gas Arterial, Lactate: 4.20 mmol/L (07-16-23 @ 10:08)    ABG - ( 19 Jul 2023 04:20 )  pH: 6.99  /  pCO2: 55    /  pO2: 208   / HCO3: 13    / Base Excess: -16.9 /  SaO2: 99.1            ABG - ( 19 Jul 2023 00:08 )  pH: 6.94  /  pCO2: 65    /  pO2: 163   / HCO3: 14    / Base Excess: -16.8 /  SaO2: 100.0           ABG - ( 18 Jul 2023 18:54 )  pH: 6.92  /  pCO2: 58    /  pO2: 95    / HCO3: 12    / Base Excess: -19.6 /  SaO2: 99.3              Coags:     >40.00  ----< 4.15    ( 19 Jul 2023 00:55 )     75.4                Urinalysis Basic - ( 19 Jul 2023 04:20 )    Color: x / Appearance: x / SG: x / pH: x  Gluc: 188 mg/dL / Ketone: x  / Bili: x / Urobili: x   Blood: x / Protein: x / Nitrite: x   Leuk Esterase: x / RBC: x / WBC x   Sq Epi: x / Non Sq Epi: x / Bacteria: x      ASSESSMENT:  Patient is a 69 y/o Male with retroperitoneal and intraabdominal abscess s/p IR placement of drain and LEFT PCN 6/18, s/p percutaneous drainage of LEFT renal abscess POD # 23 with left 26fr flank catheter in place, s/p Left radical nephrectomy, Ex-Lap POD # 17. Patient remains intubated w/ ETT on pressors.     Plan:   - Continue care as per SICU  - Patient now DNR/DNI status  - continue to monitor drain output and quality  - Abx per ID      BLUE TEAM SPECTRA: [id number]

## 2023-07-19 NOTE — PROGRESS NOTE ADULT - ASSESSMENT
Pt is a 67 y/o Male with retroperitoneal and intraabdominal abscess s/p IR placement of drain and LEFT PCN 6/18, s/p percutaneous drainage of LEFT renal abscess POD # 24 with left 26fr flank catheter in place, s/p Left radical nephrectomy, Ex-Lap POD #18. Patient remains intubated w/ ETT on pressors.     Plan:  ·Continue management as per SICU  ·Will d/w  attending

## 2023-07-19 NOTE — PROGRESS NOTE ADULT - SUBJECTIVE AND OBJECTIVE BOX
CHRISTINE VILLAVICENCIO  [id number]  67y Male    Indication for ICU admission: mechanical ventilator management in the setting of septic shock    Admit Date:06-18-23  ICU Date: 6/18/23  OR Date: 6/18 (IR), 6/22, 6/30     24HRS EVENT:  7/18  NIGHT  - PM rounds, hypotensive, MAPs in 40s and acidotic --> 2 amps bicarb  - d/c hep subQ in the setting of bleeding  - abdominal drains now with blood tinged fluid  - OG tube blood tinged output  - 1 unit PLT to be transfused  - post-transfusion hgb 5.3, platelets 65, INR >4  - 2 units PRBC and 2 units FFP    DAY  - 500cc NS lavage in attempts to temporize bleed, continue OGT to suction  - Ethics contacted, willing to be present for family meeting however daughter not answering the phone to arrange   - Hgb 6.9 on ABG with bloody content suctioned from OGT; 2uPRBC, 2uFFP  post transfusion Hgb 7.6  - Worsening acidosis, resume CVVH. Transfuse addt'l 1uPLT, 1uRBC, 1uFFP  - LA 12.6      [] A ten-point review of systems was otherwise negative except as noted above.  [x  ] Due to altered mental status/intubation, subjective information was not attained from the patient. History was obtained, to the extent possible, from review of the chart and collateral sources of information.

## 2023-07-19 NOTE — PROGRESS NOTE ADULT - CRITICAL CARE SERVICES
35
65
35
76
45
70
45
45
47
75
42
45
50
52
63
63
65
68
76
45
45
65
65
58
45
47
50
65
70
44
70
47
45

## 2023-07-19 NOTE — PROGRESS NOTE ADULT - NUTRITIONAL ASSESSMENT
This patient has been assessed with a concern for Malnutrition and has been determined to have a diagnosis/diagnoses of Severe protein-calorie malnutrition.    This patient is being managed with:   Diet NPO with Tube Feed-  Tube Feeding Modality: Nasogastric  Nepro with Carb Steady  Total Volume for 24 Hours (mL): 480  Continuous  Starting Tube Feed Rate {mL per Hour}: 20  Increase Tube Feed Rate by (mL): 20     Every 4 hours  Until Goal Tube Feed Rate (mL per Hour): 20  Tube Feed Duration (in Hours): 24  Tube Feed Start Time: 09:23  Entered: Jun 28 2023 10:01AM  
This patient has been assessed with a concern for Malnutrition and has been determined to have a diagnosis/diagnoses of Severe protein-calorie malnutrition.    This patient is being managed with:   Parenteral Nutrition - Adult-  Entered: Jul 6 2023  2:07PM    Parenteral Nutrition - Adult-  Entered: Jul 5 2023  8:00PM    Diet NPO-  Entered: Jul 5 2023 10:33AM  
This patient has been assessed with a concern for Malnutrition and has been determined to have a diagnosis/diagnoses of Severe protein-calorie malnutrition.    This patient is being managed with:   Diet NPO with Tube Feed-  Tube Feeding Modality: Nasogastric  Nepro with Carb Steady  Total Volume for 24 Hours (mL): 480  Continuous  Starting Tube Feed Rate {mL per Hour}: 20  Increase Tube Feed Rate by (mL): 20     Every 4 hours  Until Goal Tube Feed Rate (mL per Hour): 20  Tube Feed Duration (in Hours): 24  Tube Feed Start Time: 09:23  Entered: Jun 24 2023  3:34PM  
This patient has been assessed with a concern for Malnutrition and has been determined to have a diagnosis/diagnoses of Severe protein-calorie malnutrition.    This patient is being managed with:   Parenteral Nutrition - Adult-  Entered: Jul 11 2023  8:00PM    Diet NPO-  Entered: Jul 5 2023 10:33AM  
This patient has been assessed with a concern for Malnutrition and has been determined to have a diagnosis/diagnoses of Severe protein-calorie malnutrition.    This patient is being managed with:   Parenteral Nutrition - Adult-  Entered: Jul 18 2023  8:00PM    Diet NPO-  Entered: Jul 5 2023 10:33AM  
This patient has been assessed with a concern for Malnutrition and has been determined to have a diagnosis/diagnoses of Severe protein-calorie malnutrition.    This patient is being managed with:   lipid fat emulsion (Fish Oil and Plant Based) 20% Infusion-[Known as SMOFLIPID 20% Infusion]  50.05 Gram(s) in IV Solution 250.25 milliLiter(s) infuse at 20.9 mL/Hr  Dose Rate: 0.656 Gm/kG/Day Infuse Over: 12 Hours; Stop After 12 Hours  Administration Instructions: Use 1.2 micron in-line filter  Entered: Jul  3 2023  8:00PM    Parenteral Nutrition - Adult-  Entered: Jul  3 2023  8:00PM    Diet NPO with Tube Feed-  Tube Feeding Modality: Orogastric  Nepro with Carb Steady  Total Volume for 24 Hours (mL): 480  Continuous  Starting Tube Feed Rate {mL per Hour}: 10  Increase Tube Feed Rate by (mL): 20     Every 4 hours  Until Goal Tube Feed Rate (mL per Hour): 20  Tube Feed Duration (in Hours): 24  Tube Feed Start Time: 08:26  Entered: Jul  3 2023  8:26AM  
This patient has been assessed with a concern for Malnutrition and has been determined to have a diagnosis/diagnoses of Severe protein-calorie malnutrition.    This patient is being managed with:   lipid fat emulsion (Fish Oil and Plant Based) 20% Infusion-[Known as SMOFLIPID 20% Infusion]  50.05 Gram(s) in IV Solution 250.25 milliLiter(s) infuse at 31.3 mL/Hr  Dose Rate: 0.656 Gm/kG/Day Infuse Over: 8 Hours; Stop After 8 Hours  Administration Instructions: Use 1.2 micron in-line filter  Entered: Jul 12 2023  8:00PM    Parenteral Nutrition - Adult-  Entered: Jul 12 2023  8:00PM    Parenteral Nutrition - Adult-  Entered: Jul 11 2023  8:00PM    Diet NPO-  Entered: Jul 5 2023 10:33AM  
This patient has been assessed with a concern for Malnutrition and has been determined to have a diagnosis/diagnoses of Severe protein-calorie malnutrition.    This patient is being managed with:   Diet NPO with Tube Feed-  Tube Feeding Modality: Nasogastric  Nepro with Carb Steady  Total Volume for 24 Hours (mL): 480  Continuous  Starting Tube Feed Rate {mL per Hour}: 20  Increase Tube Feed Rate by (mL): 20     Every 4 hours  Until Goal Tube Feed Rate (mL per Hour): 20  Tube Feed Duration (in Hours): 24  Tube Feed Start Time: 09:23  Entered: Jun 30 2023  9:50PM  
This patient has been assessed with a concern for Malnutrition and has been determined to have a diagnosis/diagnoses of Severe protein-calorie malnutrition.    This patient is being managed with:   Diet NPO-  Except Medications  Entered: Jul 1 2023  7:19AM  
This patient has been assessed with a concern for Malnutrition and has been determined to have a diagnosis/diagnoses of Severe protein-calorie malnutrition.    This patient is being managed with:   Parenteral Nutrition - Adult-  Entered: Jul 7 2023  8:00PM    Diet NPO-  Entered: Jul 5 2023 10:33AM  
This patient has been assessed with a concern for Malnutrition and has been determined to have a diagnosis/diagnoses of Severe protein-calorie malnutrition.    This patient is being managed with:   lipid fat emulsion (Fish Oil and Plant Based) 20% Infusion-[Known as SMOFLIPID 20% Infusion]  50.05 Gram(s) in IV Solution 250.25 milliLiter(s) infuse at 31.3 mL/Hr  Dose Rate: 0.656 Gm/kG/Day Infuse Over: 8 Hours; Stop After 8 Hours  Administration Instructions: Use 1.2 micron in-line filter  Entered: Jul 15 2023  8:00PM    Parenteral Nutrition - Adult-  Entered: Jul 15 2023  8:00PM    Parenteral Nutrition - Adult-  Entered: Jul 14 2023  8:00PM    Diet NPO-  Entered: Jul 5 2023 10:33AM  
This patient has been assessed with a concern for Malnutrition and has been determined to have a diagnosis/diagnoses of Severe protein-calorie malnutrition.    This patient is being managed with:   Diet NPO-  Except Medications  Entered: Jun 23 2023  9:06PM  
This patient has been assessed with a concern for Malnutrition and has been determined to have a diagnosis/diagnoses of Severe protein-calorie malnutrition.    This patient is being managed with:   Diet NPO-  Except Medications  Entered: Jun 26 2023 12:16AM  
This patient has been assessed with a concern for Malnutrition and has been determined to have a diagnosis/diagnoses of Severe protein-calorie malnutrition.    This patient is being managed with:   Parenteral Nutrition - Adult-  Entered: Jul 18 2023  8:00PM    Diet NPO-  Entered: Jul 5 2023 10:33AM  
This patient has been assessed with a concern for Malnutrition and has been determined to have a diagnosis/diagnoses of Severe protein-calorie malnutrition.    This patient is being managed with:   Parenteral Nutrition - Adult-  Entered: Jul 6 2023  2:07PM    lipid fat emulsion (Fish Oil and Plant Based) 20% Infusion-[Known as SMOFLIPID 20% Infusion]  100.03 Gram(s) in IV Solution 500.15 milliLiter(s) infuse at 31.3 mL/Hr  Dose Rate: 1.311 Gm/kG/Day Infuse Over: 16 Hours; Stop After 16 Hours  Administration Instructions: Use 1.2 micron in-line filter  Entered: Jul 5 2023  8:00PM    Parenteral Nutrition - Adult-  Entered: Jul 5 2023  8:00PM    Diet NPO-  Entered: Jul 5 2023 10:33AM    This patient has been assessed with a concern for Malnutrition and has been determined to have a diagnosis/diagnoses of Severe protein-calorie malnutrition.    This patient is being managed with:   Parenteral Nutrition - Adult-  Entered: Jul 6 2023  2:07PM    lipid fat emulsion (Fish Oil and Plant Based) 20% Infusion-[Known as SMOFLIPID 20% Infusion]  100.03 Gram(s) in IV Solution 500.15 milliLiter(s) infuse at 31.3 mL/Hr  Dose Rate: 1.311 Gm/kG/Day Infuse Over: 16 Hours; Stop After 16 Hours  Administration Instructions: Use 1.2 micron in-line filter  Entered: Jul 5 2023  8:00PM    Parenteral Nutrition - Adult-  Entered: Jul 5 2023  8:00PM    Diet NPO-  Entered: Jul 5 2023 10:33AM  
This patient has been assessed with a concern for Malnutrition and has been determined to have a diagnosis/diagnoses of Severe protein-calorie malnutrition.    This patient is being managed with:   Diet NPO-  Except Medications  Entered: Jul 1 2023  7:19AM  
This patient has been assessed with a concern for Malnutrition and has been determined to have a diagnosis/diagnoses of Severe protein-calorie malnutrition.    This patient is being managed with:   Parenteral Nutrition - Adult-  Entered: Jul 4 2023  8:00PM    Diet NPO with Tube Feed-  Tube Feeding Modality: Orogastric  Nepro with Carb Steady  Total Volume for 24 Hours (mL): 480  Continuous  Starting Tube Feed Rate {mL per Hour}: 10  Increase Tube Feed Rate by (mL): 20     Every 4 hours  Until Goal Tube Feed Rate (mL per Hour): 20  Tube Feed Duration (in Hours): 24  Tube Feed Start Time: 08:26  Entered: Jul  3 2023  8:26AM  
This patient has been assessed with a concern for Malnutrition and has been determined to have a diagnosis/diagnoses of Severe protein-calorie malnutrition.    This patient is being managed with:   Parenteral Nutrition - Adult-  Entered: Jul 4 2023  8:00PM    lipid fat emulsion (Fish Oil and Plant Based) 20% Infusion-[Known as SMOFLIPID 20% Infusion]  50.05 Gram(s) in IV Solution 250.25 milliLiter(s) infuse at 20.9 mL/Hr  Dose Rate: 0.656 Gm/kG/Day Infuse Over: 12 Hours; Stop After 12 Hours  Administration Instructions: Use 1.2 micron in-line filter  Entered: Jul  3 2023  8:00PM    Parenteral Nutrition - Adult-  Entered: Jul  3 2023  8:00PM    Diet NPO with Tube Feed-  Tube Feeding Modality: Orogastric  Nepro with Carb Steady  Total Volume for 24 Hours (mL): 480  Continuous  Starting Tube Feed Rate {mL per Hour}: 10  Increase Tube Feed Rate by (mL): 20     Every 4 hours  Until Goal Tube Feed Rate (mL per Hour): 20  Tube Feed Duration (in Hours): 24  Tube Feed Start Time: 08:26  Entered: Jul  3 2023  8:26AM  
This patient has been assessed with a concern for Malnutrition and has been determined to have a diagnosis/diagnoses of Severe protein-calorie malnutrition.    This patient is being managed with:   Parenteral Nutrition - Adult-  Entered: Jul 6 2023  2:07PM    Diet NPO-  Entered: Jul 5 2023 10:33AM  
This patient has been assessed with a concern for Malnutrition and has been determined to have a diagnosis/diagnoses of Severe protein-calorie malnutrition.    This patient is being managed with:   Parenteral Nutrition - Adult-  Entered: Jul 7 2023  8:00PM    Diet NPO-  Entered: Jul 5 2023 10:33AM  
This patient has been assessed with a concern for Malnutrition and has been determined to have a diagnosis/diagnoses of Severe protein-calorie malnutrition.    This patient is being managed with:   Parenteral Nutrition - Adult-  Entered: Jul 7 2023  8:00PM    Parenteral Nutrition - Adult-  Entered: Jul 6 2023  2:07PM    Diet NPO-  Entered: Jul 5 2023 10:33AM  
This patient has been assessed with a concern for Malnutrition and has been determined to have a diagnosis/diagnoses of Severe protein-calorie malnutrition.    This patient is being managed with:   Parenteral Nutrition - Adult-  Entered: Jul 8 2023  8:00PM    Diet NPO-  Entered: Jul 5 2023 10:33AM

## 2023-07-19 NOTE — PROGRESS NOTE ADULT - ASSESSMENT
67M w/ PMH of HTN, grade IV hydronephrosis of L kidney s/p L PCN (placed 5/2023), stutter, hiatal hernia, iron deficiency anemia, H Pylori (untreated), and gastric mass (never biopsied) presents to the ED with at least 4 days of worsening weakness, abdominal distension, and no output from his PCN  . Found to have septic shock, MSOF, lactic acidosis from left emphysematous hydronephrosis, pyelonephritis and possible rupture with pneumoperitoneum along with possible hepatic abscesses, splenic infarct and abdominal and mediastinal lymphadenopathies.  had nephrectomy and abdominal washout on 6/30  Oliguric in spite of bumex, rising dig level, high lactate, started CVVHD     ROJAS likely ATN iso septic shock  on 3 pressors/ CVVHD on hold   doubt will be able to resume CVVHD / keep CVVHD on hold   prognosis is poor and CVVHD would not change it    on pressors   phos noted / check daily   ID notes appreciated and dos emeds to GFR less than 15   will follow  prognosis guarded

## 2023-07-19 NOTE — GOALS OF CARE CONVERSATION - ADVANCED CARE PLANNING - CONVERSATION DETAILS
I met with the patients family last night to update them on the worsening clinical condition of their father.  All parties are aware of the underlying diagnosis, surgical procedures, renal and respiratory failure as well as bowel perforation. The overall condition has worsened in the past 48 hours and we requested the family come for a family meeting with the SICU attending and ethics earlier in the day - which the daughter would not commit to.   At 7:30pm on 7/18, I met with the family in the SICU. I gave an in-depth review of systems detailing the advancement of critical illness. Krystian currently has failure of his neurologic, respiratory, cardiovascular, kidneys, liver, hematological systems further complicated by a new upper GI bleed which is resulting in large volume blood loss form the stomach and severe anemia despite multiple blood transfusions and attempts to correct the underlying coagulopathy.   We discussed goals of care including:    DNR- explained that he currently is receiving medications he would receiving during a cardiac arrest. The fact that the only addition to this should he arrest would be chest compressions or cardioversion, Explained the pain and high likelihood of broken ribs and sternum as a result of compressions as well as the futility of this actions.    No escalation of care- We would not add additional medications, no longer transfuses or correct coagulopathy and not resume CVVH should the machine clot off again.    CMO- the process was explained in detail, the comfort measures which would be taken to allow a peaceful passing and the ability of the family to be by Krystain's side at the time of death.     All questions presented were answered, family support was provided. Ultimately, Tracey, the HCP, decided to initiate a DNR  order, stating I don't want you to break my father's ribs". She declined to sign the MOLST, which I completed and shared the completed document with her.

## 2023-07-19 NOTE — PROGRESS NOTE ADULT - CRITICAL CARE SERVICES PROVIDED
Patient is critically ill, requiring critical care services.

## 2023-07-19 NOTE — PROGRESS NOTE ADULT - ASSESSMENT
ASSESSMENT  66yo Male with pmh of hypertension, massive Grade IV hydronephrosis s/p left nephrostomy placed in May 2023 by [facility] presents to the ED with little to no urine output from LEFT nephrostomy from about a week. PCN  more recently drainage was changed to brown/purulent fluid. 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      IMPRESSION  #Fluctuating WBC   #Fever , resolved    7/11 BCX NG   CTAP 1. Large volume extraluminal left upper quadrant/retroperitoneal oral contrast; proximal small bowel leak suspected.  2. Moderate volume free fluid with enhancement of peritoneal reflections, consistent with peritonitis. Evaluation for focal abscesses difficult   given extensive background free fluid.  3. Increased number and size of bilobar hepatic hypodense suspected hepatic abscesses, including segment IV/III multiseptated suspected   abscess measuring 5 cm, previously 2.4 cm on 6/18/2023.  4. Small bilateral pleural effusions. Left lower lobe consolidativeopacity, may represent pneumonia in the appropriate clinical setting.  5. Unchanged retroperitoneal lymphadenopathy.  6. Persistent pneumoperitoneum.  #Elevated fungitell- . 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 "Exploratory laparotomy for possible left renal cancer complicated by significant hydronephrosis and pyelonephritis. Left nephrectomy performed, notable for necrotic bulky tissue adherent to the renal vein, staple line in tact and hemostasis achieved. Copious irrigation of retroperitoneal renal bed and intraperitoneal fibrinous exudate and purulent fluid. Three drains placed, #1 in Lerma's pouch, #2 in the pelvis, and #3 in the renal bed"    6/22 UCX  Few Finegoldia magna "Susceptibilities not performed"  Nephrostolithotomy, percutaneous, with lithotripsy, large stone 22-Jun-2023 23:27:09 left large thick abscess materia dimension of aspirate over 4 x 4 x 4 cm Bernie Perdomo  Change external ureteral stent 22-Jun-2023 23:28:03 left Bernie Perdomo  Nephrostogram 22-Jun-2023 23:28:31 left Bernie Perdomo. "thick gelatinous material that was difficult to suction out even with the shock pulse. I would alternate 2 prong to remove and break up / shock pulse to suck out. after an hour where I had no clear planes I elected to stop. irrigation through the 26 bobby took out a lot additional material  in ashlyn catch cup 4 x 4 x 4 cm lump of material trapped"    6/19 UCX NG; UA Blood: x / Protein: 300 mg/dL / Nitrite: Negative   Leuk Esterase: Large / RBC: 8 /HPF / WBC >720 /HPF   Sq Epi: x / Non Sq Epi: x / Bacteria: Moderate    6/18 L PCN     Few Stenotrophomonas maltophilia Levofloxacin: S 2    6/18 L PCN     Numerous Anaerobic Gram Positive Cocci Most closely resembling  Peptoniphilus species "Susceptibilities not performed"    6/18 L PCN   Numerous Gram positive cocci in pairs per oil power field    6/18 BCX NGTD     s/p 6/18  Left PCN upsized to 16F and 1200cc of very viscous tan colored fluid was aspirated. RLQ 16F drain was placed and 1200cc of tan colored fluid was aspirated (less viscous). A sample from each location was sent for culture.     Repeat CT 6/19 Since one day earlier,  No extraluminal contrast. Oral contrast was last seen in the terminal ileum.  Status post left nephrostomy tube placement. Left enlarged kidney with severe hydronephrosis/collection has decreased, now measuring 16 x 12 cm from 20 x 14 cm  Surgical Pathology Report:   Left renal abscess, possible parenchyma  Left renal abscess, possible parenchyma, percutaneous drainage:  -  Fragments of extensively necrotic tissue, cannot be fullycharacterized  Comment: The microscopic appearance of the necrotic tissue is concerning for a neoplasm with coagulative necrosis. Definitive diagnosis cannot be  made, due to lack of viable material.  Additional sections will be submitted.  Immunohistochemical studies willbe performed, in an attempt to characterise the necrotic tissue.  Based on the H&E appearance, an infectious process such as tuberculosis  appears less likely, but special stains for microorganisms (acid-fastbacilli and fungal) are in progress and will be reported separately.   (06.22.23 @ 22:12)  6/27 CT Study is overall very limited due to lack of intravenous contrast, beam   Willingham artifact.  Persistent bilateral pleural effusions with adjacent consolidations   likely reflecting compressive atelectasis.  Left kidney poorly defined with a heterogeneous collection with air and   fluid which has likely mildly diminished in size compared to the prior   examination but exact comparison limited.  Diffuse ascites again noted. Diffuse anasarca again noted. Persistent   pneumoperitoneum.  Poorly defined lesions within the liver adjacent to the falciform   ligament which may again reflect abscesses or masses, difficult to   evaluate due to the limitations described above.  < from: CT Abdomen and Pelvis w/ IV Cont (06.18.23 @ 15:15) >  1 ) Moderate pneumoperitoneum with partial diffusion throughout moderate   volume ascites and with associated intermittent peritoneal enhancement   and nodularity. Perforation and peritonitis may be related to underlying   emphysematous pyelonephritis (see below). Less likely sources of   perforation include ureteral/bladder disruption, and bowel perforation   which cannot be entirely excluded on the provided images.  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 The compressed residual renal   parenchyma is inseparable from the adjacent fascial/fatty layers   anterosuperiorly and direct rupture into the peritoneum is a possibility   (6-355). The previously placed left-sided nephrostomy tube pigtail is   notedto be within the left renal collecting system.  3) New hepatic hypodensities measuring up to 2.6 cm, suspicious for   development of multiple focal abscesses.  4) Wedge-shaped region of hypodensity within the spleen may reflect   splenic infarct in the correct clinical setting. Developing   phlegmon/abscess cannot be excluded in the current clinical setting  5) Increased extensive retroperitoneal, portacaval and likely   gastrohepatic heterogeneously enhancing lymphadenopathy. Findings may be   reactive.  6) Mediastinal lymphadenopathy measuring up to 2.4 cm short axis.   Underlying etiology may be reactive, infectious/inflammatory, or   neoplastic. A short-term 3 month follow-up CT chest should be considered   for further evaluation.  7)Right middle and upper lobe solid pulmonary nodules which are not well   delineated due to respiratory motion but measure less than 6 mm.   attention on follow-up exam.  #Reintubated, L lung white out, ruled out HAP    6/24 bronch CX NG,   Moderate Yeast- likely colonizer  #Lactic acidosis  #Abx allergy: No Known Allergies  #Prolonged QTC Calculation(Bazett) 526 ms  #Hyponatremia  #AKICreatinine: Creatinine: 1.3 mg/dL (07-05-23 @ 05:45)  Ht 180  Weight (kg): 76.5 (06-18-23 @ 21:27)  crcl 57     RECOMMENDATIONS  - Send serum aspergillus galactomannan , persistently elevated fungitell, no evidence of fungal PNA on imaging, BCX no yeast   - CT results noted, suspected leak, liver abscesses  - Will need prolonged ~6 week antibiotics course given liver abscesses  - Doubt cure with antibiotics alone  - Appreciate IR consult- no role for liver aspiration  - Continue Ceftriaxone 2g q24h IV + IV flagyl 500mg q8h   - Continue Caspo 50mg q24h IV, repeat fungitell elevated (true positive?)  - Continue DAPTOmycin IVPB 460 milliGRAM(s) IV Intermittent every 48 hours   - Check CPK   - Continue levoFLOXacin IVPB 750 milliGRAM(s) IV Intermittent every 48 hours  - Trend WBC   - Urology following  - Poor prognosis, GOC    If any questions, please send a message or call on [employer] Teams  Please continue to update ID with any pertinent new laboratory or radiographic findings  #[id number]   ASSESSMENT  66yo Male with pmh of hypertension, massive Grade IV hydronephrosis s/p left nephrostomy placed in May 2023 by [facility] presents to the ED with little to no urine output from LEFT nephrostomy from about a week. PCN  more recently drainage was changed to brown/purulent fluid. 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      IMPRESSION  #Fluctuating WBC   #Fever , resolved    7/11 BCX NG   CTAP 1. Large volume extraluminal left upper quadrant/retroperitoneal oral contrast; proximal small bowel leak suspected.  2. Moderate volume free fluid with enhancement of peritoneal reflections, consistent with peritonitis. Evaluation for focal abscesses difficult   given extensive background free fluid.  3. Increased number and size of bilobar hepatic hypodense suspected hepatic abscesses, including segment IV/III multiseptated suspected   abscess measuring 5 cm, previously 2.4 cm on 6/18/2023.  4. Small bilateral pleural effusions. Left lower lobe consolidativeopacity, may represent pneumonia in the appropriate clinical setting.  5. Unchanged retroperitoneal lymphadenopathy.  6. Persistent pneumoperitoneum.  #Elevated fungitell- . No yeast in BCX or OR CX, possible related to SB leak   Antimicrobials can cause a false positive   Fungitell: >500 (07-15-23 @ 04:25)  Fungitell: >500 (07-06-23 @ 04:30)  Fungitell: 47 (06-25-23 @ 15:30)  #Septic shock on admission due to Emphysematous pyelonephritis with suspected perforation/ pneumoperitoneum with suspected liver abscesses & splenic infarct vs abscess----- underlying necrotic neoplasm    6/30 OR cx     Growth in fluid media only Enterococcus faecium  Exploratory laparotomy 30-Jun-2023 21:35:05  Aure Prabhakar  Left nephrectomy 30-Jun-2023 21:35:11  Aure Prabhakar. "Exploratory laparotomy for possible left renal cancer complicated by significant hydronephrosis and pyelonephritis. Left nephrectomy performed, notable for necrotic bulky tissue adherent to the renal vein, staple line in tact and hemostasis achieved. Copious irrigation of retroperitoneal renal bed and intraperitoneal fibrinous exudate and purulent fluid. Three drains placed, #1 in Lerma's pouch, #2 in the pelvis, and #3 in the renal bed"    6/22 UCX  Few Finegoldia magna "Susceptibilities not performed"  Nephrostolithotomy, percutaneous, with lithotripsy, large stone 22-Jun-2023 23:27:09 left large thick abscess materia dimension of aspirate over 4 x 4 x 4 cm Bernie Perdomo  Change external ureteral stent 22-Jun-2023 23:28:03 left Bernie Perdomo  Nephrostogram 22-Jun-2023 23:28:31 left Bernie Perdomo. "thick gelatinous material that was difficult to suction out even with the shock pulse. I would alternate 2 prong to remove and break up / shock pulse to suck out. after an hour where I had no clear planes I elected to stop. irrigation through the 26 bobby took out a lot additional material in ashlyn catch cup 4 x 4 x 4 cm lump of material trapped"    6/19 UCX NG; UA Blood: x / Protein: 300 mg/dL / Nitrite: Negative   Leuk Esterase: Large / RBC: 8 /HPF / WBC >720 /HPF   Sq Epi: x / Non Sq Epi: x / Bacteria: Moderate    6/18 L PCN     Few Stenotrophomonas maltophilia Levofloxacin: S 2    6/18 L PCN     Numerous Anaerobic Gram Positive Cocci Most closely resembling  Peptoniphilus species "Susceptibilities not performed"    6/18 BCX NGTD     s/p 6/18  Left PCN upsized to 16F and 1200cc of very viscous tan colored fluid was aspirated. RLQ 16F drain was placed and 1200cc of tan colored fluid was aspirated (less viscous). A sample from each location was sent for culture.     Repeat CT 6/19 Since one day earlier,  No extraluminal contrast. Oral contrast was last seen in the terminal ileum.  Status post left nephrostomy tube placement. Left enlarged kidney with severe hydronephrosis/collection has decreased, now measuring 16 x 12 cm from 20 x 14 cm  Surgical Pathology Report:   Left renal abscess, possible parenchyma  Left renal abscess, possible parenchyma, percutaneous drainage:  -  Fragments of extensively necrotic tissue, cannot be fullycharacterized  Comment: The microscopic appearance of the necrotic tissue is concerning for a neoplasm with coagulative necrosis. Definitive diagnosis cannot be  made, due to lack of viable material.  Additional sections will be submitted.  Immunohistochemical studies willbe performed, in an attempt to characterise the necrotic tissue.  Based on the H&E appearance, an infectious process such as tuberculosis  appears less likely, but special stains for microorganisms (acid-fastbacilli and fungal) are in progress and will be reported separately.   (06.22.23 @ 22:12)  6/27 CT Study is overall very limited due to lack of intravenous contrast, beam   Willingham artifact.  Persistent bilateral pleural effusions with adjacent consolidations   likely reflecting compressive atelectasis.  Left kidney poorly defined with a heterogeneous collection with air and   fluid which has likely mildly diminished in size compared to the prior   examination but exact comparison limited.  Diffuse ascites again noted. Diffuse anasarca again noted. Persistent   pneumoperitoneum.  Poorly defined lesions within the liver adjacent to the falciform   ligament which may again reflect abscesses or masses, difficult to   evaluate due to the limitations described above.  < from: CT Abdomen and Pelvis w/ IV Cont (06.18.23 @ 15:15) >  1 ) Moderate pneumoperitoneum with partial diffusion throughout moderate   volume ascites and with associated intermittent peritoneal enhancement   and nodularity. Perforation and peritonitis may be related to underlying   emphysematous pyelonephritis (see below). Less likely sources of   perforation include ureteral/bladder disruption, and bowel perforation   which cannot be entirely excluded on the provided images.  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 The compressed residual renal   parenchyma is inseparable from the adjacent fascial/fatty layers   anterosuperiorly and direct rupture into the peritoneum is a possibility   (6-355). The previously placed left-sided nephrostomy tube pigtail is   notedto be within the left renal collecting system.  3) New hepatic hypodensities measuring up to 2.6 cm, suspicious for   development of multiple focal abscesses.  4) Wedge-shaped region of hypodensity within the spleen may reflect   splenic infarct in the correct clinical setting. Developing   phlegmon/abscess cannot be excluded in the current clinical setting  5) Increased extensive retroperitoneal, portacaval and likely   gastrohepatic heterogeneously enhancing lymphadenopathy. Findings may be   reactive.  6) Mediastinal lymphadenopathy measuring up to 2.4 cm short axis.   Underlying etiology may be reactive, infectious/inflammatory, or   neoplastic. A short-term 3 month follow-up CT chest should be considered   for further evaluation.  7)Right middle and upper lobe solid pulmonary nodules which are not well   delineated due to respiratory motion but measure less than 6 mm.   attention on follow-up exam.  #Reintubated, L lung white out, ruled out HAP    6/24 bronch CX NG,   Moderate Yeast- likely colonizer  #Lactic acidosis  #Abx allergy: No Known Allergies  #Prolonged QTC Calculation(Bazett) 526 ms, resolved, monitoring closely on fluoroquinolone  #Hyponatremia  #ROJAS requiring CVVH  Ht 180  Weight (kg): 76.5 (06-18-23 @ 21:27)    RECOMMENDATIONS  - Send serum aspergillus galactomannan , persistently elevated fungitell, no evidence of fungal PNA on imaging, BCX no yeast   - CT results noted, suspected leak, liver abscesses  - Will need prolonged ~6 week antibiotics course given liver abscesses  - Doubt cure with antibiotics alone  - Appreciate IR consult- no role for liver aspiration  - Continue Ceftriaxone 2g q24h IV + IV flagyl 500mg q8h (if worsening hemodynamic compromise, low threshold to broaden back to Meropenem renally dosed)  - Continue Caspo 50mg q24h IV, repeat fungitell elevated (false positive?, no clear evidence of IFI)  - Continue DAPTOmycin IVPB 460 milliGRAM(s) IV Intermittent every 48 hours   - Check CPK   - Continue levoFLOXacin IVPB 750 milliGRAM(s) IV Intermittent every 48 hours  - Trend WBC   - Urology following  - Grave prognosis, GOC    If any questions, please send a message or call on [employer] Teams  Please continue to update ID with any pertinent new laboratory or radiographic findings  #[id number]

## 2023-07-20 ENCOUNTER — APPOINTMENT (OUTPATIENT)
Dept: UROLOGY | Facility: CLINIC | Age: 68
End: 2023-07-20

## 2023-07-22 LAB
CULTURE RESULTS: SIGNIFICANT CHANGE UP
SPECIMEN SOURCE: SIGNIFICANT CHANGE UP
ZINC SERPL-MCNC: 218 UG/DL — HIGH (ref 44–115)

## 2023-07-24 DIAGNOSIS — E87.5 HYPERKALEMIA: ICD-10-CM

## 2023-07-24 DIAGNOSIS — N17.0 ACUTE KIDNEY FAILURE WITH TUBULAR NECROSIS: ICD-10-CM

## 2023-07-24 DIAGNOSIS — E27.40 UNSPECIFIED ADRENOCORTICAL INSUFFICIENCY: ICD-10-CM

## 2023-07-24 DIAGNOSIS — L89.229 PRESSURE ULCER OF LEFT HIP, UNSPECIFIED STAGE: ICD-10-CM

## 2023-07-24 DIAGNOSIS — I71.40 ABDOMINAL AORTIC ANEURYSM, WITHOUT RUPTURE, UNSPECIFIED: ICD-10-CM

## 2023-07-24 DIAGNOSIS — K66.0 PERITONEAL ADHESIONS (POSTPROCEDURAL) (POSTINFECTION): ICD-10-CM

## 2023-07-24 DIAGNOSIS — R13.10 DYSPHAGIA, UNSPECIFIED: ICD-10-CM

## 2023-07-24 DIAGNOSIS — I96 GANGRENE, NOT ELSEWHERE CLASSIFIED: ICD-10-CM

## 2023-07-24 DIAGNOSIS — K63.1 PERFORATION OF INTESTINE (NONTRAUMATIC): ICD-10-CM

## 2023-07-24 DIAGNOSIS — D62 ACUTE POSTHEMORRHAGIC ANEMIA: ICD-10-CM

## 2023-07-24 DIAGNOSIS — E43 UNSPECIFIED SEVERE PROTEIN-CALORIE MALNUTRITION: ICD-10-CM

## 2023-07-24 DIAGNOSIS — I48.91 UNSPECIFIED ATRIAL FIBRILLATION: ICD-10-CM

## 2023-07-24 DIAGNOSIS — K65.9 PERITONITIS, UNSPECIFIED: ICD-10-CM

## 2023-07-24 DIAGNOSIS — E87.20 ACIDOSIS, UNSPECIFIED: ICD-10-CM

## 2023-07-24 DIAGNOSIS — N28.0 ISCHEMIA AND INFARCTION OF KIDNEY: ICD-10-CM

## 2023-07-24 DIAGNOSIS — Z66 DO NOT RESUSCITATE: ICD-10-CM

## 2023-07-24 DIAGNOSIS — R59.0 LOCALIZED ENLARGED LYMPH NODES: ICD-10-CM

## 2023-07-24 DIAGNOSIS — J96.01 ACUTE RESPIRATORY FAILURE WITH HYPOXIA: ICD-10-CM

## 2023-07-24 DIAGNOSIS — I21.A1 MYOCARDIAL INFARCTION TYPE 2: ICD-10-CM

## 2023-07-24 DIAGNOSIS — K66.8 OTHER SPECIFIED DISORDERS OF PERITONEUM: ICD-10-CM

## 2023-07-24 DIAGNOSIS — I08.3 COMBINED RHEUMATIC DISORDERS OF MITRAL, AORTIC AND TRICUSPID VALVES: ICD-10-CM

## 2023-07-24 DIAGNOSIS — D69.59 OTHER SECONDARY THROMBOCYTOPENIA: ICD-10-CM

## 2023-07-24 DIAGNOSIS — R60.0 LOCALIZED EDEMA: ICD-10-CM

## 2023-07-24 DIAGNOSIS — K75.0 ABSCESS OF LIVER: ICD-10-CM

## 2023-07-24 DIAGNOSIS — N48.89 OTHER SPECIFIED DISORDERS OF PENIS: ICD-10-CM

## 2023-07-24 DIAGNOSIS — D68.9 COAGULATION DEFECT, UNSPECIFIED: ICD-10-CM

## 2023-07-24 DIAGNOSIS — J90 PLEURAL EFFUSION, NOT ELSEWHERE CLASSIFIED: ICD-10-CM

## 2023-07-24 DIAGNOSIS — Z16.21 RESISTANCE TO VANCOMYCIN: ICD-10-CM

## 2023-07-24 DIAGNOSIS — E83.39 OTHER DISORDERS OF PHOSPHORUS METABOLISM: ICD-10-CM

## 2023-07-24 DIAGNOSIS — I10 ESSENTIAL (PRIMARY) HYPERTENSION: ICD-10-CM

## 2023-07-24 DIAGNOSIS — K92.2 GASTROINTESTINAL HEMORRHAGE, UNSPECIFIED: ICD-10-CM

## 2023-07-24 DIAGNOSIS — Z78.1 PHYSICAL RESTRAINT STATUS: ICD-10-CM

## 2023-07-24 DIAGNOSIS — L89.219 PRESSURE ULCER OF RIGHT HIP, UNSPECIFIED STAGE: ICD-10-CM

## 2023-07-24 DIAGNOSIS — R91.8 OTHER NONSPECIFIC ABNORMAL FINDING OF LUNG FIELD: ICD-10-CM

## 2023-07-24 DIAGNOSIS — K72.00 ACUTE AND SUBACUTE HEPATIC FAILURE WITHOUT COMA: ICD-10-CM

## 2023-07-24 DIAGNOSIS — C64.2 MALIGNANT NEOPLASM OF LEFT KIDNEY, EXCEPT RENAL PELVIS: ICD-10-CM

## 2023-07-24 DIAGNOSIS — J18.9 PNEUMONIA, UNSPECIFIED ORGANISM: ICD-10-CM

## 2023-07-24 DIAGNOSIS — A41.81 SEPSIS DUE TO ENTEROCOCCUS: ICD-10-CM

## 2023-07-24 DIAGNOSIS — L89.156 PRESSURE-INDUCED DEEP TISSUE DAMAGE OF SACRAL REGION: ICD-10-CM

## 2023-07-24 DIAGNOSIS — E83.51 HYPOCALCEMIA: ICD-10-CM

## 2023-07-24 DIAGNOSIS — D73.3 ABSCESS OF SPLEEN: ICD-10-CM

## 2023-07-24 DIAGNOSIS — D73.5 INFARCTION OF SPLEEN: ICD-10-CM

## 2023-07-24 DIAGNOSIS — R65.21 SEVERE SEPSIS WITH SEPTIC SHOCK: ICD-10-CM

## 2023-07-24 DIAGNOSIS — E87.1 HYPO-OSMOLALITY AND HYPONATREMIA: ICD-10-CM

## 2023-07-24 DIAGNOSIS — E87.8 OTHER DISORDERS OF ELECTROLYTE AND FLUID BALANCE, NOT ELSEWHERE CLASSIFIED: ICD-10-CM

## 2023-07-24 DIAGNOSIS — N50.89 OTHER SPECIFIED DISORDERS OF THE MALE GENITAL ORGANS: ICD-10-CM

## 2023-07-24 DIAGNOSIS — L89.626 PRESSURE-INDUCED DEEP TISSUE DAMAGE OF LEFT HEEL: ICD-10-CM

## 2023-08-01 LAB
CORTICOSTEROID BINDING GLOBULIN RESULT: 1.2 MG/DL — LOW
CORTIS F/TOTAL MFR SERPL: 56 % — SIGNIFICANT CHANGE UP
CORTIS SERPL-MCNC: 18 UG/DL — SIGNIFICANT CHANGE UP
CORTISOL, FREE RESULT: 10 UG/DL — HIGH

## 2023-08-09 LAB
CULTURE RESULTS: SIGNIFICANT CHANGE UP
SPECIMEN SOURCE: SIGNIFICANT CHANGE UP

## 2023-08-14 NOTE — H&P ADULT - BILLING PROVIDER USE ONLY
8/14/2023      Crow Roy  [address]  [address]  [place] [address]        Dear Crow,    Your health is very important to us. Our records show you are overdue for an appointment for a diabetes and blood pressure follow up appointment with Dr. Baird.    If you have another Primary Care Provider, please contact our office so we can update your records. Please contact our office at [phone] to schedule an appointment, or you may schedule using the [email or web address] radha.     Sincerely,       Omer Baird MD   [facility]  [address] [facility] [address]  Dept: [phone]  Dept Fax: [phone]         [facility] offers online access to your health information via [email or web address]  
Attending or SREEKANTH Only

## 2023-12-08 NOTE — PATIENT PROFILE ADULT - CENTRAL VENOUS CATHETER/PICC LINE
12/08/23       Tell him that pathology from his recent colonoscopy looked okay.  The biopsies of the colonic nodularity at 15 cm from the anus was not cancerous and not precancerous, which is good.  Was no significant inflammation throughout the entire colon.       Have the patient follow-up with me in the office and we could discuss his chronic diarrhea and these findings on colonoscopy.  Please send a copy of this report to his PCP.  Ky menendez
no
no

## 2024-09-23 NOTE — PROCEDURE NOTE - ADDITIONAL PROCEDURE DETAILS
Ultrasound guided right axillary line insertion. Guidewire recovered and visualized by CECI Jernigan.
none
Ultrasound guided left femoral vein hemodialysis catheter insertion. Guidewire removed from patient and observed by CECI Muro.
Wire recovered, JENNIFER Hemphill and CECI Sosa witness the procedure.
Ultrasound-guided left femoral arterial line placement. Visualized needle enter left femoral artery via ultrasound. Guidewire removed from patient and visualized by CECI Muro.
Guide wire removed from the patient, a visualized by JENNIFER Parrish.
CECI Arenas and Dr. Venegas witnessed removal of guide wire
Guidewire removed and witnessed by SHAY Pitts
Left sided triple lumen placed under ultrasound guidance, secured at 17cm at the skin. Wire location within the IJ confirmed w/ultrasound, wire recovered and confirmed with bedside nurse (see procedural safety checklist).

## 2024-12-23 NOTE — PROGRESS NOTE ADULT - SUBJECTIVE AND OBJECTIVE BOX
Nephrology progress note    Patient was seen and examined, events over the last 24 h noted .  oliguric in spite of bumex    Allergies:  No Known Allergies    Hospital Medications:   MEDICATIONS  (STANDING):  acetaminophen     Tablet .. 650 milliGRAM(s) Oral every 6 hours  albumin human  5% IVPB 250 milliLiter(s) IV Intermittent every 6 hours  bacitracin   Ointment 1 Application(s) Topical two times a day  calcium acetate 1334 milliGRAM(s) Oral every 8 hours  chlorhexidine 0.12% Liquid 15 milliLiter(s) Oral Mucosa every 12 hours  chlorhexidine 2% Cloths 1 Application(s) Topical <User Schedule>  CRRT Treatment    <Continuous>  dexMEDEtomidine Infusion 0.2 MICROgram(s)/kG/Hr (3.82 mL/Hr) IV Continuous <Continuous>  ferrous    sulfate 325 milliGRAM(s) Oral daily  gabapentin 200 milliGRAM(s) Oral every 8 hours  heparin   Injectable 5000 Unit(s) SubCutaneous every 8 hours  levoFLOXacin IVPB 500 milliGRAM(s) IV Intermittent every 48 hours  meropenem  IVPB 500 milliGRAM(s) IV Intermittent every 12 hours  metoprolol tartrate Injectable 2.5 milliGRAM(s) IV Push every 6 hours  pantoprazole  Injectable 40 milliGRAM(s) IV Push every 12 hours  phenylephrine    Infusion 0.1 MICROgram(s)/kG/Min (1.43 mL/Hr) IV Continuous <Continuous>  PureFlow Dialysate RFP-400 (K 2 / Ca 3) 5000 milliLiter(s) (2000 mL/Hr) CRRT <Continuous>  sodium bicarbonate 1300 milliGRAM(s) Oral three times a day  sodium bicarbonate  Infusion 0.295 mEq/kG/Hr (150 mL/Hr) IV Continuous <Continuous>  vasopressin Infusion 0.03 Unit(s)/Min (4.5 mL/Hr) IV Continuous <Continuous>        VITALS:  T(F): 97.5 (07-02-23 @ 08:00), Max: 98.2 (07-01-23 @ 16:00)  HR: 83 (07-02-23 @ 09:00)  BP: --  RR: 19 (07-02-23 @ 09:00)  SpO2: 96% (07-02-23 @ 09:00)  Wt(kg): --    06-30 @ 07:01  -  07-01 @ 07:00  --------------------------------------------------------  IN: 3527.5 mL / OUT: 530 mL / NET: 2997.5 mL    07-01 @ 07:01 - 07-02 @ 07:00  --------------------------------------------------------  IN: 2571.9 mL / OUT: 1078 mL / NET: 1493.9 mL    07-02 @ 07:01 - 07-02 @ 12:04  --------------------------------------------------------  IN: 310 mL / OUT: 255 mL / NET: 55 mL          PHYSICAL EXAM:  	Gen: intubated/ventilated  	Pulm:  B/L ronchi  	CV:  S1S2; no rub  	Abd: +distended  	LE:  edema    LABS:  07-01    136  |  102  |  125<HH>  ----------------------------<  173<H>  5.2<H>   |  16<L>  |  3.9<H>    Ca    6.8<L>      01 Jul 2023 20:21  Phos  8.5     07-01  Mg     2.0     07-01    TPro  3.8<L>  /  Alb  2.0<L>  /  TBili  1.3<H>  /  DBili      /  AST  45<H>  /  ALT  11  /  AlkPhos  86  06-30                          10.8   34.29 )-----------( 160      ( 02 Jul 2023 01:50 )             32.0       Urine Studies:  Urinalysis Basic - ( 01 Jul 2023 20:21 )    Color:  / Appearance:  / SG:  / pH:   Gluc: 173 mg/dL / Ketone:   / Bili:  / Urobili:    Blood:  / Protein:  / Nitrite:    Leuk Esterase:  / RBC:  / WBC    Sq Epi:  / Non Sq Epi:  / Bacteria:       Sodium, Random Urine: <20.0 mmoL/L (06-29 @ 20:15)  Creatinine, Random Urine: 57 mg/dL (06-29 @ 20:15)  Creatinine, Random Urine: 59 mg/dL (06-29 @ 16:48)  Sodium, Random Urine: <20.0 mmoL/L (06-29 @ 16:48)    RADIOLOGY & ADDITIONAL STUDIES:   [Time Spent: ___ minutes] : I have spent [unfilled] minutes of time on the encounter which excludes teaching and separately reported services.

## 2025-07-09 NOTE — PROGRESS NOTE ADULT - SUBJECTIVE AND OBJECTIVE BOX
CHRISTINE VILLAVICENCIO  [id number]  67y Male    Indication for ICU admission: mechanical ventilatior secondary to urosepsis     Admit Date:06-18-23  ICU Date: 6/18/23  OR Date: 6/18, 6/22, 6/30     24HRS EVENT:  7/2: NIGHT:  [x] F/u 8pm labs: lactate 3.1 from 4.0  [ ] F/u 4am labs  [x] TPN started  [ ] Digoxin level AM  [ ] ABG continue to f/u  -removing 50cc/hr CRRT (Continuous Renal Replacement Therapy)  -phenylephrine down to 0.9    7/2: DAY  - F/U with Renal for Dialysis vs CRRT--> Renal ok with CVVHD, titrate to -50ml/hr  - FIO2 decreased to 50%  - pm ABG--> 7.40/30/126/19  - Cr level from R pelvic drain-->Pelvic drain Cr 3.99  - 11 am BMP--> K= 4.6  - increase HCO3 gtt to 150--> decreased to 75mEq in afternoon once dialysis started  - Albumin 5 % @ 250 Q6h x 4 today  -Solucortef 50q6 x5 days for adrenal insufficiency 2/2 sepsis   -RIJ dialysis catheter  -RUE Duplex:right radial artery occlusion, vascular cs placed   -TPN cs placed  - held evening Metoprolol 2.5 mg dose x1        REVIEW OF SYSTEMS  [ ] A ten-point review of systems was otherwise negative except as noted.  [ x] Due to altered mental status/intubation, subjective information were not able to be obtained from the patient. History was obtained, to the extent possible, from review of the chart and collateral sources of information.    ----------------------------------------------------------------------------------------------------------------------  ----------------------------------------------------------------------------------------------------------------------   CHRISTINE VILLAVICENCIO  [id number]  67y Male    Indication for ICU admission: mechanical ventilatior secondary to urosepsis     Admit Date:06-18-23  ICU Date: 6/18/23  OR Date: 6/18, 6/22, 6/30     24HRS EVENT:  7/2: NIGHT:  [x] F/u 8pm labs: lactate 3.1 from 4.0  [x] F/u 4am labs: Lactate 2.3 from 3.1, downtrending  [x] TPN started  [ ] Digoxin level AM  [ ] ABG continue to f/u  -removing 50cc/hr CRRT (Continuous Renal Replacement Therapy)  -phenylephrine down to 0.9    7/2: DAY  - F/U with Renal for Dialysis vs CRRT--> Renal ok with CVVHD, titrate to -50ml/hr  - FIO2 decreased to 50%  - pm ABG--> 7.40/30/126/19  - Cr level from R pelvic drain-->Pelvic drain Cr 3.99  - 11 am BMP--> K= 4.6  - increase HCO3 gtt to 150--> decreased to 75mEq in afternoon once dialysis started  - Albumin 5 % @ 250 Q6h x 4 today  -Solucortef 50q6 x5 days for adrenal insufficiency 2/2 sepsis   -RIJ dialysis catheter  -RUE Duplex:right radial artery occlusion, vascular cs placed   -TPN cs placed  - held evening Metoprolol 2.5 mg dose x1        REVIEW OF SYSTEMS  [ ] A ten-point review of systems was otherwise negative except as noted.  [ x] Due to altered mental status/intubation, subjective information were not able to be obtained from the patient. History was obtained, to the extent possible, from review of the chart and collateral sources of information.    ----------------------------------------------------------------------------------------------------------------------  ----------------------------------------------------------------------------------------------------------------------   CHRISTINE VILLAVICENCIO  [id number]  67y Male    Indication for ICU admission: mechanical ventilatior secondary to urosepsis     Admit Date:06-18-23  ICU Date: 6/18/23  OR Date: 6/18, 6/22, 6/30     24HRS EVENT:  7/2: NIGHT:  [x] F/u 8pm labs: lactate 3.1 from 4.0  [x] F/u 4am ABG: Lactate 2.3 from 3.1, downtrending  [ ] AM Hg 8.4 from 9.9, repeat cbc at 11am  [x] TPN started  [ ] Digoxin level AM  [ ] ABG continue to f/u  -removing 50cc/hr CRRT (Continuous Renal Replacement Therapy)  -phenylephrine down to 0.9    7/2: DAY  - F/U with Renal for Dialysis vs CRRT--> Renal ok with CVVHD, titrate to -50ml/hr  - FIO2 decreased to 50%  - pm ABG--> 7.40/30/126/19  - Cr level from R pelvic drain-->Pelvic drain Cr 3.99  - 11 am BMP--> K= 4.6  - increase HCO3 gtt to 150--> decreased to 75mEq in afternoon once dialysis started  - Albumin 5 % @ 250 Q6h x 4 today  -Solucortef 50q6 x5 days for adrenal insufficiency 2/2 sepsis   -RIJ dialysis catheter  -RUE Duplex:right radial artery occlusion, vascular cs placed   -TPN cs placed  - held evening Metoprolol 2.5 mg dose x1        REVIEW OF SYSTEMS  [ ] A ten-point review of systems was otherwise negative except as noted.  [ x] Due to altered mental status/intubation, subjective information were not able to be obtained from the patient. History was obtained, to the extent possible, from review of the chart and collateral sources of information.    ----------------------------------------------------------------------------------------------------------------------  ----------------------------------------------------------------------------------------------------------------------   CHRISTINE VILLAVICENCIO  [id number]  67y Male    Indication for ICU admission: mechanical ventilator secondary to urosepsis     Admit Date:06-18-23  ICU Date: 6/18/23  OR Date: 6/18, 6/22, 6/30     24HRS EVENT:  7/2: NIGHT:  [x] F/u 8pm labs: lactate 3.1 from 4.0  [x] F/u 4am ABG: Lactate 2.3 from 3.1, downtrending  [ ] AM Hg 8.4 from 9.9, repeat cbc at 11am  [x] TPN started  [ ] Digoxin level AM  [ ] ABG continue to f/u  -removing 50cc/hr CRRT (Continuous Renal Replacement Therapy)  -phenylephrine down to 0.9    7/2: DAY  - F/U with Renal for Dialysis vs CRRT--> Renal ok with CVVHD, titrate to -50ml/hr  - FIO2 decreased to 50%  - pm ABG--> 7.40/30/126/19  - Cr level from R pelvic drain-->Pelvic drain Cr 3.99  - 11 am BMP--> K= 4.6  - increase HCO3 gtt to 150--> decreased to 75mEq in afternoon once dialysis started  - Albumin 5 % @ 250 Q6h x 4 today  -Solucortef 50q6 x5 days for adrenal insufficiency 2/2 sepsis   -RIJ dialysis catheter  -RUE Duplex:right radial artery occlusion, vascular cs placed   -TPN cs placed  - held evening Metoprolol 2.5 mg dose x1        REVIEW OF SYSTEMS  [ ] A ten-point review of systems was otherwise negative except as noted.  [ x] Due to altered mental status/intubation, subjective information were not able to be obtained from the patient. History was obtained, to the extent possible, from review of the chart and collateral sources of information.    Daily     Daily   Diet, NPO:   Except Medications (07-01-23 @ 07:19)    CURRENT MEDS:  Neurologic Medications  acetaminophen     Tablet .. 650 milliGRAM(s) Oral every 6 hours  dexMEDEtomidine Infusion 0.2 MICROgram(s)/kG/Hr IV Continuous <Continuous>  fentaNYL    Injectable 25 MICROGram(s) IV Push every 3 hours PRN Moderate Pain (4 - 6)  gabapentin 200 milliGRAM(s) Oral every 8 hours    Respiratory Medications    Cardiovascular Medications  metoprolol tartrate Injectable 2.5 milliGRAM(s) IV Push every 6 hours  phenylephrine    Infusion 0.1 MICROgram(s)/kG/Min IV Continuous <Continuous>    Gastrointestinal Medications  albumin human  5% IVPB 250 milliLiter(s) IV Intermittent every 6 hours  calcium acetate 1334 milliGRAM(s) Oral every 8 hours  ferrous    sulfate 325 milliGRAM(s) Oral daily  pantoprazole  Injectable 40 milliGRAM(s) IV Push every 12 hours  sodium bicarbonate 1300 milliGRAM(s) Oral three times a day  sodium chloride 0.45% 1000 milliLiter(s) IV Continuous <Continuous>  sodium chloride 0.9% lock flush 10 milliLiter(s) IV Push every 1 hour PRN Pre/post blood products, medications, blood draw, and to maintain line patency  sodium chloride 0.9% lock flush 10 milliLiter(s) IV Push every 1 hour PRN Pre/post blood products, medications, blood draw, and to maintain line patency    Genitourinary Medications    Hematologic/Oncologic Medications  heparin   Injectable 5000 Unit(s) SubCutaneous every 8 hours    Antimicrobial/Immunologic Medications  levoFLOXacin IVPB 500 milliGRAM(s) IV Intermittent every 48 hours  meropenem  IVPB 500 milliGRAM(s) IV Intermittent every 12 hours    Endocrine/Metabolic Medications  hydrocortisone sodium succinate Injectable 50 milliGRAM(s) IV Push every 6 hours  vasopressin Infusion 0.03 Unit(s)/Min IV Continuous <Continuous>    Topical/Other Medications  bacitracin   Ointment 1 Application(s) Topical two times a day  chlorhexidine 0.12% Liquid 15 milliLiter(s) Oral Mucosa every 12 hours  chlorhexidine 2% Cloths 1 Application(s) Topical <User Schedule>  CRRT Treatment    <Continuous>  PureFlow Dialysate RFP-400 (K 2 / Ca 3) 5000 milliLiter(s) CRRT <Continuous>    ICU Vital Signs Last 24 Hrs  T(C): 34.4 (03 Jul 2023 01:00), Max: 37.1 (02 Jul 2023 12:00)  T(F): 94 (03 Jul 2023 01:00), Max: 98.7 (02 Jul 2023 12:00)  HR: 59 (03 Jul 2023 02:50) (59 - 91)  BP: --  BP(mean): --  ABP: 135/80 (03 Jul 2023 02:00) (94/56 - 141/84)  ABP(mean): 100 (03 Jul 2023 02:00) (69 - 105)  RR: 18 (03 Jul 2023 02:00) (17 - 20)  SpO2: 100% (03 Jul 2023 02:50) (96% - 100%)    O2 Parameters below as of 03 Jul 2023 02:00  Patient On (Oxygen Delivery Method): ventilator    O2 Concentration (%): 40      Mode: AC/ CMV (Assist Control/ Continuous Mandatory Ventilation)  RR (machine): 18  TV (machine): 450  FiO2: 40  PEEP: 8  ITime: 0.7  MAP: 13  PIP: 32    ABG - ( 03 Jul 2023 05:04 )  pH, Arterial: 7.50  pH, Blood: x     /  pCO2: 27    /  pO2: 135   / HCO3: 21    / Base Excess: -0.8  /  SaO2: 99.3              I&O's Summary    01 Jul 2023 07:01  -  02 Jul 2023 07:00  --------------------------------------------------------  IN: 2571.9 mL / OUT: 1078 mL / NET: 1493.9 mL    02 Jul 2023 07:01  -  03 Jul 2023 05:58  --------------------------------------------------------  IN: 3183.7 mL / OUT: 2419 mL / NET: 764.7 mL      I&O's Detail    01 Jul 2023 07:01  -  02 Jul 2023 07:00  --------------------------------------------------------  IN:    Dexmedetomidine: 374.8 mL    IV PiggyBack: 100 mL    Phenylephrine: 384.7 mL    Phenylephrine: 101.9 mL    Sodium Bicarbonate: 750 mL    Sodium Bicarbonate: 700 mL    sodium chloride 0.9%: 75 mL    Vasopressin: 85.5 mL  Total IN: 2571.9 mL    OUT:    Drain (mL): 100 mL    Drain (mL): 305 mL    Drain (mL): 505 mL    Indwelling Catheter - Urethral (mL): 168 mL    Norepinephrine: 0 mL  Total OUT: 1078 mL    Total NET: 1493.9 mL      02 Jul 2023 07:01  -  03 Jul 2023 05:58  --------------------------------------------------------  IN:    Dexmedetomidine: 171.3 mL    Enteral Tube Flush: 60 mL    IV PiggyBack: 150 mL    IV PiggyBack: 500 mL    Lactated Ringers: 75 mL    Phenylephrine: 292 mL    Sodium Bicarbonate: 100 mL    Sodium Bicarbonate: 1050 mL    sodium chloride 0.45% w/ Additives: 700 mL    Vasopressin: 85.5 mL  Total IN: 3183.7 mL    OUT:    Drain (mL): 535 mL    Drain (mL): 130 mL    Drain (mL): 335 mL    Indwelling Catheter - Urethral (mL): 210 mL    Other (mL): 1209 mL  Total OUT: 2419 mL    Total NET: 764.7 mL              ----------------------------------------------------------------------------------------------------------------------  ----------------------------------------------------------------------------------------------------------------------   CHRISTINE VILLAVICENCIO  [id number]  67y Male    Indication for ICU admission: mechanical ventilator secondary to urosepsis     Admit Date:06-18-23  ICU Date: 6/18/23  OR Date: 6/18, 6/22, 6/30     24HRS EVENT:  7/2: NIGHT:  [x] F/u 8pm labs: lactate 3.1 from 4.0  [x] F/u 4am ABG: Lactate 2.3 from 3.1, downtrending  [ ] AM Hg 8.4 from 9.9, repeat cbc at 11am  [x] TPN started  [ ] Digoxin level AM  [ ] ABG continue to f/u  -removing 50cc/hr CRRT (Continuous Renal Replacement Therapy)  -phenylephrine down to 0.9  [ ] F/u vascular recs for LUE non palpable radial pulse swelling distal extremities, cold to touch    7/2: DAY  - F/U with Renal for Dialysis vs CRRT--> Renal ok with CVVHD, titrate to -50ml/hr  - FIO2 decreased to 50%  - pm ABG--> 7.40/30/126/19  - Cr level from R pelvic drain-->Pelvic drain Cr 3.99  - 11 am BMP--> K= 4.6  - increase HCO3 gtt to 150--> decreased to 75mEq in afternoon once dialysis started  - Albumin 5 % @ 250 Q6h x 4 today  -Solucortef 50q6 x5 days for adrenal insufficiency 2/2 sepsis   -RIJ dialysis catheter  -RUE Duplex:right radial artery occlusion, vascular cs placed   -TPN cs placed  - held evening Metoprolol 2.5 mg dose x1        REVIEW OF SYSTEMS  [ ] A ten-point review of systems was otherwise negative except as noted.  [ x] Due to altered mental status/intubation, subjective information were not able to be obtained from the patient. History was obtained, to the extent possible, from review of the chart and collateral sources of information.    Daily     Daily   Diet, NPO:   Except Medications (07-01-23 @ 07:19)    CURRENT MEDS:  Neurologic Medications  acetaminophen     Tablet .. 650 milliGRAM(s) Oral every 6 hours  dexMEDEtomidine Infusion 0.2 MICROgram(s)/kG/Hr IV Continuous <Continuous>  fentaNYL    Injectable 25 MICROGram(s) IV Push every 3 hours PRN Moderate Pain (4 - 6)  gabapentin 200 milliGRAM(s) Oral every 8 hours    Respiratory Medications    Cardiovascular Medications  metoprolol tartrate Injectable 2.5 milliGRAM(s) IV Push every 6 hours  phenylephrine    Infusion 0.1 MICROgram(s)/kG/Min IV Continuous <Continuous>    Gastrointestinal Medications  albumin human  5% IVPB 250 milliLiter(s) IV Intermittent every 6 hours  calcium acetate 1334 milliGRAM(s) Oral every 8 hours  ferrous    sulfate 325 milliGRAM(s) Oral daily  pantoprazole  Injectable 40 milliGRAM(s) IV Push every 12 hours  sodium bicarbonate 1300 milliGRAM(s) Oral three times a day  sodium chloride 0.45% 1000 milliLiter(s) IV Continuous <Continuous>  sodium chloride 0.9% lock flush 10 milliLiter(s) IV Push every 1 hour PRN Pre/post blood products, medications, blood draw, and to maintain line patency  sodium chloride 0.9% lock flush 10 milliLiter(s) IV Push every 1 hour PRN Pre/post blood products, medications, blood draw, and to maintain line patency    Genitourinary Medications    Hematologic/Oncologic Medications  heparin   Injectable 5000 Unit(s) SubCutaneous every 8 hours    Antimicrobial/Immunologic Medications  levoFLOXacin IVPB 500 milliGRAM(s) IV Intermittent every 48 hours  meropenem  IVPB 500 milliGRAM(s) IV Intermittent every 12 hours    Endocrine/Metabolic Medications  hydrocortisone sodium succinate Injectable 50 milliGRAM(s) IV Push every 6 hours  vasopressin Infusion 0.03 Unit(s)/Min IV Continuous <Continuous>    Topical/Other Medications  bacitracin   Ointment 1 Application(s) Topical two times a day  chlorhexidine 0.12% Liquid 15 milliLiter(s) Oral Mucosa every 12 hours  chlorhexidine 2% Cloths 1 Application(s) Topical <User Schedule>  CRRT Treatment    <Continuous>  PureFlow Dialysate RFP-400 (K 2 / Ca 3) 5000 milliLiter(s) CRRT <Continuous>    ICU Vital Signs Last 24 Hrs  T(C): 34.4 (03 Jul 2023 01:00), Max: 37.1 (02 Jul 2023 12:00)  T(F): 94 (03 Jul 2023 01:00), Max: 98.7 (02 Jul 2023 12:00)  HR: 59 (03 Jul 2023 02:50) (59 - 91)  BP: --  BP(mean): --  ABP: 135/80 (03 Jul 2023 02:00) (94/56 - 141/84)  ABP(mean): 100 (03 Jul 2023 02:00) (69 - 105)  RR: 18 (03 Jul 2023 02:00) (17 - 20)  SpO2: 100% (03 Jul 2023 02:50) (96% - 100%)    O2 Parameters below as of 03 Jul 2023 02:00  Patient On (Oxygen Delivery Method): ventilator    O2 Concentration (%): 40      Mode: AC/ CMV (Assist Control/ Continuous Mandatory Ventilation)  RR (machine): 18  TV (machine): 450  FiO2: 40  PEEP: 8  ITime: 0.7  MAP: 13  PIP: 32    ABG - ( 03 Jul 2023 05:04 )  pH, Arterial: 7.50  pH, Blood: x     /  pCO2: 27    /  pO2: 135   / HCO3: 21    / Base Excess: -0.8  /  SaO2: 99.3              I&O's Summary    01 Jul 2023 07:01  -  02 Jul 2023 07:00  --------------------------------------------------------  IN: 2571.9 mL / OUT: 1078 mL / NET: 1493.9 mL    02 Jul 2023 07:01  -  03 Jul 2023 05:58  --------------------------------------------------------  IN: 3183.7 mL / OUT: 2419 mL / NET: 764.7 mL      I&O's Detail    01 Jul 2023 07:01  -  02 Jul 2023 07:00  --------------------------------------------------------  IN:    Dexmedetomidine: 374.8 mL    IV PiggyBack: 100 mL    Phenylephrine: 384.7 mL    Phenylephrine: 101.9 mL    Sodium Bicarbonate: 750 mL    Sodium Bicarbonate: 700 mL    sodium chloride 0.9%: 75 mL    Vasopressin: 85.5 mL  Total IN: 2571.9 mL    OUT:    Drain (mL): 100 mL    Drain (mL): 305 mL    Drain (mL): 505 mL    Indwelling Catheter - Urethral (mL): 168 mL    Norepinephrine: 0 mL  Total OUT: 1078 mL    Total NET: 1493.9 mL      02 Jul 2023 07:01  -  03 Jul 2023 05:58  --------------------------------------------------------  IN:    Dexmedetomidine: 171.3 mL    Enteral Tube Flush: 60 mL    IV PiggyBack: 150 mL    IV PiggyBack: 500 mL    Lactated Ringers: 75 mL    Phenylephrine: 292 mL    Sodium Bicarbonate: 100 mL    Sodium Bicarbonate: 1050 mL    sodium chloride 0.45% w/ Additives: 700 mL    Vasopressin: 85.5 mL  Total IN: 3183.7 mL    OUT:    Drain (mL): 535 mL    Drain (mL): 130 mL    Drain (mL): 335 mL    Indwelling Catheter - Urethral (mL): 210 mL    Other (mL): 1209 mL  Total OUT: 2419 mL    Total NET: 764.7 mL              ----------------------------------------------------------------------------------------------------------------------  ----------------------------------------------------------------------------------------------------------------------   1-2 cups/cans per day